# Patient Record
Sex: FEMALE | Race: WHITE | NOT HISPANIC OR LATINO | Employment: OTHER | URBAN - METROPOLITAN AREA
[De-identification: names, ages, dates, MRNs, and addresses within clinical notes are randomized per-mention and may not be internally consistent; named-entity substitution may affect disease eponyms.]

---

## 2018-10-23 ENCOUNTER — HOSPITAL ENCOUNTER (EMERGENCY)
Facility: HOSPITAL | Age: 33
Discharge: HOME | End: 2018-10-23
Attending: EMERGENCY MEDICINE
Payer: MEDICARE

## 2018-10-23 VITALS
RESPIRATION RATE: 20 BRPM | TEMPERATURE: 98 F | OXYGEN SATURATION: 95 % | HEART RATE: 88 BPM | DIASTOLIC BLOOD PRESSURE: 70 MMHG | SYSTOLIC BLOOD PRESSURE: 130 MMHG

## 2018-10-23 DIAGNOSIS — S33.5XXA LOW BACK SPRAIN, INITIAL ENCOUNTER: Primary | ICD-10-CM

## 2018-10-23 PROCEDURE — 63600175 PHARM REV CODE 636 W HCPCS: Performed by: PHYSICIAN ASSISTANT

## 2018-10-23 PROCEDURE — 99284 EMERGENCY DEPT VISIT MOD MDM: CPT

## 2018-10-23 PROCEDURE — 96372 THER/PROPH/DIAG INJ SC/IM: CPT | Mod: 59

## 2018-10-23 RX ORDER — ORPHENADRINE CITRATE 30 MG/ML
60 INJECTION INTRAMUSCULAR; INTRAVENOUS
Status: COMPLETED | OUTPATIENT
Start: 2018-10-23 | End: 2018-10-23

## 2018-10-23 RX ORDER — KETOROLAC TROMETHAMINE 30 MG/ML
60 INJECTION, SOLUTION INTRAMUSCULAR; INTRAVENOUS
Status: COMPLETED | OUTPATIENT
Start: 2018-10-23 | End: 2018-10-23

## 2018-10-23 RX ORDER — ORPHENADRINE CITRATE 100 MG/1
100 TABLET, EXTENDED RELEASE ORAL 2 TIMES DAILY
Qty: 20 TABLET | Refills: 0 | Status: SHIPPED | OUTPATIENT
Start: 2018-10-23 | End: 2018-11-02

## 2018-10-23 RX ORDER — HYDROCODONE BITARTRATE AND ACETAMINOPHEN 7.5; 325 MG/1; MG/1
1 TABLET ORAL EVERY 6 HOURS PRN
Qty: 12 TABLET | Refills: 0 | Status: SHIPPED | OUTPATIENT
Start: 2018-10-23 | End: 2018-11-02

## 2018-10-23 RX ORDER — DEXAMETHASONE SODIUM PHOSPHATE 4 MG/ML
8 INJECTION, SOLUTION INTRA-ARTICULAR; INTRALESIONAL; INTRAMUSCULAR; INTRAVENOUS; SOFT TISSUE
Status: COMPLETED | OUTPATIENT
Start: 2018-10-23 | End: 2018-10-23

## 2018-10-23 RX ADMIN — KETOROLAC TROMETHAMINE 60 MG: 30 INJECTION INTRAMUSCULAR; INTRAVENOUS at 07:10

## 2018-10-23 RX ADMIN — DEXAMETHASONE SODIUM PHOSPHATE 8 MG: 4 INJECTION, SOLUTION INTRAMUSCULAR; INTRAVENOUS at 07:10

## 2018-10-23 RX ADMIN — ORPHENADRINE CITRATE 60 MG: 30 INJECTION INTRAMUSCULAR; INTRAVENOUS at 07:10

## 2018-10-24 NOTE — ED PROVIDER NOTES
"SCRIBE #1 NOTE: I, Tiffanie Avery, am scribing for, and in the presence of, LUCA Wick Jr. I have scribed the entire note.      History      Chief Complaint   Patient presents with    Back Pain     pt reports hx of pinch nerve in lower back and states she thinks it is a "flare up"       Review of patient's allergies indicates:  No Known Allergies     HPI   HPI    10/23/2018, 7:01 PM   History obtained from the patient      History of Present Illness: Tianna Leon is a 33 y.o. female patient who presents to the Emergency Department for exacerbation of non-radiating chronic lower back pain which onset gradually several days ago. Symptoms are constant and mild in severity. She states that she was seeing pain management but quit pain management because she was receiving epidural injections which helped with the pain. She is currently taking Ibuprofen 600 mg for pain but the pain has not improved. Pain is worse with ambulation and movement. Patient denies direct back trauma/injury, fever, chills, saddle anesthesia, bowel/bladder incontinence, extremity weakness/numbness, gait problem, and all other sxs at this time. No further complaints or concerns at this time.       Arrival mode: Personal vehicle    PCP: Noris Palmer MD     Past Medical History:  Past medical history reviewed not relevant      Past Surgical History:  Past surgical history reviewed not relevant      Family History:  Family history reviewed not relevant      Social History:  Social History    Social History Main Topics    Social History Main Topics    Smoking status: Unknown if ever smoked    Smokeless tobacco: Unknown if ever used    Alcohol Use: Unknown drinking history    Drug Use: Unknown if ever used    Sexual Activity: Unknown     ROS   Review of Systems   Constitutional: Negative for chills and fever.   HENT: Negative for sore throat.    Respiratory: Negative for shortness of breath.    Cardiovascular: Negative for chest pain. "   Gastrointestinal: Negative for nausea.   Genitourinary: Negative for dysuria.   Musculoskeletal: Positive for back pain. Negative for arthralgias, joint swelling, neck pain and neck stiffness.        (-) direct back trauma/injury   Skin: Negative for rash.   Neurological: Negative for weakness and numbness.        (-) saddle anesthesia, (-) bladder/bowel incontinence, (-) paresthesias   Hematological: Does not bruise/bleed easily.   All other systems reviewed and are negative.      Physical Exam      Initial Vitals [10/23/18 1848]   BP Pulse Resp Temp SpO2   135/70 94 20 98 °F (36.7 °C) 95 %      MAP       --          Physical Exam  Nursing Notes and Vital Signs Reviewed.  Constitutional: Patient is in no acute distress. Awake and alert. Well-developed and well-nourished.  Head: Atraumatic. Normocephalic.  Eyes: PERRL. EOM intact. Conjunctivae are not pale. No scleral icterus.  ENT: Mucous membranes are moist. Oropharynx is clear and symmetric.    Neck: Supple. Full ROM.  Cardiovascular: Regular rate. Regular rhythm. No murmurs, rubs, or gallops. Distal pulses are 2+ and symmetric.  Pulmonary/Chest: No respiratory distress. Clear to auscultation bilaterally. No wheezing, rales, or rhonchi.  Abdominal: Non-distended.  Genitourinary: No CVA tenderness  Musculoskeletal: Moves all extremities. No obvious deformities. No edema. No calf tenderness.  Back: There is lumbar paraspinal muscle tenderness. No midline bony tenderness, deformities, or step-offs of the T-spine or L-spine. Skin appears normal without abrasions or bruising. No erythema, induration, or fluctuance.   Skin: Warm and dry.  Neurological: Awake, alert, and appropriate. GCS 15. Appropriate for age. DTRs 2+ and equal. Normal gait. No acute focal neurological deficits noted.  Psychiatric: Normal affect. Good eye contact. Appropriate in content.    ED Course    Procedures  ED Vital Signs:  Vitals:    10/23/18 1848   BP: 135/70   Pulse: 94   Resp: 20   Temp:  98 °F (36.7 °C)   TempSrc: Oral   SpO2: 95%     The Emergency Provider reviewed the vital signs and test results, which are outlined above.    ED Discussion     7:11 PM: Patient is requesting injections here for pain. Discussed pt dx and plan to tx with Norco and Norflex. Informed pt to follow up with PCP.  All questions and concerns were addressed at this time. Pt expresses understanding of information and instructions, and is comfortable with plan to discharge. Pt is stable for discharge.    I discussed with patient that evaluation in the ED does not suggest any emergent or life threatening medical conditions requiring immediate intervention beyond what was provided in the ED, and I believe patient is safe for discharge.  Regardless, an unremarkable evaluation in the ED does not preclude the development or presence of a serious of life threatening condition. As such, patient was instructed to return immediately for any worsening or change in current symptoms.      ED Medication(s):  Medications   ketorolac injection 60 mg (not administered)   orphenadrine injection 60 mg (not administered)   dexamethasone injection 8 mg (not administered)       Current Discharge Medication List      START taking these medications    Details   HYDROcodone-acetaminophen (NORCO) 7.5-325 mg per tablet Take 1 tablet by mouth every 6 (six) hours as needed for Pain.  Qty: 12 tablet, Refills: 0      orphenadrine (NORFLEX) 100 mg tablet Take 1 tablet (100 mg total) by mouth 2 (two) times daily. for 10 days  Qty: 20 tablet, Refills: 0             Follow-up Information     Noris Palmer MD In 1 week.    Specialty:  Family Medicine  Why:  If symptoms worsen  Contact information:  [address]  [facility] [address]  [phone]                    Medical Decision Making              Scribe Attestation:   Scribe #1: I performed the above scribed service and the documentation accurately describes the services I performed. I attest to  the accuracy of the note.  Attending:   Physician Attestation Statement for Scribe #1: I, LUCA Wick Jr., personally performed the services described in this documentation, as scribed by Tiffanie Avery, in my presence, and it is both accurate and complete.          Clinical Impression       ICD-10-CM ICD-9-CM   1. Low back sprain, initial encounter S33.5XXA 846.9       Disposition:   Disposition: Discharged  Condition: Stable           LUCA Morales  10/23/18 2138

## 2018-11-16 ENCOUNTER — TELEPHONE (OUTPATIENT)
Dept: PAIN MEDICINE | Facility: CLINIC | Age: 33
End: 2018-11-16

## 2018-11-16 NOTE — TELEPHONE ENCOUNTER
Contacted pt to offer earlier appt. Pt requesting to set up pain management appointment for pain medications. Informed pt that  and Dr. Kirkland is an INTERVENTIONAL pain specialist meaning they do procedures for pain management. Pt states she will contact  That referred her to verify as she was under the undestanding that she will be getting refills and medical pain management. All questions answered.//lp

## 2018-11-16 NOTE — TELEPHONE ENCOUNTER
----- Message from Antonette Moran sent at 11/16/2018 12:38 PM CST -----  Contact: self   Requesting a call back regarding if referral was received.please call back at [phone].      Thanks,  Antonette Moran

## 2018-11-28 ENCOUNTER — TELEPHONE (OUTPATIENT)
Dept: PAIN MEDICINE | Facility: CLINIC | Age: 33
End: 2018-11-28

## 2018-11-28 NOTE — TELEPHONE ENCOUNTER
Contacted pt to confirm that I spoke to pt 11/16/18. Contacted pt to offer earlier appt. Pt requesting to set up pain management appointment for pain medications. Informed pt that  and Dr. Kirkland is an INTERVENTIONAL pain specialist meaning they do procedures for pain management. Pt states she will contact  That referred her to verify as she was under the undestanding that she will be getting refills and medical pain management. All questions answered.//lp

## 2019-03-25 ENCOUNTER — OFFICE VISIT (OUTPATIENT)
Dept: OPHTHALMOLOGY | Facility: CLINIC | Age: 34
End: 2019-03-25
Payer: MEDICARE

## 2019-03-25 DIAGNOSIS — H52.203 MYOPIC ASTIGMATISM OF BOTH EYES: ICD-10-CM

## 2019-03-25 DIAGNOSIS — H52.13 MYOPIC ASTIGMATISM OF BOTH EYES: ICD-10-CM

## 2019-03-25 DIAGNOSIS — Z01.00 VISIT FOR EYE AND VISION EXAM: Primary | ICD-10-CM

## 2019-03-25 DIAGNOSIS — Z13.5 GLAUCOMA SCREENING: ICD-10-CM

## 2019-03-25 PROCEDURE — 92015 PR REFRACTION: ICD-10-PCS | Mod: S$GLB,,, | Performed by: OPTOMETRIST

## 2019-03-25 PROCEDURE — 99999 PR PBB SHADOW E&M-EST. PATIENT-LVL II: CPT | Mod: PBBFAC,,, | Performed by: OPTOMETRIST

## 2019-03-25 PROCEDURE — 92015 DETERMINE REFRACTIVE STATE: CPT | Mod: S$GLB,,, | Performed by: OPTOMETRIST

## 2019-03-25 PROCEDURE — 92004 COMPRE OPH EXAM NEW PT 1/>: CPT | Mod: S$GLB,,, | Performed by: OPTOMETRIST

## 2019-03-25 PROCEDURE — 99999 PR PBB SHADOW E&M-EST. PATIENT-LVL II: ICD-10-PCS | Mod: PBBFAC,,, | Performed by: OPTOMETRIST

## 2019-03-25 PROCEDURE — 92004 PR EYE EXAM, NEW PATIENT,COMPREHESV: ICD-10-PCS | Mod: S$GLB,,, | Performed by: OPTOMETRIST

## 2019-03-25 NOTE — PROGRESS NOTES
HPI     New Patient  Screening for glaucoma  RE  No visual complaints    Last edited by Leland Espitia MA on 3/25/2019  9:19 AM. (History)            Assessment /Plan     For exam results, see Encounter Report.    Visit for eye and vision exam    Glaucoma screening    Myopic astigmatism of both eyes      OH OK OU.  Spec Rx given.  RTC one year or prn.

## 2019-04-05 ENCOUNTER — TELEPHONE (OUTPATIENT)
Dept: OBSTETRICS AND GYNECOLOGY | Facility: CLINIC | Age: 34
End: 2019-04-05

## 2019-04-05 NOTE — TELEPHONE ENCOUNTER
Returned call to patient.  She requested an earlier appt than what she scheduled.  Says that it doesn't matter which provider and is okay with a NP, per patient.  Appointment scheduled 04/12/19 at 9:15 am with Ms. Stella Scott, NP, patient confirmed appointment, provider and location.

## 2019-04-05 NOTE — TELEPHONE ENCOUNTER
----- Message from Julisa Chaudhry sent at 4/5/2019  3:19 PM CDT -----  Contact: Patient  Patient called to reschedule her appointment. She can be contacted at [phone].    Thanks,  Julisa

## 2019-04-08 PROBLEM — J43.1 PANLOBULAR EMPHYSEMA: Status: ACTIVE | Noted: 2019-04-08

## 2019-05-10 ENCOUNTER — OFFICE VISIT (OUTPATIENT)
Dept: OBSTETRICS AND GYNECOLOGY | Facility: CLINIC | Age: 34
End: 2019-05-10
Payer: MEDICARE

## 2019-05-10 VITALS
HEIGHT: 64 IN | WEIGHT: 254 LBS | SYSTOLIC BLOOD PRESSURE: 118 MMHG | BODY MASS INDEX: 43.36 KG/M2 | DIASTOLIC BLOOD PRESSURE: 78 MMHG

## 2019-05-10 DIAGNOSIS — N94.6 DYSMENORRHEA: ICD-10-CM

## 2019-05-10 DIAGNOSIS — N81.11 CYSTOCELE, MIDLINE: ICD-10-CM

## 2019-05-10 DIAGNOSIS — Z01.419 WELL WOMAN EXAM WITH ROUTINE GYNECOLOGICAL EXAM: Primary | ICD-10-CM

## 2019-05-10 DIAGNOSIS — N39.3 FEMALE STRESS INCONTINENCE: ICD-10-CM

## 2019-05-10 PROCEDURE — 99999 PR PBB SHADOW E&M-EST. PATIENT-LVL II: CPT | Mod: PBBFAC,,, | Performed by: OBSTETRICS & GYNECOLOGY

## 2019-05-10 PROCEDURE — 99999 PR PBB SHADOW E&M-EST. PATIENT-LVL II: ICD-10-PCS | Mod: PBBFAC,,, | Performed by: OBSTETRICS & GYNECOLOGY

## 2019-05-10 PROCEDURE — G0101 CA SCREEN;PELVIC/BREAST EXAM: HCPCS | Mod: S$GLB,,, | Performed by: OBSTETRICS & GYNECOLOGY

## 2019-05-10 PROCEDURE — 87624 HPV HI-RISK TYP POOLED RSLT: CPT

## 2019-05-10 PROCEDURE — G0101 PR CA SCREEN;PELVIC/BREAST EXAM: ICD-10-PCS | Mod: S$GLB,,, | Performed by: OBSTETRICS & GYNECOLOGY

## 2019-05-10 PROCEDURE — 88175 CYTOPATH C/V AUTO FLUID REDO: CPT

## 2019-05-10 RX ORDER — OMEPRAZOLE 40 MG/1
CAPSULE, DELAYED RELEASE ORAL
Refills: 11 | COMMUNITY
Start: 2019-03-29 | End: 2019-06-07 | Stop reason: SDUPTHER

## 2019-05-10 RX ORDER — IBUPROFEN 800 MG/1
TABLET ORAL
Refills: 0 | Status: ON HOLD | COMMUNITY
Start: 2019-03-18 | End: 2019-07-23 | Stop reason: SDUPTHER

## 2019-05-10 RX ORDER — GABAPENTIN 300 MG/1
300 CAPSULE ORAL NIGHTLY
COMMUNITY
Start: 2019-03-06 | End: 2019-10-01 | Stop reason: SDUPTHER

## 2019-05-10 RX ORDER — MEDROXYPROGESTERONE ACETATE 10 MG/1
10 TABLET ORAL NIGHTLY
Status: ON HOLD | COMMUNITY
Start: 2019-05-05 | End: 2019-07-23

## 2019-05-10 RX ORDER — VARENICLINE TARTRATE 0.5 MG/1
0.5 TABLET, FILM COATED ORAL NIGHTLY
COMMUNITY
Start: 2019-05-06 | End: 2019-08-08

## 2019-05-13 ENCOUNTER — TELEPHONE (OUTPATIENT)
Dept: OBSTETRICS AND GYNECOLOGY | Facility: CLINIC | Age: 34
End: 2019-05-13

## 2019-05-13 NOTE — TELEPHONE ENCOUNTER
----- Message from Brenda Mcclain sent at 5/13/2019 12:14 PM CDT -----  Contact: pt  Type:  Patient Returning Call    Who Called: the pt   Who Left Message for Patient: unknown  Does the patient know what this is regarding?: Surgery appt  Would the patient rather a call back or a response via [facility]? Call back  Best Call Back Number: [phone]  Additional Information: n/a

## 2019-05-13 NOTE — TELEPHONE ENCOUNTER
Patient called stating that she had missed a call from the surgery scheduler.  Informed patient that I would send a message, and to be expecting another call from them.  Patient verbalized understanding.

## 2019-05-14 ENCOUNTER — TELEPHONE (OUTPATIENT)
Dept: OBSTETRICS AND GYNECOLOGY | Facility: CLINIC | Age: 34
End: 2019-05-14

## 2019-05-14 DIAGNOSIS — N39.3 FEMALE STRESS INCONTINENCE: ICD-10-CM

## 2019-05-14 DIAGNOSIS — N81.11 CYSTOCELE, MIDLINE: ICD-10-CM

## 2019-05-14 DIAGNOSIS — N94.6 DYSMENORRHEA: Primary | ICD-10-CM

## 2019-05-15 NOTE — PROGRESS NOTES
HPI:  34 y.o. female  presents today for well woman exam and complaint of severe pain with menses ever since her ectopic pregnancy surgery around 7 years ago.  She had right salpingectomy at that time.  Menses are regular with fairly heavy flow.  Pain is severe, and she has 2 days a month that she spends in bed due to severe pain, because she is unable to function.  She has previously tried hormonal contraception, which has not relieved the pain.  She reports she had a pelvic u/s with her previous ob/gyn that was negative.  Leep in past with normal paps since.  She also has bladder leakage with cough and sneeze and wears a pad daily.  She desires a hysterectomy with bladder lift for management of her symptoms.          REVIEW OF SYSTEMS:  GENERAL:  No fever, chills, fatigue, or weight loss  ABDOMEN:  Normal appetite, no weight loss or abdominal pain  URINARY:  No flank pain, dysuria, or hematuria  REPRODUCTIVE:  No abnormal vaginal bleeding  BREASTS:  No tenderness, masses, or nipple discharge noted of breasts    PHYSICAL EXAM:    APPEARANCE:  Well nourished, well developed, in no acute distress  ABDOMEN:  Soft, no tenderness or masses, no distension noted  PELVIC:  VULVA:  No lesions.  Normal female genitalia  URETHRAL MEATUS:  Normal size and location.  No lesions.  No prolapse  URETHRA:  No masses, tenderness, prolapse, or scarring, urethral hypermobility is present.  VAGINA:  No lesions or discharge.  Third degree cystocele, minimal rectocele  CERVIX:  No lesions.  Normal diameter, no cervical motion tenderness.  UTERUS:  4-6 week size, regular shape, mobile, non-tender, normal position, good support  ADNEXA:  No masses or tenderness  ANUS AND PERINEUM:  No lesions.  No external hemorrhoids    ASSESSMENT:  1. Well woman exam with routine gynecological exam  Liquid-based pap smear, screening    HPV High Risk Genotypes, PCR   2. Dysmenorrhea     3. Female stress incontinence     4. Cystocele, midline            PLAN:  Counseled patient on treatment options for her situation.  Due to severity of her symptoms, she desires to proceed with RALH, as well as TOT sling and anterior repair.  Will schedule surgery.  Will obtain prior records.    Discussed with the patient and all questioned fully answered. She will call me if any problems arise.    The patient indicates understanding of these issues and agrees with the plan.

## 2019-05-16 LAB
HPV HR 12 DNA CVX QL NAA+PROBE: NEGATIVE
HPV16 AG SPEC QL: NEGATIVE
HPV18 DNA SPEC QL NAA+PROBE: NEGATIVE

## 2019-05-18 ENCOUNTER — HOSPITAL ENCOUNTER (EMERGENCY)
Facility: HOSPITAL | Age: 34
Discharge: HOME | End: 2019-05-18
Attending: FAMILY MEDICINE
Payer: MEDICARE

## 2019-05-18 VITALS
HEIGHT: 65 IN | BODY MASS INDEX: 41.14 KG/M2 | TEMPERATURE: 98 F | DIASTOLIC BLOOD PRESSURE: 75 MMHG | RESPIRATION RATE: 16 BRPM | WEIGHT: 246.94 LBS | SYSTOLIC BLOOD PRESSURE: 158 MMHG | HEART RATE: 84 BPM | OXYGEN SATURATION: 97 %

## 2019-05-18 DIAGNOSIS — G89.29 CHRONIC BILATERAL LOW BACK PAIN WITH BILATERAL SCIATICA: ICD-10-CM

## 2019-05-18 DIAGNOSIS — H66.002 ACUTE SUPPURATIVE OTITIS MEDIA OF LEFT EAR WITHOUT SPONTANEOUS RUPTURE OF TYMPANIC MEMBRANE, RECURRENCE NOT SPECIFIED: Primary | ICD-10-CM

## 2019-05-18 DIAGNOSIS — M54.41 CHRONIC BILATERAL LOW BACK PAIN WITH BILATERAL SCIATICA: ICD-10-CM

## 2019-05-18 DIAGNOSIS — M54.42 CHRONIC BILATERAL LOW BACK PAIN WITH BILATERAL SCIATICA: ICD-10-CM

## 2019-05-18 PROCEDURE — 99284 EMERGENCY DEPT VISIT MOD MDM: CPT | Mod: 25

## 2019-05-18 PROCEDURE — 25000003 PHARM REV CODE 250: Performed by: NURSE PRACTITIONER

## 2019-05-18 PROCEDURE — 96372 THER/PROPH/DIAG INJ SC/IM: CPT

## 2019-05-18 PROCEDURE — 63600175 PHARM REV CODE 636 W HCPCS: Performed by: NURSE PRACTITIONER

## 2019-05-18 RX ORDER — HYDROCODONE BITARTRATE AND ACETAMINOPHEN 10; 325 MG/1; MG/1
1 TABLET ORAL
Status: COMPLETED | OUTPATIENT
Start: 2019-05-18 | End: 2019-05-18

## 2019-05-18 RX ORDER — TRAMADOL HYDROCHLORIDE 50 MG/1
50 TABLET ORAL EVERY 6 HOURS PRN
Qty: 15 TABLET | Refills: 0 | Status: SHIPPED | OUTPATIENT
Start: 2019-05-18 | End: 2019-05-29

## 2019-05-18 RX ORDER — LORAZEPAM 1 MG/1
1 TABLET ORAL
Status: COMPLETED | OUTPATIENT
Start: 2019-05-18 | End: 2019-05-18

## 2019-05-18 RX ORDER — AMOXICILLIN AND CLAVULANATE POTASSIUM 875; 125 MG/1; MG/1
1 TABLET, FILM COATED ORAL 2 TIMES DAILY
Qty: 14 TABLET | Refills: 0 | Status: SHIPPED | OUTPATIENT
Start: 2019-05-18 | End: 2019-05-28

## 2019-05-18 RX ORDER — CYCLOBENZAPRINE HCL 10 MG
10 TABLET ORAL 3 TIMES DAILY PRN
Qty: 28 TABLET | Refills: 0 | Status: SHIPPED | OUTPATIENT
Start: 2019-05-18 | End: 2019-05-23

## 2019-05-18 RX ORDER — ONDANSETRON 4 MG/1
4 TABLET, ORALLY DISINTEGRATING ORAL
Status: COMPLETED | OUTPATIENT
Start: 2019-05-18 | End: 2019-05-18

## 2019-05-18 RX ORDER — DEXAMETHASONE SODIUM PHOSPHATE 4 MG/ML
8 INJECTION, SOLUTION INTRA-ARTICULAR; INTRALESIONAL; INTRAMUSCULAR; INTRAVENOUS; SOFT TISSUE
Status: COMPLETED | OUTPATIENT
Start: 2019-05-18 | End: 2019-05-18

## 2019-05-18 RX ADMIN — ONDANSETRON 4 MG: 4 TABLET, ORALLY DISINTEGRATING ORAL at 07:05

## 2019-05-18 RX ADMIN — LORAZEPAM 1 MG: 1 TABLET ORAL at 07:05

## 2019-05-18 RX ADMIN — HYDROCODONE BITARTRATE AND ACETAMINOPHEN 1 TABLET: 10; 325 TABLET ORAL at 07:05

## 2019-05-18 RX ADMIN — DEXAMETHASONE SODIUM PHOSPHATE 8 MG: 4 INJECTION, SOLUTION INTRAMUSCULAR; INTRAVENOUS at 07:05

## 2019-05-19 NOTE — ED PROVIDER NOTES
SCRIBE #1 NOTE: I, Tiffanie Avery, am scribing for, and in the presence of, Louise Borges NP. I have scribed the entire note.       History     Chief Complaint   Patient presents with    Back Pain     chronic back pain, scheduled for injections in June, also c/o left ear pain.     Review of patient's allergies indicates:  No Known Allergies      History of Present Illness     HPI    5/18/2019, 7:05 PM  History obtained from the patient      History of Present Illness: Tianna Leon is a 34 y.o. female patient with a PMHx of chronic back pain who presents to the Emergency Department for evaluation of lower back pain which she has been having for 7 months. She denies any recent trauma or activity that could have exacerbating the pain. Patient notes that she just found a doctor that accepts her insurance and has an appt in June for injections. She has been taking Robaxin and takes Gabapentin daily w/o relief. The pain has been constant, moderate in severity, and radiates to her bilateral buttocks and legs. NIALL injections has helped in the past and PCP prescribed a few days course of Tramadol which helped. No exacerbating factors reported. Associated sx include nausea. She denies fever, chills, saddle anesthesia, bladder/bowel incontinence, extremity weakness/numbness, paresthesias. Patient also c/o L ear pain which onset 2 hours ago with associated ear drainage. Pt reports having ear infections 3 times in the past 3 months and is scheduled to see ENT in 2 weeks. She denies dental pain.    Arrival mode: Personal vehicle    PCP: Spenser Campa MD        Past Medical History:  Past Medical History:   Diagnosis Date    ADHD (attention deficit hyperactivity disorder)     Anxiety     Bipolar disorder     COPD (chronic obstructive pulmonary disease)     GERD (gastroesophageal reflux disease)     Hyperlipidemia     Intravenous drug abuse     MDD (major depressive disorder)     Mood disorder     PTSD  (post-traumatic stress disorder)     Suicidal ideation        Past Surgical History:  Past Surgical History:   Procedure Laterality Date     SECTION  2001    CHOLECYSTECTOMY      TONSILLECTOMY      TUBAL LIGATION           Family History:  No family history on file.    Social History:  Social History     Tobacco Use    Smoking status: Current Every Day Smoker     Packs/day: 1.50     Types: Cigarettes    Smokeless tobacco: Never Used   Substance and Sexual Activity    Alcohol use: Yes     Comment: Occassional    Drug use: Not Currently     Types: IV, Methamphetamines, Heroin    Sexual activity: Yes        Review of Systems     Review of Systems   Constitutional: Negative for chills and fever.   HENT: Positive for ear discharge and ear pain. Negative for dental problem and sore throat.    Respiratory: Negative for shortness of breath.    Cardiovascular: Negative for chest pain.   Gastrointestinal: Positive for nausea. Negative for abdominal pain and vomiting.   Genitourinary: Negative for dysuria.   Musculoskeletal: Positive for back pain. Negative for neck pain.        (-) acute trauma   Skin: Negative for rash.   Neurological: Negative for weakness and numbness.        (-) paresthesias, (-) saddle anesthesia, (-) bladder/bowel incontinence   Hematological: Does not bruise/bleed easily.   All other systems reviewed and are negative.     Physical Exam     Initial Vitals [19 1845]   BP Pulse Resp Temp SpO2   (!) 158/75 84 16 98.1 °F (36.7 °C) 97 %      MAP       --          Physical Exam  Nursing Notes and Vital Signs Reviewed.  Constitutional: Patient is in no acute distress. Well-developed and well-nourished.  Head: Atraumatic. Normocephalic.  Eyes: PERRL. EOM intact. Conjunctivae are not pale. No scleral icterus.  Ears: Right TM normal. Left TM with erythema, bulging, and effusion. No TM perforation noted bilaterally.   Nose: Patent nares. Turbinates are normal. No drainage.   Throat: Moist  "mucous membranes. Posterior oropharynx is symmetric without erythema. Tonsillar exudate is not present. No trismus. Normal handling of secretions. No stridor.  Neck: Supple. Full ROM. No lymphadenopathy.  Cardiovascular: Regular rate. Regular rhythm. No murmurs, rubs, or gallops. Distal pulses are 2+ and symmetric.  Pulmonary/Chest: No respiratory distress. Clear to auscultation bilaterally. No wheezing or rales.  Abdominal: Soft and non-distended.  There is no tenderness.  No rebound, guarding, or rigidity. Good bowel sounds.  Musculoskeletal: Moves all extremities. No obvious deformities. No edema. No calf tenderness.  Back: mild paraspinous tenderness to L spine. No midline bony tenderness, deformities, or step-offs of the T-spine or L-spine. Skin appears normal without abrasions or bruising. Full ROM to BLE, 5/5 strength, +2 bilateral pedal pulse.   Skin: Warm and dry.  Neurological:  Alert, awake, and appropriate.  Normal speech.  No acute focal neurological deficits are appreciated.  Psychiatric: Normal affect. Good eye contact. Appropriate in content.     ED Course   Procedures  ED Vital Signs:  Vitals:    05/18/19 1845   BP: (!) 158/75   Pulse: 84   Resp: 16   Temp: 98.1 °F (36.7 °C)   TempSrc: Oral   SpO2: 97%   Weight: 112 kg (246 lb 14.6 oz)   Height: 5' 5" (1.651 m)              The Emergency Provider reviewed the vital signs and test results, which are outlined above.     ED Discussion     7:46 PM: Discussed pt dx and plan to prescribe a few days worth of Tramadol for pain and Flexeril. Will prescribe Augmentin for ear infection. Keep scheduled appts. Gave pt all f/u and return to the ED instructions. All questions and concerns were addressed at this time. Pt expresses understanding of information and instructions, and is comfortable with plan to discharge. Pt is stable for discharge.    I discussed with patient and/or family/caretaker that evaluation in the ED does not suggest any emergent or life " threatening medical conditions requiring immediate intervention beyond what was provided in the ED, and I believe patient is safe for discharge.  Regardless, an unremarkable evaluation in the ED does not preclude the development or presence of a serious of life threatening condition. As such, patient was instructed to return immediately for any worsening or change in current symptoms.    ED Medication(s):  Medications   dexamethasone injection 8 mg (8 mg Intramuscular Given 5/18/19 1942)   HYDROcodone-acetaminophen  mg per tablet 1 tablet (1 tablet Oral Given 5/18/19 1941)   ondansetron disintegrating tablet 4 mg (4 mg Oral Given 5/18/19 1942)   LORazepam tablet 1 mg (1 mg Oral Given 5/18/19 1941)       Discharge Medication List as of 5/18/2019  7:46 PM      START taking these medications    Details   amoxicillin-clavulanate 875-125mg (AUGMENTIN) 875-125 mg per tablet Take 1 tablet by mouth 2 (two) times daily. for 10 days, Starting Sat 5/18/2019, Until Tue 5/28/2019, Print      cyclobenzaprine (FLEXERIL) 10 MG tablet Take 1 tablet (10 mg total) by mouth 3 (three) times daily as needed for Muscle spasms., Starting Sat 5/18/2019, Until Thu 5/23/2019, Print      traMADol (ULTRAM) 50 mg tablet Take 1 tablet (50 mg total) by mouth every 6 (six) hours as needed for Pain., Starting Sat 5/18/2019, Until Tue 5/28/2019, Print             Follow-up Information     Spenser Campa MD In 1 week.    Specialty:  Family Medicine  Why:  Follow up with your doctor for further evaluation, Return to ED for any concerns.  Contact information:  [address]  [address]  [facility] [address]  [phone]                                     Scribe Attestation:   Scribe #1: I performed the above scribed service and the documentation accurately describes the services I performed. I attest to the accuracy of the note.     Attending:   Physician Attestation Statement for Scribe #1: I, Louise Borges NP, personally performed the  services described in this documentation, as scribed by Tiffanie Avery, in my presence, and it is both accurate and complete.           Clinical Impression       ICD-10-CM ICD-9-CM   1. Acute suppurative otitis media of left ear without spontaneous rupture of tympanic membrane, recurrence not specified H66.002 382.00   2. Chronic bilateral low back pain with bilateral sciatica M54.42 724.2    M54.41 724.3    G89.29 338.29       Disposition:   Disposition: Discharged  Condition: Stable         Louise Borges NP  05/18/19 2035

## 2019-05-27 ENCOUNTER — CLINICAL SUPPORT (OUTPATIENT)
Dept: AUDIOLOGY | Facility: CLINIC | Age: 34
End: 2019-05-27
Payer: MEDICARE

## 2019-05-27 ENCOUNTER — OFFICE VISIT (OUTPATIENT)
Dept: OTOLARYNGOLOGY | Facility: CLINIC | Age: 34
End: 2019-05-27
Payer: MEDICARE

## 2019-05-27 VITALS
HEART RATE: 77 BPM | DIASTOLIC BLOOD PRESSURE: 92 MMHG | SYSTOLIC BLOOD PRESSURE: 134 MMHG | HEIGHT: 65 IN | BODY MASS INDEX: 42.24 KG/M2 | WEIGHT: 253.5 LBS | TEMPERATURE: 98 F

## 2019-05-27 DIAGNOSIS — H90.3 SENSORY HEARING LOSS, BILATERAL: Primary | ICD-10-CM

## 2019-05-27 DIAGNOSIS — H92.03 OTALGIA, BILATERAL: Primary | ICD-10-CM

## 2019-05-27 DIAGNOSIS — F17.200 SMOKER: ICD-10-CM

## 2019-05-27 PROCEDURE — 31575 DIAGNOSTIC LARYNGOSCOPY: CPT | Mod: S$GLB,,, | Performed by: ORTHOPAEDIC SURGERY

## 2019-05-27 PROCEDURE — 3008F BODY MASS INDEX DOCD: CPT | Mod: CPTII,S$GLB,, | Performed by: ORTHOPAEDIC SURGERY

## 2019-05-27 PROCEDURE — 92557 PR COMPREHENSIVE HEARING TEST: ICD-10-PCS | Mod: S$GLB,,, | Performed by: AUDIOLOGIST

## 2019-05-27 PROCEDURE — 92567 TYMPANOMETRY: CPT | Mod: S$GLB,,, | Performed by: AUDIOLOGIST

## 2019-05-27 PROCEDURE — 99204 PR OFFICE/OUTPT VISIT, NEW, LEVL IV, 45-59 MIN: ICD-10-PCS | Mod: 25,S$GLB,, | Performed by: ORTHOPAEDIC SURGERY

## 2019-05-27 PROCEDURE — 31575 PR LARYNGOSCOPY, FLEXIBLE; DIAGNOSTIC: ICD-10-PCS | Mod: S$GLB,,, | Performed by: ORTHOPAEDIC SURGERY

## 2019-05-27 PROCEDURE — 3008F PR BODY MASS INDEX (BMI) DOCUMENTED: ICD-10-PCS | Mod: CPTII,S$GLB,, | Performed by: ORTHOPAEDIC SURGERY

## 2019-05-27 PROCEDURE — 92557 COMPREHENSIVE HEARING TEST: CPT | Mod: S$GLB,,, | Performed by: AUDIOLOGIST

## 2019-05-27 PROCEDURE — 99204 OFFICE O/P NEW MOD 45 MIN: CPT | Mod: 25,S$GLB,, | Performed by: ORTHOPAEDIC SURGERY

## 2019-05-27 PROCEDURE — 99999 PR PBB SHADOW E&M-EST. PATIENT-LVL III: CPT | Mod: PBBFAC,,, | Performed by: ORTHOPAEDIC SURGERY

## 2019-05-27 PROCEDURE — 99999 PR PBB SHADOW E&M-EST. PATIENT-LVL III: ICD-10-PCS | Mod: PBBFAC,,, | Performed by: ORTHOPAEDIC SURGERY

## 2019-05-27 PROCEDURE — 92567 PR TYMPA2METRY: ICD-10-PCS | Mod: S$GLB,,, | Performed by: AUDIOLOGIST

## 2019-05-27 NOTE — PROGRESS NOTES
Tianna Leon was seen 05/27/2019 for an audiological evaluation.  Patient complains of chronic middle ear effusion for the past several months , especially in the left ear.  She reports that she is currently taking antibiotics as prescribed by her PCP.  She reports popping, crakcling,and a  muffled sensation in the left ear.     Results reveal a borderline normal-to-mild sensorineural hearing loss 250-8000 Hz for the right ear, and  mild-to-moderate sensorineural hearing loss 250-8000 Hz for the left ear.   Speech Reception Thresholds were  20 dBHL for the right ear and 25 dBHL for the left ear.   Word recognition scores were excellent for the right ear and excellent for the left ear.   Tympanograms were Type A for the right ear and Type A for the left ear.    Patient was counseled on the above findings.    REC:  ENT review of audiogram

## 2019-05-27 NOTE — PROGRESS NOTES
"Subjective:       Patient ID: Tianna Leon is a 34 y.o. female.    Chief Complaint: Otitis Media    Patient is a very pleasant 34 year old female here to see me today for evaluation of left otalgia.  She says that she has been diagnosed multiple times recently with left AOM.  She is currently on oral antibiotics.  She has a long history of "ear problems," and says that she required adenotonsillectomy as a teenager.  She says that she did well until February.  She has two episodes of PET as a child.  She has not had any issues with ear drainage, sometimes has slightly muffled hearing.  She does smoke, one pack daily for 15 years.  She had an audiogram today, her first in many years.  She has no known issues with TMJ, and last saw her dentist about a month ago, did not discuss at that time.  She is not having any dental pain at that time.    Review of Systems   Constitutional: Negative for chills, fatigue, fever and unexpected weight change.   HENT: Positive for ear pain and hearing loss (muffled at times, slight). Negative for congestion, dental problem, ear discharge, facial swelling, nosebleeds, postnasal drip, rhinorrhea, sinus pressure, sneezing, sore throat, tinnitus, trouble swallowing and voice change.    Eyes: Negative for redness, itching and visual disturbance.   Respiratory: Positive for cough. Negative for choking, shortness of breath and wheezing.    Cardiovascular: Negative for chest pain and palpitations.   Gastrointestinal: Negative for abdominal pain.        No reflux.   Musculoskeletal: Negative for gait problem.   Skin: Negative for rash.   Neurological: Negative for dizziness, light-headedness and headaches.       Objective:      Physical Exam   Constitutional: She is oriented to person, place, and time. She appears well-developed and well-nourished. No distress.   HENT:   Head: Normocephalic and atraumatic.   Right Ear: External ear and ear canal normal. Tympanic membrane is scarred. "   Left Ear: External ear and ear canal normal. Tympanic membrane is scarred.   Nose: Nose normal. No mucosal edema, rhinorrhea, nasal deformity or septal deviation. No epistaxis. Right sinus exhibits no maxillary sinus tenderness and no frontal sinus tenderness. Left sinus exhibits no maxillary sinus tenderness and no frontal sinus tenderness.   Mouth/Throat: Uvula is midline, oropharynx is clear and moist and mucous membranes are normal. Mucous membranes are not pale and not dry. No dental caries. No oropharyngeal exudate or posterior oropharyngeal erythema.   Eyes: Pupils are equal, round, and reactive to light. Conjunctivae, EOM and lids are normal. Right eye exhibits no chemosis. Left eye exhibits no chemosis. Right conjunctiva is not injected. Left conjunctiva is not injected. No scleral icterus. Right eye exhibits normal extraocular motion and no nystagmus. Left eye exhibits normal extraocular motion and no nystagmus.   Neck: Trachea normal and phonation normal. No tracheal tenderness present. No tracheal deviation present. No thyroid mass and no thyromegaly present.   Cardiovascular: Intact distal pulses.   Pulmonary/Chest: Effort normal. No stridor. No respiratory distress.   Abdominal: She exhibits no distension.   Lymphadenopathy:        Head (right side): No submental, no submandibular, no preauricular, no posterior auricular and no occipital adenopathy present.        Head (left side): No submental, no submandibular, no preauricular, no posterior auricular and no occipital adenopathy present.     She has no cervical adenopathy.   Neurological: She is alert and oriented to person, place, and time. No cranial nerve deficit.   Skin: Skin is warm and dry. No rash noted. No erythema.   Psychiatric: She has a normal mood and affect. Her behavior is normal.       AUDIOGRAM:  Results reveal a borderline normal-to-mild sensorineural hearing loss 250-8000 Hz for the right ear, and  mild-to-moderate sensorineural  hearing loss 250-8000 Hz for the left ear.   Speech Reception Thresholds were  20 dBHL for the right ear and 25 dBHL for the left ear.   Word recognition scores were excellent for the right ear and excellent for the left ear.   Tympanograms were Type A for the right ear and Type A for the left ear.      Due to indication in patient's history, presentation or risk factors,  a fiber optic exam was performed.    SEPARATE PROCEDURE NOTE:    ANESTHESIA:  Topical xylocaine with jonathan-synephrine  FINDINGS:  Normal exam      PROCEDURE:  After verbal consent was obtained, the flexible scope was passed through the patient's nasal cavity without difficulty.  The nasopharynx (adenoid pad) and eustachian tube orifices were first visualized and were found to be normal, without masses or irregularity.  The posterior pharyngeal wall and base of tongue were then examined and no mass or irregular tissue was seen.  The scope was then advanced to the larynx, and the epiglottis, valleculae, and piriform sinuses were normal, without masses or mucosal irregularity.  The false vocal folds and true vocal folds were then examined and were found to have normal mobility (full abduction and adduction) and no masses or mucosal irregularity was seen.  The arytenoids and the interarytenoid area were normal.      Assessment:       1. Otalgia, bilateral    2. Smoker        Plan:       1.  Bilateral otalgia:  Her ear exam today is completely normal, and she also has bilateral type a tympanograms on her audiogram also support neck there is no sign of any fluid or infection in her middle ear.  Twice in the last 4 weeks she has been diagnosed with acute otitis media, and she is on oral Augmentin.  However, if she truly did have an acute otitis media I would be surprised if her ear exam would be this improved at this point.  We did discuss other possible causes of ear pain other than ear infections including referred pain from the temporomandibular joint,  neck issues, as well as different neurological disorders that can be associated with ear pain including migraine.  She will please call the next time her ears are hurting, will have her come in to hopefully see someone in the ENT Department, or audiology if needed for tympanograms.  2.  Smoker:  Negative scope examination today for a mass or lesion in the base of tongue or hypopharynx as the cause for her otalgia.  Discussed importance of stopping smoking.

## 2019-05-28 ENCOUNTER — TELEPHONE (OUTPATIENT)
Dept: PAIN MEDICINE | Facility: CLINIC | Age: 34
End: 2019-05-28

## 2019-05-29 ENCOUNTER — HOSPITAL ENCOUNTER (OUTPATIENT)
Dept: RADIOLOGY | Facility: HOSPITAL | Age: 34
Discharge: HOME | End: 2019-05-29
Attending: PAIN MEDICINE
Payer: MEDICARE

## 2019-05-29 ENCOUNTER — OFFICE VISIT (OUTPATIENT)
Dept: PAIN MEDICINE | Facility: CLINIC | Age: 34
End: 2019-05-29
Payer: MEDICARE

## 2019-05-29 VITALS
DIASTOLIC BLOOD PRESSURE: 86 MMHG | RESPIRATION RATE: 17 BRPM | BODY MASS INDEX: 42.24 KG/M2 | WEIGHT: 253.5 LBS | HEART RATE: 87 BPM | HEIGHT: 65 IN | SYSTOLIC BLOOD PRESSURE: 123 MMHG

## 2019-05-29 DIAGNOSIS — M47.816 LUMBAR SPONDYLOSIS: ICD-10-CM

## 2019-05-29 DIAGNOSIS — M47.816 LUMBAR SPONDYLOSIS: Primary | ICD-10-CM

## 2019-05-29 PROCEDURE — 99999 PR PBB SHADOW E&M-EST. PATIENT-LVL IV: CPT | Mod: PBBFAC,,, | Performed by: PAIN MEDICINE

## 2019-05-29 PROCEDURE — 72100 X-RAY EXAM L-S SPINE 2/3 VWS: CPT | Mod: TC

## 2019-05-29 PROCEDURE — 72100 X-RAY EXAM L-S SPINE 2/3 VWS: CPT | Mod: 26,,, | Performed by: RADIOLOGY

## 2019-05-29 PROCEDURE — 99204 PR OFFICE/OUTPT VISIT, NEW, LEVL IV, 45-59 MIN: ICD-10-PCS | Mod: S$GLB,,, | Performed by: PAIN MEDICINE

## 2019-05-29 PROCEDURE — 99999 PR PBB SHADOW E&M-EST. PATIENT-LVL IV: ICD-10-PCS | Mod: PBBFAC,,, | Performed by: PAIN MEDICINE

## 2019-05-29 PROCEDURE — 99204 OFFICE O/P NEW MOD 45 MIN: CPT | Mod: S$GLB,,, | Performed by: PAIN MEDICINE

## 2019-05-29 PROCEDURE — 3008F PR BODY MASS INDEX (BMI) DOCUMENTED: ICD-10-PCS | Mod: CPTII,S$GLB,, | Performed by: PAIN MEDICINE

## 2019-05-29 PROCEDURE — 3008F BODY MASS INDEX DOCD: CPT | Mod: CPTII,S$GLB,, | Performed by: PAIN MEDICINE

## 2019-05-29 PROCEDURE — 72100 XR LUMBAR SPINE AP AND LATERAL: ICD-10-PCS | Mod: 26,,, | Performed by: RADIOLOGY

## 2019-05-29 RX ORDER — CYCLOBENZAPRINE HCL 10 MG
10 TABLET ORAL 3 TIMES DAILY PRN
COMMUNITY
End: 2019-08-08

## 2019-05-29 RX ORDER — GUAIFENESIN AND PHENYLEPHRINE HCL 400; 10 MG/1; MG/1
500 TABLET ORAL 2 TIMES DAILY
Qty: 60 CAPSULE | Refills: 3 | Status: SHIPPED | OUTPATIENT
Start: 2019-05-29 | End: 2019-10-01

## 2019-05-29 NOTE — PATIENT INSTRUCTIONS
-obtain lumbar x-ray  -schedule for bilateral lumbar medial branch blocks  -attempt to obtain records from previous pain clinic  -prescribed turmeric 500 mg twice daily  -follow up in clinic in 8 weeks

## 2019-05-29 NOTE — PROGRESS NOTES
Chief Pain Complaint:  Back Pain (consult back pain )    History of Present Illness:   This patient is a 34 y.o. female who presents today complaining of the above noted pain/s. The patient describes the pain as follows.  Ms. Leon is a new patient to clinic with complaints of low back pain which she currently rates as a 10/10.  She states she has been having pain off and on for the past 17 years and that standing and activity causes her symptoms to worsen.  She finds that if she coughs or sneezes this causes an exacerbation her symptoms.  She has been using pain medications undergone epidural steroid injections in the past and reports that she has also had lumbar medial branch blocks in preparation for radiofrequency ablation however she began feel better so she did not go through with the radiofrequency ablation.  She does report occasional radiating shin into the anterior thighs which is more burning and aching sensation.  She completed greater than 6 weeks of physical therapy in late 2018.  She has tried numerous types of therapies including aqua therapy, 10s unit, lumbar brace, ibuprofen, gabapentin, Robaxin, tramadol, Flexeril with minimal benefit.  She also describes having weakness in her bilateral lower extremities. She denies having bowel bladder difficulties.  She is due to go on vacation this coming Friday with her family to [place].    Previous Therapy:  Medications:  Ibuprofen, gabapentin, Robaxin, Toradol, Flexeril  Injections: Lumbar NIALL and Lumbar Medial Branch Blocks  Surgeries: None  Physical Therapy: Completed in the Past    Past Surgical History:   Procedure Laterality Date     SECTION      CHOLECYSTECTOMY      TONSILLECTOMY      TUBAL LIGATION       Imaging / Labs / Studies (reviewed on 2019):    Review of Systems:  Review of Systems   Constitutional: Negative for fever.   Eyes: Negative for blurred vision.   Respiratory: Negative for cough and wheezing.    Cardiovascular:  "Negative for chest pain and orthopnea.   Gastrointestinal: Negative for constipation, diarrhea, nausea and vomiting.   Genitourinary: Negative for dysuria.   Musculoskeletal: Positive for back pain.   Skin: Negative for itching and rash.   Neurological: Negative for weakness.   Endo/Heme/Allergies: Does not bruise/bleed easily.       Physical Exam:  /86 (BP Location: Right arm, Patient Position: Sitting)   Pulse 87   Resp 17   Ht 5' 5" (1.651 m)   Wt 115 kg (253 lb 8.5 oz)   BMI 42.19 kg/m²  (reviewed on 2019)\  General    Constitutional: She is oriented to person, place, and time. She appears well-developed and well-nourished.   HENT:   Head: Normocephalic and atraumatic.   Eyes: EOM are normal.   Neck: Neck supple.   Pulmonary/Chest: Effort normal.   Abdominal: She exhibits no distension.   Neurological: She is alert and oriented to person, place, and time. No cranial nerve deficit.   Psychiatric: She has a normal mood and affect.     General Musculoskeletal Exam   Gait: antalgic     Back (L-Spine & T-Spine) / Neck (C-Spine) Exam     Tenderness Right paramedian tenderness of the Lower L-Spine. Left paramedian tenderness of the Lower L-Spine.     Back (L-Spine & T-Spine) Range of Motion   Extension: normal   Flexion: normal   Lateral bend right: normal   Lateral bend left: normal   Rotation right: normal   Rotation left: normal     Spinal Sensation   Right Side Sensation  L-Spine Level: normal  Left Side Sensation  L-Spine Level: normal    Comments:  Negative straight leg raise bilaterally for radicular symptoms; increased pain with flexion and left and right lateral bending; tender palpation over lower lumbar facets      Muscle Strength   Right Lower Extremity   Hip Flexion: 5/5   Quadriceps:  5/5   Hamstrin/5   Anterior tibial:  5/5/5  Left Lower Extremity   Hip Flexion: 5/5   Quadriceps:  5/5   Hamstrin/5   Anterior tibial:  5/5/5     Reflexes     Left Side  Quadriceps:  2+  Achilles:  " 2+  Ankle Clonus:  absent    Right Side   Quadriceps:  2+  Achilles:  2+  Ankle Clonus:  absent      Assessment  Lumbar Spondylosis  Lumbar Radiculopathy    1. 34 y.o. year old patient with PMH of   Past Medical History:   Diagnosis Date    ADHD (attention deficit hyperactivity disorder)     Anxiety     Bipolar disorder     COPD (chronic obstructive pulmonary disease)     GERD (gastroesophageal reflux disease)     Hyperlipidemia     Intravenous drug abuse     MDD (major depressive disorder)     Mood disorder     PTSD (post-traumatic stress disorder)     Suicidal ideation       presenting with pain located lumbar spine  2. Pain Generators / Etiology: Lumbar Radiculopathy and Lumbar Spondylosis  3. Failed Meds (E- Effective, NE- Not Effective):  Ibuprofen-minimally effective, gabapentin-minimally effective, Robaxin-minimally effective, Toradol-minimally effective, Flexeril-minimally effective  4. Physical Therapy - Completed in the Past  5. Psychological comorbidities - None  6. Anticoagulants / Antiplatelets: None     PLAN:  1. Medications:  Continue all medications as prescribed; prescribed turmeric 500 mg twice daily    2. PT - continue physical therapy exercises at home  3. Psychological - none  4. Labs - obtain  none  5. Imaging - obtain lumbar x-ray  6. Interventions - schedule bilateral lumbar medial branch blocks  7. Referrals - none  8. Records - attempt to get records from previous pain clinic  9. Follow up visit - follow up in clinic in 8 weeks  10. Patient Questions - answered all of the patient's questions regarding diagnosis, therapy, and treatment  11.  This condition does not require this patient to take time off of work    ASHWINI Kirkland MD  Interventional Pain  [facility] [place]

## 2019-05-29 NOTE — LETTER
May 29, 2019      Spenser Campa MD  [address]  [address]  [place] [address]           [facility]  [address]  [place] [address]  Phone: [phone]  Fax: [phone]          Patient: Tianna Leon   MR Number: [id number]   YOB: 1985   Date of Visit: 5/29/2019       Dear Dr. Spenser Campa:    Thank you for referring Tianna Leon to me for evaluation. Attached you will find relevant portions of my assessment and plan of care.    If you have questions, please do not hesitate to call me. I look forward to following Tianna Leon along with you.    Sincerely,    Ned Kirkland MD    Enclosure  CC:  No Recipients    If you would like to receive this communication electronically, please contact [email or web address] or [phone] to request more information on [employer] access.    For providers and/or their staff who would like to refer a patient to [facility], please contact us through our one-stop-shop provider referral line, [facility], at [phone].    If you feel you have received this communication in error or would no longer like to receive these types of communications, please e-mail [email or web address]

## 2019-06-07 ENCOUNTER — OFFICE VISIT (OUTPATIENT)
Dept: OBSTETRICS AND GYNECOLOGY | Facility: CLINIC | Age: 34
End: 2019-06-07
Payer: MEDICARE

## 2019-06-07 ENCOUNTER — HOSPITAL ENCOUNTER (OUTPATIENT)
Dept: PREADMISSION TESTING | Facility: HOSPITAL | Age: 34
Discharge: HOME | End: 2019-06-07
Attending: OBSTETRICS & GYNECOLOGY
Payer: MEDICARE

## 2019-06-07 VITALS
SYSTOLIC BLOOD PRESSURE: 114 MMHG | WEIGHT: 253.31 LBS | DIASTOLIC BLOOD PRESSURE: 82 MMHG | BODY MASS INDEX: 42.15 KG/M2

## 2019-06-07 VITALS — WEIGHT: 250 LBS | BODY MASS INDEX: 41.65 KG/M2 | HEIGHT: 65 IN

## 2019-06-07 DIAGNOSIS — Z01.818 PREOP EXAMINATION: Primary | ICD-10-CM

## 2019-06-07 DIAGNOSIS — N94.89 OTHER SPECIFIED CONDITIONS ASSOCIATED WITH FEMALE GENITAL ORGANS AND MENSTRUAL CYCLE: ICD-10-CM

## 2019-06-07 PROCEDURE — 99999 PR PBB SHADOW E&M-EST. PATIENT-LVL III: CPT | Mod: PBBFAC,,, | Performed by: OBSTETRICS & GYNECOLOGY

## 2019-06-07 PROCEDURE — 99499 UNLISTED E&M SERVICE: CPT | Mod: S$GLB,,, | Performed by: OBSTETRICS & GYNECOLOGY

## 2019-06-07 PROCEDURE — 99999 PR PBB SHADOW E&M-EST. PATIENT-LVL III: ICD-10-PCS | Mod: PBBFAC,,, | Performed by: OBSTETRICS & GYNECOLOGY

## 2019-06-07 PROCEDURE — 99499 NO LOS: ICD-10-PCS | Mod: S$GLB,,, | Performed by: OBSTETRICS & GYNECOLOGY

## 2019-06-07 NOTE — DISCHARGE INSTRUCTIONS
To confirm, Your doctor has instructed you that surgery is scheduled for 06/11/19  at  1200noon.       Please report to [facility], [address], 1st floor, main lobby by 10:30am.  Pre admit office will call afternoon prior to surgery with final arrival time    INSTRUCTIONS IMPORTANT!!!   Do not eat, drink, or smoke after 12 midnight-including water. OK to brush teeth, no gum, candy or mints!    ¨ Take only these medicines with a small swallow of water-morning of surgery.  Use Albuterol , Fluticasone    Pre operative instructions:  Please review the Pre-Operative Instruction booklet that you were given.        Bathing Instructions--See page 6 in the Pre-operative booklet.      Prevention of surgical site infections:     -Keep incisions clean and dry.   -Do not soak/submerge incisions in water until completely healed.   -Do not apply lotions, powders, creams, or deodorants to site.   -Always make sure hands are cleaned with antibacterial soap/ alcohol-based                 prior to touching the surgical site.  (This includes doctors,                 nurses, staff, and yourself.)    Signs and symptoms:   -Redness and pain around the area where you had surgery   -Drainage of cloudy fluid from your surgical wound   -Fever over 100.4       I have read or had read and explained to me, and understand the above information.  Additional comments or instructions:  Received a copy of Pre-operative instructions booklet, FAQ surgical site infection sheet, and packets of hibiclens (if indicated).

## 2019-06-08 NOTE — PROGRESS NOTES
Patient presents today for preoperative visit.  She is scheduled for RALH with bilateral salpingectomy, TOT sling, and possible anterior repair on 6/11/19.  Procedure was explained to the patient and procedure specific consent form reviewed with the patient and signed by her.  All questions were answered to the patient's satisfaction.  Patient seems to understand the procedure, as well as risks and benefits associated with it.  Will plan to proceed with surgery as planned.  Patient desires to keep ovaries in place if they are normal.

## 2019-06-10 ENCOUNTER — TELEPHONE (OUTPATIENT)
Dept: OBSTETRICS AND GYNECOLOGY | Facility: CLINIC | Age: 34
End: 2019-06-10

## 2019-06-10 NOTE — TELEPHONE ENCOUNTER
----- Message from Cindy Florentino sent at 6/10/2019 11:12 AM CDT -----  Contact: pt  please call pt @ [phone] regarding procedure tomorrow,

## 2019-06-10 NOTE — TELEPHONE ENCOUNTER
Called patient and talked to her.  Patient stated that she talked to her insurance co. And they gave her a reference number [id number] and to call back this afternoon concerning prior authorization for surgery.      Patient made aware that Dr. Campa will have to clear her for surgery as well, per pre-op (anesthesia requesting).    Patient stated she will go to Dr. Campa's office today and I told her we will both try to get her surgery ok'd, but that it may have to be postponed.  Patient upset and stated all her arrangements have been made and wants surgery to proceed tomorrow.  Informed patient that if surgery is not authorized and if Dr. Campa does not clear her for surgery, it will have to be postponed.  Patient verbalized understanding.

## 2019-06-11 ENCOUNTER — CLINICAL SUPPORT (OUTPATIENT)
Dept: AUDIOLOGY | Facility: CLINIC | Age: 34
End: 2019-06-11
Payer: MEDICARE

## 2019-06-11 ENCOUNTER — OFFICE VISIT (OUTPATIENT)
Dept: OTOLARYNGOLOGY | Facility: CLINIC | Age: 34
End: 2019-06-11
Payer: MEDICARE

## 2019-06-11 VITALS
HEART RATE: 90 BPM | TEMPERATURE: 98 F | BODY MASS INDEX: 42.3 KG/M2 | WEIGHT: 254.19 LBS | DIASTOLIC BLOOD PRESSURE: 88 MMHG | SYSTOLIC BLOOD PRESSURE: 136 MMHG

## 2019-06-11 DIAGNOSIS — H92.03 OTALGIA, BILATERAL: Primary | ICD-10-CM

## 2019-06-11 DIAGNOSIS — H92.03 OTALGIA OF BOTH EARS: Primary | ICD-10-CM

## 2019-06-11 DIAGNOSIS — F17.200 SMOKER: ICD-10-CM

## 2019-06-11 DIAGNOSIS — M26.622 ARTHRALGIA OF LEFT TEMPOROMANDIBULAR JOINT: ICD-10-CM

## 2019-06-11 PROCEDURE — 99213 PR OFFICE/OUTPT VISIT, EST, LEVL III, 20-29 MIN: ICD-10-PCS | Mod: S$GLB,,, | Performed by: ORTHOPAEDIC SURGERY

## 2019-06-11 PROCEDURE — 3008F BODY MASS INDEX DOCD: CPT | Mod: CPTII,S$GLB,, | Performed by: ORTHOPAEDIC SURGERY

## 2019-06-11 PROCEDURE — 92567 PR TYMPA2METRY: ICD-10-PCS | Mod: S$GLB,,, | Performed by: AUDIOLOGIST-HEARING AID FITTER

## 2019-06-11 PROCEDURE — 92567 TYMPANOMETRY: CPT | Mod: S$GLB,,, | Performed by: AUDIOLOGIST-HEARING AID FITTER

## 2019-06-11 PROCEDURE — 3008F PR BODY MASS INDEX (BMI) DOCUMENTED: ICD-10-PCS | Mod: CPTII,S$GLB,, | Performed by: ORTHOPAEDIC SURGERY

## 2019-06-11 PROCEDURE — 99999 PR PBB SHADOW E&M-EST. PATIENT-LVL III: ICD-10-PCS | Mod: PBBFAC,,, | Performed by: ORTHOPAEDIC SURGERY

## 2019-06-11 PROCEDURE — 99999 PR PBB SHADOW E&M-EST. PATIENT-LVL III: CPT | Mod: PBBFAC,,, | Performed by: ORTHOPAEDIC SURGERY

## 2019-06-11 PROCEDURE — 99213 OFFICE O/P EST LOW 20 MIN: CPT | Mod: S$GLB,,, | Performed by: ORTHOPAEDIC SURGERY

## 2019-06-11 NOTE — PROGRESS NOTES
Tympanometry revealed Type A, normal in the right ear, and Type A, normal in the left ear.     Right Ear: 1.1ml@ -63 daPa, with ear canal volume of: 1.0ml    Left Ear:  0.4 ml@ -52 daPa, with ear canal volume of: 1.4ml    Patient was counseled with results.

## 2019-06-11 NOTE — TELEPHONE ENCOUNTER
Patient not cleared for surgery tomorrow due to elevated white blood cell count and ear infection.  She is on antibiotics and PCP requests that she reschedule surgery in 2-3 weeks.  Please reschedule her surgery.

## 2019-06-11 NOTE — PROGRESS NOTES
Subjective:       Patient ID: Tianna Leon is a 34 y.o. female.    Chief Complaint: Otitis Media (Left sided (frequently))    Patient is a very pleasant 34 year old female here to see me today for evaluation of her ear pain.  She was seen by PCP yesterday with left otalgia, and there was possible concern for a left AOM.  She says that she has not noted any change in her hearing.  She is clenching her teeth at night, she feels it is worse on the left side.  She also has noted clenching during the day.  She has a mouth piece from the dentist that she was given to wear for grinding, she is not wearing.  She is having increased nasal drainage.  She is still smoking, trying to stop.      Review of Systems   Constitutional: Negative for chills, fatigue, fever and unexpected weight change.   HENT: Positive for ear pain and hearing loss. Negative for congestion, dental problem, ear discharge, facial swelling, nosebleeds, postnasal drip, rhinorrhea, sinus pressure, sneezing, sore throat, tinnitus, trouble swallowing and voice change.    Eyes: Negative for redness, itching and visual disturbance.   Respiratory: Negative for cough, choking, shortness of breath and wheezing.    Cardiovascular: Negative for chest pain and palpitations.   Gastrointestinal: Negative for abdominal pain.        No reflux.   Musculoskeletal: Negative for gait problem.   Skin: Negative for rash.   Neurological: Negative for dizziness, light-headedness and headaches.       Objective:      Physical Exam   Constitutional: She is oriented to person, place, and time. She appears well-developed and well-nourished. No distress.   HENT:   Head: Normocephalic and atraumatic.   Right Ear: External ear and ear canal normal. Tympanic membrane is scarred. No middle ear effusion.   Left Ear: External ear and ear canal normal. Tympanic membrane is scarred.  No middle ear effusion.   Nose: Nose normal. No mucosal edema, rhinorrhea, nasal deformity or septal  deviation. No epistaxis. Right sinus exhibits no maxillary sinus tenderness and no frontal sinus tenderness. Left sinus exhibits no maxillary sinus tenderness and no frontal sinus tenderness.   Mouth/Throat: Uvula is midline, oropharynx is clear and moist and mucous membranes are normal. Mucous membranes are not pale and not dry. No dental caries. No oropharyngeal exudate or posterior oropharyngeal erythema.   Eyes: Pupils are equal, round, and reactive to light. Conjunctivae, EOM and lids are normal. Right eye exhibits no chemosis. Left eye exhibits no chemosis. Right conjunctiva is not injected. Left conjunctiva is not injected. No scleral icterus. Right eye exhibits normal extraocular motion and no nystagmus. Left eye exhibits normal extraocular motion and no nystagmus.   Neck: Trachea normal and phonation normal. No tracheal tenderness present. No tracheal deviation present. No thyroid mass and no thyromegaly present.   Cardiovascular: Intact distal pulses.   Pulmonary/Chest: Effort normal. No stridor. No respiratory distress.   Abdominal: She exhibits no distension.   Lymphadenopathy:        Head (right side): No submental, no submandibular, no preauricular, no posterior auricular and no occipital adenopathy present.        Head (left side): No submental, no submandibular, no preauricular, no posterior auricular and no occipital adenopathy present.     She has no cervical adenopathy.   Neurological: She is alert and oriented to person, place, and time. No cranial nerve deficit.   Skin: Skin is warm and dry. No rash noted. No erythema.   Psychiatric: She has a normal mood and affect. Her behavior is normal.       Tympanometry revealed Type A, normal in the right ear, and Type A, normal in the left ear.      Right Ear: 1.1ml@ -63 daPa, with ear canal volume of: 1.0ml     Left Ear:  0.4 ml@ -52 daPa, with ear canal volume of: 1.4ml      Assessment:       1. Otalgia, bilateral    2. Smoker    3. Arthralgia of left  temporomandibular joint        Plan:       1. Bilateral otalgia:  No fluid or infection on today's exam, and no fluid seen on audiology exam.  She has sclerosis of her TM, but no AOM.  Discussed with the patient that she has an elevated WBC, but the ears are not the source at this time.  No indication for PET placement at this point, continue to follow.  2.  TMJ:  We had a long discussion regarding the underlying pathology of temporomandibular joint dysfunction (TMD) as the cause of ear pain.  We further discussed conservative measures to treat TMD including avoiding gum and other foods that require lots of chewing, warm compresses, and scheduled antinflammatories.  If the pain persists, the patient will then schedule an appointment with a dentist for further evaluation.  3.  Smoker:  Trying to stop.

## 2019-06-12 ENCOUNTER — TELEPHONE (OUTPATIENT)
Dept: OBSTETRICS AND GYNECOLOGY | Facility: CLINIC | Age: 34
End: 2019-06-12

## 2019-06-13 ENCOUNTER — TELEPHONE (OUTPATIENT)
Dept: OBSTETRICS AND GYNECOLOGY | Facility: CLINIC | Age: 34
End: 2019-06-13

## 2019-06-13 NOTE — TELEPHONE ENCOUNTER
----- Message from Katerina Haynes sent at 6/13/2019 11:32 AM CDT -----  Contact: [phone]  Would  Like to consult with the nurse, patient  Has some question concerning some medication, that she was on and was told to stop taking  and would like to   speak with the nurse concerning this, please call back at [phone]

## 2019-06-13 NOTE — TELEPHONE ENCOUNTER
Spoke with pt, informed pt that we received her op clearance from her PCP. Pt asked if her insurance has approved her surgery. Pt advised that I do not know, but I would try and find out for her and let her know.

## 2019-06-18 ENCOUNTER — TELEPHONE (OUTPATIENT)
Dept: OBSTETRICS AND GYNECOLOGY | Facility: CLINIC | Age: 34
End: 2019-06-18

## 2019-06-18 NOTE — TELEPHONE ENCOUNTER
Christopher Colin Staff             Good afternoon Dr. Quintanilla and staff. I hav been contacted by a peer to peer specialist from [facility] regarding patient Tianna Leon mrn: [id number]. She is saying that you can set up a peer to peer because after being review by the medical director this case will be denied. Please have someone in your office contact to set up a p2p with Olery at [phone] and use ref# [id number] and the fax number is [phone]. This call need to take place before 12 noon on 06/19. Please let me know that you have received this message. Thank you     Christopher DOSHI   [phone]   Ext [phone]

## 2019-06-20 ENCOUNTER — TELEPHONE (OUTPATIENT)
Dept: OBSTETRICS AND GYNECOLOGY | Facility: CLINIC | Age: 34
End: 2019-06-20

## 2019-06-20 NOTE — TELEPHONE ENCOUNTER
----- Message from Adele Keane sent at 6/19/2019 12:27 PM CDT -----  Contact: Tessie Medina  Calling to give status of an authorization, Ph. [phone] ref# [id number]

## 2019-06-21 ENCOUNTER — TELEPHONE (OUTPATIENT)
Dept: PAIN MEDICINE | Facility: CLINIC | Age: 34
End: 2019-06-21

## 2019-06-21 NOTE — TELEPHONE ENCOUNTER
Contact patient. She stated she is having car trouble cannot get a  and will not make it for procedure. Will call at a later date to schedule procedure. No further concerns.

## 2019-06-21 NOTE — TELEPHONE ENCOUNTER
----- Message from Tisha Stone sent at 6/21/2019  9:40 AM CDT -----  Contact: PT   She's calling in regards to cancel appt,  6/21   pls call pt back at [phone] (home)

## 2019-07-02 ENCOUNTER — TELEPHONE (OUTPATIENT)
Dept: OBSTETRICS AND GYNECOLOGY | Facility: CLINIC | Age: 34
End: 2019-07-02

## 2019-07-02 NOTE — TELEPHONE ENCOUNTER
Patient unsure if she will be hospitalized over night with her surgery scheduled July 9.  Will send to Dr. Quintanilla for advice.

## 2019-07-02 NOTE — TELEPHONE ENCOUNTER
Patient advised that surgery should be same day discharge without complications and she verbalized understanding.

## 2019-07-02 NOTE — TELEPHONE ENCOUNTER
Spoke with Christopher DOSHI with precert regarding this patient's surgery.  She states the hysterectomy is approved, just not the bladder lift procedure.

## 2019-07-02 NOTE — TELEPHONE ENCOUNTER
----- Message from Yoana Cortes sent at 7/2/2019  3:32 PM CDT -----  Contact: self  Type:  Needs Medical Advice    Who Called: pt  Symptoms (please be specific):n/a   How long has patient had these symptoms:n/a  Pharmacy name and phone #: n/a  Would the patient rather a call back or a response via [facility]? Call back  Best Call Back Number: [phone]  Additional Information: requesting call back regarding questions about pt procedure on 07/09.    Thanks,  Yoana Cortes

## 2019-07-02 NOTE — TELEPHONE ENCOUNTER
Please advise patient that since we are just doing the hysterectomy without the bladder lift, she should be able to go home the same day.  However, we can always extend her stay overnight if needed.

## 2019-07-18 ENCOUNTER — TELEPHONE (OUTPATIENT)
Dept: OBSTETRICS AND GYNECOLOGY | Facility: CLINIC | Age: 34
End: 2019-07-18

## 2019-07-18 DIAGNOSIS — N94.6 DYSMENORRHEA: Primary | ICD-10-CM

## 2019-07-18 DIAGNOSIS — N39.3 FEMALE STRESS INCONTINENCE: ICD-10-CM

## 2019-07-18 DIAGNOSIS — N81.11 CYSTOCELE, MIDLINE: ICD-10-CM

## 2019-07-18 NOTE — TELEPHONE ENCOUNTER
----- Message from Дмитрий Ahuja sent at 7/18/2019  3:31 PM CDT -----  Contact: Pt  Please give the pt a call at ..[phone] (home) to have a surgery date rescheduled

## 2019-07-18 NOTE — TELEPHONE ENCOUNTER
Patient called to reschedule her ISAIAS surgery. Let her know that I will forward this message to Dr. Quintanilla. Patient verbalized understanding.

## 2019-07-22 ENCOUNTER — PATIENT MESSAGE (OUTPATIENT)
Dept: OBSTETRICS AND GYNECOLOGY | Facility: CLINIC | Age: 34
End: 2019-07-22

## 2019-07-22 DIAGNOSIS — Z01.818 PRE-OP TESTING: Primary | ICD-10-CM

## 2019-07-23 ENCOUNTER — TELEPHONE (OUTPATIENT)
Dept: OBSTETRICS AND GYNECOLOGY | Facility: HOSPITAL | Age: 34
End: 2019-07-23

## 2019-07-23 ENCOUNTER — ANESTHESIA (OUTPATIENT)
Dept: SURGERY | Facility: HOSPITAL | Age: 34
End: 2019-07-23
Payer: MEDICARE

## 2019-07-23 ENCOUNTER — ANESTHESIA EVENT (OUTPATIENT)
Dept: SURGERY | Facility: HOSPITAL | Age: 34
End: 2019-07-23
Payer: MEDICARE

## 2019-07-23 ENCOUNTER — CLINICAL SUPPORT (OUTPATIENT)
Dept: CARDIOLOGY | Facility: CLINIC | Age: 34
End: 2019-07-23
Payer: MEDICARE

## 2019-07-23 DIAGNOSIS — Z90.710 S/P LAPAROSCOPIC HYSTERECTOMY: Primary | ICD-10-CM

## 2019-07-23 DIAGNOSIS — Z01.818 PRE-OP TESTING: ICD-10-CM

## 2019-07-23 PROBLEM — R10.9 ABDOMINAL PAIN: Status: ACTIVE | Noted: 2019-07-23

## 2019-07-23 PROCEDURE — 25000003 PHARM REV CODE 250: Mod: HCWC | Performed by: NURSE ANESTHETIST, CERTIFIED REGISTERED

## 2019-07-23 PROCEDURE — 93010 EKG 12-LEAD: ICD-10-PCS | Mod: HCWC,S$GLB,, | Performed by: INTERNAL MEDICINE

## 2019-07-23 PROCEDURE — 93010 ELECTROCARDIOGRAM REPORT: CPT | Mod: HCWC,S$GLB,, | Performed by: INTERNAL MEDICINE

## 2019-07-23 PROCEDURE — 63600175 PHARM REV CODE 636 W HCPCS: Mod: HCWC | Performed by: NURSE ANESTHETIST, CERTIFIED REGISTERED

## 2019-07-23 RX ORDER — ROCURONIUM BROMIDE 10 MG/ML
INJECTION, SOLUTION INTRAVENOUS
Status: DISCONTINUED | OUTPATIENT
Start: 2019-07-23 | End: 2019-07-23

## 2019-07-23 RX ORDER — KETOROLAC TROMETHAMINE 30 MG/ML
INJECTION, SOLUTION INTRAMUSCULAR; INTRAVENOUS
Status: DISCONTINUED | OUTPATIENT
Start: 2019-07-23 | End: 2019-07-23

## 2019-07-23 RX ORDER — ONDANSETRON 8 MG/1
8 TABLET, ORALLY DISINTEGRATING ORAL EVERY 6 HOURS PRN
Qty: 20 TABLET | Refills: 0 | Status: SHIPPED | OUTPATIENT
Start: 2019-07-23 | End: 2019-08-08

## 2019-07-23 RX ORDER — FENTANYL CITRATE 50 UG/ML
INJECTION, SOLUTION INTRAMUSCULAR; INTRAVENOUS
Status: DISCONTINUED | OUTPATIENT
Start: 2019-07-23 | End: 2019-07-23

## 2019-07-23 RX ORDER — SODIUM CHLORIDE, SODIUM LACTATE, POTASSIUM CHLORIDE, CALCIUM CHLORIDE 600; 310; 30; 20 MG/100ML; MG/100ML; MG/100ML; MG/100ML
INJECTION, SOLUTION INTRAVENOUS CONTINUOUS PRN
Status: DISCONTINUED | OUTPATIENT
Start: 2019-07-23 | End: 2019-07-23

## 2019-07-23 RX ORDER — PROPOFOL 10 MG/ML
VIAL (ML) INTRAVENOUS
Status: DISCONTINUED | OUTPATIENT
Start: 2019-07-23 | End: 2019-07-23

## 2019-07-23 RX ORDER — ONDANSETRON 2 MG/ML
INJECTION INTRAMUSCULAR; INTRAVENOUS
Status: DISCONTINUED | OUTPATIENT
Start: 2019-07-23 | End: 2019-07-23

## 2019-07-23 RX ORDER — DEXAMETHASONE SODIUM PHOSPHATE 4 MG/ML
INJECTION, SOLUTION INTRA-ARTICULAR; INTRALESIONAL; INTRAMUSCULAR; INTRAVENOUS; SOFT TISSUE
Status: DISCONTINUED | OUTPATIENT
Start: 2019-07-23 | End: 2019-07-23

## 2019-07-23 RX ORDER — MIDAZOLAM HYDROCHLORIDE 1 MG/ML
INJECTION, SOLUTION INTRAMUSCULAR; INTRAVENOUS
Status: DISCONTINUED | OUTPATIENT
Start: 2019-07-23 | End: 2019-07-23

## 2019-07-23 RX ORDER — KETAMINE HYDROCHLORIDE 50 MG/ML
INJECTION, SOLUTION INTRAMUSCULAR; INTRAVENOUS
Status: DISCONTINUED | OUTPATIENT
Start: 2019-07-23 | End: 2019-07-23

## 2019-07-23 RX ORDER — CEFAZOLIN SODIUM 1 G/3ML
INJECTION, POWDER, FOR SOLUTION INTRAMUSCULAR; INTRAVENOUS
Status: DISCONTINUED | OUTPATIENT
Start: 2019-07-23 | End: 2019-07-23

## 2019-07-23 RX ORDER — LIDOCAINE HYDROCHLORIDE 10 MG/ML
INJECTION, SOLUTION EPIDURAL; INFILTRATION; INTRACAUDAL; PERINEURAL
Status: DISCONTINUED | OUTPATIENT
Start: 2019-07-23 | End: 2019-07-23

## 2019-07-23 RX ADMIN — PROPOFOL 100 MG: 10 INJECTION, EMULSION INTRAVENOUS at 01:07

## 2019-07-23 RX ADMIN — MIDAZOLAM 2 MG: 1 INJECTION INTRAMUSCULAR; INTRAVENOUS at 12:07

## 2019-07-23 RX ADMIN — KETAMINE HYDROCHLORIDE 50 MG: 50 INJECTION, SOLUTION, CONCENTRATE INTRAMUSCULAR; INTRAVENOUS at 12:07

## 2019-07-23 RX ADMIN — KETAMINE HYDROCHLORIDE 10 MG: 50 INJECTION, SOLUTION, CONCENTRATE INTRAMUSCULAR; INTRAVENOUS at 02:07

## 2019-07-23 RX ADMIN — KETOROLAC TROMETHAMINE 30 MG: 30 INJECTION, SOLUTION INTRAMUSCULAR; INTRAVENOUS at 02:07

## 2019-07-23 RX ADMIN — Medication 10 MG: at 02:07

## 2019-07-23 RX ADMIN — ONDANSETRON 4 MG: 2 INJECTION, SOLUTION INTRAMUSCULAR; INTRAVENOUS at 02:07

## 2019-07-23 RX ADMIN — PROPOFOL 100 MG: 10 INJECTION, EMULSION INTRAVENOUS at 12:07

## 2019-07-23 RX ADMIN — FENTANYL CITRATE 50 MCG: 50 INJECTION, SOLUTION INTRAMUSCULAR; INTRAVENOUS at 02:07

## 2019-07-23 RX ADMIN — LIDOCAINE HYDROCHLORIDE 50 MG: 10 INJECTION, SOLUTION EPIDURAL; INFILTRATION; INTRACAUDAL; PERINEURAL at 12:07

## 2019-07-23 RX ADMIN — FENTANYL CITRATE 100 MCG: 50 INJECTION, SOLUTION INTRAMUSCULAR; INTRAVENOUS at 02:07

## 2019-07-23 RX ADMIN — Medication 10 MG: at 01:07

## 2019-07-23 RX ADMIN — FENTANYL CITRATE 250 MCG: 50 INJECTION, SOLUTION INTRAMUSCULAR; INTRAVENOUS at 12:07

## 2019-07-23 RX ADMIN — ROCURONIUM BROMIDE 70 MG: 10 INJECTION, SOLUTION INTRAVENOUS at 12:07

## 2019-07-23 RX ADMIN — CEFAZOLIN 2 G: 1 INJECTION, POWDER, FOR SOLUTION INTRAMUSCULAR; INTRAVENOUS at 12:07

## 2019-07-23 RX ADMIN — ROCURONIUM BROMIDE 20 MG: 10 INJECTION, SOLUTION INTRAVENOUS at 01:07

## 2019-07-23 RX ADMIN — SUGAMMADEX 200 MG: 100 INJECTION, SOLUTION INTRAVENOUS at 02:07

## 2019-07-23 RX ADMIN — DEXAMETHASONE SODIUM PHOSPHATE 8 MG: 4 INJECTION, SOLUTION INTRA-ARTICULAR; INTRALESIONAL; INTRAMUSCULAR; INTRAVENOUS; SOFT TISSUE at 12:07

## 2019-07-23 RX ADMIN — SODIUM CHLORIDE, SODIUM LACTATE, POTASSIUM CHLORIDE, AND CALCIUM CHLORIDE: .6; .31; .03; .02 INJECTION, SOLUTION INTRAVENOUS at 12:07

## 2019-07-23 RX ADMIN — FENTANYL CITRATE 100 MCG: 50 INJECTION, SOLUTION INTRAMUSCULAR; INTRAVENOUS at 01:07

## 2019-07-23 RX ADMIN — KETAMINE HYDROCHLORIDE 10 MG: 50 INJECTION, SOLUTION, CONCENTRATE INTRAMUSCULAR; INTRAVENOUS at 01:07

## 2019-07-23 NOTE — TRANSFER OF CARE
"Anesthesia Transfer of Care Note    Patient: Tianna Leon    Procedure(s) Performed: Procedure(s) (LRB):  XI ROBOTIC HYSTERECTOMY (N/A)  XI ROBOTIC SALPINGECTOMY (Right)    Patient location: PACU    Anesthesia Type: general    Transport from OR: Transported from OR on room air with adequate spontaneous ventilation    Post pain: adequate analgesia    Post assessment: no apparent anesthetic complications and tolerated procedure well    Post vital signs: stable    Level of consciousness: responds to stimulation    Complications: none    Transfer of care protocol was followed      Last vitals:   Visit Vitals  /85 (BP Location: Right arm, Patient Position: Sitting)   Pulse 78   Temp 36.6 °C (97.9 °F) (Temporal)   Resp 18   Ht 5' 5" (1.651 m)   Wt 113.3 kg (249 lb 12.5 oz)   LMP 07/08/2019   SpO2 98%   Breastfeeding? No   BMI 41.57 kg/m²     "

## 2019-07-23 NOTE — ANESTHESIA PREPROCEDURE EVALUATION
07/23/2019  Tianna Leon is a 34 y.o., female.    Anesthesia Evaluation    I have reviewed the Patient Summary Reports.    I have reviewed the Nursing Notes.   I have reviewed the Medications.     Review of Systems  Anesthesia Hx:  No problems with previous Anesthesia Denies Hx of Anesthetic complications  Neg history of prior surgery. Denies Family Hx of Anesthesia complications.   Denies Personal Hx of Anesthesia complications.   Social:  Non-Smoker, No Alcohol Use    Hematology/Oncology:  Hematology Normal   Oncology Normal     EENT/Dental:EENT/Dental Normal   Cardiovascular:  Cardiovascular Normal Exercise tolerance: good  NYHA Classification I ECG has been reviewed.    Pulmonary:   COPD, mild Asthma    Renal/:  Renal/ Normal     Hepatic/GI:   GERD    Musculoskeletal:  Musculoskeletal Normal    Neurological:  Neurology Normal    Endocrine:  Endocrine Normal    Dermatological:  Skin Normal    Psych:   Psychiatric History          Physical Exam  General:  Well nourished, Obesity    Airway/Jaw/Neck:  Airway Findings: Mouth Opening: Normal Tongue: Normal  General Airway Assessment: Adult  Mallampati: I  TM Distance: Normal, at least 6 cm  Jaw/Neck Findings:  Neck ROM: Normal ROM     Eyes/Ears/Nose:  EYES/EARS/NOSE FINDINGS: Normal   Dental:  Dental Findings: In tact   Chest/Lungs:  Chest/Lungs Findings: Clear to auscultation     Heart/Vascular:  Heart Findings: Rate: Normal  Rhythm: Regular Rhythm  Sounds: Normal  Heart murmur: negative Vascular Findings: Normal    Abdomen:  Abdomen Findings: Normal    Musculoskeletal:  Musculoskeletal Findings: Normal   Skin:  Skin Findings: Normal    Mental Status:  Mental Status Findings: Normal        Anesthesia Plan  Type of Anesthesia, risks & benefits discussed:  Anesthesia Type:  general  Patient's Preference:   Intra-op Monitoring Plan: standard ASA  monitors  Intra-op Monitoring Plan Comments:   Post Op Pain Control Plan: per primary service following discharge from PACU  Post Op Pain Control Plan Comments:   Induction:   IV  Beta Blocker:  Patient is not currently on a Beta-Blocker (No further documentation required).       Informed Consent: Patient understands risks and agrees with Anesthesia plan.  Questions answered. Anesthesia consent signed with patient.  ASA Score: 3     Day of Surgery Review of History & Physical: I have interviewed and examined the patient. I have reviewed the patient's H&P dated:    H&P update referred to the surgeon.         Ready For Surgery From Anesthesia Perspective.

## 2019-07-24 ENCOUNTER — TELEPHONE (OUTPATIENT)
Dept: OBSTETRICS AND GYNECOLOGY | Facility: CLINIC | Age: 34
End: 2019-07-24

## 2019-07-24 RX ORDER — PROMETHAZINE HYDROCHLORIDE 12.5 MG/1
12.5 TABLET ORAL EVERY 6 HOURS PRN
Qty: 20 TABLET | Refills: 0 | Status: SHIPPED | OUTPATIENT
Start: 2019-07-24 | End: 2019-08-08

## 2019-07-24 NOTE — TELEPHONE ENCOUNTER
Please advise patient that it is fine for her to take 2 percocets every 6 hours, if that is what she needs.  Rx for promethazine sent to [facility] in [place].

## 2019-07-24 NOTE — ANESTHESIA POSTPROCEDURE EVALUATION
Anesthesia Post Evaluation    Patient: Tianna Leon    Procedure(s) Performed: Procedure(s) (LRB):  XI ROBOTIC HYSTERECTOMY (N/A)  XI ROBOTIC SALPINGECTOMY (Right)  XI ROBOTIC LYSIS, ADHESIONS (N/A)    Final Anesthesia Type: general  Patient location during evaluation: PACU  Patient participation: Yes- Able to Participate  Level of consciousness: awake and alert  Post-procedure vital signs: reviewed and stable  Pain management: adequate  Airway patency: patent  PONV status at discharge: No PONV  Anesthetic complications: no      Cardiovascular status: hemodynamically stable  Respiratory status: spontaneous ventilation  Hydration status: euvolemic  Follow-up not needed.          Vitals Value Taken Time   /70 7/23/2019  5:00 PM   Temp 37.1 °C (98.8 °F) 7/23/2019  2:41 PM   Pulse 77 7/23/2019  5:21 PM   Resp 20 7/23/2019  5:20 PM   SpO2 98 % 7/23/2019  5:21 PM   Vitals shown include unvalidated device data.      Event Time     Out of Recovery 17:24:51          Pain/Ginger Score: Pain Rating Prior to Med Admin: 10 (7/23/2019  6:24 PM)  Ginger Score: 10 (7/23/2019  5:00 PM)

## 2019-07-24 NOTE — TELEPHONE ENCOUNTER
Pt called to see if she could take two 5mg percocets because she still feels pain while even alternating with the Ibuprofen. She has been advised that she can but make sure to continue increasing water intake. She has taken promethazine for nausea that she is almost out of and would like a refill Message has been forwarded to provider and Pt has been made aware. DS

## 2019-07-24 NOTE — TELEPHONE ENCOUNTER
Patient advised ok to take 2 percocets if needed and the script for promethazine was sent to her pharmacy.  Patient verbalized understanding.

## 2019-07-27 ENCOUNTER — TELEPHONE (OUTPATIENT)
Dept: OBSTETRICS AND GYNECOLOGY | Facility: HOSPITAL | Age: 34
End: 2019-07-27

## 2019-07-27 DIAGNOSIS — Z90.710 STATUS POST LAPAROSCOPIC HYSTERECTOMY: Primary | ICD-10-CM

## 2019-07-27 RX ORDER — OXYCODONE AND ACETAMINOPHEN 5; 325 MG/1; MG/1
1 TABLET ORAL EVERY 4 HOURS PRN
Qty: 20 TABLET | Refills: 0 | Status: SHIPPED | OUTPATIENT
Start: 2019-07-27 | End: 2019-08-08

## 2019-07-27 NOTE — TELEPHONE ENCOUNTER
Pt called requesting refill of her Percocet.  States she does well with the medication but feels that pain is too much without it.  Is about to run out and would like a refill.  Pt was educated on goals of post-operative pain management and on warning signs.  Pt voiced understanding and still requests refill.  Percocet refill sent to pharmacy as requested and pt advised to follow up with Dr. Quintanilla as scheduled.

## 2019-07-30 ENCOUNTER — TELEPHONE (OUTPATIENT)
Dept: OBSTETRICS AND GYNECOLOGY | Facility: HOSPITAL | Age: 34
End: 2019-07-30

## 2019-07-31 NOTE — TELEPHONE ENCOUNTER
Can you schedule her for the PA clinic today or tomorrow.  She is 1 week post op.  I'm not in the office until Friday.

## 2019-07-31 NOTE — TELEPHONE ENCOUNTER
Called 7/30/2019--2000-    c/o increased anxiety, crying spells;   Inability to rest    Hx of mood disorder/depression--meds adjusted prior to surgery    Advised pt to call MD that treats her depression in am (wed)    Reassurance given to patient that she still has her ovaries and hormonally she should be as she was prior to surgery      May need earlier appt in PA clinic if unable to see dr hogan this week      Denies any post op physical problems

## 2019-08-01 ENCOUNTER — OFFICE VISIT (OUTPATIENT)
Dept: OBSTETRICS AND GYNECOLOGY | Facility: CLINIC | Age: 34
End: 2019-08-01
Payer: MEDICARE

## 2019-08-01 VITALS
DIASTOLIC BLOOD PRESSURE: 72 MMHG | SYSTOLIC BLOOD PRESSURE: 118 MMHG | WEIGHT: 255 LBS | BODY MASS INDEX: 42.49 KG/M2 | HEIGHT: 65 IN

## 2019-08-01 DIAGNOSIS — Z90.710 STATUS POST LAPAROSCOPIC HYSTERECTOMY: Primary | ICD-10-CM

## 2019-08-01 PROCEDURE — 99999 PR PBB SHADOW E&M-EST. PATIENT-LVL III: CPT | Mod: PBBFAC,HCWC,,

## 2019-08-01 PROCEDURE — 99024 POSTOP FOLLOW-UP VISIT: CPT | Mod: HCWC,S$GLB,, | Performed by: OBSTETRICS & GYNECOLOGY

## 2019-08-01 PROCEDURE — 99999 PR PBB SHADOW E&M-EST. PATIENT-LVL III: ICD-10-PCS | Mod: PBBFAC,HCWC,,

## 2019-08-01 PROCEDURE — 99024 PR POST-OP FOLLOW-UP VISIT: ICD-10-PCS | Mod: HCWC,S$GLB,, | Performed by: OBSTETRICS & GYNECOLOGY

## 2019-08-01 RX ORDER — OXYBUTYNIN CHLORIDE 5 MG/1
5 TABLET ORAL 2 TIMES DAILY
Qty: 60 TABLET | Refills: 0 | Status: SHIPPED | OUTPATIENT
Start: 2019-08-01 | End: 2019-09-05 | Stop reason: SDUPTHER

## 2019-08-01 RX ORDER — CLONAZEPAM 1 MG/1
1 TABLET ORAL 3 TIMES DAILY PRN
Qty: 90 TABLET | Refills: 0 | Status: SHIPPED | OUTPATIENT
Start: 2019-08-01 | End: 2019-10-01

## 2019-08-22 ENCOUNTER — OFFICE VISIT (OUTPATIENT)
Dept: OBSTETRICS AND GYNECOLOGY | Facility: CLINIC | Age: 34
End: 2019-08-22
Payer: MEDICARE

## 2019-08-22 VITALS
DIASTOLIC BLOOD PRESSURE: 92 MMHG | WEIGHT: 259.06 LBS | BODY MASS INDEX: 43.16 KG/M2 | HEIGHT: 65 IN | SYSTOLIC BLOOD PRESSURE: 120 MMHG

## 2019-08-22 DIAGNOSIS — Z98.890 POST-OPERATIVE STATE: Primary | ICD-10-CM

## 2019-08-22 DIAGNOSIS — N89.8 VAGINAL DISCHARGE: ICD-10-CM

## 2019-08-22 DIAGNOSIS — N76.0 VAGINAL CUFF CELLULITIS: ICD-10-CM

## 2019-08-22 PROCEDURE — 99999 PR PBB SHADOW E&M-EST. PATIENT-LVL III: ICD-10-PCS | Mod: PBBFAC,HCWC,, | Performed by: OBSTETRICS & GYNECOLOGY

## 2019-08-22 PROCEDURE — 99024 PR POST-OP FOLLOW-UP VISIT: ICD-10-PCS | Mod: HCWC,S$GLB,, | Performed by: OBSTETRICS & GYNECOLOGY

## 2019-08-22 PROCEDURE — 99999 PR PBB SHADOW E&M-EST. PATIENT-LVL III: CPT | Mod: PBBFAC,HCWC,, | Performed by: OBSTETRICS & GYNECOLOGY

## 2019-08-22 PROCEDURE — 87070 CULTURE OTHR SPECIMN AEROBIC: CPT | Mod: HCWC

## 2019-08-22 PROCEDURE — 99024 POSTOP FOLLOW-UP VISIT: CPT | Mod: HCWC,S$GLB,, | Performed by: OBSTETRICS & GYNECOLOGY

## 2019-08-22 RX ORDER — BUPROPION HYDROCHLORIDE 150 MG/1
150 TABLET, EXTENDED RELEASE ORAL
COMMUNITY
End: 2019-10-01

## 2019-08-22 RX ORDER — MIRTAZAPINE 15 MG/1
15 TABLET, FILM COATED ORAL
COMMUNITY
End: 2019-09-05

## 2019-08-22 RX ORDER — DIVALPROEX SODIUM 250 MG/1
250 TABLET, DELAYED RELEASE ORAL
COMMUNITY
End: 2019-10-01

## 2019-08-22 RX ORDER — AMOXICILLIN AND CLAVULANATE POTASSIUM 875; 125 MG/1; MG/1
1 TABLET, FILM COATED ORAL 2 TIMES DAILY
Qty: 20 TABLET | Refills: 0 | Status: SHIPPED | OUTPATIENT
Start: 2019-08-22 | End: 2019-09-01

## 2019-08-22 RX ORDER — METHOCARBAMOL 500 MG/1
TABLET, FILM COATED ORAL
COMMUNITY
Start: 2019-08-08 | End: 2019-09-05

## 2019-08-22 RX ORDER — DIVALPROEX SODIUM 500 MG/1
TABLET, DELAYED RELEASE ORAL
Refills: 0 | COMMUNITY
Start: 2019-08-17 | End: 2019-10-01

## 2019-08-22 NOTE — PROGRESS NOTES
Patient presents today for postoperative follow up.  Pt. underwent RALH/bilateral salpingectomy on 7/23/19.  Patient is recovering well overall, but reports lower pelvic pain for past week.  No fever or chills.  No vaginal bleeding.  C/o vaginal discharge also.  Still c/o urinary leakage with cough.    Physical Exam:  General - no acute distress  Abdomen - soft, nontender and nondistended.  No masses.  Incisions are well healed with no evidence of infection.  Pelvic - vaginal cuff intact.  Yellow/brown vaginal discharge noted.  Sutures still present.  No evidence of granulation tissue.  Bimanual exam shows no masses, but cuff tenderness is present, c/w cuff cellulitis.  Extremities - nontender and no edema noted.    Wet prep - positive for increased WBC's    Encounter Diagnoses   Name Primary?    Post-operative state Yes    Vaginal discharge     Vaginal cuff cellulitis        PLAN:    Augmentin Rx  Follow up 2 weeks to recheck vaginal cuff.  Continue pelvic rest  PFPT after fully healed from surgery.

## 2019-08-26 LAB — BACTERIA SPEC AEROBE CULT: NORMAL

## 2019-08-26 NOTE — PROGRESS NOTES
Subjective:       Patient ID: Tianna Leon is a 34 y.o. female.    Chief Complaint:  Post-op Evaluation (anxiety and pain)      History of Present Illness  HPI  Patient here today complaining of pain after her surgery.  She has high anxiety about her pain.      GYN & OB History  Patient's last menstrual period was 2019.   Date of Last Pap: 2019    OB History    Para Term  AB Living   2 1 1   1 1   SAB TAB Ectopic Multiple Live Births       1   1      # Outcome Date GA Lbr Pastor/2nd Weight Sex Delivery Anes PTL Lv   2 Ectopic               Birth Comments: right salpingectomy   1 Term      CS-Unspec   JADON       Review of Systems  Review of Systems        Objective:    Physical Exam       Assessment:        1. Status post laparoscopic hysterectomy                Plan:      Send anti-anxiolytic to pharmacy.

## 2019-09-05 ENCOUNTER — OFFICE VISIT (OUTPATIENT)
Dept: OBSTETRICS AND GYNECOLOGY | Facility: CLINIC | Age: 34
End: 2019-09-05
Payer: MEDICARE

## 2019-09-05 VITALS
BODY MASS INDEX: 45 KG/M2 | WEIGHT: 270.06 LBS | HEIGHT: 65 IN | DIASTOLIC BLOOD PRESSURE: 80 MMHG | SYSTOLIC BLOOD PRESSURE: 116 MMHG

## 2019-09-05 DIAGNOSIS — Z90.710 S/P LAPAROSCOPIC HYSTERECTOMY: Primary | ICD-10-CM

## 2019-09-05 DIAGNOSIS — N39.3 FEMALE STRESS INCONTINENCE: ICD-10-CM

## 2019-09-05 DIAGNOSIS — N32.81 OVERACTIVE BLADDER: ICD-10-CM

## 2019-09-05 PROBLEM — F60.3 BORDERLINE PERSONALITY DISORDER: Status: ACTIVE | Noted: 2019-01-21

## 2019-09-05 PROBLEM — E66.01 MORBID OBESITY WITH BMI OF 40.0-44.9, ADULT: Status: ACTIVE | Noted: 2017-02-10

## 2019-09-05 PROBLEM — J44.9 COPD (CHRONIC OBSTRUCTIVE PULMONARY DISEASE): Status: ACTIVE | Noted: 2017-12-29

## 2019-09-05 PROBLEM — F31.9 BIPOLAR 1 DISORDER: Status: ACTIVE | Noted: 2019-09-05

## 2019-09-05 PROCEDURE — 99999 PR PBB SHADOW E&M-EST. PATIENT-LVL III: ICD-10-PCS | Mod: PBBFAC,HCWC,, | Performed by: OBSTETRICS & GYNECOLOGY

## 2019-09-05 PROCEDURE — 99024 PR POST-OP FOLLOW-UP VISIT: ICD-10-PCS | Mod: HCWC,S$GLB,, | Performed by: OBSTETRICS & GYNECOLOGY

## 2019-09-05 PROCEDURE — 99999 PR PBB SHADOW E&M-EST. PATIENT-LVL III: CPT | Mod: PBBFAC,HCWC,, | Performed by: OBSTETRICS & GYNECOLOGY

## 2019-09-05 PROCEDURE — 99024 POSTOP FOLLOW-UP VISIT: CPT | Mod: HCWC,S$GLB,, | Performed by: OBSTETRICS & GYNECOLOGY

## 2019-09-05 RX ORDER — ZALEPLON 5 MG/1
5 CAPSULE ORAL
COMMUNITY
Start: 2019-08-30 | End: 2019-10-01

## 2019-09-05 RX ORDER — OXYBUTYNIN CHLORIDE 5 MG/1
5 TABLET ORAL 2 TIMES DAILY
Qty: 60 TABLET | Refills: 9 | Status: SHIPPED | OUTPATIENT
Start: 2019-09-05 | End: 2020-07-15

## 2019-09-05 RX ORDER — RISPERIDONE 0.5 MG/1
0.5 TABLET ORAL
COMMUNITY
Start: 2019-08-30 | End: 2019-10-01

## 2019-09-05 NOTE — PROGRESS NOTES
"Patient presents today for postoperative follow up.  Pt. underwent RALH with bilateral salpingectomy on 7/23/19.  Patient is recovering well and has no complaints today.  Abdominal pain has resolved.  No vaginal pain, bleeding, or discharge.  Urinary urgency symptoms have resolved with ditropan.  She continues to have stress incontinence with cough and sneeze.  Had another psych admission, due to "nervous breakdown".    Physical Exam:  General - no acute distress  Abdomen - soft, nontender and nondistended.  No masses.  Incisions are well healed with no evidence of infection.  Pelvic - deferred  Extremities - nontender and no edema noted.    Encounter Diagnoses   Name Primary?    S/P laparoscopic hysterectomy Yes    Female stress incontinence        PLAN:    Kegel exercise instructions given for stress incontinence.  Recommend pelvic floor physical therapy.    Continue ditropan.  Pelvic rest x 8 weeks.  Follow up as needed.      "

## 2019-10-01 PROBLEM — Z72.0 TOBACCO ABUSE: Status: ACTIVE | Noted: 2019-10-01

## 2019-11-01 ENCOUNTER — OFFICE VISIT (OUTPATIENT)
Dept: SURGERY | Facility: CLINIC | Age: 34
End: 2019-11-01
Payer: MEDICARE

## 2019-11-01 ENCOUNTER — TELEPHONE (OUTPATIENT)
Dept: SURGERY | Facility: CLINIC | Age: 34
End: 2019-11-01

## 2019-11-01 VITALS
TEMPERATURE: 99 F | DIASTOLIC BLOOD PRESSURE: 62 MMHG | BODY MASS INDEX: 47.31 KG/M2 | HEIGHT: 65 IN | HEART RATE: 76 BPM | WEIGHT: 283.94 LBS | SYSTOLIC BLOOD PRESSURE: 100 MMHG

## 2019-11-01 DIAGNOSIS — F17.200 SMOKING ADDICTION: Primary | ICD-10-CM

## 2019-11-01 PROCEDURE — 99999 PR PBB SHADOW E&M-EST. PATIENT-LVL III: CPT | Mod: PBBFAC,HCWC,, | Performed by: SURGERY

## 2019-11-01 PROCEDURE — 3008F PR BODY MASS INDEX (BMI) DOCUMENTED: ICD-10-PCS | Mod: HCWC,CPTII,S$GLB, | Performed by: SURGERY

## 2019-11-01 PROCEDURE — 3008F BODY MASS INDEX DOCD: CPT | Mod: HCWC,CPTII,S$GLB, | Performed by: SURGERY

## 2019-11-01 PROCEDURE — 99203 PR OFFICE/OUTPT VISIT, NEW, LEVL III, 30-44 MIN: ICD-10-PCS | Mod: HCWC,S$GLB,, | Performed by: SURGERY

## 2019-11-01 PROCEDURE — 99203 OFFICE O/P NEW LOW 30 MIN: CPT | Mod: HCWC,S$GLB,, | Performed by: SURGERY

## 2019-11-01 PROCEDURE — 99999 PR PBB SHADOW E&M-EST. PATIENT-LVL III: ICD-10-PCS | Mod: PBBFAC,HCWC,, | Performed by: SURGERY

## 2019-11-01 NOTE — TELEPHONE ENCOUNTER
----- Message from Kath Bob sent at 11/1/2019  4:04 PM CDT -----  Contact: Patient  Type: Needs Medical Advice    Who Called:  Patient  Symptoms (please be specific):  na  How long has patient had these symptoms:  tarsha  Pharmacy name and phone #:  tarsha  Best Call Back Number: [phone] (home)    Additional Information: Patient states that she does not qualify for the smoking cessation program and that she would like a call back to discuss what she can o next. Please call to advise, thank you!

## 2019-11-01 NOTE — PROGRESS NOTES
Tianna is 34-year-old white female patient who is a heavy smoker with a history of COPD and currently is short of breath being seen for the evaluation of weight loss surgery. According to the patient, she had done everything she can in order to lose excess weight without any success.  Her current BMI is 47.25 kilogram/meter sq and weighs 283 lb.  Patient had been a heavy smoker and wishes to gain health by losing excess weight.  The patient and I discussed the weight loss surgery algorithm.  We have discussed indication and contraindications to.  She has multiple comorbidities such as COPD, sleep apnea as well as psychiatric issues.  After about half an hour discussion, the patient has elected proceed with smoking cessation school program.  Once that she has completed, she will follow with me for a 2nd step of the program.  Total time approximately 20 min was spent with this patient, more than 50% of that was spent for treatment planning and counseling.    Hira Avendano

## 2019-11-04 ENCOUNTER — TELEPHONE (OUTPATIENT)
Dept: SURGERY | Facility: CLINIC | Age: 34
End: 2019-11-04

## 2019-11-04 NOTE — TELEPHONE ENCOUNTER
----- Message from Mary Goel sent at 11/4/2019 10:22 AM CST -----  Contact: patient  Patient states she is too young for the Smoking Cessation Program and would like to know what she should to in order to have surgery. Please call patient ASAP today @ [phone]. Thanks, cha

## 2019-11-07 ENCOUNTER — TELEPHONE (OUTPATIENT)
Dept: SURGERY | Facility: CLINIC | Age: 34
End: 2019-11-07

## 2019-11-07 NOTE — TELEPHONE ENCOUNTER
Returned call, informed pt our office contacted the smoking cessation dept and s/w Leonora/with [facility] smoking cessation program, states, she will contact the pt and answer any questions she may have. Pt voiced an understanding

## 2019-11-07 NOTE — TELEPHONE ENCOUNTER
----- Message from Yokasta Peng sent at 11/7/2019 12:57 PM CST -----  Contact: Pt  Pt is requesting call back in regards to questions about program other than Smoking Cessation due to her not being qualified because of age.          Pls call back at [phone]

## 2019-11-08 ENCOUNTER — TELEPHONE (OUTPATIENT)
Dept: SURGERY | Facility: CLINIC | Age: 34
End: 2019-11-08

## 2019-11-08 NOTE — TELEPHONE ENCOUNTER
----- Message from Angelita Krause MA sent at 11/8/2019  2:59 PM CST -----  Contact: pt      ----- Message -----  From: Tisha Stone  Sent: 11/8/2019   2:36 PM CST  To: Romana Iniguez Staff    She's calling in regards to speak with nurse; smoking program      Pt stated she found one       Pt request nurse give her a call [phone] (home)

## 2019-11-08 NOTE — TELEPHONE ENCOUNTER
Returned call, pt states, she enrolled in a smoking cessation program. Advised pt once she completes the program she will need a letter stating she completed the program. Pt voiced an understandking

## 2019-12-05 ENCOUNTER — HOSPITAL ENCOUNTER (EMERGENCY)
Facility: HOSPITAL | Age: 34
Discharge: HOME | End: 2019-12-05
Attending: EMERGENCY MEDICINE
Payer: MEDICARE

## 2019-12-05 VITALS
RESPIRATION RATE: 20 BRPM | DIASTOLIC BLOOD PRESSURE: 68 MMHG | TEMPERATURE: 98 F | OXYGEN SATURATION: 99 % | HEART RATE: 91 BPM | SYSTOLIC BLOOD PRESSURE: 126 MMHG

## 2019-12-05 DIAGNOSIS — S06.0X0A CONCUSSION WITHOUT LOSS OF CONSCIOUSNESS, INITIAL ENCOUNTER: ICD-10-CM

## 2019-12-05 DIAGNOSIS — M50.30 DDD (DEGENERATIVE DISC DISEASE), CERVICAL: ICD-10-CM
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
## 2019-12-05 DIAGNOSIS — S01.01XA LACERATION OF SCALP, INITIAL ENCOUNTER: Primary | ICD-10-CM

## 2019-12-05 DIAGNOSIS — M51.36 DDD (DEGENERATIVE DISC DISEASE), LUMBAR: ICD-10-CM

## 2019-12-05 PROCEDURE — 90715 TDAP VACCINE 7 YRS/> IM: CPT | Mod: HCWC | Performed by: EMERGENCY MEDICINE

## 2019-12-05 PROCEDURE — 90471 IMMUNIZATION ADMIN: CPT | Mod: HCWC | Performed by: EMERGENCY MEDICINE

## 2019-12-05 PROCEDURE — 25000003 PHARM REV CODE 250: Mod: HCWC | Performed by: EMERGENCY MEDICINE

## 2019-12-05 PROCEDURE — 12002 RPR S/N/AX/GEN/TRNK2.6-7.5CM: CPT | Mod: HCWC

## 2019-12-05 PROCEDURE — 63600175 PHARM REV CODE 636 W HCPCS: Mod: HCWC | Performed by: EMERGENCY MEDICINE

## 2019-12-05 PROCEDURE — 99285 EMERGENCY DEPT VISIT HI MDM: CPT | Mod: 25,HCWC

## 2019-12-05 RX ORDER — MORPHINE SULFATE 4 MG/ML
8 INJECTION, SOLUTION INTRAMUSCULAR; INTRAVENOUS
Status: COMPLETED | OUTPATIENT
Start: 2019-12-05 | End: 2019-12-05

## 2019-12-05 RX ADMIN — BACITRACIN ZINC NEOMYCIN SULFATE POLYMYXIN B SULFATE: 400; 3.5; 5 OINTMENT TOPICAL at 11:12

## 2019-12-05 RX ADMIN — MORPHINE SULFATE 8 MG: 4 INJECTION INTRAVENOUS at 11:12

## 2019-12-05 RX ADMIN — CLOSTRIDIUM TETANI TOXOID ANTIGEN (FORMALDEHYDE INACTIVATED), CORYNEBACTERIUM DIPHTHERIAE TOXOID ANTIGEN (FORMALDEHYDE INACTIVATED), BORDETELLA PERTUSSIS TOXOID ANTIGEN (GLUTARALDEHYDE INACTIVATED), BORDETELLA PERTUSSIS FILAMENTOUS HEMAGGLUTININ ANTIGEN (FORMALDEHYDE INACTIVATED), BORDETELLA PERTUSSIS PERTACTIN ANTIGEN, AND BORDETELLA PERTUSSIS FIMBRIAE 2/3 ANTIGEN 0.5 ML: 5; 2; 2.5; 5; 3; 5 INJECTION, SUSPENSION INTRAMUSCULAR at 01:12

## 2019-12-05 NOTE — ED NOTES
Assisted Dr. Burleson with placing staples to posterior head wound.  Area irrigated with sterile saline. Scrubbed with Hibiclens and saline. 5 staples placed. Edges well approximated and bleeding controlled.

## 2019-12-05 NOTE — ED NOTES
"Resting SF with c/o "my head is throbbing."   NAD noted.  Dried blood removed from left jaw and right forehead with NS and gauze.  "

## 2019-12-05 NOTE — ED PROVIDER NOTES
SCRIBE #1 NOTE: I, Ross Cleveland, am scribing for, and in the presence of, Tor Burleson MD. I have scribed the entire note.       History     Chief Complaint   Patient presents with    Fall     pt fell off ladder appr 5 feet, complaining of head and back pain.  unknown if LOC     Review of patient's allergies indicates:  No Known Allergies      History of Present Illness     HPI    12/5/2019, 10:20 AM  History obtained from the patient      History of Present Illness: Tianna Leon is a 34 y.o. female patient (current smoker, 1.5 ppd) with a PMHx of PTSD, major depressive disorder, IV drug abuse, HLD, GERD, COPD, cervical cancer, bipolar disorder, asthma, anxiety, and ADHD who presents to the Emergency Department for evaluation of back pain and HA following a fall which onset immediately PTA. Pt was on a 6ft ladder in her shed, and fell off of the second step onto concrete, hitting her head. Pt states she is unsure of whether she lost consciousness. Symptoms are constant and moderate in severity. No mitigating or exacerbating factors reported. Associated sxs include head trauma, blurred vision, back pain, and HA. Patient denies any neck pain, knee pain, hip pain, abdominal pain, CP, SOB, dizziness, and all other sxs at this time. No prior Tx reported. No further complaints or concerns at this time.        Arrival mode: EMS    PCP: Spenser Campa MD        Past Medical History:  Past Medical History:   Diagnosis Date    ADHD (attention deficit hyperactivity disorder)     Anxiety     Asthma     Bipolar disorder     Cancer     cervical    COPD (chronic obstructive pulmonary disease)     GERD (gastroesophageal reflux disease)     Hepatitis C antibody positive in blood     RNA UNDETECTED 5/18/2016    Hyperlipidemia     Intravenous drug abuse     MDD (major depressive disorder)     Mood disorder     PTSD (post-traumatic stress disorder)     Suicidal ideation        Past Surgical History:  Past  Surgical History:   Procedure Laterality Date     SECTION  2001    x2    CHOLECYSTECTOMY      LAPAROSCOPIC SALPINGECTOMY      ROBOT-ASSISTED LAPAROSCOPIC ABDOMINAL HYSTERECTOMY USING DA SEBASTIEN XI N/A 2019    Procedure: XI ROBOTIC HYSTERECTOMY;  Surgeon: Tabatha Quintanilla MD;  Location: [facility] OR;  Service: OB/GYN;  Laterality: N/A;    ROBOT-ASSISTED LAPAROSCOPIC LYSIS OF ADHESIONS USING DA SEBASTIEN XI N/A 2019    Procedure: XI ROBOTIC LYSIS, ADHESIONS;  Surgeon: Tabatha Quintanilla MD;  Location: [facility] OR;  Service: OB/GYN;  Laterality: N/A;    ROBOT-ASSISTED SURGICAL REMOVAL OF FALLOPIAN TUBE USING DA SEBASTIEN XI Right 2019    Procedure: XI ROBOTIC SALPINGECTOMY;  Surgeon: Tabatha Quintanilla MD;  Location: [facility] OR;  Service: OB/GYN;  Laterality: Right;    TONSILLECTOMY      TUBAL LIGATION           Family History:  Family History   Problem Relation Age of Onset    COPD Mother        Social History:  Social History     Tobacco Use    Smoking status: Current Every Day Smoker     Packs/day: 1.50     Types: Cigarettes    Smokeless tobacco: Never Used    Tobacco comment: no smoking after midnight prior to surgery   Substance and Sexual Activity    Alcohol use: Yes     Comment: Occassional     Drug use: Not Currently     Types: IV, Methamphetamines, Heroin     Comment: denies heroin use.  Last used meth 10/2018  No illegal drugs prior to sx    Sexual activity: Not Currently     Partners: Male     Birth control/protection: None        Review of Systems     Review of Systems   Constitutional: Negative for chills and fever.   HENT: Negative for sore throat.         (+) Head trauma   Eyes: Positive for visual disturbance (Blurred; since resolved).   Respiratory: Negative for cough and shortness of breath.    Cardiovascular: Negative for chest pain and leg swelling.   Gastrointestinal: Negative for abdominal pain, diarrhea, nausea and vomiting.   Genitourinary: Negative for dysuria.    Musculoskeletal: Positive for back pain. Negative for arthralgias (No knee or hip pain), neck pain and neck stiffness.   Skin: Negative for rash and wound.   Neurological: Positive for headaches. Negative for dizziness, weakness, light-headedness and numbness.   Hematological: Does not bruise/bleed easily.   All other systems reviewed and are negative.     Physical Exam     Initial Vitals [12/05/19 1017]   BP Pulse Resp Temp SpO2   (!) 145/75 100 18 98.3 °F (36.8 °C) 96 %      MAP       --          Physical Exam  Nursing Notes and Vital Signs Reviewed.  Constitutional: Patient is in no acute distress. Well-developed and well-nourished.  Head: Normocephalic. 5 cm v-shaped laceration to apex of scalp.  Eyes: PERRL. EOM intact. Conjunctivae are not pale. No scleral icterus.  ENT: Mucous membranes are moist. Oropharynx is clear and symmetric.    Neck: Supple. Full ROM. No lymphadenopathy. No cervical midline bony tenderness, deformities, or step-offs.   Back: No midline bony tenderness, deformities, or step-offs of the T-spine or L-spine. Skin appears normal without abrasions or bruising. No erythema, induration, or fluctuance.  Cardiovascular: Regular rate. Regular rhythm. No murmurs, rubs, or gallops. Distal pulses are 2+ and symmetric.  Pulmonary/Chest: No respiratory distress. Clear to auscultation bilaterally. No wheezing or rales.  Abdominal: Soft and non-distended.  There is no tenderness.  No rebound, guarding, or rigidity. Good bowel sounds.  Genitourinary: No CVA tenderness.  Musculoskeletal: Moves all extremities. No obvious deformities. No edema. No calf tenderness.  Skin: Warm and dry.  Neurological:  Alert, awake, and appropriate.  Normal speech.  No acute focal neurological deficits are appreciated. Normal gait. No pelvic instability. Neurologically in tact.  Psychiatric: Normal affect. Good eye contact. Appropriate in content.     ED Course   Lac Repair  Date/Time: 12/5/2019 11:20 AM  Performed by:  "Tor Burleson MD  Authorized by: Tor Burleson MD   Consent Done: Yes  Consent: Verbal consent obtained.  Risks and benefits: risks, benefits and alternatives were discussed  Consent given by: patient  Patient understanding: patient states understanding of the procedure being performed  Patient consent: the patient's understanding of the procedure matches consent given  Procedure consent: procedure consent matches procedure scheduled  Relevant documents: relevant documents present and verified  Test results: test results available and properly labeled  Site marked: the operative site was marked  Required items: required blood products, implants, devices, and special equipment available  Patient identity confirmed:  and name  Time out: Immediately prior to procedure a "time out" was called to verify the correct patient, procedure, equipment, support staff and site/side marked as required.  Body area: head/neck  Location details: scalp  Laceration length: 5 cm  Tendon involvement: none  Nerve involvement: none  Vascular damage: no    Anesthesia:  Local anesthesia used: no  Patient sedated: no  Preparation: Patient was prepped and draped in the usual sterile fashion.  Irrigation solution: saline  Irrigation method: jet lavage  Amount of cleaning: standard  Debridement: none  Degree of undermining: none  Skin closure: staples (5)  Technique: simple  Approximation: close  Approximation difficulty: simple  Dressing: antibiotic ointment  Patient tolerance: Patient tolerated the procedure well with no immediate complications        ED Vital Signs:  Vitals:    19 1017 19 1228 19 1329   BP: (!) 145/75 127/66 126/68   Pulse: 100 94 91   Resp: 18  20   Temp: 98.3 °F (36.8 °C)     TempSrc: Oral     SpO2: 96% 99% 99%       Abnormal Lab Results:  Labs Reviewed - No data to display     All Lab Results:  None    Imaging Results:  Imaging Results          CT Lumbar Spine Without Contrast (Final result)  Result " time 12/05/19 12:25:18    Final result by Lloyd Sandhu MD (12/05/19 12:25:18)                 Impression:      Circumferential disc bulges at the L3-4, L4-5 and L5-S1 levels.  Moderate central spinal stenosis at the L4-L5 level.  Narrowing of the left lateral recess greater than the right lateral recess.  Transitional L5 vertebra with bony neural foraminal narrowing on the left side and possible left-sided L5 nerve root impingement.  No acute injury/fracture identified.    All CT scans at this facility are performed  using dose modulation techniques as appropriate to performed exam including the following:  automated exposure control; adjustment of mA and/or kV according to the patients size (this includes techniques or standardized protocols for targeted exams where dose is matched to indication/reason for exam: i.e. extremities or head);  iterative reconstruction technique.      Electronically signed by: Lloyd Sandhu MD  Date:    12/05/2019  Time:    12:25             Narrative:    EXAMINATION:  CT LUMBAR SPINE WITHOUT CONTRAST    CLINICAL HISTORY:  T/L-spine trauma, significant injury suspected, +/- localizing signs;    FINDINGS:  The lumbar vertebral bodies demonstrate adequate alignment.The vertebral body heights are well-maintained.No fractures are identified.No secondary evidence of fracture (such as rahel-osseous hematoma) is identified.    Circumferential disc bulges at the L3-4 and L4-5 levels.  Mild degenerative change facets at the L4-5 and L5-S1 levels.  Bony neural foraminal narrowing seen most significantly at the L5-S1 level with possible left-sided L5 nerve root impingement.  L5 is a transitional vertebra.    Moderate central spinal stenosis at the L4-L5 level.                               CT Cervical Spine Without Contrast (Final result)  Result time 12/05/19 12:06:22    Final result by Lloyd Sandhu MD (12/05/19 12:06:22)                 Impression:      CT scan of the cervical spine is within  normal limits.  Minimal cervical scoliosis.    All CT scans at this facility are performed  using dose modulation techniques as appropriate to performed exam including the following:  automated exposure control; adjustment of mA and/or kV according to the patients size (this includes techniques or standardized protocols for targeted exams where dose is matched to indication/reason for exam: i.e. extremities or head);  iterative reconstruction technique.      Electronically signed by: Lloyd Sandhu MD  Date:    12/05/2019  Time:    12:06             Narrative:    EXAMINATION:  CT CERVICAL SPINE WITHOUT CONTRAST    CLINICAL HISTORY:  C-spine trauma, NEXUS/CCR positive, low risk;    TECHNIQUE:  Low dose axial images, sagittal and coronal reformations were performed though the cervical spine.  Contrast was not administered.    COMPARISON:  None    FINDINGS:  Minimal cervical scoliosis.    C2-C3: Normal.    C3-C4: Normal.    C4-C5: Normal.    C5-C6: Normal.    C6-C7: Normal.    C7-T1: Normal.                               CT Head Without Contrast (Final result)  Result time 12/05/19 12:04:16    Final result by Lloyd Sandhu MD (12/05/19 12:04:16)                 Impression:      Soft tissue injury to the scalp with gas in the subcutaneous fat adjacent to the convexity of the skull.  No skull fracture identified.  No acute intracranial abnormality..    All CT scans at this facility use dose modulation, iterative reconstruction and/or weight based dosing when appropriate to reduce radiation dose to as low as reasonably achievable.      Electronically signed by: Lloyd Sandhu MD  Date:    12/05/2019  Time:    12:04             Narrative:    EXAMINATION:  CT HEAD WITHOUT CONTRAST    CLINICAL HISTORY:  Head trauma, minor, GCS>=13, NOC/NEXUS/CCR positive, first study;    TECHNIQUE:  Axial CT images of the head were obtained without  contrast.    FINDINGS:  Ventricles, sulci, and cisterns are symmetric without evidence of mass  effect.  Brain volume and white matter are normal for age.  No intra or extraaxial masses, hemorrhages, abnormal fluid collections or abnormal calcifications. Gas identified within the subcutaneous fat of the high convexity scalp period.  Visualized sinuses and mastoid air cells are clear.                               X-Ray Pelvis Routine AP (Final result)  Result time 12/05/19 11:18:09    Final result by Lloyd Sandhu MD (12/05/19 11:18:09)                 Impression:      No acute abnormality.      Electronically signed by: Lloyd Sandhu MD  Date:    12/05/2019  Time:    11:18             Narrative:    EXAMINATION:  XR PELVIS ROUTINE AP    CLINICAL HISTORY:  Unspecified fall, initial encounter    TECHNIQUE:  AP view of the pelvis was performed.    COMPARISON:  None.    FINDINGS:  Study limited secondary to patient size.  No acute abnormality identified.                               X-Ray Chest AP Portable (Final result)  Result time 12/05/19 11:17:17    Final result by Lloyd Sandhu MD (12/05/19 11:17:17)                 Impression:      Normal single view of the chest.      Electronically signed by: Lloyd Sandhu MD  Date:    12/05/2019  Time:    11:17             Narrative:    EXAMINATION:  XR CHEST AP PORTABLE    CLINICAL HISTORY:  Fall;    TECHNIQUE:  Single frontal view of the chest was performed.    COMPARISON:  None    FINDINGS:  The lungs are clear, with normal appearance of pulmonary vasculature.  No pleural effusion or pneumothorax.    The cardiac silhouette is normal in size. The hilar and mediastinal contours are unremarkable.                                     The Emergency Provider reviewed the vital signs and test results, which are outlined above.     ED Discussion     12:51 PM: Reassessed pt at this time.  Pt states her condition has improved at this time. Discussed with pt all pertinent ED information and results. Discussed pt dx and plan of tx. Gave pt all f/u and return to the ED instructions.  All questions and concerns were addressed at this time. Pt expresses understanding of information and instructions, and is comfortable with plan to discharge. Pt is stable for discharge.    Closed Head Injury precautions were discussed with patient and/or family/caretaker; specifically that despite unrevealing CT scan or exam, a concussion can represent brain tissue injury.  Second impact syndrome was also discussed.    I discussed with patient and/or family/caretaker that evaluation in the ED does not suggest any emergent or life threatening medical conditions requiring immediate intervention beyond what was provided in the ED, and I believe patient is safe for discharge.  Regardless, an unremarkable evaluation in the ED does not preclude the development or presence of a serious of life threatening condition. As such, patient was instructed to return immediately for any worsening or change in current symptoms.       Medical Decision Making:   Clinical Tests:   Radiological Study: Ordered and Reviewed           ED Medication(s):  Medications   morphine injection 8 mg (8 mg Intramuscular Given 12/5/19 3493)   neomycin-bacitracin-polymyxin ointment ( Topical (Top) Given 12/5/19 1130)   DIPH,PERTUSS(ACEL),TET VAC(PF)(ADULT)(ADACEL)(TDaP) (0.5 mLs Intramuscular Given 12/5/19 1337)       Discharge Medication List as of 12/5/2019 12:52 PM          Follow-up Information     Spenser Campa MD In 1 week.    Specialty:  Family Medicine  Why:  For suture removal, For wound re-check  Contact information:  [address]  [address]  [place] [address] [phone]             [facility]. Go today.    Specialty:  Emergency Medicine  Why:  If symptoms worsen, For re-evaluation and further treatment, As needed  Contact information:  [address] [address] [facility] [address] [phone]                     Scribe Attestation:   Scribe #1: I performed the above scribed service and the  documentation accurately describes the services I performed. I attest to the accuracy of the note.     Attending:   Physician Attestation Statement for Scribe #1: I, Tor Burleson MD, personally performed the services described in this documentation, as scribed by Ross Cleveland, in my presence, and it is both accurate and complete.           Clinical Impression       ICD-10-CM ICD-9-CM   1. Laceration of scalp, initial encounter S01.01XA 873.0   2. Fall W19.XXXA E888.9   3. DDD (degenerative disc disease), cervical M50.30 722.4   4. DDD (degenerative disc disease), lumbar M51.36 722.52   5. Concussion without loss of consciousness, initial encounter S06.0X0A 850.0       Disposition:   Disposition: Discharged  Condition: Stable         Tor Burleson MD  12/05/19 3136

## 2019-12-11 ENCOUNTER — HOSPITAL ENCOUNTER (OUTPATIENT)
Dept: RADIOLOGY | Facility: HOSPITAL | Age: 34
Discharge: HOME | End: 2019-12-11
Attending: FAMILY MEDICINE
Payer: MEDICARE

## 2019-12-11 DIAGNOSIS — S06.0X0A CONCUSSION WITHOUT LOSS OF CONSCIOUSNESS, INITIAL ENCOUNTER: ICD-10-CM

## 2019-12-11 PROCEDURE — 70450 CT HEAD/BRAIN W/O DYE: CPT | Mod: TC,HCWC

## 2020-07-15 RX ORDER — OXYBUTYNIN CHLORIDE 5 MG/1
TABLET ORAL
Qty: 180 TABLET | Refills: 0 | Status: SHIPPED | OUTPATIENT
Start: 2020-07-15 | End: 2021-02-09

## 2021-10-22 ENCOUNTER — HOSPITAL ENCOUNTER (EMERGENCY)
Facility: HOSPITAL | Age: 36
Discharge: HOME | End: 2021-10-22
Attending: EMERGENCY MEDICINE
Payer: MEDICARE

## 2021-10-22 VITALS
DIASTOLIC BLOOD PRESSURE: 66 MMHG | TEMPERATURE: 98 F | OXYGEN SATURATION: 100 % | HEART RATE: 89 BPM | SYSTOLIC BLOOD PRESSURE: 103 MMHG | RESPIRATION RATE: 18 BRPM

## 2021-10-22 DIAGNOSIS — Z20.822 LAB TEST NEGATIVE FOR COVID-19 VIRUS: ICD-10-CM

## 2021-10-22 DIAGNOSIS — Z00.8 MEDICAL CLEARANCE FOR INCARCERATION: Primary | ICD-10-CM

## 2021-10-22 LAB
CTP QC/QA: YES
SARS-COV-2 RDRP RESP QL NAA+PROBE: NEGATIVE

## 2021-10-22 PROCEDURE — U0002 COVID-19 LAB TEST NON-CDC: HCPCS | Performed by: EMERGENCY MEDICINE

## 2021-10-22 PROCEDURE — 99282 EMERGENCY DEPT VISIT SF MDM: CPT

## 2022-02-24 PROBLEM — K21.9 GASTROESOPHAGEAL REFLUX DISEASE WITHOUT ESOPHAGITIS: Status: ACTIVE | Noted: 2022-02-24

## 2022-05-06 ENCOUNTER — HOSPITAL ENCOUNTER (INPATIENT)
Facility: HOSPITAL | Age: 37
LOS: 35 days | Discharge: NURSING, REHAB OR LONG-TERM CARE | DRG: 853 | End: 2022-06-10
Attending: EMERGENCY MEDICINE | Admitting: INTERNAL MEDICINE
Payer: MEDICARE

## 2022-05-06 DIAGNOSIS — I38 ENDOCARDITIS: ICD-10-CM

## 2022-05-06 DIAGNOSIS — U07.1 COVID-19: ICD-10-CM

## 2022-05-06 DIAGNOSIS — I33.0 VEGETATION OF HEART VALVE: ICD-10-CM

## 2022-05-06 DIAGNOSIS — I33.0 SUBACUTE BACTERIAL ENDOCARDITIS: ICD-10-CM

## 2022-05-06 DIAGNOSIS — J90 LOCULATED PLEURAL EFFUSION: ICD-10-CM

## 2022-05-06 DIAGNOSIS — R09.89 ENDOCARDITIS, SUSPECTED: ICD-10-CM

## 2022-05-06 DIAGNOSIS — R78.81 BACTEREMIA: ICD-10-CM

## 2022-05-06 DIAGNOSIS — D73.5 SPLENIC INFARCT: ICD-10-CM

## 2022-05-06 DIAGNOSIS — R06.02 SOB (SHORTNESS OF BREATH): ICD-10-CM

## 2022-05-06 DIAGNOSIS — J01.00 ACUTE NON-RECURRENT MAXILLARY SINUSITIS: ICD-10-CM

## 2022-05-06 DIAGNOSIS — M25.569 KNEE PAIN: ICD-10-CM

## 2022-05-06 DIAGNOSIS — R00.0 TACHYCARDIA: ICD-10-CM

## 2022-05-06 DIAGNOSIS — I33.0 ACUTE BACTERIAL ENDOCARDITIS: ICD-10-CM

## 2022-05-06 DIAGNOSIS — J85.1 ABSCESS OF LOWER LOBE OF RIGHT LUNG WITH PNEUMONIA: Primary | ICD-10-CM

## 2022-05-06 DIAGNOSIS — J86.9 EMPYEMA OF RIGHT PLEURAL SPACE: ICD-10-CM

## 2022-05-06 DIAGNOSIS — I33.0 INFECTIVE ENDOCARDITIS: ICD-10-CM

## 2022-05-06 PROBLEM — F19.90 IVDU (INTRAVENOUS DRUG USER): Status: ACTIVE | Noted: 2022-05-06

## 2022-05-06 PROBLEM — R65.20 SEVERE SEPSIS: Status: ACTIVE | Noted: 2022-05-06

## 2022-05-06 PROBLEM — J18.9 PNA (PNEUMONIA): Status: ACTIVE | Noted: 2022-05-06

## 2022-05-06 PROBLEM — A41.9 SEVERE SEPSIS: Status: ACTIVE | Noted: 2022-05-06

## 2022-05-06 LAB
ALBUMIN SERPL BCP-MCNC: 1.8 G/DL (ref 3.5–5.2)
ALP SERPL-CCNC: 139 U/L (ref 55–135)
ALT SERPL W/O P-5'-P-CCNC: 33 U/L (ref 10–44)
AMPHET+METHAMPHET UR QL: ABNORMAL
ANION GAP SERPL CALC-SCNC: 13 MMOL/L (ref 8–16)
APTT BLDCRRT: 29.6 SEC (ref 21–32)
AST SERPL-CCNC: 37 U/L (ref 10–40)
BACTERIA #/AREA URNS HPF: NORMAL /HPF
BARBITURATES UR QL SCN>200 NG/ML: NEGATIVE
BASOPHILS NFR BLD: 0 % (ref 0–1.9)
BENZODIAZ UR QL SCN>200 NG/ML: NEGATIVE
BILIRUB SERPL-MCNC: 1.4 MG/DL (ref 0.1–1)
BILIRUB UR QL STRIP: NEGATIVE
BNP SERPL-MCNC: 28 PG/ML (ref 0–99)
BUN SERPL-MCNC: 46 MG/DL (ref 6–20)
BZE UR QL SCN: ABNORMAL
CALCIUM SERPL-MCNC: 8.3 MG/DL (ref 8.7–10.5)
CANNABINOIDS UR QL SCN: NEGATIVE
CHLORIDE SERPL-SCNC: 89 MMOL/L (ref 95–110)
CLARITY UR: CLEAR
CO2 SERPL-SCNC: 28 MMOL/L (ref 23–29)
COLOR UR: YELLOW
CREAT SERPL-MCNC: 1.1 MG/DL (ref 0.5–1.4)
CREAT UR-MCNC: 83.8 MG/DL (ref 15–325)
CTP QC/QA: YES
DIFFERENTIAL METHOD: ABNORMAL
EOSINOPHIL NFR BLD: 0 % (ref 0–8)
ERYTHROCYTE [DISTWIDTH] IN BLOOD BY AUTOMATED COUNT: 14.2 % (ref 11.5–14.5)
EST. GFR  (AFRICAN AMERICAN): >60 ML/MIN/1.73 M^2
EST. GFR  (NON AFRICAN AMERICAN): >60 ML/MIN/1.73 M^2
ETHANOL SERPL-MCNC: <10 MG/DL
GLUCOSE SERPL-MCNC: 109 MG/DL (ref 70–110)
GLUCOSE UR QL STRIP: NEGATIVE
HCT VFR BLD AUTO: 27.8 % (ref 37–48.5)
HGB BLD-MCNC: 9.9 G/DL (ref 12–16)
HGB UR QL STRIP: ABNORMAL
HYALINE CASTS #/AREA URNS LPF: 1 /LPF
IMM GRANULOCYTES # BLD AUTO: ABNORMAL K/UL (ref 0–0.04)
IMM GRANULOCYTES NFR BLD AUTO: ABNORMAL % (ref 0–0.5)
INR PPP: 1.1 (ref 0.8–1.2)
KETONES UR QL STRIP: NEGATIVE
LACTATE SERPL-SCNC: 1.5 MMOL/L (ref 0.5–2.2)
LACTATE SERPL-SCNC: 1.5 MMOL/L (ref 0.5–2.2)
LEUKOCYTE ESTERASE UR QL STRIP: NEGATIVE
LYMPHOCYTES NFR BLD: 4 % (ref 18–48)
MCH RBC QN AUTO: 29.2 PG (ref 27–31)
MCHC RBC AUTO-ENTMCNC: 35.6 G/DL (ref 32–36)
MCV RBC AUTO: 82 FL (ref 82–98)
METAMYELOCYTES NFR BLD MANUAL: 3 %
METHADONE UR QL SCN>300 NG/ML: NEGATIVE
MICROSCOPIC COMMENT: NORMAL
MONOCYTES NFR BLD: 4 % (ref 4–15)
MYELOCYTES NFR BLD MANUAL: 2 %
NEUTROPHILS NFR BLD: 86 % (ref 38–73)
NEUTS BAND NFR BLD MANUAL: 1 %
NITRITE UR QL STRIP: NEGATIVE
NRBC BLD-RTO: 0 /100 WBC
OPIATES UR QL SCN: NEGATIVE
PCP UR QL SCN>25 NG/ML: NEGATIVE
PH UR STRIP: 6 [PH] (ref 5–8)
PLATELET # BLD AUTO: 171 K/UL (ref 150–450)
PMV BLD AUTO: 11.6 FL (ref 9.2–12.9)
POC MOLECULAR INFLUENZA A AGN: NEGATIVE
POC MOLECULAR INFLUENZA B AGN: NEGATIVE
POTASSIUM SERPL-SCNC: 3.4 MMOL/L (ref 3.5–5.1)
PROCALCITONIN SERPL IA-MCNC: 2.71 NG/ML
PROT SERPL-MCNC: 6 G/DL (ref 6–8.4)
PROT UR QL STRIP: ABNORMAL
PROTHROMBIN TIME: 11.7 SEC (ref 9–12.5)
RBC # BLD AUTO: 3.39 M/UL (ref 4–5.4)
RBC #/AREA URNS HPF: 2 /HPF (ref 0–4)
SARS-COV-2 RDRP RESP QL NAA+PROBE: NEGATIVE
SODIUM SERPL-SCNC: 130 MMOL/L (ref 136–145)
SP GR UR STRIP: 1.01 (ref 1–1.03)
TOXICOLOGY INFORMATION: ABNORMAL
TROPONIN I SERPL DL<=0.01 NG/ML-MCNC: 0.01 NG/ML (ref 0–0.03)
URN SPEC COLLECT METH UR: ABNORMAL
UROBILINOGEN UR STRIP-ACNC: 1 EU/DL
WBC # BLD AUTO: 29.13 K/UL (ref 3.9–12.7)
WBC #/AREA URNS HPF: 1 /HPF (ref 0–5)

## 2022-05-06 PROCEDURE — 80307 DRUG TEST PRSMV CHEM ANLYZR: CPT | Performed by: EMERGENCY MEDICINE

## 2022-05-06 PROCEDURE — 96365 THER/PROPH/DIAG IV INF INIT: CPT

## 2022-05-06 PROCEDURE — 83605 ASSAY OF LACTIC ACID: CPT | Mod: 91 | Performed by: EMERGENCY MEDICINE

## 2022-05-06 PROCEDURE — 84484 ASSAY OF TROPONIN QUANT: CPT | Performed by: EMERGENCY MEDICINE

## 2022-05-06 PROCEDURE — 25500020 PHARM REV CODE 255: Performed by: FAMILY MEDICINE

## 2022-05-06 PROCEDURE — 93010 ELECTROCARDIOGRAM REPORT: CPT | Mod: ,,, | Performed by: STUDENT IN AN ORGANIZED HEALTH CARE EDUCATION/TRAINING PROGRAM

## 2022-05-06 PROCEDURE — 63600175 PHARM REV CODE 636 W HCPCS: Performed by: FAMILY MEDICINE

## 2022-05-06 PROCEDURE — 63600175 PHARM REV CODE 636 W HCPCS: Performed by: EMERGENCY MEDICINE

## 2022-05-06 PROCEDURE — 85610 PROTHROMBIN TIME: CPT | Performed by: EMERGENCY MEDICINE

## 2022-05-06 PROCEDURE — 87040 BLOOD CULTURE FOR BACTERIA: CPT | Performed by: EMERGENCY MEDICINE

## 2022-05-06 PROCEDURE — 87502 INFLUENZA DNA AMP PROBE: CPT

## 2022-05-06 PROCEDURE — 25000003 PHARM REV CODE 250: Performed by: INTERNAL MEDICINE

## 2022-05-06 PROCEDURE — 87186 SC STD MICRODIL/AGAR DIL: CPT | Performed by: EMERGENCY MEDICINE

## 2022-05-06 PROCEDURE — 99291 CRITICAL CARE FIRST HOUR: CPT | Mod: 25

## 2022-05-06 PROCEDURE — 81000 URINALYSIS NONAUTO W/SCOPE: CPT | Mod: 59 | Performed by: EMERGENCY MEDICINE

## 2022-05-06 PROCEDURE — 93005 ELECTROCARDIOGRAM TRACING: CPT

## 2022-05-06 PROCEDURE — 93010 EKG 12-LEAD: ICD-10-PCS | Mod: ,,, | Performed by: STUDENT IN AN ORGANIZED HEALTH CARE EDUCATION/TRAINING PROGRAM

## 2022-05-06 PROCEDURE — 80053 COMPREHEN METABOLIC PANEL: CPT | Performed by: EMERGENCY MEDICINE

## 2022-05-06 PROCEDURE — 84145 PROCALCITONIN (PCT): CPT | Performed by: EMERGENCY MEDICINE

## 2022-05-06 PROCEDURE — 96361 HYDRATE IV INFUSION ADD-ON: CPT

## 2022-05-06 PROCEDURE — 11000001 HC ACUTE MED/SURG PRIVATE ROOM

## 2022-05-06 PROCEDURE — 85730 THROMBOPLASTIN TIME PARTIAL: CPT | Performed by: EMERGENCY MEDICINE

## 2022-05-06 PROCEDURE — 82077 ASSAY SPEC XCP UR&BREATH IA: CPT | Performed by: EMERGENCY MEDICINE

## 2022-05-06 PROCEDURE — 83880 ASSAY OF NATRIURETIC PEPTIDE: CPT | Performed by: EMERGENCY MEDICINE

## 2022-05-06 PROCEDURE — 85007 BL SMEAR W/DIFF WBC COUNT: CPT | Performed by: EMERGENCY MEDICINE

## 2022-05-06 PROCEDURE — 25000003 PHARM REV CODE 250: Performed by: FAMILY MEDICINE

## 2022-05-06 PROCEDURE — 87077 CULTURE AEROBIC IDENTIFY: CPT | Performed by: EMERGENCY MEDICINE

## 2022-05-06 PROCEDURE — U0002 COVID-19 LAB TEST NON-CDC: HCPCS | Performed by: EMERGENCY MEDICINE

## 2022-05-06 PROCEDURE — 85027 COMPLETE CBC AUTOMATED: CPT | Performed by: EMERGENCY MEDICINE

## 2022-05-06 RX ORDER — SODIUM CHLORIDE 0.9 % (FLUSH) 0.9 %
10 SYRINGE (ML) INJECTION
Status: DISCONTINUED | OUTPATIENT
Start: 2022-05-07 | End: 2022-06-10 | Stop reason: HOSPADM

## 2022-05-06 RX ORDER — ACETAMINOPHEN 500 MG
1000 TABLET ORAL
Status: COMPLETED | OUTPATIENT
Start: 2022-05-06 | End: 2022-05-06

## 2022-05-06 RX ORDER — SODIUM CHLORIDE 9 MG/ML
INJECTION, SOLUTION INTRAVENOUS CONTINUOUS
Status: DISCONTINUED | OUTPATIENT
Start: 2022-05-07 | End: 2022-05-14

## 2022-05-06 RX ORDER — ACETAMINOPHEN 325 MG/1
650 TABLET ORAL EVERY 6 HOURS PRN
Status: DISCONTINUED | OUTPATIENT
Start: 2022-05-07 | End: 2022-06-10 | Stop reason: HOSPADM

## 2022-05-06 RX ORDER — METRONIDAZOLE 500 MG/100ML
500 INJECTION, SOLUTION INTRAVENOUS
Status: DISCONTINUED | OUTPATIENT
Start: 2022-05-07 | End: 2022-05-09

## 2022-05-06 RX ORDER — ENOXAPARIN SODIUM 100 MG/ML
40 INJECTION SUBCUTANEOUS EVERY 24 HOURS
Status: DISCONTINUED | OUTPATIENT
Start: 2022-05-07 | End: 2022-05-31

## 2022-05-06 RX ORDER — CEFEPIME HYDROCHLORIDE 1 G/50ML
2 INJECTION, SOLUTION INTRAVENOUS
Status: DISCONTINUED | OUTPATIENT
Start: 2022-05-07 | End: 2022-05-09

## 2022-05-06 RX ORDER — ONDANSETRON 2 MG/ML
4 INJECTION INTRAMUSCULAR; INTRAVENOUS EVERY 6 HOURS PRN
Status: DISCONTINUED | OUTPATIENT
Start: 2022-05-07 | End: 2022-06-06

## 2022-05-06 RX ORDER — ACETAMINOPHEN 500 MG
500 TABLET ORAL EVERY 6 HOURS PRN
Status: ON HOLD | COMMUNITY
End: 2022-06-26

## 2022-05-06 RX ORDER — MORPHINE SULFATE 4 MG/ML
2 INJECTION, SOLUTION INTRAMUSCULAR; INTRAVENOUS EVERY 6 HOURS PRN
Status: DISCONTINUED | OUTPATIENT
Start: 2022-05-07 | End: 2022-05-08

## 2022-05-06 RX ORDER — IBUPROFEN 200 MG
200 TABLET ORAL EVERY 6 HOURS PRN
Status: ON HOLD | COMMUNITY
End: 2022-06-26

## 2022-05-06 RX ORDER — HYDROCODONE BITARTRATE AND ACETAMINOPHEN 5; 325 MG/1; MG/1
1 TABLET ORAL EVERY 6 HOURS PRN
Status: DISCONTINUED | OUTPATIENT
Start: 2022-05-07 | End: 2022-05-09

## 2022-05-06 RX ORDER — GUAIFENESIN AND DEXTROMETHORPHAN HYDROBROMIDE 1200; 60 MG/1; MG/1
1 TABLET, EXTENDED RELEASE ORAL
Status: ON HOLD | COMMUNITY
End: 2022-06-26

## 2022-05-06 RX ADMIN — VANCOMYCIN HYDROCHLORIDE 2250 MG: 10 INJECTION, POWDER, LYOPHILIZED, FOR SOLUTION INTRAVENOUS at 11:05

## 2022-05-06 RX ADMIN — IOHEXOL 100 ML: 350 INJECTION, SOLUTION INTRAVENOUS at 06:05

## 2022-05-06 RX ADMIN — SODIUM CHLORIDE 1000 ML: 0.9 INJECTION, SOLUTION INTRAVENOUS at 08:05

## 2022-05-06 RX ADMIN — PIPERACILLIN SODIUM AND TAZOBACTAM SODIUM 4.5 G: 4; .5 INJECTION, POWDER, FOR SOLUTION INTRAVENOUS at 05:05

## 2022-05-06 RX ADMIN — SODIUM CHLORIDE, SODIUM LACTATE, POTASSIUM CHLORIDE, AND CALCIUM CHLORIDE 2649 ML: .6; .31; .03; .02 INJECTION, SOLUTION INTRAVENOUS at 04:05

## 2022-05-06 RX ADMIN — ACETAMINOPHEN 1000 MG: 500 TABLET ORAL at 05:05

## 2022-05-06 NOTE — ED PROVIDER NOTES
SCRIBE #1 NOTE: ICherry, am scribing for, and in the presence of, Kyle Still MD. I have scribed the HPI, ROS, and PEx.     SCRIBE #2 NOTE: IAnuj, am scribing for, and in the presence of,  Maru Chan MD. I have scribed the remaining portions of the note not scribed by Scribe #1.     History      Chief Complaint   Patient presents with    Shortness of Breath     SOB and lower back pain x4 days.       Review of patient's allergies indicates:   Allergen Reactions    Sulfamethoxazole-trimethoprim         HPI   HPI    5/6/2022, 3:24 PM   History obtained from the patient      History of Present Illness: Tianna Leon is a 37 y.o. female patient with a PMHx of asthma, cancer, COPD, and HLD who presents to the Emergency Department for SOB which onset gradually 4 days PTA. Symptoms are constant and moderate in severity. No mitigating or exacerbating factors reported. Associated sxs include congestion, cough, lower back pain, fever (t now 101.6), R ankle swelling, R ring finger swelling, R knee swelling, and inability to walk due to R knee swelling. Patient denies any CP, diaphoresis, weakness, N/V/D, abdominal pain, dysuria, and all other sxs at this time. Prior Tx includes Mucinex. No further complaints or concerns at this time.         Arrival mode: EMS    PCP: Spenser Campa MD       Past Medical History:  Past Medical History:   Diagnosis Date    ADHD (attention deficit hyperactivity disorder)     Anxiety     Asthma     Bipolar disorder     Cancer     cervical    COPD (chronic obstructive pulmonary disease)     GERD (gastroesophageal reflux disease)     Hepatitis C antibody positive in blood     RNA UNDETECTED 5/18/2016    Hyperlipidemia     Intravenous drug abuse     MDD (major depressive disorder)     Mood disorder     PTSD (post-traumatic stress disorder)     Suicidal ideation        Past Surgical History:  Past Surgical History:   Procedure Laterality Date      SECTION  2001    x2    CHOLECYSTECTOMY      HYSTERECTOMY      LAPAROSCOPIC SALPINGECTOMY      ROBOT-ASSISTED LAPAROSCOPIC ABDOMINAL HYSTERECTOMY USING DA SEBASTIEN XI N/A 2019    Procedure: XI ROBOTIC HYSTERECTOMY;  Surgeon: Tabatha Quintanilla MD;  Location: [facility] OR;  Service: OB/GYN;  Laterality: N/A;    ROBOT-ASSISTED LAPAROSCOPIC LYSIS OF ADHESIONS USING DA SEBASTIEN XI N/A 2019    Procedure: XI ROBOTIC LYSIS, ADHESIONS;  Surgeon: Tabatha Quintanilla MD;  Location: [facility] OR;  Service: OB/GYN;  Laterality: N/A;    ROBOT-ASSISTED SURGICAL REMOVAL OF FALLOPIAN TUBE USING DA SEBASTIEN XI Right 2019    Procedure: XI ROBOTIC SALPINGECTOMY;  Surgeon: Tabatha Quintanilla MD;  Location: [facility] OR;  Service: OB/GYN;  Laterality: Right;    TONSILLECTOMY      TUBAL LIGATION           Family History:  Family History   Problem Relation Age of Onset    COPD Mother     Asthma Neg Hx     Cancer Neg Hx        Social History:  Social History     Tobacco Use    Smoking status: Current Every Day Smoker     Packs/day: 1.50     Types: Cigarettes    Smokeless tobacco: Never Used    Tobacco comment: no smoking after midnight prior to surgery   Substance and Sexual Activity    Alcohol use: Yes     Comment: Occassional     Drug use: Not Currently     Types: IV, Methamphetamines, Heroin     Comment: denies heroin use.  Last used meth 10/2018  No illegal drugs prior to sx    Sexual activity: Not Currently     Partners: Male     Birth control/protection: None       ROS   Review of Systems   Constitutional: Positive for fever (t now 101.6). Negative for diaphoresis.   HENT: Positive for congestion. Negative for sore throat.    Respiratory: Positive for cough and shortness of breath.    Cardiovascular: Negative for chest pain.   Gastrointestinal: Negative for abdominal pain, diarrhea, nausea and vomiting.   Genitourinary: Negative for dysuria.   Musculoskeletal: Positive for back pain (lower), gait  problem (inability to walk due to R knee swelling) and joint swelling (R ankle, R ring finger, and R knee swelling).   Skin: Negative for rash.   Neurological: Negative for weakness.   Hematological: Does not bruise/bleed easily.   All other systems reviewed and are negative.    Physical Exam      Initial Vitals [05/06/22 1521]   BP Pulse Resp Temp SpO2   (!) 124/58 110 (!) 30 (!) 101.6 °F (38.7 °C) 96 %      MAP       --          Physical Exam  Nursing Notes and Vital Signs Reviewed.  Constitutional: Patient is in no acute distress. Well-developed and well-nourished.  Head: Atraumatic. Normocephalic.  Eyes: PERRL. EOM intact. Conjunctivae are not pale. No scleral icterus.  ENT: Mucous membranes are moist. Oropharynx is clear and symmetric.    Neck: Supple. Full ROM. No lymphadenopathy.  Cardiovascular: Tachycardic rate. Regular rhythm. No murmurs, rubs, or gallops. Distal pulses are 2+ and symmetric.  Pulmonary/Chest: Tachypneic. No respiratory distress. Clear to auscultation bilaterally. No wheezing or rales.  Abdominal: Soft and non-distended.  There is no tenderness.  No rebound, guarding, or rigidity.   Musculoskeletal: Moves all extremities. No obvious deformities. There is swelling to the R ring finger.  Skin: Warm and dry.  Neurological:  Alert, awake, and appropriate.  Normal speech.  No acute focal neurological deficits are appreciated.  Psychiatric: Normal affect. Good eye contact. Appropriate in content.    ED Course    Critical Care    Date/Time: 5/6/2022 7:04 PM  Performed by: Maru Chan MD  Authorized by: Maru Chan MD   Direct patient critical care time: 8 minutes  Additional history critical care time: 8 minutes  Ordering / reviewing critical care time: 8 minutes  Documentation critical care time: 9 minutes  Consulting other physicians critical care time: 7 minutes  Total critical care time (exclusive of procedural time) : 40 minutes  Critical care time was exclusive of separately  "billable procedures and treating other patients and teaching time.  Critical care was necessary to treat or prevent imminent or life-threatening deterioration of the following conditions: respiratory failure.  Critical care was time spent personally by me on the following activities: blood draw for specimens, development of treatment plan with patient or surrogate, discussions with consultants, interpretation of cardiac output measurements, examination of patient, evaluation of patient's response to treatment, obtaining history from patient or surrogate, ordering and performing treatments and interventions, ordering and review of laboratory studies, ordering and review of radiographic studies, pulse oximetry, re-evaluation of patient's condition and review of old charts.        ED Vital Signs:  Vitals:    05/06/22 1827 05/06/22 1853 05/06/22 1910 05/06/22 1912   BP:    (!) 99/51   Pulse: 98 92  88   Resp: (!) 27 (!) 25  (!) 24   Temp:   99 °F (37.2 °C) 99.1 °F (37.3 °C)   TempSrc:   Oral Oral   SpO2: (!) 94% (!) 94%  (!) 93%   Weight:       Height:        05/06/22 2009 05/06/22 2013 05/06/22 2141 05/06/22 2359   BP:  (!) 98/55 (!) 103/58 (!) 115/56   Pulse:  88 89 94   Resp:  (!) 28 (!) 26 17   Temp:   98.7 °F (37.1 °C) 98.2 °F (36.8 °C)   TempSrc:   Oral Oral   SpO2:  96% 96% 95%   Weight:   82.4 kg (181 lb 10.5 oz) 83 kg (182 lb 15.7 oz)   Height: 5' 5" (1.651 m)       05/07/22 0205 05/07/22 0330 05/07/22 0453 05/07/22 0734   BP:   (!) 100/58 (!) 100/59   Pulse:   82 74   Resp:   18 20   Temp: (!) 101.2 °F (38.4 °C) 99 °F (37.2 °C) 98.6 °F (37 °C) 97.7 °F (36.5 °C)   TempSrc:   Oral Oral   SpO2:   95% 96%   Weight:       Height:        05/07/22 0922 05/07/22 1142 05/07/22 1457   BP:  121/70    Pulse:  82    Resp: 20 18 20   Temp:  98.1 °F (36.7 °C)    TempSrc:  Oral    SpO2:  100%    Weight:      Height:          Abnormal Lab Results:  Labs Reviewed   CBC W/ AUTO DIFFERENTIAL - Abnormal; Notable for the following " components:       Result Value    WBC 29.13 (*)     RBC 3.39 (*)     Hemoglobin 9.9 (*)     Hematocrit 27.8 (*)     Gran % 86.0 (*)     Lymph % 4.0 (*)     All other components within normal limits   COMPREHENSIVE METABOLIC PANEL - Abnormal; Notable for the following components:    Sodium 130 (*)     Potassium 3.4 (*)     Chloride 89 (*)     BUN 46 (*)     Calcium 8.3 (*)     Albumin 1.8 (*)     Total Bilirubin 1.4 (*)     Alkaline Phosphatase 139 (*)     All other components within normal limits   URINALYSIS, REFLEX TO URINE CULTURE - Abnormal; Notable for the following components:    Protein, UA 1+ (*)     Occult Blood UA 1+ (*)     All other components within normal limits    Narrative:     Specimen Source->Urine   PROCALCITONIN - Abnormal; Notable for the following components:    Procalcitonin 2.71 (*)     All other components within normal limits   DRUG SCREEN PANEL, URINE EMERGENCY - Abnormal; Notable for the following components:    Cocaine (Metab.) Presumptive Positive (*)     Amphetamine Screen, Ur Presumptive Positive (*)     All other components within normal limits    Narrative:     Specimen Source->Urine   SARS-COV-2 RNA AMPLIFICATION, QUAL   LACTIC ACID, PLASMA   B-TYPE NATRIURETIC PEPTIDE   TROPONIN I   PROTIME-INR   APTT   ALCOHOL,MEDICAL (ETHANOL)   LACTIC ACID, PLASMA   URINALYSIS MICROSCOPIC    Narrative:     Specimen Source->Urine   DRUG SCREEN PANEL, URINE EMERGENCY   POCT INFLUENZA A/B MOLECULAR        All Lab Results:  Results for orders placed or performed during the hospital encounter of 05/06/22   Blood culture x two cultures. Draw prior to antibiotics.    Specimen: Peripheral, Antecubital, Left; Blood   Result Value Ref Range    Blood Culture, Routine       Gram stain aer bottle: Gram positive cocci in clusters resembling Staph     Blood Culture, Routine       Gram stain missael bottle: Gram positive cocci in clusters resembling Staph     Blood Culture, Routine       Results called to and read  back by: Lila Hernandez RN 05/07/2022  10:57   Blood culture x two cultures. Draw prior to antibiotics.    Specimen: Peripheral, Antecubital, Right; Blood   Result Value Ref Range    Blood Culture, Routine       Gram stain aer bottle: Gram positive cocci in clusters resembling Staph     Blood Culture, Routine       Results called to and read back by: Lila Hernandez RN 05/07/2022  10:57    Blood Culture, Routine       Gram stain missael bottle: Gram positive cocci in clusters resembling Staph     Blood Culture, Routine 05/07/2022  13:40    COVID-19 Rapid Screening   Result Value Ref Range    SARS-CoV-2 RNA, Amplification, Qual Negative Negative   CBC auto differential   Result Value Ref Range    WBC 29.13 (H) 3.90 - 12.70 K/uL    RBC 3.39 (L) 4.00 - 5.40 M/uL    Hemoglobin 9.9 (L) 12.0 - 16.0 g/dL    Hematocrit 27.8 (L) 37.0 - 48.5 %    MCV 82 82 - 98 fL    MCH 29.2 27.0 - 31.0 pg    MCHC 35.6 32.0 - 36.0 g/dL    RDW 14.2 11.5 - 14.5 %    Platelets 171 150 - 450 K/uL    MPV 11.6 9.2 - 12.9 fL    Immature Granulocytes CANCELED 0.0 - 0.5 %    Immature Grans (Abs) CANCELED 0.00 - 0.04 K/uL    nRBC 0 0 /100 WBC    Gran % 86.0 (H) 38.0 - 73.0 %    Lymph % 4.0 (L) 18.0 - 48.0 %    Mono % 4.0 4.0 - 15.0 %    Eosinophil % 0.0 0.0 - 8.0 %    Basophil % 0.0 0.0 - 1.9 %    Bands 1.0 %    Metamyelocytes 3.0 %    Myelocytes 2.0 %    Differential Method Manual    Comprehensive metabolic panel   Result Value Ref Range    Sodium 130 (L) 136 - 145 mmol/L    Potassium 3.4 (L) 3.5 - 5.1 mmol/L    Chloride 89 (L) 95 - 110 mmol/L    CO2 28 23 - 29 mmol/L    Glucose 109 70 - 110 mg/dL    BUN 46 (H) 6 - 20 mg/dL    Creatinine 1.1 0.5 - 1.4 mg/dL    Calcium 8.3 (L) 8.7 - 10.5 mg/dL    Total Protein 6.0 6.0 - 8.4 g/dL    Albumin 1.8 (L) 3.5 - 5.2 g/dL    Total Bilirubin 1.4 (H) 0.1 - 1.0 mg/dL    Alkaline Phosphatase 139 (H) 55 - 135 U/L    AST 37 10 - 40 U/L    ALT 33 10 - 44 U/L    Anion Gap 13 8 - 16 mmol/L    eGFR if African American >60 >60  mL/min/1.73 m^2    eGFR if non African American >60 >60 mL/min/1.73 m^2   Lactic acid, plasma #1   Result Value Ref Range    Lactate (Lactic Acid) 1.5 0.5 - 2.2 mmol/L   Urinalysis, Reflex to Urine Culture Urine, Clean Catch    Specimen: Urine   Result Value Ref Range    Specimen UA Urine, Clean Catch     Color, UA Yellow Yellow, Straw, Mindy    Appearance, UA Clear Clear    pH, UA 6.0 5.0 - 8.0    Specific Gravity, UA 1.010 1.005 - 1.030    Protein, UA 1+ (A) Negative    Glucose, UA Negative Negative    Ketones, UA Negative Negative    Bilirubin (UA) Negative Negative    Occult Blood UA 1+ (A) Negative    Nitrite, UA Negative Negative    Urobilinogen, UA 1.0 <2.0 EU/dL    Leukocytes, UA Negative Negative   Brain natriuretic peptide   Result Value Ref Range    BNP 28 0 - 99 pg/mL   Troponin I   Result Value Ref Range    Troponin I 0.011 0.000 - 0.026 ng/mL   Procalcitonin   Result Value Ref Range    Procalcitonin 2.71 (H) <0.25 ng/mL   Protime-INR   Result Value Ref Range    Prothrombin Time 11.7 9.0 - 12.5 sec    INR 1.1 0.8 - 1.2   APTT   Result Value Ref Range    aPTT 29.6 21.0 - 32.0 sec   Ethanol   Result Value Ref Range    Alcohol, Serum <10 <10 mg/dL   Lactic acid, plasma #2   Result Value Ref Range    Lactate (Lactic Acid) 1.5 0.5 - 2.2 mmol/L   Urinalysis Microscopic   Result Value Ref Range    RBC, UA 2 0 - 4 /hpf    WBC, UA 1 0 - 5 /hpf    Bacteria Rare None-Occ /hpf    Hyaline Casts, UA 1 0-1/lpf /lpf    Microscopic Comment SEE COMMENT    Drug screen panel, in-house   Result Value Ref Range    Benzodiazepines Negative Negative    Methadone metabolites Negative Negative    Cocaine (Metab.) Presumptive Positive (A) Negative    Opiate Scrn, Ur Negative Negative    Barbiturate Screen, Ur Negative Negative    Amphetamine Screen, Ur Presumptive Positive (A) Negative    THC Negative Negative    Phencyclidine Negative Negative    Creatinine, Urine 83.8 15.0 - 325.0 mg/dL    Toxicology Information SEE COMMENT     Comprehensive metabolic panel   Result Value Ref Range    Sodium 134 (L) 136 - 145 mmol/L    Potassium 3.3 (L) 3.5 - 5.1 mmol/L    Chloride 95 95 - 110 mmol/L    CO2 31 (H) 23 - 29 mmol/L    Glucose 111 (H) 70 - 110 mg/dL    BUN 38 (H) 6 - 20 mg/dL    Creatinine 0.8 0.5 - 1.4 mg/dL    Calcium 7.6 (L) 8.7 - 10.5 mg/dL    Total Protein 4.8 (L) 6.0 - 8.4 g/dL    Albumin 1.5 (L) 3.5 - 5.2 g/dL    Total Bilirubin 1.7 (H) 0.1 - 1.0 mg/dL    Alkaline Phosphatase 122 55 - 135 U/L    AST 56 (H) 10 - 40 U/L    ALT 38 10 - 44 U/L    Anion Gap 8 8 - 16 mmol/L    eGFR if African American >60 >60 mL/min/1.73 m^2    eGFR if non African American >60 >60 mL/min/1.73 m^2   Magnesium   Result Value Ref Range    Magnesium 1.8 1.6 - 2.6 mg/dL   Phosphorus   Result Value Ref Range    Phosphorus 4.1 2.7 - 4.5 mg/dL   CBC auto differential   Result Value Ref Range    WBC 27.52 (H) 3.90 - 12.70 K/uL    RBC 3.05 (L) 4.00 - 5.40 M/uL    Hemoglobin 9.0 (L) 12.0 - 16.0 g/dL    Hematocrit 26.0 (L) 37.0 - 48.5 %    MCV 85 82 - 98 fL    MCH 29.5 27.0 - 31.0 pg    MCHC 34.6 32.0 - 36.0 g/dL    RDW 14.4 11.5 - 14.5 %    Platelets 115 (L) 150 - 450 K/uL    MPV 10.9 9.2 - 12.9 fL    Immature Granulocytes CANCELED 0.0 - 0.5 %    Immature Grans (Abs) CANCELED 0.00 - 0.04 K/uL    nRBC 0 0 /100 WBC    Gran % 84.0 (H) 38.0 - 73.0 %    Lymph % 5.0 (L) 18.0 - 48.0 %    Mono % 6.0 4.0 - 15.0 %    Eosinophil % 0.0 0.0 - 8.0 %    Basophil % 0.0 0.0 - 1.9 %    Bands 4.0 %    Metamyelocytes 1.0 %    Platelet Estimate Decreased (A)     Differential Method Manual    Lactate dehydrogenase   Result Value Ref Range     (H) 110 - 260 U/L   Protein, total   Result Value Ref Range    Total Protein 5.0 (L) 6.0 - 8.4 g/dL   Protime-INR   Result Value Ref Range    Prothrombin Time 11.9 9.0 - 12.5 sec    INR 1.1 0.8 - 1.2   APTT   Result Value Ref Range    aPTT 31.4 21.0 - 32.0 sec   Protein, Peritoneal, Pleural Fluid or ANTHONY Drainage, In-House Pleural Fluid, Right    Result Value Ref Range    Body Fluid Source, Total Protein Pleural Fluid, Right     Body Fluid, Protein 3.7 Not established g/dL   Albumin, Peritoneal, Pleural Fluid or ANTHONY Drainage, In-House Pleural Fluid, Right   Result Value Ref Range    Body Fluid Source, Albumin Pleural Fluid, Right     Body Fluid, Albumin 1.3 See text g/dL   LDH, Peritoneal, Pleural Fluid or ANTHONY Drainage, In-House Pleural Fluid, Right   Result Value Ref Range    Body Fluid Source, LDH Pleural Fluid, Right     LD, Fluid 1077 Not established U/L   Glucose, Peritoneal, Pleural Fluid or ANTHONY Drainage, In-House Pleural Fluid, Right   Result Value Ref Range    Body Fluid Source, Glucose Pleural Fluid, Right     Glucose, Fluid 29 Not established mg/dL   Amylase, Peritoneal, Pleural Fluid or ANTHONY Drainage, In-House Pleural Fluid, Right   Result Value Ref Range    Body Fluid Source Amylase Pleural Fluid, Right     Amylase, Fluid 34 Not established U/L   WBC & Diff,Body Fluid Pleural Fluid, Right   Result Value Ref Range    Body Fluid Type Pleural Fluid, Right     Fluid Appearance Hazy     Fluid Color Yellow     WBC, Body Fluid 61677 /cu mm    Segs, Fluid 93 %    Lymphs, Fluid 3 %    Monocytes/Macrophages, Fluid 4 %   Vancomycin, Random   Result Value Ref Range    Vancomycin, Random 15.7 Not established ug/mL   POCT Influenza A/B Molecular   Result Value Ref Range    POC Molecular Influenza A Ag Negative Negative, Not Reported    POC Molecular Influenza B Ag Negative Negative, Not Reported     Acceptable Yes    Echo   Result Value Ref Range    TDI SEPTAL 0.14 m/s    LV LATERAL E/E' RATIO 5.58 m/s    LV SEPTAL E/E' RATIO 7.57 m/s    IVC diameter 2.34 cm    Left Ventricular Outflow Tract Mean Velocity 0.36800226594 cm/s    Left Ventricular Outflow Tract Mean Gradient 4.20 mmHg    TDI LATERAL 0.19 m/s    LVIDd 5.11 3.5 - 6.0 cm    IVS 1.26 (A) 0.6 - 1.1 cm    Posterior Wall 1.01 0.6 - 1.1 cm    Ao root annulus 2.65 cm    LVIDs 3.25 2.1 - 4.0 cm     FS 36 28 - 44 %    Sinus 2.99 cm    STJ 2.78 cm    Ascending aorta 2.90 cm    LV mass 224.00 g    LA size 3.62 cm    Left Ventricle Relative Wall Thickness 0.40 cm    AV mean gradient 8 mmHg    AV valve area 1.87 cm2    AV Velocity Ratio 0.71     AV index (prosthetic) 0.63     PV peak gradient 4.55 mmHg    E/A ratio 1.20     Mean e' 0.17 m/s    E wave deceleration time 271.824984225134979 msec    IVRT 19.035010611439224 msec    LVOT diameter 1.94 cm    LVOT area 3.0 cm2    LVOT peak callum 1.34 m/s    LVOT peak VTI 25.70 cm    Ao peak callum 1.89 m/s    Ao VTI 40.5 cm    RVOT peak callum 1.07 m/s    RVOT peak VTI 21.7 cm    LVOT stroke volume 75.93 cm3    AV peak gradient 14 mmHg    PV mean gradient 2.38 mmHg    E/E' ratio 6.42 m/s    MV Peak E Callum 1.06 m/s    TR Max Callum 3.21 m/s    MV Peak A Callum 0.88 m/s    LV Systolic Volume 42.38 mL    LV Systolic Volume Index 22.3 mL/m2    LV Diastolic Volume 124.62 mL    LV Diastolic Volume Index 65.59 mL/m2    LV Mass Index 118 g/m2    Echo EF Estimated 66 %    RA Major Axis 4.42 cm    Left Atrium Minor Axis 4.80 cm    Left Atrium Major Axis 5.06 cm    Triscuspid Valve Regurgitation Peak Gradient 41 mmHg    RA Width 3.06 cm         Imaging Results:  Imaging Results          CTA Chest Non-Coronary (PE Study) (Final result)  Result time 05/06/22 18:26:20    Final result by Demario Morin MD (05/06/22 18:26:20)                 Impression:      No definite pulmonary thromboembolism.    Multiple nodular and cavitary opacities concerning for septic emboli with larger opacities possibly necrotizing pneumonia.    Moderate loculated right pleural fluid.  Empyema not excluded.    Small pericardial effusion of unclear etiology.    Splenic hypodensity possibly related to infarct or contrast timing.    Additional details as above.    All CT scans at this facility are performed  using dose modulation techniques as appropriate to performed exam including the following:  automated exposure  control; adjustment of mA and/or kV according to the patients size (this includes techniques or standardized protocols for targeted exams where dose is matched to indication/reason for exam: i.e. extremities or head);  iterative reconstruction technique.      Electronically signed by: Demario Morin  Date:    05/06/2022  Time:    18:26             Narrative:    EXAMINATION:  CTA CHEST NON CORONARY    CLINICAL HISTORY:  Pulmonary embolism (PE) suspected, neg D-dimer;    TECHNIQUE:  Low dose axial images, sagittal and coronal reformations were obtained from the thoracic inlet to the lung bases following the IV administration of 100 mL of Omnipaque 350.  Contrast timing was optimized to evaluate the pulmonary arteries.  MIP images were performed.    COMPARISON:  Multiple priors.    FINDINGS:  Base of Neck: No significant abnormality.    Thoracic soft tissues: Unremarkable.    Aorta: Left-sided aortic arch.  No aneurysm and no significant atherosclerosis    Heart: Normal size.  Small effusion.    Pulmonary vasculature: Pulmonary arteries are well opacified.  There is no pulmonary thromboembolism.    Esha/Mediastinum: No pathologic ariana enlargement.    Airways: Patent.    Lungs/Pleura: Multiple nodular and cavitary opacities concerning for septic emboli.  Additional larger opacities most notably in the right lung base with some internal clearing possibly related to necrotizing pneumonia.  Moderate loculated right pleural fluid and no definite left pleural fluid.  Empyema not excluded.    Esophagus: Unremarkable.    Upper Abdomen: Geographic wedge-shaped splenic hypodensity possibly related to contrast timing or infarct.    Bones: No acute fracture. No suspicious lytic or sclerotic lesions.                                CT Lumbar Spine Without Contrast (Final result)  Result time 05/06/22 18:29:13    Final result by Demario Morin MD (05/06/22 18:29:13)                 Impression:      No fracture or traumatic  malalignment of the lumbar spine.  No definite findings to suggest osteomyelitis.  Further evaluation as clinically warranted.    Possible progression of degenerative changes most notable at L4-5 with moderate to severe spinal canal stenosis at this level.  Additional details as above.    This report was flagged in Epic as abnormal.    All CT scans at this facility are performed  using dose modulation techniques as appropriate to performed exam including the following:  automated exposure control; adjustment of mA and/or kV according to the patients size (this includes techniques or standardized protocols for targeted exams where dose is matched to indication/reason for exam: i.e. extremities or head);  iterative reconstruction technique.      Electronically signed by: Demario Morin  Date:    05/06/2022  Time:    18:29             Narrative:    EXAMINATION:  CT LUMBAR SPINE WITHOUT CONTRAST    CLINICAL HISTORY:  Low back pain, infection suspected;    TECHNIQUE:  Low-dose CT images obtained throughout the region of the lumbar spine.  Axial, sagittal and coronal reformations were performed.  Contrast was not administered.    COMPARISON:  12/05/2019    FINDINGS:  The vertebral bodies are normal in height and morphology without evidence of fracture or osseous destructive process.    Normal sagittal alignment is preserved.  No spondylolisthesis.    Circumferential disc bulges L3 through S1 with mild spinal canal stenosis L3-4, moderate to severe spinal canal stenosis L4-5 and probable moderate spinal canal stenosis L5-S1.    Facet arthropathy with moderate left neural foraminal narrowing at L4-5 and severe left neural foraminal narrowing at L5-S1.  Other levels are better preserved.    Limited evaluation of the intraspinal contents demonstrates no hematoma or mass.    Paraspinal soft tissues exhibit no acute abnormalities.                               CT Thoracic Spine Without Contrast (Final result)  Result time  05/06/22 18:32:20    Final result by Demario Morin MD (05/06/22 18:32:20)                 Impression:      No definite acute abnormality.  No traumatic malalignment, fracture, or osseous destructive process.  Further evaluation as clinically warranted.    Pulmonary opacities as on the dedicated CT exam.    All CT scans at this facility are performed  using dose modulation techniques as appropriate to performed exam including the following:  automated exposure control; adjustment of mA and/or kV according to the patients size (this includes techniques or standardized protocols for targeted exams where dose is matched to indication/reason for exam: i.e. extremities or head);  iterative reconstruction technique.      Electronically signed by: Demario Morin  Date:    05/06/2022  Time:    18:32             Narrative:    EXAMINATION:  CT THORACIC SPINE WITHOUT CONTRAST    CLINICAL HISTORY:  Epidural abscess suspected;    TECHNIQUE:  Low-dose axial noncontrast CT images of the thoracic spine.  Multiplanar reformats generated    COMPARISON:  None    FINDINGS:  Thoracic spine alignment is well preserved.  No findings to suggest vertebral body fracture.  No osseous lytic or blastic process.    No significant degenerative changes.  No disc abnormalities or facet arthrosis.    Pulmonary opacities as on the dedicated CT exam.                               CT Cervical Spine Without Contrast (Final result)  Result time 05/06/22 18:34:24    Final result by Demario Morin MD (05/06/22 18:34:24)                 Impression:      No fracture or traumatic malalignment of the cervical spine.  No definite CT evidence of osteomyelitis.  Further evaluation as clinically warranted.    Additional degenerative findings as above.    All CT scans at this facility are performed  using dose modulation techniques as appropriate to performed exam including the following:  automated exposure control; adjustment of mA and/or kV according to the  patients size (this includes techniques or standardized protocols for targeted exams where dose is matched to indication/reason for exam: i.e. extremities or head);  iterative reconstruction technique.      Electronically signed by: Demario Morin  Date:    05/06/2022  Time:    18:34             Narrative:    EXAMINATION:  CT CERVICAL SPINE WITHOUT CONTRAST    CLINICAL HISTORY:  Neck pain, acute, infection suspected;    TECHNIQUE:  Low dose axial CT images through the cervical spine, with sagittal and coronal reformations.  Contrast was not administered.    COMPARISON:  12/05/2019    FINDINGS:  The vertebral bodies are normal in height and morphology without evidence of fracture or osseous destructive process.  Normal sagittal alignment is preserved.    Progression of degenerative change at C4-5 in comparison the prior exam with posterior disc osteophyte complex at this level producing mild spinal canal stenosis.  Smaller C5-6 posterior disc osteophyte complex without definite spinal canal stenosis.    No definite neural foraminal narrowing.    Limited evaluation of the intraspinal contents demonstrates no hematoma or mass.Paraspinal soft tissues exhibit no acute abnormalities.                               X-Ray Knee Complete 4 Or More Views Right (Final result)  Result time 05/06/22 16:01:49    Final result by Nate Brasher MD (05/06/22 16:01:49)                 Impression:      No evidence of fracture or dislocation. Moderate suprapatellar knee joint effusion. Mild soft tissue edema lateral to the knee.      Electronically signed by: Nate Brasher  Date:    05/06/2022  Time:    16:01             Narrative:    EXAMINATION:  XR KNEE COMP 4 OR MORE VIEWS RIGHT    CLINICAL HISTORY:  Pain in unspecified knee    TECHNIQUE:  AP, lateral, and Merchant views of the right knee were performed.    COMPARISON:  None    FINDINGS:  No evidence of fracture or dislocation.  Moderate suprapatellar knee joint effusion.  Mild soft  tissue edema lateral to the knee.  No foreign body.  Soft tissues otherwise unremarkable.                               X-Ray Finger 2 or More Views Right (Final result)  Result time 05/06/22 16:02:28    Final result by Nate Brasher MD (05/06/22 16:02:28)                 Impression:      Negative exam.      Electronically signed by: Nate Brasher  Date:    05/06/2022  Time:    16:02             Narrative:    EXAMINATION:  XR FINGER 2 OR MORE VIEWS RIGHT    CLINICAL HISTORY:  finger swelling;    TECHNIQUE:  Three views of the right 4th digit.    COMPARISON:  None    FINDINGS:  No evidence of fracture or dislocation.  Joint spaces appear well maintained.  Soft tissues unremarkable.  No evidence of foreign body.                                X-Ray Chest AP Portable (Final result)  Result time 05/06/22 16:04:37    Final result by Nate Brasher MD (05/06/22 16:04:37)                 Impression:      Patchy bilateral opacities, some of which appear nodular and possibly cavitary. Recommend further evaluation with chest CT with IV contrast.    This report was flagged in Epic as abnormal.      Electronically signed by: Nate Brasher  Date:    05/06/2022  Time:    16:04             Narrative:    EXAMINATION:  XR CHEST AP PORTABLE    CLINICAL HISTORY:  Sepsis;.    TECHNIQUE:  Single frontal portable view of the chest was performed.    COMPARISON:  12/05/2019    FINDINGS:  Support devices: None    Patchy bilateral opacities, some of which appear nodular and possibly cavitary.  Recommend further evaluation with chest CT with IV contrast.    Heart normal size.  No pneumothorax or pleural effusion    Bones are intact.                               The EKG was ordered, reviewed, and independently interpreted by the ED provider.  Interpretation time: 1702  Rate: 107 BPM  Rhythm: sinus tachycardia  Interpretation: Nonspecific T wave abnormality. Abnormal EKG. No STEMI.             The Emergency Provider reviewed the vital signs and  test results, which are outlined above.    ED Discussion     4:00 PM: Dr. Still transfers care of patient to Dr. Chan pending lab results.    8:06 PM: Discussed case with Alona Caban NP ([facility]). Dr. Arechiga agrees with current care and management of pt and accepts admission.   Admitting Service: Hospital Medicine  Admitting Physician: Dr. Arechiga  Admit to: Tele    8:06 PM: Re-evaluated pt. I have discussed test results, shared treatment plan, and the need for admission with patient and family at bedside. Pt and family express understanding at this time and agree with all information. All questions answered. Pt and family have no further questions or concerns at this time. Pt is ready for admit.           ED Medication(s):  Medications   vancomycin - pharmacy to dose (has no administration in time range)   cefepime in dextrose 5 % IVPB 2 g (2 g Intravenous New Bag 5/7/22 1638)   metronidazole IVPB 500 mg (0 mg Intravenous Stopped 5/7/22 1200)   0.9%  NaCl infusion ( Intravenous Verify Only 5/7/22 1500)   sodium chloride 0.9% flush 10 mL (has no administration in time range)   enoxaparin injection 40 mg (40 mg Subcutaneous Given 5/7/22 1701)   HYDROcodone-acetaminophen 5-325 mg per tablet 1 tablet (1 tablet Oral Given 5/7/22 1457)   morphine injection 2 mg (2 mg Intravenous Given 5/7/22 0922)   ondansetron injection 4 mg (has no administration in time range)   acetaminophen tablet 650 mg (650 mg Oral Given 5/7/22 0205)   albuterol-ipratropium 2.5 mg-0.5 mg/3 mL nebulizer solution 3 mL (has no administration in time range)   sodium chloride 0.9% flush 10 mL (has no administration in time range)   mupirocin 2 % ointment (has no administration in time range)   lactated ringers bolus 2,649 mL (0 mL/kg × 88.3 kg Intravenous Stopped 5/6/22 1815)   acetaminophen tablet 1,000 mg (1,000 mg Oral Given 5/6/22 1713)   piperacillin-tazobactam 4.5 g in dextrose 5 % 100 mL IVPB (ready to mix system) (0 g  Intravenous Stopped 5/6/22 1826)   iohexoL (OMNIPAQUE 350) injection 100 mL (100 mLs Intravenous Given 5/6/22 1811)   sodium chloride 0.9% bolus 1,000 mL (0 mLs Intravenous Stopped 5/6/22 2217)   vancomycin (VANCOCIN) 2,250 mg in dextrose 5 % 500 mL IVPB (0 mg Intravenous Stopped 5/7/22 0142)   vancomycin 1.25 g in dextrose 5% 250 mL IVPB (ready to mix) (0 mg Intravenous Stopped 5/7/22 1436)   lorazepam (ATIVAN) injection 0.5 mg (0.5 mg Intravenous Given 5/7/22 1700)           Current Discharge Medication List            Medical Decision Making    Medical Decision Making:   Clinical Tests:   Lab Tests: Ordered and Reviewed  Radiological Study: Ordered and Reviewed  Medical Tests: Ordered and Reviewed           Scribe Attestation:   Scribe #1: I performed the above scribed service and the documentation accurately describes the services I performed. I attest to the accuracy of the note.    Attending:   Physician Attestation Statement for Scribe #1: I, Kyle Still MD, personally performed the services described in this documentation, as scribed by Cherry Bowling, in my presence, and it is both accurate and complete.       Scribe Attestation:   Scribe #2: I performed the above scribed service and the documentation accurately describes the services I performed. I attest to the accuracy of the note.    Attending Attestation:           Physician Attestation for Scribe:    Physician Attestation Statement for Scribe #2: I, Maru Chan MD, reviewed documentation, as scribed by Anuj Carlos in my presence, and it is both accurate and complete. I also acknowledge and confirm the content of the note done by Scribe #1.          Clinical Impression       ICD-10-CM ICD-9-CM   1. Abscess of lower lobe of right lung with pneumonia  J85.1 513.0   2. SOB (shortness of breath)  R06.02 786.05   3. Tachycardia  R00.0 785.0   4. Knee pain  M25.569 719.46   5. Splenic infarct  D73.5 289.59   6. Infective endocarditis   I33.0 421.0   7. Loculated pleural effusion  J90 511.9       Disposition:   Disposition: Admitted  Condition: Fair         Maru Chan MD  05/07/22 1703       Maru Chan MD  05/07/22 1708

## 2022-05-06 NOTE — PHARMACY MED REC
"Admission Medication History     The home medication history was taken by Edgar Gutierrez.    You may go to "Admission" then "Reconcile Home Medications" tabs to review and/or act upon these items.      The home medication list has been updated by the Pharmacy department.    Please read ALL comments highlighted in yellow.    Please address this information as you see fit.     Feel free to contact us if you have any questions or require assistance.      The medications listed below were removed from the home medication list. Please reorder if appropriate:  Patient reports no longer taking the following medication(s):   ALBUTEROL 2.5 MG/3 mL (0.083 %) NEBULIZER SOLUTION   FLUTICASONE-UMECLIDIN-VILANTER (TRELEGY ELLIPTA) 100-62.5-25 MCG DsDv   GABAPENTIN 300 MG CAPSULE   IPRATROPIUM 21 MCG (0.03 %) NASAL SPRAY   METHYLPREDNISOLONE 4 MG TABLET DOSE PACK   OMEPRAZOLE 40 MG CAPSULE   OXYBUTYNIN 10 MG 24 HR TABLET   PRAZOSIN 5 MG CAPSULE   PROPRANOLOL 60 MG 24 HR CAPSULE   CARIPRAZINE (VRAYLAR) 3 MG CAPSULE    Medications listed below were obtained from: Patient/family, Analytic software- [employer] and Medical records  (Not in a hospital admission)      Potential issues to be addressed PRIOR TO DISCHARGE: NONE      Edgar Gutierrez [facility]  Spectr[employer]mi [phone]    Current Outpatient Medications on File Prior to Encounter   Medication Sig Dispense Refill Last Dose    acetaminophen (TYLENOL) 500 MG tablet Take 500 mg by mouth every 6 (six) hours as needed for Pain.   5/6/2022 at Unknown time    albuterol (PROVENTIL/VENTOLIN HFA) 90 mcg/actuation inhaler Inhale 2 puffs into the lungs every 6 (six) hours as needed for Wheezing. 18 g 1 5/6/2022 at Unknown time    azithromycin (ZITHROMAX Z-YUAN) 250 MG tablet Take 2 tablets (500 mg) on  Day 1,  followed by 1 tablet (250 mg) once daily on Days 2 through 5. 6 tablet 0 5/6/2022 at Unknown time    dextromethorphan-guaiFENesin (MUCINEX DM) 60-1,200 mg per 12 hr tablet " Take 1 tablet by mouth every 12 (twelve) hours.   5/6/2022 at Unknown time    ibuprofen (ADVIL,MOTRIN) 200 MG tablet Take 200 mg by mouth every 6 (six) hours as needed for Pain.   5/6/2022 at Unknown time    [DISCONTINUED] albuterol (PROVENTIL) 2.5 mg /3 mL (0.083 %) nebulizer solution Take 3 mLs (2.5 mg total) by nebulization every 6 (six) hours as needed for Wheezing. Rescue 100 each 1     [DISCONTINUED] fluticasone-umeclidin-vilanter (TRELEGY ELLIPTA) 100-62.5-25 mcg DsDv Inhale 1 puff into the lungs once daily. 1 each 11     [DISCONTINUED] gabapentin (NEURONTIN) 300 MG capsule Take 1 capsule (300 mg total) by mouth 3 (three) times daily. 90 capsule 5     [DISCONTINUED] ipratropium (ATROVENT) 21 mcg (0.03 %) nasal spray 2 sprays by Nasal route 3 (three) times daily. 30 mL 0     [DISCONTINUED] methylPREDNISolone (MEDROL DOSEPACK) 4 mg tablet Take as directed 21 tablet 0     [DISCONTINUED] omeprazole (PRILOSEC) 40 MG capsule Take 1 capsule (40 mg total) by mouth once daily. 30 capsule 11     [DISCONTINUED] oxybutynin (DITROPAN XL) 10 MG 24 hr tablet Take 1 tablet (10 mg total) by mouth once daily. 30 tablet 11     [DISCONTINUED] prazosin (MINIPRESS) 5 MG capsule Take 5 mg by mouth every evening.   0     [DISCONTINUED] propranolol (INDERAL LA) 60 MG 24 hr capsule Take 60 mg by mouth every morning.  0     [DISCONTINUED] VRAYLAR 3 mg Cap Take 1 capsule (3 mg total) by mouth every evening. 30 capsule 2                               .

## 2022-05-07 PROBLEM — J90 LOCULATED PLEURAL EFFUSION: Status: ACTIVE | Noted: 2022-05-07

## 2022-05-07 PROBLEM — R78.81 BACTEREMIA: Status: ACTIVE | Noted: 2022-05-07

## 2022-05-07 LAB
ALBUMIN FLD-MCNC: 1.3 G/DL
ALBUMIN SERPL BCP-MCNC: 1.5 G/DL (ref 3.5–5.2)
ALP SERPL-CCNC: 122 U/L (ref 55–135)
ALT SERPL W/O P-5'-P-CCNC: 38 U/L (ref 10–44)
AMYLASE, BODY FLUID: 34 U/L
ANION GAP SERPL CALC-SCNC: 8 MMOL/L (ref 8–16)
AORTIC ROOT ANNULUS: 2.65 CM
APPEARANCE FLD: NORMAL
APTT BLDCRRT: 31.4 SEC (ref 21–32)
ASCENDING AORTA: 2.9 CM
AST SERPL-CCNC: 56 U/L (ref 10–40)
AV INDEX (PROSTH): 0.63
AV MEAN GRADIENT: 8 MMHG
AV PEAK GRADIENT: 14 MMHG
AV VALVE AREA: 1.87 CM2
AV VELOCITY RATIO: 0.71
BASOPHILS NFR BLD: 0 % (ref 0–1.9)
BILIRUB SERPL-MCNC: 1.7 MG/DL (ref 0.1–1)
BODY FLD TYPE: NORMAL
BODY FLUID SOURCE AMYLASE: NORMAL
BODY FLUID SOURCE, LDH: NORMAL
BUN SERPL-MCNC: 38 MG/DL (ref 6–20)
CALCIUM SERPL-MCNC: 7.6 MG/DL (ref 8.7–10.5)
CHLORIDE SERPL-SCNC: 95 MMOL/L (ref 95–110)
CO2 SERPL-SCNC: 31 MMOL/L (ref 23–29)
COLOR FLD: YELLOW
CREAT SERPL-MCNC: 0.8 MG/DL (ref 0.5–1.4)
CV ECHO LV RWT: 0.4 CM
DIFFERENTIAL METHOD: ABNORMAL
DOP CALC AO PEAK VEL: 1.89 M/S
DOP CALC AO VTI: 40.5 CM
DOP CALC LVOT AREA: 3 CM2
DOP CALC LVOT DIAMETER: 1.94 CM
DOP CALC LVOT PEAK VEL: 1.34 M/S
DOP CALC LVOT STROKE VOLUME: 75.93 CM3
DOP CALC RVOT PEAK VEL: 1.07 M/S
DOP CALC RVOT VTI: 21.7 CM
DOP CALCLVOT PEAK VEL VTI: 25.7 CM
E WAVE DECELERATION TIME: 271.19 MSEC
E/A RATIO: 1.2
E/E' RATIO: 6.42 M/S
ECHO EF ESTIMATED: 66 %
ECHO LV POSTERIOR WALL: 1.01 CM (ref 0.6–1.1)
EJECTION FRACTION: 60 %
EOSINOPHIL NFR BLD: 0 % (ref 0–8)
ERYTHROCYTE [DISTWIDTH] IN BLOOD BY AUTOMATED COUNT: 14.4 % (ref 11.5–14.5)
EST. GFR  (AFRICAN AMERICAN): >60 ML/MIN/1.73 M^2
EST. GFR  (NON AFRICAN AMERICAN): >60 ML/MIN/1.73 M^2
FRACTIONAL SHORTENING: 36 % (ref 28–44)
GLUCOSE FLD-MCNC: 29 MG/DL
GLUCOSE SERPL-MCNC: 111 MG/DL (ref 70–110)
HCT VFR BLD AUTO: 26 % (ref 37–48.5)
HGB BLD-MCNC: 9 G/DL (ref 12–16)
IMM GRANULOCYTES # BLD AUTO: ABNORMAL K/UL (ref 0–0.04)
IMM GRANULOCYTES NFR BLD AUTO: ABNORMAL % (ref 0–0.5)
INR PPP: 1.1 (ref 0.8–1.2)
INTERVENTRICULAR SEPTUM: 1.26 CM (ref 0.6–1.1)
IVC DIAMETER: 2.34 CM
IVRT: 19.98 MSEC
LA MAJOR: 5.06 CM
LA MINOR: 4.8 CM
LDH FLD L TO P-CCNC: 1077 U/L
LDH SERPL L TO P-CCNC: 308 U/L (ref 110–260)
LEFT ATRIUM SIZE: 3.62 CM
LEFT INTERNAL DIMENSION IN SYSTOLE: 3.25 CM (ref 2.1–4)
LEFT VENTRICLE DIASTOLIC VOLUME INDEX: 65.59 ML/M2
LEFT VENTRICLE DIASTOLIC VOLUME: 124.62 ML
LEFT VENTRICLE MASS INDEX: 118 G/M2
LEFT VENTRICLE SYSTOLIC VOLUME INDEX: 22.3 ML/M2
LEFT VENTRICLE SYSTOLIC VOLUME: 42.38 ML
LEFT VENTRICULAR INTERNAL DIMENSION IN DIASTOLE: 5.11 CM (ref 3.5–6)
LEFT VENTRICULAR MASS: 224 G
LV LATERAL E/E' RATIO: 5.58 M/S
LV SEPTAL E/E' RATIO: 7.57 M/S
LVOT MG: 4.2 MMHG
LVOT MV: 0.97 CM/S
LYMPHOCYTES NFR BLD: 5 % (ref 18–48)
LYMPHOCYTES NFR FLD MANUAL: 3 %
MAGNESIUM SERPL-MCNC: 1.8 MG/DL (ref 1.6–2.6)
MCH RBC QN AUTO: 29.5 PG (ref 27–31)
MCHC RBC AUTO-ENTMCNC: 34.6 G/DL (ref 32–36)
MCV RBC AUTO: 85 FL (ref 82–98)
METAMYELOCYTES NFR BLD MANUAL: 1 %
MONOCYTES NFR BLD: 6 % (ref 4–15)
MONOS+MACROS NFR FLD MANUAL: 4 %
MV PEAK A VEL: 0.88 M/S
MV PEAK E VEL: 1.06 M/S
NEUTROPHILS NFR BLD: 84 % (ref 38–73)
NEUTROPHILS NFR FLD MANUAL: 93 %
NEUTS BAND NFR BLD MANUAL: 4 %
NRBC BLD-RTO: 0 /100 WBC
PHOSPHATE SERPL-MCNC: 4.1 MG/DL (ref 2.7–4.5)
PISA TR MAX VEL: 3.21 M/S
PLATELET # BLD AUTO: 115 K/UL (ref 150–450)
PLATELET BLD QL SMEAR: ABNORMAL
PMV BLD AUTO: 10.9 FL (ref 9.2–12.9)
POTASSIUM SERPL-SCNC: 3.3 MMOL/L (ref 3.5–5.1)
PROT FLD-MCNC: 3.7 G/DL
PROT SERPL-MCNC: 4.8 G/DL (ref 6–8.4)
PROT SERPL-MCNC: 5 G/DL (ref 6–8.4)
PROTHROMBIN TIME: 11.9 SEC (ref 9–12.5)
PV MEAN GRADIENT: 2.38 MMHG
RA MAJOR: 4.42 CM
RA PRESSURE: 15 MMHG
RA WIDTH: 3.06 CM
RBC # BLD AUTO: 3.05 M/UL (ref 4–5.4)
SINUS: 2.99 CM
SODIUM SERPL-SCNC: 134 MMOL/L (ref 136–145)
SPECIMEN SOURCE: NORMAL
STJ: 2.78 CM
TDI LATERAL: 0.19 M/S
TDI SEPTAL: 0.14 M/S
TDI: 0.17 M/S
TR MAX PG: 41 MMHG
TV REST PULMONARY ARTERY PRESSURE: 56 MMHG
VANCOMYCIN SERPL-MCNC: 15.7 UG/ML
WBC # BLD AUTO: 27.52 K/UL (ref 3.9–12.7)
WBC # FLD: NORMAL /CU MM

## 2022-05-07 PROCEDURE — 99223 PR INITIAL HOSPITAL CARE,LEVL III: ICD-10-PCS | Mod: 25,,, | Performed by: INTERNAL MEDICINE

## 2022-05-07 PROCEDURE — 88112 CYTOPATH CELL ENHANCE TECH: CPT | Mod: 26,,, | Performed by: PATHOLOGY

## 2022-05-07 PROCEDURE — 88305 TISSUE EXAM BY PATHOLOGIST: CPT | Mod: 26,,, | Performed by: PATHOLOGY

## 2022-05-07 PROCEDURE — 25000003 PHARM REV CODE 250: Performed by: INTERNAL MEDICINE

## 2022-05-07 PROCEDURE — 82150 ASSAY OF AMYLASE: CPT | Performed by: INTERNAL MEDICINE

## 2022-05-07 PROCEDURE — 85730 THROMBOPLASTIN TIME PARTIAL: CPT | Performed by: INTERNAL MEDICINE

## 2022-05-07 PROCEDURE — 88112 CYTOPATH CELL ENHANCE TECH: CPT | Performed by: PATHOLOGY

## 2022-05-07 PROCEDURE — 32555 ASPIRATE PLEURA W/ IMAGING: CPT | Mod: RT,,, | Performed by: INTERNAL MEDICINE

## 2022-05-07 PROCEDURE — 63600175 PHARM REV CODE 636 W HCPCS: Performed by: INTERNAL MEDICINE

## 2022-05-07 PROCEDURE — 82042 OTHER SOURCE ALBUMIN QUAN EA: CPT | Performed by: INTERNAL MEDICINE

## 2022-05-07 PROCEDURE — 84311 SPECTROPHOTOMETRY: CPT | Performed by: INTERNAL MEDICINE

## 2022-05-07 PROCEDURE — 21400001 HC TELEMETRY ROOM

## 2022-05-07 PROCEDURE — 83615 LACTATE (LD) (LDH) ENZYME: CPT | Mod: 91 | Performed by: INTERNAL MEDICINE

## 2022-05-07 PROCEDURE — 80202 ASSAY OF VANCOMYCIN: CPT | Performed by: INTERNAL MEDICINE

## 2022-05-07 PROCEDURE — 99223 1ST HOSP IP/OBS HIGH 75: CPT | Mod: 25,,, | Performed by: INTERNAL MEDICINE

## 2022-05-07 PROCEDURE — 83615 LACTATE (LD) (LDH) ENZYME: CPT | Performed by: INTERNAL MEDICINE

## 2022-05-07 PROCEDURE — 84155 ASSAY OF PROTEIN SERUM: CPT | Performed by: INTERNAL MEDICINE

## 2022-05-07 PROCEDURE — 32555 PR THORACEN W/IMAG GUIDANCE: ICD-10-PCS | Mod: RT,,, | Performed by: INTERNAL MEDICINE

## 2022-05-07 PROCEDURE — 63600175 PHARM REV CODE 636 W HCPCS: Performed by: FAMILY MEDICINE

## 2022-05-07 PROCEDURE — 94761 N-INVAS EAR/PLS OXIMETRY MLT: CPT

## 2022-05-07 PROCEDURE — 88305 TISSUE EXAM BY PATHOLOGIST: CPT | Performed by: PATHOLOGY

## 2022-05-07 PROCEDURE — 11000001 HC ACUTE MED/SURG PRIVATE ROOM

## 2022-05-07 PROCEDURE — 83735 ASSAY OF MAGNESIUM: CPT | Performed by: INTERNAL MEDICINE

## 2022-05-07 PROCEDURE — 80053 COMPREHEN METABOLIC PANEL: CPT | Performed by: INTERNAL MEDICINE

## 2022-05-07 PROCEDURE — 84100 ASSAY OF PHOSPHORUS: CPT | Performed by: INTERNAL MEDICINE

## 2022-05-07 PROCEDURE — 88305 TISSUE EXAM BY PATHOLOGIST: ICD-10-PCS | Mod: 26,,, | Performed by: PATHOLOGY

## 2022-05-07 PROCEDURE — 88112 PR  CYTOPATH, CELL ENHANCE TECH: ICD-10-PCS | Mod: 26,,, | Performed by: PATHOLOGY

## 2022-05-07 PROCEDURE — 25000003 PHARM REV CODE 250: Performed by: FAMILY MEDICINE

## 2022-05-07 PROCEDURE — 85027 COMPLETE CBC AUTOMATED: CPT | Performed by: INTERNAL MEDICINE

## 2022-05-07 PROCEDURE — 84157 ASSAY OF PROTEIN OTHER: CPT | Performed by: INTERNAL MEDICINE

## 2022-05-07 PROCEDURE — 85610 PROTHROMBIN TIME: CPT | Performed by: INTERNAL MEDICINE

## 2022-05-07 PROCEDURE — 85007 BL SMEAR W/DIFF WBC COUNT: CPT | Performed by: INTERNAL MEDICINE

## 2022-05-07 PROCEDURE — 82945 GLUCOSE OTHER FLUID: CPT | Performed by: INTERNAL MEDICINE

## 2022-05-07 PROCEDURE — 89051 BODY FLUID CELL COUNT: CPT | Performed by: INTERNAL MEDICINE

## 2022-05-07 PROCEDURE — S0030 INJECTION, METRONIDAZOLE: HCPCS | Performed by: INTERNAL MEDICINE

## 2022-05-07 PROCEDURE — 36415 COLL VENOUS BLD VENIPUNCTURE: CPT | Performed by: INTERNAL MEDICINE

## 2022-05-07 RX ORDER — IPRATROPIUM BROMIDE AND ALBUTEROL SULFATE 2.5; .5 MG/3ML; MG/3ML
3 SOLUTION RESPIRATORY (INHALATION) EVERY 6 HOURS PRN
Status: DISCONTINUED | OUTPATIENT
Start: 2022-05-07 | End: 2022-05-11

## 2022-05-07 RX ORDER — SODIUM CHLORIDE 0.9 % (FLUSH) 0.9 %
10 SYRINGE (ML) INJECTION
Status: DISCONTINUED | OUTPATIENT
Start: 2022-05-07 | End: 2022-06-10 | Stop reason: HOSPADM

## 2022-05-07 RX ORDER — MUPIROCIN 20 MG/G
OINTMENT TOPICAL 2 TIMES DAILY
Status: DISPENSED | OUTPATIENT
Start: 2022-05-07 | End: 2022-05-12

## 2022-05-07 RX ADMIN — HYDROCODONE BITARTRATE AND ACETAMINOPHEN 1 TABLET: 5; 325 TABLET ORAL at 02:05

## 2022-05-07 RX ADMIN — SODIUM CHLORIDE: 0.9 INJECTION, SOLUTION INTRAVENOUS at 06:05

## 2022-05-07 RX ADMIN — ENOXAPARIN SODIUM 40 MG: 40 INJECTION SUBCUTANEOUS at 05:05

## 2022-05-07 RX ADMIN — VANCOMYCIN HYDROCHLORIDE 1250 MG: 1.25 INJECTION, POWDER, LYOPHILIZED, FOR SOLUTION INTRAVENOUS at 01:05

## 2022-05-07 RX ADMIN — HYDROCODONE BITARTRATE AND ACETAMINOPHEN 1 TABLET: 5; 325 TABLET ORAL at 10:05

## 2022-05-07 RX ADMIN — SODIUM CHLORIDE: 0.9 INJECTION, SOLUTION INTRAVENOUS at 01:05

## 2022-05-07 RX ADMIN — CEFEPIME HYDROCHLORIDE 2 G: 2 INJECTION, SOLUTION INTRAVENOUS at 04:05

## 2022-05-07 RX ADMIN — METRONIDAZOLE 500 MG: 500 INJECTION, SOLUTION INTRAVENOUS at 11:05

## 2022-05-07 RX ADMIN — ACETAMINOPHEN 650 MG: 325 TABLET ORAL at 02:05

## 2022-05-07 RX ADMIN — MORPHINE SULFATE 2 MG: 4 INJECTION INTRAVENOUS at 06:05

## 2022-05-07 RX ADMIN — METRONIDAZOLE 500 MG: 500 INJECTION, SOLUTION INTRAVENOUS at 02:05

## 2022-05-07 RX ADMIN — MUPIROCIN: 20 OINTMENT TOPICAL at 08:05

## 2022-05-07 RX ADMIN — CEFEPIME HYDROCHLORIDE 2 G: 2 INJECTION, SOLUTION INTRAVENOUS at 01:05

## 2022-05-07 RX ADMIN — LORAZEPAM 0.5 MG: 2 INJECTION INTRAMUSCULAR; INTRAVENOUS at 05:05

## 2022-05-07 RX ADMIN — CEFEPIME HYDROCHLORIDE 2 G: 2 INJECTION, SOLUTION INTRAVENOUS at 09:05

## 2022-05-07 RX ADMIN — MORPHINE SULFATE 2 MG: 4 INJECTION INTRAVENOUS at 09:05

## 2022-05-07 RX ADMIN — METRONIDAZOLE 500 MG: 500 INJECTION, SOLUTION INTRAVENOUS at 06:05

## 2022-05-07 NOTE — ASSESSMENT & PLAN NOTE
"This patient does have evidence of infective focus  My overall impression is sepsis. Vital signs were reviewed and noted in progress note.  Antibiotics given-   Antibiotics (From admission, onward)            Start     Stop Route Frequency Ordered    05/07/22 0200  metronidazole IVPB 500 mg         -- IV Every 8 hours (non-standard times) 05/06/22 1954 05/07/22 0100  cefepime in dextrose 5 % IVPB 2 g         -- IV Every 8 hours (non-standard times) 05/06/22 1954 05/06/22 2115  vancomycin (VANCOCIN) 2,250 mg in dextrose 5 % 500 mL IVPB         -- IV Once 05/06/22 2014 05/06/22 2052  vancomycin - pharmacy to dose  (vancomycin IVPB)        "And" Linked Group Details    -- IV pharmacy to manage frequency 05/06/22 1954        Cultures were taken-   Microbiology Results (last 7 days)     Procedure Component Value Units Date/Time    Blood culture x two cultures. Draw prior to antibiotics. [[id number]] Collected: 05/06/22 1617    Order Status: Sent Specimen: Blood from Peripheral, Antecubital, Right Updated: 05/06/22 1618    Blood culture x two cultures. Draw prior to antibiotics. [[id number]] Collected: 05/06/22 1616    Order Status: Sent Specimen: Blood from Peripheral, Antecubital, Left Updated: 05/06/22 1617        Latest lactate reviewed, they are-  Recent Labs   Lab 05/06/22  1616 05/06/22  1731   LACTATE 1.5 1.5       Organ dysfunction indicated by Acute kidney injury  Source-  lung    Source control Achieved by-   Cont Broad spectrum IVAB     "

## 2022-05-07 NOTE — HOSPITAL COURSE
5/7 admitted for pneumonia, severe sepsis in setting of ivdu. Mother at bedside and provides collateral. Patient has struggled with substance abuse for approximately 20 years, worsening over last 3-5 years since life event. Onset of symptoms 1-2 weeks ago. Tolerated thoracentesis with no pneumothorax on chest x-ray. Mri lumbar and echo, pending. Bcx positive, growing gram positive cocci. On vanc, cefepime and flagyl. Infectious disease consulted for bacteremia. 5/8/22 The patient remained afebrile overnight. Currently on cefepime/vanc/flagyl. Blood cx 5/6/22 are growing MSSA. The patient refused the MRI lumbar spine overnight d/t being unable to lay flat from back pain. Will give pain meds and attempt to get the MRI today to r/o spinal abscess. The ECHO showed a 1.3 x 1.5 cm elongated, mobile echodensity seen on the tricuspid valve consistent with vegetation, CT surgery was consulted. Will need a SUSANNAH. Infectious disease is consulted. Will repeat blood cx today. 5/9/22 No acute events overnight. The patient reports lower back pain is not well controlled with current pain regimen, will adjust. Repeat blood cx are still positive. Sensitivities are back and Staph is sensitive to oxacillin, will D/C vanc/cefepime. Cardiology consulted and plans for a SUSANNAH today. CT surgery following and recommends continuing medical management with appropriate ABX, no surgical intervention at this time. MRI lumbar spine showed moderate to severe spinal canal stenosis with moderate left-sided neural foraminal stenosis, Neurosurgery consulted. 5/10/22 No acute events overnight. The patient reports some improvement in pain with adjustment in pain meds. Repeat blood cx are +. Continue IV oxacillin and flagyl. Infectious disease is consulted. IR consulted to evaluate possible paraspinous muscle myositis versus abscess. Recommend IR drainage. Continue current management. 5/11/22 No acute events overnight. The patient reports pain is still not  well controlled at times. The patient reports that she is very anxious about the upcoming SUSANNAH and IR drainage. SUSANNAH was pushed back to this afternoon because the patient ate candy this AM. Continue treatment with IV oxacillin and flagyl. Infectious disease is following. WBC trended down to 31K. Continue current management. 5/12/22 No acute events overnight. The patients WBC improved to 22K overnight. The patient remains on continuous oxacillin infusion. Infectious disease is following. MRI thoracic spine is ordered and pending. The patients SUSANNAH was postponed again today d/t low H/H. Hgb was noted to be 6.2 this morning, will transfuse 1 unit PRBC. The patient reports pain is still not well controlled. The case was discussed with Neurosurgery who recommended adding extended release pain meds. Approximately 1 ml of purulent drainage aspirated from back yesterday per IR, growing gram + cocci.     5/13: Patient was given 1U PRBC on 5/12, improved from 6.2 --> 7.0, will give an additional unit PRBC. Radiology attempted to bring patient on 5/12 at 1755 for Thoracic MRI, patient refused due to pain and anxiety. Repeat attempt this AM, patient again refused. Will order Ativan 1mg IV to be given with pain medication prior to scan to relieve anxiety and pain, nurse to notify NP when ready to go for scan. Discussed pain management with patient, discussed transition to PO pain medications to being preparing for d/c to LTAC, adjusted PO pain regimen will reevaluate to determine effectiveness. Repeat Blood cultures 5/11/22: NGTD.     5/14: Final anaerobic cultures pending, continue to adjust pain regimen, d/c IV Dilaudid today. Patient currently managed with PO Morphine extended release and PO oxycodone. D/C plan is for LTAC once final antibiotic regimen determined. Patient continues to refuse MRI of thoracic spine.     5/15: Called to room this AM, patient had coughed up blood and mucous. Approx. 1-2 teaspoons of blood in emesis  bag, ordered CXR and repeat CBC, CXR showed improvement from previous day, repeat CBC at time of incident, showed a slight decrease in Hgb: 9.7 --> 8.7, when repeated at 1500, Hgb had improved to 10. no further episodes of coughing up blood throughout day. Patient continues to c/o not having enough pain medication but continues to sleep most of the day and will fall asleep when speaking at times. Added IV Toradol to plan.     5/16: No further episodes of coughing blood overnight, patient participated with encouragement with PT/OT today, recommendations are HH vs. SNF pending progress. Awaiting final antibiotic regimen recommendations. Anaerobic culture results finalized: negative for growth. Afebrile, WBC trending downward.     5/17: Antibiotic regimen per ID is continuous oxacillin until 7/11/22, SW notified of plan, will seek acceptance at LTAC or SNF. Patient choice is [facility]. WBC in normal range. AMS this AM, CT of head: no acute findings. Given narcan and mental status improved. Decreased narcotic dose.     5/18: Per discussion with patient, she is willing to have the MRI of thoracic spine today, will order IV pain medication and anxiety medication prior to scan. Nursing and Radiology aware of needing to premedicate for scan. Patient is more cooperative and participating with care.     5/19: Patient given premedications for MRI, MRI completed, results pending. Patient more agreeable with treatment plan and cooperating. Will need psychiatry follow-up after discharge. Hgb: 6.7, will give 1U PRBC    5/20/22: +LEON and generalized pain. Thoracic MRI showed no evidence for disc osteomyelitis in the thoracic spine and no epidural abscess, Loculated right hydropneumothorax and extensive bilateral pulmonary infiltrates. Ct chest showed septic emboli and moderate right pleural effusion.+splenic infarct  Pulmonology felt likely empyema- consulted IR for chest tube.     5/21/22: s/p right pig tail placement to suction/  "drainage system per IR. 60ml pus removed initially. Fluid analysis consistent with empyema. Pleural fluid cultures pending. Now with scant serosanguinous output.+ right lateral chest pain at chest tube site. Denies any other complaints. Status update provided to pt's mother.     5/22/22: Repeatedly requesting IV Dilaudid. Toradol ordered. Afebrile, WBC normal, O2sats stable on 3lites. s/p right pig tail placement to suction/ drainage system per IR on 5/21/22- draining serous draiange with pus. Plan for LTAC placement     5/23/22: More cofortable today. Not getting OOB. Encouraged OOB and rationale provided. Pulmonology recommended Possible tPA and DNase in chest tube tomorrow    5/24/22: Examined OOB in chair. Encouraged pt to continue OOB. Plan for tPA and DNase in chest tube today per Pulmonology  Pt denied LTAC- will pursue SNF    5/25/22: Temp 101.7F last night. Will recheck blood cultures and UA. BP low - IVF bolus given and restarted IVF. ID re-consulted. 160ml of purulent CT drainage output. Dr. San plans to repeat tPA and DNase in chest tube again today.     5/26/22: temp 99.2. c/o of right "lung pain" when eating. Will consult speech to check swallow. +mild leukocytosis. Repeat BC 5/25/22 show NGTD. Repeat UA showed rare trichomas. Chest tube drainage remains purulent     5/27/22  Low grade temp. Downward trend of hemoglobin, related to chest tube drainage. Blood cultures remain negative. Urine culture growing E. Coli. Sensitivities are pending.     5/28/22  Urine culture returned pansenstiive. Will continue ciprofloxacin for total of 3 days then discontinue. Still awaiting SNF placement.    5/29/22 culture of pleural fluid from 5/28/22 negative. Continue CT for now.     5/30/22  CT removed yesterday by pulmonology. SNF declines. Will explore other options for IV abx with help from ID.     5/31/22  Patient has been accepted to [facility]. Will transfer today. Will follow up with pulmonology and ID at " discharge. She will continue oxacillin for management of bacteremia, septic embolic, and endocarditis.  Addendum: COVID negative transfer delayed.     6/1/22  COVID positive started on remdesivir. Tmax 101.3. repeat CT scan shows evolving septic embolic/necrotizing pneumonia. Pulmonlogy recommends reconsulting CVT. ID following. Awaiting insurance approval for [facility]. Decline in hemoglobin 7.4, hold on transfusion at this time.   6/2:  CVT consult on case.  Plan for VATS procedure on Monday given patient's COVID status.  Continue remdesivir.  Continue oxacillin.  6/3: VATS planned for Monday. Will transfuse 1 unit prbc. No further hemoptysis noted. Cont oxacillin.   6/4:  Vats procedure Monday .  H&H improved status post 1 unit packed RBC.  NPO midnight.  Continue oxacillin.  6/5:  VATS procedure tomorrow.  H&H stable.  NPO at midnight.  6/6: VATS procedure today.   6/7: s/p vats procedure. Patient self extubated approx 1 am. Currently doing well. Will step down from ICU. LTAC placement pending.   6/8: stepped down from icu. Cont chest tube, current being managed by CTS. Cont current abx therapy. Plans for LTAC once chest tubes are out.   6/9: h/h low today. Transfuse 2 units prbc per CTS. Cont chest tube.   6/10 Pt was seen and examined at bedside . She was determined to be suitable for d/c . She will be d/c  to [facility]  H/H stable . The Chest tube  DC by CTS . She will be d/c on oxacillin cont infusion  for  MSSA IE  EOC 7/11/22

## 2022-05-07 NOTE — PROGRESS NOTES
Pharmacokinetic Initial Assessment: IV Vancomycin    Assessment/Plan:    Initiate intravenous vancomycin with loading dose of 2250 mg once with subsequent doses when random concentrations are less than 20 mcg/mL  Desired empiric serum trough concentration is 15 to 20 mcg/mL  Draw vancomycin random level on 5/7 at 11:00.  Pharmacy will continue to follow and monitor vancomycin.      Please contact pharmacy at extension [phone] with any questions regarding this assessment.     Thank you for the consult,   Gurjit Dunn       Patient brief summary:  Tianna Leon is a 37 y.o. female initiated on antimicrobial therapy with IV Vancomycin for treatment of suspected  pneumonia    Drug Allergies:   Review of patient's allergies indicates:   Allergen Reactions    Sulfamethoxazole-trimethoprim        Actual Body Weight:   82.4kg    Renal Function:   Estimated Creatinine Clearance: 72.8 mL/min (based on SCr of 1.1 mg/dL).,     Dialysis Method (if applicable):  N/A    CBC (last 72 hours):  Recent Labs   Lab Result Units 05/06/22  1616   WBC K/uL 29.13*   Hemoglobin g/dL 9.9*   Hematocrit % 27.8*   Platelets K/uL 171   Gran % % 86.0*   Lymph % % 4.0*   Mono % % 4.0   Eosinophil % % 0.0   Basophil % % 0.0   Differential Method  Manual       Metabolic Panel (last 72 hours):  Recent Labs   Lab Result Units 05/06/22  1619 05/06/22  1720   Sodium mmol/L 130*  --    Potassium mmol/L 3.4*  --    Chloride mmol/L 89*  --    CO2 mmol/L 28  --    Glucose mg/dL 109  --    Glucose, UA   --  Negative   BUN mg/dL 46*  --    Creatinine mg/dL 1.1  --    Creatinine, Urine mg/dL  --  83.8   Albumin g/dL 1.8*  --    Total Bilirubin mg/dL 1.4*  --    Alkaline Phosphatase U/L 139*  --    AST U/L 37  --    ALT U/L 33  --        Drug levels (last 3 results):  No results for input(s): VANCOMYCINRA, VANCOMYCINPE, VANCOMYCINTR in the last 72 hours.    Microbiologic Results:  Microbiology Results (last 7 days)       Procedure Component Value Units  Date/Time    Blood culture x two cultures. Draw prior to antibiotics. [[id number]] Collected: 05/06/22 1617    Order Status: Sent Specimen: Blood from Peripheral, Antecubital, Right Updated: 05/06/22 1618    Blood culture x two cultures. Draw prior to antibiotics. [[id number]] Collected: 05/06/22 1616    Order Status: Sent Specimen: Blood from Peripheral, Antecubital, Left Updated: 05/06/22 1617

## 2022-05-07 NOTE — PLAN OF CARE
[facility] - Med Surg  Initial Discharge Assessment       Primary Care Provider: Spenser Campa MD    Admission Diagnosis: Knee pain [M25.569]  SOB (shortness of breath) [R06.02]  Tachycardia [R00.0]  Splenic infarct [D73.5]  Abscess of lower lobe of right lung with pneumonia [J85.1]    Admission Date: 5/6/2022  Expected Discharge Date:     Discharge Barriers Identified: None    Payor: HU[employer] MANAGED MEDICARE / Plan: HU[employer]NA SNP (SPECIAL NEEDS PLAN) / Product Type: Medicare Advantage /     Extended Emergency Contact Information  Primary Emergency Contact: Contact,No  Address: [address] [place], [place] [id number] [place] [address]  Home Phone: [phone]  Mobile Phone: [phone]  Relation: Mother    Discharge Plan A: Home         [id number] - [place], [place] - [address] [facility] [address]  [address] [address] [address]  Phone: [phone] Fax: [phone]    [facility] [place], [place] - [address]  [address]  [facility] [address]  Phone: [phone] Fax: [phone]    [id number] [address] [facility] AT [facility] [address]  [address] [facility] [address]  Phone: [phone] Fax: [phone]      Initial Assessment (most recent)     Adult Discharge Assessment - 05/07/22 1203        Discharge Assessment    Assessment Type Discharge Planning Assessment     Confirmed/corrected address, phone number and insurance Yes     Confirmed Demographics Correct on Facesheet     Source of Information patient     When was your last doctors appointment? 04/29/22     Does patient/caregiver understand observation status Yes     Communicated YESSICA with patient/caregiver Yes     Reason For Admission extreme pain     Lives With alone     Do you expect to return to your current living situation? Yes     Do you have help at home or someone to help you manage your care at home? No     Prior to hospitilization  cognitive status: Alert/Oriented     Current cognitive status: Alert/Oriented     Walking or Climbing Stairs Difficulty none     Dressing/Bathing Difficulty none     Equipment Currently Used at Home none     Readmission within 30 days? No     Patient currently being followed by outpatient case management? No     Do you currently have service(s) that help you manage your care at home? No     Do you take prescription medications? No     Do you have prescription coverage? Yes     Who is going to help you get home at discharge? Karyn Hammond, mother, [phone]     How do you get to doctors appointments? family or friend will provide     Are you on dialysis? No     Do you take coumadin? No     Discharge Plan A Home     DME Needed Upon Discharge  none     Discharge Plan discussed with: Parent(s);Patient     Name(s) and Number(s) Karyn Hammond, mother, [phone]     Discharge Barriers Identified None               SW met with pt and mother at bedside. SW explained role. Pt mother reports pt lives alone and was independent prior to being admitted. Pt discharge needs depends on her hospital progression. SW will continue to follow. SW provided a transitional care folder, information on advanced directives, information on pharmacy bedside delivery, and discharge planning begins on admission with contact information for any needs/questions.

## 2022-05-07 NOTE — NURSING
7 rings removed from patient placed in clear bag and gave to patient to put in her belongings.  Nurse Neil NOLAN was a witness

## 2022-05-07 NOTE — SUBJECTIVE & OBJECTIVE
Past Medical History:   Diagnosis Date    ADHD (attention deficit hyperactivity disorder)     Anxiety     Asthma     Bipolar disorder     Cancer     cervical    COPD (chronic obstructive pulmonary disease)     GERD (gastroesophageal reflux disease)     Hepatitis C antibody positive in blood     RNA UNDETECTED 2016    Hyperlipidemia     Intravenous drug abuse     MDD (major depressive disorder)     Mood disorder     PTSD (post-traumatic stress disorder)     Suicidal ideation        Past Surgical History:   Procedure Laterality Date     SECTION  2001    x2    CHOLECYSTECTOMY      HYSTERECTOMY      LAPAROSCOPIC SALPINGECTOMY      ROBOT-ASSISTED LAPAROSCOPIC ABDOMINAL HYSTERECTOMY USING DA SEBASTIEN XI N/A 2019    Procedure: XI ROBOTIC HYSTERECTOMY;  Surgeon: Tabatha Quintanilla MD;  Location: [facility] OR;  Service: OB/GYN;  Laterality: N/A;    ROBOT-ASSISTED LAPAROSCOPIC LYSIS OF ADHESIONS USING DA SEBASTIEN XI N/A 2019    Procedure: XI ROBOTIC LYSIS, ADHESIONS;  Surgeon: Tabatha Quintanilla MD;  Location: [facility] OR;  Service: OB/GYN;  Laterality: N/A;    ROBOT-ASSISTED SURGICAL REMOVAL OF FALLOPIAN TUBE USING DA SEBASTIEN XI Right 2019    Procedure: XI ROBOTIC SALPINGECTOMY;  Surgeon: Tabatha Quintanilla MD;  Location: [facility] OR;  Service: OB/GYN;  Laterality: Right;    TONSILLECTOMY      TUBAL LIGATION         Review of patient's allergies indicates:   Allergen Reactions    Sulfamethoxazole-trimethoprim        Family History       Problem Relation (Age of Onset)    COPD Mother          Tobacco Use    Smoking status: Current Every Day Smoker     Packs/day: 1.50     Types: Cigarettes    Smokeless tobacco: Never Used    Tobacco comment: no smoking after midnight prior to surgery   Substance and Sexual Activity    Alcohol use: Yes     Comment: Occassional     Drug use: Not Currently     Types: IV, Methamphetamines, Heroin     Comment: denies heroin use.  Last used meth 10/2018  No illegal drugs prior  to sx    Sexual activity: Not Currently     Partners: Male     Birth control/protection: None         Review of Systems   Constitutional:  Positive for fatigue and fever.   HENT: Negative.     Eyes: Negative.    Respiratory:  Positive for cough, chest tightness and shortness of breath.    Gastrointestinal: Negative.    Endocrine: Negative.    Genitourinary: Negative.    Musculoskeletal:  Positive for arthralgias and back pain.   Skin: Negative.    Allergic/Immunologic: Negative.    Neurological:  Positive for weakness.   Psychiatric/Behavioral:  Positive for confusion.    Objective:     Vital Signs (Most Recent):  Temp: 98.6 °F (37 °C) (05/07/22 0453)  Pulse: 82 (05/07/22 0453)  Resp: 18 (05/07/22 0453)  BP: (!) 100/58 (05/07/22 0453)  SpO2: 95 % (05/07/22 0453)   Vital Signs (24h Range):  Temp:  [98.2 °F (36.8 °C)-101.6 °F (38.7 °C)] 98.6 °F (37 °C)  Pulse:  [] 82  Resp:  [17-30] 18  SpO2:  [93 %-96 %] 95 %  BP: ()/(51-65) 100/58     Weight: 83 kg (182 lb 15.7 oz)  Body mass index is 30.45 kg/m².      Intake/Output Summary (Last 24 hours) at 5/7/2022 0705  Last data filed at 5/7/2022 0451  Gross per 24 hour   Intake 1670.01 ml   Output 400 ml   Net 1270.01 ml       Physical Exam  Vitals and nursing note reviewed.   Constitutional:       Appearance: She is ill-appearing.      Interventions: Nasal cannula in place.   HENT:      Head: Normocephalic.      Comments: Lip piercing     Nose: Nose normal.      Mouth/Throat:      Mouth: Mucous membranes are moist.   Eyes:      Pupils: Pupils are equal, round, and reactive to light.   Cardiovascular:      Rate and Rhythm: Normal rate.   Pulmonary:      Effort: Tachypnea present.      Breath sounds: Examination of the right-middle field reveals decreased breath sounds. Examination of the right-lower field reveals decreased breath sounds. Decreased breath sounds and rales present. No wheezing or rhonchi.   Abdominal:      General: Bowel sounds are normal.       Palpations: Abdomen is soft.   Musculoskeletal:         General: Normal range of motion.      Cervical back: Normal range of motion.   Skin:     General: Skin is warm.      Comments: Multiple tattoos   Neurological:      General: No focal deficit present.      Mental Status: She is alert and oriented to person, place, and time.   Psychiatric:         Behavior: Behavior is cooperative.       Vents:       Lines/Drains/Airways       Peripheral Intravenous Line  Duration                  Peripheral IV - Single Lumen 05/06/22 22 G Right Wrist 1 day         Peripheral IV - Single Lumen 05/06/22 1616 20 G Right Antecubital <1 day         Peripheral IV - Single Lumen 05/06/22 1658 20 G Left Antecubital <1 day                    Significant Labs:    CBC/Anemia Profile:  Recent Labs   Lab 05/06/22  1616   WBC 29.13*   HGB 9.9*   HCT 27.8*      MCV 82   RDW 14.2        Chemistries:  Recent Labs   Lab 05/06/22  1619   *   K 3.4*   CL 89*   CO2 28   BUN 46*   CREATININE 1.1   CALCIUM 8.3*   ALBUMIN 1.8*   PROT 6.0   BILITOT 1.4*   ALKPHOS 139*   ALT 33   AST 37       ABGs: No results for input(s): PH, PCO2, HCO3, POCSATURATED, BE in the last 48 hours.  Blood Culture: No results for input(s): LABBLOO in the last 48 hours.  Lactic Acid:   Recent Labs   Lab 05/06/22  1616 05/06/22  1731   LACTATE 1.5 1.5     POCT Glucose: No results for input(s): POCTGLUCOSE in the last 48 hours.  Respiratory Culture: No results for input(s): GSRESP, RESPIRATORYC in the last 48 hours.  Urine Culture: No results for input(s): LABURIN in the last 48 hours.  All pertinent labs within the past 24 hours have been reviewed.    Significant Imaging:   I have reviewed all pertinent imaging results/findings within the past 24 hours.    CT Cervical Spine Without Contrast  Narrative: EXAMINATION:  CT CERVICAL SPINE WITHOUT CONTRAST    CLINICAL HISTORY:  Neck pain, acute, infection suspected;    TECHNIQUE:  Low dose axial CT images through the  cervical spine, with sagittal and coronal reformations.  Contrast was not administered.    COMPARISON:  12/05/2019    FINDINGS:  The vertebral bodies are normal in height and morphology without evidence of fracture or osseous destructive process.  Normal sagittal alignment is preserved.    Progression of degenerative change at C4-5 in comparison the prior exam with posterior disc osteophyte complex at this level producing mild spinal canal stenosis.  Smaller C5-6 posterior disc osteophyte complex without definite spinal canal stenosis.    No definite neural foraminal narrowing.    Limited evaluation of the intraspinal contents demonstrates no hematoma or mass.Paraspinal soft tissues exhibit no acute abnormalities.  Impression: No fracture or traumatic malalignment of the cervical spine.  No definite CT evidence of osteomyelitis.  Further evaluation as clinically warranted.    Additional degenerative findings as above.    All CT scans at this facility are performed  using dose modulation techniques as appropriate to performed exam including the following:  automated exposure control; adjustment of mA and/or kV according to the patients size (this includes techniques or standardized protocols for targeted exams where dose is matched to indication/reason for exam: i.e. extremities or head);  iterative reconstruction technique.    Electronically signed by: Demario Morin  Date:    05/06/2022  Time:    18:34  CT Thoracic Spine Without Contrast  Narrative: EXAMINATION:  CT THORACIC SPINE WITHOUT CONTRAST    CLINICAL HISTORY:  Epidural abscess suspected;    TECHNIQUE:  Low-dose axial noncontrast CT images of the thoracic spine.  Multiplanar reformats generated    COMPARISON:  None    FINDINGS:  Thoracic spine alignment is well preserved.  No findings to suggest vertebral body fracture.  No osseous lytic or blastic process.    No significant degenerative changes.  No disc abnormalities or facet arthrosis.    Pulmonary  opacities as on the dedicated CT exam.  Impression: No definite acute abnormality.  No traumatic malalignment, fracture, or osseous destructive process.  Further evaluation as clinically warranted.    Pulmonary opacities as on the dedicated CT exam.    All CT scans at this facility are performed  using dose modulation techniques as appropriate to performed exam including the following:  automated exposure control; adjustment of mA and/or kV according to the patients size (this includes techniques or standardized protocols for targeted exams where dose is matched to indication/reason for exam: i.e. extremities or head);  iterative reconstruction technique.    Electronically signed by: Demario Morin  Date:    05/06/2022  Time:    18:32  CT Lumbar Spine Without Contrast  Narrative: EXAMINATION:  CT LUMBAR SPINE WITHOUT CONTRAST    CLINICAL HISTORY:  Low back pain, infection suspected;    TECHNIQUE:  Low-dose CT images obtained throughout the region of the lumbar spine.  Axial, sagittal and coronal reformations were performed.  Contrast was not administered.    COMPARISON:  12/05/2019    FINDINGS:  The vertebral bodies are normal in height and morphology without evidence of fracture or osseous destructive process.    Normal sagittal alignment is preserved.  No spondylolisthesis.    Circumferential disc bulges L3 through S1 with mild spinal canal stenosis L3-4, moderate to severe spinal canal stenosis L4-5 and probable moderate spinal canal stenosis L5-S1.    Facet arthropathy with moderate left neural foraminal narrowing at L4-5 and severe left neural foraminal narrowing at L5-S1.  Other levels are better preserved.    Limited evaluation of the intraspinal contents demonstrates no hematoma or mass.    Paraspinal soft tissues exhibit no acute abnormalities.  Impression: No fracture or traumatic malalignment of the lumbar spine.  No definite findings to suggest osteomyelitis.  Further evaluation as clinically  warranted.    Possible progression of degenerative changes most notable at L4-5 with moderate to severe spinal canal stenosis at this level.  Additional details as above.    This report was flagged in Epic as abnormal.    All CT scans at this facility are performed  using dose modulation techniques as appropriate to performed exam including the following:  automated exposure control; adjustment of mA and/or kV according to the patients size (this includes techniques or standardized protocols for targeted exams where dose is matched to indication/reason for exam: i.e. extremities or head);  iterative reconstruction technique.    Electronically signed by: Demario Morin  Date:    05/06/2022  Time:    18:29  CTA Chest Non-Coronary (PE Study)  Narrative: EXAMINATION:  CTA CHEST NON CORONARY    CLINICAL HISTORY:  Pulmonary embolism (PE) suspected, neg D-dimer;    TECHNIQUE:  Low dose axial images, sagittal and coronal reformations were obtained from the thoracic inlet to the lung bases following the IV administration of 100 mL of Omnipaque 350.  Contrast timing was optimized to evaluate the pulmonary arteries.  MIP images were performed.    COMPARISON:  Multiple priors.    FINDINGS:  Base of Neck: No significant abnormality.    Thoracic soft tissues: Unremarkable.    Aorta: Left-sided aortic arch.  No aneurysm and no significant atherosclerosis    Heart: Normal size.  Small effusion.    Pulmonary vasculature: Pulmonary arteries are well opacified.  There is no pulmonary thromboembolism.    Esha/Mediastinum: No pathologic ariana enlargement.    Airways: Patent.    Lungs/Pleura: Multiple nodular and cavitary opacities concerning for septic emboli.  Additional larger opacities most notably in the right lung base with some internal clearing possibly related to necrotizing pneumonia.  Moderate loculated right pleural fluid and no definite left pleural fluid.  Empyema not excluded.    Esophagus: Unremarkable.    Upper Abdomen:  Geographic wedge-shaped splenic hypodensity possibly related to contrast timing or infarct.    Bones: No acute fracture. No suspicious lytic or sclerotic lesions.  Impression: No definite pulmonary thromboembolism.    Multiple nodular and cavitary opacities concerning for septic emboli with larger opacities possibly necrotizing pneumonia.    Moderate loculated right pleural fluid.  Empyema not excluded.    Small pericardial effusion of unclear etiology.    Splenic hypodensity possibly related to infarct or contrast timing.    Additional details as above.    All CT scans at this facility are performed  using dose modulation techniques as appropriate to performed exam including the following:  automated exposure control; adjustment of mA and/or kV according to the patients size (this includes techniques or standardized protocols for targeted exams where dose is matched to indication/reason for exam: i.e. extremities or head);  iterative reconstruction technique.    Electronically signed by: Demario Morin  Date:    05/06/2022  Time:    18:26  X-Ray Chest AP Portable  Narrative: EXAMINATION:  XR CHEST AP PORTABLE    CLINICAL HISTORY:  Sepsis;.    TECHNIQUE:  Single frontal portable view of the chest was performed.    COMPARISON:  12/05/2019    FINDINGS:  Support devices: None    Patchy bilateral opacities, some of which appear nodular and possibly cavitary.  Recommend further evaluation with chest CT with IV contrast.    Heart normal size.  No pneumothorax or pleural effusion    Bones are intact.  Impression: Patchy bilateral opacities, some of which appear nodular and possibly cavitary. Recommend further evaluation with chest CT with IV contrast.    This report was flagged in Epic as abnormal.    Electronically signed by: Nate Brasher  Date:    05/06/2022  Time:    16:04  X-Ray Finger 2 or More Views Right  Narrative: EXAMINATION:  XR FINGER 2 OR MORE VIEWS RIGHT    CLINICAL HISTORY:  finger  swelling;    TECHNIQUE:  Three views of the right 4th digit.    COMPARISON:  None    FINDINGS:  No evidence of fracture or dislocation.  Joint spaces appear well maintained.  Soft tissues unremarkable.  No evidence of foreign body.  Impression: Negative exam.    Electronically signed by: Nate Brasher  Date:    05/06/2022  Time:    16:02  X-Ray Knee Complete 4 Or More Views Right  Narrative: EXAMINATION:  XR KNEE COMP 4 OR MORE VIEWS RIGHT    CLINICAL HISTORY:  Pain in unspecified knee    TECHNIQUE:  AP, lateral, and Merchant views of the right knee were performed.    COMPARISON:  None    FINDINGS:  No evidence of fracture or dislocation.  Moderate suprapatellar knee joint effusion.  Mild soft tissue edema lateral to the knee.  No foreign body.  Soft tissues otherwise unremarkable.  Impression: No evidence of fracture or dislocation. Moderate suprapatellar knee joint effusion. Mild soft tissue edema lateral to the knee.    Electronically signed by: Nate Brasher  Date:    05/06/2022  Time:    16:01

## 2022-05-07 NOTE — CONSULTS
[facility]  Pulmonology  Consult Note    Patient Name: Tianna Leon  MRN: [id number]  Admission Date: 2022  Hospital Length of Stay: 1 days  Code Status: Full Code  Attending Physician: Elizabeth Kim MD  Primary Care Provider: Spenser Campa MD   Principal Problem: <principal problem not specified>    [unfilled]  Subjective:     HPI:  Tianna Leon is 37 y.o.  Asked to see for abn chest CT  Presented yesterday, fever, cough, low oxygen Sat  She is active IVDU  ( heroine , cocaine  and amphetamine   Imaging show loculated right pleural effusion on chest CT  T max 101.6F, Wbcc 29 k, Procal 2.71  Drug screen +ve for cocaince and amphetamine    Past Medical History:   Diagnosis Date    ADHD (attention deficit hyperactivity disorder)     Anxiety     Asthma     Bipolar disorder     Cancer     cervical    COPD (chronic obstructive pulmonary disease)     GERD (gastroesophageal reflux disease)     Hepatitis C antibody positive in blood     RNA UNDETECTED 2016    Hyperlipidemia     Intravenous drug abuse     MDD (major depressive disorder)     Mood disorder     PTSD (post-traumatic stress disorder)     Suicidal ideation           Past Medical History:   Diagnosis Date    ADHD (attention deficit hyperactivity disorder)     Anxiety     Asthma     Bipolar disorder     Cancer     cervical    COPD (chronic obstructive pulmonary disease)     GERD (gastroesophageal reflux disease)     Hepatitis C antibody positive in blood     RNA UNDETECTED 2016    Hyperlipidemia     Intravenous drug abuse     MDD (major depressive disorder)     Mood disorder     PTSD (post-traumatic stress disorder)     Suicidal ideation        Past Surgical History:   Procedure Laterality Date     SECTION  2001    x2    CHOLECYSTECTOMY      HYSTERECTOMY      LAPAROSCOPIC SALPINGECTOMY      ROBOT-ASSISTED LAPAROSCOPIC ABDOMINAL HYSTERECTOMY USING DA SEBASTIEN XI N/A 2019    Procedure: XI  ROBOTIC HYSTERECTOMY;  Surgeon: Tabatha Quintanilla MD;  Location: [facility] OR;  Service: OB/GYN;  Laterality: N/A;    ROBOT-ASSISTED LAPAROSCOPIC LYSIS OF ADHESIONS USING DA SEBASTIEN XI N/A 7/23/2019    Procedure: XI ROBOTIC LYSIS, ADHESIONS;  Surgeon: Tabatha Quintanilla MD;  Location: [facility] OR;  Service: OB/GYN;  Laterality: N/A;    ROBOT-ASSISTED SURGICAL REMOVAL OF FALLOPIAN TUBE USING DA SEBASTIEN XI Right 7/23/2019    Procedure: XI ROBOTIC SALPINGECTOMY;  Surgeon: Tabatha Quintanilla MD;  Location: [facility] OR;  Service: OB/GYN;  Laterality: Right;    TONSILLECTOMY      TUBAL LIGATION         Review of patient's allergies indicates:   Allergen Reactions    Sulfamethoxazole-trimethoprim        Family History       Problem Relation (Age of Onset)    COPD Mother          Tobacco Use    Smoking status: Current Every Day Smoker     Packs/day: 1.50     Types: Cigarettes    Smokeless tobacco: Never Used    Tobacco comment: no smoking after midnight prior to surgery   Substance and Sexual Activity    Alcohol use: Yes     Comment: Occassional     Drug use: Not Currently     Types: IV, Methamphetamines, Heroin     Comment: denies heroin use.  Last used meth 10/2018  No illegal drugs prior to sx    Sexual activity: Not Currently     Partners: Male     Birth control/protection: None         Review of Systems   Constitutional:  Positive for fatigue and fever.   HENT: Negative.     Eyes: Negative.    Respiratory:  Positive for cough, chest tightness and shortness of breath.    Gastrointestinal: Negative.    Endocrine: Negative.    Genitourinary: Negative.    Musculoskeletal:  Positive for arthralgias and back pain.   Skin: Negative.    Allergic/Immunologic: Negative.    Neurological:  Positive for weakness.   Psychiatric/Behavioral:  Positive for confusion.    Objective:     Vital Signs (Most Recent):  Temp: 98.6 °F (37 °C) (05/07/22 0453)  Pulse: 82 (05/07/22 0453)  Resp: 18 (05/07/22 0453)  BP: (!) 100/58  (05/07/22 0453)  SpO2: 95 % (05/07/22 0453)   Vital Signs (24h Range):  Temp:  [98.2 °F (36.8 °C)-101.6 °F (38.7 °C)] 98.6 °F (37 °C)  Pulse:  [] 82  Resp:  [17-30] 18  SpO2:  [93 %-96 %] 95 %  BP: ()/(51-65) 100/58     Weight: 83 kg (182 lb 15.7 oz)  Body mass index is 30.45 kg/m².      Intake/Output Summary (Last 24 hours) at 5/7/2022 0705  Last data filed at 5/7/2022 0451  Gross per 24 hour   Intake 1670.01 ml   Output 400 ml   Net 1270.01 ml       Physical Exam  Vitals and nursing note reviewed.   Constitutional:       Appearance: She is ill-appearing.      Interventions: Nasal cannula in place.   HENT:      Head: Normocephalic.      Comments: Lip piercing     Nose: Nose normal.      Mouth/Throat:      Mouth: Mucous membranes are moist.   Eyes:      Pupils: Pupils are equal, round, and reactive to light.   Cardiovascular:      Rate and Rhythm: Normal rate.   Pulmonary:      Effort: Tachypnea present.      Breath sounds: Examination of the right-middle field reveals decreased breath sounds. Examination of the right-lower field reveals decreased breath sounds. Decreased breath sounds and rales present. No wheezing or rhonchi.   Abdominal:      General: Bowel sounds are normal.      Palpations: Abdomen is soft.   Musculoskeletal:         General: Normal range of motion.      Cervical back: Normal range of motion.   Skin:     General: Skin is warm.      Comments: Multiple tattoos   Neurological:      General: No focal deficit present.      Mental Status: She is alert and oriented to person, place, and time.   Psychiatric:         Behavior: Behavior is cooperative.       Vents:       Lines/Drains/Airways       Peripheral Intravenous Line  Duration                  Peripheral IV - Single Lumen 05/06/22 22 G Right Wrist 1 day         Peripheral IV - Single Lumen 05/06/22 1616 20 G Right Antecubital <1 day         Peripheral IV - Single Lumen 05/06/22 1658 20 G Left Antecubital <1 day                     Significant Labs:    CBC/Anemia Profile:  Recent Labs   Lab 05/06/22  1616   WBC 29.13*   HGB 9.9*   HCT 27.8*      MCV 82   RDW 14.2        Chemistries:  Recent Labs   Lab 05/06/22  1619   *   K 3.4*   CL 89*   CO2 28   BUN 46*   CREATININE 1.1   CALCIUM 8.3*   ALBUMIN 1.8*   PROT 6.0   BILITOT 1.4*   ALKPHOS 139*   ALT 33   AST 37       ABGs: No results for input(s): PH, PCO2, HCO3, POCSATURATED, BE in the last 48 hours.  Blood Culture: No results for input(s): LABBLOO in the last 48 hours.  Lactic Acid:   Recent Labs   Lab 05/06/22  1616 05/06/22  1731   LACTATE 1.5 1.5     POCT Glucose: No results for input(s): POCTGLUCOSE in the last 48 hours.  Respiratory Culture: No results for input(s): GSRESP, RESPIRATORYC in the last 48 hours.  Urine Culture: No results for input(s): LABURIN in the last 48 hours.  All pertinent labs within the past 24 hours have been reviewed.    Significant Imaging:   I have reviewed all pertinent imaging results/findings within the past 24 hours.    CT Cervical Spine Without Contrast  Narrative: EXAMINATION:  CT CERVICAL SPINE WITHOUT CONTRAST    CLINICAL HISTORY:  Neck pain, acute, infection suspected;    TECHNIQUE:  Low dose axial CT images through the cervical spine, with sagittal and coronal reformations.  Contrast was not administered.    COMPARISON:  12/05/2019    FINDINGS:  The vertebral bodies are normal in height and morphology without evidence of fracture or osseous destructive process.  Normal sagittal alignment is preserved.    Progression of degenerative change at C4-5 in comparison the prior exam with posterior disc osteophyte complex at this level producing mild spinal canal stenosis.  Smaller C5-6 posterior disc osteophyte complex without definite spinal canal stenosis.    No definite neural foraminal narrowing.    Limited evaluation of the intraspinal contents demonstrates no hematoma or mass.Paraspinal soft tissues exhibit no acute  abnormalities.  Impression: No fracture or traumatic malalignment of the cervical spine.  No definite CT evidence of osteomyelitis.  Further evaluation as clinically warranted.    Additional degenerative findings as above.    All CT scans at this facility are performed  using dose modulation techniques as appropriate to performed exam including the following:  automated exposure control; adjustment of mA and/or kV according to the patients size (this includes techniques or standardized protocols for targeted exams where dose is matched to indication/reason for exam: i.e. extremities or head);  iterative reconstruction technique.    Electronically signed by: Demario Morin  Date:    05/06/2022  Time:    18:34  CT Thoracic Spine Without Contrast  Narrative: EXAMINATION:  CT THORACIC SPINE WITHOUT CONTRAST    CLINICAL HISTORY:  Epidural abscess suspected;    TECHNIQUE:  Low-dose axial noncontrast CT images of the thoracic spine.  Multiplanar reformats generated    COMPARISON:  None    FINDINGS:  Thoracic spine alignment is well preserved.  No findings to suggest vertebral body fracture.  No osseous lytic or blastic process.    No significant degenerative changes.  No disc abnormalities or facet arthrosis.    Pulmonary opacities as on the dedicated CT exam.  Impression: No definite acute abnormality.  No traumatic malalignment, fracture, or osseous destructive process.  Further evaluation as clinically warranted.    Pulmonary opacities as on the dedicated CT exam.    All CT scans at this facility are performed  using dose modulation techniques as appropriate to performed exam including the following:  automated exposure control; adjustment of mA and/or kV according to the patients size (this includes techniques or standardized protocols for targeted exams where dose is matched to indication/reason for exam: i.e. extremities or head);  iterative reconstruction technique.    Electronically signed by: Demario  Mikel  Date:    05/06/2022  Time:    18:32  CT Lumbar Spine Without Contrast  Narrative: EXAMINATION:  CT LUMBAR SPINE WITHOUT CONTRAST    CLINICAL HISTORY:  Low back pain, infection suspected;    TECHNIQUE:  Low-dose CT images obtained throughout the region of the lumbar spine.  Axial, sagittal and coronal reformations were performed.  Contrast was not administered.    COMPARISON:  12/05/2019    FINDINGS:  The vertebral bodies are normal in height and morphology without evidence of fracture or osseous destructive process.    Normal sagittal alignment is preserved.  No spondylolisthesis.    Circumferential disc bulges L3 through S1 with mild spinal canal stenosis L3-4, moderate to severe spinal canal stenosis L4-5 and probable moderate spinal canal stenosis L5-S1.    Facet arthropathy with moderate left neural foraminal narrowing at L4-5 and severe left neural foraminal narrowing at L5-S1.  Other levels are better preserved.    Limited evaluation of the intraspinal contents demonstrates no hematoma or mass.    Paraspinal soft tissues exhibit no acute abnormalities.  Impression: No fracture or traumatic malalignment of the lumbar spine.  No definite findings to suggest osteomyelitis.  Further evaluation as clinically warranted.    Possible progression of degenerative changes most notable at L4-5 with moderate to severe spinal canal stenosis at this level.  Additional details as above.    This report was flagged in Epic as abnormal.    All CT scans at this facility are performed  using dose modulation techniques as appropriate to performed exam including the following:  automated exposure control; adjustment of mA and/or kV according to the patients size (this includes techniques or standardized protocols for targeted exams where dose is matched to indication/reason for exam: i.e. extremities or head);  iterative reconstruction technique.    Electronically signed by: Demario  Mikel  Date:    05/06/2022  Time:    18:29  CTA Chest Non-Coronary (PE Study)  Narrative: EXAMINATION:  CTA CHEST NON CORONARY    CLINICAL HISTORY:  Pulmonary embolism (PE) suspected, neg D-dimer;    TECHNIQUE:  Low dose axial images, sagittal and coronal reformations were obtained from the thoracic inlet to the lung bases following the IV administration of 100 mL of Omnipaque 350.  Contrast timing was optimized to evaluate the pulmonary arteries.  MIP images were performed.    COMPARISON:  Multiple priors.    FINDINGS:  Base of Neck: No significant abnormality.    Thoracic soft tissues: Unremarkable.    Aorta: Left-sided aortic arch.  No aneurysm and no significant atherosclerosis    Heart: Normal size.  Small effusion.    Pulmonary vasculature: Pulmonary arteries are well opacified.  There is no pulmonary thromboembolism.    Esha/Mediastinum: No pathologic ariana enlargement.    Airways: Patent.    Lungs/Pleura: Multiple nodular and cavitary opacities concerning for septic emboli.  Additional larger opacities most notably in the right lung base with some internal clearing possibly related to necrotizing pneumonia.  Moderate loculated right pleural fluid and no definite left pleural fluid.  Empyema not excluded.    Esophagus: Unremarkable.    Upper Abdomen: Geographic wedge-shaped splenic hypodensity possibly related to contrast timing or infarct.    Bones: No acute fracture. No suspicious lytic or sclerotic lesions.  Impression: No definite pulmonary thromboembolism.    Multiple nodular and cavitary opacities concerning for septic emboli with larger opacities possibly necrotizing pneumonia.    Moderate loculated right pleural fluid.  Empyema not excluded.    Small pericardial effusion of unclear etiology.    Splenic hypodensity possibly related to infarct or contrast timing.    Additional details as above.    All CT scans at this facility are performed  using dose modulation techniques as appropriate to performed  exam including the following:  automated exposure control; adjustment of mA and/or kV according to the patients size (this includes techniques or standardized protocols for targeted exams where dose is matched to indication/reason for exam: i.e. extremities or head);  iterative reconstruction technique.    Electronically signed by: Demario Morin  Date:    05/06/2022  Time:    18:26  X-Ray Chest AP Portable  Narrative: EXAMINATION:  XR CHEST AP PORTABLE    CLINICAL HISTORY:  Sepsis;.    TECHNIQUE:  Single frontal portable view of the chest was performed.    COMPARISON:  12/05/2019    FINDINGS:  Support devices: None    Patchy bilateral opacities, some of which appear nodular and possibly cavitary.  Recommend further evaluation with chest CT with IV contrast.    Heart normal size.  No pneumothorax or pleural effusion    Bones are intact.  Impression: Patchy bilateral opacities, some of which appear nodular and possibly cavitary. Recommend further evaluation with chest CT with IV contrast.    This report was flagged in Epic as abnormal.    Electronically signed by: Nate Brasher  Date:    05/06/2022  Time:    16:04  X-Ray Finger 2 or More Views Right  Narrative: EXAMINATION:  XR FINGER 2 OR MORE VIEWS RIGHT    CLINICAL HISTORY:  finger swelling;    TECHNIQUE:  Three views of the right 4th digit.    COMPARISON:  None    FINDINGS:  No evidence of fracture or dislocation.  Joint spaces appear well maintained.  Soft tissues unremarkable.  No evidence of foreign body.  Impression: Negative exam.    Electronically signed by: Nate Brasher  Date:    05/06/2022  Time:    16:02  X-Ray Knee Complete 4 Or More Views Right  Narrative: EXAMINATION:  XR KNEE COMP 4 OR MORE VIEWS RIGHT    CLINICAL HISTORY:  Pain in unspecified knee    TECHNIQUE:  AP, lateral, and Merchant views of the right knee were performed.    COMPARISON:  None    FINDINGS:  No evidence of fracture or dislocation.  Moderate suprapatellar knee joint effusion.  Mild  soft tissue edema lateral to the knee.  No foreign body.  Soft tissues otherwise unremarkable.  Impression: No evidence of fracture or dislocation. Moderate suprapatellar knee joint effusion. Mild soft tissue edema lateral to the knee.    Electronically signed by: Nate Brasher  Date:    05/06/2022  Time:    16:01       ABG  No results for input(s): PH, PO2, PCO2, HCO3, BE in the last 168 hours.  Assessment/Plan:     Loculated pleural effusion  Complicated by PNA, possible empyema  Plan for diagnostic thoracentesis    IVDU (intravenous drug user)  Cessation/rehab    PNA (pneumonia)  PNA in IVDU  Cultures; Blood and maloney=putum  AbxL VANCO+ CEFEPIME + FLAGYL    COPD (chronic obstructive pulmonary disease)  Oxygen  Keep SP2> 92%  Bronchodilators  Follow up in Pulmonary for PFT to clarify Dx    Lower back pain  Address pain  Morphine    Complications of the procedure discussed in detail with patient. Complications including but not limited to infection that may require hospital admission, bleeding that may require blood transfusion and or hospital admission, perforation of the lung which may require surgery. Patient expressed and verbalized understanding. Alternate treatments and material risks associated with such alternatives were discussed with pateint. These include radiologic surveillance with minimal risk and sugery with an indeterminate risk. The material risks of refusing the procedure was discussed in detail. This includes no diagnosis or confirmation of diagnisis and rendering of appropriate treatment the risk of which depends on the nature of the diagnosed illness. Patient expressed and verbalized understanding. Pleural fluid will be sent for chenistry, microbiology and cytology.       Thank you for your consult. I will follow-up with patient. Please contact us if you have any additional questions.     Alonzo Morrison MD  Pulmonology  [facility]

## 2022-05-07 NOTE — ASSESSMENT & PLAN NOTE
H/O IVDU   Cont broad spectrum IVAB   F/U blood and sputum cx   Will consult pulmonology for thoracentesis  To r/u empyema

## 2022-05-07 NOTE — SUBJECTIVE & OBJECTIVE
Past Medical History:   Diagnosis Date    ADHD (attention deficit hyperactivity disorder)     Anxiety     Asthma     Bipolar disorder     Cancer     cervical    COPD (chronic obstructive pulmonary disease)     GERD (gastroesophageal reflux disease)     Hepatitis C antibody positive in blood     RNA UNDETECTED 2016    Hyperlipidemia     Intravenous drug abuse     MDD (major depressive disorder)     Mood disorder     PTSD (post-traumatic stress disorder)     Suicidal ideation        Past Surgical History:   Procedure Laterality Date     SECTION  2001    x2    CHOLECYSTECTOMY      HYSTERECTOMY      LAPAROSCOPIC SALPINGECTOMY      ROBOT-ASSISTED LAPAROSCOPIC ABDOMINAL HYSTERECTOMY USING DA SEBASTIEN XI N/A 2019    Procedure: XI ROBOTIC HYSTERECTOMY;  Surgeon: Tabatha Quintanilla MD;  Location: [facility] ([place] OR;  Service: OB/GYN;  Laterality: N/A;    ROBOT-ASSISTED LAPAROSCOPIC LYSIS OF ADHESIONS USING DA SEBASTIEN XI N/A 2019    Procedure: XI ROBOTIC LYSIS, ADHESIONS;  Surgeon: Tabatha Quintanilla MD;  Location: [facility] OR;  Service: OB/GYN;  Laterality: N/A;    ROBOT-ASSISTED SURGICAL REMOVAL OF FALLOPIAN TUBE USING DA SEBASTIEN XI Right 2019    Procedure: XI ROBOTIC SALPINGECTOMY;  Surgeon: Tabatha Quintanilla MD;  Location: [facility] OR;  Service: OB/GYN;  Laterality: Right;    TONSILLECTOMY      TUBAL LIGATION         Review of patient's allergies indicates:   Allergen Reactions    Sulfamethoxazole-trimethoprim        No current facility-administered medications on file prior to encounter.     Current Outpatient Medications on File Prior to Encounter   Medication Sig    acetaminophen (TYLENOL) 500 MG tablet Take 500 mg by mouth every 6 (six) hours as needed for Pain.    albuterol (PROVENTIL/VENTOLIN HFA) 90 mcg/actuation inhaler Inhale 2 puffs into the lungs every 6 (six) hours as needed for Wheezing.    azithromycin (ZITHROMAX Z-YUAN) 250 MG tablet Take 2 tablets (500 mg) on  Day 1,  followed by 1 tablet  (250 mg) once daily on Days 2 through 5.    dextromethorphan-guaiFENesin (MUCINEX DM) 60-1,200 mg per 12 hr tablet Take 1 tablet by mouth every 12 (twelve) hours.    ibuprofen (ADVIL,MOTRIN) 200 MG tablet Take 200 mg by mouth every 6 (six) hours as needed for Pain.    [DISCONTINUED] albuterol (PROVENTIL) 2.5 mg /3 mL (0.083 %) nebulizer solution Take 3 mLs (2.5 mg total) by nebulization every 6 (six) hours as needed for Wheezing. Rescue    [DISCONTINUED] fluticasone-umeclidin-vilanter (TRELEGY ELLIPTA) 100-62.5-25 mcg DsDv Inhale 1 puff into the lungs once daily.    [DISCONTINUED] gabapentin (NEURONTIN) 300 MG capsule Take 1 capsule (300 mg total) by mouth 3 (three) times daily.    [DISCONTINUED] ipratropium (ATROVENT) 21 mcg (0.03 %) nasal spray 2 sprays by Nasal route 3 (three) times daily.    [DISCONTINUED] methylPREDNISolone (MEDROL DOSEPACK) 4 mg tablet Take as directed    [DISCONTINUED] omeprazole (PRILOSEC) 40 MG capsule Take 1 capsule (40 mg total) by mouth once daily.    [DISCONTINUED] oxybutynin (DITROPAN XL) 10 MG 24 hr tablet Take 1 tablet (10 mg total) by mouth once daily.    [DISCONTINUED] prazosin (MINIPRESS) 5 MG capsule Take 5 mg by mouth every evening.     [DISCONTINUED] propranolol (INDERAL LA) 60 MG 24 hr capsule Take 60 mg by mouth every morning.    [DISCONTINUED] VRAYLAR 3 mg Cap Take 1 capsule (3 mg total) by mouth every evening.     Family History       Problem Relation (Age of Onset)    COPD Mother          Tobacco Use    Smoking status: Current Every Day Smoker     Packs/day: 1.50     Types: Cigarettes    Smokeless tobacco: Never Used    Tobacco comment: no smoking after midnight prior to surgery   Substance and Sexual Activity    Alcohol use: Yes     Comment: Occassional     Drug use: Not Currently     Types: IV, Methamphetamines, Heroin     Comment: denies heroin use.  Last used meth 10/2018  No illegal drugs prior to sx    Sexual activity: Not Currently     Partners: Male     Birth  control/protection: None     Review of Systems   Constitutional:  Positive for activity change, chills, fatigue and fever.   HENT: Negative.     Respiratory:  Positive for cough, chest tightness and shortness of breath.    Gastrointestinal: Negative.    Endocrine: Negative.    Genitourinary: Negative.    Musculoskeletal:  Positive for back pain.   Skin:  Positive for rash and wound.   Allergic/Immunologic: Negative.    Neurological: Negative.    Hematological: Negative.    Psychiatric/Behavioral: Negative.     Objective:     Vital Signs (Most Recent):  Temp: 98.7 °F (37.1 °C) (05/06/22 2141)  Pulse: 89 (05/06/22 2141)  Resp: (!) 26 (05/06/22 2141)  BP: (!) 103/58 (05/06/22 2141)  SpO2: 96 % (05/06/22 2141)   Vital Signs (24h Range):  Temp:  [98.7 °F (37.1 °C)-101.6 °F (38.7 °C)] 98.7 °F (37.1 °C)  Pulse:  [] 89  Resp:  [24-30] 26  SpO2:  [93 %-96 %] 96 %  BP: ()/(51-65) 103/58     Weight: 82.4 kg (181 lb 10.5 oz)  Body mass index is 30.23 kg/m².    Physical Exam  Vitals reviewed.   Constitutional:       Appearance: She is ill-appearing and toxic-appearing.   HENT:      Head: Normocephalic and atraumatic.      Nose: Nose normal.   Eyes:      Extraocular Movements: Extraocular movements intact.      Conjunctiva/sclera: Conjunctivae normal.      Pupils: Pupils are equal, round, and reactive to light.   Cardiovascular:      Rate and Rhythm: Normal rate and regular rhythm.      Pulses: Normal pulses.      Heart sounds: Murmur heard.   Pulmonary:      Effort: Pulmonary effort is normal.      Breath sounds: Rhonchi and rales present.   Abdominal:      General: Abdomen is flat. Bowel sounds are normal. There is no distension.      Palpations: Abdomen is soft. There is no mass.      Tenderness: There is no abdominal tenderness. There is no guarding or rebound.      Hernia: No hernia is present.   Musculoskeletal:         General: Swelling present. No tenderness, deformity or signs of injury.      Cervical back:  Normal range of motion and neck supple.      Right lower leg: Edema present.      Left lower leg: Edema present.   Skin:     Capillary Refill: Capillary refill takes less than 2 seconds.      Findings: Lesion and rash present.   Neurological:      General: No focal deficit present.      Mental Status: She is alert and oriented to person, place, and time. Mental status is at baseline.      Cranial Nerves: No cranial nerve deficit.      Sensory: No sensory deficit.      Motor: No weakness.      Coordination: Coordination normal.   Psychiatric:         Mood and Affect: Mood normal.         CRANIAL NERVES     CN III, IV, VI   Pupils are equal, round, and reactive to light.     Significant Labs: All pertinent labs within the past 24 hours have been reviewed.      Significant Imaging: I have reviewed all pertinent imaging results/findings within the past 24 hours.

## 2022-05-07 NOTE — PLAN OF CARE
Received bedside shift report , pt in with pneumonia with sepsis. POC: O2 on at all times, monitor/manage acute changes respiratory status. ABT, IVF. Manage acute pain. Pt pain is managed as ordered., Pt continues ABT during my shift with IVF. Chart checked.

## 2022-05-07 NOTE — HOSPITAL COURSE
Per05/07: seen and examined  05/08: seen and examined: SP thora, CXR reveiwed, Echo Pul HTN, feels better, T max 98.9F, wbcc 10 k, LDH 1077, BC +ve G+ve Cocci  05/09: seen and examined: T max 99.9F, CTS note reveiwed, Wbcc 30 K, BC+ve Staph  05/10: seen and examined T ma x 99.6F, NS note reviewed, C/O back pain? Paraspinal process, Wbcc 35 K  on Abx, Oxacillin  05/11: seen and examined T ma x 99.2F, NS note reviewed, IR procedure planned, C/O back pain? Paraspinal process, Wbcc 31 K  on Abx, Oxacillin  5/20/2022 Asked to re-evaluate patient. Still with shortness of breath at rest, anxious. Thoracentesis performed on 5/7/2022 with exudative effusion with elevated WBC, gram stain - no results, aerobic cultures no results. Clinically an empyema. MRI performed - no spinal abscess.  5/21 Underwent placement of chest tube with drainage. C/o chest discomfort at site. Patient is on scheduled oral medications for pain control - will keep with present regimen  5/22 Resting quietly this am , no new c/o . Chest X Ray - stable  5/23.  Patient seen and examined.  O2 sat 92% on 2 liters/minute.  Body fluid studies reviewed.  Status post right-sided pigtail placement 05/21/2022.  Cultures reviewed.  5/24 seen and examined 98% on room air.  Chest tube output last 24 hours 50 mL.  Afebrile.  Complaining of generalized pain consistent coughing.  5/25 patient seen and examined.  Chest tube output last 24 hours 120 mL.  Afebrile.  Purulent discharge noted in pigtail.  Status post tPA and Dnase yesterday.  Spiked temp yesterday 101. 7  5/26  Chest tube output last 24 hours 200 mL. Afebrile last 24 hours.  Received tPA and DNase installation via chest tube for 2 consecutive days.  Complains of generalized pain decreased appetite and decreased activity fatigue.  Bleeding noted around chest tube resolved with compression and dressing change.  5/27 seen and examined.  Afebrile.  Chest tube in place.  225 mL drainage yesterday.  No further  bleeding around pigtail site.  Severe weakness fatigue and decreased activity and appetite.  Afebrile.  5/28 patient seen and examined.  T-max 100.2° last 24 hours.  Minimal amount sanguinous chest tube drainage on the right.  Left side ultrasound 330 mL.  Diagnostic thoracentesis performed 20 mL of cloudy pinkish fluid drained on the left.  No panchito pus noted.  Weak sleepy generalized myalgias.  Over  5/29 seen and examined.  O2 sat 95% on room air.  Afebrile.  25 cc right chest tube output.  Pleural fluid cell count from left thoracentesis yesterday not showing complicated effusion or empyema.  Weak , generalized pain and SOB  5/30 seen and examined.  Afebrile.  Right-sided pigtail removed yesterday.  Chest x-ray today reviewed.  Stable.  O2 sat 98% on 2 liter/minute.  Cultures from left-sided pleural fluid negative.  5/31 Afebrile, no new pulmonary complaints, O2 sat 97-98% on oxygen  6/1 seen and examined.  Afebrile T-max 101°.  O2 sat 93% on 2 liter/minute.  Weak SOB decreased activity.  CT chest reviewed.  6/2 seen and examined.  T-max 100°.  O2 sat 98% on 2 liter/minute.  Complains of SOB diffuse pain requesting pain medicine.  Decreased activity and appetite.  6/3 O2 sat 98% on 1 liter/minute.  T-max 99.3°.  CRP pending.  WBC 6 K. blood-tinged sputum.  On IV oxacillin.  Input from cardiothoracic surgery intervention Radiology and Infectious Disease noted.    6/4O2 sat 96% on 2 liter/minute.  Afebrile.  CRP 30. Significantly decreased.  Last blood cultures negative.  Awaiting VATS for complicated right-sided pleural effusion Monday.  Generalized pain weakness and decreased activity   6/5 T-max 100.4°.  Rule output 5 L. creatinine 1.9.  On IV heparin.  No major events overnight.      06/06: seen and examined: await decortication, T max: 98.4F, 2.0 LPM    06/07: seen and examined : POD #1: VATS decortication, Has a line, pain 5/10, T max: 99.6F, UO 3515 mls, chamber has 210 mls, no air leak    06/08: seen and  examined: POD #2: VATS decortication, OOB, still has pain, T Max: 99.6F, UO 1100mls, also got lasix , chamber 160 mls, no leak.  06/09: seen and examined: POD #3: VATS decortication, OOB, still has pain, T Max: 98.8 F, UO 3500mls, also got lasix , chamber 70 mls, no leak. H &H 6.1/19.6  06/10: seen and examined: POD #4:  VATS decortication, OOB, still has pain, T Max: 98.1 F, UO 2500mls,

## 2022-05-07 NOTE — PROCEDURES
"Tianna Leon is a 37 y.o. female patient.    Temp: 97.7 °F (36.5 °C) (22)  Pulse: 74 (22)  Resp: 20 (22)  BP: (!) 100/59 (22)  SpO2: 96 % (22)  Weight: 83 kg (182 lb 15.7 oz) (22)  Height: 5' 5" (165.1 cm) (22)       Thoracentesis    Date/Time: 2022 9:08 AM  Location procedure was performed: [facility] PULMONARY MEDICINE  Performed by: Alonzo Morrison MD  Authorized by: Alonzo Morrison MD   Pre-operative diagnosis: Right Loculated Effusion  Post-operative diagnosis: SAME  Consent Done: Yes  Consent: Written consent obtained.  Risks and benefits: risks, benefits and alternatives were discussed  Consent given by: patient  Patient understanding: patient states understanding of the procedure being performed  Patient consent: the patient's understanding of the procedure matches consent given  Procedure consent: procedure consent matches procedure scheduled  Relevant documents: relevant documents present and verified  Test results: test results available and properly labeled  Site marked: the operative site was marked  Imaging studies: imaging studies available  Required items: required blood products, implants, devices, and special equipment available  Patient identity confirmed: , MRN, name, provided demographic data and verbally with patient  Time out: Immediately prior to procedure a "time out" was called to verify the correct patient, procedure, equipment, support staff and site/side marked as required.  Procedure purpose: diagnostic  Indications: pleural effusion  Preparation: Patient was prepped and draped in the usual sterile fashion.  Local anesthesia used: yes  Anesthesia: local infiltration    Anesthesia:  Local anesthesia used: yes  Local Anesthetic: lidocaine 1% without epinephrine  Anesthetic total: 10 mL    Patient sedated: no  Preparation: skin prepped with ChloraPrep  Patient position: sitting  Ultrasound " guidance: yes  Location: right posterior  Intercostal space: 7th  Puncture method: over-the-needle catheter  Needle size: 18  Catheter size: 8 Bangl[place]  Number of attempts: 1  Drainage amount: 400 ml  Drainage characteristics: purulent  Patient tolerance: Patient tolerated the procedure well with no immediate complications  Chest x-ray performed: yes  Chest x-ray interpreted by me.  Chest x-ray findings: normal findings  Complications: No  Specimens: Yes  Implants: No  Comments: Post procedure CXR no pneumothorax  Drainage Tube: removed        5/7/2022

## 2022-05-07 NOTE — ASSESSMENT & PLAN NOTE
Will ordet TTE to r/o IE  Will order MRI lumbar wwo   Cont broad spectrum IVAB     5/7   Studies pending for additional sites of infection given h/o ivdu

## 2022-05-07 NOTE — ASSESSMENT & PLAN NOTE
Chronic in nature\  H/O IVDU  CT lumbar show spinal stenosis   No neurological deficit at this time   Will order a MRI lumbar wwo  To r/o spinal abscess     5/7  Mri pending

## 2022-05-07 NOTE — ASSESSMENT & PLAN NOTE
H/O IVDU   Cont broad spectrum IVAB   F/U blood and sputum cx   Will consult pulmonology for thoracentesis  To r/u empyema     5/7  Status post thoracentesis  Chest x-ray without signs of pneumothorax  Fluid studies pending

## 2022-05-07 NOTE — ASSESSMENT & PLAN NOTE
"This patient does have evidence of infective focus  My overall impression is sepsis. Vital signs were reviewed and noted in progress note.  Antibiotics given-   Antibiotics (From admission, onward)            Start     Stop Route Frequency Ordered    05/07/22 1300  vancomycin 1.25 g in dextrose 5% 250 mL IVPB (ready to mix)         -- IV Once 05/07/22 1213    05/07/22 0200  metronidazole IVPB 500 mg         -- IV Every 8 hours (non-standard times) 05/06/22 1954 05/07/22 0100  cefepime in dextrose 5 % IVPB 2 g         -- IV Every 8 hours (non-standard times) 05/06/22 1954 05/06/22 2052  vancomycin - pharmacy to dose  (vancomycin IVPB)        "And" Linked Group Details    -- IV pharmacy to manage frequency 05/06/22 1954        Cultures were taken-   Microbiology Results (last 7 days)     Procedure Component Value Units Date/Time    Blood culture x two cultures. Draw prior to antibiotics. [[id number]] Collected: 05/06/22 1617    Order Status: Completed Specimen: Blood from Peripheral, Antecubital, Right Updated: 05/07/22 1341     Blood Culture, Routine Gram stain aer bottle: Gram positive cocci in clusters resembling Staph       Results called to and read back by: Lila Hernandez RN 05/07/2022  10:57      Gram stain missael bottle: Gram positive cocci in clusters resembling Staph       05/07/2022  13:40    Narrative:      Aerobic and anaerobic    Blood culture x two cultures. Draw prior to antibiotics. [[id number]] Collected: 05/06/22 1616    Order Status: Completed Specimen: Blood from Peripheral, Antecubital, Left Updated: 05/07/22 1057     Blood Culture, Routine Gram stain aer bottle: Gram positive cocci in clusters resembling Staph       Gram stain missael bottle: Gram positive cocci in clusters resembling Staph       Results called to and read back by: Lila Hernandez RN 05/07/2022  10:57    Narrative:      Aerobic and anaerobic    Gram stain [[id number]] Collected: 05/07/22 0901    Order Status: Sent Specimen: Body " Fluid from Pleural Fluid Updated: 05/07/22 0905    Fungus culture [[id number]] Collected: 05/07/22 0901    Order Status: Sent Specimen: Body Fluid from Pleural Fluid Updated: 05/07/22 0905    AFB culture (includes stain) [[id number]] Collected: 05/07/22 0901    Order Status: Sent Specimen: Body Fluid from Pleural Fluid Updated: 05/07/22 0905        Latest lactate reviewed, they are-  Recent Labs   Lab 05/06/22  1616 05/06/22  1731   LACTATE 1.5 1.5       Organ dysfunction indicated by Acute kidney injury  Source-  lung    Source control Achieved by-   Cont Broad spectrum IVAB     Bacteremia  Infectious disease consulted

## 2022-05-07 NOTE — ASSESSMENT & PLAN NOTE
Gram positive   Infectious disease consulted  Continue broad spectrum intravenous antibiotic(s) given h/o ivdu  Remains febrile and leukocytosis persists

## 2022-05-07 NOTE — SUBJECTIVE & OBJECTIVE
Interval History: See hospital course for today      Review of Systems   Unable to perform ROS: Other (somnolence)   Objective:     Vital Signs (Most Recent):  Temp: 98.1 °F (36.7 °C) (05/07/22 1142)  Pulse: 82 (05/07/22 1142)  Resp: 18 (05/07/22 1142)  BP: 121/70 (05/07/22 1142)  SpO2: 100 % (05/07/22 1142) Vital Signs (24h Range):  Temp:  [97.7 °F (36.5 °C)-101.6 °F (38.7 °C)] 98.1 °F (36.7 °C)  Pulse:  [] 82  Resp:  [17-30] 18  SpO2:  [93 %-100 %] 100 %  BP: ()/(51-70) 121/70     Weight: 83 kg (182 lb 15.7 oz)  Body mass index is 30.45 kg/m².    Intake/Output Summary (Last 24 hours) at 5/7/2022 1255  Last data filed at 5/7/2022 0451  Gross per 24 hour   Intake 1670.01 ml   Output 400 ml   Net 1270.01 ml      Physical Exam  Vitals and nursing note reviewed. Exam conducted with a chaperone present (family).   Constitutional:       General: She is not in acute distress.     Appearance: She is ill-appearing. She is not toxic-appearing.   HENT:      Head: Normocephalic and atraumatic.   Cardiovascular:      Rate and Rhythm: Normal rate.   Pulmonary:      Effort: Pulmonary effort is normal. No respiratory distress.   Abdominal:      Palpations: Abdomen is soft.      Tenderness: There is no abdominal tenderness.   Musculoskeletal:         General: Swelling and tenderness present.      Right lower leg: No edema.      Left lower leg: No edema.   Skin:     General: Skin is warm.      Coloration: Skin is pale.      Findings: Erythema present.   Neurological:      Motor: Weakness present.       Significant Labs: All pertinent labs within the past 24 hours have been reviewed.  Blood Culture:   Recent Labs   Lab 05/06/22  1616 05/06/22  1617   LABBLOO Gram stain aer bottle: Gram positive cocci in clusters resembling Staph   Gram stain missael bottle: Gram positive cocci in clusters resembling Staph   Results called to and read back by: Lila Hernandez RN 05/07/2022  10:57 Gram stain aer bottle: Gram positive cocci in  clusters resembling Staph   Results called to and read back by: Lila Hernandez RN 05/07/2022  10:57     CBC:   Recent Labs   Lab 05/06/22  1616 05/07/22  0623   WBC 29.13* 27.52*   HGB 9.9* 9.0*   HCT 27.8* 26.0*    115*     CMP:   Recent Labs   Lab 05/06/22  1619 05/07/22  0623 05/07/22  0731   * 134*  --    K 3.4* 3.3*  --    CL 89* 95  --    CO2 28 31*  --     111*  --    BUN 46* 38*  --    CREATININE 1.1 0.8  --    CALCIUM 8.3* 7.6*  --    PROT 6.0 4.8* 5.0*   ALBUMIN 1.8* 1.5*  --    BILITOT 1.4* 1.7*  --    ALKPHOS 139* 122  --    AST 37 56*  --    ALT 33 38  --    ANIONGAP 13 8  --    EGFRNONAA >60 >60  --        Significant Imaging: I have reviewed all pertinent imaging results/findings within the past 24 hours.  CT: I have reviewed all pertinent results/findings within the past 24 hours and my personal findings are:  bulging discs  CXR: I have reviewed all pertinent results/findings within the past 24 hours and my personal findings are:  no pneumothorax

## 2022-05-07 NOTE — ASSESSMENT & PLAN NOTE
Chronic in nature\  H/O IVDU  CT lumbar show spinal stenosis   No neurological deficit at this time   Will order a MRI lumbar wwo  To r/o spinal abscess

## 2022-05-07 NOTE — CONSULTS
Pharmacokinetic Assessment Follow Up: IV Vancomycin    Vancomycin serum concentration assessment(s):    The random level was drawn correctly and can be used to guide therapy at this time. The measurement is within the desired definitive target range of 15 to 20 mcg/mL.    Vancomycin Regimen Plan:    A 1250 mg dose was administered following the random level of 15.7 mcg/mL. Re-dose when the random level is less than 20 mcg/mL, next level to be drawn at 0000 on 5/8.     Drug levels (last 3 results):  Recent Labs   Lab Result Units 05/07/22  1124   Vancomycin, Random ug/mL 15.7       Pharmacy will continue to follow and monitor vancomycin.    Please contact pharmacy at [phone] for questions regarding this assessment.    Thank you for the consult,   Art Garcia, PharmD 5/7/2022 1:29 PM       Patient brief summary:  Tianna Leon is a 37 y.o. female initiated on antimicrobial therapy with IV Vancomycin for treatment of GPC bacteremia, suspected empyema.     The patient is being pulse dosed based on random vancomycin levels.     Drug Allergies:   Review of patient's allergies indicates:   Allergen Reactions    Sulfamethoxazole-trimethoprim        Actual Body Weight:   83 kg    Renal Function:   Estimated Creatinine Clearance: 100.5 mL/min (based on SCr of 0.8 mg/dL).,     Dialysis Method (if applicable):  Not on RRT    CBC (last 72 hours):  Recent Labs   Lab Result Units 05/06/22  1616 05/07/22  0623   WBC K/uL 29.13* 27.52*   Hemoglobin g/dL 9.9* 9.0*   Hematocrit % 27.8* 26.0*   Platelets K/uL 171 115*   Gran % % 86.0* 84.0*   Lymph % % 4.0* 5.0*   Mono % % 4.0 6.0   Eosinophil % % 0.0 0.0   Basophil % % 0.0 0.0   Differential Method  Manual Manual       Metabolic Panel (last 72 hours):  Recent Labs   Lab Result Units 05/06/22  1619 05/06/22  1720 05/07/22  0623   Sodium mmol/L 130*  --  134*   Potassium mmol/L 3.4*  --  3.3*   Chloride mmol/L 89*  --  95   CO2 mmol/L 28  --  31*   Glucose mg/dL 109  --   111*   Glucose, UA   --  Negative  --    BUN mg/dL 46*  --  38*   Creatinine mg/dL 1.1  --  0.8   Creatinine, Urine mg/dL  --  83.8  --    Albumin g/dL 1.8*  --  1.5*   Total Bilirubin mg/dL 1.4*  --  1.7*   Alkaline Phosphatase U/L 139*  --  122   AST U/L 37  --  56*   ALT U/L 33  --  38   Magnesium mg/dL  --   --  1.8   Phosphorus mg/dL  --   --  4.1       Vancomycin Administrations:  vancomycin given in the last 96 hours                   vancomycin 1.25 g in dextrose 5% 250 mL IVPB (ready to mix) (mg) 1,250 mg New Bag 05/07/22 1306    vancomycin (VANCOCIN) 2,250 mg in dextrose 5 % 500 mL IVPB (mg) 2,250 mg New Bag 05/06/22 2312                Microbiologic Results:  Microbiology Results (last 7 days)     Procedure Component Value Units Date/Time    Blood culture x two cultures. Draw prior to antibiotics. [[id number]] Collected: 05/06/22 1617    Order Status: Completed Specimen: Blood from Peripheral, Antecubital, Right Updated: 05/07/22 1058     Blood Culture, Routine Gram stain aer bottle: Gram positive cocci in clusters resembling Staph       Results called to and read back by: Lila Hernandez RN 05/07/2022  10:57    Narrative:      Aerobic and anaerobic    Blood culture x two cultures. Draw prior to antibiotics. [[id number]] Collected: 05/06/22 1616    Order Status: Completed Specimen: Blood from Peripheral, Antecubital, Left Updated: 05/07/22 1057     Blood Culture, Routine Gram stain aer bottle: Gram positive cocci in clusters resembling Staph       Gram stain missael bottle: Gram positive cocci in clusters resembling Staph       Results called to and read back by: Lila Hernandez RN 05/07/2022  10:57    Narrative:      Aerobic and anaerobic    Gram stain [[id number]] Collected: 05/07/22 0901    Order Status: Sent Specimen: Body Fluid from Pleural Fluid Updated: 05/07/22 0905    Fungus culture [[id number]] Collected: 05/07/22 0901    Order Status: Sent Specimen: Body Fluid from Pleural Fluid Updated: 05/07/22 0905     AFB culture (includes stain) [[id number]] Collected: 05/07/22 0901    Order Status: Sent Specimen: Body Fluid from Pleural Fluid Updated: 05/07/22 0905

## 2022-05-07 NOTE — PLAN OF CARE
Problem: Adult Inpatient Plan of Care  Goal: Plan of Care Review  Outcome: Ongoing, Progressing  Goal: Patient-Specific Goal (Individualized)  Outcome: Ongoing, Progressing  Goal: Absence of Hospital-Acquired Illness or Injury  Outcome: Ongoing, Progressing  Goal: Optimal Comfort and Wellbeing  Outcome: Ongoing, Progressing  Goal: Readiness for Transition of Care  Outcome: Ongoing, Progressing     Problem: Skin Injury Risk Increased  Goal: Skin Health and Integrity  Outcome: Ongoing, Progressing     Problem: Adjustment to Illness (Sepsis/Septic Shock)  Goal: Optimal Coping  Outcome: Ongoing, Progressing     Problem: Bleeding (Sepsis/Septic Shock)  Goal: Absence of Bleeding  Outcome: Ongoing, Progressing     Problem: Glycemic Control Impaired (Sepsis/Septic Shock)  Goal: Blood Glucose Level Within Desired Range  Outcome: Ongoing, Progressing     Problem: Infection Progression (Sepsis/Septic Shock)  Goal: Absence of Infection Signs and Symptoms  Outcome: Ongoing, Progressing     Problem: Nutrition Impaired (Sepsis/Septic Shock)  Goal: Optimal Nutrition Intake  Outcome: Ongoing, Progressing     Problem: Fluid Imbalance (Pneumonia)  Goal: Fluid Balance  Outcome: Ongoing, Progressing     Problem: Infection (Pneumonia)  Goal: Resolution of Infection Signs and Symptoms  Outcome: Ongoing, Progressing     Problem: Respiratory Compromise (Pneumonia)  Goal: Effective Oxygenation and Ventilation  Outcome: Ongoing, Progressing

## 2022-05-08 PROBLEM — J91.8 PARAPNEUMONIC EFFUSION: Status: ACTIVE | Noted: 2022-05-07

## 2022-05-08 PROBLEM — J18.9 PARAPNEUMONIC EFFUSION: Status: ACTIVE | Noted: 2022-05-07

## 2022-05-08 PROBLEM — I27.9 CHRONIC PULMONARY HEART DISEASE: Status: ACTIVE | Noted: 2022-05-08

## 2022-05-08 PROBLEM — I33.0 SUBACUTE BACTERIAL ENDOCARDITIS: Status: ACTIVE | Noted: 2022-05-08

## 2022-05-08 PROBLEM — I33.0 VEGETATION OF HEART VALVE: Status: ACTIVE | Noted: 2022-05-08

## 2022-05-08 LAB
ALBUMIN SERPL BCP-MCNC: 1.4 G/DL (ref 3.5–5.2)
ALP SERPL-CCNC: 137 U/L (ref 55–135)
ALT SERPL W/O P-5'-P-CCNC: 25 U/L (ref 10–44)
ANION GAP SERPL CALC-SCNC: 13 MMOL/L (ref 8–16)
ANISOCYTOSIS BLD QL SMEAR: SLIGHT
AST SERPL-CCNC: 23 U/L (ref 10–40)
BASOPHILS NFR BLD: 0 % (ref 0–1.9)
BILIRUB SERPL-MCNC: 0.9 MG/DL (ref 0.1–1)
BUN SERPL-MCNC: 14 MG/DL (ref 6–20)
CALCIUM SERPL-MCNC: 7.5 MG/DL (ref 8.7–10.5)
CHLORIDE SERPL-SCNC: 95 MMOL/L (ref 95–110)
CO2 SERPL-SCNC: 23 MMOL/L (ref 23–29)
CREAT SERPL-MCNC: 0.6 MG/DL (ref 0.5–1.4)
DIFFERENTIAL METHOD: ABNORMAL
EOSINOPHIL NFR BLD: 0 % (ref 0–8)
ERYTHROCYTE [DISTWIDTH] IN BLOOD BY AUTOMATED COUNT: 14.5 % (ref 11.5–14.5)
EST. GFR  (AFRICAN AMERICAN): >60 ML/MIN/1.73 M^2
EST. GFR  (NON AFRICAN AMERICAN): >60 ML/MIN/1.73 M^2
GLUCOSE SERPL-MCNC: 125 MG/DL (ref 70–110)
HCT VFR BLD AUTO: 22.1 % (ref 37–48.5)
HGB BLD-MCNC: 7.8 G/DL (ref 12–16)
IMM GRANULOCYTES # BLD AUTO: ABNORMAL K/UL (ref 0–0.04)
IMM GRANULOCYTES NFR BLD AUTO: ABNORMAL % (ref 0–0.5)
LYMPHOCYTES NFR BLD: 6 % (ref 18–48)
MCH RBC QN AUTO: 29.2 PG (ref 27–31)
MCHC RBC AUTO-ENTMCNC: 35.3 G/DL (ref 32–36)
MCV RBC AUTO: 83 FL (ref 82–98)
MONOCYTES NFR BLD: 4 % (ref 4–15)
NEUTROPHILS NFR BLD: 89 % (ref 38–73)
NEUTS BAND NFR BLD MANUAL: 1 %
NRBC BLD-RTO: 0 /100 WBC
PLATELET # BLD AUTO: 165 K/UL (ref 150–450)
PLATELET BLD QL SMEAR: ABNORMAL
PMV BLD AUTO: 11.4 FL (ref 9.2–12.9)
POTASSIUM SERPL-SCNC: 3.6 MMOL/L (ref 3.5–5.1)
PROT SERPL-MCNC: 5.2 G/DL (ref 6–8.4)
RBC # BLD AUTO: 2.67 M/UL (ref 4–5.4)
SODIUM SERPL-SCNC: 131 MMOL/L (ref 136–145)
VANCOMYCIN SERPL-MCNC: 11.4 UG/ML
VANCOMYCIN SERPL-MCNC: 14.4 UG/ML
WBC # BLD AUTO: 30.9 K/UL (ref 3.9–12.7)

## 2022-05-08 PROCEDURE — 80202 ASSAY OF VANCOMYCIN: CPT | Performed by: FAMILY MEDICINE

## 2022-05-08 PROCEDURE — 94640 AIRWAY INHALATION TREATMENT: CPT

## 2022-05-08 PROCEDURE — 27000221 HC OXYGEN, UP TO 24 HOURS

## 2022-05-08 PROCEDURE — 25000003 PHARM REV CODE 250: Performed by: FAMILY MEDICINE

## 2022-05-08 PROCEDURE — 63600175 PHARM REV CODE 636 W HCPCS: Performed by: FAMILY MEDICINE

## 2022-05-08 PROCEDURE — 80053 COMPREHEN METABOLIC PANEL: CPT | Performed by: INTERNAL MEDICINE

## 2022-05-08 PROCEDURE — 63600175 PHARM REV CODE 636 W HCPCS: Performed by: NURSE PRACTITIONER

## 2022-05-08 PROCEDURE — 21400001 HC TELEMETRY ROOM

## 2022-05-08 PROCEDURE — 80202 ASSAY OF VANCOMYCIN: CPT | Mod: 91 | Performed by: FAMILY MEDICINE

## 2022-05-08 PROCEDURE — 99232 PR SUBSEQUENT HOSPITAL CARE,LEVL II: ICD-10-PCS | Mod: ,,, | Performed by: INTERNAL MEDICINE

## 2022-05-08 PROCEDURE — 99232 SBSQ HOSP IP/OBS MODERATE 35: CPT | Mod: ,,, | Performed by: INTERNAL MEDICINE

## 2022-05-08 PROCEDURE — 63600175 PHARM REV CODE 636 W HCPCS: Performed by: INTERNAL MEDICINE

## 2022-05-08 PROCEDURE — 11000001 HC ACUTE MED/SURG PRIVATE ROOM

## 2022-05-08 PROCEDURE — 25500020 PHARM REV CODE 255: Performed by: FAMILY MEDICINE

## 2022-05-08 PROCEDURE — S0030 INJECTION, METRONIDAZOLE: HCPCS | Performed by: INTERNAL MEDICINE

## 2022-05-08 PROCEDURE — 99900035 HC TECH TIME PER 15 MIN (STAT)

## 2022-05-08 PROCEDURE — 99223 PR INITIAL HOSPITAL CARE,LEVL III: ICD-10-PCS | Mod: NSCH,,, | Performed by: INTERNAL MEDICINE

## 2022-05-08 PROCEDURE — A9585 GADOBUTROL INJECTION: HCPCS | Performed by: FAMILY MEDICINE

## 2022-05-08 PROCEDURE — 85027 COMPLETE CBC AUTOMATED: CPT | Performed by: INTERNAL MEDICINE

## 2022-05-08 PROCEDURE — 87040 BLOOD CULTURE FOR BACTERIA: CPT | Mod: 59 | Performed by: NURSE PRACTITIONER

## 2022-05-08 PROCEDURE — 25000003 PHARM REV CODE 250: Performed by: INTERNAL MEDICINE

## 2022-05-08 PROCEDURE — 36415 COLL VENOUS BLD VENIPUNCTURE: CPT | Performed by: FAMILY MEDICINE

## 2022-05-08 PROCEDURE — 85007 BL SMEAR W/DIFF WBC COUNT: CPT | Performed by: INTERNAL MEDICINE

## 2022-05-08 PROCEDURE — 25000242 PHARM REV CODE 250 ALT 637 W/ HCPCS: Performed by: INTERNAL MEDICINE

## 2022-05-08 PROCEDURE — 94761 N-INVAS EAR/PLS OXIMETRY MLT: CPT

## 2022-05-08 PROCEDURE — 99223 1ST HOSP IP/OBS HIGH 75: CPT | Mod: NSCH,,, | Performed by: INTERNAL MEDICINE

## 2022-05-08 RX ORDER — MORPHINE SULFATE 4 MG/ML
4 INJECTION, SOLUTION INTRAMUSCULAR; INTRAVENOUS EVERY 4 HOURS PRN
Status: DISCONTINUED | OUTPATIENT
Start: 2022-05-08 | End: 2022-05-09

## 2022-05-08 RX ORDER — GADOBUTROL 604.72 MG/ML
10 INJECTION INTRAVENOUS
Status: COMPLETED | OUTPATIENT
Start: 2022-05-08 | End: 2022-05-08

## 2022-05-08 RX ORDER — HYDROMORPHONE HYDROCHLORIDE 2 MG/ML
1 INJECTION, SOLUTION INTRAMUSCULAR; INTRAVENOUS; SUBCUTANEOUS ONCE
Status: COMPLETED | OUTPATIENT
Start: 2022-05-08 | End: 2022-05-08

## 2022-05-08 RX ADMIN — HYDROCODONE BITARTRATE AND ACETAMINOPHEN 1 TABLET: 5; 325 TABLET ORAL at 01:05

## 2022-05-08 RX ADMIN — MORPHINE SULFATE 4 MG: 4 INJECTION INTRAVENOUS at 07:05

## 2022-05-08 RX ADMIN — GADOBUTROL 8 ML: 604.72 INJECTION INTRAVENOUS at 01:05

## 2022-05-08 RX ADMIN — IPRATROPIUM BROMIDE AND ALBUTEROL SULFATE 3 ML: 2.5; .5 SOLUTION RESPIRATORY (INHALATION) at 12:05

## 2022-05-08 RX ADMIN — METRONIDAZOLE 500 MG: 500 INJECTION, SOLUTION INTRAVENOUS at 05:05

## 2022-05-08 RX ADMIN — MORPHINE SULFATE 2 MG: 4 INJECTION INTRAVENOUS at 02:05

## 2022-05-08 RX ADMIN — CEFEPIME HYDROCHLORIDE 2 G: 2 INJECTION, SOLUTION INTRAVENOUS at 12:05

## 2022-05-08 RX ADMIN — METRONIDAZOLE 500 MG: 500 INJECTION, SOLUTION INTRAVENOUS at 01:05

## 2022-05-08 RX ADMIN — CEFEPIME HYDROCHLORIDE 2 G: 2 INJECTION, SOLUTION INTRAVENOUS at 04:05

## 2022-05-08 RX ADMIN — SODIUM CHLORIDE: 0.9 INJECTION, SOLUTION INTRAVENOUS at 06:05

## 2022-05-08 RX ADMIN — MORPHINE SULFATE 2 MG: 4 INJECTION INTRAVENOUS at 09:05

## 2022-05-08 RX ADMIN — VANCOMYCIN HYDROCHLORIDE 1500 MG: 1.5 INJECTION, POWDER, LYOPHILIZED, FOR SOLUTION INTRAVENOUS at 03:05

## 2022-05-08 RX ADMIN — CEFEPIME HYDROCHLORIDE 2 G: 2 INJECTION, SOLUTION INTRAVENOUS at 09:05

## 2022-05-08 RX ADMIN — VANCOMYCIN HYDROCHLORIDE 1500 MG: 1.5 INJECTION, POWDER, LYOPHILIZED, FOR SOLUTION INTRAVENOUS at 06:05

## 2022-05-08 RX ADMIN — ENOXAPARIN SODIUM 40 MG: 40 INJECTION SUBCUTANEOUS at 04:05

## 2022-05-08 RX ADMIN — METRONIDAZOLE 500 MG: 500 INJECTION, SOLUTION INTRAVENOUS at 11:05

## 2022-05-08 RX ADMIN — MORPHINE SULFATE 4 MG: 4 INJECTION INTRAVENOUS at 03:05

## 2022-05-08 RX ADMIN — HYDROMORPHONE HYDROCHLORIDE 1 MG: 2 INJECTION INTRAMUSCULAR; INTRAVENOUS; SUBCUTANEOUS at 12:05

## 2022-05-08 NOTE — ASSESSMENT & PLAN NOTE
"This patient does have evidence of infective focus  My overall impression is sepsis. Vital signs were reviewed and noted in progress note.  Antibiotics given-   Antibiotics (From admission, onward)            Start     Stop Route Frequency Ordered    05/07/22 2100  mupirocin 2 % ointment         05/12 2059 Nasl 2 times daily 05/07/22 1646    05/07/22 0200  metronidazole IVPB 500 mg         -- IV Every 8 hours (non-standard times) 05/06/22 1954 05/07/22 0100  cefepime in dextrose 5 % IVPB 2 g         -- IV Every 8 hours (non-standard times) 05/06/22 1954 05/06/22 2052  vancomycin - pharmacy to dose  (vancomycin IVPB)        "And" Linked Group Details    -- IV pharmacy to manage frequency 05/06/22 1954        Cultures were taken-   Microbiology Results (last 7 days)     Procedure Component Value Units Date/Time    Blood culture [[id number]]     Order Status: Sent Specimen: Blood     Blood culture [[id number]]     Order Status: Sent Specimen: Blood     Blood culture x two cultures. Draw prior to antibiotics. [[id number]]  (Abnormal) Collected: 05/06/22 1617    Order Status: Completed Specimen: Blood from Peripheral, Antecubital, Right Updated: 05/08/22 0843     Blood Culture, Routine Gram stain aer bottle: Gram positive cocci in clusters resembling Staph       Results called to and read back by: Lila Hernandez RN 05/07/2022  10:57      Gram stain missael bottle: Gram positive cocci in clusters resembling Staph       05/07/2022  13:40      STAPHYLOCOCCUS AUREUS  ID consult required at [facility] and [facility] locations.  For susceptibility see order #[id number]      Narrative:      Aerobic and anaerobic    Blood culture x two cultures. Draw prior to antibiotics. [[id number]]  (Abnormal) Collected: 05/06/22 1616    Order Status: Completed Specimen: Blood from Peripheral, Antecubital, Left Updated: 05/08/22 0837     Blood Culture, Routine Gram stain aer bottle: Gram positive cocci in clusters resembling Staph       " Gram stain missael bottle: Gram positive cocci in clusters resembling Staph       Results called to and read back by: Lila Hernandez RN 05/07/2022  10:57      STAPHYLOCOCCUS AUREUS  Susceptibility pending  ID consult required at [facility] and Reece nathan.      Narrative:      Aerobic and anaerobic    Gram stain [[id number]] Collected: 05/07/22 0901    Order Status: Sent Specimen: Body Fluid from Pleural Fluid Updated: 05/07/22 0905    Fungus culture [[id number]] Collected: 05/07/22 0901    Order Status: Sent Specimen: Body Fluid from Pleural Fluid Updated: 05/07/22 0905    AFB culture (includes stain) [[id number]] Collected: 05/07/22 0901    Order Status: Sent Specimen: Body Fluid from Pleural Fluid Updated: 05/07/22 0905        Latest lactate reviewed, they are-  Recent Labs   Lab 05/06/22  1616 05/06/22  1731   LACTATE 1.5 1.5       Organ dysfunction indicated by Acute kidney injury  Source-  lung    Source control Achieved by-   Cont Broad spectrum IVAB     Bacteremia  Infectious disease consulted

## 2022-05-08 NOTE — NURSING
"Patients one time dose for Ativan that was for her MRI was given on Day shift after 5pm. During report I was told to contact radiology to have them come and get the patient for the Test. Patient at the time was sleeping. Radiology called back and asked about the patient and I told himThat the patients Ativan had already been given on day shift. Radiology stated that they already called staff in to perform the test on the patient. I went and discussed the test with the patient and the patient stated " I am not having that test done, the only way I would have it is for them to completely put me to sleep. I will not take Ativan I must be put asleep" Radiology came up to the floor and was notified of patients choice.  "

## 2022-05-08 NOTE — ASSESSMENT & PLAN NOTE
Chronic in nature\  H/O IVDU  CT lumbar show spinal stenosis   No neurological deficit at this time   Will order a MRI lumbar wwo  To r/o spinal abscess     5/7  Mri pending  5/8/22 The patient refused the MRI lumbar spine overnight d/t being unable to lay flat from back pain. Will give pain meds and attempt to get the MRI today to r/o spinal abscess.

## 2022-05-08 NOTE — PROGRESS NOTES
Pharmacokinetic Assessment Follow Up: IV Vancomycin    Vancomycin serum concentration assessment(s):    The random level was drawn correctly and can be used to guide therapy at this time. The measurement is below the desired definitive target range of 15 to 20 mcg/mL.    Vancomycin Regimen Plan:  Administer 1500mg dose    Re-dose when the random level is less than 20 mcg/mL, next level to be drawn at 13:00 on 5/8    Drug levels (last 3 results):  Recent Labs   Lab Result Units 05/07/22  1124 05/08/22  0002   Vancomycin, Random ug/mL 15.7 14.4       Pharmacy will continue to follow and monitor vancomycin.    Please contact pharmacy at extension [phone] for questions regarding this assessment.    Thank you for the consult,   Gurjit Dunn       Patient brief summary:  Tianna Leon is a 37 y.o. female initiated on antimicrobial therapy with IV Vancomycin for treatment of bacteremia    The patient's current regimen is pulse dosing    Drug Allergies:   Review of patient's allergies indicates:   Allergen Reactions    Sulfamethoxazole-trimethoprim        Actual Body Weight:   83kg    Renal Function:   Estimated Creatinine Clearance: 100.5 mL/min (based on SCr of 0.8 mg/dL).,     Dialysis Method (if applicable):  N/A    CBC (last 72 hours):  Recent Labs   Lab Result Units 05/06/22  1616 05/07/22  0623   WBC K/uL 29.13* 27.52*   Hemoglobin g/dL 9.9* 9.0*   Hematocrit % 27.8* 26.0*   Platelets K/uL 171 115*   Gran % % 86.0* 84.0*   Lymph % % 4.0* 5.0*   Mono % % 4.0 6.0   Eosinophil % % 0.0 0.0   Basophil % % 0.0 0.0   Differential Method  Manual Manual       Metabolic Panel (last 72 hours):  Recent Labs   Lab Result Units 05/06/22  1619 05/06/22  1720 05/07/22  0623 05/07/22  0905   Sodium mmol/L 130*  --  134*  --    Potassium mmol/L 3.4*  --  3.3*  --    Chloride mmol/L 89*  --  95  --    CO2 mmol/L 28  --  31*  --    Glucose mg/dL 109  --  111*  --    Glucose, Fluid mg/dL  --   --   --  29   Glucose, UA   --   Negative  --   --    BUN mg/dL 46*  --  38*  --    Creatinine mg/dL 1.1  --  0.8  --    Creatinine, Urine mg/dL  --  83.8  --   --    Albumin g/dL 1.8*  --  1.5*  --    Total Bilirubin mg/dL 1.4*  --  1.7*  --    Alkaline Phosphatase U/L 139*  --  122  --    AST U/L 37  --  56*  --    ALT U/L 33  --  38  --    Magnesium mg/dL  --   --  1.8  --    Phosphorus mg/dL  --   --  4.1  --        Vancomycin Administrations:  vancomycin given in the last 96 hours                   vancomycin 1.25 g in dextrose 5% 250 mL IVPB (ready to mix) (mg) 1,250 mg New Bag 05/07/22 1306    vancomycin (VANCOCIN) 2,250 mg in dextrose 5 % 500 mL IVPB (mg) 2,250 mg New Bag 05/06/22 2312                Microbiologic Results:  Microbiology Results (last 7 days)     Procedure Component Value Units Date/Time    Blood culture x two cultures. Draw prior to antibiotics. [[id number]] Collected: 05/06/22 1617    Order Status: Completed Specimen: Blood from Peripheral, Antecubital, Right Updated: 05/07/22 1341     Blood Culture, Routine Gram stain aer bottle: Gram positive cocci in clusters resembling Staph       Results called to and read back by: Lila Hernandez RN 05/07/2022  10:57      Gram stain missael bottle: Gram positive cocci in clusters resembling Staph       05/07/2022  13:40    Narrative:      Aerobic and anaerobic    Blood culture x two cultures. Draw prior to antibiotics. [[id number]] Collected: 05/06/22 1616    Order Status: Completed Specimen: Blood from Peripheral, Antecubital, Left Updated: 05/07/22 1057     Blood Culture, Routine Gram stain aer bottle: Gram positive cocci in clusters resembling Staph       Gram stain missael bottle: Gram positive cocci in clusters resembling Staph       Results called to and read back by: Lila Hernandez RN 05/07/2022  10:57    Narrative:      Aerobic and anaerobic    Gram stain [[id number]] Collected: 05/07/22 0901    Order Status: Sent Specimen: Body Fluid from Pleural Fluid Updated: 05/07/22 0905    Fungus  culture [[id number]] Collected: 05/07/22 0901    Order Status: Sent Specimen: Body Fluid from Pleural Fluid Updated: 05/07/22 0905    AFB culture (includes stain) [[id number]] Collected: 05/07/22 0901    Order Status: Sent Specimen: Body Fluid from Pleural Fluid Updated: 05/07/22 0905

## 2022-05-08 NOTE — SUBJECTIVE & OBJECTIVE
Interval History:  The patient remained afebrile overnight. Currently on cefepime/vanc/flagyl. Blood cx are growing staph aureus. The patient refused the MRI lumbar spine overnight d/t being unable to lay flat from back pain. Will give pain meds and attempt to get the MRI today to r/o spinal abscess. The ECHO showed a 1.3 x 1.5 cm elongated, mobile echodensity seen on the tricuspid valve consistent with vegetation, CT surgery was consulted. Will need a SUSANNAH. Infectious disease is consulted. Will repeat blood cx today.     Review of Systems   Constitutional:  Positive for fatigue and fever. Negative for activity change, appetite change, chills, diaphoresis and unexpected weight change.   HENT: Negative.  Negative for congestion, drooling, facial swelling, rhinorrhea, sinus pressure, sneezing and sore throat.    Eyes: Negative.  Negative for discharge, redness, itching and visual disturbance.   Respiratory:  Positive for cough, chest tightness and shortness of breath. Negative for apnea, choking, wheezing and stridor.    Cardiovascular:  Negative for chest pain, palpitations and leg swelling.   Gastrointestinal: Negative.  Negative for abdominal distention, abdominal pain, anal bleeding, blood in stool, constipation, diarrhea, nausea and vomiting.   Endocrine: Negative.    Genitourinary: Negative.  Negative for decreased urine volume, difficulty urinating, dysuria, frequency, hematuria, pelvic pain, urgency, vaginal bleeding and vaginal discharge.   Musculoskeletal:  Positive for arthralgias and back pain. Negative for gait problem, joint swelling, myalgias, neck pain and neck stiffness.   Skin: Negative.  Negative for color change, pallor, rash and wound.   Allergic/Immunologic: Negative.    Neurological:  Positive for weakness. Negative for dizziness, seizures, facial asymmetry, speech difficulty, light-headedness, numbness and headaches.   Psychiatric/Behavioral:  Positive for confusion. Negative for agitation,  hallucinations and suicidal ideas.    All other systems reviewed and are negative.  Objective:     Vital Signs (Most Recent):  Temp: 97.9 °F (36.6 °C) (05/08/22 1131)  Pulse: 88 (05/08/22 1131)  Resp: 18 (05/08/22 1131)  BP: 111/69 (05/08/22 1131)  SpO2: 97 % (05/08/22 1131) Vital Signs (24h Range):  Temp:  [97.9 °F (36.6 °C)-98.9 °F (37.2 °C)] 97.9 °F (36.6 °C)  Pulse:  [87-95] 88  Resp:  [18-22] 18  SpO2:  [92 %-97 %] 97 %  BP: (106-117)/(54-69) 111/69     Weight: 83 kg (182 lb 15.7 oz)  Body mass index is 30.45 kg/m².    Intake/Output Summary (Last 24 hours) at 5/8/2022 1204  Last data filed at 5/8/2022 1000  Gross per 24 hour   Intake 2745.97 ml   Output 2200 ml   Net 545.97 ml      Physical Exam  Vitals and nursing note reviewed. Exam conducted with a chaperone present (family).   Constitutional:       General: She is not in acute distress.     Appearance: She is well-developed. She is ill-appearing. She is not toxic-appearing.   HENT:      Head: Normocephalic and atraumatic.   Eyes:      Conjunctiva/sclera: Conjunctivae normal.      Pupils: Pupils are equal, round, and reactive to light.   Cardiovascular:      Rate and Rhythm: Normal rate and regular rhythm.      Heart sounds: Normal heart sounds. No murmur heard.  Pulmonary:      Effort: Pulmonary effort is normal. No respiratory distress.      Breath sounds: Normal breath sounds.   Abdominal:      General: Bowel sounds are normal. There is no distension.      Palpations: Abdomen is soft.      Tenderness: There is no abdominal tenderness.   Musculoskeletal:         General: Swelling and tenderness present. No deformity. Normal range of motion.      Cervical back: Normal range of motion and neck supple.      Right lower leg: No edema.      Left lower leg: No edema.   Skin:     General: Skin is warm and dry.      Coloration: Skin is pale.      Findings: Erythema present.   Neurological:      Mental Status: She is alert and oriented to person, place, and time.       Motor: Weakness present.      Deep Tendon Reflexes: Reflexes are normal and symmetric.   Psychiatric:         Behavior: Behavior normal.       Significant Labs: All pertinent labs within the past 24 hours have been reviewed.    Significant Imaging:   Imaging Results              CTA Chest Non-Coronary (PE Study) (Final result)  Result time 05/06/22 18:26:20      Final result by Demario Morin MD (05/06/22 18:26:20)                   Impression:      No definite pulmonary thromboembolism.    Multiple nodular and cavitary opacities concerning for septic emboli with larger opacities possibly necrotizing pneumonia.    Moderate loculated right pleural fluid.  Empyema not excluded.    Small pericardial effusion of unclear etiology.    Splenic hypodensity possibly related to infarct or contrast timing.    Additional details as above.    All CT scans at this facility are performed  using dose modulation techniques as appropriate to performed exam including the following:  automated exposure control; adjustment of mA and/or kV according to the patients size (this includes techniques or standardized protocols for targeted exams where dose is matched to indication/reason for exam: i.e. extremities or head);  iterative reconstruction technique.      Electronically signed by: Demario Morin  Date:    05/06/2022  Time:    18:26               Narrative:    EXAMINATION:  CTA CHEST NON CORONARY    CLINICAL HISTORY:  Pulmonary embolism (PE) suspected, neg D-dimer;    TECHNIQUE:  Low dose axial images, sagittal and coronal reformations were obtained from the thoracic inlet to the lung bases following the IV administration of 100 mL of Omnipaque 350.  Contrast timing was optimized to evaluate the pulmonary arteries.  MIP images were performed.    COMPARISON:  Multiple priors.    FINDINGS:  Base of Neck: No significant abnormality.    Thoracic soft tissues: Unremarkable.    Aorta: Left-sided aortic arch.  No aneurysm and no  significant atherosclerosis    Heart: Normal size.  Small effusion.    Pulmonary vasculature: Pulmonary arteries are well opacified.  There is no pulmonary thromboembolism.    Esha/Mediastinum: No pathologic ariana enlargement.    Airways: Patent.    Lungs/Pleura: Multiple nodular and cavitary opacities concerning for septic emboli.  Additional larger opacities most notably in the right lung base with some internal clearing possibly related to necrotizing pneumonia.  Moderate loculated right pleural fluid and no definite left pleural fluid.  Empyema not excluded.    Esophagus: Unremarkable.    Upper Abdomen: Geographic wedge-shaped splenic hypodensity possibly related to contrast timing or infarct.    Bones: No acute fracture. No suspicious lytic or sclerotic lesions.                                        CT Lumbar Spine Without Contrast (Final result)  Result time 05/06/22 18:29:13      Final result by Demario Morin MD (05/06/22 18:29:13)                   Impression:      No fracture or traumatic malalignment of the lumbar spine.  No definite findings to suggest osteomyelitis.  Further evaluation as clinically warranted.    Possible progression of degenerative changes most notable at L4-5 with moderate to severe spinal canal stenosis at this level.  Additional details as above.    This report was flagged in Epic as abnormal.    All CT scans at this facility are performed  using dose modulation techniques as appropriate to performed exam including the following:  automated exposure control; adjustment of mA and/or kV according to the patients size (this includes techniques or standardized protocols for targeted exams where dose is matched to indication/reason for exam: i.e. extremities or head);  iterative reconstruction technique.      Electronically signed by: Demario Mroin  Date:    05/06/2022  Time:    18:29               Narrative:    EXAMINATION:  CT LUMBAR SPINE WITHOUT CONTRAST    CLINICAL HISTORY:  Low  back pain, infection suspected;    TECHNIQUE:  Low-dose CT images obtained throughout the region of the lumbar spine.  Axial, sagittal and coronal reformations were performed.  Contrast was not administered.    COMPARISON:  12/05/2019    FINDINGS:  The vertebral bodies are normal in height and morphology without evidence of fracture or osseous destructive process.    Normal sagittal alignment is preserved.  No spondylolisthesis.    Circumferential disc bulges L3 through S1 with mild spinal canal stenosis L3-4, moderate to severe spinal canal stenosis L4-5 and probable moderate spinal canal stenosis L5-S1.    Facet arthropathy with moderate left neural foraminal narrowing at L4-5 and severe left neural foraminal narrowing at L5-S1.  Other levels are better preserved.    Limited evaluation of the intraspinal contents demonstrates no hematoma or mass.    Paraspinal soft tissues exhibit no acute abnormalities.                                       CT Thoracic Spine Without Contrast (Final result)  Result time 05/06/22 18:32:20      Final result by Demario Morin MD (05/06/22 18:32:20)                   Impression:      No definite acute abnormality.  No traumatic malalignment, fracture, or osseous destructive process.  Further evaluation as clinically warranted.    Pulmonary opacities as on the dedicated CT exam.    All CT scans at this facility are performed  using dose modulation techniques as appropriate to performed exam including the following:  automated exposure control; adjustment of mA and/or kV according to the patients size (this includes techniques or standardized protocols for targeted exams where dose is matched to indication/reason for exam: i.e. extremities or head);  iterative reconstruction technique.      Electronically signed by: Demario Morin  Date:    05/06/2022  Time:    18:32               Narrative:    EXAMINATION:  CT THORACIC SPINE WITHOUT CONTRAST    CLINICAL HISTORY:  Epidural abscess  suspected;    TECHNIQUE:  Low-dose axial noncontrast CT images of the thoracic spine.  Multiplanar reformats generated    COMPARISON:  None    FINDINGS:  Thoracic spine alignment is well preserved.  No findings to suggest vertebral body fracture.  No osseous lytic or blastic process.    No significant degenerative changes.  No disc abnormalities or facet arthrosis.    Pulmonary opacities as on the dedicated CT exam.                                       CT Cervical Spine Without Contrast (Final result)  Result time 05/06/22 18:34:24      Final result by Demario Morin MD (05/06/22 18:34:24)                   Impression:      No fracture or traumatic malalignment of the cervical spine.  No definite CT evidence of osteomyelitis.  Further evaluation as clinically warranted.    Additional degenerative findings as above.    All CT scans at this facility are performed  using dose modulation techniques as appropriate to performed exam including the following:  automated exposure control; adjustment of mA and/or kV according to the patients size (this includes techniques or standardized protocols for targeted exams where dose is matched to indication/reason for exam: i.e. extremities or head);  iterative reconstruction technique.      Electronically signed by: Demario Morin  Date:    05/06/2022  Time:    18:34               Narrative:    EXAMINATION:  CT CERVICAL SPINE WITHOUT CONTRAST    CLINICAL HISTORY:  Neck pain, acute, infection suspected;    TECHNIQUE:  Low dose axial CT images through the cervical spine, with sagittal and coronal reformations.  Contrast was not administered.    COMPARISON:  12/05/2019    FINDINGS:  The vertebral bodies are normal in height and morphology without evidence of fracture or osseous destructive process.  Normal sagittal alignment is preserved.    Progression of degenerative change at C4-5 in comparison the prior exam with posterior disc osteophyte complex at this level producing mild  spinal canal stenosis.  Smaller C5-6 posterior disc osteophyte complex without definite spinal canal stenosis.    No definite neural foraminal narrowing.    Limited evaluation of the intraspinal contents demonstrates no hematoma or mass.Paraspinal soft tissues exhibit no acute abnormalities.                                       X-Ray Knee Complete 4 Or More Views Right (Final result)  Result time 05/06/22 16:01:49      Final result by Nate Brasher MD (05/06/22 16:01:49)                   Impression:      No evidence of fracture or dislocation. Moderate suprapatellar knee joint effusion. Mild soft tissue edema lateral to the knee.      Electronically signed by: Nate Brasher  Date:    05/06/2022  Time:    16:01               Narrative:    EXAMINATION:  XR KNEE COMP 4 OR MORE VIEWS RIGHT    CLINICAL HISTORY:  Pain in unspecified knee    TECHNIQUE:  AP, lateral, and Merchant views of the right knee were performed.    COMPARISON:  None    FINDINGS:  No evidence of fracture or dislocation.  Moderate suprapatellar knee joint effusion.  Mild soft tissue edema lateral to the knee.  No foreign body.  Soft tissues otherwise unremarkable.                                       X-Ray Finger 2 or More Views Right (Final result)  Result time 05/06/22 16:02:28      Final result by Nate Brasher MD (05/06/22 16:02:28)                   Impression:      Negative exam.      Electronically signed by: Nate Brasher  Date:    05/06/2022  Time:    16:02               Narrative:    EXAMINATION:  XR FINGER 2 OR MORE VIEWS RIGHT    CLINICAL HISTORY:  finger swelling;    TECHNIQUE:  Three views of the right 4th digit.    COMPARISON:  None    FINDINGS:  No evidence of fracture or dislocation.  Joint spaces appear well maintained.  Soft tissues unremarkable.  No evidence of foreign body.                                        X-Ray Chest AP Portable (Final result)  Result time 05/06/22 16:04:37      Final result by Nate Brasher MD  (05/06/22 16:04:37)                   Impression:      Patchy bilateral opacities, some of which appear nodular and possibly cavitary. Recommend further evaluation with chest CT with IV contrast.    This report was flagged in Epic as abnormal.      Electronically signed by: Nate Brasher  Date:    05/06/2022  Time:    16:04               Narrative:    EXAMINATION:  XR CHEST AP PORTABLE    CLINICAL HISTORY:  Sepsis;.    TECHNIQUE:  Single frontal portable view of the chest was performed.    COMPARISON:  12/05/2019    FINDINGS:  Support devices: None    Patchy bilateral opacities, some of which appear nodular and possibly cavitary.  Recommend further evaluation with chest CT with IV contrast.    Heart normal size.  No pneumothorax or pleural effusion    Bones are intact.

## 2022-05-08 NOTE — CONSULTS
[facility]  Cardiothoracic Surgery  Consult Note    Patient Name: Tianna Leon  MRN: [id number]  Admission Date: 2022  Attending Physician: Elizabeth Kim MD  Referring Provider: Self, Aaareferral    Patient information was obtained from patient, parent and ER records.     Consults  Subjective:     Principal Problem: Bacteremia    · History of Present Illness: The patient is a 37-year-old female with COPD, asthma bipolar disease, IV drug abuse, hyperlipidemia who was worked up for shortness of breath over the past week.  Patient is also complaining of fever chills and generalized weakness.  The patient is an active IV drug abuser and last injection was just prior to coming to the emergency room.  Workup included a CT scan that showed concern for possible septic emboli.  Blood cultures are positive for Gram-positive cocci.  Echocardiogram shows mild to moderate tricuspid regurgitation with a 1.3 x 1.5 elongated mobile vegetation on the tricuspid valve.        No current facility-administered medications on file prior to encounter.     Current Outpatient Medications on File Prior to Encounter   Medication Sig    acetaminophen (TYLENOL) 500 MG tablet Take 500 mg by mouth every 6 (six) hours as needed for Pain.    albuterol (PROVENTIL/VENTOLIN HFA) 90 mcg/actuation inhaler Inhale 2 puffs into the lungs every 6 (six) hours as needed for Wheezing.    [] azithromycin (ZITHROMAX Z-YUAN) 250 MG tablet Take 2 tablets (500 mg) on  Day 1,  followed by 1 tablet (250 mg) once daily on Days 2 through 5.    dextromethorphan-guaiFENesin (MUCINEX DM) 60-1,200 mg per 12 hr tablet Take 1 tablet by mouth every 12 (twelve) hours.    ibuprofen (ADVIL,MOTRIN) 200 MG tablet Take 200 mg by mouth every 6 (six) hours as needed for Pain.       Review of patient's allergies indicates:   Allergen Reactions    Sulfamethoxazole-trimethoprim        Past Medical History:   Diagnosis Date    ADHD (attention deficit  hyperactivity disorder)     Anxiety     Asthma     Bipolar disorder     Cancer     cervical    COPD (chronic obstructive pulmonary disease)     GERD (gastroesophageal reflux disease)     Hepatitis C antibody positive in blood     RNA UNDETECTED 2016    Hyperlipidemia     Intravenous drug abuse     MDD (major depressive disorder)     Mood disorder     PTSD (post-traumatic stress disorder)     Suicidal ideation      Past Surgical History:   Procedure Laterality Date     SECTION  2001    x2    CHOLECYSTECTOMY      HYSTERECTOMY      LAPAROSCOPIC SALPINGECTOMY      ROBOT-ASSISTED LAPAROSCOPIC ABDOMINAL HYSTERECTOMY USING DA SEBASTIEN XI N/A 2019    Procedure: XI ROBOTIC HYSTERECTOMY;  Surgeon: Tabatha Quintanilla MD;  Location: [facility];  Service: OB/GYN;  Laterality: N/A;    ROBOT-ASSISTED LAPAROSCOPIC LYSIS OF ADHESIONS USING DA SEBASTIEN XI N/A 2019    Procedure: XI ROBOTIC LYSIS, ADHESIONS;  Surgeon: Tabatha Quintanilla MD;  Location: [facility];  Service: OB/GYN;  Laterality: N/A;    ROBOT-ASSISTED SURGICAL REMOVAL OF FALLOPIAN TUBE USING DA SEBASTIEN XI Right 2019    Procedure: XI ROBOTIC SALPINGECTOMY;  Surgeon: Tabatha Quintanilla MD;  Location: [facility];  Service: OB/GYN;  Laterality: Right;    TONSILLECTOMY      TUBAL LIGATION       Family History       Problem Relation (Age of Onset)    COPD Mother          Tobacco Use    Smoking status: Current Every Day Smoker     Packs/day: 1.50     Types: Cigarettes    Smokeless tobacco: Never Used    Tobacco comment: no smoking after midnight prior to surgery   Substance and Sexual Activity    Alcohol use: Yes     Comment: Occassional     Drug use: Not Currently     Types: IV, Methamphetamines, Heroin     Comment: denies heroin use.  Last used meth 10/2018  No illegal drugs prior to sx    Sexual activity: Not Currently     Partners: Male     Birth control/protection: None     Review of Systems   Constitutional:  Positive  for chills and fever.   HENT:  Positive for congestion and sinus pain.    Eyes: Negative.    Respiratory:  Negative for chest tightness.    Cardiovascular:  Negative for chest pain and leg swelling.   Gastrointestinal: Negative.    Endocrine: Positive for polyuria.   Genitourinary:  Positive for enuresis.   Musculoskeletal:  Positive for arthralgias, back pain and gait problem.   Skin:  Positive for rash.   Neurological:  Negative for dizziness and light-headedness.   Hematological:  Does not bruise/bleed easily.   Psychiatric/Behavioral:  Negative for confusion. The patient is not nervous/anxious.    Objective:     Vital Signs (Most Recent):  Temp: 99.2 °F (37.3 °C) (05/08/22 1558)  Pulse: 87 (05/08/22 1558)  Resp: 18 (05/08/22 1558)  BP: (!) 113/59 (05/08/22 1558)  SpO2: 97 % (05/08/22 1558)   Vital Signs (24h Range):  Temp:  [97.9 °F (36.6 °C)-99.2 °F (37.3 °C)] 99.2 °F (37.3 °C)  Pulse:  [87-95] 87  Resp:  [18-22] 18  SpO2:  [92 %-98 %] 97 %  BP: (106-117)/(54-69) 113/59     Weight: 83 kg (182 lb 15.7 oz)  Body mass index is 30.45 kg/m².    SpO2: 97 %  O2 Device (Oxygen Therapy): nasal cannula     Intake/Output - Last 3 Shifts         05/06 0700 05/07 0659 05/07 0700 05/08 0659 05/08 0700  05/09 0659    P.O. 720 1318     I.V. (mL/kg) 207 (2.5) 1847.4 (22.3)     IV Piggyback 743 898.5     Total Intake(mL/kg) 1670 (20.1) 4064 (49)     Urine (mL/kg/hr) 400 1900 (1) 1200 (1.3)    Stool   0    Total Output 400 1900 1200    Net +1270 +2164 -1200           Stool Occurrence  1 x 1 x             Lines/Drains/Airways       Peripheral Intravenous Line  Duration                  Peripheral IV - Single Lumen 05/06/22 1616 20 G Right Antecubital 2 days                     STS Risk Score: n/a    Physical Exam  Constitutional:       Appearance: Normal appearance.   HENT:      Head: Normocephalic and atraumatic.      Nose: Nose normal.   Eyes:      Extraocular Movements: Extraocular movements intact.      Pupils: Pupils are  equal, round, and reactive to light.   Cardiovascular:      Rate and Rhythm: Regular rhythm.      Pulses: Normal pulses.      Heart sounds: Normal heart sounds.   Pulmonary:      Effort: Pulmonary effort is normal.      Breath sounds: Normal breath sounds.   Abdominal:      General: Abdomen is flat. Bowel sounds are normal.      Palpations: Abdomen is soft.   Musculoskeletal:      Right lower leg: No edema.      Left lower leg: No edema.   Skin:     General: Skin is warm and dry.   Neurological:      General: No focal deficit present.      Mental Status: She is alert and oriented to person, place, and time.   Psychiatric:         Mood and Affect: Mood normal.         Behavior: Behavior normal.       Significant Labs:  All pertinent labs from the last 24 hours have been reviewed.    Significant Diagnostics:  I have reviewed all pertinent imaging results/findings within the past 24 hours.      Assessment/Plan:     NYHA Score: NYHA III: marked limitation of physical activity, comfortable at rest    Subacute bacterial endocarditis  The patient is a 37-year-old active IV drug abuser who was admitted for workup of fever chills.  Workup shows echocardiogram with a vegetation on the tricuspid valve.  In addition, patient has blood cultures that are positive for g positive cocci.  Criteria for surgical intervention include decompensated heart failure, highly resistant organism, perivalvular extension, large vegetation i.e. greater than 2 cm, inability to clear bacteremia.  In that regard, will recommend continuing medical management appropriate antibiotic therapy.        Thank you for your consult. I will follow-up with patient. Please contact us if you have any additional questions.    Bobby Blandon MD  Cardiothoracic Surgery  [facility]

## 2022-05-08 NOTE — PROGRESS NOTES
[facility] Medicine  Progress Note    Patient Name: Tianna Leon  MRN: [id number]  Patient Class: IP- Inpatient   Admission Date: 5/6/2022  Length of Stay: 2 days  Attending Physician: Elizabeth Kim MD  Primary Care Provider: Spenser Campa MD        Subjective:     Principal Problem:Bacteremia        HPI:  38 y/o. female  with a PMHx of asthma, COPD,Bipolar  , IVDU and HLD presented to the ER with a c/o  sob for the last weak which has gotten worse . The SOB is associated with fever , chills , productive cough , back pain  and generalized weakness . She report cough some blood this am .  She is active IVDU  ( heroine , cocaine  and amphetamine ) . She denies any  sick contact , chest pain  , GI/ sx , She also complaning of LE sweeling . Knee pain and B/L LE rash .   ER COURSE: CTA chest negative for pe . Multiple nodular and cavitary opacities concerning for septic emboli with larger opacities possibly necrotizing pneumonia. CT cervical  and thoracic did not show any acute finding , CT lumbar Possible progression of degenerative changes most notable at L4-5 with moderate to severe spinal canal stenosis at this level.  WBC  29 k , na 130 , k 3.4 , cl 89 , procal 2.71   ER VS:  BP Pulse Resp Temp SpO2   (!) 124/58 110 (!) 30 (!) 101.6 °F (38.7 °C) 96 %           Pt will be admitted to inpt with a dx of PNA and severe sepsis       Overview/Hospital Course:  5/7 admitted for pneumonia, severe sepsis in setting of ivdu. Mother at bedside and provides collateral. Patient has struggled with substance abuse for approximately 20 years, worsening over last 3-5 years since life event. Onset of symptoms 1-2 weeks ago. Tolerated thoracentesis with no pneumothorax on chest x-ray. Mri lumbar and echo, pending. Bcx positive, growing gram positive cocci. On vanc, cefepime and flagyl. Infectious disease consulted for bacteremia. 5/8/22 The patient remained afebrile overnight. Currently on  cefepime/vanc/flagyl. Blood cx are growing staph aureus. The patient refused the MRI lumbar spine overnight d/t being unable to lay flat from back pain. Will give pain meds and attempt to get the MRI today to r/o spinal abscess. The ECHO showed a 1.3 x 1.5 cm elongated, mobile echodensity seen on the tricuspid valve consistent with vegetation, CT surgery was consulted. Will need a SUSANNAH. Infectious disease is consulted. Will repeat blood cx today.       Interval History:  The patient remained afebrile overnight. Currently on cefepime/vanc/flagyl. Blood cx are growing staph aureus. The patient refused the MRI lumbar spine overnight d/t being unable to lay flat from back pain. Will give pain meds and attempt to get the MRI today to r/o spinal abscess. The ECHO showed a 1.3 x 1.5 cm elongated, mobile echodensity seen on the tricuspid valve consistent with vegetation, CT surgery was consulted. Will need a SUSANNAH. Infectious disease is consulted. Will repeat blood cx today.     Review of Systems   Constitutional:  Positive for fatigue and fever. Negative for activity change, appetite change, chills, diaphoresis and unexpected weight change.   HENT: Negative.  Negative for congestion, drooling, facial swelling, rhinorrhea, sinus pressure, sneezing and sore throat.    Eyes: Negative.  Negative for discharge, redness, itching and visual disturbance.   Respiratory:  Positive for cough, chest tightness and shortness of breath. Negative for apnea, choking, wheezing and stridor.    Cardiovascular:  Negative for chest pain, palpitations and leg swelling.   Gastrointestinal: Negative.  Negative for abdominal distention, abdominal pain, anal bleeding, blood in stool, constipation, diarrhea, nausea and vomiting.   Endocrine: Negative.    Genitourinary: Negative.  Negative for decreased urine volume, difficulty urinating, dysuria, frequency, hematuria, pelvic pain, urgency, vaginal bleeding and vaginal discharge.   Musculoskeletal:   Positive for arthralgias and back pain. Negative for gait problem, joint swelling, myalgias, neck pain and neck stiffness.   Skin: Negative.  Negative for color change, pallor, rash and wound.   Allergic/Immunologic: Negative.    Neurological:  Positive for weakness. Negative for dizziness, seizures, facial asymmetry, speech difficulty, light-headedness, numbness and headaches.   Psychiatric/Behavioral:  Positive for confusion. Negative for agitation, hallucinations and suicidal ideas.    All other systems reviewed and are negative.  Objective:     Vital Signs (Most Recent):  Temp: 97.9 °F (36.6 °C) (05/08/22 1131)  Pulse: 88 (05/08/22 1131)  Resp: 18 (05/08/22 1131)  BP: 111/69 (05/08/22 1131)  SpO2: 97 % (05/08/22 1131) Vital Signs (24h Range):  Temp:  [97.9 °F (36.6 °C)-98.9 °F (37.2 °C)] 97.9 °F (36.6 °C)  Pulse:  [87-95] 88  Resp:  [18-22] 18  SpO2:  [92 %-97 %] 97 %  BP: (106-117)/(54-69) 111/69     Weight: 83 kg (182 lb 15.7 oz)  Body mass index is 30.45 kg/m².    Intake/Output Summary (Last 24 hours) at 5/8/2022 1204  Last data filed at 5/8/2022 1000  Gross per 24 hour   Intake 2745.97 ml   Output 2200 ml   Net 545.97 ml      Physical Exam  Vitals and nursing note reviewed. Exam conducted with a chaperone present (family).   Constitutional:       General: She is not in acute distress.     Appearance: She is well-developed. She is ill-appearing. She is not toxic-appearing.   HENT:      Head: Normocephalic and atraumatic.   Eyes:      Conjunctiva/sclera: Conjunctivae normal.      Pupils: Pupils are equal, round, and reactive to light.   Cardiovascular:      Rate and Rhythm: Normal rate and regular rhythm.      Heart sounds: Normal heart sounds. No murmur heard.  Pulmonary:      Effort: Pulmonary effort is normal. No respiratory distress.      Breath sounds: Normal breath sounds.   Abdominal:      General: Bowel sounds are normal. There is no distension.      Palpations: Abdomen is soft.      Tenderness: There  is no abdominal tenderness.   Musculoskeletal:         General: Swelling and tenderness present. No deformity. Normal range of motion.      Cervical back: Normal range of motion and neck supple.      Right lower leg: No edema.      Left lower leg: No edema.   Skin:     General: Skin is warm and dry.      Coloration: Skin is pale.      Findings: Erythema present.   Neurological:      Mental Status: She is alert and oriented to person, place, and time.      Motor: Weakness present.      Deep Tendon Reflexes: Reflexes are normal and symmetric.   Psychiatric:         Behavior: Behavior normal.       Significant Labs: All pertinent labs within the past 24 hours have been reviewed.    Significant Imaging:   Imaging Results              CTA Chest Non-Coronary (PE Study) (Final result)  Result time 05/06/22 18:26:20      Final result by Demario Morin MD (05/06/22 18:26:20)                   Impression:      No definite pulmonary thromboembolism.    Multiple nodular and cavitary opacities concerning for septic emboli with larger opacities possibly necrotizing pneumonia.    Moderate loculated right pleural fluid.  Empyema not excluded.    Small pericardial effusion of unclear etiology.    Splenic hypodensity possibly related to infarct or contrast timing.    Additional details as above.    All CT scans at this facility are performed  using dose modulation techniques as appropriate to performed exam including the following:  automated exposure control; adjustment of mA and/or kV according to the patients size (this includes techniques or standardized protocols for targeted exams where dose is matched to indication/reason for exam: i.e. extremities or head);  iterative reconstruction technique.      Electronically signed by: Demario Morin  Date:    05/06/2022  Time:    18:26               Narrative:    EXAMINATION:  CTA CHEST NON CORONARY    CLINICAL HISTORY:  Pulmonary embolism (PE) suspected, neg  D-dimer;    TECHNIQUE:  Low dose axial images, sagittal and coronal reformations were obtained from the thoracic inlet to the lung bases following the IV administration of 100 mL of Omnipaque 350.  Contrast timing was optimized to evaluate the pulmonary arteries.  MIP images were performed.    COMPARISON:  Multiple priors.    FINDINGS:  Base of Neck: No significant abnormality.    Thoracic soft tissues: Unremarkable.    Aorta: Left-sided aortic arch.  No aneurysm and no significant atherosclerosis    Heart: Normal size.  Small effusion.    Pulmonary vasculature: Pulmonary arteries are well opacified.  There is no pulmonary thromboembolism.    Esha/Mediastinum: No pathologic ariana enlargement.    Airways: Patent.    Lungs/Pleura: Multiple nodular and cavitary opacities concerning for septic emboli.  Additional larger opacities most notably in the right lung base with some internal clearing possibly related to necrotizing pneumonia.  Moderate loculated right pleural fluid and no definite left pleural fluid.  Empyema not excluded.    Esophagus: Unremarkable.    Upper Abdomen: Geographic wedge-shaped splenic hypodensity possibly related to contrast timing or infarct.    Bones: No acute fracture. No suspicious lytic or sclerotic lesions.                                        CT Lumbar Spine Without Contrast (Final result)  Result time 05/06/22 18:29:13      Final result by Demario Morin MD (05/06/22 18:29:13)                   Impression:      No fracture or traumatic malalignment of the lumbar spine.  No definite findings to suggest osteomyelitis.  Further evaluation as clinically warranted.    Possible progression of degenerative changes most notable at L4-5 with moderate to severe spinal canal stenosis at this level.  Additional details as above.    This report was flagged in Epic as abnormal.    All CT scans at this facility are performed  using dose modulation techniques as appropriate to performed exam  including the following:  automated exposure control; adjustment of mA and/or kV according to the patients size (this includes techniques or standardized protocols for targeted exams where dose is matched to indication/reason for exam: i.e. extremities or head);  iterative reconstruction technique.      Electronically signed by: Demario Morin  Date:    05/06/2022  Time:    18:29               Narrative:    EXAMINATION:  CT LUMBAR SPINE WITHOUT CONTRAST    CLINICAL HISTORY:  Low back pain, infection suspected;    TECHNIQUE:  Low-dose CT images obtained throughout the region of the lumbar spine.  Axial, sagittal and coronal reformations were performed.  Contrast was not administered.    COMPARISON:  12/05/2019    FINDINGS:  The vertebral bodies are normal in height and morphology without evidence of fracture or osseous destructive process.    Normal sagittal alignment is preserved.  No spondylolisthesis.    Circumferential disc bulges L3 through S1 with mild spinal canal stenosis L3-4, moderate to severe spinal canal stenosis L4-5 and probable moderate spinal canal stenosis L5-S1.    Facet arthropathy with moderate left neural foraminal narrowing at L4-5 and severe left neural foraminal narrowing at L5-S1.  Other levels are better preserved.    Limited evaluation of the intraspinal contents demonstrates no hematoma or mass.    Paraspinal soft tissues exhibit no acute abnormalities.                                       CT Thoracic Spine Without Contrast (Final result)  Result time 05/06/22 18:32:20      Final result by Demario Morin MD (05/06/22 18:32:20)                   Impression:      No definite acute abnormality.  No traumatic malalignment, fracture, or osseous destructive process.  Further evaluation as clinically warranted.    Pulmonary opacities as on the dedicated CT exam.    All CT scans at this facility are performed  using dose modulation techniques as appropriate to performed exam including the  following:  automated exposure control; adjustment of mA and/or kV according to the patients size (this includes techniques or standardized protocols for targeted exams where dose is matched to indication/reason for exam: i.e. extremities or head);  iterative reconstruction technique.      Electronically signed by: Demario Morin  Date:    05/06/2022  Time:    18:32               Narrative:    EXAMINATION:  CT THORACIC SPINE WITHOUT CONTRAST    CLINICAL HISTORY:  Epidural abscess suspected;    TECHNIQUE:  Low-dose axial noncontrast CT images of the thoracic spine.  Multiplanar reformats generated    COMPARISON:  None    FINDINGS:  Thoracic spine alignment is well preserved.  No findings to suggest vertebral body fracture.  No osseous lytic or blastic process.    No significant degenerative changes.  No disc abnormalities or facet arthrosis.    Pulmonary opacities as on the dedicated CT exam.                                       CT Cervical Spine Without Contrast (Final result)  Result time 05/06/22 18:34:24      Final result by Demario Morin MD (05/06/22 18:34:24)                   Impression:      No fracture or traumatic malalignment of the cervical spine.  No definite CT evidence of osteomyelitis.  Further evaluation as clinically warranted.    Additional degenerative findings as above.    All CT scans at this facility are performed  using dose modulation techniques as appropriate to performed exam including the following:  automated exposure control; adjustment of mA and/or kV according to the patients size (this includes techniques or standardized protocols for targeted exams where dose is matched to indication/reason for exam: i.e. extremities or head);  iterative reconstruction technique.      Electronically signed by: Demario Morin  Date:    05/06/2022  Time:    18:34               Narrative:    EXAMINATION:  CT CERVICAL SPINE WITHOUT CONTRAST    CLINICAL HISTORY:  Neck pain, acute, infection  suspected;    TECHNIQUE:  Low dose axial CT images through the cervical spine, with sagittal and coronal reformations.  Contrast was not administered.    COMPARISON:  12/05/2019    FINDINGS:  The vertebral bodies are normal in height and morphology without evidence of fracture or osseous destructive process.  Normal sagittal alignment is preserved.    Progression of degenerative change at C4-5 in comparison the prior exam with posterior disc osteophyte complex at this level producing mild spinal canal stenosis.  Smaller C5-6 posterior disc osteophyte complex without definite spinal canal stenosis.    No definite neural foraminal narrowing.    Limited evaluation of the intraspinal contents demonstrates no hematoma or mass.Paraspinal soft tissues exhibit no acute abnormalities.                                       X-Ray Knee Complete 4 Or More Views Right (Final result)  Result time 05/06/22 16:01:49      Final result by Nate Brasher MD (05/06/22 16:01:49)                   Impression:      No evidence of fracture or dislocation. Moderate suprapatellar knee joint effusion. Mild soft tissue edema lateral to the knee.      Electronically signed by: Nate Brasher  Date:    05/06/2022  Time:    16:01               Narrative:    EXAMINATION:  XR KNEE COMP 4 OR MORE VIEWS RIGHT    CLINICAL HISTORY:  Pain in unspecified knee    TECHNIQUE:  AP, lateral, and Merchant views of the right knee were performed.    COMPARISON:  None    FINDINGS:  No evidence of fracture or dislocation.  Moderate suprapatellar knee joint effusion.  Mild soft tissue edema lateral to the knee.  No foreign body.  Soft tissues otherwise unremarkable.                                       X-Ray Finger 2 or More Views Right (Final result)  Result time 05/06/22 16:02:28      Final result by Nate Brasher MD (05/06/22 16:02:28)                   Impression:      Negative exam.      Electronically signed by: Nate  Anshu  Date:    05/06/2022  Time:    16:02               Narrative:    EXAMINATION:  XR FINGER 2 OR MORE VIEWS RIGHT    CLINICAL HISTORY:  finger swelling;    TECHNIQUE:  Three views of the right 4th digit.    COMPARISON:  None    FINDINGS:  No evidence of fracture or dislocation.  Joint spaces appear well maintained.  Soft tissues unremarkable.  No evidence of foreign body.                                        X-Ray Chest AP Portable (Final result)  Result time 05/06/22 16:04:37      Final result by Nate Brasher MD (05/06/22 16:04:37)                   Impression:      Patchy bilateral opacities, some of which appear nodular and possibly cavitary. Recommend further evaluation with chest CT with IV contrast.    This report was flagged in Epic as abnormal.      Electronically signed by: Nate Brasher  Date:    05/06/2022  Time:    16:04               Narrative:    EXAMINATION:  XR CHEST AP PORTABLE    CLINICAL HISTORY:  Sepsis;.    TECHNIQUE:  Single frontal portable view of the chest was performed.    COMPARISON:  12/05/2019    FINDINGS:  Support devices: None    Patchy bilateral opacities, some of which appear nodular and possibly cavitary.  Recommend further evaluation with chest CT with IV contrast.    Heart normal size.  No pneumothorax or pleural effusion    Bones are intact.                                          Assessment/Plan:      * Bacteremia  Gram positive   Infectious disease consulted  Continue broad spectrum intravenous antibiotic(s) given h/o ivdu  Remains febrile and leukocytosis persists  5/8/22 The patient remained afebrile overnight. Currently on cefepime/vanc/flagyl. Blood cx are growing staph aureus. The patient refused the MRI lumbar spine overnight d/t being unable to lay flat from back pain. Will give pain meds and attempt to get the MRI today to r/o spinal abscess. The ECHO showed a 1.3 x 1.5 cm elongated, mobile echodensity seen on the tricuspid valve consistent with vegetation, CT  "surgery was consulted. Will need a SUSANNAH. Infectious disease is consulted. Will repeat blood cx today.         Vegetation of heart valve  5/8/22  The ECHO showed a 1.3 x 1.5 cm elongated, mobile echodensity seen on the tricuspid valve consistent with vegetation, CT surgery was consulted. Will need a SUSANNAH. Infectious disease is consulted. Will repeat blood cx today. Continue IV ABX.       Chronic pulmonary heart disease  - Pulmonology following       Parapneumonic effusion  Pulmonology consulted  Status post thoracentesis  Fluid studies pending      IVDU (intravenous drug user)   Will ordet TTE to r/o IE  Will order MRI lumbar wwo   Cont broad spectrum IVAB     5/7   Studies pending for additional sites of infection given h/o ivdu  5/8/22  on cessation     Severe sepsis  This patient does have evidence of infective focus  My overall impression is sepsis. Vital signs were reviewed and noted in progress note.  Antibiotics given-   Antibiotics (From admission, onward)            Start     Stop Route Frequency Ordered    05/07/22 2100  mupirocin 2 % ointment         05/12 2059 Nasl 2 times daily 05/07/22 1646 05/07/22 0200  metronidazole IVPB 500 mg         -- IV Every 8 hours (non-standard times) 05/06/22 1954 05/07/22 0100  cefepime in dextrose 5 % IVPB 2 g         -- IV Every 8 hours (non-standard times) 05/06/22 1954 05/06/22 2052  vancomycin - pharmacy to dose  (vancomycin IVPB)        "And" Linked Group Details    -- IV pharmacy to manage frequency 05/06/22 1954        Cultures were taken-   Microbiology Results (last 7 days)     Procedure Component Value Units Date/Time    Blood culture [[id number]]     Order Status: Sent Specimen: Blood     Blood culture [[id number]]     Order Status: Sent Specimen: Blood     Blood culture x two cultures. Draw prior to antibiotics. [[id number]]  (Abnormal) Collected: 05/06/22 1617    Order Status: Completed Specimen: Blood from Peripheral, Antecubital, Right Updated: " 05/08/22 0843     Blood Culture, Routine Gram stain aer bottle: Gram positive cocci in clusters resembling Staph       Results called to and read back by: Lila Hernandez RN 05/07/2022  10:57      Gram stain missael bottle: Gram positive cocci in clusters resembling Staph       05/07/2022  13:40      STAPHYLOCOCCUS AUREUS  ID consult required at [facility].  For susceptibility see order #[id number]      Narrative:      Aerobic and anaerobic    Blood culture x two cultures. Draw prior to antibiotics. [[id number]]  (Abnormal) Collected: 05/06/22 1616    Order Status: Completed Specimen: Blood from Peripheral, Antecubital, Left Updated: 05/08/22 0837     Blood Culture, Routine Gram stain aer bottle: Gram positive cocci in clusters resembling Staph       Gram stain missael bottle: Gram positive cocci in clusters resembling Staph       Results called to and read back by: Lila Hernandez RN 05/07/2022  10:57      STAPHYLOCOCCUS AUREUS  Susceptibility pending  ID consult required at [facility] and [address] locations.      Narrative:      Aerobic and anaerobic    Gram stain [[id number]] Collected: 05/07/22 0901    Order Status: Sent Specimen: Body Fluid from Pleural Fluid Updated: 05/07/22 0905    Fungus culture [[id number]] Collected: 05/07/22 0901    Order Status: Sent Specimen: Body Fluid from Pleural Fluid Updated: 05/07/22 0905    AFB culture (includes stain) [[id number]] Collected: 05/07/22 0901    Order Status: Sent Specimen: Body Fluid from Pleural Fluid Updated: 05/07/22 0905        Latest lactate reviewed, they are-  Recent Labs   Lab 05/06/22  1616 05/06/22  1731   LACTATE 1.5 1.5       Organ dysfunction indicated by Acute kidney injury  Source-  lung    Source control Achieved by-   Cont Broad spectrum IVAB     Bacteremia  Infectious disease consulted      PNA (pneumonia)  H/O IVDU   Cont broad spectrum IVAB   F/U blood and sputum cx   Will consult pulmonology for thoracentesis  To r/u  empyema     5/7  Status post thoracentesis  Chest x-ray without signs of pneumothorax  Fluid studies pending        Tobacco abuse  - Patient thoroughly counseled on smoking cessation, pt verbalized understanding. Time of counseling 10 minutes.        COPD (chronic obstructive pulmonary disease)  Cont breathing tx prn  Pulmonology following     Lower back pain  Chronic in nature\  H/O IVDU  CT lumbar show spinal stenosis   No neurological deficit at this time   Will order a MRI lumbar wwo  To r/o spinal abscess     5/7  Mri pending  5/8/22 The patient refused the MRI lumbar spine overnight d/t being unable to lay flat from back pain. Will give pain meds and attempt to get the MRI today to r/o spinal abscess.      VTE Risk Mitigation (From admission, onward)         Ordered     enoxaparin injection 40 mg  Daily         05/06/22 2336     IP VTE HIGH RISK PATIENT  Once         05/06/22 2336     Place sequential compression device  Until discontinued         05/06/22 2336                Discharge Planning   YESSICA:      Code Status: Full Code   Is the patient medically ready for discharge?:     Reason for patient still in hospital (select all that apply): Patient new problem, Patient trending condition, Laboratory test, Treatment, Imaging, Consult recommendations and Pending disposition  Discharge Plan A: Home                  Kike Buckley NP  Department of Hospital Medicine   [facility] - Med Surg

## 2022-05-08 NOTE — PROGRESS NOTES
[facility] Surg  Pulmonology  Progress Note    Patient Name: Tianna Leon  MRN: [id number]  Admission Date: 5/6/2022  Hospital Length of Stay: 2 days  Code Status: Full Code  Attending Provider: Elizabteh Kim MD  Primary Care Provider: Spenser Campa MD   Principal Problem: Severe sepsis    Subjective:     Interval History:   05/08: seen and examined: SP thora, CXR reveiwed, Echo Pul HTN, feels better, T max 98.9F, wbcc 10 k, LDH 1077, BC +ve G+ve Cocci    Respiratory ROS: positive for - pleuritic pain and shortness of breath  negative for - hemoptysis, orthopnea, sputum changes, tachypnea, or wheezing     Objective:     Vital Signs (Most Recent):  Temp: 98.5 °F (36.9 °C) (05/08/22 0024)  Pulse: 90 (05/08/22 0029)  Resp: 20 (05/08/22 0221)  BP: (!) 106/54 (05/08/22 0024)  SpO2: 95 % (05/08/22 0029)   Vital Signs (24h Range):  Temp:  [97.7 °F (36.5 °C)-98.9 °F (37.2 °C)] 98.5 °F (36.9 °C)  Pulse:  [74-95] 90  Resp:  [18-22] 20  SpO2:  [92 %-100 %] 95 %  BP: (100-121)/(54-70) 106/54     Weight: 83 kg (182 lb 15.7 oz)  Body mass index is 30.45 kg/m².      Intake/Output Summary (Last 24 hours) at 5/8/2022 0732  Last data filed at 5/8/2022 0515  Gross per 24 hour   Intake 4063.97 ml   Output 1900 ml   Net 2163.97 ml       Physical Exam  Vitals and nursing note reviewed.   Constitutional:       Appearance: She is ill-appearing.      Interventions: Nasal cannula in place.   HENT:      Head: Normocephalic.      Comments: Lip piercing     Nose: Nose normal.      Mouth/Throat:      Mouth: Mucous membranes are moist.   Eyes:      Pupils: Pupils are equal, round, and reactive to light.   Cardiovascular:      Rate and Rhythm: Normal rate.   Pulmonary:      Effort: Tachypnea present.      Breath sounds: Examination of the right-middle field reveals decreased breath sounds. Examination of the right-lower field reveals decreased breath sounds. Decreased breath sounds present. No wheezing, rhonchi or rales.   Abdominal:       General: Bowel sounds are normal.      Palpations: Abdomen is soft.   Musculoskeletal:         General: Normal range of motion.      Cervical back: Normal range of motion.   Skin:     General: Skin is warm.      Comments: Multiple tattoos   Neurological:      General: No focal deficit present.      Mental Status: She is alert and oriented to person, place, and time.   Psychiatric:         Behavior: Behavior is cooperative.       Vents:  Oxygen Concentration (%): 28 (05/08/22 0029)    Lines/Drains/Airways       Peripheral Intravenous Line  Duration                  Peripheral IV - Single Lumen 05/06/22 1616 20 G Right Antecubital 1 day                    Significant Labs:    CBC/Anemia Profile:  Recent Labs   Lab 05/06/22  1616 05/07/22  0623   WBC 29.13* 27.52*   HGB 9.9* 9.0*   HCT 27.8* 26.0*    115*   MCV 82 85   RDW 14.2 14.4        Chemistries:  Recent Labs   Lab 05/06/22  1619 05/07/22  0623 05/07/22  0731   * 134*  --    K 3.4* 3.3*  --    CL 89* 95  --    CO2 28 31*  --    BUN 46* 38*  --    CREATININE 1.1 0.8  --    CALCIUM 8.3* 7.6*  --    ALBUMIN 1.8* 1.5*  --    PROT 6.0 4.8* 5.0*   BILITOT 1.4* 1.7*  --    ALKPHOS 139* 122  --    ALT 33 38  --    AST 37 56*  --    MG  --  1.8  --    PHOS  --  4.1  --        Blood Culture:   Recent Labs   Lab 05/06/22  1616 05/06/22  1617   LABBLOO Gram stain aer bottle: Gram positive cocci in clusters resembling Staph   Gram stain missael bottle: Gram positive cocci in clusters resembling Staph   Results called to and read back by: Lila Hernandez RN 05/07/2022  10:57 Gram stain aer bottle: Gram positive cocci in clusters resembling Staph   Results called to and read back by: Lila Hernandez RN 05/07/2022  10:57  Gram stain missael bottle: Gram positive cocci in clusters resembling Staph   05/07/2022  13:40     POCT Glucose: No results for input(s): POCTGLUCOSE in the last 48 hours.  Respiratory Culture: No results for input(s): GSRESP, RESPIRATORYC in the last  48 hours.  Urine Studies:   Recent Labs   Lab 05/06/22  1720   COLORU Yellow   APPEARANCEUA Clear   PHUR 6.0   SPECGRAV 1.010   PROTEINUA 1+*   GLUCUA Negative   KETONESU Negative   BILIRUBINUA Negative   OCCULTUA 1+*   NITRITE Negative   UROBILINOGEN 1.0   LEUKOCYTESUR Negative   RBCUA 2   WBCUA 1   BACTERIA Rare   HYALINECASTS 1     Component      Latest Ref Rng & Units 5/7/2022   WBC      3.90 - 12.70 K/uL 27.52 (H)   RBC      4.00 - 5.40 M/uL 3.05 (L)   Hemoglobin      12.0 - 16.0 g/dL 9.0 (L)   Hematocrit      37.0 - 48.5 % 26.0 (L)   MCV      82 - 98 fL 85   MCH      27.0 - 31.0 pg 29.5   MCHC      32.0 - 36.0 g/dL 34.6   RDW      11.5 - 14.5 % 14.4   Platelets      150 - 450 K/uL 115 (L)   MPV      9.2 - 12.9 fL 10.9   Immature Granulocytes      0.0 - 0.5 % CANCELED   Immature Grans (Abs)      0.00 - 0.04 K/uL CANCELED   nRBC      0 /100 WBC 0   Gran %      38.0 - 73.0 % 84.0 (H)   Lymph %      18.0 - 48.0 % 5.0 (L)   Mono %      4.0 - 15.0 % 6.0   Eosinophil %      0.0 - 8.0 % 0.0   Basophil %      0.0 - 1.9 % 0.0   Bands      % 4.0   Metamyelocytes      % 1.0   Platelet Estimate       Decreased (A)   Differential Method       Manual   Body Fluid Type       Pleural Fluid, Right   Fluid Appearance       Hazy   Fluid Color       Yellow   WBC, Body Fluid      /cu mm 11804   Segs, Fluid      % 93   Lymphs, Fluid      % 3   Monocytes/Macrophages, Fluid      % 4   Body Fluid Source, Total Protein       Pleural Fluid, Right   Body Fluid, Protein      Not established g/dL 3.7   Body Fluid Source, Albumin       Pleural Fluid, Right   Body Fluid, Albumin      See text g/dL 1.3   Body Fluid Source, LDH       Pleural Fluid, Right   LD, Fluid      Not established U/L 1077   Body Fluid Source, Glucose       Pleural Fluid, Right   Glucose, Fluid      Not established mg/dL 29   Body Fluid Source Amylase       Pleural Fluid, Right   Amylase, Fluid      Not established U/L 34   LD      110 - 260 U/L 308 (H)     Significant  Imaging:  I have reviewed all pertinent imaging results/findings within the past 24 hours.    Echo  · The left ventricle is normal in size with eccentric hypertrophy and   normal systolic function.  · Normal left ventricular diastolic function.  · The estimated PA systolic pressure is 56 mmHg.  · Normal right ventricular size with normal right ventricular systolic   function.  · There is pulmonary hypertension.  · Elevated central venous pressure (15 mmHg).  · Mild pulmonic regurgitation.  · The estimated ejection fraction is 60%.  · Mild to moderate tricuspid regurgitation.     There is a 1.3 x 1.5 cm elongated, mobile echodensity seen on the   tricuspid valve consistent with vegetation. Recommend CT surgery consult.   US Chest Mediastinum  Narrative: EXAMINATION:  US CHEST MEDIASTINUM    CLINICAL HISTORY:  pleural effusion, preop thoracentesis;    TECHNIQUE:  Grayscale images of the right chest obtained.    COMPARISON:  Chest radiograph same date; CTA chest 05/06/2022    FINDINGS:  Single, grayscale image of the right chest demonstrates a right pleural effusion.  Subcutaneous spot marked preoperatively for thoracentesis.  Impression: As above.    Electronically signed by: Fish Moran  Date:    05/07/2022  Time:    09:27  X-Ray Chest 1 View  Narrative: EXAMINATION:  XR CHEST 1 VIEW    CLINICAL HISTORY:  post thora;    TECHNIQUE:  Single frontal view of the chest was performed.    COMPARISON:  Chest radiograph earlier same date; CTA chest 05/06/2022    FINDINGS:  Cardiac leads project over the chest.  Cardiomediastinal silhouette is unchanged in size.  Persistent irregular opacities identified throughout both lungs.  Persistent small loculated right pleural effusion.  No pneumothorax.  The visualized osseous structures appear intact.  Impression: No pneumothorax status post thoracentesis.  No detrimental interval change.    Electronically signed by: Fish Moran  Date:    05/07/2022  Time:    09:26  X-Ray  Chest 1 View  Narrative: EXAMINATION:  XR CHEST 1 VIEW    CLINICAL HISTORY:  <Diagnosis>, follow up;    COMPARISON:  Comparison 05/06/2022.    FINDINGS:  Stable heart size.  Patchy nodular infiltrates bilateral.  Slight worsening suspected on the right.  Impression: Slight worsening suspected.  Continued follow-up is recommended.    Electronically signed by: You Antonio MD  Date:    05/07/2022  Time:    09:00       ABG  No results for input(s): PH, PO2, PCO2, HCO3, BE in the last 168 hours.  Assessment/Plan:     Chronic pulmonary heart disease  Pulmonary hypertension  PA estimated 56  Multifactorial  : COPD, IVDU  Need outpatient w/u    Bacteremia  +ve BC  IVDU  Cont VANCO  Deescalate based on results final    Parapneumonic effusion  Complicated by PNA, possible empyema  SP Thora  Cont Abx    IVDU (intravenous drug user)  Cessation/rehab    PNA (pneumonia)  PNA in IVDU  Cultures; Blood and sputum  BC +ve G+ve cocci  AbxL VANCO+ CEFEPIME + FLAGYL    COPD (chronic obstructive pulmonary disease)  Oxygen  Keep SP2> 92%  Bronchodilators  Follow up in Pulmonary for PFT to clarify Dx    Lower back pain  Address pain  Morphine         Alonzo Morrison MD  Pulmonology  [facility]

## 2022-05-08 NOTE — ASSESSMENT & PLAN NOTE
The patient is a 37-year-old active IV drug abuser who was admitted for workup of fever chills.  Workup shows echocardiogram with a vegetation on the tricuspid valve.  In addition, patient has blood cultures that are positive for g positive cocci.  Criteria for surgical intervention include decompensated heart failure, highly resistant organism, perivalvular extension, large vegetation i.e. greater than 2 cm, inability to clear bacteremia.  In that regard, will recommend continuing medical management appropriate antibiotic therapy.

## 2022-05-08 NOTE — ASSESSMENT & PLAN NOTE
Gram positive   Infectious disease consulted  Continue broad spectrum intravenous antibiotic(s) given h/o ivdu  Remains febrile and leukocytosis persists  5/8/22 The patient remained afebrile overnight. Currently on cefepime/vanc/flagyl. Blood cx are growing staph aureus. The patient refused the MRI lumbar spine overnight d/t being unable to lay flat from back pain. Will give pain meds and attempt to get the MRI today to r/o spinal abscess. The ECHO showed a 1.3 x 1.5 cm elongated, mobile echodensity seen on the tricuspid valve consistent with vegetation, CT surgery was consulted. Will need a SUSANNAH. Infectious disease is consulted. Will repeat blood cx today.

## 2022-05-08 NOTE — CONSULTS
Pharmacokinetic Assessment Follow Up: IV Vancomycin    Vancomycin serum concentration assessment(s):    The random level was drawn correctly and can be used to guide therapy at this time. The measurement is below the desired definitive target range of 15 to 20 mcg/mL.    Vancomycin Regimen Plan:    Begin scheduled dosing of vancomycin with 1500 mg IV every 12 hours with next serum trough concentration measured at 1730 prior to the third scheduled dose on 5/9.    Drug levels (last 3 results):  Recent Labs   Lab Result Units 05/07/22  1124 05/08/22  0002 05/08/22  1514   Vancomycin, Random ug/mL 15.7 14.4 11.4       Pharmacy will continue to follow and monitor vancomycin.    Please contact pharmacy at [phone] for questions regarding this assessment.    Thank you for the consult,   Art Garcia, PharmD 5/8/2022 6:58 PM       Patient brief summary:  Tianna Leon is a 37 y.o. female initiated on antimicrobial therapy with IV Vancomycin for treatment of Staph aureus bacteremia, endocarditis.    The patient's new regimen is vancomycin 1500 mg IV q12 h.     Drug Allergies:   Review of patient's allergies indicates:   Allergen Reactions    Sulfamethoxazole-trimethoprim        Actual Body Weight:   83 kg    Renal Function:   Estimated Creatinine Clearance: 133.9 mL/min (based on SCr of 0.6 mg/dL).,     Dialysis Method (if applicable):  Not on RRT    CBC (last 72 hours):  Recent Labs   Lab Result Units 05/06/22  1616 05/07/22  0623 05/08/22  1514   WBC K/uL 29.13* 27.52* 30.90*   Hemoglobin g/dL 9.9* 9.0* 7.8*   Hematocrit % 27.8* 26.0* 22.1*   Platelets K/uL 171 115* 165   Gran % % 86.0* 84.0* 89.0*   Lymph % % 4.0* 5.0* 6.0*   Mono % % 4.0 6.0 4.0   Eosinophil % % 0.0 0.0 0.0   Basophil % % 0.0 0.0 0.0   Differential Method  Manual Manual Manual       Metabolic Panel (last 72 hours):  Recent Labs   Lab Result Units 05/06/22  1619 05/06/22  1720 05/07/22  0623 05/07/22  0905 05/08/22  1514   Sodium mmol/L 130*   --  134*  --  131*   Potassium mmol/L 3.4*  --  3.3*  --  3.6   Chloride mmol/L 89*  --  95  --  95   CO2 mmol/L 28  --  31*  --  23   Glucose mg/dL 109  --  111*  --  125*   Glucose, Fluid mg/dL  --   --   --  29  --    Glucose, UA   --  Negative  --   --   --    BUN mg/dL 46*  --  38*  --  14   Creatinine mg/dL 1.1  --  0.8  --  0.6   Creatinine, Urine mg/dL  --  83.8  --   --   --    Albumin g/dL 1.8*  --  1.5*  --  1.4*   Total Bilirubin mg/dL 1.4*  --  1.7*  --  0.9   Alkaline Phosphatase U/L 139*  --  122  --  137*   AST U/L 37  --  56*  --  23   ALT U/L 33  --  38  --  25   Magnesium mg/dL  --   --  1.8  --   --    Phosphorus mg/dL  --   --  4.1  --   --        Vancomycin Administrations:  vancomycin given in the last 96 hours                   vancomycin 1.5 g in dextrose 5 % 250 mL IVPB (ready to mix) (mg) 1,500 mg New Bag 05/08/22 1845    vancomycin 1.5 g in dextrose 5 % 250 mL IVPB (ready to mix) ()  Restarted 05/08/22 0338     1,500 mg New Bag  0332      Restarted  0330    vancomycin 1.25 g in dextrose 5% 250 mL IVPB (ready to mix) (mg) 1,250 mg New Bag 05/07/22 1306    vancomycin (VANCOCIN) 2,250 mg in dextrose 5 % 500 mL IVPB (mg) 2,250 mg New Bag 05/06/22 2312                Microbiologic Results:  Microbiology Results (last 7 days)     Procedure Component Value Units Date/Time    Blood culture [[id number]] Collected: 05/08/22 1515    Order Status: Sent Specimen: Blood from Peripheral, Left Arm Updated: 05/08/22 1516    Narrative:      Collection has been rescheduled by [email or web address] at 05/08/2022 13:22 Reason:   Pt in mri will be there after another hour [id number]  Collection has been rescheduled by [email or web address] at 05/08/2022 13:22 Reason:   Pt in mri will be there after another hour [id number]    Blood culture [[id number]] Collected: 05/08/22 1516    Order Status: Sent Specimen: Blood from Peripheral, Right Hand Updated: 05/08/22 1516    Narrative:      Collection has been rescheduled by [email or web address] at 05/08/2022 13:22 Reason:    Pt in mri will be there after another hour [id number]  Collection has been rescheduled by [email or web address] at 05/08/2022 13:22 Reason:   Pt in mri will be there after another hour [id number]    Blood culture x two cultures. Draw prior to antibiotics. [[id number]]  (Abnormal) Collected: 05/06/22 1617    Order Status: Completed Specimen: Blood from Peripheral, Antecubital, Right Updated: 05/08/22 0843     Blood Culture, Routine Gram stain aer bottle: Gram positive cocci in clusters resembling Staph       Results called to and read back by: Lila Hernandez RN 05/07/2022  10:57      Gram stain missael bottle: Gram positive cocci in clusters resembling Staph       05/07/2022  13:40      STAPHYLOCOCCUS AUREUS  ID consult required at [facility].  For susceptibility see order #[id number]      Narrative:      Aerobic and anaerobic    Blood culture x two cultures. Draw prior to antibiotics. [[id number]]  (Abnormal) Collected: 05/06/22 1616    Order Status: Completed Specimen: Blood from Peripheral, Antecubital, Left Updated: 05/08/22 0837     Blood Culture, Routine Gram stain aer bottle: Gram positive cocci in clusters resembling Staph       Gram stain missael bottle: Gram positive cocci in clusters resembling Staph       Results called to and read back by: Lila Hernandez RN 05/07/2022  10:57      STAPHYLOCOCCUS AUREUS  Susceptibility pending  ID consult required at [facility].      Narrative:      Aerobic and anaerobic    Gram stain [[id number]] Collected: 05/07/22 0901    Order Status: Sent Specimen: Body Fluid from Pleural Fluid Updated: 05/07/22 0905    Fungus culture [[id number]] Collected: 05/07/22 0901    Order Status: Sent Specimen: Body Fluid from Pleural Fluid Updated: 05/07/22 0905    AFB culture (includes stain) [[id number]] Collected: 05/07/22 0901    Order Status: Sent Specimen: Body Fluid from Pleural Fluid Updated: 05/07/22 0905

## 2022-05-08 NOTE — SUBJECTIVE & OBJECTIVE
No current facility-administered medications on file prior to encounter.     Current Outpatient Medications on File Prior to Encounter   Medication Sig    acetaminophen (TYLENOL) 500 MG tablet Take 500 mg by mouth every 6 (six) hours as needed for Pain.    albuterol (PROVENTIL/VENTOLIN HFA) 90 mcg/actuation inhaler Inhale 2 puffs into the lungs every 6 (six) hours as needed for Wheezing.    [] azithromycin (ZITHROMAX Z-YUAN) 250 MG tablet Take 2 tablets (500 mg) on  Day 1,  followed by 1 tablet (250 mg) once daily on Days 2 through 5.    dextromethorphan-guaiFENesin (MUCINEX DM) 60-1,200 mg per 12 hr tablet Take 1 tablet by mouth every 12 (twelve) hours.    ibuprofen (ADVIL,MOTRIN) 200 MG tablet Take 200 mg by mouth every 6 (six) hours as needed for Pain.       Review of patient's allergies indicates:   Allergen Reactions    Sulfamethoxazole-trimethoprim        Past Medical History:   Diagnosis Date    ADHD (attention deficit hyperactivity disorder)     Anxiety     Asthma     Bipolar disorder     Cancer     cervical    COPD (chronic obstructive pulmonary disease)     GERD (gastroesophageal reflux disease)     Hepatitis C antibody positive in blood     RNA UNDETECTED 2016    Hyperlipidemia     Intravenous drug abuse     MDD (major depressive disorder)     Mood disorder     PTSD (post-traumatic stress disorder)     Suicidal ideation      Past Surgical History:   Procedure Laterality Date     SECTION  2001    x2    CHOLECYSTECTOMY      HYSTERECTOMY      LAPAROSCOPIC SALPINGECTOMY      ROBOT-ASSISTED LAPAROSCOPIC ABDOMINAL HYSTERECTOMY USING DA SEBASTIEN XI N/A 2019    Procedure: XI ROBOTIC HYSTERECTOMY;  Surgeon: Tabatha Quintanilla MD;  Location: [facility] OR;  Service: OB/GYN;  Laterality: N/A;    ROBOT-ASSISTED LAPAROSCOPIC LYSIS OF ADHESIONS USING DA SEBASTIEN XI N/A 2019    Procedure: XI ROBOTIC LYSIS, ADHESIONS;  Surgeon: Tabatha Quintanilla MD;  Location: [facility] OR;  Service: OB/GYN;   Laterality: N/A;    ROBOT-ASSISTED SURGICAL REMOVAL OF FALLOPIAN TUBE USING DA SEBASTIEN XI Right 7/23/2019    Procedure: XI ROBOTIC SALPINGECTOMY;  Surgeon: Tabatha Quintanilla MD;  [facility];  Service: OB/GYN;  Laterality: Right;    TONSILLECTOMY      TUBAL LIGATION       Family History       Problem Relation (Age of Onset)    COPD Mother          Tobacco Use    Smoking status: Current Every Day Smoker     Packs/day: 1.50     Types: Cigarettes    Smokeless tobacco: Never Used    Tobacco comment: no smoking after midnight prior to surgery   Substance and Sexual Activity    Alcohol use: Yes     Comment: Occassional     Drug use: Not Currently     Types: IV, Methamphetamines, Heroin     Comment: denies heroin use.  Last used meth 10/2018  No illegal drugs prior to sx    Sexual activity: Not Currently     Partners: Male     Birth control/protection: None     Review of Systems   Constitutional:  Positive for chills and fever.   HENT:  Positive for congestion and sinus pain.    Eyes: Negative.    Respiratory:  Negative for chest tightness.    Cardiovascular:  Negative for chest pain and leg swelling.   Gastrointestinal: Negative.    Endocrine: Positive for polyuria.   Genitourinary:  Positive for enuresis.   Musculoskeletal:  Positive for arthralgias, back pain and gait problem.   Skin:  Positive for rash.   Neurological:  Negative for dizziness and light-headedness.   Hematological:  Does not bruise/bleed easily.   Psychiatric/Behavioral:  Negative for confusion. The patient is not nervous/anxious.    Objective:     Vital Signs (Most Recent):  Temp: 99.2 °F (37.3 °C) (05/08/22 1558)  Pulse: 87 (05/08/22 1558)  Resp: 18 (05/08/22 1558)  BP: (!) 113/59 (05/08/22 1558)  SpO2: 97 % (05/08/22 1558)   Vital Signs (24h Range):  Temp:  [97.9 °F (36.6 °C)-99.2 °F (37.3 °C)] 99.2 °F (37.3 °C)  Pulse:  [87-95] 87  Resp:  [18-22] 18  SpO2:  [92 %-98 %] 97 %  BP: (106-117)/(54-69) 113/59     Weight: 83 kg (182 lb 15.7  oz)  Body mass index is 30.45 kg/m².    SpO2: 97 %  O2 Device (Oxygen Therapy): nasal cannula     Intake/Output - Last 3 Shifts         05/06 0700  05/07 0659 05/07 0700  05/08 0659 05/08 0700  05/09 0659    P.O. 720 1318     I.V. (mL/kg) 207 (2.5) 1847.4 (22.3)     IV Piggyback 743 898.5     Total Intake(mL/kg) 1670 (20.1) 4064 (49)     Urine (mL/kg/hr) 400 1900 (1) 1200 (1.3)    Stool   0    Total Output 400 1900 1200    Net +1270 +2164 -1200           Stool Occurrence  1 x 1 x             Lines/Drains/Airways       Peripheral Intravenous Line  Duration                  Peripheral IV - Single Lumen 05/06/22 1616 20 G Right Antecubital 2 days                     STS Risk Score: n/a    Physical Exam  Constitutional:       Appearance: Normal appearance.   HENT:      Head: Normocephalic and atraumatic.      Nose: Nose normal.   Eyes:      Extraocular Movements: Extraocular movements intact.      Pupils: Pupils are equal, round, and reactive to light.   Cardiovascular:      Rate and Rhythm: Regular rhythm.      Pulses: Normal pulses.      Heart sounds: Normal heart sounds.   Pulmonary:      Effort: Pulmonary effort is normal.      Breath sounds: Normal breath sounds.   Abdominal:      General: Abdomen is flat. Bowel sounds are normal.      Palpations: Abdomen is soft.   Musculoskeletal:      Right lower leg: No edema.      Left lower leg: No edema.   Skin:     General: Skin is warm and dry.   Neurological:      General: No focal deficit present.      Mental Status: She is alert and oriented to person, place, and time.   Psychiatric:         Mood and Affect: Mood normal.         Behavior: Behavior normal.       Significant Labs:  All pertinent labs from the last 24 hours have been reviewed.    Significant Diagnostics:  I have reviewed all pertinent imaging results/findings within the past 24 hours.

## 2022-05-08 NOTE — SUBJECTIVE & OBJECTIVE
Interval History:   05/08: seen and examined: SP rolando, CXR reveiwed, Echo Pul HTN, feels better, T max 98.9F, wbcc 10 k, LDH 1077, BC +ve G+ve Cocci    Respiratory ROS: positive for - pleuritic pain and shortness of breath  negative for - hemoptysis, orthopnea, sputum changes, tachypnea, or wheezing     Objective:     Vital Signs (Most Recent):  Temp: 98.5 °F (36.9 °C) (05/08/22 0024)  Pulse: 90 (05/08/22 0029)  Resp: 20 (05/08/22 0221)  BP: (!) 106/54 (05/08/22 0024)  SpO2: 95 % (05/08/22 0029)   Vital Signs (24h Range):  Temp:  [97.7 °F (36.5 °C)-98.9 °F (37.2 °C)] 98.5 °F (36.9 °C)  Pulse:  [74-95] 90  Resp:  [18-22] 20  SpO2:  [92 %-100 %] 95 %  BP: (100-121)/(54-70) 106/54     Weight: 83 kg (182 lb 15.7 oz)  Body mass index is 30.45 kg/m².      Intake/Output Summary (Last 24 hours) at 5/8/2022 0732  Last data filed at 5/8/2022 0515  Gross per 24 hour   Intake 4063.97 ml   Output 1900 ml   Net 2163.97 ml       Physical Exam  Vitals and nursing note reviewed.   Constitutional:       Appearance: She is ill-appearing.      Interventions: Nasal cannula in place.   HENT:      Head: Normocephalic.      Comments: Lip piercing     Nose: Nose normal.      Mouth/Throat:      Mouth: Mucous membranes are moist.   Eyes:      Pupils: Pupils are equal, round, and reactive to light.   Cardiovascular:      Rate and Rhythm: Normal rate.   Pulmonary:      Effort: Tachypnea present.      Breath sounds: Examination of the right-middle field reveals decreased breath sounds. Examination of the right-lower field reveals decreased breath sounds. Decreased breath sounds present. No wheezing, rhonchi or rales.   Abdominal:      General: Bowel sounds are normal.      Palpations: Abdomen is soft.   Musculoskeletal:         General: Normal range of motion.      Cervical back: Normal range of motion.   Skin:     General: Skin is warm.      Comments: Multiple tattoos   Neurological:      General: No focal deficit present.      Mental Status:  She is alert and oriented to person, place, and time.   Psychiatric:         Behavior: Behavior is cooperative.       Vents:  Oxygen Concentration (%): 28 (05/08/22 0029)    Lines/Drains/Airways       Peripheral Intravenous Line  Duration                  Peripheral IV - Single Lumen 05/06/22 1616 20 G Right Antecubital 1 day                    Significant Labs:    CBC/Anemia Profile:  Recent Labs   Lab 05/06/22  1616 05/07/22  0623   WBC 29.13* 27.52*   HGB 9.9* 9.0*   HCT 27.8* 26.0*    115*   MCV 82 85   RDW 14.2 14.4        Chemistries:  Recent Labs   Lab 05/06/22  1619 05/07/22  0623 05/07/22  0731   * 134*  --    K 3.4* 3.3*  --    CL 89* 95  --    CO2 28 31*  --    BUN 46* 38*  --    CREATININE 1.1 0.8  --    CALCIUM 8.3* 7.6*  --    ALBUMIN 1.8* 1.5*  --    PROT 6.0 4.8* 5.0*   BILITOT 1.4* 1.7*  --    ALKPHOS 139* 122  --    ALT 33 38  --    AST 37 56*  --    MG  --  1.8  --    PHOS  --  4.1  --        Blood Culture:   Recent Labs   Lab 05/06/22  1616 05/06/22  1617   LABBLOO Gram stain aer bottle: Gram positive cocci in clusters resembling Staph   Gram stain missael bottle: Gram positive cocci in clusters resembling Staph   Results called to and read back by: Lila Hernandez RN 05/07/2022  10:57 Gram stain aer bottle: Gram positive cocci in clusters resembling Staph   Results called to and read back by: Lila Hernandez RN 05/07/2022  10:57  Gram stain missael bottle: Gram positive cocci in clusters resembling Staph   05/07/2022  13:40     POCT Glucose: No results for input(s): POCTGLUCOSE in the last 48 hours.  Respiratory Culture: No results for input(s): GSRESP, RESPIRATORYC in the last 48 hours.  Urine Studies:   Recent Labs   Lab 05/06/22  1720   COLORU Yellow   APPEARANCEUA Clear   PHUR 6.0   SPECGRAV 1.010   PROTEINUA 1+*   GLUCUA Negative   KETONESU Negative   BILIRUBINUA Negative   OCCULTUA 1+*   NITRITE Negative   UROBILINOGEN 1.0   LEUKOCYTESUR Negative   RBCUA 2   WBCUA 1   BACTERIA Rare    HYALINECASTS 1     Component      Latest Ref Rng & Units 5/7/2022   WBC      3.90 - 12.70 K/uL 27.52 (H)   RBC      4.00 - 5.40 M/uL 3.05 (L)   Hemoglobin      12.0 - 16.0 g/dL 9.0 (L)   Hematocrit      37.0 - 48.5 % 26.0 (L)   MCV      82 - 98 fL 85   MCH      27.0 - 31.0 pg 29.5   MCHC      32.0 - 36.0 g/dL 34.6   RDW      11.5 - 14.5 % 14.4   Platelets      150 - 450 K/uL 115 (L)   MPV      9.2 - 12.9 fL 10.9   Immature Granulocytes      0.0 - 0.5 % CANCELED   Immature Grans (Abs)      0.00 - 0.04 K/uL CANCELED   nRBC      0 /100 WBC 0   Gran %      38.0 - 73.0 % 84.0 (H)   Lymph %      18.0 - 48.0 % 5.0 (L)   Mono %      4.0 - 15.0 % 6.0   Eosinophil %      0.0 - 8.0 % 0.0   Basophil %      0.0 - 1.9 % 0.0   Bands      % 4.0   Metamyelocytes      % 1.0   Platelet Estimate       Decreased (A)   Differential Method       Manual   Body Fluid Type       Pleural Fluid, Right   Fluid Appearance       Hazy   Fluid Color       Yellow   WBC, Body Fluid      /cu mm 85178   Segs, Fluid      % 93   Lymphs, Fluid      % 3   Monocytes/Macrophages, Fluid      % 4   Body Fluid Source, Total Protein       Pleural Fluid, Right   Body Fluid, Protein      Not established g/dL 3.7   Body Fluid Source, Albumin       Pleural Fluid, Right   Body Fluid, Albumin      See text g/dL 1.3   Body Fluid Source, LDH       Pleural Fluid, Right   LD, Fluid      Not established U/L 1077   Body Fluid Source, Glucose       Pleural Fluid, Right   Glucose, Fluid      Not established mg/dL 29   Body Fluid Source Amylase       Pleural Fluid, Right   Amylase, Fluid      Not established U/L 34   LD      110 - 260 U/L 308 (H)     Significant Imaging:  I have reviewed all pertinent imaging results/findings within the past 24 hours.    Echo  · The left ventricle is normal in size with eccentric hypertrophy and   normal systolic function.  · Normal left ventricular diastolic function.  · The estimated PA systolic pressure is 56 mmHg.  · Normal right  ventricular size with normal right ventricular systolic   function.  · There is pulmonary hypertension.  · Elevated central venous pressure (15 mmHg).  · Mild pulmonic regurgitation.  · The estimated ejection fraction is 60%.  · Mild to moderate tricuspid regurgitation.     There is a 1.3 x 1.5 cm elongated, mobile echodensity seen on the   tricuspid valve consistent with vegetation. Recommend CT surgery consult.   US Chest Mediastinum  Narrative: EXAMINATION:  US CHEST MEDIASTINUM    CLINICAL HISTORY:  pleural effusion, preop thoracentesis;    TECHNIQUE:  Grayscale images of the right chest obtained.    COMPARISON:  Chest radiograph same date; CTA chest 05/06/2022    FINDINGS:  Single, grayscale image of the right chest demonstrates a right pleural effusion.  Subcutaneous spot marked preoperatively for thoracentesis.  Impression: As above.    Electronically signed by: Fish Moran  Date:    05/07/2022  Time:    09:27  X-Ray Chest 1 View  Narrative: EXAMINATION:  XR CHEST 1 VIEW    CLINICAL HISTORY:  post thora;    TECHNIQUE:  Single frontal view of the chest was performed.    COMPARISON:  Chest radiograph earlier same date; CTA chest 05/06/2022    FINDINGS:  Cardiac leads project over the chest.  Cardiomediastinal silhouette is unchanged in size.  Persistent irregular opacities identified throughout both lungs.  Persistent small loculated right pleural effusion.  No pneumothorax.  The visualized osseous structures appear intact.  Impression: No pneumothorax status post thoracentesis.  No detrimental interval change.    Electronically signed by: Fish Moran  Date:    05/07/2022  Time:    09:26  X-Ray Chest 1 View  Narrative: EXAMINATION:  XR CHEST 1 VIEW    CLINICAL HISTORY:  <Diagnosis>, follow up;    COMPARISON:  Comparison 05/06/2022.    FINDINGS:  Stable heart size.  Patchy nodular infiltrates bilateral.  Slight worsening suspected on the right.  Impression: Slight worsening suspected.  Continued  follow-up is recommended.    Electronically signed by: You Antonio MD  Date:    05/07/2022  Time:    09:00

## 2022-05-08 NOTE — ASSESSMENT & PLAN NOTE
- Patient thoroughly counseled on smoking cessation, pt verbalized understanding. Time of counseling 10 minutes.

## 2022-05-08 NOTE — ASSESSMENT & PLAN NOTE
Will ordet TTE to r/o IE  Will order MRI lumbar wwo   Cont broad spectrum IVAB     5/7   Studies pending for additional sites of infection given h/o ivdu  5/8/22  on cessation

## 2022-05-08 NOTE — ASSESSMENT & PLAN NOTE
5/8/22  The ECHO showed a 1.3 x 1.5 cm elongated, mobile echodensity seen on the tricuspid valve consistent with vegetation, CT surgery was consulted. Will need a SUSANNAH. Infectious disease is consulted. Will repeat blood cx today. Continue IV ABX.

## 2022-05-08 NOTE — PLAN OF CARE
Problem: Adult Inpatient Plan of Care  Goal: Plan of Care Review  Outcome: Ongoing, Progressing     Problem: Impaired Wound Healing  Goal: Optimal Wound Healing  Outcome: Ongoing, Progressing     Problem: Skin Injury Risk Increased  Goal: Skin Health and Integrity  Outcome: Ongoing, Progressing

## 2022-05-09 PROBLEM — M48.061 LUMBAR STENOSIS WITHOUT NEUROGENIC CLAUDICATION: Status: ACTIVE | Noted: 2022-05-09

## 2022-05-09 PROBLEM — G06.1: Status: ACTIVE | Noted: 2022-05-09

## 2022-05-09 LAB
ALBUMIN SERPL BCP-MCNC: 1.3 G/DL (ref 3.5–5.2)
ALBUMIN SERPL BCP-MCNC: 1.3 G/DL (ref 3.5–5.2)
ALP SERPL-CCNC: 94 U/L (ref 55–135)
ALP SERPL-CCNC: 94 U/L (ref 55–135)
ALT SERPL W/O P-5'-P-CCNC: 23 U/L (ref 10–44)
ALT SERPL W/O P-5'-P-CCNC: 23 U/L (ref 10–44)
ANION GAP SERPL CALC-SCNC: 7 MMOL/L (ref 8–16)
ANION GAP SERPL CALC-SCNC: 7 MMOL/L (ref 8–16)
AST SERPL-CCNC: 19 U/L (ref 10–40)
AST SERPL-CCNC: 19 U/L (ref 10–40)
BACTERIA BLD CULT: ABNORMAL
BASOPHILS NFR BLD: 1 % (ref 0–1.9)
BASOPHILS NFR BLD: 1 % (ref 0–1.9)
BILIRUB SERPL-MCNC: 1.1 MG/DL (ref 0.1–1)
BILIRUB SERPL-MCNC: 1.1 MG/DL (ref 0.1–1)
BUN SERPL-MCNC: 10 MG/DL (ref 6–20)
BUN SERPL-MCNC: 10 MG/DL (ref 6–20)
CALCIUM SERPL-MCNC: 7.1 MG/DL (ref 8.7–10.5)
CALCIUM SERPL-MCNC: 7.1 MG/DL (ref 8.7–10.5)
CHLORIDE SERPL-SCNC: 95 MMOL/L (ref 95–110)
CHLORIDE SERPL-SCNC: 95 MMOL/L (ref 95–110)
CO2 SERPL-SCNC: 28 MMOL/L (ref 23–29)
CO2 SERPL-SCNC: 28 MMOL/L (ref 23–29)
CREAT SERPL-MCNC: 0.5 MG/DL (ref 0.5–1.4)
CREAT SERPL-MCNC: 0.5 MG/DL (ref 0.5–1.4)
DIFFERENTIAL METHOD: ABNORMAL
DIFFERENTIAL METHOD: ABNORMAL
EOSINOPHIL NFR BLD: 0 % (ref 0–8)
EOSINOPHIL NFR BLD: 0 % (ref 0–8)
ERYTHROCYTE [DISTWIDTH] IN BLOOD BY AUTOMATED COUNT: 14.2 % (ref 11.5–14.5)
ERYTHROCYTE [DISTWIDTH] IN BLOOD BY AUTOMATED COUNT: 14.2 % (ref 11.5–14.5)
EST. GFR  (AFRICAN AMERICAN): >60 ML/MIN/1.73 M^2
EST. GFR  (AFRICAN AMERICAN): >60 ML/MIN/1.73 M^2
EST. GFR  (NON AFRICAN AMERICAN): >60 ML/MIN/1.73 M^2
EST. GFR  (NON AFRICAN AMERICAN): >60 ML/MIN/1.73 M^2
GLUCOSE SERPL-MCNC: 113 MG/DL (ref 70–110)
GLUCOSE SERPL-MCNC: 113 MG/DL (ref 70–110)
HCT VFR BLD AUTO: 21.8 % (ref 37–48.5)
HCT VFR BLD AUTO: 21.8 % (ref 37–48.5)
HGB BLD-MCNC: 7.4 G/DL (ref 12–16)
HGB BLD-MCNC: 7.4 G/DL (ref 12–16)
IMM GRANULOCYTES # BLD AUTO: ABNORMAL K/UL (ref 0–0.04)
IMM GRANULOCYTES # BLD AUTO: ABNORMAL K/UL (ref 0–0.04)
IMM GRANULOCYTES NFR BLD AUTO: ABNORMAL % (ref 0–0.5)
IMM GRANULOCYTES NFR BLD AUTO: ABNORMAL % (ref 0–0.5)
LYMPHOCYTES NFR BLD: 2 % (ref 18–48)
LYMPHOCYTES NFR BLD: 2 % (ref 18–48)
MCH RBC QN AUTO: 28.9 PG (ref 27–31)
MCH RBC QN AUTO: 28.9 PG (ref 27–31)
MCHC RBC AUTO-ENTMCNC: 33.9 G/DL (ref 32–36)
MCHC RBC AUTO-ENTMCNC: 33.9 G/DL (ref 32–36)
MCV RBC AUTO: 85 FL (ref 82–98)
MCV RBC AUTO: 85 FL (ref 82–98)
MONOCYTES NFR BLD: 6 % (ref 4–15)
MONOCYTES NFR BLD: 6 % (ref 4–15)
MYELOCYTES NFR BLD MANUAL: 1 %
MYELOCYTES NFR BLD MANUAL: 1 %
NEUTROPHILS NFR BLD: 90 % (ref 38–73)
NEUTROPHILS NFR BLD: 90 % (ref 38–73)
NRBC BLD-RTO: 0 /100 WBC
NRBC BLD-RTO: 0 /100 WBC
PATH INTERP FLD-IMP: NORMAL
PLATELET # BLD AUTO: 176 K/UL (ref 150–450)
PLATELET # BLD AUTO: 176 K/UL (ref 150–450)
PLATELET BLD QL SMEAR: ABNORMAL
PLATELET BLD QL SMEAR: ABNORMAL
PMV BLD AUTO: 12 FL (ref 9.2–12.9)
PMV BLD AUTO: 12 FL (ref 9.2–12.9)
POTASSIUM SERPL-SCNC: 3.9 MMOL/L (ref 3.5–5.1)
POTASSIUM SERPL-SCNC: 3.9 MMOL/L (ref 3.5–5.1)
PROT SERPL-MCNC: 4.9 G/DL (ref 6–8.4)
PROT SERPL-MCNC: 4.9 G/DL (ref 6–8.4)
RBC # BLD AUTO: 2.56 M/UL (ref 4–5.4)
RBC # BLD AUTO: 2.56 M/UL (ref 4–5.4)
SODIUM SERPL-SCNC: 130 MMOL/L (ref 136–145)
SODIUM SERPL-SCNC: 130 MMOL/L (ref 136–145)
WBC # BLD AUTO: 33.79 K/UL (ref 3.9–12.7)
WBC # BLD AUTO: 33.79 K/UL (ref 3.9–12.7)

## 2022-05-09 PROCEDURE — 99232 PR SUBSEQUENT HOSPITAL CARE,LEVL II: ICD-10-PCS | Mod: ,,, | Performed by: INTERNAL MEDICINE

## 2022-05-09 PROCEDURE — 85007 BL SMEAR W/DIFF WBC COUNT: CPT | Performed by: INTERNAL MEDICINE

## 2022-05-09 PROCEDURE — 25000003 PHARM REV CODE 250: Performed by: NURSE PRACTITIONER

## 2022-05-09 PROCEDURE — 63600175 PHARM REV CODE 636 W HCPCS: Performed by: INTERNAL MEDICINE

## 2022-05-09 PROCEDURE — S0030 INJECTION, METRONIDAZOLE: HCPCS | Performed by: INTERNAL MEDICINE

## 2022-05-09 PROCEDURE — 21400001 HC TELEMETRY ROOM

## 2022-05-09 PROCEDURE — 85027 COMPLETE CBC AUTOMATED: CPT | Performed by: INTERNAL MEDICINE

## 2022-05-09 PROCEDURE — 80053 COMPREHEN METABOLIC PANEL: CPT | Performed by: INTERNAL MEDICINE

## 2022-05-09 PROCEDURE — 99222 1ST HOSP IP/OBS MODERATE 55: CPT | Mod: ,,, | Performed by: INTERNAL MEDICINE

## 2022-05-09 PROCEDURE — 63600175 PHARM REV CODE 636 W HCPCS: Performed by: FAMILY MEDICINE

## 2022-05-09 PROCEDURE — 36415 COLL VENOUS BLD VENIPUNCTURE: CPT | Performed by: INTERNAL MEDICINE

## 2022-05-09 PROCEDURE — 63600175 PHARM REV CODE 636 W HCPCS: Performed by: NURSE PRACTITIONER

## 2022-05-09 PROCEDURE — 99222 PR INITIAL HOSPITAL CARE,LEVL II: ICD-10-PCS | Mod: ,,, | Performed by: INTERNAL MEDICINE

## 2022-05-09 PROCEDURE — 25000003 PHARM REV CODE 250: Performed by: INTERNAL MEDICINE

## 2022-05-09 PROCEDURE — 94640 AIRWAY INHALATION TREATMENT: CPT

## 2022-05-09 PROCEDURE — 99223 1ST HOSP IP/OBS HIGH 75: CPT | Mod: ,,, | Performed by: NEUROLOGICAL SURGERY

## 2022-05-09 PROCEDURE — 94761 N-INVAS EAR/PLS OXIMETRY MLT: CPT

## 2022-05-09 PROCEDURE — 27000221 HC OXYGEN, UP TO 24 HOURS

## 2022-05-09 PROCEDURE — 99900035 HC TECH TIME PER 15 MIN (STAT)

## 2022-05-09 PROCEDURE — 99232 SBSQ HOSP IP/OBS MODERATE 35: CPT | Mod: ,,, | Performed by: INTERNAL MEDICINE

## 2022-05-09 PROCEDURE — 25000003 PHARM REV CODE 250: Performed by: FAMILY MEDICINE

## 2022-05-09 PROCEDURE — 25000242 PHARM REV CODE 250 ALT 637 W/ HCPCS: Performed by: INTERNAL MEDICINE

## 2022-05-09 PROCEDURE — 99223 PR INITIAL HOSPITAL CARE,LEVL III: ICD-10-PCS | Mod: ,,, | Performed by: NEUROLOGICAL SURGERY

## 2022-05-09 RX ORDER — METRONIDAZOLE 500 MG/1
500 TABLET ORAL EVERY 8 HOURS
Status: DISCONTINUED | OUTPATIENT
Start: 2022-05-09 | End: 2022-05-13

## 2022-05-09 RX ORDER — OXYCODONE AND ACETAMINOPHEN 10; 325 MG/1; MG/1
1 TABLET ORAL EVERY 4 HOURS PRN
Status: DISCONTINUED | OUTPATIENT
Start: 2022-05-09 | End: 2022-05-13

## 2022-05-09 RX ORDER — HYDROMORPHONE HYDROCHLORIDE 2 MG/ML
1 INJECTION, SOLUTION INTRAMUSCULAR; INTRAVENOUS; SUBCUTANEOUS
Status: DISCONTINUED | OUTPATIENT
Start: 2022-05-09 | End: 2022-05-14

## 2022-05-09 RX ORDER — TALC
6 POWDER (GRAM) TOPICAL NIGHTLY PRN
Status: DISCONTINUED | OUTPATIENT
Start: 2022-05-09 | End: 2022-05-10

## 2022-05-09 RX ADMIN — MORPHINE SULFATE 4 MG: 4 INJECTION INTRAVENOUS at 01:05

## 2022-05-09 RX ADMIN — SODIUM CHLORIDE: 0.9 INJECTION, SOLUTION INTRAVENOUS at 07:05

## 2022-05-09 RX ADMIN — ENOXAPARIN SODIUM 40 MG: 40 INJECTION SUBCUTANEOUS at 04:05

## 2022-05-09 RX ADMIN — VANCOMYCIN HYDROCHLORIDE 1500 MG: 1.5 INJECTION, POWDER, LYOPHILIZED, FOR SOLUTION INTRAVENOUS at 06:05

## 2022-05-09 RX ADMIN — MORPHINE SULFATE 4 MG: 4 INJECTION INTRAVENOUS at 06:05

## 2022-05-09 RX ADMIN — MUPIROCIN: 20 OINTMENT TOPICAL at 12:05

## 2022-05-09 RX ADMIN — HYDROMORPHONE HYDROCHLORIDE 1 MG: 2 INJECTION INTRAMUSCULAR; INTRAVENOUS; SUBCUTANEOUS at 04:05

## 2022-05-09 RX ADMIN — CEFEPIME HYDROCHLORIDE 2 G: 2 INJECTION, SOLUTION INTRAVENOUS at 12:05

## 2022-05-09 RX ADMIN — HYDROMORPHONE HYDROCHLORIDE 1 MG: 2 INJECTION INTRAMUSCULAR; INTRAVENOUS; SUBCUTANEOUS at 10:05

## 2022-05-09 RX ADMIN — CEFEPIME HYDROCHLORIDE 2 G: 2 INJECTION, SOLUTION INTRAVENOUS at 08:05

## 2022-05-09 RX ADMIN — IPRATROPIUM BROMIDE AND ALBUTEROL SULFATE 3 ML: 2.5; .5 SOLUTION RESPIRATORY (INHALATION) at 01:05

## 2022-05-09 RX ADMIN — Medication 6 MG: at 09:05

## 2022-05-09 RX ADMIN — METRONIDAZOLE 500 MG: 500 TABLET ORAL at 04:05

## 2022-05-09 RX ADMIN — OXACILLIN SODIUM 12 G: 10 INJECTION, POWDER, FOR SOLUTION INTRAVENOUS at 12:05

## 2022-05-09 RX ADMIN — MORPHINE SULFATE 4 MG: 4 INJECTION INTRAVENOUS at 10:05

## 2022-05-09 RX ADMIN — HYDROCODONE BITARTRATE AND ACETAMINOPHEN 1 TABLET: 5; 325 TABLET ORAL at 08:05

## 2022-05-09 RX ADMIN — METRONIDAZOLE 500 MG: 500 INJECTION, SOLUTION INTRAVENOUS at 01:05

## 2022-05-09 RX ADMIN — HYDROMORPHONE HYDROCHLORIDE 1 MG: 2 INJECTION INTRAMUSCULAR; INTRAVENOUS; SUBCUTANEOUS at 01:05

## 2022-05-09 RX ADMIN — OXYCODONE AND ACETAMINOPHEN 1 TABLET: 10; 325 TABLET ORAL at 09:05

## 2022-05-09 RX ADMIN — SODIUM CHLORIDE: 0.9 INJECTION, SOLUTION INTRAVENOUS at 08:05

## 2022-05-09 RX ADMIN — METRONIDAZOLE 500 MG: 500 INJECTION, SOLUTION INTRAVENOUS at 10:05

## 2022-05-09 NOTE — PROGRESS NOTES
[facility]  Pulmonology  Progress Note    Patient Name: Tianna Leon  MRN: [id number]  Admission Date: 5/6/2022  Hospital Length of Stay: 3 days  Code Status: Full Code  Attending Provider: Elizabeth Kim MD  Primary Care Provider: Spenser Campa MD   Principal Problem: Bacteremia    Subjective:     Interval History:  05/09: seen and examined: T max 99.9F, CTS note reveiwed, Wbcc 30 K, BC+ve Staph    Respiratory ROS: no cough, shortness of breath, or wheezing  positive for - pleuritic pain     Objective:     Vital Signs (Most Recent):  Temp: 99.9 °F (37.7 °C) (05/09/22 0452)  Pulse: (!) 132 (05/09/22 0452)  Resp: 17 (05/09/22 0618)  BP: 121/67 (05/09/22 0452)  SpO2: 96 % (05/09/22 0452)   Vital Signs (24h Range):  Temp:  [97.9 °F (36.6 °C)-99.9 °F (37.7 °C)] 99.9 °F (37.7 °C)  Pulse:  [] 132  Resp:  [16-22] 17  SpO2:  [94 %-98 %] 96 %  BP: (111-121)/(58-69) 121/67     Weight: 83 kg (182 lb 15.7 oz)  Body mass index is 30.45 kg/m².      Intake/Output Summary (Last 24 hours) at 5/9/2022 0648  Last data filed at 5/9/2022 0500  Gross per 24 hour   Intake 1340.28 ml   Output 3200 ml   Net -1859.72 ml       Physical Exam  Vitals and nursing note reviewed.   Constitutional:       Appearance: She is ill-appearing.      Interventions: Nasal cannula in place.   HENT:      Head: Normocephalic.      Comments: Lip piercing     Nose: Nose normal.      Mouth/Throat:      Mouth: Mucous membranes are moist.   Eyes:      Pupils: Pupils are equal, round, and reactive to light.   Cardiovascular:      Rate and Rhythm: Normal rate.   Pulmonary:      Effort: Tachypnea present.      Breath sounds: Examination of the right-middle field reveals decreased breath sounds. Examination of the right-lower field reveals decreased breath sounds. Decreased breath sounds present. No wheezing, rhonchi or rales.   Abdominal:      General: Bowel sounds are normal.      Palpations: Abdomen is soft.   Musculoskeletal:         General:  Normal range of motion.      Cervical back: Normal range of motion.   Skin:     General: Skin is warm.      Comments: Multiple tattoos   Neurological:      General: No focal deficit present.      Mental Status: She is alert and oriented to person, place, and time.   Psychiatric:         Behavior: Behavior is cooperative.       Vents:  Oxygen Concentration (%): 28 (05/09/22 0156)    Lines/Drains/Airways       Peripheral Intravenous Line  Duration                  Peripheral IV - Single Lumen 05/06/22 1616 20 G Right Antecubital 2 days                    Significant Labs:    CBC/Anemia Profile:  Recent Labs   Lab 05/08/22  1514   WBC 30.90*   HGB 7.8*   HCT 22.1*      MCV 83   RDW 14.5        Chemistries:  Recent Labs   Lab 05/07/22  0731 05/08/22  1514   NA  --  131*   K  --  3.6   CL  --  95   CO2  --  23   BUN  --  14   CREATININE  --  0.6   CALCIUM  --  7.5*   ALBUMIN  --  1.4*   PROT 5.0* 5.2*   BILITOT  --  0.9   ALKPHOS  --  137*   ALT  --  25   AST  --  23       Blood Culture:   Recent Labs   Lab 05/08/22  1515 05/08/22  1516   LABBLOO No Growth to date No Growth to date     All pertinent labs within the past 24 hours have been reviewed.    Significant Imaging:  I have reviewed all pertinent imaging results/findings within the past 24 hours.      ABG  No results for input(s): PH, PO2, PCO2, HCO3, BE in the last 168 hours.  Assessment/Plan:     * Bacteremia  +ve BC  IVDU  Cont VANCO  Deescalate based on results final    Subacute bacterial endocarditis  Abx:Vancomycin    Vegetation of heart valve  Medical Mx  CTS note noted    Chronic pulmonary heart disease  Pulmonary hypertension  PA estimated 56  Multifactorial  : COPD, IVDU  Need outpatient w/u    Parapneumonic effusion  Complicated by PNA, possible empyema  SP Thora  Cont Abx    IVDU (intravenous drug user)  Cessation/rehab    PNA (pneumonia)  PNA in IVDU  Cultures; Blood and sputum  BC +ve G+ve cocci  AbxL VANCO+ CEFEPIME + FLAGYL    Deescalate  FLAGYL    Tobacco abuse  Smoking cessation    COPD (chronic obstructive pulmonary disease)  Oxygen  Keep SP2> 92%  Bronchodilators  Follow up in Pulmonary for PFT to clarify Dx    Lower back pain  Address pain  Morphine         Alonzo Morrison MD  Pulmonology  [facility]

## 2022-05-09 NOTE — ASSESSMENT & PLAN NOTE
Chronic in nature\  H/O IVDU  CT lumbar show spinal stenosis   No neurological deficit at this time   Will order a MRI lumbar wwo  To r/o spinal abscess     5/7  Mri pending  5/8/22 The patient refused the MRI lumbar spine overnight d/t being unable to lay flat from back pain. Will give pain meds and attempt to get the MRI today to r/o spinal abscess.  5/9/22 The patient reports lower back pain is not well controlled with current pain regimen, will adjust. MRI lumbar spine showed moderate to severe spinal canal stenosis with moderate left-sided neural foraminal stenosis, Neurosurgery consulted.

## 2022-05-09 NOTE — ASSESSMENT & PLAN NOTE
Gram positive   Infectious disease consulted  Continue broad spectrum intravenous antibiotic(s) given h/o ivdu  Remains febrile and leukocytosis persists  5/8/22 The patient remained afebrile overnight. Currently on cefepime/vanc/flagyl. Blood cx are growing staph aureus. The patient refused the MRI lumbar spine overnight d/t being unable to lay flat from back pain. Will give pain meds and attempt to get the MRI today to r/o spinal abscess. The ECHO showed a 1.3 x 1.5 cm elongated, mobile echodensity seen on the tricuspid valve consistent with vegetation, CT surgery was consulted. Will need a SUSANNAH. Infectious disease is consulted. Will repeat blood cx today.   5/9/22 Repeat blood cx are still positive. Sensitivities are back and Staph is sensitive to oxacillin, will D/C vanc/cefepime. Infectious disease is following

## 2022-05-09 NOTE — ASSESSMENT & PLAN NOTE
PNA in IVDU  Cultures; Blood and sputum  BC +ve G+ve cocci  AbxL VANCO+ CEFEPIME + FLAGYL    Deescalate FLAGYL

## 2022-05-09 NOTE — SUBJECTIVE & OBJECTIVE
Past Medical History:   Diagnosis Date    ADHD (attention deficit hyperactivity disorder)     Anxiety     Asthma     Bipolar disorder     Cancer     cervical    COPD (chronic obstructive pulmonary disease)     GERD (gastroesophageal reflux disease)     Hepatitis C antibody positive in blood     RNA UNDETECTED 2016    Hyperlipidemia     Intravenous drug abuse     MDD (major depressive disorder)     Mood disorder     PTSD (post-traumatic stress disorder)     Suicidal ideation        Past Surgical History:   Procedure Laterality Date     SECTION  2001    x2    CHOLECYSTECTOMY      HYSTERECTOMY      LAPAROSCOPIC SALPINGECTOMY      ROBOT-ASSISTED LAPAROSCOPIC ABDOMINAL HYSTERECTOMY USING DA SEBASTIEN XI N/A 2019    Procedure: XI ROBOTIC HYSTERECTOMY;  Surgeon: Tabatha Quintanilla MD;  Location: [facility] OR;  Service: OB/GYN;  Laterality: N/A;    ROBOT-ASSISTED LAPAROSCOPIC LYSIS OF ADHESIONS USING DA SEBASTIEN XI N/A 2019    Procedure: XI ROBOTIC LYSIS, ADHESIONS;  Surgeon: Tabatha Quintanilla MD;  Location: [facility] OR;  Service: OB/GYN;  Laterality: N/A;    ROBOT-ASSISTED SURGICAL REMOVAL OF FALLOPIAN TUBE USING DA SEBASTIEN XI Right 2019    Procedure: XI ROBOTIC SALPINGECTOMY;  Surgeon: Tabatha Quintanilla MD;  Location: [facility] OR;  Service: OB/GYN;  Laterality: Right;    TONSILLECTOMY      TUBAL LIGATION         Review of patient's allergies indicates:   Allergen Reactions    Sulfamethoxazole-trimethoprim        Medications:  Medications Prior to Admission   Medication Sig    acetaminophen (TYLENOL) 500 MG tablet Take 500 mg by mouth every 6 (six) hours as needed for Pain.    albuterol (PROVENTIL/VENTOLIN HFA) 90 mcg/actuation inhaler Inhale 2 puffs into the lungs every 6 (six) hours as needed for Wheezing.    [] azithromycin (ZITHROMAX Z-YUAN) 250 MG tablet Take 2 tablets (500 mg) on  Day 1,  followed by 1 tablet (250 mg) once daily on Days 2 through 5.    dextromethorphan-guaiFENesin  (MUCINEX DM) 60-1,200 mg per 12 hr tablet Take 1 tablet by mouth every 12 (twelve) hours.    ibuprofen (ADVIL,MOTRIN) 200 MG tablet Take 200 mg by mouth every 6 (six) hours as needed for Pain.     Antibiotics (From admission, onward)                Start     Stop Route Frequency Ordered    05/09/22 1100  oxacillin 12 g in  mL CONTINUOUS INFUSION         -- IV Every 24 hours (non-standard times) 05/09/22 0932    05/07/22 2100  mupirocin 2 % ointment         05/12 2059 Nasl 2 times daily 05/07/22 1646    05/07/22 0200  metronidazole IVPB 500 mg         -- IV Every 8 hours (non-standard times) 05/06/22 1954          Antifungals (From admission, onward)                None          Antivirals (From admission, onward)      None             Immunization History   Administered Date(s) Administered    COVID-19 Vaccine 08/20/2021    Hepatitis A, Adult 07/10/2019    Influenza - Quadrivalent 10/15/2015, 12/29/2017, 11/13/2018    Influenza - Quadrivalent - PF *Preferred* (6 months and older) 10/01/2019    Pneumococcal Polysaccharide - 23 Valent 12/29/2017    Td (ADULT) 09/18/2012    Tdap 12/05/2019       Family History       Problem Relation (Age of Onset)    COPD Mother          Social History     Socioeconomic History    Marital status: Legally    Tobacco Use    Smoking status: Current Every Day Smoker     Packs/day: 1.50     Types: Cigarettes    Smokeless tobacco: Never Used    Tobacco comment: no smoking after midnight prior to surgery   Substance and Sexual Activity    Alcohol use: Yes     Comment: Occassional     Drug use: Not Currently     Types: IV, Methamphetamines, Heroin     Comment: denies heroin use.  Last used meth 10/2018  No illegal drugs prior to sx    Sexual activity: Not Currently     Partners: Male     Birth control/protection: None     Review of Systems   Constitutional:  Positive for activity change. Negative for appetite change, chills, diaphoresis, fatigue and fever.   Respiratory:   Positive for shortness of breath. Negative for apnea and chest tightness.    Neurological:  Negative for dizziness and facial asymmetry.   Objective:     Vital Signs (Most Recent):  Temp: 98.7 °F (37.1 °C) (05/09/22 0733)  Pulse: 85 (05/09/22 0733)  Resp: 18 (05/09/22 1018)  BP: (!) 112/59 (05/09/22 0733)  SpO2: 97 % (05/09/22 0733)   Vital Signs (24h Range):  Temp:  [97.9 °F (36.6 °C)-99.9 °F (37.7 °C)] 98.7 °F (37.1 °C)  Pulse:  [] 85  Resp:  [16-22] 18  SpO2:  [94 %-98 %] 97 %  BP: (111-121)/(59-69) 112/59     Weight: 83 kg (182 lb 15.7 oz)  Body mass index is 30.45 kg/m².    Estimated Creatinine Clearance: 160.7 mL/min (based on SCr of 0.5 mg/dL).    Physical Exam  Vitals and nursing note reviewed. Exam conducted with a chaperone present (family).   Constitutional:       General: She is not in acute distress.     Appearance: She is well-developed. She is ill-appearing. She is not toxic-appearing.   HENT:      Head: Normocephalic and atraumatic.   Eyes:      Conjunctiva/sclera: Conjunctivae normal.      Pupils: Pupils are equal, round, and reactive to light.   Cardiovascular:      Rate and Rhythm: Normal rate and regular rhythm.      Heart sounds: Normal heart sounds. No murmur heard.  Pulmonary:      Effort: Pulmonary effort is normal. No respiratory distress.      Breath sounds: Normal breath sounds.   Abdominal:      General: Bowel sounds are normal. There is no distension.      Palpations: Abdomen is soft.      Tenderness: There is no abdominal tenderness.   Musculoskeletal:         General: Swelling and tenderness present. No deformity. Normal range of motion.      Cervical back: Normal range of motion and neck supple.      Right lower leg: No edema.      Left lower leg: No edema.   Skin:     General: Skin is warm and dry.      Coloration: Skin is pale.      Findings: Erythema present.   Neurological:      Mental Status: She is alert and oriented to person, place, and time.      Motor: Weakness  present.   Psychiatric:         Behavior: Behavior normal.       Significant Labs: Blood Culture:   Recent Labs   Lab 05/06/22  1616 05/06/22  1617 05/08/22  1515 05/08/22  1516   LABBLOO Gram stain aer bottle: Gram positive cocci in clusters resembling Staph   Gram stain missael bottle: Gram positive cocci in clusters resembling Staph   Results called to and read back by: Lila Hernandez RN 05/07/2022  10:57  STAPHYLOCOCCUS AUREUS  ID consult required at [facility].  * Gram stain aer bottle: Gram positive cocci in clusters resembling Staph   Results called to and read back by: Lila Hernandez RN 05/07/2022  10:57  Gram stain missael bottle: Gram positive cocci in clusters resembling Staph   05/07/2022  13:40  STAPHYLOCOCCUS AUREUS  ID consult required at [facility].  For susceptibility see order #[id number]  * No Growth to date No Growth to date       CBC:   Recent Labs   Lab 05/08/22  1514 05/09/22  0730   WBC 30.90* 33.79*  33.79*   HGB 7.8* 7.4*  7.4*   HCT 22.1* 21.8*  21.8*    176  176       CMP:   Recent Labs   Lab 05/08/22  1514 05/09/22  0730   * 130*  130*   K 3.6 3.9  3.9   CL 95 95  95   CO2 23 28  28   * 113*  113*   BUN 14 10  10   CREATININE 0.6 0.5  0.5   CALCIUM 7.5* 7.1*  7.1*   PROT 5.2* 4.9*  4.9*   ALBUMIN 1.4* 1.3*  1.3*   BILITOT 0.9 1.1*  1.1*   ALKPHOS 137* 94  94   AST 23 19  19   ALT 25 23  23   ANIONGAP 13 7*  7*   EGFRNONAA >60 >60  >60

## 2022-05-09 NOTE — ASSESSMENT & PLAN NOTE
This patient does have evidence of infective focus  My overall impression is sepsis. Vital signs were reviewed and noted in progress note.  Antibiotics given-   Antibiotics (From admission, onward)            Start     Stop Route Frequency Ordered    05/09/22 1100  oxacillin 12 g in  mL CONTINUOUS INFUSION         -- IV Every 24 hours (non-standard times) 05/09/22 0932    05/07/22 2100  mupirocin 2 % ointment         05/12 2059 Nasl 2 times daily 05/07/22 1646    05/07/22 0200  metronidazole IVPB 500 mg         -- IV Every 8 hours (non-standard times) 05/06/22 1954        Cultures were taken-   Microbiology Results (last 7 days)     Procedure Component Value Units Date/Time    Blood culture [[id number]] Collected: 05/08/22 1516    Order Status: Completed Specimen: Blood from Peripheral, Right Hand Updated: 05/09/22 1132     Blood Culture, Routine Gram stain aer bottle: Gram positive cocci in clusters resembling Staph       Results called to and read back by: Chasity Raymundo RN  05/09/2022  11:31    Narrative:      Collection has been rescheduled by [email or web address] at 05/08/2022 13:22 Reason:   Pt in mri will be there after another hour [id number]  Collection has been rescheduled by [email or web address] at 05/08/2022 13:22 Reason:   Pt in mri will be there after another hour [id number]    Blood culture x two cultures. Draw prior to antibiotics. [[id number]]  (Abnormal) Collected: 05/06/22 1617    Order Status: Completed Specimen: Blood from Peripheral, Antecubital, Right Updated: 05/09/22 0824     Blood Culture, Routine Gram stain aer bottle: Gram positive cocci in clusters resembling Staph       Results called to and read back by: Lila Hernandez RN 05/07/2022  10:57      Gram stain missael bottle: Gram positive cocci in clusters resembling Staph       05/07/2022  13:40      STAPHYLOCOCCUS AUREUS  ID consult required at [facility] and [facility].  For susceptibility see order #[id number]      Narrative:      Aerobic and anaerobic     Blood culture x two cultures. Draw prior to antibiotics. [[id number]]  (Abnormal)  (Susceptibility) Collected: 05/06/22 1616    Order Status: Completed Specimen: Blood from Peripheral, Antecubital, Left Updated: 05/09/22 0823     Blood Culture, Routine Gram stain aer bottle: Gram positive cocci in clusters resembling Staph       Gram stain missael bottle: Gram positive cocci in clusters resembling Staph       Results called to and read back by: Lila Hernandez RN 05/07/2022  10:57      STAPHYLOCOCCUS AUREUS  ID consult required at [facility],[place] and [facility] locations.      Narrative:      Aerobic and anaerobic    Blood culture [[id number]] Collected: 05/08/22 1515    Order Status: Completed Specimen: Blood from Peripheral, Left Arm Updated: 05/09/22 0315     Blood Culture, Routine No Growth to date    Narrative:      Collection has been rescheduled by [email or web address] at 05/08/2022 13:22 Reason:   Pt in mri will be there after another hour [id number]  Collection has been rescheduled by [email or web address] at 05/08/2022 13:22 Reason:   Pt in mri will be there after another hour [id number]    Gram stain [[id number]] Collected: 05/07/22 0901    Order Status: Sent Specimen: Body Fluid from Pleural Fluid Updated: 05/07/22 0905    Fungus culture [[id number]] Collected: 05/07/22 0901    Order Status: Sent Specimen: Body Fluid from Pleural Fluid Updated: 05/07/22 0905    AFB culture (includes stain) [[id number]] Collected: 05/07/22 0901    Order Status: Sent Specimen: Body Fluid from Pleural Fluid Updated: 05/07/22 0905        Latest lactate reviewed, they are-  Recent Labs   Lab 05/06/22  1616 05/06/22  1731   LACTATE 1.5 1.5       Organ dysfunction indicated by Acute kidney injury  Source-  lung    Source control Achieved by-   Cont Broad spectrum IVAB     Bacteremia  Infectious disease consulted

## 2022-05-09 NOTE — SUBJECTIVE & OBJECTIVE
Interval History:  05/09: seen and examined: T max 99.9F, CTS note reveiwed, Wbcc 30 K, BC+ve Staph    Respiratory ROS: no cough, shortness of breath, or wheezing  positive for - pleuritic pain     Objective:     Vital Signs (Most Recent):  Temp: 99.9 °F (37.7 °C) (05/09/22 0452)  Pulse: (!) 132 (05/09/22 0452)  Resp: 17 (05/09/22 0618)  BP: 121/67 (05/09/22 0452)  SpO2: 96 % (05/09/22 0452)   Vital Signs (24h Range):  Temp:  [97.9 °F (36.6 °C)-99.9 °F (37.7 °C)] 99.9 °F (37.7 °C)  Pulse:  [] 132  Resp:  [16-22] 17  SpO2:  [94 %-98 %] 96 %  BP: (111-121)/(58-69) 121/67     Weight: 83 kg (182 lb 15.7 oz)  Body mass index is 30.45 kg/m².      Intake/Output Summary (Last 24 hours) at 5/9/2022 0648  Last data filed at 5/9/2022 0500  Gross per 24 hour   Intake 1340.28 ml   Output 3200 ml   Net -1859.72 ml       Physical Exam  Vitals and nursing note reviewed.   Constitutional:       Appearance: She is ill-appearing.      Interventions: Nasal cannula in place.   HENT:      Head: Normocephalic.      Comments: Lip piercing     Nose: Nose normal.      Mouth/Throat:      Mouth: Mucous membranes are moist.   Eyes:      Pupils: Pupils are equal, round, and reactive to light.   Cardiovascular:      Rate and Rhythm: Normal rate.   Pulmonary:      Effort: Tachypnea present.      Breath sounds: Examination of the right-middle field reveals decreased breath sounds. Examination of the right-lower field reveals decreased breath sounds. Decreased breath sounds present. No wheezing, rhonchi or rales.   Abdominal:      General: Bowel sounds are normal.      Palpations: Abdomen is soft.   Musculoskeletal:         General: Normal range of motion.      Cervical back: Normal range of motion.   Skin:     General: Skin is warm.      Comments: Multiple tattoos   Neurological:      General: No focal deficit present.      Mental Status: She is alert and oriented to person, place, and time.   Psychiatric:         Behavior: Behavior is  cooperative.       Vents:  Oxygen Concentration (%): 28 (05/09/22 0156)    Lines/Drains/Airways       Peripheral Intravenous Line  Duration                  Peripheral IV - Single Lumen 05/06/22 1616 20 G Right Antecubital 2 days                    Significant Labs:    CBC/Anemia Profile:  Recent Labs   Lab 05/08/22  1514   WBC 30.90*   HGB 7.8*   HCT 22.1*      MCV 83   RDW 14.5        Chemistries:  Recent Labs   Lab 05/07/22  0731 05/08/22  1514   NA  --  131*   K  --  3.6   CL  --  95   CO2  --  23   BUN  --  14   CREATININE  --  0.6   CALCIUM  --  7.5*   ALBUMIN  --  1.4*   PROT 5.0* 5.2*   BILITOT  --  0.9   ALKPHOS  --  137*   ALT  --  25   AST  --  23       Blood Culture:   Recent Labs   Lab 05/08/22  1515 05/08/22  1516   LABBLOO No Growth to date No Growth to date     All pertinent labs within the past 24 hours have been reviewed.    Significant Imaging:  I have reviewed all pertinent imaging results/findings within the past 24 hours.

## 2022-05-09 NOTE — CONSULTS
[facility] - Med Surg  Cardiology  Consult Note    Patient Name: Tianna Leon  MRN: [id number]  Admission Date: 5/6/2022  Hospital Length of Stay: 3 days  Code Status: Full Code   Attending Provider: Elizabeth Kim MD   Consulting Provider: Conchita San MD  Primary Care Physician: Spenser Campa MD  Principal Problem:Bacteremia    Patient information was obtained from patient, past medical records and ER records.     Inpatient consult to Cardiology  Consult performed by: Conchita San MD  Consult ordered by: Elizabeth Kim MD  Reason for consult: tricuspid vegetation, request for a SUSANNAH         Subjective:     Chief Complaint:  TRICUSPID VEGETATION      HPI:   38 y/o. female  with a PMHx of asthma, COPD,Bipolar  , IVDU and HLD presented to the ER with a c/o  sob for the last weak which has gotten worse . The SOB is associated with fever , chills , productive cough , back pain  and generalized weakness . She report cough some blood this am .  She is active IVDU  ( heroine , cocaine  and amphetamine ) . She denies any  sick contact , chest pain  , GI/ sx , She also complaning of LE sweeling . Knee pain and B/L LE rash .   ER COURSE: CTA chest negative for pe . Multiple nodular and cavitary opacities concerning for septic emboli with larger opacities possibly necrotizing pneumonia. CT cervical  and thoracic did not show any acute finding , CT lumbar Possible progression of degenerative changes most notable at L4-5 with moderate to severe spinal canal stenosis at this level.  WBC  29 k , na 130 , k 3.4 , cl 89 , procal 2.71   ER VS:  BP Pulse Resp Temp SpO2   (!) 124/58 110 (!) 30 (!) 101.6 °F (38.7 °        WE ARE BEING CONSULTED FOR A SUSANNAH, TTE SHOWED VEGEATION OF THE TRICUSPID VALVE       Past Medical History:   Diagnosis Date    ADHD (attention deficit hyperactivity disorder)     Anxiety     Asthma     Bipolar disorder     Cancer     cervical    COPD (chronic obstructive pulmonary disease)     GERD  (gastroesophageal reflux disease)     Hepatitis C antibody positive in blood     RNA UNDETECTED 2016    Hyperlipidemia     Intravenous drug abuse     MDD (major depressive disorder)     Mood disorder     PTSD (post-traumatic stress disorder)     Suicidal ideation        Past Surgical History:   Procedure Laterality Date     SECTION  2001    x2    CHOLECYSTECTOMY      HYSTERECTOMY      LAPAROSCOPIC SALPINGECTOMY      ROBOT-ASSISTED LAPAROSCOPIC ABDOMINAL HYSTERECTOMY USING DA SEBASTIEN XI N/A 2019    Procedure: XI ROBOTIC HYSTERECTOMY;  Surgeon: Tabatha Quintanilla MD;  Location: [facility] OR;  Service: OB/GYN;  Laterality: N/A;    ROBOT-ASSISTED LAPAROSCOPIC LYSIS OF ADHESIONS USING DA SEBASTIEN XI N/A 2019    Procedure: XI ROBOTIC LYSIS, ADHESIONS;  Surgeon: Tabatha Quintanilla MD;  Location: [facility] OR;  Service: OB/GYN;  Laterality: N/A;    ROBOT-ASSISTED SURGICAL REMOVAL OF FALLOPIAN TUBE USING DA SEBASTIEN XI Right 2019    Procedure: XI ROBOTIC SALPINGECTOMY;  Surgeon: Tabatha Quintanilla MD;  Location: [facility] OR;  Service: OB/GYN;  Laterality: Right;    TONSILLECTOMY      TUBAL LIGATION         Review of patient's allergies indicates:   Allergen Reactions    Sulfamethoxazole-trimethoprim        Medications:  Medications Prior to Admission   Medication Sig    acetaminophen (TYLENOL) 500 MG tablet Take 500 mg by mouth every 6 (six) hours as needed for Pain.    albuterol (PROVENTIL/VENTOLIN HFA) 90 mcg/actuation inhaler Inhale 2 puffs into the lungs every 6 (six) hours as needed for Wheezing.    [] azithromycin (ZITHROMAX Z-YUAN) 250 MG tablet Take 2 tablets (500 mg) on  Day 1,  followed by 1 tablet (250 mg) once daily on Days 2 through 5.    dextromethorphan-guaiFENesin (MUCINEX DM) 60-1,200 mg per 12 hr tablet Take 1 tablet by mouth every 12 (twelve) hours.    ibuprofen (ADVIL,MOTRIN) 200 MG tablet Take 200 mg by mouth every 6 (six) hours as needed for Pain.      Antibiotics (From admission, onward)                Start     Stop Route Frequency Ordered    05/09/22 1100  oxacillin 12 g in  mL CONTINUOUS INFUSION         -- IV Every 24 hours (non-standard times) 05/09/22 0932    05/07/22 2100  mupirocin 2 % ointment         05/12 2059 Nasl 2 times daily 05/07/22 1646    05/07/22 0200  metronidazole IVPB 500 mg         -- IV Every 8 hours (non-standard times) 05/06/22 1954          Antifungals (From admission, onward)                None          Antivirals (From admission, onward)      None             Immunization History   Administered Date(s) Administered    COVID-19 Vaccine 08/20/2021    Hepatitis A, Adult 07/10/2019    Influenza - Quadrivalent 10/15/2015, 12/29/2017, 11/13/2018    Influenza - Quadrivalent - PF *Preferred* (6 months and older) 10/01/2019    Pneumococcal Polysaccharide - 23 Valent 12/29/2017    Td (ADULT) 09/18/2012    Tdap 12/05/2019       Family History       Problem Relation (Age of Onset)    COPD Mother          Social History     Socioeconomic History    Marital status: Legally    Tobacco Use    Smoking status: Current Every Day Smoker     Packs/day: 1.50     Types: Cigarettes    Smokeless tobacco: Never Used    Tobacco comment: no smoking after midnight prior to surgery   Substance and Sexual Activity    Alcohol use: Yes     Comment: Occassional     Drug use: Not Currently     Types: IV, Methamphetamines, Heroin     Comment: denies heroin use.  Last used meth 10/2018  No illegal drugs prior to sx    Sexual activity: Not Currently     Partners: Male     Birth control/protection: None     Review of Systems   Constitutional:  Positive for activity change. Negative for appetite change, chills, diaphoresis, fatigue and fever.   Respiratory:  Positive for shortness of breath. Negative for apnea and chest tightness.    Neurological:  Negative for dizziness and facial asymmetry.   Objective:     Vital Signs (Most  Recent):  Temp: 98.7 °F (37.1 °C) (05/09/22 0733)  Pulse: 85 (05/09/22 0733)  Resp: 18 (05/09/22 1018)  BP: (!) 112/59 (05/09/22 0733)  SpO2: 97 % (05/09/22 0733)   Vital Signs (24h Range):  Temp:  [97.9 °F (36.6 °C)-99.9 °F (37.7 °C)] 98.7 °F (37.1 °C)  Pulse:  [] 85  Resp:  [16-22] 18  SpO2:  [94 %-98 %] 97 %  BP: (111-121)/(59-69) 112/59     Weight: 83 kg (182 lb 15.7 oz)  Body mass index is 30.45 kg/m².    Estimated Creatinine Clearance: 160.7 mL/min (based on SCr of 0.5 mg/dL).    Physical Exam  Vitals and nursing note reviewed. Exam conducted with a chaperone present (family).   Constitutional:       General: She is not in acute distress.     Appearance: She is well-developed. She is ill-appearing. She is not toxic-appearing.   HENT:      Head: Normocephalic and atraumatic.   Eyes:      Conjunctiva/sclera: Conjunctivae normal.      Pupils: Pupils are equal, round, and reactive to light.   Cardiovascular:      Rate and Rhythm: Normal rate and regular rhythm.      Heart sounds: Normal heart sounds. No murmur heard.  Pulmonary:      Effort: Pulmonary effort is normal. No respiratory distress.      Breath sounds: Normal breath sounds.   Abdominal:      General: Bowel sounds are normal. There is no distension.      Palpations: Abdomen is soft.      Tenderness: There is no abdominal tenderness.   Musculoskeletal:         General: Swelling and tenderness present. No deformity. Normal range of motion.      Cervical back: Normal range of motion and neck supple.      Right lower leg: No edema.      Left lower leg: No edema.   Skin:     General: Skin is warm and dry.      Coloration: Skin is pale.      Findings: Erythema present.   Neurological:      Mental Status: She is alert and oriented to person, place, and time.      Motor: Weakness present.   Psychiatric:         Behavior: Behavior normal.       Significant Labs: Blood Culture:   Recent Labs   Lab 05/06/22  1616 05/06/22  1617 05/08/22  1515 05/08/22  1516    LABBLOO Gram stain aer bottle: Gram positive cocci in clusters resembling Staph   Gram stain missael bottle: Gram positive cocci in clusters resembling Staph   Results called to and read back by: Lila Hernandez RN 05/07/2022  10:57  STAPHYLOCOCCUS AUREUS  ID consult required at [facility] locations.  * Gram stain aer bottle: Gram positive cocci in clusters resembling Staph   Results called to and read back by: Lila Hernandez RN 05/07/2022  10:57  Gram stain missael bottle: Gram positive cocci in clusters resembling Staph   05/07/2022  13:40  STAPHYLOCOCCUS AUREUS  ID consult required at [facility].  For susceptibility see order #[id number]  * No Growth to date No Growth to date       CBC:   Recent Labs   Lab 05/08/22  1514 05/09/22  0730   WBC 30.90* 33.79*  33.79*   HGB 7.8* 7.4*  7.4*   HCT 22.1* 21.8*  21.8*    176  176       CMP:   Recent Labs   Lab 05/08/22  1514 05/09/22  0730   * 130*  130*   K 3.6 3.9  3.9   CL 95 95  95   CO2 23 28  28   * 113*  113*   BUN 14 10  10   CREATININE 0.6 0.5  0.5   CALCIUM 7.5* 7.1*  7.1*   PROT 5.2* 4.9*  4.9*   ALBUMIN 1.4* 1.3*  1.3*   BILITOT 0.9 1.1*  1.1*   ALKPHOS 137* 94  94   AST 23 19  19   ALT 25 23  23   ANIONGAP 13 7*  7*   EGFRNONAA >60 >60  >60                Assessment and Plan:     Vegetation of heart valve  CONSENTED AND AGREEABLE WITH A SUSANNAH   ALL QUESTIONS ANSWERED   PROCEDURE AND RISKS EXPLAINED IN DETAILS         VTE Risk Mitigation (From admission, onward)         Ordered     enoxaparin injection 40 mg  Daily         05/06/22 2336     IP VTE HIGH RISK PATIENT  Once         05/06/22 2336     Place sequential compression device  Until discontinued         05/06/22 2336                Thank you for your consult. I will follow-up with patient. Please contact us if you have any additional questions.    Conchita San MD  Cardiology   [facility] - Med Surg

## 2022-05-09 NOTE — ASSESSMENT & PLAN NOTE
5/8/22  The ECHO showed a 1.3 x 1.5 cm elongated, mobile echodensity seen on the tricuspid valve consistent with vegetation, CT surgery was consulted. Will need a SUSANNAH. Infectious disease is consulted. Will repeat blood cx today. Continue IV ABX.   5/9/22 Sensitivities are back and Staph is sensitive to oxacillin, will D/C vanc/cefepime. Cardiology consulted and plans for a SUSANNAH today. CT surgery following and recommends continuing medical management with appropriate ABX, no surgical intervention at this time.

## 2022-05-09 NOTE — SUBJECTIVE & OBJECTIVE
Past Medical History:   Diagnosis Date    ADHD (attention deficit hyperactivity disorder)     Anxiety     Asthma     Bipolar disorder     Cancer     cervical    COPD (chronic obstructive pulmonary disease)     GERD (gastroesophageal reflux disease)     Hepatitis C antibody positive in blood     RNA UNDETECTED 2016    Hyperlipidemia     Intravenous drug abuse     MDD (major depressive disorder)     Mood disorder     PTSD (post-traumatic stress disorder)     Suicidal ideation        Past Surgical History:   Procedure Laterality Date     SECTION  2001    x2    CHOLECYSTECTOMY      HYSTERECTOMY      LAPAROSCOPIC SALPINGECTOMY      ROBOT-ASSISTED LAPAROSCOPIC ABDOMINAL HYSTERECTOMY USING DA SEBASTIEN XI N/A 2019    Procedure: XI ROBOTIC HYSTERECTOMY;  Surgeon: Tabatha Quintanilla MD;  Location: [facility] OR;  Service: OB/GYN;  Laterality: N/A;    ROBOT-ASSISTED LAPAROSCOPIC LYSIS OF ADHESIONS USING DA SEBASTIEN XI N/A 2019    Procedure: XI ROBOTIC LYSIS, ADHESIONS;  Surgeon: Tabatha Quintanilla MD;  Location: [facility] OR;  Service: OB/GYN;  Laterality: N/A;    ROBOT-ASSISTED SURGICAL REMOVAL OF FALLOPIAN TUBE USING DA SEBASTIEN XI Right 2019    Procedure: XI ROBOTIC SALPINGECTOMY;  Surgeon: Tabatha Quintanilla MD;  Location: [facility] OR;  Service: OB/GYN;  Laterality: Right;    TONSILLECTOMY      TUBAL LIGATION         Review of patient's allergies indicates:   Allergen Reactions    Sulfamethoxazole-trimethoprim        Medications:  Medications Prior to Admission   Medication Sig    acetaminophen (TYLENOL) 500 MG tablet Take 500 mg by mouth every 6 (six) hours as needed for Pain.    albuterol (PROVENTIL/VENTOLIN HFA) 90 mcg/actuation inhaler Inhale 2 puffs into the lungs every 6 (six) hours as needed for Wheezing.    [] azithromycin (ZITHROMAX Z-YUAN) 250 MG tablet Take 2 tablets (500 mg) on  Day 1,  followed by 1 tablet (250 mg) once daily on Days 2 through 5.    dextromethorphan-guaiFENesin  "(MUCINEX DM) 60-1,200 mg per 12 hr tablet Take 1 tablet by mouth every 12 (twelve) hours.    ibuprofen (ADVIL,MOTRIN) 200 MG tablet Take 200 mg by mouth every 6 (six) hours as needed for Pain.     Antibiotics (From admission, onward)                Start     Stop Route Frequency Ordered    05/08/22 1730  vancomycin 1.5 g in dextrose 5 % 250 mL IVPB (ready to mix)         -- IV Every 12 hours (non-standard times) 05/08/22 1636    05/07/22 2100  mupirocin 2 % ointment         05/12 2059 Nasl 2 times daily 05/07/22 1646 05/07/22 0200  metronidazole IVPB 500 mg         -- IV Every 8 hours (non-standard times) 05/06/22 1954 05/07/22 0100  cefepime in dextrose 5 % IVPB 2 g         -- IV Every 8 hours (non-standard times) 05/06/22 1954 05/06/22 2052  vancomycin - pharmacy to dose  (vancomycin IVPB)        "And" Linked Group Details    -- IV pharmacy to manage frequency 05/06/22 1954          Antifungals (From admission, onward)                None          Antivirals (From admission, onward)      None             Immunization History   Administered Date(s) Administered    COVID-19 Vaccine 08/20/2021    Hepatitis A, Adult 07/10/2019    Influenza - Quadrivalent 10/15/2015, 12/29/2017, 11/13/2018    Influenza - Quadrivalent - PF *Preferred* (6 months and older) 10/01/2019    Pneumococcal Polysaccharide - 23 Valent 12/29/2017    Td (ADULT) 09/18/2012    Tdap 12/05/2019       Family History       Problem Relation (Age of Onset)    COPD Mother          Social History     Socioeconomic History    Marital status: Legally    Tobacco Use    Smoking status: Current Every Day Smoker     Packs/day: 1.50     Types: Cigarettes    Smokeless tobacco: Never Used    Tobacco comment: no smoking after midnight prior to surgery   Substance and Sexual Activity    Alcohol use: Yes     Comment: Occassional     Drug use: Not Currently     Types: IV, Methamphetamines, Heroin     Comment: denies heroin use.  Last used meth 10/2018  " No illegal drugs prior to sx    Sexual activity: Not Currently     Partners: Male     Birth control/protection: None     Review of Systems   Constitutional:  Positive for activity change. Negative for appetite change, chills, diaphoresis, fatigue and fever.   Respiratory:  Positive for shortness of breath. Negative for apnea and chest tightness.    Neurological:  Negative for dizziness and facial asymmetry.   Objective:     Vital Signs (Most Recent):  Temp: 99.9 °F (37.7 °C) (05/09/22 0452)  Pulse: (!) 132 (05/09/22 0452)  Resp: 17 (05/09/22 0618)  BP: 121/67 (05/09/22 0452)  SpO2: 96 % (05/09/22 0452)   Vital Signs (24h Range):  Temp:  [97.9 °F (36.6 °C)-99.9 °F (37.7 °C)] 99.9 °F (37.7 °C)  Pulse:  [] 132  Resp:  [16-22] 17  SpO2:  [94 %-98 %] 96 %  BP: (111-121)/(58-69) 121/67     Weight: 83 kg (182 lb 15.7 oz)  Body mass index is 30.45 kg/m².    Estimated Creatinine Clearance: 133.9 mL/min (based on SCr of 0.6 mg/dL).    Physical Exam  Vitals and nursing note reviewed. Exam conducted with a chaperone present (family).   Constitutional:       General: She is not in acute distress.     Appearance: She is well-developed. She is ill-appearing. She is not toxic-appearing.   HENT:      Head: Normocephalic and atraumatic.   Eyes:      Conjunctiva/sclera: Conjunctivae normal.      Pupils: Pupils are equal, round, and reactive to light.   Cardiovascular:      Rate and Rhythm: Normal rate and regular rhythm.      Heart sounds: Normal heart sounds. No murmur heard.  Pulmonary:      Effort: Pulmonary effort is normal. No respiratory distress.      Breath sounds: Normal breath sounds.   Abdominal:      General: Bowel sounds are normal. There is no distension.      Palpations: Abdomen is soft.      Tenderness: There is no abdominal tenderness.   Musculoskeletal:         General: Swelling and tenderness present. No deformity. Normal range of motion.      Cervical back: Normal range of motion and neck supple.      Right  lower leg: No edema.      Left lower leg: No edema.   Skin:     General: Skin is warm and dry.      Coloration: Skin is pale.      Findings: Erythema present.   Neurological:      Mental Status: She is alert and oriented to person, place, and time.      Motor: Weakness present.   Psychiatric:         Behavior: Behavior normal.       Significant Labs: Blood Culture:   Recent Labs   Lab 05/06/22  1616 05/06/22  1617 05/08/22  1515 05/08/22  1516   LABBLOO Gram stain aer bottle: Gram positive cocci in clusters resembling Staph   Gram stain missael bottle: Gram positive cocci in clusters resembling Staph   Results called to and read back by: Lila Hernandez RN 05/07/2022  10:57  STAPHYLOCOCCUS AUREUS  Susceptibility pending  ID consult required at [facility].  * Gram stain aer bottle: Gram positive cocci in clusters resembling Staph   Results called to and read back by: Lila Hernandez RN 05/07/2022  10:57  Gram stain missael bottle: Gram positive cocci in clusters resembling Staph   05/07/2022  13:40  STAPHYLOCOCCUS AUREUS  ID consult required at [facility].  For susceptibility see order #[id number]  * No Growth to date No Growth to date     CBC:   Recent Labs   Lab 05/08/22  1514   WBC 30.90*   HGB 7.8*   HCT 22.1*        CMP:   Recent Labs   Lab 05/07/22  0731 05/08/22  1514   NA  --  131*   K  --  3.6   CL  --  95   CO2  --  23   GLU  --  125*   BUN  --  14   CREATININE  --  0.6   CALCIUM  --  7.5*   PROT 5.0* 5.2*   ALBUMIN  --  1.4*   BILITOT  --  0.9   ALKPHOS  --  137*   AST  --  23   ALT  --  25   ANIONGAP  --  13   EGFRNONAA  --  >60       Significant Imaging:

## 2022-05-09 NOTE — PLAN OF CARE
Problem: Adult Inpatient Plan of Care  Goal: Plan of Care Review  Outcome: Ongoing, Progressing  Goal: Optimal Comfort and Wellbeing  Outcome: Ongoing, Progressing     Problem: Infection Progression (Sepsis/Septic Shock)  Goal: Absence of Infection Signs and Symptoms  Outcome: Ongoing, Progressing

## 2022-05-09 NOTE — ASSESSMENT & PLAN NOTE
CONSENTED AND AGREEABLE WITH A SUSANNAH   ALL QUESTIONS ANSWERED   PROCEDURE AND RISKS EXPLAINED IN DETAILS

## 2022-05-09 NOTE — CONSULTS
[facility] Surg  Neurosurgery  Consult Note    Consults  Subjective:     Chief Complaint/Reason for Admission:  Lower back and flank pain    History of Present Illness: 38 y/o. female   chronic history of lower back pain which has been intermittent presented to the ER with a PMHx of asthma, COPD,Bipolar  , IVDU and HLD presented to the ER with a c/o  sob for the last weak which has gotten worse . The SOB is associated with fever , chills , productive cough , back pain  and generalized weakness . She report cough some blood this am .  She is active IVDU  ( heroine , cocaine  and amphetamine ) . She denies any  sick contact , chest pain  , GI/ sx , She also complaning of LE sweeling . Knee pain and B/L LE rash .  We see the diagnosed with Gram-positive cocci in the blood  ER COURSE: CTA chest negative for pe . Multiple nodular and cavitary opacities concerning for septic emboli with larger opacities possibly necrotizing pneumonia. CT cervical  and thoracic did not show any acute finding , CT lumbar Possible progression of degenerative changes most notable at L4-5 with moderate to severe spinal canal stenosis at this level.  She currently complains of pain throughout the lumbar region extending into the flank area with some tenderness difficulties laying on her back and pain with twisting and bending she has some pain referred into the buttocks but denies any pain radiating into the legs feet or toes she denies any numbness or tingling in the feet or toes and no bowel or bladder incontinence at this time    PTA Medications   Medication Sig    acetaminophen (TYLENOL) 500 MG tablet Take 500 mg by mouth every 6 (six) hours as needed for Pain.    albuterol (PROVENTIL/VENTOLIN HFA) 90 mcg/actuation inhaler Inhale 2 puffs into the lungs every 6 (six) hours as needed for Wheezing.    [] azithromycin (ZITHROMAX Z-YUAN) 250 MG tablet Take 2 tablets (500 mg) on  Day 1,  followed by 1 tablet (250 mg) once daily on  Days 2 through 5.    dextromethorphan-guaiFENesin (MUCINEX DM) 60-1,200 mg per 12 hr tablet Take 1 tablet by mouth every 12 (twelve) hours.    ibuprofen (ADVIL,MOTRIN) 200 MG tablet Take 200 mg by mouth every 6 (six) hours as needed for Pain.       Review of patient's allergies indicates:   Allergen Reactions    Sulfamethoxazole-trimethoprim        Past Medical History:   Diagnosis Date    ADHD (attention deficit hyperactivity disorder)     Anxiety     Asthma     Bipolar disorder     Cancer     cervical    COPD (chronic obstructive pulmonary disease)     GERD (gastroesophageal reflux disease)     Hepatitis C antibody positive in blood     RNA UNDETECTED 2016    Hyperlipidemia     Intravenous drug abuse     MDD (major depressive disorder)     Mood disorder     PTSD (post-traumatic stress disorder)     Suicidal ideation      Past Surgical History:   Procedure Laterality Date     SECTION  2001    x2    CHOLECYSTECTOMY      HYSTERECTOMY      LAPAROSCOPIC SALPINGECTOMY      ROBOT-ASSISTED LAPAROSCOPIC ABDOMINAL HYSTERECTOMY USING DA SEBASTIEN XI N/A 2019    Procedure: XI ROBOTIC HYSTERECTOMY;  Surgeon: Tabatha Quintanilla MD;  Location: [facility] OR;  Service: OB/GYN;  Laterality: N/A;    ROBOT-ASSISTED LAPAROSCOPIC LYSIS OF ADHESIONS USING DA SEBASTIEN XI N/A 2019    Procedure: XI ROBOTIC LYSIS, ADHESIONS;  Surgeon: Tabatha Quintanilla MD;  Location: [facility] OR;  Service: OB/GYN;  Laterality: N/A;    ROBOT-ASSISTED SURGICAL REMOVAL OF FALLOPIAN TUBE USING DA SEBASTIEN XI Right 2019    Procedure: XI ROBOTIC SALPINGECTOMY;  Surgeon: Tabatha Quintanilla MD;  Location: [facility] OR;  Service: OB/GYN;  Laterality: Right;    TONSILLECTOMY      TUBAL LIGATION       Family History     Problem Relation (Age of Onset)    COPD Mother        Tobacco Use    Smoking status: Current Every Day Smoker     Packs/day: 1.50     Types: Cigarettes    Smokeless tobacco: Never Used    Tobacco  comment: no smoking after midnight prior to surgery   Substance and Sexual Activity    Alcohol use: Yes     Comment: Occassional     Drug use: Not Currently     Types: IV, Methamphetamines, Heroin     Comment: denies heroin use.  Last used meth 10/2018  No illegal drugs prior to sx    Sexual activity: Not Currently     Partners: Male     Birth control/protection: None     Review of Systems  Objective:     Weight: 83 kg (182 lb 15.7 oz)  Body mass index is 30.45 kg/m².  Vital Signs (Most Recent):  Temp: 98.4 °F (36.9 °C) (05/09/22 1145)  Pulse: 88 (05/09/22 1145)  Resp: 18 (05/09/22 1145)  BP: 114/65 (05/09/22 1145)  SpO2: 100 % (05/09/22 1145) Vital Signs (24h Range):  Temp:  [98.1 °F (36.7 °C)-99.9 °F (37.7 °C)] 98.4 °F (36.9 °C)  Pulse:  [] 88  Resp:  [16-22] 18  SpO2:  [94 %-100 %] 100 %  BP: (112-121)/(59-67) 114/65                Oxygen Concentration (%):  [28] 28         Physical Exam:  Nursing note and vitals reviewed.    Eyes: Pupils are equal, round, and reactive to light. Conjunctivae and EOM are normal.     Cardiovascular: Normal rate and regular rhythm.     Abdominal: Soft. Bowel sounds are normal.     Musculoskeletal: Gait is abnormal.        Neck: Muscle strength is 5/5.        Back: Muscle strength is 5/5.        Right Upper Extremities: Range of motion is full. Muscle strength is 5/5.        Left Upper Extremities: Range of motion is full. Muscle strength is 5/5.       Right Lower Extremities: Range of motion is limited. Muscle strength is 5/5.        Left Lower Extremities: Range of motion is limited. Muscle strength is 5/5.     Neurological:        Coordination: She has normal finger to nose coordination and normal heel to shin coordination.        DTRs: DTRs are DTRS NORMAL AND SYMMETRICnormal and symmetric. She displays no Babinski's sign on the right side. She displays no Babinski's sign on the left side.        Cranial nerves: Cranial nerve(s) II, III, IV, V, VI, VII, VIII, IX, X, XI  and XII are intact.   Patient is awake and alert speech is clear and fluent cranial nerves are intact cervical exam revealed normal range of motion.  Upper extremity strength showed 5/5 strength lower extremity exam showed slight break away weakness with hip flexion and quadriceps.  Patient had 4+ out of 5 extensor hallucis longus strength otherwise remaining motor exam showed 5-out of 5 strength in the lower extremities sensation was grossly intact to light touch temperature pinprick..  Patient decreased range of motion of the lumbar spine with diffuse tenderness over the paraspinous muscles.  Deep tendon reflexes were decreased throughout cerebellar exam showed no obvious dysmetria patient found it difficult to ambulate secondary to pain in the back.       Significant Labs:  Recent Labs   Lab 05/08/22  1514 05/09/22  0730   * 113*  113*   * 130*  130*   K 3.6 3.9  3.9   CL 95 95  95   CO2 23 28  28   BUN 14 10  10   CREATININE 0.6 0.5  0.5   CALCIUM 7.5* 7.1*  7.1*     Recent Labs   Lab 05/08/22  1514 05/09/22  0730   WBC 30.90* 33.79*  33.79*   HGB 7.8* 7.4*  7.4*   HCT 22.1* 21.8*  21.8*    176  176     No results for input(s): LABPT, INR, APTT in the last 48 hours.  Microbiology Results (last 7 days)     Procedure Component Value Units Date/Time    Blood culture [[id number]] Collected: 05/08/22 1516    Order Status: Completed Specimen: Blood from Peripheral, Right Hand Updated: 05/09/22 1132     Blood Culture, Routine Gram stain aer bottle: Gram positive cocci in clusters resembling Staph       Results called to and read back by: Chasity Raymundo RN  05/09/2022  11:31    Narrative:      Collection has been rescheduled by [email or web address] at 05/08/2022 13:22 Reason:   Pt in mri will be there after another hour [id number]  Collection has been rescheduled by [email or web address] at 05/08/2022 13:22 Reason:   Pt in mri will be there after another hour [id number]    Blood culture x two cultures. Draw prior to  antibiotics. [[id number]]  (Abnormal) Collected: 05/06/22 1617    Order Status: Completed Specimen: Blood from Peripheral, Antecubital, Right Updated: 05/09/22 0824     Blood Culture, Routine Gram stain aer bottle: Gram positive cocci in clusters resembling Staph       Results called to and read back by: Lila Hernandez RN 05/07/2022  10:57      Gram stain missael bottle: Gram positive cocci in clusters resembling Staph       05/07/2022  13:40      STAPHYLOCOCCUS AUREUS  ID consult required at [facility].  For susceptibility see order #[id number]      Narrative:      Aerobic and anaerobic    Blood culture x two cultures. Draw prior to antibiotics. [[id number]]  (Abnormal)  (Susceptibility) Collected: 05/06/22 1616    Order Status: Completed Specimen: Blood from Peripheral, Antecubital, Left Updated: 05/09/22 0823     Blood Culture, Routine Gram stain aer bottle: Gram positive cocci in clusters resembling Staph       Gram stain missael bottle: Gram positive cocci in clusters resembling Staph       Results called to and read back by: Lila Hernandez RN 05/07/2022  10:57      STAPHYLOCOCCUS AUREUS  ID consult required at [facility].      Narrative:      Aerobic and anaerobic    Blood culture [[id number]] Collected: 05/08/22 1515    Order Status: Completed Specimen: Blood from Peripheral, Left Arm Updated: 05/09/22 0315     Blood Culture, Routine No Growth to date    Narrative:      Collection has been rescheduled by [email or web address] at 05/08/2022 13:22 Reason:   Pt in mri will be there after another hour [id number]  Collection has been rescheduled by [email or web address] at 05/08/2022 13:22 Reason:   Pt in mri will be there after another hour [id number]    Gram stain [[id number]] Collected: 05/07/22 0901    Order Status: Sent Specimen: Body Fluid from Pleural Fluid Updated: 05/07/22 0905    Fungus culture [[id number]] Collected: 05/07/22 0901    Order Status: Sent Specimen: Body Fluid from Pleural Fluid  Updated: 05/07/22 0905    AFB culture (includes stain) [[id number]] Collected: 05/07/22 0901    Order Status: Sent Specimen: Body Fluid from Pleural Fluid Updated: 05/07/22 0905        Recent Lab Results       05/09/22  0730   05/08/22  1516   05/08/22  1515   05/08/22  1514        Albumin 1.3       1.4        1.3             Alkaline Phosphatase 94       137        94             ALT 23       25        23             Anion Gap 7       13        7             Aniso       Slight       AST 19       23        19             Bands       1.0       Basophil %       0.0       BILIRUBIN TOTAL 1.1  Comment: For infants and newborns, interpretation of results should be based  on gestational age, weight and in agreement with clinical  observations.    Premature Infant recommended reference ranges:  Up to 24 hours.............<8.0 mg/dL  Up to 48 hours............<12.0 mg/dL  3-5 days..................<15.0 mg/dL  6-29 days.................<15.0 mg/dL         0.9  Comment: For infants and newborns, interpretation of results should be based  on gestational age, weight and in agreement with clinical  observations.    Premature Infant recommended reference ranges:  Up to 24 hours.............<8.0 mg/dL  Up to 48 hours............<12.0 mg/dL  3-5 days..................<15.0 mg/dL  6-29 days.................<15.0 mg/dL          1.1  Comment: For infants and newborns, interpretation of results should be based  on gestational age, weight and in agreement with clinical  observations.    Premature Infant recommended reference ranges:  Up to 24 hours.............<8.0 mg/dL  Up to 48 hours............<12.0 mg/dL  3-5 days..................<15.0 mg/dL  6-29 days.................<15.0 mg/dL               Blood Culture, Routine   Gram stain aer bottle: Gram positive cocci in clusters resembling Staph   [P]   No Growth to date  [P]            Results called to and read back by: Chasity Raymundo RN  05/09/2022  11:31  [P]           BUN 10        14        10             Calcium 7.1       7.5        7.1             Chloride 95       95        95             CO2 28       23        28             Creatinine 0.5       0.6        0.5             Differential Method       Manual       eGFR if  >60       >60        >60             eGFR if non  >60  Comment: Calculation used to obtain the estimated glomerular filtration  rate (eGFR) is the CKD-EPI equation.          >60  Comment: Calculation used to obtain the estimated glomerular filtration  rate (eGFR) is the CKD-EPI equation.           >60  Comment: Calculation used to obtain the estimated glomerular filtration  rate (eGFR) is the CKD-EPI equation.                Eosinophil %       0.0       Glucose 113       125        113             Gran %       89.0       Hematocrit 21.8       22.1        21.8             Hemoglobin 7.4       7.8        7.4             Immature Grans (Abs)       CANCELED  Comment: Mild elevation in immature granulocytes is non specific and   can be seen in a variety of conditions including stress response,   acute inflammation, trauma and pregnancy. Correlation with other   laboratory and clinical findings is essential.    Result canceled by the ancillary.         Immature Granulocytes       CANCELED  Comment: Result canceled by the ancillary.       Lymph %       6.0       MCH 28.9       29.2        28.9             MCHC 33.9       35.3        33.9             MCV 85       83        85             Mono %       4.0       MPV 12.0       11.4        12.0             nRBC       0       Platelet Estimate       Appears normal       Platelets 176       165        176             Potassium 3.9       3.6        3.9             PROTEIN TOTAL 4.9       5.2        4.9             RBC 2.56       2.67        2.56             RDW 14.2       14.5        14.2             Sodium 130       131        130             Vancomycin, Random       11.4       WBC 33.79       30.90         33.79                    [P] - Preliminary Result           All pertinent labs from the last 24 hours have been reviewed.  Significant Diagnostics:  MRI: No results found in the last 24 hours.  I have reviewed all pertinent imaging results/findings within the past 24 hours.  Degenerative findings:     T12-L1: No significant disc bulge, spinal canal or neural foraminal stenosis.     L1-L2: No significant disc bulge, spinal canal or neural foraminal stenosis.     L2-L3: No significant disc bulge.  Small amount of fluid within the right facet joint with adjacent fluid collection.  No significant spinal canal or neural foraminal stenosis.     L3-L4: Disc desiccation.  Mild broad-based disc bulge.  No significant spinal canal or neural foraminal stenosis.     L4-L5: Disc height loss and desiccation.  Broad-based disc bulge with superimposed central disc protrusion and mild bilateral facet arthropathy and ligamentum flavum hypertrophy.  Moderate to severe spinal canal stenosis with moderate left-sided neural foraminal stenosis.  Fluid within the left facet joint.     L5-S1: Asymmetric left disc bulge involving the left neural foramen.  Narrowing of the left lateral recess and moderate to severe left-sided neural foraminal stenosis.  No significant spinal canal stenosis.     Paraspinal muscles & soft tissues: Rim enhancing fluid collection identified emanating from the right L2-L3 facet joint measures roughly 1.4 x 1.7 x 2.5 cm.  Additional ring-enhancing fluid collection is identified emanating from the left L4-L5 facet joint measuring roughly 1.9 x 1.9 x 2.4 cm.  Paraspinal soft tissues otherwise are grossly within normal limits.     Impression:     No MR evidence of epidural abscess.     Large, rim enhancing facet synovial cysts arising posteriorly from the right L2-L3 and left L4-L5 facet joints may be degenerative or sequelae of facet septic arthritis.     Minor enhancement and endplate irregularity at L4-L5 may be  degenerative versus less likely the sequelae of spondylodiscitis.  No significant associated endplate destruction or vertebral body height loss.     Multilevel degenerative changes of the lumbar spine are most pronounced at L4-L5 with broad-based disc bulge and prominent central disc protrusion contributing to moderate to severe spinal canal stenosis with moderate left-sided neural foraminal stenosis.     Asymmetric left L5-S1 disc bulge contributes to moderate to severe left-sided neural foraminal stenosis and left lateral recess stenosis.     Assessment/Plan:   1. Chronic lower back pain with L4-5 central disc protrusion causing mild-to-moderate lateral recess stenosis.  Patient with minor radicular leg symptoms would recommend continue conservative management given patient's current infectious status would hold off on any surgery patient may follow up in neurosurgery clinic for further treatment of the back and possible surgery once her infection is cleared  2. Possible paraspinous muscle and joint infection at L2-3 and L4-5 recommend Interventional Radiology to perform CT-guided aspiration for diagnostic treatment if indicated if unable to find other source of infection.  Patient will require 6-8 weeks of IV antibiotics for treatment.  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] 05/09/2022 Unknown    Chronic pulmonary heart disease [I27.9] 05/08/2022 Yes    Vegetation of heart valve [I33.0] 05/08/2022 Yes    Subacute bacterial endocarditis [I33.0] 05/08/2022 Yes    Parapneumonic effusion [J18.9, J91.8] 05/07/2022 Yes    PNA (pneumonia) [J18.9] 05/06/2022 Yes    Severe sepsis [A41.9, R65.20] 05/06/2022 Yes    IVDU (intravenous drug user) [F19.90]  05/06/2022 Yes    Tobacco abuse [Z72.0] 10/01/2019 Yes    COPD (chronic obstructive pulmonary disease) [J44.9] 12/29/2017 Yes    Lower back pain [M54.50] 10/24/2013 Yes      Problems Resolved During this Admission:   More than 45 minutes of the time spent in counseling and coordination of care including discussions etiology of diagnosis, pathogenesis of diagnosis, prognosis of diagnosis,, diagnostic results, impression and recommendations, diagnostic studies, management, risks and benefits of treatment, instructions of disease self-management, treatment instructions, follow up requirements, patient and family counseling/involvement in care compliance with treatment regimen. All of the patient's and/or family members questions were answered during this discussion.      Thank you for your consult. I will follow-up with patient. Please contact us if you have any additional questions.    Didier Hess MD  Neurosurgery  [facility] - Med Surg

## 2022-05-09 NOTE — CONSULTS
[facility] - Med Surg  Infectious Disease  Consult Note    Patient Name: Tianna Leon  MRN: [id number]  Admission Date: 5/6/2022  Hospital Length of Stay: 3 days  Attending Physician: Elizabeth Kim MD  Primary Care Provider: Spenser Campa MD     Isolation Status: No active isolations    Patient information was obtained from patient, past medical records and ER records.      Consults  Assessment/Plan:     * Bacteremia  Isolate is staph Aureus - will continue Vanco/cefepime.flagyl    Subacute bacterial endocarditis  Follow drug susceptibility of staph Aureus - continue Vancomycin, Flagyl.cefepime for now      IVDU (intravenous drug user)  This is the crux of the problem - needs drug cessation     Severe sepsis  Will continue Cefepime/vancomycin,flagyl  Will follow drug susceptibility of staph aureus   CVT follow up        Thank you for your consult. I will follow-up with patient. Please contact us if you have any additional questions.    Sunday Hassan MD  Infectious Disease  [facility]    Subjective:     Principal Problem: Bacteremia    HPI:   38 y/o. female  with a PMHx of asthma, COPD,Bipolar  , IVDU and HLD ,active IVDU  ( heroine , cocaine  and amphetamine )   : CTA chest negative for pe . Multiple nodular and cavitary opacities concerning for septic emboli with larger opacities possibly necrotizing pneumonia. CT cervical  and thoracic did not show any acute finding , CT lumbar Possible progression of degenerative changes most notable at L4-5 with moderate to severe spinal canal stenosis at this level.  Echo-  · to moderate tricuspid regurgitation.     There is a 1.3 x 1.5 cm elongated, mobile echodensity seen on the tricuspid valve consistent with vegetation. Recommend CT surgery consult.   Blood culture- staph auerus  Component      Latest Ref Rng & Units 5/8/2022 5/7/2022 5/6/2022   WBC      3.90 - 12.70 K/uL 30.90 (H) 27.52 (H) 29.13 (H)       Past Medical History:   Diagnosis Date    ADHD (attention  deficit hyperactivity disorder)     Anxiety     Asthma     Bipolar disorder     Cancer     cervical    COPD (chronic obstructive pulmonary disease)     GERD (gastroesophageal reflux disease)     Hepatitis C antibody positive in blood     RNA UNDETECTED 2016    Hyperlipidemia     Intravenous drug abuse     MDD (major depressive disorder)     Mood disorder     PTSD (post-traumatic stress disorder)     Suicidal ideation        Past Surgical History:   Procedure Laterality Date     SECTION  2001    x2    CHOLECYSTECTOMY      HYSTERECTOMY      LAPAROSCOPIC SALPINGECTOMY      ROBOT-ASSISTED LAPAROSCOPIC ABDOMINAL HYSTERECTOMY USING DA SEBASTIEN XI N/A 2019    Procedure: XI ROBOTIC HYSTERECTOMY;  Surgeon: Tabatha Quintanilla MD;  Location: [facility] OR;  Service: OB/GYN;  Laterality: N/A;    ROBOT-ASSISTED LAPAROSCOPIC LYSIS OF ADHESIONS USING DA SEBASTIEN XI N/A 2019    Procedure: XI ROBOTIC LYSIS, ADHESIONS;  Surgeon: Tabatha Quintanilla MD;  Location: [facility] OR;  Service: OB/GYN;  Laterality: N/A;    ROBOT-ASSISTED SURGICAL REMOVAL OF FALLOPIAN TUBE USING DA SEBASTIEN XI Right 2019    Procedure: XI ROBOTIC SALPINGECTOMY;  Surgeon: Tabatha Quintanilla MD;  Location: [facility] OR;  Service: OB/GYN;  Laterality: Right;    TONSILLECTOMY      TUBAL LIGATION         Review of patient's allergies indicates:   Allergen Reactions    Sulfamethoxazole-trimethoprim        Medications:  Medications Prior to Admission   Medication Sig    acetaminophen (TYLENOL) 500 MG tablet Take 500 mg by mouth every 6 (six) hours as needed for Pain.    albuterol (PROVENTIL/VENTOLIN HFA) 90 mcg/actuation inhaler Inhale 2 puffs into the lungs every 6 (six) hours as needed for Wheezing.    [] azithromycin (ZITHROMAX Z-YUAN) 250 MG tablet Take 2 tablets (500 mg) on  Day 1,  followed by 1 tablet (250 mg) once daily on Days 2 through 5.    dextromethorphan-guaiFENesin (MUCINEX DM) 60-1,200 mg per 12 hr  "tablet Take 1 tablet by mouth every 12 (twelve) hours.    ibuprofen (ADVIL,MOTRIN) 200 MG tablet Take 200 mg by mouth every 6 (six) hours as needed for Pain.     Antibiotics (From admission, onward)                Start     Stop Route Frequency Ordered    05/08/22 1730  vancomycin 1.5 g in dextrose 5 % 250 mL IVPB (ready to mix)         -- IV Every 12 hours (non-standard times) 05/08/22 1636    05/07/22 2100  mupirocin 2 % ointment         05/12 2059 Nasl 2 times daily 05/07/22 1646 05/07/22 0200  metronidazole IVPB 500 mg         -- IV Every 8 hours (non-standard times) 05/06/22 1954 05/07/22 0100  cefepime in dextrose 5 % IVPB 2 g         -- IV Every 8 hours (non-standard times) 05/06/22 1954 05/06/22 2052  vancomycin - pharmacy to dose  (vancomycin IVPB)        "And" Linked Group Details    -- IV pharmacy to manage frequency 05/06/22 1954          Antifungals (From admission, onward)                None          Antivirals (From admission, onward)      None             Immunization History   Administered Date(s) Administered    COVID-19 Vaccine 08/20/2021    Hepatitis A, Adult 07/10/2019    Influenza - Quadrivalent 10/15/2015, 12/29/2017, 11/13/2018    Influenza - Quadrivalent - PF *Preferred* (6 months and older) 10/01/2019    Pneumococcal Polysaccharide - 23 Valent 12/29/2017    Td (ADULT) 09/18/2012    Tdap 12/05/2019       Family History       Problem Relation (Age of Onset)    COPD Mother          Social History     Socioeconomic History    Marital status: Legally    Tobacco Use    Smoking status: Current Every Day Smoker     Packs/day: 1.50     Types: Cigarettes    Smokeless tobacco: Never Used    Tobacco comment: no smoking after midnight prior to surgery   Substance and Sexual Activity    Alcohol use: Yes     Comment: Occassional     Drug use: Not Currently     Types: IV, Methamphetamines, Heroin     Comment: denies heroin use.  Last used meth 10/2018  No illegal drugs " prior to sx    Sexual activity: Not Currently     Partners: Male     Birth control/protection: None     Review of Systems   Constitutional:  Positive for activity change. Negative for appetite change, chills, diaphoresis, fatigue and fever.   Respiratory:  Positive for shortness of breath. Negative for apnea and chest tightness.    Neurological:  Negative for dizziness and facial asymmetry.   Objective:     Vital Signs (Most Recent):  Temp: 99.9 °F (37.7 °C) (05/09/22 0452)  Pulse: (!) 132 (05/09/22 0452)  Resp: 17 (05/09/22 0618)  BP: 121/67 (05/09/22 0452)  SpO2: 96 % (05/09/22 0452)   Vital Signs (24h Range):  Temp:  [97.9 °F (36.6 °C)-99.9 °F (37.7 °C)] 99.9 °F (37.7 °C)  Pulse:  [] 132  Resp:  [16-22] 17  SpO2:  [94 %-98 %] 96 %  BP: (111-121)/(58-69) 121/67     Weight: 83 kg (182 lb 15.7 oz)  Body mass index is 30.45 kg/m².    Estimated Creatinine Clearance: 133.9 mL/min (based on SCr of 0.6 mg/dL).    Physical Exam  Vitals and nursing note reviewed. Exam conducted with a chaperone present (family).   Constitutional:       General: She is not in acute distress.     Appearance: She is well-developed. She is ill-appearing. She is not toxic-appearing.   HENT:      Head: Normocephalic and atraumatic.   Eyes:      Conjunctiva/sclera: Conjunctivae normal.      Pupils: Pupils are equal, round, and reactive to light.   Cardiovascular:      Rate and Rhythm: Normal rate and regular rhythm.      Heart sounds: Normal heart sounds. No murmur heard.  Pulmonary:      Effort: Pulmonary effort is normal. No respiratory distress.      Breath sounds: Normal breath sounds.   Abdominal:      General: Bowel sounds are normal. There is no distension.      Palpations: Abdomen is soft.      Tenderness: There is no abdominal tenderness.   Musculoskeletal:         General: Swelling and tenderness present. No deformity. Normal range of motion.      Cervical back: Normal range of motion and neck supple.      Right lower leg: No  edema.      Left lower leg: No edema.   Skin:     General: Skin is warm and dry.      Coloration: Skin is pale.      Findings: Erythema present.   Neurological:      Mental Status: She is alert and oriented to person, place, and time.      Motor: Weakness present.   Psychiatric:         Behavior: Behavior normal.       Significant Labs: Blood Culture:   Recent Labs   Lab 05/06/22  1616 05/06/22  1617 05/08/22  1515 05/08/22  1516   LABBLOO Gram stain aer bottle: Gram positive cocci in clusters resembling Staph   Gram stain missael bottle: Gram positive cocci in clusters resembling Staph   Results called to and read back by: Lila Hernandez RN 05/07/2022  10:57  STAPHYLOCOCCUS AUREUS  Susceptibility pending  ID consult required at [facility].  * Gram stain aer bottle: Gram positive cocci in clusters resembling Staph   Results called to and read back by: Lila Hernandez RN 05/07/2022  10:57  Gram stain missael bottle: Gram positive cocci in clusters resembling Staph   05/07/2022  13:40  STAPHYLOCOCCUS AUREUS  ID consult required at [facility].  For susceptibility see order #[id number]  * No Growth to date No Growth to date     CBC:   Recent Labs   Lab 05/08/22  1514   WBC 30.90*   HGB 7.8*   HCT 22.1*        CMP:   Recent Labs   Lab 05/07/22  0731 05/08/22  1514   NA  --  131*   K  --  3.6   CL  --  95   CO2  --  23   GLU  --  125*   BUN  --  14   CREATININE  --  0.6   CALCIUM  --  7.5*   PROT 5.0* 5.2*   ALBUMIN  --  1.4*   BILITOT  --  0.9   ALKPHOS  --  137*   AST  --  23   ALT  --  25   ANIONGAP  --  13   EGFRNONAA  --  >60       Significant Imaging:

## 2022-05-09 NOTE — NURSING
Notified Kike Buckley NP by way of secure chat of a positive blood culture collected on 5/8 at 15:16. Tatum Carlisle at the main campus called with the results of -aerobic bottle, gram + cocci in clusters-.

## 2022-05-09 NOTE — H&P (VIEW-ONLY)
[facility] S[facility]  Neurosurgery  Consult Note    Consults  Subjective:     Chief Complaint/Reason for Admission:  Lower back and flank pain    History of Present Illness: 38 y/o. female   chronic history of lower back pain which has been intermittent presented to the ER with a PMHx of asthma, COPD,Bipolar  , IVDU and HLD presented to the ER with a c/o  sob for the last weak which has gotten worse . The SOB is associated with fever , chills , productive cough , back pain  and generalized weakness . She report cough some blood this am .  She is active IVDU  ( heroine , cocaine  and amphetamine ) . She denies any  sick contact , chest pain  , GI/ sx , She also complaning of LE sweeling . Knee pain and B/L LE rash .  We see the diagnosed with Gram-positive cocci in the blood  ER COURSE: CTA chest negative for pe . Multiple nodular and cavitary opacities concerning for septic emboli with larger opacities possibly necrotizing pneumonia. CT cervical  and thoracic did not show any acute finding , CT lumbar Possible progression of degenerative changes most notable at L4-5 with moderate to severe spinal canal stenosis at this level.  She currently complains of pain throughout the lumbar region extending into the flank area with some tenderness difficulties laying on her back and pain with twisting and bending she has some pain referred into the buttocks but denies any pain radiating into the legs feet or toes she denies any numbness or tingling in the feet or toes and no bowel or bladder incontinence at this time    PTA Medications   Medication Sig    acetaminophen (TYLENOL) 500 MG tablet Take 500 mg by mouth every 6 (six) hours as needed for Pain.    albuterol (PROVENTIL/VENTOLIN HFA) 90 mcg/actuation inhaler Inhale 2 puffs into the lungs every 6 (six) hours as needed for Wheezing.    [] azithromycin (ZITHROMAX Z-YUAN) 250 MG tablet Take 2 tablets (500 mg) on  Day 1,  followed by 1 tablet (250 mg) once daily on  Days 2 through 5.    dextromethorphan-guaiFENesin (MUCINEX DM) 60-1,200 mg per 12 hr tablet Take 1 tablet by mouth every 12 (twelve) hours.    ibuprofen (ADVIL,MOTRIN) 200 MG tablet Take 200 mg by mouth every 6 (six) hours as needed for Pain.       Review of patient's allergies indicates:   Allergen Reactions    Sulfamethoxazole-trimethoprim        Past Medical History:   Diagnosis Date    ADHD (attention deficit hyperactivity disorder)     Anxiety     Asthma     Bipolar disorder     Cancer     cervical    COPD (chronic obstructive pulmonary disease)     GERD (gastroesophageal reflux disease)     Hepatitis C antibody positive in blood     RNA UNDETECTED 2016    Hyperlipidemia     Intravenous drug abuse     MDD (major depressive disorder)     Mood disorder     PTSD (post-traumatic stress disorder)     Suicidal ideation      Past Surgical History:   Procedure Laterality Date     SECTION  2001    x2    CHOLECYSTECTOMY      HYSTERECTOMY      LAPAROSCOPIC SALPINGECTOMY      ROBOT-ASSISTED LAPAROSCOPIC ABDOMINAL HYSTERECTOMY USING DA SEBASTIEN XI N/A 2019    Procedure: XI ROBOTIC HYSTERECTOMY;  Surgeon: Tabatha Quintanilla MD;  Location: [facility], [place] OR;  Service: OB/GYN;  Laterality: N/A;    ROBOT-ASSISTED LAPAROSCOPIC LYSIS OF ADHESIONS USING DA SEBASTIEN XI N/A 2019    Procedure: XI ROBOTIC LYSIS, ADHESIONS;  Surgeon: Tabatha Quintanilla MD;  Location: [facility], [place] OR;  Service: OB/GYN;  Laterality: N/A;    ROBOT-ASSISTED SURGICAL REMOVAL OF FALLOPIAN TUBE USING DA SEBASTIEN XI Right 2019    Procedure: XI ROBOTIC SALPINGECTOMY;  Surgeon: Tabatha Quintanilla MD;  Location: [facility], [place] OR;  Service: OB/GYN;  Laterality: Right;    TONSILLECTOMY      TUBAL LIGATION       Family History     Problem Relation (Age of Onset)    COPD Mother        Tobacco Use    Smoking status: Current Every Day Smoker     Packs/day: 1.50     Types: Cigarettes    Smokeless tobacco: Never Used    Tobacco  comment: no smoking after midnight prior to surgery   Substance and Sexual Activity    Alcohol use: Yes     Comment: Occassional     Drug use: Not Currently     Types: IV, Methamphetamines, Heroin     Comment: denies heroin use.  Last used meth 10/2018  No illegal drugs prior to sx    Sexual activity: Not Currently     Partners: Male     Birth control/protection: None     Review of Systems  Objective:     Weight: 83 kg (182 lb 15.7 oz)  Body mass index is 30.45 kg/m².  Vital Signs (Most Recent):  Temp: 98.4 °F (36.9 °C) (05/09/22 1145)  Pulse: 88 (05/09/22 1145)  Resp: 18 (05/09/22 1145)  BP: 114/65 (05/09/22 1145)  SpO2: 100 % (05/09/22 1145) Vital Signs (24h Range):  Temp:  [98.1 °F (36.7 °C)-99.9 °F (37.7 °C)] 98.4 °F (36.9 °C)  Pulse:  [] 88  Resp:  [16-22] 18  SpO2:  [94 %-100 %] 100 %  BP: (112-121)/(59-67) 114/65                Oxygen Concentration (%):  [28] 28         Physical Exam:  Nursing note and vitals reviewed.    Eyes: Pupils are equal, round, and reactive to light. Conjunctivae and EOM are normal.     Cardiovascular: Normal rate and regular rhythm.     Abdominal: Soft. Bowel sounds are normal.     Musculoskeletal: Gait is abnormal.        Neck: Muscle strength is 5/5.        Back: Muscle strength is 5/5.        Right Upper Extremities: Range of motion is full. Muscle strength is 5/5.        Left Upper Extremities: Range of motion is full. Muscle strength is 5/5.       Right Lower Extremities: Range of motion is limited. Muscle strength is 5/5.        Left Lower Extremities: Range of motion is limited. Muscle strength is 5/5.     Neurological:        Coordination: She has normal finger to nose coordination and normal heel to shin coordination.        DTRs: DTRs are DTRS NORMAL AND SYMMETRICnormal and symmetric. She displays no Babinski's sign on the right side. She displays no Babinski's sign on the left side.        Cranial nerves: Cranial nerve(s) II, III, IV, V, VI, VII, VIII, IX, X, XI  and XII are intact.   Patient is awake and alert speech is clear and fluent cranial nerves are intact cervical exam revealed normal range of motion.  Upper extremity strength showed 5/5 strength lower extremity exam showed slight break away weakness with hip flexion and quadriceps.  Patient had 4+ out of 5 extensor hallucis longus strength otherwise remaining motor exam showed 5-out of 5 strength in the lower extremities sensation was grossly intact to light touch temperature pinprick..  Patient decreased range of motion of the lumbar spine with diffuse tenderness over the paraspinous muscles.  Deep tendon reflexes were decreased throughout cerebellar exam showed no obvious dysmetria patient found it difficult to ambulate secondary to pain in the back.       Significant Labs:  Recent Labs   Lab 05/08/22  1514 05/09/22  0730   * 113*  113*   * 130*  130*   K 3.6 3.9  3.9   CL 95 95  95   CO2 23 28  28   BUN 14 10  10   CREATININE 0.6 0.5  0.5   CALCIUM 7.5* 7.1*  7.1*     Recent Labs   Lab 05/08/22  1514 05/09/22  0730   WBC 30.90* 33.79*  33.79*   HGB 7.8* 7.4*  7.4*   HCT 22.1* 21.8*  21.8*    176  176     No results for input(s): LABPT, INR, APTT in the last 48 hours.  Microbiology Results (last 7 days)     Procedure Component Value Units Date/Time    Blood culture [[id number]] Collected: 05/08/22 1516    Order Status: Completed Specimen: Blood from Peripheral, Right Hand Updated: 05/09/22 1132     Blood Culture, Routine Gram stain aer bottle: Gram positive cocci in clusters resembling Staph       Results called to and read back by: Chasity Raymundo RN  05/09/2022  11:31    Narrative:      Collection has been rescheduled by [email or web address] at 05/08/2022 13:22 Reason:   Pt in mri will be there after another hour [id number]  Collection has been rescheduled by [email or web address] at 05/08/2022 13:22 Reason:   Pt in mri will be there after another hour [id number]    Blood culture x two cultures. Draw prior to  antibiotics. [[id number]]  (Abnormal) Collected: 05/06/22 1617    Order Status: Completed Specimen: Blood from Peripheral, Antecubital, Right Updated: 05/09/22 0824     Blood Culture, Routine Gram stain aer bottle: Gram positive cocci in clusters resembling Staph       Results called to and read back by: Lila Hernandez RN 05/07/2022  10:57      Gram stain missael bottle: Gram positive cocci in clusters resembling Staph       05/07/2022  13:40      STAPHYLOCOCCUS AUREUS  ID consult required at [facility].  For susceptibility see order #[id number]      Narrative:      Aerobic and anaerobic    Blood culture x two cultures. Draw prior to antibiotics. [[id number]]  (Abnormal)  (Susceptibility) Collected: 05/06/22 1616    Order Status: Completed Specimen: Blood from Peripheral, Antecubital, Left Updated: 05/09/22 0823     Blood Culture, Routine Gram stain aer bottle: Gram positive cocci in clusters resembling Staph       Gram stain missael bottle: Gram positive cocci in clusters resembling Staph       Results called to and read back by: Lila Hernandez RN 05/07/2022  10:57      STAPHYLOCOCCUS AUREUS  ID consult required at [facility].      Narrative:      Aerobic and anaerobic    Blood culture [[id number]] Collected: 05/08/22 1515    Order Status: Completed Specimen: Blood from Peripheral, Left Arm Updated: 05/09/22 0315     Blood Culture, Routine No Growth to date    Narrative:      Collection has been rescheduled by [email or web address] at 05/08/2022 13:22 Reason:   Pt in mri will be there after another hour [id number]  Collection has been rescheduled by [email or web address] at 05/08/2022 13:22 Reason:   Pt in mri will be there after another hour [id number]    Gram stain [[id number]] Collected: 05/07/22 0901    Order Status: Sent Specimen: Body Fluid from Pleural Fluid Updated: 05/07/22 0905    Fungus culture [[id number]] Collected: 05/07/22 0901    Order Status: Sent Specimen: Body Fluid from Pleural Fluid  Updated: 05/07/22 0905    AFB culture (includes stain) [[id number]] Collected: 05/07/22 0901    Order Status: Sent Specimen: Body Fluid from Pleural Fluid Updated: 05/07/22 0905        Recent Lab Results       05/09/22  0730   05/08/22  1516   05/08/22  1515   05/08/22  1514        Albumin 1.3       1.4        1.3             Alkaline Phosphatase 94       137        94             ALT 23       25        23             Anion Gap 7       13        7             Aniso       Slight       AST 19       23        19             Bands       1.0       Basophil %       0.0       BILIRUBIN TOTAL 1.1  Comment: For infants and newborns, interpretation of results should be based  on gestational age, weight and in agreement with clinical  observations.    Premature Infant recommended reference ranges:  Up to 24 hours.............<8.0 mg/dL  Up to 48 hours............<12.0 mg/dL  3-5 days..................<15.0 mg/dL  6-29 days.................<15.0 mg/dL         0.9  Comment: For infants and newborns, interpretation of results should be based  on gestational age, weight and in agreement with clinical  observations.    Premature Infant recommended reference ranges:  Up to 24 hours.............<8.0 mg/dL  Up to 48 hours............<12.0 mg/dL  3-5 days..................<15.0 mg/dL  6-29 days.................<15.0 mg/dL          1.1  Comment: For infants and newborns, interpretation of results should be based  on gestational age, weight and in agreement with clinical  observations.    Premature Infant recommended reference ranges:  Up to 24 hours.............<8.0 mg/dL  Up to 48 hours............<12.0 mg/dL  3-5 days..................<15.0 mg/dL  6-29 days.................<15.0 mg/dL               Blood Culture, Routine   Gram stain aer bottle: Gram positive cocci in clusters resembling Staph   [P]   No Growth to date  [P]            Results called to and read back by: Chasity Raymundo RN  05/09/2022  11:31  [P]           BUN 10        14        10             Calcium 7.1       7.5        7.1             Chloride 95       95        95             CO2 28       23        28             Creatinine 0.5       0.6        0.5             Differential Method       Manual       eGFR if  >60       >60        >60             eGFR if non  >60  Comment: Calculation used to obtain the estimated glomerular filtration  rate (eGFR) is the CKD-EPI equation.          >60  Comment: Calculation used to obtain the estimated glomerular filtration  rate (eGFR) is the CKD-EPI equation.           >60  Comment: Calculation used to obtain the estimated glomerular filtration  rate (eGFR) is the CKD-EPI equation.                Eosinophil %       0.0       Glucose 113       125        113             Gran %       89.0       Hematocrit 21.8       22.1        21.8             Hemoglobin 7.4       7.8        7.4             Immature Grans (Abs)       CANCELED  Comment: Mild elevation in immature granulocytes is non specific and   can be seen in a variety of conditions including stress response,   acute inflammation, trauma and pregnancy. Correlation with other   laboratory and clinical findings is essential.    Result canceled by the ancillary.         Immature Granulocytes       CANCELED  Comment: Result canceled by the ancillary.       Lymph %       6.0       MCH 28.9       29.2        28.9             MCHC 33.9       35.3        33.9             MCV 85       83        85             Mono %       4.0       MPV 12.0       11.4        12.0             nRBC       0       Platelet Estimate       Appears normal       Platelets 176       165        176             Potassium 3.9       3.6        3.9             PROTEIN TOTAL 4.9       5.2        4.9             RBC 2.56       2.67        2.56             RDW 14.2       14.5        14.2             Sodium 130       131        130             Vancomycin, Random       11.4       WBC 33.79       30.90         33.79                    [P] - Preliminary Result           All pertinent labs from the last 24 hours have been reviewed.  Significant Diagnostics:  MRI: No results found in the last 24 hours.  I have reviewed all pertinent imaging results/findings within the past 24 hours.  Degenerative findings:     T12-L1: No significant disc bulge, spinal canal or neural foraminal stenosis.     L1-L2: No significant disc bulge, spinal canal or neural foraminal stenosis.     L2-L3: No significant disc bulge.  Small amount of fluid within the right facet joint with adjacent fluid collection.  No significant spinal canal or neural foraminal stenosis.     L3-L4: Disc desiccation.  Mild broad-based disc bulge.  No significant spinal canal or neural foraminal stenosis.     L4-L5: Disc height loss and desiccation.  Broad-based disc bulge with superimposed central disc protrusion and mild bilateral facet arthropathy and ligamentum flavum hypertrophy.  Moderate to severe spinal canal stenosis with moderate left-sided neural foraminal stenosis.  Fluid within the left facet joint.     L5-S1: Asymmetric left disc bulge involving the left neural foramen.  Narrowing of the left lateral recess and moderate to severe left-sided neural foraminal stenosis.  No significant spinal canal stenosis.     Paraspinal muscles & soft tissues: Rim enhancing fluid collection identified emanating from the right L2-L3 facet joint measures roughly 1.4 x 1.7 x 2.5 cm.  Additional ring-enhancing fluid collection is identified emanating from the left L4-L5 facet joint measuring roughly 1.9 x 1.9 x 2.4 cm.  Paraspinal soft tissues otherwise are grossly within normal limits.     Impression:     No MR evidence of epidural abscess.     Large, rim enhancing facet synovial cysts arising posteriorly from the right L2-L3 and left L4-L5 facet joints may be degenerative or sequelae of facet septic arthritis.     Minor enhancement and endplate irregularity at L4-L5 may be  degenerative versus less likely the sequelae of spondylodiscitis.  No significant associated endplate destruction or vertebral body height loss.     Multilevel degenerative changes of the lumbar spine are most pronounced at L4-L5 with broad-based disc bulge and prominent central disc protrusion contributing to moderate to severe spinal canal stenosis with moderate left-sided neural foraminal stenosis.     Asymmetric left L5-S1 disc bulge contributes to moderate to severe left-sided neural foraminal stenosis and left lateral recess stenosis.     Assessment/Plan:   1. Chronic lower back pain with L4-5 central disc protrusion causing mild-to-moderate lateral recess stenosis.  Patient with minor radicular leg symptoms would recommend continue conservative management given patient's current infectious status would hold off on any surgery patient may follow up in neurosurgery clinic for further treatment of the back and possible surgery once her infection is cleared  2. Possible paraspinous muscle and joint infection at L2-3 and L4-5 recommend Interventional Radiology to perform CT-guided aspiration for diagnostic treatment if indicated if unable to find other source of infection.  Patient will require 6-8 weeks of IV antibiotics for treatment.  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] 05/09/2022 Unknown    Chronic pulmonary heart disease [I27.9] 05/08/2022 Yes    Vegetation of heart valve [I33.0] 05/08/2022 Yes    Subacute bacterial endocarditis [I33.0] 05/08/2022 Yes    Parapneumonic effusion [J18.9, J91.8] 05/07/2022 Yes    PNA (pneumonia) [J18.9] 05/06/2022 Yes    Severe sepsis [A41.9, R65.20] 05/06/2022 Yes    IVDU (intravenous drug user) [F19.90]  05/06/2022 Yes    Tobacco abuse [Z72.0] 10/01/2019 Yes    COPD (chronic obstructive pulmonary disease) [J44.9] 12/29/2017 Yes    Lower back pain [M54.50] 10/24/2013 Yes      Problems Resolved During this Admission:   More than 45 minutes of the time spent in counseling and coordination of care including discussions etiology of diagnosis, pathogenesis of diagnosis, prognosis of diagnosis,, diagnostic results, impression and recommendations, diagnostic studies, management, risks and benefits of treatment, instructions of disease self-management, treatment instructions, follow up requirements, patient and family counseling/involvement in care compliance with treatment regimen. All of the patient's and/or family members questions were answered during this discussion.      Thank you for your consult. I will follow-up with patient. Please contact us if you have any additional questions.    Didier Hess MD  Neurosurgery  [facility]

## 2022-05-09 NOTE — ASSESSMENT & PLAN NOTE
Will continue Cefepime/vancomycin,flagyl  Will follow drug susceptibility of staph aureus   CVT follow up

## 2022-05-09 NOTE — PROGRESS NOTES
Therapy with Vancomycin complete and/or consult discontinued by provider.  Pharmacy will sign off, please re-consult as needed.     Angeli Martinez [facility] 5/9/2022 9:30 AM

## 2022-05-09 NOTE — SUBJECTIVE & OBJECTIVE
Interval History:No acute events overnight. The patient reports lower back pain is not well controlled with current pain regimen, will adjust. Repeat blood cx are still positive. Sensitivities are back and Staph is sensitive to oxacillin, will D/C vanc/cefepime. Cardiology consulted and plans for a SUSANNAH today. CT surgery following and recommends continuing medical management with appropriate ABX, no surgical intervention at this time. MRI lumbar spine showed moderate to severe spinal canal stenosis with moderate left-sided neural foraminal stenosis, Neurosurgery consulted.      Review of Systems   Constitutional:  Positive for fatigue and fever. Negative for activity change, appetite change, chills, diaphoresis and unexpected weight change.   HENT: Negative.  Negative for congestion, drooling, facial swelling, rhinorrhea, sinus pressure, sneezing and sore throat.    Eyes: Negative.  Negative for discharge, redness, itching and visual disturbance.   Respiratory:  Positive for cough, chest tightness and shortness of breath. Negative for apnea, choking, wheezing and stridor.    Cardiovascular:  Negative for chest pain, palpitations and leg swelling.   Gastrointestinal: Negative.  Negative for abdominal distention, abdominal pain, anal bleeding, blood in stool, constipation, diarrhea, nausea and vomiting.   Endocrine: Negative.    Genitourinary: Negative.  Negative for decreased urine volume, difficulty urinating, dysuria, frequency, hematuria, pelvic pain, urgency, vaginal bleeding and vaginal discharge.   Musculoskeletal:  Positive for arthralgias and back pain. Negative for gait problem, joint swelling, myalgias, neck pain and neck stiffness.   Skin: Negative.  Negative for color change, pallor, rash and wound.   Allergic/Immunologic: Negative.    Neurological:  Positive for weakness. Negative for dizziness, seizures, facial asymmetry, speech difficulty, light-headedness, numbness and headaches.    Psychiatric/Behavioral:  Positive for confusion. Negative for agitation, hallucinations and suicidal ideas.    All other systems reviewed and are negative.  Objective:     Vital Signs (Most Recent):  Temp: 98.7 °F (37.1 °C) (05/09/22 0733)  Pulse: 85 (05/09/22 0733)  Resp: 18 (05/09/22 1018)  BP: (!) 112/59 (05/09/22 0733)  SpO2: 97 % (05/09/22 0733)   Vital Signs (24h Range):  Temp:  [98.1 °F (36.7 °C)-99.9 °F (37.7 °C)] 98.7 °F (37.1 °C)  Pulse:  [] 85  Resp:  [16-22] 18  SpO2:  [94 %-98 %] 97 %  BP: (112-121)/(59-67) 112/59     Weight: 83 kg (182 lb 15.7 oz)  Body mass index is 30.45 kg/m².    Intake/Output Summary (Last 24 hours) at 5/9/2022 1143  Last data filed at 5/9/2022 0500  Gross per 24 hour   Intake 1340.28 ml   Output 2000 ml   Net -659.72 ml      Physical Exam  Vitals and nursing note reviewed. Exam conducted with a chaperone present (family).   Constitutional:       General: She is not in acute distress.     Appearance: She is well-developed. She is ill-appearing. She is not toxic-appearing.   HENT:      Head: Normocephalic and atraumatic.   Eyes:      Conjunctiva/sclera: Conjunctivae normal.      Pupils: Pupils are equal, round, and reactive to light.   Cardiovascular:      Rate and Rhythm: Normal rate and regular rhythm.      Heart sounds: Normal heart sounds. No murmur heard.  Pulmonary:      Effort: Pulmonary effort is normal. No respiratory distress.      Breath sounds: Normal breath sounds.   Abdominal:      General: Bowel sounds are normal. There is no distension.      Palpations: Abdomen is soft.      Tenderness: There is no abdominal tenderness.   Musculoskeletal:         General: Swelling and tenderness present. No deformity. Normal range of motion.      Cervical back: Normal range of motion and neck supple.      Right lower leg: No edema.      Left lower leg: No edema.   Skin:     General: Skin is warm and dry.      Coloration: Skin is pale.      Findings: Erythema present.    Neurological:      Mental Status: She is alert and oriented to person, place, and time.      Motor: Weakness present.      Deep Tendon Reflexes: Reflexes are normal and symmetric.   Psychiatric:         Behavior: Behavior normal.       Significant Labs: All pertinent labs within the past 24 hours have been reviewed.    Significant Imaging:   Imaging Results              CTA Chest Non-Coronary (PE Study) (Final result)  Result time 05/06/22 18:26:20      Final result by Demario Morin MD (05/06/22 18:26:20)                   Impression:      No definite pulmonary thromboembolism.    Multiple nodular and cavitary opacities concerning for septic emboli with larger opacities possibly necrotizing pneumonia.    Moderate loculated right pleural fluid.  Empyema not excluded.    Small pericardial effusion of unclear etiology.    Splenic hypodensity possibly related to infarct or contrast timing.    Additional details as above.    All CT scans at this facility are performed  using dose modulation techniques as appropriate to performed exam including the following:  automated exposure control; adjustment of mA and/or kV according to the patients size (this includes techniques or standardized protocols for targeted exams where dose is matched to indication/reason for exam: i.e. extremities or head);  iterative reconstruction technique.      Electronically signed by: Demario Morin  Date:    05/06/2022  Time:    18:26               Narrative:    EXAMINATION:  CTA CHEST NON CORONARY    CLINICAL HISTORY:  Pulmonary embolism (PE) suspected, neg D-dimer;    TECHNIQUE:  Low dose axial images, sagittal and coronal reformations were obtained from the thoracic inlet to the lung bases following the IV administration of 100 mL of Omnipaque 350.  Contrast timing was optimized to evaluate the pulmonary arteries.  MIP images were performed.    COMPARISON:  Multiple priors.    FINDINGS:  Base of Neck: No significant  abnormality.    Thoracic soft tissues: Unremarkable.    Aorta: Left-sided aortic arch.  No aneurysm and no significant atherosclerosis    Heart: Normal size.  Small effusion.    Pulmonary vasculature: Pulmonary arteries are well opacified.  There is no pulmonary thromboembolism.    Esha/Mediastinum: No pathologic ariana enlargement.    Airways: Patent.    Lungs/Pleura: Multiple nodular and cavitary opacities concerning for septic emboli.  Additional larger opacities most notably in the right lung base with some internal clearing possibly related to necrotizing pneumonia.  Moderate loculated right pleural fluid and no definite left pleural fluid.  Empyema not excluded.    Esophagus: Unremarkable.    Upper Abdomen: Geographic wedge-shaped splenic hypodensity possibly related to contrast timing or infarct.    Bones: No acute fracture. No suspicious lytic or sclerotic lesions.                                        CT Lumbar Spine Without Contrast (Final result)  Result time 05/06/22 18:29:13      Final result by Demario Morin MD (05/06/22 18:29:13)                   Impression:      No fracture or traumatic malalignment of the lumbar spine.  No definite findings to suggest osteomyelitis.  Further evaluation as clinically warranted.    Possible progression of degenerative changes most notable at L4-5 with moderate to severe spinal canal stenosis at this level.  Additional details as above.    This report was flagged in Epic as abnormal.    All CT scans at this facility are performed  using dose modulation techniques as appropriate to performed exam including the following:  automated exposure control; adjustment of mA and/or kV according to the patients size (this includes techniques or standardized protocols for targeted exams where dose is matched to indication/reason for exam: i.e. extremities or head);  iterative reconstruction technique.      Electronically signed by: Demario  Mikel  Date:    05/06/2022  Time:    18:29               Narrative:    EXAMINATION:  CT LUMBAR SPINE WITHOUT CONTRAST    CLINICAL HISTORY:  Low back pain, infection suspected;    TECHNIQUE:  Low-dose CT images obtained throughout the region of the lumbar spine.  Axial, sagittal and coronal reformations were performed.  Contrast was not administered.    COMPARISON:  12/05/2019    FINDINGS:  The vertebral bodies are normal in height and morphology without evidence of fracture or osseous destructive process.    Normal sagittal alignment is preserved.  No spondylolisthesis.    Circumferential disc bulges L3 through S1 with mild spinal canal stenosis L3-4, moderate to severe spinal canal stenosis L4-5 and probable moderate spinal canal stenosis L5-S1.    Facet arthropathy with moderate left neural foraminal narrowing at L4-5 and severe left neural foraminal narrowing at L5-S1.  Other levels are better preserved.    Limited evaluation of the intraspinal contents demonstrates no hematoma or mass.    Paraspinal soft tissues exhibit no acute abnormalities.                                       CT Thoracic Spine Without Contrast (Final result)  Result time 05/06/22 18:32:20      Final result by Demario Morin MD (05/06/22 18:32:20)                   Impression:      No definite acute abnormality.  No traumatic malalignment, fracture, or osseous destructive process.  Further evaluation as clinically warranted.    Pulmonary opacities as on the dedicated CT exam.    All CT scans at this facility are performed  using dose modulation techniques as appropriate to performed exam including the following:  automated exposure control; adjustment of mA and/or kV according to the patients size (this includes techniques or standardized protocols for targeted exams where dose is matched to indication/reason for exam: i.e. extremities or head);  iterative reconstruction technique.      Electronically signed by: Demario  Mikel  Date:    05/06/2022  Time:    18:32               Narrative:    EXAMINATION:  CT THORACIC SPINE WITHOUT CONTRAST    CLINICAL HISTORY:  Epidural abscess suspected;    TECHNIQUE:  Low-dose axial noncontrast CT images of the thoracic spine.  Multiplanar reformats generated    COMPARISON:  None    FINDINGS:  Thoracic spine alignment is well preserved.  No findings to suggest vertebral body fracture.  No osseous lytic or blastic process.    No significant degenerative changes.  No disc abnormalities or facet arthrosis.    Pulmonary opacities as on the dedicated CT exam.                                       CT Cervical Spine Without Contrast (Final result)  Result time 05/06/22 18:34:24      Final result by Demario Morin MD (05/06/22 18:34:24)                   Impression:      No fracture or traumatic malalignment of the cervical spine.  No definite CT evidence of osteomyelitis.  Further evaluation as clinically warranted.    Additional degenerative findings as above.    All CT scans at this facility are performed  using dose modulation techniques as appropriate to performed exam including the following:  automated exposure control; adjustment of mA and/or kV according to the patients size (this includes techniques or standardized protocols for targeted exams where dose is matched to indication/reason for exam: i.e. extremities or head);  iterative reconstruction technique.      Electronically signed by: Demario Morin  Date:    05/06/2022  Time:    18:34               Narrative:    EXAMINATION:  CT CERVICAL SPINE WITHOUT CONTRAST    CLINICAL HISTORY:  Neck pain, acute, infection suspected;    TECHNIQUE:  Low dose axial CT images through the cervical spine, with sagittal and coronal reformations.  Contrast was not administered.    COMPARISON:  12/05/2019    FINDINGS:  The vertebral bodies are normal in height and morphology without evidence of fracture or osseous destructive process.  Normal sagittal alignment  is preserved.    Progression of degenerative change at C4-5 in comparison the prior exam with posterior disc osteophyte complex at this level producing mild spinal canal stenosis.  Smaller C5-6 posterior disc osteophyte complex without definite spinal canal stenosis.    No definite neural foraminal narrowing.    Limited evaluation of the intraspinal contents demonstrates no hematoma or mass.Paraspinal soft tissues exhibit no acute abnormalities.                                       X-Ray Knee Complete 4 Or More Views Right (Final result)  Result time 05/06/22 16:01:49      Final result by Nate Brasher MD (05/06/22 16:01:49)                   Impression:      No evidence of fracture or dislocation. Moderate suprapatellar knee joint effusion. Mild soft tissue edema lateral to the knee.      Electronically signed by: Nate Brasher  Date:    05/06/2022  Time:    16:01               Narrative:    EXAMINATION:  XR KNEE COMP 4 OR MORE VIEWS RIGHT    CLINICAL HISTORY:  Pain in unspecified knee    TECHNIQUE:  AP, lateral, and Merchant views of the right knee were performed.    COMPARISON:  None    FINDINGS:  No evidence of fracture or dislocation.  Moderate suprapatellar knee joint effusion.  Mild soft tissue edema lateral to the knee.  No foreign body.  Soft tissues otherwise unremarkable.                                       X-Ray Finger 2 or More Views Right (Final result)  Result time 05/06/22 16:02:28      Final result by Nate Brasher MD (05/06/22 16:02:28)                   Impression:      Negative exam.      Electronically signed by: Nate Brasher  Date:    05/06/2022  Time:    16:02               Narrative:    EXAMINATION:  XR FINGER 2 OR MORE VIEWS RIGHT    CLINICAL HISTORY:  finger swelling;    TECHNIQUE:  Three views of the right 4th digit.    COMPARISON:  None    FINDINGS:  No evidence of fracture or dislocation.  Joint spaces appear well maintained.  Soft tissues unremarkable.  No evidence of foreign  body.                                        X-Ray Chest AP Portable (Final result)  Result time 05/06/22 16:04:37      Final result by Nate Brasher MD (05/06/22 16:04:37)                   Impression:      Patchy bilateral opacities, some of which appear nodular and possibly cavitary. Recommend further evaluation with chest CT with IV contrast.    This report was flagged in Epic as abnormal.      Electronically signed by: Nate Brasher  Date:    05/06/2022  Time:    16:04               Narrative:    EXAMINATION:  XR CHEST AP PORTABLE    CLINICAL HISTORY:  Sepsis;.    TECHNIQUE:  Single frontal portable view of the chest was performed.    COMPARISON:  12/05/2019    FINDINGS:  Support devices: None    Patchy bilateral opacities, some of which appear nodular and possibly cavitary.  Recommend further evaluation with chest CT with IV contrast.    Heart normal size.  No pneumothorax or pleural effusion    Bones are intact.

## 2022-05-09 NOTE — PROGRESS NOTES
[facility] Medicine  Progress Note    Patient Name: Tianna Leon  MRN: [id number]  Patient Class: IP- Inpatient   Admission Date: 5/6/2022  Length of Stay: 3 days  Attending Physician: Elizabeth Kim MD  Primary Care Provider: Spenser Campa MD        Subjective:     Principal Problem:Bacteremia        HPI:  36 y/o. female  with a PMHx of asthma, COPD,Bipolar  , IVDU and HLD presented to the ER with a c/o  sob for the last weak which has gotten worse . The SOB is associated with fever , chills , productive cough , back pain  and generalized weakness . She report cough some blood this am .  She is active IVDU  ( heroine , cocaine  and amphetamine ) . She denies any  sick contact , chest pain  , GI/ sx , She also complaning of LE sweeling . Knee pain and B/L LE rash .   ER COURSE: CTA chest negative for pe . Multiple nodular and cavitary opacities concerning for septic emboli with larger opacities possibly necrotizing pneumonia. CT cervical  and thoracic did not show any acute finding , CT lumbar Possible progression of degenerative changes most notable at L4-5 with moderate to severe spinal canal stenosis at this level.  WBC  29 k , na 130 , k 3.4 , cl 89 , procal 2.71   ER VS:  BP Pulse Resp Temp SpO2   (!) 124/58 110 (!) 30 (!) 101.6 °F (38.7 °C) 96 %           Pt will be admitted to inpt with a dx of PNA and severe sepsis       Overview/Hospital Course:  5/7 admitted for pneumonia, severe sepsis in setting of ivdu. Mother at bedside and provides collateral. Patient has struggled with substance abuse for approximately 20 years, worsening over last 3-5 years since life event. Onset of symptoms 1-2 weeks ago. Tolerated thoracentesis with no pneumothorax on chest x-ray. Mri lumbar and echo, pending. Bcx positive, growing gram positive cocci. On vanc, cefepime and flagyl. Infectious disease consulted for bacteremia. 5/8/22 The patient remained afebrile overnight. Currently on  cefepime/vanc/flagyl. Blood cx are growing staph aureus. The patient refused the MRI lumbar spine overnight d/t being unable to lay flat from back pain. Will give pain meds and attempt to get the MRI today to r/o spinal abscess. The ECHO showed a 1.3 x 1.5 cm elongated, mobile echodensity seen on the tricuspid valve consistent with vegetation, CT surgery was consulted. Will need a SUSANNAH. Infectious disease is consulted. Will repeat blood cx today. 5/9/22 No acute events overnight. The patient reports lower back pain is not well controlled with current pain regimen, will adjust. Repeat blood cx are still positive. Sensitivities are back and Staph is sensitive to oxacillin, will D/C vanc/cefepime. Cardiology consulted and plans for a SUSANNAH today. CT surgery following and recommends continuing medical management with appropriate ABX, no surgical intervention at this time. MRI lumbar spine showed moderate to severe spinal canal stenosis with moderate left-sided neural foraminal stenosis, Neurosurgery consulted.       Interval History:No acute events overnight. The patient reports lower back pain is not well controlled with current pain regimen, will adjust. Repeat blood cx are still positive. Sensitivities are back and Staph is sensitive to oxacillin, will D/C vanc/cefepime. Cardiology consulted and plans for a SUSANNAH today. CT surgery following and recommends continuing medical management with appropriate ABX, no surgical intervention at this time. MRI lumbar spine showed moderate to severe spinal canal stenosis with moderate left-sided neural foraminal stenosis, Neurosurgery consulted.      Review of Systems   Constitutional:  Positive for fatigue and fever. Negative for activity change, appetite change, chills, diaphoresis and unexpected weight change.   HENT: Negative.  Negative for congestion, drooling, facial swelling, rhinorrhea, sinus pressure, sneezing and sore throat.    Eyes: Negative.  Negative for discharge,  redness, itching and visual disturbance.   Respiratory:  Positive for cough, chest tightness and shortness of breath. Negative for apnea, choking, wheezing and stridor.    Cardiovascular:  Negative for chest pain, palpitations and leg swelling.   Gastrointestinal: Negative.  Negative for abdominal distention, abdominal pain, anal bleeding, blood in stool, constipation, diarrhea, nausea and vomiting.   Endocrine: Negative.    Genitourinary: Negative.  Negative for decreased urine volume, difficulty urinating, dysuria, frequency, hematuria, pelvic pain, urgency, vaginal bleeding and vaginal discharge.   Musculoskeletal:  Positive for arthralgias and back pain. Negative for gait problem, joint swelling, myalgias, neck pain and neck stiffness.   Skin: Negative.  Negative for color change, pallor, rash and wound.   Allergic/Immunologic: Negative.    Neurological:  Positive for weakness. Negative for dizziness, seizures, facial asymmetry, speech difficulty, light-headedness, numbness and headaches.   Psychiatric/Behavioral:  Positive for confusion. Negative for agitation, hallucinations and suicidal ideas.    All other systems reviewed and are negative.  Objective:     Vital Signs (Most Recent):  Temp: 98.7 °F (37.1 °C) (05/09/22 0733)  Pulse: 85 (05/09/22 0733)  Resp: 18 (05/09/22 1018)  BP: (!) 112/59 (05/09/22 0733)  SpO2: 97 % (05/09/22 0733)   Vital Signs (24h Range):  Temp:  [98.1 °F (36.7 °C)-99.9 °F (37.7 °C)] 98.7 °F (37.1 °C)  Pulse:  [] 85  Resp:  [16-22] 18  SpO2:  [94 %-98 %] 97 %  BP: (112-121)/(59-67) 112/59     Weight: 83 kg (182 lb 15.7 oz)  Body mass index is 30.45 kg/m².    Intake/Output Summary (Last 24 hours) at 5/9/2022 1143  Last data filed at 5/9/2022 0500  Gross per 24 hour   Intake 1340.28 ml   Output 2000 ml   Net -659.72 ml      Physical Exam  Vitals and nursing note reviewed. Exam conducted with a chaperone present (family).   Constitutional:       General: She is not in acute  distress.     Appearance: She is well-developed. She is ill-appearing. She is not toxic-appearing.   HENT:      Head: Normocephalic and atraumatic.   Eyes:      Conjunctiva/sclera: Conjunctivae normal.      Pupils: Pupils are equal, round, and reactive to light.   Cardiovascular:      Rate and Rhythm: Normal rate and regular rhythm.      Heart sounds: Normal heart sounds. No murmur heard.  Pulmonary:      Effort: Pulmonary effort is normal. No respiratory distress.      Breath sounds: Normal breath sounds.   Abdominal:      General: Bowel sounds are normal. There is no distension.      Palpations: Abdomen is soft.      Tenderness: There is no abdominal tenderness.   Musculoskeletal:         General: Swelling and tenderness present. No deformity. Normal range of motion.      Cervical back: Normal range of motion and neck supple.      Right lower leg: No edema.      Left lower leg: No edema.   Skin:     General: Skin is warm and dry.      Coloration: Skin is pale.      Findings: Erythema present.   Neurological:      Mental Status: She is alert and oriented to person, place, and time.      Motor: Weakness present.      Deep Tendon Reflexes: Reflexes are normal and symmetric.   Psychiatric:         Behavior: Behavior normal.       Significant Labs: All pertinent labs within the past 24 hours have been reviewed.    Significant Imaging:   Imaging Results              CTA Chest Non-Coronary (PE Study) (Final result)  Result time 05/06/22 18:26:20      Final result by Demario Morin MD (05/06/22 18:26:20)                   Impression:      No definite pulmonary thromboembolism.    Multiple nodular and cavitary opacities concerning for septic emboli with larger opacities possibly necrotizing pneumonia.    Moderate loculated right pleural fluid.  Empyema not excluded.    Small pericardial effusion of unclear etiology.    Splenic hypodensity possibly related to infarct or contrast timing.    Additional details as  above.    All CT scans at this facility are performed  using dose modulation techniques as appropriate to performed exam including the following:  automated exposure control; adjustment of mA and/or kV according to the patients size (this includes techniques or standardized protocols for targeted exams where dose is matched to indication/reason for exam: i.e. extremities or head);  iterative reconstruction technique.      Electronically signed by: Demario Morin  Date:    05/06/2022  Time:    18:26               Narrative:    EXAMINATION:  CTA CHEST NON CORONARY    CLINICAL HISTORY:  Pulmonary embolism (PE) suspected, neg D-dimer;    TECHNIQUE:  Low dose axial images, sagittal and coronal reformations were obtained from the thoracic inlet to the lung bases following the IV administration of 100 mL of Omnipaque 350.  Contrast timing was optimized to evaluate the pulmonary arteries.  MIP images were performed.    COMPARISON:  Multiple priors.    FINDINGS:  Base of Neck: No significant abnormality.    Thoracic soft tissues: Unremarkable.    Aorta: Left-sided aortic arch.  No aneurysm and no significant atherosclerosis    Heart: Normal size.  Small effusion.    Pulmonary vasculature: Pulmonary arteries are well opacified.  There is no pulmonary thromboembolism.    Esha/Mediastinum: No pathologic ariana enlargement.    Airways: Patent.    Lungs/Pleura: Multiple nodular and cavitary opacities concerning for septic emboli.  Additional larger opacities most notably in the right lung base with some internal clearing possibly related to necrotizing pneumonia.  Moderate loculated right pleural fluid and no definite left pleural fluid.  Empyema not excluded.    Esophagus: Unremarkable.    Upper Abdomen: Geographic wedge-shaped splenic hypodensity possibly related to contrast timing or infarct.    Bones: No acute fracture. No suspicious lytic or sclerotic lesions.                                        CT Lumbar Spine Without  Contrast (Final result)  Result time 05/06/22 18:29:13      Final result by Demario Morin MD (05/06/22 18:29:13)                   Impression:      No fracture or traumatic malalignment of the lumbar spine.  No definite findings to suggest osteomyelitis.  Further evaluation as clinically warranted.    Possible progression of degenerative changes most notable at L4-5 with moderate to severe spinal canal stenosis at this level.  Additional details as above.    This report was flagged in Epic as abnormal.    All CT scans at this facility are performed  using dose modulation techniques as appropriate to performed exam including the following:  automated exposure control; adjustment of mA and/or kV according to the patients size (this includes techniques or standardized protocols for targeted exams where dose is matched to indication/reason for exam: i.e. extremities or head);  iterative reconstruction technique.      Electronically signed by: Demario Morin  Date:    05/06/2022  Time:    18:29               Narrative:    EXAMINATION:  CT LUMBAR SPINE WITHOUT CONTRAST    CLINICAL HISTORY:  Low back pain, infection suspected;    TECHNIQUE:  Low-dose CT images obtained throughout the region of the lumbar spine.  Axial, sagittal and coronal reformations were performed.  Contrast was not administered.    COMPARISON:  12/05/2019    FINDINGS:  The vertebral bodies are normal in height and morphology without evidence of fracture or osseous destructive process.    Normal sagittal alignment is preserved.  No spondylolisthesis.    Circumferential disc bulges L3 through S1 with mild spinal canal stenosis L3-4, moderate to severe spinal canal stenosis L4-5 and probable moderate spinal canal stenosis L5-S1.    Facet arthropathy with moderate left neural foraminal narrowing at L4-5 and severe left neural foraminal narrowing at L5-S1.  Other levels are better preserved.    Limited evaluation of the intraspinal contents demonstrates  no hematoma or mass.    Paraspinal soft tissues exhibit no acute abnormalities.                                       CT Thoracic Spine Without Contrast (Final result)  Result time 05/06/22 18:32:20      Final result by Demario Morin MD (05/06/22 18:32:20)                   Impression:      No definite acute abnormality.  No traumatic malalignment, fracture, or osseous destructive process.  Further evaluation as clinically warranted.    Pulmonary opacities as on the dedicated CT exam.    All CT scans at this facility are performed  using dose modulation techniques as appropriate to performed exam including the following:  automated exposure control; adjustment of mA and/or kV according to the patients size (this includes techniques or standardized protocols for targeted exams where dose is matched to indication/reason for exam: i.e. extremities or head);  iterative reconstruction technique.      Electronically signed by: Demario Morin  Date:    05/06/2022  Time:    18:32               Narrative:    EXAMINATION:  CT THORACIC SPINE WITHOUT CONTRAST    CLINICAL HISTORY:  Epidural abscess suspected;    TECHNIQUE:  Low-dose axial noncontrast CT images of the thoracic spine.  Multiplanar reformats generated    COMPARISON:  None    FINDINGS:  Thoracic spine alignment is well preserved.  No findings to suggest vertebral body fracture.  No osseous lytic or blastic process.    No significant degenerative changes.  No disc abnormalities or facet arthrosis.    Pulmonary opacities as on the dedicated CT exam.                                       CT Cervical Spine Without Contrast (Final result)  Result time 05/06/22 18:34:24      Final result by Demario Morin MD (05/06/22 18:34:24)                   Impression:      No fracture or traumatic malalignment of the cervical spine.  No definite CT evidence of osteomyelitis.  Further evaluation as clinically warranted.    Additional degenerative findings as above.    All CT  scans at this facility are performed  using dose modulation techniques as appropriate to performed exam including the following:  automated exposure control; adjustment of mA and/or kV according to the patients size (this includes techniques or standardized protocols for targeted exams where dose is matched to indication/reason for exam: i.e. extremities or head);  iterative reconstruction technique.      Electronically signed by: Demario Morin  Date:    05/06/2022  Time:    18:34               Narrative:    EXAMINATION:  CT CERVICAL SPINE WITHOUT CONTRAST    CLINICAL HISTORY:  Neck pain, acute, infection suspected;    TECHNIQUE:  Low dose axial CT images through the cervical spine, with sagittal and coronal reformations.  Contrast was not administered.    COMPARISON:  12/05/2019    FINDINGS:  The vertebral bodies are normal in height and morphology without evidence of fracture or osseous destructive process.  Normal sagittal alignment is preserved.    Progression of degenerative change at C4-5 in comparison the prior exam with posterior disc osteophyte complex at this level producing mild spinal canal stenosis.  Smaller C5-6 posterior disc osteophyte complex without definite spinal canal stenosis.    No definite neural foraminal narrowing.    Limited evaluation of the intraspinal contents demonstrates no hematoma or mass.Paraspinal soft tissues exhibit no acute abnormalities.                                       X-Ray Knee Complete 4 Or More Views Right (Final result)  Result time 05/06/22 16:01:49      Final result by Nate Brasher MD (05/06/22 16:01:49)                   Impression:      No evidence of fracture or dislocation. Moderate suprapatellar knee joint effusion. Mild soft tissue edema lateral to the knee.      Electronically signed by: Nate Brasher  Date:    05/06/2022  Time:    16:01               Narrative:    EXAMINATION:  XR KNEE COMP 4 OR MORE VIEWS RIGHT    CLINICAL HISTORY:  Pain in unspecified  knee    TECHNIQUE:  AP, lateral, and Merchant views of the right knee were performed.    COMPARISON:  None    FINDINGS:  No evidence of fracture or dislocation.  Moderate suprapatellar knee joint effusion.  Mild soft tissue edema lateral to the knee.  No foreign body.  Soft tissues otherwise unremarkable.                                       X-Ray Finger 2 or More Views Right (Final result)  Result time 05/06/22 16:02:28      Final result by Nate Brasher MD (05/06/22 16:02:28)                   Impression:      Negative exam.      Electronically signed by: Nate Brasher  Date:    05/06/2022  Time:    16:02               Narrative:    EXAMINATION:  XR FINGER 2 OR MORE VIEWS RIGHT    CLINICAL HISTORY:  finger swelling;    TECHNIQUE:  Three views of the right 4th digit.    COMPARISON:  None    FINDINGS:  No evidence of fracture or dislocation.  Joint spaces appear well maintained.  Soft tissues unremarkable.  No evidence of foreign body.                                        X-Ray Chest AP Portable (Final result)  Result time 05/06/22 16:04:37      Final result by Nate Brasher MD (05/06/22 16:04:37)                   Impression:      Patchy bilateral opacities, some of which appear nodular and possibly cavitary. Recommend further evaluation with chest CT with IV contrast.    This report was flagged in Epic as abnormal.      Electronically signed by: Nate Brasher  Date:    05/06/2022  Time:    16:04               Narrative:    EXAMINATION:  XR CHEST AP PORTABLE    CLINICAL HISTORY:  Sepsis;.    TECHNIQUE:  Single frontal portable view of the chest was performed.    COMPARISON:  12/05/2019    FINDINGS:  Support devices: None    Patchy bilateral opacities, some of which appear nodular and possibly cavitary.  Recommend further evaluation with chest CT with IV contrast.    Heart normal size.  No pneumothorax or pleural effusion    Bones are intact.                                          Assessment/Plan:      *  Bacteremia  Gram positive   Infectious disease consulted  Continue broad spectrum intravenous antibiotic(s) given h/o ivdu  Remains febrile and leukocytosis persists  5/8/22 The patient remained afebrile overnight. Currently on cefepime/vanc/flagyl. Blood cx are growing staph aureus. The patient refused the MRI lumbar spine overnight d/t being unable to lay flat from back pain. Will give pain meds and attempt to get the MRI today to r/o spinal abscess. The ECHO showed a 1.3 x 1.5 cm elongated, mobile echodensity seen on the tricuspid valve consistent with vegetation, CT surgery was consulted. Will need a SUSANNAH. Infectious disease is consulted. Will repeat blood cx today.   5/9/22 Repeat blood cx are still positive. Sensitivities are back and Staph is sensitive to oxacillin, will D/C vanc/cefepime. Infectious disease is following     Subacute bacterial endocarditis        Vegetation of heart valve  5/8/22  The ECHO showed a 1.3 x 1.5 cm elongated, mobile echodensity seen on the tricuspid valve consistent with vegetation, CT surgery was consulted. Will need a SUSANNAH. Infectious disease is consulted. Will repeat blood cx today. Continue IV ABX.   5/9/22 Sensitivities are back and Staph is sensitive to oxacillin, will D/C vanc/cefepime. Cardiology consulted and plans for a SUSANNAH today. CT surgery following and recommends continuing medical management with appropriate ABX, no surgical intervention at this time.    Chronic pulmonary heart disease  - Pulmonology following       Parapneumonic effusion  Pulmonology consulted  Status post thoracentesis  Fluid studies pending      IVDU (intravenous drug user)   Will ordet TTE to r/o IE  Will order MRI lumbar wwo   Cont broad spectrum IVAB     5/7   Studies pending for additional sites of infection given h/o ivdu  5/8/22  on cessation     Severe sepsis  This patient does have evidence of infective focus  My overall impression is sepsis. Vital signs were reviewed and noted in  progress note.  Antibiotics given-   Antibiotics (From admission, onward)            Start     Stop Route Frequency Ordered    05/09/22 1100  oxacillin 12 g in  mL CONTINUOUS INFUSION         -- IV Every 24 hours (non-standard times) 05/09/22 0932    05/07/22 2100  mupirocin 2 % ointment         05/12 2059 Nasl 2 times daily 05/07/22 1646    05/07/22 0200  metronidazole IVPB 500 mg         -- IV Every 8 hours (non-standard times) 05/06/22 1954        Cultures were taken-   Microbiology Results (last 7 days)     Procedure Component Value Units Date/Time    Blood culture [[id number]] Collected: 05/08/22 1516    Order Status: Completed Specimen: Blood from Peripheral, Right Hand Updated: 05/09/22 1132     Blood Culture, Routine Gram stain aer bottle: Gram positive cocci in clusters resembling Staph       Results called to and read back by: Chasity Raymundo RN  05/09/2022  11:31    Narrative:      Collection has been rescheduled by [email or web address] at 05/08/2022 13:22 Reason:   Pt in mri will be there after another hour [id number]  Collection has been rescheduled by [email or web address] at 05/08/2022 13:22 Reason:   Pt in mri will be there after another hour [id number]    Blood culture x two cultures. Draw prior to antibiotics. [[id number]]  (Abnormal) Collected: 05/06/22 1617    Order Status: Completed Specimen: Blood from Peripheral, Antecubital, Right Updated: 05/09/22 0824     Blood Culture, Routine Gram stain aer bottle: Gram positive cocci in clusters resembling Staph       Results called to and read back by: Lila Hernandez RN 05/07/2022  10:57      Gram stain missael bottle: Gram positive cocci in clusters resembling Staph       05/07/2022  13:40      STAPHYLOCOCCUS AUREUS  ID consult required at [facility],[place] and [facility].  For susceptibility see order #[id number]      Narrative:      Aerobic and anaerobic    Blood culture x two cultures. Draw prior to antibiotics. [[id number]]  (Abnormal)  (Susceptibility) Collected: 05/06/22 1616     Order Status: Completed Specimen: Blood from Peripheral, Antecubital, Left Updated: 05/09/22 0823     Blood Culture, Routine Gram stain aer bottle: Gram positive cocci in clusters resembling Staph       Gram stain missael bottle: Gram positive cocci in clusters resembling Staph       Results called to and read back by: Lila Hernandez RN 05/07/2022  10:57      STAPHYLOCOCCUS AUREUS  ID consult required at [facility],[place] and [facility].      Narrative:      Aerobic and anaerobic    Blood culture [[id number]] Collected: 05/08/22 1515    Order Status: Completed Specimen: Blood from Peripheral, Left Arm Updated: 05/09/22 0315     Blood Culture, Routine No Growth to date    Narrative:      Collection has been rescheduled by [email or web address] at 05/08/2022 13:22 Reason:   Pt in mri will be there after another hour [id number]  Collection has been rescheduled by [email or web address] at 05/08/2022 13:22 Reason:   Pt in mri will be there after another hour [id number]    Gram stain [[id number]] Collected: 05/07/22 0901    Order Status: Sent Specimen: Body Fluid from Pleural Fluid Updated: 05/07/22 0905    Fungus culture [[id number]] Collected: 05/07/22 0901    Order Status: Sent Specimen: Body Fluid from Pleural Fluid Updated: 05/07/22 0905    AFB culture (includes stain) [[id number]] Collected: 05/07/22 0901    Order Status: Sent Specimen: Body Fluid from Pleural Fluid Updated: 05/07/22 0905        Latest lactate reviewed, they are-  Recent Labs   Lab 05/06/22  1616 05/06/22  1731   LACTATE 1.5 1.5       Organ dysfunction indicated by Acute kidney injury  Source-  lung    Source control Achieved by-   Cont Broad spectrum IVAB     Bacteremia  Infectious disease consulted      PNA (pneumonia)  H/O IVDU   Cont broad spectrum IVAB   F/U blood and sputum cx   Will consult pulmonology for thoracentesis  To r/u empyema     5/7  Status post thoracentesis  Chest x-ray without signs of pneumothorax  Fluid studies pending        Tobacco abuse  - Patient  thoroughly counseled on smoking cessation, pt verbalized understanding. Time of counseling 10 minutes.        COPD (chronic obstructive pulmonary disease)  3Cont breathing tx prn  Pulmonology following     Lower back pain  Chronic in nature\  H/O IVDU  CT lumbar show spinal stenosis   No neurological deficit at this time   Will order a MRI lumbar wwo  To r/o spinal abscess     5/7  Mri pending  5/8/22 The patient refused the MRI lumbar spine overnight d/t being unable to lay flat from back pain. Will give pain meds and attempt to get the MRI today to r/o spinal abscess.  5/9/22 The patient reports lower back pain is not well controlled with current pain regimen, will adjust. MRI lumbar spine showed moderate to severe spinal canal stenosis with moderate left-sided neural foraminal stenosis, Neurosurgery consulted.       VTE Risk Mitigation (From admission, onward)         Ordered     enoxaparin injection 40 mg  Daily         05/06/22 2336     IP VTE HIGH RISK PATIENT  Once         05/06/22 2336     Place sequential compression device  Until discontinued         05/06/22 2336                Discharge Planning   YESSICA:      Code Status: Full Code   Is the patient medically ready for discharge?:     Reason for patient still in hospital (select all that apply): Patient trending condition, Laboratory test, Treatment, Consult recommendations and Pending disposition  Discharge Plan A: Home                  Kike Buckley NP  Department of Hospital Medicine   [facility] - Med Surg

## 2022-05-10 LAB
ALBUMIN SERPL BCP-MCNC: 1.4 G/DL (ref 3.5–5.2)
ALP SERPL-CCNC: 85 U/L (ref 55–135)
ALT SERPL W/O P-5'-P-CCNC: 16 U/L (ref 10–44)
ANION GAP SERPL CALC-SCNC: 8 MMOL/L (ref 8–16)
AST SERPL-CCNC: 18 U/L (ref 10–40)
BASOPHILS # BLD AUTO: 0.1 K/UL (ref 0–0.2)
BASOPHILS NFR BLD: 0.3 % (ref 0–1.9)
BILIRUB SERPL-MCNC: 0.8 MG/DL (ref 0.1–1)
BUN SERPL-MCNC: 11 MG/DL (ref 6–20)
CALCIUM SERPL-MCNC: 7.3 MG/DL (ref 8.7–10.5)
CHLORIDE SERPL-SCNC: 98 MMOL/L (ref 95–110)
CO2 SERPL-SCNC: 29 MMOL/L (ref 23–29)
CREAT SERPL-MCNC: 0.5 MG/DL (ref 0.5–1.4)
DIFFERENTIAL METHOD: ABNORMAL
EOSINOPHIL # BLD AUTO: 0.1 K/UL (ref 0–0.5)
EOSINOPHIL NFR BLD: 0.2 % (ref 0–8)
ERYTHROCYTE [DISTWIDTH] IN BLOOD BY AUTOMATED COUNT: 14.8 % (ref 11.5–14.5)
EST. GFR  (AFRICAN AMERICAN): >60 ML/MIN/1.73 M^2
EST. GFR  (NON AFRICAN AMERICAN): >60 ML/MIN/1.73 M^2
GLUCOSE SERPL-MCNC: 86 MG/DL (ref 70–110)
HCT VFR BLD AUTO: 23.2 % (ref 37–48.5)
HGB BLD-MCNC: 7.9 G/DL (ref 12–16)
IMM GRANULOCYTES # BLD AUTO: 1.47 K/UL (ref 0–0.04)
IMM GRANULOCYTES NFR BLD AUTO: 4.1 % (ref 0–0.5)
LYMPHOCYTES # BLD AUTO: 2.8 K/UL (ref 1–4.8)
LYMPHOCYTES NFR BLD: 7.9 % (ref 18–48)
MCH RBC QN AUTO: 29.2 PG (ref 27–31)
MCHC RBC AUTO-ENTMCNC: 34.1 G/DL (ref 32–36)
MCV RBC AUTO: 86 FL (ref 82–98)
MONOCYTES # BLD AUTO: 1.5 K/UL (ref 0.3–1)
MONOCYTES NFR BLD: 4.1 % (ref 4–15)
NEUTROPHILS # BLD AUTO: 29.8 K/UL (ref 1.8–7.7)
NEUTROPHILS NFR BLD: 83.4 % (ref 38–73)
NRBC BLD-RTO: 0 /100 WBC
PLATELET # BLD AUTO: 249 K/UL (ref 150–450)
PLATELET BLD QL SMEAR: ABNORMAL
PMV BLD AUTO: 10.5 FL (ref 9.2–12.9)
POTASSIUM SERPL-SCNC: 4.3 MMOL/L (ref 3.5–5.1)
PROT SERPL-MCNC: 5.1 G/DL (ref 6–8.4)
RBC # BLD AUTO: 2.71 M/UL (ref 4–5.4)
SODIUM SERPL-SCNC: 135 MMOL/L (ref 136–145)
WBC # BLD AUTO: 35.76 K/UL (ref 3.9–12.7)

## 2022-05-10 PROCEDURE — 99232 SBSQ HOSP IP/OBS MODERATE 35: CPT | Mod: ,,, | Performed by: INTERNAL MEDICINE

## 2022-05-10 PROCEDURE — 94761 N-INVAS EAR/PLS OXIMETRY MLT: CPT

## 2022-05-10 PROCEDURE — 63600175 PHARM REV CODE 636 W HCPCS: Performed by: INTERNAL MEDICINE

## 2022-05-10 PROCEDURE — 94640 AIRWAY INHALATION TREATMENT: CPT

## 2022-05-10 PROCEDURE — 99232 PR SUBSEQUENT HOSPITAL CARE,LEVL II: ICD-10-PCS | Mod: ,,, | Performed by: NEUROLOGICAL SURGERY

## 2022-05-10 PROCEDURE — 63600175 PHARM REV CODE 636 W HCPCS: Performed by: NURSE PRACTITIONER

## 2022-05-10 PROCEDURE — 80053 COMPREHEN METABOLIC PANEL: CPT | Performed by: INTERNAL MEDICINE

## 2022-05-10 PROCEDURE — 25000003 PHARM REV CODE 250: Performed by: INTERNAL MEDICINE

## 2022-05-10 PROCEDURE — 27000221 HC OXYGEN, UP TO 24 HOURS

## 2022-05-10 PROCEDURE — 99232 SBSQ HOSP IP/OBS MODERATE 35: CPT | Mod: ,,, | Performed by: NEUROLOGICAL SURGERY

## 2022-05-10 PROCEDURE — 21400001 HC TELEMETRY ROOM

## 2022-05-10 PROCEDURE — 99232 PR SUBSEQUENT HOSPITAL CARE,LEVL II: ICD-10-PCS | Mod: ,,, | Performed by: INTERNAL MEDICINE

## 2022-05-10 PROCEDURE — 99900035 HC TECH TIME PER 15 MIN (STAT)

## 2022-05-10 PROCEDURE — 25000003 PHARM REV CODE 250: Performed by: NURSE PRACTITIONER

## 2022-05-10 PROCEDURE — 85025 COMPLETE CBC W/AUTO DIFF WBC: CPT | Performed by: INTERNAL MEDICINE

## 2022-05-10 PROCEDURE — 36415 COLL VENOUS BLD VENIPUNCTURE: CPT | Performed by: INTERNAL MEDICINE

## 2022-05-10 PROCEDURE — 25000242 PHARM REV CODE 250 ALT 637 W/ HCPCS: Performed by: INTERNAL MEDICINE

## 2022-05-10 RX ORDER — TRAZODONE HYDROCHLORIDE 100 MG/1
100 TABLET ORAL NIGHTLY
Status: DISCONTINUED | OUTPATIENT
Start: 2022-05-10 | End: 2022-06-10 | Stop reason: HOSPADM

## 2022-05-10 RX ORDER — SODIUM CHLORIDE 0.9 % (FLUSH) 0.9 %
10 SYRINGE (ML) INJECTION
Status: DISCONTINUED | OUTPATIENT
Start: 2022-05-10 | End: 2022-06-10 | Stop reason: HOSPADM

## 2022-05-10 RX ADMIN — MUPIROCIN: 20 OINTMENT TOPICAL at 08:05

## 2022-05-10 RX ADMIN — HYDROMORPHONE HYDROCHLORIDE 1 MG: 2 INJECTION INTRAMUSCULAR; INTRAVENOUS; SUBCUTANEOUS at 03:05

## 2022-05-10 RX ADMIN — OXYCODONE AND ACETAMINOPHEN 1 TABLET: 10; 325 TABLET ORAL at 01:05

## 2022-05-10 RX ADMIN — TRAZODONE HYDROCHLORIDE 100 MG: 100 TABLET ORAL at 09:05

## 2022-05-10 RX ADMIN — OXYCODONE AND ACETAMINOPHEN 1 TABLET: 10; 325 TABLET ORAL at 04:05

## 2022-05-10 RX ADMIN — HYDROMORPHONE HYDROCHLORIDE 1 MG: 2 INJECTION INTRAMUSCULAR; INTRAVENOUS; SUBCUTANEOUS at 11:05

## 2022-05-10 RX ADMIN — OXACILLIN SODIUM 12 G: 10 INJECTION, POWDER, FOR SOLUTION INTRAVENOUS at 12:05

## 2022-05-10 RX ADMIN — METRONIDAZOLE 500 MG: 500 TABLET ORAL at 02:05

## 2022-05-10 RX ADMIN — SODIUM CHLORIDE: 0.9 INJECTION, SOLUTION INTRAVENOUS at 11:05

## 2022-05-10 RX ADMIN — METRONIDAZOLE 500 MG: 500 TABLET ORAL at 05:05

## 2022-05-10 RX ADMIN — HYDROMORPHONE HYDROCHLORIDE 1 MG: 2 INJECTION INTRAMUSCULAR; INTRAVENOUS; SUBCUTANEOUS at 08:05

## 2022-05-10 RX ADMIN — OXYCODONE AND ACETAMINOPHEN 1 TABLET: 10; 325 TABLET ORAL at 11:05

## 2022-05-10 RX ADMIN — MUPIROCIN: 20 OINTMENT TOPICAL at 09:05

## 2022-05-10 RX ADMIN — OXYCODONE AND ACETAMINOPHEN 1 TABLET: 10; 325 TABLET ORAL at 09:05

## 2022-05-10 RX ADMIN — HYDROMORPHONE HYDROCHLORIDE 1 MG: 2 INJECTION INTRAMUSCULAR; INTRAVENOUS; SUBCUTANEOUS at 04:05

## 2022-05-10 RX ADMIN — HYDROMORPHONE HYDROCHLORIDE 1 MG: 2 INJECTION INTRAMUSCULAR; INTRAVENOUS; SUBCUTANEOUS at 06:05

## 2022-05-10 RX ADMIN — HYDROMORPHONE HYDROCHLORIDE 1 MG: 2 INJECTION INTRAMUSCULAR; INTRAVENOUS; SUBCUTANEOUS at 12:05

## 2022-05-10 RX ADMIN — ENOXAPARIN SODIUM 40 MG: 40 INJECTION SUBCUTANEOUS at 04:05

## 2022-05-10 RX ADMIN — METRONIDAZOLE 500 MG: 500 TABLET ORAL at 09:05

## 2022-05-10 RX ADMIN — IPRATROPIUM BROMIDE AND ALBUTEROL SULFATE 3 ML: 2.5; .5 SOLUTION RESPIRATORY (INHALATION) at 02:05

## 2022-05-10 RX ADMIN — SODIUM CHLORIDE: 0.9 INJECTION, SOLUTION INTRAVENOUS at 07:05

## 2022-05-10 NOTE — ASSESSMENT & PLAN NOTE
This patient does have evidence of infective focus  My overall impression is sepsis. Vital signs were reviewed and noted in progress note.  Antibiotics given-   Antibiotics (From admission, onward)            Start     Stop Route Frequency Ordered    05/09/22 1800  metroNIDAZOLE tablet 500 mg         -- Oral Every 8 hours 05/09/22 1201    05/09/22 1100  oxacillin 12 g in  mL CONTINUOUS INFUSION         -- IV Every 24 hours (non-standard times) 05/09/22 0932    05/07/22 2100  mupirocin 2 % ointment         05/12 2059 Nasl 2 times daily 05/07/22 1646        Cultures were taken-   Microbiology Results (last 7 days)     Procedure Component Value Units Date/Time    Blood culture [[id number]]  (Abnormal) Collected: 05/08/22 1516    Order Status: Completed Specimen: Blood from Peripheral, Right Hand Updated: 05/10/22 0649     Blood Culture, Routine Gram stain aer bottle: Gram positive cocci in clusters resembling Staph       Results called to and read back by: Chasity Raymundo RN  05/09/2022  11:31      STAPHYLOCOCCUS AUREUS  ID consult required at [facility].Rosario shukla [facility].  For susceptibility see order #[id number]      Narrative:      Collection has been rescheduled by [email or web address] at 05/08/2022 13:22 Reason:   Pt in mri will be there after another hour [id number]  Collection has been rescheduled by [email or web address] at 05/08/2022 13:22 Reason:   Pt in mri will be there after another hour [id number]    Blood culture [[id number]]  (Abnormal) Collected: 05/08/22 1515    Order Status: Completed Specimen: Blood from Peripheral, Left Arm Updated: 05/10/22 0648     Blood Culture, Routine Gram stain aer bottle: Gram positive cocci in clusters resembling Staph      Results called to and read back by: Chasity Raymundo RN  05/09/2022  15:40      STAPHYLOCOCCUS AUREUS  ID consult required at [facility].  For susceptibility see order #[id number]      Narrative:      Collection has been rescheduled by [email or web address] at  05/08/2022 13:22 Reason:   Pt in mri will be there after another hour [id number]  Collection has been rescheduled by [email or web address] at 05/08/2022 13:22 Reason:   Pt in mri will be there after another hour [id number]    Blood culture x two cultures. Draw prior to antibiotics. [[id number]]  (Abnormal) Collected: 05/06/22 1617    Order Status: Completed Specimen: Blood from Peripheral, Antecubital, Right Updated: 05/09/22 0824     Blood Culture, Routine Gram stain aer bottle: Gram positive cocci in clusters resembling Staph       Results called to and read back by: Lila Hernandez RN 05/07/2022  10:57      Gram stain missael bottle: Gram positive cocci in clusters resembling Staph       05/07/2022  13:40      STAPHYLOCOCCUS AUREUS  ID consult required at [facility].  For susceptibility see order #[id number]      Narrative:      Aerobic and anaerobic    Blood culture x two cultures. Draw prior to antibiotics. [[id number]]  (Abnormal)  (Susceptibility) Collected: 05/06/22 1616    Order Status: Completed Specimen: Blood from Peripheral, Antecubital, Left Updated: 05/09/22 0823     Blood Culture, Routine Gram stain aer bottle: Gram positive cocci in clusters resembling Staph       Gram stain missael bottle: Gram positive cocci in clusters resembling Staph       Results called to and read back by: Lila Hernandez RN 05/07/2022  10:57      STAPHYLOCOCCUS AUREUS  ID consult required at [facility].      Narrative:      Aerobic and anaerobic    Gram stain [[id number]] Collected: 05/07/22 0901    Order Status: Sent Specimen: Body Fluid from Pleural Fluid Updated: 05/07/22 0905    Fungus culture [[id number]] Collected: 05/07/22 0901    Order Status: Sent Specimen: Body Fluid from Pleural Fluid Updated: 05/07/22 0905    AFB culture (includes stain) [[id number]] Collected: 05/07/22 0901    Order Status: Sent Specimen: Body Fluid from Pleural Fluid Updated: 05/07/22 0905        Latest lactate  reviewed, they are-  No results for input(s): LACTATE in the last 72 hours.    Organ dysfunction indicated by Acute kidney injury  Source-  lung    Source control Achieved by-   Cont Broad spectrum IVAB     Bacteremia  Infectious disease consulted

## 2022-05-10 NOTE — PROGRESS NOTES
[facility] Medicine  Progress Note    Patient Name: Tianna Leon  MRN: [id number]  Patient Class: IP- Inpatient   Admission Date: 5/6/2022  Length of Stay: 4 days  Attending Physician: Elizabeth Kim MD  Primary Care Provider: Spenser Campa MD        Subjective:     Principal Problem:Bacteremia        HPI:  36 y/o. female  with a PMHx of asthma, COPD,Bipolar  , IVDU and HLD presented to the ER with a c/o  sob for the last weak which has gotten worse . The SOB is associated with fever , chills , productive cough , back pain  and generalized weakness . She report cough some blood this am .  She is active IVDU  ( heroine , cocaine  and amphetamine ) . She denies any  sick contact , chest pain  , GI/ sx , She also complaning of LE sweeling . Knee pain and B/L LE rash .   ER COURSE: CTA chest negative for pe . Multiple nodular and cavitary opacities concerning for septic emboli with larger opacities possibly necrotizing pneumonia. CT cervical  and thoracic did not show any acute finding , CT lumbar Possible progression of degenerative changes most notable at L4-5 with moderate to severe spinal canal stenosis at this level.  WBC  29 k , na 130 , k 3.4 , cl 89 , procal 2.71   ER VS:  BP Pulse Resp Temp SpO2   (!) 124/58 110 (!) 30 (!) 101.6 °F (38.7 °C) 96 %           Pt will be admitted to inpt with a dx of PNA and severe sepsis       Overview/Hospital Course:  5/7 admitted for pneumonia, severe sepsis in setting of ivdu. Mother at bedside and provides collateral. Patient has struggled with substance abuse for approximately 20 years, worsening over last 3-5 years since life event. Onset of symptoms 1-2 weeks ago. Tolerated thoracentesis with no pneumothorax on chest x-ray. Mri lumbar and echo, pending. Bcx positive, growing gram positive cocci. On vanc, cefepime and flagyl. Infectious disease consulted for bacteremia. 5/8/22 The patient remained afebrile overnight. Currently on  cefepime/vanc/flagyl. Blood cx are growing staph aureus. The patient refused the MRI lumbar spine overnight d/t being unable to lay flat from back pain. Will give pain meds and attempt to get the MRI today to r/o spinal abscess. The ECHO showed a 1.3 x 1.5 cm elongated, mobile echodensity seen on the tricuspid valve consistent with vegetation, CT surgery was consulted. Will need a SUSANNAH. Infectious disease is consulted. Will repeat blood cx today. 5/9/22 No acute events overnight. The patient reports lower back pain is not well controlled with current pain regimen, will adjust. Repeat blood cx are still positive. Sensitivities are back and Staph is sensitive to oxacillin, will D/C vanc/cefepime. Cardiology consulted and plans for a SUSANNAH today. CT surgery following and recommends continuing medical management with appropriate ABX, no surgical intervention at this time. MRI lumbar spine showed moderate to severe spinal canal stenosis with moderate left-sided neural foraminal stenosis, Neurosurgery consulted. 5/10/22 No acute events overnight. The patient reports some improvement in pain with adjustment in pain meds. Repeat blood cx are +. Continue IV oxacillin and flagyl. Infectious disease is consulted. IR consulted to evaluate possible paraspinous muscle myositis versus abscess. Recommend IR drainage. Continue current management.        Interval History: No acute events overnight. The patient reports some improvement in pain with adjustment in pain meds. Repeat blood cx are +. Continue IV oxacillin and flagyl. Infectious disease is consulted. IR consulted to evaluate possible paraspinous muscle myositis versus abscess. Recommend IR drainage. Continue current management.      Review of Systems   Constitutional:  Positive for fatigue and fever. Negative for activity change, appetite change, chills, diaphoresis and unexpected weight change.   HENT: Negative.  Negative for congestion, drooling, facial swelling, rhinorrhea,  sinus pressure, sneezing and sore throat.    Eyes: Negative.  Negative for discharge, redness, itching and visual disturbance.   Respiratory:  Positive for cough, chest tightness and shortness of breath. Negative for apnea, choking, wheezing and stridor.    Cardiovascular:  Negative for chest pain, palpitations and leg swelling.   Gastrointestinal: Negative.  Negative for abdominal distention, abdominal pain, anal bleeding, blood in stool, constipation, diarrhea, nausea and vomiting.   Endocrine: Negative.    Genitourinary: Negative.  Negative for decreased urine volume, difficulty urinating, dysuria, frequency, hematuria, pelvic pain, urgency, vaginal bleeding and vaginal discharge.   Musculoskeletal:  Positive for arthralgias and back pain. Negative for gait problem, joint swelling, myalgias, neck pain and neck stiffness.   Skin: Negative.  Negative for color change, pallor, rash and wound.   Allergic/Immunologic: Negative.    Neurological:  Positive for weakness. Negative for dizziness, seizures, facial asymmetry, speech difficulty, light-headedness, numbness and headaches.   Psychiatric/Behavioral:  Positive for confusion. Negative for agitation, hallucinations and suicidal ideas.    All other systems reviewed and are negative.  Objective:     Vital Signs (Most Recent):  Temp: 98.4 °F (36.9 °C) (05/10/22 1620)  Pulse: 87 (05/10/22 1620)  Resp: 17 (05/10/22 1620)  BP: (!) 97/56 (05/10/22 1620)  SpO2: 98 % (05/10/22 1620)   Vital Signs (24h Range):  Temp:  [97.9 °F (36.6 °C)-98.9 °F (37.2 °C)] 98.4 °F (36.9 °C)  Pulse:  [] 87  Resp:  [15-20] 17  SpO2:  [94 %-100 %] 98 %  BP: ()/(52-65) 97/56     Weight: 92 kg (202 lb 13.2 oz)  Body mass index is 33.75 kg/m².    Intake/Output Summary (Last 24 hours) at 5/10/2022 1645  Last data filed at 5/10/2022 1300  Gross per 24 hour   Intake 420 ml   Output 2150 ml   Net -1730 ml      Physical Exam  Vitals and nursing note reviewed. Exam conducted with a chaperone  present (family).   Constitutional:       General: She is not in acute distress.     Appearance: She is well-developed. She is ill-appearing. She is not toxic-appearing.   HENT:      Head: Normocephalic and atraumatic.   Eyes:      Conjunctiva/sclera: Conjunctivae normal.      Pupils: Pupils are equal, round, and reactive to light.   Cardiovascular:      Rate and Rhythm: Normal rate and regular rhythm.      Heart sounds: Normal heart sounds. No murmur heard.  Pulmonary:      Effort: Pulmonary effort is normal. No respiratory distress.      Breath sounds: Normal breath sounds.   Abdominal:      General: Bowel sounds are normal. There is no distension.      Palpations: Abdomen is soft.      Tenderness: There is no abdominal tenderness.   Musculoskeletal:         General: Swelling and tenderness present. No deformity. Normal range of motion.      Cervical back: Normal range of motion and neck supple.      Right lower leg: No edema.      Left lower leg: No edema.   Skin:     General: Skin is warm and dry.      Coloration: Skin is pale.      Findings: Erythema present.   Neurological:      Mental Status: She is alert and oriented to person, place, and time.      Motor: Weakness present.      Deep Tendon Reflexes: Reflexes are normal and symmetric.   Psychiatric:         Behavior: Behavior normal.       Significant Labs: All pertinent labs within the past 24 hours have been reviewed.    Significant Imaging:   Imaging Results              CTA Chest Non-Coronary (PE Study) (Final result)  Result time 05/06/22 18:26:20      Final result by Demario Morin MD (05/06/22 18:26:20)                   Impression:      No definite pulmonary thromboembolism.    Multiple nodular and cavitary opacities concerning for septic emboli with larger opacities possibly necrotizing pneumonia.    Moderate loculated right pleural fluid.  Empyema not excluded.    Small pericardial effusion of unclear etiology.    Splenic hypodensity possibly  related to infarct or contrast timing.    Additional details as above.    All CT scans at this facility are performed  using dose modulation techniques as appropriate to performed exam including the following:  automated exposure control; adjustment of mA and/or kV according to the patients size (this includes techniques or standardized protocols for targeted exams where dose is matched to indication/reason for exam: i.e. extremities or head);  iterative reconstruction technique.      Electronically signed by: Demario Morin  Date:    05/06/2022  Time:    18:26               Narrative:    EXAMINATION:  CTA CHEST NON CORONARY    CLINICAL HISTORY:  Pulmonary embolism (PE) suspected, neg D-dimer;    TECHNIQUE:  Low dose axial images, sagittal and coronal reformations were obtained from the thoracic inlet to the lung bases following the IV administration of 100 mL of Omnipaque 350.  Contrast timing was optimized to evaluate the pulmonary arteries.  MIP images were performed.    COMPARISON:  Multiple priors.    FINDINGS:  Base of Neck: No significant abnormality.    Thoracic soft tissues: Unremarkable.    Aorta: Left-sided aortic arch.  No aneurysm and no significant atherosclerosis    Heart: Normal size.  Small effusion.    Pulmonary vasculature: Pulmonary arteries are well opacified.  There is no pulmonary thromboembolism.    Esha/Mediastinum: No pathologic ariana enlargement.    Airways: Patent.    Lungs/Pleura: Multiple nodular and cavitary opacities concerning for septic emboli.  Additional larger opacities most notably in the right lung base with some internal clearing possibly related to necrotizing pneumonia.  Moderate loculated right pleural fluid and no definite left pleural fluid.  Empyema not excluded.    Esophagus: Unremarkable.    Upper Abdomen: Geographic wedge-shaped splenic hypodensity possibly related to contrast timing or infarct.    Bones: No acute fracture. No suspicious lytic or sclerotic lesions.                                         CT Lumbar Spine Without Contrast (Final result)  Result time 05/06/22 18:29:13      Final result by Demario Morin MD (05/06/22 18:29:13)                   Impression:      No fracture or traumatic malalignment of the lumbar spine.  No definite findings to suggest osteomyelitis.  Further evaluation as clinically warranted.    Possible progression of degenerative changes most notable at L4-5 with moderate to severe spinal canal stenosis at this level.  Additional details as above.    This report was flagged in Epic as abnormal.    All CT scans at this facility are performed  using dose modulation techniques as appropriate to performed exam including the following:  automated exposure control; adjustment of mA and/or kV according to the patients size (this includes techniques or standardized protocols for targeted exams where dose is matched to indication/reason for exam: i.e. extremities or head);  iterative reconstruction technique.      Electronically signed by: Demario Morin  Date:    05/06/2022  Time:    18:29               Narrative:    EXAMINATION:  CT LUMBAR SPINE WITHOUT CONTRAST    CLINICAL HISTORY:  Low back pain, infection suspected;    TECHNIQUE:  Low-dose CT images obtained throughout the region of the lumbar spine.  Axial, sagittal and coronal reformations were performed.  Contrast was not administered.    COMPARISON:  12/05/2019    FINDINGS:  The vertebral bodies are normal in height and morphology without evidence of fracture or osseous destructive process.    Normal sagittal alignment is preserved.  No spondylolisthesis.    Circumferential disc bulges L3 through S1 with mild spinal canal stenosis L3-4, moderate to severe spinal canal stenosis L4-5 and probable moderate spinal canal stenosis L5-S1.    Facet arthropathy with moderate left neural foraminal narrowing at L4-5 and severe left neural foraminal narrowing at L5-S1.  Other levels are better  preserved.    Limited evaluation of the intraspinal contents demonstrates no hematoma or mass.    Paraspinal soft tissues exhibit no acute abnormalities.                                       CT Thoracic Spine Without Contrast (Final result)  Result time 05/06/22 18:32:20      Final result by Demario Morin MD (05/06/22 18:32:20)                   Impression:      No definite acute abnormality.  No traumatic malalignment, fracture, or osseous destructive process.  Further evaluation as clinically warranted.    Pulmonary opacities as on the dedicated CT exam.    All CT scans at this facility are performed  using dose modulation techniques as appropriate to performed exam including the following:  automated exposure control; adjustment of mA and/or kV according to the patients size (this includes techniques or standardized protocols for targeted exams where dose is matched to indication/reason for exam: i.e. extremities or head);  iterative reconstruction technique.      Electronically signed by: Demario Morin  Date:    05/06/2022  Time:    18:32               Narrative:    EXAMINATION:  CT THORACIC SPINE WITHOUT CONTRAST    CLINICAL HISTORY:  Epidural abscess suspected;    TECHNIQUE:  Low-dose axial noncontrast CT images of the thoracic spine.  Multiplanar reformats generated    COMPARISON:  None    FINDINGS:  Thoracic spine alignment is well preserved.  No findings to suggest vertebral body fracture.  No osseous lytic or blastic process.    No significant degenerative changes.  No disc abnormalities or facet arthrosis.    Pulmonary opacities as on the dedicated CT exam.                                       CT Cervical Spine Without Contrast (Final result)  Result time 05/06/22 18:34:24      Final result by Demario Morin MD (05/06/22 18:34:24)                   Impression:      No fracture or traumatic malalignment of the cervical spine.  No definite CT evidence of osteomyelitis.  Further evaluation as  clinically warranted.    Additional degenerative findings as above.    All CT scans at this facility are performed  using dose modulation techniques as appropriate to performed exam including the following:  automated exposure control; adjustment of mA and/or kV according to the patients size (this includes techniques or standardized protocols for targeted exams where dose is matched to indication/reason for exam: i.e. extremities or head);  iterative reconstruction technique.      Electronically signed by: Demario Morin  Date:    05/06/2022  Time:    18:34               Narrative:    EXAMINATION:  CT CERVICAL SPINE WITHOUT CONTRAST    CLINICAL HISTORY:  Neck pain, acute, infection suspected;    TECHNIQUE:  Low dose axial CT images through the cervical spine, with sagittal and coronal reformations.  Contrast was not administered.    COMPARISON:  12/05/2019    FINDINGS:  The vertebral bodies are normal in height and morphology without evidence of fracture or osseous destructive process.  Normal sagittal alignment is preserved.    Progression of degenerative change at C4-5 in comparison the prior exam with posterior disc osteophyte complex at this level producing mild spinal canal stenosis.  Smaller C5-6 posterior disc osteophyte complex without definite spinal canal stenosis.    No definite neural foraminal narrowing.    Limited evaluation of the intraspinal contents demonstrates no hematoma or mass.Paraspinal soft tissues exhibit no acute abnormalities.                                       X-Ray Knee Complete 4 Or More Views Right (Final result)  Result time 05/06/22 16:01:49      Final result by Nate Brasher MD (05/06/22 16:01:49)                   Impression:      No evidence of fracture or dislocation. Moderate suprapatellar knee joint effusion. Mild soft tissue edema lateral to the knee.      Electronically signed by: Nate Brasher  Date:    05/06/2022  Time:    16:01               Narrative:     EXAMINATION:  XR KNEE COMP 4 OR MORE VIEWS RIGHT    CLINICAL HISTORY:  Pain in unspecified knee    TECHNIQUE:  AP, lateral, and Merchant views of the right knee were performed.    COMPARISON:  None    FINDINGS:  No evidence of fracture or dislocation.  Moderate suprapatellar knee joint effusion.  Mild soft tissue edema lateral to the knee.  No foreign body.  Soft tissues otherwise unremarkable.                                       X-Ray Finger 2 or More Views Right (Final result)  Result time 05/06/22 16:02:28      Final result by Nate Brasher MD (05/06/22 16:02:28)                   Impression:      Negative exam.      Electronically signed by: Nate Brasher  Date:    05/06/2022  Time:    16:02               Narrative:    EXAMINATION:  XR FINGER 2 OR MORE VIEWS RIGHT    CLINICAL HISTORY:  finger swelling;    TECHNIQUE:  Three views of the right 4th digit.    COMPARISON:  None    FINDINGS:  No evidence of fracture or dislocation.  Joint spaces appear well maintained.  Soft tissues unremarkable.  No evidence of foreign body.                                        X-Ray Chest AP Portable (Final result)  Result time 05/06/22 16:04:37      Final result by Nate Brasher MD (05/06/22 16:04:37)                   Impression:      Patchy bilateral opacities, some of which appear nodular and possibly cavitary. Recommend further evaluation with chest CT with IV contrast.    This report was flagged in Epic as abnormal.      Electronically signed by: Nate Brasher  Date:    05/06/2022  Time:    16:04               Narrative:    EXAMINATION:  XR CHEST AP PORTABLE    CLINICAL HISTORY:  Sepsis;.    TECHNIQUE:  Single frontal portable view of the chest was performed.    COMPARISON:  12/05/2019    FINDINGS:  Support devices: None    Patchy bilateral opacities, some of which appear nodular and possibly cavitary.  Recommend further evaluation with chest CT with IV contrast.    Heart normal size.  No pneumothorax or pleural  effusion    Bones are intact.                                          Assessment/Plan:      * Bacteremia  Gram positive   Infectious disease consulted  Continue broad spectrum intravenous antibiotic(s) given h/o ivdu  Remains febrile and leukocytosis persists  5/8/22 The patient remained afebrile overnight. Currently on cefepime/vanc/flagyl. Blood cx are growing staph aureus. The patient refused the MRI lumbar spine overnight d/t being unable to lay flat from back pain. Will give pain meds and attempt to get the MRI today to r/o spinal abscess. The ECHO showed a 1.3 x 1.5 cm elongated, mobile echodensity seen on the tricuspid valve consistent with vegetation, CT surgery was consulted. Will need a SUSANNAH. Infectious disease is consulted. Will repeat blood cx today.   5/9/22 Repeat blood cx are still positive. Sensitivities are back and Staph is sensitive to oxacillin, will D/C vanc/cefepime. Infectious disease is following   5/10/22 Repeat blood cx are +. Continue IV oxacillin and flagyl. Infectious disease is consulted. IR consulted to evaluate possible paraspinous muscle myositis versus abscess. Recommend IR drainage. Continue current management.      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 IR consulted to evaluate possible paraspinous muscle myositis versus abscess. Recommend IR drainage. Continue current management with ABX.        Lumbar stenosis without neurogenic claudication        Subacute bacterial endocarditis        Vegetation of heart valve  5/8/22  The ECHO showed a 1.3 x 1.5 cm elongated, mobile echodensity seen on the tricuspid valve consistent with vegetation, CT surgery was consulted. Will need a SUSANNAH. Infectious disease is consulted. Will repeat blood cx today. Continue IV ABX.   5/9/22 Sensitivities are back and Staph is sensitive to oxacillin, will D/C vanc/cefepime. Cardiology consulted and plans for a SUSANNAH today. CT surgery following and recommends  continuing medical management with appropriate ABX, no surgical intervention at this time.  5/10/22 Continue IV oxacillin and flagyl. Infectious disease is consulted.    Chronic pulmonary heart disease  - Pulmonology following       Parapneumonic effusion  Pulmonology consulted  Status post thoracentesis  Fluid studies pending      IVDU (intravenous drug user)   Will ordet TTE to r/o IE  Will order MRI lumbar wwo   Cont broad spectrum IVAB     5/7   Studies pending for additional sites of infection given h/o ivdu  5/8/22  on cessation     Severe sepsis  This patient does have evidence of infective focus  My overall impression is sepsis. Vital signs were reviewed and noted in progress note.  Antibiotics given-   Antibiotics (From admission, onward)            Start     Stop Route Frequency Ordered    05/09/22 1800  metroNIDAZOLE tablet 500 mg         -- Oral Every 8 hours 05/09/22 1201    05/09/22 1100  oxacillin 12 g in  mL CONTINUOUS INFUSION         -- IV Every 24 hours (non-standard times) 05/09/22 0932    05/07/22 2100  mupirocin 2 % ointment         05/12 2059 Nasl 2 times daily 05/07/22 1646        Cultures were taken-   Microbiology Results (last 7 days)     Procedure Component Value Units Date/Time    Blood culture [[id number]]  (Abnormal) Collected: 05/08/22 1516    Order Status: Completed Specimen: Blood from Peripheral, Right Hand Updated: 05/10/22 0649     Blood Culture, Routine Gram stain aer bottle: Gram positive cocci in clusters resembling Staph       Results called to and read back by: Chasity Raymundo RN  05/09/2022  11:31      STAPHYLOCOCCUS AUREUS  ID consult required at [facility].[place] and [place] locations.  For susceptibility see order #[id number]      Narrative:      Collection has been rescheduled by [email or web address] at 05/08/2022 13:22 Reason:   Pt in mri will be there after another hour [id number]  Collection has been rescheduled by [email or web address] at 05/08/2022 13:22 Reason:   Pt in mri will be there  after another hour [id number]    Blood culture [[id number]]  (Abnormal) Collected: 05/08/22 1515    Order Status: Completed Specimen: Blood from Peripheral, Left Arm Updated: 05/10/22 0648     Blood Culture, Routine Gram stain aer bottle: Gram positive cocci in clusters resembling Staph      Results called to and read back by: Chasity Raymundo RN  05/09/2022  15:40      STAPHYLOCOCCUS AUREUS  ID consult required at [facility].  For susceptibility see order #[id number]      Narrative:      Collection has been rescheduled by [email or web address] at 05/08/2022 13:22 Reason:   Pt in mri will be there after another hour [id number]  Collection has been rescheduled by [email or web address] at 05/08/2022 13:22 Reason:   Pt in mri will be there after another hour [id number]    Blood culture x two cultures. Draw prior to antibiotics. [[id number]]  (Abnormal) Collected: 05/06/22 1617    Order Status: Completed Specimen: Blood from Peripheral, Antecubital, Right Updated: 05/09/22 0824     Blood Culture, Routine Gram stain aer bottle: Gram positive cocci in clusters resembling Staph       Results called to and read back by: Lila Hernandez RN 05/07/2022  10:57      Gram stain missael bottle: Gram positive cocci in clusters resembling Staph       05/07/2022  13:40      STAPHYLOCOCCUS AUREUS  ID consult required at [facility].  For susceptibility see order #[id number]      Narrative:      Aerobic and anaerobic    Blood culture x two cultures. Draw prior to antibiotics. [[id number]]  (Abnormal)  (Susceptibility) Collected: 05/06/22 1616    Order Status: Completed Specimen: Blood from Peripheral, Antecubital, Left Updated: 05/09/22 0823     Blood Culture, Routine Gram stain aer bottle: Gram positive cocci in clusters resembling Staph       Gram stain missael bottle: Gram positive cocci in clusters resembling Staph       Results called to and read back by: Lila Hernandez RN 05/07/2022  10:57      STAPHYLOCOCCUS AUREUS  ID  consult required at [facility] and [place] locations.      Narrative:      Aerobic and anaerobic    Gram stain [[id number]] Collected: 05/07/22 0901    Order Status: Sent Specimen: Body Fluid from Pleural Fluid Updated: 05/07/22 0905    Fungus culture [[id number]] Collected: 05/07/22 0901    Order Status: Sent Specimen: Body Fluid from Pleural Fluid Updated: 05/07/22 0905    AFB culture (includes stain) [[id number]] Collected: 05/07/22 0901    Order Status: Sent Specimen: Body Fluid from Pleural Fluid Updated: 05/07/22 0905        Latest lactate reviewed, they are-  No results for input(s): LACTATE in the last 72 hours.    Organ dysfunction indicated by Acute kidney injury  Source-  lung    Source control Achieved by-   Cont Broad spectrum IVAB     Bacteremia  Infectious disease consulted      PNA (pneumonia)  H/O IVDU   Cont broad spectrum IVAB   F/U blood and sputum cx   Will consult pulmonology for thoracentesis  To r/u empyema     5/7  Status post thoracentesis  Chest x-ray without signs of pneumothorax  Fluid studies pending        Tobacco abuse  - Patient thoroughly counseled on smoking cessation, pt verbalized understanding. Time of counseling 10 minutes.        COPD (chronic obstructive pulmonary disease)  3Cont breathing tx prn  Pulmonology following     Lower back pain  Chronic in nature\  H/O IVDU  CT lumbar show spinal stenosis   No neurological deficit at this time   Will order a MRI lumbar wwo  To r/o spinal abscess     5/7  Mri pending  5/8/22 The patient refused the MRI lumbar spine overnight d/t being unable to lay flat from back pain. Will give pain meds and attempt to get the MRI today to r/o spinal abscess.  5/9/22 The patient reports lower back pain is not well controlled with current pain regimen, will adjust. MRI lumbar spine showed moderate to severe spinal canal stenosis with moderate left-sided neural foraminal stenosis, Neurosurgery consulted.   5/10/22 No acute events overnight. The  patient reports some improvement in pain with adjustment in pain meds. Continue current management.        VTE Risk Mitigation (From admission, onward)         Ordered     enoxaparin injection 40 mg  Daily         05/06/22 2336     IP VTE HIGH RISK PATIENT  Once         05/06/22 2336     Place sequential compression device  Until discontinued         05/06/22 2336                Discharge Planning   YESSICA:      Code Status: Full Code   Is the patient medically ready for discharge?:     Reason for patient still in hospital (select all that apply): Patient trending condition, Laboratory test, Treatment, Imaging, Consult recommendations and Pending disposition  Discharge Plan A: Home                  Kike Buckley NP  Department of Hospital Medicine   [facility]

## 2022-05-10 NOTE — ASSESSMENT & PLAN NOTE
Chronic in nature\  H/O IVDU  CT lumbar show spinal stenosis   No neurological deficit at this time   Will order a MRI lumbar wwo  To r/o spinal abscess     5/7  Mri pending  5/8/22 The patient refused the MRI lumbar spine overnight d/t being unable to lay flat from back pain. Will give pain meds and attempt to get the MRI today to r/o spinal abscess.  5/9/22 The patient reports lower back pain is not well controlled with current pain regimen, will adjust. MRI lumbar spine showed moderate to severe spinal canal stenosis with moderate left-sided neural foraminal stenosis, Neurosurgery consulted.   5/10/22 No acute events overnight. The patient reports some improvement in pain with adjustment in pain meds. Continue current management.

## 2022-05-10 NOTE — ASSESSMENT & PLAN NOTE
5/8/22  The ECHO showed a 1.3 x 1.5 cm elongated, mobile echodensity seen on the tricuspid valve consistent with vegetation, CT surgery was consulted. Will need a SUSANNAH. Infectious disease is consulted. Will repeat blood cx today. Continue IV ABX.   5/9/22 Sensitivities are back and Staph is sensitive to oxacillin, will D/C vanc/cefepime. Cardiology consulted and plans for a SUSANNAH today. CT surgery following and recommends continuing medical management with appropriate ABX, no surgical intervention at this time.  5/10/22 Continue IV oxacillin and flagyl. Infectious disease is consulted.

## 2022-05-10 NOTE — PROGRESS NOTES
[facility]  Neurosurgery  Progress Note    Subjective:     Interval History:  Patient underwent pulmonary procedure currently resting in bed she reports pain in the back worse with activities requesting pain medicine    History of Present Illness:     Post-Op Info:  Procedure(s) (LRB):  ECHOCARDIOGRAM,TRANSESOPHAGEAL (N/A)   1 Day Post-Op      Medications:  Continuous Infusions:   sodium chloride 0.9% 125 mL/hr at 05/09/22 1933     Scheduled Meds:   enoxaparin  40 mg Subcutaneous Daily    metroNIDAZOLE  500 mg Oral Q8H    mupirocin   Nasal BID    oxacillin 12 g in  mL CONTINUOUS INFUSION  12 g Intravenous Q24H     PRN Meds:acetaminophen, albuterol-ipratropium, HYDROmorphone, lorazepam, melatonin, ondansetron, oxyCODONE-acetaminophen, sodium chloride 0.9%, sodium chloride 0.9%     Review of Systems  Objective:     Weight: 92 kg (202 lb 13.2 oz)  Body mass index is 33.75 kg/m².  Vital Signs (Most Recent):  Temp: 98.9 °F (37.2 °C) (05/10/22 0740)  Pulse: 86 (05/10/22 0740)  Resp: 15 (05/10/22 0847)  BP: 109/61 (05/10/22 0740)  SpO2: 95 % (05/10/22 0740) Vital Signs (24h Range):  Temp:  [98.3 °F (36.8 °C)-99.6 °F (37.6 °C)] 98.9 °F (37.2 °C)  Pulse:  [] 86  Resp:  [15-18] 15  SpO2:  [94 %-100 %] 95 %  BP: ()/(52-65) 109/61                          Physical Exam:  Nursing note and vitals reviewed.    Musculoskeletal: Gait is abnormal.        Back: Range of motion is limited. There is tenderness (Lower lumbar region).        Right Upper Extremities: Range of motion is full.        Left Upper Extremities: Range of motion is full.       Right Lower Extremities: Range of motion is full.        Left Lower Extremities: Range of motion is full.      Comments: Patient has decreased range of motion flexion extension the spine generalized tenderness over the lumbar region.  Motor exam essentially unchanged with there is slight dorsiflexion plantar flexion weakness secondary to break away pain      Neurological:        Sensory: There is no sensory deficit in the trunk. There is no sensory deficit in the extremities.        DTRs: DTRs are DTRS NORMAL AND SYMMETRICnormal and symmetric.        Cranial nerves: Cranial nerve(s) II, III, IV, V, VI, VII, VIII, IX, X, XI and XII are intact.   Awake alert oriented to person place no nuchal rigidity speech clear cranial nerves are intact       Significant Labs:  Recent Labs   Lab 05/08/22  1514 05/09/22  0730 05/10/22  0716   * 113*  113* 86   * 130*  130* 135*   K 3.6 3.9  3.9 4.3   CL 95 95  95 98   CO2 23 28  28 29   BUN 14 10  10 11   CREATININE 0.6 0.5  0.5 0.5   CALCIUM 7.5* 7.1*  7.1* 7.3*     Recent Labs   Lab 05/08/22  1514 05/09/22  0730 05/10/22  0716   WBC 30.90* 33.79*  33.79* 35.76*   HGB 7.8* 7.4*  7.4* 7.9*   HCT 22.1* 21.8*  21.8* 23.2*    176  176 249     No results for input(s): LABPT, INR, APTT in the last 48 hours.  Microbiology Results (last 7 days)     Procedure Component Value Units Date/Time    Blood culture [[id number]]  (Abnormal) Collected: 05/08/22 1516    Order Status: Completed Specimen: Blood from Peripheral, Right Hand Updated: 05/10/22 0649     Blood Culture, Routine Gram stain aer bottle: Gram positive cocci in clusters resembling Staph       Results called to and read back by: Chasity Raymundo RN  05/09/2022  11:31      STAPHYLOCOCCUS AUREUS  ID consult required at [facility].Critical access hospital,[place] [facility].  For susceptibility see order #[id number]      Narrative:      Collection has been rescheduled by [email or web address] at 05/08/2022 13:22 Reason:   Pt in mri will be there after another hour [id number]  Collection has been rescheduled by [email or web address] at 05/08/2022 13:22 Reason:   Pt in mri will be there after another hour [id number]    Blood culture [[id number]]  (Abnormal) Collected: 05/08/22 1515    Order Status: Completed Specimen: Blood from Peripheral, Left Arm Updated: 05/10/22 0648     Blood Culture, Routine Gram stain  aer bottle: Gram positive cocci in clusters resembling Staph      Results called to and read back by: Chasity Raymundo RN  05/09/2022  15:40      STAPHYLOCOCCUS AUREUS  ID consult required at [facility].  For susceptibility see order #[id number]      Narrative:      Collection has been rescheduled by [email or web address] at 05/08/2022 13:22 Reason:   Pt in mri will be there after another hour [id number]  Collection has been rescheduled by [email or web address] at 05/08/2022 13:22 Reason:   Pt in mri will be there after another hour [id number]    Blood culture x two cultures. Draw prior to antibiotics. [[id number]]  (Abnormal) Collected: 05/06/22 1617    Order Status: Completed Specimen: Blood from Peripheral, Antecubital, Right Updated: 05/09/22 0824     Blood Culture, Routine Gram stain aer bottle: Gram positive cocci in clusters resembling Staph       Results called to and read back by: Lila Hernandez RN 05/07/2022  10:57      Gram stain missael bottle: Gram positive cocci in clusters resembling Staph       05/07/2022  13:40      STAPHYLOCOCCUS AUREUS  ID consult required at [facility].  For susceptibility see order #[id number]      Narrative:      Aerobic and anaerobic    Blood culture x two cultures. Draw prior to antibiotics. [[id number]]  (Abnormal)  (Susceptibility) Collected: 05/06/22 1616    Order Status: Completed Specimen: Blood from Peripheral, Antecubital, Left Updated: 05/09/22 0823     Blood Culture, Routine Gram stain aer bottle: Gram positive cocci in clusters resembling Staph       Gram stain missael bottle: Gram positive cocci in clusters resembling Staph       Results called to and read back by: Lila Hernandez RN 05/07/2022  10:57      STAPHYLOCOCCUS AUREUS  ID consult required at [facility].      Narrative:      Aerobic and anaerobic    Gram stain [[id number]] Collected: 05/07/22 0901    Order Status: Sent Specimen: Body Fluid from Pleural Fluid Updated:  05/07/22 0905    Fungus culture [[id number]] Collected: 05/07/22 0901    Order Status: Sent Specimen: Body Fluid from Pleural Fluid Updated: 05/07/22 0905    AFB culture (includes stain) [[id number]] Collected: 05/07/22 0901    Order Status: Sent Specimen: Body Fluid from Pleural Fluid Updated: 05/07/22 0905        Recent Lab Results       05/10/22  0716        Albumin 1.4       Alkaline Phosphatase 85       ALT 16       Anion Gap 8       AST 18       Baso # 0.10       Basophil % 0.3       BILIRUBIN TOTAL 0.8  Comment: For infants and newborns, interpretation of results should be based  on gestational age, weight and in agreement with clinical  observations.    Premature Infant recommended reference ranges:  Up to 24 hours.............<8.0 mg/dL  Up to 48 hours............<12.0 mg/dL  3-5 days..................<15.0 mg/dL  6-29 days.................<15.0 mg/dL         BUN 11       Calcium 7.3       Chloride 98       CO2 29       Creatinine 0.5       Differential Method Automated       eGFR if  >60       eGFR if non  >60  Comment: Calculation used to obtain the estimated glomerular filtration  rate (eGFR) is the CKD-EPI equation.          Eos # 0.1       Eosinophil % 0.2       Glucose 86       Gran # (ANC) 29.8       Gran % 83.4       Hematocrit 23.2       Hemoglobin 7.9       Immature Grans (Abs) 1.47  Comment: Mild elevation in immature granulocytes is non specific and   can be seen in a variety of conditions including stress response,   acute inflammation, trauma and pregnancy. Correlation with other   laboratory and clinical findings is essential.         Immature Granulocytes 4.1       Lymph # 2.8       Lymph % 7.9       MCH 29.2       MCHC 34.1       MCV 86       Mono # 1.5       Mono % 4.1       MPV 10.5       nRBC 0       Platelet Estimate Appears normal       Platelets 249  Comment: Results confirmed, test repeated       Potassium 4.3       PROTEIN TOTAL 5.1       RBC 2.71        RDW 14.8       Sodium 135       WBC 35.76           All pertinent labs from the last 24 hours have been reviewed.  Significant Diagnostics:  I have reviewed all pertinent imaging results/findings within the past 24 hours.    Assessment/Plan:   1. L4-5 broad-based disc protrusion causing some mild lateral recess stenosis radicular leg pain this time no neurosurgical intervention indicated in the face of bacteremia and infection.  Patient may follow up in neurosurgery clinic once medically stable in her infection has resolved in 2-3 months.  2. 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] 05/09/2022 Yes    Chronic pulmonary heart disease [I27.9] 05/08/2022 Yes    Vegetation of heart valve [I33.0] 05/08/2022 Yes    Subacute bacterial endocarditis [I33.0] 05/08/2022 Yes    Parapneumonic effusion [J18.9, J91.8] 05/07/2022 Yes    PNA (pneumonia) [J18.9] 05/06/2022 Yes    Severe sepsis [A41.9, R65.20] 05/06/2022 Yes    IVDU (intravenous drug user) [F19.90] 05/06/2022 Yes    Tobacco abuse [Z72.0] 10/01/2019 Yes    COPD (chronic obstructive pulmonary disease) [J44.9] 12/29/2017 Yes    Lower back pain [M54.50] 10/24/2013 Yes      Problems Resolved During this Admission:       Didier Hess MD  Neurosurgery  [facility]

## 2022-05-10 NOTE — ASSESSMENT & PLAN NOTE
Infectious disease is consulted. IR consulted to evaluate possible paraspinous muscle myositis versus abscess. Recommend IR drainage. Continue current management with ABX.

## 2022-05-10 NOTE — PLAN OF CARE
Problem: Adult Inpatient Plan of Care  Goal: Plan of Care Review  Outcome: Ongoing, Progressing  Goal: Patient-Specific Goal (Individualized)  Outcome: Ongoing, Progressing  Goal: Absence of Hospital-Acquired Illness or Injury  Outcome: Ongoing, Progressing  Goal: Optimal Comfort and Wellbeing  Outcome: Ongoing, Progressing  Goal: Readiness for Transition of Care  Outcome: Ongoing, Progressing     Problem: Impaired Wound Healing  Goal: Optimal Wound Healing  Outcome: Ongoing, Progressing     Problem: Skin Injury Risk Increased  Goal: Skin Health and Integrity  Outcome: Ongoing, Progressing     Problem: Adjustment to Illness (Sepsis/Septic Shock)  Goal: Optimal Coping  Outcome: Ongoing, Progressing     Problem: Infection (Pneumonia)  Goal: Resolution of Infection Signs and Symptoms  Outcome: Ongoing, Progressing

## 2022-05-10 NOTE — SUBJECTIVE & OBJECTIVE
Interval History:  05/10: seen and examined T ma x 99.6F, NS note reviewed, C/O back pain? Paraspinal process, Wbcc 35 K  on Abx, Oxacillin     sodium chloride 0.9% 125 mL/hr at 05/09/22 1933     Review of Systems   Constitutional: Positive for malaise/fatigue.   HENT: Negative.     Eyes: Negative.    Cardiovascular: Negative.    Respiratory: Negative.     Endocrine: Negative.    Hematologic/Lymphatic: Negative.    Skin: Negative.    Musculoskeletal:  Positive for back pain.   Gastrointestinal: Negative.    Genitourinary: Negative.    Neurological: Negative.    Psychiatric/Behavioral: Negative.     Allergic/Immunologic: Negative.       Objective:     Vital Signs (Most Recent):  Temp: 98.9 °F (37.2 °C) (05/10/22 0740)  Pulse: 86 (05/10/22 0740)  Resp: 15 (05/10/22 0847)  BP: 109/61 (05/10/22 0740)  SpO2: 95 % (05/10/22 0740) Vital Signs (24h Range):  Temp:  [98.3 °F (36.8 °C)-99.6 °F (37.6 °C)] 98.9 °F (37.2 °C)  Pulse:  [] 86  Resp:  [15-18] 15  SpO2:  [94 %-100 %] 95 %  BP: ()/(52-65) 109/61     Weight: 92 kg (202 lb 13.2 oz)  Body mass index is 33.75 kg/m².      Intake/Output Summary (Last 24 hours) at 5/10/2022 1015  Last data filed at 5/10/2022 0600  Gross per 24 hour   Intake --   Output 1650 ml   Net -1650 ml       Physical Exam  Vitals and nursing note reviewed.   Constitutional:       Appearance: She is ill-appearing.      Interventions: Nasal cannula in place.   HENT:      Head: Normocephalic.      Comments: Lip piercing     Nose: Nose normal.      Mouth/Throat:      Mouth: Mucous membranes are moist.   Eyes:      Pupils: Pupils are equal, round, and reactive to light.   Cardiovascular:      Rate and Rhythm: Normal rate.   Pulmonary:      Effort: No tachypnea.      Breath sounds: Examination of the right-middle field reveals decreased breath sounds. Examination of the right-lower field reveals decreased breath sounds. Decreased breath sounds present. No wheezing, rhonchi or rales.   Abdominal:       General: Bowel sounds are normal.      Palpations: Abdomen is soft.   Musculoskeletal:         General: Normal range of motion.      Cervical back: Normal range of motion.   Skin:     General: Skin is warm.      Comments: Multiple tattoos   Neurological:      General: No focal deficit present.      Mental Status: She is alert and oriented to person, place, and time.   Psychiatric:         Behavior: Behavior is cooperative.       Vents:  Oxygen Concentration (%): 28 (05/09/22 0156)    Lines/Drains/Airways       Peripheral Intravenous Line  Duration                  Peripheral IV - Single Lumen 05/06/22 1616 20 G Right Antecubital 3 days                    Significant Labs:    CBC/Anemia Profile:  Recent Labs   Lab 05/08/22  1514 05/09/22  0730 05/10/22  0716   WBC 30.90* 33.79*  33.79* 35.76*   HGB 7.8* 7.4*  7.4* 7.9*   HCT 22.1* 21.8*  21.8* 23.2*    176  176 249   MCV 83 85  85 86   RDW 14.5 14.2  14.2 14.8*        Chemistries:  Recent Labs   Lab 05/08/22  1514 05/09/22  0730 05/10/22  0716   * 130*  130* 135*   K 3.6 3.9  3.9 4.3   CL 95 95  95 98   CO2 23 28  28 29   BUN 14 10  10 11   CREATININE 0.6 0.5  0.5 0.5   CALCIUM 7.5* 7.1*  7.1* 7.3*   ALBUMIN 1.4* 1.3*  1.3* 1.4*   PROT 5.2* 4.9*  4.9* 5.1*   BILITOT 0.9 1.1*  1.1* 0.8   ALKPHOS 137* 94  94 85   ALT 25 23  23 16   AST 23 19  19 18       Blood Culture:   Recent Labs   Lab 05/08/22  1515 05/08/22  1516   LABBLOO Gram stain aer bottle: Gram positive cocci in clusters resembling Staph  Results called to and read back by: Chasity Raymundo RN  05/09/2022  15:40  STAPHYLOCOCCUS AUREUS  ID consult required at [facility],[place] and [facility] locations.  For susceptibility see order #[id number]  * Gram stain aer bottle: Gram positive cocci in clusters resembling Staph   Results called to and read back by: Chasity Raymundo RN  05/09/2022  11:31  STAPHYLOCOCCUS AUREUS  ID consult required at [facility] [address] [address] and [place]  locations.  For susceptibility see order #[id number]  *     POCT Glucose: No results for input(s): POCTGLUCOSE in the last 48 hours.  All pertinent labs within the past 24 hours have been reviewed.    Significant Imaging:  I have reviewed all pertinent imaging results/findings within the past 24 hours.      MRI Lumbar Spine W WO Cont  Narrative: EXAMINATION:  MRI LUMBAR SPINE W WO CONTRAST    CLINICAL HISTORY:  spina; abscess . H/O IVDU;    TECHNIQUE:  Multiplanar, multisequence MR images were acquired from the thoracolumbar junction to the sacrum without and with 8 mL Gadavist intravenous contrast.    COMPARISON:  CT lumbar spine 05/06/2022    FINDINGS:  Alignment: Straightening of the normal lumbar lordosis.    Vertebrae: Lumbar vertebral body heights appear maintained.  L4-L5 endplate edema with minor endplate irregularity is most likely degenerative.  Edema within the right L2-L3 and left L4-L5 facet joints.  No evidence of acute fracture.  Marrow signal is otherwise within normal limits.    Discs: L3-L4 and L4-L5 disc desiccation with L4-L5 disc height loss.    Cord: Normal.  Conus terminates at L1.    Enhancement: Minor enhancement noted at the L4-L5 endplate.  No abnormal intrathecal enhancement.    Degenerative findings:    T12-L1: No significant disc bulge, spinal canal or neural foraminal stenosis.    L1-L2: No significant disc bulge, spinal canal or neural foraminal stenosis.    L2-L3: No significant disc bulge.  Small amount of fluid within the right facet joint with adjacent fluid collection.  No significant spinal canal or neural foraminal stenosis.    L3-L4: Disc desiccation.  Mild broad-based disc bulge.  No significant spinal canal or neural foraminal stenosis.    L4-L5: Disc height loss and desiccation.  Broad-based disc bulge with superimposed central disc protrusion and mild bilateral facet arthropathy and ligamentum flavum hypertrophy.  Moderate to severe spinal canal stenosis with moderate  left-sided neural foraminal stenosis.  Fluid within the left facet joint.    L5-S1: Asymmetric left disc bulge involving the left neural foramen.  Narrowing of the left lateral recess and moderate to severe left-sided neural foraminal stenosis.  No significant spinal canal stenosis.    Paraspinal muscles & soft tissues: Rim enhancing fluid collection identified emanating from the right L2-L3 facet joint measures roughly 1.4 x 1.7 x 2.5 cm.  Additional ring-enhancing fluid collection is identified emanating from the left L4-L5 facet joint measuring roughly 1.9 x 1.9 x 2.4 cm.  Paraspinal soft tissues otherwise are grossly within normal limits.  Impression: No MR evidence of epidural abscess.    Large, rim enhancing facet synovial cysts arising posteriorly from the right L2-L3 and left L4-L5 facet joints may be degenerative or sequelae of facet septic arthritis.    Minor enhancement and endplate irregularity at L4-L5 may be degenerative versus less likely the sequelae of spondylodiscitis.  No significant associated endplate destruction or vertebral body height loss.    Multilevel degenerative changes of the lumbar spine are most pronounced at L4-L5 with broad-based disc bulge and prominent central disc protrusion contributing to moderate to severe spinal canal stenosis with moderate left-sided neural foraminal stenosis.    Asymmetric left L5-S1 disc bulge contributes to moderate to severe left-sided neural foraminal stenosis and left lateral recess stenosis.    Electronically signed by: Fish Moran  Date:    05/08/2022  Time:    14:15  X-Ray Chest 1 View  Narrative: EXAMINATION:  XR CHEST 1 VIEW    CLINICAL HISTORY:  follow up;    TECHNIQUE:  Single frontal view of the chest was performed.    COMPARISON:  Chest radiograph 05/07/2022 with priors    FINDINGS:  Cardiac leads project over the chest.  Cardiomediastinal silhouette is unchanged in size.  Similar appearance patchy ground-glass opacities throughout both  lungs.  Persistent small right pleural effusion.  No pneumothorax.  The visualized osseous structures appear intact.  Impression: No detrimental interval change as above.    Electronically signed by: Fish Moran  Date:    05/08/2022  Time:    08:31

## 2022-05-10 NOTE — PLAN OF CARE
Patient remains free from falls and injuries this shift. Safety precautions maintained. Pain managed with dilaudid IV push q3 and Percocets POq4. No signs of acute distress noted. Will continue to monitor. Chart check completed.

## 2022-05-10 NOTE — PROGRESS NOTES
[facility] Surg  Pulmonology  Progress Note    Patient Name: Tianna Leon  MRN: [id number]  Admission Date: 5/6/2022  Hospital Length of Stay: 4 days  Code Status: Full Code  Attending Provider: Elizabeth Kim MD  Primary Care Provider: Spenser Campa MD   Principal Problem: Bacteremia    Subjective:     Interval History:  05/10: seen and examined T ma x 99.6F, NS note reviewed, C/O back pain? Paraspinal process, Wbcc 35 K  on Abx, Oxacillin     sodium chloride 0.9% 125 mL/hr at 05/09/22 1933     Review of Systems   Constitutional: Positive for malaise/fatigue.   HENT: Negative.     Eyes: Negative.    Cardiovascular: Negative.    Respiratory: Negative.     Endocrine: Negative.    Hematologic/Lymphatic: Negative.    Skin: Negative.    Musculoskeletal:  Positive for back pain.   Gastrointestinal: Negative.    Genitourinary: Negative.    Neurological: Negative.    Psychiatric/Behavioral: Negative.     Allergic/Immunologic: Negative.       Objective:     Vital Signs (Most Recent):  Temp: 98.9 °F (37.2 °C) (05/10/22 0740)  Pulse: 86 (05/10/22 0740)  Resp: 15 (05/10/22 0847)  BP: 109/61 (05/10/22 0740)  SpO2: 95 % (05/10/22 0740) Vital Signs (24h Range):  Temp:  [98.3 °F (36.8 °C)-99.6 °F (37.6 °C)] 98.9 °F (37.2 °C)  Pulse:  [] 86  Resp:  [15-18] 15  SpO2:  [94 %-100 %] 95 %  BP: ()/(52-65) 109/61     Weight: 92 kg (202 lb 13.2 oz)  Body mass index is 33.75 kg/m².      Intake/Output Summary (Last 24 hours) at 5/10/2022 1015  Last data filed at 5/10/2022 0600  Gross per 24 hour   Intake --   Output 1650 ml   Net -1650 ml       Physical Exam  Vitals and nursing note reviewed.   Constitutional:       Appearance: She is ill-appearing.      Interventions: Nasal cannula in place.   HENT:      Head: Normocephalic.      Comments: Lip piercing     Nose: Nose normal.      Mouth/Throat:      Mouth: Mucous membranes are moist.   Eyes:      Pupils: Pupils are equal, round, and reactive to light.   Cardiovascular:       Rate and Rhythm: Normal rate.   Pulmonary:      Effort: No tachypnea.      Breath sounds: Examination of the right-middle field reveals decreased breath sounds. Examination of the right-lower field reveals decreased breath sounds. Decreased breath sounds present. No wheezing, rhonchi or rales.   Abdominal:      General: Bowel sounds are normal.      Palpations: Abdomen is soft.   Musculoskeletal:         General: Normal range of motion.      Cervical back: Normal range of motion.   Skin:     General: Skin is warm.      Comments: Multiple tattoos   Neurological:      General: No focal deficit present.      Mental Status: She is alert and oriented to person, place, and time.   Psychiatric:         Behavior: Behavior is cooperative.       Vents:  Oxygen Concentration (%): 28 (05/09/22 0156)    Lines/Drains/Airways       Peripheral Intravenous Line  Duration                  Peripheral IV - Single Lumen 05/06/22 1616 20 G Right Antecubital 3 days                    Significant Labs:    CBC/Anemia Profile:  Recent Labs   Lab 05/08/22  1514 05/09/22  0730 05/10/22  0716   WBC 30.90* 33.79*  33.79* 35.76*   HGB 7.8* 7.4*  7.4* 7.9*   HCT 22.1* 21.8*  21.8* 23.2*    176  176 249   MCV 83 85  85 86   RDW 14.5 14.2  14.2 14.8*        Chemistries:  Recent Labs   Lab 05/08/22  1514 05/09/22  0730 05/10/22  0716   * 130*  130* 135*   K 3.6 3.9  3.9 4.3   CL 95 95  95 98   CO2 23 28  28 29   BUN 14 10  10 11   CREATININE 0.6 0.5  0.5 0.5   CALCIUM 7.5* 7.1*  7.1* 7.3*   ALBUMIN 1.4* 1.3*  1.3* 1.4*   PROT 5.2* 4.9*  4.9* 5.1*   BILITOT 0.9 1.1*  1.1* 0.8   ALKPHOS 137* 94  94 85   ALT 25 23  23 16   AST 23 19  19 18       Blood Culture:   Recent Labs   Lab 05/08/22  1515 05/08/22  1516   LABBLOO Gram stain aer bottle: Gram positive cocci in clusters resembling Staph  Results called to and read back by: Chasity Raymundo RN  05/09/2022  15:40  STAPHYLOCOCCUS AUREUS  [place] [facility] [place]  [facility] and [facility].  For susceptibility see order #[id number]  * Gram stain aer bottle: Gram positive cocci in clusters resembling Staph   Results called to and read back by: Chasity Raymundo RN  05/09/2022  11:31  STAPHYLOCOCCUS AUREUS  ID consult required at [facility] and [facility].  For susceptibility see order #[id number]  *     POCT Glucose: No results for input(s): POCTGLUCOSE in the last 48 hours.  All pertinent labs within the past 24 hours have been reviewed.    Significant Imaging:  I have reviewed all pertinent imaging results/findings within the past 24 hours.      MRI Lumbar Spine W WO Cont  Narrative: EXAMINATION:  MRI LUMBAR SPINE W WO CONTRAST    CLINICAL HISTORY:  spina; abscess . H/O IVDU;    TECHNIQUE:  Multiplanar, multisequence MR images were acquired from the thoracolumbar junction to the sacrum without and with 8 mL Gadavist intravenous contrast.    COMPARISON:  CT lumbar spine 05/06/2022    FINDINGS:  Alignment: Straightening of the normal lumbar lordosis.    Vertebrae: Lumbar vertebral body heights appear maintained.  L4-L5 endplate edema with minor endplate irregularity is most likely degenerative.  Edema within the right L2-L3 and left L4-L5 facet joints.  No evidence of acute fracture.  Marrow signal is otherwise within normal limits.    Discs: L3-L4 and L4-L5 disc desiccation with L4-L5 disc height loss.    Cord: Normal.  Conus terminates at L1.    Enhancement: Minor enhancement noted at the L4-L5 endplate.  No abnormal intrathecal enhancement.    Degenerative findings:    T12-L1: No significant disc bulge, spinal canal or neural foraminal stenosis.    L1-L2: No significant disc bulge, spinal canal or neural foraminal stenosis.    L2-L3: No significant disc bulge.  Small amount of fluid within the right facet joint with adjacent fluid collection.  No significant spinal canal or neural foraminal stenosis.    L3-L4: Disc desiccation.  Mild broad-based  disc bulge.  No significant spinal canal or neural foraminal stenosis.    L4-L5: Disc height loss and desiccation.  Broad-based disc bulge with superimposed central disc protrusion and mild bilateral facet arthropathy and ligamentum flavum hypertrophy.  Moderate to severe spinal canal stenosis with moderate left-sided neural foraminal stenosis.  Fluid within the left facet joint.    L5-S1: Asymmetric left disc bulge involving the left neural foramen.  Narrowing of the left lateral recess and moderate to severe left-sided neural foraminal stenosis.  No significant spinal canal stenosis.    Paraspinal muscles & soft tissues: Rim enhancing fluid collection identified emanating from the right L2-L3 facet joint measures roughly 1.4 x 1.7 x 2.5 cm.  Additional ring-enhancing fluid collection is identified emanating from the left L4-L5 facet joint measuring roughly 1.9 x 1.9 x 2.4 cm.  Paraspinal soft tissues otherwise are grossly within normal limits.  Impression: No MR evidence of epidural abscess.    Large, rim enhancing facet synovial cysts arising posteriorly from the right L2-L3 and left L4-L5 facet joints may be degenerative or sequelae of facet septic arthritis.    Minor enhancement and endplate irregularity at L4-L5 may be degenerative versus less likely the sequelae of spondylodiscitis.  No significant associated endplate destruction or vertebral body height loss.    Multilevel degenerative changes of the lumbar spine are most pronounced at L4-L5 with broad-based disc bulge and prominent central disc protrusion contributing to moderate to severe spinal canal stenosis with moderate left-sided neural foraminal stenosis.    Asymmetric left L5-S1 disc bulge contributes to moderate to severe left-sided neural foraminal stenosis and left lateral recess stenosis.    Electronically signed by: Fish Moran  Date:    05/08/2022  Time:    14:15  X-Ray Chest 1 View  Narrative: EXAMINATION:  XR CHEST 1 VIEW    CLINICAL  HISTORY:  follow up;    TECHNIQUE:  Single frontal view of the chest was performed.    COMPARISON:  Chest radiograph 05/07/2022 with priors    FINDINGS:  Cardiac leads project over the chest.  Cardiomediastinal silhouette is unchanged in size.  Similar appearance patchy ground-glass opacities throughout both lungs.  Persistent small right pleural effusion.  No pneumothorax.  The visualized osseous structures appear intact.  Impression: No detrimental interval change as above.    Electronically signed by: Fish Moran  Date:    05/08/2022  Time:    08:31       ABG  No results for input(s): PH, PO2, PCO2, HCO3, BE in the last 168 hours.  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  Neurosurge note  Await IR drainage    Subacute bacterial endocarditis  Abx OXACILLIN + FLAGYL    Vegetation of heart valve  Medical Mx  CTS note noted    Chronic pulmonary heart disease  Pulmonary hypertension  PA estimated 56  Multifactorial  : COPD, IVDU  Need outpatient w/u    Parapneumonic effusion  Complicated by PNA, possible empyema  SP Thora  Cont Abx    IVDU (intravenous drug user)  Cessation/rehab    PNA (pneumonia)  PNA in IVDU  Cultures; Blood and sputum  BC +ve G+ve cocci  AbxL OXACILLIN + FLAGYL         Tobacco abuse  Smoking cessation    COPD (chronic obstructive pulmonary disease)  Oxygen  Keep SP2> 92%  Bronchodilators  Follow up in Pulmonary for PFT to clarify Dx         Alonzo Morrison MD  Pulmonology  [facility] - Med Surg

## 2022-05-10 NOTE — ASSESSMENT & PLAN NOTE
Gram positive   Infectious disease consulted  Continue broad spectrum intravenous antibiotic(s) given h/o ivdu  Remains febrile and leukocytosis persists  5/8/22 The patient remained afebrile overnight. Currently on cefepime/vanc/flagyl. Blood cx are growing staph aureus. The patient refused the MRI lumbar spine overnight d/t being unable to lay flat from back pain. Will give pain meds and attempt to get the MRI today to r/o spinal abscess. The ECHO showed a 1.3 x 1.5 cm elongated, mobile echodensity seen on the tricuspid valve consistent with vegetation, CT surgery was consulted. Will need a SUSANNAH. Infectious disease is consulted. Will repeat blood cx today.   5/9/22 Repeat blood cx are still positive. Sensitivities are back and Staph is sensitive to oxacillin, will D/C vanc/cefepime. Infectious disease is following   5/10/22 Repeat blood cx are +. Continue IV oxacillin and flagyl. Infectious disease is consulted. IR consulted to evaluate possible paraspinous muscle myositis versus abscess. Recommend IR drainage. Continue current management.

## 2022-05-10 NOTE — SUBJECTIVE & OBJECTIVE
Interval History: No acute events overnight. The patient reports some improvement in pain with adjustment in pain meds. Repeat blood cx are +. Continue IV oxacillin and flagyl. Infectious disease is consulted. IR consulted to evaluate possible paraspinous muscle myositis versus abscess. Recommend IR drainage. Continue current management.      Review of Systems   Constitutional:  Positive for fatigue and fever. Negative for activity change, appetite change, chills, diaphoresis and unexpected weight change.   HENT: Negative.  Negative for congestion, drooling, facial swelling, rhinorrhea, sinus pressure, sneezing and sore throat.    Eyes: Negative.  Negative for discharge, redness, itching and visual disturbance.   Respiratory:  Positive for cough, chest tightness and shortness of breath. Negative for apnea, choking, wheezing and stridor.    Cardiovascular:  Negative for chest pain, palpitations and leg swelling.   Gastrointestinal: Negative.  Negative for abdominal distention, abdominal pain, anal bleeding, blood in stool, constipation, diarrhea, nausea and vomiting.   Endocrine: Negative.    Genitourinary: Negative.  Negative for decreased urine volume, difficulty urinating, dysuria, frequency, hematuria, pelvic pain, urgency, vaginal bleeding and vaginal discharge.   Musculoskeletal:  Positive for arthralgias and back pain. Negative for gait problem, joint swelling, myalgias, neck pain and neck stiffness.   Skin: Negative.  Negative for color change, pallor, rash and wound.   Allergic/Immunologic: Negative.    Neurological:  Positive for weakness. Negative for dizziness, seizures, facial asymmetry, speech difficulty, light-headedness, numbness and headaches.   Psychiatric/Behavioral:  Positive for confusion. Negative for agitation, hallucinations and suicidal ideas.    All other systems reviewed and are negative.  Objective:     Vital Signs (Most Recent):  Temp: 98.4 °F (36.9 °C) (05/10/22 1620)  Pulse: 87  (05/10/22 1620)  Resp: 17 (05/10/22 1620)  BP: (!) 97/56 (05/10/22 1620)  SpO2: 98 % (05/10/22 1620)   Vital Signs (24h Range):  Temp:  [97.9 °F (36.6 °C)-98.9 °F (37.2 °C)] 98.4 °F (36.9 °C)  Pulse:  [] 87  Resp:  [15-20] 17  SpO2:  [94 %-100 %] 98 %  BP: ()/(52-65) 97/56     Weight: 92 kg (202 lb 13.2 oz)  Body mass index is 33.75 kg/m².    Intake/Output Summary (Last 24 hours) at 5/10/2022 1645  Last data filed at 5/10/2022 1300  Gross per 24 hour   Intake 420 ml   Output 2150 ml   Net -1730 ml      Physical Exam  Vitals and nursing note reviewed. Exam conducted with a chaperone present (family).   Constitutional:       General: She is not in acute distress.     Appearance: She is well-developed. She is ill-appearing. She is not toxic-appearing.   HENT:      Head: Normocephalic and atraumatic.   Eyes:      Conjunctiva/sclera: Conjunctivae normal.      Pupils: Pupils are equal, round, and reactive to light.   Cardiovascular:      Rate and Rhythm: Normal rate and regular rhythm.      Heart sounds: Normal heart sounds. No murmur heard.  Pulmonary:      Effort: Pulmonary effort is normal. No respiratory distress.      Breath sounds: Normal breath sounds.   Abdominal:      General: Bowel sounds are normal. There is no distension.      Palpations: Abdomen is soft.      Tenderness: There is no abdominal tenderness.   Musculoskeletal:         General: Swelling and tenderness present. No deformity. Normal range of motion.      Cervical back: Normal range of motion and neck supple.      Right lower leg: No edema.      Left lower leg: No edema.   Skin:     General: Skin is warm and dry.      Coloration: Skin is pale.      Findings: Erythema present.   Neurological:      Mental Status: She is alert and oriented to person, place, and time.      Motor: Weakness present.      Deep Tendon Reflexes: Reflexes are normal and symmetric.   Psychiatric:         Behavior: Behavior normal.       Significant Labs: All  pertinent labs within the past 24 hours have been reviewed.    Significant Imaging:   Imaging Results              CTA Chest Non-Coronary (PE Study) (Final result)  Result time 05/06/22 18:26:20      Final result by Demario Morin MD (05/06/22 18:26:20)                   Impression:      No definite pulmonary thromboembolism.    Multiple nodular and cavitary opacities concerning for septic emboli with larger opacities possibly necrotizing pneumonia.    Moderate loculated right pleural fluid.  Empyema not excluded.    Small pericardial effusion of unclear etiology.    Splenic hypodensity possibly related to infarct or contrast timing.    Additional details as above.    All CT scans at this facility are performed  using dose modulation techniques as appropriate to performed exam including the following:  automated exposure control; adjustment of mA and/or kV according to the patients size (this includes techniques or standardized protocols for targeted exams where dose is matched to indication/reason for exam: i.e. extremities or head);  iterative reconstruction technique.      Electronically signed by: Demario Morin  Date:    05/06/2022  Time:    18:26               Narrative:    EXAMINATION:  CTA CHEST NON CORONARY    CLINICAL HISTORY:  Pulmonary embolism (PE) suspected, neg D-dimer;    TECHNIQUE:  Low dose axial images, sagittal and coronal reformations were obtained from the thoracic inlet to the lung bases following the IV administration of 100 mL of Omnipaque 350.  Contrast timing was optimized to evaluate the pulmonary arteries.  MIP images were performed.    COMPARISON:  Multiple priors.    FINDINGS:  Base of Neck: No significant abnormality.    Thoracic soft tissues: Unremarkable.    Aorta: Left-sided aortic arch.  No aneurysm and no significant atherosclerosis    Heart: Normal size.  Small effusion.    Pulmonary vasculature: Pulmonary arteries are well opacified.  There is no pulmonary  thromboembolism.    Esha/Mediastinum: No pathologic ariana enlargement.    Airways: Patent.    Lungs/Pleura: Multiple nodular and cavitary opacities concerning for septic emboli.  Additional larger opacities most notably in the right lung base with some internal clearing possibly related to necrotizing pneumonia.  Moderate loculated right pleural fluid and no definite left pleural fluid.  Empyema not excluded.    Esophagus: Unremarkable.    Upper Abdomen: Geographic wedge-shaped splenic hypodensity possibly related to contrast timing or infarct.    Bones: No acute fracture. No suspicious lytic or sclerotic lesions.                                        CT Lumbar Spine Without Contrast (Final result)  Result time 05/06/22 18:29:13      Final result by Demario Morin MD (05/06/22 18:29:13)                   Impression:      No fracture or traumatic malalignment of the lumbar spine.  No definite findings to suggest osteomyelitis.  Further evaluation as clinically warranted.    Possible progression of degenerative changes most notable at L4-5 with moderate to severe spinal canal stenosis at this level.  Additional details as above.    This report was flagged in Epic as abnormal.    All CT scans at this facility are performed  using dose modulation techniques as appropriate to performed exam including the following:  automated exposure control; adjustment of mA and/or kV according to the patients size (this includes techniques or standardized protocols for targeted exams where dose is matched to indication/reason for exam: i.e. extremities or head);  iterative reconstruction technique.      Electronically signed by: Demario Morin  Date:    05/06/2022  Time:    18:29               Narrative:    EXAMINATION:  CT LUMBAR SPINE WITHOUT CONTRAST    CLINICAL HISTORY:  Low back pain, infection suspected;    TECHNIQUE:  Low-dose CT images obtained throughout the region of the lumbar spine.  Axial, sagittal and coronal  reformations were performed.  Contrast was not administered.    COMPARISON:  12/05/2019    FINDINGS:  The vertebral bodies are normal in height and morphology without evidence of fracture or osseous destructive process.    Normal sagittal alignment is preserved.  No spondylolisthesis.    Circumferential disc bulges L3 through S1 with mild spinal canal stenosis L3-4, moderate to severe spinal canal stenosis L4-5 and probable moderate spinal canal stenosis L5-S1.    Facet arthropathy with moderate left neural foraminal narrowing at L4-5 and severe left neural foraminal narrowing at L5-S1.  Other levels are better preserved.    Limited evaluation of the intraspinal contents demonstrates no hematoma or mass.    Paraspinal soft tissues exhibit no acute abnormalities.                                       CT Thoracic Spine Without Contrast (Final result)  Result time 05/06/22 18:32:20      Final result by Demario Morin MD (05/06/22 18:32:20)                   Impression:      No definite acute abnormality.  No traumatic malalignment, fracture, or osseous destructive process.  Further evaluation as clinically warranted.    Pulmonary opacities as on the dedicated CT exam.    All CT scans at this facility are performed  using dose modulation techniques as appropriate to performed exam including the following:  automated exposure control; adjustment of mA and/or kV according to the patients size (this includes techniques or standardized protocols for targeted exams where dose is matched to indication/reason for exam: i.e. extremities or head);  iterative reconstruction technique.      Electronically signed by: Demario Morin  Date:    05/06/2022  Time:    18:32               Narrative:    EXAMINATION:  CT THORACIC SPINE WITHOUT CONTRAST    CLINICAL HISTORY:  Epidural abscess suspected;    TECHNIQUE:  Low-dose axial noncontrast CT images of the thoracic spine.  Multiplanar reformats  generated    COMPARISON:  None    FINDINGS:  Thoracic spine alignment is well preserved.  No findings to suggest vertebral body fracture.  No osseous lytic or blastic process.    No significant degenerative changes.  No disc abnormalities or facet arthrosis.    Pulmonary opacities as on the dedicated CT exam.                                       CT Cervical Spine Without Contrast (Final result)  Result time 05/06/22 18:34:24      Final result by Demario Morin MD (05/06/22 18:34:24)                   Impression:      No fracture or traumatic malalignment of the cervical spine.  No definite CT evidence of osteomyelitis.  Further evaluation as clinically warranted.    Additional degenerative findings as above.    All CT scans at this facility are performed  using dose modulation techniques as appropriate to performed exam including the following:  automated exposure control; adjustment of mA and/or kV according to the patients size (this includes techniques or standardized protocols for targeted exams where dose is matched to indication/reason for exam: i.e. extremities or head);  iterative reconstruction technique.      Electronically signed by: Demario Morin  Date:    05/06/2022  Time:    18:34               Narrative:    EXAMINATION:  CT CERVICAL SPINE WITHOUT CONTRAST    CLINICAL HISTORY:  Neck pain, acute, infection suspected;    TECHNIQUE:  Low dose axial CT images through the cervical spine, with sagittal and coronal reformations.  Contrast was not administered.    COMPARISON:  12/05/2019    FINDINGS:  The vertebral bodies are normal in height and morphology without evidence of fracture or osseous destructive process.  Normal sagittal alignment is preserved.    Progression of degenerative change at C4-5 in comparison the prior exam with posterior disc osteophyte complex at this level producing mild spinal canal stenosis.  Smaller C5-6 posterior disc osteophyte complex without definite spinal canal  stenosis.    No definite neural foraminal narrowing.    Limited evaluation of the intraspinal contents demonstrates no hematoma or mass.Paraspinal soft tissues exhibit no acute abnormalities.                                       X-Ray Knee Complete 4 Or More Views Right (Final result)  Result time 05/06/22 16:01:49      Final result by Nate Brasher MD (05/06/22 16:01:49)                   Impression:      No evidence of fracture or dislocation. Moderate suprapatellar knee joint effusion. Mild soft tissue edema lateral to the knee.      Electronically signed by: Nate Brasher  Date:    05/06/2022  Time:    16:01               Narrative:    EXAMINATION:  XR KNEE COMP 4 OR MORE VIEWS RIGHT    CLINICAL HISTORY:  Pain in unspecified knee    TECHNIQUE:  AP, lateral, and Merchant views of the right knee were performed.    COMPARISON:  None    FINDINGS:  No evidence of fracture or dislocation.  Moderate suprapatellar knee joint effusion.  Mild soft tissue edema lateral to the knee.  No foreign body.  Soft tissues otherwise unremarkable.                                       X-Ray Finger 2 or More Views Right (Final result)  Result time 05/06/22 16:02:28      Final result by Nate Brasher MD (05/06/22 16:02:28)                   Impression:      Negative exam.      Electronically signed by: Nate Brasher  Date:    05/06/2022  Time:    16:02               Narrative:    EXAMINATION:  XR FINGER 2 OR MORE VIEWS RIGHT    CLINICAL HISTORY:  finger swelling;    TECHNIQUE:  Three views of the right 4th digit.    COMPARISON:  None    FINDINGS:  No evidence of fracture or dislocation.  Joint spaces appear well maintained.  Soft tissues unremarkable.  No evidence of foreign body.                                        X-Ray Chest AP Portable (Final result)  Result time 05/06/22 16:04:37      Final result by Nate Brasher MD (05/06/22 16:04:37)                   Impression:      Patchy bilateral opacities, some of which appear  nodular and possibly cavitary. Recommend further evaluation with chest CT with IV contrast.    This report was flagged in Epic as abnormal.      Electronically signed by: Nate Brasher  Date:    05/06/2022  Time:    16:04               Narrative:    EXAMINATION:  XR CHEST AP PORTABLE    CLINICAL HISTORY:  Sepsis;.    TECHNIQUE:  Single frontal portable view of the chest was performed.    COMPARISON:  12/05/2019    FINDINGS:  Support devices: None    Patchy bilateral opacities, some of which appear nodular and possibly cavitary.  Recommend further evaluation with chest CT with IV contrast.    Heart normal size.  No pneumothorax or pleural effusion    Bones are intact.

## 2022-05-11 ENCOUNTER — ANESTHESIA (OUTPATIENT)
Dept: ANESTHESIOLOGY | Facility: HOSPITAL | Age: 37
DRG: 853 | End: 2022-05-11
Payer: MEDICARE

## 2022-05-11 ENCOUNTER — ANESTHESIA EVENT (OUTPATIENT)
Dept: ANESTHESIOLOGY | Facility: HOSPITAL | Age: 37
DRG: 853 | End: 2022-05-11
Payer: MEDICARE

## 2022-05-11 PROBLEM — I26.90: Status: ACTIVE | Noted: 2022-05-11

## 2022-05-11 LAB
ALBUMIN SERPL BCP-MCNC: 1.2 G/DL (ref 3.5–5.2)
ALP SERPL-CCNC: 77 U/L (ref 55–135)
ALT SERPL W/O P-5'-P-CCNC: 13 U/L (ref 10–44)
ANION GAP SERPL CALC-SCNC: 9 MMOL/L (ref 8–16)
AST SERPL-CCNC: 21 U/L (ref 10–40)
BACTERIA BLD CULT: ABNORMAL
BASOPHILS # BLD AUTO: 0.1 K/UL (ref 0–0.2)
BASOPHILS NFR BLD: 0.3 % (ref 0–1.9)
BILIRUB SERPL-MCNC: 0.7 MG/DL (ref 0.1–1)
BUN SERPL-MCNC: 12 MG/DL (ref 6–20)
CALCIUM SERPL-MCNC: 7 MG/DL (ref 8.7–10.5)
CHLORIDE SERPL-SCNC: 102 MMOL/L (ref 95–110)
CHOLEST FLD-MCNC: 79 MG/DL
CO2 SERPL-SCNC: 23 MMOL/L (ref 23–29)
CREAT SERPL-MCNC: 0.5 MG/DL (ref 0.5–1.4)
DIFFERENTIAL METHOD: ABNORMAL
EOSINOPHIL # BLD AUTO: 0.1 K/UL (ref 0–0.5)
EOSINOPHIL NFR BLD: 0.2 % (ref 0–8)
ERYTHROCYTE [DISTWIDTH] IN BLOOD BY AUTOMATED COUNT: 15.2 % (ref 11.5–14.5)
EST. GFR  (AFRICAN AMERICAN): >60 ML/MIN/1.73 M^2
EST. GFR  (NON AFRICAN AMERICAN): >60 ML/MIN/1.73 M^2
GLUCOSE SERPL-MCNC: 98 MG/DL (ref 70–110)
HCT VFR BLD AUTO: 21.6 % (ref 37–48.5)
HGB BLD-MCNC: 7.1 G/DL (ref 12–16)
IMM GRANULOCYTES # BLD AUTO: 1.41 K/UL (ref 0–0.04)
IMM GRANULOCYTES NFR BLD AUTO: 4.5 % (ref 0–0.5)
LYMPHOCYTES # BLD AUTO: 2.6 K/UL (ref 1–4.8)
LYMPHOCYTES NFR BLD: 8.3 % (ref 18–48)
MCH RBC QN AUTO: 29.5 PG (ref 27–31)
MCHC RBC AUTO-ENTMCNC: 32.9 G/DL (ref 32–36)
MCV RBC AUTO: 90 FL (ref 82–98)
MONOCYTES # BLD AUTO: 1.5 K/UL (ref 0.3–1)
MONOCYTES NFR BLD: 4.7 % (ref 4–15)
NEUTROPHILS # BLD AUTO: 25.5 K/UL (ref 1.8–7.7)
NEUTROPHILS NFR BLD: 82 % (ref 38–73)
NRBC BLD-RTO: 0 /100 WBC
PLATELET # BLD AUTO: 247 K/UL (ref 150–450)
PLATELET BLD QL SMEAR: ABNORMAL
PMV BLD AUTO: 11.2 FL (ref 9.2–12.9)
POTASSIUM SERPL-SCNC: 4.7 MMOL/L (ref 3.5–5.1)
PROT SERPL-MCNC: 4.7 G/DL (ref 6–8.4)
RBC # BLD AUTO: 2.41 M/UL (ref 4–5.4)
SODIUM SERPL-SCNC: 134 MMOL/L (ref 136–145)
WBC # BLD AUTO: 31.07 K/UL (ref 3.9–12.7)

## 2022-05-11 PROCEDURE — 63600175 PHARM REV CODE 636 W HCPCS: Performed by: NURSE PRACTITIONER

## 2022-05-11 PROCEDURE — 99233 SBSQ HOSP IP/OBS HIGH 50: CPT | Mod: NSCH,,, | Performed by: INTERNAL MEDICINE

## 2022-05-11 PROCEDURE — 94761 N-INVAS EAR/PLS OXIMETRY MLT: CPT

## 2022-05-11 PROCEDURE — 87075 CULTR BACTERIA EXCEPT BLOOD: CPT | Performed by: RADIOLOGY

## 2022-05-11 PROCEDURE — 87077 CULTURE AEROBIC IDENTIFY: CPT | Performed by: RADIOLOGY

## 2022-05-11 PROCEDURE — 63600175 PHARM REV CODE 636 W HCPCS: Performed by: NURSE ANESTHETIST, CERTIFIED REGISTERED

## 2022-05-11 PROCEDURE — 80053 COMPREHEN METABOLIC PANEL: CPT | Performed by: INTERNAL MEDICINE

## 2022-05-11 PROCEDURE — 27000221 HC OXYGEN, UP TO 24 HOURS

## 2022-05-11 PROCEDURE — 87205 SMEAR GRAM STAIN: CPT | Performed by: RADIOLOGY

## 2022-05-11 PROCEDURE — 87040 BLOOD CULTURE FOR BACTERIA: CPT | Performed by: INTERNAL MEDICINE

## 2022-05-11 PROCEDURE — 85025 COMPLETE CBC W/AUTO DIFF WBC: CPT | Performed by: INTERNAL MEDICINE

## 2022-05-11 PROCEDURE — 36415 COLL VENOUS BLD VENIPUNCTURE: CPT | Performed by: INTERNAL MEDICINE

## 2022-05-11 PROCEDURE — 71000033 HC RECOVERY, INTIAL HOUR: Performed by: RADIOLOGY

## 2022-05-11 PROCEDURE — 99232 PR SUBSEQUENT HOSPITAL CARE,LEVL II: ICD-10-PCS | Mod: ,,, | Performed by: INTERNAL MEDICINE

## 2022-05-11 PROCEDURE — 25000003 PHARM REV CODE 250: Performed by: INTERNAL MEDICINE

## 2022-05-11 PROCEDURE — 87070 CULTURE OTHR SPECIMN AEROBIC: CPT | Performed by: RADIOLOGY

## 2022-05-11 PROCEDURE — 99499 NO LOS: ICD-10-PCS | Mod: ,,, | Performed by: INTERNAL MEDICINE

## 2022-05-11 PROCEDURE — 99233 PR SUBSEQUENT HOSPITAL CARE,LEVL III: ICD-10-PCS | Mod: NSCH,,, | Performed by: INTERNAL MEDICINE

## 2022-05-11 PROCEDURE — 99232 SBSQ HOSP IP/OBS MODERATE 35: CPT | Mod: ,,, | Performed by: INTERNAL MEDICINE

## 2022-05-11 PROCEDURE — 99232 SBSQ HOSP IP/OBS MODERATE 35: CPT | Mod: ,,, | Performed by: NEUROLOGICAL SURGERY

## 2022-05-11 PROCEDURE — 37000009 HC ANESTHESIA EA ADD 15 MINS: Performed by: RADIOLOGY

## 2022-05-11 PROCEDURE — 99232 PR SUBSEQUENT HOSPITAL CARE,LEVL II: ICD-10-PCS | Mod: ,,, | Performed by: NEUROLOGICAL SURGERY

## 2022-05-11 PROCEDURE — 63600175 PHARM REV CODE 636 W HCPCS: Performed by: INTERNAL MEDICINE

## 2022-05-11 PROCEDURE — 21400001 HC TELEMETRY ROOM

## 2022-05-11 PROCEDURE — 87186 SC STD MICRODIL/AGAR DIL: CPT | Performed by: RADIOLOGY

## 2022-05-11 PROCEDURE — 99499 UNLISTED E&M SERVICE: CPT | Mod: ,,, | Performed by: INTERNAL MEDICINE

## 2022-05-11 PROCEDURE — 37000008 HC ANESTHESIA 1ST 15 MINUTES: Performed by: RADIOLOGY

## 2022-05-11 PROCEDURE — 25000003 PHARM REV CODE 250: Performed by: NURSE PRACTITIONER

## 2022-05-11 RX ORDER — PROPOFOL 10 MG/ML
VIAL (ML) INTRAVENOUS
Status: DISCONTINUED | OUTPATIENT
Start: 2022-05-11 | End: 2022-05-11

## 2022-05-11 RX ORDER — ALPRAZOLAM 0.5 MG/1
0.5 TABLET ORAL 3 TIMES DAILY PRN
Status: DISCONTINUED | OUTPATIENT
Start: 2022-05-11 | End: 2022-05-13

## 2022-05-11 RX ORDER — FENTANYL CITRATE 50 UG/ML
INJECTION, SOLUTION INTRAMUSCULAR; INTRAVENOUS
Status: DISCONTINUED | OUTPATIENT
Start: 2022-05-11 | End: 2022-05-11

## 2022-05-11 RX ORDER — IPRATROPIUM BROMIDE AND ALBUTEROL SULFATE 2.5; .5 MG/3ML; MG/3ML
3 SOLUTION RESPIRATORY (INHALATION) EVERY 4 HOURS PRN
Status: DISCONTINUED | OUTPATIENT
Start: 2022-05-11 | End: 2022-05-20

## 2022-05-11 RX ORDER — MIDAZOLAM HYDROCHLORIDE 1 MG/ML
INJECTION, SOLUTION INTRAMUSCULAR; INTRAVENOUS
Status: DISCONTINUED | OUTPATIENT
Start: 2022-05-11 | End: 2022-05-11

## 2022-05-11 RX ADMIN — METRONIDAZOLE 500 MG: 500 TABLET ORAL at 01:05

## 2022-05-11 RX ADMIN — PROPOFOL 20 MG: 10 INJECTION, EMULSION INTRAVENOUS at 02:05

## 2022-05-11 RX ADMIN — ENOXAPARIN SODIUM 40 MG: 40 INJECTION SUBCUTANEOUS at 04:05

## 2022-05-11 RX ADMIN — HYDROMORPHONE HYDROCHLORIDE 1 MG: 2 INJECTION INTRAMUSCULAR; INTRAVENOUS; SUBCUTANEOUS at 06:05

## 2022-05-11 RX ADMIN — FENTANYL CITRATE 100 MCG: 50 INJECTION, SOLUTION INTRAMUSCULAR; INTRAVENOUS at 02:05

## 2022-05-11 RX ADMIN — OXACILLIN SODIUM 12 G: 10 INJECTION, POWDER, FOR SOLUTION INTRAVENOUS at 12:05

## 2022-05-11 RX ADMIN — ACETAMINOPHEN 650 MG: 325 TABLET ORAL at 07:05

## 2022-05-11 RX ADMIN — OXYCODONE AND ACETAMINOPHEN 1 TABLET: 10; 325 TABLET ORAL at 11:05

## 2022-05-11 RX ADMIN — MUPIROCIN: 20 OINTMENT TOPICAL at 08:05

## 2022-05-11 RX ADMIN — OXYCODONE AND ACETAMINOPHEN 1 TABLET: 10; 325 TABLET ORAL at 07:05

## 2022-05-11 RX ADMIN — OXYCODONE AND ACETAMINOPHEN 1 TABLET: 10; 325 TABLET ORAL at 01:05

## 2022-05-11 RX ADMIN — TRAZODONE HYDROCHLORIDE 100 MG: 100 TABLET ORAL at 08:05

## 2022-05-11 RX ADMIN — METRONIDAZOLE 500 MG: 500 TABLET ORAL at 09:05

## 2022-05-11 RX ADMIN — METRONIDAZOLE 500 MG: 500 TABLET ORAL at 05:05

## 2022-05-11 RX ADMIN — MUPIROCIN: 20 OINTMENT TOPICAL at 09:05

## 2022-05-11 RX ADMIN — HYDROMORPHONE HYDROCHLORIDE 1 MG: 2 INJECTION INTRAMUSCULAR; INTRAVENOUS; SUBCUTANEOUS at 08:05

## 2022-05-11 RX ADMIN — SODIUM CHLORIDE: 0.9 INJECTION, SOLUTION INTRAVENOUS at 12:05

## 2022-05-11 RX ADMIN — HYDROMORPHONE HYDROCHLORIDE 1 MG: 2 INJECTION INTRAMUSCULAR; INTRAVENOUS; SUBCUTANEOUS at 01:05

## 2022-05-11 RX ADMIN — LORAZEPAM 2 MG: 2 INJECTION INTRAMUSCULAR; INTRAVENOUS at 12:05

## 2022-05-11 RX ADMIN — MIDAZOLAM 2 MG: 1 INJECTION INTRAMUSCULAR; INTRAVENOUS at 02:05

## 2022-05-11 RX ADMIN — HYDROMORPHONE HYDROCHLORIDE 1 MG: 2 INJECTION INTRAMUSCULAR; INTRAVENOUS; SUBCUTANEOUS at 10:05

## 2022-05-11 RX ADMIN — HYDROMORPHONE HYDROCHLORIDE 1 MG: 2 INJECTION INTRAMUSCULAR; INTRAVENOUS; SUBCUTANEOUS at 02:05

## 2022-05-11 RX ADMIN — HYDROMORPHONE HYDROCHLORIDE 1 MG: 2 INJECTION INTRAMUSCULAR; INTRAVENOUS; SUBCUTANEOUS at 04:05

## 2022-05-11 RX ADMIN — OXYCODONE AND ACETAMINOPHEN 1 TABLET: 10; 325 TABLET ORAL at 05:05

## 2022-05-11 NOTE — SEDATION DOCUMENTATION
Pt transported via bed from pt room to CT room. Pt transferred to CT table and placed in prone position. Pt reporting discomfort in this position but not allowing staff to assist in repositioning with pillows. VSS and in no acute distress.

## 2022-05-11 NOTE — NURSING
"Walked in at 0620 and pt was eating candy despite reminding her several times throughout the shift of NPO status. Pt stated she didn't believe she would be having any procedures today anyway because the doctors have been having a "tug of war" about her plan of care. Reiterated NPO status and purpose. Pt verbalized understanding.  "

## 2022-05-11 NOTE — ASSESSMENT & PLAN NOTE
5/8/22  The ECHO showed a 1.3 x 1.5 cm elongated, mobile echodensity seen on the tricuspid valve consistent with vegetation, CT surgery was consulted. Will need a SUSANNAH. Infectious disease is consulted. Will repeat blood cx today. Continue IV ABX.   5/9/22 Sensitivities are back and Staph is sensitive to oxacillin, will D/C vanc/cefepime. Cardiology consulted and plans for a SUSANNAH today. CT surgery following and recommends continuing medical management with appropriate ABX, no surgical intervention at this time.  5/10/22 Continue IV oxacillin and flagyl. Infectious disease is consulted.  5/11/22 The patient reports that she is very anxious about the upcoming SUSANNAH and IR drainage. SUSANNAH was pushed back to this afternoon because the patient ate candy this AM. Continue treatment with IV oxacillin and flagyl. Infectious disease is following. WBC trended down to 31K. Continue current management.

## 2022-05-11 NOTE — ASSESSMENT & PLAN NOTE
This patient does have evidence of infective focus  My overall impression is sepsis. Vital signs were reviewed and noted in progress note.  Antibiotics given-   Antibiotics (From admission, onward)            Start     Stop Route Frequency Ordered    05/09/22 1800  metroNIDAZOLE tablet 500 mg         -- Oral Every 8 hours 05/09/22 1201    05/09/22 1100  oxacillin 12 g in  mL CONTINUOUS INFUSION         -- IV Every 24 hours (non-standard times) 05/09/22 0932    05/07/22 2100  mupirocin 2 % ointment         05/12 2059 Nasl 2 times daily 05/07/22 1646        Cultures were taken-   Microbiology Results (last 7 days)     Procedure Component Value Units Date/Time    Blood culture [[id number]]     Order Status: Sent Specimen: Blood     Blood culture [[id number]]     Order Status: Sent Specimen: Blood     Blood culture [[id number]]  (Abnormal) Collected: 05/08/22 1516    Order Status: Completed Specimen: Blood from Peripheral, Right Hand Updated: 05/11/22 1009     Blood Culture, Routine Gram stain aer bottle: Gram positive cocci in clusters resembling Staph       Results called to and read back by: Chasity Raymundo RN  05/09/2022  11:31      STAPHYLOCOCCUS AUREUS  ID consult required at [facility],Rosario and Reece nathan.  For susceptibility see order #[id number]      Narrative:      Collection has been rescheduled by [email or web address] at 05/08/2022 13:22 Reason:   Pt in mri will be there after another hour [id number]  Collection has been rescheduled by [email or web address] at 05/08/2022 13:22 Reason:   Pt in mri will be there after another hour [id number]    Blood culture [[id number]]  (Abnormal) Collected: 05/08/22 1515    Order Status: Completed Specimen: Blood from Peripheral, Left Arm Updated: 05/11/22 1009     Blood Culture, Routine Gram stain aer bottle: Gram positive cocci in clusters resembling Staph      Results called to and read back by: Chasity Raymundo RN  05/09/2022  15:40      STAPHYLOCOCCUS AUREUS  ID consult required at [facility]  [facility].  For susceptibility see order #[id number]      Narrative:      Collection has been rescheduled by [email or web address] at 05/08/2022 13:22 Reason:   Pt in mri will be there after another hour [id number]  Collection has been rescheduled by [email or web address] at 05/08/2022 13:22 Reason:   Pt in mri will be there after another hour [id number]    Blood culture x two cultures. Draw prior to antibiotics. [[id number]]  (Abnormal) Collected: 05/06/22 1617    Order Status: Completed Specimen: Blood from Peripheral, Antecubital, Right Updated: 05/09/22 0824     Blood Culture, Routine Gram stain aer bottle: Gram positive cocci in clusters resembling Staph       Results called to and read back by: Lila Hernandez RN 05/07/2022  10:57      Gram stain missael bottle: Gram positive cocci in clusters resembling Staph       05/07/2022  13:40      STAPHYLOCOCCUS AUREUS  ID consult required at [facility].  For susceptibility see order #[id number]      Narrative:      Aerobic and anaerobic    Blood culture x two cultures. Draw prior to antibiotics. [[id number]]  (Abnormal)  (Susceptibility) Collected: 05/06/22 1616    Order Status: Completed Specimen: Blood from Peripheral, Antecubital, Left Updated: 05/09/22 0823     Blood Culture, Routine Gram stain aer bottle: Gram positive cocci in clusters resembling Staph       Gram stain missael bottle: Gram positive cocci in clusters resembling Staph       Results called to and read back by: Lila Hernandez RN 05/07/2022  10:57      STAPHYLOCOCCUS AUREUS  ID consult required at [facility].      Narrative:      Aerobic and anaerobic    Gram stain [[id number]] Collected: 05/07/22 0901    Order Status: Sent Specimen: Body Fluid from Pleural Fluid Updated: 05/07/22 0905    Fungus culture [[id number]] Collected: 05/07/22 0901    Order Status: Sent Specimen: Body Fluid from Pleural Fluid Updated: 05/07/22 0905    AFB culture (includes stain)  [[id number]] Collected: 05/07/22 0901    Order Status: Sent Specimen: Body Fluid from Pleural Fluid Updated: 05/07/22 0905        Latest lactate reviewed, they are-  No results for input(s): LACTATE in the last 72 hours.    Organ dysfunction indicated by Acute kidney injury  Source-  lung    Source control Achieved by-   Cont Broad spectrum IVAB     Bacteremia  Infectious disease consulted

## 2022-05-11 NOTE — CONSULTS
Chart reviewed by Dr. Bass.       ASSESSMENT/PLAN:    Myositis, Facet joint fluid collection    The order for a CT aspiration has been placed and the procedure will be performed asap.          Thank you for the consult.

## 2022-05-11 NOTE — SEDATION DOCUMENTATION
Procedure completed at this time. VS monitored and meds given by anesthesia during procedure.  Bandaid to right mid back puncture site CDI. Will continue to monitor.

## 2022-05-11 NOTE — PROGRESS NOTES
[facility]  Neurosurgery  Progress Note    Subjective:     History of Present Illness: History of Present Illness: 36 y/o. female   chronic history of lower back pain which has been intermittent presented to the ER with a PMHx of asthma, COPD,Bipolar  , IVDU and HLD presented to the ER with a c/o  sob for the last weak which has gotten worse . The SOB is associated with fever , chills , productive cough , back pain  and generalized weakness . She report cough some blood this am .  She is active IVDU  ( heroine , cocaine  and amphetamine ) . She denies any  sick contact , chest pain  , GI/ sx , She also complaning of LE sweeling . Knee pain and B/L LE rash .  We see the diagnosed with Gram-positive cocci in the blood  ER COURSE: CTA chest negative for pe . Multiple nodular and cavitary opacities concerning for septic emboli with larger opacities possibly necrotizing pneumonia. CT cervical  and thoracic did not show any acute finding , CT lumbar Possible progression of degenerative changes most notable at L4-5 with moderate to severe spinal canal stenosis at this level.  She currently complains of pain throughout the lumbar region extending into the flank area with some tenderness difficulties laying on her back and pain with twisting and bending she has some pain referred into the buttocks but denies any pain radiating into the legs feet or toes she denies any numbness or tingling in the feet or toes and no bowel or bladder incontinence at this time      Post-Op Info:  Procedure(s) (LRB):  CT (COMPUTED TOMOGRAPHY)/facet joint aspiration (N/A)   Day of Surgery     Interval History:  Patient undergoing bacteremia workup underwent transesophageal echocardiogram.  Continues to receive IV antibiotics.  Patient with history of drug abuse show requesting narcotics for pain control.  Still having low-grade fevers    Medications:  Continuous Infusions:   sodium chloride 0.9% 125 mL/hr at 05/11/22 1208     Scheduled  Meds:   enoxaparin  40 mg Subcutaneous Daily    metroNIDAZOLE  500 mg Oral Q8H    mupirocin   Nasal BID    oxacillin 12 g in  mL CONTINUOUS INFUSION  12 g Intravenous Q24H    traZODone  100 mg Oral QHS     PRN Meds:acetaminophen, albuterol-ipratropium, ALPRAZolam, HYDROmorphone, ondansetron, oxyCODONE-acetaminophen, sodium chloride 0.9%, sodium chloride 0.9%, sodium chloride 0.9%     Review of Systems  Objective:     Weight: 98.3 kg (216 lb 11.4 oz)  Body mass index is 36.06 kg/m².  Vital Signs (Most Recent):  Temp: 100.1 °F (37.8 °C) (05/11/22 1159)  Pulse: 105 (05/11/22 1159)  Resp: 18 (05/11/22 1315)  BP: (!) 116/59 (05/11/22 1159)  SpO2: 95 % (05/11/22 1159)   Vital Signs (24h Range):  Temp:  [97.9 °F (36.6 °C)-100.1 °F (37.8 °C)] 100.1 °F (37.8 °C)  Pulse:  [] 105  Resp:  [16-22] 18  SpO2:  [95 %-100 %] 95 %  BP: ()/(53-59) 116/59     Date 05/11/22 0700 - 05/12/22 0659   Shift 8535-4549 1448-6408 8133-6345 24 Hour Total   INTAKE   P.O. 0   0   Shift Total(mL/kg) 0(0)   0(0)   OUTPUT   Shift Total(mL/kg)       Weight (kg) 98.3 98.3 98.3 98.3              Oxygen Concentration (%):  [32] 32         Physical Exam  Vitals and nursing note reviewed. Exam conducted with a chaperone present.   HENT:      Head: Normocephalic and atraumatic.      Nose: Nose normal.      Mouth/Throat:      Mouth: Mucous membranes are dry.   Eyes:      Extraocular Movements: Extraocular movements intact.      Conjunctiva/sclera: Conjunctivae normal.      Pupils: Pupils are equal, round, and reactive to light.   Cardiovascular:      Rate and Rhythm: Normal rate and regular rhythm.   Pulmonary:      Effort: Pulmonary effort is normal.      Breath sounds: Normal breath sounds.   Abdominal:      General: Abdomen is flat.      Palpations: Abdomen is soft.   Musculoskeletal:         General: Tenderness present.      Cervical back: Normal range of motion and neck supple.      Lumbar back: Tenderness present. Decreased range  of motion. Negative right straight leg raise test and negative left straight leg raise test.        Back:       Comments: Generalized paraspinous muscle tenderness with decreased range of motion with for flexion extension.  Patient is awake alert orient x3 speech clear and fluent cranial nerves are intact upper extremity strength is 5/5 lower extremity strength shows 5-out of 5 with some break away weakness secondary to pain with very slight dorsiflexion plantar extensor weakness   Skin:     General: Skin is warm and dry.   Neurological:      General: No focal deficit present.      Mental Status: She is alert and oriented to person, place, and time. Mental status is at baseline.      Sensory: Sensory deficit present.      Motor: Weakness present.   Psychiatric:         Mood and Affect: Mood normal.         Behavior: Behavior normal.       Physical Exam:  Nursing note and vitals reviewed.    Eyes: Pupils are equal, round, and reactive to light. Conjunctivae are normal.     Cardiovascular: Normal rate and regular rhythm.     Abdominal: Soft.     Psych/Behavior: She is alert. She is oriented to person, place, and time.     Musculoskeletal: Gait is abnormal.        Neck: Range of motion is full. Tone is normal.        Back: Range of motion is full. Tone is normal.        Right Upper Extremities: Tone is normal.        Left Upper Extremities: Tone is normal.       Right Lower Extremities: Range of motion is full.        Left Lower Extremities: Range of motion is full.      Comments: Generalized paraspinous muscle tenderness with decreased range of motion with for flexion extension.  Patient is awake alert orient x3 speech clear and fluent cranial nerves are intact upper extremity strength is 5/5 lower extremity strength shows 5-out of 5 with some break away weakness secondary to pain with very slight dorsiflexion plantar extensor weakness     Neurological:        Cranial nerves: Cranial nerve(s) II, III, IV, V, VI, VII,  VIII, IX, X, XI and XII are intact.     Significant Labs:  Recent Labs   Lab 05/10/22  0716 05/11/22  0650   GLU 86 98   * 134*   K 4.3 4.7   CL 98 102   CO2 29 23   BUN 11 12   CREATININE 0.5 0.5   CALCIUM 7.3* 7.0*     Recent Labs   Lab 05/10/22  0716 05/11/22  0650   WBC 35.76* 31.07*   HGB 7.9* 7.1*   HCT 23.2* 21.6*    247     No results for input(s): LABPT, INR, APTT in the last 48 hours.  Microbiology Results (last 7 days)       Procedure Component Value Units Date/Time    Blood culture [[id number]] Collected: 05/11/22 1213    Order Status: Sent Specimen: Blood Updated: 05/11/22 1225    Blood culture [[id number]] Collected: 05/11/22 1213    Order Status: Sent Specimen: Blood Updated: 05/11/22 1225    Blood culture [[id number]]  (Abnormal) Collected: 05/08/22 1516    Order Status: Completed Specimen: Blood from Peripheral, Right Hand Updated: 05/11/22 1009     Blood Culture, Routine Gram stain aer bottle: Gram positive cocci in clusters resembling Staph       Results called to and read back by: Chasity Raymundo RN  05/09/2022  11:31      STAPHYLOCOCCUS AUREUS  [facility].  For susceptibility see order #[id number]      Narrative:      Collection has been rescheduled by [email or web address] at 05/08/2022 13:22 Reason:   Pt in mri will be there after another hour [id number]  Collection has been rescheduled by [email or web address] at 05/08/2022 13:22 Reason:   Pt in mri will be there after another hour [id number]    Blood culture [[id number]]  (Abnormal) Collected: 05/08/22 1515    Order Status: Completed Specimen: Blood from Peripheral, Left Arm Updated: 05/11/22 1009     Blood Culture, Routine Gram stain aer bottle: Gram positive cocci in clusters resembling Staph      Results called to and read back by: Chasity Raymundo RN  05/09/2022  15:40      STAPHYLOCOCCUS AUREUS  ID consult required at [facility].  For susceptibility see order #[id number]      Narrative:       Collection has been rescheduled by [email or web address] at 05/08/2022 13:22 Reason:   Pt in mri will be there after another hour [id number]  Collection has been rescheduled by [email or web address] at 05/08/2022 13:22 Reason:   Pt in mri will be there after another hour [id number]    Blood culture x two cultures. Draw prior to antibiotics. [[id number]]  (Abnormal) Collected: 05/06/22 1617    Order Status: Completed Specimen: Blood from Peripheral, Antecubital, Right Updated: 05/09/22 0824     Blood Culture, Routine Gram stain aer bottle: Gram positive cocci in clusters resembling Staph       Results called to and read back by: Lila Hernandez RN 05/07/2022  10:57      Gram stain missael bottle: Gram positive cocci in clusters resembling Staph       05/07/2022  13:40      STAPHYLOCOCCUS AUREUS  ID consult required at [facility].  For susceptibility see order #[id number]      Narrative:      Aerobic and anaerobic    Blood culture x two cultures. Draw prior to antibiotics. [[id number]]  (Abnormal)  (Susceptibility) Collected: 05/06/22 1616    Order Status: Completed Specimen: Blood from Peripheral, Antecubital, Left Updated: 05/09/22 0823     Blood Culture, Routine Gram stain aer bottle: Gram positive cocci in clusters resembling Staph       Gram stain missael bottle: Gram positive cocci in clusters resembling Staph       Results called to and read back by: Lila Hernandez RN 05/07/2022  10:57      STAPHYLOCOCCUS AUREUS  ID consult required at [facility].      Narrative:      Aerobic and anaerobic    Gram stain [[id number]] Collected: 05/07/22 0901    Order Status: Sent Specimen: Body Fluid from Pleural Fluid Updated: 05/07/22 0905    Fungus culture [[id number]] Collected: 05/07/22 0901    Order Status: Sent Specimen: Body Fluid from Pleural Fluid Updated: 05/07/22 0905    AFB culture (includes stain) [[id number]] Collected: 05/07/22 0901    Order Status: Sent Specimen: Body Fluid from Pleural Fluid  Updated: 05/07/22 0905          Recent Lab Results         05/11/22  0650        Albumin 1.2       Alkaline Phosphatase 77       ALT 13       Anion Gap 9       AST 21       Baso # 0.10       Basophil % 0.3       BILIRUBIN TOTAL 0.7  Comment: For infants and newborns, interpretation of results should be based  on gestational age, weight and in agreement with clinical  observations.    Premature Infant recommended reference ranges:  Up to 24 hours.............<8.0 mg/dL  Up to 48 hours............<12.0 mg/dL  3-5 days..................<15.0 mg/dL  6-29 days.................<15.0 mg/dL         BUN 12       Calcium 7.0       Chloride 102       CO2 23       Creatinine 0.5       Differential Method Automated       eGFR if  >60       eGFR if non  >60  Comment: Calculation used to obtain the estimated glomerular filtration  rate (eGFR) is the CKD-EPI equation.          Eos # 0.1       Eosinophil % 0.2       Glucose 98       Gran # (ANC) 25.5       Gran % 82.0       Hematocrit 21.6       Hemoglobin 7.1       Immature Grans (Abs) 1.41  Comment: Mild elevation in immature granulocytes is non specific and   can be seen in a variety of conditions including stress response,   acute inflammation, trauma and pregnancy. Correlation with other   laboratory and clinical findings is essential.         Immature Granulocytes 4.5       Lymph # 2.6       Lymph % 8.3       MCH 29.5       MCHC 32.9       MCV 90       Mono # 1.5       Mono % 4.7       MPV 11.2       nRBC 0       Platelet Estimate Appears normal       Platelets 247       Potassium 4.7       PROTEIN TOTAL 4.7       RBC 2.41       RDW 15.2       Sodium 134       WBC 31.07             All pertinent labs from the last 24 hours have been reviewed.    Significant Diagnostics:  I have reviewed all pertinent imaging results/findings within the past 24 hours.    Assessment/Plan:     Lumbar stenosis without neurogenic claudication  Patient with L4-5  broad-based disc protrusion contributing to lumbar stenosis some mechanical lower back pain and mild early neurogenic claudication.  Patient will most likely require surgical intervention in the future.  Patient will need to be cleared of her infection for minimum of 6-8 weeks for she be re-evaluated to discuss surgical options.  Patient with paraspinous muscle abscesses and bacteremia will require 6-8 weeks of IV antibiotics.  Patient may follow up in neurosurgery clinic once she is cleared medically.  No neurosurgical intervention indicated at this time    More than 30minutes of the time spent in counseling and coordination of care including discussions etiology of diagnosis, pathogenesis of diagnosis, prognosis of diagnosis,, diagnostic results, impression and recommendations, diagnostic studies, management, risks and benefits of treatment, instructions of disease self-management, treatment instructions, follow up requirements, patient and family counseling/involvement in care compliance with treatment regimen. All of the patient's and/or family members questions were answered during this discussion.          Didier Hess MD  Neurosurgery  [facility]

## 2022-05-11 NOTE — ASSESSMENT & PLAN NOTE
Pulmonology consulted  Status post thoracentesis  Fluid studies pending  5/11/22 IR to collect sample and send for culture today

## 2022-05-11 NOTE — ASSESSMENT & PLAN NOTE
Follow drug susceptibility of staph Aureus - continue Vancomycin, Flagyl.cefepime for now      05/11- isolate is MSSA - will continue Oxacillin, follow SUSANNAH, repeat blood cultures

## 2022-05-11 NOTE — ASSESSMENT & PLAN NOTE
Chronic in nature\  H/O IVDU  CT lumbar show spinal stenosis   No neurological deficit at this time   Will order a MRI lumbar wwo  To r/o spinal abscess     5/7  Mri pending  5/8/22 The patient refused the MRI lumbar spine overnight d/t being unable to lay flat from back pain. Will give pain meds and attempt to get the MRI today to r/o spinal abscess.  5/9/22 The patient reports lower back pain is not well controlled with current pain regimen, will adjust. MRI lumbar spine showed moderate to severe spinal canal stenosis with moderate left-sided neural foraminal stenosis, Neurosurgery consulted.   5/10/22 No acute events overnight. The patient reports some improvement in pain with adjustment in pain meds. Continue current management.    5/11/22 The patient reports pain is still not well controlled at times. Continue management with PRN analgesia

## 2022-05-11 NOTE — OP NOTE
Pre Op Diagnosis: LBP     Post Op Diagnosis: same     Procedure:  aspiration     Procedure performed by: Daniela GEORGE, Shorty BURNS     Written Informed Consent Obtained: Yes     Specimen Removed:  purulent fluid (type of tissue to be determined by lab analysis)     Estimated Blood Loss:  minimal     Findings: Local anesthesia and moderate sedation were used.     The patient tolerated the procedure well and there were no complications.      Sterile technique was performed in the lower back, lidocaine was used as a local anesthetic.  Approx 1 cc of purulent fluid removed and sent to lab.  Pt tolerated the procedure well without immediate complications.  Please see radiologist report for details. F/u with PCP and/or ordering physician.

## 2022-05-11 NOTE — ANESTHESIA PREPROCEDURE EVALUATION
05/11/2022  Tianna Leon is a 37 y.o., female.      Pre-op Assessment    I have reviewed the Patient Summary Reports.     I have reviewed the Nursing Notes. I have reviewed the NPO Status.   I have reviewed the Medications.     Review of Systems  Anesthesia Hx:  No problems with previous Anesthesia  Denies Family Hx of Anesthesia complications.   Denies Personal Hx of Anesthesia complications.   Social:  Smoker Drug abuse IV   Cardiovascular:   vegetation on valve, normall ef   Pulmonary:   Pneumonia COPD, moderate Asthma Shortness of breath    Hepatic/GI:   GERD    Musculoskeletal:   Arthritis     Neurological:   Neuromuscular Disease,    Psych:   Psychiatric History          Physical Exam    Airway:  Mallampati: II   Mouth Opening: Normal  TM Distance: Normal  Tongue: Normal    Chest/Lungs:  Rhonchi  Decreased Breath Sounds: left and right        Anesthesia Plan  Type of Anesthesia, risks & benefits discussed:    Anesthesia Type: MAC  Intra-op Monitoring Plan: Standard ASA Monitors  Post Op Pain Control Plan: multimodal analgesia  Induction:  IV  Informed Consent: Informed consent signed with the Patient and all parties understand the risks and agree with anesthesia plan.  All questions answered.   ASA Score: 4  Day of Surgery Review of History & Physical: H&P Update referred to the surgeon/provider.I have interviewed and examined the patient. I have reviewed the patient's H&P dated: There are no significant changes. H&P completed by Anesthesiologist.    Ready For Surgery From Anesthesia Perspective.     .

## 2022-05-11 NOTE — INTERVAL H&P NOTE
The patient has been examined and the H&P has been reviewed:    I concur with the findings and no changes have occurred since H&P was written.    Anesthesia risks, benefits and alternative options discussed and understood by patient/family.          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

## 2022-05-11 NOTE — ANESTHESIA POSTPROCEDURE EVALUATION
Anesthesia Post Evaluation    Patient: Tianna Leon    Procedure(s) Performed: Procedure(s) (LRB):  CT (COMPUTED TOMOGRAPHY)/facet joint aspiration (N/A)    Final Anesthesia Type: MAC      Patient location during evaluation: PACU  Patient participation: Yes- Able to Participate  Level of consciousness: awake and alert  Post-procedure vital signs: reviewed and stable  Pain management: pain needs to be addressed (pt chronic pain, ivdu)  Airway patency: patent  CARMEN mitigation strategies: Multimodal analgesia  PONV status at discharge: No PONV  Anesthetic complications: no      Cardiovascular status: blood pressure returned to baseline  Respiratory status: unassisted and spontaneous ventilation  Hydration status: euvolemic  Follow-up not needed.          Vitals Value Taken Time   /59 05/11/22 1159   Temp 37.8 °C (100.1 °F) 05/11/22 1159   Pulse 105 05/11/22 1159   Resp 18 05/11/22 1315   SpO2 95 % 05/11/22 1159         No case tracking events are documented in the log.      Pain/Ginger Score: Pain Rating Prior to Med Admin: 10 (5/11/2022  1:15 PM)  Pain Rating Post Med Admin: 4 (5/11/2022 12:15 PM)

## 2022-05-11 NOTE — SUBJECTIVE & OBJECTIVE
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  Blood cultures- MSSA.  She remains bacteremic.  No MR evidence of epidural abscess.     Large, rim enhancing facet synovial cysts arising posteriorly from the right L2-L3 and left L4-L5 facet joints may be degenerative or sequelae of facet septic arthritis.     Minor enhancement and endplate irregularity at L4-L5 may be degenerative versus less likely the sequelae of spondylodiscitis.  No significant associated endplate destruction or vertebral body height loss.     Multilevel degenerative changes of the lumbar spine are most pronounced at L4-L5 with broad-based disc bulge and prominent central disc protrusion contributing to moderate to severe spinal canal stenosis with moderate left-sided neural foraminal stenosis.     Asymmetric left L5-S1 disc bulge contributes to moderate to severe left-sided neural foraminal stenosis and left lateral recess stenosis.   CT chest -Lungs/Pleura: Multiple nodular and cavitary opacities concerning for septic emboli.  Additional larger opacities most notably in the right lung base with some internal clearing possibly related to necrotizing pneumonia.  Moderate loculated right pleural fluid and no definite left pleural fluid.  Empyema not excluded.     Review of Systems   Constitutional:  Positive for fatigue and fever. Negative for activity change, appetite change, chills, diaphoresis and unexpected weight change.   HENT: Negative.  Negative for congestion, drooling, facial swelling, rhinorrhea, sinus pressure, sneezing and sore throat.    Eyes: Negative.  Negative for discharge, redness, itching and visual disturbance.   Respiratory:  Positive for cough, chest tightness and shortness of breath. Negative for apnea, choking, wheezing and stridor.    Cardiovascular:  Negative for chest pain, palpitations and leg swelling.   Gastrointestinal: Negative.  Negative for abdominal distention, abdominal pain, anal bleeding,  blood in stool, constipation, diarrhea, nausea and vomiting.   Endocrine: Negative.    Genitourinary: Negative.  Negative for decreased urine volume, difficulty urinating, dysuria, frequency, hematuria, pelvic pain, urgency, vaginal bleeding and vaginal discharge.   Musculoskeletal:  Positive for arthralgias and back pain. Negative for gait problem, joint swelling, myalgias, neck pain and neck stiffness.   Skin: Negative.  Negative for color change, pallor, rash and wound.   Allergic/Immunologic: Negative.    Neurological:  Positive for weakness. Negative for dizziness, seizures, facial asymmetry, speech difficulty, light-headedness, numbness and headaches.   Psychiatric/Behavioral:  Positive for confusion. Negative for agitation, hallucinations and suicidal ideas.    All other systems reviewed and are negative.  Objective:     Vital Signs (Most Recent):  Temp: 98.4 °F (36.9 °C) (05/11/22 0738)  Pulse: 86 (05/11/22 0738)  Resp: 18 (05/11/22 1008)  BP: (!) 98/55 (05/11/22 0738)  SpO2: 96 % (05/11/22 0752)   Vital Signs (24h Range):  Temp:  [97.9 °F (36.6 °C)-99.2 °F (37.3 °C)] 98.4 °F (36.9 °C)  Pulse:  [] 86  Resp:  [16-20] 18  SpO2:  [95 %-100 %] 96 %  BP: ()/(53-57) 98/55     Weight: 98.3 kg (216 lb 11.4 oz)  Body mass index is 36.06 kg/m².    Estimated Creatinine Clearance: 175.3 mL/min (based on SCr of 0.5 mg/dL).    Physical Exam  Vitals and nursing note reviewed. Exam conducted with a chaperone present (family).   Constitutional:       General: She is not in acute distress.     Appearance: She is well-developed. She is ill-appearing. She is not toxic-appearing.   HENT:      Head: Normocephalic and atraumatic.   Eyes:      Conjunctiva/sclera: Conjunctivae normal.      Pupils: Pupils are equal, round, and reactive to light.   Cardiovascular:      Rate and Rhythm: Normal rate and regular rhythm.      Heart sounds: Normal heart sounds. No murmur heard.  Pulmonary:      Effort: Pulmonary effort is  normal. No respiratory distress.      Breath sounds: Normal breath sounds.   Abdominal:      General: Bowel sounds are normal. There is no distension.      Palpations: Abdomen is soft.      Tenderness: There is no abdominal tenderness.   Musculoskeletal:         General: Swelling and tenderness present. No deformity. Normal range of motion.      Cervical back: Normal range of motion and neck supple.      Right lower leg: No edema.      Left lower leg: No edema.   Skin:     General: Skin is warm and dry.      Coloration: Skin is pale.      Findings: Erythema present.   Neurological:      Mental Status: She is alert and oriented to person, place, and time.      Motor: Weakness present.      Deep Tendon Reflexes: Reflexes are normal and symmetric.   Psychiatric:         Behavior: Behavior normal.       Significant Labs: Blood Culture:   Recent Labs   Lab 05/06/22  1616 05/06/22  1617 05/08/22  1515 05/08/22  1516   LABBLOO Gram stain aer bottle: Gram positive cocci in clusters resembling Staph   Gram stain missael bottle: Gram positive cocci in clusters resembling Staph   Results called to and read back by: Lila Hernandez RN 05/07/2022  10:57  STAPHYLOCOCCUS AUREUS  ID consult required at [facility].  * Gram stain aer bottle: Gram positive cocci in clusters resembling Staph   Results called to and read back by: Lila Hernandez RN 05/07/2022  10:57  Gram stain missael bottle: Gram positive cocci in clusters resembling Staph   05/07/2022  13:40  STAPHYLOCOCCUS AUREUS  ID consult required at [facility].  For susceptibility see order #[id number]  * Gram stain aer bottle: Gram positive cocci in clusters resembling Staph  Results called to and read back by: Chasity Raymundo RN  05/09/2022  15:40  STAPHYLOCOCCUS AUREUS  ID consult required at [facility].  For susceptibility see order #[id number]  * Gram stain aer bottle: Gram positive  cocci in clusters resembling Staph   Results called to and read back by: Chasity Raymundo RN  05/09/2022  11:31  STAPHYLOCOCCUS AUREUS  ID consult required at [facility] Bhaskar.Moustapha,Rosario and Reece nathan.  For susceptibility see order #[id number]  *     BMP:   Recent Labs   Lab 05/11/22  0650   GLU 98   *   K 4.7      CO2 23   BUN 12   CREATININE 0.5   CALCIUM 7.0*     CMP:   Recent Labs   Lab 05/10/22  0716 05/11/22  0650   * 134*   K 4.3 4.7   CL 98 102   CO2 29 23   GLU 86 98   BUN 11 12   CREATININE 0.5 0.5   CALCIUM 7.3* 7.0*   PROT 5.1* 4.7*   ALBUMIN 1.4* 1.2*   BILITOT 0.8 0.7   ALKPHOS 85 77   AST 18 21   ALT 16 13   ANIONGAP 8 9   EGFRNONAA >60 >60       Significant Imaging: CT: I have reviewed all pertinent results/findings within the past 24 hours:

## 2022-05-11 NOTE — SUBJECTIVE & OBJECTIVE
Interval History: No acute events overnight. The patient reports pain is still not well controlled at times. The patient reports that she is very anxious about the upcoming SUSANNAH and IR drainage. SUSANNAH was pushed back to this afternoon because the patient ate candy this AM. Continue treatment with IV oxacillin and flagyl. Infectious disease is following. WBC trended down to 31K. Continue current management.     Review of Systems   Constitutional:  Positive for fatigue and fever. Negative for activity change, appetite change, chills, diaphoresis and unexpected weight change.   HENT: Negative.  Negative for congestion, drooling, facial swelling, rhinorrhea, sinus pressure, sneezing and sore throat.    Eyes: Negative.  Negative for discharge, redness, itching and visual disturbance.   Respiratory:  Positive for cough, chest tightness and shortness of breath. Negative for apnea, choking, wheezing and stridor.    Cardiovascular:  Negative for chest pain, palpitations and leg swelling.   Gastrointestinal: Negative.  Negative for abdominal distention, abdominal pain, anal bleeding, blood in stool, constipation, diarrhea, nausea and vomiting.   Endocrine: Negative.    Genitourinary: Negative.  Negative for decreased urine volume, difficulty urinating, dysuria, frequency, hematuria, pelvic pain, urgency, vaginal bleeding and vaginal discharge.   Musculoskeletal:  Positive for arthralgias and back pain. Negative for gait problem, joint swelling, myalgias, neck pain and neck stiffness.   Skin: Negative.  Negative for color change, pallor, rash and wound.   Allergic/Immunologic: Negative.    Neurological:  Positive for weakness. Negative for dizziness, seizures, facial asymmetry, speech difficulty, light-headedness, numbness and headaches.   Psychiatric/Behavioral:  Positive for confusion. Negative for agitation, hallucinations and suicidal ideas.    All other systems reviewed and are negative.  Objective:     Vital Signs (Most  Recent):  Temp: 98.4 °F (36.9 °C) (05/11/22 0738)  Pulse: 86 (05/11/22 0738)  Resp: 18 (05/11/22 1129)  BP: (!) 98/55 (05/11/22 0738)  SpO2: 96 % (05/11/22 0752)   Vital Signs (24h Range):  Temp:  [97.9 °F (36.6 °C)-99.2 °F (37.3 °C)] 98.4 °F (36.9 °C)  Pulse:  [] 86  Resp:  [16-20] 18  SpO2:  [95 %-100 %] 96 %  BP: ()/(53-57) 98/55     Weight: 98.3 kg (216 lb 11.4 oz)  Body mass index is 36.06 kg/m².    Intake/Output Summary (Last 24 hours) at 5/11/2022 1138  Last data filed at 5/11/2022 0600  Gross per 24 hour   Intake 240 ml   Output 2350 ml   Net -2110 ml      Physical Exam  Vitals and nursing note reviewed. Exam conducted with a chaperone present (family).   Constitutional:       General: She is not in acute distress.     Appearance: She is well-developed. She is ill-appearing. She is not toxic-appearing.   HENT:      Head: Normocephalic and atraumatic.   Eyes:      Conjunctiva/sclera: Conjunctivae normal.      Pupils: Pupils are equal, round, and reactive to light.   Cardiovascular:      Rate and Rhythm: Normal rate and regular rhythm.      Heart sounds: Normal heart sounds. No murmur heard.  Pulmonary:      Effort: Pulmonary effort is normal. No respiratory distress.      Breath sounds: Normal breath sounds.   Abdominal:      General: Bowel sounds are normal. There is no distension.      Palpations: Abdomen is soft.      Tenderness: There is no abdominal tenderness.   Musculoskeletal:         General: Swelling and tenderness present. No deformity. Normal range of motion.      Cervical back: Normal range of motion and neck supple.      Right lower leg: No edema.      Left lower leg: No edema.   Skin:     General: Skin is warm and dry.      Coloration: Skin is pale.      Findings: Erythema present.   Neurological:      Mental Status: She is alert and oriented to person, place, and time.      Motor: Weakness present.      Deep Tendon Reflexes: Reflexes are normal and symmetric.   Psychiatric:          Behavior: Behavior normal.       Significant Labs: All pertinent labs within the past 24 hours have been reviewed.    Significant Imaging:   Imaging Results              CTA Chest Non-Coronary (PE Study) (Final result)  Result time 05/06/22 18:26:20      Final result by Demario Morin MD (05/06/22 18:26:20)                   Impression:      No definite pulmonary thromboembolism.    Multiple nodular and cavitary opacities concerning for septic emboli with larger opacities possibly necrotizing pneumonia.    Moderate loculated right pleural fluid.  Empyema not excluded.    Small pericardial effusion of unclear etiology.    Splenic hypodensity possibly related to infarct or contrast timing.    Additional details as above.    All CT scans at this facility are performed  using dose modulation techniques as appropriate to performed exam including the following:  automated exposure control; adjustment of mA and/or kV according to the patients size (this includes techniques or standardized protocols for targeted exams where dose is matched to indication/reason for exam: i.e. extremities or head);  iterative reconstruction technique.      Electronically signed by: Demario Morin  Date:    05/06/2022  Time:    18:26               Narrative:    EXAMINATION:  CTA CHEST NON CORONARY    CLINICAL HISTORY:  Pulmonary embolism (PE) suspected, neg D-dimer;    TECHNIQUE:  Low dose axial images, sagittal and coronal reformations were obtained from the thoracic inlet to the lung bases following the IV administration of 100 mL of Omnipaque 350.  Contrast timing was optimized to evaluate the pulmonary arteries.  MIP images were performed.    COMPARISON:  Multiple priors.    FINDINGS:  Base of Neck: No significant abnormality.    Thoracic soft tissues: Unremarkable.    Aorta: Left-sided aortic arch.  No aneurysm and no significant atherosclerosis    Heart: Normal size.  Small effusion.    Pulmonary vasculature: Pulmonary arteries are  well opacified.  There is no pulmonary thromboembolism.    Esha/Mediastinum: No pathologic ariana enlargement.    Airways: Patent.    Lungs/Pleura: Multiple nodular and cavitary opacities concerning for septic emboli.  Additional larger opacities most notably in the right lung base with some internal clearing possibly related to necrotizing pneumonia.  Moderate loculated right pleural fluid and no definite left pleural fluid.  Empyema not excluded.    Esophagus: Unremarkable.    Upper Abdomen: Geographic wedge-shaped splenic hypodensity possibly related to contrast timing or infarct.    Bones: No acute fracture. No suspicious lytic or sclerotic lesions.                                        CT Lumbar Spine Without Contrast (Final result)  Result time 05/06/22 18:29:13      Final result by Demario Morin MD (05/06/22 18:29:13)                   Impression:      No fracture or traumatic malalignment of the lumbar spine.  No definite findings to suggest osteomyelitis.  Further evaluation as clinically warranted.    Possible progression of degenerative changes most notable at L4-5 with moderate to severe spinal canal stenosis at this level.  Additional details as above.    This report was flagged in Epic as abnormal.    All CT scans at this facility are performed  using dose modulation techniques as appropriate to performed exam including the following:  automated exposure control; adjustment of mA and/or kV according to the patients size (this includes techniques or standardized protocols for targeted exams where dose is matched to indication/reason for exam: i.e. extremities or head);  iterative reconstruction technique.      Electronically signed by: Demario Morin  Date:    05/06/2022  Time:    18:29               Narrative:    EXAMINATION:  CT LUMBAR SPINE WITHOUT CONTRAST    CLINICAL HISTORY:  Low back pain, infection suspected;    TECHNIQUE:  Low-dose CT images obtained throughout the region of the lumbar  spine.  Axial, sagittal and coronal reformations were performed.  Contrast was not administered.    COMPARISON:  12/05/2019    FINDINGS:  The vertebral bodies are normal in height and morphology without evidence of fracture or osseous destructive process.    Normal sagittal alignment is preserved.  No spondylolisthesis.    Circumferential disc bulges L3 through S1 with mild spinal canal stenosis L3-4, moderate to severe spinal canal stenosis L4-5 and probable moderate spinal canal stenosis L5-S1.    Facet arthropathy with moderate left neural foraminal narrowing at L4-5 and severe left neural foraminal narrowing at L5-S1.  Other levels are better preserved.    Limited evaluation of the intraspinal contents demonstrates no hematoma or mass.    Paraspinal soft tissues exhibit no acute abnormalities.                                       CT Thoracic Spine Without Contrast (Final result)  Result time 05/06/22 18:32:20      Final result by Demario Morin MD (05/06/22 18:32:20)                   Impression:      No definite acute abnormality.  No traumatic malalignment, fracture, or osseous destructive process.  Further evaluation as clinically warranted.    Pulmonary opacities as on the dedicated CT exam.    All CT scans at this facility are performed  using dose modulation techniques as appropriate to performed exam including the following:  automated exposure control; adjustment of mA and/or kV according to the patients size (this includes techniques or standardized protocols for targeted exams where dose is matched to indication/reason for exam: i.e. extremities or head);  iterative reconstruction technique.      Electronically signed by: Demario Morin  Date:    05/06/2022  Time:    18:32               Narrative:    EXAMINATION:  CT THORACIC SPINE WITHOUT CONTRAST    CLINICAL HISTORY:  Epidural abscess suspected;    TECHNIQUE:  Low-dose axial noncontrast CT images of the thoracic spine.  Multiplanar reformats  generated    COMPARISON:  None    FINDINGS:  Thoracic spine alignment is well preserved.  No findings to suggest vertebral body fracture.  No osseous lytic or blastic process.    No significant degenerative changes.  No disc abnormalities or facet arthrosis.    Pulmonary opacities as on the dedicated CT exam.                                       CT Cervical Spine Without Contrast (Final result)  Result time 05/06/22 18:34:24      Final result by Demario Morin MD (05/06/22 18:34:24)                   Impression:      No fracture or traumatic malalignment of the cervical spine.  No definite CT evidence of osteomyelitis.  Further evaluation as clinically warranted.    Additional degenerative findings as above.    All CT scans at this facility are performed  using dose modulation techniques as appropriate to performed exam including the following:  automated exposure control; adjustment of mA and/or kV according to the patients size (this includes techniques or standardized protocols for targeted exams where dose is matched to indication/reason for exam: i.e. extremities or head);  iterative reconstruction technique.      Electronically signed by: Demario Morin  Date:    05/06/2022  Time:    18:34               Narrative:    EXAMINATION:  CT CERVICAL SPINE WITHOUT CONTRAST    CLINICAL HISTORY:  Neck pain, acute, infection suspected;    TECHNIQUE:  Low dose axial CT images through the cervical spine, with sagittal and coronal reformations.  Contrast was not administered.    COMPARISON:  12/05/2019    FINDINGS:  The vertebral bodies are normal in height and morphology without evidence of fracture or osseous destructive process.  Normal sagittal alignment is preserved.    Progression of degenerative change at C4-5 in comparison the prior exam with posterior disc osteophyte complex at this level producing mild spinal canal stenosis.  Smaller C5-6 posterior disc osteophyte complex without definite spinal canal  stenosis.    No definite neural foraminal narrowing.    Limited evaluation of the intraspinal contents demonstrates no hematoma or mass.Paraspinal soft tissues exhibit no acute abnormalities.                                       X-Ray Knee Complete 4 Or More Views Right (Final result)  Result time 05/06/22 16:01:49      Final result by Nate Brasher MD (05/06/22 16:01:49)                   Impression:      No evidence of fracture or dislocation. Moderate suprapatellar knee joint effusion. Mild soft tissue edema lateral to the knee.      Electronically signed by: Nate Brasher  Date:    05/06/2022  Time:    16:01               Narrative:    EXAMINATION:  XR KNEE COMP 4 OR MORE VIEWS RIGHT    CLINICAL HISTORY:  Pain in unspecified knee    TECHNIQUE:  AP, lateral, and Merchant views of the right knee were performed.    COMPARISON:  None    FINDINGS:  No evidence of fracture or dislocation.  Moderate suprapatellar knee joint effusion.  Mild soft tissue edema lateral to the knee.  No foreign body.  Soft tissues otherwise unremarkable.                                       X-Ray Finger 2 or More Views Right (Final result)  Result time 05/06/22 16:02:28      Final result by Nate Brasher MD (05/06/22 16:02:28)                   Impression:      Negative exam.      Electronically signed by: Nate Brasher  Date:    05/06/2022  Time:    16:02               Narrative:    EXAMINATION:  XR FINGER 2 OR MORE VIEWS RIGHT    CLINICAL HISTORY:  finger swelling;    TECHNIQUE:  Three views of the right 4th digit.    COMPARISON:  None    FINDINGS:  No evidence of fracture or dislocation.  Joint spaces appear well maintained.  Soft tissues unremarkable.  No evidence of foreign body.                                        X-Ray Chest AP Portable (Final result)  Result time 05/06/22 16:04:37      Final result by Nate Brasher MD (05/06/22 16:04:37)                   Impression:      Patchy bilateral opacities, some of which appear  nodular and possibly cavitary. Recommend further evaluation with chest CT with IV contrast.    This report was flagged in Epic as abnormal.      Electronically signed by: Nate Brasher  Date:    05/06/2022  Time:    16:04               Narrative:    EXAMINATION:  XR CHEST AP PORTABLE    CLINICAL HISTORY:  Sepsis;.    TECHNIQUE:  Single frontal portable view of the chest was performed.    COMPARISON:  12/05/2019    FINDINGS:  Support devices: None    Patchy bilateral opacities, some of which appear nodular and possibly cavitary.  Recommend further evaluation with chest CT with IV contrast.    Heart normal size.  No pneumothorax or pleural effusion    Bones are intact.

## 2022-05-11 NOTE — ASSESSMENT & PLAN NOTE
Gram positive   Infectious disease consulted  Continue broad spectrum intravenous antibiotic(s) given h/o ivdu  Remains febrile and leukocytosis persists  5/8/22 The patient remained afebrile overnight. Currently on cefepime/vanc/flagyl. Blood cx are growing staph aureus. The patient refused the MRI lumbar spine overnight d/t being unable to lay flat from back pain. Will give pain meds and attempt to get the MRI today to r/o spinal abscess. The ECHO showed a 1.3 x 1.5 cm elongated, mobile echodensity seen on the tricuspid valve consistent with vegetation, CT surgery was consulted. Will need a SUSANNAH. Infectious disease is consulted. Will repeat blood cx today.   5/9/22 Repeat blood cx are still positive. Sensitivities are back and Staph is sensitive to oxacillin, will D/C vanc/cefepime. Infectious disease is following   5/10/22 Repeat blood cx are +. Continue IV oxacillin and flagyl. Infectious disease is consulted. IR consulted to evaluate possible paraspinous muscle myositis versus abscess. Recommend IR drainage. Continue current management.    5/11/22 The patient reports pain is still not well controlled at times. The patient reports that she is very anxious about the upcoming SUSANNAH and IR drainage. SUSANNAH was pushed back to this afternoon because the patient ate candy this AM. Continue treatment with IV oxacillin and flagyl. Infectious disease is following. WBC trended down to 31K. Continue current management.

## 2022-05-11 NOTE — SEDATION DOCUMENTATION
Pt transported back to room 541. Bedside report given. Pt stable at time of transfer. bandaid to site remains CDI.

## 2022-05-11 NOTE — HOSPITAL COURSE
Patient is still reporting pain in the back requesting narcotics reports slightly better than during her admission denies any new focal neurologic deficits no new numbness tingling or weakness.  Patient currently receiving IV antibiotics for her bacteremia and paraspinous muscle infections.  Medical workup for etiology of infection still in progress.  Patient is scheduled for transesophageal echo today no new focal neurologic complaints    Patient is still reporting diffuse pain in back and throughout her body and chest.  Also complaining about nursing staff at getting up and letting her lay in bed in her own urine.  I spoke extensively to the patient with her mother present about her needing to be more proactive getting up and going to the bathroom on her own.  Despite her pain she needs to remain mobile getting up to Chairs ambulating to the bathroom and using her spirometry to help with her pulmonary status.  We also had a long talk regarding pain control and pain management explained to her that she will never be free of pain in that she needs to push through some of her activities despite the pain otherwise she will continue to lose muscle mass strength and become debilitated

## 2022-05-11 NOTE — PLAN OF CARE
Problem: Adult Inpatient Plan of Care  Goal: Plan of Care Review  Outcome: Ongoing, Progressing  Goal: Patient-Specific Goal (Individualized)  Outcome: Ongoing, Progressing  Goal: Absence of Hospital-Acquired Illness or Injury  Outcome: Ongoing, Progressing  Goal: Optimal Comfort and Wellbeing  Outcome: Ongoing, Progressing  Goal: Readiness for Transition of Care  Outcome: Ongoing, Progressing     Problem: Impaired Wound Healing  Goal: Optimal Wound Healing  Outcome: Ongoing, Progressing     Problem: Skin Injury Risk Increased  Goal: Skin Health and Integrity  Outcome: Ongoing, Progressing     Problem: Adjustment to Illness (Sepsis/Septic Shock)  Goal: Optimal Coping  Outcome: Ongoing, Progressing     Problem: Bleeding (Sepsis/Septic Shock)  Goal: Absence of Bleeding  Outcome: Ongoing, Progressing     Problem: Glycemic Control Impaired (Sepsis/Septic Shock)  Goal: Blood Glucose Level Within Desired Range  Outcome: Ongoing, Progressing     Problem: Infection Progression (Sepsis/Septic Shock)  Goal: Absence of Infection Signs and Symptoms  Outcome: Ongoing, Progressing     Problem: Nutrition Impaired (Sepsis/Septic Shock)  Goal: Optimal Nutrition Intake  Outcome: Ongoing, Progressing     Problem: Fluid Imbalance (Pneumonia)  Goal: Fluid Balance  Outcome: Ongoing, Progressing     Problem: Infection (Pneumonia)  Goal: Resolution of Infection Signs and Symptoms  Outcome: Ongoing, Progressing     Problem: Respiratory Compromise (Pneumonia)  Goal: Effective Oxygenation and Ventilation  Outcome: Ongoing, Progressing

## 2022-05-11 NOTE — SUBJECTIVE & OBJECTIVE
Interval History:  Patient undergoing bacteremia workup underwent transesophageal echocardiogram.  Continues to receive IV antibiotics.  Patient with history of drug abuse show requesting narcotics for pain control.  Still having low-grade fevers    Medications:  Continuous Infusions:   sodium chloride 0.9% 125 mL/hr at 05/11/22 1208     Scheduled Meds:   enoxaparin  40 mg Subcutaneous Daily    metroNIDAZOLE  500 mg Oral Q8H    mupirocin   Nasal BID    oxacillin 12 g in  mL CONTINUOUS INFUSION  12 g Intravenous Q24H    traZODone  100 mg Oral QHS     PRN Meds:acetaminophen, albuterol-ipratropium, ALPRAZolam, HYDROmorphone, ondansetron, oxyCODONE-acetaminophen, sodium chloride 0.9%, sodium chloride 0.9%, sodium chloride 0.9%     Review of Systems  Objective:     Weight: 98.3 kg (216 lb 11.4 oz)  Body mass index is 36.06 kg/m².  Vital Signs (Most Recent):  Temp: 100.1 °F (37.8 °C) (05/11/22 1159)  Pulse: 105 (05/11/22 1159)  Resp: 18 (05/11/22 1315)  BP: (!) 116/59 (05/11/22 1159)  SpO2: 95 % (05/11/22 1159)   Vital Signs (24h Range):  Temp:  [97.9 °F (36.6 °C)-100.1 °F (37.8 °C)] 100.1 °F (37.8 °C)  Pulse:  [] 105  Resp:  [16-22] 18  SpO2:  [95 %-100 %] 95 %  BP: ()/(53-59) 116/59     Date 05/11/22 0700 - 05/12/22 0659   Shift 7704-5281 0278-2585 4344-2969 24 Hour Total   INTAKE   P.O. 0   0   Shift Total(mL/kg) 0(0)   0(0)   OUTPUT   Shift Total(mL/kg)       Weight (kg) 98.3 98.3 98.3 98.3              Oxygen Concentration (%):  [32] 32         Physical Exam  Vitals and nursing note reviewed. Exam conducted with a chaperone present.   HENT:      Head: Normocephalic and atraumatic.      Nose: Nose normal.      Mouth/Throat:      Mouth: Mucous membranes are dry.   Eyes:      Extraocular Movements: Extraocular movements intact.      Conjunctiva/sclera: Conjunctivae normal.      Pupils: Pupils are equal, round, and reactive to light.   Cardiovascular:      Rate and Rhythm: Normal rate and regular  rhythm.   Pulmonary:      Effort: Pulmonary effort is normal.      Breath sounds: Normal breath sounds.   Abdominal:      General: Abdomen is flat.      Palpations: Abdomen is soft.   Musculoskeletal:         General: Tenderness present.      Cervical back: Normal range of motion and neck supple.      Lumbar back: Tenderness present. Decreased range of motion. Negative right straight leg raise test and negative left straight leg raise test.        Back:       Comments: Generalized paraspinous muscle tenderness with decreased range of motion with for flexion extension.  Patient is awake alert orient x3 speech clear and fluent cranial nerves are intact upper extremity strength is 5/5 lower extremity strength shows 5-out of 5 with some break away weakness secondary to pain with very slight dorsiflexion plantar extensor weakness   Skin:     General: Skin is warm and dry.   Neurological:      General: No focal deficit present.      Mental Status: She is alert and oriented to person, place, and time. Mental status is at baseline.      Sensory: Sensory deficit present.      Motor: Weakness present.   Psychiatric:         Mood and Affect: Mood normal.         Behavior: Behavior normal.       Physical Exam:  Nursing note and vitals reviewed.    Eyes: Pupils are equal, round, and reactive to light. Conjunctivae are normal.     Cardiovascular: Normal rate and regular rhythm.     Abdominal: Soft.     Psych/Behavior: She is alert. She is oriented to person, place, and time.     Musculoskeletal: Gait is abnormal.        Neck: Range of motion is full. Tone is normal.        Back: Range of motion is full. Tone is normal.        Right Upper Extremities: Tone is normal.        Left Upper Extremities: Tone is normal.       Right Lower Extremities: Range of motion is full.        Left Lower Extremities: Range of motion is full.      Comments: Generalized paraspinous muscle tenderness with decreased range of motion with for flexion  extension.  Patient is awake alert orient x3 speech clear and fluent cranial nerves are intact upper extremity strength is 5/5 lower extremity strength shows 5-out of 5 with some break away weakness secondary to pain with very slight dorsiflexion plantar extensor weakness     Neurological:        Cranial nerves: Cranial nerve(s) II, III, IV, V, VI, VII, VIII, IX, X, XI and XII are intact.     Significant Labs:  Recent Labs   Lab 05/10/22  0716 05/11/22  0650   GLU 86 98   * 134*   K 4.3 4.7   CL 98 102   CO2 29 23   BUN 11 12   CREATININE 0.5 0.5   CALCIUM 7.3* 7.0*     Recent Labs   Lab 05/10/22  0716 05/11/22  0650   WBC 35.76* 31.07*   HGB 7.9* 7.1*   HCT 23.2* 21.6*    247     No results for input(s): LABPT, INR, APTT in the last 48 hours.  Microbiology Results (last 7 days)       Procedure Component Value Units Date/Time    Blood culture [[id number]] Collected: 05/11/22 1213    Order Status: Sent Specimen: Blood Updated: 05/11/22 1225    Blood culture [[id number]] Collected: 05/11/22 1213    Order Status: Sent Specimen: Blood Updated: 05/11/22 1225    Blood culture [[id number]]  (Abnormal) Collected: 05/08/22 1516    Order Status: Completed Specimen: Blood from Peripheral, Right Hand Updated: 05/11/22 1009     Blood Culture, Routine Gram stain aer bottle: Gram positive cocci in clusters resembling Staph       Results called to and read back by: Chasity Raymundo RN  05/09/2022  11:31      STAPHYLOCOCCUS AUREUS  ID consult required at [facility].  For susceptibility see order #[id number]      Narrative:      Collection has been rescheduled by [email or web address] at 05/08/2022 13:22 Reason:   Pt in mri will be there after another hour [id number]  Collection has been rescheduled by [email or web address] at 05/08/2022 13:22 Reason:   Pt in mri will be there after another hour [id number]    Blood culture [[id number]]  (Abnormal) Collected: 05/08/22 1515    Order Status: Completed Specimen: Blood from Peripheral, Left  Arm Updated: 05/11/22 1009     Blood Culture, Routine Gram stain aer bottle: Gram positive cocci in clusters resembling Staph      Results called to and read back by: Chasity Raymundo RN  05/09/2022  15:40      STAPHYLOCOCCUS AUREUS  ID consult required at [facility].  For susceptibility see order #[id number]      Narrative:      Collection has been rescheduled by [email or web address] at 05/08/2022 13:22 Reason:   Pt in mri will be there after another hour [id number]  Collection has been rescheduled by [email or web address] at 05/08/2022 13:22 Reason:   Pt in mri will be there after another hour [id number]    Blood culture x two cultures. Draw prior to antibiotics. [[id number]]  (Abnormal) Collected: 05/06/22 1617    Order Status: Completed Specimen: Blood from Peripheral, Antecubital, Right Updated: 05/09/22 0824     Blood Culture, Routine Gram stain aer bottle: Gram positive cocci in clusters resembling Staph       Results called to and read back by: Lila Hernandez RN 05/07/2022  10:57      Gram stain missael bottle: Gram positive cocci in clusters resembling Staph       05/07/2022  13:40      STAPHYLOCOCCUS AUREUS  ID consult required at [facility].  For susceptibility see order #[id number]      Narrative:      Aerobic and anaerobic    Blood culture x two cultures. Draw prior to antibiotics. [[id number]]  (Abnormal)  (Susceptibility) Collected: 05/06/22 1616    Order Status: Completed Specimen: Blood from Peripheral, Antecubital, Left Updated: 05/09/22 0823     Blood Culture, Routine Gram stain aer bottle: Gram positive cocci in clusters resembling Staph       Gram stain missael bottle: Gram positive cocci in clusters resembling Staph       Results called to and read back by: Lila Hernandez RN 05/07/2022  10:57      STAPHYLOCOCCUS AUREUS  ID consult required at [facility].      Narrative:      Aerobic and anaerobic    Gram stain [[id number]] Collected: 05/07/22 0901     Order Status: Sent Specimen: Body Fluid from Pleural Fluid Updated: 05/07/22 0905    Fungus culture [[id number]] Collected: 05/07/22 0901    Order Status: Sent Specimen: Body Fluid from Pleural Fluid Updated: 05/07/22 0905    AFB culture (includes stain) [[id number]] Collected: 05/07/22 0901    Order Status: Sent Specimen: Body Fluid from Pleural Fluid Updated: 05/07/22 0905          Recent Lab Results         05/11/22  0650        Albumin 1.2       Alkaline Phosphatase 77       ALT 13       Anion Gap 9       AST 21       Baso # 0.10       Basophil % 0.3       BILIRUBIN TOTAL 0.7  Comment: For infants and newborns, interpretation of results should be based  on gestational age, weight and in agreement with clinical  observations.    Premature Infant recommended reference ranges:  Up to 24 hours.............<8.0 mg/dL  Up to 48 hours............<12.0 mg/dL  3-5 days..................<15.0 mg/dL  6-29 days.................<15.0 mg/dL         BUN 12       Calcium 7.0       Chloride 102       CO2 23       Creatinine 0.5       Differential Method Automated       eGFR if  >60       eGFR if non  >60  Comment: Calculation used to obtain the estimated glomerular filtration  rate (eGFR) is the CKD-EPI equation.          Eos # 0.1       Eosinophil % 0.2       Glucose 98       Gran # (ANC) 25.5       Gran % 82.0       Hematocrit 21.6       Hemoglobin 7.1       Immature Grans (Abs) 1.41  Comment: Mild elevation in immature granulocytes is non specific and   can be seen in a variety of conditions including stress response,   acute inflammation, trauma and pregnancy. Correlation with other   laboratory and clinical findings is essential.         Immature Granulocytes 4.5       Lymph # 2.6       Lymph % 8.3       MCH 29.5       MCHC 32.9       MCV 90       Mono # 1.5       Mono % 4.7       MPV 11.2       nRBC 0       Platelet Estimate Appears normal       Platelets 247       Potassium 4.7       PROTEIN  TOTAL 4.7       RBC 2.41       RDW 15.2       Sodium 134       WBC 31.07             All pertinent labs from the last 24 hours have been reviewed.    Significant Diagnostics:  I have reviewed all pertinent imaging results/findings within the past 24 hours.

## 2022-05-11 NOTE — SUBJECTIVE & OBJECTIVE
Interval History:   05/11: seen and examined T ma x 99.2F, NS note reviewed, IR procedure planned, C/O back pain? Paraspinal process, Wbcc 31 K  on Abx, Oxacillin    Respiratory ROS: no cough, shortness of breath, or wheezing     Objective:     Vital Signs (Most Recent):  Temp: 98.4 °F (36.9 °C) (05/11/22 0738)  Pulse: 86 (05/11/22 0738)  Resp: 18 (05/11/22 1008)  BP: (!) 98/55 (05/11/22 0738)  SpO2: 96 % (05/11/22 0752)   Vital Signs (24h Range):  Temp:  [97.9 °F (36.6 °C)-99.2 °F (37.3 °C)] 98.4 °F (36.9 °C)  Pulse:  [] 86  Resp:  [16-20] 18  SpO2:  [95 %-100 %] 96 %  BP: ()/(53-57) 98/55     Weight: 98.3 kg (216 lb 11.4 oz)  Body mass index is 36.06 kg/m².      Intake/Output Summary (Last 24 hours) at 5/11/2022 1021  Last data filed at 5/11/2022 0600  Gross per 24 hour   Intake 240 ml   Output 2350 ml   Net -2110 ml       Physical Exam  Vitals and nursing note reviewed.   Constitutional:       Appearance: She is ill-appearing.      Interventions: Nasal cannula in place.   HENT:      Head: Normocephalic.      Comments: Lip piercing     Nose: Nose normal.      Mouth/Throat:      Mouth: Mucous membranes are moist.   Eyes:      Pupils: Pupils are equal, round, and reactive to light.   Cardiovascular:      Rate and Rhythm: Normal rate.      Pulses: Normal pulses.      Heart sounds: Normal heart sounds.   Pulmonary:      Effort: No tachypnea.      Breath sounds: Examination of the right-middle field reveals decreased breath sounds. Examination of the right-lower field reveals decreased breath sounds. Decreased breath sounds present. No wheezing, rhonchi or rales.   Abdominal:      General: Bowel sounds are normal.      Palpations: Abdomen is soft.   Musculoskeletal:         General: Normal range of motion.      Cervical back: Normal range of motion.   Skin:     General: Skin is warm.      Findings: Bruising and erythema present.      Comments: Multiple tattoos   Neurological:      General: No focal deficit  present.      Mental Status: She is alert and oriented to person, place, and time.   Psychiatric:         Behavior: Behavior is cooperative.       Vents:  Oxygen Concentration (%): 32 (05/11/22 0738)    Lines/Drains/Airways       Peripheral Intravenous Line  Duration                  Peripheral IV - Single Lumen 05/11/22 0845 22 G Anterior;Right Wrist <1 day                    Significant Labs:    CBC/Anemia Profile:  Recent Labs   Lab 05/10/22  0716 05/11/22  0650   WBC 35.76* 31.07*   HGB 7.9* 7.1*   HCT 23.2* 21.6*    247   MCV 86 90   RDW 14.8* 15.2*        Chemistries:  Recent Labs   Lab 05/10/22  0716 05/11/22  0650   * 134*   K 4.3 4.7   CL 98 102   CO2 29 23   BUN 11 12   CREATININE 0.5 0.5   CALCIUM 7.3* 7.0*   ALBUMIN 1.4* 1.2*   PROT 5.1* 4.7*   BILITOT 0.8 0.7   ALKPHOS 85 77   ALT 16 13   AST 18 21       Blood Culture: No results for input(s): LABBLOO in the last 48 hours.  Respiratory Culture: No results for input(s): GSRESP, RESPIRATORYC in the last 48 hours.  All pertinent labs within the past 24 hours have been reviewed.    Significant Imaging:  I have reviewed all pertinent imaging results/findings within the past 24 hours.

## 2022-05-11 NOTE — ASSESSMENT & PLAN NOTE
Patient with L4-5 broad-based disc protrusion contributing to lumbar stenosis some mechanical lower back pain and mild early neurogenic claudication.  Patient will most likely require surgical intervention in the future.  Patient will need to be cleared of her infection for minimum of 6-8 weeks for she be re-evaluated to discuss surgical options.  Patient with paraspinous muscle abscesses and bacteremia will require 6-8 weeks of IV antibiotics.  Patient may follow up in neurosurgery clinic once she is cleared medically.  No neurosurgical intervention indicated at this time    More than 30minutes of the time spent in counseling and coordination of care including discussions etiology of diagnosis, pathogenesis of diagnosis, prognosis of diagnosis,, diagnostic results, impression and recommendations, diagnostic studies, management, risks and benefits of treatment, instructions of disease self-management, treatment instructions, follow up requirements, patient and family counseling/involvement in care compliance with treatment regimen. All of the patient's and/or family members questions were answered during this discussion.

## 2022-05-11 NOTE — PROGRESS NOTES
[facility]  Pulmonology  Progress Note    Patient Name: Tianna Leon  MRN: [id number]  Admission Date: 5/6/2022  Hospital Length of Stay: 5 days  Code Status: Full Code  Attending Provider: Elizabeth Kim MD  Primary Care Provider: Spenser Campa MD   Principal Problem: Bacteremia    Subjective:     Interval History:   05/11: seen and examined T ma x 99.2F, NS note reviewed, IR procedure planned, C/O back pain? Paraspinal process, Wbcc 31 K  on Abx, Oxacillin    Respiratory ROS: no cough, shortness of breath, or wheezing     Objective:     Vital Signs (Most Recent):  Temp: 98.4 °F (36.9 °C) (05/11/22 0738)  Pulse: 86 (05/11/22 0738)  Resp: 18 (05/11/22 1008)  BP: (!) 98/55 (05/11/22 0738)  SpO2: 96 % (05/11/22 0752)   Vital Signs (24h Range):  Temp:  [97.9 °F (36.6 °C)-99.2 °F (37.3 °C)] 98.4 °F (36.9 °C)  Pulse:  [] 86  Resp:  [16-20] 18  SpO2:  [95 %-100 %] 96 %  BP: ()/(53-57) 98/55     Weight: 98.3 kg (216 lb 11.4 oz)  Body mass index is 36.06 kg/m².      Intake/Output Summary (Last 24 hours) at 5/11/2022 1021  Last data filed at 5/11/2022 0600  Gross per 24 hour   Intake 240 ml   Output 2350 ml   Net -2110 ml       Physical Exam  Vitals and nursing note reviewed.   Constitutional:       Appearance: She is ill-appearing.      Interventions: Nasal cannula in place.   HENT:      Head: Normocephalic.      Comments: Lip piercing     Nose: Nose normal.      Mouth/Throat:      Mouth: Mucous membranes are moist.   Eyes:      Pupils: Pupils are equal, round, and reactive to light.   Cardiovascular:      Rate and Rhythm: Normal rate.      Pulses: Normal pulses.      Heart sounds: Normal heart sounds.   Pulmonary:      Effort: No tachypnea.      Breath sounds: Examination of the right-middle field reveals decreased breath sounds. Examination of the right-lower field reveals decreased breath sounds. Decreased breath sounds present. No wheezing, rhonchi or rales.   Abdominal:      General: Bowel  sounds are normal.      Palpations: Abdomen is soft.   Musculoskeletal:         General: Normal range of motion.      Cervical back: Normal range of motion.   Skin:     General: Skin is warm.      Findings: Bruising and erythema present.      Comments: Multiple tattoos   Neurological:      General: No focal deficit present.      Mental Status: She is alert and oriented to person, place, and time.   Psychiatric:         Behavior: Behavior is cooperative.       Vents:  Oxygen Concentration (%): 32 (05/11/22 0738)    Lines/Drains/Airways       Peripheral Intravenous Line  Duration                  Peripheral IV - Single Lumen 05/11/22 0845 22 G Anterior;Right Wrist <1 day                    Significant Labs:    CBC/Anemia Profile:  Recent Labs   Lab 05/10/22  0716 05/11/22  0650   WBC 35.76* 31.07*   HGB 7.9* 7.1*   HCT 23.2* 21.6*    247   MCV 86 90   RDW 14.8* 15.2*        Chemistries:  Recent Labs   Lab 05/10/22  0716 05/11/22  0650   * 134*   K 4.3 4.7   CL 98 102   CO2 29 23   BUN 11 12   CREATININE 0.5 0.5   CALCIUM 7.3* 7.0*   ALBUMIN 1.4* 1.2*   PROT 5.1* 4.7*   BILITOT 0.8 0.7   ALKPHOS 85 77   ALT 16 13   AST 18 21       Blood Culture: No results for input(s): LABBLOO in the last 48 hours.  Respiratory Culture: No results for input(s): GSRESP, RESPIRATORYC in the last 48 hours.  All pertinent labs within the past 24 hours have been reviewed.    Significant Imaging:  I have reviewed all pertinent imaging results/findings within the past 24 hours.      ABG  No results for input(s): PH, PO2, PCO2, HCO3, BE in the last 168 hours.  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  Neurosurge note  Await IR drainage    Lumbar stenosis without neurogenic claudication  Neurosurgery recs noted    Vegetation of heart valve  Medical Mx  CTS note noted    Chronic pulmonary heart disease  Pulmonary hypertension  PA estimated  56  Multifactorial  : COPD, IVDU  Need outpatient w/u    Parapneumonic effusion  Complicated by PNA, possible empyema  SP Thora  Cont Abx    IVDU (intravenous drug user)  Cessation/rehab    PNA (pneumonia)  PNA in IVDU  Cultures; Blood and sputum  BC +ve G+ve cocci  AbxL OXACILLIN + FLAGYL         Tobacco abuse  Smoking cessation    COPD (chronic obstructive pulmonary disease)  Oxygen  Keep SP2> 92%  Bronchodilators  Follow up in Pulmonary for PFT to clarify Dx    Lower back pain  Address pain  Morphine      Main issues remain ID  Will need interval imaging 6-8 weeks  O2 requirements minimal  Will sign off  Call for questions     Alonzo Morrison MD  Pulmonology  [facility]

## 2022-05-11 NOTE — PLAN OF CARE
Patient remains free from falls and injuries this shift. Safety precautions maintained. Pain managed with Iv dilaudid. No s/s of acute distress noted. Will continue to monitor. Chart check completed.

## 2022-05-11 NOTE — TRANSFER OF CARE
"Anesthesia Transfer of Care Note    Patient: Tianna Leon    Procedure(s) Performed: Procedure(s) (LRB):  CT (COMPUTED TOMOGRAPHY)/facet joint aspiration (N/A)    Patient location: Other: IR    Anesthesia Type: MAC    Post pain: pain needs to be addressed    Post assessment: no apparent anesthetic complications and tolerated procedure well    Post vital signs: stable    Level of consciousness: awake    Nausea/Vomiting: no nausea/vomiting    Complications: none    Transfer of care protocol was followed      Last vitals:   Visit Vitals  BP (!) 116/59 (BP Location: Left arm, Patient Position: Lying)   Pulse 105   Temp 37.8 °C (100.1 °F) (Oral)   Resp 18   Ht 5' 5" (1.651 m)   Wt 98.3 kg (216 lb 11.4 oz)   LMP 07/08/2019   SpO2 95%   Breastfeeding No   BMI 36.06 kg/m²     "

## 2022-05-11 NOTE — ASSESSMENT & PLAN NOTE
MRI of the lumbar spine-  Large, rim enhancing facet synovial cysts arising posteriorly from the right L2-L3 and left L4-L5 facet joints may be degenerative or sequelae of facet septic arthritis.     Minor enhancement and endplate irregularity at L4-L5 may be degenerative versus less likely the sequelae of spondylodiscitis.  No significant associated endplate destruction or vertebral body height loss.     Will follow IR guided aspiration, will do MRI of the thoracic regime

## 2022-05-11 NOTE — PROGRESS NOTES
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     Minor enhancement and endplate irregularity at L4-L5 may be degenerative versus less likely the sequelae of spondylodiscitis.  No significant associated endplate destruction or vertebral body height loss.     Will follow IR guided aspiration, will do MRI of the thoracic regime    Subacute bacterial endocarditis  Follow drug susceptibility of staph Aureus - continue Vancomycin, Flagyl.cefepime for now      05/11- isolate is MSSA - will continue Oxacillin, follow SUSANNAH, repeat blood cultures     IVDU (intravenous drug user)  This is the crux of the problem - needs drug cessation     Lower back pain  With bacteremia - will do MRI of the thoracic regime too  Continue oxacillin         Anticipated Disposition:     Thank you for your consult. I will follow-up with patient. Please contact us if you have any additional questions.    Sunday Hassan MD  Infectious Disease  [facility]    Subjective:     Principal Problem:Bacteremia    HPI:   36 y/o. female  with a PMHx of asthma, COPD,Bipolar  , IVDU and HLD ,active IVDU  ( heroine , cocaine  and amphetamine )   : CTA chest negative for pe . Multiple nodular and cavitary opacities concerning for septic emboli with larger opacities possibly necrotizing pneumonia. CT cervical  and thoracic did not show any acute finding , CT lumbar Possible  progression of degenerative changes most notable at L4-5 with moderate to severe spinal canal stenosis at this level.  Echo-  · to moderate tricuspid regurgitation.     There is a 1.3 x 1.5 cm elongated, mobile echodensity seen on the tricuspid valve consistent with vegetation. Recommend CT surgery consult.   Blood culture- staph auerus  Component      Latest Ref Rng & Units 5/8/2022 5/7/2022 5/6/2022   WBC      3.90 - 12.70 K/uL 30.90 (H) 27.52 (H) 29.13 (H)     Interval History:  37 year old woman with tricuspid endocarditis .  Blood cultures- MSSA.  She remains bacteremic.  No MR evidence of epidural abscess.     Large, rim enhancing facet synovial cysts arising posteriorly from the right L2-L3 and left L4-L5 facet joints may be degenerative or sequelae of facet septic arthritis.     Minor enhancement and endplate irregularity at L4-L5 may be degenerative versus less likely the sequelae of spondylodiscitis.  No significant associated endplate destruction or vertebral body height loss.     Multilevel degenerative changes of the lumbar spine are most pronounced at L4-L5 with broad-based disc bulge and prominent central disc protrusion contributing to moderate to severe spinal canal stenosis with moderate left-sided neural foraminal stenosis.     Asymmetric left L5-S1 disc bulge contributes to moderate to severe left-sided neural foraminal stenosis and left lateral recess stenosis.   CT chest -Lungs/Pleura: Multiple nodular and cavitary opacities concerning for septic emboli.  Additional larger opacities most notably in the right lung base with some internal clearing possibly related to necrotizing pneumonia.  Moderate loculated right pleural fluid and no definite left pleural fluid.  Empyema not excluded.     Review of Systems   Constitutional:  Positive for fatigue and fever. Negative for activity change, appetite change, chills, diaphoresis and unexpected weight change.   HENT: Negative.  Negative for congestion,  drooling, facial swelling, rhinorrhea, sinus pressure, sneezing and sore throat.    Eyes: Negative.  Negative for discharge, redness, itching and visual disturbance.   Respiratory:  Positive for cough, chest tightness and shortness of breath. Negative for apnea, choking, wheezing and stridor.    Cardiovascular:  Negative for chest pain, palpitations and leg swelling.   Gastrointestinal: Negative.  Negative for abdominal distention, abdominal pain, anal bleeding, blood in stool, constipation, diarrhea, nausea and vomiting.   Endocrine: Negative.    Genitourinary: Negative.  Negative for decreased urine volume, difficulty urinating, dysuria, frequency, hematuria, pelvic pain, urgency, vaginal bleeding and vaginal discharge.   Musculoskeletal:  Positive for arthralgias and back pain. Negative for gait problem, joint swelling, myalgias, neck pain and neck stiffness.   Skin: Negative.  Negative for color change, pallor, rash and wound.   Allergic/Immunologic: Negative.    Neurological:  Positive for weakness. Negative for dizziness, seizures, facial asymmetry, speech difficulty, light-headedness, numbness and headaches.   Psychiatric/Behavioral:  Positive for confusion. Negative for agitation, hallucinations and suicidal ideas.    All other systems reviewed and are negative.  Objective:     Vital Signs (Most Recent):  Temp: 98.4 °F (36.9 °C) (05/11/22 0738)  Pulse: 86 (05/11/22 0738)  Resp: 18 (05/11/22 1008)  BP: (!) 98/55 (05/11/22 0738)  SpO2: 96 % (05/11/22 0752)   Vital Signs (24h Range):  Temp:  [97.9 °F (36.6 °C)-99.2 °F (37.3 °C)] 98.4 °F (36.9 °C)  Pulse:  [] 86  Resp:  [16-20] 18  SpO2:  [95 %-100 %] 96 %  BP: ()/(53-57) 98/55     Weight: 98.3 kg (216 lb 11.4 oz)  Body mass index is 36.06 kg/m².    Estimated Creatinine Clearance: 175.3 mL/min (based on SCr of 0.5 mg/dL).    Physical Exam  Vitals and nursing note reviewed. Exam conducted with a chaperone present (family).   Constitutional:        General: She is not in acute distress.     Appearance: She is well-developed. She is ill-appearing. She is not toxic-appearing.   HENT:      Head: Normocephalic and atraumatic.   Eyes:      Conjunctiva/sclera: Conjunctivae normal.      Pupils: Pupils are equal, round, and reactive to light.   Cardiovascular:      Rate and Rhythm: Normal rate and regular rhythm.      Heart sounds: Normal heart sounds. No murmur heard.  Pulmonary:      Effort: Pulmonary effort is normal. No respiratory distress.      Breath sounds: Normal breath sounds.   Abdominal:      General: Bowel sounds are normal. There is no distension.      Palpations: Abdomen is soft.      Tenderness: There is no abdominal tenderness.   Musculoskeletal:         General: Swelling and tenderness present. No deformity. Normal range of motion.      Cervical back: Normal range of motion and neck supple.      Right lower leg: No edema.      Left lower leg: No edema.   Skin:     General: Skin is warm and dry.      Coloration: Skin is pale.      Findings: Erythema present.   Neurological:      Mental Status: She is alert and oriented to person, place, and time.      Motor: Weakness present.      Deep Tendon Reflexes: Reflexes are normal and symmetric.   Psychiatric:         Behavior: Behavior normal.       Significant Labs: Blood Culture:   Recent Labs   Lab 05/06/22  1616 05/06/22  1617 05/08/22  1515 05/08/22  1516   LABBLOO Gram stain aer bottle: Gram positive cocci in clusters resembling Staph   Gram stain imssael bottle: Gram positive cocci in clusters resembling Staph   Results called to and read back by: Lila Hernandez RN 05/07/2022  10:57  STAPHYLOCOCCUS AUREUS  ID consult required at [facility],[place] and [facility].  * Gram stain aer bottle: Gram positive cocci in clusters resembling Staph   Results called to and read back by: Lila Hernandez RN 05/07/2022  10:57  Gram stain missael bottle: Gram positive cocci in clusters resembling Staph    05/07/2022  13:40  STAPHYLOCOCCUS AUREUS  ID consult required at [facility].  For susceptibility see order #[id number]  * Gram stain aer bottle: Gram positive cocci in clusters resembling Staph  Results called to and read back by: Chasity Raymundo RN  05/09/2022  15:40  STAPHYLOCOCCUS AUREUS  ID consult required at [facility].  For susceptibility see order #[id number]  * Gram stain aer bottle: Gram positive cocci in clusters resembling Staph   Results called to and read back by: Chasity Raymundo RN  05/09/2022  11:31  STAPHYLOCOCCUS AUREUS  ID consult required at [facility].  For susceptibility see order #[id number]  *     BMP:   Recent Labs   Lab 05/11/22  0650   GLU 98   *   K 4.7      CO2 23   BUN 12   CREATININE 0.5   CALCIUM 7.0*     CMP:   Recent Labs   Lab 05/10/22  0716 05/11/22  0650   * 134*   K 4.3 4.7   CL 98 102   CO2 29 23   GLU 86 98   BUN 11 12   CREATININE 0.5 0.5   CALCIUM 7.3* 7.0*   PROT 5.1* 4.7*   ALBUMIN 1.4* 1.2*   BILITOT 0.8 0.7   ALKPHOS 85 77   AST 18 21   ALT 16 13   ANIONGAP 8 9   EGFRNONAA >60 >60       Significant Imaging: CT: I have reviewed all pertinent results/findings within the past 24 hours:

## 2022-05-12 PROBLEM — D64.9 NORMOCYTIC ANEMIA: Status: ACTIVE | Noted: 2022-05-12

## 2022-05-12 LAB
ABO + RH BLD: NORMAL
ALBUMIN SERPL BCP-MCNC: 1.2 G/DL (ref 3.5–5.2)
ALP SERPL-CCNC: 99 U/L (ref 55–135)
ALT SERPL W/O P-5'-P-CCNC: 12 U/L (ref 10–44)
ANION GAP SERPL CALC-SCNC: 7 MMOL/L (ref 8–16)
AST SERPL-CCNC: 15 U/L (ref 10–40)
BASOPHILS # BLD AUTO: 0.05 K/UL (ref 0–0.2)
BASOPHILS NFR BLD: 0.2 % (ref 0–1.9)
BILIRUB SERPL-MCNC: 0.7 MG/DL (ref 0.1–1)
BLD GP AB SCN CELLS X3 SERPL QL: NORMAL
BLD PROD TYP BPU: NORMAL
BLOOD UNIT EXPIRATION DATE: NORMAL
BLOOD UNIT TYPE CODE: 5100
BLOOD UNIT TYPE: NORMAL
BUN SERPL-MCNC: 10 MG/DL (ref 6–20)
CALCIUM SERPL-MCNC: 7.3 MG/DL (ref 8.7–10.5)
CHLORIDE SERPL-SCNC: 101 MMOL/L (ref 95–110)
CO2 SERPL-SCNC: 26 MMOL/L (ref 23–29)
CODING SYSTEM: NORMAL
CREAT SERPL-MCNC: 0.5 MG/DL (ref 0.5–1.4)
DIFFERENTIAL METHOD: ABNORMAL
DISPENSE STATUS: NORMAL
EOSINOPHIL # BLD AUTO: 0.1 K/UL (ref 0–0.5)
EOSINOPHIL NFR BLD: 0.3 % (ref 0–8)
ERYTHROCYTE [DISTWIDTH] IN BLOOD BY AUTOMATED COUNT: 15.3 % (ref 11.5–14.5)
EST. GFR  (AFRICAN AMERICAN): >60 ML/MIN/1.73 M^2
EST. GFR  (NON AFRICAN AMERICAN): >60 ML/MIN/1.73 M^2
FINAL PATHOLOGIC DIAGNOSIS: NORMAL
GLUCOSE SERPL-MCNC: 93 MG/DL (ref 70–110)
GRAM STN SPEC: NORMAL
GRAM STN SPEC: NORMAL
HCT VFR BLD AUTO: 18.8 % (ref 37–48.5)
HGB BLD-MCNC: 6.2 G/DL (ref 12–16)
IMM GRANULOCYTES # BLD AUTO: 0.93 K/UL (ref 0–0.04)
IMM GRANULOCYTES NFR BLD AUTO: 4.2 % (ref 0–0.5)
LYMPHOCYTES # BLD AUTO: 1.9 K/UL (ref 1–4.8)
LYMPHOCYTES NFR BLD: 8.7 % (ref 18–48)
Lab: NORMAL
MCH RBC QN AUTO: 29 PG (ref 27–31)
MCHC RBC AUTO-ENTMCNC: 33 G/DL (ref 32–36)
MCV RBC AUTO: 88 FL (ref 82–98)
MONOCYTES # BLD AUTO: 1 K/UL (ref 0.3–1)
MONOCYTES NFR BLD: 4.7 % (ref 4–15)
NEUTROPHILS # BLD AUTO: 18.3 K/UL (ref 1.8–7.7)
NEUTROPHILS NFR BLD: 81.9 % (ref 38–73)
NRBC BLD-RTO: 0 /100 WBC
NUM UNITS TRANS PACKED RBC: NORMAL
PLATELET # BLD AUTO: 395 K/UL (ref 150–450)
PLATELET BLD QL SMEAR: ABNORMAL
PMV BLD AUTO: 10 FL (ref 9.2–12.9)
POTASSIUM SERPL-SCNC: 4.7 MMOL/L (ref 3.5–5.1)
PROT SERPL-MCNC: 4.8 G/DL (ref 6–8.4)
RBC # BLD AUTO: 2.14 M/UL (ref 4–5.4)
SODIUM SERPL-SCNC: 134 MMOL/L (ref 136–145)
WBC # BLD AUTO: 22.31 K/UL (ref 3.9–12.7)

## 2022-05-12 PROCEDURE — 86920 COMPATIBILITY TEST SPIN: CPT | Performed by: NURSE PRACTITIONER

## 2022-05-12 PROCEDURE — 86850 RBC ANTIBODY SCREEN: CPT | Performed by: NURSE PRACTITIONER

## 2022-05-12 PROCEDURE — 99233 PR SUBSEQUENT HOSPITAL CARE,LEVL III: ICD-10-PCS | Mod: NSCH,,, | Performed by: INTERNAL MEDICINE

## 2022-05-12 PROCEDURE — 27000221 HC OXYGEN, UP TO 24 HOURS

## 2022-05-12 PROCEDURE — 99232 PR SUBSEQUENT HOSPITAL CARE,LEVL II: ICD-10-PCS | Mod: ,,, | Performed by: NEUROLOGICAL SURGERY

## 2022-05-12 PROCEDURE — 99900035 HC TECH TIME PER 15 MIN (STAT)

## 2022-05-12 PROCEDURE — 99233 SBSQ HOSP IP/OBS HIGH 50: CPT | Mod: NSCH,,, | Performed by: INTERNAL MEDICINE

## 2022-05-12 PROCEDURE — 63600175 PHARM REV CODE 636 W HCPCS: Performed by: NURSE PRACTITIONER

## 2022-05-12 PROCEDURE — 36430 TRANSFUSION BLD/BLD COMPNT: CPT

## 2022-05-12 PROCEDURE — 25000003 PHARM REV CODE 250: Performed by: INTERNAL MEDICINE

## 2022-05-12 PROCEDURE — 99499 UNLISTED E&M SERVICE: CPT | Mod: ,,, | Performed by: INTERNAL MEDICINE

## 2022-05-12 PROCEDURE — 63600175 PHARM REV CODE 636 W HCPCS: Performed by: INTERNAL MEDICINE

## 2022-05-12 PROCEDURE — 25000003 PHARM REV CODE 250: Performed by: NURSE PRACTITIONER

## 2022-05-12 PROCEDURE — 99232 SBSQ HOSP IP/OBS MODERATE 35: CPT | Mod: ,,, | Performed by: NEUROLOGICAL SURGERY

## 2022-05-12 PROCEDURE — 36415 COLL VENOUS BLD VENIPUNCTURE: CPT | Performed by: NURSE PRACTITIONER

## 2022-05-12 PROCEDURE — 21400001 HC TELEMETRY ROOM

## 2022-05-12 PROCEDURE — 85025 COMPLETE CBC W/AUTO DIFF WBC: CPT | Performed by: INTERNAL MEDICINE

## 2022-05-12 PROCEDURE — 99499 NO LOS: ICD-10-PCS | Mod: ,,, | Performed by: INTERNAL MEDICINE

## 2022-05-12 PROCEDURE — P9016 RBC LEUKOCYTES REDUCED: HCPCS | Performed by: NURSE PRACTITIONER

## 2022-05-12 PROCEDURE — 80053 COMPREHEN METABOLIC PANEL: CPT | Performed by: INTERNAL MEDICINE

## 2022-05-12 PROCEDURE — 94761 N-INVAS EAR/PLS OXIMETRY MLT: CPT

## 2022-05-12 RX ORDER — HYDROCODONE BITARTRATE AND ACETAMINOPHEN 500; 5 MG/1; MG/1
TABLET ORAL
Status: DISCONTINUED | OUTPATIENT
Start: 2022-05-12 | End: 2022-06-09

## 2022-05-12 RX ORDER — MORPHINE SULFATE 15 MG/1
15 TABLET, FILM COATED, EXTENDED RELEASE ORAL EVERY 12 HOURS
Status: DISCONTINUED | OUTPATIENT
Start: 2022-05-12 | End: 2022-06-02

## 2022-05-12 RX ADMIN — SODIUM CHLORIDE: 0.9 INJECTION, SOLUTION INTRAVENOUS at 01:05

## 2022-05-12 RX ADMIN — HYDROMORPHONE HYDROCHLORIDE 1 MG: 2 INJECTION INTRAMUSCULAR; INTRAVENOUS; SUBCUTANEOUS at 03:05

## 2022-05-12 RX ADMIN — METRONIDAZOLE 500 MG: 500 TABLET ORAL at 05:05

## 2022-05-12 RX ADMIN — OXYCODONE AND ACETAMINOPHEN 1 TABLET: 10; 325 TABLET ORAL at 05:05

## 2022-05-12 RX ADMIN — ENOXAPARIN SODIUM 40 MG: 40 INJECTION SUBCUTANEOUS at 05:05

## 2022-05-12 RX ADMIN — SODIUM CHLORIDE: 0.9 INJECTION, SOLUTION INTRAVENOUS at 03:05

## 2022-05-12 RX ADMIN — TRAZODONE HYDROCHLORIDE 100 MG: 100 TABLET ORAL at 09:05

## 2022-05-12 RX ADMIN — MORPHINE SULFATE 15 MG: 15 TABLET, EXTENDED RELEASE ORAL at 11:05

## 2022-05-12 RX ADMIN — OXYCODONE AND ACETAMINOPHEN 1 TABLET: 10; 325 TABLET ORAL at 01:05

## 2022-05-12 RX ADMIN — HYDROMORPHONE HYDROCHLORIDE 1 MG: 2 INJECTION INTRAMUSCULAR; INTRAVENOUS; SUBCUTANEOUS at 12:05

## 2022-05-12 RX ADMIN — HYDROMORPHONE HYDROCHLORIDE 1 MG: 2 INJECTION INTRAMUSCULAR; INTRAVENOUS; SUBCUTANEOUS at 07:05

## 2022-05-12 RX ADMIN — METRONIDAZOLE 500 MG: 500 TABLET ORAL at 09:05

## 2022-05-12 RX ADMIN — OXYCODONE AND ACETAMINOPHEN 1 TABLET: 10; 325 TABLET ORAL at 10:05

## 2022-05-12 RX ADMIN — SODIUM CHLORIDE: 0.9 INJECTION, SOLUTION INTRAVENOUS at 09:05

## 2022-05-12 RX ADMIN — MORPHINE SULFATE 15 MG: 15 TABLET, EXTENDED RELEASE ORAL at 09:05

## 2022-05-12 RX ADMIN — HYDROMORPHONE HYDROCHLORIDE 1 MG: 2 INJECTION INTRAMUSCULAR; INTRAVENOUS; SUBCUTANEOUS at 06:05

## 2022-05-12 RX ADMIN — OXACILLIN SODIUM 12 G: 10 INJECTION, POWDER, FOR SOLUTION INTRAVENOUS at 12:05

## 2022-05-12 NOTE — PROGRESS NOTES
[facility] S[facility]  Neurosurgery  Progress Note    Subjective:     History of Present Illness: History of Present Illness: 38 y/o. female   chronic history of lower back pain which has been intermittent presented to the ER with a PMHx of asthma, COPD,Bipolar  , IVDU and HLD presented to the ER with a c/o  sob for the last weak which has gotten worse . The SOB is associated with fever , chills , productive cough , back pain  and generalized weakness . She report cough some blood this am .  She is active IVDU  ( heroine , cocaine  and amphetamine ) . She denies any  sick contact , chest pain  , GI/ sx , She also complaning of LE sweeling . Knee pain and B/L LE rash .  We see the diagnosed with Gram-positive cocci in the blood  ER COURSE: CTA chest negative for pe . Multiple nodular and cavitary opacities concerning for septic emboli with larger opacities possibly necrotizing pneumonia. CT cervical  and thoracic did not show any acute finding , CT lumbar Possible progression of degenerative changes most notable at L4-5 with moderate to severe spinal canal stenosis at this level.  She currently complains of pain throughout the lumbar region extending into the flank area with some tenderness difficulties laying on her back and pain with twisting and bending she has some pain referred into the buttocks but denies any pain radiating into the legs feet or toes she denies any numbness or tingling in the feet or toes and no bowel or bladder incontinence at this time      Post-Op Info:  Procedure(s) (LRB):  ECHOCARDIOGRAM,TRANSESOPHAGEAL (N/A)   Day of Surgery     Interval History:  Patient undergoing bacteremia workup underwent transesophageal echocardiogram.  Continues to receive IV antibiotics.  Patient with history of drug abuse show requesting narcotics for pain control.  Still having low-grade fevers.  Patient and going transesophageal echo today    Medications:  Continuous Infusions:   sodium chloride 0.9% 125 mL/hr  at 05/12/22 1323     Scheduled Meds:   enoxaparin  40 mg Subcutaneous Daily    metroNIDAZOLE  500 mg Oral Q8H    morphine  15 mg Oral Q12H    mupirocin   Nasal BID    oxacillin 12 g in  mL CONTINUOUS INFUSION  12 g Intravenous Q24H    traZODone  100 mg Oral QHS     PRN Meds:sodium chloride, acetaminophen, albuterol-ipratropium, ALPRAZolam, HYDROmorphone, ondansetron, oxyCODONE-acetaminophen, sodium chloride 0.9%, sodium chloride 0.9%, sodium chloride 0.9%     Review of Systems  Objective:     Weight: 95.1 kg (209 lb 10.5 oz)  Body mass index is 34.89 kg/m².  Vital Signs (Most Recent):  Temp: 98.3 °F (36.8 °C) (05/12/22 1247)  Pulse: 91 (05/12/22 1247)  Resp: 20 (05/12/22 1247)  BP: (!) 105/52 (05/12/22 1247)  SpO2: (!) 94 % (05/12/22 1247)   Vital Signs (24h Range):  Temp:  [98.3 °F (36.8 °C)-100.8 °F (38.2 °C)] 98.3 °F (36.8 °C)  Pulse:  [] 91  Resp:  [14-20] 20  SpO2:  [93 %-98 %] 94 %  BP: ()/(52-61) 105/52                  Oxygen Concentration (%):  [28] 28         Physical Exam  Vitals and nursing note reviewed. Exam conducted with a chaperone present.   HENT:      Head: Normocephalic and atraumatic.      Nose: Nose normal.      Mouth/Throat:      Mouth: Mucous membranes are dry.   Eyes:      Extraocular Movements: Extraocular movements intact.      Conjunctiva/sclera: Conjunctivae normal.      Pupils: Pupils are equal, round, and reactive to light.   Cardiovascular:      Rate and Rhythm: Normal rate and regular rhythm.   Pulmonary:      Effort: Pulmonary effort is normal.      Breath sounds: Normal breath sounds.   Abdominal:      General: Abdomen is flat.      Palpations: Abdomen is soft.   Musculoskeletal:         General: Tenderness present.      Cervical back: Normal range of motion and neck supple.      Lumbar back: Tenderness present. Decreased range of motion. Negative right straight leg raise test and negative left straight leg raise test.        Back:       Comments:  Generalized paraspinous muscle tenderness with decreased range of motion with for flexion extension.  Patient is awake alert orient x3 speech clear and fluent cranial nerves are intact upper extremity strength is 5/5 lower extremity strength shows 5-out of 5 with some break away weakness secondary to pain with very slight dorsiflexion plantar extensor weakness   Skin:     General: Skin is warm and dry.   Neurological:      General: No focal deficit present.      Mental Status: She is alert and oriented to person, place, and time. Mental status is at baseline.      Sensory: Sensory deficit present.      Motor: Weakness present.   Psychiatric:         Mood and Affect: Mood normal.         Behavior: Behavior normal.       Physical Exam:  Nursing note and vitals reviewed.    Eyes: Pupils are equal, round, and reactive to light. Conjunctivae are normal.     Cardiovascular: Normal rate and regular rhythm.     Abdominal: Soft.     Psych/Behavior: She is alert. She is oriented to person, place, and time.     Musculoskeletal: Gait is abnormal.        Neck: Range of motion is full. Tone is normal.        Back: Range of motion is full. Tone is normal.        Right Upper Extremities: Tone is normal.        Left Upper Extremities: Tone is normal.       Right Lower Extremities: Range of motion is full.        Left Lower Extremities: Range of motion is full.      Comments: Generalized paraspinous muscle tenderness with decreased range of motion with for flexion extension.  Patient is awake alert orient x3 speech clear and fluent cranial nerves are intact upper extremity strength is 5/5 lower extremity strength shows 5-out of 5 with some break away weakness secondary to pain with very slight dorsiflexion plantar extensor weakness     Neurological:        Cranial nerves: Cranial nerve(s) II, III, IV, V, VI, VII, VIII, IX, X, XI and XII are intact.     Significant Labs:  Recent Labs   Lab 05/11/22  0650 05/12/22  0730   GLU 98 93    * 134*   K 4.7 4.7    101   CO2 23 26   BUN 12 10   CREATININE 0.5 0.5   CALCIUM 7.0* 7.3*       Recent Labs   Lab 05/11/22  0650 05/12/22  0730   WBC 31.07* 22.31*   HGB 7.1* 6.2*   HCT 21.6* 18.8*    395       No results for input(s): LABPT, INR, APTT in the last 48 hours.  Microbiology Results (last 7 days)       Procedure Component Value Units Date/Time    Blood culture [[id number]] Collected: 05/11/22 1213    Order Status: Completed Specimen: Blood Updated: 05/12/22 0915     Blood Culture, Routine No Growth to date    Blood culture [[id number]] Collected: 05/11/22 1213    Order Status: Completed Specimen: Blood Updated: 05/12/22 0915     Blood Culture, Routine No Growth to date    Gram stain [[id number]] Collected: 05/11/22 1440    Order Status: Completed Specimen: Abscess from Back Updated: 05/12/22 0306     Gram Stain Result Few WBC's      Few Gram positive cocci    Aerobic culture [[id number]] Collected: 05/11/22 1440    Order Status: Sent Specimen: Abscess from Back Updated: 05/12/22 0215    Culture, Anaerobe [[id number]] Collected: 05/11/22 1440    Order Status: Sent Specimen: Abscess from Back Updated: 05/12/22 0215    Gram stain [[id number]]     Order Status: Canceled Specimen: Joint Fluid from Back     Blood culture [[id number]]  (Abnormal) Collected: 05/08/22 1516    Order Status: Completed Specimen: Blood from Peripheral, Right Hand Updated: 05/11/22 1009     Blood Culture, Routine Gram stain aer bottle: Gram positive cocci in clusters resembling Staph       Results called to and read back by: Chasity Raymundo RN  05/09/2022  11:31      STAPHYLOCOCCUS AUREUS  ID consult required at [facility].[place] and [place].  For susceptibility see order #[id number]      Narrative:      Collection has been rescheduled by [email or web address] at 05/08/2022 13:22 Reason:   Pt in mri will be there after another hour [id number]  Collection has been rescheduled by [email or web address] at 05/08/2022 13:22 Reason:   Pt in mri will  be there after another hour [id number]    Blood culture [[id number]]  (Abnormal) Collected: 05/08/22 1515    Order Status: Completed Specimen: Blood from Peripheral, Left Arm Updated: 05/11/22 1009     Blood Culture, Routine Gram stain aer bottle: Gram positive cocci in clusters resembling Staph      Results called to and read back by: Chasity Raymundo RN  05/09/2022  15:40      STAPHYLOCOCCUS AUREUS  ID consult required at [facility].  For susceptibility see order #[id number]      Narrative:      Collection has been rescheduled by [email or web address] at 05/08/2022 13:22 Reason:   Pt in mri will be there after another hour [id number]  Collection has been rescheduled by [email or web address] at 05/08/2022 13:22 Reason:   Pt in mri will be there after another hour [id number]    Blood culture x two cultures. Draw prior to antibiotics. [[id number]]  (Abnormal) Collected: 05/06/22 1617    Order Status: Completed Specimen: Blood from Peripheral, Antecubital, Right Updated: 05/09/22 0824     Blood Culture, Routine Gram stain aer bottle: Gram positive cocci in clusters resembling Staph       Results called to and read back by: Lila Hernandez RN 05/07/2022  10:57      Gram stain missael bottle: Gram positive cocci in clusters resembling Staph       05/07/2022  13:40      STAPHYLOCOCCUS AUREUS  ID consult required at [facility].  For susceptibility see order #[id number]      Narrative:      Aerobic and anaerobic    Blood culture x two cultures. Draw prior to antibiotics. [[id number]]  (Abnormal)  (Susceptibility) Collected: 05/06/22 1616    Order Status: Completed Specimen: Blood from Peripheral, Antecubital, Left Updated: 05/09/22 0823     Blood Culture, Routine Gram stain aer bottle: Gram positive cocci in clusters resembling Staph       Gram stain missael bottle: Gram positive cocci in clusters resembling Staph       Results called to and read back by: Lila Hernandez RN 05/07/2022  10:57      STAPHYLOCOCCUS  AUREUS  ID consult required at [facility].[place] and [place] locations.      Narrative:      Aerobic and anaerobic    Gram stain [[id number]] Collected: 05/07/22 0901    Order Status: Sent Specimen: Body Fluid from Pleural Fluid Updated: 05/07/22 0905    Fungus culture [[id number]] Collected: 05/07/22 0901    Order Status: Sent Specimen: Body Fluid from Pleural Fluid Updated: 05/07/22 0905    AFB culture (includes stain) [[id number]] Collected: 05/07/22 0901    Order Status: Sent Specimen: Body Fluid from Pleural Fluid Updated: 05/07/22 0905          Recent Lab Results         05/12/22 0935 05/12/22 0730 05/11/22  1440        Unit Blood Type Code 5100  [P]           Unit Expiration [id number]  [P]           Unit Blood Type O POS  [P]           Albumin   1.2         Alkaline Phosphatase   99         ALT   12         Anion Gap   7         AST   15         Baso #   0.05         Basophil %   0.2         BILIRUBIN TOTAL   0.7  Comment: For infants and newborns, interpretation of results should be based  on gestational age, weight and in agreement with clinical  observations.    Premature Infant recommended reference ranges:  Up to 24 hours.............<8.0 mg/dL  Up to 48 hours............<12.0 mg/dL  3-5 days..................<15.0 mg/dL  6-29 days.................<15.0 mg/dL           BUN   10         Calcium   7.3         Chloride   101         CO2   26         CODING SYSTEM VNLS374  [P]           Creatinine   0.5         Differential Method   Automated         DISPENSE STATUS ISSUED  [P]           eGFR if    >60         eGFR if non    >60  Comment: Calculation used to obtain the estimated glomerular filtration  rate (eGFR) is the CKD-EPI equation.            Eos #   0.1         Eosinophil %   0.3         Glucose   93         Gram Stain Result     Few WBC's            Few Gram positive cocci       Gran # (ANC)   18.3         Gran %   81.9         Group & Rh O POS            Hematocrit   18.8  Comment: Results confirmed, test repeated  HCT  critical result(s) called and verbal readback obtained from   HENRY LARRY RN by CN1 05/12/2022 08:32           Hemoglobin   6.2         Immature Grans (Abs)   0.93  Comment: Mild elevation in immature granulocytes is non specific and   can be seen in a variety of conditions including stress response,   acute inflammation, trauma and pregnancy. Correlation with other   laboratory and clinical findings is essential.           Immature Granulocytes   4.2         INDIRECT SALLY NEG           Lymph #   1.9         Lymph %   8.7         MCH   29.0         MCHC   33.0         MCV   88         Mono #   1.0         Mono %   4.7         MPV   10.0         nRBC   0         Platelet Estimate   Appears normal         Platelets   395  Comment: Results confirmed, test repeated         Potassium   4.7         Product Code [id number]  [P]           PROTEIN TOTAL   4.8         RBC   2.14         RDW   15.3         Sodium   134         UNIT NUMBER [id number]  [P]           WBC   22.31                  [P] - Preliminary Result             All pertinent labs from the last 24 hours have been reviewed.    Significant Diagnostics:  I have reviewed all pertinent imaging results/findings within the past 24 hours.    Assessment/Plan:     Lumbar stenosis without neurogenic claudication  Patient remains stable with no new focal neurologic deficits still reporting significant pain.  Recommended getting patient switched over to longer-acting pain medications instead of the short-acting medications.  Patient with L4-5 broad-based disc protrusion contributing to lumbar stenosis some mechanical lower back pain and mild early neurogenic claudication.  Patient will most likely require surgical intervention in the future.  Patient will need to be cleared of her infection for minimum of 6-8 weeks for she be re-evaluated to discuss surgical options.  Patient with paraspinous muscle  abscesses and bacteremia will require 6-8 weeks of IV antibiotics.  Patient may follow up in neurosurgery clinic once she is cleared medically.  No neurosurgical intervention indicated at this time    More than 30minutes of the time spent in counseling and coordination of care including discussions etiology of diagnosis, pathogenesis of diagnosis, prognosis of diagnosis,, diagnostic results, impression and recommendations, diagnostic studies, management, risks and benefits of treatment, instructions of disease self-management, treatment instructions, follow up requirements, patient and family counseling/involvement in care compliance with treatment regimen. All of the patient's and/or family members questions were answered during this discussion.          Didier Hess MD  Neurosurgery  [facility]

## 2022-05-12 NOTE — ASSESSMENT & PLAN NOTE
Patient remains stable with no new focal neurologic deficits still reporting significant pain.  Recommended getting patient switched over to longer-acting pain medications instead of the short-acting medications.  Patient with L4-5 broad-based disc protrusion contributing to lumbar stenosis some mechanical lower back pain and mild early neurogenic claudication.  Patient will most likely require surgical intervention in the future.  Patient will need to be cleared of her infection for minimum of 6-8 weeks for she be re-evaluated to discuss surgical options.  Patient with paraspinous muscle abscesses and bacteremia will require 6-8 weeks of IV antibiotics.  Patient may follow up in neurosurgery clinic once she is cleared medically.  No neurosurgical intervention indicated at this time    More than 30minutes of the time spent in counseling and coordination of care including discussions etiology of diagnosis, pathogenesis of diagnosis, prognosis of diagnosis,, diagnostic results, impression and recommendations, diagnostic studies, management, risks and benefits of treatment, instructions of disease self-management, treatment instructions, follow up requirements, patient and family counseling/involvement in care compliance with treatment regimen. All of the patient's and/or family members questions were answered during this discussion.

## 2022-05-12 NOTE — PROGRESS NOTES
[facility] Medicine  Progress Note    Patient Name: Tianna Leon  MRN: [id number]  Patient Class: IP- Inpatient   Admission Date: 5/6/2022  Length of Stay: 6 days  Attending Physician: Elizabeth Kim MD  Primary Care Provider: Spenser Campa MD        Subjective:     Principal Problem:Bacteremia        HPI:  38 y/o. female  with a PMHx of asthma, COPD,Bipolar  , IVDU and HLD presented to the ER with a c/o  sob for the last weak which has gotten worse . The SOB is associated with fever , chills , productive cough , back pain  and generalized weakness . She report cough some blood this am .  She is active IVDU  ( heroine , cocaine  and amphetamine ) . She denies any  sick contact , chest pain  , GI/ sx , She also complaning of LE sweeling . Knee pain and B/L LE rash .   ER COURSE: CTA chest negative for pe . Multiple nodular and cavitary opacities concerning for septic emboli with larger opacities possibly necrotizing pneumonia. CT cervical  and thoracic did not show any acute finding , CT lumbar Possible progression of degenerative changes most notable at L4-5 with moderate to severe spinal canal stenosis at this level.  WBC  29 k , na 130 , k 3.4 , cl 89 , procal 2.71   ER VS:  BP Pulse Resp Temp SpO2   (!) 124/58 110 (!) 30 (!) 101.6 °F (38.7 °C) 96 %           Pt will be admitted to inpt with a dx of PNA and severe sepsis       Overview/Hospital Course:  5/7 admitted for pneumonia, severe sepsis in setting of ivdu. Mother at bedside and provides collateral. Patient has struggled with substance abuse for approximately 20 years, worsening over last 3-5 years since life event. Onset of symptoms 1-2 weeks ago. Tolerated thoracentesis with no pneumothorax on chest x-ray. Mri lumbar and echo, pending. Bcx positive, growing gram positive cocci. On vanc, cefepime and flagyl. Infectious disease consulted for bacteremia. 5/8/22 The patient remained afebrile overnight. Currently on  cefepime/vanc/flagyl. Blood cx are growing staph aureus. The patient refused the MRI lumbar spine overnight d/t being unable to lay flat from back pain. Will give pain meds and attempt to get the MRI today to r/o spinal abscess. The ECHO showed a 1.3 x 1.5 cm elongated, mobile echodensity seen on the tricuspid valve consistent with vegetation, CT surgery was consulted. Will need a SUSANNAH. Infectious disease is consulted. Will repeat blood cx today. 5/9/22 No acute events overnight. The patient reports lower back pain is not well controlled with current pain regimen, will adjust. Repeat blood cx are still positive. Sensitivities are back and Staph is sensitive to oxacillin, will D/C vanc/cefepime. Cardiology consulted and plans for a SUSANNAH today. CT surgery following and recommends continuing medical management with appropriate ABX, no surgical intervention at this time. MRI lumbar spine showed moderate to severe spinal canal stenosis with moderate left-sided neural foraminal stenosis, Neurosurgery consulted. 5/10/22 No acute events overnight. The patient reports some improvement in pain with adjustment in pain meds. Repeat blood cx are +. Continue IV oxacillin and flagyl. Infectious disease is consulted. IR consulted to evaluate possible paraspinous muscle myositis versus abscess. Recommend IR drainage. Continue current management. 5/11/22 No acute events overnight. The patient reports pain is still not well controlled at times. The patient reports that she is very anxious about the upcoming SUSANNAH and IR drainage. SUSANNAH was pushed back to this afternoon because the patient ate candy this AM. Continue treatment with IV oxacillin and flagyl. Infectious disease is following. WBC trended down to 31K. Continue current management. 5/12/22 No acute events overnight. The patients WBC improved to 22K overnight. The patient remains on continuous oxacillin infusion. Infectious disease is following. MRI thoracic spine is ordered and  pending. The patients SUSANNAH was postponed again today d/t low H/H. Hgb was noted to be 6.2 this morning, will transfuse 1 unit PRBC. The patient reports pain is still not well controlled. The case was discussed with Neurosurgery who recommended adding extended release pain meds. Approximately 1 ml of purulent drainage aspirated from back yesterday per IR, growing gram + cocci.       Interval History: No acute events overnight. The patients WBC improved to 22K overnight. The patient remains on continuous oxacillin infusion. Infectious disease is following. MRI thoracic spine is ordered and pending. The patients SUSANNAH was postponed again today d/t low H/H. Hgb was noted to be 6.2 this morning, will transfuse 1 unit PRBC. The patient reports pain is still not well controlled. The case was discussed with Neurosurgery who recommended adding extended release pain meds. Approximately 1 ml of purulent drainage aspirated from back yesterday per IR, growing gram + cocci.     Review of Systems   Constitutional:  Positive for fatigue and fever. Negative for activity change, appetite change, chills, diaphoresis and unexpected weight change.   HENT: Negative.  Negative for congestion, drooling, facial swelling, rhinorrhea, sinus pressure, sneezing and sore throat.    Eyes: Negative.  Negative for discharge, redness, itching and visual disturbance.   Respiratory:  Positive for cough, chest tightness and shortness of breath. Negative for apnea, choking, wheezing and stridor.    Cardiovascular:  Negative for chest pain, palpitations and leg swelling.   Gastrointestinal: Negative.  Negative for abdominal distention, abdominal pain, anal bleeding, blood in stool, constipation, diarrhea, nausea and vomiting.   Endocrine: Negative.    Genitourinary: Negative.  Negative for decreased urine volume, difficulty urinating, dysuria, frequency, hematuria, pelvic pain, urgency, vaginal bleeding and vaginal discharge.   Musculoskeletal:  Positive  for arthralgias and back pain. Negative for gait problem, joint swelling, myalgias, neck pain and neck stiffness.   Skin: Negative.  Negative for color change, pallor, rash and wound.   Allergic/Immunologic: Negative.    Neurological:  Positive for weakness. Negative for dizziness, seizures, facial asymmetry, speech difficulty, light-headedness, numbness and headaches.   Psychiatric/Behavioral:  Positive for confusion. Negative for agitation, hallucinations and suicidal ideas.    All other systems reviewed and are negative.  Objective:     Vital Signs (Most Recent):  Temp: 98.7 °F (37.1 °C) (05/12/22 1207)  Pulse: 98 (05/12/22 1207)  Resp: 14 (05/12/22 1210)  BP: (!) 106/54 (05/12/22 1207)  SpO2: 96 % (05/12/22 1207)   Vital Signs (24h Range):  Temp:  [98.7 °F (37.1 °C)-100.8 °F (38.2 °C)] 98.7 °F (37.1 °C)  Pulse:  [] 98  Resp:  [14-20] 14  SpO2:  [94 %-98 %] 96 %  BP: ()/(52-61) 106/54     Weight: 95.1 kg (209 lb 10.5 oz)  Body mass index is 34.89 kg/m².    Intake/Output Summary (Last 24 hours) at 5/12/2022 1218  Last data filed at 5/12/2022 0500  Gross per 24 hour   Intake --   Output 2000 ml   Net -2000 ml      Physical Exam  Vitals and nursing note reviewed. Exam conducted with a chaperone present (family).   Constitutional:       General: She is not in acute distress.     Appearance: She is well-developed. She is ill-appearing. She is not toxic-appearing.   HENT:      Head: Normocephalic and atraumatic.   Eyes:      Conjunctiva/sclera: Conjunctivae normal.      Pupils: Pupils are equal, round, and reactive to light.   Cardiovascular:      Rate and Rhythm: Normal rate and regular rhythm.      Heart sounds: Normal heart sounds. No murmur heard.  Pulmonary:      Effort: Pulmonary effort is normal. No respiratory distress.      Breath sounds: Normal breath sounds.   Abdominal:      General: Bowel sounds are normal. There is no distension.      Palpations: Abdomen is soft.      Tenderness: There is no  abdominal tenderness.   Musculoskeletal:         General: Swelling and tenderness present. No deformity. Normal range of motion.      Cervical back: Normal range of motion and neck supple.      Right lower leg: No edema.      Left lower leg: No edema.   Skin:     General: Skin is warm and dry.      Coloration: Skin is pale.      Findings: Erythema present.   Neurological:      Mental Status: She is alert and oriented to person, place, and time.      Motor: Weakness present.      Deep Tendon Reflexes: Reflexes are normal and symmetric.   Psychiatric:         Behavior: Behavior normal.       Significant Labs: All pertinent labs within the past 24 hours have been reviewed.    Significant Imaging:   Imaging Results              CTA Chest Non-Coronary (PE Study) (Final result)  Result time 05/06/22 18:26:20      Final result by Demario Morin MD (05/06/22 18:26:20)                   Impression:      No definite pulmonary thromboembolism.    Multiple nodular and cavitary opacities concerning for septic emboli with larger opacities possibly necrotizing pneumonia.    Moderate loculated right pleural fluid.  Empyema not excluded.    Small pericardial effusion of unclear etiology.    Splenic hypodensity possibly related to infarct or contrast timing.    Additional details as above.    All CT scans at this facility are performed  using dose modulation techniques as appropriate to performed exam including the following:  automated exposure control; adjustment of mA and/or kV according to the patients size (this includes techniques or standardized protocols for targeted exams where dose is matched to indication/reason for exam: i.e. extremities or head);  iterative reconstruction technique.      Electronically signed by: Demario Morin  Date:    05/06/2022  Time:    18:26               Narrative:    EXAMINATION:  CTA CHEST NON CORONARY    CLINICAL HISTORY:  Pulmonary embolism (PE) suspected, neg D-dimer;    TECHNIQUE:  Low  dose axial images, sagittal and coronal reformations were obtained from the thoracic inlet to the lung bases following the IV administration of 100 mL of Omnipaque 350.  Contrast timing was optimized to evaluate the pulmonary arteries.  MIP images were performed.    COMPARISON:  Multiple priors.    FINDINGS:  Base of Neck: No significant abnormality.    Thoracic soft tissues: Unremarkable.    Aorta: Left-sided aortic arch.  No aneurysm and no significant atherosclerosis    Heart: Normal size.  Small effusion.    Pulmonary vasculature: Pulmonary arteries are well opacified.  There is no pulmonary thromboembolism.    Esha/Mediastinum: No pathologic ariana enlargement.    Airways: Patent.    Lungs/Pleura: Multiple nodular and cavitary opacities concerning for septic emboli.  Additional larger opacities most notably in the right lung base with some internal clearing possibly related to necrotizing pneumonia.  Moderate loculated right pleural fluid and no definite left pleural fluid.  Empyema not excluded.    Esophagus: Unremarkable.    Upper Abdomen: Geographic wedge-shaped splenic hypodensity possibly related to contrast timing or infarct.    Bones: No acute fracture. No suspicious lytic or sclerotic lesions.                                        CT Lumbar Spine Without Contrast (Final result)  Result time 05/06/22 18:29:13      Final result by Demario Morin MD (05/06/22 18:29:13)                   Impression:      No fracture or traumatic malalignment of the lumbar spine.  No definite findings to suggest osteomyelitis.  Further evaluation as clinically warranted.    Possible progression of degenerative changes most notable at L4-5 with moderate to severe spinal canal stenosis at this level.  Additional details as above.    This report was flagged in Epic as abnormal.    All CT scans at this facility are performed  using dose modulation techniques as appropriate to performed exam including the following:  automated  exposure control; adjustment of mA and/or kV according to the patients size (this includes techniques or standardized protocols for targeted exams where dose is matched to indication/reason for exam: i.e. extremities or head);  iterative reconstruction technique.      Electronically signed by: Demario Morin  Date:    05/06/2022  Time:    18:29               Narrative:    EXAMINATION:  CT LUMBAR SPINE WITHOUT CONTRAST    CLINICAL HISTORY:  Low back pain, infection suspected;    TECHNIQUE:  Low-dose CT images obtained throughout the region of the lumbar spine.  Axial, sagittal and coronal reformations were performed.  Contrast was not administered.    COMPARISON:  12/05/2019    FINDINGS:  The vertebral bodies are normal in height and morphology without evidence of fracture or osseous destructive process.    Normal sagittal alignment is preserved.  No spondylolisthesis.    Circumferential disc bulges L3 through S1 with mild spinal canal stenosis L3-4, moderate to severe spinal canal stenosis L4-5 and probable moderate spinal canal stenosis L5-S1.    Facet arthropathy with moderate left neural foraminal narrowing at L4-5 and severe left neural foraminal narrowing at L5-S1.  Other levels are better preserved.    Limited evaluation of the intraspinal contents demonstrates no hematoma or mass.    Paraspinal soft tissues exhibit no acute abnormalities.                                       CT Thoracic Spine Without Contrast (Final result)  Result time 05/06/22 18:32:20      Final result by Demario Morin MD (05/06/22 18:32:20)                   Impression:      No definite acute abnormality.  No traumatic malalignment, fracture, or osseous destructive process.  Further evaluation as clinically warranted.    Pulmonary opacities as on the dedicated CT exam.    All CT scans at this facility are performed  using dose modulation techniques as appropriate to performed exam including the following:  automated exposure control;  adjustment of mA and/or kV according to the patients size (this includes techniques or standardized protocols for targeted exams where dose is matched to indication/reason for exam: i.e. extremities or head);  iterative reconstruction technique.      Electronically signed by: Demario Morin  Date:    05/06/2022  Time:    18:32               Narrative:    EXAMINATION:  CT THORACIC SPINE WITHOUT CONTRAST    CLINICAL HISTORY:  Epidural abscess suspected;    TECHNIQUE:  Low-dose axial noncontrast CT images of the thoracic spine.  Multiplanar reformats generated    COMPARISON:  None    FINDINGS:  Thoracic spine alignment is well preserved.  No findings to suggest vertebral body fracture.  No osseous lytic or blastic process.    No significant degenerative changes.  No disc abnormalities or facet arthrosis.    Pulmonary opacities as on the dedicated CT exam.                                       CT Cervical Spine Without Contrast (Final result)  Result time 05/06/22 18:34:24      Final result by Demario Morin MD (05/06/22 18:34:24)                   Impression:      No fracture or traumatic malalignment of the cervical spine.  No definite CT evidence of osteomyelitis.  Further evaluation as clinically warranted.    Additional degenerative findings as above.    All CT scans at this facility are performed  using dose modulation techniques as appropriate to performed exam including the following:  automated exposure control; adjustment of mA and/or kV according to the patients size (this includes techniques or standardized protocols for targeted exams where dose is matched to indication/reason for exam: i.e. extremities or head);  iterative reconstruction technique.      Electronically signed by: Demario Morin  Date:    05/06/2022  Time:    18:34               Narrative:    EXAMINATION:  CT CERVICAL SPINE WITHOUT CONTRAST    CLINICAL HISTORY:  Neck pain, acute, infection suspected;    TECHNIQUE:  Low dose axial CT images  through the cervical spine, with sagittal and coronal reformations.  Contrast was not administered.    COMPARISON:  12/05/2019    FINDINGS:  The vertebral bodies are normal in height and morphology without evidence of fracture or osseous destructive process.  Normal sagittal alignment is preserved.    Progression of degenerative change at C4-5 in comparison the prior exam with posterior disc osteophyte complex at this level producing mild spinal canal stenosis.  Smaller C5-6 posterior disc osteophyte complex without definite spinal canal stenosis.    No definite neural foraminal narrowing.    Limited evaluation of the intraspinal contents demonstrates no hematoma or mass.Paraspinal soft tissues exhibit no acute abnormalities.                                       X-Ray Knee Complete 4 Or More Views Right (Final result)  Result time 05/06/22 16:01:49      Final result by Nate Brasher MD (05/06/22 16:01:49)                   Impression:      No evidence of fracture or dislocation. Moderate suprapatellar knee joint effusion. Mild soft tissue edema lateral to the knee.      Electronically signed by: Nate Brasher  Date:    05/06/2022  Time:    16:01               Narrative:    EXAMINATION:  XR KNEE COMP 4 OR MORE VIEWS RIGHT    CLINICAL HISTORY:  Pain in unspecified knee    TECHNIQUE:  AP, lateral, and Merchant views of the right knee were performed.    COMPARISON:  None    FINDINGS:  No evidence of fracture or dislocation.  Moderate suprapatellar knee joint effusion.  Mild soft tissue edema lateral to the knee.  No foreign body.  Soft tissues otherwise unremarkable.                                       X-Ray Finger 2 or More Views Right (Final result)  Result time 05/06/22 16:02:28      Final result by Nate Brasher MD (05/06/22 16:02:28)                   Impression:      Negative exam.      Electronically signed by: Nate Brasher  Date:    05/06/2022  Time:    16:02               Narrative:    EXAMINATION:  XR  FINGER 2 OR MORE VIEWS RIGHT    CLINICAL HISTORY:  finger swelling;    TECHNIQUE:  Three views of the right 4th digit.    COMPARISON:  None    FINDINGS:  No evidence of fracture or dislocation.  Joint spaces appear well maintained.  Soft tissues unremarkable.  No evidence of foreign body.                                        X-Ray Chest AP Portable (Final result)  Result time 05/06/22 16:04:37      Final result by Nate Brasher MD (05/06/22 16:04:37)                   Impression:      Patchy bilateral opacities, some of which appear nodular and possibly cavitary. Recommend further evaluation with chest CT with IV contrast.    This report was flagged in Epic as abnormal.      Electronically signed by: Nate Brasher  Date:    05/06/2022  Time:    16:04               Narrative:    EXAMINATION:  XR CHEST AP PORTABLE    CLINICAL HISTORY:  Sepsis;.    TECHNIQUE:  Single frontal portable view of the chest was performed.    COMPARISON:  12/05/2019    FINDINGS:  Support devices: None    Patchy bilateral opacities, some of which appear nodular and possibly cavitary.  Recommend further evaluation with chest CT with IV contrast.    Heart normal size.  No pneumothorax or pleural effusion    Bones are intact.                                          Assessment/Plan:      * Bacteremia  Gram positive   Infectious disease consulted  Continue broad spectrum intravenous antibiotic(s) given h/o ivdu  Remains febrile and leukocytosis persists  5/8/22 The patient remained afebrile overnight. Currently on cefepime/vanc/flagyl. Blood cx are growing staph aureus. The patient refused the MRI lumbar spine overnight d/t being unable to lay flat from back pain. Will give pain meds and attempt to get the MRI today to r/o spinal abscess. The ECHO showed a 1.3 x 1.5 cm elongated, mobile echodensity seen on the tricuspid valve consistent with vegetation, CT surgery was consulted. Will need a SUSANNAH. Infectious disease is consulted. Will repeat blood  cx today.   5/9/22 Repeat blood cx are still positive. Sensitivities are back and Staph is sensitive to oxacillin, will D/C vanc/cefepime. Infectious disease is following   5/10/22 Repeat blood cx are +. Continue IV oxacillin and flagyl. Infectious disease is consulted. IR consulted to evaluate possible paraspinous muscle myositis versus abscess. Recommend IR drainage. Continue current management.    5/11/22 The patient reports pain is still not well controlled at times. The patient reports that she is very anxious about the upcoming SUSANNAH and IR drainage. SUSANNAH was pushed back to this afternoon because the patient ate candy this AM. Continue treatment with IV oxacillin and flagyl. Infectious disease is following. WBC trended down to 31K. Continue current management.  5/12/22 The patients WBC improved to 22K overnight. The patient remains on continuous oxacillin infusion. Infectious disease is following.     Normocytic anemia  5/12/22 Hgb was noted to be 6.2 this morning, will transfuse 1 unit PRBC. 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 IR consulted to evaluate possible paraspinous muscle myositis versus abscess. Recommend IR drainage. Continue current management with ABX.   5/12/22  Approximately 1 ml of purulent drainage aspirated from back yesterday per IR, growing gram + cocci.        Lumbar stenosis without neurogenic claudication        Subacute bacterial endocarditis        Vegetation of heart valve  5/8/22  The ECHO showed a 1.3 x 1.5 cm elongated, mobile echodensity seen on the tricuspid valve consistent with vegetation, CT surgery was consulted. Will need a SUSANNAH. Infectious disease is consulted. Will repeat blood cx today. Continue IV ABX.   5/9/22 Sensitivities are back and Staph is sensitive to oxacillin, will D/C vanc/cefepime. Cardiology consulted and plans for a SUSANNAH today. CT surgery following and recommends continuing medical management with  appropriate ABX, no surgical intervention at this time.  5/10/22 Continue IV oxacillin and flagyl. Infectious disease is consulted.  5/11/22 The patient reports that she is very anxious about the upcoming SUSANNAH and IR drainage. SUSANNAH was pushed back to this afternoon because the patient ate candy this AM. Continue treatment with IV oxacillin and flagyl. Infectious disease is following. WBC trended down to 31K. Continue current management.   5/12/22 The patients WBC improved to 22K overnight. The patient remains on continuous oxacillin infusion. Infectious disease is following. MRI thoracic spine is ordered and pending. The patients SUSANNAH was postponed again today d/t low H/H.     Chronic pulmonary heart disease  - Pulmonology following       Parapneumonic effusion  Pulmonology consulted  Status post thoracentesis  Fluid studies pending  5/11/22 IR to collect sample and send for culture today    IVDU (intravenous drug user)   Will ordet TTE to r/o IE  Will order MRI lumbar wwo   Cont broad spectrum IVAB     5/7   Studies pending for additional sites of infection given h/o ivdu  5/8/22  on cessation     Severe sepsis  This patient does have evidence of infective focus  My overall impression is sepsis. Vital signs were reviewed and noted in progress note.  Antibiotics given-   Antibiotics (From admission, onward)            Start     Stop Route Frequency Ordered    05/09/22 1800  metroNIDAZOLE tablet 500 mg         -- Oral Every 8 hours 05/09/22 1201    05/09/22 1100  oxacillin 12 g in  mL CONTINUOUS INFUSION         -- IV Every 24 hours (non-standard times) 05/09/22 0932    05/07/22 2100  mupirocin 2 % ointment         05/12 2059 Nasl 2 times daily 05/07/22 1646        Cultures were taken-   Microbiology Results (last 7 days)     Procedure Component Value Units Date/Time    Blood culture [[id number]]     Order Status: Sent Specimen: Blood     Blood culture [[id number]]     Order Status: Sent Specimen: Blood      Blood culture [[id number]]  (Abnormal) Collected: 05/08/22 1516    Order Status: Completed Specimen: Blood from Peripheral, Right Hand Updated: 05/11/22 1009     Blood Culture, Routine Gram stain aer bottle: Gram positive cocci in clusters resembling Staph       Results called to and read back by: Chasity Raymundo RN  05/09/2022  11:31      STAPHYLOCOCCUS AUREUS  ID consult required at [facility].  For susceptibility see order #[id number]      Narrative:      Collection has been rescheduled by [email or web address] at 05/08/2022 13:22 Reason:   Pt in mri will be there after another hour [id number]  Collection has been rescheduled by [email or web address] at 05/08/2022 13:22 Reason:   Pt in mri will be there after another hour [id number]    Blood culture [[id number]]  (Abnormal) Collected: 05/08/22 1515    Order Status: Completed Specimen: Blood from Peripheral, Left Arm Updated: 05/11/22 1009     Blood Culture, Routine Gram stain aer bottle: Gram positive cocci in clusters resembling Staph      Results called to and read back by: Chasity Raymundo RN  05/09/2022  15:40      STAPHYLOCOCCUS AUREUS  ID consult required at [facility].  For susceptibility see order #[id number]      Narrative:      Collection has been rescheduled by [email or web address] at 05/08/2022 13:22 Reason:   Pt in mri will be there after another hour [id number]  Collection has been rescheduled by [email or web address] at 05/08/2022 13:22 Reason:   Pt in mri will be there after another hour [id number]    Blood culture x two cultures. Draw prior to antibiotics. [[id number]]  (Abnormal) Collected: 05/06/22 1617    Order Status: Completed Specimen: Blood from Peripheral, Antecubital, Right Updated: 05/09/22 0824     Blood Culture, Routine Gram stain aer bottle: Gram positive cocci in clusters resembling Staph       Results called to and read back by: Lila Hernandez RN 05/07/2022  10:57      Gram stain missael bottle: Gram positive cocci in clusters resembling Staph        05/07/2022  13:40      STAPHYLOCOCCUS AUREUS  ID consult required at [facility].  For susceptibility see order #[id number]      Narrative:      Aerobic and anaerobic    Blood culture x two cultures. Draw prior to antibiotics. [[id number]]  (Abnormal)  (Susceptibility) Collected: 05/06/22 1616    Order Status: Completed Specimen: Blood from Peripheral, Antecubital, Left Updated: 05/09/22 0823     Blood Culture, Routine Gram stain aer bottle: Gram positive cocci in clusters resembling Staph       Gram stain missael bottle: Gram positive cocci in clusters resembling Staph       Results called to and read back by: Lila Hernandez RN 05/07/2022  10:57      STAPHYLOCOCCUS AUREUS  ID consult required at [facility].      Narrative:      Aerobic and anaerobic    Gram stain [[id number]] Collected: 05/07/22 0901    Order Status: Sent Specimen: Body Fluid from Pleural Fluid Updated: 05/07/22 0905    Fungus culture [[id number]] Collected: 05/07/22 0901    Order Status: Sent Specimen: Body Fluid from Pleural Fluid Updated: 05/07/22 0905    AFB culture (includes stain) [[id number]] Collected: 05/07/22 0901    Order Status: Sent Specimen: Body Fluid from Pleural Fluid Updated: 05/07/22 0905        Latest lactate reviewed, they are-  No results for input(s): LACTATE in the last 72 hours.    Organ dysfunction indicated by Acute kidney injury  Source-  lung    Source control Achieved by-   Cont Broad spectrum IVAB     Bacteremia  Infectious disease consulted      PNA (pneumonia)  H/O IVDU   Cont broad spectrum IVAB   F/U blood and sputum cx   Will consult pulmonology for thoracentesis  To r/u empyema     5/7  Status post thoracentesis  Chest x-ray without signs of pneumothorax  Fluid studies pending        Tobacco abuse  - Patient thoroughly counseled on smoking cessation, pt verbalized understanding. Time of counseling 10 minutes.        COPD (chronic obstructive pulmonary  disease)  3Cont breathing tx prn  Pulmonology following     Lower back pain  Chronic in nature\  H/O IVDU  CT lumbar show spinal stenosis   No neurological deficit at this time   Will order a MRI lumbar wwo  To r/o spinal abscess     5/7  Mri pending  5/8/22 The patient refused the MRI lumbar spine overnight d/t being unable to lay flat from back pain. Will give pain meds and attempt to get the MRI today to r/o spinal abscess.  5/9/22 The patient reports lower back pain is not well controlled with current pain regimen, will adjust. MRI lumbar spine showed moderate to severe spinal canal stenosis with moderate left-sided neural foraminal stenosis, Neurosurgery consulted.   5/10/22 No acute events overnight. The patient reports some improvement in pain with adjustment in pain meds. Continue current management.    5/11/22 The patient reports pain is still not well controlled at times. Continue management with PRN analgesia  5/12/22 MRI thoracic spine is ordered and pending. The patient reports pain is still not well controlled. The case was discussed with Neurosurgery who recommended adding extended release pain meds.       VTE Risk Mitigation (From admission, onward)         Ordered     enoxaparin injection 40 mg  Daily         05/06/22 2336     IP VTE HIGH RISK PATIENT  Once         05/06/22 2336     Place sequential compression device  Until discontinued         05/06/22 2336                Discharge Planning   YESSICA:      Code Status: Full Code   Is the patient medically ready for discharge?:     Reason for patient still in hospital (select all that apply): Patient trending condition, Laboratory test, Treatment, Imaging, Consult recommendations and Pending disposition  Discharge Plan A: Home                  Kike Buckley NP  Department of Hospital Medicine   [facility] - Med Surg

## 2022-05-12 NOTE — PLAN OF CARE
Problem: Adult Inpatient Plan of Care  Goal: Plan of Care Review  Outcome: Ongoing, Progressing  Goal: Patient-Specific Goal (Individualized)  Outcome: Ongoing, Progressing  Goal: Absence of Hospital-Acquired Illness or Injury  Outcome: Ongoing, Progressing  Goal: Optimal Comfort and Wellbeing  Outcome: Ongoing, Progressing  Goal: Readiness for Transition of Care  Outcome: Ongoing, Progressing     Problem: Impaired Wound Healing  Goal: Optimal Wound Healing  Outcome: Ongoing, Progressing     Problem: Skin Injury Risk Increased  Goal: Skin Health and Integrity  Outcome: Ongoing, Progressing     Problem: Adjustment to Illness (Sepsis/Septic Shock)  Goal: Optimal Coping  Outcome: Ongoing, Progressing     Problem: Bleeding (Sepsis/Septic Shock)  Goal: Absence of Bleeding  Outcome: Ongoing, Progressing     Problem: Glycemic Control Impaired (Sepsis/Septic Shock)  Goal: Blood Glucose Level Within Desired Range  Outcome: Ongoing, Progressing     Problem: Infection Progression (Sepsis/Septic Shock)  Goal: Absence of Infection Signs and Symptoms  Outcome: Ongoing, Progressing     Problem: Nutrition Impaired (Sepsis/Septic Shock)  Goal: Optimal Nutrition Intake  Outcome: Ongoing, Progressing     Problem: Fluid Imbalance (Pneumonia)  Goal: Fluid Balance  Outcome: Ongoing, Progressing     Problem: Infection (Pneumonia)  Goal: Resolution of Infection Signs and Symptoms  Outcome: Ongoing, Progressing     Problem: Respiratory Compromise (Pneumonia)  Goal: Effective Oxygenation and Ventilation  Outcome: Ongoing, Progressing     Problem: Fall Injury Risk  Goal: Absence of Fall and Fall-Related Injury  Outcome: Ongoing, Progressing

## 2022-05-12 NOTE — ASSESSMENT & PLAN NOTE
5/8/22  The ECHO showed a 1.3 x 1.5 cm elongated, mobile echodensity seen on the tricuspid valve consistent with vegetation, CT surgery was consulted. Will need a SUSANNAH. Infectious disease is consulted. Will repeat blood cx today. Continue IV ABX.   5/9/22 Sensitivities are back and Staph is sensitive to oxacillin, will D/C vanc/cefepime. Cardiology consulted and plans for a SUSANNAH today. CT surgery following and recommends continuing medical management with appropriate ABX, no surgical intervention at this time.  5/10/22 Continue IV oxacillin and flagyl. Infectious disease is consulted.  5/11/22 The patient reports that she is very anxious about the upcoming SUSANNAH and IR drainage. SUSANNAH was pushed back to this afternoon because the patient ate candy this AM. Continue treatment with IV oxacillin and flagyl. Infectious disease is following. WBC trended down to 31K. Continue current management.   5/12/22 The patients WBC improved to 22K overnight. The patient remains on continuous oxacillin infusion. Infectious disease is following. MRI thoracic spine is ordered and pending. The patients SUSANNAH was postponed again today d/t low H/H.

## 2022-05-12 NOTE — PLAN OF CARE
[facility] - Med Surg  Discharge Reassessment    Primary Care Provider: Spenser Campa MD    Expected Discharge Date:     Reassessment (most recent)     Discharge Reassessment - 05/12/22 1546        Discharge Reassessment    Assessment Type Discharge Planning Reassessment     Did the patient's condition or plan change since previous assessment? No     Communicated YESSICA with patient/caregiver Date not available/Unable to determine     Discharge Plan A Home     DME Needed Upon Discharge  --   tbd    Discharge Barriers Identified None     Why the patient remains in the hospital Requires continued medical care               CM will continue to follow for discharge planning needs.

## 2022-05-12 NOTE — SUBJECTIVE & OBJECTIVE
Interval History:  Patient undergoing bacteremia workup underwent transesophageal echocardiogram.  Continues to receive IV antibiotics.  Patient with history of drug abuse show requesting narcotics for pain control.  Still having low-grade fevers.  Patient and going transesophageal echo today    Medications:  Continuous Infusions:   sodium chloride 0.9% 125 mL/hr at 05/12/22 1323     Scheduled Meds:   enoxaparin  40 mg Subcutaneous Daily    metroNIDAZOLE  500 mg Oral Q8H    morphine  15 mg Oral Q12H    mupirocin   Nasal BID    oxacillin 12 g in  mL CONTINUOUS INFUSION  12 g Intravenous Q24H    traZODone  100 mg Oral QHS     PRN Meds:sodium chloride, acetaminophen, albuterol-ipratropium, ALPRAZolam, HYDROmorphone, ondansetron, oxyCODONE-acetaminophen, sodium chloride 0.9%, sodium chloride 0.9%, sodium chloride 0.9%     Review of Systems  Objective:     Weight: 95.1 kg (209 lb 10.5 oz)  Body mass index is 34.89 kg/m².  Vital Signs (Most Recent):  Temp: 98.3 °F (36.8 °C) (05/12/22 1247)  Pulse: 91 (05/12/22 1247)  Resp: 20 (05/12/22 1247)  BP: (!) 105/52 (05/12/22 1247)  SpO2: (!) 94 % (05/12/22 1247)   Vital Signs (24h Range):  Temp:  [98.3 °F (36.8 °C)-100.8 °F (38.2 °C)] 98.3 °F (36.8 °C)  Pulse:  [] 91  Resp:  [14-20] 20  SpO2:  [93 %-98 %] 94 %  BP: ()/(52-61) 105/52                  Oxygen Concentration (%):  [28] 28         Physical Exam  Vitals and nursing note reviewed. Exam conducted with a chaperone present.   HENT:      Head: Normocephalic and atraumatic.      Nose: Nose normal.      Mouth/Throat:      Mouth: Mucous membranes are dry.   Eyes:      Extraocular Movements: Extraocular movements intact.      Conjunctiva/sclera: Conjunctivae normal.      Pupils: Pupils are equal, round, and reactive to light.   Cardiovascular:      Rate and Rhythm: Normal rate and regular rhythm.   Pulmonary:      Effort: Pulmonary effort is normal.      Breath sounds: Normal breath sounds.   Abdominal:       General: Abdomen is flat.      Palpations: Abdomen is soft.   Musculoskeletal:         General: Tenderness present.      Cervical back: Normal range of motion and neck supple.      Lumbar back: Tenderness present. Decreased range of motion. Negative right straight leg raise test and negative left straight leg raise test.        Back:       Comments: Generalized paraspinous muscle tenderness with decreased range of motion with for flexion extension.  Patient is awake alert orient x3 speech clear and fluent cranial nerves are intact upper extremity strength is 5/5 lower extremity strength shows 5-out of 5 with some break away weakness secondary to pain with very slight dorsiflexion plantar extensor weakness   Skin:     General: Skin is warm and dry.   Neurological:      General: No focal deficit present.      Mental Status: She is alert and oriented to person, place, and time. Mental status is at baseline.      Sensory: Sensory deficit present.      Motor: Weakness present.   Psychiatric:         Mood and Affect: Mood normal.         Behavior: Behavior normal.       Physical Exam:  Nursing note and vitals reviewed.    Eyes: Pupils are equal, round, and reactive to light. Conjunctivae are normal.     Cardiovascular: Normal rate and regular rhythm.     Abdominal: Soft.     Psych/Behavior: She is alert. She is oriented to person, place, and time.     Musculoskeletal: Gait is abnormal.        Neck: Range of motion is full. Tone is normal.        Back: Range of motion is full. Tone is normal.        Right Upper Extremities: Tone is normal.        Left Upper Extremities: Tone is normal.       Right Lower Extremities: Range of motion is full.        Left Lower Extremities: Range of motion is full.      Comments: Generalized paraspinous muscle tenderness with decreased range of motion with for flexion extension.  Patient is awake alert orient x3 speech clear and fluent cranial nerves are intact upper extremity strength is 5/5  lower extremity strength shows 5-out of 5 with some break away weakness secondary to pain with very slight dorsiflexion plantar extensor weakness     Neurological:        Cranial nerves: Cranial nerve(s) II, III, IV, V, VI, VII, VIII, IX, X, XI and XII are intact.     Significant Labs:  Recent Labs   Lab 05/11/22  0650 05/12/22  0730   GLU 98 93   * 134*   K 4.7 4.7    101   CO2 23 26   BUN 12 10   CREATININE 0.5 0.5   CALCIUM 7.0* 7.3*       Recent Labs   Lab 05/11/22  0650 05/12/22  0730   WBC 31.07* 22.31*   HGB 7.1* 6.2*   HCT 21.6* 18.8*    395       No results for input(s): LABPT, INR, APTT in the last 48 hours.  Microbiology Results (last 7 days)       Procedure Component Value Units Date/Time    Blood culture [[id number]] Collected: 05/11/22 1213    Order Status: Completed Specimen: Blood Updated: 05/12/22 0915     Blood Culture, Routine No Growth to date    Blood culture [[id number]] Collected: 05/11/22 1213    Order Status: Completed Specimen: Blood Updated: 05/12/22 0915     Blood Culture, Routine No Growth to date    Gram stain [[id number]] Collected: 05/11/22 1440    Order Status: Completed Specimen: Abscess from Back Updated: 05/12/22 0306     Gram Stain Result Few WBC's      Few Gram positive cocci    Aerobic culture [[id number]] Collected: 05/11/22 1440    Order Status: Sent Specimen: Abscess from Back Updated: 05/12/22 0215    Culture, Anaerobe [[id number]] Collected: 05/11/22 1440    Order Status: Sent Specimen: Abscess from Back Updated: 05/12/22 0215    Gram stain [[id number]]     Order Status: Canceled Specimen: Joint Fluid from Back     Blood culture [[id number]]  (Abnormal) Collected: 05/08/22 1516    Order Status: Completed Specimen: Blood from Peripheral, Right Hand Updated: 05/11/22 1009     Blood Culture, Routine Gram stain aer bottle: Gram positive cocci in clusters resembling Staph       Results called to and read back by: Chasity Raymundo RN  05/09/2022  11:31       STAPHYLOCOCCUS AUREUS  ID consult required at [facility].  For susceptibility see order #[id number]      Narrative:      Collection has been rescheduled by [email or web address] at 05/08/2022 13:22 Reason:   Pt in mri will be there after another hour [id number]  Collection has been rescheduled by [email or web address] at 05/08/2022 13:22 Reason:   Pt in mri will be there after another hour [id number]    Blood culture [[id number]]  (Abnormal) Collected: 05/08/22 1515    Order Status: Completed Specimen: Blood from Peripheral, Left Arm Updated: 05/11/22 1009     Blood Culture, Routine Gram stain aer bottle: Gram positive cocci in clusters resembling Staph      Results called to and read back by: Chasity Raymundo RN  05/09/2022  15:40      STAPHYLOCOCCUS AUREUS  ID consult required at [facility].  For susceptibility see order #[id number]      Narrative:      Collection has been rescheduled by [email or web address] at 05/08/2022 13:22 Reason:   Pt in mri will be there after another hour [id number]  Collection has been rescheduled by [email or web address] at 05/08/2022 13:22 Reason:   Pt in mri will be there after another hour [id number]    Blood culture x two cultures. Draw prior to antibiotics. [[id number]]  (Abnormal) Collected: 05/06/22 1617    Order Status: Completed Specimen: Blood from Peripheral, Antecubital, Right Updated: 05/09/22 0824     Blood Culture, Routine Gram stain aer bottle: Gram positive cocci in clusters resembling Staph       Results called to and read back by: Lila Hernandez RN 05/07/2022  10:57      Gram stain missael bottle: Gram positive cocci in clusters resembling Staph       05/07/2022  13:40      STAPHYLOCOCCUS AUREUS  ID consult required at [facility].  For susceptibility see order #[id number]      Narrative:      Aerobic and anaerobic    Blood culture x two cultures. Draw prior to antibiotics. [[id number]]  (Abnormal)  (Susceptibility) Collected: 05/06/22 1616    Order Status:  Completed Specimen: Blood from Peripheral, Antecubital, Left Updated: 05/09/22 0823     Blood Culture, Routine Gram stain aer bottle: Gram positive cocci in clusters resembling Staph       Gram stain missael bottle: Gram positive cocci in clusters resembling Staph       Results called to and read back by: Lila Hernandez RN 05/07/2022  10:57      STAPHYLOCOCCUS AUREUS  ID consult required at [facility],[place] and [facility] locations.      Narrative:      Aerobic and anaerobic    Gram stain [[id number]] Collected: 05/07/22 0901    Order Status: Sent Specimen: Body Fluid from Pleural Fluid Updated: 05/07/22 0905    Fungus culture [[id number]] Collected: 05/07/22 0901    Order Status: Sent Specimen: Body Fluid from Pleural Fluid Updated: 05/07/22 0905    AFB culture (includes stain) [[id number]] Collected: 05/07/22 0901    Order Status: Sent Specimen: Body Fluid from Pleural Fluid Updated: 05/07/22 0905          Recent Lab Results         05/12/22 0935 05/12/22 0730 05/11/22  1440        Unit Blood Type Code 5100  [P]           Unit Expiration [id number]  [P]           Unit Blood Type O POS  [P]           Albumin   1.2         Alkaline Phosphatase   99         ALT   12         Anion Gap   7         AST   15         Baso #   0.05         Basophil %   0.2         BILIRUBIN TOTAL   0.7  Comment: For infants and newborns, interpretation of results should be based  on gestational age, weight and in agreement with clinical  observations.    Premature Infant recommended reference ranges:  Up to 24 hours.............<8.0 mg/dL  Up to 48 hours............<12.0 mg/dL  3-5 days..................<15.0 mg/dL  6-29 days.................<15.0 mg/dL           BUN   10         Calcium   7.3         Chloride   101         CO2   26         CODING SYSTEM OSJN124  [P]           Creatinine   0.5         Differential Method   Automated         DISPENSE STATUS ISSUED  [P]           eGFR if    >60         eGFR if non   American   >60  Comment: Calculation used to obtain the estimated glomerular filtration  rate (eGFR) is the CKD-EPI equation.            Eos #   0.1         Eosinophil %   0.3         Glucose   93         Gram Stain Result     Few WBC's            Few Gram positive cocci       Gran # (ANC)   18.3         Gran %   81.9         Group & Rh O POS           Hematocrit   18.8  Comment: Results confirmed, test repeated  HCT  critical result(s) called and verbal readback obtained from   HENRY LARRY RN by CN1 05/12/2022 08:32           Hemoglobin   6.2         Immature Grans (Abs)   0.93  Comment: Mild elevation in immature granulocytes is non specific and   can be seen in a variety of conditions including stress response,   acute inflammation, trauma and pregnancy. Correlation with other   laboratory and clinical findings is essential.           Immature Granulocytes   4.2         INDIRECT SALLY NEG           Lymph #   1.9         Lymph %   8.7         MCH   29.0         MCHC   33.0         MCV   88         Mono #   1.0         Mono %   4.7         MPV   10.0         nRBC   0         Platelet Estimate   Appears normal         Platelets   395  Comment: Results confirmed, test repeated         Potassium   4.7         Product Code [id number]  [P]           PROTEIN TOTAL   4.8         RBC   2.14         RDW   15.3         Sodium   134         UNIT NUMBER [id number]  [P]           WBC   22.31                  [P] - Preliminary Result             All pertinent labs from the last 24 hours have been reviewed.    Significant Diagnostics:  I have reviewed all pertinent imaging results/findings within the past 24 hours.

## 2022-05-12 NOTE — ASSESSMENT & PLAN NOTE
Chronic in nature\  H/O IVDU  CT lumbar show spinal stenosis   No neurological deficit at this time   Will order a MRI lumbar wwo  To r/o spinal abscess     5/7  Mri pending  5/8/22 The patient refused the MRI lumbar spine overnight d/t being unable to lay flat from back pain. Will give pain meds and attempt to get the MRI today to r/o spinal abscess.  5/9/22 The patient reports lower back pain is not well controlled with current pain regimen, will adjust. MRI lumbar spine showed moderate to severe spinal canal stenosis with moderate left-sided neural foraminal stenosis, Neurosurgery consulted.   5/10/22 No acute events overnight. The patient reports some improvement in pain with adjustment in pain meds. Continue current management.    5/11/22 The patient reports pain is still not well controlled at times. Continue management with PRN analgesia  5/12/22 MRI thoracic spine is ordered and pending. The patient reports pain is still not well controlled. The case was discussed with Neurosurgery who recommended adding extended release pain meds.

## 2022-05-12 NOTE — ASSESSMENT & PLAN NOTE
Infectious disease is consulted. IR consulted to evaluate possible paraspinous muscle myositis versus abscess. Recommend IR drainage. Continue current management with ABX.   5/12/22  Approximately 1 ml of purulent drainage aspirated from back yesterday per IR, growing gram + cocci.

## 2022-05-12 NOTE — SUBJECTIVE & OBJECTIVE
Interval History: No acute events overnight. The patients WBC improved to 22K overnight. The patient remains on continuous oxacillin infusion. Infectious disease is following. MRI thoracic spine is ordered and pending. The patients SUSANNAH was postponed again today d/t low H/H. Hgb was noted to be 6.2 this morning, will transfuse 1 unit PRBC. The patient reports pain is still not well controlled. The case was discussed with Neurosurgery who recommended adding extended release pain meds. Approximately 1 ml of purulent drainage aspirated from back yesterday per IR, growing gram + cocci.     Review of Systems   Constitutional:  Positive for fatigue and fever. Negative for activity change, appetite change, chills, diaphoresis and unexpected weight change.   HENT: Negative.  Negative for congestion, drooling, facial swelling, rhinorrhea, sinus pressure, sneezing and sore throat.    Eyes: Negative.  Negative for discharge, redness, itching and visual disturbance.   Respiratory:  Positive for cough, chest tightness and shortness of breath. Negative for apnea, choking, wheezing and stridor.    Cardiovascular:  Negative for chest pain, palpitations and leg swelling.   Gastrointestinal: Negative.  Negative for abdominal distention, abdominal pain, anal bleeding, blood in stool, constipation, diarrhea, nausea and vomiting.   Endocrine: Negative.    Genitourinary: Negative.  Negative for decreased urine volume, difficulty urinating, dysuria, frequency, hematuria, pelvic pain, urgency, vaginal bleeding and vaginal discharge.   Musculoskeletal:  Positive for arthralgias and back pain. Negative for gait problem, joint swelling, myalgias, neck pain and neck stiffness.   Skin: Negative.  Negative for color change, pallor, rash and wound.   Allergic/Immunologic: Negative.    Neurological:  Positive for weakness. Negative for dizziness, seizures, facial asymmetry, speech difficulty, light-headedness, numbness and headaches.    Psychiatric/Behavioral:  Positive for confusion. Negative for agitation, hallucinations and suicidal ideas.    All other systems reviewed and are negative.  Objective:     Vital Signs (Most Recent):  Temp: 98.7 °F (37.1 °C) (05/12/22 1207)  Pulse: 98 (05/12/22 1207)  Resp: 14 (05/12/22 1210)  BP: (!) 106/54 (05/12/22 1207)  SpO2: 96 % (05/12/22 1207)   Vital Signs (24h Range):  Temp:  [98.7 °F (37.1 °C)-100.8 °F (38.2 °C)] 98.7 °F (37.1 °C)  Pulse:  [] 98  Resp:  [14-20] 14  SpO2:  [94 %-98 %] 96 %  BP: ()/(52-61) 106/54     Weight: 95.1 kg (209 lb 10.5 oz)  Body mass index is 34.89 kg/m².    Intake/Output Summary (Last 24 hours) at 5/12/2022 1218  Last data filed at 5/12/2022 0500  Gross per 24 hour   Intake --   Output 2000 ml   Net -2000 ml      Physical Exam  Vitals and nursing note reviewed. Exam conducted with a chaperone present (family).   Constitutional:       General: She is not in acute distress.     Appearance: She is well-developed. She is ill-appearing. She is not toxic-appearing.   HENT:      Head: Normocephalic and atraumatic.   Eyes:      Conjunctiva/sclera: Conjunctivae normal.      Pupils: Pupils are equal, round, and reactive to light.   Cardiovascular:      Rate and Rhythm: Normal rate and regular rhythm.      Heart sounds: Normal heart sounds. No murmur heard.  Pulmonary:      Effort: Pulmonary effort is normal. No respiratory distress.      Breath sounds: Normal breath sounds.   Abdominal:      General: Bowel sounds are normal. There is no distension.      Palpations: Abdomen is soft.      Tenderness: There is no abdominal tenderness.   Musculoskeletal:         General: Swelling and tenderness present. No deformity. Normal range of motion.      Cervical back: Normal range of motion and neck supple.      Right lower leg: No edema.      Left lower leg: No edema.   Skin:     General: Skin is warm and dry.      Coloration: Skin is pale.      Findings: Erythema present.    Neurological:      Mental Status: She is alert and oriented to person, place, and time.      Motor: Weakness present.      Deep Tendon Reflexes: Reflexes are normal and symmetric.   Psychiatric:         Behavior: Behavior normal.       Significant Labs: All pertinent labs within the past 24 hours have been reviewed.    Significant Imaging:   Imaging Results              CTA Chest Non-Coronary (PE Study) (Final result)  Result time 05/06/22 18:26:20      Final result by Demario Morin MD (05/06/22 18:26:20)                   Impression:      No definite pulmonary thromboembolism.    Multiple nodular and cavitary opacities concerning for septic emboli with larger opacities possibly necrotizing pneumonia.    Moderate loculated right pleural fluid.  Empyema not excluded.    Small pericardial effusion of unclear etiology.    Splenic hypodensity possibly related to infarct or contrast timing.    Additional details as above.    All CT scans at this facility are performed  using dose modulation techniques as appropriate to performed exam including the following:  automated exposure control; adjustment of mA and/or kV according to the patients size (this includes techniques or standardized protocols for targeted exams where dose is matched to indication/reason for exam: i.e. extremities or head);  iterative reconstruction technique.      Electronically signed by: Demario Morin  Date:    05/06/2022  Time:    18:26               Narrative:    EXAMINATION:  CTA CHEST NON CORONARY    CLINICAL HISTORY:  Pulmonary embolism (PE) suspected, neg D-dimer;    TECHNIQUE:  Low dose axial images, sagittal and coronal reformations were obtained from the thoracic inlet to the lung bases following the IV administration of 100 mL of Omnipaque 350.  Contrast timing was optimized to evaluate the pulmonary arteries.  MIP images were performed.    COMPARISON:  Multiple priors.    FINDINGS:  Base of Neck: No significant  abnormality.    Thoracic soft tissues: Unremarkable.    Aorta: Left-sided aortic arch.  No aneurysm and no significant atherosclerosis    Heart: Normal size.  Small effusion.    Pulmonary vasculature: Pulmonary arteries are well opacified.  There is no pulmonary thromboembolism.    Esha/Mediastinum: No pathologic ariana enlargement.    Airways: Patent.    Lungs/Pleura: Multiple nodular and cavitary opacities concerning for septic emboli.  Additional larger opacities most notably in the right lung base with some internal clearing possibly related to necrotizing pneumonia.  Moderate loculated right pleural fluid and no definite left pleural fluid.  Empyema not excluded.    Esophagus: Unremarkable.    Upper Abdomen: Geographic wedge-shaped splenic hypodensity possibly related to contrast timing or infarct.    Bones: No acute fracture. No suspicious lytic or sclerotic lesions.                                        CT Lumbar Spine Without Contrast (Final result)  Result time 05/06/22 18:29:13      Final result by Demario Morin MD (05/06/22 18:29:13)                   Impression:      No fracture or traumatic malalignment of the lumbar spine.  No definite findings to suggest osteomyelitis.  Further evaluation as clinically warranted.    Possible progression of degenerative changes most notable at L4-5 with moderate to severe spinal canal stenosis at this level.  Additional details as above.    This report was flagged in Epic as abnormal.    All CT scans at this facility are performed  using dose modulation techniques as appropriate to performed exam including the following:  automated exposure control; adjustment of mA and/or kV according to the patients size (this includes techniques or standardized protocols for targeted exams where dose is matched to indication/reason for exam: i.e. extremities or head);  iterative reconstruction technique.      Electronically signed by: Demario  Mikel  Date:    05/06/2022  Time:    18:29               Narrative:    EXAMINATION:  CT LUMBAR SPINE WITHOUT CONTRAST    CLINICAL HISTORY:  Low back pain, infection suspected;    TECHNIQUE:  Low-dose CT images obtained throughout the region of the lumbar spine.  Axial, sagittal and coronal reformations were performed.  Contrast was not administered.    COMPARISON:  12/05/2019    FINDINGS:  The vertebral bodies are normal in height and morphology without evidence of fracture or osseous destructive process.    Normal sagittal alignment is preserved.  No spondylolisthesis.    Circumferential disc bulges L3 through S1 with mild spinal canal stenosis L3-4, moderate to severe spinal canal stenosis L4-5 and probable moderate spinal canal stenosis L5-S1.    Facet arthropathy with moderate left neural foraminal narrowing at L4-5 and severe left neural foraminal narrowing at L5-S1.  Other levels are better preserved.    Limited evaluation of the intraspinal contents demonstrates no hematoma or mass.    Paraspinal soft tissues exhibit no acute abnormalities.                                       CT Thoracic Spine Without Contrast (Final result)  Result time 05/06/22 18:32:20      Final result by Demario Morin MD (05/06/22 18:32:20)                   Impression:      No definite acute abnormality.  No traumatic malalignment, fracture, or osseous destructive process.  Further evaluation as clinically warranted.    Pulmonary opacities as on the dedicated CT exam.    All CT scans at this facility are performed  using dose modulation techniques as appropriate to performed exam including the following:  automated exposure control; adjustment of mA and/or kV according to the patients size (this includes techniques or standardized protocols for targeted exams where dose is matched to indication/reason for exam: i.e. extremities or head);  iterative reconstruction technique.      Electronically signed by: Demario  Mikel  Date:    05/06/2022  Time:    18:32               Narrative:    EXAMINATION:  CT THORACIC SPINE WITHOUT CONTRAST    CLINICAL HISTORY:  Epidural abscess suspected;    TECHNIQUE:  Low-dose axial noncontrast CT images of the thoracic spine.  Multiplanar reformats generated    COMPARISON:  None    FINDINGS:  Thoracic spine alignment is well preserved.  No findings to suggest vertebral body fracture.  No osseous lytic or blastic process.    No significant degenerative changes.  No disc abnormalities or facet arthrosis.    Pulmonary opacities as on the dedicated CT exam.                                       CT Cervical Spine Without Contrast (Final result)  Result time 05/06/22 18:34:24      Final result by Demario Morin MD (05/06/22 18:34:24)                   Impression:      No fracture or traumatic malalignment of the cervical spine.  No definite CT evidence of osteomyelitis.  Further evaluation as clinically warranted.    Additional degenerative findings as above.    All CT scans at this facility are performed  using dose modulation techniques as appropriate to performed exam including the following:  automated exposure control; adjustment of mA and/or kV according to the patients size (this includes techniques or standardized protocols for targeted exams where dose is matched to indication/reason for exam: i.e. extremities or head);  iterative reconstruction technique.      Electronically signed by: Demario Morin  Date:    05/06/2022  Time:    18:34               Narrative:    EXAMINATION:  CT CERVICAL SPINE WITHOUT CONTRAST    CLINICAL HISTORY:  Neck pain, acute, infection suspected;    TECHNIQUE:  Low dose axial CT images through the cervical spine, with sagittal and coronal reformations.  Contrast was not administered.    COMPARISON:  12/05/2019    FINDINGS:  The vertebral bodies are normal in height and morphology without evidence of fracture or osseous destructive process.  Normal sagittal alignment  is preserved.    Progression of degenerative change at C4-5 in comparison the prior exam with posterior disc osteophyte complex at this level producing mild spinal canal stenosis.  Smaller C5-6 posterior disc osteophyte complex without definite spinal canal stenosis.    No definite neural foraminal narrowing.    Limited evaluation of the intraspinal contents demonstrates no hematoma or mass.Paraspinal soft tissues exhibit no acute abnormalities.                                       X-Ray Knee Complete 4 Or More Views Right (Final result)  Result time 05/06/22 16:01:49      Final result by Nate Brasher MD (05/06/22 16:01:49)                   Impression:      No evidence of fracture or dislocation. Moderate suprapatellar knee joint effusion. Mild soft tissue edema lateral to the knee.      Electronically signed by: Nate Brasher  Date:    05/06/2022  Time:    16:01               Narrative:    EXAMINATION:  XR KNEE COMP 4 OR MORE VIEWS RIGHT    CLINICAL HISTORY:  Pain in unspecified knee    TECHNIQUE:  AP, lateral, and Merchant views of the right knee were performed.    COMPARISON:  None    FINDINGS:  No evidence of fracture or dislocation.  Moderate suprapatellar knee joint effusion.  Mild soft tissue edema lateral to the knee.  No foreign body.  Soft tissues otherwise unremarkable.                                       X-Ray Finger 2 or More Views Right (Final result)  Result time 05/06/22 16:02:28      Final result by Nate Brasher MD (05/06/22 16:02:28)                   Impression:      Negative exam.      Electronically signed by: Nate Brasher  Date:    05/06/2022  Time:    16:02               Narrative:    EXAMINATION:  XR FINGER 2 OR MORE VIEWS RIGHT    CLINICAL HISTORY:  finger swelling;    TECHNIQUE:  Three views of the right 4th digit.    COMPARISON:  None    FINDINGS:  No evidence of fracture or dislocation.  Joint spaces appear well maintained.  Soft tissues unremarkable.  No evidence of foreign  body.                                        X-Ray Chest AP Portable (Final result)  Result time 05/06/22 16:04:37      Final result by Nate Brasher MD (05/06/22 16:04:37)                   Impression:      Patchy bilateral opacities, some of which appear nodular and possibly cavitary. Recommend further evaluation with chest CT with IV contrast.    This report was flagged in Epic as abnormal.      Electronically signed by: Nate Brasher  Date:    05/06/2022  Time:    16:04               Narrative:    EXAMINATION:  XR CHEST AP PORTABLE    CLINICAL HISTORY:  Sepsis;.    TECHNIQUE:  Single frontal portable view of the chest was performed.    COMPARISON:  12/05/2019    FINDINGS:  Support devices: None    Patchy bilateral opacities, some of which appear nodular and possibly cavitary.  Recommend further evaluation with chest CT with IV contrast.    Heart normal size.  No pneumothorax or pleural effusion    Bones are intact.

## 2022-05-12 NOTE — PLAN OF CARE
Pt remains free from injury/falls this shift. Safety precautions maintained. Pain managed with prn and scheduled meds. Diet tolerated well. VSS. No signs and symptoms of acute distress noted at this time. Chart reviewed, will continue to monitor.

## 2022-05-12 NOTE — ASSESSMENT & PLAN NOTE
Gram positive   Infectious disease consulted  Continue broad spectrum intravenous antibiotic(s) given h/o ivdu  Remains febrile and leukocytosis persists  5/8/22 The patient remained afebrile overnight. Currently on cefepime/vanc/flagyl. Blood cx are growing staph aureus. The patient refused the MRI lumbar spine overnight d/t being unable to lay flat from back pain. Will give pain meds and attempt to get the MRI today to r/o spinal abscess. The ECHO showed a 1.3 x 1.5 cm elongated, mobile echodensity seen on the tricuspid valve consistent with vegetation, CT surgery was consulted. Will need a SUSANNAH. Infectious disease is consulted. Will repeat blood cx today.   5/9/22 Repeat blood cx are still positive. Sensitivities are back and Staph is sensitive to oxacillin, will D/C vanc/cefepime. Infectious disease is following   5/10/22 Repeat blood cx are +. Continue IV oxacillin and flagyl. Infectious disease is consulted. IR consulted to evaluate possible paraspinous muscle myositis versus abscess. Recommend IR drainage. Continue current management.    5/11/22 The patient reports pain is still not well controlled at times. The patient reports that she is very anxious about the upcoming SUSANNAH and IR drainage. SUSANNAH was pushed back to this afternoon because the patient ate candy this AM. Continue treatment with IV oxacillin and flagyl. Infectious disease is following. WBC trended down to 31K. Continue current management.  5/12/22 The patients WBC improved to 22K overnight. The patient remains on continuous oxacillin infusion. Infectious disease is following.

## 2022-05-13 LAB
BASOPHILS # BLD AUTO: 0.06 K/UL (ref 0–0.2)
BASOPHILS NFR BLD: 0.3 % (ref 0–1.9)
BLD PROD TYP BPU: NORMAL
BLOOD UNIT EXPIRATION DATE: NORMAL
BLOOD UNIT TYPE CODE: 5100
BLOOD UNIT TYPE: NORMAL
CODING SYSTEM: NORMAL
DIFFERENTIAL METHOD: ABNORMAL
DISPENSE STATUS: NORMAL
EOSINOPHIL # BLD AUTO: 0 K/UL (ref 0–0.5)
EOSINOPHIL NFR BLD: 0.1 % (ref 0–8)
ERYTHROCYTE [DISTWIDTH] IN BLOOD BY AUTOMATED COUNT: 18.7 % (ref 11.5–14.5)
HCT VFR BLD AUTO: 21.5 % (ref 37–48.5)
HGB BLD-MCNC: 7 G/DL (ref 12–16)
IMM GRANULOCYTES # BLD AUTO: 0.63 K/UL (ref 0–0.04)
IMM GRANULOCYTES NFR BLD AUTO: 3.3 % (ref 0–0.5)
LYMPHOCYTES # BLD AUTO: 2 K/UL (ref 1–4.8)
LYMPHOCYTES NFR BLD: 10.5 % (ref 18–48)
MCH RBC QN AUTO: 27.7 PG (ref 27–31)
MCHC RBC AUTO-ENTMCNC: 32.6 G/DL (ref 32–36)
MCV RBC AUTO: 85 FL (ref 82–98)
MONOCYTES # BLD AUTO: 1.1 K/UL (ref 0.3–1)
MONOCYTES NFR BLD: 5.5 % (ref 4–15)
NEUTROPHILS # BLD AUTO: 15.2 K/UL (ref 1.8–7.7)
NEUTROPHILS NFR BLD: 80.3 % (ref 38–73)
NRBC BLD-RTO: 0 /100 WBC
NUM UNITS TRANS PACKED RBC: NORMAL
PLATELET # BLD AUTO: 453 K/UL (ref 150–450)
PMV BLD AUTO: 9.2 FL (ref 9.2–12.9)
RBC # BLD AUTO: 2.53 M/UL (ref 4–5.4)
WBC # BLD AUTO: 18.96 K/UL (ref 3.9–12.7)

## 2022-05-13 PROCEDURE — 63600175 PHARM REV CODE 636 W HCPCS: Performed by: NURSE PRACTITIONER

## 2022-05-13 PROCEDURE — 99232 SBSQ HOSP IP/OBS MODERATE 35: CPT | Mod: ,,, | Performed by: NEUROLOGICAL SURGERY

## 2022-05-13 PROCEDURE — 21400001 HC TELEMETRY ROOM

## 2022-05-13 PROCEDURE — 36415 COLL VENOUS BLD VENIPUNCTURE: CPT | Performed by: INTERNAL MEDICINE

## 2022-05-13 PROCEDURE — 94799 UNLISTED PULMONARY SVC/PX: CPT

## 2022-05-13 PROCEDURE — 25000003 PHARM REV CODE 250: Performed by: NURSE PRACTITIONER

## 2022-05-13 PROCEDURE — 94761 N-INVAS EAR/PLS OXIMETRY MLT: CPT

## 2022-05-13 PROCEDURE — 99900035 HC TECH TIME PER 15 MIN (STAT)

## 2022-05-13 PROCEDURE — P9016 RBC LEUKOCYTES REDUCED: HCPCS | Performed by: NURSE PRACTITIONER

## 2022-05-13 PROCEDURE — 63600175 PHARM REV CODE 636 W HCPCS: Performed by: INTERNAL MEDICINE

## 2022-05-13 PROCEDURE — 25000242 PHARM REV CODE 250 ALT 637 W/ HCPCS: Performed by: NURSE PRACTITIONER

## 2022-05-13 PROCEDURE — 99232 PR SUBSEQUENT HOSPITAL CARE,LEVL II: ICD-10-PCS | Mod: ,,, | Performed by: NEUROLOGICAL SURGERY

## 2022-05-13 PROCEDURE — 85025 COMPLETE CBC W/AUTO DIFF WBC: CPT | Performed by: INTERNAL MEDICINE

## 2022-05-13 PROCEDURE — 27000221 HC OXYGEN, UP TO 24 HOURS

## 2022-05-13 PROCEDURE — 36430 TRANSFUSION BLD/BLD COMPNT: CPT

## 2022-05-13 PROCEDURE — 94640 AIRWAY INHALATION TREATMENT: CPT

## 2022-05-13 RX ORDER — ACETAMINOPHEN 325 MG/1
650 TABLET ORAL ONCE
Status: COMPLETED | OUTPATIENT
Start: 2022-05-13 | End: 2022-05-13

## 2022-05-13 RX ORDER — DIPHENHYDRAMINE HCL 25 MG
25 CAPSULE ORAL ONCE
Status: COMPLETED | OUTPATIENT
Start: 2022-05-13 | End: 2022-05-13

## 2022-05-13 RX ORDER — HYDROCODONE BITARTRATE AND ACETAMINOPHEN 500; 5 MG/1; MG/1
TABLET ORAL
Status: DISCONTINUED | OUTPATIENT
Start: 2022-05-13 | End: 2022-06-09

## 2022-05-13 RX ORDER — ALPRAZOLAM 1 MG/1
1 TABLET ORAL 3 TIMES DAILY PRN
Status: DISCONTINUED | OUTPATIENT
Start: 2022-05-13 | End: 2022-05-26

## 2022-05-13 RX ORDER — OXYCODONE HYDROCHLORIDE 5 MG/1
10 TABLET ORAL
Status: DISCONTINUED | OUTPATIENT
Start: 2022-05-13 | End: 2022-05-15

## 2022-05-13 RX ADMIN — HYDROMORPHONE HYDROCHLORIDE 1 MG: 2 INJECTION INTRAMUSCULAR; INTRAVENOUS; SUBCUTANEOUS at 04:05

## 2022-05-13 RX ADMIN — OXYCODONE AND ACETAMINOPHEN 1 TABLET: 10; 325 TABLET ORAL at 03:05

## 2022-05-13 RX ADMIN — IPRATROPIUM BROMIDE AND ALBUTEROL SULFATE 3 ML: 2.5; .5 SOLUTION RESPIRATORY (INHALATION) at 10:05

## 2022-05-13 RX ADMIN — OXACILLIN SODIUM 12 G: 10 INJECTION, POWDER, FOR SOLUTION INTRAVENOUS at 12:05

## 2022-05-13 RX ADMIN — HYDROMORPHONE HYDROCHLORIDE 1 MG: 2 INJECTION INTRAMUSCULAR; INTRAVENOUS; SUBCUTANEOUS at 05:05

## 2022-05-13 RX ADMIN — TRAZODONE HYDROCHLORIDE 100 MG: 100 TABLET ORAL at 09:05

## 2022-05-13 RX ADMIN — ALPRAZOLAM 0.5 MG: 0.5 TABLET ORAL at 09:05

## 2022-05-13 RX ADMIN — HYDROMORPHONE HYDROCHLORIDE 1 MG: 2 INJECTION INTRAMUSCULAR; INTRAVENOUS; SUBCUTANEOUS at 09:05

## 2022-05-13 RX ADMIN — OXYCODONE HYDROCHLORIDE 10 MG: 5 TABLET ORAL at 01:05

## 2022-05-13 RX ADMIN — MORPHINE SULFATE 15 MG: 15 TABLET, EXTENDED RELEASE ORAL at 08:05

## 2022-05-13 RX ADMIN — ALPRAZOLAM 1 MG: 1 TABLET ORAL at 05:05

## 2022-05-13 RX ADMIN — MORPHINE SULFATE 15 MG: 15 TABLET, EXTENDED RELEASE ORAL at 09:05

## 2022-05-13 RX ADMIN — HYDROMORPHONE HYDROCHLORIDE 1 MG: 2 INJECTION INTRAMUSCULAR; INTRAVENOUS; SUBCUTANEOUS at 08:05

## 2022-05-13 RX ADMIN — DIPHENHYDRAMINE HYDROCHLORIDE 25 MG: 25 CAPSULE ORAL at 12:05

## 2022-05-13 RX ADMIN — ACETAMINOPHEN 650 MG: 325 TABLET ORAL at 12:05

## 2022-05-13 RX ADMIN — OXYCODONE HYDROCHLORIDE 10 MG: 5 TABLET ORAL at 05:05

## 2022-05-13 RX ADMIN — HYDROMORPHONE HYDROCHLORIDE 1 MG: 2 INJECTION INTRAMUSCULAR; INTRAVENOUS; SUBCUTANEOUS at 12:05

## 2022-05-13 RX ADMIN — ENOXAPARIN SODIUM 40 MG: 40 INJECTION SUBCUTANEOUS at 05:05

## 2022-05-13 RX ADMIN — METRONIDAZOLE 500 MG: 500 TABLET ORAL at 05:05

## 2022-05-13 NOTE — ASSESSMENT & PLAN NOTE
Chronic in nature, H/O IVDU  --CT lumbar show spinal stenosis   --No neurological deficit at this time   --MRI lumbar wwo  To r/o spinal abscess -showed moderate to severe spinal canal stenosis with moderate left-sided neural foraminal stenosis, Neurosurgery consulted.     --MRI thoracic spine ordered: Patient refused 5/12 at 1755 citing anxiety and pain, again refused on 5/13 AM for same reasons, advised nurse to alert provider if able to reapprach, will order 1 time dose of IV Ativan.   --The patient reports pain is still not well controlled. The case was discussed with Neurosurgery who recommended adding extended release pain meds, will initiate PO Oxycodone and titrate for pain relief, when acceptable dose determined will initiate extended release medications.

## 2022-05-13 NOTE — ASSESSMENT & PLAN NOTE
ECHO showed a 1.3 x 1.5 cm elongated, mobile echodensity seen on the tricuspid valve consistent with vegetation, CT surgery was consulted.     --Will need a SUSANNAH.  -- Infectious disease is consulted.  -- repeat blood cx NGTD.  -- Continue IV ABX.   --Sensitivities are back and Staph is sensitive to oxacillin, D/C vanc/cefepime.   --Cardiology consulted and plans for a SUSANNAH.   --CT surgery following and recommends continuing medical management with appropriate ABX, no surgical intervention at this time.

## 2022-05-13 NOTE — ASSESSMENT & PLAN NOTE
Secondary to acute illness    --Daily CBC  --Transfuse with 1 unit PRBC for Hgb <7.0 or <8.0 if symptomatic   --Hgb: 7.0, give 1U PRBC

## 2022-05-13 NOTE — PROGRESS NOTES
[facility]  Progress Note    Patient Name: Tianna Leon  MRN: [id number]  Patient Class: IP- Inpatient   Admission Date: 5/6/2022  Length of Stay: 7 days  Attending Physician: Elizabeth Kim MD  Primary Care Provider: Spenser Campa MD        Subjective:     Principal Problem:Bacteremia        HPI:  36 y/o. female  with a PMHx of asthma, COPD,Bipolar  , IVDU and HLD presented to the ER with a c/o  sob for the last weak which has gotten worse . The SOB is associated with fever , chills , productive cough , back pain  and generalized weakness . She report cough some blood this am .  She is active IVDU  ( heroine , cocaine  and amphetamine ) . She denies any  sick contact , chest pain  , GI/ sx , She also complaning of LE sweeling . Knee pain and B/L LE rash .   ER COURSE: CTA chest negative for pe . Multiple nodular and cavitary opacities concerning for septic emboli with larger opacities possibly necrotizing pneumonia. CT cervical  and thoracic did not show any acute finding , CT lumbar Possible progression of degenerative changes most notable at L4-5 with moderate to severe spinal canal stenosis at this level.  WBC  29 k , na 130 , k 3.4 , cl 89 , procal 2.71   ER VS:  BP Pulse Resp Temp SpO2   (!) 124/58 110 (!) 30 (!) 101.6 °F (38.7 °C) 96 %           Pt will be admitted to inpt with a dx of PNA and severe sepsis       Overview/Hospital Course:  5/7 admitted for pneumonia, severe sepsis in setting of ivdu. Mother at bedside and provides collateral. Patient has struggled with substance abuse for approximately 20 years, worsening over last 3-5 years since life event. Onset of symptoms 1-2 weeks ago. Tolerated thoracentesis with no pneumothorax on chest x-ray. Mri lumbar and echo, pending. Bcx positive, growing gram positive cocci. On vanc, cefepime and flagyl. Infectious disease consulted for bacteremia. 5/8/22 The patient remained afebrile overnight. Currently on  cefepime/vanc/flagyl. Blood cx are growing staph aureus. The patient refused the MRI lumbar spine overnight d/t being unable to lay flat from back pain. Will give pain meds and attempt to get the MRI today to r/o spinal abscess. The ECHO showed a 1.3 x 1.5 cm elongated, mobile echodensity seen on the tricuspid valve consistent with vegetation, CT surgery was consulted. Will need a SUSANNAH. Infectious disease is consulted. Will repeat blood cx today. 5/9/22 No acute events overnight. The patient reports lower back pain is not well controlled with current pain regimen, will adjust. Repeat blood cx are still positive. Sensitivities are back and Staph is sensitive to oxacillin, will D/C vanc/cefepime. Cardiology consulted and plans for a SUSANNAH today. CT surgery following and recommends continuing medical management with appropriate ABX, no surgical intervention at this time. MRI lumbar spine showed moderate to severe spinal canal stenosis with moderate left-sided neural foraminal stenosis, Neurosurgery consulted. 5/10/22 No acute events overnight. The patient reports some improvement in pain with adjustment in pain meds. Repeat blood cx are +. Continue IV oxacillin and flagyl. Infectious disease is consulted. IR consulted to evaluate possible paraspinous muscle myositis versus abscess. Recommend IR drainage. Continue current management. 5/11/22 No acute events overnight. The patient reports pain is still not well controlled at times. The patient reports that she is very anxious about the upcoming SUSANNAH and IR drainage. SUSANNAH was pushed back to this afternoon because the patient ate candy this AM. Continue treatment with IV oxacillin and flagyl. Infectious disease is following. WBC trended down to 31K. Continue current management. 5/12/22 No acute events overnight. The patients WBC improved to 22K overnight. The patient remains on continuous oxacillin infusion. Infectious disease is following. MRI thoracic spine is ordered and  pending. The patients SUSANNAH was postponed again today d/t low H/H. Hgb was noted to be 6.2 this morning, will transfuse 1 unit PRBC. The patient reports pain is still not well controlled. The case was discussed with Neurosurgery who recommended adding extended release pain meds. Approximately 1 ml of purulent drainage aspirated from back yesterday per IR, growing gram + cocci.     5/13: Patient was given 1U PRBC on 5/12, improved from 6.2 --> 7.0, will give an additional unit PRBC. Radiology attempted to bring patient on 5/12 at 1755 for Thoracic MRI, patient refused due to pain and anxiety. Repeat attempt this AM, patient again refused. Will order Ativan 1mg IV to be given with pain medication prior to scan to relieve anxiety and pain, nurse to notify NP when ready to go for scan. Discussed pain management with patient, discussed transition to PO pain medications to being preparing for d/c to LTAC, adjusted PO pain regimen will reevaluate to determine effectiveness. Repeat Blood cultures: NGTD, sensitivities pending.       Interval History: continued c/o SOB, weakness, anxiety and pain. Regimen adjusted.     Review of Systems   Constitutional:  Positive for activity change, appetite change and fatigue. Negative for chills, diaphoresis, fever and unexpected weight change.   HENT:  Negative for congestion, hearing loss, mouth sores, postnasal drip, rhinorrhea, sore throat and trouble swallowing.    Eyes:  Negative for discharge and visual disturbance.   Respiratory:  Positive for shortness of breath. Negative for cough and chest tightness.    Cardiovascular:  Negative for chest pain, palpitations and leg swelling.   Gastrointestinal:  Positive for abdominal distention and abdominal pain. Negative for blood in stool, constipation, diarrhea, nausea and vomiting.   Endocrine: Negative for cold intolerance and heat intolerance.   Genitourinary:  Negative for difficulty urinating, dyspareunia, flank pain and hematuria.    Musculoskeletal:  Positive for back pain and myalgias. Negative for arthralgias.   Skin: Negative.    Neurological:  Positive for weakness and headaches. Negative for dizziness and light-headedness.   Hematological:  Negative for adenopathy. Does not bruise/bleed easily.   Psychiatric/Behavioral:  Positive for agitation and sleep disturbance. Negative for behavioral problems and confusion. The patient is nervous/anxious.    Objective:     Vital Signs (Most Recent):  Temp: 98.5 °F (36.9 °C) (05/13/22 1349)  Pulse: 90 (05/13/22 1349)  Resp: 16 (05/13/22 1349)  BP: 115/67 (05/13/22 1349)  SpO2: 98 % (05/13/22 1349) Vital Signs (24h Range):  Temp:  [98.3 °F (36.8 °C)-99.8 °F (37.7 °C)] 98.5 °F (36.9 °C)  Pulse:  [] 90  Resp:  [14-20] 16  SpO2:  [91 %-98 %] 98 %  BP: ()/(53-68) 115/67     Weight: 95.1 kg (209 lb 10.5 oz)  Body mass index is 34.89 kg/m².    Intake/Output Summary (Last 24 hours) at 5/13/2022 1437  Last data filed at 5/13/2022 1330  Gross per 24 hour   Intake 8127.74 ml   Output --   Net 8127.74 ml      Physical Exam  Vitals and nursing note reviewed.   Constitutional:       General: She is not in acute distress.     Appearance: She is well-developed. She is ill-appearing.   HENT:      Head: Normocephalic and atraumatic.      Right Ear: Hearing and external ear normal.      Left Ear: Hearing and external ear normal.      Nose: No rhinorrhea.      Right Sinus: No maxillary sinus tenderness or frontal sinus tenderness.      Left Sinus: No maxillary sinus tenderness or frontal sinus tenderness.      Mouth/Throat:      Mouth: No oral lesions.      Pharynx: Uvula midline.   Eyes:      General:         Right eye: No discharge.         Left eye: No discharge.      Conjunctiva/sclera: Conjunctivae normal.      Pupils: Pupils are equal, round, and reactive to light.   Neck:      Thyroid: No thyromegaly.      Vascular: No carotid bruit.      Trachea: No tracheal deviation.   Cardiovascular:      Rate  and Rhythm: Regular rhythm. Tachycardia present.      Pulses:           Dorsalis pedis pulses are 2+ on the right side and 2+ on the left side.      Heart sounds: Normal heart sounds, S1 normal and S2 normal. No murmur heard.  Pulmonary:      Effort: Pulmonary effort is normal. No respiratory distress.      Breath sounds: Examination of the right-lower field reveals decreased breath sounds. Examination of the left-lower field reveals decreased breath sounds. Decreased breath sounds present.   Chest:   Breasts:      Right: No supraclavicular adenopathy.      Left: No supraclavicular adenopathy.     Abdominal:      General: Bowel sounds are decreased. There is no distension.      Palpations: Abdomen is soft. There is no mass.      Tenderness: There is no abdominal tenderness.   Musculoskeletal:      Cervical back: Normal range of motion.      Thoracic back: Tenderness present. Decreased range of motion.      Lumbar back: Tenderness present. Decreased range of motion.   Lymphadenopathy:      Cervical: No cervical adenopathy.      Upper Body:      Right upper body: No supraclavicular adenopathy.      Left upper body: No supraclavicular adenopathy.   Skin:     General: Skin is warm and dry.      Capillary Refill: Capillary refill takes less than 2 seconds.      Findings: No rash.   Neurological:      Mental Status: She is alert and oriented to person, place, and time.      Sensory: No sensory deficit.      Coordination: Coordination normal.      Gait: Gait normal.   Psychiatric:         Mood and Affect: Mood is not anxious or depressed.         Speech: Speech normal.         Behavior: Behavior normal.         Thought Content: Thought content normal.         Judgment: Judgment normal.       Significant Labs: All pertinent labs within the past 24 hours have been reviewed.  CBC:   Recent Labs   Lab 05/12/22  0730 05/13/22  0559   WBC 22.31* 18.96*   HGB 6.2* 7.0*   HCT 18.8* 21.5*    453*     CMP:   Recent Labs   Lab  05/12/22  0730   *   K 4.7      CO2 26   GLU 93   BUN 10   CREATININE 0.5   CALCIUM 7.3*   PROT 4.8*   ALBUMIN 1.2*   BILITOT 0.7   ALKPHOS 99   AST 15   ALT 12   ANIONGAP 7*   EGFRNONAA >60       Significant Imaging: I have reviewed all pertinent imaging results/findings within the past 24 hours.      Assessment/Plan:      * Bacteremia  Gram positive   Infectious disease consulted  Continue broad spectrum intravenous antibiotic(s) given h/o ivdu  Remains febrile and leukocytosis persists  5/8/22 The patient remained afebrile overnight. Currently on cefepime/vanc/flagyl. Blood cx are growing staph aureus. The patient refused the MRI lumbar spine overnight d/t being unable to lay flat from back pain. Will give pain meds and attempt to get the MRI today to r/o spinal abscess. The ECHO showed a 1.3 x 1.5 cm elongated, mobile echodensity seen on the tricuspid valve consistent with vegetation, CT surgery was consulted. Will need a SUSANNAH. Infectious disease is consulted. Will repeat blood cx today.   5/9/22 Repeat blood cx are still positive. Sensitivities are back and Staph is sensitive to oxacillin, will D/C vanc/cefepime. Infectious disease is following   5/10/22 Repeat blood cx are +. Continue IV oxacillin and flagyl. Infectious disease is consulted. IR consulted to evaluate possible paraspinous muscle myositis versus abscess. Recommend IR drainage. Continue current management.    5/11/22 The patient reports pain is still not well controlled at times. The patient reports that she is very anxious about the upcoming SUSANNAH and IR drainage. SUSANNAH was pushed back to this afternoon because the patient ate candy this AM. Continue treatment with IV oxacillin and flagyl. Infectious disease is following. WBC trended down to 31K. Continue current management.  5/12/22 The patients WBC improved to 22K overnight. The patient remains on continuous oxacillin infusion. Infectious disease is following.     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 IR consulted to evaluate possible paraspinous muscle myositis versus abscess.    --Approximately 1 ml of purulent drainage aspirated from back 5/11 per IR, growing gram + cocci.    --Continue current regimen      Lumbar stenosis without neurogenic claudication        Subacute bacterial endocarditis        Vegetation of heart valve  ECHO showed a 1.3 x 1.5 cm elongated, mobile echodensity seen on the tricuspid valve consistent with vegetation, CT surgery was consulted.     --Will need a SUSANNAH.  -- Infectious disease is consulted.  -- repeat blood cx NGTD.  -- Continue IV ABX.   --Sensitivities are back and Staph is sensitive to oxacillin, D/C vanc/cefepime.   --Cardiology consulted and plans for a SUSANNAH.   --CT surgery following and recommends continuing medical management with appropriate ABX, no surgical intervention at this time.      Chronic pulmonary heart disease  - Pulmonology following       Parapneumonic effusion  Pulmonology consulted  Status post thoracentesis  Fluid studies pending  5/11/22 IR to collect sample and send for culture today    IVDU (intravenous drug user)   Will ordet TTE to r/o IE  Will order MRI lumbar wwo   Cont broad spectrum IVAB     5/7   Studies pending for additional sites of infection given h/o ivdu  5/8/22  on cessation     Severe sepsis  This patient does have evidence of infective focus  My overall impression is sepsis. Vital signs were reviewed and noted in progress note.  Antibiotics given-   Antibiotics (From admission, onward)            Start     Stop Route Frequency Ordered    05/09/22 1100  oxacillin 12 g in  mL CONTINUOUS INFUSION         -- IV Every 24 hours (non-standard times) 05/09/22 0932    05/07/22 2100  mupirocin 2 % ointment          05/12 2059 Nasl 2 times daily 05/07/22 1646        Cultures were taken-   Microbiology Results (last 7 days)     Procedure Component Value Units Date/Time    Aerobic culture [[id number]]  (Abnormal) Collected: 05/11/22 1440    Order Status: Completed Specimen: Abscess from Back Updated: 05/13/22 0811     Aerobic Bacterial Culture STAPHYLOCOCCUS AUREUS  Many  Susceptibility pending      Culture, Anaerobe [[id number]] Collected: 05/11/22 1440    Order Status: Completed Specimen: Abscess from Back Updated: 05/13/22 0719     Anaerobic Culture Culture in progress    Blood culture [[id number]] Collected: 05/11/22 1213    Order Status: Completed Specimen: Blood Updated: 05/13/22 0612     Blood Culture, Routine No Growth to date      No Growth to date    Blood culture [[id number]] Collected: 05/11/22 1213    Order Status: Completed Specimen: Blood Updated: 05/13/22 0612     Blood Culture, Routine No Growth to date      No Growth to date    Gram stain [[id number]] Collected: 05/11/22 1440    Order Status: Completed Specimen: Abscess from Back Updated: 05/12/22 0306     Gram Stain Result Few WBC's      Few Gram positive cocci    Gram stain [[id number]]     Order Status: Canceled Specimen: Joint Fluid from Back     Blood culture [[id number]]  (Abnormal) Collected: 05/08/22 1516    Order Status: Completed Specimen: Blood from Peripheral, Right Hand Updated: 05/11/22 1009     Blood Culture, Routine Gram stain aer bottle: Gram positive cocci in clusters resembling Staph       Results called to and read back by: Chasity Raymundo RN  05/09/2022  11:31      STAPHYLOCOCCUS AUREUS  ID consult required at [facility].[place] and [place] locations.  For susceptibility see order #[id number]      Narrative:      Collection has been rescheduled by [email or web address] at 05/08/2022 13:22 Reason:   Pt in mri will be there after another hour [id number]  Collection has been rescheduled by [email or web address] at 05/08/2022 13:22 Reason:   Pt in mri will be there after another  hour [id number]    Blood culture [[id number]]  (Abnormal) Collected: 05/08/22 1515    Order Status: Completed Specimen: Blood from Peripheral, Left Arm Updated: 05/11/22 1009     Blood Culture, Routine Gram stain aer bottle: Gram positive cocci in clusters resembling Staph      Results called to and read back by: Chasity Raymundo RN  05/09/2022  15:40      STAPHYLOCOCCUS AUREUS  ID consult required at [facility].  For susceptibility see order #[id number]      Narrative:      Collection has been rescheduled by [email or web address] at 05/08/2022 13:22 Reason:   Pt in mri will be there after another hour [id number]  Collection has been rescheduled by [email or web address] at 05/08/2022 13:22 Reason:   Pt in mri will be there after another hour [id number]    Blood culture x two cultures. Draw prior to antibiotics. [[id number]]  (Abnormal) Collected: 05/06/22 1617    Order Status: Completed Specimen: Blood from Peripheral, Antecubital, Right Updated: 05/09/22 0824     Blood Culture, Routine Gram stain aer bottle: Gram positive cocci in clusters resembling Staph       Results called to and read back by: Lila Hernandez RN 05/07/2022  10:57      Gram stain missael bottle: Gram positive cocci in clusters resembling Staph       05/07/2022  13:40      STAPHYLOCOCCUS AUREUS  ID consult required at [facility].  For susceptibility see order #[id number]      Narrative:      Aerobic and anaerobic    Blood culture x two cultures. Draw prior to antibiotics. [[id number]]  (Abnormal)  (Susceptibility) Collected: 05/06/22 1616    Order Status: Completed Specimen: Blood from Peripheral, Antecubital, Left Updated: 05/09/22 0823     Blood Culture, Routine Gram stain aer bottle: Gram positive cocci in clusters resembling Staph       Gram stain missael bottle: Gram positive cocci in clusters resembling Staph       Results called to and read back by: Lila Hernandez RN 05/07/2022  10:57      STAPHYLOCOCCUS AUREUS  ID consult required  at [facility] [facility]      Narrative:      Aerobic and anaerobic    Gram stain [[id number]] Collected: 05/07/22 0901    Order Status: Sent Specimen: Body Fluid from Pleural Fluid Updated: 05/07/22 0905    Fungus culture [[id number]] Collected: 05/07/22 0901    Order Status: Sent Specimen: Body Fluid from Pleural Fluid Updated: 05/07/22 0905    AFB culture (includes stain) [[id number]] Collected: 05/07/22 0901    Order Status: Sent Specimen: Body Fluid from Pleural Fluid Updated: 05/07/22 0905        Latest lactate reviewed, they are-  No results for input(s): LACTATE in the last 72 hours.    Organ dysfunction indicated by Acute kidney injury  Source-  lung    Source control Achieved by-   Cont Broad spectrum IVAB     Bacteremia  Infectious disease consulted      PNA (pneumonia)  H/O IVDU   Cont broad spectrum IVAB   F/U blood and sputum cx   Will consult pulmonology for thoracentesis  To r/u empyema     5/7  Status post thoracentesis  Chest x-ray without signs of pneumothorax  Fluid studies pending        Tobacco abuse  - Patient thoroughly counseled on smoking cessation, pt verbalized understanding. Time of counseling 10 minutes.        COPD (chronic obstructive pulmonary disease)  Cont breathing tx prn  Pulmonology following     Lower back pain  Chronic in nature, H/O IVDU  --CT lumbar show spinal stenosis   --No neurological deficit at this time   --MRI lumbar wwo  To r/o spinal abscess -showed moderate to severe spinal canal stenosis with moderate left-sided neural foraminal stenosis, Neurosurgery consulted.     --MRI thoracic spine ordered: Patient refused 5/12 at 1755 citing anxiety and pain, again refused on 5/13 AM for same reasons, advised nurse to alert provider if able to reapprach, will order 1 time dose of IV Ativan.   --The patient reports pain is still not well controlled. The case was discussed with Neurosurgery who recommended adding extended release pain meds, will initiate PO  Oxycodone and titrate for pain relief, when acceptable dose determined will initiate extended release medications.       VTE Risk Mitigation (From admission, onward)         Ordered     enoxaparin injection 40 mg  Daily         05/06/22 2336     IP VTE HIGH RISK PATIENT  Once         05/06/22 2336     Place sequential compression device  Until discontinued         05/06/22 2336                Discharge Planning   YESSICA:      Code Status: Full Code   Is the patient medically ready for discharge?:     Reason for patient still in hospital (select all that apply): Patient trending condition  Discharge Plan A: Home                  Marjan Calderón NP  Department of Hospital Medicine   [facility] - Med Surg

## 2022-05-13 NOTE — PROGRESS NOTES
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     Minor enhancement and endplate irregularity at L4-L5 may be degenerative versus less likely the sequelae of spondylodiscitis.  No significant associated endplate destruction or vertebral body height loss.     Will follow IR guided aspiration, will do MRI of the thoracic regime    05/12- will need spinal thoracic MRI - follow cultures from CT guided aspirate     Subacute bacterial endocarditis  Follow drug susceptibility of staph Aureus - continue Vancomycin, Flagyl.cefepime for now      05/11- isolate is MSSA - will continue Oxacillin, follow SUSANNAH, repeat blood cultures         Anticipated Disposition:     Thank you for your consult. I will follow-up with patient. Please contact us if you have any additional questions.    Sunday Hassan MD  Infectious Disease  [facility]    Subjective:     Principal Problem:Bacteremia    HPI:   38 y/o. female  with a PMHx of asthma, COPD,Bipolar  , IVDU and HLD ,active IVDU  ( heroine , cocaine  and amphetamine )   : CTA chest negative for pe . Multiple nodular and cavitary opacities concerning for septic emboli with larger opacities possibly necrotizing pneumonia. CT cervical  and thoracic did not show any acute finding , CT lumbar Possible progression of degenerative changes most notable at L4-5 with moderate to severe spinal canal stenosis at  this level.  Echo-  · to moderate tricuspid regurgitation.     There is a 1.3 x 1.5 cm elongated, mobile echodensity seen on the tricuspid valve consistent with vegetation. Recommend CT surgery consult.   Blood culture- staph auerus  Component      Latest Ref Rng & Units 5/8/2022 5/7/2022 5/6/2022   WBC      3.90 - 12.70 K/uL 30.90 (H) 27.52 (H) 29.13 (H)     Interval History:  37 year old woman with tricuspid endocarditis .  Blood cultures- MSSA.  She remains bacteremic.  No MR evidence of epidural abscess.     Large, rim enhancing facet synovial cysts arising posteriorly from the right L2-L3 and left L4-L5 facet joints may be degenerative or sequelae of facet septic arthritis.     Minor enhancement and endplate irregularity at L4-L5 may be degenerative versus less likely the sequelae of spondylodiscitis.  No significant associated endplate destruction or vertebral body height loss.     Multilevel degenerative changes of the lumbar spine are most pronounced at L4-L5 with broad-based disc bulge and prominent central disc protrusion contributing to moderate to severe spinal canal stenosis with moderate left-sided neural foraminal stenosis.     Asymmetric left L5-S1 disc bulge contributes to moderate to severe left-sided neural foraminal stenosis and left lateral recess stenosis.   CT chest -Lungs/Pleura: Multiple nodular and cavitary opacities concerning for septic emboli.  Additional larger opacities most notably in the right lung base with some internal clearing possibly related to necrotizing pneumonia.  Moderate loculated right pleural fluid and no definite left pleural fluid.  Empyema not excluded.   05/12- she declined thoracic MRI    She had CT guided aspirtaion -pus aspirated  Review of Systems   Constitutional:  Positive for fatigue and fever. Negative for activity change, appetite change, chills, diaphoresis and unexpected weight change.   HENT: Negative.  Negative for congestion, drooling, facial swelling,  rhinorrhea, sinus pressure, sneezing and sore throat.    Eyes: Negative.  Negative for discharge, redness, itching and visual disturbance.   Respiratory:  Positive for cough, chest tightness and shortness of breath. Negative for apnea, choking, wheezing and stridor.    Cardiovascular:  Negative for chest pain, palpitations and leg swelling.   Gastrointestinal: Negative.  Negative for abdominal distention, abdominal pain, anal bleeding, blood in stool, constipation, diarrhea, nausea and vomiting.   Endocrine: Negative.    Genitourinary: Negative.  Negative for decreased urine volume, difficulty urinating, dysuria, frequency, hematuria, pelvic pain, urgency, vaginal bleeding and vaginal discharge.   Musculoskeletal:  Positive for arthralgias and back pain. Negative for gait problem, joint swelling, myalgias, neck pain and neck stiffness.   Skin: Negative.  Negative for color change, pallor, rash and wound.   Allergic/Immunologic: Negative.    Neurological:  Positive for weakness. Negative for dizziness, seizures, facial asymmetry, speech difficulty, light-headedness, numbness and headaches.   Psychiatric/Behavioral:  Positive for confusion. Negative for agitation, hallucinations and suicidal ideas.    All other systems reviewed and are negative.  Objective:     Vital Signs (Most Recent):  Temp: 98.6 °F (37 °C) (05/13/22 0440)  Pulse: 95 (05/13/22 0506)  Resp: 18 (05/13/22 0506)  BP: (!) 116/58 (05/13/22 0440)  SpO2: (!) 92 % (05/13/22 0440)   Vital Signs (24h Range):  Temp:  [98.3 °F (36.8 °C)-99.8 °F (37.7 °C)] 98.6 °F (37 °C)  Pulse:  [] 95  Resp:  [14-20] 18  SpO2:  [91 %-97 %] 92 %  BP: ()/(52-59) 116/58     Weight: 95.1 kg (209 lb 10.5 oz)  Body mass index is 34.89 kg/m².    Estimated Creatinine Clearance: 172.2 mL/min (based on SCr of 0.5 mg/dL).    Physical Exam  Vitals and nursing note reviewed. Chaperone present: -.   Constitutional:       General: She is not in acute distress.     Appearance: She  is well-developed. She is ill-appearing. She is not toxic-appearing.   HENT:      Head: Normocephalic and atraumatic.   Eyes:      Conjunctiva/sclera: Conjunctivae normal.      Pupils: Pupils are equal, round, and reactive to light.   Cardiovascular:      Rate and Rhythm: Normal rate and regular rhythm.      Heart sounds: Normal heart sounds. No murmur heard.  Pulmonary:      Effort: Pulmonary effort is normal. No respiratory distress.      Breath sounds: Normal breath sounds.   Abdominal:      General: Bowel sounds are normal. There is no distension.      Palpations: Abdomen is soft.      Tenderness: There is no abdominal tenderness.   Musculoskeletal:         General: Swelling and tenderness present. No deformity. Normal range of motion.      Cervical back: Normal range of motion and neck supple.      Right lower leg: No edema.      Left lower leg: No edema.   Skin:     General: Skin is warm and dry.      Coloration: Skin is pale.      Findings: Erythema present.   Neurological:      Mental Status: She is alert and oriented to person, place, and time.      Motor: Weakness present.      Deep Tendon Reflexes: Reflexes are normal and symmetric.   Psychiatric:         Behavior: Behavior normal.       Significant Labs: Blood Culture:   Recent Labs   Lab 05/06/22  1616 05/06/22  1617 05/08/22  1515 05/08/22  1516 05/11/22  1213   LABBLOO Gram stain aer bottle: Gram positive cocci in clusters resembling Staph   Gram stain missael bottle: Gram positive cocci in clusters resembling Staph   Results called to and read back by: Lila Hernandez RN 05/07/2022  10:57  STAPHYLOCOCCUS AUREUS  ID consult required at [facility] [place] and [facility].  * Gram stain aer bottle: Gram positive cocci in clusters resembling Staph   Results called to and read back by: Lila Hernandez RN 05/07/2022  10:57  Gram stain missael bottle: Gram positive cocci in clusters resembling Staph   05/07/2022  13:40  STAPHYLOCOCCUS AUREUS  ID consult  required at [facility].  For susceptibility see order #[id number]  * Gram stain aer bottle: Gram positive cocci in clusters resembling Staph  Results called to and read back by: Chasity Raymundo RN  05/09/2022  15:40  STAPHYLOCOCCUS AUREUS  ID consult required at [facility].  For susceptibility see order #[id number]  * Gram stain aer bottle: Gram positive cocci in clusters resembling Staph   Results called to and read back by: Chasity Raymundo RN  05/09/2022  11:31  STAPHYLOCOCCUS AUREUS  ID consult required at [facility].  For susceptibility see order #[id number]  * No Growth to date  No Growth to date       BMP:   Recent Labs   Lab 05/12/22  0730   GLU 93   *   K 4.7      CO2 26   BUN 10   CREATININE 0.5   CALCIUM 7.3*       CMP:   Recent Labs   Lab 05/11/22  0650 05/12/22  0730   * 134*   K 4.7 4.7    101   CO2 23 26   GLU 98 93   BUN 12 10   CREATININE 0.5 0.5   CALCIUM 7.0* 7.3*   PROT 4.7* 4.8*   ALBUMIN 1.2* 1.2*   BILITOT 0.7 0.7   ALKPHOS 77 99   AST 21 15   ALT 13 12   ANIONGAP 9 7*   EGFRNONAA >60 >60         Significant Imaging: CT: I have reviewed all pertinent results/findings within the past 24 hours:

## 2022-05-13 NOTE — SUBJECTIVE & OBJECTIVE
Interval History: continued c/o SOB, weakness, anxiety and pain. Regimen adjusted.     Review of Systems   Constitutional:  Positive for activity change, appetite change and fatigue. Negative for chills, diaphoresis, fever and unexpected weight change.   HENT:  Negative for congestion, hearing loss, mouth sores, postnasal drip, rhinorrhea, sore throat and trouble swallowing.    Eyes:  Negative for discharge and visual disturbance.   Respiratory:  Positive for shortness of breath. Negative for cough and chest tightness.    Cardiovascular:  Negative for chest pain, palpitations and leg swelling.   Gastrointestinal:  Positive for abdominal distention and abdominal pain. Negative for blood in stool, constipation, diarrhea, nausea and vomiting.   Endocrine: Negative for cold intolerance and heat intolerance.   Genitourinary:  Negative for difficulty urinating, dyspareunia, flank pain and hematuria.   Musculoskeletal:  Positive for back pain and myalgias. Negative for arthralgias.   Skin: Negative.    Neurological:  Positive for weakness and headaches. Negative for dizziness and light-headedness.   Hematological:  Negative for adenopathy. Does not bruise/bleed easily.   Psychiatric/Behavioral:  Positive for agitation and sleep disturbance. Negative for behavioral problems and confusion. The patient is nervous/anxious.    Objective:     Vital Signs (Most Recent):  Temp: 98.5 °F (36.9 °C) (05/13/22 1349)  Pulse: 90 (05/13/22 1349)  Resp: 16 (05/13/22 1349)  BP: 115/67 (05/13/22 1349)  SpO2: 98 % (05/13/22 1349) Vital Signs (24h Range):  Temp:  [98.3 °F (36.8 °C)-99.8 °F (37.7 °C)] 98.5 °F (36.9 °C)  Pulse:  [] 90  Resp:  [14-20] 16  SpO2:  [91 %-98 %] 98 %  BP: ()/(53-68) 115/67     Weight: 95.1 kg (209 lb 10.5 oz)  Body mass index is 34.89 kg/m².    Intake/Output Summary (Last 24 hours) at 5/13/2022 1437  Last data filed at 5/13/2022 1330  Gross per 24 hour   Intake 8127.74 ml   Output --   Net 8127.74 ml       Physical Exam  Vitals and nursing note reviewed.   Constitutional:       General: She is not in acute distress.     Appearance: She is well-developed. She is ill-appearing.   HENT:      Head: Normocephalic and atraumatic.      Right Ear: Hearing and external ear normal.      Left Ear: Hearing and external ear normal.      Nose: No rhinorrhea.      Right Sinus: No maxillary sinus tenderness or frontal sinus tenderness.      Left Sinus: No maxillary sinus tenderness or frontal sinus tenderness.      Mouth/Throat:      Mouth: No oral lesions.      Pharynx: Uvula midline.   Eyes:      General:         Right eye: No discharge.         Left eye: No discharge.      Conjunctiva/sclera: Conjunctivae normal.      Pupils: Pupils are equal, round, and reactive to light.   Neck:      Thyroid: No thyromegaly.      Vascular: No carotid bruit.      Trachea: No tracheal deviation.   Cardiovascular:      Rate and Rhythm: Regular rhythm. Tachycardia present.      Pulses:           Dorsalis pedis pulses are 2+ on the right side and 2+ on the left side.      Heart sounds: Normal heart sounds, S1 normal and S2 normal. No murmur heard.  Pulmonary:      Effort: Pulmonary effort is normal. No respiratory distress.      Breath sounds: Examination of the right-lower field reveals decreased breath sounds. Examination of the left-lower field reveals decreased breath sounds. Decreased breath sounds present.   Chest:   Breasts:      Right: No supraclavicular adenopathy.      Left: No supraclavicular adenopathy.     Abdominal:      General: Bowel sounds are decreased. There is no distension.      Palpations: Abdomen is soft. There is no mass.      Tenderness: There is no abdominal tenderness.   Musculoskeletal:      Cervical back: Normal range of motion.      Thoracic back: Tenderness present. Decreased range of motion.      Lumbar back: Tenderness present. Decreased range of motion.   Lymphadenopathy:      Cervical: No cervical adenopathy.       Upper Body:      Right upper body: No supraclavicular adenopathy.      Left upper body: No supraclavicular adenopathy.   Skin:     General: Skin is warm and dry.      Capillary Refill: Capillary refill takes less than 2 seconds.      Findings: No rash.   Neurological:      Mental Status: She is alert and oriented to person, place, and time.      Sensory: No sensory deficit.      Coordination: Coordination normal.      Gait: Gait normal.   Psychiatric:         Mood and Affect: Mood is not anxious or depressed.         Speech: Speech normal.         Behavior: Behavior normal.         Thought Content: Thought content normal.         Judgment: Judgment normal.       Significant Labs: All pertinent labs within the past 24 hours have been reviewed.  CBC:   Recent Labs   Lab 05/12/22  0730 05/13/22  0559   WBC 22.31* 18.96*   HGB 6.2* 7.0*   HCT 18.8* 21.5*    453*     CMP:   Recent Labs   Lab 05/12/22  0730   *   K 4.7      CO2 26   GLU 93   BUN 10   CREATININE 0.5   CALCIUM 7.3*   PROT 4.8*   ALBUMIN 1.2*   BILITOT 0.7   ALKPHOS 99   AST 15   ALT 12   ANIONGAP 7*   EGFRNONAA >60       Significant Imaging: I have reviewed all pertinent imaging results/findings within the past 24 hours.

## 2022-05-13 NOTE — SUBJECTIVE & OBJECTIVE
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  Blood cultures- MSSA.  She remains bacteremic.  No MR evidence of epidural abscess.     Large, rim enhancing facet synovial cysts arising posteriorly from the right L2-L3 and left L4-L5 facet joints may be degenerative or sequelae of facet septic arthritis.     Minor enhancement and endplate irregularity at L4-L5 may be degenerative versus less likely the sequelae of spondylodiscitis.  No significant associated endplate destruction or vertebral body height loss.     Multilevel degenerative changes of the lumbar spine are most pronounced at L4-L5 with broad-based disc bulge and prominent central disc protrusion contributing to moderate to severe spinal canal stenosis with moderate left-sided neural foraminal stenosis.     Asymmetric left L5-S1 disc bulge contributes to moderate to severe left-sided neural foraminal stenosis and left lateral recess stenosis.   CT chest -Lungs/Pleura: Multiple nodular and cavitary opacities concerning for septic emboli.  Additional larger opacities most notably in the right lung base with some internal clearing possibly related to necrotizing pneumonia.  Moderate loculated right pleural fluid and no definite left pleural fluid.  Empyema not excluded.   05/12- she declined thoracic MRI    She had CT guided aspirtaion -pus aspirated  Review of Systems   Constitutional:  Positive for fatigue and fever. Negative for activity change, appetite change, chills, diaphoresis and unexpected weight change.   HENT: Negative.  Negative for congestion, drooling, facial swelling, rhinorrhea, sinus pressure, sneezing and sore throat.    Eyes: Negative.  Negative for discharge, redness, itching and visual disturbance.   Respiratory:  Positive for cough, chest tightness and shortness of breath. Negative for apnea, choking, wheezing and stridor.    Cardiovascular:  Negative for chest pain, palpitations and leg swelling.    Gastrointestinal: Negative.  Negative for abdominal distention, abdominal pain, anal bleeding, blood in stool, constipation, diarrhea, nausea and vomiting.   Endocrine: Negative.    Genitourinary: Negative.  Negative for decreased urine volume, difficulty urinating, dysuria, frequency, hematuria, pelvic pain, urgency, vaginal bleeding and vaginal discharge.   Musculoskeletal:  Positive for arthralgias and back pain. Negative for gait problem, joint swelling, myalgias, neck pain and neck stiffness.   Skin: Negative.  Negative for color change, pallor, rash and wound.   Allergic/Immunologic: Negative.    Neurological:  Positive for weakness. Negative for dizziness, seizures, facial asymmetry, speech difficulty, light-headedness, numbness and headaches.   Psychiatric/Behavioral:  Positive for confusion. Negative for agitation, hallucinations and suicidal ideas.    All other systems reviewed and are negative.  Objective:     Vital Signs (Most Recent):  Temp: 98.6 °F (37 °C) (05/13/22 0440)  Pulse: 95 (05/13/22 0506)  Resp: 18 (05/13/22 0506)  BP: (!) 116/58 (05/13/22 0440)  SpO2: (!) 92 % (05/13/22 0440)   Vital Signs (24h Range):  Temp:  [98.3 °F (36.8 °C)-99.8 °F (37.7 °C)] 98.6 °F (37 °C)  Pulse:  [] 95  Resp:  [14-20] 18  SpO2:  [91 %-97 %] 92 %  BP: ()/(52-59) 116/58     Weight: 95.1 kg (209 lb 10.5 oz)  Body mass index is 34.89 kg/m².    Estimated Creatinine Clearance: 172.2 mL/min (based on SCr of 0.5 mg/dL).    Physical Exam  Vitals and nursing note reviewed. Chaperone present: -.   Constitutional:       General: She is not in acute distress.     Appearance: She is well-developed. She is ill-appearing. She is not toxic-appearing.   HENT:      Head: Normocephalic and atraumatic.   Eyes:      Conjunctiva/sclera: Conjunctivae normal.      Pupils: Pupils are equal, round, and reactive to light.   Cardiovascular:      Rate and Rhythm: Normal rate and regular rhythm.      Heart sounds: Normal heart  sounds. No murmur heard.  Pulmonary:      Effort: Pulmonary effort is normal. No respiratory distress.      Breath sounds: Normal breath sounds.   Abdominal:      General: Bowel sounds are normal. There is no distension.      Palpations: Abdomen is soft.      Tenderness: There is no abdominal tenderness.   Musculoskeletal:         General: Swelling and tenderness present. No deformity. Normal range of motion.      Cervical back: Normal range of motion and neck supple.      Right lower leg: No edema.      Left lower leg: No edema.   Skin:     General: Skin is warm and dry.      Coloration: Skin is pale.      Findings: Erythema present.   Neurological:      Mental Status: She is alert and oriented to person, place, and time.      Motor: Weakness present.      Deep Tendon Reflexes: Reflexes are normal and symmetric.   Psychiatric:         Behavior: Behavior normal.       Significant Labs: Blood Culture:   Recent Labs   Lab 05/06/22  1616 05/06/22  1617 05/08/22  1515 05/08/22  1516 05/11/22  1213   LABBLOO Gram stain aer bottle: Gram positive cocci in clusters resembling Staph   Gram stain missael bottle: Gram positive cocci in clusters resembling Staph   Results called to and read back by: Lila Hernandez RN 05/07/2022  10:57  STAPHYLOCOCCUS AUREUS  ID consult required at [facility].  * Gram stain aer bottle: Gram positive cocci in clusters resembling Staph   Results called to and read back by: Lila Hernandez RN 05/07/2022  10:57  Gram stain missael bottle: Gram positive cocci in clusters resembling Staph   05/07/2022  13:40  STAPHYLOCOCCUS AUREUS  ID consult required at [facility].  For susceptibility see order #[id number]  * Gram stain aer bottle: Gram positive cocci in clusters resembling Staph  Results called to and read back by: Chasity Raymundo RN  05/09/2022  15:40  STAPHYLOCOCCUS AUREUS  ID consult required at [facility]  locations.  For susceptibility see order #[id number]  * Gram stain aer bottle: Gram positive cocci in clusters resembling Staph   Results called to and read back by: Chasity Raymundo RN  05/09/2022  11:31  STAPHYLOCOCCUS AUREUS  ID consult required at [facility] and [place] locations.  For susceptibility see order #[id number]  * No Growth to date  No Growth to date       BMP:   Recent Labs   Lab 05/12/22  0730   GLU 93   *   K 4.7      CO2 26   BUN 10   CREATININE 0.5   CALCIUM 7.3*       CMP:   Recent Labs   Lab 05/11/22  0650 05/12/22  0730   * 134*   K 4.7 4.7    101   CO2 23 26   GLU 98 93   BUN 12 10   CREATININE 0.5 0.5   CALCIUM 7.0* 7.3*   PROT 4.7* 4.8*   ALBUMIN 1.2* 1.2*   BILITOT 0.7 0.7   ALKPHOS 77 99   AST 21 15   ALT 13 12   ANIONGAP 9 7*   EGFRNONAA >60 >60         Significant Imaging: CT: I have reviewed all pertinent results/findings within the past 24 hours:

## 2022-05-13 NOTE — ASSESSMENT & PLAN NOTE
MRI of the lumbar spine-  Large, rim enhancing facet synovial cysts arising posteriorly from the right L2-L3 and left L4-L5 facet joints may be degenerative or sequelae of facet septic arthritis.     Minor enhancement and endplate irregularity at L4-L5 may be degenerative versus less likely the sequelae of spondylodiscitis.  No significant associated endplate destruction or vertebral body height loss.     Will follow IR guided aspiration, will do MRI of the thoracic regime    05/12- will need spinal thoracic MRI - follow cultures from CT guided aspirate

## 2022-05-13 NOTE — ASSESSMENT & PLAN NOTE
Infectious disease is consulted. IR consulted to evaluate possible paraspinous muscle myositis versus abscess.    --Approximately 1 ml of purulent drainage aspirated from back 5/11 per IR, growing gram + cocci.    --Continue current regimen

## 2022-05-13 NOTE — PROGRESS NOTES
[facility]  Neurosurgery  Progress Note    Subjective:     History of Present Illness: History of Present Illness: 38 y/o. female   chronic history of lower back pain which has been intermittent presented to the ER with a PMHx of asthma, COPD,Bipolar  , IVDU and HLD presented to the ER with a c/o  sob for the last weak which has gotten worse . The SOB is associated with fever , chills , productive cough , back pain  and generalized weakness . She report cough some blood this am .  She is active IVDU  ( heroine , cocaine  and amphetamine ) . She denies any  sick contact , chest pain  , GI/ sx , She also complaning of LE sweeling . Knee pain and B/L LE rash .  We see the diagnosed with Gram-positive cocci in the blood  ER COURSE: CTA chest negative for pe . Multiple nodular and cavitary opacities concerning for septic emboli with larger opacities possibly necrotizing pneumonia. CT cervical  and thoracic did not show any acute finding , CT lumbar Possible progression of degenerative changes most notable at L4-5 with moderate to severe spinal canal stenosis at this level.  She currently complains of pain throughout the lumbar region extending into the flank area with some tenderness difficulties laying on her back and pain with twisting and bending she has some pain referred into the buttocks but denies any pain radiating into the legs feet or toes she denies any numbness or tingling in the feet or toes and no bowel or bladder incontinence at this time      Post-Op Info:  Procedure(s) (LRB):  ECHOCARDIOGRAM,TRANSESOPHAGEAL (N/A)   1 Day Post-Op     Interval History:  Patient undergoing bacteremia workup underwent transesophageal echocardiogram.  Continues to receive IV antibiotics.  Patient with history of drug abuse show requesting narcotics for pain control.  Still having low-grade fevers.  Patient and going transesophageal echo today    Patient encouraged to get up and be more active in proactive in her care  to ambulate to the bathroom and to set up chair more throughout the day and eat her meals despite the pain     Medications:  Continuous Infusions:   sodium chloride 0.9% 125 mL/hr at 05/12/22 2117     Scheduled Meds:   acetaminophen  650 mg Oral Once    diphenhydrAMINE  25 mg Oral Once    enoxaparin  40 mg Subcutaneous Daily    morphine  15 mg Oral Q12H    oxacillin 12 g in  mL CONTINUOUS INFUSION  12 g Intravenous Q24H    traZODone  100 mg Oral QHS     PRN Meds:sodium chloride, sodium chloride, acetaminophen, albuterol-ipratropium, ALPRAZolam, HYDROmorphone, ondansetron, oxyCODONE-acetaminophen, sodium chloride 0.9%, sodium chloride 0.9%, sodium chloride 0.9%     Review of Systems  Objective:     Weight: 95.1 kg (209 lb 10.5 oz)  Body mass index is 34.89 kg/m².  Vital Signs (Most Recent):  Temp: 98.4 °F (36.9 °C) (05/13/22 0816)  Pulse: 95 (05/13/22 0819)  Resp: 16 (05/13/22 0842)  BP: 109/60 (05/13/22 0819)  SpO2: 95 % (05/13/22 0816)   Vital Signs (24h Range):  Temp:  [98.3 °F (36.8 °C)-99.8 °F (37.7 °C)] 98.4 °F (36.9 °C)  Pulse:  [] 95  Resp:  [14-20] 16  SpO2:  [91 %-97 %] 95 %  BP: ()/(52-60) 109/60                  Oxygen Concentration (%):  [28] 28         Physical Exam  Vitals and nursing note reviewed. Exam conducted with a chaperone present.   HENT:      Head: Normocephalic and atraumatic.      Nose: Nose normal.      Mouth/Throat:      Mouth: Mucous membranes are dry.   Eyes:      Extraocular Movements: Extraocular movements intact.      Conjunctiva/sclera: Conjunctivae normal.      Pupils: Pupils are equal, round, and reactive to light.   Cardiovascular:      Rate and Rhythm: Normal rate and regular rhythm.   Pulmonary:      Effort: Pulmonary effort is normal.      Breath sounds: Normal breath sounds.   Abdominal:      General: Abdomen is flat.      Palpations: Abdomen is soft.   Musculoskeletal:         General: Tenderness present.      Cervical back: Normal range of motion  and neck supple.      Lumbar back: Tenderness present. Decreased range of motion. Negative right straight leg raise test and negative left straight leg raise test.        Back:       Comments: Generalized paraspinous muscle tenderness with decreased range of motion with for flexion extension.  Patient is awake alert orient x3 speech clear and fluent cranial nerves are intact upper extremity strength is 5/5 lower extremity strength shows 5-out of 5 with some break away weakness secondary to pain with very slight dorsiflexion plantar extensor weakness   Skin:     General: Skin is warm and dry.   Neurological:      General: No focal deficit present.      Mental Status: She is alert and oriented to person, place, and time. Mental status is at baseline.      Sensory: Sensory deficit present.      Motor: Weakness present.   Psychiatric:         Mood and Affect: Mood normal.         Behavior: Behavior normal.       Physical Exam:  Nursing note and vitals reviewed.    Eyes: Pupils are equal, round, and reactive to light. Conjunctivae are normal.     Cardiovascular: Normal rate and regular rhythm.     Abdominal: Soft.     Psych/Behavior: She is alert. She is oriented to person, place, and time.     Musculoskeletal: Gait is abnormal.        Neck: Range of motion is full. Tone is normal.        Back: Range of motion is full. Tone is normal.        Right Upper Extremities: Tone is normal.        Left Upper Extremities: Tone is normal.       Right Lower Extremities: Range of motion is full.        Left Lower Extremities: Range of motion is full.      Comments: Generalized paraspinous muscle tenderness with decreased range of motion with for flexion extension.  Patient is awake alert orient x3 speech clear and fluent cranial nerves are intact upper extremity strength is 5/5 lower extremity strength shows 5-out of 5 with some break away weakness secondary to pain with very slight dorsiflexion plantar extensor weakness      Neurological:        Cranial nerves: Cranial nerve(s) II, III, IV, V, VI, VII, VIII, IX, X, XI and XII are intact.     Significant Labs:  Recent Labs   Lab 05/12/22  0730   GLU 93   *   K 4.7      CO2 26   BUN 10   CREATININE 0.5   CALCIUM 7.3*       Recent Labs   Lab 05/12/22  0730 05/13/22  0559   WBC 22.31* 18.96*   HGB 6.2* 7.0*   HCT 18.8* 21.5*    453*       No results for input(s): LABPT, INR, APTT in the last 48 hours.  Microbiology Results (last 7 days)       Procedure Component Value Units Date/Time    Aerobic culture [[id number]]  (Abnormal) Collected: 05/11/22 1440    Order Status: Completed Specimen: Abscess from Back Updated: 05/13/22 0811     Aerobic Bacterial Culture STAPHYLOCOCCUS AUREUS  Many  Susceptibility pending      Culture, Anaerobe [[id number]] Collected: 05/11/22 1440    Order Status: Completed Specimen: Abscess from Back Updated: 05/13/22 0719     Anaerobic Culture Culture in progress    Blood culture [[id number]] Collected: 05/11/22 1213    Order Status: Completed Specimen: Blood Updated: 05/13/22 0612     Blood Culture, Routine No Growth to date      No Growth to date    Blood culture [[id number]] Collected: 05/11/22 1213    Order Status: Completed Specimen: Blood Updated: 05/13/22 0612     Blood Culture, Routine No Growth to date      No Growth to date    Gram stain [[id number]] Collected: 05/11/22 1440    Order Status: Completed Specimen: Abscess from Back Updated: 05/12/22 0306     Gram Stain Result Few WBC's      Few Gram positive cocci    Gram stain [[id number]]     Order Status: Canceled Specimen: Joint Fluid from Back     Blood culture [[id number]]  (Abnormal) Collected: 05/08/22 1516    Order Status: Completed Specimen: Blood from Peripheral, Right Hand Updated: 05/11/22 1009     Blood Culture, Routine Gram stain aer bottle: Gram positive cocci in clusters resembling Staph       Results called to and read back by: Chasity Raymundo RN  05/09/2022  11:31       STAPHYLOCOCCUS AUREUS  ID consult required at [facility].  For susceptibility see order #[id number]      Narrative:      Collection has been rescheduled by [email or web address] at 05/08/2022 13:22 Reason:   Pt in mri will be there after another hour [id number]  Collection has been rescheduled by [email or web address] at 05/08/2022 13:22 Reason:   Pt in mri will be there after another hour [id number]    Blood culture [[id number]]  (Abnormal) Collected: 05/08/22 1515    Order Status: Completed Specimen: Blood from Peripheral, Left Arm Updated: 05/11/22 1009     Blood Culture, Routine Gram stain aer bottle: Gram positive cocci in clusters resembling Staph      Results called to and read back by: Chasity Raymundo RN  05/09/2022  15:40      STAPHYLOCOCCUS AUREUS  ID consult required at [facility].  For susceptibility see order #[id number]      Narrative:      Collection has been rescheduled by [email or web address] at 05/08/2022 13:22 Reason:   Pt in mri will be there after another hour [id number]  Collection has been rescheduled by [email or web address] at 05/08/2022 13:22 Reason:   Pt in mri will be there after another hour [id number]    Blood culture x two cultures. Draw prior to antibiotics. [[id number]]  (Abnormal) Collected: 05/06/22 1617    Order Status: Completed Specimen: Blood from Peripheral, Antecubital, Right Updated: 05/09/22 0824     Blood Culture, Routine Gram stain aer bottle: Gram positive cocci in clusters resembling Staph       Results called to and read back by: Lila Hernandez RN 05/07/2022  10:57      Gram stain missael bottle: Gram positive cocci in clusters resembling Staph       05/07/2022  13:40      STAPHYLOCOCCUS AUREUS  ID consult required at [facility].  For susceptibility see order #[id number]      Narrative:      Aerobic and anaerobic    Blood culture x two cultures. Draw prior to antibiotics. [[id number]]  (Abnormal)  (Susceptibility) Collected: 05/06/22 1616    Order Status:  Completed Specimen: Blood from Peripheral, Antecubital, Left Updated: 05/09/22 0823     Blood Culture, Routine Gram stain aer bottle: Gram positive cocci in clusters resembling Staph       Gram stain missael bottle: Gram positive cocci in clusters resembling Staph       Results called to and read back by: Lila Hernandez RN 05/07/2022  10:57      STAPHYLOCOCCUS AUREUS  ID consult required at [facility],[place] and [facility] locations.      Narrative:      Aerobic and anaerobic    Gram stain [[id number]] Collected: 05/07/22 0901    Order Status: Sent Specimen: Body Fluid from Pleural Fluid Updated: 05/07/22 0905    Fungus culture [[id number]] Collected: 05/07/22 0901    Order Status: Sent Specimen: Body Fluid from Pleural Fluid Updated: 05/07/22 0905    AFB culture (includes stain) [[id number]] Collected: 05/07/22 0901    Order Status: Sent Specimen: Body Fluid from Pleural Fluid Updated: 05/07/22 0905          Recent Lab Results         05/13/22 0559 05/12/22 0935        Unit Blood Type Code   5100       Unit Expiration   [id number]       Unit Blood Type   O POS       Baso # 0.06         Basophil % 0.3         CODING SYSTEM   PQIN349       Differential Method Automated         DISPENSE STATUS   TRANSFUSED       Eos # 0.0         Eosinophil % 0.1         Gran # (ANC) 15.2         Gran % 80.3         Group & Rh   O POS       Hematocrit 21.5         Hemoglobin 7.0         Immature Grans (Abs) 0.63  Comment: Mild elevation in immature granulocytes is non specific and   can be seen in a variety of conditions including stress response,   acute inflammation, trauma and pregnancy. Correlation with other   laboratory and clinical findings is essential.           Immature Granulocytes 3.3         INDIRECT SALLY   NEG       Lymph # 2.0         Lymph % 10.5         MCH 27.7         MCHC 32.6         MCV 85         Mono # 1.1         Mono % 5.5         MPV 9.2         nRBC 0         Platelets 453         Product Code   [id number]        RBC 2.53         RDW 18.7         UNIT NUMBER   [id number]       WBC 18.96               All pertinent labs from the last 24 hours have been reviewed.    Significant Diagnostics:  I have reviewed all pertinent imaging results/findings within the past 24 hours.    Assessment/Plan:     Lumbar stenosis without neurogenic claudication  Patient remains stable with no new focal neurologic deficits still reporting significant pain.  Recommended getting patient switched over to longer-acting pain medications instead of the short-acting medications.  Patient with L4-5 broad-based disc protrusion contributing to lumbar stenosis some mechanical lower back pain and mild early neurogenic claudication.  Patient will most likely require surgical intervention in the future.  Patient will need to be cleared of her infection for minimum of 6-8 weeks for she be re-evaluated to discuss surgical options.  Patient with paraspinous muscle abscesses and bacteremia will require 6-8 weeks of IV antibiotics.  Patient may follow up in neurosurgery clinic once she is cleared medically.  No neurosurgical intervention indicated at this time    Patient with L4-5 disc protrusion causing some mechanical back pain minimal leg weakness which has been complicated by her bacteremia and infection in the paraspinous muscles.  Fortunately there is nothing we can do for this patient at this time till she gets cleared of her infection.  At some point she may require lumbar decompression.  Would recommend inpatient rehab or skilled nursing facility help with her pain control patient may follow-up with neurosurgery in 6-8 weeks once her infection is cleared to discuss her treatment options for her lumbar spine.  Patient remains stable neurologically no neurosurgical intervention indicated at this time will sign off. May re-consulted if her condition changes    More than 30minutes of the time spent in counseling and coordination of care including  discussions etiology of diagnosis, pathogenesis of diagnosis, prognosis of diagnosis,, diagnostic results, impression and recommendations, diagnostic studies, management, risks and benefits of treatment, instructions of disease self-management, treatment instructions, follow up requirements, patient and family counseling/involvement in care compliance with treatment regimen. All of the patient's and/or family members questions were answered during this discussion.          Didier Hess MD  Neurosurgery  [facility]

## 2022-05-13 NOTE — SUBJECTIVE & OBJECTIVE
Interval History:  Patient undergoing bacteremia workup underwent transesophageal echocardiogram.  Continues to receive IV antibiotics.  Patient with history of drug abuse show requesting narcotics for pain control.  Still having low-grade fevers.  Patient and going transesophageal echo today    Patient encouraged to get up and be more active in proactive in her care to ambulate to the bathroom and to set up chair more throughout the day and eat her meals despite the pain     Medications:  Continuous Infusions:   sodium chloride 0.9% 125 mL/hr at 05/12/22 2117     Scheduled Meds:   acetaminophen  650 mg Oral Once    diphenhydrAMINE  25 mg Oral Once    enoxaparin  40 mg Subcutaneous Daily    morphine  15 mg Oral Q12H    oxacillin 12 g in  mL CONTINUOUS INFUSION  12 g Intravenous Q24H    traZODone  100 mg Oral QHS     PRN Meds:sodium chloride, sodium chloride, acetaminophen, albuterol-ipratropium, ALPRAZolam, HYDROmorphone, ondansetron, oxyCODONE-acetaminophen, sodium chloride 0.9%, sodium chloride 0.9%, sodium chloride 0.9%     Review of Systems  Objective:     Weight: 95.1 kg (209 lb 10.5 oz)  Body mass index is 34.89 kg/m².  Vital Signs (Most Recent):  Temp: 98.4 °F (36.9 °C) (05/13/22 0816)  Pulse: 95 (05/13/22 0819)  Resp: 16 (05/13/22 0842)  BP: 109/60 (05/13/22 0819)  SpO2: 95 % (05/13/22 0816)   Vital Signs (24h Range):  Temp:  [98.3 °F (36.8 °C)-99.8 °F (37.7 °C)] 98.4 °F (36.9 °C)  Pulse:  [] 95  Resp:  [14-20] 16  SpO2:  [91 %-97 %] 95 %  BP: ()/(52-60) 109/60                  Oxygen Concentration (%):  [28] 28         Physical Exam  Vitals and nursing note reviewed. Exam conducted with a chaperone present.   HENT:      Head: Normocephalic and atraumatic.      Nose: Nose normal.      Mouth/Throat:      Mouth: Mucous membranes are dry.   Eyes:      Extraocular Movements: Extraocular movements intact.      Conjunctiva/sclera: Conjunctivae normal.      Pupils: Pupils are equal, round, and  reactive to light.   Cardiovascular:      Rate and Rhythm: Normal rate and regular rhythm.   Pulmonary:      Effort: Pulmonary effort is normal.      Breath sounds: Normal breath sounds.   Abdominal:      General: Abdomen is flat.      Palpations: Abdomen is soft.   Musculoskeletal:         General: Tenderness present.      Cervical back: Normal range of motion and neck supple.      Lumbar back: Tenderness present. Decreased range of motion. Negative right straight leg raise test and negative left straight leg raise test.        Back:       Comments: Generalized paraspinous muscle tenderness with decreased range of motion with for flexion extension.  Patient is awake alert orient x3 speech clear and fluent cranial nerves are intact upper extremity strength is 5/5 lower extremity strength shows 5-out of 5 with some break away weakness secondary to pain with very slight dorsiflexion plantar extensor weakness   Skin:     General: Skin is warm and dry.   Neurological:      General: No focal deficit present.      Mental Status: She is alert and oriented to person, place, and time. Mental status is at baseline.      Sensory: Sensory deficit present.      Motor: Weakness present.   Psychiatric:         Mood and Affect: Mood normal.         Behavior: Behavior normal.       Physical Exam:  Nursing note and vitals reviewed.    Eyes: Pupils are equal, round, and reactive to light. Conjunctivae are normal.     Cardiovascular: Normal rate and regular rhythm.     Abdominal: Soft.     Psych/Behavior: She is alert. She is oriented to person, place, and time.     Musculoskeletal: Gait is abnormal.        Neck: Range of motion is full. Tone is normal.        Back: Range of motion is full. Tone is normal.        Right Upper Extremities: Tone is normal.        Left Upper Extremities: Tone is normal.       Right Lower Extremities: Range of motion is full.        Left Lower Extremities: Range of motion is full.      Comments: Generalized  paraspinous muscle tenderness with decreased range of motion with for flexion extension.  Patient is awake alert orient x3 speech clear and fluent cranial nerves are intact upper extremity strength is 5/5 lower extremity strength shows 5-out of 5 with some break away weakness secondary to pain with very slight dorsiflexion plantar extensor weakness     Neurological:        Cranial nerves: Cranial nerve(s) II, III, IV, V, VI, VII, VIII, IX, X, XI and XII are intact.     Significant Labs:  Recent Labs   Lab 05/12/22  0730   GLU 93   *   K 4.7      CO2 26   BUN 10   CREATININE 0.5   CALCIUM 7.3*       Recent Labs   Lab 05/12/22  0730 05/13/22  0559   WBC 22.31* 18.96*   HGB 6.2* 7.0*   HCT 18.8* 21.5*    453*       No results for input(s): LABPT, INR, APTT in the last 48 hours.  Microbiology Results (last 7 days)       Procedure Component Value Units Date/Time    Aerobic culture [[id number]]  (Abnormal) Collected: 05/11/22 1440    Order Status: Completed Specimen: Abscess from Back Updated: 05/13/22 0811     Aerobic Bacterial Culture STAPHYLOCOCCUS AUREUS  Many  Susceptibility pending      Culture, Anaerobe [[id number]] Collected: 05/11/22 1440    Order Status: Completed Specimen: Abscess from Back Updated: 05/13/22 0719     Anaerobic Culture Culture in progress    Blood culture [[id number]] Collected: 05/11/22 1213    Order Status: Completed Specimen: Blood Updated: 05/13/22 0612     Blood Culture, Routine No Growth to date      No Growth to date    Blood culture [[id number]] Collected: 05/11/22 1213    Order Status: Completed Specimen: Blood Updated: 05/13/22 0612     Blood Culture, Routine No Growth to date      No Growth to date    Gram stain [[id number]] Collected: 05/11/22 1440    Order Status: Completed Specimen: Abscess from Back Updated: 05/12/22 0306     Gram Stain Result Few WBC's      Few Gram positive cocci    Gram stain [[id number]]     Order Status: Canceled Specimen: Joint Fluid from Back      Blood culture [[id number]]  (Abnormal) Collected: 05/08/22 1516    Order Status: Completed Specimen: Blood from Peripheral, Right Hand Updated: 05/11/22 1009     Blood Culture, Routine Gram stain aer bottle: Gram positive cocci in clusters resembling Staph       Results called to and read back by: Chasity Raymundo RN  05/09/2022  11:31      STAPHYLOCOCCUS AUREUS  ID consult required at [facility].  For susceptibility see order #[id number]      Narrative:      Collection has been rescheduled by [email or web address] at 05/08/2022 13:22 Reason:   Pt in mri will be there after another hour [id number]  Collection has been rescheduled by [email or web address] at 05/08/2022 13:22 Reason:   Pt in mri will be there after another hour [id number]    Blood culture [[id number]]  (Abnormal) Collected: 05/08/22 1515    Order Status: Completed Specimen: Blood from Peripheral, Left Arm Updated: 05/11/22 1009     Blood Culture, Routine Gram stain aer bottle: Gram positive cocci in clusters resembling Staph      Results called to and read back by: Chasity Raymundo RN  05/09/2022  15:40      STAPHYLOCOCCUS AUREUS  ID consult required at [facility].  For susceptibility see order #[id number]      Narrative:      Collection has been rescheduled by [email or web address] at 05/08/2022 13:22 Reason:   Pt in mri will be there after another hour [id number]  Collection has been rescheduled by [email or web address] at 05/08/2022 13:22 Reason:   Pt in mri will be there after another hour [id number]    Blood culture x two cultures. Draw prior to antibiotics. [[id number]]  (Abnormal) Collected: 05/06/22 1617    Order Status: Completed Specimen: Blood from Peripheral, Antecubital, Right Updated: 05/09/22 0824     Blood Culture, Routine Gram stain aer bottle: Gram positive cocci in clusters resembling Staph       Results called to and read back by: Lila Hernandez RN 05/07/2022  10:57      Gram stain missael bottle: Gram positive cocci in clusters resembling Staph        05/07/2022  13:40      STAPHYLOCOCCUS AUREUS  ID consult required at [facility].  For susceptibility see order #[id number]      Narrative:      Aerobic and anaerobic    Blood culture x two cultures. Draw prior to antibiotics. [[id number]]  (Abnormal)  (Susceptibility) Collected: 05/06/22 1616    Order Status: Completed Specimen: Blood from Peripheral, Antecubital, Left Updated: 05/09/22 0823     Blood Culture, Routine Gram stain aer bottle: Gram positive cocci in clusters resembling Staph       Gram stain missael bottle: Gram positive cocci in clusters resembling Staph       Results called to and read back by: Lila Hernandez RN 05/07/2022  10:57      STAPHYLOCOCCUS AUREUS  ID consult required at [facility].      Narrative:      Aerobic and anaerobic    Gram stain [[id number]] Collected: 05/07/22 0901    Order Status: Sent Specimen: Body Fluid from Pleural Fluid Updated: 05/07/22 0905    Fungus culture [[id number]] Collected: 05/07/22 0901    Order Status: Sent Specimen: Body Fluid from Pleural Fluid Updated: 05/07/22 0905    AFB culture (includes stain) [[id number]] Collected: 05/07/22 0901    Order Status: Sent Specimen: Body Fluid from Pleural Fluid Updated: 05/07/22 0905          Recent Lab Results         05/13/22 0559   05/12/22 0935        Unit Blood Type Code   5100       Unit Expiration   [id number]       Unit Blood Type   O POS       Baso # 0.06         Basophil % 0.3         CODING SYSTEM   HAOL772       Differential Method Automated         DISPENSE STATUS   TRANSFUSED       Eos # 0.0         Eosinophil % 0.1         Gran # (ANC) 15.2         Gran % 80.3         Group & Rh   O POS       Hematocrit 21.5         Hemoglobin 7.0         Immature Grans (Abs) 0.63  Comment: Mild elevation in immature granulocytes is non specific and   can be seen in a variety of conditions including stress response,   acute inflammation, trauma and pregnancy. Correlation  with other   laboratory and clinical findings is essential.           Immature Granulocytes 3.3         INDIRECT SALLY   NEG       Lymph # 2.0         Lymph % 10.5         MCH 27.7         MCHC 32.6         MCV 85         Mono # 1.1         Mono % 5.5         MPV 9.2         nRBC 0         Platelets 453         Product Code   [id number]       RBC 2.53         RDW 18.7         UNIT NUMBER   [id number]       WBC 18.96               All pertinent labs from the last 24 hours have been reviewed.    Significant Diagnostics:  I have reviewed all pertinent imaging results/findings within the past 24 hours.

## 2022-05-13 NOTE — ASSESSMENT & PLAN NOTE
Patient remains stable with no new focal neurologic deficits still reporting significant pain.  Recommended getting patient switched over to longer-acting pain medications instead of the short-acting medications.  Patient with L4-5 broad-based disc protrusion contributing to lumbar stenosis some mechanical lower back pain and mild early neurogenic claudication.  Patient will most likely require surgical intervention in the future.  Patient will need to be cleared of her infection for minimum of 6-8 weeks for she be re-evaluated to discuss surgical options.  Patient with paraspinous muscle abscesses and bacteremia will require 6-8 weeks of IV antibiotics.  Patient may follow up in neurosurgery clinic once she is cleared medically.  No neurosurgical intervention indicated at this time    Patient with L4-5 disc protrusion causing some mechanical back pain minimal leg weakness which has been complicated by her bacteremia and infection in the paraspinous muscles.  Fortunately there is nothing we can do for this patient at this time till she gets cleared of her infection.  At some point she may require lumbar decompression.  Would recommend inpatient rehab or skilled nursing facility help with her pain control patient may follow-up with neurosurgery in 6-8 weeks once her infection is cleared to discuss her treatment options for her lumbar spine.  Patient remains stable neurologically no neurosurgical intervention indicated at this time will sign off. May re-consulted if her condition changes    More than 30minutes of the time spent in counseling and coordination of care including discussions etiology of diagnosis, pathogenesis of diagnosis, prognosis of diagnosis,, diagnostic results, impression and recommendations, diagnostic studies, management, risks and benefits of treatment, instructions of disease self-management, treatment instructions, follow up requirements, patient and family counseling/involvement in care  compliance with treatment regimen. All of the patient's and/or family members questions were answered during this discussion.

## 2022-05-13 NOTE — ASSESSMENT & PLAN NOTE
This patient does have evidence of infective focus  My overall impression is sepsis. Vital signs were reviewed and noted in progress note.  Antibiotics given-   Antibiotics (From admission, onward)            Start     Stop Route Frequency Ordered    05/09/22 1100  oxacillin 12 g in  mL CONTINUOUS INFUSION         -- IV Every 24 hours (non-standard times) 05/09/22 0932    05/07/22 2100  mupirocin 2 % ointment         05/12 2059 Nasl 2 times daily 05/07/22 1646        Cultures were taken-   Microbiology Results (last 7 days)     Procedure Component Value Units Date/Time    Aerobic culture [[id number]]  (Abnormal) Collected: 05/11/22 1440    Order Status: Completed Specimen: Abscess from Back Updated: 05/13/22 0811     Aerobic Bacterial Culture STAPHYLOCOCCUS AUREUS  Many  Susceptibility pending      Culture, Anaerobe [[id number]] Collected: 05/11/22 1440    Order Status: Completed Specimen: Abscess from Back Updated: 05/13/22 0719     Anaerobic Culture Culture in progress    Blood culture [[id number]] Collected: 05/11/22 1213    Order Status: Completed Specimen: Blood Updated: 05/13/22 0612     Blood Culture, Routine No Growth to date      No Growth to date    Blood culture [[id number]] Collected: 05/11/22 1213    Order Status: Completed Specimen: Blood Updated: 05/13/22 0612     Blood Culture, Routine No Growth to date      No Growth to date    Gram stain [[id number]] Collected: 05/11/22 1440    Order Status: Completed Specimen: Abscess from Back Updated: 05/12/22 0306     Gram Stain Result Few WBC's      Few Gram positive cocci    Gram stain [[id number]]     Order Status: Canceled Specimen: Joint Fluid from Back     Blood culture [[id number]]  (Abnormal) Collected: 05/08/22 1516    Order Status: Completed Specimen: Blood from Peripheral, Right Hand Updated: 05/11/22 1009     Blood Culture, Routine Gram stain aer bottle: Gram positive cocci in clusters resembling Staph       Results called to and read back by: June  Breanne JACKSON  05/09/2022  11:31      STAPHYLOCOCCUS AUREUS  ID consult required at [facility].  For susceptibility see order #[id number]      Narrative:      Collection has been rescheduled by [email or web address] at 05/08/2022 13:22 Reason:   Pt in mri will be there after another hour [id number]  Collection has been rescheduled by [email or web address] at 05/08/2022 13:22 Reason:   Pt in mri will be there after another hour [id number]    Blood culture [[id number]]  (Abnormal) Collected: 05/08/22 1515    Order Status: Completed Specimen: Blood from Peripheral, Left Arm Updated: 05/11/22 1009     Blood Culture, Routine Gram stain aer bottle: Gram positive cocci in clusters resembling Staph      Results called to and read back by: Chasity Raymundo RN  05/09/2022  15:40      STAPHYLOCOCCUS AUREUS  ID consult required at [facility].  For susceptibility see order #[id number]      Narrative:      Collection has been rescheduled by [email or web address] at 05/08/2022 13:22 Reason:   Pt in mri will be there after another hour [id number]  Collection has been rescheduled by [email or web address] at 05/08/2022 13:22 Reason:   Pt in mri will be there after another hour [id number]    Blood culture x two cultures. Draw prior to antibiotics. [[id number]]  (Abnormal) Collected: 05/06/22 1617    Order Status: Completed Specimen: Blood from Peripheral, Antecubital, Right Updated: 05/09/22 0824     Blood Culture, Routine Gram stain aer bottle: Gram positive cocci in clusters resembling Staph       Results called to and read back by: Lila Hernandez RN 05/07/2022  10:57      Gram stain missael bottle: Gram positive cocci in clusters resembling Staph       05/07/2022  13:40      STAPHYLOCOCCUS AUREUS  ID consult required at [facility].  For susceptibility see order #[id number]      Narrative:      Aerobic and anaerobic    Blood culture x two cultures. Draw prior to antibiotics. [[id number]]  (Abnormal)  (Susceptibility) Collected:  05/06/22 1616    Order Status: Completed Specimen: Blood from Peripheral, Antecubital, Left Updated: 05/09/22 0823     Blood Culture, Routine Gram stain aer bottle: Gram positive cocci in clusters resembling Staph       Gram stain missael bottle: Gram positive cocci in clusters resembling Staph       Results called to and read back by: Lila Hernandez RN 05/07/2022  10:57      STAPHYLOCOCCUS AUREUS  ID consult required at [facility],[place] and [facility] locations.      Narrative:      Aerobic and anaerobic    Gram stain [[id number]] Collected: 05/07/22 0901    Order Status: Sent Specimen: Body Fluid from Pleural Fluid Updated: 05/07/22 0905    Fungus culture [[id number]] Collected: 05/07/22 0901    Order Status: Sent Specimen: Body Fluid from Pleural Fluid Updated: 05/07/22 0905    AFB culture (includes stain) [[id number]] Collected: 05/07/22 0901    Order Status: Sent Specimen: Body Fluid from Pleural Fluid Updated: 05/07/22 0905        Latest lactate reviewed, they are-  No results for input(s): LACTATE in the last 72 hours.    Organ dysfunction indicated by Acute kidney injury  Source-  lung    Source control Achieved by-   Cont Broad spectrum IVAB     Bacteremia  Infectious disease consulted

## 2022-05-13 NOTE — PLAN OF CARE
1 unit of blood transfusing @ this time. No adverse reactions noted, remains afebrile. IV ABT therapy remains in progress. NS@ 125mL/hr. 2L/NC. C/o's of pain on deep breathing. Heart monitor 8681. PRN pain meds adm as needed. Requires assist x1 with ADL's. Remains free from incident/injury. Call light in reach.

## 2022-05-14 LAB
ALBUMIN SERPL BCP-MCNC: 1.2 G/DL (ref 3.5–5.2)
ALP SERPL-CCNC: 110 U/L (ref 55–135)
ALT SERPL W/O P-5'-P-CCNC: 9 U/L (ref 10–44)
ANION GAP SERPL CALC-SCNC: 8 MMOL/L (ref 8–16)
AST SERPL-CCNC: 13 U/L (ref 10–40)
BACTERIA SPEC AEROBE CULT: ABNORMAL
BASOPHILS # BLD AUTO: 0.05 K/UL (ref 0–0.2)
BASOPHILS NFR BLD: 0.3 % (ref 0–1.9)
BILIRUB SERPL-MCNC: 0.5 MG/DL (ref 0.1–1)
BUN SERPL-MCNC: 6 MG/DL (ref 6–20)
CALCIUM SERPL-MCNC: 7.3 MG/DL (ref 8.7–10.5)
CHLORIDE SERPL-SCNC: 102 MMOL/L (ref 95–110)
CO2 SERPL-SCNC: 24 MMOL/L (ref 23–29)
CREAT SERPL-MCNC: 0.5 MG/DL (ref 0.5–1.4)
DIFFERENTIAL METHOD: ABNORMAL
EOSINOPHIL # BLD AUTO: 0 K/UL (ref 0–0.5)
EOSINOPHIL NFR BLD: 0.1 % (ref 0–8)
ERYTHROCYTE [DISTWIDTH] IN BLOOD BY AUTOMATED COUNT: 17.9 % (ref 11.5–14.5)
EST. GFR  (AFRICAN AMERICAN): >60 ML/MIN/1.73 M^2
EST. GFR  (NON AFRICAN AMERICAN): >60 ML/MIN/1.73 M^2
GLUCOSE SERPL-MCNC: 107 MG/DL (ref 70–110)
HCT VFR BLD AUTO: 25 % (ref 37–48.5)
HGB BLD-MCNC: 8 G/DL (ref 12–16)
IMM GRANULOCYTES # BLD AUTO: 0.43 K/UL (ref 0–0.04)
IMM GRANULOCYTES NFR BLD AUTO: 2.4 % (ref 0–0.5)
LYMPHOCYTES # BLD AUTO: 2.4 K/UL (ref 1–4.8)
LYMPHOCYTES NFR BLD: 13 % (ref 18–48)
MCH RBC QN AUTO: 28.2 PG (ref 27–31)
MCHC RBC AUTO-ENTMCNC: 32 G/DL (ref 32–36)
MCV RBC AUTO: 88 FL (ref 82–98)
MONOCYTES # BLD AUTO: 1 K/UL (ref 0.3–1)
MONOCYTES NFR BLD: 5.2 % (ref 4–15)
NEUTROPHILS # BLD AUTO: 14.3 K/UL (ref 1.8–7.7)
NEUTROPHILS NFR BLD: 79 % (ref 38–73)
NRBC BLD-RTO: 0 /100 WBC
PLATELET # BLD AUTO: 512 K/UL (ref 150–450)
PMV BLD AUTO: 9.4 FL (ref 9.2–12.9)
POTASSIUM SERPL-SCNC: 4.4 MMOL/L (ref 3.5–5.1)
PROT SERPL-MCNC: 4.9 G/DL (ref 6–8.4)
RBC # BLD AUTO: 2.84 M/UL (ref 4–5.4)
SODIUM SERPL-SCNC: 134 MMOL/L (ref 136–145)
WBC # BLD AUTO: 18.1 K/UL (ref 3.9–12.7)

## 2022-05-14 PROCEDURE — 80053 COMPREHEN METABOLIC PANEL: CPT | Performed by: NURSE PRACTITIONER

## 2022-05-14 PROCEDURE — 63600175 PHARM REV CODE 636 W HCPCS: Performed by: NURSE PRACTITIONER

## 2022-05-14 PROCEDURE — 85025 COMPLETE CBC W/AUTO DIFF WBC: CPT | Performed by: INTERNAL MEDICINE

## 2022-05-14 PROCEDURE — 94761 N-INVAS EAR/PLS OXIMETRY MLT: CPT

## 2022-05-14 PROCEDURE — 36415 COLL VENOUS BLD VENIPUNCTURE: CPT | Performed by: NURSE PRACTITIONER

## 2022-05-14 PROCEDURE — 99900035 HC TECH TIME PER 15 MIN (STAT)

## 2022-05-14 PROCEDURE — 21400001 HC TELEMETRY ROOM

## 2022-05-14 PROCEDURE — 94640 AIRWAY INHALATION TREATMENT: CPT

## 2022-05-14 PROCEDURE — 25000242 PHARM REV CODE 250 ALT 637 W/ HCPCS: Performed by: NURSE PRACTITIONER

## 2022-05-14 PROCEDURE — 63600175 PHARM REV CODE 636 W HCPCS: Performed by: INTERNAL MEDICINE

## 2022-05-14 PROCEDURE — 27000221 HC OXYGEN, UP TO 24 HOURS

## 2022-05-14 PROCEDURE — 96372 THER/PROPH/DIAG INJ SC/IM: CPT

## 2022-05-14 PROCEDURE — 25000003 PHARM REV CODE 250: Performed by: NURSE PRACTITIONER

## 2022-05-14 RX ORDER — FUROSEMIDE 10 MG/ML
40 INJECTION INTRAMUSCULAR; INTRAVENOUS ONCE
Status: COMPLETED | OUTPATIENT
Start: 2022-05-14 | End: 2022-05-14

## 2022-05-14 RX ADMIN — OXACILLIN SODIUM 12 G: 10 INJECTION, POWDER, FOR SOLUTION INTRAVENOUS at 10:05

## 2022-05-14 RX ADMIN — OXYCODONE HYDROCHLORIDE 10 MG: 5 TABLET ORAL at 11:05

## 2022-05-14 RX ADMIN — MORPHINE SULFATE 15 MG: 15 TABLET, EXTENDED RELEASE ORAL at 08:05

## 2022-05-14 RX ADMIN — ALPRAZOLAM 1 MG: 1 TABLET ORAL at 11:05

## 2022-05-14 RX ADMIN — IPRATROPIUM BROMIDE AND ALBUTEROL SULFATE 3 ML: 2.5; .5 SOLUTION RESPIRATORY (INHALATION) at 08:05

## 2022-05-14 RX ADMIN — OXYCODONE HYDROCHLORIDE 10 MG: 5 TABLET ORAL at 03:05

## 2022-05-14 RX ADMIN — OXYCODONE HYDROCHLORIDE 10 MG: 5 TABLET ORAL at 06:05

## 2022-05-14 RX ADMIN — ALPRAZOLAM 1 MG: 1 TABLET ORAL at 08:05

## 2022-05-14 RX ADMIN — ENOXAPARIN SODIUM 40 MG: 40 INJECTION SUBCUTANEOUS at 05:05

## 2022-05-14 RX ADMIN — OXYCODONE HYDROCHLORIDE 10 MG: 5 TABLET ORAL at 08:05

## 2022-05-14 RX ADMIN — ALPRAZOLAM 1 MG: 1 TABLET ORAL at 03:05

## 2022-05-14 RX ADMIN — FUROSEMIDE 40 MG: 10 INJECTION, SOLUTION INTRAMUSCULAR; INTRAVENOUS at 05:05

## 2022-05-14 RX ADMIN — HYDROMORPHONE HYDROCHLORIDE 1 MG: 2 INJECTION INTRAMUSCULAR; INTRAVENOUS; SUBCUTANEOUS at 05:05

## 2022-05-14 RX ADMIN — HYDROMORPHONE HYDROCHLORIDE 1 MG: 2 INJECTION INTRAMUSCULAR; INTRAVENOUS; SUBCUTANEOUS at 12:05

## 2022-05-14 RX ADMIN — TRAZODONE HYDROCHLORIDE 100 MG: 100 TABLET ORAL at 08:05

## 2022-05-14 RX ADMIN — FUROSEMIDE 40 MG: 10 INJECTION, SOLUTION INTRAMUSCULAR; INTRAVENOUS at 01:05

## 2022-05-14 NOTE — NURSING
Patient complaining of pain all night, given IV and Oral pain medication per orders, Pt stated no relief. She also c/o SOB and proceeded to request IV Dilaudid and Ativan. Prn Xanax was given as well. Heart monitor on. VS stable. Pt in no acute distress. Side rails up x2, call light in reach. Chart reviewed. Will continue to monitor.

## 2022-05-14 NOTE — NURSING
"Pt educated that she has to take Oxycontin IR for pain then she can have dilaudid IV for breakthrough pain not relieved by oral medication. Pt became agitated and threw fork at breakfast tray and stated that she can no longer eat. She had eaten 50% of breakfast. Pt states "I want instant relief! I want my diluadid!"   "

## 2022-05-14 NOTE — ASSESSMENT & PLAN NOTE
H/O IVDU   --Cont broad spectrum IVAB   --F/U blood and sputum cx   --Status post thoracentesis - 5/7  --Chest x-ray without signs of pneumothorax  --Fluid studies pending  --C/O worsened cough, repeat CXR shows interval worsening, likely secondary to IVF  --Stopped IVF, given Lasix 40mg IV x1 dose  --Nebs PRN  --Supportive O2

## 2022-05-14 NOTE — ASSESSMENT & PLAN NOTE
Pulmonology consulted  Status post thoracentesis  Fluid studies pending  5/11/22 IR to collect sample and send for culture today- done, pending

## 2022-05-14 NOTE — ASSESSMENT & PLAN NOTE
Will ordet TTE to r/o IE  Will order MRI lumbar wwo- completed   Cont broad spectrum IVAB   --MRI thoracic spine ordered per ID, patient refused on multiple attempts    5/7   Studies pending for additional sites of infection given h/o ivdu  5/8/22  on cessation

## 2022-05-14 NOTE — PROGRESS NOTES
[facility] Medicine  Progress Note    Patient Name: Tianna Leon  MRN: [id number]  Patient Class: IP- Inpatient   Admission Date: 5/6/2022  Length of Stay: 8 days  Attending Physician: Elizabeth Kim MD  Primary Care Provider: Spenser Campa MD        Subjective:     Principal Problem:Bacteremia        HPI:  38 y/o. female  with a PMHx of asthma, COPD,Bipolar  , IVDU and HLD presented to the ER with a c/o  sob for the last weak which has gotten worse . The SOB is associated with fever , chills , productive cough , back pain  and generalized weakness . She report cough some blood this am .  She is active IVDU  ( heroine , cocaine  and amphetamine ) . She denies any  sick contact , chest pain  , GI/ sx , She also complaning of LE sweeling . Knee pain and B/L LE rash .   ER COURSE: CTA chest negative for pe . Multiple nodular and cavitary opacities concerning for septic emboli with larger opacities possibly necrotizing pneumonia. CT cervical  and thoracic did not show any acute finding , CT lumbar Possible progression of degenerative changes most notable at L4-5 with moderate to severe spinal canal stenosis at this level.  WBC  29 k , na 130 , k 3.4 , cl 89 , procal 2.71   ER VS:  BP Pulse Resp Temp SpO2   (!) 124/58 110 (!) 30 (!) 101.6 °F (38.7 °C) 96 %           Pt will be admitted to inpt with a dx of PNA and severe sepsis       Overview/Hospital Course:  5/7 admitted for pneumonia, severe sepsis in setting of ivdu. Mother at bedside and provides collateral. Patient has struggled with substance abuse for approximately 20 years, worsening over last 3-5 years since life event. Onset of symptoms 1-2 weeks ago. Tolerated thoracentesis with no pneumothorax on chest x-ray. Mri lumbar and echo, pending. Bcx positive, growing gram positive cocci. On vanc, cefepime and flagyl. Infectious disease consulted for bacteremia. 5/8/22 The patient remained afebrile overnight. Currently on  cefepime/vanc/flagyl. Blood cx are growing staph aureus. The patient refused the MRI lumbar spine overnight d/t being unable to lay flat from back pain. Will give pain meds and attempt to get the MRI today to r/o spinal abscess. The ECHO showed a 1.3 x 1.5 cm elongated, mobile echodensity seen on the tricuspid valve consistent with vegetation, CT surgery was consulted. Will need a SUSANNAH. Infectious disease is consulted. Will repeat blood cx today. 5/9/22 No acute events overnight. The patient reports lower back pain is not well controlled with current pain regimen, will adjust. Repeat blood cx are still positive. Sensitivities are back and Staph is sensitive to oxacillin, will D/C vanc/cefepime. Cardiology consulted and plans for a SUSANNAH today. CT surgery following and recommends continuing medical management with appropriate ABX, no surgical intervention at this time. MRI lumbar spine showed moderate to severe spinal canal stenosis with moderate left-sided neural foraminal stenosis, Neurosurgery consulted. 5/10/22 No acute events overnight. The patient reports some improvement in pain with adjustment in pain meds. Repeat blood cx are +. Continue IV oxacillin and flagyl. Infectious disease is consulted. IR consulted to evaluate possible paraspinous muscle myositis versus abscess. Recommend IR drainage. Continue current management. 5/11/22 No acute events overnight. The patient reports pain is still not well controlled at times. The patient reports that she is very anxious about the upcoming SUSANNAH and IR drainage. SUSANNAH was pushed back to this afternoon because the patient ate candy this AM. Continue treatment with IV oxacillin and flagyl. Infectious disease is following. WBC trended down to 31K. Continue current management. 5/12/22 No acute events overnight. The patients WBC improved to 22K overnight. The patient remains on continuous oxacillin infusion. Infectious disease is following. MRI thoracic spine is ordered and  pending. The patients SUSANNAH was postponed again today d/t low H/H. Hgb was noted to be 6.2 this morning, will transfuse 1 unit PRBC. The patient reports pain is still not well controlled. The case was discussed with Neurosurgery who recommended adding extended release pain meds. Approximately 1 ml of purulent drainage aspirated from back yesterday per IR, growing gram + cocci.     5/13: Patient was given 1U PRBC on 5/12, improved from 6.2 --> 7.0, will give an additional unit PRBC. Radiology attempted to bring patient on 5/12 at 1755 for Thoracic MRI, patient refused due to pain and anxiety. Repeat attempt this AM, patient again refused. Will order Ativan 1mg IV to be given with pain medication prior to scan to relieve anxiety and pain, nurse to notify NP when ready to go for scan. Discussed pain management with patient, discussed transition to PO pain medications to being preparing for d/c to LTAC, adjusted PO pain regimen will reevaluate to determine effectiveness. Repeat Blood cultures: NGTD, sensitivities pending.     5/14: Final anaerobic cultures pending, continue to adjust pain regimen, d/c IV Dilaudid today. Patient currently managed with PO Morphine extended release and PO oxycodone. D/C plan is for LTAC once final antibiotic regimen determined. Patient continues to refuse MRI of thoracic spine.       Interval History: IV Dilaudid d/c. Continues on PO pain regimen.     Review of Systems   Constitutional:  Positive for activity change, appetite change and fatigue. Negative for chills, diaphoresis, fever and unexpected weight change.   HENT:  Negative for congestion, hearing loss, mouth sores, postnasal drip, rhinorrhea, sore throat and trouble swallowing.    Eyes:  Negative for discharge and visual disturbance.   Respiratory:  Positive for shortness of breath. Negative for cough and chest tightness.    Cardiovascular:  Negative for chest pain, palpitations and leg swelling.   Gastrointestinal:  Positive for  abdominal distention and abdominal pain. Negative for blood in stool, constipation, diarrhea, nausea and vomiting.   Endocrine: Negative for cold intolerance and heat intolerance.   Genitourinary:  Negative for difficulty urinating, dyspareunia, flank pain and hematuria.   Musculoskeletal:  Positive for back pain and myalgias. Negative for arthralgias.   Skin: Negative.    Neurological:  Positive for weakness and headaches. Negative for dizziness and light-headedness.   Hematological:  Negative for adenopathy. Does not bruise/bleed easily.   Psychiatric/Behavioral:  Positive for agitation and sleep disturbance. Negative for behavioral problems and confusion. The patient is nervous/anxious.    Objective:     Vital Signs (Most Recent):  Temp: 99.7 °F (37.6 °C) (05/14/22 1225)  Pulse: 91 (05/14/22 1225)  Resp: 20 (05/14/22 1502)  BP: 119/68 (05/14/22 1225)  SpO2: 99 % (05/14/22 1225) Vital Signs (24h Range):  Temp:  [97.8 °F (36.6 °C)-99.7 °F (37.6 °C)] 99.7 °F (37.6 °C)  Pulse:  [] 91  Resp:  [12-22] 20  SpO2:  [96 %-100 %] 99 %  BP: (104-133)/(57-77) 119/68     Weight: 95.1 kg (209 lb 10.5 oz)  Body mass index is 34.89 kg/m².    Intake/Output Summary (Last 24 hours) at 5/14/2022 1510  Last data filed at 5/14/2022 1400  Gross per 24 hour   Intake 6152.4 ml   Output 3200 ml   Net 2952.4 ml      Physical Exam  Vitals and nursing note reviewed.   Constitutional:       General: She is not in acute distress.     Appearance: She is well-developed. She is ill-appearing.   HENT:      Head: Normocephalic and atraumatic.      Right Ear: Hearing and external ear normal.      Left Ear: Hearing and external ear normal.      Nose: No rhinorrhea.      Right Sinus: No maxillary sinus tenderness or frontal sinus tenderness.      Left Sinus: No maxillary sinus tenderness or frontal sinus tenderness.      Mouth/Throat:      Mouth: No oral lesions.      Pharynx: Uvula midline.   Eyes:      General:         Right eye: No discharge.          Left eye: No discharge.      Conjunctiva/sclera: Conjunctivae normal.      Pupils: Pupils are equal, round, and reactive to light.   Neck:      Thyroid: No thyromegaly.      Vascular: No carotid bruit.      Trachea: No tracheal deviation.   Cardiovascular:      Rate and Rhythm: Regular rhythm. Tachycardia present.      Pulses:           Dorsalis pedis pulses are 2+ on the right side and 2+ on the left side.      Heart sounds: Normal heart sounds, S1 normal and S2 normal. No murmur heard.  Pulmonary:      Effort: Pulmonary effort is normal. No respiratory distress.      Breath sounds: Examination of the right-lower field reveals decreased breath sounds. Examination of the left-lower field reveals decreased breath sounds. Decreased breath sounds present.   Chest:   Breasts:      Right: No supraclavicular adenopathy.      Left: No supraclavicular adenopathy.     Abdominal:      General: Bowel sounds are decreased. There is no distension.      Palpations: Abdomen is soft. There is no mass.      Tenderness: There is no abdominal tenderness.   Musculoskeletal:      Cervical back: Normal range of motion.      Thoracic back: Tenderness present. Decreased range of motion.      Lumbar back: Tenderness present. Decreased range of motion.   Lymphadenopathy:      Cervical: No cervical adenopathy.      Upper Body:      Right upper body: No supraclavicular adenopathy.      Left upper body: No supraclavicular adenopathy.   Skin:     General: Skin is warm and dry.      Capillary Refill: Capillary refill takes less than 2 seconds.      Findings: No rash.   Neurological:      Mental Status: She is alert and oriented to person, place, and time.      Sensory: No sensory deficit.      Coordination: Coordination normal.      Gait: Gait normal.   Psychiatric:         Mood and Affect: Mood is not anxious or depressed.         Speech: Speech normal.         Behavior: Behavior normal.         Thought Content: Thought content normal.          Judgment: Judgment normal.       Significant Labs: All pertinent labs within the past 24 hours have been reviewed.  CBC:   Recent Labs   Lab 05/13/22  0559 05/14/22  0650   WBC 18.96* 18.10*   HGB 7.0* 8.0*   HCT 21.5* 25.0*   * 512*     CMP:   Recent Labs   Lab 05/14/22  0650   *   K 4.4      CO2 24      BUN 6   CREATININE 0.5   CALCIUM 7.3*   PROT 4.9*   ALBUMIN 1.2*   BILITOT 0.5   ALKPHOS 110   AST 13   ALT 9*   ANIONGAP 8   EGFRNONAA >60       Significant Imaging: I have reviewed all pertinent imaging results/findings within the past 24 hours.      Assessment/Plan:      * Bacteremia  Gram positive   Infectious disease consulted  Continue broad spectrum intravenous antibiotic(s) given h/o ivdu  Remains febrile and leukocytosis persists  5/8/22 The patient remained afebrile overnight. Currently on cefepime/vanc/flagyl. Blood cx are growing staph aureus. The patient refused the MRI lumbar spine overnight d/t being unable to lay flat from back pain. Will give pain meds and attempt to get the MRI today to r/o spinal abscess. The ECHO showed a 1.3 x 1.5 cm elongated, mobile echodensity seen on the tricuspid valve consistent with vegetation, CT surgery was consulted. Will need a SUSANNAH. Infectious disease is consulted. Will repeat blood cx today.   5/9/22 Repeat blood cx are still positive. Sensitivities are back and Staph is sensitive to oxacillin, will D/C vanc/cefepime. Infectious disease is following   5/10/22 Repeat blood cx are +. Continue IV oxacillin and flagyl. Infectious disease is consulted. IR consulted to evaluate possible paraspinous muscle myositis versus abscess. Recommend IR drainage. Continue current management.    5/11/22 The patient reports pain is still not well controlled at times. The patient reports that she is very anxious about the upcoming SUSANNAH and IR drainage. SUSANNAH was pushed back to this afternoon because the patient ate candy this AM. Continue treatment with IV  oxacillin and flagyl. Infectious disease is following. WBC trended down to 31K. Continue current management.  5/12/22 The patients WBC improved to 22K overnight. The patient remains on continuous oxacillin infusion. Infectious disease is following.  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 IR consulted to evaluate possible paraspinous muscle myositis versus abscess.    --Approximately 1 ml of purulent drainage aspirated from back 5/11 per IR, growing gram + cocci.    --Continue current regimen      Lumbar stenosis without neurogenic claudication        Subacute bacterial endocarditis        Vegetation of heart valve  ECHO showed a 1.3 x 1.5 cm elongated, mobile echodensity seen on the tricuspid valve consistent with vegetation, CT surgery was consulted.     --Will need a SUSANNAH.  -- Infectious disease is consulted.  -- repeat blood cx NGTD.  -- Continue IV ABX.   --Sensitivities are back and Staph is sensitive to oxacillin, D/C vanc/cefepime.   --Cardiology consulted and plans for a SUSANNAH.   --CT surgery following and recommends continuing medical management with appropriate ABX, no surgical intervention at this time.      Chronic pulmonary heart disease  - Pulmonology following       Parapneumonic effusion  Pulmonology consulted  Status post thoracentesis  Fluid studies pending  5/11/22 IR to collect sample and send for culture today- done, pending    IVDU (intravenous drug user)   Will ordet TTE to r/o IE  Will order MRI lumbar wwo- completed   Cont broad spectrum IVAB   --MRI thoracic spine ordered per ID, patient refused on multiple attempts    5/7   Studies pending for additional sites of infection given h/o ivdu  5/8/22  on cessation     Severe sepsis  This  patient does have evidence of infective focus  My overall impression is sepsis. Vital signs were reviewed and noted in progress note.  Antibiotics given-   Antibiotics (From admission, onward)            Start     Stop Route Frequency Ordered    05/09/22 1100  oxacillin 12 g in  mL CONTINUOUS INFUSION         -- IV Every 24 hours (non-standard times) 05/09/22 0932    05/07/22 2100  mupirocin 2 % ointment         05/12 2059 Nasl 2 times daily 05/07/22 1646        Cultures were taken-   Microbiology Results (last 7 days)     Procedure Component Value Units Date/Time    Aerobic culture [[id number]]  (Abnormal)  (Susceptibility) Collected: 05/11/22 1440    Order Status: Completed Specimen: Abscess from Back Updated: 05/14/22 1057     Aerobic Bacterial Culture STAPHYLOCOCCUS AUREUS  Many      Blood culture [[id number]] Collected: 05/11/22 1213    Order Status: Completed Specimen: Blood Updated: 05/14/22 0612     Blood Culture, Routine No Growth to date      No Growth to date      No Growth to date    Blood culture [[id number]] Collected: 05/11/22 1213    Order Status: Completed Specimen: Blood Updated: 05/14/22 0612     Blood Culture, Routine No Growth to date      No Growth to date      No Growth to date    Culture, Anaerobe [[id number]] Collected: 05/11/22 1440    Order Status: Completed Specimen: Abscess from Back Updated: 05/13/22 0719     Anaerobic Culture Culture in progress    Gram stain [[id number]] Collected: 05/11/22 1440    Order Status: Completed Specimen: Abscess from Back Updated: 05/12/22 0306     Gram Stain Result Few WBC's      Few Gram positive cocci    Gram stain [[id number]]     Order Status: Canceled Specimen: Joint Fluid from Back     Blood culture [[id number]]  (Abnormal) Collected: 05/08/22 1516    Order Status: Completed Specimen: Blood from Peripheral, Right Hand Updated: 05/11/22 1009     Blood Culture, Routine Gram stain aer bottle: Gram positive cocci in clusters resembling Staph       Results  called to and read back by: Chasity Raymundo RN  05/09/2022  11:31      STAPHYLOCOCCUS AUREUS  ID consult required at [facility].  For susceptibility see order #[id number]      Narrative:      Collection has been rescheduled by [email or web address] at 05/08/2022 13:22 Reason:   Pt in mri will be there after another hour [id number]  Collection has been rescheduled by [email or web address] at 05/08/2022 13:22 Reason:   Pt in mri will be there after another hour [id number]    Blood culture [[id number]]  (Abnormal) Collected: 05/08/22 1515    Order Status: Completed Specimen: Blood from Peripheral, Left Arm Updated: 05/11/22 1009     Blood Culture, Routine Gram stain aer bottle: Gram positive cocci in clusters resembling Staph      Results called to and read back by: Chasity Raymundo RN  05/09/2022  15:40      STAPHYLOCOCCUS AUREUS  ID consult required at [facility].  For susceptibility see order #[id number]      Narrative:      Collection has been rescheduled by [email or web address] at 05/08/2022 13:22 Reason:   Pt in mri will be there after another hour [id number]  Collection has been rescheduled by [email or web address] at 05/08/2022 13:22 Reason:   Pt in mri will be there after another hour [id number]    Blood culture x two cultures. Draw prior to antibiotics. [[id number]]  (Abnormal) Collected: 05/06/22 1617    Order Status: Completed Specimen: Blood from Peripheral, Antecubital, Right Updated: 05/09/22 0824     Blood Culture, Routine Gram stain aer bottle: Gram positive cocci in clusters resembling Staph       Results called to and read back by: Lila Hernandez RN 05/07/2022  10:57      Gram stain missael bottle: Gram positive cocci in clusters resembling Staph       05/07/2022  13:40      STAPHYLOCOCCUS AUREUS  ID consult required at [facility].  For susceptibility see order #[id number]      Narrative:      Aerobic and anaerobic    Blood culture x two cultures. Draw prior to antibiotics. [[id number]]   (Abnormal)  (Susceptibility) Collected: 05/06/22 1616    Order Status: Completed Specimen: Blood from Peripheral, Antecubital, Left Updated: 05/09/22 0823     Blood Culture, Routine Gram stain aer bottle: Gram positive cocci in clusters resembling Staph       Gram stain missael bottle: Gram positive cocci in clusters resembling Staph       Results called to and read back by: Lila Hernandez RN 05/07/2022  10:57      STAPHYLOCOCCUS AUREUS  ID consult required at [facility] and [facility] locations.      Narrative:      Aerobic and anaerobic        Latest lactate reviewed, they are-  No results for input(s): LACTATE in the last 72 hours.    Organ dysfunction indicated by Acute kidney injury  Source-  lung    Source control Achieved by-   Cont Broad spectrum IVAB     Bacteremia  Infectious disease consulted      PNA (pneumonia)  H/O IVDU   --Cont broad spectrum IVAB   --F/U blood and sputum cx   --Status post thoracentesis - 5/7  --Chest x-ray without signs of pneumothorax  --Fluid studies pending  --C/O worsened cough, repeat CXR shows interval worsening, likely secondary to IVF  --Stopped IVF, given Lasix 40mg IV x1 dose  --Nebs PRN  --Supportive O2        Tobacco abuse  - Patient thoroughly counseled on smoking cessation, pt verbalized understanding. Time of counseling 10 minutes.        COPD (chronic obstructive pulmonary disease)  Cont breathing tx prn  Pulmonology following     Lower back pain  Chronic in nature, H/O IVDU  --CT lumbar show spinal stenosis   --No neurological deficit at this time   --MRI lumbar wwo  To r/o spinal abscess -showed moderate to severe spinal canal stenosis with moderate left-sided neural foraminal stenosis, Neurosurgery consulted.     --MRI thoracic spine ordered: Patient refused 5/12 at 1755 citing anxiety and pain, again refused on 5/13 AM for same reasons, advised nurse to alert provider if able to reapprach, will order 1 time dose of IV Ativan.   --The patient reports pain is  still not well controlled. The case was discussed with Neurosurgery who recommended adding extended release pain meds, will initiate PO Oxycodone and titrate for pain relief for breakthrough pain, when acceptable dose determined will adjust long acting medication      VTE Risk Mitigation (From admission, onward)         Ordered     enoxaparin injection 40 mg  Daily         05/06/22 2336     IP VTE HIGH RISK PATIENT  Once         05/06/22 2336     Place sequential compression device  Until discontinued         05/06/22 2336                Discharge Planning   YESSICA:      Code Status: Full Code   Is the patient medically ready for discharge?:     Reason for patient still in hospital (select all that apply): Patient trending condition  Discharge Plan A: Home                  Marjan Calderón NP  Department of Hospital Medicine   [facility] - Med Surg

## 2022-05-14 NOTE — ASSESSMENT & PLAN NOTE
Chronic in nature, H/O IVDU  --CT lumbar show spinal stenosis   --No neurological deficit at this time   --MRI lumbar wwo  To r/o spinal abscess -showed moderate to severe spinal canal stenosis with moderate left-sided neural foraminal stenosis, Neurosurgery consulted.     --MRI thoracic spine ordered: Patient refused 5/12 at 1755 citing anxiety and pain, again refused on 5/13 AM for same reasons, advised nurse to alert provider if able to reapprach, will order 1 time dose of IV Ativan.   --The patient reports pain is still not well controlled. The case was discussed with Neurosurgery who recommended adding extended release pain meds, will initiate PO Oxycodone and titrate for pain relief for breakthrough pain, when acceptable dose determined will adjust long acting medication

## 2022-05-14 NOTE — PLAN OF CARE
Remains injury free. Pain managed with oral medication. Repositioned with assistance. Barrier cream applied to protect skin. No s/s acute distress.

## 2022-05-14 NOTE — ASSESSMENT & PLAN NOTE
This patient does have evidence of infective focus  My overall impression is sepsis. Vital signs were reviewed and noted in progress note.  Antibiotics given-   Antibiotics (From admission, onward)            Start     Stop Route Frequency Ordered    05/09/22 1100  oxacillin 12 g in  mL CONTINUOUS INFUSION         -- IV Every 24 hours (non-standard times) 05/09/22 0932    05/07/22 2100  mupirocin 2 % ointment         05/12 2059 Nasl 2 times daily 05/07/22 1646        Cultures were taken-   Microbiology Results (last 7 days)     Procedure Component Value Units Date/Time    Aerobic culture [[id number]]  (Abnormal)  (Susceptibility) Collected: 05/11/22 1440    Order Status: Completed Specimen: Abscess from Back Updated: 05/14/22 1057     Aerobic Bacterial Culture STAPHYLOCOCCUS AUREUS  Many      Blood culture [[id number]] Collected: 05/11/22 1213    Order Status: Completed Specimen: Blood Updated: 05/14/22 0612     Blood Culture, Routine No Growth to date      No Growth to date      No Growth to date    Blood culture [[id number]] Collected: 05/11/22 1213    Order Status: Completed Specimen: Blood Updated: 05/14/22 0612     Blood Culture, Routine No Growth to date      No Growth to date      No Growth to date    Culture, Anaerobe [[id number]] Collected: 05/11/22 1440    Order Status: Completed Specimen: Abscess from Back Updated: 05/13/22 0719     Anaerobic Culture Culture in progress    Gram stain [[id number]] Collected: 05/11/22 1440    Order Status: Completed Specimen: Abscess from Back Updated: 05/12/22 0306     Gram Stain Result Few WBC's      Few Gram positive cocci    Gram stain [[id number]]     Order Status: Canceled Specimen: Joint Fluid from Back     Blood culture [[id number]]  (Abnormal) Collected: 05/08/22 1516    Order Status: Completed Specimen: Blood from Peripheral, Right Hand Updated: 05/11/22 1009     Blood Culture, Routine Gram stain aer bottle: Gram positive cocci in clusters resembling Staph        Results called to and read back by: Chasity Raymundo RN  05/09/2022  11:31      STAPHYLOCOCCUS AUREUS  ID consult required at [facility].  For susceptibility see order #[id number]      Narrative:      Collection has been rescheduled by [email or web address] at 05/08/2022 13:22 Reason:   Pt in mri will be there after another hour [id number]  Collection has been rescheduled by [email or web address] at 05/08/2022 13:22 Reason:   Pt in mri will be there after another hour [id number]    Blood culture [[id number]]  (Abnormal) Collected: 05/08/22 1515    Order Status: Completed Specimen: Blood from Peripheral, Left Arm Updated: 05/11/22 1009     Blood Culture, Routine Gram stain aer bottle: Gram positive cocci in clusters resembling Staph      Results called to and read back by: Chasity Raymundo RN  05/09/2022  15:40      STAPHYLOCOCCUS AUREUS  ID consult required at [facility].  For susceptibility see order #[id number]      Narrative:      Collection has been rescheduled by [email or web address] at 05/08/2022 13:22 Reason:   Pt in mri will be there after another hour [id number]  Collection has been rescheduled by [email or web address] at 05/08/2022 13:22 Reason:   Pt in mri will be there after another hour [id number]    Blood culture x two cultures. Draw prior to antibiotics. [[id number]]  (Abnormal) Collected: 05/06/22 1617    Order Status: Completed Specimen: Blood from Peripheral, Antecubital, Right Updated: 05/09/22 0824     Blood Culture, Routine Gram stain aer bottle: Gram positive cocci in clusters resembling Staph       Results called to and read back by: Lila Hernandez RN 05/07/2022  10:57      Gram stain missael bottle: Gram positive cocci in clusters resembling Staph       05/07/2022  13:40      STAPHYLOCOCCUS AUREUS  ID consult required at [facility].  For susceptibility see order #[id number]      Narrative:      Aerobic and anaerobic    Blood culture x two cultures. Draw prior to antibiotics. [[id number]]   (Abnormal)  (Susceptibility) Collected: 05/06/22 1616    Order Status: Completed Specimen: Blood from Peripheral, Antecubital, Left Updated: 05/09/22 0823     Blood Culture, Routine Gram stain aer bottle: Gram positive cocci in clusters resembling Staph       Gram stain missael bottle: Gram positive cocci in clusters resembling Staph       Results called to and read back by: Lila Hernandez RN 05/07/2022  10:57      STAPHYLOCOCCUS AUREUS  ID consult required at [facility] [place] locations.      Narrative:      Aerobic and anaerobic        Latest lactate reviewed, they are-  No results for input(s): LACTATE in the last 72 hours.    Organ dysfunction indicated by Acute kidney injury  Source-  lung    Source control Achieved by-   Cont Broad spectrum IVAB     Bacteremia  Infectious disease consulted

## 2022-05-14 NOTE — SUBJECTIVE & OBJECTIVE
Interval History: IV Dilaudid d/c. Continues on PO pain regimen.     Review of Systems   Constitutional:  Positive for activity change, appetite change and fatigue. Negative for chills, diaphoresis, fever and unexpected weight change.   HENT:  Negative for congestion, hearing loss, mouth sores, postnasal drip, rhinorrhea, sore throat and trouble swallowing.    Eyes:  Negative for discharge and visual disturbance.   Respiratory:  Positive for shortness of breath. Negative for cough and chest tightness.    Cardiovascular:  Negative for chest pain, palpitations and leg swelling.   Gastrointestinal:  Positive for abdominal distention and abdominal pain. Negative for blood in stool, constipation, diarrhea, nausea and vomiting.   Endocrine: Negative for cold intolerance and heat intolerance.   Genitourinary:  Negative for difficulty urinating, dyspareunia, flank pain and hematuria.   Musculoskeletal:  Positive for back pain and myalgias. Negative for arthralgias.   Skin: Negative.    Neurological:  Positive for weakness and headaches. Negative for dizziness and light-headedness.   Hematological:  Negative for adenopathy. Does not bruise/bleed easily.   Psychiatric/Behavioral:  Positive for agitation and sleep disturbance. Negative for behavioral problems and confusion. The patient is nervous/anxious.    Objective:     Vital Signs (Most Recent):  Temp: 99.7 °F (37.6 °C) (05/14/22 1225)  Pulse: 91 (05/14/22 1225)  Resp: 20 (05/14/22 1502)  BP: 119/68 (05/14/22 1225)  SpO2: 99 % (05/14/22 1225) Vital Signs (24h Range):  Temp:  [97.8 °F (36.6 °C)-99.7 °F (37.6 °C)] 99.7 °F (37.6 °C)  Pulse:  [] 91  Resp:  [12-22] 20  SpO2:  [96 %-100 %] 99 %  BP: (104-133)/(57-77) 119/68     Weight: 95.1 kg (209 lb 10.5 oz)  Body mass index is 34.89 kg/m².    Intake/Output Summary (Last 24 hours) at 5/14/2022 1510  Last data filed at 5/14/2022 1400  Gross per 24 hour   Intake 6152.4 ml   Output 3200 ml   Net 2952.4 ml      Physical  Exam  Vitals and nursing note reviewed.   Constitutional:       General: She is not in acute distress.     Appearance: She is well-developed. She is ill-appearing.   HENT:      Head: Normocephalic and atraumatic.      Right Ear: Hearing and external ear normal.      Left Ear: Hearing and external ear normal.      Nose: No rhinorrhea.      Right Sinus: No maxillary sinus tenderness or frontal sinus tenderness.      Left Sinus: No maxillary sinus tenderness or frontal sinus tenderness.      Mouth/Throat:      Mouth: No oral lesions.      Pharynx: Uvula midline.   Eyes:      General:         Right eye: No discharge.         Left eye: No discharge.      Conjunctiva/sclera: Conjunctivae normal.      Pupils: Pupils are equal, round, and reactive to light.   Neck:      Thyroid: No thyromegaly.      Vascular: No carotid bruit.      Trachea: No tracheal deviation.   Cardiovascular:      Rate and Rhythm: Regular rhythm. Tachycardia present.      Pulses:           Dorsalis pedis pulses are 2+ on the right side and 2+ on the left side.      Heart sounds: Normal heart sounds, S1 normal and S2 normal. No murmur heard.  Pulmonary:      Effort: Pulmonary effort is normal. No respiratory distress.      Breath sounds: Examination of the right-lower field reveals decreased breath sounds. Examination of the left-lower field reveals decreased breath sounds. Decreased breath sounds present.   Chest:   Breasts:      Right: No supraclavicular adenopathy.      Left: No supraclavicular adenopathy.     Abdominal:      General: Bowel sounds are decreased. There is no distension.      Palpations: Abdomen is soft. There is no mass.      Tenderness: There is no abdominal tenderness.   Musculoskeletal:      Cervical back: Normal range of motion.      Thoracic back: Tenderness present. Decreased range of motion.      Lumbar back: Tenderness present. Decreased range of motion.   Lymphadenopathy:      Cervical: No cervical adenopathy.      Upper  Body:      Right upper body: No supraclavicular adenopathy.      Left upper body: No supraclavicular adenopathy.   Skin:     General: Skin is warm and dry.      Capillary Refill: Capillary refill takes less than 2 seconds.      Findings: No rash.   Neurological:      Mental Status: She is alert and oriented to person, place, and time.      Sensory: No sensory deficit.      Coordination: Coordination normal.      Gait: Gait normal.   Psychiatric:         Mood and Affect: Mood is not anxious or depressed.         Speech: Speech normal.         Behavior: Behavior normal.         Thought Content: Thought content normal.         Judgment: Judgment normal.       Significant Labs: All pertinent labs within the past 24 hours have been reviewed.  CBC:   Recent Labs   Lab 05/13/22  0559 05/14/22  0650   WBC 18.96* 18.10*   HGB 7.0* 8.0*   HCT 21.5* 25.0*   * 512*     CMP:   Recent Labs   Lab 05/14/22  0650   *   K 4.4      CO2 24      BUN 6   CREATININE 0.5   CALCIUM 7.3*   PROT 4.9*   ALBUMIN 1.2*   BILITOT 0.5   ALKPHOS 110   AST 13   ALT 9*   ANIONGAP 8   EGFRNONAA >60       Significant Imaging: I have reviewed all pertinent imaging results/findings within the past 24 hours.

## 2022-05-14 NOTE — NURSING
Pt refused MRI today. When asked if she was going to go down for MRI today she yelled at staff and mother and said that she was not going to have this done.

## 2022-05-14 NOTE — CARE UPDATE
Reassessed patient following IV Lasix administration, rales in bilateral lungs significantly improved from this AM, will order an additional 1x dose of IV Lasix, reassess in AM. Encouraged patient to continue use of IS and to sit on side of bed intermittently.

## 2022-05-14 NOTE — ASSESSMENT & PLAN NOTE
Gram positive   Infectious disease consulted  Continue broad spectrum intravenous antibiotic(s) given h/o ivdu  Remains febrile and leukocytosis persists  5/8/22 The patient remained afebrile overnight. Currently on cefepime/vanc/flagyl. Blood cx are growing staph aureus. The patient refused the MRI lumbar spine overnight d/t being unable to lay flat from back pain. Will give pain meds and attempt to get the MRI today to r/o spinal abscess. The ECHO showed a 1.3 x 1.5 cm elongated, mobile echodensity seen on the tricuspid valve consistent with vegetation, CT surgery was consulted. Will need a SUSANNAH. Infectious disease is consulted. Will repeat blood cx today.   5/9/22 Repeat blood cx are still positive. Sensitivities are back and Staph is sensitive to oxacillin, will D/C vanc/cefepime. Infectious disease is following   5/10/22 Repeat blood cx are +. Continue IV oxacillin and flagyl. Infectious disease is consulted. IR consulted to evaluate possible paraspinous muscle myositis versus abscess. Recommend IR drainage. Continue current management.    5/11/22 The patient reports pain is still not well controlled at times. The patient reports that she is very anxious about the upcoming SUSANNAH and IR drainage. SUSANNAH was pushed back to this afternoon because the patient ate candy this AM. Continue treatment with IV oxacillin and flagyl. Infectious disease is following. WBC trended down to 31K. Continue current management.  5/12/22 The patients WBC improved to 22K overnight. The patient remains on continuous oxacillin infusion. Infectious disease is following.  5/14: WBC continues to improve, 18.10 today

## 2022-05-14 NOTE — ASSESSMENT & PLAN NOTE
Secondary to acute illness    --Daily CBC  --Transfuse with 1 unit PRBC for Hgb <7.0 or <8.0 if symptomatic   --Hgb: 8.0, improved

## 2022-05-15 LAB
ALBUMIN SERPL BCP-MCNC: 1.3 G/DL (ref 3.5–5.2)
ALP SERPL-CCNC: 115 U/L (ref 55–135)
ALT SERPL W/O P-5'-P-CCNC: 9 U/L (ref 10–44)
ANION GAP SERPL CALC-SCNC: 9 MMOL/L (ref 8–16)
AST SERPL-CCNC: 14 U/L (ref 10–40)
BASOPHILS # BLD AUTO: 0.04 K/UL (ref 0–0.2)
BASOPHILS # BLD AUTO: 0.05 K/UL (ref 0–0.2)
BASOPHILS # BLD AUTO: 0.06 K/UL (ref 0–0.2)
BASOPHILS NFR BLD: 0.2 % (ref 0–1.9)
BASOPHILS NFR BLD: 0.3 % (ref 0–1.9)
BASOPHILS NFR BLD: 0.3 % (ref 0–1.9)
BILIRUB SERPL-MCNC: 0.5 MG/DL (ref 0.1–1)
BUN SERPL-MCNC: 9 MG/DL (ref 6–20)
CALCIUM SERPL-MCNC: 7.6 MG/DL (ref 8.7–10.5)
CHLORIDE SERPL-SCNC: 97 MMOL/L (ref 95–110)
CO2 SERPL-SCNC: 28 MMOL/L (ref 23–29)
CREAT SERPL-MCNC: 0.5 MG/DL (ref 0.5–1.4)
DIFFERENTIAL METHOD: ABNORMAL
EOSINOPHIL # BLD AUTO: 0 K/UL (ref 0–0.5)
EOSINOPHIL NFR BLD: 0 % (ref 0–8)
EOSINOPHIL NFR BLD: 0.1 % (ref 0–8)
EOSINOPHIL NFR BLD: 0.1 % (ref 0–8)
ERYTHROCYTE [DISTWIDTH] IN BLOOD BY AUTOMATED COUNT: 17.2 % (ref 11.5–14.5)
ERYTHROCYTE [DISTWIDTH] IN BLOOD BY AUTOMATED COUNT: 17.2 % (ref 11.5–14.5)
ERYTHROCYTE [DISTWIDTH] IN BLOOD BY AUTOMATED COUNT: 17.5 % (ref 11.5–14.5)
EST. GFR  (AFRICAN AMERICAN): >60 ML/MIN/1.73 M^2
EST. GFR  (NON AFRICAN AMERICAN): >60 ML/MIN/1.73 M^2
GLUCOSE SERPL-MCNC: 114 MG/DL (ref 70–110)
HCT VFR BLD AUTO: 26.7 % (ref 37–48.5)
HCT VFR BLD AUTO: 30.7 % (ref 37–48.5)
HCT VFR BLD AUTO: 32.2 % (ref 37–48.5)
HGB BLD-MCNC: 10 G/DL (ref 12–16)
HGB BLD-MCNC: 8.7 G/DL (ref 12–16)
HGB BLD-MCNC: 9.6 G/DL (ref 12–16)
IMM GRANULOCYTES # BLD AUTO: 0.23 K/UL (ref 0–0.04)
IMM GRANULOCYTES # BLD AUTO: 0.26 K/UL (ref 0–0.04)
IMM GRANULOCYTES # BLD AUTO: 0.27 K/UL (ref 0–0.04)
IMM GRANULOCYTES NFR BLD AUTO: 1.3 % (ref 0–0.5)
IMM GRANULOCYTES NFR BLD AUTO: 1.4 % (ref 0–0.5)
IMM GRANULOCYTES NFR BLD AUTO: 1.5 % (ref 0–0.5)
LYMPHOCYTES # BLD AUTO: 1.8 K/UL (ref 1–4.8)
LYMPHOCYTES # BLD AUTO: 1.9 K/UL (ref 1–4.8)
LYMPHOCYTES # BLD AUTO: 2.3 K/UL (ref 1–4.8)
LYMPHOCYTES NFR BLD: 10.2 % (ref 18–48)
LYMPHOCYTES NFR BLD: 13.6 % (ref 18–48)
LYMPHOCYTES NFR BLD: 9.8 % (ref 18–48)
MCH RBC QN AUTO: 27.7 PG (ref 27–31)
MCH RBC QN AUTO: 27.7 PG (ref 27–31)
MCH RBC QN AUTO: 27.8 PG (ref 27–31)
MCHC RBC AUTO-ENTMCNC: 31.1 G/DL (ref 32–36)
MCHC RBC AUTO-ENTMCNC: 31.3 G/DL (ref 32–36)
MCHC RBC AUTO-ENTMCNC: 32.6 G/DL (ref 32–36)
MCV RBC AUTO: 85 FL (ref 82–98)
MCV RBC AUTO: 89 FL (ref 82–98)
MCV RBC AUTO: 89 FL (ref 82–98)
MONOCYTES # BLD AUTO: 0.9 K/UL (ref 0.3–1)
MONOCYTES NFR BLD: 5 % (ref 4–15)
MONOCYTES NFR BLD: 5 % (ref 4–15)
MONOCYTES NFR BLD: 5.1 % (ref 4–15)
NEUTROPHILS # BLD AUTO: 13.6 K/UL (ref 1.8–7.7)
NEUTROPHILS # BLD AUTO: 15 K/UL (ref 1.8–7.7)
NEUTROPHILS # BLD AUTO: 15.3 K/UL (ref 1.8–7.7)
NEUTROPHILS NFR BLD: 79.6 % (ref 38–73)
NEUTROPHILS NFR BLD: 83.1 % (ref 38–73)
NEUTROPHILS NFR BLD: 83.4 % (ref 38–73)
NRBC BLD-RTO: 0 /100 WBC
PLATELET # BLD AUTO: 539 K/UL (ref 150–450)
PLATELET # BLD AUTO: 555 K/UL (ref 150–450)
PLATELET # BLD AUTO: 558 K/UL (ref 150–450)
PMV BLD AUTO: 9.1 FL (ref 9.2–12.9)
PMV BLD AUTO: 9.1 FL (ref 9.2–12.9)
PMV BLD AUTO: 9.2 FL (ref 9.2–12.9)
POTASSIUM SERPL-SCNC: 4.6 MMOL/L (ref 3.5–5.1)
PROT SERPL-MCNC: 5.4 G/DL (ref 6–8.4)
RBC # BLD AUTO: 3.14 M/UL (ref 4–5.4)
RBC # BLD AUTO: 3.47 M/UL (ref 4–5.4)
RBC # BLD AUTO: 3.6 M/UL (ref 4–5.4)
SODIUM SERPL-SCNC: 134 MMOL/L (ref 136–145)
WBC # BLD AUTO: 17.1 K/UL (ref 3.9–12.7)
WBC # BLD AUTO: 18.07 K/UL (ref 3.9–12.7)
WBC # BLD AUTO: 18.35 K/UL (ref 3.9–12.7)

## 2022-05-15 PROCEDURE — 27000221 HC OXYGEN, UP TO 24 HOURS

## 2022-05-15 PROCEDURE — 63600175 PHARM REV CODE 636 W HCPCS: Performed by: NURSE PRACTITIONER

## 2022-05-15 PROCEDURE — 94640 AIRWAY INHALATION TREATMENT: CPT

## 2022-05-15 PROCEDURE — 85025 COMPLETE CBC W/AUTO DIFF WBC: CPT | Mod: 91 | Performed by: INTERNAL MEDICINE

## 2022-05-15 PROCEDURE — 36415 COLL VENOUS BLD VENIPUNCTURE: CPT | Performed by: NURSE PRACTITIONER

## 2022-05-15 PROCEDURE — 85025 COMPLETE CBC W/AUTO DIFF WBC: CPT | Performed by: NURSE PRACTITIONER

## 2022-05-15 PROCEDURE — 25000003 PHARM REV CODE 250: Performed by: NURSE PRACTITIONER

## 2022-05-15 PROCEDURE — 96372 THER/PROPH/DIAG INJ SC/IM: CPT

## 2022-05-15 PROCEDURE — 27000646 HC AEROBIKA DEVICE

## 2022-05-15 PROCEDURE — 25000242 PHARM REV CODE 250 ALT 637 W/ HCPCS: Performed by: NURSE PRACTITIONER

## 2022-05-15 PROCEDURE — 94664 DEMO&/EVAL PT USE INHALER: CPT

## 2022-05-15 PROCEDURE — 63600175 PHARM REV CODE 636 W HCPCS: Performed by: INTERNAL MEDICINE

## 2022-05-15 PROCEDURE — 80053 COMPREHEN METABOLIC PANEL: CPT | Performed by: NURSE PRACTITIONER

## 2022-05-15 PROCEDURE — 99900035 HC TECH TIME PER 15 MIN (STAT)

## 2022-05-15 PROCEDURE — 21400001 HC TELEMETRY ROOM

## 2022-05-15 PROCEDURE — 94799 UNLISTED PULMONARY SVC/PX: CPT

## 2022-05-15 PROCEDURE — 94761 N-INVAS EAR/PLS OXIMETRY MLT: CPT

## 2022-05-15 RX ORDER — OXYCODONE HYDROCHLORIDE 5 MG/1
10 TABLET ORAL EVERY 4 HOURS PRN
Status: DISCONTINUED | OUTPATIENT
Start: 2022-05-15 | End: 2022-05-17

## 2022-05-15 RX ORDER — KETOROLAC TROMETHAMINE 30 MG/ML
15 INJECTION, SOLUTION INTRAMUSCULAR; INTRAVENOUS EVERY 6 HOURS
Status: COMPLETED | OUTPATIENT
Start: 2022-05-15 | End: 2022-05-18

## 2022-05-15 RX ORDER — KETOROLAC TROMETHAMINE 30 MG/ML
15 INJECTION, SOLUTION INTRAMUSCULAR; INTRAVENOUS EVERY 6 HOURS
Status: DISCONTINUED | OUTPATIENT
Start: 2022-05-15 | End: 2022-05-15

## 2022-05-15 RX ADMIN — KETOROLAC TROMETHAMINE 15 MG: 30 INJECTION, SOLUTION INTRAMUSCULAR at 09:05

## 2022-05-15 RX ADMIN — OXACILLIN SODIUM 12 G: 10 INJECTION, POWDER, FOR SOLUTION INTRAVENOUS at 11:05

## 2022-05-15 RX ADMIN — MORPHINE SULFATE 15 MG: 15 TABLET, EXTENDED RELEASE ORAL at 08:05

## 2022-05-15 RX ADMIN — IPRATROPIUM BROMIDE AND ALBUTEROL SULFATE 3 ML: 2.5; .5 SOLUTION RESPIRATORY (INHALATION) at 12:05

## 2022-05-15 RX ADMIN — ALPRAZOLAM 1 MG: 1 TABLET ORAL at 05:05

## 2022-05-15 RX ADMIN — OXYCODONE HYDROCHLORIDE 10 MG: 5 TABLET ORAL at 02:05

## 2022-05-15 RX ADMIN — TRAZODONE HYDROCHLORIDE 100 MG: 100 TABLET ORAL at 08:05

## 2022-05-15 RX ADMIN — KETOROLAC TROMETHAMINE 15 MG: 30 INJECTION, SOLUTION INTRAMUSCULAR at 05:05

## 2022-05-15 RX ADMIN — OXYCODONE HYDROCHLORIDE 10 MG: 5 TABLET ORAL at 03:05

## 2022-05-15 RX ADMIN — ENOXAPARIN SODIUM 40 MG: 40 INJECTION SUBCUTANEOUS at 05:05

## 2022-05-15 RX ADMIN — MORPHINE SULFATE 15 MG: 15 TABLET, EXTENDED RELEASE ORAL at 09:05

## 2022-05-15 RX ADMIN — KETOROLAC TROMETHAMINE 15 MG: 30 INJECTION, SOLUTION INTRAMUSCULAR at 11:05

## 2022-05-15 RX ADMIN — OXYCODONE HYDROCHLORIDE 10 MG: 5 TABLET ORAL at 05:05

## 2022-05-15 NOTE — RESPIRATORY THERAPY
Unable to obtain sputum sample, specimen cup left at bedside, Pt instructed on use and to notify RN when obtained

## 2022-05-15 NOTE — NURSING
"Patient has called the  12 times since the shift started requesting IV Dilaudid and IV Ativan. I have been giving the patient her pain meds as prescribed and she is still complaining of pain stating "I want IV Dilaudid I pay my hospital bills and I want it" I explained to the patient several times that the IV medications were stopped and all medications will be given orally. Patient falls asleep mid sentence when I am speaking to her then wakes back up again stating "I want something for pain" Charge nurse Hazel and House Sup Yvonne have both spoken to the patient. Patient has been educated several times about her medication. Patient laying in bed with O2 on NC at 2L, siderails up X2, call light within reach, bed alarm set, heart monitor in place, patient in no acute distress will continue to monitor.   "

## 2022-05-15 NOTE — ASSESSMENT & PLAN NOTE
Gram positive   Infectious disease consulted  Continue broad spectrum intravenous antibiotic(s) given h/o ivdu  Remains febrile and leukocytosis persists  5/8/22 The patient remained afebrile overnight. Currently on cefepime/vanc/flagyl. Blood cx are growing staph aureus. The patient refused the MRI lumbar spine overnight d/t being unable to lay flat from back pain. Will give pain meds and attempt to get the MRI today to r/o spinal abscess. The ECHO showed a 1.3 x 1.5 cm elongated, mobile echodensity seen on the tricuspid valve consistent with vegetation, CT surgery was consulted. Will need a SUSANNAH. Infectious disease is consulted. Will repeat blood cx today.   5/9/22 Repeat blood cx are still positive. Sensitivities are back and Staph is sensitive to oxacillin, will D/C vanc/cefepime. Infectious disease is following   5/10/22 Repeat blood cx are +. Continue IV oxacillin and flagyl. Infectious disease is consulted. IR consulted to evaluate possible paraspinous muscle myositis versus abscess. Recommend IR drainage. Continue current management.    5/11/22 The patient reports pain is still not well controlled at times. The patient reports that she is very anxious about the upcoming SUSANNAH and IR drainage. SUSANNAH was pushed back to this afternoon because the patient ate candy this AM. Continue treatment with IV oxacillin and flagyl. Infectious disease is following. WBC trended down to 31K. Continue current management.  5/12/22 The patients WBC improved to 22K overnight. The patient remains on continuous oxacillin infusion. Infectious disease is following.  5/14: WBC continues to improve, 18.10 today     5/15: Blood cultures: NGTD

## 2022-05-15 NOTE — ASSESSMENT & PLAN NOTE
Secondary to acute illness    --Daily CBC  --Transfuse with 1 unit PRBC for Hgb <7.0 or <8.0 if symptomatic   --Hgb: 10.0, improved

## 2022-05-15 NOTE — NURSING
"Patient screaming from room and when I went in the patient stated "I am in pain I want IV pain meds and IV Ativan" I explained to the patient that the IV medications are no longer available. Patient stated "I want to be transferred to [facility]" I told patient I will pass along to day shift and the patient stated "I will act like an ass in this room I need my IV medication"  "

## 2022-05-15 NOTE — SUBJECTIVE & OBJECTIVE
Interval History: Productive cough this AM, ordered repeat CXR, no concerning findings.     Review of Systems   Constitutional:  Positive for activity change, appetite change and fatigue. Negative for chills, diaphoresis, fever and unexpected weight change.   HENT:  Negative for congestion, hearing loss, mouth sores, postnasal drip, rhinorrhea, sore throat and trouble swallowing.    Eyes:  Negative for discharge and visual disturbance.   Respiratory:  Positive for cough and shortness of breath. Negative for chest tightness.    Cardiovascular:  Negative for chest pain, palpitations and leg swelling.   Gastrointestinal:  Positive for abdominal distention and abdominal pain. Negative for blood in stool, constipation, diarrhea, nausea and vomiting.   Endocrine: Negative for cold intolerance and heat intolerance.   Genitourinary:  Negative for difficulty urinating, dyspareunia, flank pain and hematuria.   Musculoskeletal:  Positive for back pain and myalgias. Negative for arthralgias.   Skin: Negative.    Neurological:  Positive for weakness and headaches. Negative for dizziness and light-headedness.   Hematological:  Negative for adenopathy. Does not bruise/bleed easily.   Psychiatric/Behavioral:  Positive for agitation and sleep disturbance. Negative for behavioral problems and confusion. The patient is nervous/anxious.    Objective:     Vital Signs (Most Recent):  Temp: 98.1 °F (36.7 °C) (05/15/22 1642)  Pulse: 80 (05/15/22 1642)  Resp: 18 (05/15/22 1642)  BP: 121/71 (05/15/22 1642)  SpO2: 100 % (05/15/22 1642) Vital Signs (24h Range):  Temp:  [97.7 °F (36.5 °C)-100 °F (37.8 °C)] 98.1 °F (36.7 °C)  Pulse:  [] 80  Resp:  [16-20] 18  SpO2:  [93 %-100 %] 100 %  BP: (121-169)/(71-93) 121/71     Weight: 95.1 kg (209 lb 10.5 oz)  Body mass index is 34.89 kg/m².    Intake/Output Summary (Last 24 hours) at 5/15/2022 1651  Last data filed at 5/15/2022 0401  Gross per 24 hour   Intake 667.2 ml   Output 3100 ml   Net  -2432.8 ml      Physical Exam  Vitals and nursing note reviewed.   Constitutional:       General: She is not in acute distress.     Appearance: She is well-developed. She is ill-appearing.   HENT:      Head: Normocephalic and atraumatic.      Right Ear: Hearing and external ear normal.      Left Ear: Hearing and external ear normal.      Nose: No rhinorrhea.      Right Sinus: No maxillary sinus tenderness or frontal sinus tenderness.      Left Sinus: No maxillary sinus tenderness or frontal sinus tenderness.      Mouth/Throat:      Mouth: No oral lesions.      Pharynx: Uvula midline.   Eyes:      General:         Right eye: No discharge.         Left eye: No discharge.      Conjunctiva/sclera: Conjunctivae normal.      Pupils: Pupils are equal, round, and reactive to light.   Neck:      Thyroid: No thyromegaly.      Vascular: No carotid bruit.      Trachea: No tracheal deviation.   Cardiovascular:      Rate and Rhythm: Regular rhythm. Tachycardia present.      Pulses:           Dorsalis pedis pulses are 2+ on the right side and 2+ on the left side.      Heart sounds: Normal heart sounds, S1 normal and S2 normal. No murmur heard.  Pulmonary:      Effort: Pulmonary effort is normal. No respiratory distress.      Breath sounds: Examination of the right-lower field reveals decreased breath sounds. Examination of the left-lower field reveals decreased breath sounds. Decreased breath sounds present.   Chest:   Breasts:      Right: No supraclavicular adenopathy.      Left: No supraclavicular adenopathy.     Abdominal:      General: Bowel sounds are decreased. There is no distension.      Palpations: Abdomen is soft. There is no mass.      Tenderness: There is no abdominal tenderness.   Musculoskeletal:      Cervical back: Normal range of motion.      Thoracic back: Tenderness present. Decreased range of motion.      Lumbar back: Tenderness present. Decreased range of motion.   Lymphadenopathy:      Cervical: No cervical  adenopathy.      Upper Body:      Right upper body: No supraclavicular adenopathy.      Left upper body: No supraclavicular adenopathy.   Skin:     General: Skin is warm and dry.      Capillary Refill: Capillary refill takes less than 2 seconds.      Findings: No rash.   Neurological:      Mental Status: She is alert and oriented to person, place, and time.      Sensory: No sensory deficit.      Coordination: Coordination normal.      Gait: Gait normal.   Psychiatric:         Mood and Affect: Mood is not anxious or depressed.         Speech: Speech normal.         Behavior: Behavior normal.         Thought Content: Thought content normal.         Judgment: Judgment normal.       Significant Labs: All pertinent labs within the past 24 hours have been reviewed.  CBC:   Recent Labs   Lab 05/15/22  0727 05/15/22  1046 05/15/22  1454   WBC 18.35* 18.07* 17.10*   HGB 9.6* 8.7* 10.0*   HCT 30.7* 26.7* 32.2*   * 558* 555*     CMP:   Recent Labs   Lab 05/14/22  0650 05/15/22  0727   * 134*   K 4.4 4.6    97   CO2 24 28    114*   BUN 6 9   CREATININE 0.5 0.5   CALCIUM 7.3* 7.6*   PROT 4.9* 5.4*   ALBUMIN 1.2* 1.3*   BILITOT 0.5 0.5   ALKPHOS 110 115   AST 13 14   ALT 9* 9*   ANIONGAP 8 9   EGFRNONAA >60 >60       Significant Imaging: I have reviewed all pertinent imaging results/findings within the past 24 hours.

## 2022-05-15 NOTE — PT/OT/SLP PROGRESS
"Physical Therapy      Patient Name:  Tianna Leon   MRN:  [id number]    PT consult received and chart review completed. Patient not seen today secondary to patient refusing therapy eval, stating "you just don't understand, I haven't had any sleep."  Will follow-up on next therapy session.    Viridiana Rhodes, PT  05/15/2022  11:45 AM        "

## 2022-05-15 NOTE — ASSESSMENT & PLAN NOTE
H/O IVDU   --Cont broad spectrum IVAB   --F/U blood and sputum cx   --Status post thoracentesis - 5/7  --Chest x-ray without signs of pneumothorax  --Fluid studies pending  --C/O worsened cough, repeat CXR shows interval worsening, likely secondary to IVF  --Stopped IVF, given Lasix 40mg IV x2 dose- improved  --Nebs PRN  --Supportive O2

## 2022-05-15 NOTE — PLAN OF CARE
Patient has had better pain control today with Toradol IVP that is now scheduled. Pt has been encouraged to use her incentive spirometer and uses it at times. Patient instructed that she needs to participate with PT as she did refuse today.

## 2022-05-15 NOTE — ASSESSMENT & PLAN NOTE
Chronic in nature, H/O IVDU  --CT lumbar show spinal stenosis   --No neurological deficit at this time   --MRI lumbar wwo  To r/o spinal abscess -showed moderate to severe spinal canal stenosis with moderate left-sided neural foraminal stenosis, Neurosurgery consulted.     --MRI thoracic spine ordered: Patient refused 5/12 at 1755 citing anxiety and pain, again refused on 5/13 AM for same reasons, advised nurse to alert provider if able to reapprach, will order 1 time dose of IV Ativan.   --The patient reports pain is still not well controlled. The case was discussed with Neurosurgery who recommended adding extended release pain meds, will initiate PO Oxycodone and titrate for pain relief for breakthrough pain, when acceptable dose determined will adjust long acting medication  --Added Toradol

## 2022-05-15 NOTE — PLAN OF CARE
Problem: Adult Inpatient Plan of Care  Goal: Plan of Care Review  Outcome: Ongoing, Progressing  Goal: Patient-Specific Goal (Individualized)  Outcome: Ongoing, Progressing  Goal: Absence of Hospital-Acquired Illness or Injury  Outcome: Ongoing, Progressing  Goal: Optimal Comfort and Wellbeing  Outcome: Ongoing, Progressing  Goal: Readiness for Transition of Care  Outcome: Ongoing, Progressing     Problem: Skin Injury Risk Increased  Goal: Skin Health and Integrity  Outcome: Ongoing, Progressing     Problem: Bleeding (Sepsis/Septic Shock)  Goal: Absence of Bleeding  Outcome: Ongoing, Progressing

## 2022-05-15 NOTE — PROGRESS NOTES
[facility] Medicine  Progress Note    Patient Name: Tianna Leon  MRN: [id number]  Patient Class: IP- Inpatient   Admission Date: 5/6/2022  Length of Stay: 9 days  Attending Physician: Elizabeth Kim MD  Primary Care Provider: Spenser Campa MD        Subjective:     Principal Problem:Bacteremia        HPI:  36 y/o. female  with a PMHx of asthma, COPD,Bipolar  , IVDU and HLD presented to the ER with a c/o  sob for the last weak which has gotten worse . The SOB is associated with fever , chills , productive cough , back pain  and generalized weakness . She report cough some blood this am .  She is active IVDU  ( heroine , cocaine  and amphetamine ) . She denies any  sick contact , chest pain  , GI/ sx , She also complaning of LE sweeling . Knee pain and B/L LE rash .   ER COURSE: CTA chest negative for pe . Multiple nodular and cavitary opacities concerning for septic emboli with larger opacities possibly necrotizing pneumonia. CT cervical  and thoracic did not show any acute finding , CT lumbar Possible progression of degenerative changes most notable at L4-5 with moderate to severe spinal canal stenosis at this level.  WBC  29 k , na 130 , k 3.4 , cl 89 , procal 2.71   ER VS:  BP Pulse Resp Temp SpO2   (!) 124/58 110 (!) 30 (!) 101.6 °F (38.7 °C) 96 %           Pt will be admitted to inpt with a dx of PNA and severe sepsis       Overview/Hospital Course:  5/7 admitted for pneumonia, severe sepsis in setting of ivdu. Mother at bedside and provides collateral. Patient has struggled with substance abuse for approximately 20 years, worsening over last 3-5 years since life event. Onset of symptoms 1-2 weeks ago. Tolerated thoracentesis with no pneumothorax on chest x-ray. Mri lumbar and echo, pending. Bcx positive, growing gram positive cocci. On vanc, cefepime and flagyl. Infectious disease consulted for bacteremia. 5/8/22 The patient remained afebrile overnight. Currently on  cefepime/vanc/flagyl. Blood cx are growing staph aureus. The patient refused the MRI lumbar spine overnight d/t being unable to lay flat from back pain. Will give pain meds and attempt to get the MRI today to r/o spinal abscess. The ECHO showed a 1.3 x 1.5 cm elongated, mobile echodensity seen on the tricuspid valve consistent with vegetation, CT surgery was consulted. Will need a SUSANNAH. Infectious disease is consulted. Will repeat blood cx today. 5/9/22 No acute events overnight. The patient reports lower back pain is not well controlled with current pain regimen, will adjust. Repeat blood cx are still positive. Sensitivities are back and Staph is sensitive to oxacillin, will D/C vanc/cefepime. Cardiology consulted and plans for a SUSANNAH today. CT surgery following and recommends continuing medical management with appropriate ABX, no surgical intervention at this time. MRI lumbar spine showed moderate to severe spinal canal stenosis with moderate left-sided neural foraminal stenosis, Neurosurgery consulted. 5/10/22 No acute events overnight. The patient reports some improvement in pain with adjustment in pain meds. Repeat blood cx are +. Continue IV oxacillin and flagyl. Infectious disease is consulted. IR consulted to evaluate possible paraspinous muscle myositis versus abscess. Recommend IR drainage. Continue current management. 5/11/22 No acute events overnight. The patient reports pain is still not well controlled at times. The patient reports that she is very anxious about the upcoming SUSANNAH and IR drainage. SUSANNAH was pushed back to this afternoon because the patient ate candy this AM. Continue treatment with IV oxacillin and flagyl. Infectious disease is following. WBC trended down to 31K. Continue current management. 5/12/22 No acute events overnight. The patients WBC improved to 22K overnight. The patient remains on continuous oxacillin infusion. Infectious disease is following. MRI thoracic spine is ordered and  pending. The patients SUSANNAH was postponed again today d/t low H/H. Hgb was noted to be 6.2 this morning, will transfuse 1 unit PRBC. The patient reports pain is still not well controlled. The case was discussed with Neurosurgery who recommended adding extended release pain meds. Approximately 1 ml of purulent drainage aspirated from back yesterday per IR, growing gram + cocci.     5/13: Patient was given 1U PRBC on 5/12, improved from 6.2 --> 7.0, will give an additional unit PRBC. Radiology attempted to bring patient on 5/12 at 1755 for Thoracic MRI, patient refused due to pain and anxiety. Repeat attempt this AM, patient again refused. Will order Ativan 1mg IV to be given with pain medication prior to scan to relieve anxiety and pain, nurse to notify NP when ready to go for scan. Discussed pain management with patient, discussed transition to PO pain medications to being preparing for d/c to LTAC, adjusted PO pain regimen will reevaluate to determine effectiveness. Repeat Blood cultures: NGTD, sensitivities pending.     5/14: Final anaerobic cultures pending, continue to adjust pain regimen, d/c IV Dilaudid today. Patient currently managed with PO Morphine extended release and PO oxycodone. D/C plan is for LTAC once final antibiotic regimen determined. Patient continues to refuse MRI of thoracic spine.     5/15: Called to room this AM, patient had coughed up blood and mucous. Approx. 1-2 teaspoons of blood in emesis bag, ordered CXR and repeat CBC, CXR showed improvement from previous day, repeat CBC at time of incident, showed a slight decrease in Hgb: 9.7 --> 8.7, when repeated at 1500, Hgb had improved to 10. no further episodes of coughing up blood throughout day. Patient continues to c/o not having enough pain medication but continues to sleep most of the day and will fall asleep when speaking at times. Added IV Toradol to plan.       Interval History: Productive cough this AM, ordered repeat CXR, no concerning  findings.     Review of Systems   Constitutional:  Positive for activity change, appetite change and fatigue. Negative for chills, diaphoresis, fever and unexpected weight change.   HENT:  Negative for congestion, hearing loss, mouth sores, postnasal drip, rhinorrhea, sore throat and trouble swallowing.    Eyes:  Negative for discharge and visual disturbance.   Respiratory:  Positive for cough and shortness of breath. Negative for chest tightness.    Cardiovascular:  Negative for chest pain, palpitations and leg swelling.   Gastrointestinal:  Positive for abdominal distention and abdominal pain. Negative for blood in stool, constipation, diarrhea, nausea and vomiting.   Endocrine: Negative for cold intolerance and heat intolerance.   Genitourinary:  Negative for difficulty urinating, dyspareunia, flank pain and hematuria.   Musculoskeletal:  Positive for back pain and myalgias. Negative for arthralgias.   Skin: Negative.    Neurological:  Positive for weakness and headaches. Negative for dizziness and light-headedness.   Hematological:  Negative for adenopathy. Does not bruise/bleed easily.   Psychiatric/Behavioral:  Positive for agitation and sleep disturbance. Negative for behavioral problems and confusion. The patient is nervous/anxious.    Objective:     Vital Signs (Most Recent):  Temp: 98.1 °F (36.7 °C) (05/15/22 1642)  Pulse: 80 (05/15/22 1642)  Resp: 18 (05/15/22 1642)  BP: 121/71 (05/15/22 1642)  SpO2: 100 % (05/15/22 1642) Vital Signs (24h Range):  Temp:  [97.7 °F (36.5 °C)-100 °F (37.8 °C)] 98.1 °F (36.7 °C)  Pulse:  [] 80  Resp:  [16-20] 18  SpO2:  [93 %-100 %] 100 %  BP: (121-169)/(71-93) 121/71     Weight: 95.1 kg (209 lb 10.5 oz)  Body mass index is 34.89 kg/m².    Intake/Output Summary (Last 24 hours) at 5/15/2022 1651  Last data filed at 5/15/2022 0401  Gross per 24 hour   Intake 667.2 ml   Output 3100 ml   Net -2432.8 ml      Physical Exam  Vitals and nursing note reviewed.   Constitutional:        General: She is not in acute distress.     Appearance: She is well-developed. She is ill-appearing.   HENT:      Head: Normocephalic and atraumatic.      Right Ear: Hearing and external ear normal.      Left Ear: Hearing and external ear normal.      Nose: No rhinorrhea.      Right Sinus: No maxillary sinus tenderness or frontal sinus tenderness.      Left Sinus: No maxillary sinus tenderness or frontal sinus tenderness.      Mouth/Throat:      Mouth: No oral lesions.      Pharynx: Uvula midline.   Eyes:      General:         Right eye: No discharge.         Left eye: No discharge.      Conjunctiva/sclera: Conjunctivae normal.      Pupils: Pupils are equal, round, and reactive to light.   Neck:      Thyroid: No thyromegaly.      Vascular: No carotid bruit.      Trachea: No tracheal deviation.   Cardiovascular:      Rate and Rhythm: Regular rhythm. Tachycardia present.      Pulses:           Dorsalis pedis pulses are 2+ on the right side and 2+ on the left side.      Heart sounds: Normal heart sounds, S1 normal and S2 normal. No murmur heard.  Pulmonary:      Effort: Pulmonary effort is normal. No respiratory distress.      Breath sounds: Examination of the right-lower field reveals decreased breath sounds. Examination of the left-lower field reveals decreased breath sounds. Decreased breath sounds present.   Chest:   Breasts:      Right: No supraclavicular adenopathy.      Left: No supraclavicular adenopathy.     Abdominal:      General: Bowel sounds are decreased. There is no distension.      Palpations: Abdomen is soft. There is no mass.      Tenderness: There is no abdominal tenderness.   Musculoskeletal:      Cervical back: Normal range of motion.      Thoracic back: Tenderness present. Decreased range of motion.      Lumbar back: Tenderness present. Decreased range of motion.   Lymphadenopathy:      Cervical: No cervical adenopathy.      Upper Body:      Right upper body: No supraclavicular adenopathy.       Left upper body: No supraclavicular adenopathy.   Skin:     General: Skin is warm and dry.      Capillary Refill: Capillary refill takes less than 2 seconds.      Findings: No rash.   Neurological:      Mental Status: She is alert and oriented to person, place, and time.      Sensory: No sensory deficit.      Coordination: Coordination normal.      Gait: Gait normal.   Psychiatric:         Mood and Affect: Mood is not anxious or depressed.         Speech: Speech normal.         Behavior: Behavior normal.         Thought Content: Thought content normal.         Judgment: Judgment normal.       Significant Labs: All pertinent labs within the past 24 hours have been reviewed.  CBC:   Recent Labs   Lab 05/15/22  0727 05/15/22  1046 05/15/22  1454   WBC 18.35* 18.07* 17.10*   HGB 9.6* 8.7* 10.0*   HCT 30.7* 26.7* 32.2*   * 558* 555*     CMP:   Recent Labs   Lab 05/14/22  0650 05/15/22  0727   * 134*   K 4.4 4.6    97   CO2 24 28    114*   BUN 6 9   CREATININE 0.5 0.5   CALCIUM 7.3* 7.6*   PROT 4.9* 5.4*   ALBUMIN 1.2* 1.3*   BILITOT 0.5 0.5   ALKPHOS 110 115   AST 13 14   ALT 9* 9*   ANIONGAP 8 9   EGFRNONAA >60 >60       Significant Imaging: I have reviewed all pertinent imaging results/findings within the past 24 hours.      Assessment/Plan:      * Bacteremia  Gram positive   Infectious disease consulted  Continue broad spectrum intravenous antibiotic(s) given h/o ivdu  Remains febrile and leukocytosis persists  5/8/22 The patient remained afebrile overnight. Currently on cefepime/vanc/flagyl. Blood cx are growing staph aureus. The patient refused the MRI lumbar spine overnight d/t being unable to lay flat from back pain. Will give pain meds and attempt to get the MRI today to r/o spinal abscess. The ECHO showed a 1.3 x 1.5 cm elongated, mobile echodensity seen on the tricuspid valve consistent with vegetation, CT surgery was consulted. Will need a SUSANNAH. Infectious disease is consulted. Will  repeat blood cx today.   5/9/22 Repeat blood cx are still positive. Sensitivities are back and Staph is sensitive to oxacillin, will D/C vanc/cefepime. Infectious disease is following   5/10/22 Repeat blood cx are +. Continue IV oxacillin and flagyl. Infectious disease is consulted. IR consulted to evaluate possible paraspinous muscle myositis versus abscess. Recommend IR drainage. Continue current management.    5/11/22 The patient reports pain is still not well controlled at times. The patient reports that she is very anxious about the upcoming SUSANNAH and IR drainage. SUSANNAH was pushed back to this afternoon because the patient ate candy this AM. Continue treatment with IV oxacillin and flagyl. Infectious disease is following. WBC trended down to 31K. Continue current management.  5/12/22 The patients WBC improved to 22K overnight. The patient remains on continuous oxacillin infusion. Infectious disease is following.  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 IR consulted to evaluate possible paraspinous muscle myositis versus abscess.    --Approximately 1 ml of purulent drainage aspirated from back 5/11 per IR, growing gram + cocci.    --Continue current regimen      Lumbar stenosis without neurogenic claudication        Subacute bacterial endocarditis        Vegetation of heart valve  ECHO showed a 1.3 x 1.5 cm elongated, mobile echodensity seen on the tricuspid valve consistent with vegetation, CT surgery was consulted.     --Will need a SUSANNAH.  -- Infectious disease is consulted.  -- repeat blood cx NGTD.  -- Continue IV ABX.   --Sensitivities are back and Staph is sensitive to oxacillin, D/C vanc/cefepime.    --Cardiology consulted and plans for a SUSANNAH.   --CT surgery following and recommends continuing medical management with appropriate ABX, no surgical intervention at this time.      Chronic pulmonary heart disease  - Pulmonology following       Parapneumonic effusion  Pulmonology consulted  Status post thoracentesis  Fluid studies pending  5/11/22 IR to collect sample and send for culture today- done, pending    IVDU (intravenous drug user)   Will ordet TTE to r/o IE  Will order MRI lumbar wwo- completed   Cont broad spectrum IVAB   --MRI thoracic spine ordered per ID, patient refused on multiple attempts    5/7   Studies pending for additional sites of infection given h/o ivdu  5/8/22  on cessation     Severe sepsis  This patient does have evidence of infective focus  My overall impression is sepsis. Vital signs were reviewed and noted in progress note.  Antibiotics given-   Antibiotics (From admission, onward)            Start     Stop Route Frequency Ordered    05/09/22 1100  oxacillin 12 g in  mL CONTINUOUS INFUSION         -- IV Every 24 hours (non-standard times) 05/09/22 0932    05/07/22 2100  mupirocin 2 % ointment         05/12 2059 Nasl 2 times daily 05/07/22 1646        Cultures were taken-   Microbiology Results (last 7 days)     Procedure Component Value Units Date/Time    Culture, Respiratory with Gram Stain [[id number]]     Order Status: No result Specimen: Respiratory     Blood culture [[id number]] Collected: 05/11/22 1213    Order Status: Completed Specimen: Blood Updated: 05/15/22 0612     Blood Culture, Routine No Growth to date      No Growth to date      No Growth to date      No Growth to date    Blood culture [[id number]] Collected: 05/11/22 1213    Order Status: Completed Specimen: Blood Updated: 05/15/22 0612     Blood Culture, Routine No Growth to date      No Growth to date      No Growth to date      No Growth to date    Aerobic culture [[id number]]  (Abnormal)  (Susceptibility)  Collected: 05/11/22 1440    Order Status: Completed Specimen: Abscess from Back Updated: 05/14/22 1057     Aerobic Bacterial Culture STAPHYLOCOCCUS AUREUS  Many      Culture, Anaerobe [[id number]] Collected: 05/11/22 1440    Order Status: Completed Specimen: Abscess from Back Updated: 05/13/22 0719     Anaerobic Culture Culture in progress    Gram stain [[id number]] Collected: 05/11/22 1440    Order Status: Completed Specimen: Abscess from Back Updated: 05/12/22 0306     Gram Stain Result Few WBC's      Few Gram positive cocci    Gram stain [[id number]]     Order Status: Canceled Specimen: Joint Fluid from Back     Blood culture [[id number]]  (Abnormal) Collected: 05/08/22 1516    Order Status: Completed Specimen: Blood from Peripheral, Right Hand Updated: 05/11/22 1009     Blood Culture, Routine Gram stain aer bottle: Gram positive cocci in clusters resembling Staph       Results called to and read back by: Chasity Raymundo RN  05/09/2022  11:31      STAPHYLOCOCCUS AUREUS  [facility]  For susceptibility see order #[id number]      Narrative:      Collection has been rescheduled by [email or web address] at 05/08/2022 13:22 Reason:   Pt in mri will be there after another hour [id number]  Collection has been rescheduled by [email or web address] at 05/08/2022 13:22 Reason:   Pt in mri will be there after another hour [id number]    Blood culture [[id number]]  (Abnormal) Collected: 05/08/22 1515    Order Status: Completed Specimen: Blood from Peripheral, Left Arm Updated: 05/11/22 1009     Blood Culture, Routine Gram stain aer bottle: Gram positive cocci in clusters resembling Staph      Results called to and read back by: Chasity Raymundo RN  05/09/2022  15:40      STAPHYLOCOCCUS AUREUS  [facility]  For susceptibility see order #[id number]      Narrative:      Collection has been rescheduled by [email or web address] at 05/08/2022 13:22 Reason:   Pt in mri will be there after  another hour [id number]  Collection has been rescheduled by [email or web address] at 05/08/2022 13:22 Reason:   Pt in mri will be there after another hour [id number]    Blood culture x two cultures. Draw prior to antibiotics. [[id number]]  (Abnormal) Collected: 05/06/22 1617    Order Status: Completed Specimen: Blood from Peripheral, Antecubital, Right Updated: 05/09/22 0824     Blood Culture, Routine Gram stain aer bottle: Gram positive cocci in clusters resembling Staph       Results called to and read back by: Lila Hernandez RN 05/07/2022  10:57      Gram stain missael bottle: Gram positive cocci in clusters resembling Staph       05/07/2022  13:40      STAPHYLOCOCCUS AUREUS  ID consult required at [facility].  For susceptibility see order #[id number]      Narrative:      Aerobic and anaerobic    Blood culture x two cultures. Draw prior to antibiotics. [[id number]]  (Abnormal)  (Susceptibility) Collected: 05/06/22 1616    Order Status: Completed Specimen: Blood from Peripheral, Antecubital, Left Updated: 05/09/22 0823     Blood Culture, Routine Gram stain aer bottle: Gram positive cocci in clusters resembling Staph       Gram stain missael bottle: Gram positive cocci in clusters resembling Staph       Results called to and read back by: Lila Hernandez RN 05/07/2022  10:57      STAPHYLOCOCCUS AUREUS  ID consult required at [facility].      Narrative:      Aerobic and anaerobic        Latest lactate reviewed, they are-  No results for input(s): LACTATE in the last 72 hours.    Organ dysfunction indicated by Acute kidney injury  Source-  lung    Source control Achieved by-   Cont Broad spectrum IVAB     Bacteremia  Infectious disease consulted      PNA (pneumonia)  H/O IVDU   --Cont broad spectrum IVAB   --F/U blood and sputum cx   --Status post thoracentesis - 5/7  --Chest x-ray without signs of pneumothorax  --Fluid studies pending  --C/O worsened cough, repeat CXR shows interval  worsening, likely secondary to IVF  --Stopped IVF, given Lasix 40mg IV x2 dose- improved  --Nebs PRN  --Supportive O2        Tobacco abuse  - Patient thoroughly counseled on smoking cessation, pt verbalized understanding. Time of counseling 10 minutes.        COPD (chronic obstructive pulmonary disease)  Cont breathing tx prn  Pulmonology following     Lower back pain  Chronic in nature, H/O IVDU  --CT lumbar show spinal stenosis   --No neurological deficit at this time   --MRI lumbar wwo  To r/o spinal abscess -showed moderate to severe spinal canal stenosis with moderate left-sided neural foraminal stenosis, Neurosurgery consulted.     --MRI thoracic spine ordered: Patient refused 5/12 at 1755 citing anxiety and pain, again refused on 5/13 AM for same reasons, advised nurse to alert provider if able to reapprach, will order 1 time dose of IV Ativan.   --The patient reports pain is still not well controlled. The case was discussed with Neurosurgery who recommended adding extended release pain meds, will initiate PO Oxycodone and titrate for pain relief for breakthrough pain, when acceptable dose determined will adjust long acting medication  --Added Toradol      VTE Risk Mitigation (From admission, onward)         Ordered     enoxaparin injection 40 mg  Daily         05/06/22 2336     IP VTE HIGH RISK PATIENT  Once         05/06/22 2336     Place sequential compression device  Until discontinued         05/06/22 2336                Discharge Planning   YESSICA:      Code Status: Full Code   Is the patient medically ready for discharge?:     Reason for patient still in hospital (select all that apply): Patient trending condition  Discharge Plan A: Home                  Marjan Calderón NP  Department of Hospital Medicine   [facility] - Med Surg

## 2022-05-15 NOTE — ASSESSMENT & PLAN NOTE
This patient does have evidence of infective focus  My overall impression is sepsis. Vital signs were reviewed and noted in progress note.  Antibiotics given-   Antibiotics (From admission, onward)            Start     Stop Route Frequency Ordered    05/09/22 1100  oxacillin 12 g in  mL CONTINUOUS INFUSION         -- IV Every 24 hours (non-standard times) 05/09/22 0932    05/07/22 2100  mupirocin 2 % ointment         05/12 2059 Nasl 2 times daily 05/07/22 1646        Cultures were taken-   Microbiology Results (last 7 days)     Procedure Component Value Units Date/Time    Culture, Respiratory with Gram Stain [[id number]]     Order Status: No result Specimen: Respiratory     Blood culture [[id number]] Collected: 05/11/22 1213    Order Status: Completed Specimen: Blood Updated: 05/15/22 0612     Blood Culture, Routine No Growth to date      No Growth to date      No Growth to date      No Growth to date    Blood culture [[id number]] Collected: 05/11/22 1213    Order Status: Completed Specimen: Blood Updated: 05/15/22 0612     Blood Culture, Routine No Growth to date      No Growth to date      No Growth to date      No Growth to date    Aerobic culture [[id number]]  (Abnormal)  (Susceptibility) Collected: 05/11/22 1440    Order Status: Completed Specimen: Abscess from Back Updated: 05/14/22 1057     Aerobic Bacterial Culture STAPHYLOCOCCUS AUREUS  Many      Culture, Anaerobe [[id number]] Collected: 05/11/22 1440    Order Status: Completed Specimen: Abscess from Back Updated: 05/13/22 0719     Anaerobic Culture Culture in progress    Gram stain [[id number]] Collected: 05/11/22 1440    Order Status: Completed Specimen: Abscess from Back Updated: 05/12/22 0306     Gram Stain Result Few WBC's      Few Gram positive cocci    Gram stain [[id number]]     Order Status: Canceled Specimen: Joint Fluid from Back     Blood culture [[id number]]  (Abnormal) Collected: 05/08/22 1516    Order Status: Completed Specimen: Blood from  Peripheral, Right Hand Updated: 05/11/22 1009     Blood Culture, Routine Gram stain aer bottle: Gram positive cocci in clusters resembling Staph       Results called to and read back by: Chasity Raymundo RN  05/09/2022  11:31      STAPHYLOCOCCUS AUREUS  ID consult required at [facility].  For susceptibility see order #[id number]      Narrative:      Collection has been rescheduled by [email or web address] at 05/08/2022 13:22 Reason:   Pt in mri will be there after another hour [id number]  Collection has been rescheduled by [email or web address] at 05/08/2022 13:22 Reason:   Pt in mri will be there after another hour [id number]    Blood culture [[id number]]  (Abnormal) Collected: 05/08/22 1515    Order Status: Completed Specimen: Blood from Peripheral, Left Arm Updated: 05/11/22 1009     Blood Culture, Routine Gram stain aer bottle: Gram positive cocci in clusters resembling Staph      Results called to and read back by: Chasity Raymundo RN  05/09/2022  15:40      STAPHYLOCOCCUS AUREUS  ID consult required at [facility] and [facility].  For susceptibility see order #[id number]      Narrative:      Collection has been rescheduled by [email or web address] at 05/08/2022 13:22 Reason:   Pt in mri will be there after another hour [id number]  Collection has been rescheduled by [email or web address] at 05/08/2022 13:22 Reason:   Pt in mri will be there after another hour [id number]    Blood culture x two cultures. Draw prior to antibiotics. [[id number]]  (Abnormal) Collected: 05/06/22 1617    Order Status: Completed Specimen: Blood from Peripheral, Antecubital, Right Updated: 05/09/22 0824     Blood Culture, Routine Gram stain aer bottle: Gram positive cocci in clusters resembling Staph       Results called to and read back by: Lila Hernandez RN 05/07/2022  10:57      Gram stain missael bottle: Gram positive cocci in clusters resembling Staph       05/07/2022  13:40      STAPHYLOCOCCUS AUREUS  ID consult required at [facility] and [facility].  For  susceptibility see order #[id number]      Narrative:      Aerobic and anaerobic    Blood culture x two cultures. Draw prior to antibiotics. [[id number]]  (Abnormal)  (Susceptibility) Collected: 05/06/22 1616    Order Status: Completed Specimen: Blood from Peripheral, Antecubital, Left Updated: 05/09/22 0823     Blood Culture, Routine Gram stain aer bottle: Gram positive cocci in clusters resembling Staph       Gram stain missael bottle: Gram positive cocci in clusters resembling Staph       Results called to and read back by: Lila Hernandez RN 05/07/2022  10:57      STAPHYLOCOCCUS AUREUS  ID consult required at [facility].[place] and [place] locations.      Narrative:      Aerobic and anaerobic        Latest lactate reviewed, they are-  No results for input(s): LACTATE in the last 72 hours.    Organ dysfunction indicated by Acute kidney injury  Source-  lung    Source control Achieved by-   Cont Broad spectrum IVAB     Bacteremia  Infectious disease consulted

## 2022-05-16 LAB
BACTERIA SPEC ANAEROBE CULT: NORMAL
BASOPHILS # BLD AUTO: 0.06 K/UL (ref 0–0.2)
BASOPHILS NFR BLD: 0.4 % (ref 0–1.9)
DIFFERENTIAL METHOD: ABNORMAL
EOSINOPHIL # BLD AUTO: 0.1 K/UL (ref 0–0.5)
EOSINOPHIL NFR BLD: 0.6 % (ref 0–8)
ERYTHROCYTE [DISTWIDTH] IN BLOOD BY AUTOMATED COUNT: 17.5 % (ref 11.5–14.5)
HCT VFR BLD AUTO: 26.7 % (ref 37–48.5)
HGB BLD-MCNC: 8.4 G/DL (ref 12–16)
IMM GRANULOCYTES # BLD AUTO: 0.19 K/UL (ref 0–0.04)
IMM GRANULOCYTES NFR BLD AUTO: 1.4 % (ref 0–0.5)
LYMPHOCYTES # BLD AUTO: 2.4 K/UL (ref 1–4.8)
LYMPHOCYTES NFR BLD: 17.8 % (ref 18–48)
MCH RBC QN AUTO: 27.9 PG (ref 27–31)
MCHC RBC AUTO-ENTMCNC: 31.5 G/DL (ref 32–36)
MCV RBC AUTO: 89 FL (ref 82–98)
MONOCYTES # BLD AUTO: 1 K/UL (ref 0.3–1)
MONOCYTES NFR BLD: 7.4 % (ref 4–15)
NEUTROPHILS # BLD AUTO: 9.7 K/UL (ref 1.8–7.7)
NEUTROPHILS NFR BLD: 72.4 % (ref 38–73)
NRBC BLD-RTO: 0 /100 WBC
PLATELET # BLD AUTO: 586 K/UL (ref 150–450)
PMV BLD AUTO: 9.2 FL (ref 9.2–12.9)
RBC # BLD AUTO: 3.01 M/UL (ref 4–5.4)
WBC # BLD AUTO: 13.46 K/UL (ref 3.9–12.7)

## 2022-05-16 PROCEDURE — 87205 SMEAR GRAM STAIN: CPT | Performed by: NURSE PRACTITIONER

## 2022-05-16 PROCEDURE — 97530 THERAPEUTIC ACTIVITIES: CPT

## 2022-05-16 PROCEDURE — 25000003 PHARM REV CODE 250: Performed by: NURSE PRACTITIONER

## 2022-05-16 PROCEDURE — 27000221 HC OXYGEN, UP TO 24 HOURS

## 2022-05-16 PROCEDURE — 36415 COLL VENOUS BLD VENIPUNCTURE: CPT | Performed by: INTERNAL MEDICINE

## 2022-05-16 PROCEDURE — 27000646 HC AEROBIKA DEVICE

## 2022-05-16 PROCEDURE — 99233 PR SUBSEQUENT HOSPITAL CARE,LEVL III: ICD-10-PCS | Mod: NSCH,,, | Performed by: INTERNAL MEDICINE

## 2022-05-16 PROCEDURE — 63600175 PHARM REV CODE 636 W HCPCS: Performed by: INTERNAL MEDICINE

## 2022-05-16 PROCEDURE — 94761 N-INVAS EAR/PLS OXIMETRY MLT: CPT

## 2022-05-16 PROCEDURE — 85025 COMPLETE CBC W/AUTO DIFF WBC: CPT | Performed by: INTERNAL MEDICINE

## 2022-05-16 PROCEDURE — 21400001 HC TELEMETRY ROOM

## 2022-05-16 PROCEDURE — 99900035 HC TECH TIME PER 15 MIN (STAT)

## 2022-05-16 PROCEDURE — 63600175 PHARM REV CODE 636 W HCPCS: Performed by: NURSE PRACTITIONER

## 2022-05-16 PROCEDURE — 97166 OT EVAL MOD COMPLEX 45 MIN: CPT

## 2022-05-16 PROCEDURE — 97162 PT EVAL MOD COMPLEX 30 MIN: CPT

## 2022-05-16 PROCEDURE — 94664 DEMO&/EVAL PT USE INHALER: CPT

## 2022-05-16 PROCEDURE — 99233 SBSQ HOSP IP/OBS HIGH 50: CPT | Mod: NSCH,,, | Performed by: INTERNAL MEDICINE

## 2022-05-16 PROCEDURE — 97535 SELF CARE MNGMENT TRAINING: CPT

## 2022-05-16 PROCEDURE — 87070 CULTURE OTHR SPECIMN AEROBIC: CPT | Performed by: NURSE PRACTITIONER

## 2022-05-16 RX ADMIN — MORPHINE SULFATE 15 MG: 15 TABLET, EXTENDED RELEASE ORAL at 08:05

## 2022-05-16 RX ADMIN — KETOROLAC TROMETHAMINE 15 MG: 30 INJECTION, SOLUTION INTRAMUSCULAR at 06:05

## 2022-05-16 RX ADMIN — KETOROLAC TROMETHAMINE 15 MG: 30 INJECTION, SOLUTION INTRAMUSCULAR at 11:05

## 2022-05-16 RX ADMIN — OXACILLIN SODIUM 12 G: 10 INJECTION, POWDER, FOR SOLUTION INTRAVENOUS at 12:05

## 2022-05-16 RX ADMIN — KETOROLAC TROMETHAMINE 15 MG: 30 INJECTION, SOLUTION INTRAMUSCULAR at 12:05

## 2022-05-16 RX ADMIN — ENOXAPARIN SODIUM 40 MG: 40 INJECTION SUBCUTANEOUS at 06:05

## 2022-05-16 RX ADMIN — OXYCODONE HYDROCHLORIDE 10 MG: 5 TABLET ORAL at 10:05

## 2022-05-16 RX ADMIN — TRAZODONE HYDROCHLORIDE 100 MG: 100 TABLET ORAL at 08:05

## 2022-05-16 RX ADMIN — KETOROLAC TROMETHAMINE 15 MG: 30 INJECTION, SOLUTION INTRAMUSCULAR at 05:05

## 2022-05-16 NOTE — PLAN OF CARE
P.T. EVAL COMPLETE, PT CURRENTLY REQUIRES SBA FOR BED MOBILITY, SHAYY FOR TF'S AND SHORT DISTANCE GAIT, QUICK TO FATIGUE WITH INCREASED SOB WITH MINIMAL EXERTION, P.T. RECOMMENDS SNF VS HHPT DEPENDING ON PROGRESS WITH POC

## 2022-05-16 NOTE — PLAN OF CARE
[facility] - Med Surg  Discharge Reassessment    Primary Care Provider: Spenser Campa MD    Expected Discharge Date:     Reassessment (most recent)     Discharge Reassessment - 05/16/22 1107        Discharge Reassessment    Assessment Type Discharge Planning Reassessment     Did the patient's condition or plan change since previous assessment? No     Discharge Plan discussed with: Patient     Communicated YESSICA with patient/caregiver Date not available/Unable to determine     Discharge Plan A Home     Discharge Plan B Home     DME Needed Upon Discharge  none     Discharge Barriers Identified None        Post-Acute Status    Discharge Delays None known at this time               Cm will continue to follow patient for DC needs. Patient to dc home once medically stable.

## 2022-05-16 NOTE — PLAN OF CARE
SEE EVAL FOR DETAILS. PT DISPLAYED DEFICITS WITH FUNCTIONAL MOBILITY/ TRANSFERS, DECREASE ADL'S SKILLS, DECREASE B UE STRENGTH/ENDURANCE. RECOMMEND: HOME HEALTH O.T. VS. SNF

## 2022-05-16 NOTE — PT/OT/SLP EVAL
Physical Therapy Evaluation    Patient Name:  Tianna Leon   MRN:  [id number]    Recommendations:     Discharge Recommendations:  SNF VS HHPT DEPENDING ON PROGRESS WITH POC   Discharge Equipment Recommendations: walker, rolling   Barriers to discharge: None    Assessment:     Tianna Leon is a 37 y.o. female admitted with a medical diagnosis of Bacteremia.  She presents with the following impairments/functional limitations:  weakness, impaired endurance, impaired functional mobilty, gait instability, decreased safety awareness, decreased lower extremity function, decreased upper extremity function, edema, decreased coordination.    Rehab Prognosis: Good; patient would benefit from acute skilled PT services to address these deficits and reach maximum level of function.    Recent Surgery: Procedure(s) (LRB):  ECHOCARDIOGRAM,TRANSESOPHAGEAL (N/A) 4 Days Post-Op    Plan:     During this hospitalization, patient to be seen 3 x/week to address the identified rehab impairments via gait training, therapeutic activities, therapeutic exercises and progress toward the following goals:    · Plan of Care Expires:  05/30/22    Subjective     Chief Complaint:   Patient/Family Comments/goals:   Pain/Comfort:  · Pain Rating 1: 0/10    Patients cultural, spiritual, Restorationist conflicts given the current situation:      Living Environment:  PT LIVES ALONE IN  WITH 2 STEPS TO ENTER WITH RAIL, AMB INDEP COMMUNITY DISTANCES, DRIVES, DOES NOT WORK, INDEP WITH ADL'S  Prior to admission, patients level of function was INDEP.  Equipment used at home: none.  DME owned (not currently used): none.  Upon discharge, patient will have assistance from ?.    Objective:     Communicated with NURSE OSORIO prior to session.  Patient found supine with telemetry, oxygen, peripheral IV, PureWick  upon PT entry to room.    General Precautions: Standard, fall, respiratory   Orthopedic Precautions:N/A   Braces: N/A  Respiratory Status: Nasal  cannula, flow 2 L/min    Exams:  · Cognitive Exam:  Patient is oriented to Person, Place, Time, Situation and INCREASED ANXIETY WITH OOB ACTIVITY  · Postural Exam:  Patient presented with the following abnormalities:    · -       Rounded shoulders  · -       Forward head  · Sensation:    · -       Intact  BLE  · Skin Integrity/Edema:      · -       Edema: Moderate ALL OVER BODY  · RLE ROM: WFL  · RLE Strength: GROSSLY 3+/5  · LLE ROM: WFL  · LLE Strength: GROSSLY 3+/5    Functional Mobility:  · Bed Mobility:     · Rolling Left:  stand by assistance  · Scooting: stand by assistance  · Supine to Sit: stand by assistance  · Transfers:     · Sit to Stand:  minimum assistance with rolling walker  · Bed to Chair: minimum assistance with  rolling walker  using  Step Transfer  · Toilet Transfer: minimum assistance with  rolling walker  using  Step Transfer  · Gait: PT AMB 10' X 2 TRIALS WITH RW AND MIN/CGA, SLOW PACE, CUES FOR UPRIGHT POSTURE AND DEEP BREATHING  · Balance: POOR+    Therapeutic Activities and Exercises:   PT EDUCATED IN ROLE OF P.T. AND POC, PT EDUCATED IN RW USE AND SAFETY DURING TF'S AND GAIT, PT SLOW MOVING WITH ALL MOBILITY, CUES REQUIRED TO STAY ON TASK, INCREASED ANXIETY AND TEARFUL AT TIMES, MAX CUES THROUGHOUT EVAL AND TREAT TO HOLD HEAD UPRIGHT INSTEAD OF FLEXED FWD AT HEAD AND TRUNK, DEEP BREATHING ENCOURAGED, REST GIVEN AS NEEDED, SHAYY FOR TOILET TF, MODA FOR CLEANING BOTTOM, PT ENCOURAGED TO INCREASE TIME OOB IN CHAIR, EDUCATED IN BLE THEREX TO PERFORM WHILE SEATED IN CHAIR THROUGHOUT THE DAY: HIP FLEX/EXT, LAQ, AP'S    AM-PAC 6 CLICK MOBILITY  Total Score:18     Patient left up in chair with all lines intact, call button in reach, chair alarm on, NURSE notified and MOTHER present.    GOALS:   Multidisciplinary Problems     Physical Therapy Goals        Problem: Physical Therapy    Goal Priority Disciplines Outcome Goal Variances Interventions   Physical Therapy Goal     PT, PT/OT       Description: LTG'S TO BE MET IN 14 DAYS (22)  1. PT WILL BE KRISAT WITH BED MOBILITY  2. PT WILL BE KRISTA WITH TF'S  3. PT WILL ' WITH RW AND SPV                   History:     Past Medical History:   Diagnosis Date    ADHD (attention deficit hyperactivity disorder)     Anxiety     Asthma     Bipolar disorder     Cancer     cervical    COPD (chronic obstructive pulmonary disease)     GERD (gastroesophageal reflux disease)     Hepatitis C antibody positive in blood     RNA UNDETECTED 2016    Hyperlipidemia     Intravenous drug abuse     MDD (major depressive disorder)     Mood disorder     PTSD (post-traumatic stress disorder)     Suicidal ideation        Past Surgical History:   Procedure Laterality Date     SECTION  2001    x2    CHOLECYSTECTOMY      HYSTERECTOMY      LAPAROSCOPIC SALPINGECTOMY      ROBOT-ASSISTED LAPAROSCOPIC ABDOMINAL HYSTERECTOMY USING DA SEBASTIEN XI N/A 2019    Procedure: XI ROBOTIC HYSTERECTOMY;  Surgeon: Tabatha Quintanilla MD;  Location: [facility] OR;  Service: OB/GYN;  Laterality: N/A;    ROBOT-ASSISTED LAPAROSCOPIC LYSIS OF ADHESIONS USING DA SEBASTIEN XI N/A 2019    Procedure: XI ROBOTIC LYSIS, ADHESIONS;  Surgeon: Tabatha Quintanilla MD;  Location: [facility] OR;  Service: OB/GYN;  Laterality: N/A;    ROBOT-ASSISTED SURGICAL REMOVAL OF FALLOPIAN TUBE USING DA SEBASTIEN XI Right 2019    Procedure: XI ROBOTIC SALPINGECTOMY;  Surgeon: Tabatha Quintanilla MD;  Location: [facility] OR;  Service: OB/GYN;  Laterality: Right;    TONSILLECTOMY      TUBAL LIGATION         Time Tracking:     PT Received On: 22  PT Start Time: 1015     PT Stop Time: 1040  PT Total Time (min): 25 min     Billable Minutes: Evaluation 15 and Therapeutic Activity 10    2022

## 2022-05-16 NOTE — PROGRESS NOTES
[facility] Medicine  Progress Note    Patient Name: Tianna Leon  MRN: [id number]  Patient Class: IP- Inpatient   Admission Date: 5/6/2022  Length of Stay: 10 days  Attending Physician: Elizabeth Kim MD  Primary Care Provider: Spenser Campa MD        Subjective:     Principal Problem:Bacteremia        HPI:  38 y/o. female  with a PMHx of asthma, COPD,Bipolar  , IVDU and HLD presented to the ER with a c/o  sob for the last weak which has gotten worse . The SOB is associated with fever , chills , productive cough , back pain  and generalized weakness . She report cough some blood this am .  She is active IVDU  ( heroine , cocaine  and amphetamine ) . She denies any  sick contact , chest pain  , GI/ sx , She also complaning of LE sweeling . Knee pain and B/L LE rash .   ER COURSE: CTA chest negative for pe . Multiple nodular and cavitary opacities concerning for septic emboli with larger opacities possibly necrotizing pneumonia. CT cervical  and thoracic did not show any acute finding , CT lumbar Possible progression of degenerative changes most notable at L4-5 with moderate to severe spinal canal stenosis at this level.  WBC  29 k , na 130 , k 3.4 , cl 89 , procal 2.71   ER VS:  BP Pulse Resp Temp SpO2   (!) 124/58 110 (!) 30 (!) 101.6 °F (38.7 °C) 96 %           Pt will be admitted to inpt with a dx of PNA and severe sepsis       Overview/Hospital Course:  5/7 admitted for pneumonia, severe sepsis in setting of ivdu. Mother at bedside and provides collateral. Patient has struggled with substance abuse for approximately 20 years, worsening over last 3-5 years since life event. Onset of symptoms 1-2 weeks ago. Tolerated thoracentesis with no pneumothorax on chest x-ray. Mri lumbar and echo, pending. Bcx positive, growing gram positive cocci. On vanc, cefepime and flagyl. Infectious disease consulted for bacteremia. 5/8/22 The patient remained afebrile overnight. Currently on  cefepime/vanc/flagyl. Blood cx are growing staph aureus. The patient refused the MRI lumbar spine overnight d/t being unable to lay flat from back pain. Will give pain meds and attempt to get the MRI today to r/o spinal abscess. The ECHO showed a 1.3 x 1.5 cm elongated, mobile echodensity seen on the tricuspid valve consistent with vegetation, CT surgery was consulted. Will need a SUSANNAH. Infectious disease is consulted. Will repeat blood cx today. 5/9/22 No acute events overnight. The patient reports lower back pain is not well controlled with current pain regimen, will adjust. Repeat blood cx are still positive. Sensitivities are back and Staph is sensitive to oxacillin, will D/C vanc/cefepime. Cardiology consulted and plans for a SUSANNAH today. CT surgery following and recommends continuing medical management with appropriate ABX, no surgical intervention at this time. MRI lumbar spine showed moderate to severe spinal canal stenosis with moderate left-sided neural foraminal stenosis, Neurosurgery consulted. 5/10/22 No acute events overnight. The patient reports some improvement in pain with adjustment in pain meds. Repeat blood cx are +. Continue IV oxacillin and flagyl. Infectious disease is consulted. IR consulted to evaluate possible paraspinous muscle myositis versus abscess. Recommend IR drainage. Continue current management. 5/11/22 No acute events overnight. The patient reports pain is still not well controlled at times. The patient reports that she is very anxious about the upcoming SUSANNAH and IR drainage. SUSANNAH was pushed back to this afternoon because the patient ate candy this AM. Continue treatment with IV oxacillin and flagyl. Infectious disease is following. WBC trended down to 31K. Continue current management. 5/12/22 No acute events overnight. The patients WBC improved to 22K overnight. The patient remains on continuous oxacillin infusion. Infectious disease is following. MRI thoracic spine is ordered and  pending. The patients SUSANNAH was postponed again today d/t low H/H. Hgb was noted to be 6.2 this morning, will transfuse 1 unit PRBC. The patient reports pain is still not well controlled. The case was discussed with Neurosurgery who recommended adding extended release pain meds. Approximately 1 ml of purulent drainage aspirated from back yesterday per IR, growing gram + cocci.     5/13: Patient was given 1U PRBC on 5/12, improved from 6.2 --> 7.0, will give an additional unit PRBC. Radiology attempted to bring patient on 5/12 at 1755 for Thoracic MRI, patient refused due to pain and anxiety. Repeat attempt this AM, patient again refused. Will order Ativan 1mg IV to be given with pain medication prior to scan to relieve anxiety and pain, nurse to notify NP when ready to go for scan. Discussed pain management with patient, discussed transition to PO pain medications to being preparing for d/c to LTAC, adjusted PO pain regimen will reevaluate to determine effectiveness. Repeat Blood cultures: NGTD, sensitivities pending.     5/14: Final anaerobic cultures pending, continue to adjust pain regimen, d/c IV Dilaudid today. Patient currently managed with PO Morphine extended release and PO oxycodone. D/C plan is for LTAC once final antibiotic regimen determined. Patient continues to refuse MRI of thoracic spine.     5/15: Called to room this AM, patient had coughed up blood and mucous. Approx. 1-2 teaspoons of blood in emesis bag, ordered CXR and repeat CBC, CXR showed improvement from previous day, repeat CBC at time of incident, showed a slight decrease in Hgb: 9.7 --> 8.7, when repeated at 1500, Hgb had improved to 10. no further episodes of coughing up blood throughout day. Patient continues to c/o not having enough pain medication but continues to sleep most of the day and will fall asleep when speaking at times. Added IV Toradol to plan.     5/16: No further episodes of coughing blood overnight, patient participated  with encouragement with PT/OT today, recommendations are HH vs. SNF pending progress. Awaiting final antibiotic regimen recommendations. Anaerobic culture results finalized: negative for growth. Afebrile, WBC trending downward.       Interval History: WBC continues downward trend    Review of Systems   Constitutional:  Positive for activity change, appetite change and fatigue. Negative for chills, diaphoresis, fever and unexpected weight change.   HENT:  Negative for congestion, hearing loss, mouth sores, postnasal drip, rhinorrhea, sore throat and trouble swallowing.    Eyes:  Negative for discharge and visual disturbance.   Respiratory:  Positive for cough and shortness of breath. Negative for chest tightness.    Cardiovascular:  Negative for chest pain, palpitations and leg swelling.   Gastrointestinal:  Positive for abdominal distention and abdominal pain. Negative for blood in stool, constipation, diarrhea, nausea and vomiting.   Endocrine: Negative for cold intolerance and heat intolerance.   Genitourinary:  Negative for difficulty urinating, dyspareunia, flank pain and hematuria.   Musculoskeletal:  Positive for back pain and myalgias. Negative for arthralgias.   Skin: Negative.    Neurological:  Positive for weakness and headaches. Negative for dizziness and light-headedness.   Hematological:  Negative for adenopathy. Does not bruise/bleed easily.   Psychiatric/Behavioral:  Positive for agitation and sleep disturbance. Negative for behavioral problems and confusion. The patient is nervous/anxious.    Objective:     Vital Signs (Most Recent):  Temp: 98.2 °F (36.8 °C) (05/16/22 1230)  Pulse: 99 (05/16/22 1230)  Resp: 16 (05/16/22 1230)  BP: 124/76 (05/16/22 1230)  SpO2: 95 % (05/16/22 1230) Vital Signs (24h Range):  Temp:  [98.1 °F (36.7 °C)-98.9 °F (37.2 °C)] 98.2 °F (36.8 °C)  Pulse:  [] 99  Resp:  [16-20] 16  SpO2:  [95 %-100 %] 95 %  BP: (103-124)/(51-76) 124/76     Weight: 95.1 kg (209 lb 10.5  oz)  Body mass index is 34.89 kg/m².    Intake/Output Summary (Last 24 hours) at 5/16/2022 1618  Last data filed at 5/16/2022 1059  Gross per 24 hour   Intake 740.22 ml   Output 1600 ml   Net -859.78 ml      Physical Exam  Vitals and nursing note reviewed.   Constitutional:       General: She is not in acute distress.     Appearance: She is well-developed. She is ill-appearing.   HENT:      Head: Normocephalic and atraumatic.      Right Ear: Hearing and external ear normal.      Left Ear: Hearing and external ear normal.      Nose: No rhinorrhea.      Right Sinus: No maxillary sinus tenderness or frontal sinus tenderness.      Left Sinus: No maxillary sinus tenderness or frontal sinus tenderness.      Mouth/Throat:      Mouth: No oral lesions.      Pharynx: Uvula midline.   Eyes:      General:         Right eye: No discharge.         Left eye: No discharge.      Conjunctiva/sclera: Conjunctivae normal.      Pupils: Pupils are equal, round, and reactive to light.   Neck:      Thyroid: No thyromegaly.      Vascular: No carotid bruit.      Trachea: No tracheal deviation.   Cardiovascular:      Rate and Rhythm: Regular rhythm. Tachycardia present.      Pulses:           Dorsalis pedis pulses are 2+ on the right side and 2+ on the left side.      Heart sounds: Normal heart sounds, S1 normal and S2 normal. No murmur heard.  Pulmonary:      Effort: Pulmonary effort is normal. No respiratory distress.      Breath sounds: Examination of the right-lower field reveals decreased breath sounds. Examination of the left-lower field reveals decreased breath sounds. Decreased breath sounds present.   Chest:   Breasts:      Right: No supraclavicular adenopathy.      Left: No supraclavicular adenopathy.     Abdominal:      General: Bowel sounds are decreased. There is no distension.      Palpations: Abdomen is soft. There is no mass.      Tenderness: There is no abdominal tenderness.   Musculoskeletal:      Cervical back: Normal range  of motion.      Thoracic back: Tenderness present. Decreased range of motion.      Lumbar back: Tenderness present. Decreased range of motion.   Lymphadenopathy:      Cervical: No cervical adenopathy.      Upper Body:      Right upper body: No supraclavicular adenopathy.      Left upper body: No supraclavicular adenopathy.   Skin:     General: Skin is warm and dry.      Capillary Refill: Capillary refill takes less than 2 seconds.      Findings: No rash.   Neurological:      Mental Status: She is alert and oriented to person, place, and time.      Sensory: No sensory deficit.      Coordination: Coordination normal.      Gait: Gait normal.   Psychiatric:         Mood and Affect: Mood is not anxious or depressed.         Speech: Speech normal.         Behavior: Behavior normal.         Thought Content: Thought content normal.         Judgment: Judgment normal.       Significant Labs: All pertinent labs within the past 24 hours have been reviewed.    Significant Imaging: I have reviewed all pertinent imaging results/findings within the past 24 hours.      Assessment/Plan:      * Bacteremia  Gram positive   Infectious disease consulted  Continue broad spectrum intravenous antibiotic(s) given h/o ivdu  Remains febrile and leukocytosis persists  5/8/22 The patient remained afebrile overnight. Currently on cefepime/vanc/flagyl. Blood cx are growing staph aureus. The patient refused the MRI lumbar spine overnight d/t being unable to lay flat from back pain. Will give pain meds and attempt to get the MRI today to r/o spinal abscess. The ECHO showed a 1.3 x 1.5 cm elongated, mobile echodensity seen on the tricuspid valve consistent with vegetation, CT surgery was consulted. Will need a SUSANNAH. Infectious disease is consulted. Will repeat blood cx today.   5/9/22 Repeat blood cx are still positive. Sensitivities are back and Staph is sensitive to oxacillin, will D/C vanc/cefepime. Infectious disease is following   5/10/22 Repeat  blood cx are +. Continue IV oxacillin and flagyl. Infectious disease is consulted. IR consulted to evaluate possible paraspinous muscle myositis versus abscess. Recommend IR drainage. Continue current management.    5/11/22 The patient reports pain is still not well controlled at times. The patient reports that she is very anxious about the upcoming SUSANNAH and IR drainage. SUSANNAH was pushed back to this afternoon because the patient ate candy this AM. Continue treatment with IV oxacillin and flagyl. Infectious disease is following. WBC trended down to 31K. Continue current management.  5/12/22 The patients WBC improved to 22K overnight. The patient remains on continuous oxacillin infusion. Infectious disease is following.  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 IR consulted to evaluate possible paraspinous muscle myositis versus abscess.    --Approximately 1 ml of purulent drainage aspirated from back 5/11 per IR, growing gram + cocci.    --Continue current regimen      Lumbar stenosis without neurogenic claudication        Subacute bacterial endocarditis        Vegetation of heart valve  ECHO showed a 1.3 x 1.5 cm elongated, mobile echodensity seen on the tricuspid valve consistent with vegetation, CT surgery was consulted.     --Will need a SUSANNAH.  -- Infectious disease is consulted.  -- repeat blood cx NGTD.  -- Continue IV ABX.   --Sensitivities are back and Staph is sensitive to oxacillin, D/C vanc/cefepime.   --Cardiology consulted and plans for a SUSANNAH.   --CT surgery following and recommends continuing medical management with appropriate ABX, no surgical intervention at this time.      Chronic pulmonary  heart disease  - Pulmonology following       Parapneumonic effusion  Pulmonology consulted  Status post thoracentesis  Fluid studies pending  5/11/22 IR to collect sample and send for culture today- done, pending    IVDU (intravenous drug user)   Will ordet TTE to r/o IE  Will order MRI lumbar wwo- completed   Cont broad spectrum IVAB   --MRI thoracic spine ordered per ID, patient refused on multiple attempts    5/7   Studies pending for additional sites of infection given h/o ivdu  5/8/22  on cessation     Severe sepsis  This patient does have evidence of infective focus  My overall impression is sepsis. Vital signs were reviewed and noted in progress note.  Antibiotics given-   Antibiotics (From admission, onward)            Start     Stop Route Frequency Ordered    05/09/22 1100  oxacillin 12 g in  mL CONTINUOUS INFUSION         -- IV Every 24 hours (non-standard times) 05/09/22 0932    05/07/22 2100  mupirocin 2 % ointment         05/12 2059 Nasl 2 times daily 05/07/22 1646        Cultures were taken-   Microbiology Results (last 7 days)     Procedure Component Value Units Date/Time    Culture, Respiratory with Gram Stain [[id number]] Collected: 05/16/22 0758    Order Status: Sent Specimen: Respiratory from Sputum Updated: 05/16/22 0759    Culture, Anaerobe [[id number]] Collected: 05/11/22 1440    Order Status: Completed Specimen: Abscess from Back Updated: 05/16/22 0733     Anaerobic Culture No anaerobes isolated    Blood culture [[id number]] Collected: 05/11/22 1213    Order Status: Completed Specimen: Blood Updated: 05/16/22 0612     Blood Culture, Routine No Growth to date      No Growth to date      No Growth to date      No Growth to date      No Growth to date    Blood culture [[id number]] Collected: 05/11/22 1213    Order Status: Completed Specimen: Blood Updated: 05/16/22 0612     Blood Culture, Routine No Growth to date      No Growth to date      No Growth to date      No Growth to date       No Growth to date    Aerobic culture [[id number]]  (Abnormal)  (Susceptibility) Collected: 05/11/22 1440    Order Status: Completed Specimen: Abscess from Back Updated: 05/14/22 1057     Aerobic Bacterial Culture STAPHYLOCOCCUS AUREUS  Many      Gram stain [[id number]] Collected: 05/11/22 1440    Order Status: Completed Specimen: Abscess from Back Updated: 05/12/22 0306     Gram Stain Result Few WBC's      Few Gram positive cocci    Gram stain [[id number]]     Order Status: Canceled Specimen: Joint Fluid from Back     Blood culture [[id number]]  (Abnormal) Collected: 05/08/22 1516    Order Status: Completed Specimen: Blood from Peripheral, Right Hand Updated: 05/11/22 1009     Blood Culture, Routine Gram stain aer bottle: Gram positive cocci in clusters resembling Staph       Results called to and read back by: Chasity Raymundo RN  05/09/2022  11:31      STAPHYLOCOCCUS AUREUS  ID consult required at [facility].  For susceptibility see order #[id number]      Narrative:      Collection has been rescheduled by [email or web address] at 05/08/2022 13:22 Reason:   Pt in mri will be there after another hour [id number]  Collection has been rescheduled by [email or web address] at 05/08/2022 13:22 Reason:   Pt in mri will be there after another hour [id number]    Blood culture [[id number]]  (Abnormal) Collected: 05/08/22 1515    Order Status: Completed Specimen: Blood from Peripheral, Left Arm Updated: 05/11/22 1009     Blood Culture, Routine Gram stain aer bottle: Gram positive cocci in clusters resembling Staph      Results called to and read back by: Chasity Raymundo RN  05/09/2022  15:40      STAPHYLOCOCCUS AUREUS  ID consult required at [facility].  For susceptibility see order #[id number]      Narrative:      Collection has been rescheduled by [email or web address] at 05/08/2022 13:22 Reason:   Pt in mri will be there after another hour [id number]  Collection has been rescheduled by [email or web address] at 05/08/2022 13:22 Reason:   Pt in mri  will be there after another hour [id number]        Latest lactate reviewed, they are-  No results for input(s): LACTATE in the last 72 hours.    Organ dysfunction indicated by Acute kidney injury  Source-  lung    Source control Achieved by-   Cont Broad spectrum IVAB     Bacteremia  Infectious disease consulted      PNA (pneumonia)  H/O IVDU   --Cont broad spectrum IVAB   --F/U blood and sputum cx   --Status post thoracentesis - 5/7  --Chest x-ray without signs of pneumothorax  --Fluid studies pending  --C/O worsened cough, repeat CXR shows interval worsening, likely secondary to IVF  --Stopped IVF, given Lasix 40mg IV x2 dose- improved  --Nebs PRN  --Supportive O2        Tobacco abuse  - Patient thoroughly counseled on smoking cessation, pt verbalized understanding. Time of counseling 10 minutes.        COPD (chronic obstructive pulmonary disease)  Cont breathing tx prn  Pulmonology following     Lower back pain  Chronic in nature, H/O IVDU  --CT lumbar show spinal stenosis   --No neurological deficit at this time   --MRI lumbar wwo  To r/o spinal abscess -showed moderate to severe spinal canal stenosis with moderate left-sided neural foraminal stenosis, Neurosurgery consulted.     --MRI thoracic spine ordered: Patient refused 5/12 at 1755 citing anxiety and pain, again refused on 5/13 AM for same reasons, advised nurse to alert provider if able to reapprach, will order 1 time dose of IV Ativan.   --The patient reports pain is still not well controlled. The case was discussed with Neurosurgery who recommended adding extended release pain meds, will initiate PO Oxycodone and titrate for pain relief for breakthrough pain, when acceptable dose determined will adjust long acting medication  --Continue Toradol      VTE Risk Mitigation (From admission, onward)         Ordered     enoxaparin injection 40 mg  Daily         05/06/22 2336     IP VTE HIGH RISK PATIENT  Once         05/06/22 2336     Place sequential  compression device  Until discontinued         05/06/22 4765                Discharge Planning   YESSICA:      Code Status: Full Code   Is the patient medically ready for discharge?:     Reason for patient still in hospital (select all that apply): Patient trending condition and Pending disposition  Discharge Plan A: Home   Discharge Delays: None known at this time              Marjan Calderón NP  Department of Hospital Medicine   [facility]

## 2022-05-16 NOTE — PLAN OF CARE
Recommendations  1) Continue cardiac diet - encourage intake   2) If PO < 50% meals, add Boost Glucose Control TID    Goals: Pt to meet greater than or equal to 75% EEN by RD follow up  Nutrition Goal Status: new  Communication of RD Recs: other (comment) (POC, sticky note)    Assessment and Plan  Nutrition Problem  Inadequate oral intake    Related to (etiology):   Decreased appetite    Signs and Symptoms (as evidenced by):   PO intake 0-75% meals per chart    Interventions(treatment strategy):  Collaboration with other providers  Fat and Mineral modified diet (cardiac)    Nutrition Diagnosis Status:   New

## 2022-05-16 NOTE — SUBJECTIVE & OBJECTIVE
Interval History: WBC continues downward trend    Review of Systems   Constitutional:  Positive for activity change, appetite change and fatigue. Negative for chills, diaphoresis, fever and unexpected weight change.   HENT:  Negative for congestion, hearing loss, mouth sores, postnasal drip, rhinorrhea, sore throat and trouble swallowing.    Eyes:  Negative for discharge and visual disturbance.   Respiratory:  Positive for cough and shortness of breath. Negative for chest tightness.    Cardiovascular:  Negative for chest pain, palpitations and leg swelling.   Gastrointestinal:  Positive for abdominal distention and abdominal pain. Negative for blood in stool, constipation, diarrhea, nausea and vomiting.   Endocrine: Negative for cold intolerance and heat intolerance.   Genitourinary:  Negative for difficulty urinating, dyspareunia, flank pain and hematuria.   Musculoskeletal:  Positive for back pain and myalgias. Negative for arthralgias.   Skin: Negative.    Neurological:  Positive for weakness and headaches. Negative for dizziness and light-headedness.   Hematological:  Negative for adenopathy. Does not bruise/bleed easily.   Psychiatric/Behavioral:  Positive for agitation and sleep disturbance. Negative for behavioral problems and confusion. The patient is nervous/anxious.    Objective:     Vital Signs (Most Recent):  Temp: 98.2 °F (36.8 °C) (05/16/22 1230)  Pulse: 99 (05/16/22 1230)  Resp: 16 (05/16/22 1230)  BP: 124/76 (05/16/22 1230)  SpO2: 95 % (05/16/22 1230) Vital Signs (24h Range):  Temp:  [98.1 °F (36.7 °C)-98.9 °F (37.2 °C)] 98.2 °F (36.8 °C)  Pulse:  [] 99  Resp:  [16-20] 16  SpO2:  [95 %-100 %] 95 %  BP: (103-124)/(51-76) 124/76     Weight: 95.1 kg (209 lb 10.5 oz)  Body mass index is 34.89 kg/m².    Intake/Output Summary (Last 24 hours) at 5/16/2022 1618  Last data filed at 5/16/2022 1059  Gross per 24 hour   Intake 740.22 ml   Output 1600 ml   Net -859.78 ml      Physical Exam  Vitals and  nursing note reviewed.   Constitutional:       General: She is not in acute distress.     Appearance: She is well-developed. She is ill-appearing.   HENT:      Head: Normocephalic and atraumatic.      Right Ear: Hearing and external ear normal.      Left Ear: Hearing and external ear normal.      Nose: No rhinorrhea.      Right Sinus: No maxillary sinus tenderness or frontal sinus tenderness.      Left Sinus: No maxillary sinus tenderness or frontal sinus tenderness.      Mouth/Throat:      Mouth: No oral lesions.      Pharynx: Uvula midline.   Eyes:      General:         Right eye: No discharge.         Left eye: No discharge.      Conjunctiva/sclera: Conjunctivae normal.      Pupils: Pupils are equal, round, and reactive to light.   Neck:      Thyroid: No thyromegaly.      Vascular: No carotid bruit.      Trachea: No tracheal deviation.   Cardiovascular:      Rate and Rhythm: Regular rhythm. Tachycardia present.      Pulses:           Dorsalis pedis pulses are 2+ on the right side and 2+ on the left side.      Heart sounds: Normal heart sounds, S1 normal and S2 normal. No murmur heard.  Pulmonary:      Effort: Pulmonary effort is normal. No respiratory distress.      Breath sounds: Examination of the right-lower field reveals decreased breath sounds. Examination of the left-lower field reveals decreased breath sounds. Decreased breath sounds present.   Chest:   Breasts:      Right: No supraclavicular adenopathy.      Left: No supraclavicular adenopathy.     Abdominal:      General: Bowel sounds are decreased. There is no distension.      Palpations: Abdomen is soft. There is no mass.      Tenderness: There is no abdominal tenderness.   Musculoskeletal:      Cervical back: Normal range of motion.      Thoracic back: Tenderness present. Decreased range of motion.      Lumbar back: Tenderness present. Decreased range of motion.   Lymphadenopathy:      Cervical: No cervical adenopathy.      Upper Body:      Right  upper body: No supraclavicular adenopathy.      Left upper body: No supraclavicular adenopathy.   Skin:     General: Skin is warm and dry.      Capillary Refill: Capillary refill takes less than 2 seconds.      Findings: No rash.   Neurological:      Mental Status: She is alert and oriented to person, place, and time.      Sensory: No sensory deficit.      Coordination: Coordination normal.      Gait: Gait normal.   Psychiatric:         Mood and Affect: Mood is not anxious or depressed.         Speech: Speech normal.         Behavior: Behavior normal.         Thought Content: Thought content normal.         Judgment: Judgment normal.       Significant Labs: All pertinent labs within the past 24 hours have been reviewed.    Significant Imaging: I have reviewed all pertinent imaging results/findings within the past 24 hours.

## 2022-05-16 NOTE — PROGRESS NOTES
"[facility] - Med Surg  Adult Nutrition  Progress Note    SUMMARY     Recommendations  1) Continue cardiac diet - encourage intake   2) If PO < 50% meals, add Boost Glucose Control TID    Goals: Pt to meet greater than or equal to 75% EEN by RD follow up  Nutrition Goal Status: new  Communication of RD Recs: other (comment) (POC, sticky note)    Assessment and Plan  Nutrition Problem  Inadequate oral intake    Related to (etiology):   Decreased appetite    Signs and Symptoms (as evidenced by):   PO intake 0-75% meals per chart    Interventions(treatment strategy):  Collaboration with other providers  Fat and Mineral modified diet (cardiac)    Nutrition Diagnosis Status:   New    Malnutrition Assessment    JHON NFPE due to remote assessment  Wt history showing wt fluctuations and gradual wt loss over the last 3 years. Unable to assess wt pattern in the last year (none on file)    Reason for Assessment  Reason For Assessment: length of stay  Diagnosis: other (see comments), infection/sepsis (bacteremia)  Relevant Medical History: asthma, COPD,Bipolar, IVDU, anxiety, HLD  Interdisciplinary Rounds: did not attend  General Information Comments: Remote assessment for coverage. Unable to reach pt on room phone - line busy. Reached out to RN via secure chat - reports she is doing okay with PO intake. Per chart, eating 0-50% meals, with 15 lbs wt gain since admit (+ 4.3 L in I/Os), possibly fluid related. Per MD note pt +appetite change and + abdominal pain and distension. LBM 5/15. JHON NFPE due to remote assesment. RD to monitor and follow up.  Nutrition Discharge Planning: Discharge on cardiac diet    Nutrition Risk Screen  Nutrition Risk Screen: no indicators present    Nutrition/Diet History  Factors Affecting Nutritional Intake: decreased appetite, abdominal pain, abdominal distension    Anthropometrics  Temp: 98.2 °F (36.8 °C)  Height Method: Stated  Height: 5' 5" (165.1 cm)  Height (inches): 65 in  Weight Method: Bed " Scale  Weight: 95.1 kg (209 lb 10.5 oz)  Weight (lb): 209.66 lb  Ideal Body Weight (IBW), Female: 125 lb  BMI (Calculated): 34.9     Lab/Procedures/Meds  Pertinent Labs Reviewed: reviewed  Pertinent Labs Comments: Na 134, Glu 114, Ca 7.6, Alb 1.3, ALT 9  Pertinent Medications Reviewed: reviewed  Pertinent Medications Comments: morphine    Estimated/Assessed Needs  Weight Used For Calorie Calculations: 94.8 kg (209 lb)  Energy Calorie Requirements (kcal): 2370 kcal/day based on 25 kcal/kg for COPD  Energy Need Method: Kcal/kg  Protein Requirements:  kcal/day based on 1-1.2 g/kg  Weight Used For Protein Calculations: 94.8 kg (209 lb)  Fluid Requirements (mL): 1 mL/kcal or per MD  Estimated Fluid Requirement Method: RDA Method  RDA Method (mL): 2370     Nutrition Prescription Ordered  Current Diet Order: Cardiac - 1500 kcal    Evaluation of Received Nutrient/Fluid Intake  Energy Calories Required: not meeting needs  Fluid Required: not meeting needs  Comments: LBM 5/15  Tolerance: tolerating  % Intake of Estimated Energy Needs: 25 - 50 %  % Meal Intake: 25 - 50 %    Nutrition Risk  Level of Risk/Frequency of Follow-up:  (1-2x weekly)     Monitor and Evaluation  Food and Nutrient Intake: energy intake, food and beverage intake  Food and Nutrient Adminstration: diet order  Knowledge/Beliefs/Attitudes: food and nutrition knowledge/skill  Physical Activity and Function: nutrition-related ADLs and IADLs  Anthropometric Measurements: weight change  Biochemical Data, Medical Tests and Procedures: electrolyte and renal panel, lipid profile, gastrointestinal profile, glucose/endocrine profile, inflammatory profile  Nutrition-Focused Physical Findings: overall appearance, extremities, muscles and bones, skin     Nutrition Follow-Up  RD Follow-up?: Yes

## 2022-05-16 NOTE — ASSESSMENT & PLAN NOTE
Chronic in nature, H/O IVDU  --CT lumbar show spinal stenosis   --No neurological deficit at this time   --MRI lumbar wwo  To r/o spinal abscess -showed moderate to severe spinal canal stenosis with moderate left-sided neural foraminal stenosis, Neurosurgery consulted.     --MRI thoracic spine ordered: Patient refused 5/12 at 1755 citing anxiety and pain, again refused on 5/13 AM for same reasons, advised nurse to alert provider if able to reapprach, will order 1 time dose of IV Ativan.   --The patient reports pain is still not well controlled. The case was discussed with Neurosurgery who recommended adding extended release pain meds, will initiate PO Oxycodone and titrate for pain relief for breakthrough pain, when acceptable dose determined will adjust long acting medication  --Continue Toradol

## 2022-05-16 NOTE — PT/OT/SLP EVAL
Occupational Therapy   Evaluation    Name: Tianna Leon  MRN: [id number]  Admitting Diagnosis:  Bacteremia  Recent Surgery: Procedure(s) (LRB):  ECHOCARDIOGRAM,TRANSESOPHAGEAL (N/A) 4 Days Post-Op    Recommendations:     Discharge Recommendations: home health OT, nursing facility, skilled  Discharge Equipment Recommendations:  walker, rolling  Barriers to discharge:       Assessment:     Tianna Leon is a 37 y.o. female with a medical diagnosis of Bacteremia.  She presents with DEBILITY AND GENERALIZED WEAKNESS. Performance deficits affecting function: weakness, impaired balance, decreased safety awareness, impaired endurance, impaired self care skills, decreased coordination, decreased ROM, impaired functional mobilty, decreased upper extremity function, gait instability, decreased lower extremity function, edema.      Rehab Prognosis: Fair; patient would benefit from acute skilled OT services to address these deficits and reach maximum level of function.       Plan:     Patient to be seen 2 x/week to address the above listed problems via self-care/home management, therapeutic activities, therapeutic exercises  · Plan of Care Expires: 05/30/22  · Plan of Care Reviewed with: patient, mother    Subjective     Chief Complaint: DEBILITY  AND GENERALIZED WEAKNESS  Patient/Family Comments/goals:   Occupational Profile:  Living Environment: LIVES ALONE IN [facility] WITH 2 STEPS TO ENTER AND HAND RAILS BY DOOR.  Previous level of function: (I)  WITH ADL'S AND FUNCTIONAL MOBILITY  Roles and Routines: OCCUPATIONAL THERAPY  Equipment Used at Home:  none  Assistance upon Discharge:     Pain/Comfort:  Pain Rating 1: 0/10    Patients cultural, spiritual, Orthodoxy conflicts given the current situation:      Objective:     Communicated with: NURSE AND Epic CHART REVIEW prior to session.  Patient found with bed in chair position with telemetry, oxygen, peripheral IV, PureWick upon OT entry to room.    General  "Precautions: Standard, fall   Orthopedic Precautions:N/A   Braces: N/A  Respiratory Status: Room air    Occupational Performance:    Bed Mobility:    · Patient completed Rolling/Turning to Left with  moderate assistance  · Patient completed Scooting/Bridging with moderate assistance  · Patient completed Supine to Sit with moderate assistance    Functional Mobility/Transfers:  · Patient completed Sit <> Stand Transfer with minimum assistance and of 2 persons  with  rolling walker   · Patient completed Bed <> Chair Transfer using Step Transfer technique with minimum assistance and of 2 persons with rolling walker  · Patient completed Toilet Transfer Step Transfer technique with moderate assistance with  rolling walker, grab bars and VC'S FOR SAFETY, HAND PLACEMENT, AND POSTURE  · Functional Mobility: PT AMBULATED 10 FEET X 2 WITH MIN A  AND ROLLING WALKER    Activities of Daily Living:  · Lower Body Dressing: maximal assistance .    Cognitive/Visual Perceptual:  Cognitive/Psychosocial Skills:     -       Oriented to: Person, Place, Time and Situation   -       Follows Commands/attention:Follows multistep  commands  -       Communication: clear/fluent  -       Memory: No Deficits noted  -       Safety awareness/insight to disability: impaired     Physical Exam:  Upper Extremity Range of Motion:     -       Right Upper Extremity: WFL  -       Left Upper Extremity: WFL  Upper Extremity Strength:    -       Right Upper Extremity: MMT: 3/ 5 GROSSLY  -       Left Upper Extremity: MMT: 3/5 GROSSLY   Strength:    -       Right Upper Extremity: MMT: 3/5 GROSSLY  -       Left Upper Extremity: MMT: 3/5 GROSSLY    AMPAC 6 Click ADL:  AMPAC Total Score: 14    Treatment & Education:  O.T. EVAL COM[PLETED. PT EDUCATED ON O.T. POC. PT DISPLAYED DEFICITS WITH ADL'S AND FUNCTIONAL MOBILITY/ TRANSFERS. PT ALSO DISPLAYED DEFICITS WITH B UE STRENGTH/ENDURANCE. PT STATES SEVERAL TIMES THROUGH OUT EVAL " I CAN'T." PT DISPLAYS POOR " MOTIVATION AND DECREASE PARTICIPATION   Tx: PT REQ MAX A WITH TOILETING.Education:    PT WILL CONTINUE BENEFIT FROM SKILLED O.T.SEE EVAL FOR DETAILS.  Patient left up in chair with all lines intact, call button in reach, NURSE notified and SPOUSE present    GOALS:   Multidisciplinary Problems     Occupational Therapy Goals        Problem: Occupational Therapy    Goal Priority Disciplines Outcome Interventions   Occupational Therapy Goal     OT, PT/OT     Description: O.T. GOALS MET BY -  SBA WITH UE DRESSING  MIN A WITH LE DRESSING  PT WILL TOLERATE 1 SET X 15 REPS B UE ROM EXERCISE  MIN A WITH TOILETING                   History:     Past Medical History:   Diagnosis Date    ADHD (attention deficit hyperactivity disorder)     Anxiety     Asthma     Bipolar disorder     Cancer     cervical    COPD (chronic obstructive pulmonary disease)     GERD (gastroesophageal reflux disease)     Hepatitis C antibody positive in blood     RNA UNDETECTED 2016    Hyperlipidemia     Intravenous drug abuse     MDD (major depressive disorder)     Mood disorder     PTSD (post-traumatic stress disorder)     Suicidal ideation          Past Surgical History:   Procedure Laterality Date     SECTION  2001    x2    CHOLECYSTECTOMY      HYSTERECTOMY      LAPAROSCOPIC SALPINGECTOMY      ROBOT-ASSISTED LAPAROSCOPIC ABDOMINAL HYSTERECTOMY USING DA SEBASTIEN XI N/A 2019    Procedure: XI ROBOTIC HYSTERECTOMY;  Surgeon: Tabatha Quintanilla MD;  Location: [facility] OR;  Service: OB/GYN;  Laterality: N/A;    ROBOT-ASSISTED LAPAROSCOPIC LYSIS OF ADHESIONS USING DA SEBASTIEN XI N/A 2019    Procedure: XI ROBOTIC LYSIS, ADHESIONS;  Surgeon: Tabatha Quintanilla MD;  Location: [facility] OR;  Service: OB/GYN;  Laterality: N/A;    ROBOT-ASSISTED SURGICAL REMOVAL OF FALLOPIAN TUBE USING DA SEBASTIEN XI Right 2019    Procedure: XI ROBOTIC SALPINGECTOMY;  Surgeon: Tabatha Quintanilla MD;  Location: [facility] OR;  Service:  OB/GYN;  Laterality: Right;    TONSILLECTOMY      TUBAL LIGATION         Time Tracking:     OT Date of Treatment: 05/16/22  OT Start Time: 1010  OT Stop Time: 1035  OT Total Time (min): 25 min    Billable Minutes:Evaluation 15 MINUTES  Self Care/Home Management  10 MINUTES    5/16/2022

## 2022-05-17 LAB
BACTERIA BLD CULT: NORMAL
BACTERIA BLD CULT: NORMAL
BASOPHILS # BLD AUTO: 0.07 K/UL (ref 0–0.2)
BASOPHILS NFR BLD: 0.7 % (ref 0–1.9)
DIFFERENTIAL METHOD: ABNORMAL
EOSINOPHIL # BLD AUTO: 0.1 K/UL (ref 0–0.5)
EOSINOPHIL NFR BLD: 0.5 % (ref 0–8)
ERYTHROCYTE [DISTWIDTH] IN BLOOD BY AUTOMATED COUNT: 17.6 % (ref 11.5–14.5)
HCT VFR BLD AUTO: 26.7 % (ref 37–48.5)
HGB BLD-MCNC: 8.2 G/DL (ref 12–16)
IMM GRANULOCYTES # BLD AUTO: 0.12 K/UL (ref 0–0.04)
IMM GRANULOCYTES NFR BLD AUTO: 1.3 % (ref 0–0.5)
LYMPHOCYTES # BLD AUTO: 1.9 K/UL (ref 1–4.8)
LYMPHOCYTES NFR BLD: 20.1 % (ref 18–48)
MCH RBC QN AUTO: 27.4 PG (ref 27–31)
MCHC RBC AUTO-ENTMCNC: 30.7 G/DL (ref 32–36)
MCV RBC AUTO: 89 FL (ref 82–98)
MONOCYTES # BLD AUTO: 0.7 K/UL (ref 0.3–1)
MONOCYTES NFR BLD: 7.2 % (ref 4–15)
NEUTROPHILS # BLD AUTO: 6.7 K/UL (ref 1.8–7.7)
NEUTROPHILS NFR BLD: 70.2 % (ref 38–73)
NRBC BLD-RTO: 0 /100 WBC
PLATELET # BLD AUTO: 510 K/UL (ref 150–450)
PMV BLD AUTO: 9 FL (ref 9.2–12.9)
RBC # BLD AUTO: 2.99 M/UL (ref 4–5.4)
WBC # BLD AUTO: 9.5 K/UL (ref 3.9–12.7)

## 2022-05-17 PROCEDURE — 99900035 HC TECH TIME PER 15 MIN (STAT)

## 2022-05-17 PROCEDURE — 21400001 HC TELEMETRY ROOM

## 2022-05-17 PROCEDURE — 25000003 PHARM REV CODE 250: Performed by: NURSE PRACTITIONER

## 2022-05-17 PROCEDURE — 36415 COLL VENOUS BLD VENIPUNCTURE: CPT | Performed by: INTERNAL MEDICINE

## 2022-05-17 PROCEDURE — 63600175 PHARM REV CODE 636 W HCPCS: Performed by: NURSE PRACTITIONER

## 2022-05-17 PROCEDURE — 85025 COMPLETE CBC W/AUTO DIFF WBC: CPT | Performed by: INTERNAL MEDICINE

## 2022-05-17 PROCEDURE — 27000221 HC OXYGEN, UP TO 24 HOURS

## 2022-05-17 PROCEDURE — 63600175 PHARM REV CODE 636 W HCPCS: Performed by: INTERNAL MEDICINE

## 2022-05-17 PROCEDURE — 36569 INSJ PICC 5 YR+ W/O IMAGING: CPT

## 2022-05-17 PROCEDURE — C1751 CATH, INF, PER/CENT/MIDLINE: HCPCS

## 2022-05-17 PROCEDURE — 94664 DEMO&/EVAL PT USE INHALER: CPT

## 2022-05-17 PROCEDURE — 94761 N-INVAS EAR/PLS OXIMETRY MLT: CPT

## 2022-05-17 PROCEDURE — 27000646 HC AEROBIKA DEVICE

## 2022-05-17 PROCEDURE — 63600175 PHARM REV CODE 636 W HCPCS: Performed by: STUDENT IN AN ORGANIZED HEALTH CARE EDUCATION/TRAINING PROGRAM

## 2022-05-17 RX ORDER — OXYCODONE HYDROCHLORIDE 5 MG/1
5 TABLET ORAL EVERY 6 HOURS PRN
Status: DISCONTINUED | OUTPATIENT
Start: 2022-05-17 | End: 2022-06-06

## 2022-05-17 RX ORDER — OXYCODONE HYDROCHLORIDE 5 MG/1
5 TABLET ORAL EVERY 4 HOURS PRN
Status: DISCONTINUED | OUTPATIENT
Start: 2022-05-17 | End: 2022-05-17

## 2022-05-17 RX ORDER — NALOXONE HCL 0.4 MG/ML
0.4 VIAL (ML) INJECTION
Status: DISCONTINUED | OUTPATIENT
Start: 2022-05-17 | End: 2022-06-10 | Stop reason: HOSPADM

## 2022-05-17 RX ORDER — NALOXONE HYDROCHLORIDE 1 MG/ML
0.4 INJECTION INTRAMUSCULAR; INTRAVENOUS; SUBCUTANEOUS ONCE
Status: COMPLETED | OUTPATIENT
Start: 2022-05-17 | End: 2022-05-17

## 2022-05-17 RX ADMIN — SODIUM CHLORIDE 1000 ML: 0.9 INJECTION, SOLUTION INTRAVENOUS at 01:05

## 2022-05-17 RX ADMIN — KETOROLAC TROMETHAMINE 15 MG: 30 INJECTION, SOLUTION INTRAMUSCULAR at 05:05

## 2022-05-17 RX ADMIN — KETOROLAC TROMETHAMINE 15 MG: 30 INJECTION, SOLUTION INTRAMUSCULAR at 11:05

## 2022-05-17 RX ADMIN — NALOXONE HYDROCHLORIDE 0.4 MG: 1 INJECTION PARENTERAL at 10:05

## 2022-05-17 RX ADMIN — ENOXAPARIN SODIUM 40 MG: 40 INJECTION SUBCUTANEOUS at 05:05

## 2022-05-17 RX ADMIN — OXACILLIN SODIUM 12 G: 10 INJECTION, POWDER, FOR SOLUTION INTRAVENOUS at 11:05

## 2022-05-17 RX ADMIN — TRAZODONE HYDROCHLORIDE 100 MG: 100 TABLET ORAL at 09:05

## 2022-05-17 NOTE — ASSESSMENT & PLAN NOTE
MRI of the lumbar spine-  Large, rim enhancing facet synovial cysts arising posteriorly from the right L2-L3 and left L4-L5 facet joints may be degenerative or sequelae of facet septic arthritis.     Minor enhancement and endplate irregularity at L4-L5 may be degenerative versus less likely the sequelae of spondylodiscitis.  No significant associated endplate destruction or vertebral body height loss.     Will follow IR guided aspiration, will do MRI of the thoracic regime    05/12- will need spinal thoracic MRI - follow cultures from CT guided aspirate     05/16- will need mRI of the thoracic region -she wants it to be done with pain medication-will discuss with the team

## 2022-05-17 NOTE — NURSING
This morning at 0830 monitor tech called pts heart rate 24, went in pt arousable, but falls asleep quickly, notified Marjan Calderón NP. April came to the bedside to assess pt and ordered CT of head. Orders obtained to hold morning dose of morphine from CURT Calderón NP today. Pt returned from Ct at 0915 and she did remain sleepy. At 1026 narcan 0.4mg given IVP and pt is awake, alert and oriented x4 now. CURT Calderón NP notified that narcan did work and pt is fully awake.

## 2022-05-17 NOTE — PLAN OF CARE
Pt remains free from falls/injuries this shift. Safety precautions maintained. Pain managed w pain medication. No s/s of acute distress noted. Will continue to monitor. Chart check complete.

## 2022-05-17 NOTE — ASSESSMENT & PLAN NOTE
Isolate is staph Aureus - will continue [email or web address]    05/16- will continue Oxacillin   Will plan to treat for 8 weeks =EOC 07/11/22

## 2022-05-17 NOTE — PROGRESS NOTES
[facility] Medicine  Progress Note    Patient Name: Tianna Leon  MRN: [id number]  Patient Class: IP- Inpatient   Admission Date: 5/6/2022  Length of Stay: 11 days  Attending Physician: Columba Helms MD  Primary Care Provider: Spenser Campa MD        Subjective:     Principal Problem:Bacteremia        HPI:  38 y/o. female  with a PMHx of asthma, COPD,Bipolar  , IVDU and HLD presented to the ER with a c/o  sob for the last weak which has gotten worse . The SOB is associated with fever , chills , productive cough , back pain  and generalized weakness . She report cough some blood this am .  She is active IVDU  ( heroine , cocaine  and amphetamine ) . She denies any  sick contact , chest pain  , GI/ sx , She also complaning of LE sweeling . Knee pain and B/L LE rash .   ER COURSE: CTA chest negative for pe . Multiple nodular and cavitary opacities concerning for septic emboli with larger opacities possibly necrotizing pneumonia. CT cervical  and thoracic did not show any acute finding , CT lumbar Possible progression of degenerative changes most notable at L4-5 with moderate to severe spinal canal stenosis at this level.  WBC  29 k , na 130 , k 3.4 , cl 89 , procal 2.71   ER VS:  BP Pulse Resp Temp SpO2   (!) 124/58 110 (!) 30 (!) 101.6 °F (38.7 °C) 96 %           Pt will be admitted to inpt with a dx of PNA and severe sepsis       Overview/Hospital Course:  5/7 admitted for pneumonia, severe sepsis in setting of ivdu. Mother at bedside and provides collateral. Patient has struggled with substance abuse for approximately 20 years, worsening over last 3-5 years since life event. Onset of symptoms 1-2 weeks ago. Tolerated thoracentesis with no pneumothorax on chest x-ray. Mri lumbar and echo, pending. Bcx positive, growing gram positive cocci. On vanc, cefepime and flagyl. Infectious disease consulted for bacteremia. 5/8/22 The patient remained afebrile overnight. Currently on  cefepime/vanc/flagyl. Blood cx are growing staph aureus. The patient refused the MRI lumbar spine overnight d/t being unable to lay flat from back pain. Will give pain meds and attempt to get the MRI today to r/o spinal abscess. The ECHO showed a 1.3 x 1.5 cm elongated, mobile echodensity seen on the tricuspid valve consistent with vegetation, CT surgery was consulted. Will need a SUSANNAH. Infectious disease is consulted. Will repeat blood cx today. 5/9/22 No acute events overnight. The patient reports lower back pain is not well controlled with current pain regimen, will adjust. Repeat blood cx are still positive. Sensitivities are back and Staph is sensitive to oxacillin, will D/C vanc/cefepime. Cardiology consulted and plans for a SUSANNAH today. CT surgery following and recommends continuing medical management with appropriate ABX, no surgical intervention at this time. MRI lumbar spine showed moderate to severe spinal canal stenosis with moderate left-sided neural foraminal stenosis, Neurosurgery consulted. 5/10/22 No acute events overnight. The patient reports some improvement in pain with adjustment in pain meds. Repeat blood cx are +. Continue IV oxacillin and flagyl. Infectious disease is consulted. IR consulted to evaluate possible paraspinous muscle myositis versus abscess. Recommend IR drainage. Continue current management. 5/11/22 No acute events overnight. The patient reports pain is still not well controlled at times. The patient reports that she is very anxious about the upcoming SUSANNAH and IR drainage. SUSANNAH was pushed back to this afternoon because the patient ate candy this AM. Continue treatment with IV oxacillin and flagyl. Infectious disease is following. WBC trended down to 31K. Continue current management. 5/12/22 No acute events overnight. The patients WBC improved to 22K overnight. The patient remains on continuous oxacillin infusion. Infectious disease is following. MRI thoracic spine is ordered and  pending. The patients SUSANNAH was postponed again today d/t low H/H. Hgb was noted to be 6.2 this morning, will transfuse 1 unit PRBC. The patient reports pain is still not well controlled. The case was discussed with Neurosurgery who recommended adding extended release pain meds. Approximately 1 ml of purulent drainage aspirated from back yesterday per IR, growing gram + cocci.     5/13: Patient was given 1U PRBC on 5/12, improved from 6.2 --> 7.0, will give an additional unit PRBC. Radiology attempted to bring patient on 5/12 at 1755 for Thoracic MRI, patient refused due to pain and anxiety. Repeat attempt this AM, patient again refused. Will order Ativan 1mg IV to be given with pain medication prior to scan to relieve anxiety and pain, nurse to notify NP when ready to go for scan. Discussed pain management with patient, discussed transition to PO pain medications to being preparing for d/c to LTAC, adjusted PO pain regimen will reevaluate to determine effectiveness. Repeat Blood cultures: NGTD, sensitivities pending.     5/14: Final anaerobic cultures pending, continue to adjust pain regimen, d/c IV Dilaudid today. Patient currently managed with PO Morphine extended release and PO oxycodone. D/C plan is for LTAC once final antibiotic regimen determined. Patient continues to refuse MRI of thoracic spine.     5/15: Called to room this AM, patient had coughed up blood and mucous. Approx. 1-2 teaspoons of blood in emesis bag, ordered CXR and repeat CBC, CXR showed improvement from previous day, repeat CBC at time of incident, showed a slight decrease in Hgb: 9.7 --> 8.7, when repeated at 1500, Hgb had improved to 10. no further episodes of coughing up blood throughout day. Patient continues to c/o not having enough pain medication but continues to sleep most of the day and will fall asleep when speaking at times. Added IV Toradol to plan.     5/16: No further episodes of coughing blood overnight, patient participated  with encouragement with PT/OT today, recommendations are HH vs. SNF pending progress. Awaiting final antibiotic regimen recommendations. Anaerobic culture results finalized: negative for growth. Afebrile, WBC trending downward.     5/17: Antibiotic regimen per ID is continuous oxacillin until 7/11/22, SW notified of plan, will seek acceptance at LTAC or SNF. Patient choice is [facility]. WBC in normal range. AMS this AM, CT of head: no acute findings. Given narcan and mental status improved. Decreased narcotic dose.       Interval History: WBC in normal range. Afebrile, narcotic dose decreased.     Review of Systems   Constitutional:  Positive for activity change, appetite change and fatigue. Negative for chills, diaphoresis, fever and unexpected weight change.   HENT:  Negative for congestion, hearing loss, mouth sores, postnasal drip, rhinorrhea, sore throat and trouble swallowing.    Eyes:  Negative for discharge and visual disturbance.   Respiratory:  Negative for cough, chest tightness and shortness of breath.    Cardiovascular:  Negative for chest pain, palpitations and leg swelling.   Gastrointestinal:  Positive for abdominal distention and abdominal pain. Negative for blood in stool, constipation, diarrhea, nausea and vomiting.   Endocrine: Negative for cold intolerance and heat intolerance.   Genitourinary:  Negative for difficulty urinating, dyspareunia, flank pain and hematuria.   Musculoskeletal:  Positive for back pain and myalgias. Negative for arthralgias.   Skin: Negative.    Neurological:  Positive for weakness and headaches. Negative for dizziness and light-headedness.   Hematological:  Negative for adenopathy. Does not bruise/bleed easily.   Psychiatric/Behavioral:  Negative for agitation, behavioral problems, confusion and sleep disturbance. The patient is nervous/anxious.    Objective:     Vital Signs (Most Recent):  Temp: 98.7 °F (37.1 °C) (05/17/22 1219)  Pulse: 78 (05/17/22 1325)  Resp: 18  (05/17/22 1219)  BP: (!) 83/51 (05/17/22 1219)  SpO2: 97 % (05/17/22 1219)   Vital Signs (24h Range):  Temp:  [96.5 °F (35.8 °C)-98.7 °F (37.1 °C)] 98.7 °F (37.1 °C)  Pulse:  [65-92] 78  Resp:  [17-18] 18  SpO2:  [93 %-99 %] 97 %  BP: ()/(51-67) 83/51     Weight: 95.1 kg (209 lb 10.5 oz)  Body mass index is 34.89 kg/m².    Intake/Output Summary (Last 24 hours) at 5/17/2022 1507  Last data filed at 5/17/2022 1245  Gross per 24 hour   Intake 1025.59 ml   Output 1000 ml   Net 25.59 ml      Physical Exam  Vitals and nursing note reviewed.   Constitutional:       General: She is not in acute distress.     Appearance: She is well-developed. She is ill-appearing.   HENT:      Head: Normocephalic and atraumatic.      Right Ear: Hearing and external ear normal.      Left Ear: Hearing and external ear normal.      Nose: No rhinorrhea.      Right Sinus: No maxillary sinus tenderness or frontal sinus tenderness.      Left Sinus: No maxillary sinus tenderness or frontal sinus tenderness.      Mouth/Throat:      Mouth: No oral lesions.      Pharynx: Uvula midline.   Eyes:      General:         Right eye: No discharge.         Left eye: No discharge.      Conjunctiva/sclera: Conjunctivae normal.      Pupils: Pupils are equal, round, and reactive to light.   Neck:      Thyroid: No thyromegaly.      Vascular: No carotid bruit.      Trachea: No tracheal deviation.   Cardiovascular:      Rate and Rhythm: Regular rhythm. Tachycardia present.      Pulses:           Dorsalis pedis pulses are 2+ on the right side and 2+ on the left side.      Heart sounds: Normal heart sounds, S1 normal and S2 normal. No murmur heard.  Pulmonary:      Effort: Pulmonary effort is normal. No respiratory distress.      Breath sounds: Examination of the right-lower field reveals decreased breath sounds. Examination of the left-lower field reveals decreased breath sounds. Decreased breath sounds present.   Chest:   Breasts:      Right: No supraclavicular  adenopathy.      Left: No supraclavicular adenopathy.     Abdominal:      General: Bowel sounds are decreased. There is no distension.      Palpations: Abdomen is soft. There is no mass.      Tenderness: There is no abdominal tenderness.   Musculoskeletal:      Cervical back: Normal range of motion.      Thoracic back: Tenderness present. Decreased range of motion.      Lumbar back: Tenderness present. Decreased range of motion.   Lymphadenopathy:      Cervical: No cervical adenopathy.      Upper Body:      Right upper body: No supraclavicular adenopathy.      Left upper body: No supraclavicular adenopathy.   Skin:     General: Skin is warm and dry.      Capillary Refill: Capillary refill takes less than 2 seconds.      Findings: No rash.   Neurological:      Mental Status: She is alert and oriented to person, place, and time.      Sensory: No sensory deficit.      Coordination: Coordination normal.      Gait: Gait normal.   Psychiatric:         Mood and Affect: Mood is not anxious or depressed.         Speech: Speech normal.         Behavior: Behavior normal.         Thought Content: Thought content normal.         Judgment: Judgment normal.       Significant Labs: All pertinent labs within the past 24 hours have been reviewed.  CBC:   Recent Labs   Lab 05/16/22  0431 05/17/22  0533   WBC 13.46* 9.50   HGB 8.4* 8.2*   HCT 26.7* 26.7*   * 510*     CMP: No results for input(s): NA, K, CL, CO2, GLU, BUN, CREATININE, CALCIUM, PROT, ALBUMIN, BILITOT, ALKPHOS, AST, ALT, ANIONGAP, EGFRNONAA in the last 48 hours.    Invalid input(s): ESTGFAFRICA    Significant Imaging: I have reviewed all pertinent imaging results/findings within the past 24 hours.      Assessment/Plan:      * Bacteremia  Gram positive   Infectious disease consulted  Continue broad spectrum intravenous antibiotic(s) given h/o ivdu  Remains febrile and leukocytosis persists  5/8/22 The patient remained afebrile overnight. Currently on  cefepime/vanc/flagyl. Blood cx are growing staph aureus. The patient refused the MRI lumbar spine overnight d/t being unable to lay flat from back pain. Will give pain meds and attempt to get the MRI today to r/o spinal abscess. The ECHO showed a 1.3 x 1.5 cm elongated, mobile echodensity seen on the tricuspid valve consistent with vegetation, CT surgery was consulted. Will need a SUSANNAH. Infectious disease is consulted. Will repeat blood cx today.   5/9/22 Repeat blood cx are still positive. Sensitivities are back and Staph is sensitive to oxacillin, will D/C vanc/cefepime. Infectious disease is following   5/10/22 Repeat blood cx are +. Continue IV oxacillin and flagyl. Infectious disease is consulted. IR consulted to evaluate possible paraspinous muscle myositis versus abscess. Recommend IR drainage. Continue current management.    5/11/22 The patient reports pain is still not well controlled at times. The patient reports that she is very anxious about the upcoming SUSANNAH and IR drainage. SUSANNAH was pushed back to this afternoon because the patient ate candy this AM. Continue treatment with IV oxacillin and flagyl. Infectious disease is following. WBC trended down to 31K. Continue current management.  5/12/22 The patients WBC improved to 22K overnight. The patient remains on continuous oxacillin infusion. Infectious disease is following.  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 IR consulted to evaluate possible paraspinous muscle myositis versus abscess.    --Approximately 1 ml of purulent drainage aspirated from back 5/11 per IR, growing gram + cocci.    --Continue current regimen      Lumbar stenosis without  neurogenic claudication        Subacute bacterial endocarditis        Vegetation of heart valve  ECHO showed a 1.3 x 1.5 cm elongated, mobile echodensity seen on the tricuspid valve consistent with vegetation, CT surgery was consulted.     --Will need a SUSANNAH.  -- Infectious disease is consulted.  -- repeat blood cx NGTD.  -- Continue IV ABX.   --Sensitivities are back and Staph is sensitive to oxacillin, D/C vanc/cefepime.   --Cardiology consulted and plans for a SUSANNAH.   --CT surgery following and recommends continuing medical management with appropriate ABX, no surgical intervention at this time.      Chronic pulmonary heart disease  - Pulmonology following       Parapneumonic effusion  Pulmonology consulted  Status post thoracentesis  Fluid studies pending  5/11/22 IR to collect sample and send for culture today- reviewed    IVDU (intravenous drug user)   Will ordet TTE to r/o IE  Will order MRI lumbar wwo- completed   Cont broad spectrum IVAB   --MRI thoracic spine ordered per ID, patient refused on multiple attempts    5/7   Studies pending for additional sites of infection given h/o ivdu  5/8/22  on cessation     Severe sepsis  This patient does have evidence of infective focus  My overall impression is sepsis. Vital signs were reviewed and noted in progress note.  Antibiotics given-   Antibiotics (From admission, onward)            Start     Stop Route Frequency Ordered    05/09/22 1100  oxacillin 12 g in  mL CONTINUOUS INFUSION         -- IV Every 24 hours (non-standard times) 05/09/22 0932    05/07/22 2100  mupirocin 2 % ointment         05/12 2059 Nasl 2 times daily 05/07/22 1646        Cultures were taken-   Microbiology Results (last 7 days)     Procedure Component Value Units Date/Time    Culture, Respiratory with Gram Stain [[id number]] Collected: 05/16/22 0758    Order Status: Sent Specimen: Respiratory from Sputum Updated: 05/16/22 0759    Culture, Anaerobe [[id number]] Collected: 05/11/22  1440    Order Status: Completed Specimen: Abscess from Back Updated: 05/16/22 0733     Anaerobic Culture No anaerobes isolated    Blood culture [[id number]] Collected: 05/11/22 1213    Order Status: Completed Specimen: Blood Updated: 05/16/22 0612     Blood Culture, Routine No Growth to date      No Growth to date      No Growth to date      No Growth to date      No Growth to date    Blood culture [[id number]] Collected: 05/11/22 1213    Order Status: Completed Specimen: Blood Updated: 05/16/22 0612     Blood Culture, Routine No Growth to date      No Growth to date      No Growth to date      No Growth to date      No Growth to date    Aerobic culture [[id number]]  (Abnormal)  (Susceptibility) Collected: 05/11/22 1440    Order Status: Completed Specimen: Abscess from Back Updated: 05/14/22 1057     Aerobic Bacterial Culture STAPHYLOCOCCUS AUREUS  Many      Gram stain [[id number]] Collected: 05/11/22 1440    Order Status: Completed Specimen: Abscess from Back Updated: 05/12/22 0306     Gram Stain Result Few WBC's      Few Gram positive cocci    Gram stain [[id number]]     Order Status: Canceled Specimen: Joint Fluid from Back     Blood culture [[id number]]  (Abnormal) Collected: 05/08/22 1516    Order Status: Completed Specimen: Blood from Peripheral, Right Hand Updated: 05/11/22 1009     Blood Culture, Routine Gram stain aer bottle: Gram positive cocci in clusters resembling Staph       Results called to and read back by: Chasity Raymundo RN  05/09/2022  11:31      STAPHYLOCOCCUS AUREUS  ID consult required at [facility],[place] and [facility].  For susceptibility see order #[id number]      Narrative:      Collection has been rescheduled by [email or web address] at 05/08/2022 13:22 Reason:   Pt in mri will be there after another hour [id number]  Collection has been rescheduled by [email or web address] at 05/08/2022 13:22 Reason:   Pt in mri will be there after another hour [id number]    Blood culture [[id number]]  (Abnormal) Collected: 05/08/22 1515     Order Status: Completed Specimen: Blood from Peripheral, Left Arm Updated: 05/11/22 1009     Blood Culture, Routine Gram stain aer bottle: Gram positive cocci in clusters resembling Staph      Results called to and read back by: Chasity Raymundo RN  05/09/2022  15:40      STAPHYLOCOCCUS AUREUS  ID consult required at [facility] and [place] locations.  For susceptibility see order #[id number]      Narrative:      Collection has been rescheduled by [email or web address] at 05/08/2022 13:22 Reason:   Pt in mri will be there after another hour [id number]  Collection has been rescheduled by [email or web address] at 05/08/2022 13:22 Reason:   Pt in mri will be there after another hour [id number]        Latest lactate reviewed, they are-  No results for input(s): LACTATE in the last 72 hours.    Organ dysfunction indicated by Acute kidney injury  Source-  lung    Source control Achieved by-   Cont Broad spectrum IVAB     Bacteremia  Infectious disease consulted      PNA (pneumonia)  H/O IVDU   --Cont broad spectrum IVAB   --F/U blood and sputum cx   --Status post thoracentesis - 5/7  --Chest x-ray without signs of pneumothorax  --Fluid studies pending  --C/O worsened cough, repeat CXR shows interval worsening, likely secondary to IVF  --Stopped IVF, given Lasix 40mg IV x2 dose- improved  --Nebs PRN  --Supportive O2        Tobacco abuse  - Patient thoroughly counseled on smoking cessation, pt verbalized understanding. Time of counseling 10 minutes.        COPD (chronic obstructive pulmonary disease)  Cont breathing tx prn  Pulmonology following     Lower back pain  Chronic in nature, H/O IVDU  --CT lumbar show spinal stenosis   --No neurological deficit at this time   --MRI lumbar wwo  To r/o spinal abscess -showed moderate to severe spinal canal stenosis with moderate left-sided neural foraminal stenosis, Neurosurgery consulted.     --MRI thoracic spine ordered: Patient refused 5/12 at 1755 citing anxiety and pain, again refused on 5/13 AM for same  reasons, advised nurse to alert provider if able to reapprach, will order 1 time dose of IV Ativan- patient continued to refuse  --Decreased dose of pain medication due to patient drowsy and speech slurred. Decreased Oxycodone to 5mg Q6h, Morphine 12hr tab 15mg Q12  --Continue Toradol      VTE Risk Mitigation (From admission, onward)         Ordered     enoxaparin injection 40 mg  Daily         05/06/22 2336     IP VTE HIGH RISK PATIENT  Once         05/06/22 2336     Place sequential compression device  Until discontinued         05/06/22 2336                Discharge Planning   YESSICA:      Code Status: Full Code   Is the patient medically ready for discharge?:     Reason for patient still in hospital (select all that apply): Patient trending condition  Discharge Plan A: Home   Discharge Delays: None known at this time              Marjan Calderón NP  Department of Hospital Medicine   [facility] - Med Surg

## 2022-05-17 NOTE — ASSESSMENT & PLAN NOTE
Follow drug susceptibility of staph Aureus - continue Vancomycin, Flagyl.cefepime for now      05/11- isolate is MSSA - will continue Oxacillin, follow SUSANNAH, repeat blood cultures     05/16- will treat for 6 weeks with oxacillin   EOC 07/11/22

## 2022-05-17 NOTE — SUBJECTIVE & OBJECTIVE
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  Blood cultures- MSSA.  She remains bacteremic.  No MR evidence of epidural abscess.     Large, rim enhancing facet synovial cysts arising posteriorly from the right L2-L3 and left L4-L5 facet joints may be degenerative or sequelae of facet septic arthritis.     Minor enhancement and endplate irregularity at L4-L5 may be degenerative versus less likely the sequelae of spondylodiscitis.  No significant associated endplate destruction or vertebral body height loss.     Multilevel degenerative changes of the lumbar spine are most pronounced at L4-L5 with broad-based disc bulge and prominent central disc protrusion contributing to moderate to severe spinal canal stenosis with moderate left-sided neural foraminal stenosis.     Asymmetric left L5-S1 disc bulge contributes to moderate to severe left-sided neural foraminal stenosis and left lateral recess stenosis.   CT chest -Lungs/Pleura: Multiple nodular and cavitary opacities concerning for septic emboli.  Additional larger opacities most notably in the right lung base with some internal clearing possibly related to necrotizing pneumonia.  Moderate loculated right pleural fluid and no definite left pleural fluid.  Empyema not excluded.   05/12- she declined thoracic MRI    She had CT guided aspirtaion -pus aspirated  05/16 - she is drowsy but refusing more imaging .   Review of Systems   Constitutional:  Positive for fatigue and fever. Negative for activity change, appetite change, chills, diaphoresis and unexpected weight change.   HENT: Negative.  Negative for congestion, drooling, facial swelling, rhinorrhea, sinus pressure, sneezing and sore throat.    Eyes: Negative.  Negative for discharge, redness, itching and visual disturbance.   Respiratory:  Positive for cough, chest tightness and shortness of breath. Negative for apnea, choking, wheezing and stridor.    Cardiovascular:  Negative for chest  pain, palpitations and leg swelling.   Gastrointestinal: Negative.  Negative for abdominal distention, abdominal pain, anal bleeding, blood in stool, constipation, diarrhea, nausea and vomiting.   Endocrine: Negative.    Genitourinary: Negative.  Negative for decreased urine volume, difficulty urinating, dysuria, frequency, hematuria, pelvic pain, urgency, vaginal bleeding and vaginal discharge.   Musculoskeletal:  Positive for arthralgias and back pain. Negative for gait problem, joint swelling, myalgias, neck pain and neck stiffness.   Skin: Negative.  Negative for color change, pallor, rash and wound.   Allergic/Immunologic: Negative.    Neurological:  Positive for weakness. Negative for dizziness, seizures, facial asymmetry, speech difficulty, light-headedness, numbness and headaches.   Psychiatric/Behavioral:  Positive for confusion. Negative for agitation, hallucinations and suicidal ideas.    All other systems reviewed and are negative.  Objective:     Vital Signs (Most Recent):  Temp: 96.5 °F (35.8 °C) (05/16/22 1930)  Pulse: 92 (05/16/22 1930)  Resp: 18 (05/16/22 1930)  BP: (!) 105/56 (05/16/22 1930)  SpO2: (!) 93 % (05/16/22 1930)   Vital Signs (24h Range):  Temp:  [96.5 °F (35.8 °C)-98.9 °F (37.2 °C)] 96.5 °F (35.8 °C)  Pulse:  [] 92  Resp:  [16-20] 18  SpO2:  [93 %-100 %] 93 %  BP: (103-125)/(51-76) 105/56     Weight: 95.1 kg (209 lb 10.5 oz)  Body mass index is 34.89 kg/m².    Estimated Creatinine Clearance: 172.2 mL/min (based on SCr of 0.5 mg/dL).    Physical Exam  Vitals and nursing note reviewed. Chaperone present: -.   Constitutional:       General: She is not in acute distress.     Appearance: She is well-developed. She is ill-appearing. She is not toxic-appearing.   HENT:      Head: Normocephalic and atraumatic.   Eyes:      Conjunctiva/sclera: Conjunctivae normal.      Pupils: Pupils are equal, round, and reactive to light.   Cardiovascular:      Rate and Rhythm: Normal rate and regular  rhythm.      Heart sounds: Normal heart sounds. No murmur heard.  Pulmonary:      Effort: Pulmonary effort is normal. No respiratory distress.      Breath sounds: Normal breath sounds.   Abdominal:      General: Bowel sounds are normal. There is no distension.      Palpations: Abdomen is soft.      Tenderness: There is no abdominal tenderness.   Musculoskeletal:         General: Swelling and tenderness present. No deformity. Normal range of motion.      Cervical back: Normal range of motion and neck supple.      Right lower leg: No edema.      Left lower leg: No edema.   Skin:     General: Skin is warm and dry.      Coloration: Skin is pale.      Findings: Erythema present.   Neurological:      Mental Status: She is alert and oriented to person, place, and time.      Motor: Weakness present.      Deep Tendon Reflexes: Reflexes are normal and symmetric.   Psychiatric:         Behavior: Behavior normal.       Significant Labs: Blood Culture:   Recent Labs   Lab 05/06/22  1616 05/06/22  1617 05/08/22  1515 05/08/22  1516 05/11/22  1213   LABBLOO Gram stain aer bottle: Gram positive cocci in clusters resembling Staph   Gram stain missael bottle: Gram positive cocci in clusters resembling Staph   Results called to and read back by: Lila Hernandez RN 05/07/2022  10:57  STAPHYLOCOCCUS AUREUS  ID consult required at [facility].  * Gram stain aer bottle: Gram positive cocci in clusters resembling Staph   Results called to and read back by: Lila Hernandez RN 05/07/2022  10:57  Gram stain missael bottle: Gram positive cocci in clusters resembling Staph   05/07/2022  13:40  STAPHYLOCOCCUS AUREUS  ID consult required at [facility].  For susceptibility see order #[id number]  * Gram stain aer bottle: Gram positive cocci in clusters resembling Staph  Results called to and read back by: Chasity Raymundo RN  05/09/2022  15:40  STAPHYLOCOCCUS AUREUS  ID consult required at [facility].  For susceptibility see order #[id number]  * Gram stain aer bottle: Gram positive cocci in clusters resembling Staph   Results called to and read back by: Chasity Raymundo RN  05/09/2022  11:31  STAPHYLOCOCCUS AUREUS  ID consult required at [facility] and [facility].  For susceptibility see order #[id number]  * No Growth to date  No Growth to date  No Growth to date  No Growth to date  No Growth to date  No Growth to date  No Growth to date  No Growth to date  No Growth to date  No Growth to date       BMP:   Recent Labs   Lab 05/15/22  0727   *   *   K 4.6   CL 97   CO2 28   BUN 9   CREATININE 0.5   CALCIUM 7.6*       CMP:   Recent Labs   Lab 05/15/22  0727   *   K 4.6   CL 97   CO2 28   *   BUN 9   CREATININE 0.5   CALCIUM 7.6*   PROT 5.4*   ALBUMIN 1.3*   BILITOT 0.5   ALKPHOS 115   AST 14   ALT 9*   ANIONGAP 9   EGFRNONAA >60         Significant Imaging: CT: I have reviewed all pertinent results/findings within the past 24 hours:

## 2022-05-17 NOTE — PROGRESS NOTES
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     Minor enhancement and endplate irregularity at L4-L5 may be degenerative versus less likely the sequelae of spondylodiscitis.  No significant associated endplate destruction or vertebral body height loss.     Will follow IR guided aspiration, will do MRI of the thoracic regime    05/12- will need spinal thoracic MRI - follow cultures from CT guided aspirate     05/16- will need mRI of the thoracic region -she wants it to be done with pain medication-will discuss with the team      Subacute bacterial endocarditis  Follow drug susceptibility of staph Aureus - continue Vancomycin, [email or web address] for now      05/11- isolate is MSSA - will continue Oxacillin, follow SUSANNAH, repeat blood cultures     05/16- will treat for 6 weeks with oxacillin   EOC 07/11/22      IVDU (intravenous drug user)  This is the crux of the problem - needs drug cessation         Anticipated Disposition:     Thank you for your consult. I will follow-up with patient. Please contact us if you have any additional questions.    Sunday Hassan MD  Infectious Disease  [facility] - Med S[facility]    Subjective:     Principal Problem:Bacteremia    HPI:   38 y/o. female  with a PMHx of asthma, COPD,Bipolar  , IVDU and HLD ,active IVDU  ( heroine , cocaine  and  amphetamine )   : CTA chest negative for pe . Multiple nodular and cavitary opacities concerning for septic emboli with larger opacities possibly necrotizing pneumonia. CT cervical  and thoracic did not show any acute finding , CT lumbar Possible progression of degenerative changes most notable at L4-5 with moderate to severe spinal canal stenosis at this level.  Echo-  · to moderate tricuspid regurgitation.     There is a 1.3 x 1.5 cm elongated, mobile echodensity seen on the tricuspid valve consistent with vegetation. Recommend CT surgery consult.   Blood culture- staph auerus  Component      Latest Ref Rng & Units 5/8/2022 5/7/2022 5/6/2022   WBC      3.90 - 12.70 K/uL 30.90 (H) 27.52 (H) 29.13 (H)     Interval History:  37 year old woman with tricuspid endocarditis .  Blood cultures- MSSA.  She remains bacteremic.  No MR evidence of epidural abscess.     Large, rim enhancing facet synovial cysts arising posteriorly from the right L2-L3 and left L4-L5 facet joints may be degenerative or sequelae of facet septic arthritis.     Minor enhancement and endplate irregularity at L4-L5 may be degenerative versus less likely the sequelae of spondylodiscitis.  No significant associated endplate destruction or vertebral body height loss.     Multilevel degenerative changes of the lumbar spine are most pronounced at L4-L5 with broad-based disc bulge and prominent central disc protrusion contributing to moderate to severe spinal canal stenosis with moderate left-sided neural foraminal stenosis.     Asymmetric left L5-S1 disc bulge contributes to moderate to severe left-sided neural foraminal stenosis and left lateral recess stenosis.   CT chest -Lungs/Pleura: Multiple nodular and cavitary opacities concerning for septic emboli.  Additional larger opacities most notably in the right lung base with some internal clearing possibly related to necrotizing pneumonia.  Moderate loculated right pleural fluid and no definite left  pleural fluid.  Empyema not excluded.   05/12- she declined thoracic MRI    She had CT guided aspirtaion -pus aspirated  05/16 - she is drowsy but refusing more imaging .   Review of Systems   Constitutional:  Positive for fatigue and fever. Negative for activity change, appetite change, chills, diaphoresis and unexpected weight change.   HENT: Negative.  Negative for congestion, drooling, facial swelling, rhinorrhea, sinus pressure, sneezing and sore throat.    Eyes: Negative.  Negative for discharge, redness, itching and visual disturbance.   Respiratory:  Positive for cough, chest tightness and shortness of breath. Negative for apnea, choking, wheezing and stridor.    Cardiovascular:  Negative for chest pain, palpitations and leg swelling.   Gastrointestinal: Negative.  Negative for abdominal distention, abdominal pain, anal bleeding, blood in stool, constipation, diarrhea, nausea and vomiting.   Endocrine: Negative.    Genitourinary: Negative.  Negative for decreased urine volume, difficulty urinating, dysuria, frequency, hematuria, pelvic pain, urgency, vaginal bleeding and vaginal discharge.   Musculoskeletal:  Positive for arthralgias and back pain. Negative for gait problem, joint swelling, myalgias, neck pain and neck stiffness.   Skin: Negative.  Negative for color change, pallor, rash and wound.   Allergic/Immunologic: Negative.    Neurological:  Positive for weakness. Negative for dizziness, seizures, facial asymmetry, speech difficulty, light-headedness, numbness and headaches.   Psychiatric/Behavioral:  Positive for confusion. Negative for agitation, hallucinations and suicidal ideas.    All other systems reviewed and are negative.  Objective:     Vital Signs (Most Recent):  Temp: 96.5 °F (35.8 °C) (05/16/22 1930)  Pulse: 92 (05/16/22 1930)  Resp: 18 (05/16/22 1930)  BP: (!) 105/56 (05/16/22 1930)  SpO2: (!) 93 % (05/16/22 1930)   Vital Signs (24h Range):  Temp:  [96.5 °F (35.8 °C)-98.9 °F (37.2 °C)]  96.5 °F (35.8 °C)  Pulse:  [] 92  Resp:  [16-20] 18  SpO2:  [93 %-100 %] 93 %  BP: (103-125)/(51-76) 105/56     Weight: 95.1 kg (209 lb 10.5 oz)  Body mass index is 34.89 kg/m².    Estimated Creatinine Clearance: 172.2 mL/min (based on SCr of 0.5 mg/dL).    Physical Exam  Vitals and nursing note reviewed. Chaperone present: -.   Constitutional:       General: She is not in acute distress.     Appearance: She is well-developed. She is ill-appearing. She is not toxic-appearing.   HENT:      Head: Normocephalic and atraumatic.   Eyes:      Conjunctiva/sclera: Conjunctivae normal.      Pupils: Pupils are equal, round, and reactive to light.   Cardiovascular:      Rate and Rhythm: Normal rate and regular rhythm.      Heart sounds: Normal heart sounds. No murmur heard.  Pulmonary:      Effort: Pulmonary effort is normal. No respiratory distress.      Breath sounds: Normal breath sounds.   Abdominal:      General: Bowel sounds are normal. There is no distension.      Palpations: Abdomen is soft.      Tenderness: There is no abdominal tenderness.   Musculoskeletal:         General: Swelling and tenderness present. No deformity. Normal range of motion.      Cervical back: Normal range of motion and neck supple.      Right lower leg: No edema.      Left lower leg: No edema.   Skin:     General: Skin is warm and dry.      Coloration: Skin is pale.      Findings: Erythema present.   Neurological:      Mental Status: She is alert and oriented to person, place, and time.      Motor: Weakness present.      Deep Tendon Reflexes: Reflexes are normal and symmetric.   Psychiatric:         Behavior: Behavior normal.       Significant Labs: Blood Culture:   Recent Labs   Lab 05/06/22  1616 05/06/22  1617 05/08/22  1515 05/08/22  1516 05/11/22  1213   LABBLOO Gram stain aer bottle: Gram positive cocci in clusters resembling Staph   Gram stain missael bottle: Gram positive cocci in clusters resembling Staph   Results called to and  read back by: Lila Hernandez RN 05/07/2022  10:57  STAPHYLOCOCCUS AUREUS  ID consult required at [facility].  * Gram stain aer bottle: Gram positive cocci in clusters resembling Staph   Results called to and read back by: Lila Hernandez RN 05/07/2022  10:57  Gram stain missael bottle: Gram positive cocci in clusters resembling Staph   05/07/2022  13:40  STAPHYLOCOCCUS AUREUS  ID consult required at [facility].  For susceptibility see order #[id number]  * Gram stain aer bottle: Gram positive cocci in clusters resembling Staph  Results called to and read back by: Chasity Raymundo RN  05/09/2022  15:40  STAPHYLOCOCCUS AUREUS  ID consult required at [facility].  For susceptibility see order #[id number]  * Gram stain aer bottle: Gram positive cocci in clusters resembling Staph   Results called to and read back by: Chasity Raymundo RN  05/09/2022  11:31  STAPHYLOCOCCUS AUREUS  ID consult required at [facility].  For susceptibility see order #[id number]  * No Growth to date  No Growth to date  No Growth to date  No Growth to date  No Growth to date  No Growth to date  No Growth to date  No Growth to date  No Growth to date  No Growth to date       BMP:   Recent Labs   Lab 05/15/22  0727   *   *   K 4.6   CL 97   CO2 28   BUN 9   CREATININE 0.5   CALCIUM 7.6*       CMP:   Recent Labs   Lab 05/15/22  0727   *   K 4.6   CL 97   CO2 28   *   BUN 9   CREATININE 0.5   CALCIUM 7.6*   PROT 5.4*   ALBUMIN 1.3*   BILITOT 0.5   ALKPHOS 115   AST 14   ALT 9*   ANIONGAP 9   EGFRNONAA >60         Significant Imaging: CT: I have reviewed all pertinent results/findings within the past 24 hours:

## 2022-05-17 NOTE — ASSESSMENT & PLAN NOTE
Pulmonology consulted  Status post thoracentesis  Fluid studies pending  5/11/22 IR to collect sample and send for culture today- reviewed

## 2022-05-17 NOTE — PT/OT/SLP PROGRESS
Physical Therapy      Patient Name:  Tianna Leon   MRN:  [id number]    0750P.T. COMPLETED CHART REVIEW. PT MET IN RM FOR TX. PT VERY SLEEPY AND DIFFICULT TO AROUSE. PT ENCOURAGED TO PARTICIPATE IN THERAPY MULT TIMES HOWEVER PT WOULD NOT INITIATE MOVEMENT AND WOULD RETURN SUP WHEN P.T. ASSIST LE OR ASSIST TO ROLL IN BED. P.T. SPOKE WITH NURSE PER PT NOT RESPONDING TO PARTICIPATE. P.T. RETURNED AT LATER TIME AND PT COLD AND SHAKING IN BED. PT REFUSED P.T. 2ND TIME. P.T. TO CHECK ON PT NEXT VISIT. THANK YOU , Yani Trujillo, PT

## 2022-05-17 NOTE — ASSESSMENT & PLAN NOTE
Secondary to acute illness    --Daily CBC  --Transfuse with 1 unit PRBC for Hgb <7.0 or <8.0 if symptomatic   --Hgb: 8.2, improved

## 2022-05-17 NOTE — PROCEDURES
"Tianna Leon is a 37 y.o. female patient.    Temp: 98.7 °F (37.1 °C) (05/17/22 1219)  Pulse: 78 (05/17/22 1325)  Resp: 18 (05/17/22 1219)  BP: (!) 83/51 (05/17/22 1219)  SpO2: 97 % (05/17/22 1219)  Weight: 95.1 kg (209 lb 10.5 oz) (05/12/22 0108)  Height: 5' 5" (165.1 cm) (05/06/22 2009)    PICC  Performed by: James Haynes RN  Supervising provider: James Haynes RN  Consent Done: Yes  Time out: Immediately prior to procedure a time out was called to verify the correct patient, procedure, equipment, support staff and site/side marked as required  Indications: med administration and vascular access  Anesthesia: local infiltration  Local anesthetic: lidocaine 1% without epinephrine  Anesthetic Total (mL): 3  Preparation: skin prepped with ChloraPrep  Skin prep agent dried: skin prep agent completely dried prior to procedure  Sterile barriers: all five maximum sterile barriers used - cap, mask, sterile gown, sterile gloves, and large sterile sheet  Hand hygiene: hand hygiene performed prior to central venous catheter insertion  Location details: right basilic  Catheter type: double lumen  Catheter size: 4 Fr  Catheter Length: 34cm    Ultrasound guidance: yes  Vessel Caliber: medium and patent, compressibility normal  Vascular Doppler: not done  Needle advanced into vessel with real time Ultrasound guidance.  Guidewire confirmed in vessel.  Sterile sheath used.  no esophageal manometryNumber of attempts: 1  Post-procedure: blood return through all ports, chlorhexidine patch and sterile dressing applied  Estimated blood loss (mL): 0  Specimens: No  Implants: No          Name RENETTA Ramirez  5/17/2022  "

## 2022-05-17 NOTE — ASSESSMENT & PLAN NOTE
Chronic in nature, H/O IVDU  --CT lumbar show spinal stenosis   --No neurological deficit at this time   --MRI lumbar wwo  To r/o spinal abscess -showed moderate to severe spinal canal stenosis with moderate left-sided neural foraminal stenosis, Neurosurgery consulted.     --MRI thoracic spine ordered: Patient refused 5/12 at 1755 citing anxiety and pain, again refused on 5/13 AM for same reasons, advised nurse to alert provider if able to reapprach, will order 1 time dose of IV Ativan- patient continued to refuse  --Decreased dose of pain medication due to patient drowsy and speech slurred. Decreased Oxycodone to 5mg Q6h, Morphine 12hr tab 15mg Q12  --Continue Toradol

## 2022-05-17 NOTE — PLAN OF CARE
Cm met with patient at the bedside to discuss LTAC placement. Patient gave preference for [facility]. Referral sent via careport, pending acceptance.     Update: Cm spoke with aileen at [facility], facility is willing to try to submit to insurance, pending updated therapy notes. Cm informed that I would send new therapy notes as they become available.   05/17/2022   4:06PM   Abdomen soft, non-tender, no guarding.

## 2022-05-17 NOTE — PT/OT/SLP PROGRESS
Occupational Therapy      Patient Name:  Tianna Leon   MRN:  [id number]   O.T.  COMPLETED CHART REVIEW. PT MET IN RM FOR TX,  HOWEVER,   PT COLD AND SHAKING IN BED. PT ADAMANTLY REFUSED O.T.  TX SESSION. O.T. SPOKE WITH NURSE PER  PT NOT PARTICIPATING WITH TX SESSION. WILL FOLLOW UP NEXT VISIT.  5/17/2022   Yolande Quinones, OT  [id number]

## 2022-05-18 LAB
BACTERIA SPEC AEROBE CULT: NORMAL
BACTERIA SPEC AEROBE CULT: NORMAL
GRAM STN SPEC: NORMAL

## 2022-05-18 PROCEDURE — 80053 COMPREHEN METABOLIC PANEL: CPT | Performed by: NURSE PRACTITIONER

## 2022-05-18 PROCEDURE — 85025 COMPLETE CBC W/AUTO DIFF WBC: CPT | Performed by: INTERNAL MEDICINE

## 2022-05-18 PROCEDURE — 97530 THERAPEUTIC ACTIVITIES: CPT | Mod: CQ

## 2022-05-18 PROCEDURE — 94664 DEMO&/EVAL PT USE INHALER: CPT

## 2022-05-18 PROCEDURE — 63600175 PHARM REV CODE 636 W HCPCS: Performed by: INTERNAL MEDICINE

## 2022-05-18 PROCEDURE — 27000646 HC AEROBIKA DEVICE

## 2022-05-18 PROCEDURE — 97116 GAIT TRAINING THERAPY: CPT | Mod: CQ

## 2022-05-18 PROCEDURE — 99900035 HC TECH TIME PER 15 MIN (STAT)

## 2022-05-18 PROCEDURE — 94761 N-INVAS EAR/PLS OXIMETRY MLT: CPT

## 2022-05-18 PROCEDURE — 25000003 PHARM REV CODE 250: Performed by: NURSE PRACTITIONER

## 2022-05-18 PROCEDURE — 63600175 PHARM REV CODE 636 W HCPCS: Performed by: NURSE PRACTITIONER

## 2022-05-18 PROCEDURE — 27000221 HC OXYGEN, UP TO 24 HOURS

## 2022-05-18 PROCEDURE — 97530 THERAPEUTIC ACTIVITIES: CPT

## 2022-05-18 PROCEDURE — 21400001 HC TELEMETRY ROOM

## 2022-05-18 RX ORDER — HYDROMORPHONE HYDROCHLORIDE 2 MG/ML
1 INJECTION, SOLUTION INTRAMUSCULAR; INTRAVENOUS; SUBCUTANEOUS
Status: DISCONTINUED | OUTPATIENT
Start: 2022-05-18 | End: 2022-05-20

## 2022-05-18 RX ORDER — KETOROLAC TROMETHAMINE 10 MG/1
10 TABLET, FILM COATED ORAL EVERY 6 HOURS
Status: DISPENSED | OUTPATIENT
Start: 2022-05-18 | End: 2022-05-21

## 2022-05-18 RX ORDER — KETOROLAC TROMETHAMINE 10 MG/1
10 TABLET, FILM COATED ORAL EVERY 6 HOURS
Status: DISCONTINUED | OUTPATIENT
Start: 2022-05-19 | End: 2022-05-18

## 2022-05-18 RX ORDER — HYDROMORPHONE HYDROCHLORIDE 2 MG/ML
1 INJECTION, SOLUTION INTRAMUSCULAR; INTRAVENOUS; SUBCUTANEOUS ONCE
Status: DISCONTINUED | OUTPATIENT
Start: 2022-05-18 | End: 2022-05-18

## 2022-05-18 RX ORDER — KETOROLAC TROMETHAMINE 30 MG/ML
15 INJECTION, SOLUTION INTRAMUSCULAR; INTRAVENOUS ONCE
Status: COMPLETED | OUTPATIENT
Start: 2022-05-18 | End: 2022-05-18

## 2022-05-18 RX ADMIN — MORPHINE SULFATE 15 MG: 15 TABLET, EXTENDED RELEASE ORAL at 08:05

## 2022-05-18 RX ADMIN — KETOROLAC TROMETHAMINE 10 MG: 10 TABLET, FILM COATED ORAL at 07:05

## 2022-05-18 RX ADMIN — KETOROLAC TROMETHAMINE 15 MG: 30 INJECTION, SOLUTION INTRAMUSCULAR at 12:05

## 2022-05-18 RX ADMIN — TRAZODONE HYDROCHLORIDE 100 MG: 100 TABLET ORAL at 08:05

## 2022-05-18 RX ADMIN — KETOROLAC TROMETHAMINE 15 MG: 30 INJECTION, SOLUTION INTRAMUSCULAR at 01:05

## 2022-05-18 RX ADMIN — OXACILLIN SODIUM 12 G: 10 INJECTION, POWDER, FOR SOLUTION INTRAVENOUS at 11:05

## 2022-05-18 RX ADMIN — ENOXAPARIN SODIUM 40 MG: 40 INJECTION SUBCUTANEOUS at 05:05

## 2022-05-18 RX ADMIN — MORPHINE SULFATE 15 MG: 15 TABLET, EXTENDED RELEASE ORAL at 10:05

## 2022-05-18 RX ADMIN — KETOROLAC TROMETHAMINE 15 MG: 30 INJECTION, SOLUTION INTRAMUSCULAR at 05:05

## 2022-05-18 NOTE — ASSESSMENT & PLAN NOTE
Chronic in nature, H/O IVDU  --CT lumbar show spinal stenosis   --No neurological deficit at this time   --MRI lumbar wwo  To r/o spinal abscess -showed moderate to severe spinal canal stenosis with moderate left-sided neural foraminal stenosis, Neurosurgery consulted.     --MRI thoracic spine ordered: Patient refused 5/12 at 1755 citing anxiety and pain, again refused on 5/13 AM for same reasons, advised nurse to alert provider if able to reapprach, will order 1 time dose of IV Ativan- patient continued to refuse  --Decreased dose of pain medication due to patient drowsy and speech slurred. Decreased Oxycodone to 5mg Q6h, Morphine 12hr tab 15mg Q12  --Continue Toradol- will change to PO

## 2022-05-18 NOTE — PT/OT/SLP PROGRESS
"Occupational Therapy  Treatment    Tianna Leon   MRN: [id number]   Admitting Diagnosis: Bacteremia    OT Date of Treatment: 05/18/22   OT Start Time: 1210  OT Stop Time: 1235  OT Total Time (min): 25 min    Billable Minutes:  Therapeutic Activity 25 MINUTES    OT/CHRISTI: OT          General Precautions: Standard, fall  Orthopedic Precautions: N/A  Braces: N/A  Respiratory Status: Nasal cannula, flow 3 L/min         Subjective:  Communicated with NURSE AND Epic CHART REVIEW prior to session.    Pain/Comfort  Pain Rating 1: 0/10    Objective:  Patient found with: telemetry, oxygen, peripheral IV, PureWick     Functional Mobility:  Bed Mobility:       Transfers:        Functional Ambulation: PT AMBULATING 30 FEET X 2 WITH MIN A WITH ROLLING WALKER AND VC'S FOR SAFETY AND POSTURE    Activities of Daily Living:  G/H TASK MIN A WITH HAIR GROOMING SEATED IN BEDSIDE CHAIR  Balance:   Static Sit: FAIR+: Able to take MINIMAL challenges from all directions  Dynamic Sit: FAIR+: Maintains balance through MINIMAL excursions of active trunk motion  Static Stand: POOR+: Needs MINIMAL assist to maintain  Dynamic stand: POOR+: Needs MIN (minimal ) assist during gait  AM-PAC 6 CLICK ADL   How much help from another person does this patient currently need?   1 = Unable, Total/Dependent Assistance  2 = A lot, Maximum/Moderate Assistance  3 = A little, Minimum/Contact Guard/Supervision  4 = None, Modified [place]/Independent    Putting on and taking off regular lower body clothing? : 2  Bathing (including washing, rinsing, drying)?: 2  Toileting, which includes using toilet, bedpan, or urinal? : 2  Putting on and taking off regular upper body clothing?: 3  Taking care of personal grooming such as brushing teeth?: 4  Eating meals?: 4  Daily Activity Total Score: 17     AM-PAC Raw Score CMS "G-Code Modifier Level of Impairment Assistance   6 % Total / Unable   7 - 8 CM 80 - 100% Maximal Assist   9-13 CL 60 - 80% Moderate " Assist   14 - 19 CK 40 - 60% Moderate Assist   20 - 22 CJ 20 - 40% Minimal Assist   23 CI 1-20% SBA / CGA   24 CH 0% Independent/ Mod I       Patient left up in chair with all lines intact, call button in reach and NURSE notified    ASSESSMENT:  Tianna Leon is a 37 y.o. female with a medical diagnosis of Bacteremia and presents with DEBILITY AND GENERALIZED WEAKNESS      Rehab identified problem list/impairments: Rehab identified problem list/impairments: weakness, impaired balance, decreased safety awareness, impaired endurance, impaired self care skills, decreased ROM, impaired functional mobilty, gait instability, decreased lower extremity function, decreased upper extremity function    Rehab potential is good.    Activity tolerance: Fair    Discharge recommendations: Discharge Facility/Level of Care Needs: nursing facility, skilled, home health OT     Barriers to discharge:      Equipment recommendations: walker, rolling     GOALS:   Multidisciplinary Problems     Occupational Therapy Goals        Problem: Occupational Therapy    Goal Priority Disciplines Outcome Interventions   Occupational Therapy Goal     OT, PT/OT Ongoing, Progressing    Description: O.T. GOALS MET BY 5-30-22  SBA WITH UE DRESSING  MIN A WITH LE DRESSING  PT WILL TOLERATE 1 SET X 15 REPS B UE ROM EXERCISE  MIN A WITH TOILETING                   Plan:  Patient to be seen 2 x/week to address the above listed problems via self-care/home management, therapeutic activities, therapeutic exercises  Plan of Care expires: 05/30/22  Plan of Care reviewed with: patient         05/18/2022

## 2022-05-18 NOTE — NURSING
Patient refused MRI;states she needs a strong dose of pain meds to lay on table and to calm her down. Narcotics held due to narcan event earlier during the day.

## 2022-05-18 NOTE — PROGRESS NOTES
[facility]  Progress Note    Patient Name: Tianna Leon  MRN: [id number]  Patient Class: IP- Inpatient   Admission Date: 5/6/2022  Length of Stay: 12 days  Attending Physician: Columba Helms MD  Primary Care Provider: Spenser Campa MD        Subjective:     Principal Problem:Bacteremia        HPI:  38 y/o. female  with a PMHx of asthma, COPD,Bipolar  , IVDU and HLD presented to the ER with a c/o  sob for the last weak which has gotten worse . The SOB is associated with fever , chills , productive cough , back pain  and generalized weakness . She report cough some blood this am .  She is active IVDU  ( heroine , cocaine  and amphetamine ) . She denies any  sick contact , chest pain  , GI/ sx , She also complaning of LE sweeling . Knee pain and B/L LE rash .   ER COURSE: CTA chest negative for pe . Multiple nodular and cavitary opacities concerning for septic emboli with larger opacities possibly necrotizing pneumonia. CT cervical  and thoracic did not show any acute finding , CT lumbar Possible progression of degenerative changes most notable at L4-5 with moderate to severe spinal canal stenosis at this level.  WBC  29 k , na 130 , k 3.4 , cl 89 , procal 2.71   ER VS:  BP Pulse Resp Temp SpO2   (!) 124/58 110 (!) 30 (!) 101.6 °F (38.7 °C) 96 %           Pt will be admitted to inpt with a dx of PNA and severe sepsis       Overview/Hospital Course:  5/7 admitted for pneumonia, severe sepsis in setting of ivdu. Mother at bedside and provides collateral. Patient has struggled with substance abuse for approximately 20 years, worsening over last 3-5 years since life event. Onset of symptoms 1-2 weeks ago. Tolerated thoracentesis with no pneumothorax on chest x-ray. Mri lumbar and echo, pending. Bcx positive, growing gram positive cocci. On vanc, cefepime and flagyl. Infectious disease consulted for bacteremia. 5/8/22 The patient remained afebrile overnight. Currently on  cefepime/vanc/flagyl. Blood cx are growing staph aureus. The patient refused the MRI lumbar spine overnight d/t being unable to lay flat from back pain. Will give pain meds and attempt to get the MRI today to r/o spinal abscess. The ECHO showed a 1.3 x 1.5 cm elongated, mobile echodensity seen on the tricuspid valve consistent with vegetation, CT surgery was consulted. Will need a SUSANNAH. Infectious disease is consulted. Will repeat blood cx today. 5/9/22 No acute events overnight. The patient reports lower back pain is not well controlled with current pain regimen, will adjust. Repeat blood cx are still positive. Sensitivities are back and Staph is sensitive to oxacillin, will D/C vanc/cefepime. Cardiology consulted and plans for a SUSANNAH today. CT surgery following and recommends continuing medical management with appropriate ABX, no surgical intervention at this time. MRI lumbar spine showed moderate to severe spinal canal stenosis with moderate left-sided neural foraminal stenosis, Neurosurgery consulted. 5/10/22 No acute events overnight. The patient reports some improvement in pain with adjustment in pain meds. Repeat blood cx are +. Continue IV oxacillin and flagyl. Infectious disease is consulted. IR consulted to evaluate possible paraspinous muscle myositis versus abscess. Recommend IR drainage. Continue current management. 5/11/22 No acute events overnight. The patient reports pain is still not well controlled at times. The patient reports that she is very anxious about the upcoming SUSANNAH and IR drainage. SUSANNAH was pushed back to this afternoon because the patient ate candy this AM. Continue treatment with IV oxacillin and flagyl. Infectious disease is following. WBC trended down to 31K. Continue current management. 5/12/22 No acute events overnight. The patients WBC improved to 22K overnight. The patient remains on continuous oxacillin infusion. Infectious disease is following. MRI thoracic spine is ordered and  pending. The patients SUSANNAH was postponed again today d/t low H/H. Hgb was noted to be 6.2 this morning, will transfuse 1 unit PRBC. The patient reports pain is still not well controlled. The case was discussed with Neurosurgery who recommended adding extended release pain meds. Approximately 1 ml of purulent drainage aspirated from back yesterday per IR, growing gram + cocci.     5/13: Patient was given 1U PRBC on 5/12, improved from 6.2 --> 7.0, will give an additional unit PRBC. Radiology attempted to bring patient on 5/12 at 1755 for Thoracic MRI, patient refused due to pain and anxiety. Repeat attempt this AM, patient again refused. Will order Ativan 1mg IV to be given with pain medication prior to scan to relieve anxiety and pain, nurse to notify NP when ready to go for scan. Discussed pain management with patient, discussed transition to PO pain medications to being preparing for d/c to LTAC, adjusted PO pain regimen will reevaluate to determine effectiveness. Repeat Blood cultures: NGTD, sensitivities pending.     5/14: Final anaerobic cultures pending, continue to adjust pain regimen, d/c IV Dilaudid today. Patient currently managed with PO Morphine extended release and PO oxycodone. D/C plan is for LTAC once final antibiotic regimen determined. Patient continues to refuse MRI of thoracic spine.     5/15: Called to room this AM, patient had coughed up blood and mucous. Approx. 1-2 teaspoons of blood in emesis bag, ordered CXR and repeat CBC, CXR showed improvement from previous day, repeat CBC at time of incident, showed a slight decrease in Hgb: 9.7 --> 8.7, when repeated at 1500, Hgb had improved to 10. no further episodes of coughing up blood throughout day. Patient continues to c/o not having enough pain medication but continues to sleep most of the day and will fall asleep when speaking at times. Added IV Toradol to plan.     5/16: No further episodes of coughing blood overnight, patient participated  with encouragement with PT/OT today, recommendations are  vs. SNF pending progress. Awaiting final antibiotic regimen recommendations. Anaerobic culture results finalized: negative for growth. Afebrile, WBC trending downward.     5/17: Antibiotic regimen per ID is continuous oxacillin until 7/11/22, SW notified of plan, will seek acceptance at LTAC or SNF. Patient choice is [facility]. WBC in normal range. AMS this AM, CT of head: no acute findings. Given narcan and mental status improved. Decreased narcotic dose.     5/18: Per discussion with patient, she is willing to have the MRI of thoracic spine today, will order IV pain medication and anxiety medication prior to scan. Nursing and Radiology aware of needing to premedicate for scan. Patient is more cooperative and participating with care.       Interval History: Planned for MRI of Thoracic spine, will give premedication prior to scan.     Review of Systems   Constitutional:  Positive for activity change, appetite change and fatigue. Negative for chills, diaphoresis, fever and unexpected weight change.   HENT:  Negative for congestion, hearing loss, mouth sores, postnasal drip, rhinorrhea, sore throat and trouble swallowing.    Eyes:  Negative for discharge and visual disturbance.   Respiratory:  Negative for cough, chest tightness and shortness of breath.    Cardiovascular:  Negative for chest pain, palpitations and leg swelling.   Gastrointestinal:  Positive for abdominal distention and abdominal pain. Negative for blood in stool, constipation, diarrhea, nausea and vomiting.   Endocrine: Negative for cold intolerance and heat intolerance.   Genitourinary:  Negative for difficulty urinating, dyspareunia, flank pain and hematuria.   Musculoskeletal:  Positive for back pain and myalgias. Negative for arthralgias.   Skin: Negative.    Neurological:  Positive for weakness and headaches. Negative for dizziness and light-headedness.   Hematological:  Negative for  adenopathy. Does not bruise/bleed easily.   Psychiatric/Behavioral:  Negative for agitation, behavioral problems, confusion and sleep disturbance. The patient is nervous/anxious.    Objective:     Vital Signs (Most Recent):  Temp: 98.9 °F (37.2 °C) (05/18/22 1149)  Pulse: 85 (05/18/22 1500)  Resp: 16 (05/18/22 1149)  BP: 137/68 (05/18/22 1149)  SpO2: 95 % (05/18/22 1149) Vital Signs (24h Range):  Temp:  [98.1 °F (36.7 °C)-99.8 °F (37.7 °C)] 98.9 °F (37.2 °C)  Pulse:  [69-94] 85  Resp:  [16-18] 16  SpO2:  [95 %-100 %] 95 %  BP: ()/(51-69) 137/68     Weight: 95.1 kg (209 lb 10.5 oz)  Body mass index is 34.89 kg/m².    Intake/Output Summary (Last 24 hours) at 5/18/2022 1606  Last data filed at 5/18/2022 0454  Gross per 24 hour   Intake 1972.83 ml   Output 900 ml   Net 1072.83 ml      Physical Exam  Vitals and nursing note reviewed.   Constitutional:       General: She is not in acute distress.     Appearance: She is well-developed. She is ill-appearing.   HENT:      Head: Normocephalic and atraumatic.      Right Ear: Hearing and external ear normal.      Left Ear: Hearing and external ear normal.      Nose: No rhinorrhea.      Right Sinus: No maxillary sinus tenderness or frontal sinus tenderness.      Left Sinus: No maxillary sinus tenderness or frontal sinus tenderness.      Mouth/Throat:      Mouth: No oral lesions.      Pharynx: Uvula midline.   Eyes:      General:         Right eye: No discharge.         Left eye: No discharge.      Conjunctiva/sclera: Conjunctivae normal.      Pupils: Pupils are equal, round, and reactive to light.   Neck:      Thyroid: No thyromegaly.      Vascular: No carotid bruit.      Trachea: No tracheal deviation.   Cardiovascular:      Rate and Rhythm: Regular rhythm. Tachycardia present.      Pulses:           Dorsalis pedis pulses are 2+ on the right side and 2+ on the left side.      Heart sounds: Normal heart sounds, S1 normal and S2 normal. No murmur heard.  Pulmonary:       Effort: Pulmonary effort is normal. No respiratory distress.      Breath sounds: Examination of the right-lower field reveals decreased breath sounds. Examination of the left-lower field reveals decreased breath sounds. Decreased breath sounds present.   Chest:   Breasts:      Right: No supraclavicular adenopathy.      Left: No supraclavicular adenopathy.     Abdominal:      General: Bowel sounds are decreased. There is no distension.      Palpations: Abdomen is soft. There is no mass.      Tenderness: There is no abdominal tenderness.   Musculoskeletal:      Cervical back: Normal range of motion.      Thoracic back: Tenderness present. Decreased range of motion.      Lumbar back: Tenderness present. Decreased range of motion.   Lymphadenopathy:      Cervical: No cervical adenopathy.      Upper Body:      Right upper body: No supraclavicular adenopathy.      Left upper body: No supraclavicular adenopathy.   Skin:     General: Skin is warm and dry.      Capillary Refill: Capillary refill takes less than 2 seconds.      Findings: No rash.   Neurological:      Mental Status: She is alert and oriented to person, place, and time.      Sensory: No sensory deficit.      Coordination: Coordination normal.      Gait: Gait normal.   Psychiatric:         Mood and Affect: Mood is not anxious or depressed.         Speech: Speech normal.         Behavior: Behavior normal.         Thought Content: Thought content normal.         Judgment: Judgment normal.       Significant Labs: All pertinent labs within the past 24 hours have been reviewed.  CBC:   Recent Labs   Lab 05/17/22  0533   WBC 9.50   HGB 8.2*   HCT 26.7*   *     CMP:   Significant Imaging: I have reviewed all pertinent imaging results/findings within the past 24 hours.      Assessment/Plan:      * Bacteremia  Gram positive   Infectious disease consulted  Continue broad spectrum intravenous antibiotic(s) given h/o ivdu  Remains febrile and leukocytosis  persists  5/8/22 The patient remained afebrile overnight. Currently on cefepime/vanc/flagyl. Blood cx are growing staph aureus. The patient refused the MRI lumbar spine overnight d/t being unable to lay flat from back pain. Will give pain meds and attempt to get the MRI today to r/o spinal abscess. The ECHO showed a 1.3 x 1.5 cm elongated, mobile echodensity seen on the tricuspid valve consistent with vegetation, CT surgery was consulted. Will need a SUSANNAH. Infectious disease is consulted. Will repeat blood cx today.   5/9/22 Repeat blood cx are still positive. Sensitivities are back and Staph is sensitive to oxacillin, will D/C vanc/cefepime. Infectious disease is following   5/10/22 Repeat blood cx are +. Continue IV oxacillin and flagyl. Infectious disease is consulted. IR consulted to evaluate possible paraspinous muscle myositis versus abscess. Recommend IR drainage. Continue current management.    5/11/22 The patient reports pain is still not well controlled at times. The patient reports that she is very anxious about the upcoming SUSANNAH and IR drainage. SUSANNAH was pushed back to this afternoon because the patient ate candy this AM. Continue treatment with IV oxacillin and flagyl. Infectious disease is following. WBC trended down to 31K. Continue current management.  5/12/22 The patients WBC improved to 22K overnight. The patient remains on continuous oxacillin infusion. Infectious disease is following.  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 IR consulted to evaluate possible paraspinous muscle myositis versus abscess.    --Approximately 1 ml of purulent drainage aspirated from back 5/11 per IR, growing  gram + cocci.    --Continue current regimen      Lumbar stenosis without neurogenic claudication        Subacute bacterial endocarditis        Vegetation of heart valve  ECHO showed a 1.3 x 1.5 cm elongated, mobile echodensity seen on the tricuspid valve consistent with vegetation, CT surgery was consulted.     --Will need a SUSANNAH.  -- Infectious disease is consulted.  -- repeat blood cx NGTD.  -- Continue IV ABX.   --Sensitivities are back and Staph is sensitive to oxacillin, D/C vanc/cefepime.   --Cardiology consulted and plans for a SUSANNAH.   --CT surgery following and recommends continuing medical management with appropriate ABX, no surgical intervention at this time.      Chronic pulmonary heart disease  - Pulmonology following       Parapneumonic effusion  Pulmonology consulted  Status post thoracentesis  Fluid studies pending  5/11/22 IR to collect sample and send for culture today- reviewed    IVDU (intravenous drug user)   Will ordet TTE to r/o IE  Will order MRI lumbar wwo- completed   Cont broad spectrum IVAB   --MRI thoracic spine ordered per ID, patient refused on multiple attempts    5/7   Studies pending for additional sites of infection given h/o ivdu  5/8/22  on cessation     Severe sepsis  This patient does have evidence of infective focus  My overall impression is sepsis. Vital signs were reviewed and noted in progress note.  Antibiotics given-   Antibiotics (From admission, onward)            Start     Stop Route Frequency Ordered    05/09/22 1100  oxacillin 12 g in  mL CONTINUOUS INFUSION         -- IV Every 24 hours (non-standard times) 05/09/22 0932    05/07/22 2100  mupirocin 2 % ointment         05/12 2059 Nasl 2 times daily 05/07/22 1646        Cultures were taken-   Microbiology Results (last 7 days)     Procedure Component Value Units Date/Time    Culture, Respiratory with Gram Stain [[id number]] Collected: 05/16/22 0752    Order Status: Sent Specimen: Respiratory from Sputum  Updated: 05/16/22 0759    Culture, Anaerobe [[id number]] Collected: 05/11/22 1440    Order Status: Completed Specimen: Abscess from Back Updated: 05/16/22 0733     Anaerobic Culture No anaerobes isolated    Blood culture [[id number]] Collected: 05/11/22 1213    Order Status: Completed Specimen: Blood Updated: 05/16/22 0612     Blood Culture, Routine No Growth to date      No Growth to date      No Growth to date      No Growth to date      No Growth to date    Blood culture [[id number]] Collected: 05/11/22 1213    Order Status: Completed Specimen: Blood Updated: 05/16/22 0612     Blood Culture, Routine No Growth to date      No Growth to date      No Growth to date      No Growth to date      No Growth to date    Aerobic culture [[id number]]  (Abnormal)  (Susceptibility) Collected: 05/11/22 1440    Order Status: Completed Specimen: Abscess from Back Updated: 05/14/22 1057     Aerobic Bacterial Culture STAPHYLOCOCCUS AUREUS  Many      Gram stain [[id number]] Collected: 05/11/22 1440    Order Status: Completed Specimen: Abscess from Back Updated: 05/12/22 0306     Gram Stain Result Few WBC's      Few Gram positive cocci    Gram stain [[id number]]     Order Status: Canceled Specimen: Joint Fluid from Back     Blood culture [[id number]]  (Abnormal) Collected: 05/08/22 1516    Order Status: Completed Specimen: Blood from Peripheral, Right Hand Updated: 05/11/22 1009     Blood Culture, Routine Gram stain aer bottle: Gram positive cocci in clusters resembling Staph       Results called to and read back by: Chasity Raymundo RN  05/09/2022  11:31      STAPHYLOCOCCUS AUREUS  ID consult required at [facility].  For susceptibility see order #[id number]      Narrative:      Collection has been rescheduled by [email or web address] at 05/08/2022 13:22 Reason:   Pt in mri will be there after another hour [id number]  Collection has been rescheduled by [email or web address] at 05/08/2022 13:22 Reason:   Pt in mri will be there after another hour  [id number]    Blood culture [[id number]]  (Abnormal) Collected: 05/08/22 1515    Order Status: Completed Specimen: Blood from Peripheral, Left Arm Updated: 05/11/22 1009     Blood Culture, Routine Gram stain aer bottle: Gram positive cocci in clusters resembling Staph      Results called to and read back by: Chasity Raymundo RN  05/09/2022  15:40      STAPHYLOCOCCUS AUREUS  ID consult required at [facility].  For susceptibility see order #[id number]      Narrative:      Collection has been rescheduled by [email or web address] at 05/08/2022 13:22 Reason:   Pt in mri will be there after another hour [id number]  Collection has been rescheduled by [email or web address] at 05/08/2022 13:22 Reason:   Pt in mri will be there after another hour [id number]        Latest lactate reviewed, they are-  No results for input(s): LACTATE in the last 72 hours.    Organ dysfunction indicated by Acute kidney injury  Source-  lung    Source control Achieved by-   Cont Broad spectrum IVAB     Bacteremia  Infectious disease consulted      PNA (pneumonia)  H/O IVDU   --Cont broad spectrum IVAB   --F/U blood and sputum cx   --Status post thoracentesis - 5/7  --Chest x-ray without signs of pneumothorax  --Fluid studies pending  --C/O worsened cough, repeat CXR shows interval worsening, likely secondary to IVF  --Stopped IVF, given Lasix 40mg IV x2 dose- improved  --Nebs PRN  --Supportive O2        Tobacco abuse  - Patient thoroughly counseled on smoking cessation, pt verbalized understanding. Time of counseling 10 minutes.        COPD (chronic obstructive pulmonary disease)  Cont breathing tx prn  Pulmonology following     Lower back pain  Chronic in nature, H/O IVDU  --CT lumbar show spinal stenosis   --No neurological deficit at this time   --MRI lumbar wwo  To r/o spinal abscess -showed moderate to severe spinal canal stenosis with moderate left-sided neural foraminal stenosis, Neurosurgery consulted.     --MRI thoracic spine ordered: Patient refused  5/12 at 1755 citing anxiety and pain, again refused on 5/13 AM for same reasons, advised nurse to alert provider if able to reapprach, will order 1 time dose of IV Ativan- patient continued to refuse  --Decreased dose of pain medication due to patient drowsy and speech slurred. Decreased Oxycodone to 5mg Q6h, Morphine 12hr tab 15mg Q12  --Continue Toradol- will change to PO      VTE Risk Mitigation (From admission, onward)         Ordered     enoxaparin injection 40 mg  Daily         05/06/22 2336     IP VTE HIGH RISK PATIENT  Once         05/06/22 2336     Place sequential compression device  Until discontinued         05/06/22 2336                Discharge Planning   YESSICA:      Code Status: Full Code   Is the patient medically ready for discharge?:     Reason for patient still in hospital (select all that apply): Patient trending condition  Discharge Plan A: Home   Discharge Delays: None known at this time              Marjan Calderón NP  Department of Hospital Medicine   [facility] - Med Surg

## 2022-05-18 NOTE — PT/OT/SLP PROGRESS
"Physical Therapy  Treatment    Tianna Leon   MRN: [id number]   Admitting Diagnosis: Bacteremia    PT Received On: 05/18/22  PT Start Time: 1205     PT Stop Time: 1235    PT Total Time (min): 30 min       Billable Minutes:  Gait Training 10 and Therapeutic Activity 20    Treatment Type: Treatment  PT/PTA: PTA     PTA Visit Number: 1       General Precautions: Standard, fall  Orthopedic Precautions: N/A   Braces: N/A  Respiratory Status: Nasal cannula, flow 3 L/min         Subjective:  Communicated with patient's nurse, Deejay, and completed Epic chart review prior to session.  Patient agreed to PT session with consistent encouragement. Expressed anxiety with exertion and frustration with any task given.   "I'm so cold I can't stop shaking!" "I can't breathe" "Go get my nurse!"    Pain/Comfort  Pain Rating 1: 0/10  Pain Rating Post-Intervention 1: 0/10    Objective:   Patient found with: telemetry, oxygen, peripheral IV, PICC line, SCD, PureWick    Functional Mobility:  Patient found off of supplemental O2. Assessed SpO2: 88%. Replaced O2 at current setting of 3 LPM. SpO2 increased quickly to 95%.    Supine > Sit EOB: Min A     Self supported sitting EOB x10 min total focusing on increased tolerance to upright position, core stability, trunk control and CV endurance.  Maintained sitting balance independently.  Educated patient on pursed lip breathing technique as she became anxious after exerting herself during transition from supine to sit EOB.  Poor carryover and compliance with technique.  SpO2 96%    STS from EOB > RW: Min A (VC for hand placement)    30ft x2 trials w/ RW Min A (VC for upright posture and safety w/ RW mgmt; c/o SOB throughout)    Stand pivot T/F to chair w/ RW: Min A (VC for sequencing of task and safety w/ RW mgmt)    Patient, again, became highly anxious which lead to increased SOB. SpO2 assessed and was found to be 100% on 3L O2. Encouraged patient to slow down breathing pattern and " utilize pursed lip breathing technique in attempt to address. Patient remained in this state for 1-2 minutes before episode subsided on it's own. Patient began demanding for her nurse. Nurse was notified of patient request.     Educated patient on performing LE TE throughout the day outside of PT sessions to maintain current level of ROM and prevent further decline. Patient expressed verbal understanding not to attempt to get up without staff assistance and to use call light to meet needs in room. Encouraged patient to increase time OOB and to remain OOB for a minimum of 2 hours/day and slowly increase as tolerated. Patient verbalized understanding of all education given.    AM-PAC 6 CLICK MOBILITY  How much help from another person does this patient currently need?   1 = Unable, Total/Dependent Assistance  2 = A lot, Maximum/Moderate Assistance  3 = A little, Minimum/Contact Guard/Supervision  4 = None, Modified [place]/Independent    Turning over in bed (including adjusting bedclothes, sheets and blankets)?: 4  Sitting down on and standing up from a chair with arms (e.g., wheelchair, bedside commode, etc.): 3  Moving from lying on back to sitting on the side of the bed?: 3  Moving to and from a bed to a chair (including a wheelchair)?: 3  Need to walk in hospital room?: 3  Climbing 3-5 steps with a railing?: 1  Basic Mobility Total Score: 17    AM-PAC Raw Score CMS G-Code Modifier Level of Impairment Assistance   6 % Total / Unable   7 - 9 CM 80 - 100% Maximal Assist   10 - 14 CL 60 - 80% Moderate Assist   15 - 19 CK 40 - 60% Moderate Assist   20 - 22 CJ 20 - 40% Minimal Assist   23 CI 1-20% SBA / CGA   24 CH 0% Independent/ Mod I     Patient left up in chair with all lines intact, call button in reach, chair alarm on and nurse notified.    Assessment:  Tianna Leon is a 37 y.o. female with a medical diagnosis of Bacteremia and presents with overall decline in functional mobility. Patient would  continue to benefit from skilled PT to address functional limitations listed below in order to return to PLOF/decrease caregiver burden. Patient demonstrates some self limiting behaviors which are exacerbated by anxiety. Patient requires consistent redirection towards task and encouragement to participate.    Rehab identified problem list/impairments: Rehab identified problem list/impairments: weakness, impaired endurance, impaired self care skills, impaired functional mobilty, gait instability, impaired balance, impaired cognition, decreased coordination, decreased upper extremity function, decreased lower extremity function, decreased safety awareness, decreased ROM, impaired coordination, impaired cardiopulmonary response to activity    Rehab potential is fair.    Activity tolerance: Fair    Discharge recommendations: Discharge Facility/Level of Care Needs: home health PT, nursing facility, skilled (PENDING PROGRESS)     Barriers to discharge:      Equipment recommendations: Equipment Needed After Discharge: walker, rolling     GOALS:   Multidisciplinary Problems     Physical Therapy Goals        Problem: Physical Therapy    Goal Priority Disciplines Outcome Goal Variances Interventions   Physical Therapy Goal     PT, PT/OT      Description: LTG'S TO BE MET IN 14 DAYS (5-30-22)  1. PT WILL BE KRISTA WITH BED MOBILITY  2. PT WILL BE KRISTA WITH TF'S  3. PT WILL ' WITH RW AND SPV                   PLAN:    Patient to be seen 3 x/week  to address the above listed problems via gait training, therapeutic activities, therapeutic exercises  Plan of Care expires: 05/30/22  Plan of Care reviewed with: patient         05/18/2022

## 2022-05-18 NOTE — PLAN OF CARE
Problem: Adult Inpatient Plan of Care  Goal: Plan of Care Review  Outcome: Ongoing, Progressing  Goal: Patient-Specific Goal (Individualized)  Outcome: Ongoing, Progressing  Goal: Absence of Hospital-Acquired Illness or Injury  Outcome: Ongoing, Progressing  Goal: Optimal Comfort and Wellbeing  Outcome: Ongoing, Progressing  Goal: Readiness for Transition of Care  Outcome: Ongoing, Progressing     Problem: Impaired Wound Healing  Goal: Optimal Wound Healing  Outcome: Ongoing, Progressing     Problem: Skin Injury Risk Increased  Goal: Skin Health and Integrity  Outcome: Ongoing, Progressing     Problem: Adjustment to Illness (Sepsis/Septic Shock)  Goal: Optimal Coping  Outcome: Ongoing, Progressing     Problem: Bleeding (Sepsis/Septic Shock)  Goal: Absence of Bleeding  Outcome: Ongoing, Progressing     Problem: Glycemic Control Impaired (Sepsis/Septic Shock)  Goal: Blood Glucose Level Within Desired Range  Outcome: Ongoing, Progressing     Problem: Infection Progression (Sepsis/Septic Shock)  Goal: Absence of Infection Signs and Symptoms  Outcome: Ongoing, Progressing     Problem: Nutrition Impaired (Sepsis/Septic Shock)  Goal: Optimal Nutrition Intake  Outcome: Ongoing, Progressing     Problem: Fluid Imbalance (Pneumonia)  Goal: Fluid Balance  Outcome: Ongoing, Progressing     Problem: Infection (Pneumonia)  Goal: Resolution of Infection Signs and Symptoms  Outcome: Ongoing, Progressing     Problem: Respiratory Compromise (Pneumonia)  Goal: Effective Oxygenation and Ventilation  Outcome: Ongoing, Progressing     Problem: Fall Injury Risk  Goal: Absence of Fall and Fall-Related Injury  Outcome: Ongoing, Progressing     Problem: Infection  Goal: Absence of Infection Signs and Symptoms  Outcome: Ongoing, Progressing

## 2022-05-18 NOTE — SUBJECTIVE & OBJECTIVE
Interval History: Planned for MRI of Thoracic spine, will give premedication prior to scan.     Review of Systems   Constitutional:  Positive for activity change, appetite change and fatigue. Negative for chills, diaphoresis, fever and unexpected weight change.   HENT:  Negative for congestion, hearing loss, mouth sores, postnasal drip, rhinorrhea, sore throat and trouble swallowing.    Eyes:  Negative for discharge and visual disturbance.   Respiratory:  Negative for cough, chest tightness and shortness of breath.    Cardiovascular:  Negative for chest pain, palpitations and leg swelling.   Gastrointestinal:  Positive for abdominal distention and abdominal pain. Negative for blood in stool, constipation, diarrhea, nausea and vomiting.   Endocrine: Negative for cold intolerance and heat intolerance.   Genitourinary:  Negative for difficulty urinating, dyspareunia, flank pain and hematuria.   Musculoskeletal:  Positive for back pain and myalgias. Negative for arthralgias.   Skin: Negative.    Neurological:  Positive for weakness and headaches. Negative for dizziness and light-headedness.   Hematological:  Negative for adenopathy. Does not bruise/bleed easily.   Psychiatric/Behavioral:  Negative for agitation, behavioral problems, confusion and sleep disturbance. The patient is nervous/anxious.    Objective:     Vital Signs (Most Recent):  Temp: 98.9 °F (37.2 °C) (05/18/22 1149)  Pulse: 85 (05/18/22 1500)  Resp: 16 (05/18/22 1149)  BP: 137/68 (05/18/22 1149)  SpO2: 95 % (05/18/22 1149) Vital Signs (24h Range):  Temp:  [98.1 °F (36.7 °C)-99.8 °F (37.7 °C)] 98.9 °F (37.2 °C)  Pulse:  [69-94] 85  Resp:  [16-18] 16  SpO2:  [95 %-100 %] 95 %  BP: ()/(51-69) 137/68     Weight: 95.1 kg (209 lb 10.5 oz)  Body mass index is 34.89 kg/m².    Intake/Output Summary (Last 24 hours) at 5/18/2022 1606  Last data filed at 5/18/2022 0454  Gross per 24 hour   Intake 1972.83 ml   Output 900 ml   Net 1072.83 ml      Physical  Exam  Vitals and nursing note reviewed.   Constitutional:       General: She is not in acute distress.     Appearance: She is well-developed. She is ill-appearing.   HENT:      Head: Normocephalic and atraumatic.      Right Ear: Hearing and external ear normal.      Left Ear: Hearing and external ear normal.      Nose: No rhinorrhea.      Right Sinus: No maxillary sinus tenderness or frontal sinus tenderness.      Left Sinus: No maxillary sinus tenderness or frontal sinus tenderness.      Mouth/Throat:      Mouth: No oral lesions.      Pharynx: Uvula midline.   Eyes:      General:         Right eye: No discharge.         Left eye: No discharge.      Conjunctiva/sclera: Conjunctivae normal.      Pupils: Pupils are equal, round, and reactive to light.   Neck:      Thyroid: No thyromegaly.      Vascular: No carotid bruit.      Trachea: No tracheal deviation.   Cardiovascular:      Rate and Rhythm: Regular rhythm. Tachycardia present.      Pulses:           Dorsalis pedis pulses are 2+ on the right side and 2+ on the left side.      Heart sounds: Normal heart sounds, S1 normal and S2 normal. No murmur heard.  Pulmonary:      Effort: Pulmonary effort is normal. No respiratory distress.      Breath sounds: Examination of the right-lower field reveals decreased breath sounds. Examination of the left-lower field reveals decreased breath sounds. Decreased breath sounds present.   Chest:   Breasts:      Right: No supraclavicular adenopathy.      Left: No supraclavicular adenopathy.     Abdominal:      General: Bowel sounds are decreased. There is no distension.      Palpations: Abdomen is soft. There is no mass.      Tenderness: There is no abdominal tenderness.   Musculoskeletal:      Cervical back: Normal range of motion.      Thoracic back: Tenderness present. Decreased range of motion.      Lumbar back: Tenderness present. Decreased range of motion.   Lymphadenopathy:      Cervical: No cervical adenopathy.      Upper  Body:      Right upper body: No supraclavicular adenopathy.      Left upper body: No supraclavicular adenopathy.   Skin:     General: Skin is warm and dry.      Capillary Refill: Capillary refill takes less than 2 seconds.      Findings: No rash.   Neurological:      Mental Status: She is alert and oriented to person, place, and time.      Sensory: No sensory deficit.      Coordination: Coordination normal.      Gait: Gait normal.   Psychiatric:         Mood and Affect: Mood is not anxious or depressed.         Speech: Speech normal.         Behavior: Behavior normal.         Thought Content: Thought content normal.         Judgment: Judgment normal.       Significant Labs: All pertinent labs within the past 24 hours have been reviewed.  CBC:   Recent Labs   Lab 05/17/22  0533   WBC 9.50   HGB 8.2*   HCT 26.7*   *     CMP:   Significant Imaging: I have reviewed all pertinent imaging results/findings within the past 24 hours.

## 2022-05-19 LAB
ABO + RH BLD: NORMAL
ALBUMIN SERPL BCP-MCNC: 1.3 G/DL (ref 3.5–5.2)
ALP SERPL-CCNC: 184 U/L (ref 55–135)
ALT SERPL W/O P-5'-P-CCNC: 23 U/L (ref 10–44)
ANION GAP SERPL CALC-SCNC: 13 MMOL/L (ref 8–16)
AST SERPL-CCNC: 24 U/L (ref 10–40)
BASOPHILS # BLD AUTO: 0.07 K/UL (ref 0–0.2)
BASOPHILS # BLD AUTO: 0.07 K/UL (ref 0–0.2)
BASOPHILS NFR BLD: 0.7 % (ref 0–1.9)
BASOPHILS NFR BLD: 0.7 % (ref 0–1.9)
BILIRUB SERPL-MCNC: 0.2 MG/DL (ref 0.1–1)
BLD GP AB SCN CELLS X3 SERPL QL: NORMAL
BLD PROD TYP BPU: NORMAL
BLOOD UNIT EXPIRATION DATE: NORMAL
BLOOD UNIT TYPE CODE: 5100
BLOOD UNIT TYPE: NORMAL
BUN SERPL-MCNC: 15 MG/DL (ref 6–20)
CALCIUM SERPL-MCNC: 7.6 MG/DL (ref 8.7–10.5)
CHLORIDE SERPL-SCNC: 97 MMOL/L (ref 95–110)
CO2 SERPL-SCNC: 27 MMOL/L (ref 23–29)
CODING SYSTEM: NORMAL
CREAT SERPL-MCNC: 0.6 MG/DL (ref 0.5–1.4)
DIFFERENTIAL METHOD: ABNORMAL
DIFFERENTIAL METHOD: ABNORMAL
DISPENSE STATUS: NORMAL
EOSINOPHIL # BLD AUTO: 0 K/UL (ref 0–0.5)
EOSINOPHIL # BLD AUTO: 0.1 K/UL (ref 0–0.5)
EOSINOPHIL NFR BLD: 0.3 % (ref 0–8)
EOSINOPHIL NFR BLD: 1.4 % (ref 0–8)
ERYTHROCYTE [DISTWIDTH] IN BLOOD BY AUTOMATED COUNT: 17.1 % (ref 11.5–14.5)
ERYTHROCYTE [DISTWIDTH] IN BLOOD BY AUTOMATED COUNT: 17.5 % (ref 11.5–14.5)
EST. GFR  (AFRICAN AMERICAN): >60 ML/MIN/1.73 M^2
EST. GFR  (NON AFRICAN AMERICAN): >60 ML/MIN/1.73 M^2
GLUCOSE SERPL-MCNC: 47 MG/DL (ref 70–110)
HCT VFR BLD AUTO: 21.3 % (ref 37–48.5)
HCT VFR BLD AUTO: 24.6 % (ref 37–48.5)
HGB BLD-MCNC: 6.7 G/DL (ref 12–16)
HGB BLD-MCNC: 7 G/DL (ref 12–16)
IMM GRANULOCYTES # BLD AUTO: 0.1 K/UL (ref 0–0.04)
IMM GRANULOCYTES # BLD AUTO: 0.13 K/UL (ref 0–0.04)
IMM GRANULOCYTES NFR BLD AUTO: 1 % (ref 0–0.5)
IMM GRANULOCYTES NFR BLD AUTO: 1.3 % (ref 0–0.5)
LYMPHOCYTES # BLD AUTO: 2.4 K/UL (ref 1–4.8)
LYMPHOCYTES # BLD AUTO: 2.5 K/UL (ref 1–4.8)
LYMPHOCYTES NFR BLD: 24.6 % (ref 18–48)
LYMPHOCYTES NFR BLD: 25.4 % (ref 18–48)
MCH RBC QN AUTO: 27.7 PG (ref 27–31)
MCH RBC QN AUTO: 27.8 PG (ref 27–31)
MCHC RBC AUTO-ENTMCNC: 28.5 G/DL (ref 32–36)
MCHC RBC AUTO-ENTMCNC: 31.5 G/DL (ref 32–36)
MCV RBC AUTO: 88 FL (ref 82–98)
MCV RBC AUTO: 98 FL (ref 82–98)
MONOCYTES # BLD AUTO: 0.8 K/UL (ref 0.3–1)
MONOCYTES # BLD AUTO: 1.1 K/UL (ref 0.3–1)
MONOCYTES NFR BLD: 10.6 % (ref 4–15)
MONOCYTES NFR BLD: 8.5 % (ref 4–15)
NEUTROPHILS # BLD AUTO: 6 K/UL (ref 1.8–7.7)
NEUTROPHILS # BLD AUTO: 6.4 K/UL (ref 1.8–7.7)
NEUTROPHILS NFR BLD: 60.6 % (ref 38–73)
NEUTROPHILS NFR BLD: 64.9 % (ref 38–73)
NRBC BLD-RTO: 0 /100 WBC
NRBC BLD-RTO: 0 /100 WBC
NUM UNITS TRANS PACKED RBC: NORMAL
PLATELET # BLD AUTO: 489 K/UL (ref 150–450)
PLATELET # BLD AUTO: 495 K/UL (ref 150–450)
PMV BLD AUTO: 9 FL (ref 9.2–12.9)
PMV BLD AUTO: 9.6 FL (ref 9.2–12.9)
POCT GLUCOSE: 109 MG/DL (ref 70–110)
POTASSIUM SERPL-SCNC: 5 MMOL/L (ref 3.5–5.1)
PROT SERPL-MCNC: 5.2 G/DL (ref 6–8.4)
RBC # BLD AUTO: 2.42 M/UL (ref 4–5.4)
RBC # BLD AUTO: 2.52 M/UL (ref 4–5.4)
SODIUM SERPL-SCNC: 137 MMOL/L (ref 136–145)
WBC # BLD AUTO: 9.91 K/UL (ref 3.9–12.7)
WBC # BLD AUTO: 9.92 K/UL (ref 3.9–12.7)

## 2022-05-19 PROCEDURE — 97535 SELF CARE MNGMENT TRAINING: CPT

## 2022-05-19 PROCEDURE — 27000646 HC AEROBIKA DEVICE

## 2022-05-19 PROCEDURE — 97530 THERAPEUTIC ACTIVITIES: CPT

## 2022-05-19 PROCEDURE — 25000003 PHARM REV CODE 250: Performed by: NURSE PRACTITIONER

## 2022-05-19 PROCEDURE — 63600175 PHARM REV CODE 636 W HCPCS: Performed by: STUDENT IN AN ORGANIZED HEALTH CARE EDUCATION/TRAINING PROGRAM

## 2022-05-19 PROCEDURE — 36430 TRANSFUSION BLD/BLD COMPNT: CPT

## 2022-05-19 PROCEDURE — 25000242 PHARM REV CODE 250 ALT 637 W/ HCPCS: Performed by: NURSE PRACTITIONER

## 2022-05-19 PROCEDURE — 25000003 PHARM REV CODE 250: Performed by: INTERNAL MEDICINE

## 2022-05-19 PROCEDURE — A9585 GADOBUTROL INJECTION: HCPCS | Performed by: STUDENT IN AN ORGANIZED HEALTH CARE EDUCATION/TRAINING PROGRAM

## 2022-05-19 PROCEDURE — 94761 N-INVAS EAR/PLS OXIMETRY MLT: CPT

## 2022-05-19 PROCEDURE — P9016 RBC LEUKOCYTES REDUCED: HCPCS | Performed by: NURSE PRACTITIONER

## 2022-05-19 PROCEDURE — 99900035 HC TECH TIME PER 15 MIN (STAT)

## 2022-05-19 PROCEDURE — 63600175 PHARM REV CODE 636 W HCPCS: Performed by: NURSE PRACTITIONER

## 2022-05-19 PROCEDURE — 85025 COMPLETE CBC W/AUTO DIFF WBC: CPT | Performed by: INTERNAL MEDICINE

## 2022-05-19 PROCEDURE — 99233 PR SUBSEQUENT HOSPITAL CARE,LEVL III: ICD-10-PCS | Mod: NSCH,,, | Performed by: INTERNAL MEDICINE

## 2022-05-19 PROCEDURE — 86920 COMPATIBILITY TEST SPIN: CPT | Performed by: NURSE PRACTITIONER

## 2022-05-19 PROCEDURE — 94799 UNLISTED PULMONARY SVC/PX: CPT

## 2022-05-19 PROCEDURE — 94640 AIRWAY INHALATION TREATMENT: CPT

## 2022-05-19 PROCEDURE — 86901 BLOOD TYPING SEROLOGIC RH(D): CPT | Performed by: NURSE PRACTITIONER

## 2022-05-19 PROCEDURE — 25500020 PHARM REV CODE 255: Performed by: STUDENT IN AN ORGANIZED HEALTH CARE EDUCATION/TRAINING PROGRAM

## 2022-05-19 PROCEDURE — 97116 GAIT TRAINING THERAPY: CPT

## 2022-05-19 PROCEDURE — 99233 SBSQ HOSP IP/OBS HIGH 50: CPT | Mod: NSCH,,, | Performed by: INTERNAL MEDICINE

## 2022-05-19 PROCEDURE — 63600175 PHARM REV CODE 636 W HCPCS: Performed by: INTERNAL MEDICINE

## 2022-05-19 PROCEDURE — 21400001 HC TELEMETRY ROOM

## 2022-05-19 PROCEDURE — 27000221 HC OXYGEN, UP TO 24 HOURS

## 2022-05-19 PROCEDURE — 94664 DEMO&/EVAL PT USE INHALER: CPT

## 2022-05-19 RX ORDER — GADOBUTROL 604.72 MG/ML
10 INJECTION INTRAVENOUS
Status: COMPLETED | OUTPATIENT
Start: 2022-05-19 | End: 2022-05-19

## 2022-05-19 RX ORDER — HYDROCODONE BITARTRATE AND ACETAMINOPHEN 500; 5 MG/1; MG/1
TABLET ORAL
Status: DISCONTINUED | OUTPATIENT
Start: 2022-05-19 | End: 2022-06-09

## 2022-05-19 RX ADMIN — KETOROLAC TROMETHAMINE 10 MG: 10 TABLET, FILM COATED ORAL at 01:05

## 2022-05-19 RX ADMIN — TRAZODONE HYDROCHLORIDE 100 MG: 100 TABLET ORAL at 10:05

## 2022-05-19 RX ADMIN — GADOBUTROL 9 ML: 604.72 INJECTION INTRAVENOUS at 11:05

## 2022-05-19 RX ADMIN — MORPHINE SULFATE 15 MG: 15 TABLET, EXTENDED RELEASE ORAL at 10:05

## 2022-05-19 RX ADMIN — ENOXAPARIN SODIUM 40 MG: 40 INJECTION SUBCUTANEOUS at 06:05

## 2022-05-19 RX ADMIN — LORAZEPAM 2 MG: 2 INJECTION INTRAMUSCULAR; INTRAVENOUS at 10:05

## 2022-05-19 RX ADMIN — ACETAMINOPHEN 650 MG: 325 TABLET ORAL at 08:05

## 2022-05-19 RX ADMIN — KETOROLAC TROMETHAMINE 10 MG: 10 TABLET, FILM COATED ORAL at 05:05

## 2022-05-19 RX ADMIN — IPRATROPIUM BROMIDE AND ALBUTEROL SULFATE 3 ML: 2.5; .5 SOLUTION RESPIRATORY (INHALATION) at 09:05

## 2022-05-19 RX ADMIN — HYDROMORPHONE HYDROCHLORIDE 1 MG: 2 INJECTION INTRAMUSCULAR; INTRAVENOUS; SUBCUTANEOUS at 10:05

## 2022-05-19 RX ADMIN — OXYCODONE HYDROCHLORIDE 5 MG: 5 TABLET ORAL at 08:05

## 2022-05-19 RX ADMIN — KETOROLAC TROMETHAMINE 10 MG: 10 TABLET, FILM COATED ORAL at 03:05

## 2022-05-19 RX ADMIN — OXACILLIN SODIUM 12 G: 10 INJECTION, POWDER, FOR SOLUTION INTRAVENOUS at 03:05

## 2022-05-19 RX ADMIN — IPRATROPIUM BROMIDE AND ALBUTEROL SULFATE 3 ML: 2.5; .5 SOLUTION RESPIRATORY (INHALATION) at 03:05

## 2022-05-19 NOTE — ASSESSMENT & PLAN NOTE
Secondary to acute illness    --Daily CBC  --Transfuse with 1 unit PRBC for Hgb <7.0 or <8.0 if symptomatic   --Hgb: 6.7, give 1U PRBC

## 2022-05-19 NOTE — SUBJECTIVE & OBJECTIVE
Interval History: MRI completed, results pending. Awaiting acceptance for [facility].     Review of Systems   Constitutional:  Positive for activity change, appetite change and fatigue. Negative for chills, diaphoresis, fever and unexpected weight change.   HENT:  Negative for congestion, hearing loss, mouth sores, postnasal drip, rhinorrhea, sore throat and trouble swallowing.    Eyes:  Negative for discharge and visual disturbance.   Respiratory:  Negative for cough, chest tightness and shortness of breath.    Cardiovascular:  Negative for chest pain, palpitations and leg swelling.   Gastrointestinal:  Positive for abdominal distention and abdominal pain. Negative for blood in stool, constipation, diarrhea, nausea and vomiting.   Endocrine: Negative for cold intolerance and heat intolerance.   Genitourinary:  Negative for difficulty urinating, dyspareunia, flank pain and hematuria.   Musculoskeletal:  Positive for back pain and myalgias. Negative for arthralgias.   Skin: Negative.    Neurological:  Positive for weakness and headaches. Negative for dizziness and light-headedness.   Hematological:  Negative for adenopathy. Does not bruise/bleed easily.   Psychiatric/Behavioral:  Negative for agitation, behavioral problems, confusion and sleep disturbance. The patient is nervous/anxious.    Objective:     Vital Signs (Most Recent):  Temp: 99.9 °F (37.7 °C) (05/19/22 1217)  Pulse: 94 (05/19/22 1217)  Resp: 18 (05/19/22 1217)  BP: (!) 143/69 (05/19/22 1217)  SpO2: 95 % (05/19/22 1217)   Vital Signs (24h Range):  Temp:  [97.7 °F (36.5 °C)-99.9 °F (37.7 °C)] 99.9 °F (37.7 °C)  Pulse:  [77-99] 94  Resp:  [18-19] 18  SpO2:  [95 %-100 %] 95 %  BP: (101-143)/(55-69) 143/69     Weight: 95.1 kg (209 lb 10.5 oz)  Body mass index is 34.89 kg/m².    Intake/Output Summary (Last 24 hours) at 5/19/2022 1235  Last data filed at 5/19/2022 0731  Gross per 24 hour   Intake 535.12 ml   Output 1200 ml   Net -664.88 ml      Physical Exam  Vitals  and nursing note reviewed.   Constitutional:       General: She is not in acute distress.     Appearance: She is well-developed. She is ill-appearing.   HENT:      Head: Normocephalic and atraumatic.      Right Ear: Hearing and external ear normal.      Left Ear: Hearing and external ear normal.      Nose: No rhinorrhea.      Right Sinus: No maxillary sinus tenderness or frontal sinus tenderness.      Left Sinus: No maxillary sinus tenderness or frontal sinus tenderness.      Mouth/Throat:      Mouth: No oral lesions.      Pharynx: Uvula midline.   Eyes:      General:         Right eye: No discharge.         Left eye: No discharge.      Conjunctiva/sclera: Conjunctivae normal.      Pupils: Pupils are equal, round, and reactive to light.   Neck:      Thyroid: No thyromegaly.      Vascular: No carotid bruit.      Trachea: No tracheal deviation.   Cardiovascular:      Rate and Rhythm: Regular rhythm. Tachycardia present.      Pulses:           Dorsalis pedis pulses are 2+ on the right side and 2+ on the left side.      Heart sounds: Normal heart sounds, S1 normal and S2 normal. No murmur heard.  Pulmonary:      Effort: Pulmonary effort is normal. No respiratory distress.      Breath sounds: Examination of the right-lower field reveals decreased breath sounds. Examination of the left-lower field reveals decreased breath sounds. Decreased breath sounds present.   Chest:   Breasts:      Right: No supraclavicular adenopathy.      Left: No supraclavicular adenopathy.     Abdominal:      General: Bowel sounds are decreased. There is no distension.      Palpations: Abdomen is soft. There is no mass.      Tenderness: There is no abdominal tenderness.   Musculoskeletal:      Cervical back: Normal range of motion.      Thoracic back: Tenderness present. Decreased range of motion.      Lumbar back: Tenderness present. Decreased range of motion.   Lymphadenopathy:      Cervical: No cervical adenopathy.      Upper Body:      Right  upper body: No supraclavicular adenopathy.      Left upper body: No supraclavicular adenopathy.   Skin:     General: Skin is warm and dry.      Capillary Refill: Capillary refill takes less than 2 seconds.      Findings: No rash.   Neurological:      Mental Status: She is alert and oriented to person, place, and time.      Sensory: No sensory deficit.      Coordination: Coordination normal.      Gait: Gait normal.   Psychiatric:         Mood and Affect: Mood is not anxious or depressed.         Speech: Speech normal.         Behavior: Behavior normal.         Thought Content: Thought content normal.         Judgment: Judgment normal.       Significant Labs: All pertinent labs within the past 24 hours have been reviewed.  CBC:   Recent Labs   Lab 05/18/22 0522 05/19/22  0527   WBC 9.91 9.92   HGB 7.0* 6.7*   HCT 24.6* 21.3*   * 489*     CMP:   Recent Labs   Lab 05/18/22  0522      K 5.0   CL 97   CO2 27   GLU 47*   BUN 15   CREATININE 0.6   CALCIUM 7.6*   PROT 5.2*   ALBUMIN 1.3*   BILITOT 0.2   ALKPHOS 184*   AST 24   ALT 23   ANIONGAP 13   EGFRNONAA >60       Significant Imaging: I have reviewed all pertinent imaging results/findings within the past 24 hours.

## 2022-05-19 NOTE — ASSESSMENT & PLAN NOTE
H/O IVDU   --Cont oxacillin  --F/U blood and sputum cx   --Status post thoracentesis - 5/7  --Chest x-ray without signs of pneumothorax  --Fluid studies pending  --C/O worsened cough, repeat CXR shows interval worsening, likely secondary to IVF  --Stopped IVF, given Lasix 40mg IV x2 dose- improved  --Nebs PRN  --Supportive O2

## 2022-05-19 NOTE — PT/OT/SLP PROGRESS
Occupational Therapy  Treatment    Tianna Leon   MRN: [id number]   Admitting Diagnosis: Bacteremia    OT Date of Treatment: 05/19/22   OT Start Time: 0910  OT Stop Time: 0935  OT Total Time (min): 25 min    Billable Minutes:  Self Care/Home Management 15 minutes and Therapeutic Activity 10 minutes    OT/CHRISTI: OT          General Precautions: Standard, fall  Orthopedic Precautions: N/A  Braces: N/A  Respiratory Status: Nasal cannula, flow 3 L/min         Subjective:  Communicated with nurse and epic chart review prior to session.    Pain/Comfort  Pain Rating 1: 0/10    Objective:  Patient found with: telemetry, PureWick, peripheral IV     Functional Mobility:    Transfers:   min a with sit<>stand transfers as well as toilet transfers. Pt also req vc for hand placement and safety    Functional Ambulation:   Pt ambulated 8 feet x 2 with min a and rolling walker    Activities of Daily Living:  Mod a wiith toileting   Balance:   Static Sit: FAIR+: Able to take MINIMAL challenges from all directions  Dynamic Sit: FAIR+: Maintains balance through MINIMAL excursions of active trunk motion  Static Stand: POOR+: Needs MINIMAL assist to maintain  Dynamic stand: POOR+: Needs MIN (minimal ) assist during gait    Therapeutic Activities and Exercises:  Pt educated on hep. Pt verbalized understanding and returned demonstration  AM-PAC 6 CLICK ADL   How much help from another person does this patient currently need?   1 = Unable, Total/Dependent Assistance  2 = A lot, Maximum/Moderate Assistance  3 = A little, Minimum/Contact Guard/Supervision  4 = None, Modified [place]/Independent    Putting on and taking off regular lower body clothing? : 3  Bathing (including washing, rinsing, drying)?: 3  Toileting, which includes using toilet, bedpan, or urinal? : 2  Putting on and taking off regular upper body clothing?: 3  Taking care of personal grooming such as brushing teeth?: 4  Eating meals?: 4  Daily Activity Total Score: 19  "    AM-PAC Raw Score CMS "G-Code Modifier Level of Impairment Assistance   6 % Total / Unable   7 - 8 CM 80 - 100% Maximal Assist   9-13 CL 60 - 80% Moderate Assist   14 - 19 CK 40 - 60% Moderate Assist   20 - 22 CJ 20 - 40% Minimal Assist   23 CI 1-20% SBA / CGA   24 CH 0% Independent/ Mod I       Patient left up in chair with all lines intact, call button in reach, nurse notified and mother present    ASSESSMENT:  Tianna Leon is a 37 y.o. female with a medical diagnosis of Bacteremia and presents with debility and generalized weakness and debility    Rehab identified problem list/impairments: Rehab identified problem list/impairments: weakness, impaired balance, decreased safety awareness, impaired endurance, impaired self care skills, decreased coordination, impaired functional mobilty, decreased upper extremity function, gait instability, decreased lower extremity function    Rehab potential is good.    Activity tolerance: Good    Discharge recommendations: Discharge Facility/Level of Care Needs: home health OT, nursing facility, skilled     Barriers to discharge:      Equipment recommendations: walker, rolling     GOALS:   Multidisciplinary Problems     Occupational Therapy Goals        Problem: Occupational Therapy    Goal Priority Disciplines Outcome Interventions   Occupational Therapy Goal     OT, PT/OT Ongoing, Progressing    Description: O.T. GOALS MET BY 5-30-22  SBA WITH UE DRESSING  MIN A WITH LE DRESSING  PT WILL TOLERATE 1 SET X 15 REPS B UE ROM EXERCISE  MIN A WITH TOILETING                   Plan:  Patient to be seen 2 x/week to address the above listed problems via self-care/home management, therapeutic activities, therapeutic exercises  Plan of Care expires: 05/30/22  Plan of Care reviewed with: patient, mother         05/19/2022  "

## 2022-05-19 NOTE — PLAN OF CARE
Patient remains free from falls and injuries this shift. Safety precautions maintained. Pain managed with Morphine.  No signs or symptoms of acute distress noted. Will continue to monitor. Chart check completed.

## 2022-05-19 NOTE — ASSESSMENT & PLAN NOTE
Chronic in nature, H/O IVDU  --CT lumbar show spinal stenosis   --No neurological deficit at this time   --MRI lumbar wwo  To r/o spinal abscess -showed moderate to severe spinal canal stenosis with moderate left-sided neural foraminal stenosis, Neurosurgery consulted.     --MRI thoracic spine ordered: completed 5/19/22, results pending  --Decreased dose of pain medication due to patient drowsy and speech slurred. Decreased Oxycodone to 5mg Q6h, Morphine 12hr tab 15mg Q12  --Continue Toradol- will change to PO

## 2022-05-19 NOTE — PT/OT/SLP PROGRESS
"Physical Therapy  Treatment    Tianna Leon   MRN: [id number]   Admitting Diagnosis: Bacteremia    PT Received On: 05/19/22  PT Start Time: 0920     PT Stop Time: 0945    PT Total Time (min): 25 min       Billable Minutes:  Gait Training 15 and Therapeutic Activity 10    Treatment Type: Treatment  PT/PTA: PT     PTA Visit Number: 0       General Precautions: Standard, fall  Orthopedic Precautions: N/A   Braces: N/A  Respiratory Status: Nasal cannula, flow 3 L/min         Subjective:  Communicated with NURSE GONZALEZ AND Epic CHART REVIEW  prior to session.   PT AGREED TO TX HOWEVER CONT TO REPORT, " I CANT DO THAT ON MY OWN WHEN ASKED TO COMPLETE TASKS"    Pain/Comfort  Pain Rating 1: 0/10  Pain Rating Post-Intervention 1: 0/10    Objective:   Patient found with: peripheral IV, telemetry, PureWick    Functional Mobility:  PT LOG ROLLED TO LEFT AND SEATED EOB WITH SBA. PT SCOOTED TO EOB WITH SBA AND PT SOILED WITH URINE. P.T. EDUCATED PT TO CALL FOR ASSIST OOB AS PT WAS LAYING IN WETNESS AND EDUCATED ON RISK OF SKIN BREAKDOWN. PT ALSO EDUCATED THAT THE MORE SHE MOVES THE STRONGER SHE WILL GET. PT STOOD WITH RW AND CGA>SBA FOR GT 2X8' TO BATHROOM FOR TOILETING. PT RETURNED TO ROOM AND SEATED IN CHAIR FOR REST AFTER TOILETING. PT EDUCATED ON B LE TE X AND PT DEMONSTRATED AP, TKE AND MIP. PT LEFT SEATED IN CHAIR WITH CHAIR ALARM ON     AM-PAC 6 CLICK MOBILITY  How much help from another person does this patient currently need?   1 = Unable, Total/Dependent Assistance  2 = A lot, Maximum/Moderate Assistance  3 = A little, Minimum/Contact Guard/Supervision  4 = None, Modified Show Low/Independent    Turning over in bed (including adjusting bedclothes, sheets and blankets)?: 4  Sitting down on and standing up from a chair with arms (e.g., wheelchair, bedside commode, etc.): 4  Moving from lying on back to sitting on the side of the bed?: 4  Moving to and from a bed to a chair (including a wheelchair)?: 4  Need to " walk in hospital room?: 3  Climbing 3-5 steps with a railing?: 1  Basic Mobility Total Score: 20    AM-PAC Raw Score CMS G-Code Modifier Level of Impairment Assistance   6 % Total / Unable   7 - 9 CM 80 - 100% Maximal Assist   10 - 14 CL 60 - 80% Moderate Assist   15 - 19 CK 40 - 60% Moderate Assist   20 - 22 CJ 20 - 40% Minimal Assist   23 CI 1-20% SBA / CGA   24 CH 0% Independent/ Mod I     Patient left up in chair with call button in reach and chair alarm on.    Assessment:  PT MANJIT TX WITH INC ENCOURAGEMENT. PT LACKS MOTIVATION WITH SKILLED THERAPY TX.     Rehab identified problem list/impairments: Rehab identified problem list/impairments: weakness, decreased ROM, impaired endurance, edema, decreased safety awareness, impaired balance, gait instability, impaired functional mobilty, decreased lower extremity function, impaired self care skills, decreased coordination, impaired cognition    Rehab potential is good.    Activity tolerance: Fair    Discharge recommendations: Discharge Facility/Level of Care Needs: nursing facility, skilled, home health PT     Barriers to discharge:      Equipment recommendations: Equipment Needed After Discharge: walker, rolling     GOALS:   Multidisciplinary Problems     Physical Therapy Goals        Problem: Physical Therapy    Goal Priority Disciplines Outcome Goal Variances Interventions   Physical Therapy Goal     PT, PT/OT Ongoing, Progressing     Description: LTG'S TO BE MET IN 14 DAYS (5-30-22)  1. PT WILL BE KRISTA WITH BED MOBILITY  2. PT WILL BE KRISTA WITH TF'S  3. PT WILL ' WITH RW AND SPV                   PLAN:    Patient to be seen 3 x/week  to address the above listed problems via gait training, therapeutic activities, therapeutic exercises  Plan of Care expires: 05/30/22  Plan of Care reviewed with: patient, mother         05/19/2022

## 2022-05-19 NOTE — PLAN OF CARE
Tip spoke with Radha at [facility]. Tip informed that updated clinicals were sent via [facility] to review clinicals and submit to insurance.

## 2022-05-19 NOTE — ASSESSMENT & PLAN NOTE
Gram positive   Infectious disease consulted  Continue broad spectrum intravenous antibiotic(s) given h/o ivdu  Remains febrile and leukocytosis persists  5/8/22 The patient remained afebrile overnight. Currently on cefepime/vanc/flagyl. Blood cx are growing staph aureus. The patient refused the MRI lumbar spine overnight d/t being unable to lay flat from back pain. Will give pain meds and attempt to get the MRI today to r/o spinal abscess. The ECHO showed a 1.3 x 1.5 cm elongated, mobile echodensity seen on the tricuspid valve consistent with vegetation, CT surgery was consulted. Will need a SUSANNAH. Infectious disease is consulted. Will repeat blood cx today.   5/9/22 Repeat blood cx are still positive. Sensitivities are back and Staph is sensitive to oxacillin, will D/C vanc/cefepime. Infectious disease is following   5/10/22 Repeat blood cx are +. Continue IV oxacillin and flagyl. Infectious disease is consulted. IR consulted to evaluate possible paraspinous muscle myositis versus abscess. Recommend IR drainage. Continue current management.    5/11/22 The patient reports pain is still not well controlled at times. The patient reports that she is very anxious about the upcoming SUSANNAH and IR drainage. SUSANNAH was pushed back to this afternoon because the patient ate candy this AM. Continue treatment with IV oxacillin and flagyl. Infectious disease is following. WBC trended down to 31K. Continue current management.  5/12/22 The patients WBC improved to 22K overnight. The patient remains on continuous oxacillin infusion. Infectious disease is following.  5/14: WBC continues to improve, 18.10 today     5/15: Blood cultures: NGTD    5/19: Continuous Oxacillin, EOC: 7/11/22, pending [place] at [facility] [place], PICC line placed

## 2022-05-19 NOTE — PLAN OF CARE
Problem: Adult Inpatient Plan of Care  Goal: Plan of Care Review  Outcome: Ongoing, Progressing  Goal: Patient-Specific Goal (Individualized)  Outcome: Ongoing, Progressing  Goal: Absence of Hospital-Acquired Illness or Injury  Outcome: Ongoing, Progressing  Goal: Optimal Comfort and Wellbeing  Outcome: Ongoing, Progressing  Goal: Readiness for Transition of Care  Outcome: Ongoing, Progressing     Problem: Impaired Wound Healing  Goal: Optimal Wound Healing  Outcome: Ongoing, Progressing     Problem: Skin Injury Risk Increased  Goal: Skin Health and Integrity  Outcome: Ongoing, Progressing     Problem: Nutrition Impaired (Sepsis/Septic Shock)  Goal: Optimal Nutrition Intake  Outcome: Ongoing, Progressing     Problem: Infection Progression (Sepsis/Septic Shock)  Goal: Absence of Infection Signs and Symptoms  Outcome: Ongoing, Progressing

## 2022-05-20 ENCOUNTER — ANESTHESIA EVENT (OUTPATIENT)
Dept: ANESTHESIOLOGY | Facility: HOSPITAL | Age: 37
DRG: 853 | End: 2022-05-20
Payer: MEDICARE

## 2022-05-20 PROBLEM — B95.61 MSSA BACTEREMIA: Status: ACTIVE | Noted: 2022-05-07

## 2022-05-20 PROBLEM — J94.8 HYDROPNEUMOTHORAX: Status: ACTIVE | Noted: 2022-05-20

## 2022-05-20 LAB
ALBUMIN SERPL BCP-MCNC: 1.4 G/DL (ref 3.5–5.2)
ALP SERPL-CCNC: 130 U/L (ref 55–135)
ALT SERPL W/O P-5'-P-CCNC: 16 U/L (ref 10–44)
ANION GAP SERPL CALC-SCNC: 8 MMOL/L (ref 8–16)
AST SERPL-CCNC: 11 U/L (ref 10–40)
BASOPHILS # BLD AUTO: 0.07 K/UL (ref 0–0.2)
BASOPHILS NFR BLD: 0.8 % (ref 0–1.9)
BILIRUB SERPL-MCNC: 0.3 MG/DL (ref 0.1–1)
BUN SERPL-MCNC: 10 MG/DL (ref 6–20)
CALCIUM SERPL-MCNC: 7.7 MG/DL (ref 8.7–10.5)
CHLORIDE SERPL-SCNC: 98 MMOL/L (ref 95–110)
CO2 SERPL-SCNC: 30 MMOL/L (ref 23–29)
CREAT SERPL-MCNC: 0.5 MG/DL (ref 0.5–1.4)
DIFFERENTIAL METHOD: ABNORMAL
EOSINOPHIL # BLD AUTO: 0.2 K/UL (ref 0–0.5)
EOSINOPHIL NFR BLD: 1.8 % (ref 0–8)
ERYTHROCYTE [DISTWIDTH] IN BLOOD BY AUTOMATED COUNT: 16.2 % (ref 11.5–14.5)
EST. GFR  (AFRICAN AMERICAN): >60 ML/MIN/1.73 M^2
EST. GFR  (NON AFRICAN AMERICAN): >60 ML/MIN/1.73 M^2
GLUCOSE SERPL-MCNC: 87 MG/DL (ref 70–110)
HCT VFR BLD AUTO: 23.2 % (ref 37–48.5)
HGB BLD-MCNC: 7.3 G/DL (ref 12–16)
IMM GRANULOCYTES # BLD AUTO: 0.12 K/UL (ref 0–0.04)
IMM GRANULOCYTES NFR BLD AUTO: 1.3 % (ref 0–0.5)
LYMPHOCYTES # BLD AUTO: 1.9 K/UL (ref 1–4.8)
LYMPHOCYTES NFR BLD: 21.6 % (ref 18–48)
MCH RBC QN AUTO: 28 PG (ref 27–31)
MCHC RBC AUTO-ENTMCNC: 31.5 G/DL (ref 32–36)
MCV RBC AUTO: 89 FL (ref 82–98)
MONOCYTES # BLD AUTO: 1 K/UL (ref 0.3–1)
MONOCYTES NFR BLD: 11.1 % (ref 4–15)
NEUTROPHILS # BLD AUTO: 5.7 K/UL (ref 1.8–7.7)
NEUTROPHILS NFR BLD: 63.4 % (ref 38–73)
NRBC BLD-RTO: 0 /100 WBC
PLATELET # BLD AUTO: 484 K/UL (ref 150–450)
PMV BLD AUTO: 9.1 FL (ref 9.2–12.9)
POTASSIUM SERPL-SCNC: 5 MMOL/L (ref 3.5–5.1)
PROT SERPL-MCNC: 5.5 G/DL (ref 6–8.4)
RBC # BLD AUTO: 2.61 M/UL (ref 4–5.4)
SODIUM SERPL-SCNC: 136 MMOL/L (ref 136–145)
WBC # BLD AUTO: 8.98 K/UL (ref 3.9–12.7)

## 2022-05-20 PROCEDURE — 21400001 HC TELEMETRY ROOM

## 2022-05-20 PROCEDURE — 99900035 HC TECH TIME PER 15 MIN (STAT)

## 2022-05-20 PROCEDURE — 99233 SBSQ HOSP IP/OBS HIGH 50: CPT | Mod: ,,, | Performed by: INTERNAL MEDICINE

## 2022-05-20 PROCEDURE — 80053 COMPREHEN METABOLIC PANEL: CPT | Performed by: NURSE PRACTITIONER

## 2022-05-20 PROCEDURE — 94664 DEMO&/EVAL PT USE INHALER: CPT

## 2022-05-20 PROCEDURE — 27000221 HC OXYGEN, UP TO 24 HOURS

## 2022-05-20 PROCEDURE — 94640 AIRWAY INHALATION TREATMENT: CPT

## 2022-05-20 PROCEDURE — 25000242 PHARM REV CODE 250 ALT 637 W/ HCPCS: Performed by: INTERNAL MEDICINE

## 2022-05-20 PROCEDURE — 63600175 PHARM REV CODE 636 W HCPCS: Performed by: INTERNAL MEDICINE

## 2022-05-20 PROCEDURE — 25000003 PHARM REV CODE 250: Performed by: NURSE PRACTITIONER

## 2022-05-20 PROCEDURE — 99233 PR SUBSEQUENT HOSPITAL CARE,LEVL III: ICD-10-PCS | Mod: ,,, | Performed by: INTERNAL MEDICINE

## 2022-05-20 PROCEDURE — 94761 N-INVAS EAR/PLS OXIMETRY MLT: CPT

## 2022-05-20 PROCEDURE — 85025 COMPLETE CBC W/AUTO DIFF WBC: CPT | Performed by: INTERNAL MEDICINE

## 2022-05-20 PROCEDURE — 63600175 PHARM REV CODE 636 W HCPCS: Performed by: NURSE PRACTITIONER

## 2022-05-20 RX ORDER — IPRATROPIUM BROMIDE AND ALBUTEROL SULFATE 2.5; .5 MG/3ML; MG/3ML
3 SOLUTION RESPIRATORY (INHALATION)
Status: DISCONTINUED | OUTPATIENT
Start: 2022-05-20 | End: 2022-05-29

## 2022-05-20 RX ORDER — HYDROMORPHONE HYDROCHLORIDE 2 MG/ML
1 INJECTION, SOLUTION INTRAMUSCULAR; INTRAVENOUS; SUBCUTANEOUS
Status: DISCONTINUED | OUTPATIENT
Start: 2022-05-20 | End: 2022-05-23

## 2022-05-20 RX ORDER — HYDROMORPHONE HYDROCHLORIDE 2 MG/ML
1 INJECTION, SOLUTION INTRAMUSCULAR; INTRAVENOUS; SUBCUTANEOUS
Status: DISCONTINUED | OUTPATIENT
Start: 2022-05-20 | End: 2022-05-20

## 2022-05-20 RX ADMIN — OXYCODONE HYDROCHLORIDE 5 MG: 5 TABLET ORAL at 03:05

## 2022-05-20 RX ADMIN — LORAZEPAM 2 MG: 2 INJECTION INTRAMUSCULAR; INTRAVENOUS at 10:05

## 2022-05-20 RX ADMIN — OXACILLIN SODIUM 12 G: 10 INJECTION, POWDER, FOR SOLUTION INTRAVENOUS at 04:05

## 2022-05-20 RX ADMIN — IPRATROPIUM BROMIDE AND ALBUTEROL SULFATE 3 ML: 2.5; .5 SOLUTION RESPIRATORY (INHALATION) at 01:05

## 2022-05-20 RX ADMIN — KETOROLAC TROMETHAMINE 10 MG: 10 TABLET, FILM COATED ORAL at 06:05

## 2022-05-20 RX ADMIN — ALPRAZOLAM 1 MG: 1 TABLET ORAL at 07:05

## 2022-05-20 RX ADMIN — MORPHINE SULFATE 15 MG: 15 TABLET, EXTENDED RELEASE ORAL at 09:05

## 2022-05-20 RX ADMIN — KETOROLAC TROMETHAMINE 10 MG: 10 TABLET, FILM COATED ORAL at 11:05

## 2022-05-20 RX ADMIN — OXYCODONE HYDROCHLORIDE 5 MG: 5 TABLET ORAL at 07:05

## 2022-05-20 RX ADMIN — KETOROLAC TROMETHAMINE 10 MG: 10 TABLET, FILM COATED ORAL at 12:05

## 2022-05-20 RX ADMIN — KETOROLAC TROMETHAMINE 10 MG: 10 TABLET, FILM COATED ORAL at 05:05

## 2022-05-20 RX ADMIN — MORPHINE SULFATE 15 MG: 15 TABLET, EXTENDED RELEASE ORAL at 08:05

## 2022-05-20 RX ADMIN — TRAZODONE HYDROCHLORIDE 100 MG: 100 TABLET ORAL at 09:05

## 2022-05-20 NOTE — PT/OT/SLP PROGRESS
Occupational Therapy      Patient Name:  Tianna Leon   MRN:  [id number]    Patient unavailable and out of room for a CT scan. Will follow up during next scheduled OT session.       Yolande Quinones, OT  1050  5/20/2022

## 2022-05-20 NOTE — ASSESSMENT & PLAN NOTE
Complicated by PNA, possible empyema  SP Thora  Cont Abx  5/20/2022 Probable empyema,needs chest tube drainage

## 2022-05-20 NOTE — SUBJECTIVE & OBJECTIVE
Interval History: see hospital course    Review of Systems   Constitutional:  Positive for activity change, appetite change and fatigue. Negative for chills, diaphoresis, fever and unexpected weight change.   HENT:  Negative for congestion, hearing loss, mouth sores, postnasal drip, rhinorrhea, sore throat and trouble swallowing.    Eyes:  Negative for discharge and visual disturbance.   Respiratory:  Positive for shortness of breath. Negative for cough and chest tightness.    Cardiovascular:  Negative for chest pain, palpitations and leg swelling.   Gastrointestinal:  Positive for abdominal distention and abdominal pain. Negative for blood in stool, constipation, diarrhea, nausea and vomiting.   Endocrine: Negative for cold intolerance and heat intolerance.   Genitourinary:  Negative for difficulty urinating, dyspareunia, flank pain and hematuria.   Musculoskeletal:  Positive for back pain and myalgias. Negative for arthralgias.   Skin: Negative.    Neurological:  Positive for weakness. Negative for dizziness and light-headedness.   Hematological:  Negative for adenopathy. Does not bruise/bleed easily.   Psychiatric/Behavioral:  Negative for agitation, behavioral problems, confusion and sleep disturbance. The patient is nervous/anxious.    Objective:     Vital Signs (Most Recent):  Temp: 98.6 °F (37 °C) (05/20/22 1516)  Pulse: 91 (05/20/22 1516)  Resp: 14 (05/20/22 1516)  BP: 133/72 (05/20/22 1516)  SpO2: 99 % (05/20/22 1516)   Vital Signs (24h Range):  Temp:  [97.1 °F (36.2 °C)-100.4 °F (38 °C)] 98.6 °F (37 °C)  Pulse:  [] 91  Resp:  [14-18] 14  SpO2:  [94 %-100 %] 99 %  BP: (108-141)/(55-72) 133/72     Weight: 95.1 kg (209 lb 10.5 oz)  Body mass index is 34.89 kg/m².    Intake/Output Summary (Last 24 hours) at 5/20/2022 1612  Last data filed at 5/20/2022 0400  Gross per 24 hour   Intake 268.33 ml   Output 3100 ml   Net -2831.67 ml        Physical Exam  Vitals and nursing note reviewed.   Constitutional:        General: She is not in acute distress.     Appearance: She is well-developed. She is ill-appearing.   HENT:      Head: Normocephalic and atraumatic.      Right Ear: Hearing and external ear normal.      Left Ear: Hearing and external ear normal.      Nose: No rhinorrhea.      Right Sinus: No maxillary sinus tenderness or frontal sinus tenderness.      Left Sinus: No maxillary sinus tenderness or frontal sinus tenderness.      Mouth/Throat:      Mouth: No oral lesions.      Pharynx: Uvula midline.   Eyes:      General:         Right eye: No discharge.         Left eye: No discharge.      Conjunctiva/sclera: Conjunctivae normal.      Pupils: Pupils are equal, round, and reactive to light.   Neck:      Thyroid: No thyromegaly.      Vascular: No carotid bruit.      Trachea: No tracheal deviation.   Cardiovascular:      Rate and Rhythm: Regular rhythm. Tachycardia present.      Pulses:           Dorsalis pedis pulses are 2+ on the right side and 2+ on the left side.      Heart sounds: Normal heart sounds, S1 normal and S2 normal. No murmur heard.  Pulmonary:      Effort: Pulmonary effort is normal. No respiratory distress.      Breath sounds: Examination of the right-lower field reveals decreased breath sounds. Examination of the left-lower field reveals decreased breath sounds. Decreased breath sounds present.   Chest:   Breasts:      Right: No supraclavicular adenopathy.      Left: No supraclavicular adenopathy.     Abdominal:      General: Bowel sounds are decreased. There is no distension.      Palpations: Abdomen is soft. There is no mass.      Tenderness: There is no abdominal tenderness.   Musculoskeletal:      Cervical back: Normal range of motion.      Thoracic back: Tenderness present. Decreased range of motion.      Lumbar back: Tenderness present. Decreased range of motion.   Lymphadenopathy:      Cervical: No cervical adenopathy.      Upper Body:      Right upper body: No supraclavicular adenopathy.       Left upper body: No supraclavicular adenopathy.   Skin:     General: Skin is warm and dry.      Capillary Refill: Capillary refill takes less than 2 seconds.      Findings: No rash.   Neurological:      Mental Status: She is alert and oriented to person, place, and time.      Sensory: No sensory deficit.      Coordination: Coordination normal.      Gait: Gait normal.   Psychiatric:         Mood and Affect: Mood is not anxious or depressed.         Speech: Speech normal.         Behavior: Behavior normal.         Thought Content: Thought content normal.         Judgment: Judgment normal.       Significant Labs: All pertinent labs within the past 24 hours have been reviewed.  CBC:   Recent Labs   Lab 05/19/22  0527 05/20/22  0503   WBC 9.92 8.98   HGB 6.7* 7.3*   HCT 21.3* 23.2*   * 484*       CMP:   Recent Labs   Lab 05/20/22  1334      K 5.0   CL 98   CO2 30*   GLU 87   BUN 10   CREATININE 0.5   CALCIUM 7.7*   PROT 5.5*   ALBUMIN 1.4*   BILITOT 0.3   ALKPHOS 130   AST 11   ALT 16   ANIONGAP 8   EGFRNONAA >60         Significant Imaging: I have reviewed all pertinent imaging results/findings within the past 24 hours.

## 2022-05-20 NOTE — PROGRESS NOTES
[facility] Surg  Pulmonology  Progress Note    Patient Name: Tianna Leno  MRN: [id number]  Admission Date: 2022  Hospital Length of Stay: 14 days  Code Status: Full Code  Attending Provider: Elizabeth Kim MD  Primary Care Provider: Spenser Campa MD   Principal Problem: Bacteremia    Subjective: Still with shortness of breath at rest     Past Medical History:   Diagnosis Date    ADHD (attention deficit hyperactivity disorder)     Anxiety     Asthma     Bipolar disorder     Cancer     cervical    COPD (chronic obstructive pulmonary disease)     GERD (gastroesophageal reflux disease)     Hepatitis C antibody positive in blood     RNA UNDETECTED 2016    Hyperlipidemia     Intravenous drug abuse     MDD (major depressive disorder)     Mood disorder     PTSD (post-traumatic stress disorder)     Suicidal ideation        Past Surgical History:   Procedure Laterality Date     SECTION  2001    x2    CHOLECYSTECTOMY      COMPUTED TOMOGRAPHY N/A 2022    Procedure: CT (COMPUTED TOMOGRAPHY)/facet joint aspiration;  Surgeon: Lloyd Suarez MD;  Location: [facility];  Service: General;  Laterality: N/A;    HYSTERECTOMY      LAPAROSCOPIC SALPINGECTOMY      ROBOT-ASSISTED LAPAROSCOPIC ABDOMINAL HYSTERECTOMY USING DA SEBASTIEN XI N/A 2019    Procedure: XI ROBOTIC HYSTERECTOMY;  Surgeon: Tabatha Quintanilla MD;  Location: [facility];  Service: OB/GYN;  Laterality: N/A;    ROBOT-ASSISTED LAPAROSCOPIC LYSIS OF ADHESIONS USING DA SEBASTIEN XI N/A 2019    Procedure: XI ROBOTIC LYSIS, ADHESIONS;  Surgeon: Tabatha Quintanilla MD;  Location: [facility];  Service: OB/GYN;  Laterality: N/A;    ROBOT-ASSISTED SURGICAL REMOVAL OF FALLOPIAN TUBE USING DA SEBASTIEN XI Right 2019    Procedure: XI ROBOTIC SALPINGECTOMY;  Surgeon: Tabatha Quintanilla MD;  Location: [facility];  Service: OB/GYN;  Laterality: Right;    TONSILLECTOMY      TUBAL LIGATION         Review of patient's allergies  indicates:   Allergen Reactions    Sulfamethoxazole-trimethoprim        Family History       Problem Relation (Age of Onset)    COPD Mother          Tobacco Use    Smoking status: Current Every Day Smoker     Packs/day: 1.50     Types: Cigarettes    Smokeless tobacco: Never Used    Tobacco comment: no smoking after midnight prior to surgery   Substance and Sexual Activity    Alcohol use: Yes     Comment: Occassional     Drug use: Not Currently     Types: IV, Methamphetamines, Heroin     Comment: denies heroin use.  Last used meth 10/2018  No illegal drugs prior to sx    Sexual activity: Not Currently     Partners: Male     Birth control/protection: None         Review of Systems   Constitutional:  Positive for diaphoresis, fatigue and fever.   HENT:  Positive for dental problem and sinus pressure.    Respiratory:  Positive for cough, chest tightness and shortness of breath.    Cardiovascular: Negative.    Gastrointestinal: Negative.    Genitourinary: Negative.    Musculoskeletal:  Positive for arthralgias.   Neurological:  Positive for weakness.   Psychiatric/Behavioral:  The patient is nervous/anxious.    Objective:     Vital Signs (Most Recent):  Temp: 97.1 °F (36.2 °C) (05/20/22 0722)  Pulse: 78 (05/20/22 0722)  Resp: 14 (05/20/22 0832)  BP: 118/61 (05/20/22 0722)  SpO2: 99 % (05/20/22 0722) Vital Signs (24h Range):  Temp:  [97.1 °F (36.2 °C)-100.5 °F (38.1 °C)] 97.1 °F (36.2 °C)  Pulse:  [] 78  Resp:  [14-20] 14  SpO2:  [94 %-99 %] 99 %  BP: (108-143)/(55-76) 118/61     Weight: 95.1 kg (209 lb 10.5 oz)  Body mass index is 34.89 kg/m².      Intake/Output Summary (Last 24 hours) at 5/20/2022 0943  Last data filed at 5/20/2022 0400  Gross per 24 hour   Intake 628.33 ml   Output 3100 ml   Net -2471.67 ml       Physical Exam  Vitals and nursing note reviewed.   Constitutional:       Appearance: She is well-developed. She is obese. She is ill-appearing.   HENT:      Head: Normocephalic and atraumatic.       Nose: Nose normal.   Eyes:      Conjunctiva/sclera: Conjunctivae normal.      Pupils: Pupils are equal, round, and reactive to light.   Neck:      Thyroid: No thyromegaly.      Vascular: No JVD.      Trachea: No tracheal deviation.   Cardiovascular:      Rate and Rhythm: Regular rhythm. Tachycardia present.      Heart sounds: Murmur heard.   Pulmonary:      Effort: Pulmonary effort is normal. No respiratory distress.      Breath sounds: Stridor present. Examination of the right-lower field reveals wheezing. Examination of the left-lower field reveals wheezing. Decreased breath sounds, wheezing and rales present. No rhonchi.   Chest:      Chest wall: No tenderness.   Abdominal:      General: Bowel sounds are normal.      Palpations: Abdomen is soft.   Musculoskeletal:         General: Normal range of motion.      Cervical back: Neck supple.      Right lower leg: Edema present.      Left lower leg: Edema present.   Lymphadenopathy:      Cervical: No cervical adenopathy.   Skin:     General: Skin is warm and dry.   Neurological:      Mental Status: She is alert and oriented to person, place, and time.       Vents:  Oxygen Concentration (%): 32 (05/19/22 0923)    Lines/Drains/Airways       Peripherally Inserted Central Catheter Line  Duration             PICC Double Lumen 05/17/22 1612 right basilic 2 days                    Significant Labs:    CBC/Anemia Profile:  Recent Labs   Lab 05/19/22  0527 05/20/22  0503   WBC 9.92 8.98   HGB 6.7* 7.3*   HCT 21.3* 23.2*   * 484*   MCV 88 89   RDW 17.1* 16.2*        Chemistries:  No results for input(s): NA, K, CL, CO2, BUN, CREATININE, CALCIUM, ALBUMIN, PROT, BILITOT, ALKPHOS, ALT, AST, GLUCOSE, MG, PHOS in the last 48 hours.    ABGs: No results for input(s): PH, PCO2, HCO3, POCSATURATED, BE in the last 48 hours.  BMP: No results for input(s): GLU, NA, K, CL, CO2, BUN, CREATININE, CALCIUM, MG in the last 48 hours.  CMP: No results for input(s): NA, K, CL, CO2, GLU, BUN,  CREATININE, CALCIUM, PROT, ALBUMIN, BILITOT, ALKPHOS, AST, ALT, ANIONGAP, EGFRNONAA in the last 48 hours.    Invalid input(s): EST[place]    Significant Imaging:   I have reviewed all pertinent imaging results/findings within the past 24 hours.    MRI Thoracic Spine W WO Cont  Narrative: EXAMINATION:  MRI THORACIC SPINE W WO CONTRAST    CLINICAL HISTORY:  IV drug abuser.  Evaluate for epidural abscess.    TECHNIQUE:  Multiplanar multisequence imaging of the thoracic spine is performed with and without the intravenous administration of 9 cc of Gadavist.    COMPARISON:  Thoracic spine CT, 05/06/2022    FINDINGS:  The exam is limited due to patient motion artifact.  The STIR sequence is nondiagnostic    Loculated right hydropneumothorax.  Extensive pulmonary infiltrates bilaterally.    No fracture of the thoracic spine.  No paraspinal mass or fluid collection.  No abnormal epidural enhancement.  No endplate destruction or evidence for discitis.  No central canal stenosis or neural foraminal stenosis at any level.  Impression: 1. No evidence for disc osteomyelitis in the thoracic spine.  No evidence for epidural abscess.  2. Loculated right hydropneumothorax and extensive bilateral pulmonary infiltrates.    Electronically signed by: Dionisio Lawson MD  Date:    05/19/2022  Time:    15:36        ABG  No results for input(s): PH, PO2, PCO2, HCO3, BE in the last 168 hours.  Assessment/Plan:     Hydropneumothorax  5/20 Suspect empyema, needs chest tube placement    Parapneumonic effusion  Complicated by PNA, possible empyema  SP Thora  Cont Abx  5/20/2022 Probable empyema,needs chest tube drainage      Will discuss with interventional radiology , NPO for now     Samuel Saleh MD  Pulmonology  [facility]

## 2022-05-20 NOTE — PT/OT/SLP PROGRESS
Physical Therapy      Patient Name:  Tianna Leon   MRN:  [id number]    10:35 a.m.  Patient unavailable and out of room for a CT scan.  Will follow up during next scheduled PT session.

## 2022-05-20 NOTE — PLAN OF CARE
Problem: Adult Inpatient Plan of Care  Goal: Plan of Care Review  Outcome: Ongoing, Progressing  Goal: Patient-Specific Goal (Individualized)  Outcome: Ongoing, Progressing  Goal: Absence of Hospital-Acquired Illness or Injury  Outcome: Ongoing, Progressing  Goal: Optimal Comfort and Wellbeing  Outcome: Ongoing, Progressing  Goal: Readiness for Transition of Care  Outcome: Ongoing, Progressing     Problem: Impaired Wound Healing  Goal: Optimal Wound Healing  Outcome: Ongoing, Progressing     Problem: Skin Injury Risk Increased  Goal: Skin Health and Integrity  Outcome: Ongoing, Progressing     Problem: Adjustment to Illness (Sepsis/Septic Shock)  Goal: Optimal Coping  Outcome: Ongoing, Progressing     Problem: Glycemic Control Impaired (Sepsis/Septic Shock)  Goal: Blood Glucose Level Within Desired Range  Outcome: Ongoing, Progressing     Problem: Infection Progression (Sepsis/Septic Shock)  Goal: Absence of Infection Signs and Symptoms  Outcome: Ongoing, Progressing     Problem: Nutrition Impaired (Sepsis/Septic Shock)  Goal: Optimal Nutrition Intake  Outcome: Ongoing, Progressing     Problem: Fluid Imbalance (Pneumonia)  Goal: Fluid Balance  Outcome: Ongoing, Progressing     Problem: Infection (Pneumonia)  Goal: Resolution of Infection Signs and Symptoms  Outcome: Ongoing, Progressing     Problem: Respiratory Compromise (Pneumonia)  Goal: Effective Oxygenation and Ventilation  Outcome: Ongoing, Progressing     Problem: Fall Injury Risk  Goal: Absence of Fall and Fall-Related Injury  Outcome: Ongoing, Progressing

## 2022-05-20 NOTE — ASSESSMENT & PLAN NOTE
Infectious disease is consulted. IR consulted to evaluate possible paraspinous muscle myositis versus abscess.    --Approximately 1 ml of purulent drainage aspirated 5/11 per IR, growing MSSA  --Continue current regimen

## 2022-05-20 NOTE — PROGRESS NOTES
[facility] Medicine  Progress Note    Patient Name: Tianna Leon  MRN: [id number]  Patient Class: IP- Inpatient   Admission Date: 5/6/2022  Length of Stay: 14 days  Attending Physician: Elizabeth Kim MD  Primary Care Provider: Spenser Campa MD        Subjective:     Principal Problem:Acute bacterial endocarditis        HPI:  36 y/o. female  with a PMHx of asthma, COPD,Bipolar  , IVDU and HLD presented to the ER with a c/o  sob for the last weak which has gotten worse . The SOB is associated with fever , chills , productive cough , back pain  and generalized weakness . She report cough some blood this am .  She is active IVDU  ( heroine , cocaine  and amphetamine ) . She denies any  sick contact , chest pain  , GI/ sx , She also complaning of LE sweeling . Knee pain and B/L LE rash .   ER COURSE: CTA chest negative for pe . Multiple nodular and cavitary opacities concerning for septic emboli with larger opacities possibly necrotizing pneumonia. CT cervical  and thoracic did not show any acute finding , CT lumbar Possible progression of degenerative changes most notable at L4-5 with moderate to severe spinal canal stenosis at this level.  WBC  29 k , na 130 , k 3.4 , cl 89 , procal 2.71   ER VS:  BP Pulse Resp Temp SpO2   (!) 124/58 110 (!) 30 (!) 101.6 °F (38.7 °C) 96 %           Pt will be admitted to inpt with a dx of PNA and severe sepsis       Overview/Hospital Course:  5/7 admitted for pneumonia, severe sepsis in setting of ivdu. Mother at bedside and provides collateral. Patient has struggled with substance abuse for approximately 20 years, worsening over last 3-5 years since life event. Onset of symptoms 1-2 weeks ago. Tolerated thoracentesis with no pneumothorax on chest x-ray. Mri lumbar and echo, pending. Bcx positive, growing gram positive cocci. On vanc, cefepime and flagyl. Infectious disease consulted for bacteremia. 5/8/22 The patient remained afebrile overnight. Currently on  cefepime/vanc/flagyl. Blood cx 5/6/22 are growing MSSA. The patient refused the MRI lumbar spine overnight d/t being unable to lay flat from back pain. Will give pain meds and attempt to get the MRI today to r/o spinal abscess. The ECHO showed a 1.3 x 1.5 cm elongated, mobile echodensity seen on the tricuspid valve consistent with vegetation, CT surgery was consulted. Will need a SUSANNAH. Infectious disease is consulted. Will repeat blood cx today. 5/9/22 No acute events overnight. The patient reports lower back pain is not well controlled with current pain regimen, will adjust. Repeat blood cx are still positive. Sensitivities are back and Staph is sensitive to oxacillin, will D/C vanc/cefepime. Cardiology consulted and plans for a SUSANNAH today. CT surgery following and recommends continuing medical management with appropriate ABX, no surgical intervention at this time. MRI lumbar spine showed moderate to severe spinal canal stenosis with moderate left-sided neural foraminal stenosis, Neurosurgery consulted. 5/10/22 No acute events overnight. The patient reports some improvement in pain with adjustment in pain meds. Repeat blood cx are +. Continue IV oxacillin and flagyl. Infectious disease is consulted. IR consulted to evaluate possible paraspinous muscle myositis versus abscess. Recommend IR drainage. Continue current management. 5/11/22 No acute events overnight. The patient reports pain is still not well controlled at times. The patient reports that she is very anxious about the upcoming SUSANNAH and IR drainage. SUSANNAH was pushed back to this afternoon because the patient ate candy this AM. Continue treatment with IV oxacillin and flagyl. Infectious disease is following. WBC trended down to 31K. Continue current management. 5/12/22 No acute events overnight. The patients WBC improved to 22K overnight. The patient remains on continuous oxacillin infusion. Infectious disease is following. MRI thoracic spine is ordered and  pending. The patients SUSANNAH was postponed again today d/t low H/H. Hgb was noted to be 6.2 this morning, will transfuse 1 unit PRBC. The patient reports pain is still not well controlled. The case was discussed with Neurosurgery who recommended adding extended release pain meds. Approximately 1 ml of purulent drainage aspirated from back yesterday per IR, growing gram + cocci.     5/13: Patient was given 1U PRBC on 5/12, improved from 6.2 --> 7.0, will give an additional unit PRBC. Radiology attempted to bring patient on 5/12 at 1755 for Thoracic MRI, patient refused due to pain and anxiety. Repeat attempt this AM, patient again refused. Will order Ativan 1mg IV to be given with pain medication prior to scan to relieve anxiety and pain, nurse to notify NP when ready to go for scan. Discussed pain management with patient, discussed transition to PO pain medications to being preparing for d/c to LTAC, adjusted PO pain regimen will reevaluate to determine effectiveness. Repeat Blood cultures 5/11/22: NGTD.     5/14: Final anaerobic cultures pending, continue to adjust pain regimen, d/c IV Dilaudid today. Patient currently managed with PO Morphine extended release and PO oxycodone. D/C plan is for LTAC once final antibiotic regimen determined. Patient continues to refuse MRI of thoracic spine.     5/15: Called to room this AM, patient had coughed up blood and mucous. Approx. 1-2 teaspoons of blood in emesis bag, ordered CXR and repeat CBC, CXR showed improvement from previous day, repeat CBC at time of incident, showed a slight decrease in Hgb: 9.7 --> 8.7, when repeated at 1500, Hgb had improved to 10. no further episodes of coughing up blood throughout day. Patient continues to c/o not having enough pain medication but continues to sleep most of the day and will fall asleep when speaking at times. Added IV Toradol to plan.     5/16: No further episodes of coughing blood overnight, patient participated with encouragement  with PT/OT today, recommendations are HH vs. SNF pending progress. Awaiting final antibiotic regimen recommendations. Anaerobic culture results finalized: negative for growth. Afebrile, WBC trending downward.     5/17: Antibiotic regimen per ID is continuous oxacillin until 7/11/22, SW notified of plan, will seek acceptance at LTAC or SNF. Patient choice is [facility]. WBC in normal range. AMS this AM, CT of head: no acute findings. Given narcan and mental status improved. Decreased narcotic dose.     5/18: Per discussion with patient, she is willing to have the MRI of thoracic spine today, will order IV pain medication and anxiety medication prior to scan. Nursing and Radiology aware of needing to premedicate for scan. Patient is more cooperative and participating with care.     5/19: Patient given premedications for MRI, MRI completed, results pending. Patient more agreeable with treatment plan and cooperating. Will need psychiatry follow-up after discharge. Hgb: 6.7, will give 1U PRBC    5/20/22: +LEON and generalized pain. Thoracic MRI showed no evidence for disc osteomyelitis or epidural abscess, +Loculated right hydropneumothorax,and extensive bilateral pulmonary infiltrates. Ct chest showed septic emboli and moderate right pleural effusion.+splenic infarct  Pulmonology felt likely empyema- consulted IR for chest tube.       Interval History: see hospital course    Review of Systems   Constitutional:  Positive for activity change, appetite change and fatigue. Negative for chills, diaphoresis, fever and unexpected weight change.   HENT:  Negative for congestion, hearing loss, mouth sores, postnasal drip, rhinorrhea, sore throat and trouble swallowing.    Eyes:  Negative for discharge and visual disturbance.   Respiratory:  Positive for shortness of breath. Negative for cough and chest tightness.    Cardiovascular:  Negative for chest pain, palpitations and leg swelling.   Gastrointestinal:  Positive for abdominal  distention and abdominal pain. Negative for blood in stool, constipation, diarrhea, nausea and vomiting.   Endocrine: Negative for cold intolerance and heat intolerance.   Genitourinary:  Negative for difficulty urinating, dyspareunia, flank pain and hematuria.   Musculoskeletal:  Positive for back pain and myalgias. Negative for arthralgias.   Skin: Negative.    Neurological:  Positive for weakness. Negative for dizziness and light-headedness.   Hematological:  Negative for adenopathy. Does not bruise/bleed easily.   Psychiatric/Behavioral:  Negative for agitation, behavioral problems, confusion and sleep disturbance. The patient is nervous/anxious.    Objective:     Vital Signs (Most Recent):  Temp: 98.6 °F (37 °C) (05/20/22 1516)  Pulse: 91 (05/20/22 1516)  Resp: 14 (05/20/22 1516)  BP: 133/72 (05/20/22 1516)  SpO2: 99 % (05/20/22 1516)   Vital Signs (24h Range):  Temp:  [97.1 °F (36.2 °C)-100.4 °F (38 °C)] 98.6 °F (37 °C)  Pulse:  [] 91  Resp:  [14-18] 14  SpO2:  [94 %-100 %] 99 %  BP: (108-141)/(55-72) 133/72     Weight: 95.1 kg (209 lb 10.5 oz)  Body mass index is 34.89 kg/m².    Intake/Output Summary (Last 24 hours) at 5/20/2022 1612  Last data filed at 5/20/2022 0400  Gross per 24 hour   Intake 268.33 ml   Output 3100 ml   Net -2831.67 ml        Physical Exam  Vitals and nursing note reviewed.   Constitutional:       General: She is not in acute distress.     Appearance: She is well-developed. She is ill-appearing.   HENT:      Head: Normocephalic and atraumatic.      Right Ear: Hearing and external ear normal.      Left Ear: Hearing and external ear normal.      Nose: No rhinorrhea.      Right Sinus: No maxillary sinus tenderness or frontal sinus tenderness.      Left Sinus: No maxillary sinus tenderness or frontal sinus tenderness.      Mouth/Throat:      Mouth: No oral lesions.      Pharynx: Uvula midline.   Eyes:      General:         Right eye: No discharge.         Left eye: No discharge.       Conjunctiva/sclera: Conjunctivae normal.      Pupils: Pupils are equal, round, and reactive to light.   Neck:      Thyroid: No thyromegaly.      Vascular: No carotid bruit.      Trachea: No tracheal deviation.   Cardiovascular:      Rate and Rhythm: Regular rhythm. Tachycardia present.      Pulses:           Dorsalis pedis pulses are 2+ on the right side and 2+ on the left side.      Heart sounds: Normal heart sounds, S1 normal and S2 normal. No murmur heard.  Pulmonary:      Effort: Pulmonary effort is normal. No respiratory distress.      Breath sounds: Examination of the right-lower field reveals decreased breath sounds. Examination of the left-lower field reveals decreased breath sounds. Decreased breath sounds present.   Chest:   Breasts:      Right: No supraclavicular adenopathy.      Left: No supraclavicular adenopathy.     Abdominal:      General: Bowel sounds are decreased. There is no distension.      Palpations: Abdomen is soft. There is no mass.      Tenderness: There is no abdominal tenderness.   Musculoskeletal:      Cervical back: Normal range of motion.      Thoracic back: Tenderness present. Decreased range of motion.      Lumbar back: Tenderness present. Decreased range of motion.   Lymphadenopathy:      Cervical: No cervical adenopathy.      Upper Body:      Right upper body: No supraclavicular adenopathy.      Left upper body: No supraclavicular adenopathy.   Skin:     General: Skin is warm and dry.      Capillary Refill: Capillary refill takes less than 2 seconds.      Findings: No rash.   Neurological:      Mental Status: She is alert and oriented to person, place, and time.      Sensory: No sensory deficit.      Coordination: Coordination normal.      Gait: Gait normal.   Psychiatric:         Mood and Affect: Mood is not anxious or depressed.         Speech: Speech normal.         Behavior: Behavior normal.         Thought Content: Thought content normal.         Judgment: Judgment normal.        Significant Labs: All pertinent labs within the past 24 hours have been reviewed.  CBC:   Recent Labs   Lab 05/19/22  0527 05/20/22  0503   WBC 9.92 8.98   HGB 6.7* 7.3*   HCT 21.3* 23.2*   * 484*       CMP:   Recent Labs   Lab 05/20/22  1334      K 5.0   CL 98   CO2 30*   GLU 87   BUN 10   CREATININE 0.5   CALCIUM 7.7*   PROT 5.5*   ALBUMIN 1.4*   BILITOT 0.3   ALKPHOS 130   AST 11   ALT 16   ANIONGAP 8   EGFRNONAA >60         Significant Imaging: I have reviewed all pertinent imaging results/findings within the past 24 hours.      Assessment/Plan:      * Acute bacterial endocarditis  ECHO showed a 1.3 x 1.5 cm elongated, mobile echodensity seen on the tricuspid valve consistent with vegetation, CT surgery was consulted.   Due to MSSA    -- Infectious disease is consulted.  -- repeat blood cx NGTD.  -- Continue IV ABX.   --Sensitivities are back and Staph is sensitive to oxacillin, D/C vanc/cefepime.   --Cardiology consulted and plans for a SUSANNAH.   --CT surgery following and recommends continuing medical management with appropriate ABX, no surgical intervention at this time.    5/20/22: cont IV oxacillin.      MSSA bacteremia  Gram positive   Infectious disease consulted  Continue broad spectrum intravenous antibiotic(s) given h/o ivdu  Remains febrile and leukocytosis persists  5/8/22 The patient remained afebrile overnight. Currently on cefepime/vanc/flagyl. Blood cx are growing staph aureus. The patient refused the MRI lumbar spine overnight d/t being unable to lay flat from back pain. Will give pain meds and attempt to get the MRI today to r/o spinal abscess. The ECHO showed a 1.3 x 1.5 cm elongated, mobile echodensity seen on the tricuspid valve consistent with vegetation, CT surgery was consulted. Will need a SUSANNAH. Infectious disease is consulted. Will repeat blood cx today.   5/9/22 Repeat blood cx are still positive. Sensitivities are back and Staph is sensitive to oxacillin, will D/C  vanc/cefepime. Infectious disease is following   5/10/22 Repeat blood cx are +. Continue IV oxacillin and flagyl. Infectious disease is consulted. IR consulted to evaluate possible paraspinous muscle myositis versus abscess. Recommend IR drainage. Continue current management.    5/11/22 The patient reports pain is still not well controlled at times. The patient reports that she is very anxious about the upcoming SUSANNAH and IR drainage. SUSANNAH was pushed back to this afternoon because the patient ate candy this AM. Continue treatment with IV oxacillin and flagyl. Infectious disease is following. WBC trended down to 31K. Continue current management.  5/12/22 The patients WBC improved to 22K overnight. The patient remains on continuous oxacillin infusion. Infectious disease is following.  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 IR consulted to evaluate possible paraspinous muscle myositis versus abscess.    --Approximately 1 ml of purulent drainage aspirated 5/11 per IR, growing MSSA  --Continue current regimen      Normocytic anemia  Secondary to acute illness    --Daily CBC  --Transfuse with 1 unit PRBC for Hgb <7.0 or <8.0 if symptomatic   --Hgb: 6.7, give 1U PRBC    Lumbar stenosis without neurogenic claudication        Chronic pulmonary heart disease  - Pulmonology following       IVDU (intravenous drug user)   Will ordet TTE to r/o IE  Will order MRI lumbar wwo- completed    Cont broad spectrum IVAB   --MRI thoracic spine ordered per ID, patient refused on multiple attempts    5/7   Studies pending for additional sites of infection given h/o ivdu  5/8/22  on cessation     Severe sepsis  This patient does have evidence of infective focus  My overall impression is sepsis. Vital signs were reviewed and noted in progress note.  Antibiotics given-   Antibiotics (From admission, onward)            Start     Stop Route Frequency Ordered    05/09/22 1100  oxacillin 12 g in  mL CONTINUOUS INFUSION         -- IV Every 24 hours (non-standard times) 05/09/22 0932    05/07/22 2100  mupirocin 2 % ointment         05/12 2059 Nasl 2 times daily 05/07/22 1646        Cultures were taken-   Microbiology Results (last 7 days)     Procedure Component Value Units Date/Time    Culture, Respiratory with Gram Stain [[id number]] Collected: 05/16/22 0758    Order Status: Sent Specimen: Respiratory from Sputum Updated: 05/16/22 0759    Culture, Anaerobe [[id number]] Collected: 05/11/22 1440    Order Status: Completed Specimen: Abscess from Back Updated: 05/16/22 0733     Anaerobic Culture No anaerobes isolated    Blood culture [[id number]] Collected: 05/11/22 1213    Order Status: Completed Specimen: Blood Updated: 05/16/22 0612     Blood Culture, Routine No Growth to date      No Growth to date      No Growth to date      No Growth to date      No Growth to date    Blood culture [[id number]] Collected: 05/11/22 1213    Order Status: Completed Specimen: Blood Updated: 05/16/22 0612     Blood Culture, Routine No Growth to date      No Growth to date      No Growth to date      No Growth to date      No Growth to date    Aerobic culture [[id number]]  (Abnormal)  (Susceptibility) Collected: 05/11/22 1440    Order Status: Completed Specimen: Abscess from Back Updated: 05/14/22 1057     Aerobic Bacterial Culture STAPHYLOCOCCUS AUREUS  Many      Gram stain [[id number]] Collected: 05/11/22 1440    Order Status:  Completed Specimen: Abscess from Back Updated: 05/12/22 0306     Gram Stain Result Few WBC's      Few Gram positive cocci    Gram stain [[id number]]     Order Status: Canceled Specimen: Joint Fluid from Back     Blood culture [[id number]]  (Abnormal) Collected: 05/08/22 1516    Order Status: Completed Specimen: Blood from Peripheral, Right Hand Updated: 05/11/22 1009     Blood Culture, Routine Gram stain aer bottle: Gram positive cocci in clusters resembling Staph       Results called to and read back by: Chasity Raymundo RN  05/09/2022  11:31      STAPHYLOCOCCUS AUREUS  ID consult required at [facility].  For susceptibility see order #[id number]      Narrative:      Collection has been rescheduled by [email or web address] at 05/08/2022 13:22 Reason:   Pt in mri will be there after another hour [id number]  Collection has been rescheduled by [email or web address] at 05/08/2022 13:22 Reason:   Pt in mri will be there after another hour [id number]    Blood culture [[id number]]  (Abnormal) Collected: 05/08/22 1515    Order Status: Completed Specimen: Blood from Peripheral, Left Arm Updated: 05/11/22 1009     Blood Culture, Routine Gram stain aer bottle: Gram positive cocci in clusters resembling Staph      Results called to and read back by: Chasity Raymundo RN  05/09/2022  15:40      STAPHYLOCOCCUS AUREUS  ID consult required at [facility].  For susceptibility see order #[id number]      Narrative:      Collection has been rescheduled by [email or web address] at 05/08/2022 13:22 Reason:   Pt in mri will be there after another hour [id number]  Collection has been rescheduled by [email or web address] at 05/08/2022 13:22 Reason:   Pt in mri will be there after another hour [id number]        Latest lactate reviewed, they are-  No results for input(s): LACTATE in the last 72 hours.    Organ dysfunction indicated by Acute kidney injury  Source-  lung    Source control Achieved by-   Cont Broad spectrum IVAB     Bacteremia  Infectious disease  consulted      PNA (pneumonia)  --Cont oxacillin        Tobacco abuse  - Patient thoroughly counseled on smoking cessation, pt verbalized understanding. Time of counseling 10 minutes.        COPD (chronic obstructive pulmonary disease)  Cont breathing tx prn  Pulmonology following       VTE Risk Mitigation (From admission, onward)         Ordered     enoxaparin injection 40 mg  Daily         05/06/22 2336     IP VTE HIGH RISK PATIENT  Once         05/06/22 2336     Place sequential compression device  Until discontinued         05/06/22 2336                Discharge Planning   YESSICA:      Code Status: Full Code   Is the patient medically ready for discharge?:     Reason for patient still in hospital (select all that apply): Patient trending condition  Discharge Plan A: Home   Discharge Delays: None known at this time              Adriana Baltazar NP  Department of Hospital Medicine   [facility] - Med Surg

## 2022-05-20 NOTE — ASSESSMENT & PLAN NOTE
MRI of the thoracic region 0  1. No evidence for disc osteomyelitis in the thoracic spine.  No evidence for epidural abscess.  2. Loculated right hydropneumothorax and extensive bilateral pulmonary infiltrates.     Case discussed with critical care team- will consult pulmonology /CVT  On oxacillin

## 2022-05-20 NOTE — ASSESSMENT & PLAN NOTE
Gram positive   Infectious disease consulted  Continue broad spectrum intravenous antibiotic(s) given h/o ivdu  Remains febrile and leukocytosis persists  5/8/22 The patient remained afebrile overnight. Currently on cefepime/vanc/flagyl. Blood cx are growing staph aureus. The patient refused the MRI lumbar spine overnight d/t being unable to lay flat from back pain. Will give pain meds and attempt to get the MRI today to r/o spinal abscess. The ECHO showed a 1.3 x 1.5 cm elongated, mobile echodensity seen on the tricuspid valve consistent with vegetation, CT surgery was consulted. Will need a SUSANNAH. Infectious disease is consulted. Will repeat blood cx today.   5/9/22 Repeat blood cx are still positive. Sensitivities are back and Staph is sensitive to oxacillin, will D/C vanc/cefepime. Infectious disease is following   5/10/22 Repeat blood cx are +. Continue IV oxacillin and flagyl. Infectious disease is consulted. IR consulted to evaluate possible paraspinous muscle myositis versus abscess. Recommend IR drainage. Continue current management.    5/11/22 The patient reports pain is still not well controlled at times. The patient reports that she is very anxious about the upcoming SUSANNAH and IR drainage. SUSANNAH was pushed back to this afternoon because the patient ate candy this AM. Continue treatment with IV oxacillin and flagyl. Infectious disease is following. WBC trended down to 31K. Continue current management.  5/12/22 The patients WBC improved to 22K overnight. The patient remains on continuous oxacillin infusion. Infectious disease is following.  5/14: WBC continues to improve, 18.10 today     5/15: Blood cultures 5/11/22: NGTD     Continuous Oxacillin, EOC: 7/11/22, [facility], PICC line placed

## 2022-05-20 NOTE — SUBJECTIVE & OBJECTIVE
Past Medical History:   Diagnosis Date    ADHD (attention deficit hyperactivity disorder)     Anxiety     Asthma     Bipolar disorder     Cancer     cervical    COPD (chronic obstructive pulmonary disease)     GERD (gastroesophageal reflux disease)     Hepatitis C antibody positive in blood     RNA UNDETECTED 2016    Hyperlipidemia     Intravenous drug abuse     MDD (major depressive disorder)     Mood disorder     PTSD (post-traumatic stress disorder)     Suicidal ideation        Past Surgical History:   Procedure Laterality Date     SECTION  2001    x2    CHOLECYSTECTOMY      COMPUTED TOMOGRAPHY N/A 2022    Procedure: CT (COMPUTED TOMOGRAPHY)/facet joint aspiration;  Surgeon: Lloyd Suarez MD;  Location: [facility];  Service: General;  Laterality: N/A;    HYSTERECTOMY      LAPAROSCOPIC SALPINGECTOMY      ROBOT-ASSISTED LAPAROSCOPIC ABDOMINAL HYSTERECTOMY USING DA SEBASTIEN XI N/A 2019    Procedure: XI ROBOTIC HYSTERECTOMY;  Surgeon: Tabatha Quintanilla MD;  Location: [facility];  Service: OB/GYN;  Laterality: N/A;    ROBOT-ASSISTED LAPAROSCOPIC LYSIS OF ADHESIONS USING DA SEBASTIEN XI N/A 2019    Procedure: XI ROBOTIC LYSIS, ADHESIONS;  Surgeon: Tabatha Quintanilla MD;  Location: [facility];  Service: OB/GYN;  Laterality: N/A;    ROBOT-ASSISTED SURGICAL REMOVAL OF FALLOPIAN TUBE USING DA SEBASTIEN XI Right 2019    Procedure: XI ROBOTIC SALPINGECTOMY;  Surgeon: Tabatha Quintanilla MD;  Location: [facility];  Service: OB/GYN;  Laterality: Right;    TONSILLECTOMY      TUBAL LIGATION         Review of patient's allergies indicates:   Allergen Reactions    Sulfamethoxazole-trimethoprim        Family History       Problem Relation (Age of Onset)    COPD Mother          Tobacco Use    Smoking status: Current Every Day Smoker     Packs/day: 1.50     Types: Cigarettes    Smokeless tobacco: Never Used    Tobacco comment: no smoking after midnight prior to surgery   Substance and Sexual Activity     Alcohol use: Yes     Comment: Occassional     Drug use: Not Currently     Types: IV, Methamphetamines, Heroin     Comment: denies heroin use.  Last used meth 10/2018  No illegal drugs prior to sx    Sexual activity: Not Currently     Partners: Male     Birth control/protection: None         Review of Systems   Constitutional:  Positive for diaphoresis, fatigue and fever.   HENT:  Positive for dental problem and sinus pressure.    Respiratory:  Positive for cough, chest tightness and shortness of breath.    Cardiovascular: Negative.    Gastrointestinal: Negative.    Genitourinary: Negative.    Musculoskeletal:  Positive for arthralgias.   Neurological:  Positive for weakness.   Psychiatric/Behavioral:  The patient is nervous/anxious.    Objective:     Vital Signs (Most Recent):  Temp: 97.1 °F (36.2 °C) (05/20/22 0722)  Pulse: 78 (05/20/22 0722)  Resp: 14 (05/20/22 0832)  BP: 118/61 (05/20/22 0722)  SpO2: 99 % (05/20/22 0722) Vital Signs (24h Range):  Temp:  [97.1 °F (36.2 °C)-100.5 °F (38.1 °C)] 97.1 °F (36.2 °C)  Pulse:  [] 78  Resp:  [14-20] 14  SpO2:  [94 %-99 %] 99 %  BP: (108-143)/(55-76) 118/61     Weight: 95.1 kg (209 lb 10.5 oz)  Body mass index is 34.89 kg/m².      Intake/Output Summary (Last 24 hours) at 5/20/2022 0943  Last data filed at 5/20/2022 0400  Gross per 24 hour   Intake 628.33 ml   Output 3100 ml   Net -2471.67 ml       Physical Exam  Vitals and nursing note reviewed.   Constitutional:       Appearance: She is well-developed. She is obese. She is ill-appearing.   HENT:      Head: Normocephalic and atraumatic.      Nose: Nose normal.   Eyes:      Conjunctiva/sclera: Conjunctivae normal.      Pupils: Pupils are equal, round, and reactive to light.   Neck:      Thyroid: No thyromegaly.      Vascular: No JVD.      Trachea: No tracheal deviation.   Cardiovascular:      Rate and Rhythm: Regular rhythm. Tachycardia present.      Heart sounds: Murmur heard.   Pulmonary:      Effort: Pulmonary  effort is normal. No respiratory distress.      Breath sounds: Stridor present. Examination of the right-lower field reveals wheezing. Examination of the left-lower field reveals wheezing. Decreased breath sounds, wheezing and rales present. No rhonchi.   Chest:      Chest wall: No tenderness.   Abdominal:      General: Bowel sounds are normal.      Palpations: Abdomen is soft.   Musculoskeletal:         General: Normal range of motion.      Cervical back: Neck supple.      Right lower leg: Edema present.      Left lower leg: Edema present.   Lymphadenopathy:      Cervical: No cervical adenopathy.   Skin:     General: Skin is warm and dry.   Neurological:      Mental Status: She is alert and oriented to person, place, and time.       Vents:  Oxygen Concentration (%): 32 (05/19/22 0923)    Lines/Drains/Airways       Peripherally Inserted Central Catheter Line  Duration             PICC Double Lumen 05/17/22 1612 right basilic 2 days                    Significant Labs:    CBC/Anemia Profile:  Recent Labs   Lab 05/19/22  0527 05/20/22  0503   WBC 9.92 8.98   HGB 6.7* 7.3*   HCT 21.3* 23.2*   * 484*   MCV 88 89   RDW 17.1* 16.2*        Chemistries:  No results for input(s): NA, K, CL, CO2, BUN, CREATININE, CALCIUM, ALBUMIN, PROT, BILITOT, ALKPHOS, ALT, AST, GLUCOSE, MG, PHOS in the last 48 hours.    ABGs: No results for input(s): PH, PCO2, HCO3, POCSATURATED, BE in the last 48 hours.  BMP: No results for input(s): GLU, NA, K, CL, CO2, BUN, CREATININE, CALCIUM, MG in the last 48 hours.  CMP: No results for input(s): NA, K, CL, CO2, GLU, BUN, CREATININE, CALCIUM, PROT, ALBUMIN, BILITOT, ALKPHOS, AST, ALT, ANIONGAP, EGFRNONAA in the last 48 hours.    Invalid input(s): ESTGFAFRICA    Significant Imaging:   I have reviewed all pertinent imaging results/findings within the past 24 hours.    MRI Thoracic Spine W WO Cont  Narrative: EXAMINATION:  MRI THORACIC SPINE W WO CONTRAST    CLINICAL HISTORY:  IV drug abuser.   Evaluate for epidural abscess.    TECHNIQUE:  Multiplanar multisequence imaging of the thoracic spine is performed with and without the intravenous administration of 9 cc of Gadavist.    COMPARISON:  Thoracic spine CT, 05/06/2022    FINDINGS:  The exam is limited due to patient motion artifact.  The STIR sequence is nondiagnostic    Loculated right hydropneumothorax.  Extensive pulmonary infiltrates bilaterally.    No fracture of the thoracic spine.  No paraspinal mass or fluid collection.  No abnormal epidural enhancement.  No endplate destruction or evidence for discitis.  No central canal stenosis or neural foraminal stenosis at any level.  Impression: 1. No evidence for disc osteomyelitis in the thoracic spine.  No evidence for epidural abscess.  2. Loculated right hydropneumothorax and extensive bilateral pulmonary infiltrates.    Electronically signed by: Dionisio Lawson MD  Date:    05/19/2022  Time:    15:36

## 2022-05-20 NOTE — PROGRESS NOTES
[facility] - Med Surg  Infectious Disease  Progress Note    Patient Name: Tianna Leon  MRN: [id number]  Admission Date: 5/6/2022  Length of Stay: 14 days  Attending Physician: Elizabeth Kim MD  Primary Care Provider: Spenser Campa MD    Isolation Status: No active isolations  Assessment/Plan:      Hydropneumothorax  MRI of the thoracic region 0  1. No evidence for disc osteomyelitis in the thoracic spine.  No evidence for epidural abscess.  2. Loculated right hydropneumothorax and extensive bilateral pulmonary infiltrates.     Case discussed with critical care team- will consult pulmonology /CVT  On oxacillin    Pulmonary embolism, septic  Related to tricuspid endocarditis - continue Oxacillin     Subacute bacterial endocarditis  Follow drug susceptibility of staph Aureus - continue Vancomycin, Flagyl.cefepime for now      05/11- isolate is MSSA - will continue Oxacillin, follow SUSANNAH, repeat blood cultures     05/16- will treat for 6 weeks with oxacillin   EOC 07/11/22      IVDU (intravenous drug user)  This is the crux of the problem - needs drug cessation         Anticipated Disposition:     Thank you for your consult. I will follow-up with patient. Please contact us if you have any additional questions.    Sunday Hassan MD  Infectious Disease  [facility] - Med Surg    Subjective:     Principal Problem:Bacteremia    HPI:   38 y/o. female  with a PMHx of asthma, COPD,Bipolar  , IVDU and HLD ,active IVDU  ( heroine , cocaine  and amphetamine )   : CTA chest negative for pe . Multiple nodular and cavitary opacities concerning for septic emboli with larger opacities possibly necrotizing pneumonia. CT cervical  and thoracic did not show any acute finding , CT lumbar Possible progression of degenerative changes most notable at L4-5 with moderate to severe spinal canal stenosis at this level.  Echo-  · to moderate tricuspid regurgitation.     There is a 1.3 x 1.5 cm elongated, mobile echodensity seen on the tricuspid valve  consistent with vegetation. Recommend CT surgery consult.   Blood culture- staph auerus  Component      Latest Ref Rng & Units 5/8/2022 5/7/2022 5/6/2022   WBC      3.90 - 12.70 K/uL 30.90 (H) 27.52 (H) 29.13 (H)     Interval History:  37 year old woman with tricuspid endocarditis .  Blood cultures- MSSA.  She remains bacteremic.  No MR evidence of epidural abscess.     Large, rim enhancing facet synovial cysts arising posteriorly from the right L2-L3 and left L4-L5 facet joints may be degenerative or sequelae of facet septic arthritis.     Minor enhancement and endplate irregularity at L4-L5 may be degenerative versus less likely the sequelae of spondylodiscitis.  No significant associated endplate destruction or vertebral body height loss.     Multilevel degenerative changes of the lumbar spine are most pronounced at L4-L5 with broad-based disc bulge and prominent central disc protrusion contributing to moderate to severe spinal canal stenosis with moderate left-sided neural foraminal stenosis.     Asymmetric left L5-S1 disc bulge contributes to moderate to severe left-sided neural foraminal stenosis and left lateral recess stenosis.   CT chest -Lungs/Pleura: Multiple nodular and cavitary opacities concerning for septic emboli.  Additional larger opacities most notably in the right lung base with some internal clearing possibly related to necrotizing pneumonia.  Moderate loculated right pleural fluid and no definite left pleural fluid.  Empyema not excluded.   05/12- she declined thoracic MRI    She had CT guided aspirtaion -pus aspirated  05/16 - she is drowsy but refusing more imaging .   05/19-  MRI of the thoracic region    1. No evidence for disc osteomyelitis in the thoracic spine.  No evidence for epidural abscess.  2. Loculated right hydropneumothorax and extensive bilateral pulmonary infiltrates.   T max 100.5  Review of Systems   Constitutional:  Positive for fatigue and fever. Negative for activity  change, appetite change, chills, diaphoresis and unexpected weight change.   HENT: Negative.  Negative for congestion, drooling, facial swelling, rhinorrhea, sinus pressure, sneezing and sore throat.    Eyes: Negative.  Negative for discharge, redness, itching and visual disturbance.   Respiratory:  Positive for cough, chest tightness and shortness of breath. Negative for apnea, choking, wheezing and stridor.    Cardiovascular:  Negative for chest pain, palpitations and leg swelling.   Gastrointestinal: Negative.  Negative for abdominal distention, abdominal pain, anal bleeding, blood in stool, constipation, diarrhea, nausea and vomiting.   Endocrine: Negative.    Genitourinary: Negative.  Negative for decreased urine volume, difficulty urinating, dysuria, frequency, hematuria, pelvic pain, urgency, vaginal bleeding and vaginal discharge.   Musculoskeletal:  Positive for arthralgias and back pain. Negative for gait problem, joint swelling, myalgias, neck pain and neck stiffness.   Skin: Negative.  Negative for color change, pallor, rash and wound.   Allergic/Immunologic: Negative.    Neurological:  Positive for weakness. Negative for dizziness, seizures, facial asymmetry, speech difficulty, light-headedness, numbness and headaches.   Psychiatric/Behavioral:  Positive for confusion. Negative for agitation, hallucinations and suicidal ideas.    All other systems reviewed and are negative.  Objective:     Vital Signs (Most Recent):  Temp: 97.8 °F (36.6 °C) (05/20/22 0408)  Pulse: 79 (05/20/22 0408)  Resp: 18 (05/20/22 0408)  BP: 115/60 (05/20/22 0408)  SpO2: 96 % (05/20/22 0408)   Vital Signs (24h Range):  Temp:  [97.7 °F (36.5 °C)-100.5 °F (38.1 °C)] 97.8 °F (36.6 °C)  Pulse:  [] 79  Resp:  [14-20] 18  SpO2:  [94 %-100 %] 96 %  BP: (101-143)/(55-76) 115/60     Weight: 95.1 kg (209 lb 10.5 oz)  Body mass index is 34.89 kg/m².    Estimated Creatinine Clearance: 143.5 mL/min (based on SCr of 0.6 mg/dL).    Physical  Exam  Vitals and nursing note reviewed. Chaperone present: -.   Constitutional:       General: She is not in acute distress.     Appearance: She is well-developed. She is ill-appearing. She is not toxic-appearing.   HENT:      Head: Normocephalic and atraumatic.   Eyes:      Conjunctiva/sclera: Conjunctivae normal.      Pupils: Pupils are equal, round, and reactive to light.   Cardiovascular:      Rate and Rhythm: Normal rate and regular rhythm.      Heart sounds: Normal heart sounds. No murmur heard.  Pulmonary:      Effort: Pulmonary effort is normal. No respiratory distress.      Breath sounds: Normal breath sounds.   Abdominal:      General: Bowel sounds are normal. There is no distension.      Palpations: Abdomen is soft.      Tenderness: There is no abdominal tenderness.   Musculoskeletal:         General: Swelling and tenderness present. No deformity. Normal range of motion.      Cervical back: Normal range of motion and neck supple.      Right lower leg: No edema.      Left lower leg: No edema.   Skin:     General: Skin is warm and dry.      Coloration: Skin is pale.      Findings: Erythema present.   Neurological:      Mental Status: She is alert and oriented to person, place, and time.      Motor: Weakness present.      Deep Tendon Reflexes: Reflexes are normal and symmetric.   Psychiatric:         Behavior: Behavior normal.       Significant Labs: Blood Culture:   Recent Labs   Lab 05/06/22  1616 05/06/22  1617 05/08/22  1515 05/08/22  1516 05/11/22  1213   LABBLOO Gram stain aer bottle: Gram positive cocci in clusters resembling Staph   Gram stain missael bottle: Gram positive cocci in clusters resembling Staph   Results called to and read back by: Lila Hernandez RN 05/07/2022  10:57  STAPHYLOCOCCUS AUREUS  ID consult required at [facility],[place] and [facility] locations.  * Gram stain aer bottle: Gram positive cocci in clusters resembling Staph   Results called to and read back by: Lila Hernandez RN  05/07/2022  10:57  Gram stain missael bottle: Gram positive cocci in clusters resembling Staph   05/07/2022  13:40  STAPHYLOCOCCUS AUREUS  ID consult required at [facility].  For susceptibility see order #[id number]  * Gram stain aer bottle: Gram positive cocci in clusters resembling Staph  Results called to and read back by: Chasity Raymundo RN  05/09/2022  15:40  STAPHYLOCOCCUS AUREUS  ID consult required at [facility].  For susceptibility see order #[id number]  * Gram stain aer bottle: Gram positive cocci in clusters resembling Staph   Results called to and read back by: Chasity Raymundo RN  05/09/2022  11:31  STAPHYLOCOCCUS AUREUS  ID consult required at [facility].  For susceptibility see order #[id number]  * No growth after 5 days.  No growth after 5 days.       BMP:   No results for input(s): GLU, NA, K, CL, CO2, BUN, CREATININE, CALCIUM, MG in the last 48 hours.    CMP:   No results for input(s): NA, K, CL, CO2, GLU, BUN, CREATININE, CALCIUM, PROT, ALBUMIN, BILITOT, ALKPHOS, AST, ALT, ANIONGAP, EGFRNONAA in the last 48 hours.    Invalid input(s): ESTGFAFRICA      Significant Imaging: CT: I have reviewed all pertinent results/findings within the past 24 hours:

## 2022-05-20 NOTE — SUBJECTIVE & OBJECTIVE
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  Blood cultures- MSSA.  She remains bacteremic.  No MR evidence of epidural abscess.     Large, rim enhancing facet synovial cysts arising posteriorly from the right L2-L3 and left L4-L5 facet joints may be degenerative or sequelae of facet septic arthritis.     Minor enhancement and endplate irregularity at L4-L5 may be degenerative versus less likely the sequelae of spondylodiscitis.  No significant associated endplate destruction or vertebral body height loss.     Multilevel degenerative changes of the lumbar spine are most pronounced at L4-L5 with broad-based disc bulge and prominent central disc protrusion contributing to moderate to severe spinal canal stenosis with moderate left-sided neural foraminal stenosis.     Asymmetric left L5-S1 disc bulge contributes to moderate to severe left-sided neural foraminal stenosis and left lateral recess stenosis.   CT chest -Lungs/Pleura: Multiple nodular and cavitary opacities concerning for septic emboli.  Additional larger opacities most notably in the right lung base with some internal clearing possibly related to necrotizing pneumonia.  Moderate loculated right pleural fluid and no definite left pleural fluid.  Empyema not excluded.   05/12- she declined thoracic MRI    She had CT guided aspirtaion -pus aspirated  05/16 - she is drowsy but refusing more imaging .   05/19-  MRI of the thoracic region    1. No evidence for disc osteomyelitis in the thoracic spine.  No evidence for epidural abscess.  2. Loculated right hydropneumothorax and extensive bilateral pulmonary infiltrates.   T max 100.5  Review of Systems   Constitutional:  Positive for fatigue and fever. Negative for activity change, appetite change, chills, diaphoresis and unexpected weight change.   HENT: Negative.  Negative for congestion, drooling, facial swelling, rhinorrhea, sinus pressure, sneezing and sore throat.    Eyes: Negative.   Negative for discharge, redness, itching and visual disturbance.   Respiratory:  Positive for cough, chest tightness and shortness of breath. Negative for apnea, choking, wheezing and stridor.    Cardiovascular:  Negative for chest pain, palpitations and leg swelling.   Gastrointestinal: Negative.  Negative for abdominal distention, abdominal pain, anal bleeding, blood in stool, constipation, diarrhea, nausea and vomiting.   Endocrine: Negative.    Genitourinary: Negative.  Negative for decreased urine volume, difficulty urinating, dysuria, frequency, hematuria, pelvic pain, urgency, vaginal bleeding and vaginal discharge.   Musculoskeletal:  Positive for arthralgias and back pain. Negative for gait problem, joint swelling, myalgias, neck pain and neck stiffness.   Skin: Negative.  Negative for color change, pallor, rash and wound.   Allergic/Immunologic: Negative.    Neurological:  Positive for weakness. Negative for dizziness, seizures, facial asymmetry, speech difficulty, light-headedness, numbness and headaches.   Psychiatric/Behavioral:  Positive for confusion. Negative for agitation, hallucinations and suicidal ideas.    All other systems reviewed and are negative.  Objective:     Vital Signs (Most Recent):  Temp: 97.8 °F (36.6 °C) (05/20/22 0408)  Pulse: 79 (05/20/22 0408)  Resp: 18 (05/20/22 0408)  BP: 115/60 (05/20/22 0408)  SpO2: 96 % (05/20/22 0408)   Vital Signs (24h Range):  Temp:  [97.7 °F (36.5 °C)-100.5 °F (38.1 °C)] 97.8 °F (36.6 °C)  Pulse:  [] 79  Resp:  [14-20] 18  SpO2:  [94 %-100 %] 96 %  BP: (101-143)/(55-76) 115/60     Weight: 95.1 kg (209 lb 10.5 oz)  Body mass index is 34.89 kg/m².    Estimated Creatinine Clearance: 143.5 mL/min (based on SCr of 0.6 mg/dL).    Physical Exam  Vitals and nursing note reviewed. Chaperone present: -.   Constitutional:       General: She is not in acute distress.     Appearance: She is well-developed. She is ill-appearing. She is not toxic-appearing.    HENT:      Head: Normocephalic and atraumatic.   Eyes:      Conjunctiva/sclera: Conjunctivae normal.      Pupils: Pupils are equal, round, and reactive to light.   Cardiovascular:      Rate and Rhythm: Normal rate and regular rhythm.      Heart sounds: Normal heart sounds. No murmur heard.  Pulmonary:      Effort: Pulmonary effort is normal. No respiratory distress.      Breath sounds: Normal breath sounds.   Abdominal:      General: Bowel sounds are normal. There is no distension.      Palpations: Abdomen is soft.      Tenderness: There is no abdominal tenderness.   Musculoskeletal:         General: Swelling and tenderness present. No deformity. Normal range of motion.      Cervical back: Normal range of motion and neck supple.      Right lower leg: No edema.      Left lower leg: No edema.   Skin:     General: Skin is warm and dry.      Coloration: Skin is pale.      Findings: Erythema present.   Neurological:      Mental Status: She is alert and oriented to person, place, and time.      Motor: Weakness present.      Deep Tendon Reflexes: Reflexes are normal and symmetric.   Psychiatric:         Behavior: Behavior normal.       Significant Labs: Blood Culture:   Recent Labs   Lab 05/06/22  1616 05/06/22  1617 05/08/22  1515 05/08/22  1516 05/11/22  1213   LABBLOO Gram stain aer bottle: Gram positive cocci in clusters resembling Staph   Gram stain missael bottle: Gram positive cocci in clusters resembling Staph   Results called to and read back by: Lila Hernandez RN 05/07/2022  10:57  STAPHYLOCOCCUS AUREUS  ID consult required at [facility] and [facility].  * Gram stain aer bottle: Gram positive cocci in clusters resembling Staph   Results called to and read back by: Lila Hernandez RN 05/07/2022  10:57  Gram stain missael bottle: Gram positive cocci in clusters resembling Staph   05/07/2022  13:40  STAPHYLOCOCCUS AUREUS  ID consult required at [facility] and [facility].  For  susceptibility see order #[id number]  * Gram stain aer bottle: Gram positive cocci in clusters resembling Staph  Results called to and read back by: Chasity Raymundo RN  05/09/2022  15:40  STAPHYLOCOCCUS AUREUS  ID consult required at [facility].  For susceptibility see order #[id number]  * Gram stain aer bottle: Gram positive cocci in clusters resembling Staph   Results called to and read back by: Chasity Raymundo RN  05/09/2022  11:31  STAPHYLOCOCCUS AUREUS  ID consult required at [facility].  For susceptibility see order #[id number]  * No growth after 5 days.  No growth after 5 days.       BMP:   No results for input(s): GLU, NA, K, CL, CO2, BUN, CREATININE, CALCIUM, MG in the last 48 hours.    CMP:   No results for input(s): NA, K, CL, CO2, GLU, BUN, CREATININE, CALCIUM, PROT, ALBUMIN, BILITOT, ALKPHOS, AST, ALT, ANIONGAP, EGFRNONAA in the last 48 hours.    Invalid input(s): ESTGFAFRICA      Significant Imaging: CT: I have reviewed all pertinent results/findings within the past 24 hours:

## 2022-05-20 NOTE — ASSESSMENT & PLAN NOTE
ECHO showed a 1.3 x 1.5 cm elongated, mobile echodensity seen on the tricuspid valve consistent with vegetation, CT surgery was consulted.   Due to MSSA    -- Infectious disease is consulted.  -- repeat blood cx NGTD.  -- Continue IV ABX.   --Sensitivities are back and Staph is sensitive to oxacillin, D/C vanc/cefepime.   --Cardiology consulted and plans for a SUSANNAH.   --CT surgery following and recommends continuing medical management with appropriate ABX, no surgical intervention at this time.    5/20/22: cont IV oxacillin.

## 2022-05-20 NOTE — PLAN OF CARE
Patient remains free from falls and injuries this shift. Safety precautions maintained. Pain managed with scheduled Morphine. No signs or symptoms of acute distress noted. Will continue to monitor patient Chart check completed.

## 2022-05-20 NOTE — CONSULTS
Chart reviewed by Dr. Bass.       ASSESSMENT/PLAN:    Empyema    The order for a CT chest tube has been placed and the procedure will be performed asap.          Thank you for the consult.

## 2022-05-21 ENCOUNTER — ANESTHESIA (OUTPATIENT)
Dept: ANESTHESIOLOGY | Facility: HOSPITAL | Age: 37
DRG: 853 | End: 2022-05-21
Payer: MEDICARE

## 2022-05-21 LAB
ALBUMIN SERPL BCP-MCNC: 1.3 G/DL (ref 3.5–5.2)
ALP SERPL-CCNC: 111 U/L (ref 55–135)
ALT SERPL W/O P-5'-P-CCNC: 15 U/L (ref 10–44)
ANION GAP SERPL CALC-SCNC: 9 MMOL/L (ref 8–16)
APPEARANCE FLD: NORMAL
AST SERPL-CCNC: 11 U/L (ref 10–40)
BASOPHILS # BLD AUTO: 0.06 K/UL (ref 0–0.2)
BASOPHILS NFR BLD: 0.6 % (ref 0–1.9)
BILIRUB SERPL-MCNC: 0.3 MG/DL (ref 0.1–1)
BODY FLD TYPE: NORMAL
BUN SERPL-MCNC: 9 MG/DL (ref 6–20)
CALCIUM SERPL-MCNC: 7.7 MG/DL (ref 8.7–10.5)
CHLORIDE SERPL-SCNC: 99 MMOL/L (ref 95–110)
CO2 SERPL-SCNC: 30 MMOL/L (ref 23–29)
COLOR FLD: NORMAL
CREAT SERPL-MCNC: 0.6 MG/DL (ref 0.5–1.4)
DIFFERENTIAL METHOD: ABNORMAL
EOSINOPHIL # BLD AUTO: 0.3 K/UL (ref 0–0.5)
EOSINOPHIL NFR BLD: 3.3 % (ref 0–8)
ERYTHROCYTE [DISTWIDTH] IN BLOOD BY AUTOMATED COUNT: 16 % (ref 11.5–14.5)
EST. GFR  (AFRICAN AMERICAN): >60 ML/MIN/1.73 M^2
EST. GFR  (NON AFRICAN AMERICAN): >60 ML/MIN/1.73 M^2
GLUCOSE SERPL-MCNC: 86 MG/DL (ref 70–110)
GRAM STN SPEC: NORMAL
GRAM STN SPEC: NORMAL
HCT VFR BLD AUTO: 23 % (ref 37–48.5)
HGB BLD-MCNC: 7.1 G/DL (ref 12–16)
IMM GRANULOCYTES # BLD AUTO: 0.09 K/UL (ref 0–0.04)
IMM GRANULOCYTES NFR BLD AUTO: 0.9 % (ref 0–0.5)
LYMPHOCYTES # BLD AUTO: 2.4 K/UL (ref 1–4.8)
LYMPHOCYTES NFR BLD: 24.9 % (ref 18–48)
LYMPHOCYTES NFR FLD MANUAL: 7 %
MAGNESIUM SERPL-MCNC: 1.7 MG/DL (ref 1.6–2.6)
MCH RBC QN AUTO: 27.6 PG (ref 27–31)
MCHC RBC AUTO-ENTMCNC: 30.9 G/DL (ref 32–36)
MCV RBC AUTO: 90 FL (ref 82–98)
MONOCYTES # BLD AUTO: 1.1 K/UL (ref 0.3–1)
MONOCYTES NFR BLD: 10.9 % (ref 4–15)
MONOS+MACROS NFR FLD MANUAL: 5 %
NEUTROPHILS # BLD AUTO: 5.8 K/UL (ref 1.8–7.7)
NEUTROPHILS NFR BLD: 59.4 % (ref 38–73)
NEUTROPHILS NFR FLD MANUAL: 88 %
NRBC BLD-RTO: 0 /100 WBC
PHOSPHATE SERPL-MCNC: 5.4 MG/DL (ref 2.7–4.5)
PLATELET # BLD AUTO: 504 K/UL (ref 150–450)
PMV BLD AUTO: 9 FL (ref 9.2–12.9)
POTASSIUM SERPL-SCNC: 4.6 MMOL/L (ref 3.5–5.1)
PROT SERPL-MCNC: 5.3 G/DL (ref 6–8.4)
RBC # BLD AUTO: 2.57 M/UL (ref 4–5.4)
SODIUM SERPL-SCNC: 138 MMOL/L (ref 136–145)
WBC # BLD AUTO: 9.7 K/UL (ref 3.9–12.7)
WBC # FLD: NORMAL /CU MM

## 2022-05-21 PROCEDURE — 84100 ASSAY OF PHOSPHORUS: CPT | Performed by: NURSE PRACTITIONER

## 2022-05-21 PROCEDURE — 25000003 PHARM REV CODE 250: Performed by: NURSE PRACTITIONER

## 2022-05-21 PROCEDURE — 63600175 PHARM REV CODE 636 W HCPCS

## 2022-05-21 PROCEDURE — 85025 COMPLETE CBC W/AUTO DIFF WBC: CPT | Performed by: INTERNAL MEDICINE

## 2022-05-21 PROCEDURE — 63600175 PHARM REV CODE 636 W HCPCS: Performed by: NURSE PRACTITIONER

## 2022-05-21 PROCEDURE — 94664 DEMO&/EVAL PT USE INHALER: CPT

## 2022-05-21 PROCEDURE — 83735 ASSAY OF MAGNESIUM: CPT | Performed by: NURSE PRACTITIONER

## 2022-05-21 PROCEDURE — 99232 SBSQ HOSP IP/OBS MODERATE 35: CPT | Mod: ,,, | Performed by: INTERNAL MEDICINE

## 2022-05-21 PROCEDURE — 94799 UNLISTED PULMONARY SVC/PX: CPT

## 2022-05-21 PROCEDURE — 94640 AIRWAY INHALATION TREATMENT: CPT

## 2022-05-21 PROCEDURE — 80053 COMPREHEN METABOLIC PANEL: CPT | Performed by: NURSE PRACTITIONER

## 2022-05-21 PROCEDURE — 87075 CULTR BACTERIA EXCEPT BLOOD: CPT | Performed by: FAMILY MEDICINE

## 2022-05-21 PROCEDURE — 25000003 PHARM REV CODE 250: Performed by: NURSE ANESTHETIST, CERTIFIED REGISTERED

## 2022-05-21 PROCEDURE — 94761 N-INVAS EAR/PLS OXIMETRY MLT: CPT

## 2022-05-21 PROCEDURE — 37000008 HC ANESTHESIA 1ST 15 MINUTES: Performed by: RADIOLOGY

## 2022-05-21 PROCEDURE — 63600175 PHARM REV CODE 636 W HCPCS: Performed by: INTERNAL MEDICINE

## 2022-05-21 PROCEDURE — 25000242 PHARM REV CODE 250 ALT 637 W/ HCPCS: Performed by: INTERNAL MEDICINE

## 2022-05-21 PROCEDURE — 87070 CULTURE OTHR SPECIMN AEROBIC: CPT | Performed by: FAMILY MEDICINE

## 2022-05-21 PROCEDURE — 89051 BODY FLUID CELL COUNT: CPT | Performed by: FAMILY MEDICINE

## 2022-05-21 PROCEDURE — 63600175 PHARM REV CODE 636 W HCPCS: Performed by: NURSE ANESTHETIST, CERTIFIED REGISTERED

## 2022-05-21 PROCEDURE — 99232 PR SUBSEQUENT HOSPITAL CARE,LEVL II: ICD-10-PCS | Mod: ,,, | Performed by: INTERNAL MEDICINE

## 2022-05-21 PROCEDURE — 27000221 HC OXYGEN, UP TO 24 HOURS

## 2022-05-21 PROCEDURE — 37000009 HC ANESTHESIA EA ADD 15 MINS: Performed by: RADIOLOGY

## 2022-05-21 PROCEDURE — 21400001 HC TELEMETRY ROOM

## 2022-05-21 PROCEDURE — 87205 SMEAR GRAM STAIN: CPT | Performed by: FAMILY MEDICINE

## 2022-05-21 PROCEDURE — 99900035 HC TECH TIME PER 15 MIN (STAT)

## 2022-05-21 RX ORDER — LIDOCAINE HYDROCHLORIDE 10 MG/ML
INJECTION, SOLUTION EPIDURAL; INFILTRATION; INTRACAUDAL; PERINEURAL
Status: DISCONTINUED | OUTPATIENT
Start: 2022-05-21 | End: 2022-05-21

## 2022-05-21 RX ORDER — SODIUM CHLORIDE, SODIUM LACTATE, POTASSIUM CHLORIDE, CALCIUM CHLORIDE 600; 310; 30; 20 MG/100ML; MG/100ML; MG/100ML; MG/100ML
INJECTION, SOLUTION INTRAVENOUS CONTINUOUS PRN
Status: DISCONTINUED | OUTPATIENT
Start: 2022-05-21 | End: 2022-05-21

## 2022-05-21 RX ORDER — FAMOTIDINE 20 MG/1
20 TABLET, FILM COATED ORAL 2 TIMES DAILY
Status: DISCONTINUED | OUTPATIENT
Start: 2022-05-21 | End: 2022-06-01

## 2022-05-21 RX ORDER — PROPOFOL 10 MG/ML
VIAL (ML) INTRAVENOUS
Status: DISCONTINUED | OUTPATIENT
Start: 2022-05-21 | End: 2022-05-21

## 2022-05-21 RX ADMIN — KETOROLAC TROMETHAMINE 10 MG: 10 TABLET, FILM COATED ORAL at 12:05

## 2022-05-21 RX ADMIN — PROPOFOL 40 MG: 10 INJECTION, EMULSION INTRAVENOUS at 09:05

## 2022-05-21 RX ADMIN — OXYCODONE HYDROCHLORIDE 5 MG: 5 TABLET ORAL at 04:05

## 2022-05-21 RX ADMIN — KETOROLAC TROMETHAMINE 10 MG: 10 TABLET, FILM COATED ORAL at 06:05

## 2022-05-21 RX ADMIN — PROPOFOL 50 MG: 10 INJECTION, EMULSION INTRAVENOUS at 09:05

## 2022-05-21 RX ADMIN — KETOROLAC TROMETHAMINE 10 MG: 10 TABLET, FILM COATED ORAL at 11:05

## 2022-05-21 RX ADMIN — ENOXAPARIN SODIUM 40 MG: 40 INJECTION SUBCUTANEOUS at 04:05

## 2022-05-21 RX ADMIN — PROPOFOL 30 MG: 10 INJECTION, EMULSION INTRAVENOUS at 09:05

## 2022-05-21 RX ADMIN — TRAZODONE HYDROCHLORIDE 100 MG: 100 TABLET ORAL at 09:05

## 2022-05-21 RX ADMIN — MORPHINE SULFATE 15 MG: 15 TABLET, EXTENDED RELEASE ORAL at 08:05

## 2022-05-21 RX ADMIN — FAMOTIDINE 20 MG: 20 TABLET ORAL at 09:05

## 2022-05-21 RX ADMIN — OXACILLIN SODIUM 12 G: 10 INJECTION, POWDER, FOR SOLUTION INTRAVENOUS at 04:05

## 2022-05-21 RX ADMIN — LIDOCAINE HYDROCHLORIDE 50 MG: 10 INJECTION, SOLUTION EPIDURAL; INFILTRATION; INTRACAUDAL; PERINEURAL at 09:05

## 2022-05-21 RX ADMIN — FAMOTIDINE 20 MG: 20 TABLET ORAL at 11:05

## 2022-05-21 RX ADMIN — IPRATROPIUM BROMIDE AND ALBUTEROL SULFATE 3 ML: 2.5; .5 SOLUTION RESPIRATORY (INHALATION) at 08:05

## 2022-05-21 RX ADMIN — IPRATROPIUM BROMIDE AND ALBUTEROL SULFATE 3 ML: 2.5; .5 SOLUTION RESPIRATORY (INHALATION) at 02:05

## 2022-05-21 RX ADMIN — SODIUM CHLORIDE, SODIUM LACTATE, POTASSIUM CHLORIDE, CALCIUM CHLORIDE: 600; 310; 30; 20 INJECTION, SOLUTION INTRAVENOUS at 09:05

## 2022-05-21 RX ADMIN — MORPHINE SULFATE 15 MG: 15 TABLET, EXTENDED RELEASE ORAL at 09:05

## 2022-05-21 RX ADMIN — ALPRAZOLAM 1 MG: 1 TABLET ORAL at 09:05

## 2022-05-21 RX ADMIN — OXYCODONE HYDROCHLORIDE 5 MG: 5 TABLET ORAL at 11:05

## 2022-05-21 RX ADMIN — IPRATROPIUM BROMIDE AND ALBUTEROL SULFATE 3 ML: 2.5; .5 SOLUTION RESPIRATORY (INHALATION) at 07:05

## 2022-05-21 RX ADMIN — PROPOFOL 70 MG: 10 INJECTION, EMULSION INTRAVENOUS at 09:05

## 2022-05-21 NOTE — SEDATION DOCUMENTATION
Pt in CT on table lying on left side with R side up.  CRNADionisio, at bedside for pain management and CM.  Pt verbalized undertanding of procedure.

## 2022-05-21 NOTE — ASSESSMENT & PLAN NOTE
Gram positive   Infectious disease consulted  Continue broad spectrum intravenous antibiotic(s) given h/o ivdu  Remains febrile and leukocytosis persists  5/8/22 The patient remained afebrile overnight. Currently on cefepime/vanc/flagyl. Blood cx are growing staph aureus. The patient refused the MRI lumbar spine overnight d/t being unable to lay flat from back pain. Will give pain meds and attempt to get the MRI today to r/o spinal abscess. The ECHO showed a 1.3 x 1.5 cm elongated, mobile echodensity seen on the tricuspid valve consistent with vegetation, CT surgery was consulted. Will need a SUSANNAH. Infectious disease is consulted. Will repeat blood cx today.   5/9/22 Repeat blood cx are still positive. Sensitivities are back and Staph is sensitive to oxacillin, will D/C vanc/cefepime. Infectious disease is following   5/10/22 Repeat blood cx are +. Continue IV oxacillin and flagyl. Infectious disease is consulted. IR consulted to evaluate possible paraspinous muscle myositis versus abscess. Recommend IR drainage. Continue current management.    5/11/22 The patient reports pain is still not well controlled at times. The patient reports that she is very anxious about the upcoming SUSANNAH and IR drainage. SUSANNAH was pushed back to this afternoon because the patient ate candy this AM. Continue treatment with IV oxacillin and flagyl. Infectious disease is following. WBC trended down to 31K. Continue current management.  5/12/22 The patients WBC improved to 22K overnight. The patient remains on continuous oxacillin infusion. Infectious disease is following.  5/14: WBC continues to improve, 18.10 today     5/15: Blood cultures 5/11/22: NGTD     Continuous Oxacillin, EOC: 7/11/22, pending acceptance at [facility] [place], PICC line placed

## 2022-05-21 NOTE — TRANSFER OF CARE
"Anesthesia Transfer of Care Note    Patient: Tianna Leon    Procedure(s) Performed: Procedure(s) (LRB):  CT (COMPUTED TOMOGRAPHY)/Chest tube insertion (N/A)    Patient location: Other: IR    Anesthesia Type: MAC    Transport from OR: Transported from OR on room air with adequate spontaneous ventilation    Post pain: adequate analgesia    Post assessment: no apparent anesthetic complications and tolerated procedure well    Post vital signs: stable    Level of consciousness: awake, alert and oriented    Nausea/Vomiting: no nausea/vomiting    Complications: none    Transfer of care protocol was followed      Last vitals:   Visit Vitals  /64 (BP Location: Left arm, Patient Position: Lying)   Pulse 85   Temp 36.9 °C (98.4 °F) (Oral)   Resp 18   Ht 5' 5" (1.651 m)   Wt 95.1 kg (209 lb 10.5 oz)   LMP 07/08/2019   SpO2 96%   Breastfeeding No   BMI 34.89 kg/m²     "

## 2022-05-21 NOTE — PROGRESS NOTES
[facility] Medicine  Progress Note    Patient Name: Tianna Leon  MRN: [id number]  Patient Class: IP- Inpatient   Admission Date: 5/6/2022  Length of Stay: 15 days  Attending Physician: Elizabeth Kim MD  Primary Care Provider: Spenser Campa MD        Subjective:     Principal Problem:Acute bacterial endocarditis        HPI:  36 y/o. female  with a PMHx of asthma, COPD,Bipolar  , IVDU and HLD presented to the ER with a c/o  sob for the last weak which has gotten worse . The SOB is associated with fever , chills , productive cough , back pain  and generalized weakness . She report cough some blood this am .  She is active IVDU  ( heroine , cocaine  and amphetamine ) . She denies any  sick contact , chest pain  , GI/ sx , She also complaning of LE sweeling . Knee pain and B/L LE rash .   ER COURSE: CTA chest negative for pe . Multiple nodular and cavitary opacities concerning for septic emboli with larger opacities possibly necrotizing pneumonia. CT cervical  and thoracic did not show any acute finding , CT lumbar Possible progression of degenerative changes most notable at L4-5 with moderate to severe spinal canal stenosis at this level.  WBC  29 k , na 130 , k 3.4 , cl 89 , procal 2.71   ER VS:  BP Pulse Resp Temp SpO2   (!) 124/58 110 (!) 30 (!) 101.6 °F (38.7 °C) 96 %           Pt will be admitted to inpt with a dx of PNA and severe sepsis       Overview/Hospital Course:  5/7 admitted for pneumonia, severe sepsis in setting of ivdu. Mother at bedside and provides collateral. Patient has struggled with substance abuse for approximately 20 years, worsening over last 3-5 years since life event. Onset of symptoms 1-2 weeks ago. Tolerated thoracentesis with no pneumothorax on chest x-ray. Mri lumbar and echo, pending. Bcx positive, growing gram positive cocci. On vanc, cefepime and flagyl. Infectious disease consulted for bacteremia. 5/8/22 The patient remained afebrile overnight. Currently on  cefepime/vanc/flagyl. Blood cx 5/6/22 are growing MSSA. The patient refused the MRI lumbar spine overnight d/t being unable to lay flat from back pain. Will give pain meds and attempt to get the MRI today to r/o spinal abscess. The ECHO showed a 1.3 x 1.5 cm elongated, mobile echodensity seen on the tricuspid valve consistent with vegetation, CT surgery was consulted. Will need a SUSANNAH. Infectious disease is consulted. Will repeat blood cx today. 5/9/22 No acute events overnight. The patient reports lower back pain is not well controlled with current pain regimen, will adjust. Repeat blood cx are still positive. Sensitivities are back and Staph is sensitive to oxacillin, will D/C vanc/cefepime. Cardiology consulted and plans for a SUSANNAH today. CT surgery following and recommends continuing medical management with appropriate ABX, no surgical intervention at this time. MRI lumbar spine showed moderate to severe spinal canal stenosis with moderate left-sided neural foraminal stenosis, Neurosurgery consulted. 5/10/22 No acute events overnight. The patient reports some improvement in pain with adjustment in pain meds. Repeat blood cx are +. Continue IV oxacillin and flagyl. Infectious disease is consulted. IR consulted to evaluate possible paraspinous muscle myositis versus abscess. Recommend IR drainage. Continue current management. 5/11/22 No acute events overnight. The patient reports pain is still not well controlled at times. The patient reports that she is very anxious about the upcoming SUSANNAH and IR drainage. SUSANNAH was pushed back to this afternoon because the patient ate candy this AM. Continue treatment with IV oxacillin and flagyl. Infectious disease is following. WBC trended down to 31K. Continue current management. 5/12/22 No acute events overnight. The patients WBC improved to 22K overnight. The patient remains on continuous oxacillin infusion. Infectious disease is following. MRI thoracic spine is ordered and  pending. The patients SUSANNAH was postponed again today d/t low H/H. Hgb was noted to be 6.2 this morning, will transfuse 1 unit PRBC. The patient reports pain is still not well controlled. The case was discussed with Neurosurgery who recommended adding extended release pain meds. Approximately 1 ml of purulent drainage aspirated from back yesterday per IR, growing gram + cocci.     5/13: Patient was given 1U PRBC on 5/12, improved from 6.2 --> 7.0, will give an additional unit PRBC. Radiology attempted to bring patient on 5/12 at 1755 for Thoracic MRI, patient refused due to pain and anxiety. Repeat attempt this AM, patient again refused. Will order Ativan 1mg IV to be given with pain medication prior to scan to relieve anxiety and pain, nurse to notify NP when ready to go for scan. Discussed pain management with patient, discussed transition to PO pain medications to being preparing for d/c to LTAC, adjusted PO pain regimen will reevaluate to determine effectiveness. Repeat Blood cultures 5/11/22: NGTD.     5/14: Final anaerobic cultures pending, continue to adjust pain regimen, d/c IV Dilaudid today. Patient currently managed with PO Morphine extended release and PO oxycodone. D/C plan is for LTAC once final antibiotic regimen determined. Patient continues to refuse MRI of thoracic spine.     5/15: Called to room this AM, patient had coughed up blood and mucous. Approx. 1-2 teaspoons of blood in emesis bag, ordered CXR and repeat CBC, CXR showed improvement from previous day, repeat CBC at time of incident, showed a slight decrease in Hgb: 9.7 --> 8.7, when repeated at 1500, Hgb had improved to 10. no further episodes of coughing up blood throughout day. Patient continues to c/o not having enough pain medication but continues to sleep most of the day and will fall asleep when speaking at times. Added IV Toradol to plan.     5/16: No further episodes of coughing blood overnight, patient participated with encouragement  with PT/OT today, recommendations are HH vs. SNF pending progress. Awaiting final antibiotic regimen recommendations. Anaerobic culture results finalized: negative for growth. Afebrile, WBC trending downward.     5/17: Antibiotic regimen per ID is continuous oxacillin until 7/11/22, SW notified of plan, will seek acceptance at LTAC or SNF. Patient choice is [facility]. WBC in normal range. AMS this AM, CT of head: no acute findings. Given narcan and mental status improved. Decreased narcotic dose.     5/18: Per discussion with patient, she is willing to have the MRI of thoracic spine today, will order IV pain medication and anxiety medication prior to scan. Nursing and Radiology aware of needing to premedicate for scan. Patient is more cooperative and participating with care.     5/19: Patient given premedications for MRI, MRI completed, results pending. Patient more agreeable with treatment plan and cooperating. Will need psychiatry follow-up after discharge. Hgb: 6.7, will give 1U PRBC    5/20/22: +LEON and generalized pain. Thoracic MRI showed no evidence for disc osteomyelitis in the thoracic spine and no epidural abscess, Loculated right hydropneumothorax and extensive bilateral pulmonary infiltrates. Ct chest showed septic emboli and moderate right pleural effusion.+splenic infarct  Pulmonology felt likely empyema- consulted IR for chest tube.     5/21/22: s/p right chest tube placement to suction/ drainage system per IR. 60ml pus removed initially. Fluid analysis consistent with empyema. Pleural fluid cultures pending. Now with scant serosanguinous output.+ right lateral chest pain at chest tube site. Denies any other complaints. Status update provided to pt's mother.       Interval History: see hospital course    Review of Systems   Constitutional:  Positive for activity change, appetite change and fatigue. Negative for chills, diaphoresis, fever and unexpected weight change.   HENT:  Negative for congestion,  hearing loss, mouth sores, postnasal drip, rhinorrhea, sore throat and trouble swallowing.    Eyes:  Negative for discharge and visual disturbance.   Respiratory:  Positive for shortness of breath. Negative for cough and chest tightness.    Cardiovascular:  Negative for chest pain, palpitations and leg swelling.   Gastrointestinal:  Positive for abdominal distention and abdominal pain. Negative for blood in stool, constipation, diarrhea, nausea and vomiting.   Endocrine: Negative for cold intolerance and heat intolerance.   Genitourinary:  Negative for difficulty urinating, dyspareunia, flank pain and hematuria.   Musculoskeletal:  Positive for back pain and myalgias. Negative for arthralgias.   Skin: Negative.    Neurological:  Positive for weakness. Negative for dizziness and light-headedness.   Hematological:  Negative for adenopathy. Does not bruise/bleed easily.   Psychiatric/Behavioral:  Negative for agitation, behavioral problems, confusion and sleep disturbance. The patient is nervous/anxious.    Objective:     Vital Signs (Most Recent):  Temp: 98.5 °F (36.9 °C) (05/21/22 1606)  Pulse: 85 (05/21/22 1606)  Resp: 19 (05/21/22 1622)  BP: 123/67 (05/21/22 1606)  SpO2: 100 % (05/21/22 1606)   Vital Signs (24h Range):  Temp:  [98.1 °F (36.7 °C)-99.6 °F (37.6 °C)] 98.5 °F (36.9 °C)  Pulse:  [78-90] 85  Resp:  [14-20] 19  SpO2:  [95 %-100 %] 100 %  BP: (115-139)/(63-82) 123/67     Weight: 95.1 kg (209 lb 10.5 oz)  Body mass index is 34.89 kg/m².    Intake/Output Summary (Last 24 hours) at 5/21/2022 1709  Last data filed at 5/21/2022 1617  Gross per 24 hour   Intake 1744.03 ml   Output 4050 ml   Net -2305.97 ml        Physical Exam  Vitals and nursing note reviewed.   Constitutional:       General: She is not in acute distress.     Appearance: She is well-developed. She is ill-appearing.   HENT:      Head: Normocephalic and atraumatic.      Right Ear: Hearing and external ear normal.      Left Ear: Hearing and  external ear normal.      Nose: No rhinorrhea.      Right Sinus: No maxillary sinus tenderness or frontal sinus tenderness.      Left Sinus: No maxillary sinus tenderness or frontal sinus tenderness.      Mouth/Throat:      Mouth: No oral lesions.      Pharynx: Uvula midline.   Eyes:      General:         Right eye: No discharge.         Left eye: No discharge.      Conjunctiva/sclera: Conjunctivae normal.      Pupils: Pupils are equal, round, and reactive to light.   Neck:      Thyroid: No thyromegaly.      Vascular: No carotid bruit.      Trachea: No tracheal deviation.   Cardiovascular:      Rate and Rhythm: Regular rhythm. Tachycardia present.      Pulses:           Dorsalis pedis pulses are 2+ on the right side and 2+ on the left side.      Heart sounds: Normal heart sounds, S1 normal and S2 normal. No murmur heard.  Pulmonary:      Effort: Pulmonary effort is normal. No respiratory distress.      Breath sounds: Examination of the right-lower field reveals decreased breath sounds. Examination of the left-lower field reveals decreased breath sounds. Decreased breath sounds present.      Comments: Right chest tube in place with scant serosanguinous drainage to to drainage system   Chest tube site C/D/I  Chest:   Breasts:      Right: No supraclavicular adenopathy.      Left: No supraclavicular adenopathy.     Abdominal:      General: Bowel sounds are decreased. There is no distension.      Palpations: Abdomen is soft. There is no mass.      Tenderness: There is no abdominal tenderness.   Musculoskeletal:      Cervical back: Normal range of motion.      Thoracic back: Tenderness present. Decreased range of motion.      Lumbar back: Tenderness present. Decreased range of motion.   Lymphadenopathy:      Cervical: No cervical adenopathy.      Upper Body:      Right upper body: No supraclavicular adenopathy.      Left upper body: No supraclavicular adenopathy.   Skin:     General: Skin is warm and dry.      Capillary  Refill: Capillary refill takes less than 2 seconds.      Findings: No rash.   Neurological:      Mental Status: She is alert and oriented to person, place, and time.      Sensory: No sensory deficit.      Coordination: Coordination normal.      Gait: Gait normal.   Psychiatric:         Mood and Affect: Mood is not anxious or depressed.         Speech: Speech normal.         Behavior: Behavior normal.         Thought Content: Thought content normal.         Judgment: Judgment normal.       Significant Labs: All pertinent labs within the past 24 hours have been reviewed.  CBC:   Recent Labs   Lab 05/20/22  0503 05/21/22  0500   WBC 8.98 9.70   HGB 7.3* 7.1*   HCT 23.2* 23.0*   * 504*       CMP:   Recent Labs   Lab 05/20/22  1334 05/21/22  0500    138   K 5.0 4.6   CL 98 99   CO2 30* 30*   GLU 87 86   BUN 10 9   CREATININE 0.5 0.6   CALCIUM 7.7* 7.7*   PROT 5.5* 5.3*   ALBUMIN 1.4* 1.3*   BILITOT 0.3 0.3   ALKPHOS 130 111   AST 11 11   ALT 16 15   ANIONGAP 8 9   EGFRNONAA >60 >60         Significant Imaging: I have reviewed all pertinent imaging results/findings within the past 24 hours.      Assessment/Plan:      * Acute bacterial endocarditis  ECHO showed a 1.3 x 1.5 cm elongated, mobile echodensity seen on the tricuspid valve consistent with vegetation, CT surgery was consulted.   Due to MSSA    -- Infectious disease is consulted.  -- repeat blood cx NGTD.  -- Continue IV ABX.   --Sensitivities are back and Staph is sensitive to oxacillin, D/C vanc/cefepime.   --Cardiology consulted and plans for a SUSANNAH.   --CT surgery following and recommends continuing medical management with appropriate ABX, no surgical intervention at this time.    cont IV oxacillin.      MSSA bacteremia  Gram positive   Infectious disease consulted  Continue broad spectrum intravenous antibiotic(s) given h/o ivdu  Remains febrile and leukocytosis persists  5/8/22 The patient remained afebrile overnight. Currently on  cefepime/vanc/flagyl. Blood cx are growing staph aureus. The patient refused the MRI lumbar spine overnight d/t being unable to lay flat from back pain. Will give pain meds and attempt to get the MRI today to r/o spinal abscess. The ECHO showed a 1.3 x 1.5 cm elongated, mobile echodensity seen on the tricuspid valve consistent with vegetation, CT surgery was consulted. Will need a SUSANNAH. Infectious disease is consulted. Will repeat blood cx today.   5/9/22 Repeat blood cx are still positive. Sensitivities are back and Staph is sensitive to oxacillin, will D/C vanc/cefepime. Infectious disease is following   5/10/22 Repeat blood cx are +. Continue IV oxacillin and flagyl. Infectious disease is consulted. IR consulted to evaluate possible paraspinous muscle myositis versus abscess. Recommend IR drainage. Continue current management.    5/11/22 The patient reports pain is still not well controlled at times. The patient reports that she is very anxious about the upcoming SUSANNAH and IR drainage. SUSANNAH was pushed back to this afternoon because the patient ate candy this AM. Continue treatment with IV oxacillin and flagyl. Infectious disease is following. WBC trended down to 31K. Continue current management.  5/12/22 The patients WBC improved to 22K overnight. The patient remains on continuous oxacillin infusion. Infectious disease is following.  5/14: WBC continues to improve, 18.10 today     5/15: Blood cultures 5/11/22: NGTD     Continuous Oxacillin, EOC: 7/11/22, pending acceptance at [facility] [place], PICC line placed    Hydropneumothorax  Pulmonology consulted and felt likely empyema   IR consulted for chest tube     5/21/22: s/p right chest tube placement to suction/drainage system per IR. 60ml pus removed. Fluid analysis consistent with empyema. Pleural fluid cultures pending. Now with scant serosanguinous output to drainage system.+ right lateral chest pain at chest tube site.      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 IR consulted to evaluate possible paraspinous muscle myositis versus abscess.    --Approximately 1 ml of purulent drainage aspirated 5/11 per IR, growing MSSA  --Continue current regimen      Normocytic anemia  Secondary to acute illness    --Daily CBC  --Transfuse with 1 unit PRBC for Hgb <7.0 or <8.0 if symptomatic   --Hgb: 6.7, give 1U PRBC    Lumbar stenosis without neurogenic claudication        Chronic pulmonary heart disease  - Pulmonology following       IVDU (intravenous drug user)   Will ordet TTE to r/o IE  Will order MRI lumbar wwo- completed   Cont broad spectrum IVAB   --MRI thoracic spine ordered per ID, patient refused on multiple attempts    5/7   Studies pending for additional sites of infection given h/o ivdu  5/8/22  on cessation     Severe sepsis  This patient does have evidence of infective focus  My overall impression is sepsis. Vital signs were reviewed and noted in progress note.  Antibiotics given-   Antibiotics (From admission, onward)            Start     Stop Route Frequency Ordered    05/09/22 1100  oxacillin 12 g in  mL CONTINUOUS INFUSION         -- IV Every 24 hours (non-standard times) 05/09/22 0932    05/07/22 2100  mupirocin 2 % ointment         05/12 2059 Nasl 2 times daily 05/07/22 1646        Cultures were taken-   Microbiology Results (last 7 days)     Procedure Component Value Units Date/Time    Culture, Respiratory with Gram Stain [[id number]] Collected: 05/16/22 0758    Order Status: Sent Specimen: Respiratory from Sputum Updated: 05/16/22 0759    Culture, Anaerobe [[id number]] Collected: 05/11/22 1440    Order Status: Completed Specimen: Abscess from Back Updated: 05/16/22 0733     Anaerobic Culture No anaerobes isolated     Blood culture [[id number]] Collected: 05/11/22 1213    Order Status: Completed Specimen: Blood Updated: 05/16/22 0612     Blood Culture, Routine No Growth to date      No Growth to date      No Growth to date      No Growth to date      No Growth to date    Blood culture [[id number]] Collected: 05/11/22 1213    Order Status: Completed Specimen: Blood Updated: 05/16/22 0612     Blood Culture, Routine No Growth to date      No Growth to date      No Growth to date      No Growth to date      No Growth to date    Aerobic culture [[id number]]  (Abnormal)  (Susceptibility) Collected: 05/11/22 1440    Order Status: Completed Specimen: Abscess from Back Updated: 05/14/22 1057     Aerobic Bacterial Culture STAPHYLOCOCCUS AUREUS  Many      Gram stain [[id number]] Collected: 05/11/22 1440    Order Status: Completed Specimen: Abscess from Back Updated: 05/12/22 0306     Gram Stain Result Few WBC's      Few Gram positive cocci    Gram stain [[id number]]     Order Status: Canceled Specimen: Joint Fluid from Back     Blood culture [[id number]]  (Abnormal) Collected: 05/08/22 1516    Order Status: Completed Specimen: Blood from Peripheral, Right Hand Updated: 05/11/22 1009     Blood Culture, Routine Gram stain aer bottle: Gram positive cocci in clusters resembling Staph       Results called to and read back by: Chasity Raymundo RN  05/09/2022  11:31      STAPHYLOCOCCUS AUREUS  ID consult required at [facility].Critical access hospital,[place] and [facility] locations.  For susceptibility see order #[id number]      Narrative:      Collection has been rescheduled by [email or web address] at 05/08/2022 13:22 Reason:   Pt in mri will be there after another hour [id number]  Collection has been rescheduled by [email or web address] at 05/08/2022 13:22 Reason:   Pt in mri will be there after another hour [id number]    Blood culture [[id number]]  (Abnormal) Collected: 05/08/22 1515    Order Status: Completed Specimen: Blood from Peripheral, Left Arm Updated: 05/11/22 1009     Blood Culture, Routine Gram stain  aer bottle: Gram positive cocci in clusters resembling Staph      Results called to and read back by: Chasity Raymundo RN  05/09/2022  15:40      STAPHYLOCOCCUS AUREUS  ID consult required [place] [facility] and Reece nathan.  For susceptibility see order #[id number]      Narrative:      Collection has been rescheduled by [email or web address] at 05/08/2022 13:22 Reason:   Pt in mri will be there after another hour [id number]  Collection has been rescheduled by [email or web address] at 05/08/2022 13:22 Reason:   Pt in mri will be there after another hour [id number]        Latest lactate reviewed, they are-  No results for input(s): LACTATE in the last 72 hours.    Organ dysfunction indicated by Acute kidney injury  Source-  lung    Source control Achieved by-   Cont Broad spectrum IVAB     Bacteremia  Infectious disease consulted      PNA (pneumonia)  --Cont oxacillin        Tobacco abuse  - Patient thoroughly counseled on smoking cessation, pt verbalized understanding. Time of counseling 10 minutes.        COPD (chronic obstructive pulmonary disease)  Cont breathing tx prn  Pulmonology following       VTE Risk Mitigation (From admission, onward)         Ordered     enoxaparin injection 40 mg  Daily         05/06/22 2336     IP VTE HIGH RISK PATIENT  Once         05/06/22 2336     Place sequential compression device  Until discontinued         05/06/22 2336                Discharge Planning   YESSICA:      Code Status: Full Code   Is the patient medically ready for discharge?:     Reason for patient still in hospital (select all that apply): Patient trending condition  Discharge Plan A: Home   Discharge Delays: None known at this time              Adriana Baltazar NP  Department of Hospital Medicine   [facility] - Med Surg

## 2022-05-21 NOTE — ANESTHESIA PREPROCEDURE EVALUATION
05/21/2022  Tianna Leon is a 37 y.o., female.      Pre-op Assessment    I have reviewed the Patient Summary Reports.     I have reviewed the Nursing Notes. I have reviewed the NPO Status.   I have reviewed the Medications.     Review of Systems  Anesthesia Hx:  No problems with previous Anesthesia    Social:  Smoker IV drug abuse   Hematology/Oncology:  Hematology Normal   Oncology Normal     EENT/Dental:EENT/Dental Normal   Cardiovascular:   ECG has been reviewed. Vent. Rate : 107 BPM     Atrial Rate : 107 BPM      P-R Int : 128 ms          QRS Dur : 088 ms       QT Int : 300 ms       P-R-T Axes : 060 048 -14 degrees      QTc Int : 400 ms     Sinus tachycardia   Nonspecific T wave abnormality   Abnormal ECG   When compared with ECG of 23-JUL-2019 09:17,   Nonspecific T wave abnormality now evident in Lateral leads   Confirmed by Riley GEORGE, Lucita' VIKTOR ([id number]) on 5/9/2022 8:12:33 AM      ECHO 05/2022    · The left ventricle is normal in size with eccentric hypertrophy and normal systolic function.  · Normal left ventricular diastolic function.  · The estimated PA systolic pressure is 56 mmHg.  · Normal right ventricular size with normal right ventricular systolic function.  · There is pulmonary hypertension.  · Elevated central venous pressure (15 mmHg).  · Mild pulmonic regurgitation.  · The estimated ejection fraction is 60%.  · Mild to moderate tricuspid regurgitation.     There is a 1.3 x 1.5 cm elongated, mobile echodensity seen on the tricuspid valve consistent with vegetation. Recommend CT surgery consult.   Pulmonary:   Pneumonia COPD Asthma    Renal/:  Renal/ Normal     Hepatic/GI:   GERD    Musculoskeletal:   Arthritis   Spine Disorders: lumbar Chronic Pain    Neurological:   Neuromuscular Disease,    Endocrine:  Endocrine Normal    Dermatological:  Skin Normal    Psych:   Psychiatric  History depression PTSD  Bipolar disorder  Mood disorder  ADHD  Personality disorder         Physical Exam  General: Well nourished, Cooperative, Alert and Oriented    Airway:  Mallampati: II   Mouth Opening: Normal  TM Distance: Normal  Tongue: Normal  Neck ROM: Normal ROM    Dental:  Intact        Anesthesia Plan  Type of Anesthesia, risks & benefits discussed:    Anesthesia Type: MAC, Gen Supraglottic Airway  Intra-op Monitoring Plan: Standard ASA Monitors  Post Op Pain Control Plan: multimodal analgesia  Induction:  IV  Airway Plan: Direct, Post-Induction  Informed Consent: Informed consent signed with the Patient and all parties understand the risks and agree with anesthesia plan.  All questions answered.   ASA Score: 3  Day of Surgery Review of History & Physical: H&P Update referred to the surgeon/provider.    Ready For Surgery From Anesthesia Perspective.     .

## 2022-05-21 NOTE — ASSESSMENT & PLAN NOTE
5/20 Suspect empyema, needs chest tube placement  5/21 s/p chest tube placement, adequate expansion - will check Chest X Ray in aa

## 2022-05-21 NOTE — H&P
Inpatient Radiology Pre-procedure Note    History of Present Illness:  Tianna Leon is a 37 y.o. female who presents for right chest tube placement for right hydropneumothorax with concern for empyema.    Admission H&P reviewed.  Past Medical History:   Diagnosis Date    ADHD (attention deficit hyperactivity disorder)     Anxiety     Asthma     Bipolar disorder     Cancer     cervical    COPD (chronic obstructive pulmonary disease)     GERD (gastroesophageal reflux disease)     Hepatitis C antibody positive in blood     RNA UNDETECTED 2016    Hyperlipidemia     Intravenous drug abuse     MDD (major depressive disorder)     Mood disorder     PTSD (post-traumatic stress disorder)     Suicidal ideation      Past Surgical History:   Procedure Laterality Date     SECTION  2001    x2    CHOLECYSTECTOMY      COMPUTED TOMOGRAPHY N/A 2022    Procedure: CT (COMPUTED TOMOGRAPHY)/facet joint aspiration;  Surgeon: Lloyd Suarez MD;  Location: [facility];  Service: General;  Laterality: N/A;    HYSTERECTOMY      LAPAROSCOPIC SALPINGECTOMY      ROBOT-ASSISTED LAPAROSCOPIC ABDOMINAL HYSTERECTOMY USING DA SEBASTIEN XI N/A 2019    Procedure: XI ROBOTIC HYSTERECTOMY;  Surgeon: Tabatha Quintanilla MD;  Location: [facility];  Service: OB/GYN;  Laterality: N/A;    ROBOT-ASSISTED LAPAROSCOPIC LYSIS OF ADHESIONS USING DA SEBASTIEN XI N/A 2019    Procedure: XI ROBOTIC LYSIS, ADHESIONS;  Surgeon: Tabatha Quintanilla MD;  Location: [facility];  Service: OB/GYN;  Laterality: N/A;    ROBOT-ASSISTED SURGICAL REMOVAL OF FALLOPIAN TUBE USING DA SEBASTIEN XI Right 2019    Procedure: XI ROBOTIC SALPINGECTOMY;  Surgeon: Tabatha Quintanilla MD;  Location: [facility];  Service: OB/GYN;  Laterality: Right;    TONSILLECTOMY      TUBAL LIGATION         Review of Systems:   As documented in primary team H&P    Home Meds:   Prior to Admission medications    Medication Sig Start Date End Date Taking?  Authorizing Provider   acetaminophen (TYLENOL) 500 MG tablet Take 500 mg by mouth every 6 (six) hours as needed for Pain.   Yes Historical Provider   albuterol (PROVENTIL/VENTOLIN HFA) 90 mcg/actuation inhaler Inhale 2 puffs into the lungs every 6 (six) hours as needed for Wheezing. 4/25/22 4/25/23 Yes Magda Borrero NP   dextromethorphan-guaiFENesin (MUCINEX DM) 60-1,200 mg per 12 hr tablet Take 1 tablet by mouth every 12 (twelve) hours.   Yes Historical Provider   ibuprofen (ADVIL,MOTRIN) 200 MG tablet Take 200 mg by mouth every 6 (six) hours as needed for Pain.   Yes Historical Provider     Scheduled Meds:    albuterol-ipratropium  3 mL Nebulization Q6H WAKE    enoxaparin  40 mg Subcutaneous Daily    ketorolac  10 mg Oral Q6H    morphine  15 mg Oral Q12H    oxacillin 12 g in  mL CONTINUOUS INFUSION  12 g Intravenous Q24H    traZODone  100 mg Oral QHS     Continuous Infusions:   PRN Meds:sodium chloride, sodium chloride, sodium chloride, acetaminophen, ALPRAZolam, HYDROmorphone, naloxone, ondansetron, oxyCODONE, sodium chloride 0.9%, sodium chloride 0.9%, sodium chloride 0.9%  Anticoagulants/Antiplatelets: no anticoagulation    Allergies:   Review of patient's allergies indicates:   Allergen Reactions    Sulfamethoxazole-trimethoprim      Sedation Hx: have not been any systemic reactions    Labs:  No results for input(s): INR in the last 168 hours.    Invalid input(s):  PT,  PTT    Recent Labs   Lab 05/21/22  0500   WBC 9.70   HGB 7.1*   HCT 23.0*   MCV 90   *      Recent Labs   Lab 05/21/22  0500   GLU 86      K 4.6   CL 99   CO2 30*   BUN 9   CREATININE 0.6   CALCIUM 7.7*   MG 1.7   ALT 15   AST 11   ALBUMIN 1.3*   BILITOT 0.3         Vitals:  Temp: 98.4 °F (36.9 °C) (05/21/22 0715)  Pulse: 85 (05/21/22 0716)  Resp: 18 (05/21/22 0842)  BP: 115/64 (05/21/22 0715)  SpO2: 96 % (05/21/22 0716)     Physical Exam:  General: no acute distress  Mental Status: alert and oriented to person,  place and time  HEENT: normocephalic, atraumatic  Chest: unlabored breathing  Heart: regular heart rate  Abdomen: nondistended  Extremity: moves all extremities    Plan: Right chest tube placement  Sedation Plan: per anesthesia    Lucy Bass MD  Interventional Radiology

## 2022-05-21 NOTE — PT/OT/SLP PROGRESS
Occupational Therapy      Patient Name:  Tianna Leon   MRN:  [id number]    Patient not seen today secondary to PT IN PROCEDURE (RECEIVING CHEST TUBE). Will follow-up ON LATER DATE.     5/21/2022   Yolande Quinones, OT   [id number]

## 2022-05-21 NOTE — ASSESSMENT & PLAN NOTE
ECHO showed a 1.3 x 1.5 cm elongated, mobile echodensity seen on the tricuspid valve consistent with vegetation, CT surgery was consulted.   Due to MSSA    -- Infectious disease is consulted.  -- repeat blood cx NGTD.  -- Continue IV ABX.   --Sensitivities are back and Staph is sensitive to oxacillin, D/C vanc/cefepime.   --Cardiology consulted and plans for a SUSANNAH.   --CT surgery following and recommends continuing medical management with appropriate ABX, no surgical intervention at this time.    cont IV oxacillin.

## 2022-05-21 NOTE — ASSESSMENT & PLAN NOTE
Pulmonology consulted and felt likely empyema   IR consulted for chest tube     5/21/22: s/p right chest tube placement to suction/drainage system per IR. 60ml pus removed. Fluid analysis consistent with empyema. Pleural fluid cultures pending. Now with scant serosanguinous output to drainage system.+ right lateral chest pain at chest tube site.

## 2022-05-21 NOTE — PT/OT/SLP PROGRESS
Physical Therapy      Patient Name:  Tianna Leon   MRN:  [id number]    09:00 a.m.  Patient out of room for procedure at this time.  Will follow up during next scheduled PT session.

## 2022-05-21 NOTE — PROGRESS NOTES
[facility]  Pulmonology  Progress Note    Patient Name: Tianna Leon  MRN: [id number]  Admission Date: 2022  Hospital Length of Stay: 15 days  Code Status: Full Code  Attending Provider: Elizabeth Kim MD  Primary Care Provider: Spenser Campa MD   Principal Problem: Acute bacterial endocarditis    Subjective: Chest tube placed in radiology today     Past Medical History:   Diagnosis Date    ADHD (attention deficit hyperactivity disorder)     Anxiety     Asthma     Bipolar disorder     Cancer     cervical    COPD (chronic obstructive pulmonary disease)     GERD (gastroesophageal reflux disease)     Hepatitis C antibody positive in blood     RNA UNDETECTED 2016    Hyperlipidemia     Intravenous drug abuse     MDD (major depressive disorder)     Mood disorder     PTSD (post-traumatic stress disorder)     Suicidal ideation        Past Surgical History:   Procedure Laterality Date     SECTION  2001    x2    CHOLECYSTECTOMY      COMPUTED TOMOGRAPHY N/A 2022    Procedure: CT (COMPUTED TOMOGRAPHY)/facet joint aspiration;  Surgeon: Lloyd Suarez MD;  Location: [facility];  Service: General;  Laterality: N/A;    HYSTERECTOMY      LAPAROSCOPIC SALPINGECTOMY      ROBOT-ASSISTED LAPAROSCOPIC ABDOMINAL HYSTERECTOMY USING DA SEBASTIEN XI N/A 2019    Procedure: XI ROBOTIC HYSTERECTOMY;  Surgeon: Tabatha Quintanilla MD;  Location: [facility];  Service: OB/GYN;  Laterality: N/A;    ROBOT-ASSISTED LAPAROSCOPIC LYSIS OF ADHESIONS USING DA SEBASTIEN XI N/A 2019    Procedure: XI ROBOTIC LYSIS, ADHESIONS;  Surgeon: Tabatha Quintanilla MD;  Location: [facility];  Service: OB/GYN;  Laterality: N/A;    ROBOT-ASSISTED SURGICAL REMOVAL OF FALLOPIAN TUBE USING DA SEBASTIEN XI Right 2019    Procedure: XI ROBOTIC SALPINGECTOMY;  Surgeon: Tabatha Quintanilla MD;  Location: [facility];  Service: OB/GYN;  Laterality: Right;    TONSILLECTOMY      TUBAL LIGATION         Review of  patient's allergies indicates:   Allergen Reactions    Sulfamethoxazole-trimethoprim        Family History       Problem Relation (Age of Onset)    COPD Mother          Tobacco Use    Smoking status: Current Every Day Smoker     Packs/day: 1.50     Types: Cigarettes    Smokeless tobacco: Never Used    Tobacco comment: no smoking after midnight prior to surgery   Substance and Sexual Activity    Alcohol use: Yes     Comment: Occassional     Drug use: Not Currently     Types: IV, Methamphetamines, Heroin     Comment: denies heroin use.  Last used meth 10/2018  No illegal drugs prior to sx    Sexual activity: Not Currently     Partners: Male     Birth control/protection: None         Review of Systems   Constitutional:  Positive for diaphoresis, fatigue and fever.   HENT:  Positive for dental problem and sinus pressure.    Respiratory:  Positive for cough, chest tightness and shortness of breath.    Cardiovascular: Negative.    Gastrointestinal: Negative.    Genitourinary: Negative.    Musculoskeletal:  Positive for arthralgias.   Neurological:  Positive for weakness.   Psychiatric/Behavioral:  The patient is nervous/anxious.    Objective:     Vital Signs (Most Recent):  Temp: 98.5 °F (36.9 °C) (05/21/22 1606)  Pulse: 84 (05/21/22 1700)  Resp: 19 (05/21/22 1622)  BP: 123/67 (05/21/22 1606)  SpO2: 100 % (05/21/22 1606) Vital Signs (24h Range):  Temp:  [98.1 °F (36.7 °C)-99.6 °F (37.6 °C)] 98.5 °F (36.9 °C)  Pulse:  [78-90] 84  Resp:  [14-20] 19  SpO2:  [95 %-100 %] 100 %  BP: (115-139)/(63-82) 123/67     Weight: 95.1 kg (209 lb 10.5 oz)  Body mass index is 34.89 kg/m².      Intake/Output Summary (Last 24 hours) at 5/21/2022 1817  Last data filed at 5/21/2022 1617  Gross per 24 hour   Intake 1244.56 ml   Output 4050 ml   Net -2805.44 ml         Physical Exam  Vitals and nursing note reviewed.   Constitutional:       Appearance: She is well-developed. She is obese. She is ill-appearing.   HENT:      Head:  Normocephalic and atraumatic.      Nose: Nose normal.   Eyes:      Conjunctiva/sclera: Conjunctivae normal.      Pupils: Pupils are equal, round, and reactive to light.   Neck:      Thyroid: No thyromegaly.      Vascular: No JVD.      Trachea: No tracheal deviation.   Cardiovascular:      Rate and Rhythm: Regular rhythm. Tachycardia present.      Heart sounds: Murmur heard.   Pulmonary:      Effort: Pulmonary effort is normal. No respiratory distress.      Breath sounds: Stridor present. Examination of the right-lower field reveals wheezing. Examination of the left-lower field reveals wheezing. Decreased breath sounds, wheezing and rales present. No rhonchi.   Chest:      Chest wall: No tenderness.   Abdominal:      General: Bowel sounds are normal.      Palpations: Abdomen is soft.   Musculoskeletal:         General: Normal range of motion.      Cervical back: Neck supple.      Right lower leg: Edema present.      Left lower leg: Edema present.   Lymphadenopathy:      Cervical: No cervical adenopathy.   Skin:     General: Skin is warm and dry.   Neurological:      Mental Status: She is alert and oriented to person, place, and time.       Vents:  Oxygen Concentration (%): 32 (05/21/22 1432)    Lines/Drains/Airways       Peripherally Inserted Central Catheter Line  Duration             PICC Double Lumen 05/17/22 1612 right basilic 4 days              Drain  Duration                  Chest Tube 05/21/22 0957 1 Right Pleural 8.5 Fr. <1 day    Female External Urinary Catheter 05/21/22 0700 <1 day                    Significant Labs:    CBC/Anemia Profile:  Recent Labs   Lab 05/20/22  0503 05/21/22  0500   WBC 8.98 9.70   HGB 7.3* 7.1*   HCT 23.2* 23.0*   * 504*   MCV 89 90   RDW 16.2* 16.0*          Chemistries:  Recent Labs   Lab 05/20/22  1334 05/21/22  0500    138   K 5.0 4.6   CL 98 99   CO2 30* 30*   BUN 10 9   CREATININE 0.5 0.6   CALCIUM 7.7* 7.7*   ALBUMIN 1.4* 1.3*   PROT 5.5* 5.3*   BILITOT 0.3  0.3   ALKPHOS 130 111   ALT 16 15   AST 11 11   MG  --  1.7   PHOS  --  5.4*       ABGs: No results for input(s): PH, PCO2, HCO3, POCSATURATED, BE in the last 48 hours.  BMP:   Recent Labs   Lab 05/21/22  0500   GLU 86      K 4.6   CL 99   CO2 30*   BUN 9   CREATININE 0.6   CALCIUM 7.7*   MG 1.7     CMP:   Recent Labs   Lab 05/20/22  1334 05/21/22  0500    138   K 5.0 4.6   CL 98 99   CO2 30* 30*   GLU 87 86   BUN 10 9   CREATININE 0.5 0.6   CALCIUM 7.7* 7.7*   PROT 5.5* 5.3*   ALBUMIN 1.4* 1.3*   BILITOT 0.3 0.3   ALKPHOS 130 111   AST 11 11   ALT 16 15   ANIONGAP 8 9   EGFRNONAA >60 >60       Significant Imaging:   I have reviewed all pertinent imaging results/findings within the past 24 hours.    CT Guided Needle Placement  Narrative: EXAMINATION:  Pleural drainage catheter placement    Procedural Personnel    Attending physician(s): Lucy Bass    Fellow physician(s): None    Resident physician(s): None    Advanced practice provider(s): None    Pre-procedure diagnosis: Right hydropneumothorax, empyema    Post-procedure diagnosis: Same    Indication: Leukocytosis with fluid collection    Additional clinical history: IVDU, septic pulmonary emboli with likely necrotizing pneumonia    Complications: No immediate complications.    PROCEDURAL SUMMARY:  -Right pleural drainage catheter placement under CT guidance    PROCEDURE:  Pre-procedure:    Consent: Informed consent for the procedure was obtained and time-out was performed prior to the procedure.    Preparation: The site was prepared and draped using maximal sterile barrier technique including cutaneous antisepsis.    Antibiotic administered: Scheduled dose more than 1 hour from procedure start time  or more than 2 hours for vancomycin or fluoroquinolones    Anesthesia/sedation:    Level of anesthesia/sedation: Monitored anesthesia care    Anesthesia/sedation administered by: Anesthesiology    Total intra-service sedation time (minutes): See  anesthesia record    Drainage catheter placement:    The patient was positioned left lateral decubitus. Initial imaging was performed. Local anesthesia was administered. The fluid collection was accessed using an access needle followed by wire insertion and serial dilation and a drainage catheter was placed. Position of the drainage catheter within the fluid collection was confirmed.    - Initial imaging findings: Large loculated right hydropneumothorax with redemonstration of multiple cavitary and consolidative densities throughout the lungs.    - Drainage catheter placed: Multipurpose drainage catheter    - External catheter securement: Adhesive anchoring device    - Post-drainage imaging findings: Partial drainage of the fluid collection    Contrast:    Contrast agent: None    Contrast volume (mL): 0    Radiation Dose:    CT dose length product ( mGy-cm): 1079.78    Additional Details:    Additional description of procedure: None    Equipment details: None    Specimens removed: Aspirated fluid was sent for analysis.    Estimated blood loss (mL): Less than 10    Standardized report: [email or web address]  Impression: Percutaneous placement of a 8.5 Nepali drainage catheter into right pleural space, yielding 60 mL of purulent fluid.    Plan:    Follow-up laboratory analysis.    Attestation:    Signer name: Lucy Bass    I attest that I was present for the entire procedure. I reviewed the stored images and agree with the report as written.    Electronically signed by: Lucy Bass  Date:    05/21/2022  Time:    13:55        ABG  No results for input(s): PH, PO2, PCO2, HCO3, BE in the last 168 hours.  Assessment/Plan:     Hydropneumothorax  5/20 Suspect empyema, needs chest tube placement  5/21 s/p chest tube placement, adequate expansion - will check Chest X Ray in aa           Samuel Saleh MD  Pulmonology  [facility]

## 2022-05-21 NOTE — PLAN OF CARE
Problem: Adult Inpatient Plan of Care  Goal: Plan of Care Review  Outcome: Ongoing, Progressing  Goal: Patient-Specific Goal (Individualized)  Outcome: Ongoing, Progressing  Goal: Absence of Hospital-Acquired Illness or Injury  Outcome: Ongoing, Progressing  Goal: Optimal Comfort and Wellbeing  Outcome: Ongoing, Progressing  Goal: Readiness for Transition of Care  Outcome: Ongoing, Progressing       Problem: Impaired Wound Healing  Goal: Optimal Wound Healing  Outcome: Ongoing, Progressing     Problem: Skin Injury Risk Increased  Goal: Skin Health and Integrity  Outcome: Ongoing, Progressing     Problem: Adjustment to Illness (Sepsis/Septic Shock)  Goal: Optimal Coping  Outcome: Ongoing, Progressing     Problem: Adjustment to Illness (Sepsis/Septic Shock)  Goal: Optimal Coping  Outcome: Ongoing, Progressing     Problem: Adjustment to Illness (Sepsis/Septic Shock)  Goal: Optimal Coping  Outcome: Ongoing, Progressing     Problem: Adjustment to Illness (Sepsis/Septic Shock)  Goal: Optimal Coping  Outcome: Ongoing, Progressing     Problem: Infection Progression (Sepsis/Septic Shock)  Goal: Absence of Infection Signs and Symptoms  Outcome: Ongoing, Progressing     Problem: Fluid Imbalance (Pneumonia)  Goal: Fluid Balance  Outcome: Ongoing, Progressing     Problem: Infection (Pneumonia)  Goal: Resolution of Infection Signs and Symptoms  Outcome: Ongoing, Progressing     Problem: Fall Injury Risk  Goal: Absence of Fall and Fall-Related Injury  Outcome: Ongoing, Progressing     Problem: Infection  Goal: Absence of Infection Signs and Symptoms  Outcome: Ongoing, Progressing

## 2022-05-21 NOTE — SEDATION DOCUMENTATION
Pt transferred to pt bed and transported to pt room.  Mother at bedside, update given.  Charge nurse notified of pt arrival to room.  Pt AAOx 3.

## 2022-05-21 NOTE — SUBJECTIVE & OBJECTIVE
Interval History: see hospital course    Review of Systems   Constitutional:  Positive for activity change, appetite change and fatigue. Negative for chills, diaphoresis, fever and unexpected weight change.   HENT:  Negative for congestion, hearing loss, mouth sores, postnasal drip, rhinorrhea, sore throat and trouble swallowing.    Eyes:  Negative for discharge and visual disturbance.   Respiratory:  Positive for shortness of breath. Negative for cough and chest tightness.    Cardiovascular:  Negative for chest pain, palpitations and leg swelling.   Gastrointestinal:  Positive for abdominal distention and abdominal pain. Negative for blood in stool, constipation, diarrhea, nausea and vomiting.   Endocrine: Negative for cold intolerance and heat intolerance.   Genitourinary:  Negative for difficulty urinating, dyspareunia, flank pain and hematuria.   Musculoskeletal:  Positive for back pain and myalgias. Negative for arthralgias.   Skin: Negative.    Neurological:  Positive for weakness. Negative for dizziness and light-headedness.   Hematological:  Negative for adenopathy. Does not bruise/bleed easily.   Psychiatric/Behavioral:  Negative for agitation, behavioral problems, confusion and sleep disturbance. The patient is nervous/anxious.    Objective:     Vital Signs (Most Recent):  Temp: 98.5 °F (36.9 °C) (05/21/22 1606)  Pulse: 85 (05/21/22 1606)  Resp: 19 (05/21/22 1622)  BP: 123/67 (05/21/22 1606)  SpO2: 100 % (05/21/22 1606)   Vital Signs (24h Range):  Temp:  [98.1 °F (36.7 °C)-99.6 °F (37.6 °C)] 98.5 °F (36.9 °C)  Pulse:  [78-90] 85  Resp:  [14-20] 19  SpO2:  [95 %-100 %] 100 %  BP: (115-139)/(63-82) 123/67     Weight: 95.1 kg (209 lb 10.5 oz)  Body mass index is 34.89 kg/m².    Intake/Output Summary (Last 24 hours) at 5/21/2022 1709  Last data filed at 5/21/2022 1617  Gross per 24 hour   Intake 1744.03 ml   Output 4050 ml   Net -2305.97 ml        Physical Exam  Vitals and nursing note reviewed.    Constitutional:       General: She is not in acute distress.     Appearance: She is well-developed. She is ill-appearing.   HENT:      Head: Normocephalic and atraumatic.      Right Ear: Hearing and external ear normal.      Left Ear: Hearing and external ear normal.      Nose: No rhinorrhea.      Right Sinus: No maxillary sinus tenderness or frontal sinus tenderness.      Left Sinus: No maxillary sinus tenderness or frontal sinus tenderness.      Mouth/Throat:      Mouth: No oral lesions.      Pharynx: Uvula midline.   Eyes:      General:         Right eye: No discharge.         Left eye: No discharge.      Conjunctiva/sclera: Conjunctivae normal.      Pupils: Pupils are equal, round, and reactive to light.   Neck:      Thyroid: No thyromegaly.      Vascular: No carotid bruit.      Trachea: No tracheal deviation.   Cardiovascular:      Rate and Rhythm: Regular rhythm. Tachycardia present.      Pulses:           Dorsalis pedis pulses are 2+ on the right side and 2+ on the left side.      Heart sounds: Normal heart sounds, S1 normal and S2 normal. No murmur heard.  Pulmonary:      Effort: Pulmonary effort is normal. No respiratory distress.      Breath sounds: Examination of the right-lower field reveals decreased breath sounds. Examination of the left-lower field reveals decreased breath sounds. Decreased breath sounds present.      Comments: Right chest tube in place with scant serosanguinous drainage to to drainage system   Chest tube site C/D/I  Chest:   Breasts:      Right: No supraclavicular adenopathy.      Left: No supraclavicular adenopathy.     Abdominal:      General: Bowel sounds are decreased. There is no distension.      Palpations: Abdomen is soft. There is no mass.      Tenderness: There is no abdominal tenderness.   Musculoskeletal:      Cervical back: Normal range of motion.      Thoracic back: Tenderness present. Decreased range of motion.      Lumbar back: Tenderness present. Decreased range of  motion.   Lymphadenopathy:      Cervical: No cervical adenopathy.      Upper Body:      Right upper body: No supraclavicular adenopathy.      Left upper body: No supraclavicular adenopathy.   Skin:     General: Skin is warm and dry.      Capillary Refill: Capillary refill takes less than 2 seconds.      Findings: No rash.   Neurological:      Mental Status: She is alert and oriented to person, place, and time.      Sensory: No sensory deficit.      Coordination: Coordination normal.      Gait: Gait normal.   Psychiatric:         Mood and Affect: Mood is not anxious or depressed.         Speech: Speech normal.         Behavior: Behavior normal.         Thought Content: Thought content normal.         Judgment: Judgment normal.       Significant Labs: All pertinent labs within the past 24 hours have been reviewed.  CBC:   Recent Labs   Lab 05/20/22  0503 05/21/22  0500   WBC 8.98 9.70   HGB 7.3* 7.1*   HCT 23.2* 23.0*   * 504*       CMP:   Recent Labs   Lab 05/20/22  1334 05/21/22  0500    138   K 5.0 4.6   CL 98 99   CO2 30* 30*   GLU 87 86   BUN 10 9   CREATININE 0.5 0.6   CALCIUM 7.7* 7.7*   PROT 5.5* 5.3*   ALBUMIN 1.4* 1.3*   BILITOT 0.3 0.3   ALKPHOS 130 111   AST 11 11   ALT 16 15   ANIONGAP 8 9   EGFRNONAA >60 >60         Significant Imaging: I have reviewed all pertinent imaging results/findings within the past 24 hours.

## 2022-05-21 NOTE — ANESTHESIA POSTPROCEDURE EVALUATION
Anesthesia Post Evaluation    Patient: Tianna Leon    Procedure(s) Performed: Procedure(s) (LRB):  CT (COMPUTED TOMOGRAPHY)/Chest tube insertion (N/A)    Final Anesthesia Type: MAC      Patient location: IR.  Patient participation: Yes- Able to Participate  Level of consciousness: awake and alert and oriented  Post-procedure vital signs: reviewed and stable  Pain management: adequate  Airway patency: patent  CARMEN mitigation strategies: Multimodal analgesia  PONV status at discharge: No PONV  Anesthetic complications: no      Cardiovascular status: hemodynamically stable  Respiratory status: unassisted, spontaneous ventilation and room air  Hydration status: euvolemic  Follow-up not needed.          Vitals Value Taken Time   /64 05/21/22 0715   Temp 36.9 °C (98.4 °F) 05/21/22 0715   Pulse 85 05/21/22 0716   Resp 18 05/21/22 0842   SpO2 96 % 05/21/22 0716         No case tracking events are documented in the log.      Pain/Ginger Score: Pain Rating Prior to Med Admin: 9 (5/21/2022  8:42 AM)  Pain Rating Post Med Admin: 2 (5/21/2022  1:57 AM)

## 2022-05-21 NOTE — ANESTHESIA RELEASE NOTE
"Anesthesia Release from PACU Note    Patient: Tianna Leon    Procedure(s) Performed: Procedure(s) (LRB):  CT (COMPUTED TOMOGRAPHY)/Chest tube insertion (N/A)    Anesthesia type: MAC    Post pain: Adequate analgesia    Post assessment: no apparent anesthetic complications and tolerated procedure well    Last Vitals:   Visit Vitals  /64 (BP Location: Left arm, Patient Position: Lying)   Pulse 85   Temp 36.9 °C (98.4 °F) (Oral)   Resp 18   Ht 5' 5" (1.651 m)   Wt 95.1 kg (209 lb 10.5 oz)   LMP 07/08/2019   SpO2 96%   Breastfeeding No   BMI 34.89 kg/m²       Post vital signs: stable    Level of consciousness: awake, alert  and oriented    Nausea/Vomiting: no nausea/no vomiting    Complications: none    Airway Patency: patent    Respiratory: unassisted, spontaneous ventilation, room air    Cardiovascular: stable and blood pressure at baseline    Hydration: euvolemic  "

## 2022-05-21 NOTE — PROCEDURES
Radiology Post-Procedure Note    Pre Op Diagnosis: Hydropneumothorax/empyema    Post Op Diagnosis: Same    Procedure: Right chest tube placement with anesthesia assistance    Procedure performed by: Lucy Bass MD    Written Informed Consent Obtained: Yes    Specimen Removed: YES     Estimated Blood Loss: Minimal    Findings: Appreciate anesthesia assistance with this procedure.  Local anesthesia additionally used.    A right posterior approach was used to insert a 8.5-Maltese  all-purpose drainage catheter into the pleural space using CT guidance.  60 mL purulent fluid was removed and sent to the lab for further analysis.  Small volume air was additional removed.  The tube was secured using pigtail formation of the distal end as well as adhesive anchoring device. Postprocedural imaging demonstrates decrease in size of the collection and no increase in pneumothorax.    The patient tolerated the procedure well and there were no complications.  Please see Imaging report for further details.    Lucy Bass MD  Interventional Radiology

## 2022-05-21 NOTE — SEDATION DOCUMENTATION
Procedure complete.  8.5 Mongolian chest tube with water seal to r mid back.  Secured with sutures, stat lock, and tegaderm. Pt tolerated well.  See anesthesia report for details.

## 2022-05-21 NOTE — SUBJECTIVE & OBJECTIVE
Past Medical History:   Diagnosis Date    ADHD (attention deficit hyperactivity disorder)     Anxiety     Asthma     Bipolar disorder     Cancer     cervical    COPD (chronic obstructive pulmonary disease)     GERD (gastroesophageal reflux disease)     Hepatitis C antibody positive in blood     RNA UNDETECTED 2016    Hyperlipidemia     Intravenous drug abuse     MDD (major depressive disorder)     Mood disorder     PTSD (post-traumatic stress disorder)     Suicidal ideation        Past Surgical History:   Procedure Laterality Date     SECTION  2001    x2    CHOLECYSTECTOMY      COMPUTED TOMOGRAPHY N/A 2022    Procedure: CT (COMPUTED TOMOGRAPHY)/facet joint aspiration;  Surgeon: Lloyd Suarez MD;  Location: [facility];  Service: General;  Laterality: N/A;    HYSTERECTOMY      LAPAROSCOPIC SALPINGECTOMY      ROBOT-ASSISTED LAPAROSCOPIC ABDOMINAL HYSTERECTOMY USING DA SEBASTIEN XI N/A 2019    Procedure: XI ROBOTIC HYSTERECTOMY;  Surgeon: Tabatha Quintanilla MD;  Location: [facility];  Service: OB/GYN;  Laterality: N/A;    ROBOT-ASSISTED LAPAROSCOPIC LYSIS OF ADHESIONS USING DA SEBASTIEN XI N/A 2019    Procedure: XI ROBOTIC LYSIS, ADHESIONS;  Surgeon: Tabatha Quintanilla MD;  Location: [facility];  Service: OB/GYN;  Laterality: N/A;    ROBOT-ASSISTED SURGICAL REMOVAL OF FALLOPIAN TUBE USING DA SEBASTIEN XI Right 2019    Procedure: XI ROBOTIC SALPINGECTOMY;  Surgeon: Tabatha Quintanilla MD;  Location: [facility];  Service: OB/GYN;  Laterality: Right;    TONSILLECTOMY      TUBAL LIGATION         Review of patient's allergies indicates:   Allergen Reactions    Sulfamethoxazole-trimethoprim        Family History       Problem Relation (Age of Onset)    COPD Mother          Tobacco Use    Smoking status: Current Every Day Smoker     Packs/day: 1.50     Types: Cigarettes    Smokeless tobacco: Never Used    Tobacco comment: no smoking after midnight prior to surgery    Substance and Sexual Activity    Alcohol use: Yes     Comment: Occassional     Drug use: Not Currently     Types: IV, Methamphetamines, Heroin     Comment: denies heroin use.  Last used meth 10/2018  No illegal drugs prior to sx    Sexual activity: Not Currently     Partners: Male     Birth control/protection: None         Review of Systems   Constitutional:  Positive for diaphoresis, fatigue and fever.   HENT:  Positive for dental problem and sinus pressure.    Respiratory:  Positive for cough, chest tightness and shortness of breath.    Cardiovascular: Negative.    Gastrointestinal: Negative.    Genitourinary: Negative.    Musculoskeletal:  Positive for arthralgias.   Neurological:  Positive for weakness.   Psychiatric/Behavioral:  The patient is nervous/anxious.    Objective:     Vital Signs (Most Recent):  Temp: 98.5 °F (36.9 °C) (05/21/22 1606)  Pulse: 84 (05/21/22 1700)  Resp: 19 (05/21/22 1622)  BP: 123/67 (05/21/22 1606)  SpO2: 100 % (05/21/22 1606) Vital Signs (24h Range):  Temp:  [98.1 °F (36.7 °C)-99.6 °F (37.6 °C)] 98.5 °F (36.9 °C)  Pulse:  [78-90] 84  Resp:  [14-20] 19  SpO2:  [95 %-100 %] 100 %  BP: (115-139)/(63-82) 123/67     Weight: 95.1 kg (209 lb 10.5 oz)  Body mass index is 34.89 kg/m².      Intake/Output Summary (Last 24 hours) at 5/21/2022 1817  Last data filed at 5/21/2022 1617  Gross per 24 hour   Intake 1244.56 ml   Output 4050 ml   Net -2805.44 ml         Physical Exam  Vitals and nursing note reviewed.   Constitutional:       Appearance: She is well-developed. She is obese. She is ill-appearing.   HENT:      Head: Normocephalic and atraumatic.      Nose: Nose normal.   Eyes:      Conjunctiva/sclera: Conjunctivae normal.      Pupils: Pupils are equal, round, and reactive to light.   Neck:      Thyroid: No thyromegaly.      Vascular: No JVD.      Trachea: No tracheal deviation.   Cardiovascular:      Rate and Rhythm: Regular rhythm. Tachycardia present.      Heart sounds: Murmur heard.    Pulmonary:      Effort: Pulmonary effort is normal. No respiratory distress.      Breath sounds: Stridor present. Examination of the right-lower field reveals wheezing. Examination of the left-lower field reveals wheezing. Decreased breath sounds, wheezing and rales present. No rhonchi.   Chest:      Chest wall: No tenderness.   Abdominal:      General: Bowel sounds are normal.      Palpations: Abdomen is soft.   Musculoskeletal:         General: Normal range of motion.      Cervical back: Neck supple.      Right lower leg: Edema present.      Left lower leg: Edema present.   Lymphadenopathy:      Cervical: No cervical adenopathy.   Skin:     General: Skin is warm and dry.   Neurological:      Mental Status: She is alert and oriented to person, place, and time.       Vents:  Oxygen Concentration (%): 32 (05/21/22 1432)    Lines/Drains/Airways       Peripherally Inserted Central Catheter Line  Duration             PICC Double Lumen 05/17/22 1612 right basilic 4 days              Drain  Duration                  Chest Tube 05/21/22 0957 1 Right Pleural 8.5 Fr. <1 day    Female External Urinary Catheter 05/21/22 0700 <1 day                    Significant Labs:    CBC/Anemia Profile:  Recent Labs   Lab 05/20/22  0503 05/21/22  0500   WBC 8.98 9.70   HGB 7.3* 7.1*   HCT 23.2* 23.0*   * 504*   MCV 89 90   RDW 16.2* 16.0*          Chemistries:  Recent Labs   Lab 05/20/22  1334 05/21/22  0500    138   K 5.0 4.6   CL 98 99   CO2 30* 30*   BUN 10 9   CREATININE 0.5 0.6   CALCIUM 7.7* 7.7*   ALBUMIN 1.4* 1.3*   PROT 5.5* 5.3*   BILITOT 0.3 0.3   ALKPHOS 130 111   ALT 16 15   AST 11 11   MG  --  1.7   PHOS  --  5.4*       ABGs: No results for input(s): PH, PCO2, HCO3, POCSATURATED, BE in the last 48 hours.  BMP:   Recent Labs   Lab 05/21/22  0500   GLU 86      K 4.6   CL 99   CO2 30*   BUN 9   CREATININE 0.6   CALCIUM 7.7*   MG 1.7     CMP:   Recent Labs   Lab 05/20/22  1334 05/21/22  0500    138   K  5.0 4.6   CL 98 99   CO2 30* 30*   GLU 87 86   BUN 10 9   CREATININE 0.5 0.6   CALCIUM 7.7* 7.7*   PROT 5.5* 5.3*   ALBUMIN 1.4* 1.3*   BILITOT 0.3 0.3   ALKPHOS 130 111   AST 11 11   ALT 16 15   ANIONGAP 8 9   EGFRNONAA >60 >60       Significant Imaging:   I have reviewed all pertinent imaging results/findings within the past 24 hours.    CT Guided Needle Placement  Narrative: EXAMINATION:  Pleural drainage catheter placement    Procedural Personnel    Attending physician(s): Lucy Bass    Fellow physician(s): None    Resident physician(s): None    Advanced practice provider(s): None    Pre-procedure diagnosis: Right hydropneumothorax, empyema    Post-procedure diagnosis: Same    Indication: Leukocytosis with fluid collection    Additional clinical history: IVDU, septic pulmonary emboli with likely necrotizing pneumonia    Complications: No immediate complications.    PROCEDURAL SUMMARY:  -Right pleural drainage catheter placement under CT guidance    PROCEDURE:  Pre-procedure:    Consent: Informed consent for the procedure was obtained and time-out was performed prior to the procedure.    Preparation: The site was prepared and draped using maximal sterile barrier technique including cutaneous antisepsis.    Antibiotic administered: Scheduled dose more than 1 hour from procedure start time  or more than 2 hours for vancomycin or fluoroquinolones    Anesthesia/sedation:    Level of anesthesia/sedation: Monitored anesthesia care    Anesthesia/sedation administered by: Anesthesiology    Total intra-service sedation time (minutes): See anesthesia record    Drainage catheter placement:    The patient was positioned left lateral decubitus. Initial imaging was performed. Local anesthesia was administered. The fluid collection was accessed using an access needle followed by wire insertion and serial dilation and a drainage catheter was placed. Position of the drainage catheter within the fluid collection was  confirmed.    - Initial imaging findings: Large loculated right hydropneumothorax with redemonstration of multiple cavitary and consolidative densities throughout the lungs.    - Drainage catheter placed: Multipurpose drainage catheter    - External catheter securement: Adhesive anchoring device    - Post-drainage imaging findings: Partial drainage of the fluid collection    Contrast:    Contrast agent: None    Contrast volume (mL): 0    Radiation Dose:    CT dose length product ( mGy-cm): 1079.78    Additional Details:    Additional description of procedure: None    Equipment details: None    Specimens removed: Aspirated fluid was sent for analysis.    Estimated blood loss (mL): Less than 10    Standardized report: [email or web address]  Impression: Percutaneous placement of a 8.5 Syriac drainage catheter into right pleural space, yielding 60 mL of purulent fluid.    Plan:    Follow-up laboratory analysis.    Attestation:    Signer name: Lucy Bass    I attest that I was present for the entire procedure. I reviewed the stored images and agree with the report as written.    Electronically signed by: Lucy Bass  Date:    05/21/2022  Time:    13:55

## 2022-05-21 NOTE — PLAN OF CARE
Pt remains free from falls/injuries this shift. Safety precautions maintained. Pain managed with pain medication. No s/s of acute distress noted. VSS. Tele monitoring per orders. Continuous antibiotics infusing per orders. Chest tube to R back in place, CDI. 3L NC. PICC dressing CDI. Will continue to monitor. Chart check completed.

## 2022-05-22 LAB
ALBUMIN SERPL BCP-MCNC: 1.4 G/DL (ref 3.5–5.2)
ALP SERPL-CCNC: 119 U/L (ref 55–135)
ALT SERPL W/O P-5'-P-CCNC: 16 U/L (ref 10–44)
ANION GAP SERPL CALC-SCNC: 9 MMOL/L (ref 8–16)
AST SERPL-CCNC: 12 U/L (ref 10–40)
BASOPHILS # BLD AUTO: 0.06 K/UL (ref 0–0.2)
BASOPHILS NFR BLD: 0.6 % (ref 0–1.9)
BILIRUB SERPL-MCNC: 0.3 MG/DL (ref 0.1–1)
BUN SERPL-MCNC: 10 MG/DL (ref 6–20)
CALCIUM SERPL-MCNC: 8.3 MG/DL (ref 8.7–10.5)
CHLORIDE SERPL-SCNC: 100 MMOL/L (ref 95–110)
CO2 SERPL-SCNC: 31 MMOL/L (ref 23–29)
CREAT SERPL-MCNC: 0.6 MG/DL (ref 0.5–1.4)
DIFFERENTIAL METHOD: ABNORMAL
EOSINOPHIL # BLD AUTO: 0.2 K/UL (ref 0–0.5)
EOSINOPHIL NFR BLD: 1.8 % (ref 0–8)
ERYTHROCYTE [DISTWIDTH] IN BLOOD BY AUTOMATED COUNT: 15.6 % (ref 11.5–14.5)
EST. GFR  (AFRICAN AMERICAN): >60 ML/MIN/1.73 M^2
EST. GFR  (NON AFRICAN AMERICAN): >60 ML/MIN/1.73 M^2
GLUCOSE SERPL-MCNC: 95 MG/DL (ref 70–110)
HCT VFR BLD AUTO: 23.9 % (ref 37–48.5)
HGB BLD-MCNC: 7.7 G/DL (ref 12–16)
IMM GRANULOCYTES # BLD AUTO: 0.1 K/UL (ref 0–0.04)
IMM GRANULOCYTES NFR BLD AUTO: 1 % (ref 0–0.5)
LYMPHOCYTES # BLD AUTO: 2.5 K/UL (ref 1–4.8)
LYMPHOCYTES NFR BLD: 26.3 % (ref 18–48)
MCH RBC QN AUTO: 27.7 PG (ref 27–31)
MCHC RBC AUTO-ENTMCNC: 32.2 G/DL (ref 32–36)
MCV RBC AUTO: 86 FL (ref 82–98)
MONOCYTES # BLD AUTO: 1 K/UL (ref 0.3–1)
MONOCYTES NFR BLD: 10.4 % (ref 4–15)
NEUTROPHILS # BLD AUTO: 5.7 K/UL (ref 1.8–7.7)
NEUTROPHILS NFR BLD: 59.9 % (ref 38–73)
NRBC BLD-RTO: 0 /100 WBC
PLATELET # BLD AUTO: 481 K/UL (ref 150–450)
PMV BLD AUTO: 8.5 FL (ref 9.2–12.9)
POCT GLUCOSE: 123 MG/DL (ref 70–110)
POTASSIUM SERPL-SCNC: 4.6 MMOL/L (ref 3.5–5.1)
PROT SERPL-MCNC: 5.7 G/DL (ref 6–8.4)
RBC # BLD AUTO: 2.78 M/UL (ref 4–5.4)
SODIUM SERPL-SCNC: 140 MMOL/L (ref 136–145)
WBC # BLD AUTO: 9.55 K/UL (ref 3.9–12.7)

## 2022-05-22 PROCEDURE — 25000003 PHARM REV CODE 250: Performed by: NURSE PRACTITIONER

## 2022-05-22 PROCEDURE — 99233 SBSQ HOSP IP/OBS HIGH 50: CPT | Mod: ,,, | Performed by: INTERNAL MEDICINE

## 2022-05-22 PROCEDURE — 21400001 HC TELEMETRY ROOM

## 2022-05-22 PROCEDURE — 99233 PR SUBSEQUENT HOSPITAL CARE,LEVL III: ICD-10-PCS | Mod: ,,, | Performed by: INTERNAL MEDICINE

## 2022-05-22 PROCEDURE — 25000242 PHARM REV CODE 250 ALT 637 W/ HCPCS: Performed by: INTERNAL MEDICINE

## 2022-05-22 PROCEDURE — 85025 COMPLETE CBC W/AUTO DIFF WBC: CPT | Performed by: INTERNAL MEDICINE

## 2022-05-22 PROCEDURE — 27000221 HC OXYGEN, UP TO 24 HOURS

## 2022-05-22 PROCEDURE — 80053 COMPREHEN METABOLIC PANEL: CPT | Performed by: NURSE PRACTITIONER

## 2022-05-22 PROCEDURE — 94664 DEMO&/EVAL PT USE INHALER: CPT

## 2022-05-22 PROCEDURE — 63600175 PHARM REV CODE 636 W HCPCS: Performed by: NURSE PRACTITIONER

## 2022-05-22 PROCEDURE — 94761 N-INVAS EAR/PLS OXIMETRY MLT: CPT

## 2022-05-22 PROCEDURE — 63600175 PHARM REV CODE 636 W HCPCS: Performed by: INTERNAL MEDICINE

## 2022-05-22 PROCEDURE — 94640 AIRWAY INHALATION TREATMENT: CPT

## 2022-05-22 PROCEDURE — 99900035 HC TECH TIME PER 15 MIN (STAT)

## 2022-05-22 PROCEDURE — 94799 UNLISTED PULMONARY SVC/PX: CPT

## 2022-05-22 RX ORDER — KETOROLAC TROMETHAMINE 30 MG/ML
15 INJECTION, SOLUTION INTRAMUSCULAR; INTRAVENOUS ONCE
Status: COMPLETED | OUTPATIENT
Start: 2022-05-22 | End: 2022-05-22

## 2022-05-22 RX ADMIN — IPRATROPIUM BROMIDE AND ALBUTEROL SULFATE 3 ML: 2.5; .5 SOLUTION RESPIRATORY (INHALATION) at 07:05

## 2022-05-22 RX ADMIN — TRAZODONE HYDROCHLORIDE 100 MG: 100 TABLET ORAL at 09:05

## 2022-05-22 RX ADMIN — FAMOTIDINE 20 MG: 20 TABLET ORAL at 09:05

## 2022-05-22 RX ADMIN — ALPRAZOLAM 1 MG: 1 TABLET ORAL at 05:05

## 2022-05-22 RX ADMIN — OXYCODONE HYDROCHLORIDE 5 MG: 5 TABLET ORAL at 06:05

## 2022-05-22 RX ADMIN — OXACILLIN SODIUM 12 G: 10 INJECTION, POWDER, FOR SOLUTION INTRAVENOUS at 04:05

## 2022-05-22 RX ADMIN — ALPRAZOLAM 1 MG: 1 TABLET ORAL at 06:05

## 2022-05-22 RX ADMIN — KETOROLAC TROMETHAMINE 15 MG: 30 INJECTION, SOLUTION INTRAMUSCULAR at 09:05

## 2022-05-22 RX ADMIN — MORPHINE SULFATE 15 MG: 15 TABLET, EXTENDED RELEASE ORAL at 09:05

## 2022-05-22 RX ADMIN — KETOROLAC TROMETHAMINE 15 MG: 30 INJECTION, SOLUTION INTRAMUSCULAR at 05:05

## 2022-05-22 RX ADMIN — ENOXAPARIN SODIUM 40 MG: 40 INJECTION SUBCUTANEOUS at 04:05

## 2022-05-22 RX ADMIN — IPRATROPIUM BROMIDE AND ALBUTEROL SULFATE 3 ML: 2.5; .5 SOLUTION RESPIRATORY (INHALATION) at 01:05

## 2022-05-22 NOTE — ASSESSMENT & PLAN NOTE
Pulmonology consulted and felt likely empyema   IR consulted for chest tube      s/p right pigtail small bore chest tube placement to suction/drainage system per IR on 5/21/22. 60ml pus removed. Now with Serous drainage with pus. Fluid analysis consistent with empyema. Pleural fluid cultures pending.

## 2022-05-22 NOTE — PROGRESS NOTES
[facility]  [facility] Note    Patient Name: Tianna Leon  MRN: [id number]  Patient Class: IP- Inpatient   Admission Date: 5/6/2022  Length of Stay: 16 days  Attending Physician: Elizabeth Kim MD  Primary Care Provider: Spenser Campa MD        Subjective:     Principal Problem:Acute bacterial endocarditis        HPI:  36 y/o. female  with a PMHx of asthma, COPD,Bipolar  , IVDU and HLD presented to the ER with a c/o  sob for the last weak which has gotten worse . The SOB is associated with fever , chills , productive cough , back pain  and generalized weakness . She report cough some blood this am .  She is active IVDU  ( heroine , cocaine  and amphetamine ) . She denies any  sick contact , chest pain  , GI/ sx , She also complaning of LE sweeling . Knee pain and B/L LE rash .   ER COURSE: CTA chest negative for pe . Multiple nodular and cavitary opacities concerning for septic emboli with larger opacities possibly necrotizing pneumonia. CT cervical  and thoracic did not show any acute finding , CT lumbar Possible progression of degenerative changes most notable at L4-5 with moderate to severe spinal canal stenosis at this level.  WBC  29 k , na 130 , k 3.4 , cl 89 , procal 2.71   ER VS:  BP Pulse Resp Temp SpO2   (!) 124/58 110 (!) 30 (!) 101.6 °F (38.7 °C) 96 %           Pt will be admitted to inpt with a dx of PNA and severe sepsis       Overview/Hospital Course:  5/7 admitted for pneumonia, severe sepsis in setting of ivdu. Mother at bedside and provides collateral. Patient has struggled with substance abuse for approximately 20 years, worsening over last 3-5 years since life event. Onset of symptoms 1-2 weeks ago. Tolerated thoracentesis with no pneumothorax on chest x-ray. Mri lumbar and echo, pending. Bcx positive, growing gram positive cocci. On vanc, cefepime and flagyl. Infectious disease consulted for bacteremia. 5/8/22 The patient remained afebrile overnight. Currently on  cefepime/vanc/flagyl. Blood cx 5/6/22 are growing MSSA. The patient refused the MRI lumbar spine overnight d/t being unable to lay flat from back pain. Will give pain meds and attempt to get the MRI today to r/o spinal abscess. The ECHO showed a 1.3 x 1.5 cm elongated, mobile echodensity seen on the tricuspid valve consistent with vegetation, CT surgery was consulted. Will need a SUSANNAH. Infectious disease is consulted. Will repeat blood cx today. 5/9/22 No acute events overnight. The patient reports lower back pain is not well controlled with current pain regimen, will adjust. Repeat blood cx are still positive. Sensitivities are back and Staph is sensitive to oxacillin, will D/C vanc/cefepime. Cardiology consulted and plans for a SUSANNAH today. CT surgery following and recommends continuing medical management with appropriate ABX, no surgical intervention at this time. MRI lumbar spine showed moderate to severe spinal canal stenosis with moderate left-sided neural foraminal stenosis, Neurosurgery consulted. 5/10/22 No acute events overnight. The patient reports some improvement in pain with adjustment in pain meds. Repeat blood cx are +. Continue IV oxacillin and flagyl. Infectious disease is consulted. IR consulted to evaluate possible paraspinous muscle myositis versus abscess. Recommend IR drainage. Continue current management. 5/11/22 No acute events overnight. The patient reports pain is still not well controlled at times. The patient reports that she is very anxious about the upcoming SUSANNAH and IR drainage. SUSANNAH was pushed back to this afternoon because the patient ate candy this AM. Continue treatment with IV oxacillin and flagyl. Infectious disease is following. WBC trended down to 31K. Continue current management. 5/12/22 No acute events overnight. The patients WBC improved to 22K overnight. The patient remains on continuous oxacillin infusion. Infectious disease is following. MRI thoracic spine is ordered and  pending. The patients SUSANNAH was postponed again today d/t low H/H. Hgb was noted to be 6.2 this morning, will transfuse 1 unit PRBC. The patient reports pain is still not well controlled. The case was discussed with Neurosurgery who recommended adding extended release pain meds. Approximately 1 ml of purulent drainage aspirated from back yesterday per IR, growing gram + cocci.     5/13: Patient was given 1U PRBC on 5/12, improved from 6.2 --> 7.0, will give an additional unit PRBC. Radiology attempted to bring patient on 5/12 at 1755 for Thoracic MRI, patient refused due to pain and anxiety. Repeat attempt this AM, patient again refused. Will order Ativan 1mg IV to be given with pain medication prior to scan to relieve anxiety and pain, nurse to notify NP when ready to go for scan. Discussed pain management with patient, discussed transition to PO pain medications to being preparing for d/c to LTAC, adjusted PO pain regimen will reevaluate to determine effectiveness. Repeat Blood cultures 5/11/22: NGTD.     5/14: Final anaerobic cultures pending, continue to adjust pain regimen, d/c IV Dilaudid today. Patient currently managed with PO Morphine extended release and PO oxycodone. D/C plan is for LTAC once final antibiotic regimen determined. Patient continues to refuse MRI of thoracic spine.     5/15: Called to room this AM, patient had coughed up blood and mucous. Approx. 1-2 teaspoons of blood in emesis bag, ordered CXR and repeat CBC, CXR showed improvement from previous day, repeat CBC at time of incident, showed a slight decrease in Hgb: 9.7 --> 8.7, when repeated at 1500, Hgb had improved to 10. no further episodes of coughing up blood throughout day. Patient continues to c/o not having enough pain medication but continues to sleep most of the day and will fall asleep when speaking at times. Added IV Toradol to plan.     5/16: No further episodes of coughing blood overnight, patient participated with encouragement  with PT/OT today, recommendations are HH vs. SNF pending progress. Awaiting final antibiotic regimen recommendations. Anaerobic culture results finalized: negative for growth. Afebrile, WBC trending downward.     5/17: Antibiotic regimen per ID is continuous oxacillin until 7/11/22, SW notified of plan, will seek acceptance at LTAC or SNF. Patient choice is [facility]. WBC in normal range. AMS this AM, CT of head: no acute findings. Given narcan and mental status improved. Decreased narcotic dose.     5/18: Per discussion with patient, she is willing to have the MRI of thoracic spine today, will order IV pain medication and anxiety medication prior to scan. Nursing and Radiology aware of needing to premedicate for scan. Patient is more cooperative and participating with care.     5/19: Patient given premedications for MRI, MRI completed, results pending. Patient more agreeable with treatment plan and cooperating. Will need psychiatry follow-up after discharge. Hgb: 6.7, will give 1U PRBC    5/20/22: +LEON and generalized pain. Thoracic MRI showed no evidence for disc osteomyelitis in the thoracic spine and no epidural abscess, Loculated right hydropneumothorax and extensive bilateral pulmonary infiltrates. Ct chest showed septic emboli and moderate right pleural effusion.+splenic infarct  Pulmonology felt likely empyema- consulted IR for chest tube.     5/21/22: s/p right pig tail placement to suction/ drainage system per IR. 60ml pus removed initially. Fluid analysis consistent with empyema. Pleural fluid cultures pending. Now with scant serosanguinous output.+ right lateral chest pain at chest tube site. Denies any other complaints. Status update provided to pt's mother.     5/22/22: Repeatedly requesting IV Dilaudid. Toradol ordered. Afebrile, WBC normal, O2sats stable on 3lites. s/p right pig tail placement to suction/ drainage system per IR on 5/21/22- draining serous draiange with pus. Plan for LTAC placement        Interval History: see hospital course    Review of Systems   Constitutional:  Positive for activity change, appetite change and fatigue. Negative for chills, diaphoresis, fever and unexpected weight change.   HENT:  Negative for congestion, hearing loss, mouth sores, postnasal drip, rhinorrhea, sore throat and trouble swallowing.    Eyes:  Negative for discharge and visual disturbance.   Respiratory:  Negative for cough and chest tightness.    Cardiovascular:  Negative for chest pain, palpitations and leg swelling.   Gastrointestinal:  Negative for blood in stool, constipation, diarrhea, nausea and vomiting.   Endocrine: Negative for cold intolerance and heat intolerance.   Genitourinary:  Negative for difficulty urinating, dyspareunia, flank pain and hematuria.   Musculoskeletal:  Positive for back pain and myalgias. Negative for arthralgias.   Skin: Negative.    Neurological:  Positive for weakness. Negative for dizziness and light-headedness.   Hematological:  Negative for adenopathy. Does not bruise/bleed easily.   Psychiatric/Behavioral:  Negative for agitation, behavioral problems, confusion and sleep disturbance. The patient is nervous/anxious.    Objective:     Vital Signs (Most Recent):  Temp: 98 °F (36.7 °C) (05/22/22 1249)  Pulse: 80 (05/22/22 1327)  Resp: 18 (05/22/22 1327)  BP: (!) 145/79 (05/22/22 1249)  SpO2: 96 % (05/22/22 1327)   Vital Signs (24h Range):  Temp:  [98 °F (36.7 °C)-99.3 °F (37.4 °C)] 98 °F (36.7 °C)  Pulse:  [] 80  Resp:  [16-20] 18  SpO2:  [93 %-97 %] 96 %  BP: (118-155)/(68-85) 145/79     Weight: 97.1 kg (214 lb 1.1 oz)  Body mass index is 35.62 kg/m².    Intake/Output Summary (Last 24 hours) at 5/22/2022 1624  Last data filed at 5/22/2022 1400  Gross per 24 hour   Intake 603.51 ml   Output 7760 ml   Net -7156.49 ml        Physical Exam  Vitals and nursing note reviewed.   Constitutional:       General: She is not in acute distress.     Appearance: She is well-developed.    HENT:      Head: Normocephalic and atraumatic.      Right Ear: Hearing and external ear normal.      Left Ear: Hearing and external ear normal.      Nose: No rhinorrhea.      Right Sinus: No maxillary sinus tenderness or frontal sinus tenderness.      Left Sinus: No maxillary sinus tenderness or frontal sinus tenderness.      Mouth/Throat:      Mouth: No oral lesions.      Pharynx: Uvula midline.   Eyes:      General:         Right eye: No discharge.         Left eye: No discharge.      Conjunctiva/sclera: Conjunctivae normal.      Pupils: Pupils are equal, round, and reactive to light.   Neck:      Thyroid: No thyromegaly.      Vascular: No carotid bruit.      Trachea: No tracheal deviation.   Cardiovascular:      Rate and Rhythm: Normal rate and regular rhythm.      Pulses:           Dorsalis pedis pulses are 2+ on the right side and 2+ on the left side.      Heart sounds: Normal heart sounds, S1 normal and S2 normal. No murmur heard.  Pulmonary:      Effort: Pulmonary effort is normal. No respiratory distress.      Breath sounds: Examination of the right-lower field reveals decreased breath sounds. Examination of the left-lower field reveals decreased breath sounds. Decreased breath sounds present.      Comments: Right pigtail small bore chest tube in place with moderate serous drainage with pus to to drainage system   Chest tube site C/D/I  Chest:   Breasts:      Right: No supraclavicular adenopathy.      Left: No supraclavicular adenopathy.     Abdominal:      General: Bowel sounds are decreased. There is no distension.      Palpations: Abdomen is soft. There is no mass.      Tenderness: There is no abdominal tenderness.   Musculoskeletal:      Cervical back: Normal range of motion.      Thoracic back: Tenderness present. Decreased range of motion.      Lumbar back: Tenderness present. Decreased range of motion.   Lymphadenopathy:      Cervical: No cervical adenopathy.      Upper Body:      Right upper body:  No supraclavicular adenopathy.      Left upper body: No supraclavicular adenopathy.   Skin:     General: Skin is warm and dry.      Capillary Refill: Capillary refill takes less than 2 seconds.      Findings: No rash.   Neurological:      Mental Status: She is alert and oriented to person, place, and time.      Sensory: No sensory deficit.      Coordination: Coordination normal.      Gait: Gait normal.   Psychiatric:         Attention and Perception: She is inattentive.         Mood and Affect: Mood is anxious. Mood is not depressed.         Speech: Speech normal.         Behavior: Behavior normal.         Thought Content: Thought content normal.         Judgment: Judgment normal.       Significant Labs: All pertinent labs within the past 24 hours have been reviewed.  CBC:   Recent Labs   Lab 05/21/22  0500 05/22/22  0604   WBC 9.70 9.55   HGB 7.1* 7.7*   HCT 23.0* 23.9*   * 481*       CMP:   Recent Labs   Lab 05/21/22  0500 05/22/22  0604    140   K 4.6 4.6   CL 99 100   CO2 30* 31*   GLU 86 95   BUN 9 10   CREATININE 0.6 0.6   CALCIUM 7.7* 8.3*   PROT 5.3* 5.7*   ALBUMIN 1.3* 1.4*   BILITOT 0.3 0.3   ALKPHOS 111 119   AST 11 12   ALT 15 16   ANIONGAP 9 9   EGFRNONAA >60 >60         Significant Imaging: I have reviewed all pertinent imaging results/findings within the past 24 hours.      Assessment/Plan:      * Acute bacterial endocarditis  ECHO showed a 1.3 x 1.5 cm elongated, mobile echodensity seen on the tricuspid valve consistent with vegetation, CT surgery was consulted.   Due to MSSA    -- Infectious disease is consulted.  -- repeat blood cx NGTD.  -- Continue IV ABX.   --Sensitivities are back and Staph is sensitive to oxacillin, D/C vanc/cefepime.   --Cardiology consulted and plans for a SUSANNAH.   --CT surgery following and recommends continuing medical management with appropriate ABX, no surgical intervention at this time.    cont IV oxacillin.      MSSA bacteremia  Gram positive   Infectious  disease consulted  Continue broad spectrum intravenous antibiotic(s) given h/o ivdu  Remains febrile and leukocytosis persists  5/8/22 The patient remained afebrile overnight. Currently on cefepime/vanc/flagyl. Blood cx are growing staph aureus. The patient refused the MRI lumbar spine overnight d/t being unable to lay flat from back pain. Will give pain meds and attempt to get the MRI today to r/o spinal abscess. The ECHO showed a 1.3 x 1.5 cm elongated, mobile echodensity seen on the tricuspid valve consistent with vegetation, CT surgery was consulted. Will need a SUSANNAH. Infectious disease is consulted. Will repeat blood cx today.   5/9/22 Repeat blood cx are still positive. Sensitivities are back and Staph is sensitive to oxacillin, will D/C vanc/cefepime. Infectious disease is following   5/10/22 Repeat blood cx are +. Continue IV oxacillin and flagyl. Infectious disease is consulted. IR consulted to evaluate possible paraspinous muscle myositis versus abscess. Recommend IR drainage. Continue current management.    5/11/22 The patient reports pain is still not well controlled at times. The patient reports that she is very anxious about the upcoming SUSANNAH and IR drainage. SUSANNAH was pushed back to this afternoon because the patient ate candy this AM. Continue treatment with IV oxacillin and flagyl. Infectious disease is following. WBC trended down to 31K. Continue current management.  5/12/22 The patients WBC improved to 22K overnight. The patient remains on continuous oxacillin infusion. Infectious disease is following.  5/14: WBC continues to improve, 18.10 today     5/15: Blood cultures 5/11/22: NGTD     Continuous Oxacillin, EOC: 7/11/22, pending acceptance at [facility], PICC line placed    Hydropneumothorax  Pulmonology consulted and felt likely empyema   IR consulted for chest tube      s/p right pigtail small bore chest tube placement to suction/drainage system per IR on 5/21/22. 60ml pus removed. Now with Serous  drainage with pus. Fluid analysis consistent with empyema. Pleural fluid cultures pending.    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 IR consulted to evaluate possible paraspinous muscle myositis versus abscess.    --Approximately 1 ml of purulent drainage aspirated 5/11 per IR, growing MSSA  --Continue current regimen      Normocytic anemia  Secondary to acute illness    --Daily CBC  --Transfuse with 1 unit PRBC for Hgb <7.0 or <8.0 if symptomatic   --Hgb: 6.7, give 1U PRBC    Lumbar stenosis without neurogenic claudication        Chronic pulmonary heart disease  - Pulmonology following       IVDU (intravenous drug user)   Will ordet TTE to r/o IE  Will order MRI lumbar wwo- completed   Cont broad spectrum IVAB   --MRI thoracic spine ordered per ID, patient refused on multiple attempts    5/7   Studies pending for additional sites of infection given h/o ivdu  5/8/22  on cessation     Severe sepsis  This patient does have evidence of infective focus  My overall impression is sepsis. Vital signs were reviewed and noted in progress note.  Antibiotics given-   Antibiotics (From admission, onward)            Start     Stop Route Frequency Ordered    05/09/22 1100  oxacillin 12 g in  mL CONTINUOUS INFUSION         -- IV Every 24 hours (non-standard times) 05/09/22 0932    05/07/22 2100  mupirocin 2 % ointment         05/12 2059 Nasl 2 times daily 05/07/22 1646        Cultures were taken-   Microbiology Results (last 7 days)     Procedure Component Value Units Date/Time    Culture, Respiratory with Gram Stain [[id number]] Collected: 05/16/22 0758    Order Status: Sent Specimen: Respiratory from Sputum Updated: 05/16/22 0759    Culture, Anaerobe [[id number]] Collected:  05/11/22 1440    Order Status: Completed Specimen: Abscess from Back Updated: 05/16/22 0733     Anaerobic Culture No anaerobes isolated    Blood culture [[id number]] Collected: 05/11/22 1213    Order Status: Completed Specimen: Blood Updated: 05/16/22 0612     Blood Culture, Routine No Growth to date      No Growth to date      No Growth to date      No Growth to date      No Growth to date    Blood culture [[id number]] Collected: 05/11/22 1213    Order Status: Completed Specimen: Blood Updated: 05/16/22 0612     Blood Culture, Routine No Growth to date      No Growth to date      No Growth to date      No Growth to date      No Growth to date    Aerobic culture [[id number]]  (Abnormal)  (Susceptibility) Collected: 05/11/22 1440    Order Status: Completed Specimen: Abscess from Back Updated: 05/14/22 1057     Aerobic Bacterial Culture STAPHYLOCOCCUS AUREUS  Many      Gram stain [[id number]] Collected: 05/11/22 1440    Order Status: Completed Specimen: Abscess from Back Updated: 05/12/22 0306     Gram Stain Result Few WBC's      Few Gram positive cocci    Gram stain [[id number]]     Order Status: Canceled Specimen: Joint Fluid from Back     Blood culture [[id number]]  (Abnormal) Collected: 05/08/22 1516    Order Status: Completed Specimen: Blood from Peripheral, Right Hand Updated: 05/11/22 1009     Blood Culture, Routine Gram stain aer bottle: Gram positive cocci in clusters resembling Staph       Results called to and read back by: Chasity Raymundo RN  05/09/2022  11:31      STAPHYLOCOCCUS AUREUS  ID consult required at [facility] and [facility].  For susceptibility see order #[id number]      Narrative:      Collection has been rescheduled by [email or web address] at 05/08/2022 13:22 Reason:   Pt in mri will be there after another hour [id number]  Collection has been rescheduled by [email or web address] at 05/08/2022 13:22 Reason:   Pt in mri will be there after another hour [id number]    Blood culture [[id number]]  (Abnormal) Collected: 05/08/22  1515    Order Status: Completed Specimen: Blood from Peripheral, Left Arm Updated: 05/11/22 1009     Blood Culture, Routine Gram stain aer bottle: Gram positive cocci in clusters resembling Staph      Results called to and read back by: Chasity Raymundo RN  05/09/2022  15:40      STAPHYLOCOCCUS AUREUS  ID consult required at [facility] and [facility] locations.  For susceptibility see order #[id number]      Narrative:      Collection has been rescheduled by [email or web address] at 05/08/2022 13:22 Reason:   Pt in mri will be there after another hour [id number]  Collection has been rescheduled by [email or web address] at 05/08/2022 13:22 Reason:   Pt in mri will be there after another hour [id number]        Latest lactate reviewed, they are-  No results for input(s): LACTATE in the last 72 hours.    Organ dysfunction indicated by Acute kidney injury  Source-  lung    Source control Achieved by-   Cont Broad spectrum IVAB     Bacteremia  Infectious disease consulted      PNA (pneumonia)  --Cont oxacillin        Tobacco abuse  - Patient thoroughly counseled on smoking cessation, pt verbalized understanding. Time of counseling 10 minutes.        COPD (chronic obstructive pulmonary disease)  Cont breathing tx prn  Pulmonology following       VTE Risk Mitigation (From admission, onward)         Ordered     enoxaparin injection 40 mg  Daily         05/06/22 2336     IP VTE HIGH RISK PATIENT  Once         05/06/22 2336     Place sequential compression device  Until discontinued         05/06/22 2336                Discharge Planning   YESSICA:      Code Status: Full Code   Is the patient medically ready for discharge?:     Reason for patient still in hospital (select all that apply): Patient trending condition  Discharge Plan A: Home   Discharge Delays: None known at this time              Adriana Baltazar NP  Department of Hospital Medicine   [facility] - Med Surg

## 2022-05-22 NOTE — SUBJECTIVE & OBJECTIVE
Past Medical History:   Diagnosis Date    ADHD (attention deficit hyperactivity disorder)     Anxiety     Asthma     Bipolar disorder     Cancer     cervical    COPD (chronic obstructive pulmonary disease)     GERD (gastroesophageal reflux disease)     Hepatitis C antibody positive in blood     RNA UNDETECTED 2016    Hyperlipidemia     Intravenous drug abuse     MDD (major depressive disorder)     Mood disorder     PTSD (post-traumatic stress disorder)     Suicidal ideation        Past Surgical History:   Procedure Laterality Date     SECTION  2001    x2    CHOLECYSTECTOMY      COMPUTED TOMOGRAPHY N/A 2022    Procedure: CT (COMPUTED TOMOGRAPHY)/facet joint aspiration;  Surgeon: Lloyd Suarez MD;  Location: [facility];  Service: General;  Laterality: N/A;    HYSTERECTOMY      LAPAROSCOPIC SALPINGECTOMY      ROBOT-ASSISTED LAPAROSCOPIC ABDOMINAL HYSTERECTOMY USING DA SEBASTIEN XI N/A 2019    Procedure: XI ROBOTIC HYSTERECTOMY;  Surgeon: Tabatha Quintanilla MD;  Location: [facility];  Service: OB/GYN;  Laterality: N/A;    ROBOT-ASSISTED LAPAROSCOPIC LYSIS OF ADHESIONS USING DA SEBASTIEN XI N/A 2019    Procedure: XI ROBOTIC LYSIS, ADHESIONS;  Surgeon: Tabatha Quintanilla MD;  Location: [facility];  Service: OB/GYN;  Laterality: N/A;    ROBOT-ASSISTED SURGICAL REMOVAL OF FALLOPIAN TUBE USING DA SEBASTIEN XI Right 2019    Procedure: XI ROBOTIC SALPINGECTOMY;  Surgeon: Tabatha Quintanilla MD;  Location: [facility];  Service: OB/GYN;  Laterality: Right;    TONSILLECTOMY      TUBAL LIGATION         Review of patient's allergies indicates:   Allergen Reactions    Sulfamethoxazole-trimethoprim        Family History       Problem Relation (Age of Onset)    COPD Mother          Tobacco Use    Smoking status: Current Every Day Smoker     Packs/day: 1.50     Types: Cigarettes    Smokeless tobacco: Never Used    Tobacco comment: no smoking after midnight prior to surgery   Substance and Sexual Activity     Alcohol use: Yes     Comment: Occassional     Drug use: Not Currently     Types: IV, Methamphetamines, Heroin     Comment: denies heroin use.  Last used meth 10/2018  No illegal drugs prior to sx    Sexual activity: Not Currently     Partners: Male     Birth control/protection: None         Review of Systems   Constitutional:  Positive for diaphoresis, fatigue and fever.   HENT:  Positive for dental problem and sinus pressure.    Respiratory:  Positive for cough, chest tightness and shortness of breath.         Chest tube right chest   Cardiovascular: Negative.    Gastrointestinal: Negative.    Genitourinary: Negative.    Musculoskeletal:  Positive for arthralgias.   Neurological:  Positive for weakness.   Psychiatric/Behavioral:  The patient is nervous/anxious.    Objective:     Vital Signs (Most Recent):  Temp: 98.8 °F (37.1 °C) (05/22/22 0820)  Pulse: 87 (05/22/22 0820)  Resp: 16 (05/22/22 0903)  BP: 134/70 (05/22/22 0820)  SpO2: 95 % (05/22/22 0820) Vital Signs (24h Range):  Temp:  [98.1 °F (36.7 °C)-99.3 °F (37.4 °C)] 98.8 °F (37.1 °C)  Pulse:  [] 87  Resp:  [16-20] 16  SpO2:  [93 %-100 %] 95 %  BP: (118-155)/(63-85) 134/70     Weight: 97.1 kg (214 lb 1.1 oz)  Body mass index is 35.62 kg/m².      Intake/Output Summary (Last 24 hours) at 5/22/2022 1019  Last data filed at 5/22/2022 0830  Gross per 24 hour   Intake 998.57 ml   Output 7610 ml   Net -6611.43 ml         Physical Exam  Vitals and nursing note reviewed.   Constitutional:       Appearance: She is well-developed. She is obese. She is ill-appearing.   HENT:      Head: Normocephalic and atraumatic.      Nose: Nose normal.   Eyes:      Conjunctiva/sclera: Conjunctivae normal.      Pupils: Pupils are equal, round, and reactive to light.   Neck:      Thyroid: No thyromegaly.      Vascular: No JVD.      Trachea: No tracheal deviation.   Cardiovascular:      Rate and Rhythm: Regular rhythm. Tachycardia present.      Heart sounds: Murmur heard.    Pulmonary:      Effort: Pulmonary effort is normal. No respiratory distress.      Breath sounds: Stridor present. Examination of the right-lower field reveals wheezing. Examination of the left-lower field reveals wheezing. Decreased breath sounds, wheezing and rales present. No rhonchi.   Chest:      Chest wall: No tenderness.   Abdominal:      General: Bowel sounds are normal.      Palpations: Abdomen is soft.   Musculoskeletal:         General: Normal range of motion.      Cervical back: Neck supple.      Right lower leg: Edema present.      Left lower leg: Edema present.   Lymphadenopathy:      Cervical: No cervical adenopathy.   Skin:     General: Skin is warm and dry.   Neurological:      Mental Status: She is alert and oriented to person, place, and time.       Vents:  Oxygen Concentration (%): 32 (05/22/22 0731)    Lines/Drains/Airways       Peripherally Inserted Central Catheter Line  Duration             PICC Double Lumen 05/17/22 1612 right basilic 4 days              Drain  Duration                  Chest Tube 05/21/22 0957 1 Right Pleural 8.5 Fr. 1 day    Female External Urinary Catheter 05/21/22 0700 1 day                    Significant Labs:    CBC/Anemia Profile:  Recent Labs   Lab 05/21/22  0500 05/22/22  0604   WBC 9.70 9.55   HGB 7.1* 7.7*   HCT 23.0* 23.9*   * 481*   MCV 90 86   RDW 16.0* 15.6*          Chemistries:  Recent Labs   Lab 05/20/22  1334 05/21/22  0500 05/22/22  0604    138 140   K 5.0 4.6 4.6   CL 98 99 100   CO2 30* 30* 31*   BUN 10 9 10   CREATININE 0.5 0.6 0.6   CALCIUM 7.7* 7.7* 8.3*   ALBUMIN 1.4* 1.3* 1.4*   PROT 5.5* 5.3* 5.7*   BILITOT 0.3 0.3 0.3   ALKPHOS 130 111 119   ALT 16 15 16   AST 11 11 12   MG  --  1.7  --    PHOS  --  5.4*  --          ABGs: No results for input(s): PH, PCO2, HCO3, POCSATURATED, BE in the last 48 hours.  BMP:   Recent Labs   Lab 05/21/22  0500 05/22/22  0604   GLU 86 95    140   K 4.6 4.6   CL 99 100   CO2 30* 31*   BUN 9 10    CREATININE 0.6 0.6   CALCIUM 7.7* 8.3*   MG 1.7  --        CMP:   Recent Labs   Lab 05/20/22  1334 05/21/22  0500 05/22/22  0604    138 140   K 5.0 4.6 4.6   CL 98 99 100   CO2 30* 30* 31*   GLU 87 86 95   BUN 10 9 10   CREATININE 0.5 0.6 0.6   CALCIUM 7.7* 7.7* 8.3*   PROT 5.5* 5.3* 5.7*   ALBUMIN 1.4* 1.3* 1.4*   BILITOT 0.3 0.3 0.3   ALKPHOS 130 111 119   AST 11 11 12   ALT 16 15 16   ANIONGAP 8 9 9   EGFRNONAA >60 >60 >60         Significant Imaging:   I have reviewed all pertinent imaging results/findings within the past 24 hours.    X-Ray Chest 1 View  Narrative: EXAMINATION:  XR CHEST AP PORTABLE    CLINICAL HISTORY:  SOB;    COMPARISON:  Radiographs dating back to 05/17/2022 were reviewed.    FINDINGS:  Right PICC terminates within the mid right atrium.  There is a pigtail drainage catheter within the medial right pleural space.  No visible pneumothorax.  There is patchy consolidative density within the periphery of the right lower lobe, within the central right upper lobe, central left upper lobe, and behind the heart which is similar to the prior study.  Small loculated pleural fluid bilaterally.  Heart size within normal limits.  No significant bony findings.  Impression: Right PICC terminates within the mid right atrium.  Placement of pigtail thoracostomy tube on the right with no sign of pneumothorax.  Unchanged abnormal pulmonary opacities.    Electronically signed by: Paul Radford Jr., MD  Date:    05/22/2022  Time:    07:41

## 2022-05-22 NOTE — ASSESSMENT & PLAN NOTE
Complicated by PNA, possible empyema  SP Thora  Cont Abx  5/20/2022 Probable empyema,needs chest tube drainage  5/22 chest tube draining

## 2022-05-22 NOTE — NURSING
"Patient stated "I am fixing to start cutting up I want IV morphine and IV Ativan" I explained to the patient that she cannot have IV pain medications and she stated "I am not doing this all night I will cut up" Patient falling asleep mid conversation then wakes back up stating "I will cut up" I messaged the NP Leonarda Buuck and no new orders were placed. Call light within reach, side rails up X2, heart monitor in place. Will continue to monitor.  "

## 2022-05-22 NOTE — PROGRESS NOTES
[facility]  Pulmonology  Progress Note    Patient Name: Tianna Leon  MRN: [id number]  Admission Date: 2022  Hospital Length of Stay: 16 days  Code Status: Full Code  Attending Provider: Elizabeth Kim MD  Primary Care Provider: Spenser Campa MD   Principal Problem: Acute bacterial endocarditis    Subjective: Breathing easier     Past Medical History:   Diagnosis Date    ADHD (attention deficit hyperactivity disorder)     Anxiety     Asthma     Bipolar disorder     Cancer     cervical    COPD (chronic obstructive pulmonary disease)     GERD (gastroesophageal reflux disease)     Hepatitis C antibody positive in blood     RNA UNDETECTED 2016    Hyperlipidemia     Intravenous drug abuse     MDD (major depressive disorder)     Mood disorder     PTSD (post-traumatic stress disorder)     Suicidal ideation        Past Surgical History:   Procedure Laterality Date     SECTION  2001    x2    CHOLECYSTECTOMY      COMPUTED TOMOGRAPHY N/A 2022    Procedure: CT (COMPUTED TOMOGRAPHY)/facet joint aspiration;  Surgeon: Lloyd Suarez MD;  Location: [facility];  Service: General;  Laterality: N/A;    HYSTERECTOMY      LAPAROSCOPIC SALPINGECTOMY      ROBOT-ASSISTED LAPAROSCOPIC ABDOMINAL HYSTERECTOMY USING DA SEBASTIEN XI N/A 2019    Procedure: XI ROBOTIC HYSTERECTOMY;  Surgeon: Tabatha Quintanilla MD;  Location: [facility];  Service: OB/GYN;  Laterality: N/A;    ROBOT-ASSISTED LAPAROSCOPIC LYSIS OF ADHESIONS USING DA SEBASTIEN XI N/A 2019    Procedure: XI ROBOTIC LYSIS, ADHESIONS;  Surgeon: Tabatha Quintanilla MD;  Location: [facility];  Service: OB/GYN;  Laterality: N/A;    ROBOT-ASSISTED SURGICAL REMOVAL OF FALLOPIAN TUBE USING DA SEBASTIEN XI Right 2019    Procedure: XI ROBOTIC SALPINGECTOMY;  Surgeon: Taabtha Quintanilla MD;  Location: [facility];  Service: OB/GYN;  Laterality: Right;    TONSILLECTOMY      TUBAL LIGATION         Review of patient's allergies  indicates:   Allergen Reactions    Sulfamethoxazole-trimethoprim        Family History       Problem Relation (Age of Onset)    COPD Mother          Tobacco Use    Smoking status: Current Every Day Smoker     Packs/day: 1.50     Types: Cigarettes    Smokeless tobacco: Never Used    Tobacco comment: no smoking after midnight prior to surgery   Substance and Sexual Activity    Alcohol use: Yes     Comment: Occassional     Drug use: Not Currently     Types: IV, Methamphetamines, Heroin     Comment: denies heroin use.  Last used meth 10/2018  No illegal drugs prior to sx    Sexual activity: Not Currently     Partners: Male     Birth control/protection: None         Review of Systems   Constitutional:  Positive for diaphoresis, fatigue and fever.   HENT:  Positive for dental problem and sinus pressure.    Respiratory:  Positive for cough, chest tightness and shortness of breath.         Chest tube right chest   Cardiovascular: Negative.    Gastrointestinal: Negative.    Genitourinary: Negative.    Musculoskeletal:  Positive for arthralgias.   Neurological:  Positive for weakness.   Psychiatric/Behavioral:  The patient is nervous/anxious.    Objective:     Vital Signs (Most Recent):  Temp: 98.8 °F (37.1 °C) (05/22/22 0820)  Pulse: 87 (05/22/22 0820)  Resp: 16 (05/22/22 0903)  BP: 134/70 (05/22/22 0820)  SpO2: 95 % (05/22/22 0820) Vital Signs (24h Range):  Temp:  [98.1 °F (36.7 °C)-99.3 °F (37.4 °C)] 98.8 °F (37.1 °C)  Pulse:  [] 87  Resp:  [16-20] 16  SpO2:  [93 %-100 %] 95 %  BP: (118-155)/(63-85) 134/70     Weight: 97.1 kg (214 lb 1.1 oz)  Body mass index is 35.62 kg/m².      Intake/Output Summary (Last 24 hours) at 5/22/2022 1019  Last data filed at 5/22/2022 0830  Gross per 24 hour   Intake 998.57 ml   Output 7610 ml   Net -6611.43 ml         Physical Exam  Vitals and nursing note reviewed.   Constitutional:       Appearance: She is well-developed. She is obese. She is ill-appearing.   HENT:      Head:  Normocephalic and atraumatic.      Nose: Nose normal.   Eyes:      Conjunctiva/sclera: Conjunctivae normal.      Pupils: Pupils are equal, round, and reactive to light.   Neck:      Thyroid: No thyromegaly.      Vascular: No JVD.      Trachea: No tracheal deviation.   Cardiovascular:      Rate and Rhythm: Regular rhythm. Tachycardia present.      Heart sounds: Murmur heard.   Pulmonary:      Effort: Pulmonary effort is normal. No respiratory distress.      Breath sounds: Stridor present. Examination of the right-lower field reveals wheezing. Examination of the left-lower field reveals wheezing. Decreased breath sounds, wheezing and rales present. No rhonchi.   Chest:      Chest wall: No tenderness.   Abdominal:      General: Bowel sounds are normal.      Palpations: Abdomen is soft.   Musculoskeletal:         General: Normal range of motion.      Cervical back: Neck supple.      Right lower leg: Edema present.      Left lower leg: Edema present.   Lymphadenopathy:      Cervical: No cervical adenopathy.   Skin:     General: Skin is warm and dry.   Neurological:      Mental Status: She is alert and oriented to person, place, and time.       Vents:  Oxygen Concentration (%): 32 (05/22/22 0731)    Lines/Drains/Airways       Peripherally Inserted Central Catheter Line  Duration             PICC Double Lumen 05/17/22 1612 right basilic 4 days              Drain  Duration                  Chest Tube 05/21/22 0957 1 Right Pleural 8.5 Fr. 1 day    Female External Urinary Catheter 05/21/22 0700 1 day                    Significant Labs:    CBC/Anemia Profile:  Recent Labs   Lab 05/21/22  0500 05/22/22  0604   WBC 9.70 9.55   HGB 7.1* 7.7*   HCT 23.0* 23.9*   * 481*   MCV 90 86   RDW 16.0* 15.6*          Chemistries:  Recent Labs   Lab 05/20/22  1334 05/21/22  0500 05/22/22  0604    138 140   K 5.0 4.6 4.6   CL 98 99 100   CO2 30* 30* 31*   BUN 10 9 10   CREATININE 0.5 0.6 0.6   CALCIUM 7.7* 7.7* 8.3*   ALBUMIN  1.4* 1.3* 1.4*   PROT 5.5* 5.3* 5.7*   BILITOT 0.3 0.3 0.3   ALKPHOS 130 111 119   ALT 16 15 16   AST 11 11 12   MG  --  1.7  --    PHOS  --  5.4*  --          ABGs: No results for input(s): PH, PCO2, HCO3, POCSATURATED, BE in the last 48 hours.  BMP:   Recent Labs   Lab 05/21/22  0500 05/22/22  0604   GLU 86 95    140   K 4.6 4.6   CL 99 100   CO2 30* 31*   BUN 9 10   CREATININE 0.6 0.6   CALCIUM 7.7* 8.3*   MG 1.7  --        CMP:   Recent Labs   Lab 05/20/22  1334 05/21/22  0500 05/22/22  0604    138 140   K 5.0 4.6 4.6   CL 98 99 100   CO2 30* 30* 31*   GLU 87 86 95   BUN 10 9 10   CREATININE 0.5 0.6 0.6   CALCIUM 7.7* 7.7* 8.3*   PROT 5.5* 5.3* 5.7*   ALBUMIN 1.4* 1.3* 1.4*   BILITOT 0.3 0.3 0.3   ALKPHOS 130 111 119   AST 11 11 12   ALT 16 15 16   ANIONGAP 8 9 9   EGFRNONAA >60 >60 >60         Significant Imaging:   I have reviewed all pertinent imaging results/findings within the past 24 hours.    X-Ray Chest 1 View  Narrative: EXAMINATION:  XR CHEST AP PORTABLE    CLINICAL HISTORY:  SOB;    COMPARISON:  Radiographs dating back to 05/17/2022 were reviewed.    FINDINGS:  Right PICC terminates within the mid right atrium.  There is a pigtail drainage catheter within the medial right pleural space.  No visible pneumothorax.  There is patchy consolidative density within the periphery of the right lower lobe, within the central right upper lobe, central left upper lobe, and behind the heart which is similar to the prior study.  Small loculated pleural fluid bilaterally.  Heart size within normal limits.  No significant bony findings.  Impression: Right PICC terminates within the mid right atrium.  Placement of pigtail thoracostomy tube on the right with no sign of pneumothorax.  Unchanged abnormal pulmonary opacities.    Electronically signed by: Paul Radford Jr., MD  Date:    05/22/2022  Time:    07:41        ABG  No results for input(s): PH, PO2, PCO2, HCO3, BE in the last 168 hours.  Assessment/Plan:      Parapneumonic effusion  Complicated by PNA, possible empyema  SP Thora  Cont Abx  5/20/2022 Probable empyema,needs chest tube drainage  5/22 chest tube draining    PNA (pneumonia)  PNA in IVDU  Cultures; Blood and sputum  BC +ve G+ve cocci  AbxL OXACILLIN + FLAGYL  5/22 stable, improved aeration            Continue above regimen     Samuel Saleh MD  Pulmonology  [facility]

## 2022-05-22 NOTE — PLAN OF CARE
Discussed poc with pt, pt verbalized understanding    Purposeful rounding every 2hours    VS wnl  Cardiac monitoring in use, pt is NSR, tele monitor # [id number]  Fall precautions in place, remains injury free  Pain managed with PRN meds    Abx given as prescribed  Bed locked at lowest position  Call light within reach    Chart check complete  Will cont with POC

## 2022-05-22 NOTE — NURSING
"Attempted to get pt up in chair several times today, pt refused due to wanting IV pain and anxiety medications, offered oral meds and pt refused saying "i want something that works instant". pt was educated on the importance of getting out of the bed and turning to prevent skin break down as well as blood clots. Attending provider notified.  "

## 2022-05-22 NOTE — ASSESSMENT & PLAN NOTE
PNA in IVDU  Cultures; Blood and sputum  BC +ve G+ve cocci  AbxL OXACILLIN + FLAGYL  5/22 stable, improved aeration

## 2022-05-23 PROBLEM — J86.9 EMPYEMA OF RIGHT PLEURAL SPACE: Status: ACTIVE | Noted: 2022-05-20

## 2022-05-23 LAB
ALBUMIN SERPL BCP-MCNC: 1.4 G/DL (ref 3.5–5.2)
ALP SERPL-CCNC: 106 U/L (ref 55–135)
ALT SERPL W/O P-5'-P-CCNC: 17 U/L (ref 10–44)
ANION GAP SERPL CALC-SCNC: 7 MMOL/L (ref 8–16)
AST SERPL-CCNC: 13 U/L (ref 10–40)
BASOPHILS # BLD AUTO: 0.07 K/UL (ref 0–0.2)
BASOPHILS # BLD AUTO: 0.07 K/UL (ref 0–0.2)
BASOPHILS NFR BLD: 0.7 % (ref 0–1.9)
BASOPHILS NFR BLD: 0.7 % (ref 0–1.9)
BILIRUB SERPL-MCNC: 0.2 MG/DL (ref 0.1–1)
BUN SERPL-MCNC: 12 MG/DL (ref 6–20)
CALCIUM SERPL-MCNC: 8 MG/DL (ref 8.7–10.5)
CHLORIDE SERPL-SCNC: 99 MMOL/L (ref 95–110)
CO2 SERPL-SCNC: 31 MMOL/L (ref 23–29)
CREAT SERPL-MCNC: 0.7 MG/DL (ref 0.5–1.4)
DIFFERENTIAL METHOD: ABNORMAL
DIFFERENTIAL METHOD: ABNORMAL
EOSINOPHIL # BLD AUTO: 0.3 K/UL (ref 0–0.5)
EOSINOPHIL # BLD AUTO: 0.3 K/UL (ref 0–0.5)
EOSINOPHIL NFR BLD: 3.2 % (ref 0–8)
EOSINOPHIL NFR BLD: 3.2 % (ref 0–8)
ERYTHROCYTE [DISTWIDTH] IN BLOOD BY AUTOMATED COUNT: 15.8 % (ref 11.5–14.5)
ERYTHROCYTE [DISTWIDTH] IN BLOOD BY AUTOMATED COUNT: 15.8 % (ref 11.5–14.5)
EST. GFR  (AFRICAN AMERICAN): >60 ML/MIN/1.73 M^2
EST. GFR  (NON AFRICAN AMERICAN): >60 ML/MIN/1.73 M^2
GLUCOSE SERPL-MCNC: 115 MG/DL (ref 70–110)
HCT VFR BLD AUTO: 25.2 % (ref 37–48.5)
HCT VFR BLD AUTO: 25.2 % (ref 37–48.5)
HGB BLD-MCNC: 7.8 G/DL (ref 12–16)
HGB BLD-MCNC: 7.8 G/DL (ref 12–16)
IMM GRANULOCYTES # BLD AUTO: 0.08 K/UL (ref 0–0.04)
IMM GRANULOCYTES # BLD AUTO: 0.08 K/UL (ref 0–0.04)
IMM GRANULOCYTES NFR BLD AUTO: 0.8 % (ref 0–0.5)
IMM GRANULOCYTES NFR BLD AUTO: 0.8 % (ref 0–0.5)
LYMPHOCYTES # BLD AUTO: 2.1 K/UL (ref 1–4.8)
LYMPHOCYTES # BLD AUTO: 2.1 K/UL (ref 1–4.8)
LYMPHOCYTES NFR BLD: 21.4 % (ref 18–48)
LYMPHOCYTES NFR BLD: 21.4 % (ref 18–48)
MCH RBC QN AUTO: 27.5 PG (ref 27–31)
MCH RBC QN AUTO: 27.5 PG (ref 27–31)
MCHC RBC AUTO-ENTMCNC: 31 G/DL (ref 32–36)
MCHC RBC AUTO-ENTMCNC: 31 G/DL (ref 32–36)
MCV RBC AUTO: 89 FL (ref 82–98)
MCV RBC AUTO: 89 FL (ref 82–98)
MONOCYTES # BLD AUTO: 0.9 K/UL (ref 0.3–1)
MONOCYTES # BLD AUTO: 0.9 K/UL (ref 0.3–1)
MONOCYTES NFR BLD: 9.2 % (ref 4–15)
MONOCYTES NFR BLD: 9.2 % (ref 4–15)
NEUTROPHILS # BLD AUTO: 6.5 K/UL (ref 1.8–7.7)
NEUTROPHILS # BLD AUTO: 6.5 K/UL (ref 1.8–7.7)
NEUTROPHILS NFR BLD: 64.7 % (ref 38–73)
NEUTROPHILS NFR BLD: 64.7 % (ref 38–73)
NRBC BLD-RTO: 0 /100 WBC
NRBC BLD-RTO: 0 /100 WBC
PATH INTERP FLD-IMP: NORMAL
PLATELET # BLD AUTO: 490 K/UL (ref 150–450)
PLATELET # BLD AUTO: 490 K/UL (ref 150–450)
PMV BLD AUTO: 8.7 FL (ref 9.2–12.9)
PMV BLD AUTO: 8.7 FL (ref 9.2–12.9)
POTASSIUM SERPL-SCNC: 4.7 MMOL/L (ref 3.5–5.1)
PROT SERPL-MCNC: 5.7 G/DL (ref 6–8.4)
RBC # BLD AUTO: 2.84 M/UL (ref 4–5.4)
RBC # BLD AUTO: 2.84 M/UL (ref 4–5.4)
SODIUM SERPL-SCNC: 137 MMOL/L (ref 136–145)
WBC # BLD AUTO: 9.97 K/UL (ref 3.9–12.7)
WBC # BLD AUTO: 9.97 K/UL (ref 3.9–12.7)

## 2022-05-23 PROCEDURE — 63600175 PHARM REV CODE 636 W HCPCS: Performed by: INTERNAL MEDICINE

## 2022-05-23 PROCEDURE — 99233 SBSQ HOSP IP/OBS HIGH 50: CPT | Mod: NSCH,,, | Performed by: INTERNAL MEDICINE

## 2022-05-23 PROCEDURE — 99233 SBSQ HOSP IP/OBS HIGH 50: CPT | Mod: ,,, | Performed by: INTERNAL MEDICINE

## 2022-05-23 PROCEDURE — 99900035 HC TECH TIME PER 15 MIN (STAT)

## 2022-05-23 PROCEDURE — 25000003 PHARM REV CODE 250: Performed by: NURSE PRACTITIONER

## 2022-05-23 PROCEDURE — 63600175 PHARM REV CODE 636 W HCPCS: Performed by: NURSE PRACTITIONER

## 2022-05-23 PROCEDURE — 85025 COMPLETE CBC W/AUTO DIFF WBC: CPT | Performed by: INTERNAL MEDICINE

## 2022-05-23 PROCEDURE — 94761 N-INVAS EAR/PLS OXIMETRY MLT: CPT

## 2022-05-23 PROCEDURE — 99233 PR SUBSEQUENT HOSPITAL CARE,LEVL III: ICD-10-PCS | Mod: NSCH,,, | Performed by: INTERNAL MEDICINE

## 2022-05-23 PROCEDURE — 80053 COMPREHEN METABOLIC PANEL: CPT | Performed by: NURSE PRACTITIONER

## 2022-05-23 PROCEDURE — 27000221 HC OXYGEN, UP TO 24 HOURS

## 2022-05-23 PROCEDURE — 25000242 PHARM REV CODE 250 ALT 637 W/ HCPCS: Performed by: INTERNAL MEDICINE

## 2022-05-23 PROCEDURE — 94640 AIRWAY INHALATION TREATMENT: CPT

## 2022-05-23 PROCEDURE — 21400001 HC TELEMETRY ROOM

## 2022-05-23 PROCEDURE — 94664 DEMO&/EVAL PT USE INHALER: CPT

## 2022-05-23 PROCEDURE — 99233 PR SUBSEQUENT HOSPITAL CARE,LEVL III: ICD-10-PCS | Mod: ,,, | Performed by: INTERNAL MEDICINE

## 2022-05-23 RX ORDER — KETOROLAC TROMETHAMINE 30 MG/ML
15 INJECTION, SOLUTION INTRAMUSCULAR; INTRAVENOUS ONCE
Status: COMPLETED | OUTPATIENT
Start: 2022-05-23 | End: 2022-05-23

## 2022-05-23 RX ADMIN — MORPHINE SULFATE 15 MG: 15 TABLET, EXTENDED RELEASE ORAL at 08:05

## 2022-05-23 RX ADMIN — MORPHINE SULFATE 15 MG: 15 TABLET, EXTENDED RELEASE ORAL at 09:05

## 2022-05-23 RX ADMIN — TRAZODONE HYDROCHLORIDE 100 MG: 100 TABLET ORAL at 08:05

## 2022-05-23 RX ADMIN — IPRATROPIUM BROMIDE AND ALBUTEROL SULFATE 3 ML: 2.5; .5 SOLUTION RESPIRATORY (INHALATION) at 07:05

## 2022-05-23 RX ADMIN — OXYCODONE HYDROCHLORIDE 5 MG: 5 TABLET ORAL at 02:05

## 2022-05-23 RX ADMIN — OXYCODONE HYDROCHLORIDE 5 MG: 5 TABLET ORAL at 04:05

## 2022-05-23 RX ADMIN — ALPRAZOLAM 1 MG: 1 TABLET ORAL at 09:05

## 2022-05-23 RX ADMIN — OXACILLIN SODIUM 12 G: 10 INJECTION, POWDER, FOR SOLUTION INTRAVENOUS at 05:05

## 2022-05-23 RX ADMIN — ALPRAZOLAM 1 MG: 1 TABLET ORAL at 08:05

## 2022-05-23 RX ADMIN — KETOROLAC TROMETHAMINE 15 MG: 30 INJECTION, SOLUTION INTRAMUSCULAR at 10:05

## 2022-05-23 RX ADMIN — FAMOTIDINE 20 MG: 20 TABLET ORAL at 09:05

## 2022-05-23 RX ADMIN — ENOXAPARIN SODIUM 40 MG: 40 INJECTION SUBCUTANEOUS at 05:05

## 2022-05-23 RX ADMIN — FAMOTIDINE 20 MG: 20 TABLET ORAL at 08:05

## 2022-05-23 NOTE — PROGRESS NOTES
[facility]  Progress Note    Patient Name: Tianna Leon  MRN: [id number]  Patient Class: IP- Inpatient   Admission Date: 5/6/2022  Length of Stay: 17 days  Attending Physician: Elizabeth Kim MD  Primary Care Provider: Spenser Campa MD        Subjective:     Principal Problem:Acute bacterial endocarditis        HPI:  36 y/o. female  with a PMHx of asthma, COPD,Bipolar  , IVDU and HLD presented to the ER with a c/o  sob for the last weak which has gotten worse . The SOB is associated with fever , chills , productive cough , back pain  and generalized weakness . She report cough some blood this am .  She is active IVDU  ( heroine , cocaine  and amphetamine ) . She denies any  sick contact , chest pain  , GI/ sx , She also complaning of LE sweeling . Knee pain and B/L LE rash .   ER COURSE: CTA chest negative for pe . Multiple nodular and cavitary opacities concerning for septic emboli with larger opacities possibly necrotizing pneumonia. CT cervical  and thoracic did not show any acute finding , CT lumbar Possible progression of degenerative changes most notable at L4-5 with moderate to severe spinal canal stenosis at this level.  WBC  29 k , na 130 , k 3.4 , cl 89 , procal 2.71   ER VS:  BP Pulse Resp Temp SpO2   (!) 124/58 110 (!) 30 (!) 101.6 °F (38.7 °C) 96 %           Pt will be admitted to inpt with a dx of PNA and severe sepsis       Overview/Hospital Course:  5/7 admitted for pneumonia, severe sepsis in setting of ivdu. Mother at bedside and provides collateral. Patient has struggled with substance abuse for approximately 20 years, worsening over last 3-5 years since life event. Onset of symptoms 1-2 weeks ago. Tolerated thoracentesis with no pneumothorax on chest x-ray. Mri lumbar and echo, pending. Bcx positive, growing gram positive cocci. On vanc, cefepime and flagyl. Infectious disease consulted for bacteremia. 5/8/22 The patient remained afebrile overnight. Currently on  cefepime/vanc/flagyl. Blood cx 5/6/22 are growing MSSA. The patient refused the MRI lumbar spine overnight d/t being unable to lay flat from back pain. Will give pain meds and attempt to get the MRI today to r/o spinal abscess. The ECHO showed a 1.3 x 1.5 cm elongated, mobile echodensity seen on the tricuspid valve consistent with vegetation, CT surgery was consulted. Will need a SUSANNAH. Infectious disease is consulted. Will repeat blood cx today. 5/9/22 No acute events overnight. The patient reports lower back pain is not well controlled with current pain regimen, will adjust. Repeat blood cx are still positive. Sensitivities are back and Staph is sensitive to oxacillin, will D/C vanc/cefepime. Cardiology consulted and plans for a SUSANNAH today. CT surgery following and recommends continuing medical management with appropriate ABX, no surgical intervention at this time. MRI lumbar spine showed moderate to severe spinal canal stenosis with moderate left-sided neural foraminal stenosis, Neurosurgery consulted. 5/10/22 No acute events overnight. The patient reports some improvement in pain with adjustment in pain meds. Repeat blood cx are +. Continue IV oxacillin and flagyl. Infectious disease is consulted. IR consulted to evaluate possible paraspinous muscle myositis versus abscess. Recommend IR drainage. Continue current management. 5/11/22 No acute events overnight. The patient reports pain is still not well controlled at times. The patient reports that she is very anxious about the upcoming SUSANNAH and IR drainage. SUSANNAH was pushed back to this afternoon because the patient ate candy this AM. Continue treatment with IV oxacillin and flagyl. Infectious disease is following. WBC trended down to 31K. Continue current management. 5/12/22 No acute events overnight. The patients WBC improved to 22K overnight. The patient remains on continuous oxacillin infusion. Infectious disease is following. MRI thoracic spine is ordered and  pending. The patients SUSANNAH was postponed again today d/t low H/H. Hgb was noted to be 6.2 this morning, will transfuse 1 unit PRBC. The patient reports pain is still not well controlled. The case was discussed with Neurosurgery who recommended adding extended release pain meds. Approximately 1 ml of purulent drainage aspirated from back yesterday per IR, growing gram + cocci.     5/13: Patient was given 1U PRBC on 5/12, improved from 6.2 --> 7.0, will give an additional unit PRBC. Radiology attempted to bring patient on 5/12 at 1755 for Thoracic MRI, patient refused due to pain and anxiety. Repeat attempt this AM, patient again refused. Will order Ativan 1mg IV to be given with pain medication prior to scan to relieve anxiety and pain, nurse to notify NP when ready to go for scan. Discussed pain management with patient, discussed transition to PO pain medications to being preparing for d/c to LTAC, adjusted PO pain regimen will reevaluate to determine effectiveness. Repeat Blood cultures 5/11/22: NGTD.     5/14: Final anaerobic cultures pending, continue to adjust pain regimen, d/c IV Dilaudid today. Patient currently managed with PO Morphine extended release and PO oxycodone. D/C plan is for LTAC once final antibiotic regimen determined. Patient continues to refuse MRI of thoracic spine.     5/15: Called to room this AM, patient had coughed up blood and mucous. Approx. 1-2 teaspoons of blood in emesis bag, ordered CXR and repeat CBC, CXR showed improvement from previous day, repeat CBC at time of incident, showed a slight decrease in Hgb: 9.7 --> 8.7, when repeated at 1500, Hgb had improved to 10. no further episodes of coughing up blood throughout day. Patient continues to c/o not having enough pain medication but continues to sleep most of the day and will fall asleep when speaking at times. Added IV Toradol to plan.     5/16: No further episodes of coughing blood overnight, patient participated with encouragement  with PT/OT today, recommendations are HH vs. SNF pending progress. Awaiting final antibiotic regimen recommendations. Anaerobic culture results finalized: negative for growth. Afebrile, WBC trending downward.     5/17: Antibiotic regimen per ID is continuous oxacillin until 7/11/22, SW notified of plan, will seek acceptance at LTAC or SNF. Patient choice is [facility]. WBC in normal range. AMS this AM, CT of head: no acute findings. Given narcan and mental status improved. Decreased narcotic dose.     5/18: Per discussion with patient, she is willing to have the MRI of thoracic spine today, will order IV pain medication and anxiety medication prior to scan. Nursing and Radiology aware of needing to premedicate for scan. Patient is more cooperative and participating with care.     5/19: Patient given premedications for MRI, MRI completed, results pending. Patient more agreeable with treatment plan and cooperating. Will need psychiatry follow-up after discharge. Hgb: 6.7, will give 1U PRBC    5/20/22: +LEON and generalized pain. Thoracic MRI showed no evidence for disc osteomyelitis in the thoracic spine and no epidural abscess, Loculated right hydropneumothorax and extensive bilateral pulmonary infiltrates. Ct chest showed septic emboli and moderate right pleural effusion.+splenic infarct  Pulmonology felt likely empyema- consulted IR for chest tube.     5/21/22: s/p right pig tail placement to suction/ drainage system per IR. 60ml pus removed initially. Fluid analysis consistent with empyema. Pleural fluid cultures pending. Now with scant serosanguinous output.+ right lateral chest pain at chest tube site. Denies any other complaints. Status update provided to pt's mother.     5/22/22: Repeatedly requesting IV Dilaudid. Toradol ordered. Afebrile, WBC normal, O2sats stable on 3lites. s/p right pig tail placement to suction/ drainage system per IR on 5/21/22- draining serous draiange with pus. Plan for LTAC placement      5/23/22: More cofortable today. Not getting OOB. Encouraged OOB and rationale provided. Pulmonology recommended Possible tPA and DNase in chest tube tomorrow      Interval History: see hospital course    Review of Systems   Constitutional:  Positive for activity change, appetite change and fatigue. Negative for chills, diaphoresis, fever and unexpected weight change.   HENT:  Negative for congestion, hearing loss, mouth sores, postnasal drip, rhinorrhea, sore throat and trouble swallowing.    Eyes:  Negative for discharge and visual disturbance.   Respiratory:  Negative for cough and chest tightness.    Cardiovascular:  Negative for chest pain, palpitations and leg swelling.   Gastrointestinal:  Negative for blood in stool, constipation, diarrhea, nausea and vomiting.   Endocrine: Negative for cold intolerance and heat intolerance.   Genitourinary:  Negative for difficulty urinating, dyspareunia, flank pain and hematuria.   Musculoskeletal:  Positive for back pain and myalgias. Negative for arthralgias.   Skin: Negative.    Neurological:  Positive for weakness. Negative for dizziness and light-headedness.   Hematological:  Negative for adenopathy. Does not bruise/bleed easily.   Psychiatric/Behavioral:  Negative for agitation, behavioral problems, confusion and sleep disturbance. The patient is nervous/anxious.    Objective:     Vital Signs (Most Recent):  Temp: 99.3 °F (37.4 °C) (05/23/22 1703)  Pulse: 92 (05/23/22 1703)  Resp: 16 (05/23/22 1703)  BP: 134/69 (05/23/22 1703)  SpO2: 95 % (05/23/22 1703)   Vital Signs (24h Range):  Temp:  [97.7 °F (36.5 °C)-99.4 °F (37.4 °C)] 99.3 °F (37.4 °C)  Pulse:  [80-97] 92  Resp:  [16-20] 16  SpO2:  [92 %-99 %] 95 %  BP: (128-143)/(68-81) 134/69     Weight: 97.1 kg (214 lb 1.1 oz)  Body mass index is 35.62 kg/m².    Intake/Output Summary (Last 24 hours) at 5/23/2022 1730  Last data filed at 5/23/2022 1700  Gross per 24 hour   Intake 274.54 ml   Output 7160 ml   Net -6885.46  ml        Physical Exam  Vitals and nursing note reviewed.   Constitutional:       General: She is not in acute distress.     Appearance: She is well-developed.   HENT:      Head: Normocephalic and atraumatic.      Right Ear: Hearing and external ear normal.      Left Ear: Hearing and external ear normal.      Nose: No rhinorrhea.      Right Sinus: No maxillary sinus tenderness or frontal sinus tenderness.      Left Sinus: No maxillary sinus tenderness or frontal sinus tenderness.      Mouth/Throat:      Mouth: No oral lesions.      Pharynx: Uvula midline.   Eyes:      General:         Right eye: No discharge.         Left eye: No discharge.      Conjunctiva/sclera: Conjunctivae normal.      Pupils: Pupils are equal, round, and reactive to light.   Neck:      Thyroid: No thyromegaly.      Vascular: No carotid bruit.      Trachea: No tracheal deviation.   Cardiovascular:      Rate and Rhythm: Normal rate and regular rhythm.      Pulses:           Dorsalis pedis pulses are 2+ on the right side and 2+ on the left side.      Heart sounds: Normal heart sounds, S1 normal and S2 normal. No murmur heard.  Pulmonary:      Effort: Pulmonary effort is normal. No respiratory distress.      Breath sounds: Examination of the right-lower field reveals decreased breath sounds. Examination of the left-lower field reveals decreased breath sounds. Decreased breath sounds present.      Comments: Right pigtail small bore chest tube in place with moderate serous drainage with pus to to drainage system   Chest tube site C/D/I  Chest:   Breasts:      Right: No supraclavicular adenopathy.      Left: No supraclavicular adenopathy.     Abdominal:      General: Bowel sounds are decreased. There is no distension.      Palpations: Abdomen is soft. There is no mass.      Tenderness: There is no abdominal tenderness.   Musculoskeletal:      Cervical back: Normal range of motion.      Thoracic back: Tenderness present. Decreased range of motion.       Lumbar back: Tenderness present. Decreased range of motion.      Right lower leg: Edema present.      Left lower leg: Edema present.   Lymphadenopathy:      Cervical: No cervical adenopathy.      Upper Body:      Right upper body: No supraclavicular adenopathy.      Left upper body: No supraclavicular adenopathy.   Skin:     General: Skin is warm and dry.      Capillary Refill: Capillary refill takes less than 2 seconds.      Findings: No rash.   Neurological:      Mental Status: She is alert and oriented to person, place, and time.      Sensory: No sensory deficit.      Coordination: Coordination normal.      Gait: Gait normal.   Psychiatric:         Attention and Perception: She is inattentive.         Mood and Affect: Mood is anxious. Mood is not depressed.         Speech: Speech normal.         Behavior: Behavior normal.         Thought Content: Thought content normal.         Judgment: Judgment normal.       Significant Labs: All pertinent labs within the past 24 hours have been reviewed.  CBC:   Recent Labs   Lab 05/22/22  0604 05/23/22  0448   WBC 9.55 9.97  9.97   HGB 7.7* 7.8*  7.8*   HCT 23.9* 25.2*  25.2*   * 490*  490*       CMP:   Recent Labs   Lab 05/22/22  0604 05/23/22  0448    137   K 4.6 4.7    99   CO2 31* 31*   GLU 95 115*   BUN 10 12   CREATININE 0.6 0.7   CALCIUM 8.3* 8.0*   PROT 5.7* 5.7*   ALBUMIN 1.4* 1.4*   BILITOT 0.3 0.2   ALKPHOS 119 106   AST 12 13   ALT 16 17   ANIONGAP 9 7*   EGFRNONAA >60 >60         Significant Imaging: I have reviewed all pertinent imaging results/findings within the past 24 hours.      Assessment/Plan:      * Acute bacterial endocarditis  ECHO showed a 1.3 x 1.5 cm elongated, mobile echodensity seen on the tricuspid valve consistent with vegetation, CT surgery was consulted.   Due to MSSA    -- Infectious disease is consulted.  -- repeat blood cx NGTD.  -- Continue IV ABX.   --Sensitivities are back and Staph is sensitive to oxacillin, D/C  vanc/cefepime.   --Cardiology consulted and plans for a SUSANNAH.   --CT surgery following and recommends continuing medical management with appropriate ABX, no surgical intervention at this time.    cont IV oxacillin.      MSSA bacteremia  Gram positive   Infectious disease consulted  Continue broad spectrum intravenous antibiotic(s) given h/o ivdu  Remains febrile and leukocytosis persists  5/8/22 The patient remained afebrile overnight. Currently on cefepime/vanc/flagyl. Blood cx are growing staph aureus. The patient refused the MRI lumbar spine overnight d/t being unable to lay flat from back pain. Will give pain meds and attempt to get the MRI today to r/o spinal abscess. The ECHO showed a 1.3 x 1.5 cm elongated, mobile echodensity seen on the tricuspid valve consistent with vegetation, CT surgery was consulted. Will need a SUSANNAH. Infectious disease is consulted. Will repeat blood cx today.   5/9/22 Repeat blood cx are still positive. Sensitivities are back and Staph is sensitive to oxacillin, will D/C vanc/cefepime. Infectious disease is following   5/10/22 Repeat blood cx are +. Continue IV oxacillin and flagyl. Infectious disease is consulted. IR consulted to evaluate possible paraspinous muscle myositis versus abscess. Recommend IR drainage. Continue current management.    5/11/22 The patient reports pain is still not well controlled at times. The patient reports that she is very anxious about the upcoming SUSANNAH and IR drainage. SUSANNAH was pushed back to this afternoon because the patient ate candy this AM. Continue treatment with IV oxacillin and flagyl. Infectious disease is following. WBC trended down to 31K. Continue current management.  5/12/22 The patients WBC improved to 22K overnight. The patient remains on continuous oxacillin infusion. Infectious disease is following.  5/14: WBC continues to improve, 18.10 today     5/15: Blood cultures 5/11/22: NGTD     Continuous Oxacillin, EOC: 7/11/22, pending acceptance  at [facility], PICC line placed    Empyema of right pleural space  Pulmonology consulted and felt likely empyema   IR consulted for chest tube      s/p right pigtail small bore chest tube placement to suction/drainage system per IR on 5/21/22. 60ml pus removed. Now with Serous drainage with pus. Fluid analysis consistent with empyema. 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 IR consulted to evaluate possible paraspinous muscle myositis versus abscess.    --Approximately 1 ml of purulent drainage aspirated 5/11 per IR, growing MSSA  --Continue current regimen      Normocytic anemia  Secondary to acute illness    --Daily CBC  --Transfuse with 1 unit PRBC for Hgb <7.0 or <8.0 if symptomatic   --Hgb: 6.7, give 1U PRBC    Lumbar stenosis without neurogenic claudication        Chronic pulmonary heart disease  - Pulmonology following       IVDU (intravenous drug user)   Will ordet TTE to r/o IE  Will order MRI lumbar wwo- completed   Cont broad spectrum IVAB   --MRI thoracic spine ordered per ID, patient refused on multiple attempts    5/7   Studies pending for additional sites of infection given h/o ivdu  5/8/22  on cessation     Severe sepsis  This patient does have evidence of infective focus  My overall impression is sepsis. Vital signs were reviewed and noted in progress note.  Antibiotics given-   Antibiotics (From admission, onward)            Start     Stop Route Frequency Ordered    05/09/22 1100  oxacillin 12 g in  mL CONTINUOUS INFUSION         -- IV Every 24 hours (non-standard times) 05/09/22 0932    05/07/22 2100  mupirocin 2 % ointment         05/12 2059 Nasl 2 times daily 05/07/22 1646         Cultures were taken-   Microbiology Results (last 7 days)     Procedure Component Value Units Date/Time    Culture, Respiratory with Gram Stain [[id number]] Collected: 05/16/22 0758    Order Status: Sent Specimen: Respiratory from Sputum Updated: 05/16/22 0759    Culture, Anaerobe [[id number]] Collected: 05/11/22 1440    Order Status: Completed Specimen: Abscess from Back Updated: 05/16/22 0733     Anaerobic Culture No anaerobes isolated    Blood culture [[id number]] Collected: 05/11/22 1213    Order Status: Completed Specimen: Blood Updated: 05/16/22 0612     Blood Culture, Routine No Growth to date      No Growth to date      No Growth to date      No Growth to date      No Growth to date    Blood culture [[id number]] Collected: 05/11/22 1213    Order Status: Completed Specimen: Blood Updated: 05/16/22 0612     Blood Culture, Routine No Growth to date      No Growth to date      No Growth to date      No Growth to date      No Growth to date    Aerobic culture [[id number]]  (Abnormal)  (Susceptibility) Collected: 05/11/22 1440    Order Status: Completed Specimen: Abscess from Back Updated: 05/14/22 1057     Aerobic Bacterial Culture STAPHYLOCOCCUS AUREUS  Many      Gram stain [[id number]] Collected: 05/11/22 1440    Order Status: Completed Specimen: Abscess from Back Updated: 05/12/22 0306     Gram Stain Result Few WBC's      Few Gram positive cocci    Gram stain [[id number]]     Order Status: Canceled Specimen: Joint Fluid from Back     Blood culture [[id number]]  (Abnormal) Collected: 05/08/22 1516    Order Status: Completed Specimen: Blood from Peripheral, Right Hand Updated: 05/11/22 1009     Blood Culture, Routine Gram stain aer bottle: Gram positive cocci in clusters resembling Staph       Results called to and read back by: Chasity Raymundo RN  05/09/2022  11:31      STAPHYLOCOCCUS AUREUS  ID consult required at [facility].Moustapha,Rosario and Reece nathan.  For susceptibility see order #[id number]      Narrative:       Collection has been rescheduled by [email or web address] at 05/08/2022 13:22 Reason:   Pt in mri will be there after another hour [id number]  Collection has been rescheduled by [email or web address] at 05/08/2022 13:22 Reason:   Pt in mri will be there after another hour [id number]    Blood culture [[id number]]  (Abnormal) Collected: 05/08/22 1515    Order Status: Completed Specimen: Blood from Peripheral, Left Arm Updated: 05/11/22 1009     Blood Culture, Routine Gram stain aer bottle: Gram positive cocci in clusters resembling Staph      Results called to and read back by: Chasity Raymundo RN  05/09/2022  15:40      STAPHYLOCOCCUS AUREUS  ID consult required at [facility].vazquez,Rosario and Reece [facility].  For susceptibility see order #[id number]      Narrative:      Collection has been rescheduled by [email or web address] at 05/08/2022 13:22 Reason:   Pt in mri will be there after another hour [id number]  Collection has been rescheduled by [email or web address] at 05/08/2022 13:22 Reason:   Pt in mri will be there after another hour [id number]        Latest lactate reviewed, they are-  No results for input(s): LACTATE in the last 72 hours.    Organ dysfunction indicated by Acute kidney injury  Source-  lung    Source control Achieved by-   Cont Broad spectrum IVAB     Bacteremia  Infectious disease consulted      PNA (pneumonia)  --Cont oxacillin        Tobacco abuse  - Patient thoroughly counseled on smoking cessation, pt verbalized understanding. Time of counseling 10 minutes.        COPD (chronic obstructive pulmonary disease)  Cont breathing tx prn  Pulmonology following       VTE Risk Mitigation (From admission, onward)         Ordered     enoxaparin injection 40 mg  Daily         05/06/22 2336     IP VTE HIGH RISK PATIENT  Once         05/06/22 2336     Place sequential compression device  Until discontinued         05/06/22 2336                Discharge Planning   YESSICA:      Code Status: Full Code   Is the patient medically ready for discharge?:     Reason for patient still in hospital  (select all that apply): Patient trending condition  Discharge Plan A: Long-term acute care facility (LTAC)   Discharge Delays: None known at this time              Adriana Baltazar NP  Department of Hospital Medicine   [facility] Surg

## 2022-05-23 NOTE — PT/OT/SLP PROGRESS
Physical Therapy      Patient Name:  Tianna Leon   MRN:  [id number]    02:35 p.m.  Patient refused PT session at this time. Patient was very lethargic and required increased time to respond but did c/o 8/10 back and chest pain.  Nurse notified via secure chat.  Will follow up during next scheduled PT session.

## 2022-05-23 NOTE — PLAN OF CARE
IV ABT therapy remains in progress. No adverse reactions noted. PICC to RUE. Chest tube to right chest, dressing is C/D/I. No signs of air leak. Requires max encouragement to get OOB. Heart monitor 8681. Neuro checks Q4hrs. PRN pain meds adm as needed. Call light in reach.

## 2022-05-23 NOTE — ASSESSMENT & PLAN NOTE
Pulmonology consulted and felt likely empyema   IR consulted for chest tube      s/p right pigtail small bore chest tube placement to suction/drainage system per IR on 5/21/22. 60ml pus removed. Now with Serous drainage with pus. Fluid analysis consistent with empyema. Pleural fluid aerobic culture shows NGTD, Anaerobic culture pending

## 2022-05-23 NOTE — SUBJECTIVE & OBJECTIVE
Interval History: see hospital course    Review of Systems   Constitutional:  Positive for activity change, appetite change and fatigue. Negative for chills, diaphoresis, fever and unexpected weight change.   HENT:  Negative for congestion, hearing loss, mouth sores, postnasal drip, rhinorrhea, sore throat and trouble swallowing.    Eyes:  Negative for discharge and visual disturbance.   Respiratory:  Negative for cough and chest tightness.    Cardiovascular:  Negative for chest pain, palpitations and leg swelling.   Gastrointestinal:  Negative for blood in stool, constipation, diarrhea, nausea and vomiting.   Endocrine: Negative for cold intolerance and heat intolerance.   Genitourinary:  Negative for difficulty urinating, dyspareunia, flank pain and hematuria.   Musculoskeletal:  Positive for back pain and myalgias. Negative for arthralgias.   Skin: Negative.    Neurological:  Positive for weakness. Negative for dizziness and light-headedness.   Hematological:  Negative for adenopathy. Does not bruise/bleed easily.   Psychiatric/Behavioral:  Negative for agitation, behavioral problems, confusion and sleep disturbance. The patient is nervous/anxious.    Objective:     Vital Signs (Most Recent):  Temp: 99.3 °F (37.4 °C) (05/23/22 1703)  Pulse: 92 (05/23/22 1703)  Resp: 16 (05/23/22 1703)  BP: 134/69 (05/23/22 1703)  SpO2: 95 % (05/23/22 1703)   Vital Signs (24h Range):  Temp:  [97.7 °F (36.5 °C)-99.4 °F (37.4 °C)] 99.3 °F (37.4 °C)  Pulse:  [80-97] 92  Resp:  [16-20] 16  SpO2:  [92 %-99 %] 95 %  BP: (128-143)/(68-81) 134/69     Weight: 97.1 kg (214 lb 1.1 oz)  Body mass index is 35.62 kg/m².    Intake/Output Summary (Last 24 hours) at 5/23/2022 1730  Last data filed at 5/23/2022 1700  Gross per 24 hour   Intake 274.54 ml   Output 7160 ml   Net -6885.46 ml        Physical Exam  Vitals and nursing note reviewed.   Constitutional:       General: She is not in acute distress.     Appearance: She is well-developed.    HENT:      Head: Normocephalic and atraumatic.      Right Ear: Hearing and external ear normal.      Left Ear: Hearing and external ear normal.      Nose: No rhinorrhea.      Right Sinus: No maxillary sinus tenderness or frontal sinus tenderness.      Left Sinus: No maxillary sinus tenderness or frontal sinus tenderness.      Mouth/Throat:      Mouth: No oral lesions.      Pharynx: Uvula midline.   Eyes:      General:         Right eye: No discharge.         Left eye: No discharge.      Conjunctiva/sclera: Conjunctivae normal.      Pupils: Pupils are equal, round, and reactive to light.   Neck:      Thyroid: No thyromegaly.      Vascular: No carotid bruit.      Trachea: No tracheal deviation.   Cardiovascular:      Rate and Rhythm: Normal rate and regular rhythm.      Pulses:           Dorsalis pedis pulses are 2+ on the right side and 2+ on the left side.      Heart sounds: Normal heart sounds, S1 normal and S2 normal. No murmur heard.  Pulmonary:      Effort: Pulmonary effort is normal. No respiratory distress.      Breath sounds: Examination of the right-lower field reveals decreased breath sounds. Examination of the left-lower field reveals decreased breath sounds. Decreased breath sounds present.      Comments: Right pigtail small bore chest tube in place with moderate serous drainage with pus to to drainage system   Chest tube site C/D/I  Chest:   Breasts:      Right: No supraclavicular adenopathy.      Left: No supraclavicular adenopathy.     Abdominal:      General: Bowel sounds are decreased. There is no distension.      Palpations: Abdomen is soft. There is no mass.      Tenderness: There is no abdominal tenderness.   Musculoskeletal:      Cervical back: Normal range of motion.      Thoracic back: Tenderness present. Decreased range of motion.      Lumbar back: Tenderness present. Decreased range of motion.      Right lower leg: Edema present.      Left lower leg: Edema present.   Lymphadenopathy:       Cervical: No cervical adenopathy.      Upper Body:      Right upper body: No supraclavicular adenopathy.      Left upper body: No supraclavicular adenopathy.   Skin:     General: Skin is warm and dry.      Capillary Refill: Capillary refill takes less than 2 seconds.      Findings: No rash.   Neurological:      Mental Status: She is alert and oriented to person, place, and time.      Sensory: No sensory deficit.      Coordination: Coordination normal.      Gait: Gait normal.   Psychiatric:         Attention and Perception: She is inattentive.         Mood and Affect: Mood is anxious. Mood is not depressed.         Speech: Speech normal.         Behavior: Behavior normal.         Thought Content: Thought content normal.         Judgment: Judgment normal.       Significant Labs: All pertinent labs within the past 24 hours have been reviewed.  CBC:   Recent Labs   Lab 05/22/22  0604 05/23/22  0448   WBC 9.55 9.97  9.97   HGB 7.7* 7.8*  7.8*   HCT 23.9* 25.2*  25.2*   * 490*  490*       CMP:   Recent Labs   Lab 05/22/22  0604 05/23/22  0448    137   K 4.6 4.7    99   CO2 31* 31*   GLU 95 115*   BUN 10 12   CREATININE 0.6 0.7   CALCIUM 8.3* 8.0*   PROT 5.7* 5.7*   ALBUMIN 1.4* 1.4*   BILITOT 0.3 0.2   ALKPHOS 119 106   AST 12 13   ALT 16 17   ANIONGAP 9 7*   EGFRNONAA >60 >60         Significant Imaging: I have reviewed all pertinent imaging results/findings within the past 24 hours.

## 2022-05-23 NOTE — PROGRESS NOTES
[facility] - Med Surg  Adult Nutrition  Progress Note    SUMMARY     Recommendations  1) Continue diet as prescribed: regular  2) RD to follow up to monitor intake, tolerance, and labs.      Goals: Pt to meet greater than or equal to 75% EEN by RD follow up  Nutrition Goal Status: goal met, continues  Communication of RD Recs: POC, sticky note    Assessment and Plan  Nutrition Problem  Inadequate oral intake  Related to (etiology):   Decreased appetite  Signs and Symptoms (as evidenced by):   PO intake 0-75% meals per chart  Interventions(treatment strategy):  Collaboration with other providers  Fat, Na, DM modified diet (cardiac)  Nutrition Diagnosis Status:   New    Physical Findings/Malnutrition Assessment  Patient does not meet at least 2 ASPEN criteria for malnutrition at this time.   Will continue to monitor.    N/V:   not indicated   Wounds: not indicated   Edema: 1+ dependent, hands, legs, knees, ankles, feet  Last Bowel Movement: 05/21/22 Stool Consistency: watery, loose    Mouth/Teeth WDL: WDL except, teeth Teeth Symptoms: tooth/teeth missing  O2 Device (Oxygen Therapy): nasal cannula   Hand , Left: moderate  Jose Score: 16  NFPE not performed, pt well nourished/obese    Reason for Assessment  Reason For Assessment: length of stay  Diagnosis: infection/sepsis (bacteremia) --> Acute bacterial endocarditis  Relevant Medical History: asthma, COPD,Bipolar, IVDU, anxiety, HLD    General Information Comments:     5/16: Remote assessment for coverage. Unable to reach pt on room phone - line busy. Reached out to RN via secure chat - reports she is doing okay with PO intake. Per chart, eating 0-50% meals, with 15 lbs wt gain since admit (+ 4.3 L in I/Os), possibly fluid related. Per MD note pt +appetite change and + abdominal pain and distension. LBM 5/15. JHON NFPE due to remote assesment. RD to monitor and follow up.    5/23: Pt with mostly 100% PO intake since last assessment, will dc ONS and Will continue to  "monitor.      Nutrition Discharge Planning: regular diet    Nutrition Risk Screen  Nutrition Risk Screen: no indicators present    Nutrition/Diet History  Spiritual, Cultural Beliefs, Muslim Practices, Values that Affect Care: no  Food Allergies: NKFA  Factors Affecting Nutritional Intake: decreased appetite, abdominal pain, abdominal distension    Anthropometrics  Temp: 99.4 °F (37.4 °C)  Height Method: Stated  Height: 5' 5" (165.1 cm)  Height (inches): 65 in  Weight Method: Bed Scale  Weight: 97.1 kg (214 lb 1.1 oz)  Weight (lb): 214.07 lb  Ideal Body Weight (IBW), Female: 125 lb  BMI (Calculated): 35.6  BMI Grade: 35 - 39.9 - obesity - grade II     Labs  Pertinent Labs: reviewed  Lab Results   Component Value Date    ALBUMIN 1.4 (L) 05/23/2022    HGB 7.8 (L) 05/23/2022    HGB 7.8 (L) 05/23/2022    HCT 25.2 (L) 05/23/2022    HCT 25.2 (L) 05/23/2022    WBC 9.97 05/23/2022    WBC 9.97 05/23/2022    CALCIUM 8.0 (L) 05/23/2022     Lab Results   Component Value Date     05/23/2022    K 4.7 05/23/2022    PHOS 5.4 (H) 05/21/2022    BUN 12 05/23/2022    CREATININE 0.7 05/23/2022    ESTGFRAFRICA >60 05/23/2022    EGFRNONAA >60 05/23/2022     Meds  Pertinent Medications: reviewed    albuterol-ipratropium  3 mL Nebulization Q6H WAKE    enoxaparin  40 mg Subcutaneous Daily    famotidine  20 mg Oral BID    morphine  15 mg Oral Q12H    oxacillin 12 g in  mL CONTINUOUS INFUSION  12 g Intravenous Q24H    traZODone  100 mg Oral QHS     Continuous Infusions:  PRN Meds:sodium chloride, sodium chloride, sodium chloride, acetaminophen, ALPRAZolam, HYDROmorphone, naloxone, ondansetron, oxyCODONE, sodium chloride 0.9%, sodium chloride 0.9%, sodium chloride 0.9%      Estimated/Assessed Needs  Weight Used For Calorie Calculations: 97.1 kg (214 lb 1.1 oz)  Energy Calorie Requirements (kcal): 1646 (no af, obese)  Energy Need Method: Elaine Temple  Protein Requirements: 56-68 (1-1.2g/kg)  Weight Used For Protein " Calculations: 56.7 kg (125 lb) (IBW)  Fluid Requirements (mL): 1 mL/kcal or per MD  Estimated Fluid Requirement Method: RDA Method  RDA Method (mL): 1646  CHO Requirement: 205    Nutrition Prescription Ordered  Current Diet Order: cardiac, DM 2000 diet  Oral Nutrition Supplement: Boost glucose control TID    Evaluation of Received Nutrient/Fluid Intake  Energy Calories Required: not meeting needs  Fluid Required: not meeting needs  Tolerance: tolerating  % Intake of Estimated Energy Needs: 75 - 100 %  % Meal Intake: 50 - 75 %    Monitor and Evaluation  Food and Nutrient Intake: energy intake, food and beverage intake  Food and Nutrient Adminstration: diet order  Knowledge/Beliefs/Attitudes: food and nutrition knowledge/skill  Physical Activity and Function: nutrition-related ADLs and IADLs  Anthropometric Measurements: weight change  Biochemical Data, Medical Tests and Procedures: electrolyte and renal panel, lipid profile, gastrointestinal profile, glucose/endocrine profile, inflammatory profile  Nutrition-Focused Physical Findings: overall appearance, extremities, muscles and bones, skin     Nutrition Follow-Up  Level of nutrition risk: moderate-high  Frequency of follow-up: Once weekly   Miriam Tamez, MS, RD, LDN

## 2022-05-23 NOTE — PROGRESS NOTES
[facility]  Pulmonology  Progress Note    Patient Name: Tianna Leon  MRN: [id number]  Admission Date: 5/6/2022  Hospital Length of Stay: 17 days  Code Status: Full Code  Attending Provider: Elizabeth Kim MD  Primary Care Provider: Spenser Campa MD   Principal Problem: Acute bacterial endocarditis    Subjective:     Interval History: 5/23.  Patient seen and examined.  O2 sat 92% on 2 liters/minute.  Body fluid studies reviewed.  Status post right-sided pigtail placement 05/21/2022.  Cultures reviewed.    Review of Systems   Constitutional:  Positive for malaise/fatigue and weight loss. Negative for chills and fever.   HENT:  Negative for hearing loss and nosebleeds.    Eyes:  Negative for discharge.   Respiratory:  Positive for cough, sputum production and shortness of breath.    Cardiovascular:  Positive for leg swelling. Negative for chest pain.   Gastrointestinal:  Negative for abdominal pain.   Genitourinary:  Negative for hematuria.   Musculoskeletal:  Negative for falls.   Skin:  Negative for itching.   Neurological:  Positive for weakness. Negative for speech change, seizures and loss of consciousness.   Endo/Heme/Allergies:  Negative for polydipsia. Does not bruise/bleed easily.   Psychiatric/Behavioral:  Negative for hallucinations.        Objective:     Vital Signs (Most Recent):  Temp: 99.3 °F (37.4 °C) (05/23/22 1205)  Pulse: 95 (05/23/22 1205)  Resp: 17 (05/23/22 1446)  BP: 134/75 (05/23/22 1205)  SpO2: (!) 92 % (05/23/22 1205)   Vital Signs (24h Range):  Temp:  [97.7 °F (36.5 °C)-99.4 °F (37.4 °C)] 99.3 °F (37.4 °C)  Pulse:  [80-97] 95  Resp:  [16-20] 17  SpO2:  [92 %-99 %] 92 %  BP: (128-143)/(68-81) 134/75     Weight: 97.1 kg (214 lb 1.1 oz)  Body mass index is 35.62 kg/m².      Intake/Output Summary (Last 24 hours) at 5/23/2022 1628  Last data filed at 5/23/2022 1200  Gross per 24 hour   Intake 514.54 ml   Output 6560 ml   Net -6045.46 ml       Physical Exam  Vitals and nursing note  reviewed.   Constitutional:       General: She is not in acute distress.     Appearance: She is well-developed. She is ill-appearing.   HENT:      Head: Normocephalic and atraumatic.      Nose: Nose normal.   Eyes:      Extraocular Movements: Extraocular movements intact.   Cardiovascular:      Rate and Rhythm: Normal rate and regular rhythm.   Pulmonary:      Effort: Pulmonary effort is normal.      Breath sounds: No stridor. Rhonchi present.      Comments: Right-side pigtail in place  Abdominal:      General: There is no distension.   Musculoskeletal:         General: No signs of injury.      Cervical back: Normal range of motion and neck supple.   Skin:     General: Skin is dry.   Neurological:      General: No focal deficit present.      Mental Status: She is alert and oriented to person, place, and time. Mental status is at baseline.   Psychiatric:         Behavior: Behavior normal.       Vents:  Oxygen Concentration (%): 28 (05/23/22 0739)    Lines/Drains/Airways       Peripherally Inserted Central Catheter Line  Duration             PICC Double Lumen 05/17/22 1612 right basilic 6 days              Drain  Duration                  Chest Tube 05/21/22 0957 1 Right Pleural 8.5 Fr. 2 days    Female External Urinary Catheter 05/21/22 0700 2 days                    Significant Labs:    CBC/Anemia Profile:  Recent Labs   Lab 05/22/22  0604 05/23/22  0448   WBC 9.55 9.97  9.97   HGB 7.7* 7.8*  7.8*   HCT 23.9* 25.2*  25.2*   * 490*  490*   MCV 86 89  89   RDW 15.6* 15.8*  15.8*        Chemistries:  Recent Labs   Lab 05/22/22  0604 05/23/22  0448    137   K 4.6 4.7    99   CO2 31* 31*   BUN 10 12   CREATININE 0.6 0.7   CALCIUM 8.3* 8.0*   ALBUMIN 1.4* 1.4*   PROT 5.7* 5.7*   BILITOT 0.3 0.2   ALKPHOS 119 106   ALT 16 17   AST 12 13      Latest Reference Range & Units 05/07/22 09:05 05/21/22 10:22   Fluid Appearance  Hazy Cloudy   WBC, Body Fluid /cu mm 09054 [1] 949501 (C) [2]   Body Fluid  Type  Pleural Fluid, Right Pleural Fluid, Right   Segs, Fluid % 93 88   Lymphs, Fluid % 3 7   Monocytes/Macrophages, Fluid % 4 5   Amylase, Fluid Not established U/L 34 [3]    Glucose, Fluid Not established mg/dL 29 [4]    LD, Fluid Not established U/L 1077 [5]    Body Fluid, Albumin See text g/dL 1.3 [6]    Body Fluid Source Amylase  Pleural Fluid, Right    Body Fluid Source, Albumin  Pleural Fluid, Right    Body Fluid Source, Glucose  Pleural Fluid, Right    Body Fluid Source, LDH  Pleural Fluid, Right    Body Fluid Source, Total Protein  Pleural Fluid, Right    Body Fluid, Protein Not established g/dL 3.7 [7]    Cholesterol, Body Fluid See Comment mg/dL 79 [8]      [    Significant Imaging:      CT chest without contrast 05/20/2022    Narrative & Impression  EXAMINATION:  CT CHEST WITHOUT CONTRAST     CLINICAL HISTORY:  Empyema;     TECHNIQUE:  Low dose axial images, sagittal and coronal reformations were obtained from the thoracic inlet to the lung bases. Contrast was not administered.  All CT scans at this facility are performed using dose optimization techniques including the following: automated exposure control; adjustment of the mA and/or kV; use of iterative reconstruction technique.     COMPARISON:  CTA chest non coronary 05/06/2022, chest radiograph 05/20/2022, MRI thoracic spine 05/19/2022     FINDINGS:  Base of Neck: There is diffuse body wall edema.  Unremarkable, noting the presence of AA PICC line which terminates at the superior cavoatrial junction.     Thoracic soft tissues: There is diffuse body wall edema.     Aorta: Left-sided aortic arch.  No aneurysm and no significant atherosclerosis     Heart: Normal size. No effusion.  No significant coronary atherosclerosis.     Pulmonary vasculature: Pulmonary arteries distribute normally.     Esha/Mediastinum: There are few prominent mediastinal lymph nodes with a prevascular node measuring 0.8 cm in short axis.     Airways: Patent.     Lungs/Pleura:  There are multiple bilateral consolidative densities scattered throughout the lungs, many of which demonstrate cavitation, again suggesting septic emboli.  There is small left pleural fluid, which appears simple and layers dependently.  There is a loculated moderate right pleural effusion, which appears similar in size to the comparison exam however now containing air, which may be due to empyema.  There is adjacent atelectatic change within the bilateral lower lobes.  Overall number and size of consolidative densities has increased since the comparison exam.     Esophagus: Normal.     Upper Abdomen: There is persistent wedge-shaped hypodensity within the spleen, suggesting infarct.     Bones: No acute fracture. No suspicious lytic or sclerotic lesions.     Impression:     Interval progression with increased number and size of consolidative cavitary densities throughout the lungs, most suggestive of septic emboli with larger opacities concerning for necrotizing pneumonia.     Stable size of loculated right moderate pleural effusion, now containing air compatible with hydropneumothorax as reported on prior MRI.  Interval development of air is concerning for empyema noting limited evaluation without intravenous contrast.  New small simple appearing left pleural effusion.     Wedge-shaped splenic hypodensity, suggesting infarction.     Anasarca.           ABG  No results for input(s): PH, PO2, PCO2, HCO3, BE in the last 168 hours.  Assessment/Plan:     * Acute bacterial endocarditis  Medical Mx  CTS note noted  5/23 IV oxacillin.    Hydropneumothorax  5/20 Suspect empyema, needs chest tube placement  5/21 s/p chest tube placement, adequate expansion - will check Chest X Ray in aam  5/23 tPA DNase.    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    Parapneumonic  effusion  Complicated by PNA, possible empyema  SP Thora  Cont Abx  5/20/2022 Probable empyema,needs chest tube drainage  5/22 chest tube draining  5/23 60 cc drained yesterday.  Possible tPA and DNase tomorrow.  Continue IV oxacillin.    COPD (chronic obstructive pulmonary disease)  Oxygen  Keep SP2> 92%  Bronchodilators  Follow up in Pulmonary for PFT to clarify Dx  5/23 O2 target sat 92-94%.  Nebs p.r.n.      Discussed with hospital medicine attending     Piyush San MD  Pulmonology  [facility]

## 2022-05-23 NOTE — ASSESSMENT & PLAN NOTE
Complicated by PNA, possible empyema  SP Thora  Cont Abx  5/20/2022 Probable empyema,needs chest tube drainage  5/22 chest tube draining  5/23 60 cc drained yesterday.  Possible tPA and DNase tomorrow.  Continue IV oxacillin.   Dr. Yanez

## 2022-05-23 NOTE — PLAN OF CARE
[facility] - Med [facility]  Discharge Reassessment    Primary Care Provider: Spenser Campa MD    Expected Discharge Date:     Reassessment (most recent)     Discharge Reassessment - 05/23/22 1239        Discharge Reassessment    Assessment Type Discharge Planning Reassessment     Did the patient's condition or plan change since previous assessment? No     Discharge Plan discussed with: Patient     Communicated YESSICA with patient/caregiver Date not available/Unable to determine     Discharge Plan A Long-term acute care facility (LTAC)     Discharge Plan B Long-term acute care facility (LTAC)     DME Needed Upon Discharge  none     Discharge Barriers Identified None        Post-Acute Status    Discharge Delays None known at this time               CM to continue following patient for dc needs. Patients DC disposition is LTAC. Cm sent updated clinicals to [facility]. Cm contacted liaison was unable to reach her, left  to return call.    Tip spoke with Radha at [facility], updated clinicals were received, auth was submitted, pending approval.   05/23/2022   2:57PM

## 2022-05-23 NOTE — PLAN OF CARE
Problem: Adult Inpatient Plan of Care  Goal: Plan of Care Review  Outcome: Ongoing, Progressing  Goal: Patient-Specific Goal (Individualized)  Outcome: Ongoing, Progressing  Goal: Absence of Hospital-Acquired Illness or Injury  Outcome: Ongoing, Progressing  Goal: Optimal Comfort and Wellbeing  Outcome: Ongoing, Progressing  Goal: Readiness for Transition of Care  Outcome: Ongoing, Progressing     Problem: Skin Injury Risk Increased  Goal: Skin Health and Integrity  Outcome: Ongoing, Progressing     Problem: Impaired Wound Healing  Goal: Optimal Wound Healing  Outcome: Ongoing, Progressing     Problem: Respiratory Compromise (Pneumonia)  Goal: Effective Oxygenation and Ventilation  Outcome: Ongoing, Progressing     Patient is on tele monitor 8681, NSR. Pt. Has a RUE PICC in place. Pt. Chest tube in place, CDI. Pt. Is on 2 liters of oxygen, NC. Pt. Slept majority of the night. Pt. Was free from fall or injury. Pain managed appropriately. Pt. shows no signs of distress. Will continue to monitor. 24 hour chart check complete.

## 2022-05-23 NOTE — SUBJECTIVE & OBJECTIVE
Interval History: 5/23.  Patient seen and examined.  O2 sat 92% on 2 liters/minute.  Body fluid studies reviewed.  Status post right-sided pigtail placement 05/21/2022.  Cultures reviewed.    Review of Systems   Constitutional:  Positive for malaise/fatigue and weight loss. Negative for chills and fever.   HENT:  Negative for hearing loss and nosebleeds.    Eyes:  Negative for discharge.   Respiratory:  Positive for cough, sputum production and shortness of breath.    Cardiovascular:  Positive for leg swelling. Negative for chest pain.   Gastrointestinal:  Negative for abdominal pain.   Genitourinary:  Negative for hematuria.   Musculoskeletal:  Negative for falls.   Skin:  Negative for itching.   Neurological:  Positive for weakness. Negative for speech change, seizures and loss of consciousness.   Endo/Heme/Allergies:  Negative for polydipsia. Does not bruise/bleed easily.   Psychiatric/Behavioral:  Negative for hallucinations.        Objective:     Vital Signs (Most Recent):  Temp: 99.3 °F (37.4 °C) (05/23/22 1205)  Pulse: 95 (05/23/22 1205)  Resp: 17 (05/23/22 1446)  BP: 134/75 (05/23/22 1205)  SpO2: (!) 92 % (05/23/22 1205)   Vital Signs (24h Range):  Temp:  [97.7 °F (36.5 °C)-99.4 °F (37.4 °C)] 99.3 °F (37.4 °C)  Pulse:  [80-97] 95  Resp:  [16-20] 17  SpO2:  [92 %-99 %] 92 %  BP: (128-143)/(68-81) 134/75     Weight: 97.1 kg (214 lb 1.1 oz)  Body mass index is 35.62 kg/m².      Intake/Output Summary (Last 24 hours) at 5/23/2022 1628  Last data filed at 5/23/2022 1200  Gross per 24 hour   Intake 514.54 ml   Output 6560 ml   Net -6045.46 ml       Physical Exam  Vitals and nursing note reviewed.   Constitutional:       General: She is not in acute distress.     Appearance: She is well-developed. She is ill-appearing.   HENT:      Head: Normocephalic and atraumatic.      Nose: Nose normal.   Eyes:      Extraocular Movements: Extraocular movements intact.   Cardiovascular:      Rate and Rhythm: Normal rate and  regular rhythm.   Pulmonary:      Effort: Pulmonary effort is normal.      Breath sounds: No stridor. Rhonchi present.      Comments: Right-side pigtail in place  Abdominal:      General: There is no distension.   Musculoskeletal:         General: No signs of injury.      Cervical back: Normal range of motion and neck supple.   Skin:     General: Skin is dry.   Neurological:      General: No focal deficit present.      Mental Status: She is alert and oriented to person, place, and time. Mental status is at baseline.   Psychiatric:         Behavior: Behavior normal.       Vents:  Oxygen Concentration (%): 28 (05/23/22 0739)    Lines/Drains/Airways       Peripherally Inserted Central Catheter Line  Duration             PICC Double Lumen 05/17/22 1612 right basilic 6 days              Drain  Duration                  Chest Tube 05/21/22 0957 1 Right Pleural 8.5 Fr. 2 days    Female External Urinary Catheter 05/21/22 0700 2 days                    Significant Labs:    CBC/Anemia Profile:  Recent Labs   Lab 05/22/22  0604 05/23/22  0448   WBC 9.55 9.97  9.97   HGB 7.7* 7.8*  7.8*   HCT 23.9* 25.2*  25.2*   * 490*  490*   MCV 86 89  89   RDW 15.6* 15.8*  15.8*        Chemistries:  Recent Labs   Lab 05/22/22  0604 05/23/22  0448    137   K 4.6 4.7    99   CO2 31* 31*   BUN 10 12   CREATININE 0.6 0.7   CALCIUM 8.3* 8.0*   ALBUMIN 1.4* 1.4*   PROT 5.7* 5.7*   BILITOT 0.3 0.2   ALKPHOS 119 106   ALT 16 17   AST 12 13      Latest Reference Range & Units 05/07/22 09:05 05/21/22 10:22   Fluid Appearance  Hazy Cloudy   WBC, Body Fluid /cu mm 39822 [1] 793246 (C) [2]   Body Fluid Type  Pleural Fluid, Right Pleural Fluid, Right   Segs, Fluid % 93 88   Lymphs, Fluid % 3 7   Monocytes/Macrophages, Fluid % 4 5   Amylase, Fluid Not established U/L 34 [3]    Glucose, Fluid Not established mg/dL 29 [4]    LD, Fluid Not established U/L 1077 [5]    Body Fluid, Albumin See text g/dL 1.3 [6]    Body Fluid Source  Amylase  Pleural Fluid, Right    Body Fluid Source, Albumin  Pleural Fluid, Right    Body Fluid Source, Glucose  Pleural Fluid, Right    Body Fluid Source, LDH  Pleural Fluid, Right    Body Fluid Source, Total Protein  Pleural Fluid, Right    Body Fluid, Protein Not established g/dL 3.7 [7]    Cholesterol, Body Fluid See Comment mg/dL 79 [8]      [    Significant Imaging:      CT chest without contrast 05/20/2022    Narrative & Impression  EXAMINATION:  CT CHEST WITHOUT CONTRAST     CLINICAL HISTORY:  Empyema;     TECHNIQUE:  Low dose axial images, sagittal and coronal reformations were obtained from the thoracic inlet to the lung bases. Contrast was not administered.  All CT scans at this facility are performed using dose optimization techniques including the following: automated exposure control; adjustment of the mA and/or kV; use of iterative reconstruction technique.     COMPARISON:  CTA chest non coronary 05/06/2022, chest radiograph 05/20/2022, MRI thoracic spine 05/19/2022     FINDINGS:  Base of Neck: There is diffuse body wall edema.  Unremarkable, noting the presence of AA PICC line which terminates at the superior cavoatrial junction.     Thoracic soft tissues: There is diffuse body wall edema.     Aorta: Left-sided aortic arch.  No aneurysm and no significant atherosclerosis     Heart: Normal size. No effusion.  No significant coronary atherosclerosis.     Pulmonary vasculature: Pulmonary arteries distribute normally.     Esha/Mediastinum: There are few prominent mediastinal lymph nodes with a prevascular node measuring 0.8 cm in short axis.     Airways: Patent.     Lungs/Pleura: There are multiple bilateral consolidative densities scattered throughout the lungs, many of which demonstrate cavitation, again suggesting septic emboli.  There is small left pleural fluid, which appears simple and layers dependently.  There is a loculated moderate right pleural effusion, which appears similar in size to the  comparison exam however now containing air, which may be due to empyema.  There is adjacent atelectatic change within the bilateral lower lobes.  Overall number and size of consolidative densities has increased since the comparison exam.     Esophagus: Normal.     Upper Abdomen: There is persistent wedge-shaped hypodensity within the spleen, suggesting infarct.     Bones: No acute fracture. No suspicious lytic or sclerotic lesions.     Impression:     Interval progression with increased number and size of consolidative cavitary densities throughout the lungs, most suggestive of septic emboli with larger opacities concerning for necrotizing pneumonia.     Stable size of loculated right moderate pleural effusion, now containing air compatible with hydropneumothorax as reported on prior MRI.  Interval development of air is concerning for empyema noting limited evaluation without intravenous contrast.  New small simple appearing left pleural effusion.     Wedge-shaped splenic hypodensity, suggesting infarction.     Anasarca.

## 2022-05-23 NOTE — ASSESSMENT & PLAN NOTE
Oxygen  Keep SP2> 92%  Bronchodilators  Follow up in Pulmonary for PFT to clarify Dx  5/23 O2 target sat 92-94%.  Nebs p.r.n.

## 2022-05-23 NOTE — PT/OT/SLP PROGRESS
Occupational Therapy      Patient Name:  Tianna Leon   MRN:  [id number]    Patient refused OT session at this time. Patient was very lethargic and required increased time to respond but did c/o 8/10 back and chest pain.  Nurse notified via secure chat.  Will follow up during next scheduled OT session.   1435  5/23/2022   Yolande Quinones, OT

## 2022-05-23 NOTE — ASSESSMENT & PLAN NOTE
5/20 Suspect empyema, needs chest tube placement  5/21 s/p chest tube placement, adequate expansion - will check Chest X Ray in [facility]  5/23 tPA DNase.

## 2022-05-23 NOTE — PLAN OF CARE
RD Recommendations  1) Continue diet as prescribed: regular  2) RD to follow up to monitor intake, tolerance, and labs.      Goals: Pt to meet greater than or equal to 75% EEN by RD follow up      Miriam Tamez MS, RD, LDN

## 2022-05-24 LAB
ALBUMIN SERPL BCP-MCNC: 1.5 G/DL (ref 3.5–5.2)
ALP SERPL-CCNC: 94 U/L (ref 55–135)
ALT SERPL W/O P-5'-P-CCNC: 12 U/L (ref 10–44)
ANION GAP SERPL CALC-SCNC: 8 MMOL/L (ref 8–16)
AST SERPL-CCNC: 12 U/L (ref 10–40)
BACTERIA SPEC AEROBE CULT: NO GROWTH
BASOPHILS # BLD AUTO: 0.07 K/UL (ref 0–0.2)
BASOPHILS NFR BLD: 0.6 % (ref 0–1.9)
BILIRUB SERPL-MCNC: 0.2 MG/DL (ref 0.1–1)
BUN SERPL-MCNC: 12 MG/DL (ref 6–20)
CALCIUM SERPL-MCNC: 8.1 MG/DL (ref 8.7–10.5)
CHLORIDE SERPL-SCNC: 99 MMOL/L (ref 95–110)
CO2 SERPL-SCNC: 31 MMOL/L (ref 23–29)
CREAT SERPL-MCNC: 0.7 MG/DL (ref 0.5–1.4)
DIFFERENTIAL METHOD: ABNORMAL
EOSINOPHIL # BLD AUTO: 0.4 K/UL (ref 0–0.5)
EOSINOPHIL NFR BLD: 3.3 % (ref 0–8)
ERYTHROCYTE [DISTWIDTH] IN BLOOD BY AUTOMATED COUNT: 15.6 % (ref 11.5–14.5)
EST. GFR  (AFRICAN AMERICAN): >60 ML/MIN/1.73 M^2
EST. GFR  (NON AFRICAN AMERICAN): >60 ML/MIN/1.73 M^2
GLUCOSE SERPL-MCNC: 103 MG/DL (ref 70–110)
HCT VFR BLD AUTO: 25 % (ref 37–48.5)
HGB BLD-MCNC: 7.9 G/DL (ref 12–16)
IMM GRANULOCYTES # BLD AUTO: 0.07 K/UL (ref 0–0.04)
IMM GRANULOCYTES NFR BLD AUTO: 0.6 % (ref 0–0.5)
LYMPHOCYTES # BLD AUTO: 2.6 K/UL (ref 1–4.8)
LYMPHOCYTES NFR BLD: 23.1 % (ref 18–48)
MCH RBC QN AUTO: 27.6 PG (ref 27–31)
MCHC RBC AUTO-ENTMCNC: 31.6 G/DL (ref 32–36)
MCV RBC AUTO: 87 FL (ref 82–98)
MONOCYTES # BLD AUTO: 1.1 K/UL (ref 0.3–1)
MONOCYTES NFR BLD: 10.3 % (ref 4–15)
NEUTROPHILS # BLD AUTO: 6.9 K/UL (ref 1.8–7.7)
NEUTROPHILS NFR BLD: 62.1 % (ref 38–73)
NRBC BLD-RTO: 0 /100 WBC
PLATELET # BLD AUTO: 453 K/UL (ref 150–450)
PMV BLD AUTO: 8.4 FL (ref 9.2–12.9)
POTASSIUM SERPL-SCNC: 4.4 MMOL/L (ref 3.5–5.1)
PROT SERPL-MCNC: 5.8 G/DL (ref 6–8.4)
RBC # BLD AUTO: 2.86 M/UL (ref 4–5.4)
SODIUM SERPL-SCNC: 138 MMOL/L (ref 136–145)
WBC # BLD AUTO: 11.1 K/UL (ref 3.9–12.7)

## 2022-05-24 PROCEDURE — 63600175 PHARM REV CODE 636 W HCPCS: Performed by: INTERNAL MEDICINE

## 2022-05-24 PROCEDURE — 99233 PR SUBSEQUENT HOSPITAL CARE,LEVL III: ICD-10-PCS | Mod: 25,,, | Performed by: INTERNAL MEDICINE

## 2022-05-24 PROCEDURE — 25000003 PHARM REV CODE 250: Performed by: NURSE PRACTITIONER

## 2022-05-24 PROCEDURE — 94664 DEMO&/EVAL PT USE INHALER: CPT

## 2022-05-24 PROCEDURE — 99900035 HC TECH TIME PER 15 MIN (STAT)

## 2022-05-24 PROCEDURE — 21400001 HC TELEMETRY ROOM

## 2022-05-24 PROCEDURE — 80053 COMPREHEN METABOLIC PANEL: CPT | Performed by: NURSE PRACTITIONER

## 2022-05-24 PROCEDURE — 32561 LYSE CHEST FIBRIN INIT DAY: CPT | Mod: ,,, | Performed by: INTERNAL MEDICINE

## 2022-05-24 PROCEDURE — A4217 STERILE WATER/SALINE, 500 ML: HCPCS | Performed by: INTERNAL MEDICINE

## 2022-05-24 PROCEDURE — 97530 THERAPEUTIC ACTIVITIES: CPT

## 2022-05-24 PROCEDURE — 25000003 PHARM REV CODE 250: Performed by: INTERNAL MEDICINE

## 2022-05-24 PROCEDURE — 63600175 PHARM REV CODE 636 W HCPCS: Performed by: NURSE PRACTITIONER

## 2022-05-24 PROCEDURE — 85025 COMPLETE CBC W/AUTO DIFF WBC: CPT | Performed by: INTERNAL MEDICINE

## 2022-05-24 PROCEDURE — 99233 SBSQ HOSP IP/OBS HIGH 50: CPT | Mod: 25,,, | Performed by: INTERNAL MEDICINE

## 2022-05-24 PROCEDURE — 27000646 HC AEROBIKA DEVICE

## 2022-05-24 PROCEDURE — 27000221 HC OXYGEN, UP TO 24 HOURS

## 2022-05-24 PROCEDURE — 94761 N-INVAS EAR/PLS OXIMETRY MLT: CPT

## 2022-05-24 PROCEDURE — 25000242 PHARM REV CODE 250 ALT 637 W/ HCPCS: Performed by: INTERNAL MEDICINE

## 2022-05-24 PROCEDURE — 32561 PR INSTILL VIA CHEST TUBE AGENT FOR FIBRINOLYSIS, 1ST DAY: ICD-10-PCS | Mod: ,,, | Performed by: INTERNAL MEDICINE

## 2022-05-24 PROCEDURE — 94640 AIRWAY INHALATION TREATMENT: CPT

## 2022-05-24 PROCEDURE — 63600175 PHARM REV CODE 636 W HCPCS: Mod: JG | Performed by: INTERNAL MEDICINE

## 2022-05-24 RX ORDER — IBUPROFEN 400 MG/1
400 TABLET ORAL ONCE
Status: COMPLETED | OUTPATIENT
Start: 2022-05-24 | End: 2022-05-24

## 2022-05-24 RX ADMIN — ACETAMINOPHEN 650 MG: 325 TABLET ORAL at 12:05

## 2022-05-24 RX ADMIN — DORNASE ALFA 5 MG: 1 SOLUTION RESPIRATORY (INHALATION) at 01:05

## 2022-05-24 RX ADMIN — MORPHINE SULFATE 15 MG: 15 TABLET, EXTENDED RELEASE ORAL at 09:05

## 2022-05-24 RX ADMIN — ACETAMINOPHEN 650 MG: 325 TABLET ORAL at 10:05

## 2022-05-24 RX ADMIN — FAMOTIDINE 20 MG: 20 TABLET ORAL at 10:05

## 2022-05-24 RX ADMIN — ALTEPLASE 10 MG: 2.2 INJECTION, POWDER, LYOPHILIZED, FOR SOLUTION INTRAVENOUS at 01:05

## 2022-05-24 RX ADMIN — TRAZODONE HYDROCHLORIDE 100 MG: 100 TABLET ORAL at 10:05

## 2022-05-24 RX ADMIN — OXYCODONE HYDROCHLORIDE 5 MG: 5 TABLET ORAL at 11:05

## 2022-05-24 RX ADMIN — ENOXAPARIN SODIUM 40 MG: 40 INJECTION SUBCUTANEOUS at 05:05

## 2022-05-24 RX ADMIN — FAMOTIDINE 20 MG: 20 TABLET ORAL at 09:05

## 2022-05-24 RX ADMIN — IBUPROFEN 400 MG: 400 TABLET, FILM COATED ORAL at 05:05

## 2022-05-24 RX ADMIN — OXACILLIN SODIUM 12 G: 10 INJECTION, POWDER, FOR SOLUTION INTRAVENOUS at 03:05

## 2022-05-24 RX ADMIN — ALPRAZOLAM 1 MG: 1 TABLET ORAL at 01:05

## 2022-05-24 RX ADMIN — IPRATROPIUM BROMIDE AND ALBUTEROL SULFATE 3 ML: 2.5; .5 SOLUTION RESPIRATORY (INHALATION) at 07:05

## 2022-05-24 NOTE — PLAN OF CARE
Cm received a call from Radha at [facility], insurance denied, requested info for peer to peer. Provider notified of peer to peer request, pending response.

## 2022-05-24 NOTE — ASSESSMENT & PLAN NOTE
Complicated by PNA, possible empyema  SP Thora  Cont Abx  5/20/2022 Probable empyema,needs chest tube drainage  5/22 chest tube draining  5/23 60 cc drained yesterday.  Possible tPA and DNase tomorrow.  Continue IV oxacillin.  5/24 monitor chest tube output post tPA and DNase instillation today PA chest tube

## 2022-05-24 NOTE — ASSESSMENT & PLAN NOTE
Gram positive   Infectious disease consulted  Continue broad spectrum intravenous antibiotic(s) given h/o ivdu  Remains febrile and leukocytosis persists  5/8/22 The patient remained afebrile overnight. Currently on cefepime/vanc/flagyl. Blood cx are growing staph aureus. The patient refused the MRI lumbar spine overnight d/t being unable to lay flat from back pain. Will give pain meds and attempt to get the MRI today to r/o spinal abscess. The ECHO showed a 1.3 x 1.5 cm elongated, mobile echodensity seen on the tricuspid valve consistent with vegetation, CT surgery was consulted. Will need a SUSANNAH. Infectious disease is consulted. Will repeat blood cx today.   5/9/22 Repeat blood cx are still positive. Sensitivities are back and Staph is sensitive to oxacillin, will D/C vanc/cefepime. Infectious disease is following   5/10/22 Repeat blood cx are +. Continue IV oxacillin and flagyl. Infectious disease is consulted. IR consulted to evaluate possible paraspinous muscle myositis versus abscess. Recommend IR drainage. Continue current management.    5/11/22 The patient reports pain is still not well controlled at times. The patient reports that she is very anxious about the upcoming SUSANNAH and IR drainage. SUSANNAH was pushed back to this afternoon because the patient ate candy this AM. Continue treatment with IV oxacillin and flagyl. Infectious disease is following. WBC trended down to 31K. Continue current management.  5/12/22 The patients WBC improved to 22K overnight. The patient remains on continuous oxacillin infusion. Infectious disease is following.  5/14: WBC continues to improve, 18.10 today     5/15: Blood cultures 5/11/22: NGTD     Continuous Oxacillin, EOC: 7/11/22, pending acceptance at [facility], PICC line placed

## 2022-05-24 NOTE — PLAN OF CARE
Problem: Adult Inpatient Plan of Care  Goal: Plan of Care Review  Outcome: Ongoing, Progressing  Goal: Patient-Specific Goal (Individualized)  Outcome: Ongoing, Progressing  Goal: Absence of Hospital-Acquired Illness or Injury  Outcome: Ongoing, Progressing  Goal: Optimal Comfort and Wellbeing  Outcome: Ongoing, Progressing  Goal: Readiness for Transition of Care  Outcome: Ongoing, Progressing     Problem: Skin Injury Risk Increased  Goal: Skin Health and Integrity  Outcome: Ongoing, Progressing     Patient Is on tele monitor 8681, NSR. Pt. Has a chest tube in place. RUE PICC. Pt. Was free from fall or injury. Pain managed appropriately. Pt. shows no signs of distress. Will continue to monitor. 24 hour chart check complete.

## 2022-05-24 NOTE — SUBJECTIVE & OBJECTIVE
Interval History: see hospital course    Review of Systems   Constitutional:  Positive for activity change, appetite change and fatigue. Negative for chills, diaphoresis, fever and unexpected weight change.   HENT:  Negative for congestion, hearing loss, mouth sores, postnasal drip, rhinorrhea, sore throat and trouble swallowing.    Eyes:  Negative for discharge and visual disturbance.   Respiratory:  Negative for cough and chest tightness.    Cardiovascular:  Negative for chest pain, palpitations and leg swelling.   Gastrointestinal:  Negative for blood in stool, constipation, diarrhea, nausea and vomiting.   Endocrine: Negative for cold intolerance and heat intolerance.   Genitourinary:  Negative for difficulty urinating, dyspareunia, flank pain and hematuria.   Musculoskeletal:  Positive for back pain and myalgias. Negative for arthralgias.   Skin: Negative.    Neurological:  Positive for weakness. Negative for dizziness and light-headedness.   Hematological:  Negative for adenopathy. Does not bruise/bleed easily.   Psychiatric/Behavioral:  Negative for agitation, behavioral problems, confusion and sleep disturbance. The patient is nervous/anxious.    Objective:     Vital Signs (Most Recent):  Temp: (!) 101.7 °F (38.7 °C) (05/24/22 1628)  Pulse: 82 (05/24/22 1628)  Resp: 19 (05/24/22 1628)  BP: (!) 100/55 (05/24/22 1628)  SpO2: 99 % (05/24/22 1628)   Vital Signs (24h Range):  Temp:  [97.9 °F (36.6 °C)-101.7 °F (38.7 °C)] 101.7 °F (38.7 °C)  Pulse:  [74-99] 82  Resp:  [16-19] 19  SpO2:  [90 %-99 %] 99 %  BP: (100-149)/(53-77) 100/55     Weight: 97.1 kg (214 lb 1.1 oz)  Body mass index is 35.62 kg/m².    Intake/Output Summary (Last 24 hours) at 5/24/2022 1646  Last data filed at 5/24/2022 0740  Gross per 24 hour   Intake 713.18 ml   Output 5750 ml   Net -5036.82 ml        Physical Exam  Vitals and nursing note reviewed.   Constitutional:       General: She is not in acute distress.     Appearance: She is  well-developed.   HENT:      Head: Normocephalic and atraumatic.      Right Ear: Hearing and external ear normal.      Left Ear: Hearing and external ear normal.      Nose: No rhinorrhea.      Right Sinus: No maxillary sinus tenderness or frontal sinus tenderness.      Left Sinus: No maxillary sinus tenderness or frontal sinus tenderness.      Mouth/Throat:      Mouth: No oral lesions.      Pharynx: Uvula midline.   Eyes:      General:         Right eye: No discharge.         Left eye: No discharge.      Conjunctiva/sclera: Conjunctivae normal.      Pupils: Pupils are equal, round, and reactive to light.   Neck:      Thyroid: No thyromegaly.      Vascular: No carotid bruit.      Trachea: No tracheal deviation.   Cardiovascular:      Rate and Rhythm: Normal rate and regular rhythm.      Pulses:           Dorsalis pedis pulses are 2+ on the right side and 2+ on the left side.      Heart sounds: Normal heart sounds, S1 normal and S2 normal. No murmur heard.  Pulmonary:      Effort: Pulmonary effort is normal. No respiratory distress.      Breath sounds: Examination of the right-lower field reveals decreased breath sounds. Examination of the left-lower field reveals decreased breath sounds. Decreased breath sounds present.      Comments: Right pigtail small bore chest tube in place with moderate serous drainage with pus to to drainage system   Chest tube site C/D/I  Chest:   Breasts:      Right: No supraclavicular adenopathy.      Left: No supraclavicular adenopathy.     Abdominal:      General: Bowel sounds are decreased. There is no distension.      Palpations: Abdomen is soft. There is no mass.      Tenderness: There is no abdominal tenderness.   Musculoskeletal:      Cervical back: Normal range of motion.      Thoracic back: Tenderness present. Decreased range of motion.      Lumbar back: Tenderness present. Decreased range of motion.      Right lower leg: Edema present.      Left lower leg: Edema present.    Lymphadenopathy:      Cervical: No cervical adenopathy.      Upper Body:      Right upper body: No supraclavicular adenopathy.      Left upper body: No supraclavicular adenopathy.   Skin:     General: Skin is warm and dry.      Capillary Refill: Capillary refill takes less than 2 seconds.      Findings: No rash.   Neurological:      Mental Status: She is alert and oriented to person, place, and time.      Sensory: No sensory deficit.      Coordination: Coordination normal.      Gait: Gait normal.   Psychiatric:         Attention and Perception: She is inattentive.         Mood and Affect: Mood is anxious. Mood is not depressed.         Speech: Speech normal.         Behavior: Behavior normal.         Thought Content: Thought content normal.         Judgment: Judgment normal.       Significant Labs: All pertinent labs within the past 24 hours have been reviewed.  CBC:   Recent Labs   Lab 05/23/22  0448 05/24/22  0601   WBC 9.97  9.97 11.10   HGB 7.8*  7.8* 7.9*   HCT 25.2*  25.2* 25.0*   *  490* 453*       CMP:   Recent Labs   Lab 05/23/22  0448 05/24/22  0601    138   K 4.7 4.4   CL 99 99   CO2 31* 31*   * 103   BUN 12 12   CREATININE 0.7 0.7   CALCIUM 8.0* 8.1*   PROT 5.7* 5.8*   ALBUMIN 1.4* 1.5*   BILITOT 0.2 0.2   ALKPHOS 106 94   AST 13 12   ALT 17 12   ANIONGAP 7* 8   EGFRNONAA >60 >60         Significant Imaging: I have reviewed all pertinent imaging results/findings within the past 24 hours.

## 2022-05-24 NOTE — PLAN OF CARE
Pt remains free of falls/injury this shift. Safety precautions maintained. Pain managed with pain medication. No sxs of acute distress noted. Will continue to monitor.

## 2022-05-24 NOTE — PROGRESS NOTES
[facility] Medicine  Progress Note    Patient Name: Tianna Leon  MRN: [id number]  Patient Class: IP- Inpatient   Admission Date: 5/6/2022  Length of Stay: 18 days  Attending Physician: Elizabeth Kim MD  Primary Care Provider: Spenser Campa MD        Subjective:     Principal Problem:Acute bacterial endocarditis        HPI:  38 y/o. female  with a PMHx of asthma, COPD,Bipolar  , IVDU and HLD presented to the ER with a c/o  sob for the last weak which has gotten worse . The SOB is associated with fever , chills , productive cough , back pain  and generalized weakness . She report cough some blood this am .  She is active IVDU  ( heroine , cocaine  and amphetamine ) . She denies any  sick contact , chest pain  , GI/ sx , She also complaning of LE sweeling . Knee pain and B/L LE rash .   ER COURSE: CTA chest negative for pe . Multiple nodular and cavitary opacities concerning for septic emboli with larger opacities possibly necrotizing pneumonia. CT cervical  and thoracic did not show any acute finding , CT lumbar Possible progression of degenerative changes most notable at L4-5 with moderate to severe spinal canal stenosis at this level.  WBC  29 k , na 130 , k 3.4 , cl 89 , procal 2.71   ER VS:  BP Pulse Resp Temp SpO2   (!) 124/58 110 (!) 30 (!) 101.6 °F (38.7 °C) 96 %           Pt will be admitted to inpt with a dx of PNA and severe sepsis       Overview/Hospital Course:  5/7 admitted for pneumonia, severe sepsis in setting of ivdu. Mother at bedside and provides collateral. Patient has struggled with substance abuse for approximately 20 years, worsening over last 3-5 years since life event. Onset of symptoms 1-2 weeks ago. Tolerated thoracentesis with no pneumothorax on chest x-ray. Mri lumbar and echo, pending. Bcx positive, growing gram positive cocci. On vanc, cefepime and flagyl. Infectious disease consulted for bacteremia. 5/8/22 The patient remained afebrile overnight. Currently on  cefepime/vanc/flagyl. Blood cx 5/6/22 are growing MSSA. The patient refused the MRI lumbar spine overnight d/t being unable to lay flat from back pain. Will give pain meds and attempt to get the MRI today to r/o spinal abscess. The ECHO showed a 1.3 x 1.5 cm elongated, mobile echodensity seen on the tricuspid valve consistent with vegetation, CT surgery was consulted. Will need a SUSANNAH. Infectious disease is consulted. Will repeat blood cx today. 5/9/22 No acute events overnight. The patient reports lower back pain is not well controlled with current pain regimen, will adjust. Repeat blood cx are still positive. Sensitivities are back and Staph is sensitive to oxacillin, will D/C vanc/cefepime. Cardiology consulted and plans for a SUSANNAH today. CT surgery following and recommends continuing medical management with appropriate ABX, no surgical intervention at this time. MRI lumbar spine showed moderate to severe spinal canal stenosis with moderate left-sided neural foraminal stenosis, Neurosurgery consulted. 5/10/22 No acute events overnight. The patient reports some improvement in pain with adjustment in pain meds. Repeat blood cx are +. Continue IV oxacillin and flagyl. Infectious disease is consulted. IR consulted to evaluate possible paraspinous muscle myositis versus abscess. Recommend IR drainage. Continue current management. 5/11/22 No acute events overnight. The patient reports pain is still not well controlled at times. The patient reports that she is very anxious about the upcoming SUSANNAH and IR drainage. SUSANNAH was pushed back to this afternoon because the patient ate candy this AM. Continue treatment with IV oxacillin and flagyl. Infectious disease is following. WBC trended down to 31K. Continue current management. 5/12/22 No acute events overnight. The patients WBC improved to 22K overnight. The patient remains on continuous oxacillin infusion. Infectious disease is following. MRI thoracic spine is ordered and  pending. The patients SUSANNAH was postponed again today d/t low H/H. Hgb was noted to be 6.2 this morning, will transfuse 1 unit PRBC. The patient reports pain is still not well controlled. The case was discussed with Neurosurgery who recommended adding extended release pain meds. Approximately 1 ml of purulent drainage aspirated from back yesterday per IR, growing gram + cocci.     5/13: Patient was given 1U PRBC on 5/12, improved from 6.2 --> 7.0, will give an additional unit PRBC. Radiology attempted to bring patient on 5/12 at 1755 for Thoracic MRI, patient refused due to pain and anxiety. Repeat attempt this AM, patient again refused. Will order Ativan 1mg IV to be given with pain medication prior to scan to relieve anxiety and pain, nurse to notify NP when ready to go for scan. Discussed pain management with patient, discussed transition to PO pain medications to being preparing for d/c to LTAC, adjusted PO pain regimen will reevaluate to determine effectiveness. Repeat Blood cultures 5/11/22: NGTD.     5/14: Final anaerobic cultures pending, continue to adjust pain regimen, d/c IV Dilaudid today. Patient currently managed with PO Morphine extended release and PO oxycodone. D/C plan is for LTAC once final antibiotic regimen determined. Patient continues to refuse MRI of thoracic spine.     5/15: Called to room this AM, patient had coughed up blood and mucous. Approx. 1-2 teaspoons of blood in emesis bag, ordered CXR and repeat CBC, CXR showed improvement from previous day, repeat CBC at time of incident, showed a slight decrease in Hgb: 9.7 --> 8.7, when repeated at 1500, Hgb had improved to 10. no further episodes of coughing up blood throughout day. Patient continues to c/o not having enough pain medication but continues to sleep most of the day and will fall asleep when speaking at times. Added IV Toradol to plan.     5/16: No further episodes of coughing blood overnight, patient participated with encouragement  with PT/OT today, recommendations are HH vs. SNF pending progress. Awaiting final antibiotic regimen recommendations. Anaerobic culture results finalized: negative for growth. Afebrile, WBC trending downward.     5/17: Antibiotic regimen per ID is continuous oxacillin until 7/11/22, SW notified of plan, will seek acceptance at LTAC or SNF. Patient choice is [facility]. WBC in normal range. AMS this AM, CT of head: no acute findings. Given narcan and mental status improved. Decreased narcotic dose.     5/18: Per discussion with patient, she is willing to have the MRI of thoracic spine today, will order IV pain medication and anxiety medication prior to scan. Nursing and Radiology aware of needing to premedicate for scan. Patient is more cooperative and participating with care.     5/19: Patient given premedications for MRI, MRI completed, results pending. Patient more agreeable with treatment plan and cooperating. Will need psychiatry follow-up after discharge. Hgb: 6.7, will give 1U PRBC    5/20/22: +LEON and generalized pain. Thoracic MRI showed no evidence for disc osteomyelitis in the thoracic spine and no epidural abscess, Loculated right hydropneumothorax and extensive bilateral pulmonary infiltrates. Ct chest showed septic emboli and moderate right pleural effusion.+splenic infarct  Pulmonology felt likely empyema- consulted IR for chest tube.     5/21/22: s/p right pig tail placement to suction/ drainage system per IR. 60ml pus removed initially. Fluid analysis consistent with empyema. Pleural fluid cultures pending. Now with scant serosanguinous output.+ right lateral chest pain at chest tube site. Denies any other complaints. Status update provided to pt's mother.     5/22/22: Repeatedly requesting IV Dilaudid. Toradol ordered. Afebrile, WBC normal, O2sats stable on 3lites. s/p right pig tail placement to suction/ drainage system per IR on 5/21/22- draining serous draiange with pus. Plan for LTAC placement      5/23/22: More cofortable today. Not getting OOB. Encouraged OOB and rationale provided. Pulmonology recommended Possible tPA and DNase in chest tube tomorrow    5/24/22: Examined OOB in chair. Encouraged pt to continue OOB. Plan for tPA and DNase in chest tube today per Pulmonology  Pt denied LTAC- will pursue SNF      Interval History: see hospital course    Review of Systems   Constitutional:  Positive for activity change, appetite change and fatigue. Negative for chills, diaphoresis, fever and unexpected weight change.   HENT:  Negative for congestion, hearing loss, mouth sores, postnasal drip, rhinorrhea, sore throat and trouble swallowing.    Eyes:  Negative for discharge and visual disturbance.   Respiratory:  Negative for cough and chest tightness.    Cardiovascular:  Negative for chest pain, palpitations and leg swelling.   Gastrointestinal:  Negative for blood in stool, constipation, diarrhea, nausea and vomiting.   Endocrine: Negative for cold intolerance and heat intolerance.   Genitourinary:  Negative for difficulty urinating, dyspareunia, flank pain and hematuria.   Musculoskeletal:  Positive for back pain and myalgias. Negative for arthralgias.   Skin: Negative.    Neurological:  Positive for weakness. Negative for dizziness and light-headedness.   Hematological:  Negative for adenopathy. Does not bruise/bleed easily.   Psychiatric/Behavioral:  Negative for agitation, behavioral problems, confusion and sleep disturbance. The patient is nervous/anxious.    Objective:     Vital Signs (Most Recent):  Temp: (!) 101.7 °F (38.7 °C) (05/24/22 1628)  Pulse: 82 (05/24/22 1628)  Resp: 19 (05/24/22 1628)  BP: (!) 100/55 (05/24/22 1628)  SpO2: 99 % (05/24/22 1628)   Vital Signs (24h Range):  Temp:  [97.9 °F (36.6 °C)-101.7 °F (38.7 °C)] 101.7 °F (38.7 °C)  Pulse:  [74-99] 82  Resp:  [16-19] 19  SpO2:  [90 %-99 %] 99 %  BP: (100-149)/(53-77) 100/55     Weight: 97.1 kg (214 lb 1.1 oz)  Body mass index is 35.62  kg/m².    Intake/Output Summary (Last 24 hours) at 5/24/2022 1646  Last data filed at 5/24/2022 0740  Gross per 24 hour   Intake 713.18 ml   Output 5750 ml   Net -5036.82 ml        Physical Exam  Vitals and nursing note reviewed.   Constitutional:       General: She is not in acute distress.     Appearance: She is well-developed.   HENT:      Head: Normocephalic and atraumatic.      Right Ear: Hearing and external ear normal.      Left Ear: Hearing and external ear normal.      Nose: No rhinorrhea.      Right Sinus: No maxillary sinus tenderness or frontal sinus tenderness.      Left Sinus: No maxillary sinus tenderness or frontal sinus tenderness.      Mouth/Throat:      Mouth: No oral lesions.      Pharynx: Uvula midline.   Eyes:      General:         Right eye: No discharge.         Left eye: No discharge.      Conjunctiva/sclera: Conjunctivae normal.      Pupils: Pupils are equal, round, and reactive to light.   Neck:      Thyroid: No thyromegaly.      Vascular: No carotid bruit.      Trachea: No tracheal deviation.   Cardiovascular:      Rate and Rhythm: Normal rate and regular rhythm.      Pulses:           Dorsalis pedis pulses are 2+ on the right side and 2+ on the left side.      Heart sounds: Normal heart sounds, S1 normal and S2 normal. No murmur heard.  Pulmonary:      Effort: Pulmonary effort is normal. No respiratory distress.      Breath sounds: Examination of the right-lower field reveals decreased breath sounds. Examination of the left-lower field reveals decreased breath sounds. Decreased breath sounds present.      Comments: Right pigtail small bore chest tube in place with moderate serous drainage with pus to to drainage system   Chest tube site C/D/I  Chest:   Breasts:      Right: No supraclavicular adenopathy.      Left: No supraclavicular adenopathy.     Abdominal:      General: Bowel sounds are decreased. There is no distension.      Palpations: Abdomen is soft. There is no mass.       Tenderness: There is no abdominal tenderness.   Musculoskeletal:      Cervical back: Normal range of motion.      Thoracic back: Tenderness present. Decreased range of motion.      Lumbar back: Tenderness present. Decreased range of motion.      Right lower leg: Edema present.      Left lower leg: Edema present.   Lymphadenopathy:      Cervical: No cervical adenopathy.      Upper Body:      Right upper body: No supraclavicular adenopathy.      Left upper body: No supraclavicular adenopathy.   Skin:     General: Skin is warm and dry.      Capillary Refill: Capillary refill takes less than 2 seconds.      Findings: No rash.   Neurological:      Mental Status: She is alert and oriented to person, place, and time.      Sensory: No sensory deficit.      Coordination: Coordination normal.      Gait: Gait normal.   Psychiatric:         Attention and Perception: She is inattentive.         Mood and Affect: Mood is anxious. Mood is not depressed.         Speech: Speech normal.         Behavior: Behavior normal.         Thought Content: Thought content normal.         Judgment: Judgment normal.       Significant Labs: All pertinent labs within the past 24 hours have been reviewed.  CBC:   Recent Labs   Lab 05/23/22  0448 05/24/22  0601   WBC 9.97  9.97 11.10   HGB 7.8*  7.8* 7.9*   HCT 25.2*  25.2* 25.0*   *  490* 453*       CMP:   Recent Labs   Lab 05/23/22  0448 05/24/22  0601    138   K 4.7 4.4   CL 99 99   CO2 31* 31*   * 103   BUN 12 12   CREATININE 0.7 0.7   CALCIUM 8.0* 8.1*   PROT 5.7* 5.8*   ALBUMIN 1.4* 1.5*   BILITOT 0.2 0.2   ALKPHOS 106 94   AST 13 12   ALT 17 12   ANIONGAP 7* 8   EGFRNONAA >60 >60         Significant Imaging: I have reviewed all pertinent imaging results/findings within the past 24 hours.      Assessment/Plan:      * Acute bacterial endocarditis  ECHO showed a 1.3 x 1.5 cm elongated, mobile echodensity seen on the tricuspid valve consistent with vegetation, CT surgery was  consulted.   Due to MSSA    -- Infectious disease is consulted.  -- repeat blood cx NGTD.  -- Continue IV ABX.   --Sensitivities are back and Staph is sensitive to oxacillin, D/C vanc/cefepime.   --Cardiology consulted and plans for a SUSANNAH.   --CT surgery following and recommends continuing medical management with appropriate ABX, no surgical intervention at this time.    cont IV oxacillin.      MSSA bacteremia  Gram positive   Infectious disease consulted  Continue broad spectrum intravenous antibiotic(s) given h/o ivdu  Remains febrile and leukocytosis persists  5/8/22 The patient remained afebrile overnight. Currently on cefepime/vanc/flagyl. Blood cx are growing staph aureus. The patient refused the MRI lumbar spine overnight d/t being unable to lay flat from back pain. Will give pain meds and attempt to get the MRI today to r/o spinal abscess. The ECHO showed a 1.3 x 1.5 cm elongated, mobile echodensity seen on the tricuspid valve consistent with vegetation, CT surgery was consulted. Will need a SUSANNAH. Infectious disease is consulted. Will repeat blood cx today.   5/9/22 Repeat blood cx are still positive. Sensitivities are back and Staph is sensitive to oxacillin, will D/C vanc/cefepime. Infectious disease is following   5/10/22 Repeat blood cx are +. Continue IV oxacillin and flagyl. Infectious disease is consulted. IR consulted to evaluate possible paraspinous muscle myositis versus abscess. Recommend IR drainage. Continue current management.    5/11/22 The patient reports pain is still not well controlled at times. The patient reports that she is very anxious about the upcoming SUSANNAH and IR drainage. SUSANNAH was pushed back to this afternoon because the patient ate candy this AM. Continue treatment with IV oxacillin and flagyl. Infectious disease is following. WBC trended down to 31K. Continue current management.  5/12/22 The patients WBC improved to 22K overnight. The patient remains on continuous oxacillin  infusion. Infectious disease is following.  5/14: WBC continues to improve, 18.10 today     5/15: Blood cultures 5/11/22: NGTD     Continuous Oxacillin, EOC: 7/11/22, pending acceptance at [facility], PICC line placed    Empyema of right pleural space  Pulmonology consulted and felt likely empyema   IR consulted for chest tube      s/p right pigtail small bore chest tube placement to suction/drainage system per IR on 5/21/22. 60ml pus removed. Now with Serous drainage with pus. Fluid analysis consistent with empyema. 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 IR consulted to evaluate possible paraspinous muscle myositis versus abscess.    --Approximately 1 ml of purulent drainage aspirated 5/11 per IR, growing MSSA  --Continue current regimen      Normocytic anemia  Secondary to acute illness    --Daily CBC  --Transfuse with 1 unit PRBC for Hgb <7.0 or <8.0 if symptomatic   --Hgb: 6.7, give 1U PRBC    Lumbar stenosis without neurogenic claudication        Chronic pulmonary heart disease  - Pulmonology following       IVDU (intravenous drug user)   Will ordet TTE to r/o IE  Will order MRI lumbar wwo- completed   Cont broad spectrum IVAB   --MRI thoracic spine ordered per ID, patient refused on multiple attempts    5/7   Studies pending for additional sites of infection given h/o ivdu  5/8/22  on cessation     Severe sepsis  This patient does have evidence of infective focus  My overall impression is sepsis. Vital signs were reviewed and noted in progress note.  Antibiotics given-   Antibiotics (From admission, onward)            Start     Stop Route Frequency Ordered    05/09/22 1100  oxacillin 12 g in  mL  CONTINUOUS INFUSION         -- IV Every 24 hours (non-standard times) 05/09/22 0932    05/07/22 2100  mupirocin 2 % ointment         05/12 2059 Nasl 2 times daily 05/07/22 1646        Cultures were taken-   Microbiology Results (last 7 days)     Procedure Component Value Units Date/Time    Culture, Respiratory with Gram Stain [[id number]] Collected: 05/16/22 0758    Order Status: Sent Specimen: Respiratory from Sputum Updated: 05/16/22 0759    Culture, Anaerobe [[id number]] Collected: 05/11/22 1440    Order Status: Completed Specimen: Abscess from Back Updated: 05/16/22 0733     Anaerobic Culture No anaerobes isolated    Blood culture [[id number]] Collected: 05/11/22 1213    Order Status: Completed Specimen: Blood Updated: 05/16/22 0612     Blood Culture, Routine No Growth to date      No Growth to date      No Growth to date      No Growth to date      No Growth to date    Blood culture [[id number]] Collected: 05/11/22 1213    Order Status: Completed Specimen: Blood Updated: 05/16/22 0612     Blood Culture, Routine No Growth to date      No Growth to date      No Growth to date      No Growth to date      No Growth to date    Aerobic culture [[id number]]  (Abnormal)  (Susceptibility) Collected: 05/11/22 1440    Order Status: Completed Specimen: Abscess from Back Updated: 05/14/22 1057     Aerobic Bacterial Culture STAPHYLOCOCCUS AUREUS  Many      Gram stain [[id number]] Collected: 05/11/22 1440    Order Status: Completed Specimen: Abscess from Back Updated: 05/12/22 0306     Gram Stain Result Few WBC's      Few Gram positive cocci    Gram stain [[id number]]     Order Status: Canceled Specimen: Joint Fluid from Back     Blood culture [[id number]]  (Abnormal) Collected: 05/08/22 1516    Order Status: Completed Specimen: Blood from Peripheral, Right Hand Updated: 05/11/22 1009     Blood Culture, Routine Gram stain aer bottle: Gram positive cocci in clusters resembling Staph       Results called to and read back by: June  Breanne JACKSON  05/09/2022  11:31      STAPHYLOCOCCUS AUREUS  ID consult required at [facility].  For susceptibility see order #[id number]      Narrative:      Collection has been rescheduled by [email or web address] at 05/08/2022 13:22 Reason:   Pt in mri will be there after another hour [id number]  Collection has been rescheduled by [email or web address] at 05/08/2022 13:22 Reason:   Pt in mri will be there after another hour [id number]    Blood culture [[id number]]  (Abnormal) Collected: 05/08/22 1515    Order Status: Completed Specimen: Blood from Peripheral, Left Arm Updated: 05/11/22 1009     Blood Culture, Routine Gram stain aer bottle: Gram positive cocci in clusters resembling Staph      Results called to and read back by: Chasity Raymundo RN  05/09/2022  15:40      STAPHYLOCOCCUS AUREUS  ID consult required at [facility].  For susceptibility see order #[id number]      Narrative:      Collection has been rescheduled by [email or web address] at 05/08/2022 13:22 Reason:   Pt in mri will be there after another hour [id number]  Collection has been rescheduled by [email or web address] at 05/08/2022 13:22 Reason:   Pt in mri will be there after another hour [id number]        Latest lactate reviewed, they are-  No results for input(s): LACTATE in the last 72 hours.    Organ dysfunction indicated by Acute kidney injury  Source-  lung    Source control Achieved by-   Cont Broad spectrum IVAB     Bacteremia  Infectious disease consulted      PNA (pneumonia)  --Cont oxacillin        Tobacco abuse  - Patient thoroughly counseled on smoking cessation, pt verbalized understanding. Time of counseling 10 minutes.        COPD (chronic obstructive pulmonary disease)  Cont breathing tx prn  Pulmonology following       VTE Risk Mitigation (From admission, onward)         Ordered     enoxaparin injection 40 mg  Daily         05/06/22 2336     IP VTE HIGH RISK PATIENT  Once         05/06/22 2336     Place sequential compression device  Until  discontinued         05/06/22 2336                Discharge Planning   YESSICA:      Code Status: Full Code   Is the patient medically ready for discharge?:     Reason for patient still in hospital (select all that apply): Patient trending condition  Discharge Plan A: Long-term acute care facility (LTAC)   Discharge Delays: None known at this time              Adriana Baltazar NP  Department of Hospital Medicine   [facility] Med [facility]urg

## 2022-05-24 NOTE — PROGRESS NOTES
[facility] Surg  Pulmonology  Progress Note    Patient Name: Tianna Leon  MRN: [id number]  Admission Date: 5/6/2022  Hospital Length of Stay: 18 days  Code Status: Full Code  Attending Provider: Elizabeth Kim MD  Primary Care Provider: Spenser Campa MD   Principal Problem: Acute bacterial endocarditis    Subjective:     Interval History:   5/24 seen and examined 98% on room air.  Chest tube output last 24 hours 50 mL.  Afebrile.  Complaining of generalized pain consistent coughing.    Review of Systems   Constitutional:  Positive for malaise/fatigue and weight loss. Negative for chills and fever.   HENT:  Negative for hearing loss and nosebleeds.    Eyes:  Negative for discharge.   Respiratory:  Positive for cough, sputum production and shortness of breath.    Cardiovascular:  Positive for leg swelling. Negative for chest pain.   Gastrointestinal:  Negative for abdominal pain.   Genitourinary:  Negative for hematuria.   Musculoskeletal:  Negative for falls.   Skin:  Negative for itching.   Neurological:  Positive for weakness. Negative for speech change, seizures and loss of consciousness.   Endo/Heme/Allergies:  Negative for polydipsia. Does not bruise/bleed easily.   Psychiatric/Behavioral:  Negative for hallucinations.        Objective:     Vital Signs (Most Recent):  Temp: 99.2 °F (37.3 °C) (05/24/22 1158)  Pulse: 74 (05/24/22 1158)  Resp: 19 (05/24/22 1158)  BP: (!) 102/53 (05/24/22 1158)  SpO2: 98 % (05/24/22 1158)   Vital Signs (24h Range):  Temp:  [97.9 °F (36.6 °C)-99.9 °F (37.7 °C)] 99.2 °F (37.3 °C)  Pulse:  [74-99] 74  Resp:  [16-19] 19  SpO2:  [90 %-99 %] 98 %  BP: (102-149)/(53-77) 102/53     Weight: 97.1 kg (214 lb 1.1 oz)  Body mass index is 35.62 kg/m².      Intake/Output Summary (Last 24 hours) at 5/24/2022 1451  Last data filed at 5/24/2022 0740  Gross per 24 hour   Intake 713.18 ml   Output 5750 ml   Net -5036.82 ml         Physical Exam  Vitals and nursing note reviewed.    Constitutional:       General: She is not in acute distress.     Appearance: She is well-developed. She is ill-appearing.   HENT:      Head: Normocephalic and atraumatic.      Nose: Nose normal.   Eyes:      Extraocular Movements: Extraocular movements intact.   Cardiovascular:      Rate and Rhythm: Normal rate and regular rhythm.   Pulmonary:      Effort: Pulmonary effort is normal.      Breath sounds: No stridor. Rhonchi present.      Comments: Right-side pigtail in place  Abdominal:      General: There is no distension.   Musculoskeletal:         General: No signs of injury.      Cervical back: Normal range of motion and neck supple.   Skin:     General: Skin is dry.   Neurological:      General: No focal deficit present.      Mental Status: She is alert and oriented to person, place, and time. Mental status is at baseline.   Psychiatric:         Behavior: Behavior normal.       Vents:  Oxygen Concentration (%): 28 (05/23/22 1912)    Lines/Drains/Airways       Peripherally Inserted Central Catheter Line  Duration             PICC Double Lumen 05/17/22 1612 right basilic 6 days              Drain  Duration                  Chest Tube 05/21/22 0957 1 Right Pleural 8.5 Fr. 3 days    Female External Urinary Catheter 05/21/22 0700 3 days                    Significant Labs:    CBC/Anemia Profile:  Recent Labs   Lab 05/23/22  0448 05/24/22  0601   WBC 9.97  9.97 11.10   HGB 7.8*  7.8* 7.9*   HCT 25.2*  25.2* 25.0*   *  490* 453*   MCV 89  89 87   RDW 15.8*  15.8* 15.6*          Chemistries:  Recent Labs   Lab 05/23/22  0448 05/24/22  0601    138   K 4.7 4.4   CL 99 99   CO2 31* 31*   BUN 12 12   CREATININE 0.7 0.7   CALCIUM 8.0* 8.1*   ALBUMIN 1.4* 1.5*   PROT 5.7* 5.8*   BILITOT 0.2 0.2   ALKPHOS 106 94   ALT 17 12   AST 13 12        Latest Reference Range & Units 05/07/22 09:05 05/21/22 10:22   Fluid Appearance  Hazy Cloudy   WBC, Body Fluid /cu mm 81901 [1] 297077 (C) [2]   Body Fluid Type   Pleural Fluid, Right Pleural Fluid, Right   Segs, Fluid % 93 88   Lymphs, Fluid % 3 7   Monocytes/Macrophages, Fluid % 4 5   Amylase, Fluid Not established U/L 34 [3]    Glucose, Fluid Not established mg/dL 29 [4]    LD, Fluid Not established U/L 1077 [5]    Body Fluid, Albumin See text g/dL 1.3 [6]    Body Fluid Source Amylase  Pleural Fluid, Right    Body Fluid Source, Albumin  Pleural Fluid, Right    Body Fluid Source, Glucose  Pleural Fluid, Right    Body Fluid Source, LDH  Pleural Fluid, Right    Body Fluid Source, Total Protein  Pleural Fluid, Right    Body Fluid, Protein Not established g/dL 3.7 [7]    Cholesterol, Body Fluid See Comment mg/dL 79 [8]      [    Significant Imaging:      CT chest without contrast 05/20/2022    Narrative & Impression  EXAMINATION:  CT CHEST WITHOUT CONTRAST     CLINICAL HISTORY:  Empyema;     TECHNIQUE:  Low dose axial images, sagittal and coronal reformations were obtained from the thoracic inlet to the lung bases. Contrast was not administered.  All CT scans at this facility are performed using dose optimization techniques including the following: automated exposure control; adjustment of the mA and/or kV; use of iterative reconstruction technique.     COMPARISON:  CTA chest non coronary 05/06/2022, chest radiograph 05/20/2022, MRI thoracic spine 05/19/2022     FINDINGS:  Base of Neck: There is diffuse body wall edema.  Unremarkable, noting the presence of AA PICC line which terminates at the superior cavoatrial junction.     Thoracic soft tissues: There is diffuse body wall edema.     Aorta: Left-sided aortic arch.  No aneurysm and no significant atherosclerosis     Heart: Normal size. No effusion.  No significant coronary atherosclerosis.     Pulmonary vasculature: Pulmonary arteries distribute normally.     Esha/Mediastinum: There are few prominent mediastinal lymph nodes with a prevascular node measuring 0.8 cm in short axis.     Airways: Patent.     Lungs/Pleura: There  are multiple bilateral consolidative densities scattered throughout the lungs, many of which demonstrate cavitation, again suggesting septic emboli.  There is small left pleural fluid, which appears simple and layers dependently.  There is a loculated moderate right pleural effusion, which appears similar in size to the comparison exam however now containing air, which may be due to empyema.  There is adjacent atelectatic change within the bilateral lower lobes.  Overall number and size of consolidative densities has increased since the comparison exam.     Esophagus: Normal.     Upper Abdomen: There is persistent wedge-shaped hypodensity within the spleen, suggesting infarct.     Bones: No acute fracture. No suspicious lytic or sclerotic lesions.     Impression:     Interval progression with increased number and size of consolidative cavitary densities throughout the lungs, most suggestive of septic emboli with larger opacities concerning for necrotizing pneumonia.     Stable size of loculated right moderate pleural effusion, now containing air compatible with hydropneumothorax as reported on prior MRI.  Interval development of air is concerning for empyema noting limited evaluation without intravenous contrast.  New small simple appearing left pleural effusion.     Wedge-shaped splenic hypodensity, suggesting infarction.     Anasarca.           ABG  No results for input(s): PH, PO2, PCO2, HCO3, BE in the last 168 hours.  Assessment/Plan:     * Acute bacterial endocarditis  Medical Mx  CTS note noted  5/23 IV oxacillin.  5/24 IV oxacillin as per Infectious Disease    Empyema of right pleural space  5/20 Suspect empyema, needs chest tube placement  5/21 s/p chest tube placement, adequate expansion - will check Chest X Ray in aa  5/23 tPA DNase.  5/24 tPA for DNase installation via chest tube.  Monitor chest tube output.  Repeat chest x-ray in a.m..    Pulmonary embolism, septic  5/24 continue IV  oxacillin.    Parapneumonic effusion  Complicated by PNA, possible empyema  SP Thora  Cont Abx  5/20/2022 Probable empyema,needs chest tube drainage  5/22 chest tube draining  5/23 60 cc drained yesterday.  Possible tPA and DNase tomorrow.  Continue IV oxacillin.  5/24 monitor chest tube output post tPA and DNase instillation today PA chest tube    IVDU (intravenous drug user)  Cessation/rehab  5/24 outpatient rehab    Discussed with hospital medicine attending     Piyush San MD  Pulmonology  [facility] - Med Surg

## 2022-05-24 NOTE — PROCEDURES
tPA (10 mg) in 30 ml  normal saline and DNase (5 mg) in 30 ml sterile water were administered administered via the chest tube and chest tube clamped for one hour after administration of the drugs.    I performed the procedure.    Piyush San M.D

## 2022-05-24 NOTE — PT/OT/SLP PROGRESS
"Physical Therapy  Treatment    Tianna Leon   MRN: [id number]   Admitting Diagnosis: Acute bacterial endocarditis    PT Received On: 05/24/22  PT Start Time: 0750     PT Stop Time: 0805    PT Total Time (min): 15 min       Billable Minutes:  Therapeutic Activity 15    Treatment Type: Treatment  PT/PTA: PT     PTA Visit Number: 0       General Precautions: Standard, fall  Orthopedic Precautions: N/A   Braces: N/A  Respiratory Status: Nasal cannula         Subjective:  Communicated with nurse Plata and [facility] chart review  prior to session.   pt agreed to oob to chair with inc encouragement.     Pain/Comfort  Pain Rating 1: 0/10  Pain Rating Post-Intervention 1: 0/10    Objective:   Patient found with: peripheral IV, telemetry, oxygen, PureWick, chest tube    Functional Mobility:  Pt met in rm sup in bed saturated in bed with purewick. Pt re-educated on need to call for bathroom for toileting and inc risk of skin breakdown when lying in urine. Pt seated eob with sba and declined walking to bathroom for clean self. Pt t/f to chair with rw and cga. P.T. called for PCT to assist pt in grooming. Pt reported, " wow it was easier to get up today." pt left seated in chair with all needs met and breakfast present.     AM-PAC 6 CLICK MOBILITY  How much help from another person does this patient currently need?   1 = Unable, Total/Dependent Assistance  2 = A lot, Maximum/Moderate Assistance  3 = A little, Minimum/Contact Guard/Supervision  4 = None, Modified [place]/Independent    Turning over in bed (including adjusting bedclothes, sheets and blankets)?: 3  Sitting down on and standing up from a chair with arms (e.g., wheelchair, bedside commode, etc.): 3  Moving from lying on back to sitting on the side of the bed?: 3  Moving to and from a bed to a chair (including a wheelchair)?: 3  Need to walk in hospital room?: 1  Climbing 3-5 steps with a railing?: 1  Basic Mobility Total Score: 14    AM-PAC Raw Score CMS " G-Code Modifier Level of Impairment Assistance   6 % Total / Unable   7 - 9 CM 80 - 100% Maximal Assist   10 - 14 CL 60 - 80% Moderate Assist   15 - 19 CK 40 - 60% Moderate Assist   20 - 22 CJ 20 - 40% Minimal Assist   23 CI 1-20% SBA / CGA   24 CH 0% Independent/ Mod I     Patient left up in chair with call button in reach.    Assessment:  Pt lasha min tx . Pt appears to be self limiting with progress of gross func mobility.     Rehab identified problem list/impairments: Rehab identified problem list/impairments: weakness, impaired endurance, impaired self care skills, impaired functional mobilty, gait instability, impaired balance, decreased safety awareness, decreased lower extremity function, decreased upper extremity function, impaired cardiopulmonary response to activity, impaired cognition    Rehab potential is good.    Activity tolerance: Poor    Discharge recommendations: Discharge Facility/Level of Care Needs: nursing facility, skilled     Barriers to discharge:      Equipment recommendations: Equipment Needed After Discharge: walker, rolling     GOALS:   Multidisciplinary Problems     Physical Therapy Goals        Problem: Physical Therapy    Goal Priority Disciplines Outcome Goal Variances Interventions   Physical Therapy Goal     PT, PT/OT Ongoing, Progressing     Description: LTG'S TO BE MET IN 14 DAYS (5-30-22)  1. PT WILL BE KRISTA WITH BED MOBILITY  2. PT WILL BE KRISTA WITH TF'S  3. PT WILL ' WITH RW AND SPV                   PLAN:    Patient to be seen 3 x/week  to address the above listed problems via therapeutic activities, gait training, therapeutic exercises  Plan of Care expires: 05/30/22  Plan of Care reviewed with: patient         05/24/2022

## 2022-05-24 NOTE — SUBJECTIVE & OBJECTIVE
Interval History:   5/24 seen and examined 98% on room air.  Chest tube output last 24 hours 50 mL.  Afebrile.  Complaining of generalized pain consistent coughing.    Review of Systems   Constitutional:  Positive for malaise/fatigue and weight loss. Negative for chills and fever.   HENT:  Negative for hearing loss and nosebleeds.    Eyes:  Negative for discharge.   Respiratory:  Positive for cough, sputum production and shortness of breath.    Cardiovascular:  Positive for leg swelling. Negative for chest pain.   Gastrointestinal:  Negative for abdominal pain.   Genitourinary:  Negative for hematuria.   Musculoskeletal:  Negative for falls.   Skin:  Negative for itching.   Neurological:  Positive for weakness. Negative for speech change, seizures and loss of consciousness.   Endo/Heme/Allergies:  Negative for polydipsia. Does not bruise/bleed easily.   Psychiatric/Behavioral:  Negative for hallucinations.        Objective:     Vital Signs (Most Recent):  Temp: 99.2 °F (37.3 °C) (05/24/22 1158)  Pulse: 74 (05/24/22 1158)  Resp: 19 (05/24/22 1158)  BP: (!) 102/53 (05/24/22 1158)  SpO2: 98 % (05/24/22 1158)   Vital Signs (24h Range):  Temp:  [97.9 °F (36.6 °C)-99.9 °F (37.7 °C)] 99.2 °F (37.3 °C)  Pulse:  [74-99] 74  Resp:  [16-19] 19  SpO2:  [90 %-99 %] 98 %  BP: (102-149)/(53-77) 102/53     Weight: 97.1 kg (214 lb 1.1 oz)  Body mass index is 35.62 kg/m².      Intake/Output Summary (Last 24 hours) at 5/24/2022 1451  Last data filed at 5/24/2022 0740  Gross per 24 hour   Intake 713.18 ml   Output 5750 ml   Net -5036.82 ml         Physical Exam  Vitals and nursing note reviewed.   Constitutional:       General: She is not in acute distress.     Appearance: She is well-developed. She is ill-appearing.   HENT:      Head: Normocephalic and atraumatic.      Nose: Nose normal.   Eyes:      Extraocular Movements: Extraocular movements intact.   Cardiovascular:      Rate and Rhythm: Normal rate and regular rhythm.    Pulmonary:      Effort: Pulmonary effort is normal.      Breath sounds: No stridor. Rhonchi present.      Comments: Right-side pigtail in place  Abdominal:      General: There is no distension.   Musculoskeletal:         General: No signs of injury.      Cervical back: Normal range of motion and neck supple.   Skin:     General: Skin is dry.   Neurological:      General: No focal deficit present.      Mental Status: She is alert and oriented to person, place, and time. Mental status is at baseline.   Psychiatric:         Behavior: Behavior normal.       Vents:  Oxygen Concentration (%): 28 (05/23/22 1912)    Lines/Drains/Airways       Peripherally Inserted Central Catheter Line  Duration             PICC Double Lumen 05/17/22 1612 right basilic 6 days              Drain  Duration                  Chest Tube 05/21/22 0957 1 Right Pleural 8.5 Fr. 3 days    Female External Urinary Catheter 05/21/22 0700 3 days                    Significant Labs:    CBC/Anemia Profile:  Recent Labs   Lab 05/23/22  0448 05/24/22  0601   WBC 9.97  9.97 11.10   HGB 7.8*  7.8* 7.9*   HCT 25.2*  25.2* 25.0*   *  490* 453*   MCV 89  89 87   RDW 15.8*  15.8* 15.6*          Chemistries:  Recent Labs   Lab 05/23/22  0448 05/24/22  0601    138   K 4.7 4.4   CL 99 99   CO2 31* 31*   BUN 12 12   CREATININE 0.7 0.7   CALCIUM 8.0* 8.1*   ALBUMIN 1.4* 1.5*   PROT 5.7* 5.8*   BILITOT 0.2 0.2   ALKPHOS 106 94   ALT 17 12   AST 13 12        Latest Reference Range & Units 05/07/22 09:05 05/21/22 10:22   Fluid Appearance  Hazy Cloudy   WBC, Body Fluid /cu mm 90311 [1] 776655 (C) [2]   Body Fluid Type  Pleural Fluid, Right Pleural Fluid, Right   Segs, Fluid % 93 88   Lymphs, Fluid % 3 7   Monocytes/Macrophages, Fluid % 4 5   Amylase, Fluid Not established U/L 34 [3]    Glucose, Fluid Not established mg/dL 29 [4]    LD, Fluid Not established U/L 1077 [5]    Body Fluid, Albumin See text g/dL 1.3 [6]    Body Fluid Source Amylase  Pleural  Fluid, Right    Body Fluid Source, Albumin  Pleural Fluid, Right    Body Fluid Source, Glucose  Pleural Fluid, Right    Body Fluid Source, LDH  Pleural Fluid, Right    Body Fluid Source, Total Protein  Pleural Fluid, Right    Body Fluid, Protein Not established g/dL 3.7 [7]    Cholesterol, Body Fluid See Comment mg/dL 79 [8]      [    Significant Imaging:      CT chest without contrast 05/20/2022    Narrative & Impression  EXAMINATION:  CT CHEST WITHOUT CONTRAST     CLINICAL HISTORY:  Empyema;     TECHNIQUE:  Low dose axial images, sagittal and coronal reformations were obtained from the thoracic inlet to the lung bases. Contrast was not administered.  All CT scans at this facility are performed using dose optimization techniques including the following: automated exposure control; adjustment of the mA and/or kV; use of iterative reconstruction technique.     COMPARISON:  CTA chest non coronary 05/06/2022, chest radiograph 05/20/2022, MRI thoracic spine 05/19/2022     FINDINGS:  Base of Neck: There is diffuse body wall edema.  Unremarkable, noting the presence of AA PICC line which terminates at the superior cavoatrial junction.     Thoracic soft tissues: There is diffuse body wall edema.     Aorta: Left-sided aortic arch.  No aneurysm and no significant atherosclerosis     Heart: Normal size. No effusion.  No significant coronary atherosclerosis.     Pulmonary vasculature: Pulmonary arteries distribute normally.     Esha/Mediastinum: There are few prominent mediastinal lymph nodes with a prevascular node measuring 0.8 cm in short axis.     Airways: Patent.     Lungs/Pleura: There are multiple bilateral consolidative densities scattered throughout the lungs, many of which demonstrate cavitation, again suggesting septic emboli.  There is small left pleural fluid, which appears simple and layers dependently.  There is a loculated moderate right pleural effusion, which appears similar in size to the comparison exam  however now containing air, which may be due to empyema.  There is adjacent atelectatic change within the bilateral lower lobes.  Overall number and size of consolidative densities has increased since the comparison exam.     Esophagus: Normal.     Upper Abdomen: There is persistent wedge-shaped hypodensity within the spleen, suggesting infarct.     Bones: No acute fracture. No suspicious lytic or sclerotic lesions.     Impression:     Interval progression with increased number and size of consolidative cavitary densities throughout the lungs, most suggestive of septic emboli with larger opacities concerning for necrotizing pneumonia.     Stable size of loculated right moderate pleural effusion, now containing air compatible with hydropneumothorax as reported on prior MRI.  Interval development of air is concerning for empyema noting limited evaluation without intravenous contrast.  New small simple appearing left pleural effusion.     Wedge-shaped splenic hypodensity, suggesting infarction.     Anasarca.

## 2022-05-24 NOTE — ASSESSMENT & PLAN NOTE
5/20 Suspect empyema, needs chest tube placement  5/21 s/p chest tube placement, adequate expansion - will check Chest X Ray in aa  5/23 tPA DNase.  5/24 tPA for DNase installation via chest tube.  Monitor chest tube output.  Repeat chest x-ray in a.m..

## 2022-05-25 LAB
ALBUMIN SERPL BCP-MCNC: 1.5 G/DL (ref 3.5–5.2)
ALP SERPL-CCNC: 97 U/L (ref 55–135)
ALT SERPL W/O P-5'-P-CCNC: 12 U/L (ref 10–44)
ANION GAP SERPL CALC-SCNC: 9 MMOL/L (ref 8–16)
AST SERPL-CCNC: 10 U/L (ref 10–40)
BACTERIA #/AREA URNS HPF: ABNORMAL /HPF
BASOPHILS # BLD AUTO: 0.05 K/UL (ref 0–0.2)
BASOPHILS NFR BLD: 0.4 % (ref 0–1.9)
BILIRUB SERPL-MCNC: 0.3 MG/DL (ref 0.1–1)
BILIRUB UR QL STRIP: NEGATIVE
BUN SERPL-MCNC: 22 MG/DL (ref 6–20)
CALCIUM SERPL-MCNC: 8 MG/DL (ref 8.7–10.5)
CHLORIDE SERPL-SCNC: 97 MMOL/L (ref 95–110)
CLARITY UR: CLEAR
CO2 SERPL-SCNC: 29 MMOL/L (ref 23–29)
COLOR UR: YELLOW
CREAT SERPL-MCNC: 1 MG/DL (ref 0.5–1.4)
DIFFERENTIAL METHOD: ABNORMAL
EOSINOPHIL # BLD AUTO: 0.5 K/UL (ref 0–0.5)
EOSINOPHIL NFR BLD: 4.2 % (ref 0–8)
ERYTHROCYTE [DISTWIDTH] IN BLOOD BY AUTOMATED COUNT: 15.9 % (ref 11.5–14.5)
EST. GFR  (AFRICAN AMERICAN): >60 ML/MIN/1.73 M^2
EST. GFR  (NON AFRICAN AMERICAN): >60 ML/MIN/1.73 M^2
GLUCOSE SERPL-MCNC: 85 MG/DL (ref 70–110)
GLUCOSE UR QL STRIP: NEGATIVE
HCT VFR BLD AUTO: 24.7 % (ref 37–48.5)
HCT VFR BLD AUTO: 27.5 % (ref 37–48.5)
HGB BLD-MCNC: 7.6 G/DL (ref 12–16)
HGB BLD-MCNC: 8.5 G/DL (ref 12–16)
HGB UR QL STRIP: ABNORMAL
HYALINE CASTS #/AREA URNS LPF: 0 /LPF
IMM GRANULOCYTES # BLD AUTO: 0.08 K/UL (ref 0–0.04)
IMM GRANULOCYTES NFR BLD AUTO: 0.7 % (ref 0–0.5)
KETONES UR QL STRIP: NEGATIVE
LEUKOCYTE ESTERASE UR QL STRIP: NEGATIVE
LYMPHOCYTES # BLD AUTO: 2.7 K/UL (ref 1–4.8)
LYMPHOCYTES NFR BLD: 22.6 % (ref 18–48)
MCH RBC QN AUTO: 27.7 PG (ref 27–31)
MCHC RBC AUTO-ENTMCNC: 30.8 G/DL (ref 32–36)
MCV RBC AUTO: 90 FL (ref 82–98)
MICROSCOPIC COMMENT: ABNORMAL
MONOCYTES # BLD AUTO: 1.4 K/UL (ref 0.3–1)
MONOCYTES NFR BLD: 11.8 % (ref 4–15)
NEUTROPHILS # BLD AUTO: 7.1 K/UL (ref 1.8–7.7)
NEUTROPHILS NFR BLD: 60.3 % (ref 38–73)
NITRITE UR QL STRIP: NEGATIVE
NRBC BLD-RTO: 0 /100 WBC
PH UR STRIP: 6 [PH] (ref 5–8)
PLATELET # BLD AUTO: 386 K/UL (ref 150–450)
PMV BLD AUTO: 8.6 FL (ref 9.2–12.9)
POTASSIUM SERPL-SCNC: 4.5 MMOL/L (ref 3.5–5.1)
PROT SERPL-MCNC: 5.7 G/DL (ref 6–8.4)
PROT UR QL STRIP: ABNORMAL
RBC # BLD AUTO: 2.74 M/UL (ref 4–5.4)
RBC #/AREA URNS HPF: 70 /HPF (ref 0–4)
SODIUM SERPL-SCNC: 135 MMOL/L (ref 136–145)
SP GR UR STRIP: 1.01 (ref 1–1.03)
TRICHOMONAS UR QL MICRO: ABNORMAL
URN SPEC COLLECT METH UR: ABNORMAL
UROBILINOGEN UR STRIP-ACNC: NEGATIVE EU/DL
WBC # BLD AUTO: 11.84 K/UL (ref 3.9–12.7)
WBC #/AREA URNS HPF: 15 /HPF (ref 0–5)
YEAST URNS QL MICRO: ABNORMAL

## 2022-05-25 PROCEDURE — 32562 LYSE CHEST FIBRIN SUBQ DAY: CPT | Mod: ,,, | Performed by: INTERNAL MEDICINE

## 2022-05-25 PROCEDURE — 25000242 PHARM REV CODE 250 ALT 637 W/ HCPCS: Performed by: INTERNAL MEDICINE

## 2022-05-25 PROCEDURE — 85018 HEMOGLOBIN: CPT | Performed by: NURSE PRACTITIONER

## 2022-05-25 PROCEDURE — 87040 BLOOD CULTURE FOR BACTERIA: CPT | Mod: 59 | Performed by: NURSE PRACTITIONER

## 2022-05-25 PROCEDURE — 94799 UNLISTED PULMONARY SVC/PX: CPT

## 2022-05-25 PROCEDURE — 94664 DEMO&/EVAL PT USE INHALER: CPT

## 2022-05-25 PROCEDURE — 32562 PR INSTILL VIA CHEST TUBE AGENT FOR FIBRINOLYSIS, SUBSEQUENT: ICD-10-PCS | Mod: ,,, | Performed by: INTERNAL MEDICINE

## 2022-05-25 PROCEDURE — 36415 COLL VENOUS BLD VENIPUNCTURE: CPT | Performed by: NURSE PRACTITIONER

## 2022-05-25 PROCEDURE — 99233 SBSQ HOSP IP/OBS HIGH 50: CPT | Mod: 25,,, | Performed by: INTERNAL MEDICINE

## 2022-05-25 PROCEDURE — 27000221 HC OXYGEN, UP TO 24 HOURS

## 2022-05-25 PROCEDURE — 94640 AIRWAY INHALATION TREATMENT: CPT

## 2022-05-25 PROCEDURE — 63600175 PHARM REV CODE 636 W HCPCS: Performed by: NURSE PRACTITIONER

## 2022-05-25 PROCEDURE — 85025 COMPLETE CBC W/AUTO DIFF WBC: CPT | Performed by: INTERNAL MEDICINE

## 2022-05-25 PROCEDURE — 85014 HEMATOCRIT: CPT | Performed by: NURSE PRACTITIONER

## 2022-05-25 PROCEDURE — 94761 N-INVAS EAR/PLS OXIMETRY MLT: CPT

## 2022-05-25 PROCEDURE — 99900035 HC TECH TIME PER 15 MIN (STAT)

## 2022-05-25 PROCEDURE — 21400001 HC TELEMETRY ROOM

## 2022-05-25 PROCEDURE — 87077 CULTURE AEROBIC IDENTIFY: CPT | Performed by: NURSE PRACTITIONER

## 2022-05-25 PROCEDURE — 87088 URINE BACTERIA CULTURE: CPT | Performed by: NURSE PRACTITIONER

## 2022-05-25 PROCEDURE — 87040 BLOOD CULTURE FOR BACTERIA: CPT | Mod: 59 | Performed by: INTERNAL MEDICINE

## 2022-05-25 PROCEDURE — 81000 URINALYSIS NONAUTO W/SCOPE: CPT | Performed by: NURSE PRACTITIONER

## 2022-05-25 PROCEDURE — 87186 SC STD MICRODIL/AGAR DIL: CPT | Performed by: NURSE PRACTITIONER

## 2022-05-25 PROCEDURE — 25000003 PHARM REV CODE 250: Performed by: NURSE PRACTITIONER

## 2022-05-25 PROCEDURE — A4217 STERILE WATER/SALINE, 500 ML: HCPCS | Performed by: INTERNAL MEDICINE

## 2022-05-25 PROCEDURE — 80053 COMPREHEN METABOLIC PANEL: CPT | Performed by: NURSE PRACTITIONER

## 2022-05-25 PROCEDURE — 63600175 PHARM REV CODE 636 W HCPCS: Mod: JG | Performed by: INTERNAL MEDICINE

## 2022-05-25 PROCEDURE — 63600175 PHARM REV CODE 636 W HCPCS: Performed by: INTERNAL MEDICINE

## 2022-05-25 PROCEDURE — 27000646 HC AEROBIKA DEVICE

## 2022-05-25 PROCEDURE — 99233 PR SUBSEQUENT HOSPITAL CARE,LEVL III: ICD-10-PCS | Mod: 25,,, | Performed by: INTERNAL MEDICINE

## 2022-05-25 PROCEDURE — 87086 URINE CULTURE/COLONY COUNT: CPT | Performed by: NURSE PRACTITIONER

## 2022-05-25 PROCEDURE — 25000003 PHARM REV CODE 250: Performed by: INTERNAL MEDICINE

## 2022-05-25 RX ORDER — SODIUM CHLORIDE 9 MG/ML
INJECTION, SOLUTION INTRAVENOUS CONTINUOUS
Status: DISCONTINUED | OUTPATIENT
Start: 2022-05-25 | End: 2022-06-05

## 2022-05-25 RX ADMIN — FAMOTIDINE 20 MG: 20 TABLET ORAL at 10:05

## 2022-05-25 RX ADMIN — OXACILLIN SODIUM 12 G: 10 INJECTION, POWDER, FOR SOLUTION INTRAVENOUS at 04:05

## 2022-05-25 RX ADMIN — SODIUM CHLORIDE 500 ML: 0.9 INJECTION, SOLUTION INTRAVENOUS at 12:05

## 2022-05-25 RX ADMIN — MORPHINE SULFATE 15 MG: 15 TABLET, EXTENDED RELEASE ORAL at 09:05

## 2022-05-25 RX ADMIN — ALTEPLASE 10 MG: 2.2 INJECTION, POWDER, LYOPHILIZED, FOR SOLUTION INTRAVENOUS at 02:05

## 2022-05-25 RX ADMIN — IPRATROPIUM BROMIDE AND ALBUTEROL SULFATE 3 ML: 2.5; .5 SOLUTION RESPIRATORY (INHALATION) at 02:05

## 2022-05-25 RX ADMIN — FAMOTIDINE 20 MG: 20 TABLET ORAL at 09:05

## 2022-05-25 RX ADMIN — IPRATROPIUM BROMIDE AND ALBUTEROL SULFATE 3 ML: 2.5; .5 SOLUTION RESPIRATORY (INHALATION) at 07:05

## 2022-05-25 RX ADMIN — ALPRAZOLAM 1 MG: 1 TABLET ORAL at 02:05

## 2022-05-25 RX ADMIN — SODIUM CHLORIDE: 0.9 INJECTION, SOLUTION INTRAVENOUS at 07:05

## 2022-05-25 RX ADMIN — TRAZODONE HYDROCHLORIDE 100 MG: 100 TABLET ORAL at 09:05

## 2022-05-25 RX ADMIN — OXYCODONE HYDROCHLORIDE 5 MG: 5 TABLET ORAL at 07:05

## 2022-05-25 RX ADMIN — ENOXAPARIN SODIUM 40 MG: 40 INJECTION SUBCUTANEOUS at 04:05

## 2022-05-25 RX ADMIN — DORNASE ALFA 5 MG: 1 SOLUTION RESPIRATORY (INHALATION) at 02:05

## 2022-05-25 RX ADMIN — SODIUM CHLORIDE: 0.9 INJECTION, SOLUTION INTRAVENOUS at 01:05

## 2022-05-25 NOTE — ASSESSMENT & PLAN NOTE
5/20 Suspect empyema, needs chest tube placement  5/21 s/p chest tube placement, adequate expansion - will check Chest X Ray in [facility]  5/23 tPA DNase.  5/24 tPA for DNase installation via chest tube.  Monitor chest tube output.  Repeat chest x-ray in a.m..  5/25 chest tube output increased from 50 mL on average per 24 hours to 160 mL last 24 hours.  Purulent drainage noted.  Will repeat tPA plus Dnase instillation.

## 2022-05-25 NOTE — ASSESSMENT & PLAN NOTE
MRI of the lumbar spine-  Large, rim enhancing facet synovial cysts arising posteriorly from the right L2-L3 and left L4-L5 facet joints may be degenerative or sequelae of facet septic arthritis.     Minor enhancement and endplate irregularity at L4-L5 may be degenerative versus less likely the sequelae of spondylodiscitis.  No significant associated endplate destruction or vertebral body height loss.     Will follow IR guided aspiration, will do MRI of the thoracic regime    05/12- will need spinal thoracic MRI - follow cultures from CT guided aspirate     05/16- will need mRI of the thoracic region -she wants it to be done with pain medication-will discuss with the team      05/23-will repeat blood cultures , continue oxacillin

## 2022-05-25 NOTE — PROGRESS NOTES
[facility]  Infectious Disease  Progress Note    Patient Name: Tianna Leon  MRN: [id number]  Admission Date: 5/6/2022  Length of Stay: 19 days  Attending Physician: Elizabeth Kim MD  Primary Care Provider: Spenser Campa MD    Isolation Status: No active isolations  Assessment/Plan:      Empyema of right pleural space  MRI of the thoracic region 0  1. No evidence for disc osteomyelitis in the thoracic spine.  No evidence for epidural abscess.  2. Loculated right hydropneumothorax and extensive bilateral pulmonary infiltrates.     Case discussed with critical care team- will consult pulmonology /CVT  On oxacillin  05/23- s/p chest tube placement -follow pulmonology .  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     Minor enhancement and endplate irregularity at L4-L5 may be degenerative versus less likely the sequelae of spondylodiscitis.  No significant associated endplate destruction or vertebral body height loss.     Will follow IR guided aspiration, will do MRI of the thoracic regime    05/12- will need spinal thoracic MRI - follow cultures from CT guided aspirate     05/16- will need mRI of the thoracic region -she wants it to be done with pain medication-will discuss with the team      05/23-will repeat blood cultures , continue oxacillin    MSSA bacteremia  Isolate is staph Aureus - will continue [email or web address]    05/16- will continue Oxacillin   Will plan to treat for 8 weeks =EOC 07/11/22 05/23-will repeat blood culture -will continue oxacillin      IVDU (intravenous drug user)  This is the crux of the problem - needs drug cessation         Anticipated Disposition:     Thank you for your consult. I will follow-up with patient. Please contact us if you have any  additional questions.    Sunday Hassan MD  Infectious Disease  [facility]    Subjective:     Principal Problem:Acute bacterial endocarditis    HPI:   38 y/o. female  with a PMHx of asthma, COPD,Bipolar  , IVDU and HLD ,active IVDU  ( heroine , cocaine  and amphetamine )   : CTA chest negative for pe . Multiple nodular and cavitary opacities concerning for septic emboli with larger opacities possibly necrotizing pneumonia. CT cervical  and thoracic did not show any acute finding , CT lumbar Possible progression of degenerative changes most notable at L4-5 with moderate to severe spinal canal stenosis at this level.  Echo-  · to moderate tricuspid regurgitation.     There is a 1.3 x 1.5 cm elongated, mobile echodensity seen on the tricuspid valve consistent with vegetation. Recommend CT surgery consult.   Blood culture- staph auerus  Component      Latest Ref Rng & Units 5/8/2022 5/7/2022 5/6/2022   WBC      3.90 - 12.70 K/uL 30.90 (H) 27.52 (H) 29.13 (H)     Interval History:  37 year old woman with tricuspid endocarditis .  Blood cultures- MSSA.  She remains bacteremic.  No MR evidence of epidural abscess.     Large, rim enhancing facet synovial cysts arising posteriorly from the right L2-L3 and left L4-L5 facet joints may be degenerative or sequelae of facet septic arthritis.     Minor enhancement and endplate irregularity at L4-L5 may be degenerative versus less likely the sequelae of spondylodiscitis.  No significant associated endplate destruction or vertebral body height loss.     Multilevel degenerative changes of the lumbar spine are most pronounced at L4-L5 with broad-based disc bulge and prominent central disc protrusion contributing to moderate to severe spinal canal stenosis with moderate left-sided neural foraminal stenosis.     Asymmetric left L5-S1 disc bulge contributes to moderate to severe left-sided neural foraminal stenosis and left lateral recess stenosis.   CT chest -Lungs/Pleura:  Multiple nodular and cavitary opacities concerning for septic emboli.  Additional larger opacities most notably in the right lung base with some internal clearing possibly related to necrotizing pneumonia.  Moderate loculated right pleural fluid and no definite left pleural fluid.  Empyema not excluded.   05/12- she declined thoracic MRI    She had CT guided aspirtaion -pus aspirated  05/16 - she is drowsy but refusing more imaging .   05/19-  MRI of the thoracic region    1. No evidence for disc osteomyelitis in the thoracic spine.  No evidence for epidural abscess.  2. Loculated right hydropneumothorax and extensive bilateral pulmonary infiltrates.   T max 100.5  05/23-she had interval placemet of chest tube.  Cultures from the chest remain negative.  Review of Systems   Constitutional:  Positive for fatigue and fever. Negative for activity change, appetite change, chills, diaphoresis and unexpected weight change.   HENT: Negative.  Negative for congestion, drooling, facial swelling, rhinorrhea, sinus pressure, sneezing and sore throat.    Eyes: Negative.  Negative for discharge, redness, itching and visual disturbance.   Respiratory:  Positive for cough, chest tightness and shortness of breath. Negative for apnea, choking, wheezing and stridor.    Cardiovascular:  Negative for chest pain, palpitations and leg swelling.   Gastrointestinal: Negative.  Negative for abdominal distention, abdominal pain, anal bleeding, blood in stool, constipation, diarrhea, nausea and vomiting.   Endocrine: Negative.    Genitourinary: Negative.  Negative for decreased urine volume, difficulty urinating, dysuria, frequency, hematuria, pelvic pain, urgency, vaginal bleeding and vaginal discharge.   Musculoskeletal:  Positive for arthralgias and back pain. Negative for gait problem, joint swelling, myalgias, neck pain and neck stiffness.   Skin: Negative.  Negative for color change, pallor, rash and wound.   Allergic/Immunologic:  Negative.    Neurological:  Positive for weakness. Negative for dizziness, seizures, facial asymmetry, speech difficulty, light-headedness, numbness and headaches.   Psychiatric/Behavioral:  Positive for confusion. Negative for agitation, hallucinations and suicidal ideas.    All other systems reviewed and are negative.  Objective:     Vital Signs (Most Recent):  Temp: 98.2 °F (36.8 °C) (05/25/22 0410)  Pulse: 70 (05/25/22 0411)  Resp: 17 (05/25/22 0410)  BP: (!) 81/48 (05/25/22 0411)  SpO2: 96 % (05/25/22 0411)   Vital Signs (24h Range):  Temp:  [98.1 °F (36.7 °C)-101.7 °F (38.7 °C)] 98.2 °F (36.8 °C)  Pulse:  [63-86] 70  Resp:  [15-26] 17  SpO2:  [93 %-99 %] 96 %  BP: ()/(45-61) 81/48     Weight: 97.1 kg (214 lb 1.1 oz)  Body mass index is 35.62 kg/m².    Estimated Creatinine Clearance: 124.3 mL/min (based on SCr of 0.7 mg/dL).    Physical Exam  Vitals and nursing note reviewed. Chaperone present: -.   Constitutional:       General: She is not in acute distress.     Appearance: She is well-developed. She is ill-appearing. She is not toxic-appearing.   HENT:      Head: Normocephalic and atraumatic.   Eyes:      Conjunctiva/sclera: Conjunctivae normal.      Pupils: Pupils are equal, round, and reactive to light.   Cardiovascular:      Rate and Rhythm: Normal rate and regular rhythm.      Heart sounds: Normal heart sounds. No murmur heard.  Pulmonary:      Effort: Pulmonary effort is normal. No respiratory distress.      Breath sounds: Normal breath sounds.      Comments: Right sided chest tube noted  Abdominal:      General: Bowel sounds are normal. There is no distension.      Palpations: Abdomen is soft.      Tenderness: There is no abdominal tenderness.   Musculoskeletal:         General: Swelling and tenderness present. No deformity. Normal range of motion.      Cervical back: Normal range of motion and neck supple.      Right lower leg: No edema.      Left lower leg: No edema.   Skin:     General: Skin is  warm and dry.      Coloration: Skin is pale.      Findings: Erythema present.   Neurological:      Mental Status: She is alert and oriented to person, place, and time.      Motor: Weakness present.      Deep Tendon Reflexes: Reflexes are normal and symmetric.   Psychiatric:         Behavior: Behavior normal.       Significant Labs: Blood Culture:   Recent Labs   Lab 05/06/22  1616 05/06/22  1617 05/08/22  1515 05/08/22  1516 05/11/22  1213   LABBLOO Gram stain aer bottle: Gram positive cocci in clusters resembling Staph   Gram stain missael bottle: Gram positive cocci in clusters resembling Staph   Results called to and read back by: Lila Hernandez RN 05/07/2022  10:57  STAPHYLOCOCCUS AUREUS  ID consult required at [facility].  * Gram stain aer bottle: Gram positive cocci in clusters resembling Staph   Results called to and read back by: Lila Hernandez RN 05/07/2022  10:57  Gram stain missael bottle: Gram positive cocci in clusters resembling Staph   05/07/2022  13:40  STAPHYLOCOCCUS AUREUS  ID consult required at [facility].  For susceptibility see order #[id number]  * Gram stain aer bottle: Gram positive cocci in clusters resembling Staph  Results called to and read back by: Chasity Raymundo RN  05/09/2022  15:40  STAPHYLOCOCCUS AUREUS  ID consult required at [facility].  For susceptibility see order #[id number]  * Gram stain aer bottle: Gram positive cocci in clusters resembling Staph   Results called to and read back by: Chasity Raymundo RN  05/09/2022  11:31  STAPHYLOCOCCUS AUREUS  ID consult required at [facility].  For susceptibility see order #[id number]  * No growth after 5 days.  No growth after 5 days.       BMP:   Recent Labs   Lab 05/24/22  0601         K 4.4   CL 99   CO2 31*   BUN 12   CREATININE 0.7   CALCIUM 8.1*       CMP:   Recent Labs   Lab 05/24/22  0601       K 4.4   CL 99   CO2 31*      BUN 12   CREATININE 0.7   CALCIUM 8.1*   PROT 5.8*   ALBUMIN 1.5*   BILITOT 0.2   ALKPHOS 94   AST 12   ALT 12   ANIONGAP 8   EGFRNONAA >60         Significant Imaging: CT: I have reviewed all pertinent results/findings within the past 24 hours:

## 2022-05-25 NOTE — PROGRESS NOTES
[facility] S[facility]  Pulmonology  Progress Note    Patient Name: Tianna Leon  MRN: [id number]  Admission Date: 5/6/2022  Hospital Length of Stay: 19 days  Code Status: Full Code  Attending Provider: Elizabeth Kim MD  Primary Care Provider: Spenser Campa MD   Principal Problem: Acute bacterial endocarditis    Subjective:     Interval History:   5/25 patient seen and examined.  Chest tube output last 24 hours 120 mL.  Afebrile.  Purulent discharge noted in pigtail.  Status post tPA and Dnase yesterday.  Spiked temp yesterday 101. 7.  Chest x-ray reviewed  Review of Systems   Constitutional:  Positive for malaise/fatigue and weight loss. Negative for chills and fever.   HENT:  Negative for hearing loss and nosebleeds.    Eyes:  Negative for discharge.   Respiratory:  Positive for cough, sputum production and shortness of breath.    Cardiovascular:  Positive for leg swelling. Negative for chest pain.   Gastrointestinal:  Negative for abdominal pain.   Genitourinary:  Negative for hematuria.   Musculoskeletal:  Negative for falls.   Skin:  Negative for itching.   Neurological:  Positive for weakness. Negative for speech change, seizures and loss of consciousness.   Endo/Heme/Allergies:  Negative for polydipsia. Does not bruise/bleed easily.   Psychiatric/Behavioral:  Negative for hallucinations.        Objective:     Vital Signs (Most Recent):  Temp: 98.5 °F (36.9 °C) (05/25/22 1148)  Pulse: 88 (05/25/22 1540)  Resp: 20 (05/25/22 1405)  BP: (!) 82/48 (05/25/22 1148)  SpO2: 96 % (05/25/22 1405)   Vital Signs (24h Range):  Temp:  [98.2 °F (36.8 °C)-101.5 °F (38.6 °C)] 98.5 °F (36.9 °C)  Pulse:  [63-88] 88  Resp:  [15-26] 20  SpO2:  [95 %-99 %] 96 %  BP: ()/(45-58) 82/48     Weight: 97.1 kg (214 lb 1.1 oz)  Body mass index is 35.62 kg/m².      Intake/Output Summary (Last 24 hours) at 5/25/2022 1634  Last data filed at 5/25/2022 1600  Gross per 24 hour   Intake 471.55 ml   Output 1360 ml   Net -888.45 ml          Physical Exam  Vitals and nursing note reviewed.   Constitutional:       General: She is not in acute distress.     Appearance: She is well-developed. She is ill-appearing.   HENT:      Head: Normocephalic and atraumatic.      Nose: Nose normal.   Eyes:      Extraocular Movements: Extraocular movements intact.   Cardiovascular:      Rate and Rhythm: Normal rate and regular rhythm.   Pulmonary:      Effort: Pulmonary effort is normal.      Breath sounds: No stridor. Rhonchi present.      Comments: Right-side pigtail in place  Abdominal:      General: There is no distension.   Musculoskeletal:         General: No signs of injury.      Cervical back: Normal range of motion and neck supple.   Skin:     General: Skin is dry.   Neurological:      General: No focal deficit present.      Mental Status: She is alert and oriented to person, place, and time. Mental status is at baseline.   Psychiatric:         Behavior: Behavior normal.       Vents:  Oxygen Concentration (%): 28 (05/25/22 1405)    Lines/Drains/Airways       Peripherally Inserted Central Catheter Line  Duration             PICC Double Lumen 05/17/22 1612 right basilic 8 days              Drain  Duration                  Chest Tube 05/21/22 0957 1 Right Pleural 8.5 Fr. 4 days    Female External Urinary Catheter 05/21/22 0700 4 days                    Significant Labs:    CBC/Anemia Profile:  Recent Labs   Lab 05/24/22  0601 05/25/22  0644 05/25/22  1313   WBC 11.10 11.84  --    HGB 7.9* 7.6* 8.5*   HCT 25.0* 24.7* 27.5*   * 386  --    MCV 87 90  --    RDW 15.6* 15.9*  --           Chemistries:  Recent Labs   Lab 05/24/22  0601 05/25/22  0644    135*   K 4.4 4.5   CL 99 97   CO2 31* 29   BUN 12 22*   CREATININE 0.7 1.0   CALCIUM 8.1* 8.0*   ALBUMIN 1.5* 1.5*   PROT 5.8* 5.7*   BILITOT 0.2 0.3   ALKPHOS 94 97   ALT 12 12   AST 12 10        Latest Reference Range & Units 05/07/22 09:05 05/21/22 10:22   Fluid Appearance  Hazy Cloudy   WBC, Body  Fluid /cu mm 18375 [1] 086620 (C) [2]   Body Fluid Type  Pleural Fluid, Right Pleural Fluid, Right   Segs, Fluid % 93 88   Lymphs, Fluid % 3 7   Monocytes/Macrophages, Fluid % 4 5   Amylase, Fluid Not established U/L 34 [3]    Glucose, Fluid Not established mg/dL 29 [4]    LD, Fluid Not established U/L 1077 [5]    Body Fluid, Albumin See text g/dL 1.3 [6]    Body Fluid Source Amylase  Pleural Fluid, Right    Body Fluid Source, Albumin  Pleural Fluid, Right    Body Fluid Source, Glucose  Pleural Fluid, Right    Body Fluid Source, LDH  Pleural Fluid, Right    Body Fluid Source, Total Protein  Pleural Fluid, Right    Body Fluid, Protein Not established g/dL 3.7 [7]    Cholesterol, Body Fluid See Comment mg/dL 79 [8]          Significant Imaging:       Chest x-ray 05/25/2022     COMPARISON:  Prior radiograph from 05/22/2022.     FINDINGS:  Right-sided PICC in normal position.  Right-sided pigtail thoracostomy tube within the inferior right chest.  Patchy ground-glass and consolidative density throughout the lungs is similar to prior.  Small right pleural fluid.  Heart size within normal limits.  No significant bony findings.     Impression:     No adverse change with no pneumothorax.  Trace right pleural fluid with parenchymal opacities consistent with pneumonia.            CT chest without contrast 05/20/2022    Narrative & Impression  EXAMINATION:  CT CHEST WITHOUT CONTRAST     CLINICAL HISTORY:  Empyema;     TECHNIQUE:  Low dose axial images, sagittal and coronal reformations were obtained from the thoracic inlet to the lung bases. Contrast was not administered.  All CT scans at this facility are performed using dose optimization techniques including the following: automated exposure control; adjustment of the mA and/or kV; use of iterative reconstruction technique.     COMPARISON:  CTA chest non coronary 05/06/2022, chest radiograph 05/20/2022, MRI thoracic spine 05/19/2022     FINDINGS:  Base of Neck: There is  diffuse body wall edema.  Unremarkable, noting the presence of AA PICC line which terminates at the superior cavoatrial junction.     Thoracic soft tissues: There is diffuse body wall edema.     Aorta: Left-sided aortic arch.  No aneurysm and no significant atherosclerosis     Heart: Normal size. No effusion.  No significant coronary atherosclerosis.     Pulmonary vasculature: Pulmonary arteries distribute normally.     Esha/Mediastinum: There are few prominent mediastinal lymph nodes with a prevascular node measuring 0.8 cm in short axis.     Airways: Patent.     Lungs/Pleura: There are multiple bilateral consolidative densities scattered throughout the lungs, many of which demonstrate cavitation, again suggesting septic emboli.  There is small left pleural fluid, which appears simple and layers dependently.  There is a loculated moderate right pleural effusion, which appears similar in size to the comparison exam however now containing air, which may be due to empyema.  There is adjacent atelectatic change within the bilateral lower lobes.  Overall number and size of consolidative densities has increased since the comparison exam.     Esophagus: Normal.     Upper Abdomen: There is persistent wedge-shaped hypodensity within the spleen, suggesting infarct.     Bones: No acute fracture. No suspicious lytic or sclerotic lesions.     Impression:     Interval progression with increased number and size of consolidative cavitary densities throughout the lungs, most suggestive of septic emboli with larger opacities concerning for necrotizing pneumonia.     Stable size of loculated right moderate pleural effusion, now containing air compatible with hydropneumothorax as reported on prior MRI.  Interval development of air is concerning for empyema noting limited evaluation without intravenous contrast.  New small simple appearing left pleural effusion.     Wedge-shaped splenic hypodensity, suggesting infarction.      Anasarca.           ABG  No results for input(s): PH, PO2, PCO2, HCO3, BE in the last 168 hours.  Assessment/Plan:     * Acute bacterial endocarditis  Medical Mx  CTS note noted  5/23 IV oxacillin.  5/24 IV oxacillin as per Infectious Disease  5/25 repeat cultures none relevant.  IV oxacillin as per Infectious Disease x4 weeks.  T-max 101.7° last  24 hours    Empyema of right pleural space  5/20 Suspect empyema, needs chest tube placement  5/21 s/p chest tube placement, adequate expansion - will check Chest X Ray in aam  5/23 tPA DNase.  5/24 tPA for DNase installation via chest tube.  Monitor chest tube output.  Repeat chest x-ray in a.m..  5/25 chest tube output increased from 50 mL on average per 24 hours to 160 mL last 24 hours.  Purulent drainage noted.  Will repeat tPA plus Dnase instillation.    Pulmonary embolism, septic  5/24 continue IV oxacillin.  5/25 on IV  Oxacillin  since 05/09.  Repeat CRP.    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  Repeat CRP    Discussed with hospital medicine attending     Piyush San MD  Pulmonology  [facility]

## 2022-05-25 NOTE — ASSESSMENT & PLAN NOTE
Neurosurge note  Await IR drainage  5/23 on IV oxacillin  5/25 continue IV oxacillin started 5/9.  Repeat CRP

## 2022-05-25 NOTE — PLAN OF CARE
Patient continues to be monitored and treated.  Cardiac monitor in place. Patient continues to run normal  sinus.   Patient's vitals monitored throughout shift. Physician notified of patient's low BP this shift. Patient continues to deny feelings of abnormality.   Chest tube remains in place, set to water seal and gravity. Chest tube output of about 160ml so far this shift.   Safety precautions in place.  Will continue to monitor and treat.

## 2022-05-25 NOTE — PROGRESS NOTES
[facility] Medicine  Progress Note    Patient Name: Tianna Leon  MRN: [id number]  Patient Class: IP- Inpatient   Admission Date: 5/6/2022  Length of Stay: 19 days  Attending Physician: Elizabeth Kim MD  Primary Care Provider: Spenser Campa MD        Subjective:     Principal Problem:Acute bacterial endocarditis        HPI:  38 y/o. female  with a PMHx of asthma, COPD,Bipolar  , IVDU and HLD presented to the ER with a c/o  sob for the last weak which has gotten worse . The SOB is associated with fever , chills , productive cough , back pain  and generalized weakness . She report cough some blood this am .  She is active IVDU  ( heroine , cocaine  and amphetamine ) . She denies any  sick contact , chest pain  , GI/ sx , She also complaning of LE sweeling . Knee pain and B/L LE rash .   ER COURSE: CTA chest negative for pe . Multiple nodular and cavitary opacities concerning for septic emboli with larger opacities possibly necrotizing pneumonia. CT cervical  and thoracic did not show any acute finding , CT lumbar Possible progression of degenerative changes most notable at L4-5 with moderate to severe spinal canal stenosis at this level.  WBC  29 k , na 130 , k 3.4 , cl 89 , procal 2.71   ER VS:  BP Pulse Resp Temp SpO2   (!) 124/58 110 (!) 30 (!) 101.6 °F (38.7 °C) 96 %           Pt will be admitted to inpt with a dx of PNA and severe sepsis       Overview/Hospital Course:  5/7 admitted for pneumonia, severe sepsis in setting of ivdu. Mother at bedside and provides collateral. Patient has struggled with substance abuse for approximately 20 years, worsening over last 3-5 years since life event. Onset of symptoms 1-2 weeks ago. Tolerated thoracentesis with no pneumothorax on chest x-ray. Mri lumbar and echo, pending. Bcx positive, growing gram positive cocci. On vanc, cefepime and flagyl. Infectious disease consulted for bacteremia. 5/8/22 The patient remained afebrile overnight. Currently on  cefepime/vanc/flagyl. Blood cx 5/6/22 are growing MSSA. The patient refused the MRI lumbar spine overnight d/t being unable to lay flat from back pain. Will give pain meds and attempt to get the MRI today to r/o spinal abscess. The ECHO showed a 1.3 x 1.5 cm elongated, mobile echodensity seen on the tricuspid valve consistent with vegetation, CT surgery was consulted. Will need a SUSANNAH. Infectious disease is consulted. Will repeat blood cx today. 5/9/22 No acute events overnight. The patient reports lower back pain is not well controlled with current pain regimen, will adjust. Repeat blood cx are still positive. Sensitivities are back and Staph is sensitive to oxacillin, will D/C vanc/cefepime. Cardiology consulted and plans for a SUSANNAH today. CT surgery following and recommends continuing medical management with appropriate ABX, no surgical intervention at this time. MRI lumbar spine showed moderate to severe spinal canal stenosis with moderate left-sided neural foraminal stenosis, Neurosurgery consulted. 5/10/22 No acute events overnight. The patient reports some improvement in pain with adjustment in pain meds. Repeat blood cx are +. Continue IV oxacillin and flagyl. Infectious disease is consulted. IR consulted to evaluate possible paraspinous muscle myositis versus abscess. Recommend IR drainage. Continue current management. 5/11/22 No acute events overnight. The patient reports pain is still not well controlled at times. The patient reports that she is very anxious about the upcoming SUSANNAH and IR drainage. SUSANNAH was pushed back to this afternoon because the patient ate candy this AM. Continue treatment with IV oxacillin and flagyl. Infectious disease is following. WBC trended down to 31K. Continue current management. 5/12/22 No acute events overnight. The patients WBC improved to 22K overnight. The patient remains on continuous oxacillin infusion. Infectious disease is following. MRI thoracic spine is ordered and  pending. The patients SUSANNAH was postponed again today d/t low H/H. Hgb was noted to be 6.2 this morning, will transfuse 1 unit PRBC. The patient reports pain is still not well controlled. The case was discussed with Neurosurgery who recommended adding extended release pain meds. Approximately 1 ml of purulent drainage aspirated from back yesterday per IR, growing gram + cocci.     5/13: Patient was given 1U PRBC on 5/12, improved from 6.2 --> 7.0, will give an additional unit PRBC. Radiology attempted to bring patient on 5/12 at 1755 for Thoracic MRI, patient refused due to pain and anxiety. Repeat attempt this AM, patient again refused. Will order Ativan 1mg IV to be given with pain medication prior to scan to relieve anxiety and pain, nurse to notify NP when ready to go for scan. Discussed pain management with patient, discussed transition to PO pain medications to being preparing for d/c to LTAC, adjusted PO pain regimen will reevaluate to determine effectiveness. Repeat Blood cultures 5/11/22: NGTD.     5/14: Final anaerobic cultures pending, continue to adjust pain regimen, d/c IV Dilaudid today. Patient currently managed with PO Morphine extended release and PO oxycodone. D/C plan is for LTAC once final antibiotic regimen determined. Patient continues to refuse MRI of thoracic spine.     5/15: Called to room this AM, patient had coughed up blood and mucous. Approx. 1-2 teaspoons of blood in emesis bag, ordered CXR and repeat CBC, CXR showed improvement from previous day, repeat CBC at time of incident, showed a slight decrease in Hgb: 9.7 --> 8.7, when repeated at 1500, Hgb had improved to 10. no further episodes of coughing up blood throughout day. Patient continues to c/o not having enough pain medication but continues to sleep most of the day and will fall asleep when speaking at times. Added IV Toradol to plan.     5/16: No further episodes of coughing blood overnight, patient participated with encouragement  with PT/OT today, recommendations are HH vs. SNF pending progress. Awaiting final antibiotic regimen recommendations. Anaerobic culture results finalized: negative for growth. Afebrile, WBC trending downward.     5/17: Antibiotic regimen per ID is continuous oxacillin until 7/11/22, SW notified of plan, will seek acceptance at LTAC or SNF. Patient choice is [facility]. WBC in normal range. AMS this AM, CT of head: no acute findings. Given narcan and mental status improved. Decreased narcotic dose.     5/18: Per discussion with patient, she is willing to have the MRI of thoracic spine today, will order IV pain medication and anxiety medication prior to scan. Nursing and Radiology aware of needing to premedicate for scan. Patient is more cooperative and participating with care.     5/19: Patient given premedications for MRI, MRI completed, results pending. Patient more agreeable with treatment plan and cooperating. Will need psychiatry follow-up after discharge. Hgb: 6.7, will give 1U PRBC    5/20/22: +LEON and generalized pain. Thoracic MRI showed no evidence for disc osteomyelitis in the thoracic spine and no epidural abscess, Loculated right hydropneumothorax and extensive bilateral pulmonary infiltrates. Ct chest showed septic emboli and moderate right pleural effusion.+splenic infarct  Pulmonology felt likely empyema- consulted IR for chest tube.     5/21/22: s/p right pig tail placement to suction/ drainage system per IR. 60ml pus removed initially. Fluid analysis consistent with empyema. Pleural fluid cultures pending. Now with scant serosanguinous output.+ right lateral chest pain at chest tube site. Denies any other complaints. Status update provided to pt's mother.     5/22/22: Repeatedly requesting IV Dilaudid. Toradol ordered. Afebrile, WBC normal, O2sats stable on 3lites. s/p right pig tail placement to suction/ drainage system per IR on 5/21/22- draining serous draiange with pus. Plan for LTAC placement      5/23/22: More cofortable today. Not getting OOB. Encouraged OOB and rationale provided. Pulmonology recommended Possible tPA and DNase in chest tube tomorrow    5/24/22: Examined OOB in chair. Encouraged pt to continue OOB. Plan for tPA and DNase in chest tube today per Pulmonology  Pt denied LTAC- will pursue SNF    5/25/22: Temp 101.7F last night. Will recheck blood cultures and UA. BP low - IVF bolus given and restarted IVF. ID re-consulted. 160ml of purulent CT drainage output. Dr. San plans to repeat tPA and DNase in chest tube again today.       Interval History: see hospital course    Review of Systems   Constitutional:  Positive for activity change, appetite change and fatigue. Negative for chills, diaphoresis, fever and unexpected weight change.   HENT:  Negative for congestion, hearing loss, mouth sores, postnasal drip, rhinorrhea, sore throat and trouble swallowing.    Eyes:  Negative for discharge and visual disturbance.   Respiratory:  Negative for cough and chest tightness.    Cardiovascular:  Negative for chest pain, palpitations and leg swelling.   Gastrointestinal:  Negative for blood in stool, constipation, diarrhea, nausea and vomiting.   Endocrine: Negative for cold intolerance and heat intolerance.   Genitourinary:  Negative for difficulty urinating, dyspareunia, flank pain and hematuria.   Musculoskeletal:  Positive for back pain and myalgias. Negative for arthralgias.   Skin: Negative.    Neurological:  Positive for weakness. Negative for dizziness and light-headedness.   Hematological:  Negative for adenopathy. Does not bruise/bleed easily.   Psychiatric/Behavioral:  Negative for agitation, behavioral problems, confusion and sleep disturbance. The patient is nervous/anxious.    Objective:     Vital Signs (Most Recent):  Temp: 98.5 °F (36.9 °C) (05/25/22 1148)  Pulse: 88 (05/25/22 1540)  Resp: 20 (05/25/22 1405)  BP: (!) 82/48 (05/25/22 1148)  SpO2: 96 % (05/25/22 1405)   Vital Signs  (24h Range):  Temp:  [98.2 °F (36.8 °C)-101.7 °F (38.7 °C)] 98.5 °F (36.9 °C)  Pulse:  [63-88] 88  Resp:  [15-26] 20  SpO2:  [95 %-99 %] 96 %  BP: ()/(45-58) 82/48     Weight: 97.1 kg (214 lb 1.1 oz)  Body mass index is 35.62 kg/m².    Intake/Output Summary (Last 24 hours) at 5/25/2022 1613  Last data filed at 5/25/2022 0600  Gross per 24 hour   Intake 231.55 ml   Output 1360 ml   Net -1128.45 ml        Physical Exam  Vitals and nursing note reviewed.   Constitutional:       General: She is not in acute distress.     Appearance: She is well-developed.   HENT:      Head: Normocephalic and atraumatic.      Right Ear: Hearing and external ear normal.      Left Ear: Hearing and external ear normal.      Nose: No rhinorrhea.      Right Sinus: No maxillary sinus tenderness or frontal sinus tenderness.      Left Sinus: No maxillary sinus tenderness or frontal sinus tenderness.      Mouth/Throat:      Mouth: No oral lesions.      Pharynx: Uvula midline.   Eyes:      General:         Right eye: No discharge.         Left eye: No discharge.      Conjunctiva/sclera: Conjunctivae normal.      Pupils: Pupils are equal, round, and reactive to light.   Neck:      Thyroid: No thyromegaly.      Vascular: No carotid bruit.      Trachea: No tracheal deviation.   Cardiovascular:      Rate and Rhythm: Normal rate and regular rhythm.      Pulses:           Dorsalis pedis pulses are 2+ on the right side and 2+ on the left side.      Heart sounds: Normal heart sounds, S1 normal and S2 normal. No murmur heard.  Pulmonary:      Effort: Pulmonary effort is normal. No respiratory distress.      Breath sounds: Examination of the right-lower field reveals decreased breath sounds. Examination of the left-lower field reveals decreased breath sounds. Decreased breath sounds present.      Comments: Right pigtail small bore chest tube in place with moderate serous drainage with pus to to drainage system   Chest tube site C/D/I  Chest:    Breasts:      Right: No supraclavicular adenopathy.      Left: No supraclavicular adenopathy.     Abdominal:      General: Bowel sounds are decreased. There is no distension.      Palpations: Abdomen is soft. There is no mass.      Tenderness: There is no abdominal tenderness.   Musculoskeletal:      Cervical back: Normal range of motion.      Thoracic back: Tenderness present. Decreased range of motion.      Lumbar back: Tenderness present. Decreased range of motion.      Right lower leg: Edema present.      Left lower leg: Edema present.   Lymphadenopathy:      Cervical: No cervical adenopathy.      Upper Body:      Right upper body: No supraclavicular adenopathy.      Left upper body: No supraclavicular adenopathy.   Skin:     General: Skin is warm and dry.      Capillary Refill: Capillary refill takes less than 2 seconds.      Findings: No rash.   Neurological:      Mental Status: She is alert and oriented to person, place, and time.      Sensory: No sensory deficit.      Coordination: Coordination normal.      Gait: Gait normal.   Psychiatric:         Attention and Perception: She is inattentive.         Mood and Affect: Mood is anxious. Mood is not depressed.         Speech: Speech normal.         Behavior: Behavior normal.         Thought Content: Thought content normal.         Judgment: Judgment normal.       Significant Labs: All pertinent labs within the past 24 hours have been reviewed.  CBC:   Recent Labs   Lab 05/24/22  0601 05/25/22  0644 05/25/22  1313   WBC 11.10 11.84  --    HGB 7.9* 7.6* 8.5*   HCT 25.0* 24.7* 27.5*   * 386  --        CMP:   Recent Labs   Lab 05/24/22  0601 05/25/22  0644    135*   K 4.4 4.5   CL 99 97   CO2 31* 29    85   BUN 12 22*   CREATININE 0.7 1.0   CALCIUM 8.1* 8.0*   PROT 5.8* 5.7*   ALBUMIN 1.5* 1.5*   BILITOT 0.2 0.3   ALKPHOS 94 97   AST 12 10   ALT 12 12   ANIONGAP 8 9   EGFRNONAA >60 >60         Significant Imaging: I have reviewed all pertinent  imaging results/findings within the past 24 hours.      Assessment/Plan:      * Acute bacterial endocarditis  ECHO showed a 1.3 x 1.5 cm elongated, mobile echodensity seen on the tricuspid valve consistent with vegetation, CT surgery was consulted.   Due to MSSA    -- Infectious disease is consulted.  -- repeat blood cx NGTD.  -- Continue IV ABX.   --Sensitivities are back and Staph is sensitive to oxacillin, D/C vanc/cefepime.   --Cardiology consulted and plans for a SUSANNAH.   --CT surgery following and recommends continuing medical management with appropriate ABX, no surgical intervention at this time.    cont IV oxacillin.      MSSA bacteremia  Gram positive   Infectious disease consulted  Continue broad spectrum intravenous antibiotic(s) given h/o ivdu  Remains febrile and leukocytosis persists  5/8/22 The patient remained afebrile overnight. Currently on cefepime/vanc/flagyl. Blood cx are growing staph aureus. The patient refused the MRI lumbar spine overnight d/t being unable to lay flat from back pain. Will give pain meds and attempt to get the MRI today to r/o spinal abscess. The ECHO showed a 1.3 x 1.5 cm elongated, mobile echodensity seen on the tricuspid valve consistent with vegetation, CT surgery was consulted. Will need a SUSANNAH. Infectious disease is consulted. Will repeat blood cx today.   5/9/22 Repeat blood cx are still positive. Sensitivities are back and Staph is sensitive to oxacillin, will D/C vanc/cefepime. Infectious disease is following   5/10/22 Repeat blood cx are +. Continue IV oxacillin and flagyl. Infectious disease is consulted. IR consulted to evaluate possible paraspinous muscle myositis versus abscess. Recommend IR drainage. Continue current management.    5/11/22 The patient reports pain is still not well controlled at times. The patient reports that she is very anxious about the upcoming SUSANNAH and IR drainage. SUSANNAH was pushed back to this afternoon because the patient ate candy this AM.  Continue treatment with IV oxacillin and flagyl. Infectious disease is following. WBC trended down to 31K. Continue current management.  5/12/22 The patients WBC improved to 22K overnight. The patient remains on continuous oxacillin infusion. Infectious disease is following.  5/14: WBC continues to improve, 18.10 today     5/15: Blood cultures 5/11/22: NGTD     Continuous Oxacillin, EOC: 7/11/22, pending acceptance at [facility] [place], PICC line placed    Empyema of right pleural space  Pulmonology consulted and felt likely empyema   IR consulted for chest tube      s/p right pigtail small bore chest tube placement to suction/drainage system per IR on 5/21/22. 60ml pus removed. Now with Serous drainage with pus. Fluid analysis consistent with empyema. Pleural fluid aerobic culture shows NGTD, Anaerobic culture pending    5/24/22: Dr. San plans for tPA and DNase in chest tube today.   5/25/22: 160ml of purulent CT drainage output. Dr. San plans to repeat tPA and DNase in chest tube again today.     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 IR consulted to evaluate possible paraspinous muscle myositis versus abscess.    --Approximately 1 ml of purulent drainage aspirated 5/11 per IR, growing MSSA  --Continue current regimen      Normocytic anemia  Secondary to acute illness    --Daily CBC  --Transfuse with 1 unit PRBC for Hgb <7.0 or <8.0 if symptomatic   --Hgb: 6.7, give 1U PRBC    Lumbar stenosis without neurogenic claudication        Chronic pulmonary heart disease  - Pulmonology following       IVDU (intravenous drug user)   Will ordet TTE to r/o IE  Will order MRI lumbar wwo- completed   Cont broad spectrum IVAB   --MRI thoracic spine ordered per ID, patient refused on multiple  attempts    5/7   Studies pending for additional sites of infection given h/o ivdu  5/8/22  on cessation     Severe sepsis  This patient does have evidence of infective focus  My overall impression is sepsis. Vital signs were reviewed and noted in progress note.  Antibiotics given-   Antibiotics (From admission, onward)            Start     Stop Route Frequency Ordered    05/09/22 1100  oxacillin 12 g in  mL CONTINUOUS INFUSION         -- IV Every 24 hours (non-standard times) 05/09/22 0932    05/07/22 2100  mupirocin 2 % ointment         05/12 2059 Nasl 2 times daily 05/07/22 1646        Cultures were taken-   Microbiology Results (last 7 days)     Procedure Component Value Units Date/Time    Culture, Respiratory with Gram Stain [[id number]] Collected: 05/16/22 0758    Order Status: Sent Specimen: Respiratory from Sputum Updated: 05/16/22 0759    Culture, Anaerobe [[id number]] Collected: 05/11/22 1440    Order Status: Completed Specimen: Abscess from Back Updated: 05/16/22 0733     Anaerobic Culture No anaerobes isolated    Blood culture [[id number]] Collected: 05/11/22 1213    Order Status: Completed Specimen: Blood Updated: 05/16/22 0612     Blood Culture, Routine No Growth to date      No Growth to date      No Growth to date      No Growth to date      No Growth to date    Blood culture [[id number]] Collected: 05/11/22 1213    Order Status: Completed Specimen: Blood Updated: 05/16/22 0612     Blood Culture, Routine No Growth to date      No Growth to date      No Growth to date      No Growth to date      No Growth to date    Aerobic culture [[id number]]  (Abnormal)  (Susceptibility) Collected: 05/11/22 1440    Order Status: Completed Specimen: Abscess from Back Updated: 05/14/22 1057     Aerobic Bacterial Culture STAPHYLOCOCCUS AUREUS  Many      Gram stain [[id number]] Collected: 05/11/22 1440    Order Status: Completed Specimen: Abscess from Back Updated: 05/12/22 0306     Gram Stain Result Few WBC's       Few Gram positive cocci    Gram stain [[id number]]     Order Status: Canceled Specimen: Joint Fluid from Back     Blood culture [[id number]]  (Abnormal) Collected: 05/08/22 1516    Order Status: Completed Specimen: Blood from Peripheral, Right Hand Updated: 05/11/22 1009     Blood Culture, Routine Gram stain aer bottle: Gram positive cocci in clusters resembling Staph       Results called to and read back by: Chasity Raymundo RN  05/09/2022  11:31      STAPHYLOCOCCUS AUREUS  ID consult required at [facility].  For susceptibility see order #[id number]      Narrative:      Collection has been rescheduled by [email or web address] at 05/08/2022 13:22 Reason:   Pt in mri will be there after another hour [id number]  Collection has been rescheduled by [email or web address] at 05/08/2022 13:22 Reason:   Pt in mri will be there after another hour [id number]    Blood culture [[id number]]  (Abnormal) Collected: 05/08/22 1515    Order Status: Completed Specimen: Blood from Peripheral, Left Arm Updated: 05/11/22 1009     Blood Culture, Routine Gram stain aer bottle: Gram positive cocci in clusters resembling Staph      Results called to and read back by: Chasity Raymundo RN  05/09/2022  15:40      STAPHYLOCOCCUS AUREUS  ID consult required at [facility].  For susceptibility see order #[id number]      Narrative:      Collection has been rescheduled by [email or web address] at 05/08/2022 13:22 Reason:   Pt in mri will be there after another hour [id number]  Collection has been rescheduled by [email or web address] at 05/08/2022 13:22 Reason:   Pt in mri will be there after another hour [id number]        Latest lactate reviewed, they are-  No results for input(s): LACTATE in the last 72 hours.    Organ dysfunction indicated by Acute kidney injury  Source-  lung    Source control Achieved by-   Cont Broad spectrum IVAB     Bacteremia  Infectious disease consulted      PNA (pneumonia)  --Cont oxacillin        Tobacco abuse  - Patient thoroughly counseled  on smoking cessation, pt verbalized understanding. Time of counseling 10 minutes.        COPD (chronic obstructive pulmonary disease)  Cont breathing tx prn  Pulmonology following       VTE Risk Mitigation (From admission, onward)         Ordered     enoxaparin injection 40 mg  Daily         05/06/22 2336     IP VTE HIGH RISK PATIENT  Once         05/06/22 2336     Place sequential compression device  Until discontinued         05/06/22 2336                Discharge Planning   YESSICA:      Code Status: Full Code   Is the patient medically ready for discharge?:     Reason for patient still in hospital (select all that apply): Patient trending condition  Discharge Plan A: Long-term acute care facility (LTAC)   Discharge Delays: None known at this time              Adriana Baltazar NP  Department of Hospital Medicine   [facility] - Med Surg

## 2022-05-25 NOTE — SUBJECTIVE & OBJECTIVE
Interval History: see hospital course    Review of Systems   Constitutional:  Positive for activity change, appetite change and fatigue. Negative for chills, diaphoresis, fever and unexpected weight change.   HENT:  Negative for congestion, hearing loss, mouth sores, postnasal drip, rhinorrhea, sore throat and trouble swallowing.    Eyes:  Negative for discharge and visual disturbance.   Respiratory:  Negative for cough and chest tightness.    Cardiovascular:  Negative for chest pain, palpitations and leg swelling.   Gastrointestinal:  Negative for blood in stool, constipation, diarrhea, nausea and vomiting.   Endocrine: Negative for cold intolerance and heat intolerance.   Genitourinary:  Negative for difficulty urinating, dyspareunia, flank pain and hematuria.   Musculoskeletal:  Positive for back pain and myalgias. Negative for arthralgias.   Skin: Negative.    Neurological:  Positive for weakness. Negative for dizziness and light-headedness.   Hematological:  Negative for adenopathy. Does not bruise/bleed easily.   Psychiatric/Behavioral:  Negative for agitation, behavioral problems, confusion and sleep disturbance. The patient is nervous/anxious.    Objective:     Vital Signs (Most Recent):  Temp: 98.5 °F (36.9 °C) (05/25/22 1148)  Pulse: 88 (05/25/22 1540)  Resp: 20 (05/25/22 1405)  BP: (!) 82/48 (05/25/22 1148)  SpO2: 96 % (05/25/22 1405)   Vital Signs (24h Range):  Temp:  [98.2 °F (36.8 °C)-101.7 °F (38.7 °C)] 98.5 °F (36.9 °C)  Pulse:  [63-88] 88  Resp:  [15-26] 20  SpO2:  [95 %-99 %] 96 %  BP: ()/(45-58) 82/48     Weight: 97.1 kg (214 lb 1.1 oz)  Body mass index is 35.62 kg/m².    Intake/Output Summary (Last 24 hours) at 5/25/2022 1613  Last data filed at 5/25/2022 0600  Gross per 24 hour   Intake 231.55 ml   Output 1360 ml   Net -1128.45 ml        Physical Exam  Vitals and nursing note reviewed.   Constitutional:       General: She is not in acute distress.     Appearance: She is well-developed.    HENT:      Head: Normocephalic and atraumatic.      Right Ear: Hearing and external ear normal.      Left Ear: Hearing and external ear normal.      Nose: No rhinorrhea.      Right Sinus: No maxillary sinus tenderness or frontal sinus tenderness.      Left Sinus: No maxillary sinus tenderness or frontal sinus tenderness.      Mouth/Throat:      Mouth: No oral lesions.      Pharynx: Uvula midline.   Eyes:      General:         Right eye: No discharge.         Left eye: No discharge.      Conjunctiva/sclera: Conjunctivae normal.      Pupils: Pupils are equal, round, and reactive to light.   Neck:      Thyroid: No thyromegaly.      Vascular: No carotid bruit.      Trachea: No tracheal deviation.   Cardiovascular:      Rate and Rhythm: Normal rate and regular rhythm.      Pulses:           Dorsalis pedis pulses are 2+ on the right side and 2+ on the left side.      Heart sounds: Normal heart sounds, S1 normal and S2 normal. No murmur heard.  Pulmonary:      Effort: Pulmonary effort is normal. No respiratory distress.      Breath sounds: Examination of the right-lower field reveals decreased breath sounds. Examination of the left-lower field reveals decreased breath sounds. Decreased breath sounds present.      Comments: Right pigtail small bore chest tube in place with moderate serous drainage with pus to to drainage system   Chest tube site C/D/I  Chest:   Breasts:      Right: No supraclavicular adenopathy.      Left: No supraclavicular adenopathy.     Abdominal:      General: Bowel sounds are decreased. There is no distension.      Palpations: Abdomen is soft. There is no mass.      Tenderness: There is no abdominal tenderness.   Musculoskeletal:      Cervical back: Normal range of motion.      Thoracic back: Tenderness present. Decreased range of motion.      Lumbar back: Tenderness present. Decreased range of motion.      Right lower leg: Edema present.      Left lower leg: Edema present.   Lymphadenopathy:       Cervical: No cervical adenopathy.      Upper Body:      Right upper body: No supraclavicular adenopathy.      Left upper body: No supraclavicular adenopathy.   Skin:     General: Skin is warm and dry.      Capillary Refill: Capillary refill takes less than 2 seconds.      Findings: No rash.   Neurological:      Mental Status: She is alert and oriented to person, place, and time.      Sensory: No sensory deficit.      Coordination: Coordination normal.      Gait: Gait normal.   Psychiatric:         Attention and Perception: She is inattentive.         Mood and Affect: Mood is anxious. Mood is not depressed.         Speech: Speech normal.         Behavior: Behavior normal.         Thought Content: Thought content normal.         Judgment: Judgment normal.       Significant Labs: All pertinent labs within the past 24 hours have been reviewed.  CBC:   Recent Labs   Lab 05/24/22  0601 05/25/22  0644 05/25/22  1313   WBC 11.10 11.84  --    HGB 7.9* 7.6* 8.5*   HCT 25.0* 24.7* 27.5*   * 386  --        CMP:   Recent Labs   Lab 05/24/22  0601 05/25/22  0644    135*   K 4.4 4.5   CL 99 97   CO2 31* 29    85   BUN 12 22*   CREATININE 0.7 1.0   CALCIUM 8.1* 8.0*   PROT 5.8* 5.7*   ALBUMIN 1.5* 1.5*   BILITOT 0.2 0.3   ALKPHOS 94 97   AST 12 10   ALT 12 12   ANIONGAP 8 9   EGFRNONAA >60 >60         Significant Imaging: I have reviewed all pertinent imaging results/findings within the past 24 hours.

## 2022-05-25 NOTE — PROCEDURES
tPA (10 mg) and DNase (5 mg) are administered via the chest tube twice daily for three days, tubes to be clamped for one hour after administration of the drugs, they can both be administered simultaneously   Piyush San M.D

## 2022-05-25 NOTE — SUBJECTIVE & OBJECTIVE
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  Blood cultures- MSSA.  She remains bacteremic.  No MR evidence of epidural abscess.     Large, rim enhancing facet synovial cysts arising posteriorly from the right L2-L3 and left L4-L5 facet joints may be degenerative or sequelae of facet septic arthritis.     Minor enhancement and endplate irregularity at L4-L5 may be degenerative versus less likely the sequelae of spondylodiscitis.  No significant associated endplate destruction or vertebral body height loss.     Multilevel degenerative changes of the lumbar spine are most pronounced at L4-L5 with broad-based disc bulge and prominent central disc protrusion contributing to moderate to severe spinal canal stenosis with moderate left-sided neural foraminal stenosis.     Asymmetric left L5-S1 disc bulge contributes to moderate to severe left-sided neural foraminal stenosis and left lateral recess stenosis.   CT chest -Lungs/Pleura: Multiple nodular and cavitary opacities concerning for septic emboli.  Additional larger opacities most notably in the right lung base with some internal clearing possibly related to necrotizing pneumonia.  Moderate loculated right pleural fluid and no definite left pleural fluid.  Empyema not excluded.   05/12- she declined thoracic MRI    She had CT guided aspirtaion -pus aspirated  05/16 - she is drowsy but refusing more imaging .   05/19-  MRI of the thoracic region    1. No evidence for disc osteomyelitis in the thoracic spine.  No evidence for epidural abscess.  2. Loculated right hydropneumothorax and extensive bilateral pulmonary infiltrates.   T max 100.5  05/23-she had interval placemet of chest tube.  Cultures from the chest remain negative.  Review of Systems   Constitutional:  Positive for fatigue and fever. Negative for activity change, appetite change, chills, diaphoresis and unexpected weight change.   HENT: Negative.  Negative for congestion, drooling,  facial swelling, rhinorrhea, sinus pressure, sneezing and sore throat.    Eyes: Negative.  Negative for discharge, redness, itching and visual disturbance.   Respiratory:  Positive for cough, chest tightness and shortness of breath. Negative for apnea, choking, wheezing and stridor.    Cardiovascular:  Negative for chest pain, palpitations and leg swelling.   Gastrointestinal: Negative.  Negative for abdominal distention, abdominal pain, anal bleeding, blood in stool, constipation, diarrhea, nausea and vomiting.   Endocrine: Negative.    Genitourinary: Negative.  Negative for decreased urine volume, difficulty urinating, dysuria, frequency, hematuria, pelvic pain, urgency, vaginal bleeding and vaginal discharge.   Musculoskeletal:  Positive for arthralgias and back pain. Negative for gait problem, joint swelling, myalgias, neck pain and neck stiffness.   Skin: Negative.  Negative for color change, pallor, rash and wound.   Allergic/Immunologic: Negative.    Neurological:  Positive for weakness. Negative for dizziness, seizures, facial asymmetry, speech difficulty, light-headedness, numbness and headaches.   Psychiatric/Behavioral:  Positive for confusion. Negative for agitation, hallucinations and suicidal ideas.    All other systems reviewed and are negative.  Objective:     Vital Signs (Most Recent):  Temp: 98.2 °F (36.8 °C) (05/25/22 0410)  Pulse: 70 (05/25/22 0411)  Resp: 17 (05/25/22 0410)  BP: (!) 81/48 (05/25/22 0411)  SpO2: 96 % (05/25/22 0411)   Vital Signs (24h Range):  Temp:  [98.1 °F (36.7 °C)-101.7 °F (38.7 °C)] 98.2 °F (36.8 °C)  Pulse:  [63-86] 70  Resp:  [15-26] 17  SpO2:  [93 %-99 %] 96 %  BP: ()/(45-61) 81/48     Weight: 97.1 kg (214 lb 1.1 oz)  Body mass index is 35.62 kg/m².    Estimated Creatinine Clearance: 124.3 mL/min (based on SCr of 0.7 mg/dL).    Physical Exam  Vitals and nursing note reviewed. Chaperone present: -.   Constitutional:       General: She is not in acute distress.      Appearance: She is well-developed. She is ill-appearing. She is not toxic-appearing.   HENT:      Head: Normocephalic and atraumatic.   Eyes:      Conjunctiva/sclera: Conjunctivae normal.      Pupils: Pupils are equal, round, and reactive to light.   Cardiovascular:      Rate and Rhythm: Normal rate and regular rhythm.      Heart sounds: Normal heart sounds. No murmur heard.  Pulmonary:      Effort: Pulmonary effort is normal. No respiratory distress.      Breath sounds: Normal breath sounds.      Comments: Right sided chest tube noted  Abdominal:      General: Bowel sounds are normal. There is no distension.      Palpations: Abdomen is soft.      Tenderness: There is no abdominal tenderness.   Musculoskeletal:         General: Swelling and tenderness present. No deformity. Normal range of motion.      Cervical back: Normal range of motion and neck supple.      Right lower leg: No edema.      Left lower leg: No edema.   Skin:     General: Skin is warm and dry.      Coloration: Skin is pale.      Findings: Erythema present.   Neurological:      Mental Status: She is alert and oriented to person, place, and time.      Motor: Weakness present.      Deep Tendon Reflexes: Reflexes are normal and symmetric.   Psychiatric:         Behavior: Behavior normal.       Significant Labs: Blood Culture:   Recent Labs   Lab 05/06/22  1616 05/06/22  1617 05/08/22  1515 05/08/22  1516 05/11/22  1213   LABBLOO Gram stain aer bottle: Gram positive cocci in clusters resembling Staph   Gram stain missael bottle: Gram positive cocci in clusters resembling Staph   Results called to and read back by: Lila Hernandez RN 05/07/2022  10:57  STAPHYLOCOCCUS AUREUS  ID consult required at [facility] locations.  * Gram stain aer bottle: Gram positive cocci in clusters resembling Staph   Results called to and read back by: Lila Hernandez RN 05/07/2022  10:57  Gram stain missael bottle: Gram positive cocci in clusters resembling  Staph   05/07/2022  13:40  STAPHYLOCOCCUS AUREUS  ID consult required at [facility].  For susceptibility see order #[id number]  * Gram stain aer bottle: Gram positive cocci in clusters resembling Staph  Results called to and read back by: Chasity Raymundo RN  05/09/2022  15:40  STAPHYLOCOCCUS AUREUS  ID consult required at [facility].  For susceptibility see order #[id number]  * Gram stain aer bottle: Gram positive cocci in clusters resembling Staph   Results called to and read back by: Chasity Raymundo RN  05/09/2022  11:31  STAPHYLOCOCCUS AUREUS  ID consult required at [facility].  For susceptibility see order #[id number]  * No growth after 5 days.  No growth after 5 days.       BMP:   Recent Labs   Lab 05/24/22  0601         K 4.4   CL 99   CO2 31*   BUN 12   CREATININE 0.7   CALCIUM 8.1*       CMP:   Recent Labs   Lab 05/24/22  0601      K 4.4   CL 99   CO2 31*      BUN 12   CREATININE 0.7   CALCIUM 8.1*   PROT 5.8*   ALBUMIN 1.5*   BILITOT 0.2   ALKPHOS 94   AST 12   ALT 12   ANIONGAP 8   EGFRNONAA >60         Significant Imaging: CT: I have reviewed all pertinent results/findings within the past 24 hours:

## 2022-05-25 NOTE — PLAN OF CARE
Cm met with patient and patients mother at the bedside to discuss SNF placement. CM provided with in network SNF list. Cm informed that I would send a blanket referral to start the SNF process and check for bed availability. Patient and mother was agreeable. Patient stated she would contact me with preferences once decided.

## 2022-05-25 NOTE — ASSESSMENT & PLAN NOTE
Pulmonology consulted and felt likely empyema   IR consulted for chest tube      s/p right pigtail small bore chest tube placement to suction/drainage system per IR on 5/21/22. 60ml pus removed. Now with Serous drainage with pus. Fluid analysis consistent with empyema. Pleural fluid aerobic culture shows NGTD, Anaerobic culture pending    5/24/22: Dr. San plans for tPA and DNase in chest tube today.   5/25/22: 160ml of purulent CT drainage output. Dr. San plans to repeat tPA and DNase in chest tube again today.

## 2022-05-25 NOTE — NURSING
"Message sent to hospital medicine:  "FYI: Patient's BP has been low this shift. Patient denies any feelings of abnormality. Nightly dose of oral morphine also held. At 0145 this a.m., patient's BP dropped as low as 88/45 with a pulse of 65. Patient's BP now 94/53 with a pulse of 65. Output from chest tube so far this shift: 160ml. Per notes by pulmonary, tPA was inserted via the patient's chest tube yesterday afternoon. Will continue to monitor."  "

## 2022-05-25 NOTE — ASSESSMENT & PLAN NOTE
MRI of the thoracic region 0  1. No evidence for disc osteomyelitis in the thoracic spine.  No evidence for epidural abscess.  2. Loculated right hydropneumothorax and extensive bilateral pulmonary infiltrates.     Case discussed with critical care team- will consult pulmonology /CVT  On oxacillin  05/23- s/p chest tube placement -follow pulmonology .  Continue oxacillin

## 2022-05-25 NOTE — ASSESSMENT & PLAN NOTE
Medical [facility]  CTS note noted  5/23 IV oxacillin.  5/24 IV oxacillin as per Infectious Disease  5/25 repeat cultures none relevant.  IV oxacillin as per Infectious Disease x4 weeks.  T-max 101.7° last  24 hours

## 2022-05-25 NOTE — ASSESSMENT & PLAN NOTE
Gram positive   Infectious disease consulted  Continue broad spectrum intravenous antibiotic(s) given h/o ivdu  Remains febrile and leukocytosis persists  5/8/22 The patient remained afebrile overnight. Currently on cefepime/vanc/flagyl. Blood cx are growing staph aureus. The patient refused the MRI lumbar spine overnight d/t being unable to lay flat from back pain. Will give pain meds and attempt to get the MRI today to r/o spinal abscess. The ECHO showed a 1.3 x 1.5 cm elongated, mobile echodensity seen on the tricuspid valve consistent with vegetation, CT surgery was consulted. Will need a SUSANNAH. Infectious disease is consulted. Will repeat blood cx today.   5/9/22 Repeat blood cx are still positive. Sensitivities are back and Staph is sensitive to oxacillin, will D/C vanc/cefepime. Infectious disease is following   5/10/22 Repeat blood cx are +. Continue IV oxacillin and flagyl. Infectious disease is consulted. IR consulted to evaluate possible paraspinous muscle myositis versus abscess. Recommend IR drainage. Continue current management.    5/11/22 The patient reports pain is still not well controlled at times. The patient reports that she is very anxious about the upcoming SUSANNAH and IR drainage. SUSANNAH was pushed back to this afternoon because the patient ate candy this AM. Continue treatment with IV oxacillin and flagyl. Infectious disease is following. WBC trended down to 31K. Continue current management.  5/12/22 The patients WBC improved to 22K overnight. The patient remains on continuous oxacillin infusion. Infectious disease is following.  5/14: WBC continues to improve, 18.10 today     5/15: Blood cultures 5/11/22: NGTD     Continuous Oxacillin, EOC: 7/11/22, pending acceptance at [facility] [place], PICC line placed

## 2022-05-25 NOTE — SUBJECTIVE & OBJECTIVE
Interval History:   5/25 patient seen and examined.  Chest tube output last 24 hours 120 mL.  Afebrile.  Purulent discharge noted in pigtail.  Status post tPA and Dnase yesterday.  Spiked temp yesterday 101. 7.  Chest x-ray reviewed  Review of Systems   Constitutional:  Positive for malaise/fatigue and weight loss. Negative for chills and fever.   HENT:  Negative for hearing loss and nosebleeds.    Eyes:  Negative for discharge.   Respiratory:  Positive for cough, sputum production and shortness of breath.    Cardiovascular:  Positive for leg swelling. Negative for chest pain.   Gastrointestinal:  Negative for abdominal pain.   Genitourinary:  Negative for hematuria.   Musculoskeletal:  Negative for falls.   Skin:  Negative for itching.   Neurological:  Positive for weakness. Negative for speech change, seizures and loss of consciousness.   Endo/Heme/Allergies:  Negative for polydipsia. Does not bruise/bleed easily.   Psychiatric/Behavioral:  Negative for hallucinations.        Objective:     Vital Signs (Most Recent):  Temp: 98.5 °F (36.9 °C) (05/25/22 1148)  Pulse: 88 (05/25/22 1540)  Resp: 20 (05/25/22 1405)  BP: (!) 82/48 (05/25/22 1148)  SpO2: 96 % (05/25/22 1405)   Vital Signs (24h Range):  Temp:  [98.2 °F (36.8 °C)-101.5 °F (38.6 °C)] 98.5 °F (36.9 °C)  Pulse:  [63-88] 88  Resp:  [15-26] 20  SpO2:  [95 %-99 %] 96 %  BP: ()/(45-58) 82/48     Weight: 97.1 kg (214 lb 1.1 oz)  Body mass index is 35.62 kg/m².      Intake/Output Summary (Last 24 hours) at 5/25/2022 1634  Last data filed at 5/25/2022 1600  Gross per 24 hour   Intake 471.55 ml   Output 1360 ml   Net -888.45 ml         Physical Exam  Vitals and nursing note reviewed.   Constitutional:       General: She is not in acute distress.     Appearance: She is well-developed. She is ill-appearing.   HENT:      Head: Normocephalic and atraumatic.      Nose: Nose normal.   Eyes:      Extraocular Movements: Extraocular movements intact.   Cardiovascular:       Rate and Rhythm: Normal rate and regular rhythm.   Pulmonary:      Effort: Pulmonary effort is normal.      Breath sounds: No stridor. Rhonchi present.      Comments: Right-side pigtail in place  Abdominal:      General: There is no distension.   Musculoskeletal:         General: No signs of injury.      Cervical back: Normal range of motion and neck supple.   Skin:     General: Skin is dry.   Neurological:      General: No focal deficit present.      Mental Status: She is alert and oriented to person, place, and time. Mental status is at baseline.   Psychiatric:         Behavior: Behavior normal.       Vents:  Oxygen Concentration (%): 28 (05/25/22 1405)    Lines/Drains/Airways       Peripherally Inserted Central Catheter Line  Duration             PICC Double Lumen 05/17/22 1612 right basilic 8 days              Drain  Duration                  Chest Tube 05/21/22 0957 1 Right Pleural 8.5 Fr. 4 days    Female External Urinary Catheter 05/21/22 0700 4 days                    Significant Labs:    CBC/Anemia Profile:  Recent Labs   Lab 05/24/22  0601 05/25/22  0644 05/25/22  1313   WBC 11.10 11.84  --    HGB 7.9* 7.6* 8.5*   HCT 25.0* 24.7* 27.5*   * 386  --    MCV 87 90  --    RDW 15.6* 15.9*  --           Chemistries:  Recent Labs   Lab 05/24/22  0601 05/25/22  0644    135*   K 4.4 4.5   CL 99 97   CO2 31* 29   BUN 12 22*   CREATININE 0.7 1.0   CALCIUM 8.1* 8.0*   ALBUMIN 1.5* 1.5*   PROT 5.8* 5.7*   BILITOT 0.2 0.3   ALKPHOS 94 97   ALT 12 12   AST 12 10        Latest Reference Range & Units 05/07/22 09:05 05/21/22 10:22   Fluid Appearance  Hazy Cloudy   WBC, Body Fluid /cu mm 95490 [1] 791946 (C) [2]   Body Fluid Type  Pleural Fluid, Right Pleural Fluid, Right   Segs, Fluid % 93 88   Lymphs, Fluid % 3 7   Monocytes/Macrophages, Fluid % 4 5   Amylase, Fluid Not established U/L 34 [3]    Glucose, Fluid Not established mg/dL 29 [4]    LD, Fluid Not established U/L 1077 [5]    Body Fluid, Albumin See  text g/dL 1.3 [6]    Body Fluid Source Amylase  Pleural Fluid, Right    Body Fluid Source, Albumin  Pleural Fluid, Right    Body Fluid Source, Glucose  Pleural Fluid, Right    Body Fluid Source, LDH  Pleural Fluid, Right    Body Fluid Source, Total Protein  Pleural Fluid, Right    Body Fluid, Protein Not established g/dL 3.7 [7]    Cholesterol, Body Fluid See Comment mg/dL 79 [8]          Significant Imaging:       Chest x-ray 05/25/2022     COMPARISON:  Prior radiograph from 05/22/2022.     FINDINGS:  Right-sided PICC in normal position.  Right-sided pigtail thoracostomy tube within the inferior right chest.  Patchy ground-glass and consolidative density throughout the lungs is similar to prior.  Small right pleural fluid.  Heart size within normal limits.  No significant bony findings.     Impression:     No adverse change with no pneumothorax.  Trace right pleural fluid with parenchymal opacities consistent with pneumonia.            CT chest without contrast 05/20/2022    Narrative & Impression  EXAMINATION:  CT CHEST WITHOUT CONTRAST     CLINICAL HISTORY:  Empyema;     TECHNIQUE:  Low dose axial images, sagittal and coronal reformations were obtained from the thoracic inlet to the lung bases. Contrast was not administered.  All CT scans at this facility are performed using dose optimization techniques including the following: automated exposure control; adjustment of the mA and/or kV; use of iterative reconstruction technique.     COMPARISON:  CTA chest non coronary 05/06/2022, chest radiograph 05/20/2022, MRI thoracic spine 05/19/2022     FINDINGS:  Base of Neck: There is diffuse body wall edema.  Unremarkable, noting the presence of AA PICC line which terminates at the superior cavoatrial junction.     Thoracic soft tissues: There is diffuse body wall edema.     Aorta: Left-sided aortic arch.  No aneurysm and no significant atherosclerosis     Heart: Normal size. No effusion.  No significant coronary  atherosclerosis.     Pulmonary vasculature: Pulmonary arteries distribute normally.     Esha/Mediastinum: There are few prominent mediastinal lymph nodes with a prevascular node measuring 0.8 cm in short axis.     Airways: Patent.     Lungs/Pleura: There are multiple bilateral consolidative densities scattered throughout the lungs, many of which demonstrate cavitation, again suggesting septic emboli.  There is small left pleural fluid, which appears simple and layers dependently.  There is a loculated moderate right pleural effusion, which appears similar in size to the comparison exam however now containing air, which may be due to empyema.  There is adjacent atelectatic change within the bilateral lower lobes.  Overall number and size of consolidative densities has increased since the comparison exam.     Esophagus: Normal.     Upper Abdomen: There is persistent wedge-shaped hypodensity within the spleen, suggesting infarct.     Bones: No acute fracture. No suspicious lytic or sclerotic lesions.     Impression:     Interval progression with increased number and size of consolidative cavitary densities throughout the lungs, most suggestive of septic emboli with larger opacities concerning for necrotizing pneumonia.     Stable size of loculated right moderate pleural effusion, now containing air compatible with hydropneumothorax as reported on prior MRI.  Interval development of air is concerning for empyema noting limited evaluation without intravenous contrast.  New small simple appearing left pleural effusion.     Wedge-shaped splenic hypodensity, suggesting infarction.     Anasarca.

## 2022-05-25 NOTE — ASSESSMENT & PLAN NOTE
Isolate is staph Aureus - will continue Vanco/cefepime.flagyl    05/16- will continue Oxacillin   Will plan to treat for 8 weeks =EOC 07/11/22 05/23-will repeat blood culture -will continue oxacillin

## 2022-05-26 LAB
ALBUMIN SERPL BCP-MCNC: 1.5 G/DL (ref 3.5–5.2)
ALP SERPL-CCNC: 91 U/L (ref 55–135)
ALT SERPL W/O P-5'-P-CCNC: 12 U/L (ref 10–44)
ANION GAP SERPL CALC-SCNC: 9 MMOL/L (ref 8–16)
AST SERPL-CCNC: 12 U/L (ref 10–40)
BASOPHILS # BLD AUTO: 0.04 K/UL (ref 0–0.2)
BASOPHILS NFR BLD: 0.3 % (ref 0–1.9)
BILIRUB SERPL-MCNC: 0.3 MG/DL (ref 0.1–1)
BUN SERPL-MCNC: 21 MG/DL (ref 6–20)
CALCIUM SERPL-MCNC: 7.7 MG/DL (ref 8.7–10.5)
CHLORIDE SERPL-SCNC: 99 MMOL/L (ref 95–110)
CO2 SERPL-SCNC: 27 MMOL/L (ref 23–29)
CREAT SERPL-MCNC: 1.3 MG/DL (ref 0.5–1.4)
CRP SERPL-MCNC: 130.1 MG/L (ref 0–8.2)
DIFFERENTIAL METHOD: ABNORMAL
EOSINOPHIL # BLD AUTO: 0.2 K/UL (ref 0–0.5)
EOSINOPHIL NFR BLD: 1.7 % (ref 0–8)
ERYTHROCYTE [DISTWIDTH] IN BLOOD BY AUTOMATED COUNT: 15.8 % (ref 11.5–14.5)
EST. GFR  (AFRICAN AMERICAN): 60 ML/MIN/1.73 M^2
EST. GFR  (NON AFRICAN AMERICAN): 52 ML/MIN/1.73 M^2
GLUCOSE SERPL-MCNC: 103 MG/DL (ref 70–110)
HCT VFR BLD AUTO: 24.1 % (ref 37–48.5)
HGB BLD-MCNC: 7.6 G/DL (ref 12–16)
IMM GRANULOCYTES # BLD AUTO: 0.11 K/UL (ref 0–0.04)
IMM GRANULOCYTES NFR BLD AUTO: 0.8 % (ref 0–0.5)
LYMPHOCYTES # BLD AUTO: 3.1 K/UL (ref 1–4.8)
LYMPHOCYTES NFR BLD: 23.6 % (ref 18–48)
MCH RBC QN AUTO: 27.4 PG (ref 27–31)
MCHC RBC AUTO-ENTMCNC: 31.5 G/DL (ref 32–36)
MCV RBC AUTO: 87 FL (ref 82–98)
MONOCYTES # BLD AUTO: 1.4 K/UL (ref 0.3–1)
MONOCYTES NFR BLD: 10.8 % (ref 4–15)
NEUTROPHILS # BLD AUTO: 8.4 K/UL (ref 1.8–7.7)
NEUTROPHILS NFR BLD: 62.8 % (ref 38–73)
NRBC BLD-RTO: 0 /100 WBC
PLATELET # BLD AUTO: 402 K/UL (ref 150–450)
PMV BLD AUTO: 8.9 FL (ref 9.2–12.9)
POTASSIUM SERPL-SCNC: 4.3 MMOL/L (ref 3.5–5.1)
PROT SERPL-MCNC: 5.8 G/DL (ref 6–8.4)
RBC # BLD AUTO: 2.77 M/UL (ref 4–5.4)
SODIUM SERPL-SCNC: 135 MMOL/L (ref 136–145)
WBC # BLD AUTO: 13.29 K/UL (ref 3.9–12.7)

## 2022-05-26 PROCEDURE — 99233 PR SUBSEQUENT HOSPITAL CARE,LEVL III: ICD-10-PCS | Mod: ,,, | Performed by: INTERNAL MEDICINE

## 2022-05-26 PROCEDURE — 97110 THERAPEUTIC EXERCISES: CPT

## 2022-05-26 PROCEDURE — 63600175 PHARM REV CODE 636 W HCPCS: Performed by: NURSE PRACTITIONER

## 2022-05-26 PROCEDURE — 94640 AIRWAY INHALATION TREATMENT: CPT

## 2022-05-26 PROCEDURE — 86140 C-REACTIVE PROTEIN: CPT | Performed by: INTERNAL MEDICINE

## 2022-05-26 PROCEDURE — 94664 DEMO&/EVAL PT USE INHALER: CPT

## 2022-05-26 PROCEDURE — 99900035 HC TECH TIME PER 15 MIN (STAT)

## 2022-05-26 PROCEDURE — 25000003 PHARM REV CODE 250: Performed by: NURSE PRACTITIONER

## 2022-05-26 PROCEDURE — 63600175 PHARM REV CODE 636 W HCPCS: Performed by: INTERNAL MEDICINE

## 2022-05-26 PROCEDURE — 80053 COMPREHEN METABOLIC PANEL: CPT | Performed by: NURSE PRACTITIONER

## 2022-05-26 PROCEDURE — 21400001 HC TELEMETRY ROOM

## 2022-05-26 PROCEDURE — 27000221 HC OXYGEN, UP TO 24 HOURS

## 2022-05-26 PROCEDURE — 25000242 PHARM REV CODE 250 ALT 637 W/ HCPCS: Performed by: INTERNAL MEDICINE

## 2022-05-26 PROCEDURE — 99233 SBSQ HOSP IP/OBS HIGH 50: CPT | Mod: ,,, | Performed by: INTERNAL MEDICINE

## 2022-05-26 PROCEDURE — 85025 COMPLETE CBC W/AUTO DIFF WBC: CPT | Performed by: INTERNAL MEDICINE

## 2022-05-26 PROCEDURE — 94761 N-INVAS EAR/PLS OXIMETRY MLT: CPT

## 2022-05-26 PROCEDURE — 97530 THERAPEUTIC ACTIVITIES: CPT

## 2022-05-26 PROCEDURE — 97535 SELF CARE MNGMENT TRAINING: CPT

## 2022-05-26 RX ORDER — METRONIDAZOLE 500 MG/1
2 TABLET ORAL ONCE
Status: COMPLETED | OUTPATIENT
Start: 2022-05-26 | End: 2022-05-26

## 2022-05-26 RX ORDER — LORAZEPAM 1 MG/1
1 TABLET ORAL EVERY 6 HOURS PRN
Status: DISCONTINUED | OUTPATIENT
Start: 2022-05-26 | End: 2022-06-10 | Stop reason: HOSPADM

## 2022-05-26 RX ORDER — ONDANSETRON 4 MG/1
4 TABLET, ORALLY DISINTEGRATING ORAL ONCE
Status: COMPLETED | OUTPATIENT
Start: 2022-05-26 | End: 2022-05-26

## 2022-05-26 RX ADMIN — METRONIDAZOLE 2 G: 500 TABLET ORAL at 05:05

## 2022-05-26 RX ADMIN — ALPRAZOLAM 1 MG: 1 TABLET ORAL at 05:05

## 2022-05-26 RX ADMIN — MORPHINE SULFATE 15 MG: 15 TABLET, EXTENDED RELEASE ORAL at 09:05

## 2022-05-26 RX ADMIN — MORPHINE SULFATE 15 MG: 15 TABLET, EXTENDED RELEASE ORAL at 10:05

## 2022-05-26 RX ADMIN — OXYCODONE HYDROCHLORIDE 5 MG: 5 TABLET ORAL at 11:05

## 2022-05-26 RX ADMIN — ENOXAPARIN SODIUM 40 MG: 40 INJECTION SUBCUTANEOUS at 05:05

## 2022-05-26 RX ADMIN — LORAZEPAM 1 MG: 1 TABLET ORAL at 02:05

## 2022-05-26 RX ADMIN — LORAZEPAM 1 MG: 1 TABLET ORAL at 09:05

## 2022-05-26 RX ADMIN — OXYCODONE HYDROCHLORIDE 5 MG: 5 TABLET ORAL at 05:05

## 2022-05-26 RX ADMIN — ONDANSETRON 4 MG: 4 TABLET, ORALLY DISINTEGRATING ORAL at 05:05

## 2022-05-26 RX ADMIN — FAMOTIDINE 20 MG: 20 TABLET ORAL at 09:05

## 2022-05-26 RX ADMIN — FAMOTIDINE 20 MG: 20 TABLET ORAL at 10:05

## 2022-05-26 RX ADMIN — SODIUM CHLORIDE: 0.9 INJECTION, SOLUTION INTRAVENOUS at 10:05

## 2022-05-26 RX ADMIN — TRAZODONE HYDROCHLORIDE 100 MG: 100 TABLET ORAL at 09:05

## 2022-05-26 RX ADMIN — OXACILLIN SODIUM 12 G: 10 INJECTION, POWDER, FOR SOLUTION INTRAVENOUS at 03:05

## 2022-05-26 RX ADMIN — IPRATROPIUM BROMIDE AND ALBUTEROL SULFATE 3 ML: 2.5; .5 SOLUTION RESPIRATORY (INHALATION) at 07:05

## 2022-05-26 NOTE — ASSESSMENT & PLAN NOTE
PNA in IVDU  Cultures; Blood and sputum  BC +ve G+ve cocci  AbxL OXACILLIN + FLAGYL  5/22 stable, improved aeration   5/26 chest x-ray reviewed.  Continue IV oxacillin.  Right-sided pigtail in place.  Status post tPA and DNase x2.  Bloody drainage around pigtail today status post tPA and DNase yesterday.  Will hold on fibrinolysis.  Monitor chest tube output

## 2022-05-26 NOTE — PLAN OF CARE
Patient remains free of injury. Safety measures maintained. Chest tube in place. IV antibiotics continued. IV fluids continued. Pain managed with PO pain medication. Monitoring continued per nursing rounds. Vitals monitored throughout shift.

## 2022-05-26 NOTE — PLAN OF CARE
Problem: Adult Inpatient Plan of Care  Goal: Plan of Care Review  Outcome: Ongoing, Progressing  Goal: Patient-Specific Goal (Individualized)  Outcome: Ongoing, Progressing  Goal: Absence of Hospital-Acquired Illness or Injury  Outcome: Ongoing, Progressing  Goal: Optimal Comfort and Wellbeing  Outcome: Ongoing, Progressing  Goal: Readiness for Transition of Care  Outcome: Ongoing, Progressing     Problem: Impaired Wound Healing  Goal: Optimal Wound Healing  Outcome: Ongoing, Progressing     Problem: Skin Injury Risk Increased  Goal: Skin Health and Integrity  Outcome: Ongoing, Progressing     Problem: Adjustment to Illness (Sepsis/Septic Shock)  Goal: Optimal Coping  Outcome: Ongoing, Progressing     Problem: Bleeding (Sepsis/Septic Shock)  Goal: Absence of Bleeding  Outcome: Ongoing, Progressing     Problem: Glycemic Control Impaired (Sepsis/Septic Shock)  Goal: Blood Glucose Level Within Desired Range  Outcome: Ongoing, Progressing     Problem: Infection Progression (Sepsis/Septic Shock)  Goal: Absence of Infection Signs and Symptoms  Outcome: Ongoing, Progressing     Problem: Nutrition Impaired (Sepsis/Septic Shock)  Goal: Optimal Nutrition Intake  Outcome: Ongoing, Progressing     Problem: Fluid Imbalance (Pneumonia)  Goal: Fluid Balance  Outcome: Ongoing, Progressing     Problem: Infection (Pneumonia)  Goal: Resolution of Infection Signs and Symptoms  Outcome: Ongoing, Progressing     Problem: Respiratory Compromise (Pneumonia)  Goal: Effective Oxygenation and Ventilation  Outcome: Ongoing, Progressing     Problem: Fall Injury Risk  Goal: Absence of Fall and Fall-Related Injury  Outcome: Ongoing, Progressing     Problem: Infection  Goal: Absence of Infection Signs and Symptoms  Outcome: Ongoing, Progressing     Problem: Self-Care Deficit  Goal: Improved Ability to Complete Activities of Daily Living  Outcome: Ongoing, Progressing

## 2022-05-26 NOTE — PLAN OF CARE
Patient remains free of injury. Right chest wall chest tube in place. Anxiety managed with PO medication. Telemetry monitoring continued. Mother at bedside during day. Right PICC in place, IV antibiotics continued. Patient ambulated in ba with therapy today. Chart check complete per flowsheets.

## 2022-05-26 NOTE — ASSESSMENT & PLAN NOTE
Gram positive   Infectious disease consulted  Continue broad spectrum intravenous antibiotic(s) given h/o ivdu  Remains febrile and leukocytosis persists  5/8/22 The patient remained afebrile overnight. Currently on cefepime/vanc/flagyl. Blood cx are growing staph aureus. The patient refused the MRI lumbar spine overnight d/t being unable to lay flat from back pain. Will give pain meds and attempt to get the MRI today to r/o spinal abscess. The ECHO showed a 1.3 x 1.5 cm elongated, mobile echodensity seen on the tricuspid valve consistent with vegetation, CT surgery was consulted. Will need a SUSANNAH. Infectious disease is consulted. Will repeat blood cx today.   5/9/22 Repeat blood cx are still positive. Sensitivities are back and Staph is sensitive to oxacillin, will D/C vanc/cefepime. Infectious disease is following   5/10/22 Repeat blood cx are +. Continue IV oxacillin and flagyl. Infectious disease is consulted. IR consulted to evaluate possible paraspinous muscle myositis versus abscess. Recommend IR drainage. Continue current management.    5/11/22 The patient reports pain is still not well controlled at times. The patient reports that she is very anxious about the upcoming SUSANNAH and IR drainage. SUSANNAH was pushed back to this afternoon because the patient ate candy this AM. Continue treatment with IV oxacillin and flagyl. Infectious disease is following. WBC trended down to 31K. Continue current management.  5/12/22 The patients WBC improved to 22K overnight. The patient remains on continuous oxacillin infusion. Infectious disease is following.  5/14: WBC continues to improve, 18.10 today     5/15: Blood cultures 5/11/22: NGTD     Continuous Oxacillin, EOC: 7/11/22, pending acceptance at [facility], PICC line placed

## 2022-05-26 NOTE — SUBJECTIVE & OBJECTIVE
Interval History: see hospital course    Review of Systems   Constitutional:  Positive for activity change, appetite change and fatigue. Negative for chills, diaphoresis, fever and unexpected weight change.   HENT:  Negative for congestion, hearing loss, mouth sores, postnasal drip, rhinorrhea, sore throat and trouble swallowing.    Eyes:  Negative for discharge and visual disturbance.   Respiratory:  Negative for cough and chest tightness.    Cardiovascular:  Negative for chest pain, palpitations and leg swelling.   Gastrointestinal:  Negative for blood in stool, constipation, diarrhea, nausea and vomiting.   Endocrine: Negative for cold intolerance and heat intolerance.   Genitourinary:  Negative for difficulty urinating, dyspareunia, flank pain and hematuria.   Musculoskeletal:  Positive for back pain and myalgias. Negative for arthralgias.   Skin: Negative.    Neurological:  Positive for weakness. Negative for dizziness and light-headedness.   Hematological:  Negative for adenopathy. Does not bruise/bleed easily.   Psychiatric/Behavioral:  Negative for agitation, behavioral problems, confusion and sleep disturbance. The patient is nervous/anxious.    Objective:     Vital Signs (Most Recent):  Temp: 99.6 °F (37.6 °C) (05/26/22 1601)  Pulse: 83 (05/26/22 1601)  Resp: 18 (05/26/22 1601)  BP: (!) 111/58 (05/26/22 1601)  SpO2: 99 % (05/26/22 1601)   Vital Signs (24h Range):  Temp:  [98 °F (36.7 °C)-99.6 °F (37.6 °C)] 99.6 °F (37.6 °C)  Pulse:  [] 83  Resp:  [16-20] 18  SpO2:  [93 %-99 %] 99 %  BP: (104-131)/(51-72) 111/58     Weight: 97.1 kg (214 lb 1.1 oz)  Body mass index is 35.62 kg/m².    Intake/Output Summary (Last 24 hours) at 5/26/2022 1707  Last data filed at 5/26/2022 1200  Gross per 24 hour   Intake 3539.13 ml   Output 1375 ml   Net 2164.13 ml        Physical Exam  Vitals and nursing note reviewed.   Constitutional:       General: She is not in acute distress.     Appearance: She is well-developed.    HENT:      Head: Normocephalic and atraumatic.      Right Ear: Hearing and external ear normal.      Left Ear: Hearing and external ear normal.      Nose: No rhinorrhea.      Right Sinus: No maxillary sinus tenderness or frontal sinus tenderness.      Left Sinus: No maxillary sinus tenderness or frontal sinus tenderness.      Mouth/Throat:      Mouth: No oral lesions.      Pharynx: Uvula midline.   Eyes:      General:         Right eye: No discharge.         Left eye: No discharge.      Conjunctiva/sclera: Conjunctivae normal.      Pupils: Pupils are equal, round, and reactive to light.   Neck:      Thyroid: No thyromegaly.      Vascular: No carotid bruit.      Trachea: No tracheal deviation.   Cardiovascular:      Rate and Rhythm: Normal rate and regular rhythm.      Pulses:           Dorsalis pedis pulses are 2+ on the right side and 2+ on the left side.      Heart sounds: Normal heart sounds, S1 normal and S2 normal. No murmur heard.  Pulmonary:      Effort: Pulmonary effort is normal. No respiratory distress.      Breath sounds: Examination of the right-lower field reveals decreased breath sounds. Examination of the left-lower field reveals decreased breath sounds. Decreased breath sounds present.      Comments: Right pigtail small bore chest tube in place with moderate serous drainage with pus to to drainage system   Chest tube site C/D/I  Chest:   Breasts:      Right: No supraclavicular adenopathy.      Left: No supraclavicular adenopathy.     Abdominal:      General: Bowel sounds are decreased. There is no distension.      Palpations: Abdomen is soft. There is no mass.      Tenderness: There is no abdominal tenderness.   Musculoskeletal:      Cervical back: Normal range of motion.      Thoracic back: Tenderness present. Decreased range of motion.      Lumbar back: Tenderness present. Decreased range of motion.      Right lower leg: Edema present.      Left lower leg: Edema present.   Lymphadenopathy:       Cervical: No cervical adenopathy.      Upper Body:      Right upper body: No supraclavicular adenopathy.      Left upper body: No supraclavicular adenopathy.   Skin:     General: Skin is warm and dry.      Capillary Refill: Capillary refill takes less than 2 seconds.      Findings: No rash.   Neurological:      Mental Status: She is alert and oriented to person, place, and time.      Sensory: No sensory deficit.      Coordination: Coordination normal.      Gait: Gait normal.   Psychiatric:         Attention and Perception: She is inattentive.         Mood and Affect: Mood is anxious. Mood is not depressed.         Speech: Speech normal.         Behavior: Behavior normal.         Thought Content: Thought content normal.         Judgment: Judgment normal.       Significant Labs: All pertinent labs within the past 24 hours have been reviewed.  CBC:   Recent Labs   Lab 05/25/22  0644 05/25/22  1313 05/26/22  0513   WBC 11.84  --  13.29*   HGB 7.6* 8.5* 7.6*   HCT 24.7* 27.5* 24.1*     --  402       CMP:   Recent Labs   Lab 05/25/22  0644 05/26/22  0513   * 135*   K 4.5 4.3   CL 97 99   CO2 29 27   GLU 85 103   BUN 22* 21*   CREATININE 1.0 1.3   CALCIUM 8.0* 7.7*   PROT 5.7* 5.8*   ALBUMIN 1.5* 1.5*   BILITOT 0.3 0.3   ALKPHOS 97 91   AST 10 12   ALT 12 12   ANIONGAP 9 9   EGFRNONAA >60 52*         Significant Imaging: I have reviewed all pertinent imaging results/findings within the past 24 hours.

## 2022-05-26 NOTE — ASSESSMENT & PLAN NOTE
Medical Mx  CTS note noted  5/23 IV oxacillin.  5/24 IV oxacillin as per Infectious Disease  5/25 repeat cultures none relevant.  IV oxacillin as per Infectious Disease x4 weeks.  T-max 101.7° last  24 hours  5/26 repeat blood concern negative continue oxacillin gtt     Infant (Birth)

## 2022-05-26 NOTE — ASSESSMENT & PLAN NOTE
Oxygen  Keep SP2> 92%  Bronchodilators  Follow up in Pulmonary for PFT to clarify Dx  5/23 O2 target sat 92-94%.  Nebs p.r.n.  5/26 O2 target sat at home not her% nebs p.r.n..  Smoking cessation

## 2022-05-26 NOTE — ASSESSMENT & PLAN NOTE
5/20 Suspect empyema, needs chest tube placement  5/21 s/p chest tube placement, adequate expansion - will check Chest X Ray in [facility]  5/23 tPA DNase.  5/24 tPA for DNase installation via chest tube.  Monitor chest tube output.  Repeat chest x-ray in a.m..  5/25 chest tube output increased from 50 mL on average per 24 hours to 160 mL last 24 hours.  Purulent drainage noted.  Will repeat tPA plus Dnase instillation.  5/26 chest x-ray reviewed.  Continue IV oxacillin.  Right-sided pigtail in place.  Status post tPA and DNase x2.  Bloody drainage around pigtail today status post tPA and DNase yesterday.  Will hold on fibrinolysis.  Monitor chest tube output

## 2022-05-26 NOTE — ASSESSMENT & PLAN NOTE
Complicated by PNA, possible empyema  SP Thora  Cont Abx  5/20/2022 Probable empyema,needs chest tube drainage  5/22 chest tube draining  5/23 60 cc drained yesterday.  Possible tPA and DNase tomorrow.  Continue IV oxacillin.  5/24 monitor chest tube output post tPA and DNase instillation today PA chest tube  5/26 chest x-ray reviewed.  Continue IV oxacillin.  Right-sided pigtail in place.  Status post tPA and DNase x2.  Bloody drainage around pigtail today status post tPA and DNase yesterday.  Will hold on fibrinolysis.  Monitor chest tube output

## 2022-05-26 NOTE — PLAN OF CARE
Patient currently has SNF denials from [facility], [facility], [place], [facility], [facility], [facility], [facility], [facility], [facility], [facility], [facility], and [facility].    Cm sent additional SNF referrals to [facility], [facility], [facility], [facility], and [facility]. Pending accepting SNF.

## 2022-05-26 NOTE — PROGRESS NOTES
[facility] Surg  Pulmonology  Progress Note    Patient Name: Tianna Leon  MRN: [id number]  Admission Date: 5/6/2022  Hospital Length of Stay: 20 days  Code Status: Full Code  Attending Provider: Elizabeth Kim MD  Primary Care Provider: Spenser Campa MD   Principal Problem: Acute bacterial endocarditis    Subjective:     Interval History:   5/26  Chest tube output last 24 hours 200 mL. Afebrile last 24 hours.  Received tPA and DNase installation via chest tube for 2 consecutive days.  Complains of generalized pain decreased appetite and decreased activity fatigue.  Bleeding noted around chest tube resolved with compression and dressing change.  Review of Systems   Constitutional:  Positive for malaise/fatigue and weight loss. Negative for chills and fever.   HENT:  Negative for hearing loss and nosebleeds.    Eyes:  Negative for discharge.   Respiratory:  Positive for cough, sputum production and shortness of breath.    Cardiovascular:  Positive for leg swelling. Negative for chest pain.   Gastrointestinal:  Negative for abdominal pain.   Genitourinary:  Negative for hematuria.   Musculoskeletal:  Negative for falls.   Skin:  Negative for itching.   Neurological:  Positive for weakness. Negative for speech change, seizures and loss of consciousness.   Endo/Heme/Allergies:  Negative for polydipsia. Does not bruise/bleed easily.   Psychiatric/Behavioral:  Negative for hallucinations.        Objective:     Vital Signs (Most Recent):  Temp: 99.6 °F (37.6 °C) (05/26/22 1601)  Pulse: 83 (05/26/22 1601)  Resp: 18 (05/26/22 1601)  BP: (!) 111/58 (05/26/22 1601)  SpO2: 99 % (05/26/22 1601)   Vital Signs (24h Range):  Temp:  [98 °F (36.7 °C)-99.6 °F (37.6 °C)] 99.6 °F (37.6 °C)  Pulse:  [] 83  Resp:  [16-20] 18  SpO2:  [93 %-99 %] 99 %  BP: (104-131)/(51-72) 111/58     Weight: 97.1 kg (214 lb 1.1 oz)  Body mass index is 35.62 kg/m².      Intake/Output Summary (Last 24 hours) at 5/26/2022 1741  Last data filed at  5/26/2022 1200  Gross per 24 hour   Intake 3539.13 ml   Output 1375 ml   Net 2164.13 ml         Physical Exam  Vitals and nursing note reviewed.   Constitutional:       General: She is not in acute distress.     Appearance: She is well-developed. She is ill-appearing.   HENT:      Head: Normocephalic and atraumatic.      Nose: Nose normal.   Eyes:      Extraocular Movements: Extraocular movements intact.   Cardiovascular:      Rate and Rhythm: Normal rate and regular rhythm.   Pulmonary:      Effort: Pulmonary effort is normal.      Breath sounds: No stridor. Rhonchi present.      Comments: Right-side pigtail in place  Abdominal:      General: There is no distension.   Musculoskeletal:         General: No signs of injury.      Cervical back: Normal range of motion and neck supple.   Skin:     General: Skin is dry.   Neurological:      General: No focal deficit present.      Mental Status: She is alert and oriented to person, place, and time. Mental status is at baseline.   Psychiatric:         Behavior: Behavior normal.       Vents:  Oxygen Concentration (%): 28 (05/26/22 0724)    Lines/Drains/Airways       Peripherally Inserted Central Catheter Line  Duration             PICC Double Lumen 05/17/22 1612 right basilic 9 days              Drain  Duration                  Chest Tube 05/21/22 0957 1 Right Pleural 8.5 Fr. 5 days    Female External Urinary Catheter 05/21/22 0700 5 days                    Significant Labs:    CBC/Anemia Profile:  Recent Labs   Lab 05/25/22  0644 05/25/22  1313 05/26/22  0513   WBC 11.84  --  13.29*   HGB 7.6* 8.5* 7.6*   HCT 24.7* 27.5* 24.1*     --  402   MCV 90  --  87   RDW 15.9*  --  15.8*          Chemistries:  Recent Labs   Lab 05/25/22  0644 05/26/22  0513   * 135*   K 4.5 4.3   CL 97 99   CO2 29 27   BUN 22* 21*   CREATININE 1.0 1.3   CALCIUM 8.0* 7.7*   ALBUMIN 1.5* 1.5*   PROT 5.7* 5.8*   BILITOT 0.3 0.3   ALKPHOS 97 91   ALT 12 12   AST 10 12        Latest Reference  Range & Units 05/07/22 09:05 05/21/22 10:22   Fluid Appearance  Hazy Cloudy   WBC, Body Fluid /cu mm 19735 [1] 017411 (C) [2]   Body Fluid Type  Pleural Fluid, Right Pleural Fluid, Right   Segs, Fluid % 93 88   Lymphs, Fluid % 3 7   Monocytes/Macrophages, Fluid % 4 5   Amylase, Fluid Not established U/L 34 [3]    Glucose, Fluid Not established mg/dL 29 [4]    LD, Fluid Not established U/L 1077 [5]    Body Fluid, Albumin See text g/dL 1.3 [6]    Body Fluid Source Amylase  Pleural Fluid, Right    Body Fluid Source, Albumin  Pleural Fluid, Right    Body Fluid Source, Glucose  Pleural Fluid, Right    Body Fluid Source, LDH  Pleural Fluid, Right    Body Fluid Source, Total Protein  Pleural Fluid, Right    Body Fluid, Protein Not established g/dL 3.7 [7]    Cholesterol, Body Fluid See Comment mg/dL 79 [8]          Significant Imaging:       Chest x-ray 05/26/2022     CLINICAL HISTORY:  chest tube pain;     TECHNIQUE:  Single frontal view of the chest was performed.     COMPARISON:  Chest radiograph 05/25/2022 with priors cardiac     FINDINGS:  Cardiac leads project over the chest.  Stable position of a right PICC.  Interval repositioning of a right pigtail catheter at the lung base.  Similar appearance of patchy ground-glass opacities throughout both lungs.  New small left pleural effusion.  No pneumothorax.  The cardiomediastinal silhouette is unchanged.     Impression:     Slight interval repositioning of a chest tube at the right lung base.  No pneumothorax.     New small left pleural effusion.     Similar appearance of patchy ground-glass opacities throughout both lungs.         CT chest without contrast 05/20/2022    Narrative & Impression  EXAMINATION:  CT CHEST WITHOUT CONTRAST     CLINICAL HISTORY:  Empyema;     TECHNIQUE:  Low dose axial images, sagittal and coronal reformations were obtained from the thoracic inlet to the lung bases. Contrast was not administered.  All CT scans at this facility are performed  using dose optimization techniques including the following: automated exposure control; adjustment of the mA and/or kV; use of iterative reconstruction technique.     COMPARISON:  CTA chest non coronary 05/06/2022, chest radiograph 05/20/2022, MRI thoracic spine 05/19/2022     FINDINGS:  Base of Neck: There is diffuse body wall edema.  Unremarkable, noting the presence of AA PICC line which terminates at the superior cavoatrial junction.     Thoracic soft tissues: There is diffuse body wall edema.     Aorta: Left-sided aortic arch.  No aneurysm and no significant atherosclerosis     Heart: Normal size. No effusion.  No significant coronary atherosclerosis.     Pulmonary vasculature: Pulmonary arteries distribute normally.     Esha/Mediastinum: There are few prominent mediastinal lymph nodes with a prevascular node measuring 0.8 cm in short axis.     Airways: Patent.     Lungs/Pleura: There are multiple bilateral consolidative densities scattered throughout the lungs, many of which demonstrate cavitation, again suggesting septic emboli.  There is small left pleural fluid, which appears simple and layers dependently.  There is a loculated moderate right pleural effusion, which appears similar in size to the comparison exam however now containing air, which may be due to empyema.  There is adjacent atelectatic change within the bilateral lower lobes.  Overall number and size of consolidative densities has increased since the comparison exam.     Esophagus: Normal.     Upper Abdomen: There is persistent wedge-shaped hypodensity within the spleen, suggesting infarct.     Bones: No acute fracture. No suspicious lytic or sclerotic lesions.     Impression:     Interval progression with increased number and size of consolidative cavitary densities throughout the lungs, most suggestive of septic emboli with larger opacities concerning for necrotizing pneumonia.     Stable size of loculated right moderate pleural effusion, now  containing air compatible with hydropneumothorax as reported on prior MRI.  Interval development of air is concerning for empyema noting limited evaluation without intravenous contrast.  New small simple appearing left pleural effusion.     Wedge-shaped splenic hypodensity, suggesting infarction.     Anasarca.           ABG  No results for input(s): PH, PO2, PCO2, HCO3, BE in the last 168 hours.  Assessment/Plan:     * Acute bacterial endocarditis  Medical Mx  CTS note noted  5/23 IV oxacillin.  5/24 IV oxacillin as per Infectious Disease  5/25 repeat cultures none relevant.  IV oxacillin as per Infectious Disease x4 weeks.  T-max 101.7° last  24 hours  5/26 repeat blood concern negative continue oxacillin gtt     Empyema of right pleural space  5/20 Suspect empyema, needs chest tube placement  5/21 s/p chest tube placement, adequate expansion - will check Chest X Ray in aam  5/23 tPA DNase.  5/24 tPA for DNase installation via chest tube.  Monitor chest tube output.  Repeat chest x-ray in a.m..  5/25 chest tube output increased from 50 mL on average per 24 hours to 160 mL last 24 hours.  Purulent drainage noted.  Will repeat tPA plus Dnase instillation.  5/26 chest x-ray reviewed.  Continue IV oxacillin.  Right-sided pigtail in place.  Status post tPA and DNase x2.  Bloody drainage around pigtail today status post tPA and DNase yesterday.  Will hold on fibrinolysis.  Monitor chest tube output    Parapneumonic effusion  Complicated by PNA, possible empyema  SP Thora  Cont Abx  5/20/2022 Probable empyema,needs chest tube drainage  5/22 chest tube draining  5/23 60 cc drained yesterday.  Possible tPA and DNase tomorrow.  Continue IV oxacillin.  5/24 monitor chest tube output post tPA and DNase instillation today PA chest tube  5/26 chest x-ray reviewed.  Continue IV oxacillin.  Right-sided pigtail in place.  Status post tPA and DNase x2.  Bloody drainage around pigtail today status post tPA and DNase yesterday.  Will  hold on fibrinolysis.  Monitor chest tube output    PNA (pneumonia)  PNA in IVDU  Cultures; Blood and sputum  BC +ve G+ve cocci  AbxL OXACILLIN + FLAGYL  5/22 stable, improved aeration   5/26 chest x-ray reviewed.  Continue IV oxacillin.  Right-sided pigtail in place.  Status post tPA and DNase x2.  Bloody drainage around pigtail today status post tPA and DNase yesterday.  Will hold on fibrinolysis.  Monitor chest tube output         COPD (chronic obstructive pulmonary disease)  Oxygen  Keep SP2> 92%  Bronchodilators  Follow up in Pulmonary for PFT to clarify Dx  5/23 O2 target sat 92-94%.  Nebs p.r.n.  5/26 O2 target sat at home not her% nebs p.r.n..  Smoking cessation          Piyush San MD  Pulmonology  [facility]

## 2022-05-26 NOTE — PROGRESS NOTES
[facility]  [facility] Note    Patient Name: Tianna Leon  MRN: [id number]  Patient Class: IP- Inpatient   Admission Date: 5/6/2022  Length of Stay: 20 days  Attending Physician: Elizabeth Kim MD  Primary Care Provider: Spenser Campa MD        Subjective:     Principal Problem:Acute bacterial endocarditis        HPI:  36 y/o. female  with a PMHx of asthma, COPD,Bipolar  , IVDU and HLD presented to the ER with a c/o  sob for the last weak which has gotten worse . The SOB is associated with fever , chills , productive cough , back pain  and generalized weakness . She report cough some blood this am .  She is active IVDU  ( heroine , cocaine  and amphetamine ) . She denies any  sick contact , chest pain  , GI/ sx , She also complaning of LE sweeling . Knee pain and B/L LE rash .   ER COURSE: CTA chest negative for pe . Multiple nodular and cavitary opacities concerning for septic emboli with larger opacities possibly necrotizing pneumonia. CT cervical  and thoracic did not show any acute finding , CT lumbar Possible progression of degenerative changes most notable at L4-5 with moderate to severe spinal canal stenosis at this level.  WBC  29 k , na 130 , k 3.4 , cl 89 , procal 2.71   ER VS:  BP Pulse Resp Temp SpO2   (!) 124/58 110 (!) 30 (!) 101.6 °F (38.7 °C) 96 %           Pt will be admitted to inpt with a dx of PNA and severe sepsis       Overview/Hospital Course:  5/7 admitted for pneumonia, severe sepsis in setting of ivdu. Mother at bedside and provides collateral. Patient has struggled with substance abuse for approximately 20 years, worsening over last 3-5 years since life event. Onset of symptoms 1-2 weeks ago. Tolerated thoracentesis with no pneumothorax on chest x-ray. Mri lumbar and echo, pending. Bcx positive, growing gram positive cocci. On vanc, cefepime and flagyl. Infectious disease consulted for bacteremia. 5/8/22 The patient remained afebrile overnight. Currently on  cefepime/vanc/flagyl. Blood cx 5/6/22 are growing MSSA. The patient refused the MRI lumbar spine overnight d/t being unable to lay flat from back pain. Will give pain meds and attempt to get the MRI today to r/o spinal abscess. The ECHO showed a 1.3 x 1.5 cm elongated, mobile echodensity seen on the tricuspid valve consistent with vegetation, CT surgery was consulted. Will need a SUSANNAH. Infectious disease is consulted. Will repeat blood cx today. 5/9/22 No acute events overnight. The patient reports lower back pain is not well controlled with current pain regimen, will adjust. Repeat blood cx are still positive. Sensitivities are back and Staph is sensitive to oxacillin, will D/C vanc/cefepime. Cardiology consulted and plans for a SUSANNAH today. CT surgery following and recommends continuing medical management with appropriate ABX, no surgical intervention at this time. MRI lumbar spine showed moderate to severe spinal canal stenosis with moderate left-sided neural foraminal stenosis, Neurosurgery consulted. 5/10/22 No acute events overnight. The patient reports some improvement in pain with adjustment in pain meds. Repeat blood cx are +. Continue IV oxacillin and flagyl. Infectious disease is consulted. IR consulted to evaluate possible paraspinous muscle myositis versus abscess. Recommend IR drainage. Continue current management. 5/11/22 No acute events overnight. The patient reports pain is still not well controlled at times. The patient reports that she is very anxious about the upcoming SUSANNAH and IR drainage. SUSANNAH was pushed back to this afternoon because the patient ate candy this AM. Continue treatment with IV oxacillin and flagyl. Infectious disease is following. WBC trended down to 31K. Continue current management. 5/12/22 No acute events overnight. The patients WBC improved to 22K overnight. The patient remains on continuous oxacillin infusion. Infectious disease is following. MRI thoracic spine is ordered and  pending. The patients SUSANNAH was postponed again today d/t low H/H. Hgb was noted to be 6.2 this morning, will transfuse 1 unit PRBC. The patient reports pain is still not well controlled. The case was discussed with Neurosurgery who recommended adding extended release pain meds. Approximately 1 ml of purulent drainage aspirated from back yesterday per IR, growing gram + cocci.     5/13: Patient was given 1U PRBC on 5/12, improved from 6.2 --> 7.0, will give an additional unit PRBC. Radiology attempted to bring patient on 5/12 at 1755 for Thoracic MRI, patient refused due to pain and anxiety. Repeat attempt this AM, patient again refused. Will order Ativan 1mg IV to be given with pain medication prior to scan to relieve anxiety and pain, nurse to notify NP when ready to go for scan. Discussed pain management with patient, discussed transition to PO pain medications to being preparing for d/c to LTAC, adjusted PO pain regimen will reevaluate to determine effectiveness. Repeat Blood cultures 5/11/22: NGTD.     5/14: Final anaerobic cultures pending, continue to adjust pain regimen, d/c IV Dilaudid today. Patient currently managed with PO Morphine extended release and PO oxycodone. D/C plan is for LTAC once final antibiotic regimen determined. Patient continues to refuse MRI of thoracic spine.     5/15: Called to room this AM, patient had coughed up blood and mucous. Approx. 1-2 teaspoons of blood in emesis bag, ordered CXR and repeat CBC, CXR showed improvement from previous day, repeat CBC at time of incident, showed a slight decrease in Hgb: 9.7 --> 8.7, when repeated at 1500, Hgb had improved to 10. no further episodes of coughing up blood throughout day. Patient continues to c/o not having enough pain medication but continues to sleep most of the day and will fall asleep when speaking at times. Added IV Toradol to plan.     5/16: No further episodes of coughing blood overnight, patient participated with encouragement  with PT/OT today, recommendations are HH vs. SNF pending progress. Awaiting final antibiotic regimen recommendations. Anaerobic culture results finalized: negative for growth. Afebrile, WBC trending downward.     5/17: Antibiotic regimen per ID is continuous oxacillin until 7/11/22, SW notified of plan, will seek acceptance at LTAC or SNF. Patient choice is [facility]. WBC in normal range. AMS this AM, CT of head: no acute findings. Given narcan and mental status improved. Decreased narcotic dose.     5/18: Per discussion with patient, she is willing to have the MRI of thoracic spine today, will order IV pain medication and anxiety medication prior to scan. Nursing and Radiology aware of needing to premedicate for scan. Patient is more cooperative and participating with care.     5/19: Patient given premedications for MRI, MRI completed, results pending. Patient more agreeable with treatment plan and cooperating. Will need psychiatry follow-up after discharge. Hgb: 6.7, will give 1U PRBC    5/20/22: +LEON and generalized pain. Thoracic MRI showed no evidence for disc osteomyelitis in the thoracic spine and no epidural abscess, Loculated right hydropneumothorax and extensive bilateral pulmonary infiltrates. Ct chest showed septic emboli and moderate right pleural effusion.+splenic infarct  Pulmonology felt likely empyema- consulted IR for chest tube.     5/21/22: s/p right pig tail placement to suction/ drainage system per IR. 60ml pus removed initially. Fluid analysis consistent with empyema. Pleural fluid cultures pending. Now with scant serosanguinous output.+ right lateral chest pain at chest tube site. Denies any other complaints. Status update provided to pt's mother.     5/22/22: Repeatedly requesting IV Dilaudid. Toradol ordered. Afebrile, WBC normal, O2sats stable on 3lites. s/p right pig tail placement to suction/ drainage system per IR on 5/21/22- draining serous draiange with pus. Plan for LTAC placement  "    5/23/22: More cofortable today. Not getting OOB. Encouraged OOB and rationale provided. Pulmonology recommended Possible tPA and DNase in chest tube tomorrow    5/24/22: Examined OOB in chair. Encouraged pt to continue OOB. Plan for tPA and DNase in chest tube today per Pulmonology  Pt denied LTAC- will pursue SNF    5/25/22: Temp 101.7F last night. Will recheck blood cultures and UA. BP low - IVF bolus given and restarted IVF. ID re-consulted. 160ml of purulent CT drainage output. Dr. San plans to repeat tPA and DNase in chest tube again today.     5/26/22: temp 99.2. c/o of right "lung pain" when eating. Will consult speech to check swallow. +mild leukocytosis. Repeat BC 5/25/22 show NGTD. Repeat UA showed rare trichomas. Chest tube drainage remains purulent       Interval History: see hospital course    Review of Systems   Constitutional:  Positive for activity change, appetite change and fatigue. Negative for chills, diaphoresis, fever and unexpected weight change.   HENT:  Negative for congestion, hearing loss, mouth sores, postnasal drip, rhinorrhea, sore throat and trouble swallowing.    Eyes:  Negative for discharge and visual disturbance.   Respiratory:  Negative for cough and chest tightness.    Cardiovascular:  Negative for chest pain, palpitations and leg swelling.   Gastrointestinal:  Negative for blood in stool, constipation, diarrhea, nausea and vomiting.   Endocrine: Negative for cold intolerance and heat intolerance.   Genitourinary:  Negative for difficulty urinating, dyspareunia, flank pain and hematuria.   Musculoskeletal:  Positive for back pain and myalgias. Negative for arthralgias.   Skin: Negative.    Neurological:  Positive for weakness. Negative for dizziness and light-headedness.   Hematological:  Negative for adenopathy. Does not bruise/bleed easily.   Psychiatric/Behavioral:  Negative for agitation, behavioral problems, confusion and sleep disturbance. The patient is " nervous/anxious.    Objective:     Vital Signs (Most Recent):  Temp: 99.6 °F (37.6 °C) (05/26/22 1601)  Pulse: 83 (05/26/22 1601)  Resp: 18 (05/26/22 1601)  BP: (!) 111/58 (05/26/22 1601)  SpO2: 99 % (05/26/22 1601)   Vital Signs (24h Range):  Temp:  [98 °F (36.7 °C)-99.6 °F (37.6 °C)] 99.6 °F (37.6 °C)  Pulse:  [] 83  Resp:  [16-20] 18  SpO2:  [93 %-99 %] 99 %  BP: (104-131)/(51-72) 111/58     Weight: 97.1 kg (214 lb 1.1 oz)  Body mass index is 35.62 kg/m².    Intake/Output Summary (Last 24 hours) at 5/26/2022 1707  Last data filed at 5/26/2022 1200  Gross per 24 hour   Intake 3539.13 ml   Output 1375 ml   Net 2164.13 ml        Physical Exam  Vitals and nursing note reviewed.   Constitutional:       General: She is not in acute distress.     Appearance: She is well-developed.   HENT:      Head: Normocephalic and atraumatic.      Right Ear: Hearing and external ear normal.      Left Ear: Hearing and external ear normal.      Nose: No rhinorrhea.      Right Sinus: No maxillary sinus tenderness or frontal sinus tenderness.      Left Sinus: No maxillary sinus tenderness or frontal sinus tenderness.      Mouth/Throat:      Mouth: No oral lesions.      Pharynx: Uvula midline.   Eyes:      General:         Right eye: No discharge.         Left eye: No discharge.      Conjunctiva/sclera: Conjunctivae normal.      Pupils: Pupils are equal, round, and reactive to light.   Neck:      Thyroid: No thyromegaly.      Vascular: No carotid bruit.      Trachea: No tracheal deviation.   Cardiovascular:      Rate and Rhythm: Normal rate and regular rhythm.      Pulses:           Dorsalis pedis pulses are 2+ on the right side and 2+ on the left side.      Heart sounds: Normal heart sounds, S1 normal and S2 normal. No murmur heard.  Pulmonary:      Effort: Pulmonary effort is normal. No respiratory distress.      Breath sounds: Examination of the right-lower field reveals decreased breath sounds. Examination of the left-lower  field reveals decreased breath sounds. Decreased breath sounds present.      Comments: Right pigtail small bore chest tube in place with moderate serous drainage with pus to to drainage system   Chest tube site C/D/I  Chest:   Breasts:      Right: No supraclavicular adenopathy.      Left: No supraclavicular adenopathy.     Abdominal:      General: Bowel sounds are decreased. There is no distension.      Palpations: Abdomen is soft. There is no mass.      Tenderness: There is no abdominal tenderness.   Musculoskeletal:      Cervical back: Normal range of motion.      Thoracic back: Tenderness present. Decreased range of motion.      Lumbar back: Tenderness present. Decreased range of motion.      Right lower leg: Edema present.      Left lower leg: Edema present.   Lymphadenopathy:      Cervical: No cervical adenopathy.      Upper Body:      Right upper body: No supraclavicular adenopathy.      Left upper body: No supraclavicular adenopathy.   Skin:     General: Skin is warm and dry.      Capillary Refill: Capillary refill takes less than 2 seconds.      Findings: No rash.   Neurological:      Mental Status: She is alert and oriented to person, place, and time.      Sensory: No sensory deficit.      Coordination: Coordination normal.      Gait: Gait normal.   Psychiatric:         Attention and Perception: She is inattentive.         Mood and Affect: Mood is anxious. Mood is not depressed.         Speech: Speech normal.         Behavior: Behavior normal.         Thought Content: Thought content normal.         Judgment: Judgment normal.       Significant Labs: All pertinent labs within the past 24 hours have been reviewed.  CBC:   Recent Labs   Lab 05/25/22  0644 05/25/22  1313 05/26/22  0513   WBC 11.84  --  13.29*   HGB 7.6* 8.5* 7.6*   HCT 24.7* 27.5* 24.1*     --  402       CMP:   Recent Labs   Lab 05/25/22  0644 05/26/22  0513   * 135*   K 4.5 4.3   CL 97 99   CO2 29 27   GLU 85 103   BUN 22* 21*    CREATININE 1.0 1.3   CALCIUM 8.0* 7.7*   PROT 5.7* 5.8*   ALBUMIN 1.5* 1.5*   BILITOT 0.3 0.3   ALKPHOS 97 91   AST 10 12   ALT 12 12   ANIONGAP 9 9   EGFRNONAA >60 52*         Significant Imaging: I have reviewed all pertinent imaging results/findings within the past 24 hours.      Assessment/Plan:      * Acute bacterial endocarditis  ECHO showed a 1.3 x 1.5 cm elongated, mobile echodensity seen on the tricuspid valve consistent with vegetation, CT surgery was consulted.   Due to MSSA    -- Infectious disease is consulted.  -- repeat blood cx NGTD.  -- Continue IV ABX.   --Sensitivities are back and Staph is sensitive to oxacillin, D/C vanc/cefepime.   --Cardiology consulted and plans for a SUSANNAH.   --CT surgery following and recommends continuing medical management with appropriate ABX, no surgical intervention at this time.    cont IV oxacillin.      MSSA bacteremia  Gram positive   Infectious disease consulted  Continue broad spectrum intravenous antibiotic(s) given h/o ivdu  Remains febrile and leukocytosis persists  5/8/22 The patient remained afebrile overnight. Currently on cefepime/vanc/flagyl. Blood cx are growing staph aureus. The patient refused the MRI lumbar spine overnight d/t being unable to lay flat from back pain. Will give pain meds and attempt to get the MRI today to r/o spinal abscess. The ECHO showed a 1.3 x 1.5 cm elongated, mobile echodensity seen on the tricuspid valve consistent with vegetation, CT surgery was consulted. Will need a SUSANNAH. Infectious disease is consulted. Will repeat blood cx today.   5/9/22 Repeat blood cx are still positive. Sensitivities are back and Staph is sensitive to oxacillin, will D/C vanc/cefepime. Infectious disease is following   5/10/22 Repeat blood cx are +. Continue IV oxacillin and flagyl. Infectious disease is consulted. IR consulted to evaluate possible paraspinous muscle myositis versus abscess. Recommend IR drainage. Continue current management.     5/11/22 The patient reports pain is still not well controlled at times. The patient reports that she is very anxious about the upcoming SUSANNAH and IR drainage. SUSANNAH was pushed back to this afternoon because the patient ate candy this AM. Continue treatment with IV oxacillin and flagyl. Infectious disease is following. WBC trended down to 31K. Continue current management.  5/12/22 The patients WBC improved to 22K overnight. The patient remains on continuous oxacillin infusion. Infectious disease is following.  5/14: WBC continues to improve, 18.10 today     5/15: Blood cultures 5/11/22: NGTD     Continuous Oxacillin, EOC: 7/11/22, pending acceptance at AM, PICC line placed    Empyema of right pleural space  Pulmonology consulted and felt likely empyema   IR consulted for chest tube      s/p right pigtail small bore chest tube placement to suction/drainage system per IR on 5/21/22. 60ml pus removed. Now with Serous drainage with pus. Fluid analysis consistent with empyema. Pleural fluid aerobic culture shows NGTD, Anaerobic culture pending    5/24/22: Dr. San plans for tPA and DNase in chest tube today.   5/25/22: 160ml of purulent CT drainage output. Dr. San plans to repeat tPA and DNase in chest tube again today.     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 IR consulted to evaluate possible paraspinous muscle myositis versus abscess.    --Approximately 1 ml of purulent drainage aspirated 5/11 per IR, growing MSSA  --Continue current regimen      Normocytic anemia  Secondary to acute illness    --Daily CBC  --Transfuse with 1 unit PRBC for Hgb <7.0 or <8.0 if symptomatic   --Hgb: 6.7, give 1U PRBC    Lumbar stenosis without neurogenic claudication        Chronic pulmonary  heart disease  - Pulmonology following       IVDU (intravenous drug user)   Will ordet TTE to r/o IE  Will order MRI lumbar wwo- completed   Cont broad spectrum IVAB   --MRI thoracic spine ordered per ID, patient refused on multiple attempts    5/7   Studies pending for additional sites of infection given h/o ivdu  5/8/22  on cessation     Severe sepsis  This patient does have evidence of infective focus  My overall impression is sepsis. Vital signs were reviewed and noted in progress note.  Antibiotics given-   Antibiotics (From admission, onward)            Start     Stop Route Frequency Ordered    05/09/22 1100  oxacillin 12 g in  mL CONTINUOUS INFUSION         -- IV Every 24 hours (non-standard times) 05/09/22 0932    05/07/22 2100  mupirocin 2 % ointment         05/12 2059 Nasl 2 times daily 05/07/22 1646        Cultures were taken-   Microbiology Results (last 7 days)     Procedure Component Value Units Date/Time    Culture, Respiratory with Gram Stain [[id number]] Collected: 05/16/22 0758    Order Status: Sent Specimen: Respiratory from Sputum Updated: 05/16/22 0759    Culture, Anaerobe [[id number]] Collected: 05/11/22 1440    Order Status: Completed Specimen: Abscess from Back Updated: 05/16/22 0733     Anaerobic Culture No anaerobes isolated    Blood culture [[id number]] Collected: 05/11/22 1213    Order Status: Completed Specimen: Blood Updated: 05/16/22 0612     Blood Culture, Routine No Growth to date      No Growth to date      No Growth to date      No Growth to date      No Growth to date    Blood culture [[id number]] Collected: 05/11/22 1213    Order Status: Completed Specimen: Blood Updated: 05/16/22 0612     Blood Culture, Routine No Growth to date      No Growth to date      No Growth to date      No Growth to date      No Growth to date    Aerobic culture [[id number]]  (Abnormal)  (Susceptibility) Collected: 05/11/22 1440    Order Status: Completed Specimen: Abscess from Back Updated:  05/14/22 1057     Aerobic Bacterial Culture STAPHYLOCOCCUS AUREUS  Many      Gram stain [[id number]] Collected: 05/11/22 1440    Order Status: Completed Specimen: Abscess from Back Updated: 05/12/22 0306     Gram Stain Result Few WBC's      Few Gram positive cocci    Gram stain [[id number]]     Order Status: Canceled Specimen: Joint Fluid from Back     Blood culture [[id number]]  (Abnormal) Collected: 05/08/22 1516    Order Status: Completed Specimen: Blood from Peripheral, Right Hand Updated: 05/11/22 1009     Blood Culture, Routine Gram stain aer bottle: Gram positive cocci in clusters resembling Staph       Results called to and read back by: Chasity Raymundo RN  05/09/2022  11:31      STAPHYLOCOCCUS AUREUS  ID consult required at [facility] [place].  For susceptibility see order #[id number]      Narrative:      Collection has been rescheduled by [email or web address] at 05/08/2022 13:22 Reason:   Pt in mri will be there after another hour [id number]  Collection has been rescheduled by [email or web address] at 05/08/2022 13:22 Reason:   Pt in mri will be there after another hour [id number]    Blood culture [[id number]]  (Abnormal) Collected: 05/08/22 1515    Order Status: Completed Specimen: Blood from Peripheral, Left Arm Updated: 05/11/22 1009     Blood Culture, Routine Gram stain aer bottle: Gram positive cocci in clusters resembling Staph      Results called to and read back by: Chasity Raymundo RN  05/09/2022  15:40      STAPHYLOCOCCUS AUREUS  ID consult required at [facility] [place].  For susceptibility see order #[id number]      Narrative:      Collection has been rescheduled by [email or web address] at 05/08/2022 13:22 Reason:   Pt in mri will be there after another hour [id number]  Collection has been rescheduled by [email or web address] at 05/08/2022 13:22 Reason:   Pt in mri will be there after another hour [id number]        Latest lactate reviewed, they are-  No results for input(s): LACTATE in the last 72 hours.    Organ dysfunction  indicated by Acute kidney injury  Source-  lung    Source control Achieved by-   Cont Broad spectrum IVAB     Bacteremia  Infectious disease consulted      PNA (pneumonia)  --Cont oxacillin        Tobacco abuse  - Patient thoroughly counseled on smoking cessation, pt verbalized understanding. Time of counseling 10 minutes.        COPD (chronic obstructive pulmonary disease)  Cont breathing tx prn  Pulmonology following       VTE Risk Mitigation (From admission, onward)         Ordered     enoxaparin injection 40 mg  Daily         05/06/22 2336     IP VTE HIGH RISK PATIENT  Once         05/06/22 2336     Place sequential compression device  Until discontinued         05/06/22 2336                Discharge Planning   YESSICA:      Code Status: Full Code   Is the patient medically ready for discharge?:     Reason for patient still in hospital (select all that apply): Patient trending condition  Discharge Plan A: Long-term acute care facility (LTAC)   Discharge Delays: None known at this time              Adriana Baltazar NP  Department of Hospital Medicine   [facility] - Med Surg

## 2022-05-26 NOTE — SUBJECTIVE & OBJECTIVE
Interval History:   5/26  Chest tube output last 24 hours 200 mL. Afebrile last 24 hours.  Received tPA and DNase installation via chest tube for 2 consecutive days.  Complains of generalized pain decreased appetite and decreased activity fatigue.  Bleeding noted around chest tube resolved with compression and dressing change.  Review of Systems   Constitutional:  Positive for malaise/fatigue and weight loss. Negative for chills and fever.   HENT:  Negative for hearing loss and nosebleeds.    Eyes:  Negative for discharge.   Respiratory:  Positive for cough, sputum production and shortness of breath.    Cardiovascular:  Positive for leg swelling. Negative for chest pain.   Gastrointestinal:  Negative for abdominal pain.   Genitourinary:  Negative for hematuria.   Musculoskeletal:  Negative for falls.   Skin:  Negative for itching.   Neurological:  Positive for weakness. Negative for speech change, seizures and loss of consciousness.   Endo/Heme/Allergies:  Negative for polydipsia. Does not bruise/bleed easily.   Psychiatric/Behavioral:  Negative for hallucinations.        Objective:     Vital Signs (Most Recent):  Temp: 99.6 °F (37.6 °C) (05/26/22 1601)  Pulse: 83 (05/26/22 1601)  Resp: 18 (05/26/22 1601)  BP: (!) 111/58 (05/26/22 1601)  SpO2: 99 % (05/26/22 1601)   Vital Signs (24h Range):  Temp:  [98 °F (36.7 °C)-99.6 °F (37.6 °C)] 99.6 °F (37.6 °C)  Pulse:  [] 83  Resp:  [16-20] 18  SpO2:  [93 %-99 %] 99 %  BP: (104-131)/(51-72) 111/58     Weight: 97.1 kg (214 lb 1.1 oz)  Body mass index is 35.62 kg/m².      Intake/Output Summary (Last 24 hours) at 5/26/2022 1741  Last data filed at 5/26/2022 1200  Gross per 24 hour   Intake 3539.13 ml   Output 1375 ml   Net 2164.13 ml         Physical Exam  Vitals and nursing note reviewed.   Constitutional:       General: She is not in acute distress.     Appearance: She is well-developed. She is ill-appearing.   HENT:      Head: Normocephalic and atraumatic.      Nose:  Nose normal.   Eyes:      Extraocular Movements: Extraocular movements intact.   Cardiovascular:      Rate and Rhythm: Normal rate and regular rhythm.   Pulmonary:      Effort: Pulmonary effort is normal.      Breath sounds: No stridor. Rhonchi present.      Comments: Right-side pigtail in place  Abdominal:      General: There is no distension.   Musculoskeletal:         General: No signs of injury.      Cervical back: Normal range of motion and neck supple.   Skin:     General: Skin is dry.   Neurological:      General: No focal deficit present.      Mental Status: She is alert and oriented to person, place, and time. Mental status is at baseline.   Psychiatric:         Behavior: Behavior normal.       Vents:  Oxygen Concentration (%): 28 (05/26/22 0724)    Lines/Drains/Airways       Peripherally Inserted Central Catheter Line  Duration             PICC Double Lumen 05/17/22 1612 right basilic 9 days              Drain  Duration                  Chest Tube 05/21/22 0957 1 Right Pleural 8.5 Fr. 5 days    Female External Urinary Catheter 05/21/22 0700 5 days                    Significant Labs:    CBC/Anemia Profile:  Recent Labs   Lab 05/25/22  0644 05/25/22  1313 05/26/22  0513   WBC 11.84  --  13.29*   HGB 7.6* 8.5* 7.6*   HCT 24.7* 27.5* 24.1*     --  402   MCV 90  --  87   RDW 15.9*  --  15.8*          Chemistries:  Recent Labs   Lab 05/25/22  0644 05/26/22  0513   * 135*   K 4.5 4.3   CL 97 99   CO2 29 27   BUN 22* 21*   CREATININE 1.0 1.3   CALCIUM 8.0* 7.7*   ALBUMIN 1.5* 1.5*   PROT 5.7* 5.8*   BILITOT 0.3 0.3   ALKPHOS 97 91   ALT 12 12   AST 10 12        Latest Reference Range & Units 05/07/22 09:05 05/21/22 10:22   Fluid Appearance  Hazy Cloudy   WBC, Body Fluid /cu mm 94981 [1] 826555 (C) [2]   Body Fluid Type  Pleural Fluid, Right Pleural Fluid, Right   Segs, Fluid % 93 88   Lymphs, Fluid % 3 7   Monocytes/Macrophages, Fluid % 4 5   Amylase, Fluid Not established U/L 34 [3]    Glucose,  Fluid Not established mg/dL 29 [4]    LD, Fluid Not established U/L 1077 [5]    Body Fluid, Albumin See text g/dL 1.3 [6]    Body Fluid Source Amylase  Pleural Fluid, Right    Body Fluid Source, Albumin  Pleural Fluid, Right    Body Fluid Source, Glucose  Pleural Fluid, Right    Body Fluid Source, LDH  Pleural Fluid, Right    Body Fluid Source, Total Protein  Pleural Fluid, Right    Body Fluid, Protein Not established g/dL 3.7 [7]    Cholesterol, Body Fluid See Comment mg/dL 79 [8]          Significant Imaging:       Chest x-ray 05/26/2022     CLINICAL HISTORY:  chest tube pain;     TECHNIQUE:  Single frontal view of the chest was performed.     COMPARISON:  Chest radiograph 05/25/2022 with priors cardiac     FINDINGS:  Cardiac leads project over the chest.  Stable position of a right PICC.  Interval repositioning of a right pigtail catheter at the lung base.  Similar appearance of patchy ground-glass opacities throughout both lungs.  New small left pleural effusion.  No pneumothorax.  The cardiomediastinal silhouette is unchanged.     Impression:     Slight interval repositioning of a chest tube at the right lung base.  No pneumothorax.     New small left pleural effusion.     Similar appearance of patchy ground-glass opacities throughout both lungs.         CT chest without contrast 05/20/2022    Narrative & Impression  EXAMINATION:  CT CHEST WITHOUT CONTRAST     CLINICAL HISTORY:  Empyema;     TECHNIQUE:  Low dose axial images, sagittal and coronal reformations were obtained from the thoracic inlet to the lung bases. Contrast was not administered.  All CT scans at this facility are performed using dose optimization techniques including the following: automated exposure control; adjustment of the mA and/or kV; use of iterative reconstruction technique.     COMPARISON:  CTA chest non coronary 05/06/2022, chest radiograph 05/20/2022, MRI thoracic spine 05/19/2022     FINDINGS:  Base of Neck: There is diffuse body  wall edema.  Unremarkable, noting the presence of AA PICC line which terminates at the superior cavoatrial junction.     Thoracic soft tissues: There is diffuse body wall edema.     Aorta: Left-sided aortic arch.  No aneurysm and no significant atherosclerosis     Heart: Normal size. No effusion.  No significant coronary atherosclerosis.     Pulmonary vasculature: Pulmonary arteries distribute normally.     Esha/Mediastinum: There are few prominent mediastinal lymph nodes with a prevascular node measuring 0.8 cm in short axis.     Airways: Patent.     Lungs/Pleura: There are multiple bilateral consolidative densities scattered throughout the lungs, many of which demonstrate cavitation, again suggesting septic emboli.  There is small left pleural fluid, which appears simple and layers dependently.  There is a loculated moderate right pleural effusion, which appears similar in size to the comparison exam however now containing air, which may be due to empyema.  There is adjacent atelectatic change within the bilateral lower lobes.  Overall number and size of consolidative densities has increased since the comparison exam.     Esophagus: Normal.     Upper Abdomen: There is persistent wedge-shaped hypodensity within the spleen, suggesting infarct.     Bones: No acute fracture. No suspicious lytic or sclerotic lesions.     Impression:     Interval progression with increased number and size of consolidative cavitary densities throughout the lungs, most suggestive of septic emboli with larger opacities concerning for necrotizing pneumonia.     Stable size of loculated right moderate pleural effusion, now containing air compatible with hydropneumothorax as reported on prior MRI.  Interval development of air is concerning for empyema noting limited evaluation without intravenous contrast.  New small simple appearing left pleural effusion.     Wedge-shaped splenic hypodensity, suggesting infarction.     Anasarca.

## 2022-05-27 PROBLEM — N39.0 E. COLI UTI: Status: ACTIVE | Noted: 2022-05-27

## 2022-05-27 PROBLEM — B96.20 E. COLI UTI: Status: ACTIVE | Noted: 2022-05-27

## 2022-05-27 LAB
ALBUMIN SERPL BCP-MCNC: 1.5 G/DL (ref 3.5–5.2)
ALP SERPL-CCNC: 84 U/L (ref 55–135)
ALT SERPL W/O P-5'-P-CCNC: 13 U/L (ref 10–44)
ANION GAP SERPL CALC-SCNC: 11 MMOL/L (ref 8–16)
AST SERPL-CCNC: 17 U/L (ref 10–40)
BACTERIA SPEC ANAEROBE CULT: NORMAL
BASOPHILS # BLD AUTO: 0.05 K/UL (ref 0–0.2)
BASOPHILS NFR BLD: 0.4 % (ref 0–1.9)
BILIRUB SERPL-MCNC: 0.2 MG/DL (ref 0.1–1)
BUN SERPL-MCNC: 21 MG/DL (ref 6–20)
CALCIUM SERPL-MCNC: 7.5 MG/DL (ref 8.7–10.5)
CHLORIDE SERPL-SCNC: 101 MMOL/L (ref 95–110)
CO2 SERPL-SCNC: 25 MMOL/L (ref 23–29)
CREAT SERPL-MCNC: 1.4 MG/DL (ref 0.5–1.4)
DIFFERENTIAL METHOD: ABNORMAL
EOSINOPHIL # BLD AUTO: 0.2 K/UL (ref 0–0.5)
EOSINOPHIL NFR BLD: 1.7 % (ref 0–8)
ERYTHROCYTE [DISTWIDTH] IN BLOOD BY AUTOMATED COUNT: 15.3 % (ref 11.5–14.5)
EST. GFR  (AFRICAN AMERICAN): 55 ML/MIN/1.73 M^2
EST. GFR  (NON AFRICAN AMERICAN): 47 ML/MIN/1.73 M^2
GLUCOSE SERPL-MCNC: 101 MG/DL (ref 70–110)
HCT VFR BLD AUTO: 23.5 % (ref 37–48.5)
HGB BLD-MCNC: 7.3 G/DL (ref 12–16)
IMM GRANULOCYTES # BLD AUTO: 0.09 K/UL (ref 0–0.04)
IMM GRANULOCYTES NFR BLD AUTO: 0.7 % (ref 0–0.5)
LYMPHOCYTES # BLD AUTO: 2.2 K/UL (ref 1–4.8)
LYMPHOCYTES NFR BLD: 18.2 % (ref 18–48)
MCH RBC QN AUTO: 27.1 PG (ref 27–31)
MCHC RBC AUTO-ENTMCNC: 31.1 G/DL (ref 32–36)
MCV RBC AUTO: 87 FL (ref 82–98)
MONOCYTES # BLD AUTO: 1.3 K/UL (ref 0.3–1)
MONOCYTES NFR BLD: 10.4 % (ref 4–15)
NEUTROPHILS # BLD AUTO: 8.3 K/UL (ref 1.8–7.7)
NEUTROPHILS NFR BLD: 68.6 % (ref 38–73)
NRBC BLD-RTO: 0 /100 WBC
PLATELET # BLD AUTO: 398 K/UL (ref 150–450)
PMV BLD AUTO: 8.9 FL (ref 9.2–12.9)
POTASSIUM SERPL-SCNC: 4.5 MMOL/L (ref 3.5–5.1)
PROT SERPL-MCNC: 5.7 G/DL (ref 6–8.4)
RBC # BLD AUTO: 2.69 M/UL (ref 4–5.4)
SODIUM SERPL-SCNC: 137 MMOL/L (ref 136–145)
WBC # BLD AUTO: 12.14 K/UL (ref 3.9–12.7)

## 2022-05-27 PROCEDURE — 63600175 PHARM REV CODE 636 W HCPCS: Performed by: INTERNAL MEDICINE

## 2022-05-27 PROCEDURE — 99900035 HC TECH TIME PER 15 MIN (STAT)

## 2022-05-27 PROCEDURE — 80053 COMPREHEN METABOLIC PANEL: CPT | Performed by: NURSE PRACTITIONER

## 2022-05-27 PROCEDURE — 94761 N-INVAS EAR/PLS OXIMETRY MLT: CPT

## 2022-05-27 PROCEDURE — 99233 SBSQ HOSP IP/OBS HIGH 50: CPT | Mod: ,,, | Performed by: INTERNAL MEDICINE

## 2022-05-27 PROCEDURE — 25000003 PHARM REV CODE 250: Performed by: NURSE PRACTITIONER

## 2022-05-27 PROCEDURE — 27000221 HC OXYGEN, UP TO 24 HOURS

## 2022-05-27 PROCEDURE — 92610 EVALUATE SWALLOWING FUNCTION: CPT

## 2022-05-27 PROCEDURE — 97530 THERAPEUTIC ACTIVITIES: CPT

## 2022-05-27 PROCEDURE — 63600175 PHARM REV CODE 636 W HCPCS: Performed by: NURSE PRACTITIONER

## 2022-05-27 PROCEDURE — 94664 DEMO&/EVAL PT USE INHALER: CPT

## 2022-05-27 PROCEDURE — 21400001 HC TELEMETRY ROOM

## 2022-05-27 PROCEDURE — 94640 AIRWAY INHALATION TREATMENT: CPT

## 2022-05-27 PROCEDURE — 25000242 PHARM REV CODE 250 ALT 637 W/ HCPCS: Performed by: INTERNAL MEDICINE

## 2022-05-27 PROCEDURE — 25000003 PHARM REV CODE 250: Performed by: FAMILY MEDICINE

## 2022-05-27 PROCEDURE — 96372 THER/PROPH/DIAG INJ SC/IM: CPT

## 2022-05-27 PROCEDURE — 27000646 HC AEROBIKA DEVICE

## 2022-05-27 PROCEDURE — 97110 THERAPEUTIC EXERCISES: CPT

## 2022-05-27 PROCEDURE — 99233 PR SUBSEQUENT HOSPITAL CARE,LEVL III: ICD-10-PCS | Mod: ,,, | Performed by: INTERNAL MEDICINE

## 2022-05-27 PROCEDURE — 85025 COMPLETE CBC W/AUTO DIFF WBC: CPT | Performed by: INTERNAL MEDICINE

## 2022-05-27 PROCEDURE — 97116 GAIT TRAINING THERAPY: CPT

## 2022-05-27 RX ORDER — HYDROMORPHONE HYDROCHLORIDE 2 MG/ML
1 INJECTION, SOLUTION INTRAMUSCULAR; INTRAVENOUS; SUBCUTANEOUS ONCE
Status: COMPLETED | OUTPATIENT
Start: 2022-05-27 | End: 2022-05-27

## 2022-05-27 RX ORDER — CIPROFLOXACIN 500 MG/1
500 TABLET ORAL EVERY 12 HOURS
Status: DISCONTINUED | OUTPATIENT
Start: 2022-05-27 | End: 2022-05-27

## 2022-05-27 RX ORDER — CIPROFLOXACIN 750 MG/1
750 TABLET, FILM COATED ORAL EVERY 12 HOURS
Status: COMPLETED | OUTPATIENT
Start: 2022-05-27 | End: 2022-05-30

## 2022-05-27 RX ADMIN — ENOXAPARIN SODIUM 40 MG: 40 INJECTION SUBCUTANEOUS at 06:05

## 2022-05-27 RX ADMIN — MORPHINE SULFATE 15 MG: 15 TABLET, EXTENDED RELEASE ORAL at 09:05

## 2022-05-27 RX ADMIN — SODIUM CHLORIDE: 0.9 INJECTION, SOLUTION INTRAVENOUS at 12:05

## 2022-05-27 RX ADMIN — CIPROFLOXACIN 750 MG: 750 TABLET, FILM COATED ORAL at 09:05

## 2022-05-27 RX ADMIN — LORAZEPAM 1 MG: 1 TABLET ORAL at 07:05

## 2022-05-27 RX ADMIN — FAMOTIDINE 20 MG: 20 TABLET ORAL at 09:05

## 2022-05-27 RX ADMIN — TRAZODONE HYDROCHLORIDE 100 MG: 100 TABLET ORAL at 09:05

## 2022-05-27 RX ADMIN — LORAZEPAM 1 MG: 1 TABLET ORAL at 04:05

## 2022-05-27 RX ADMIN — IPRATROPIUM BROMIDE AND ALBUTEROL SULFATE 3 ML: 2.5; .5 SOLUTION RESPIRATORY (INHALATION) at 07:05

## 2022-05-27 RX ADMIN — HYDROMORPHONE HYDROCHLORIDE 1 MG: 2 INJECTION, SOLUTION INTRAMUSCULAR; INTRAVENOUS; SUBCUTANEOUS at 04:05

## 2022-05-27 RX ADMIN — OXACILLIN SODIUM 12 G: 10 INJECTION, POWDER, FOR SOLUTION INTRAVENOUS at 03:05

## 2022-05-27 RX ADMIN — OXYCODONE HYDROCHLORIDE 5 MG: 5 TABLET ORAL at 07:05

## 2022-05-27 NOTE — ASSESSMENT & PLAN NOTE
5/20 Suspect empyema, needs chest tube placement  5/21 s/p chest tube placement, adequate expansion - will check Chest X Ray in [facility]  5/23 tPA DNase.  5/24 tPA for DNase installation via chest tube.  Monitor chest tube output.  Repeat chest x-ray in a.m..  5/25 chest tube output increased from 50 mL on average per 24 hours to 160 mL last 24 hours.  Purulent drainage noted.  Will repeat tPA plus Dnase instillation.  5/26 chest x-ray reviewed.  Continue IV oxacillin.  Right-sided pigtail in place.  Status post tPA and DNase x2.  Bloody drainage around pigtail today status post tPA and DNase yesterday.  Will hold on fibrinolysis.  Monitor chest tube output  5/27 right pigtail in place.  225 mL  Last 24 hours average of 200 mL per day.  Change pleurovac canister to better assess the color of the fluid pus versus serosanguineous.  Keep pigtail in place for now.  Ultrasound bilateral chest assess pleural effusions.  Continue oxacillin drip.

## 2022-05-27 NOTE — ASSESSMENT & PLAN NOTE
Medical [facility]  CTS note noted  5/23 IV oxacillin.  5/24 IV oxacillin as per Infectious Disease  5/25 repeat cultures none relevant.  IV oxacillin as per Infectious Disease x4 weeks.  T-max 101.7° last  24 hours  5/26 repeat blood concern negative continue oxacillin gtt   5/27 continue oxacillin drip

## 2022-05-27 NOTE — ASSESSMENT & PLAN NOTE
ECHO showed a 1.3 x 1.5 cm elongated, mobile echodensity seen on the tricuspid valve consistent with vegetation, CT surgery was consulted.   Due to MSSA    -- Infectious disease is consulted.  -- repeat blood cx NGTD.  -- Continue IV ABX.   --Sensitivities are back and Staph is sensitive to oxacillin, D/C vanc/cefepime.   --Cardiology consulted and plans for a SUSANNAH.   --CT surgery following and recommends continuing medical management with appropriate ABX, no surgical intervention at this time.    cont IV oxacillin.  Plan is to continue x 4 weeks. Afebrile

## 2022-05-27 NOTE — SUBJECTIVE & OBJECTIVE
Interval History:   5/27 seen and examined.  Afebrile.  Chest tube in place.  225 mL drainage yesterday.  No further bleeding around pigtail site.  Severe weakness fatigue and decreased activity and appetite.  Afebrile.  Review of Systems   Constitutional:  Positive for malaise/fatigue and weight loss. Negative for chills and fever.   HENT:  Negative for hearing loss and nosebleeds.    Eyes:  Negative for discharge.   Respiratory:  Positive for cough, sputum production and shortness of breath.    Cardiovascular:  Positive for leg swelling. Negative for chest pain.   Gastrointestinal:  Negative for abdominal pain.   Genitourinary:  Negative for hematuria.   Musculoskeletal:  Negative for falls.   Skin:  Negative for itching.   Neurological:  Positive for weakness. Negative for speech change, seizures and loss of consciousness.   Endo/Heme/Allergies:  Negative for polydipsia. Does not bruise/bleed easily.   Psychiatric/Behavioral:  Negative for hallucinations.        Objective:     Vital Signs (Most Recent):  Temp: 98.4 °F (36.9 °C) (05/27/22 1639)  Pulse: 81 (05/27/22 1639)  Resp: 18 (05/27/22 1639)  BP: (!) 140/67 (05/27/22 1639)  SpO2: 97 % (05/27/22 1639)   Vital Signs (24h Range):  Temp:  [97.5 °F (36.4 °C)-99.4 °F (37.4 °C)] 98.4 °F (36.9 °C)  Pulse:  [] 81  Resp:  [16-22] 18  SpO2:  [96 %-99 %] 97 %  BP: ()/(52-76) 140/67     Weight: 97.1 kg (214 lb 1.1 oz)  Body mass index is 35.62 kg/m².      Intake/Output Summary (Last 24 hours) at 5/27/2022 1713  Last data filed at 5/27/2022 1649  Gross per 24 hour   Intake 5089.03 ml   Output 3450 ml   Net 1639.03 ml         Physical Exam  Vitals and nursing note reviewed.   Constitutional:       General: She is not in acute distress.     Appearance: She is well-developed. She is ill-appearing.   HENT:      Head: Normocephalic and atraumatic.      Nose: Nose normal.   Eyes:      Extraocular Movements: Extraocular movements intact.   Cardiovascular:      Rate  and Rhythm: Normal rate and regular rhythm.   Pulmonary:      Effort: Pulmonary effort is normal.      Breath sounds: No stridor. Rhonchi present.      Comments: Right-side pigtail in place  Abdominal:      General: There is no distension.   Musculoskeletal:         General: No signs of injury.      Cervical back: Normal range of motion and neck supple.   Skin:     General: Skin is dry.   Neurological:      General: No focal deficit present.      Mental Status: She is alert and oriented to person, place, and time. Mental status is at baseline.   Psychiatric:         Behavior: Behavior normal.       Vents:  Oxygen Concentration (%): 32 (05/27/22 0741)    Lines/Drains/Airways       Peripherally Inserted Central Catheter Line  Duration             PICC Double Lumen 05/17/22 1612 right basilic 10 days              Drain  Duration                  Chest Tube 05/21/22 0957 1 Right Pleural 8.5 Fr. 6 days    Female External Urinary Catheter 05/21/22 0700 6 days                    Significant Labs:    CBC/Anemia Profile:  Recent Labs   Lab 05/26/22  0513 05/27/22  0508   WBC 13.29* 12.14   HGB 7.6* 7.3*   HCT 24.1* 23.5*    398   MCV 87 87   RDW 15.8* 15.3*          Chemistries:  Recent Labs   Lab 05/26/22  0513 05/27/22  0508   * 137   K 4.3 4.5   CL 99 101   CO2 27 25   BUN 21* 21*   CREATININE 1.3 1.4   CALCIUM 7.7* 7.5*   ALBUMIN 1.5* 1.5*   PROT 5.8* 5.7*   BILITOT 0.3 0.2   ALKPHOS 91 84   ALT 12 13   AST 12 17        Latest Reference Range & Units 05/07/22 09:05 05/21/22 10:22   Fluid Appearance  Hazy Cloudy   WBC, Body Fluid /cu mm 61122 [1] 562497 (C) [2]   Body Fluid Type  Pleural Fluid, Right Pleural Fluid, Right   Segs, Fluid % 93 88   Lymphs, Fluid % 3 7   Monocytes/Macrophages, Fluid % 4 5   Amylase, Fluid Not established U/L 34 [3]    Glucose, Fluid Not established mg/dL 29 [4]    LD, Fluid Not established U/L 1077 [5]    Body Fluid, Albumin See text g/dL 1.3 [6]    Body Fluid Source Amylase   Pleural Fluid, Right    Body Fluid Source, Albumin  Pleural Fluid, Right    Body Fluid Source, Glucose  Pleural Fluid, Right    Body Fluid Source, LDH  Pleural Fluid, Right    Body Fluid Source, Total Protein  Pleural Fluid, Right    Body Fluid, Protein Not established g/dL 3.7 [7]    Cholesterol, Body Fluid See Comment mg/dL 79 [8]          Significant Imaging:       Chest x-ray 05/26/2022     CLINICAL HISTORY:  chest tube pain;     TECHNIQUE:  Single frontal view of the chest was performed.     COMPARISON:  Chest radiograph 05/25/2022 with priors cardiac     FINDINGS:  Cardiac leads project over the chest.  Stable position of a right PICC.  Interval repositioning of a right pigtail catheter at the lung base.  Similar appearance of patchy ground-glass opacities throughout both lungs.  New small left pleural effusion.  No pneumothorax.  The cardiomediastinal silhouette is unchanged.     Impression:     Slight interval repositioning of a chest tube at the right lung base.  No pneumothorax.     New small left pleural effusion.     Similar appearance of patchy ground-glass opacities throughout both lungs.         CT chest without contrast 05/20/2022    Narrative & Impression  EXAMINATION:  CT CHEST WITHOUT CONTRAST     CLINICAL HISTORY:  Empyema;     TECHNIQUE:  Low dose axial images, sagittal and coronal reformations were obtained from the thoracic inlet to the lung bases. Contrast was not administered.  All CT scans at this facility are performed using dose optimization techniques including the following: automated exposure control; adjustment of the mA and/or kV; use of iterative reconstruction technique.     COMPARISON:  CTA chest non coronary 05/06/2022, chest radiograph 05/20/2022, MRI thoracic spine 05/19/2022     FINDINGS:  Base of Neck: There is diffuse body wall edema.  Unremarkable, noting the presence of AA PICC line which terminates at the superior cavoatrial junction.     Thoracic soft tissues: There is  diffuse body wall edema.     Aorta: Left-sided aortic arch.  No aneurysm and no significant atherosclerosis     Heart: Normal size. No effusion.  No significant coronary atherosclerosis.     Pulmonary vasculature: Pulmonary arteries distribute normally.     Esha/Mediastinum: There are few prominent mediastinal lymph nodes with a prevascular node measuring 0.8 cm in short axis.     Airways: Patent.     Lungs/Pleura: There are multiple bilateral consolidative densities scattered throughout the lungs, many of which demonstrate cavitation, again suggesting septic emboli.  There is small left pleural fluid, which appears simple and layers dependently.  There is a loculated moderate right pleural effusion, which appears similar in size to the comparison exam however now containing air, which may be due to empyema.  There is adjacent atelectatic change within the bilateral lower lobes.  Overall number and size of consolidative densities has increased since the comparison exam.     Esophagus: Normal.     Upper Abdomen: There is persistent wedge-shaped hypodensity within the spleen, suggesting infarct.     Bones: No acute fracture. No suspicious lytic or sclerotic lesions.     Impression:     Interval progression with increased number and size of consolidative cavitary densities throughout the lungs, most suggestive of septic emboli with larger opacities concerning for necrotizing pneumonia.     Stable size of loculated right moderate pleural effusion, now containing air compatible with hydropneumothorax as reported on prior MRI.  Interval development of air is concerning for empyema noting limited evaluation without intravenous contrast.  New small simple appearing left pleural effusion.     Wedge-shaped splenic hypodensity, suggesting infarction.     Anasarca.

## 2022-05-27 NOTE — PT/OT/SLP PROGRESS
Occupational Therapy  Treatment    Tianna Leon   MRN: [id number]   Admitting Diagnosis: Acute bacterial endocarditis    OT Date of Treatment: 05/26/22   OT Start Time: 1150  OT Stop Time: 1235  OT Total Time (min): 45 min    Billable Minutes:  Self Care/Home Management 15 minutes, Therapeutic Activity 15 minutes and Therapeutic Exercise 15 minutes    OT/CHRISTI: OT          General Precautions: Standard, fall  Orthopedic Precautions: N/A  Braces: N/A  Subjective:  Communicated with nurse and epic chart review prior to session.    Pain/Comfort  Pain Rating 1: 8/10  Location 1:  (incision site)  Pain Rating Post-Intervention 2: 8/10    Objective:  Patient found with: oxygen, telemetry, chest tube, peripheral IV, PureWick     Functional Mobility:  Bed Mobility:  Min a with  Rolling r<l    Transfers:      Min a with sit<>stand  Activities of Daily Living:     Feeding  Min a with simple feeding activity    Functional Ambulation:  Min a x 2 50 feet x e  LE max a with miguelina socks  Toilet max a    Balance:   Static Sit: FAIR+: Able to take MINIMAL challenges from all directions  Dynamic Sit: FAIR: Cannot move trunk without losing balance  Static Stand: POOR+: Needs MINIMAL assist to maintain  Dynamic stand: POOR+: Needs MIN (minimal ) assist during gait    Therapeutic Activities and Exercises  Pt educated on hep. Pt verbalized understanding and retunred demonstration. PT PERFORMED 1 SET X 15 REPS OF B UE ROM EXERCISE ( SHOULDER FLEXION, ELBOW FLEXION/EXTENSION, CHEST PRESS  AND HAND  DIGITS FLEX/EXT. ). Pt req extended time to complete all activity>  Pt educated on he  AM-PAC 6 CLICK ADL   How much help from another person does this patient currently need?   1 = Unable, Total/Dependent Assistance  2 = A lot, Maximum/Moderate Assistance  3 = A little, Minimum/Contact Guard/Supervision  4 = None, Modified [place]/Independent    Putting on and taking off regular lower body clothing? : 2  Bathing (including washing,  "rinsing, drying)?: 2  Toileting, which includes using toilet, bedpan, or urinal? : 2 (purewick)  Putting on and taking off regular upper body clothing?: 3  Taking care of personal grooming such as brushing teeth?: 3  Eating meals?: 4  Daily Activity Total Score: 16     AM-PAC Raw Score CMS "G-Code Modifier Level of Impairment Assistance   6 % Total / Unable   7 - 8 CM 80 - 100% Maximal Assist   9-13 CL 60 - 80% Moderate Assist   14 - 19 CK 40 - 60% Moderate Assist   20 - 22 CJ 20 - 40% Minimal Assist   23 CI 1-20% SBA / CGA   24 CH 0% Independent/ Mod I       Patient left up in chair with all lines intact, call button in reach, nurse notified and mother present    ASSESSMENT:  Tianna Leon is a 37 y.o. female with a medical diagnosis of Acute bacterial endocarditis and presents with debility and generalized weakness  .    Rehab identified problem list/impairments: Rehab identified problem list/impairments: weakness, impaired functional mobilty, impaired balance, decreased upper extremity function, impaired endurance, impaired self care skills, gait instability, decreased coordination, decreased lower extremity function, decreased safety awareness, pain, decreased ROM, edema    Rehab potential is good.    Activity tolerance: Good    Discharge recommendations: Discharge Facility/Level of Care Needs: nursing facility, skilled     Barriers to discharge:      Equipment recommendations: walker, rolling     GOALS:   Multidisciplinary Problems     Occupational Therapy Goals        Problem: Occupational Therapy    Goal Priority Disciplines Outcome Interventions   Occupational Therapy Goal     OT, PT/OT Ongoing, Progressing    Description: O.T. GOALS MET BY 5-30-22  SBA WITH UE DRESSING  MIN A WITH LE DRESSING  PT WILL TOLERATE 1 SET X 15 REPS B UE ROM EXERCISE  MIN A WITH TOILETING                   Plan:  Patient to be seen 2 x/week to address the above listed problems via self-care/home management, " therapeutic activities, therapeutic exercises  Plan of Care expires: 05/30/22  Plan of Care reviewed with: patient         05/26/2022

## 2022-05-27 NOTE — ASSESSMENT & PLAN NOTE
Pulmonology consulted and felt likely empyema   IR consulted for chest tube      s/p right pigtail small bore chest tube placement to suction/drainage system per IR on 5/21/22. 60ml pus removed. Now with Serous drainage with pus. Fluid analysis consistent with empyema. Pleural fluid aerobic culture shows NGTD, Anaerobic culture pending    5/24/22: Dr. San plans for tPA and DNase in chest tube today.   5/25/22: 160ml of purulent CT drainage output. Dr. San plans to repeat tPA and DNase in chest tube again today.   5/26/22  Still has purulent drainage noted. Pulmonology following

## 2022-05-27 NOTE — PT/OT/SLP EVAL
Speech Language Pathology Evaluation  Bedside Swallow    Patient Name:  Tianna Leon   MRN:  [id number]  Admitting Diagnosis: Acute bacterial endocarditis    Recommendations:                 General Recommendations:  Follow-up not indicated; MD to consider GI and/or esophageal medical work-up if medically indicated d//t pt c/o odynophagia, constant belching during PO intake and reported hx GERD.  Diet recommendations:  Regular, Thin   Aspiration Precautions: Standard aspiration precautions   General Precautions: Standard, aspiration  Communication strategies:  none    History:     Past Medical History:   Diagnosis Date    ADHD (attention deficit hyperactivity disorder)     Anxiety     Asthma     Bipolar disorder     Cancer     cervical    COPD (chronic obstructive pulmonary disease)     GERD (gastroesophageal reflux disease)     Hepatitis C antibody positive in blood     RNA UNDETECTED 2016    Hyperlipidemia     Intravenous drug abuse     MDD (major depressive disorder)     Mood disorder     PTSD (post-traumatic stress disorder)     Suicidal ideation        Past Surgical History:   Procedure Laterality Date     SECTION  2001    x2    CHOLECYSTECTOMY      COMPUTED TOMOGRAPHY N/A 2022    Procedure: CT (COMPUTED TOMOGRAPHY)/facet joint aspiration;  Surgeon: Lloyd Suarez MD;  Location: [facility];  Service: General;  Laterality: N/A;    COMPUTED TOMOGRAPHY N/A 2022    Procedure: CT (COMPUTED TOMOGRAPHY)/Chest tube insertion;  Surgeon: Lloyd Suarez MD;  Location: [facility];  Service: General;  Laterality: N/A;    HYSTERECTOMY      LAPAROSCOPIC SALPINGECTOMY      ROBOT-ASSISTED LAPAROSCOPIC ABDOMINAL HYSTERECTOMY USING DA SEBASTIEN XI N/A 2019    Procedure: XI ROBOTIC HYSTERECTOMY;  Surgeon: Tabatha Quintanilla MD;  [facility];  Service: OB/GYN;  Laterality: N/A;    ROBOT-ASSISTED LAPAROSCOPIC LYSIS OF ADHESIONS USING DA SEBASTIEN XI N/A 2019     "Procedure: XI ROBOTIC LYSIS, ADHESIONS;  Surgeon: Tabatha Quintanilla MD;  Location: [facility];  Service: OB/GYN;  Laterality: N/A;    ROBOT-ASSISTED SURGICAL REMOVAL OF FALLOPIAN TUBE USING DA SEBASTIEN XI Right 7/23/2019    Procedure: XI ROBOTIC SALPINGECTOMY;  Surgeon: Tabatha Quintanilla MD;  Location: [facility];  Service: OB/GYN;  Laterality: Right;    TONSILLECTOMY      TUBAL LIGATION         Social History: Patient lives with family.    Chest X-Rays: See medical chart.    Prior diet: Pt consumes regular consistency diet/thin liquids; however, intermittently reports odynophagia.    Subjective     Pt seen bedside for ST.  No c/o pain.  Ready to get back to bed and requested nursing assistance.  Pt's mother present.  Patient goals: To go home.     Pain/Comfort:  · Pain Rating 1: 0/10  · Pain Rating Post-Intervention 1: 0/10  · Pain Rating 2: 0/10  · Pain Rating Post-Intervention 2: 0/10    Respiratory Status: Nasal Cannula    Objective:     Oral Musculature Evaluation  · Oral Musculature: WFL  · Dentition: present and adequate  · Secretion Management: adequate  · Mucosal Quality: good  · Mandibular Strength and Mobility: WFL  · Oral Labial Strength and Mobility: WFL  · Lingual Strength and Mobility: WFL  · Velar Elevation: WFL  · Buccal Strength and Mobility: WFL  · Volitional Cough: present  · Volitional Swallow: WFL  · Voice Prior to PO Intake: WFL    Bedside Swallow Eval:   Consistencies Assessed:  · Pt consumed thin liquids, pureed and soft solids during bedside CSE.     Oral Phase:   · WFL    Pharyngeal Phase:   · frequent belching post deglutition with all consistencies.  Pt hitting chest at times d/t "gas pains."   · C/o odynophagia per report; however, denied pain during bedside CSE.    Compensatory Strategies  · None    Treatment: Pt recommended for regular consistency diet/thin liquids following bedside CSE.  MD to consider further esophageal and/or GI work-up d/t pt c/o odynophagia & observed s/s " during PO intake, if medically indicated.  No further SLP intervention recommended at this time.    Assessment:     Tianna Leon is a 37 y.o. female with an SLP diagnosis of possible dysphagia with c/o odynophagia, frequent belching during bedside clinical swallowing evaluation & reported GERD hx.  No overt s/s of dysphagia observed indicating continued SLP intervention at this time.  Pt recommended for regular consistency diet/thin liquids.  MD to reconsult if needed.    Goals:   Multidisciplinary Problems     SLP Goals     Not on file                Plan:     · Plan of Care reviewed with:  patient, mother   · SLP Follow-Up:  No       Discharge recommendations:   (TBD)   Barriers to Discharge:  None    Time Tracking:     SLP Treatment Date:   05/27/22  Speech Start Time:  1130  Speech Stop Time:  1200     Speech Total Time (min):  30 min    Billable Minutes: Eval Swallow and Oral Function 30 minutes    05/27/2022

## 2022-05-27 NOTE — ASSESSMENT & PLAN NOTE
Secondary to acute illness    --Daily CBC  --Transfuse with 1 unit PRBC for Hgb <7.0 or <8.0 if symptomatic   --Hgb: 6.7, give 1U PRBC    5/27/22  Hemoglobin 7.3g/dL. downward trend. Has blood tinged seropurulent drainage. Will give additional unit if continues to trend down.

## 2022-05-27 NOTE — PT/OT/SLP PROGRESS
Physical Therapy  Treatment    Tianna Leon   MRN: [id number]   Admitting Diagnosis: Acute bacterial endocarditis    PT Received On: 05/27/22  PT Start Time: 0810     PT Stop Time: 0835    PT Total Time (min): 25 min       Billable Minutes:  Gait Training 10 and Therapeutic Activity 15    Treatment Type: Treatment  PT/PTA: PT     PTA Visit Number: 0       General Precautions: Standard, fall  Orthopedic Precautions: N/A   Braces: N/A  Respiratory Status: Nasal cannula, flow 3 L/min         Subjective:  Communicated with NURSE AND Epic CHART REVIEW  prior to session.  PT AGREED TO TX WITH MAX ENCOURAGEMENT AND APPEARS TO BE SELF LIMITING     Pain/Comfort  Pain Rating 1: 5/10  Location 1: chest  Pain Rating Post-Intervention 1: 5/10    Objective:   Patient found with: telemetry, peripheral IV, chest tube, PureWick    Functional Mobility:  PT MET IN  . PT AGREED TO TX HOWEVER WOULD NOT INITIATE MOVEMENT WITH INC VERBAL CUES OR TACITLE CUES. PT THEN PT REPORTED SHE NEEDED TO URINATE. P.T. OFFERED TO ASSIST PT TO BATHROOM. PT HOWEVER CONT TO LAY DOWN. P.T. EDUCATED PT ON IMPORTANCE FOR MOBILITY. PT THEN PROCEEDED TO URINATE IN PUREWICK. PT THEN SEATED EOB WITH MIN A AND SCOOTED TO EOB WITH MN A. PT STOOD AND GT TRAINED X18' WITH RW AND MIN A. PT T/F TO CHAIR WITH RW AND SHAYY. PT LEFT SEATED AND RE-EDUCATED ON TE TO COMPLETE      AM-PAC 6 CLICK MOBILITY  How much help from another person does this patient currently need?   1 = Unable, Total/Dependent Assistance  2 = A lot, Maximum/Moderate Assistance  3 = A little, Minimum/Contact Guard/Supervision  4 = None, Modified [place]/Independent    Turning over in bed (including adjusting bedclothes, sheets and blankets)?: 3  Sitting down on and standing up from a chair with arms (e.g., wheelchair, bedside commode, etc.): 3  Moving from lying on back to sitting on the side of the bed?: 3  Moving to and from a bed to a chair (including a wheelchair)?: 3  Need to walk in  hospital room?: 3  Climbing 3-5 steps with a railing?: 1  Basic Mobility Total Score: 16    AM-PAC Raw Score CMS G-Code Modifier Level of Impairment Assistance   6 % Total / Unable   7 - 9 CM 80 - 100% Maximal Assist   10 - 14 CL 60 - 80% Moderate Assist   15 - 19 CK 40 - 60% Moderate Assist   20 - 22 CJ 20 - 40% Minimal Assist   23 CI 1-20% SBA / CGA   24 CH 0% Independent/ Mod I     Patient left up in chair with call button in reach.    Assessment:  Tianna Leon is a 37 y.o. PT MANJIT TX WITH MAX ENCOURAGEMENT     Rehab identified problem list/impairments: Rehab identified problem list/impairments: weakness, impaired endurance, gait instability, impaired balance, impaired self care skills, decreased lower extremity function, decreased upper extremity function, impaired functional mobilty, impaired cognition, decreased safety awareness, decreased ROM, edema, impaired cardiopulmonary response to activity    Rehab potential is good.    Activity tolerance: Poor    Discharge recommendations: Discharge Facility/Level of Care Needs: nursing facility, skilled     Barriers to discharge:      Equipment recommendations: Equipment Needed After Discharge: walker, rolling     GOALS:   Multidisciplinary Problems     Physical Therapy Goals        Problem: Physical Therapy    Goal Priority Disciplines Outcome Goal Variances Interventions   Physical Therapy Goal     PT, PT/OT Ongoing, Not Progressing     Description: LTG'S TO BE MET IN 14 DAYS (5-30-22)  1. PT WILL BE KRISTA WITH BED MOBILITY  2. PT WILL BE KRISTA WITH TF'S  3. PT WILL ' WITH RW AND SPV                   PLAN:    Patient to be seen 3 x/week  to address the above listed problems via therapeutic activities, therapeutic exercises, gait training  Plan of Care expires: 05/30/22  Plan of Care reviewed with: patient         05/27/2022

## 2022-05-27 NOTE — PLAN OF CARE
[facility] - Med Surg  Discharge Reassessment    Primary Care Provider: Spenser Campa MD    Expected Discharge Date:     Reassessment (most recent)     Discharge Reassessment - 05/27/22 1054        Discharge Reassessment    Assessment Type Discharge Planning Reassessment     Did the patient's condition or plan change since previous assessment? No     Discharge Plan discussed with: Patient     Communicated YESSICA with patient/caregiver Date not available/Unable to determine     Discharge Plan A Skilled Nursing Facility     Discharge Plan B Skilled Nursing Facility     DME Needed Upon Discharge  none     Discharge Barriers Identified None        Post-Acute Status    Discharge Delays None known at this time               Cm will continue to follow the patient to assess for needs. Patients DC disposition is SNF. [facility] referrals was sent out, currently no accepting SNF. Pending accepting SNF and medical stability.

## 2022-05-27 NOTE — PROGRESS NOTES
[facility]  Pulmonology  Progress Note    Patient Name: Tianna Leon  MRN: [id number]  Admission Date: 5/6/2022  Hospital Length of Stay: 21 days  Code Status: Full Code  Attending Provider: Elizabeth Kim MD  Primary Care Provider: Spenser Campa MD   Principal Problem: Acute bacterial endocarditis    Subjective:     Interval History:   5/27 seen and examined.  Afebrile.  Chest tube in place.  225 mL drainage yesterday.  No further bleeding around pigtail site.  Severe weakness fatigue and decreased activity and appetite.  Afebrile.  Review of Systems   Constitutional:  Positive for malaise/fatigue and weight loss. Negative for chills and fever.   HENT:  Negative for hearing loss and nosebleeds.    Eyes:  Negative for discharge.   Respiratory:  Positive for cough, sputum production and shortness of breath.    Cardiovascular:  Positive for leg swelling. Negative for chest pain.   Gastrointestinal:  Negative for abdominal pain.   Genitourinary:  Negative for hematuria.   Musculoskeletal:  Negative for falls.   Skin:  Negative for itching.   Neurological:  Positive for weakness. Negative for speech change, seizures and loss of consciousness.   Endo/Heme/Allergies:  Negative for polydipsia. Does not bruise/bleed easily.   Psychiatric/Behavioral:  Negative for hallucinations.        Objective:     Vital Signs (Most Recent):  Temp: 98.4 °F (36.9 °C) (05/27/22 1639)  Pulse: 81 (05/27/22 1639)  Resp: 18 (05/27/22 1639)  BP: (!) 140/67 (05/27/22 1639)  SpO2: 97 % (05/27/22 1639)   Vital Signs (24h Range):  Temp:  [97.5 °F (36.4 °C)-99.4 °F (37.4 °C)] 98.4 °F (36.9 °C)  Pulse:  [] 81  Resp:  [16-22] 18  SpO2:  [96 %-99 %] 97 %  BP: ()/(52-76) 140/67     Weight: 97.1 kg (214 lb 1.1 oz)  Body mass index is 35.62 kg/m².      Intake/Output Summary (Last 24 hours) at 5/27/2022 1713  Last data filed at 5/27/2022 1649  Gross per 24 hour   Intake 5089.03 ml   Output 3450 ml   Net 1639.03 ml         Physical  Exam  Vitals and nursing note reviewed.   Constitutional:       General: She is not in acute distress.     Appearance: She is well-developed. She is ill-appearing.   HENT:      Head: Normocephalic and atraumatic.      Nose: Nose normal.   Eyes:      Extraocular Movements: Extraocular movements intact.   Cardiovascular:      Rate and Rhythm: Normal rate and regular rhythm.   Pulmonary:      Effort: Pulmonary effort is normal.      Breath sounds: No stridor. Rhonchi present.      Comments: Right-side pigtail in place  Abdominal:      General: There is no distension.   Musculoskeletal:         General: No signs of injury.      Cervical back: Normal range of motion and neck supple.   Skin:     General: Skin is dry.   Neurological:      General: No focal deficit present.      Mental Status: She is alert and oriented to person, place, and time. Mental status is at baseline.   Psychiatric:         Behavior: Behavior normal.       Vents:  Oxygen Concentration (%): 32 (05/27/22 0741)    Lines/Drains/Airways       Peripherally Inserted Central Catheter Line  Duration             PICC Double Lumen 05/17/22 1612 right basilic 10 days              Drain  Duration                  Chest Tube 05/21/22 0957 1 Right Pleural 8.5 Fr. 6 days    Female External Urinary Catheter 05/21/22 0700 6 days                    Significant Labs:    CBC/Anemia Profile:  Recent Labs   Lab 05/26/22  0513 05/27/22  0508   WBC 13.29* 12.14   HGB 7.6* 7.3*   HCT 24.1* 23.5*    398   MCV 87 87   RDW 15.8* 15.3*          Chemistries:  Recent Labs   Lab 05/26/22  0513 05/27/22  0508   * 137   K 4.3 4.5   CL 99 101   CO2 27 25   BUN 21* 21*   CREATININE 1.3 1.4   CALCIUM 7.7* 7.5*   ALBUMIN 1.5* 1.5*   PROT 5.8* 5.7*   BILITOT 0.3 0.2   ALKPHOS 91 84   ALT 12 13   AST 12 17        Latest Reference Range & Units 05/07/22 09:05 05/21/22 10:22   Fluid Appearance  Hazy Cloudy   WBC, Body Fluid /cu mm 22903 [1] 051036 (C) [2]   Body Fluid Type   Pleural Fluid, Right Pleural Fluid, Right   Segs, Fluid % 93 88   Lymphs, Fluid % 3 7   Monocytes/Macrophages, Fluid % 4 5   Amylase, Fluid Not established U/L 34 [3]    Glucose, Fluid Not established mg/dL 29 [4]    LD, Fluid Not established U/L 1077 [5]    Body Fluid, Albumin See text g/dL 1.3 [6]    Body Fluid Source Amylase  Pleural Fluid, Right    Body Fluid Source, Albumin  Pleural Fluid, Right    Body Fluid Source, Glucose  Pleural Fluid, Right    Body Fluid Source, LDH  Pleural Fluid, Right    Body Fluid Source, Total Protein  Pleural Fluid, Right    Body Fluid, Protein Not established g/dL 3.7 [7]    Cholesterol, Body Fluid See Comment mg/dL 79 [8]          Significant Imaging:       Chest x-ray 05/26/2022     CLINICAL HISTORY:  chest tube pain;     TECHNIQUE:  Single frontal view of the chest was performed.     COMPARISON:  Chest radiograph 05/25/2022 with priors cardiac     FINDINGS:  Cardiac leads project over the chest.  Stable position of a right PICC.  Interval repositioning of a right pigtail catheter at the lung base.  Similar appearance of patchy ground-glass opacities throughout both lungs.  New small left pleural effusion.  No pneumothorax.  The cardiomediastinal silhouette is unchanged.     Impression:     Slight interval repositioning of a chest tube at the right lung base.  No pneumothorax.     New small left pleural effusion.     Similar appearance of patchy ground-glass opacities throughout both lungs.         CT chest without contrast 05/20/2022    Narrative & Impression  EXAMINATION:  CT CHEST WITHOUT CONTRAST     CLINICAL HISTORY:  Empyema;     TECHNIQUE:  Low dose axial images, sagittal and coronal reformations were obtained from the thoracic inlet to the lung bases. Contrast was not administered.  All CT scans at this facility are performed using dose optimization techniques including the following: automated exposure control; adjustment of the mA and/or kV; use of iterative  reconstruction technique.     COMPARISON:  CTA chest non coronary 05/06/2022, chest radiograph 05/20/2022, MRI thoracic spine 05/19/2022     FINDINGS:  Base of Neck: There is diffuse body wall edema.  Unremarkable, noting the presence of AA PICC line which terminates at the superior cavoatrial junction.     Thoracic soft tissues: There is diffuse body wall edema.     Aorta: Left-sided aortic arch.  No aneurysm and no significant atherosclerosis     Heart: Normal size. No effusion.  No significant coronary atherosclerosis.     Pulmonary vasculature: Pulmonary arteries distribute normally.     Esha/Mediastinum: There are few prominent mediastinal lymph nodes with a prevascular node measuring 0.8 cm in short axis.     Airways: Patent.     Lungs/Pleura: There are multiple bilateral consolidative densities scattered throughout the lungs, many of which demonstrate cavitation, again suggesting septic emboli.  There is small left pleural fluid, which appears simple and layers dependently.  There is a loculated moderate right pleural effusion, which appears similar in size to the comparison exam however now containing air, which may be due to empyema.  There is adjacent atelectatic change within the bilateral lower lobes.  Overall number and size of consolidative densities has increased since the comparison exam.     Esophagus: Normal.     Upper Abdomen: There is persistent wedge-shaped hypodensity within the spleen, suggesting infarct.     Bones: No acute fracture. No suspicious lytic or sclerotic lesions.     Impression:     Interval progression with increased number and size of consolidative cavitary densities throughout the lungs, most suggestive of septic emboli with larger opacities concerning for necrotizing pneumonia.     Stable size of loculated right moderate pleural effusion, now containing air compatible with hydropneumothorax as reported on prior MRI.  Interval development of air is concerning for empyema noting  limited evaluation without intravenous contrast.  New small simple appearing left pleural effusion.     Wedge-shaped splenic hypodensity, suggesting infarction.     Anasarca.           ABG  No results for input(s): PH, PO2, PCO2, HCO3, BE in the last 168 hours.  Assessment/Plan:     * Acute bacterial endocarditis  Medical Mx  CTS note noted  5/23 IV oxacillin.  5/24 IV oxacillin as per Infectious Disease  5/25 repeat cultures none relevant.  IV oxacillin as per Infectious Disease x4 weeks.  T-max 101.7° last  24 hours  5/26 repeat blood concern negative continue oxacillin gtt   5/27 continue oxacillin drip    Empyema of right pleural space  5/20 Suspect empyema, needs chest tube placement  5/21 s/p chest tube placement, adequate expansion - will check Chest X Ray in aa  5/23 tPA DNase.  5/24 tPA for DNase installation via chest tube.  Monitor chest tube output.  Repeat chest x-ray in a.m..  5/25 chest tube output increased from 50 mL on average per 24 hours to 160 mL last 24 hours.  Purulent drainage noted.  Will repeat tPA plus Dnase instillation.  5/26 chest x-ray reviewed.  Continue IV oxacillin.  Right-sided pigtail in place.  Status post tPA and DNase x2.  Bloody drainage around pigtail today status post tPA and DNase yesterday.  Will hold on fibrinolysis.  Monitor chest tube output  5/27 right pigtail in place.  225 mL  Last 24 hours average of 200 mL per day.  Change pleurovac canister to better assess the color of the fluid pus versus serosanguineous.  Keep pigtail in place for now.  Ultrasound bilateral chest assess pleural effusions.  Continue oxacillin drip.    Pulmonary embolism, septic  5/24 continue IV oxacillin.  5/25 on IV  Oxacillin  since 05/09.  Repeat CRP.  5/27 right pigtail in place.  225 mL  Last 24 hours average of 200 mL per day.  Change pleurovac canister to better assess the color of the fluid pus versus serosanguineous.  Keep pigtail in place for now.  Ultrasound bilateral chest assess  pleural effusions.  Continue oxacillin drip.    Parapneumonic effusion  Complicated by PNA, possible empyema  SP Thora  Cont Abx  5/20/2022 Probable empyema,needs chest tube drainage  5/22 chest tube draining  5/23 60 cc drained yesterday.  Possible tPA and DNase tomorrow.  Continue IV oxacillin.  5/24 monitor chest tube output post tPA and DNase instillation today PA chest tube  5/26 chest x-ray reviewed.  Continue IV oxacillin.  Right-sided pigtail in place.  Status post tPA and DNase x2.  Bloody drainage around pigtail today status post tPA and DNase yesterday.  Will hold on fibrinolysis.  Monitor chest tube output  5/27 right pigtail in place.  225 mL  Last 24 hours average of 200 mL per day.  Change pleurovac canister to better assess the color of the fluid pus versus serosanguineous.  Keep pigtail in place for now.  Ultrasound bilateral chest assess pleural effusions.  Continue oxacillin drip.         Piyush San MD  Pulmonology  [facility] - Med Surg

## 2022-05-27 NOTE — ASSESSMENT & PLAN NOTE
5/24 continue IV oxacillin.  5/25 on IV  Oxacillin  since 05/09.  Repeat CRP.  5/27 right pigtail in place.  225 mL  Last 24 hours average of 200 mL per day.  Change pleurovac canister to better assess the color of the fluid pus versus serosanguineous.  Keep pigtail in place for now.  Ultrasound bilateral chest assess pleural effusions.  Continue oxacillin drip.

## 2022-05-27 NOTE — PROGRESS NOTES
Pharmacist Renal Dose Adjustment Note    Tianna Leon is a 37 y.o. female being treated with the medication ciprofloxacin.    Patient Data:    Vital Signs (Most Recent):  Temp: 97.5 °F (36.4 °C) (05/27/22 1232)  Pulse: 79 (05/27/22 1232)  Resp: 18 (05/27/22 1232)  BP: 115/73 (05/27/22 1232)  SpO2: 97 % (05/27/22 1232) Vital Signs (72h Range):  Temp:  [97.5 °F (36.4 °C)-101.7 °F (38.7 °C)]   Pulse:  []   Resp:  [15-26]   BP: ()/(45-76)   SpO2:  [93 %-99 %]      Recent Labs   Lab 05/25/22  0644 05/26/22  0513 05/27/22  0508   CREATININE 1.0 1.3 1.4     Serum creatinine: 1.4 mg/dL 05/27/22 0508  Estimated creatinine clearance: 62.2 mL/min    Ciprofloxacin 500 mg PO every 12 hours will be changed to ciprofloxacin 750 mg PO every 12 hours.    Pharmacist's Name: Marie Cuadra, PharmD

## 2022-05-27 NOTE — PT/OT/SLP PROGRESS
"Occupational Therapy  Treatment    Tianna Leon   MRN: [id number]   Admitting Diagnosis: Acute bacterial endocarditis    OT Date of Treatment: 05/27/22   OT Start Time: 0900  OT Stop Time: 0915  OT Total Time (min): 15 min    Billable Minutes:  Therapeutic Exercise 15 minutes    OT/CHRISTI: OT          General Precautions: Standard, fall  Orthopedic Precautions: N/A  Braces: N/A  Respiratory Status: Room air         Subjective:  Communicated with nurse and epic chart review prior to session.  Pain/Comfort  Pain Rating 1: 8/10    Objective:     Balance:   Static Sit: FAIR+: Able to take MINIMAL challenges from all directions  Dynamic Sit: FAIR+: Maintains balance through MINIMAL excursions of active trunk motion  Therapeutic Activities and Exercises:  Pt educated on hep. Pt performed 1 set x 15 reps b ue rom exercise in all available planes and ranges seated in bed side chair.     AM-PAC 6 CLICK ADL   How much help from another person does this patient currently need?   1 = Unable, Total/Dependent Assistance  2 = A lot, Maximum/Moderate Assistance  3 = A little, Minimum/Contact Guard/Supervision  4 = None, Modified [place]/Independent    Putting on and taking off regular lower body clothing? : 2  Bathing (including washing, rinsing, drying)?: 2  Toileting, which includes using toilet, bedpan, or urinal? : 2  Putting on and taking off regular upper body clothing?: 2  Taking care of personal grooming such as brushing teeth?: 2  Eating meals?: 2  Daily Activity Total Score: 12     AM-PAC Raw Score CMS "G-Code Modifier Level of Impairment Assistance   6 % Total / Unable   7 - 8 CM 80 - 100% Maximal Assist   9-13 CL 60 - 80% Moderate Assist   14 - 19 CK 40 - 60% Moderate Assist   20 - 22 CJ 20 - 40% Minimal Assist   23 CI 1-20% SBA / CGA   24 CH 0% Independent/ Mod I       Patient left up in chair with all lines intact, call button in reach, bed alarm on and nurse hao notified    ASSESSMENT:  Tianna " Thea Leon is a 37 y.o. female with a medical diagnosis of Acute bacterial endocarditis and presents with debility and generalized weakness.    Rehab identified problem list/impairments: Rehab identified problem list/impairments: weakness, impaired cognition, impaired endurance, impaired sensation, decreased upper extremity function, impaired self care skills, impaired functional mobilty, decreased safety awareness, decreased ROM    Rehab potential is good.    Activity tolerance: Good    Discharge recommendations: Discharge Facility/Level of Care Needs: nursing facility, skilled     Barriers to discharge:      Equipment recommendations: walker, rolling     GOALS:   Multidisciplinary Problems     Occupational Therapy Goals        Problem: Occupational Therapy    Goal Priority Disciplines Outcome Interventions   Occupational Therapy Goal     OT, PT/OT Ongoing, Progressing    Description: O.T. GOALS MET BY 5-30-22  SBA WITH UE DRESSING  MIN A WITH LE DRESSING  PT WILL TOLERATE 1 SET X 15 REPS B UE ROM EXERCISE  MIN A WITH TOILETING                   Plan:  Patient to be seen 2 x/week to address the above listed problems via self-care/home management, therapeutic activities, therapeutic exercises  Plan of Care expires: 05/30/22  Plan of Care reviewed with: patient         05/27/2022

## 2022-05-27 NOTE — PLAN OF CARE
Remains injury free. Pain managed with oral medication. Chest tube atrium changed as per MD request, tolerated well. Up to chair, tolerated well. No s/s acute distress. Will monitor.

## 2022-05-27 NOTE — PROGRESS NOTES
[facility] Medicine  Progress Note    Patient Name: Tianna Leon  MRN: [id number]  Patient Class: IP- Inpatient   Admission Date: 5/6/2022  Length of Stay: 21 days  Attending Physician: Elizabeth Kim MD  Primary Care Provider: Spenser Campa MD    Subjective:     Principal Problem:Acute bacterial endocarditis    HPI:  38 y/o. female  with a PMHx of asthma, COPD,Bipolar  , IVDU and HLD presented to the ER with a c/o  sob for the last weak which has gotten worse . The SOB is associated with fever , chills , productive cough , back pain  and generalized weakness . She report cough some blood this am .  She is active IVDU  ( heroine , cocaine  and amphetamine ) . She denies any  sick contact , chest pain  , GI/ sx , She also complaning of LE sweeling . Knee pain and B/L LE rash .   ER COURSE: CTA chest negative for pe . Multiple nodular and cavitary opacities concerning for septic emboli with larger opacities possibly necrotizing pneumonia. CT cervical  and thoracic did not show any acute finding , CT lumbar Possible progression of degenerative changes most notable at L4-5 with moderate to severe spinal canal stenosis at this level.  WBC  29 k , na 130 , k 3.4 , cl 89 , procal 2.71   ER VS:  BP Pulse Resp Temp SpO2   (!) 124/58 110 (!) 30 (!) 101.6 °F (38.7 °C) 96 %           Pt will be admitted to inpt with a dx of PNA and severe sepsis       Overview/Hospital Course:  5/7 admitted for pneumonia, severe sepsis in setting of ivdu. Mother at bedside and provides collateral. Patient has struggled with substance abuse for approximately 20 years, worsening over last 3-5 years since life event. Onset of symptoms 1-2 weeks ago. Tolerated thoracentesis with no pneumothorax on chest x-ray. Mri lumbar and echo, pending. Bcx positive, growing gram positive cocci. On vanc, cefepime and flagyl. Infectious disease consulted for bacteremia. 5/8/22 The patient remained afebrile overnight. Currently on  cefepime/vanc/flagyl. Blood cx 5/6/22 are growing MSSA. The patient refused the MRI lumbar spine overnight d/t being unable to lay flat from back pain. Will give pain meds and attempt to get the MRI today to r/o spinal abscess. The ECHO showed a 1.3 x 1.5 cm elongated, mobile echodensity seen on the tricuspid valve consistent with vegetation, CT surgery was consulted. Will need a SUSANNAH. Infectious disease is consulted. Will repeat blood cx today. 5/9/22 No acute events overnight. The patient reports lower back pain is not well controlled with current pain regimen, will adjust. Repeat blood cx are still positive. Sensitivities are back and Staph is sensitive to oxacillin, will D/C vanc/cefepime. Cardiology consulted and plans for a SUSANNAH today. CT surgery following and recommends continuing medical management with appropriate ABX, no surgical intervention at this time. MRI lumbar spine showed moderate to severe spinal canal stenosis with moderate left-sided neural foraminal stenosis, Neurosurgery consulted. 5/10/22 No acute events overnight. The patient reports some improvement in pain with adjustment in pain meds. Repeat blood cx are +. Continue IV oxacillin and flagyl. Infectious disease is consulted. IR consulted to evaluate possible paraspinous muscle myositis versus abscess. Recommend IR drainage. Continue current management. 5/11/22 No acute events overnight. The patient reports pain is still not well controlled at times. The patient reports that she is very anxious about the upcoming SUSANNAH and IR drainage. SUSANNAH was pushed back to this afternoon because the patient ate candy this AM. Continue treatment with IV oxacillin and flagyl. Infectious disease is following. WBC trended down to 31K. Continue current management. 5/12/22 No acute events overnight. The patients WBC improved to 22K overnight. The patient remains on continuous oxacillin infusion. Infectious disease is following. MRI thoracic spine is ordered and  pending. The patients SUSANNAH was postponed again today d/t low H/H. Hgb was noted to be 6.2 this morning, will transfuse 1 unit PRBC. The patient reports pain is still not well controlled. The case was discussed with Neurosurgery who recommended adding extended release pain meds. Approximately 1 ml of purulent drainage aspirated from back yesterday per IR, growing gram + cocci.     5/13: Patient was given 1U PRBC on 5/12, improved from 6.2 --> 7.0, will give an additional unit PRBC. Radiology attempted to bring patient on 5/12 at 1755 for Thoracic MRI, patient refused due to pain and anxiety. Repeat attempt this AM, patient again refused. Will order Ativan 1mg IV to be given with pain medication prior to scan to relieve anxiety and pain, nurse to notify NP when ready to go for scan. Discussed pain management with patient, discussed transition to PO pain medications to being preparing for d/c to LTAC, adjusted PO pain regimen will reevaluate to determine effectiveness. Repeat Blood cultures 5/11/22: NGTD.     5/14: Final anaerobic cultures pending, continue to adjust pain regimen, d/c IV Dilaudid today. Patient currently managed with PO Morphine extended release and PO oxycodone. D/C plan is for LTAC once final antibiotic regimen determined. Patient continues to refuse MRI of thoracic spine.     5/15: Called to room this AM, patient had coughed up blood and mucous. Approx. 1-2 teaspoons of blood in emesis bag, ordered CXR and repeat CBC, CXR showed improvement from previous day, repeat CBC at time of incident, showed a slight decrease in Hgb: 9.7 --> 8.7, when repeated at 1500, Hgb had improved to 10. no further episodes of coughing up blood throughout day. Patient continues to c/o not having enough pain medication but continues to sleep most of the day and will fall asleep when speaking at times. Added IV Toradol to plan.     5/16: No further episodes of coughing blood overnight, patient participated with encouragement  with PT/OT today, recommendations are HH vs. SNF pending progress. Awaiting final antibiotic regimen recommendations. Anaerobic culture results finalized: negative for growth. Afebrile, WBC trending downward.     5/17: Antibiotic regimen per ID is continuous oxacillin until 7/11/22, SW notified of plan, will seek acceptance at LTAC or SNF. Patient choice is [facility]. WBC in normal range. AMS this AM, CT of head: no acute findings. Given narcan and mental status improved. Decreased narcotic dose.     5/18: Per discussion with patient, she is willing to have the MRI of thoracic spine today, will order IV pain medication and anxiety medication prior to scan. Nursing and Radiology aware of needing to premedicate for scan. Patient is more cooperative and participating with care.     5/19: Patient given premedications for MRI, MRI completed, results pending. Patient more agreeable with treatment plan and cooperating. Will need psychiatry follow-up after discharge. Hgb: 6.7, will give 1U PRBC    5/20/22: +LEON and generalized pain. Thoracic MRI showed no evidence for disc osteomyelitis in the thoracic spine and no epidural abscess, Loculated right hydropneumothorax and extensive bilateral pulmonary infiltrates. Ct chest showed septic emboli and moderate right pleural effusion.+splenic infarct  Pulmonology felt likely empyema- consulted IR for chest tube.     5/21/22: s/p right pig tail placement to suction/ drainage system per IR. 60ml pus removed initially. Fluid analysis consistent with empyema. Pleural fluid cultures pending. Now with scant serosanguinous output.+ right lateral chest pain at chest tube site. Denies any other complaints. Status update provided to pt's mother.     5/22/22: Repeatedly requesting IV Dilaudid. Toradol ordered. Afebrile, WBC normal, O2sats stable on 3lites. s/p right pig tail placement to suction/ drainage system per IR on 5/21/22- draining serous draiange with pus. Plan for LTAC placement  "    5/23/22: More cofortable today. Not getting OOB. Encouraged OOB and rationale provided. Pulmonology recommended Possible tPA and DNase in chest tube tomorrow    5/24/22: Examined OOB in chair. Encouraged pt to continue OOB. Plan for tPA and DNase in chest tube today per Pulmonology  Pt denied LTAC- will pursue SNF    5/25/22: Temp 101.7F last night. Will recheck blood cultures and UA. BP low - IVF bolus given and restarted IVF. ID re-consulted. 160ml of purulent CT drainage output. Dr. San plans to repeat tPA and DNase in chest tube again today.     5/26/22: temp 99.2. c/o of right "lung pain" when eating. Will consult speech to check swallow. +mild leukocytosis. Repeat BC 5/25/22 show NGTD. Repeat UA showed rare trichomas. Chest tube drainage remains purulent     5/17/22  Low grade temp. Downward trend of hemoglobin, related to chest tube drainage. Blood cultures remain negative. Urine culture growing E. Coli. Sensitivities are pending.       Interval History: sitting up in chair. Dressing resinforce.bloodtinged seropurulent drainage noted in TF.    Review of Systems   Constitutional:  Positive for activity change, appetite change and fatigue. Negative for chills, diaphoresis, fever and unexpected weight change.   HENT:  Negative for congestion, hearing loss, mouth sores, postnasal drip, rhinorrhea, sore throat and trouble swallowing.    Eyes:  Negative for discharge and visual disturbance.   Respiratory:  Negative for cough and chest tightness.    Cardiovascular:  Negative for chest pain, palpitations and leg swelling.   Gastrointestinal:  Negative for blood in stool, constipation, diarrhea, nausea and vomiting.   Endocrine: Negative for cold intolerance and heat intolerance.   Genitourinary:  Negative for difficulty urinating, dyspareunia, flank pain and hematuria.   Musculoskeletal:  Positive for back pain and myalgias. Negative for arthralgias.   Skin: Negative.    Neurological:  Positive for weakness. " Negative for dizziness and light-headedness.   Hematological:  Negative for adenopathy. Does not bruise/bleed easily.   Psychiatric/Behavioral:  Negative for agitation, behavioral problems, confusion and sleep disturbance. The patient is nervous/anxious.      Objective:     Vital Signs (Most Recent):  Temp: 98.7 °F (37.1 °C) (05/27/22 0755)  Pulse: 79 (05/27/22 0755)  Resp: 18 (05/27/22 0907)  BP: (!) 92/52 (05/27/22 0755)  SpO2: 97 % (05/27/22 0755)   Vital Signs (24h Range):  Temp:  [98.7 °F (37.1 °C)-99.6 °F (37.6 °C)] 98.7 °F (37.1 °C)  Pulse:  [] 79  Resp:  [16-22] 18  SpO2:  [96 %-99 %] 97 %  BP: ()/(52-76) 92/52     Weight: 97.1 kg (214 lb 1.1 oz)  Body mass index is 35.62 kg/m².    Intake/Output Summary (Last 24 hours) at 5/27/2022 1138  Last data filed at 5/27/2022 0845  Gross per 24 hour   Intake 4063.45 ml   Output 2450 ml   Net 1613.45 ml      Physical Exam  Vitals and nursing note reviewed.   Constitutional:       General: She is not in acute distress.     Appearance: She is well-developed. She is ill-appearing.   HENT:      Head: Normocephalic and atraumatic.      Nose: Nose normal.   Eyes:      Extraocular Movements: Extraocular movements intact.   Cardiovascular:      Rate and Rhythm: Normal rate and regular rhythm.   Pulmonary:      Effort: Pulmonary effort is normal.      Breath sounds: No stridor. Rhonchi present.      Comments: Right-side pigtail in place  Abdominal:      General: There is no distension.   Musculoskeletal:         General: No signs of injury.      Cervical back: Normal range of motion and neck supple.   Skin:     General: Skin is dry.   Neurological:      General: No focal deficit present.      Mental Status: She is alert and oriented to person, place, and time. Mental status is at baseline.   Psychiatric:         Behavior: Behavior normal.     Significant Labs: All pertinent labs within the past 24 hours have been reviewed.  CBC:   Recent Labs   Lab 05/25/22  9991  05/26/22  0513 05/27/22  0508   WBC  --  13.29* 12.14   HGB 8.5* 7.6* 7.3*   HCT 27.5* 24.1* 23.5*   PLT  --  402 398     CMP:   Recent Labs   Lab 05/26/22  0513 05/27/22  0508   * 137   K 4.3 4.5   CL 99 101   CO2 27 25    101   BUN 21* 21*   CREATININE 1.3 1.4   CALCIUM 7.7* 7.5*   PROT 5.8* 5.7*   ALBUMIN 1.5* 1.5*   BILITOT 0.3 0.2   ALKPHOS 91 84   AST 12 17   ALT 12 13   ANIONGAP 9 11   EGFRNONAA 52* 47*       Significant Imaging: I have reviewed all pertinent imaging results/findings within the past 24 hours.      Assessment/Plan:      * Acute bacterial endocarditis  ECHO showed a 1.3 x 1.5 cm elongated, mobile echodensity seen on the tricuspid valve consistent with vegetation, CT surgery was consulted.   Due to MSSA    -- Infectious disease is consulted.  -- repeat blood cx NGTD.  -- Continue IV ABX.   --Sensitivities are back and Staph is sensitive to oxacillin, D/C vanc/cefepime.   --Cardiology consulted and plans for a SUSANNAH.   --CT surgery following and recommends continuing medical management with appropriate ABX, no surgical intervention at this time.    cont IV oxacillin.  Plan is to continue x 4 weeks. Afebrile    Empyema of right pleural space  Pulmonology consulted and felt likely empyema   IR consulted for chest tube      s/p right pigtail small bore chest tube placement to suction/drainage system per IR on 5/21/22. 60ml pus removed. Now with Serous drainage with pus. Fluid analysis consistent with empyema. Pleural fluid aerobic culture shows NGTD, Anaerobic culture pending    5/24/22: Dr. San plans for tPA and DNase in chest tube today.   5/25/22: 160ml of purulent CT drainage output. Dr. San plans to repeat tPA and DNase in chest tube again today.   5/26/22  Still has purulent drainage noted. Pulmonology following    Normocytic anemia  Secondary to acute illness    --Daily CBC  --Transfuse with 1 unit PRBC for Hgb <7.0 or <8.0 if symptomatic   --Hgb: 6.7, give 1U  PRBC    5/27/22  Hemoglobin 7.3g/dL. downward trend. Has blood tinged seropurulent drainage. Will give additional unit if continues to trend down.    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 IR consulted to evaluate possible paraspinous muscle myositis versus abscess.    --Approximately 1 ml of purulent drainage aspirated 5/11 per IR, growing MSSA  --Continue current regimen  --Oxacillin x 4 weeks total    Lumbar stenosis without neurogenic claudication        Chronic pulmonary heart disease  - Pulmonology following       MSSA bacteremia  Gram positive   Infectious disease consulted  Continue broad spectrum intravenous antibiotic(s) given h/o ivdu  Remains febrile and leukocytosis persists  5/8/22 The patient remained afebrile overnight. Currently on cefepime/vanc/flagyl. Blood cx are growing staph aureus. The patient refused the MRI lumbar spine overnight d/t being unable to lay flat from back pain. Will give pain meds and attempt to get the MRI today to r/o spinal abscess. The ECHO showed a 1.3 x 1.5 cm elongated, mobile echodensity seen on the tricuspid valve consistent with vegetation, CT surgery was consulted. Will need a SUSANNAH. Infectious disease is consulted. Will repeat blood cx today.   5/9/22 Repeat blood cx are still positive. Sensitivities are back and Staph is sensitive to oxacillin, will D/C vanc/cefepime. Infectious disease is following   5/10/22 Repeat blood cx are +. Continue IV oxacillin and flagyl. Infectious disease is consulted. IR consulted to evaluate possible paraspinous muscle myositis versus abscess. Recommend IR drainage. Continue current management.    5/11/22 The patient reports pain is still not well controlled at times. The patient reports that she is very anxious about the upcoming SUSANNAH and IR drainage. SUSANNAH was pushed back to this afternoon because the patient ate candy  this AM. Continue treatment with IV oxacillin and flagyl. Infectious disease is following. WBC trended down to 31K. Continue current management.  5/12/22 The patients WBC improved to 22K overnight. The patient remains on continuous oxacillin infusion. Infectious disease is following.  5/14: WBC continues to improve, 18.10 today     5/15: Blood cultures 5/11/22: NGTD     Continuous Oxacillin, [place]OC[place] [facility] [place]    Parapneumonic effusion  Pulmonology consulted  Status post thoracentesis 5/7/22  Culture grew Normal respiratory da    IVDU (intravenous drug user)   Will ordet TTE to r/o IE  Will order MRI lumbar wwo- completed   Cont broad spectrum IVAB   --MRI thoracic spine ordered per ID, patient refused on multiple attempts    5/7   Studies pending for additional sites of infection given h/o ivdu  5/8/22  on cessation     Severe sepsis  This patient does have evidence of infective focus  My overall impression is sepsis. Vital signs were reviewed and noted in progress note.  Antibiotics given-   Antibiotics (From admission, onward)            Start     Stop Route Frequency Ordered    05/09/22 1100  oxacillin 12 g in  mL CONTINUOUS INFUSION         -- IV Every 24 hours (non-standard times) 05/09/22 0932    05/07/22 2100  mupirocin 2 % ointment         05/12 2059 Nasl 2 times daily 05/07/22 1646        Cultures were taken-   Microbiology Results (last 7 days)     Procedure Component Value Units Date/Time    Culture, Respiratory with Gram Stain [[id number]] Collected: 05/16/22 0758    Order Status: Sent Specimen: Respiratory from Sputum Updated: 05/16/22 0759    Culture, Anaerobe [[id number]] Collected: 05/11/22 1440    Order Status: Completed Specimen: Abscess from Back Updated: 05/16/22 0733     Anaerobic Culture No anaerobes isolated    Blood culture [[id number]] Collected: 05/11/22 1213    Order Status: Completed Specimen: Blood Updated: 05/16/22 0612     Blood  Culture, Routine No Growth to date      No Growth to date      No Growth to date      No Growth to date      No Growth to date    Blood culture [[id number]] Collected: 05/11/22 1213    Order Status: Completed Specimen: Blood Updated: 05/16/22 0612     Blood Culture, Routine No Growth to date      No Growth to date      No Growth to date      No Growth to date      No Growth to date    Aerobic culture [[id number]]  (Abnormal)  (Susceptibility) Collected: 05/11/22 1440    Order Status: Completed Specimen: Abscess from Back Updated: 05/14/22 1057     Aerobic Bacterial Culture STAPHYLOCOCCUS AUREUS  Many      Gram stain [[id number]] Collected: 05/11/22 1440    Order Status: Completed Specimen: Abscess from Back Updated: 05/12/22 0306     Gram Stain Result Few WBC's      Few Gram positive cocci    Gram stain [[id number]]     Order Status: Canceled Specimen: Joint Fluid from Back     Blood culture [[id number]]  (Abnormal) Collected: 05/08/22 1516    Order Status: Completed Specimen: Blood from Peripheral, Right Hand Updated: 05/11/22 1009     Blood Culture, Routine Gram stain aer bottle: Gram positive cocci in clusters resembling Staph       Results called to and read back by: Chasity Raymundo RN  05/09/2022  11:31      STAPHYLOCOCCUS AUREUS  ID consult required at [facility] access [facility],[place] and [facility].  For susceptibility see order #[id number]      Narrative:      Collection has been rescheduled by [email or web address] at 05/08/2022 13:22 Reason:   Pt in mri will be there after another hour [id number]  Collection has been rescheduled by [email or web address] at 05/08/2022 13:22 Reason:   Pt in mri will be there after another hour [id number]    Blood culture [[id number]]  (Abnormal) Collected: 05/08/22 1515    Order Status: Completed Specimen: Blood from Peripheral, Left Arm Updated: 05/11/22 1009     Blood Culture, Routine Gram stain aer bottle: Gram positive cocci in clusters resembling Staph      Results called to and read back by: Chasity Raymundo RN  05/09/2022   15:40      STAPHYLOCOCCUS AUREUS  [place] consult required [place] [facility] and [place].  For susceptibility see order #[id number]      Narrative:      Collection has been rescheduled by [email or web address] at 05/08/2022 13:22 Reason:   Pt in mri will be there after another hour [id number]  Collection has been rescheduled by [email or web address] at 05/08/2022 13:22 Reason:   Pt in mri will be there after another hour [id number]        Latest lactate reviewed, they are-  No results for input(s): LACTATE in the last 72 hours.    Organ dysfunction indicated by Acute kidney injury  Source-  lung    Source control Achieved by-   Cont Broad spectrum IVAB     Bacteremia  Infectious disease consulted      PNA (pneumonia)  --Cont oxacillin        Tobacco abuse  - Patient thoroughly counseled on smoking cessation, pt verbalized understanding. Time of counseling 10 minutes.        COPD (chronic obstructive pulmonary disease)  Cont breathing tx prn  Pulmonology following     E. Coli UTI  Add Ciprofloxacin    VTE Risk Mitigation (From admission, onward)         Ordered     enoxaparin injection 40 mg  Daily         05/06/22 2336     IP VTE HIGH RISK PATIENT  Once         05/06/22 2336     Place sequential compression device  Until discontinued         05/06/22 2336                Discharge Planning   YESSICA:      Code Status: Full Code   Is the patient medically ready for discharge?:     Reason for patient still in hospital (select all that apply): Treatment  Discharge Plan A: Skilled Nursing Facility   Discharge Delays: None known at this time    Disposition-awaiting SNF acceptance. Denials noted.    Chandni Lambert NP  Department of Hospital Medicine   [facility] - Med Surg

## 2022-05-28 PROBLEM — Z98.890 STATUS POST THORACENTESIS: Status: ACTIVE | Noted: 2022-05-28

## 2022-05-28 LAB
ALBUMIN SERPL BCP-MCNC: 1.5 G/DL (ref 3.5–5.2)
ALP SERPL-CCNC: 82 U/L (ref 55–135)
ALT SERPL W/O P-5'-P-CCNC: 10 U/L (ref 10–44)
ANION GAP SERPL CALC-SCNC: 9 MMOL/L (ref 8–16)
APPEARANCE FLD: CLEAR
AST SERPL-CCNC: 15 U/L (ref 10–40)
BACTERIA UR CULT: ABNORMAL
BASOPHILS # BLD AUTO: 0.05 K/UL (ref 0–0.2)
BASOPHILS NFR BLD: 0.4 % (ref 0–1.9)
BILIRUB SERPL-MCNC: 0.3 MG/DL (ref 0.1–1)
BODY FLD TYPE: NORMAL
BUN SERPL-MCNC: 21 MG/DL (ref 6–20)
CALCIUM SERPL-MCNC: 7.8 MG/DL (ref 8.7–10.5)
CHLORIDE SERPL-SCNC: 101 MMOL/L (ref 95–110)
CO2 SERPL-SCNC: 25 MMOL/L (ref 23–29)
COLOR FLD: YELLOW
CREAT SERPL-MCNC: 1.6 MG/DL (ref 0.5–1.4)
DIFFERENTIAL METHOD: ABNORMAL
EOSINOPHIL # BLD AUTO: 0.2 K/UL (ref 0–0.5)
EOSINOPHIL NFR BLD: 1.4 % (ref 0–8)
EOSINOPHIL NFR FLD MANUAL: 23 %
ERYTHROCYTE [DISTWIDTH] IN BLOOD BY AUTOMATED COUNT: 15.2 % (ref 11.5–14.5)
EST. GFR  (AFRICAN AMERICAN): 46 ML/MIN/1.73 M^2
EST. GFR  (NON AFRICAN AMERICAN): 40 ML/MIN/1.73 M^2
GLUCOSE SERPL-MCNC: 106 MG/DL (ref 70–110)
HCT VFR BLD AUTO: 25 % (ref 37–48.5)
HGB BLD-MCNC: 8 G/DL (ref 12–16)
IMM GRANULOCYTES # BLD AUTO: 0.12 K/UL (ref 0–0.04)
IMM GRANULOCYTES NFR BLD AUTO: 0.9 % (ref 0–0.5)
LYMPHOCYTES # BLD AUTO: 2.2 K/UL (ref 1–4.8)
LYMPHOCYTES NFR BLD: 15.9 % (ref 18–48)
LYMPHOCYTES NFR FLD MANUAL: 14 %
MCH RBC QN AUTO: 27.2 PG (ref 27–31)
MCHC RBC AUTO-ENTMCNC: 32 G/DL (ref 32–36)
MCV RBC AUTO: 85 FL (ref 82–98)
MONOCYTES # BLD AUTO: 1.3 K/UL (ref 0.3–1)
MONOCYTES NFR BLD: 9.1 % (ref 4–15)
MONOS+MACROS NFR FLD MANUAL: 22 %
NEUTROPHILS # BLD AUTO: 10.2 K/UL (ref 1.8–7.7)
NEUTROPHILS NFR BLD: 72.3 % (ref 38–73)
NEUTROPHILS NFR FLD MANUAL: 41 %
NRBC BLD-RTO: 0 /100 WBC
PLATELET # BLD AUTO: 409 K/UL (ref 150–450)
PMV BLD AUTO: 8.8 FL (ref 9.2–12.9)
POTASSIUM SERPL-SCNC: 4.5 MMOL/L (ref 3.5–5.1)
PROT SERPL-MCNC: 5.8 G/DL (ref 6–8.4)
RBC # BLD AUTO: 2.94 M/UL (ref 4–5.4)
SODIUM SERPL-SCNC: 135 MMOL/L (ref 136–145)
WBC # BLD AUTO: 14.04 K/UL (ref 3.9–12.7)
WBC # FLD: 3065 /CU MM

## 2022-05-28 PROCEDURE — 63600175 PHARM REV CODE 636 W HCPCS: Performed by: NURSE PRACTITIONER

## 2022-05-28 PROCEDURE — 97530 THERAPEUTIC ACTIVITIES: CPT | Mod: CQ

## 2022-05-28 PROCEDURE — 82945 GLUCOSE OTHER FLUID: CPT | Performed by: INTERNAL MEDICINE

## 2022-05-28 PROCEDURE — 87070 CULTURE OTHR SPECIMN AEROBIC: CPT | Performed by: INTERNAL MEDICINE

## 2022-05-28 PROCEDURE — 94761 N-INVAS EAR/PLS OXIMETRY MLT: CPT

## 2022-05-28 PROCEDURE — 99233 PR SUBSEQUENT HOSPITAL CARE,LEVL III: ICD-10-PCS | Mod: 25,,, | Performed by: INTERNAL MEDICINE

## 2022-05-28 PROCEDURE — 21400001 HC TELEMETRY ROOM

## 2022-05-28 PROCEDURE — 99233 SBSQ HOSP IP/OBS HIGH 50: CPT | Mod: 25,,, | Performed by: INTERNAL MEDICINE

## 2022-05-28 PROCEDURE — 25000242 PHARM REV CODE 250 ALT 637 W/ HCPCS: Performed by: INTERNAL MEDICINE

## 2022-05-28 PROCEDURE — 80053 COMPREHEN METABOLIC PANEL: CPT | Performed by: NURSE PRACTITIONER

## 2022-05-28 PROCEDURE — 25000003 PHARM REV CODE 250: Performed by: NURSE PRACTITIONER

## 2022-05-28 PROCEDURE — 94640 AIRWAY INHALATION TREATMENT: CPT

## 2022-05-28 PROCEDURE — 83615 LACTATE (LD) (LDH) ENZYME: CPT | Performed by: INTERNAL MEDICINE

## 2022-05-28 PROCEDURE — 89051 BODY FLUID CELL COUNT: CPT | Performed by: INTERNAL MEDICINE

## 2022-05-28 PROCEDURE — 63600175 PHARM REV CODE 636 W HCPCS: Performed by: INTERNAL MEDICINE

## 2022-05-28 PROCEDURE — 25000003 PHARM REV CODE 250: Performed by: FAMILY MEDICINE

## 2022-05-28 PROCEDURE — 88112 CYTOPATH CELL ENHANCE TECH: CPT | Mod: 26,,, | Performed by: STUDENT IN AN ORGANIZED HEALTH CARE EDUCATION/TRAINING PROGRAM

## 2022-05-28 PROCEDURE — 88112 CYTOPATH CELL ENHANCE TECH: CPT | Performed by: STUDENT IN AN ORGANIZED HEALTH CARE EDUCATION/TRAINING PROGRAM

## 2022-05-28 PROCEDURE — 94664 DEMO&/EVAL PT USE INHALER: CPT

## 2022-05-28 PROCEDURE — 32555 ASPIRATE PLEURA W/ IMAGING: CPT | Mod: LT,,, | Performed by: INTERNAL MEDICINE

## 2022-05-28 PROCEDURE — 84157 ASSAY OF PROTEIN OTHER: CPT | Performed by: INTERNAL MEDICINE

## 2022-05-28 PROCEDURE — 32555 PR THORACEN W/IMAG GUIDANCE: ICD-10-PCS | Mod: LT,,, | Performed by: INTERNAL MEDICINE

## 2022-05-28 PROCEDURE — 27000221 HC OXYGEN, UP TO 24 HOURS

## 2022-05-28 PROCEDURE — 85025 COMPLETE CBC W/AUTO DIFF WBC: CPT | Performed by: INTERNAL MEDICINE

## 2022-05-28 PROCEDURE — 87205 SMEAR GRAM STAIN: CPT | Performed by: INTERNAL MEDICINE

## 2022-05-28 PROCEDURE — 88112 PR  CYTOPATH, CELL ENHANCE TECH: ICD-10-PCS | Mod: 26,,, | Performed by: STUDENT IN AN ORGANIZED HEALTH CARE EDUCATION/TRAINING PROGRAM

## 2022-05-28 PROCEDURE — 99900035 HC TECH TIME PER 15 MIN (STAT)

## 2022-05-28 RX ADMIN — ONDANSETRON 4 MG: 2 INJECTION INTRAMUSCULAR; INTRAVENOUS at 05:05

## 2022-05-28 RX ADMIN — LORAZEPAM 1 MG: 1 TABLET ORAL at 03:05

## 2022-05-28 RX ADMIN — OXACILLIN SODIUM 12 G: 10 INJECTION, POWDER, FOR SOLUTION INTRAVENOUS at 04:05

## 2022-05-28 RX ADMIN — LORAZEPAM 1 MG: 1 TABLET ORAL at 09:05

## 2022-05-28 RX ADMIN — IPRATROPIUM BROMIDE AND ALBUTEROL SULFATE 3 ML: 2.5; .5 SOLUTION RESPIRATORY (INHALATION) at 01:05

## 2022-05-28 RX ADMIN — SODIUM CHLORIDE: 0.9 INJECTION, SOLUTION INTRAVENOUS at 04:05

## 2022-05-28 RX ADMIN — MORPHINE SULFATE 15 MG: 15 TABLET, EXTENDED RELEASE ORAL at 08:05

## 2022-05-28 RX ADMIN — ENOXAPARIN SODIUM 40 MG: 40 INJECTION SUBCUTANEOUS at 05:05

## 2022-05-28 RX ADMIN — IPRATROPIUM BROMIDE AND ALBUTEROL SULFATE 3 ML: 2.5; .5 SOLUTION RESPIRATORY (INHALATION) at 07:05

## 2022-05-28 RX ADMIN — FAMOTIDINE 20 MG: 20 TABLET ORAL at 08:05

## 2022-05-28 RX ADMIN — ONDANSETRON 4 MG: 2 INJECTION INTRAMUSCULAR; INTRAVENOUS at 12:05

## 2022-05-28 RX ADMIN — CIPROFLOXACIN 750 MG: 750 TABLET, FILM COATED ORAL at 09:05

## 2022-05-28 RX ADMIN — OXYCODONE HYDROCHLORIDE 5 MG: 5 TABLET ORAL at 02:05

## 2022-05-28 RX ADMIN — TRAZODONE HYDROCHLORIDE 100 MG: 100 TABLET ORAL at 09:05

## 2022-05-28 RX ADMIN — CIPROFLOXACIN 750 MG: 750 TABLET, FILM COATED ORAL at 08:05

## 2022-05-28 RX ADMIN — FAMOTIDINE 20 MG: 20 TABLET ORAL at 09:05

## 2022-05-28 RX ADMIN — OXYCODONE HYDROCHLORIDE 5 MG: 5 TABLET ORAL at 03:05

## 2022-05-28 RX ADMIN — MORPHINE SULFATE 15 MG: 15 TABLET, EXTENDED RELEASE ORAL at 09:05

## 2022-05-28 NOTE — PLAN OF CARE
PT REPORTS SHE HAS BEEN MOVING A LOT TODAY DUE TO MULTIPLE TESTS BEING DONE BEFORE THIS SESSION    SUPINE-->SIT MOD A, SIT-->SUPINE SBA     SCOOT TO HOB VC FO RU/LE PLACEMENT AND HOW TO ASSIST SELF TO SCOOT TO HOB    WHILE SITTING EOB X 5', SHE BEGAN TO BECOME ANXIOUS AND REPORTS SHE IS UNABLE TO SIT ANY LONGER.     PT OPTED TO RETURN TO LAY SUPINE AND END THIS SESSION.

## 2022-05-28 NOTE — ASSESSMENT & PLAN NOTE
5/24 continue IV oxacillin.  5/25 on IV  Oxacillin  since 05/09.  Repeat CRP.  5/27 right pigtail in place.  225 mL  Last 24 hours average of 200 mL per day.  Change pleurovac canister to better assess the color of the fluid pus versus serosanguineous.  Keep pigtail in place for now.  Ultrasound bilateral chest assess pleural effusions.  Continue oxacillin drip.  5/285/28 keep chest tube on the right side for now.  Repeat CRP.  Checking chemistry microbiology  on left-sided pleural fluid to rule out complicated effusion and need for pigtail.  Continue oxacillin drip.

## 2022-05-28 NOTE — ASSESSMENT & PLAN NOTE
Complicated by PNA, possible empyema  SP Thora  Cont Abx  5/20/2022 Probable empyema,needs chest tube drainage  5/22 chest tube draining  5/23 60 cc drained yesterday.  Possible tPA and DNase tomorrow.  Continue IV oxacillin.  5/24 monitor chest tube output post tPA and DNase instillation today PA chest tube  5/26 chest x-ray reviewed.  Continue IV oxacillin.  Right-sided pigtail in place.  Status post tPA and DNase x2.  Bloody drainage around pigtail today status post tPA and DNase yesterday.  Will hold on fibrinolysis.  Monitor chest tube output  5/27 right pigtail in place.  225 mL  Last 24 hours average of 200 mL per day.  Change pleurovac canister to better assess the color of the fluid pus versus serosanguineous.  Keep pigtail in place for now.  Ultrasound bilateral chest assess pleural effusions.  Continue oxacillin drip.  5/28 keep chest tube on the right side for now.  Repeat CRP.  Checking chemistry microbiology  on left-sided pleural fluid to rule out complicated effusion and need for pigtail.  Continue oxacillin drip.

## 2022-05-28 NOTE — ASSESSMENT & PLAN NOTE
Medical [facility]  CTS note noted  5/23 IV oxacillin.  5/24 IV oxacillin as per Infectious Disease  5/25 repeat cultures none relevant.  IV oxacillin as per Infectious Disease x4 weeks.  T-max 101.7° last  24 hours  5/26 repeat blood concern negative continue oxacillin gtt   5/27 continue oxacillin drip   5/28 oxacillin drip.  Blood culture 5/25 was negative

## 2022-05-28 NOTE — SUBJECTIVE & OBJECTIVE
Interval History: asleep in bed easily around. Still has increased output from chest tube on yesterday. Appears more serosanguinous drainage today.  Ultrasound completed last night shows no significant right sided pleural effusion. Left effusion has <350mls. Pulmonology following.     Review of Systems   Constitutional:  Positive for activity change, appetite change and fatigue. Negative for chills, diaphoresis, fever and unexpected weight change.   HENT:  Negative for congestion, hearing loss, mouth sores, postnasal drip, rhinorrhea, sore throat and trouble swallowing.    Eyes:  Negative for discharge and visual disturbance.   Respiratory:  Negative for cough and chest tightness.    Cardiovascular:  Negative for chest pain, palpitations and leg swelling.   Gastrointestinal:  Negative for blood in stool, constipation, diarrhea, nausea and vomiting.   Endocrine: Negative for cold intolerance and heat intolerance.   Genitourinary:  Negative for difficulty urinating, dyspareunia, flank pain and hematuria.   Musculoskeletal:  Positive for back pain and myalgias. Negative for arthralgias.   Skin: Negative.    Neurological:  Positive for weakness. Negative for dizziness and light-headedness.   Hematological:  Negative for adenopathy. Does not bruise/bleed easily.   Psychiatric/Behavioral:  Negative for agitation, behavioral problems, confusion and sleep disturbance. The patient is nervous/anxious.      Objective:     Vital Signs (Most Recent):  Temp: 98.1 °F (36.7 °C) (05/28/22 0733)  Pulse: 85 (05/28/22 0738)  Resp: 18 (05/28/22 0857)  BP: (!) 167/91 (05/28/22 0733)  SpO2: 96 % (05/28/22 0738)   Vital Signs (24h Range):  Temp:  [97.5 °F (36.4 °C)-100.2 °F (37.9 °C)] 98.1 °F (36.7 °C)  Pulse:  [] 85  Resp:  [14-18] 18  SpO2:  [96 %-99 %] 96 %  BP: (115-167)/(67-91) 167/91     Weight:  (Scale says not calibrated)  Body mass index is 35.62 kg/m².    Intake/Output Summary (Last 24 hours) at 5/28/2022 1104  Last data  filed at 5/28/2022 0000  Gross per 24 hour   Intake 1535.58 ml   Output 2100 ml   Net -564.42 ml      Physical Exam  Vitals and nursing note reviewed.   Constitutional:       General: She is not in acute distress.     Appearance: She is well-developed. She is ill-appearing.   HENT:      Head: Normocephalic and atraumatic.      Nose: Nose normal.   Eyes:      Extraocular Movements: Extraocular movements intact.   Cardiovascular:      Rate and Rhythm: Normal rate and regular rhythm.   Pulmonary:      Effort: Pulmonary effort is normal.      Breath sounds: No stridor. Rhonchi present.      Comments: Right-side pigtail in place    Serosanguinous drainage noted in tubing    Abdominal:      General: There is no distension.   Musculoskeletal:         General: No signs of injury.      Cervical back: Normal range of motion and neck supple.   Skin:     General: Skin is dry.   Neurological:      General: No focal deficit present.      Mental Status: She is alert and oriented to person, place, and time. Mental status is at baseline.   Psychiatric:         Behavior: Behavior normal.   Significant Labs: All pertinent labs within the past 24 hours have been reviewed.  CBC:   Recent Labs   Lab 05/27/22  0508 05/28/22  0621   WBC 12.14 14.04*   HGB 7.3* 8.0*   HCT 23.5* 25.0*    409     CMP:   Recent Labs   Lab 05/27/22  0508 05/28/22  0621    135*   K 4.5 4.5    101   CO2 25 25    106   BUN 21* 21*   CREATININE 1.4 1.6*   CALCIUM 7.5* 7.8*   PROT 5.7* 5.8*   ALBUMIN 1.5* 1.5*   BILITOT 0.2 0.3   ALKPHOS 84 82   AST 17 15   ALT 13 10   ANIONGAP 11 9   EGFRNONAA 47* 40*       Significant Imaging: I have reviewed all pertinent imaging results/findings within the past 24 hours.

## 2022-05-28 NOTE — ASSESSMENT & PLAN NOTE
PNA in IVDU  Cultures; Blood and sputum  BC +ve G+ve cocci  AbxL OXACILLIN + FLAGYL  5/22 stable, improved aeration   5/26 chest x-ray reviewed.  Continue IV oxacillin.  Right-sided pigtail in place.  Status post tPA and DNase x2.  Bloody drainage around pigtail today status post tPA and DNase yesterday.  Will hold on fibrinolysis.  Monitor chest tube output  5/28 keep chest tube on the right side for now.  Repeat CRP.  Checking chemistry microbiology  on left-sided pleural fluid to rule out complicated effusion and need for pigtail.  Continue oxacillin drip.

## 2022-05-28 NOTE — PLAN OF CARE
Remains injury free. Pain managed with oral medication. Right chest tube to water seal. Purewick in use.repositions independently. No s/s acute distress. Will monitor.

## 2022-05-28 NOTE — ASSESSMENT & PLAN NOTE
Secondary to acute illness    --Daily CBC  --Transfuse with 1 unit PRBC for Hgb <7.0 or <8.0 if symptomatic   --Hgb: 6.7, give 1U PRBC    5/27/22  Hemoglobin 7.3g/dL. downward trend. Has blood tinged seropurulent drainage. Will give additional unit if continues to trend down.  5/28/22  Hemoglobin 8.0 today

## 2022-05-28 NOTE — SUBJECTIVE & OBJECTIVE
Interval History:   5/28 patient seen and examined.  T-max 100.2° last 24 hours.  Minimal amount sanguinous chest tube drainage on the right.  Left side ultrasound 330 mL.  Diagnostic thoracentesis performed 20 mL of cloudy pinkish fluid drained on the left.  No panchito pus noted.  Weak sleepy generalized myalgias.  Review of Systems   Constitutional:  Positive for malaise/fatigue and weight loss. Negative for chills and fever.   HENT:  Negative for hearing loss and nosebleeds.    Eyes:  Negative for discharge.   Respiratory:  Positive for cough, sputum production and shortness of breath.    Cardiovascular:  Positive for leg swelling. Negative for chest pain.   Gastrointestinal:  Negative for abdominal pain.   Genitourinary:  Negative for hematuria.   Musculoskeletal:  Negative for falls.   Skin:  Negative for itching.   Neurological:  Positive for weakness. Negative for speech change, seizures and loss of consciousness.   Endo/Heme/Allergies:  Negative for polydipsia. Does not bruise/bleed easily.   Psychiatric/Behavioral:  Negative for hallucinations.        Objective:     Vital Signs (Most Recent):  Temp: 97.8 °F (36.6 °C) (05/28/22 1247)  Pulse: 74 (05/28/22 1300)  Resp: 18 (05/28/22 1444)  BP: (!) 147/84 (05/28/22 1247)  SpO2: 96 % (05/28/22 1300)   Vital Signs (24h Range):  Temp:  [97.8 °F (36.6 °C)-100.2 °F (37.9 °C)] 97.8 °F (36.6 °C)  Pulse:  [] 74  Resp:  [14-18] 18  SpO2:  [96 %-99 %] 96 %  BP: (140-167)/(67-91) 147/84     Weight:  (Scale says not calibrated)  Body mass index is 35.62 kg/m².      Intake/Output Summary (Last 24 hours) at 5/28/2022 1508  Last data filed at 5/28/2022 0000  Gross per 24 hour   Intake 827.58 ml   Output 1100 ml   Net -272.42 ml         Physical Exam  Vitals and nursing note reviewed.   Constitutional:       General: She is not in acute distress.     Appearance: She is well-developed. She is ill-appearing.   HENT:      Head: Normocephalic and atraumatic.      Nose: Nose  normal.   Eyes:      Extraocular Movements: Extraocular movements intact.   Cardiovascular:      Rate and Rhythm: Normal rate and regular rhythm.   Pulmonary:      Effort: Pulmonary effort is normal.      Breath sounds: No stridor. Rhonchi present.      Comments: Right-side pigtail in place  Abdominal:      General: There is no distension.   Musculoskeletal:         General: No signs of injury.      Cervical back: Normal range of motion and neck supple.   Skin:     General: Skin is dry.   Neurological:      General: No focal deficit present.      Mental Status: She is alert and oriented to person, place, and time. Mental status is at baseline.   Psychiatric:         Behavior: Behavior normal.       Vents:  Oxygen Concentration (%): 32 (05/28/22 1300)    Lines/Drains/Airways       Peripherally Inserted Central Catheter Line  Duration             PICC Double Lumen 05/17/22 1612 right basilic 10 days              Drain  Duration                  Chest Tube 05/21/22 0957 1 Right Pleural 8.5 Fr. 7 days    Female External Urinary Catheter 05/21/22 0700 7 days                    Significant Labs:    CBC/Anemia Profile:  Recent Labs   Lab 05/27/22  0508 05/28/22  0621   WBC 12.14 14.04*   HGB 7.3* 8.0*   HCT 23.5* 25.0*    409   MCV 87 85   RDW 15.3* 15.2*          Chemistries:  Recent Labs   Lab 05/27/22  0508 05/28/22  0621    135*   K 4.5 4.5    101   CO2 25 25   BUN 21* 21*   CREATININE 1.4 1.6*   CALCIUM 7.5* 7.8*   ALBUMIN 1.5* 1.5*   PROT 5.7* 5.8*   BILITOT 0.2 0.3   ALKPHOS 84 82   ALT 13 10   AST 17 15        Latest Reference Range & Units 05/07/22 09:05 05/21/22 10:22   Fluid Appearance  Hazy Cloudy   WBC, Body Fluid /cu mm 51149 [1] 363738 (C) [2]   Body Fluid Type  Pleural Fluid, Right Pleural Fluid, Right   Segs, Fluid % 93 88   Lymphs, Fluid % 3 7   Monocytes/Macrophages, Fluid % 4 5   Amylase, Fluid Not established U/L 34 [3]    Glucose, Fluid Not established mg/dL 29 [4]    LD, Fluid Not  established U/L 1077 [5]    Body Fluid, Albumin See text g/dL 1.3 [6]    Body Fluid Source Amylase  Pleural Fluid, Right    Body Fluid Source, Albumin  Pleural Fluid, Right    Body Fluid Source, Glucose  Pleural Fluid, Right    Body Fluid Source, LDH  Pleural Fluid, Right    Body Fluid Source, Total Protein  Pleural Fluid, Right    Body Fluid, Protein Not established g/dL 3.7 [7]    Cholesterol, Body Fluid See Comment mg/dL 79 [8]          Significant Imaging:       Chest x-ray 05/28/2022     COMPARISON:  Comparison 05/26/2022     FINDINGS:  Stable heart size.  Stable patchy infiltrate bilaterally.  No evidence of pneumothorax following thoracentesis.     Right pleural catheter.     Impression:     No evidence of pneumothorax.                Chest x-ray 05/26/2022     CLINICAL HISTORY:  chest tube pain;     TECHNIQUE:  Single frontal view of the chest was performed.     COMPARISON:  Chest radiograph 05/25/2022 with priors cardiac     FINDINGS:  Cardiac leads project over the chest.  Stable position of a right PICC.  Interval repositioning of a right pigtail catheter at the lung base.  Similar appearance of patchy ground-glass opacities throughout both lungs.  New small left pleural effusion.  No pneumothorax.  The cardiomediastinal silhouette is unchanged.     Impression:     Slight interval repositioning of a chest tube at the right lung base.  No pneumothorax.     New small left pleural effusion.     Similar appearance of patchy ground-glass opacities throughout both lungs.         CT chest without contrast 05/20/2022    Narrative & Impression  EXAMINATION:  CT CHEST WITHOUT CONTRAST     CLINICAL HISTORY:  Empyema;     TECHNIQUE:  Low dose axial images, sagittal and coronal reformations were obtained from the thoracic inlet to the lung bases. Contrast was not administered.  All CT scans at this facility are performed using dose optimization techniques including the following: automated exposure control; adjustment  of the mA and/or kV; use of iterative reconstruction technique.     COMPARISON:  CTA chest non coronary 05/06/2022, chest radiograph 05/20/2022, MRI thoracic spine 05/19/2022     FINDINGS:  Base of Neck: There is diffuse body wall edema.  Unremarkable, noting the presence of AA PICC line which terminates at the superior cavoatrial junction.     Thoracic soft tissues: There is diffuse body wall edema.     Aorta: Left-sided aortic arch.  No aneurysm and no significant atherosclerosis     Heart: Normal size. No effusion.  No significant coronary atherosclerosis.     Pulmonary vasculature: Pulmonary arteries distribute normally.     Esha/Mediastinum: There are few prominent mediastinal lymph nodes with a prevascular node measuring 0.8 cm in short axis.     Airways: Patent.     Lungs/Pleura: There are multiple bilateral consolidative densities scattered throughout the lungs, many of which demonstrate cavitation, again suggesting septic emboli.  There is small left pleural fluid, which appears simple and layers dependently.  There is a loculated moderate right pleural effusion, which appears similar in size to the comparison exam however now containing air, which may be due to empyema.  There is adjacent atelectatic change within the bilateral lower lobes.  Overall number and size of consolidative densities has increased since the comparison exam.     Esophagus: Normal.     Upper Abdomen: There is persistent wedge-shaped hypodensity within the spleen, suggesting infarct.     Bones: No acute fracture. No suspicious lytic or sclerotic lesions.     Impression:     Interval progression with increased number and size of consolidative cavitary densities throughout the lungs, most suggestive of septic emboli with larger opacities concerning for necrotizing pneumonia.     Stable size of loculated right moderate pleural effusion, now containing air compatible with hydropneumothorax as reported on prior MRI.  Interval development  of air is concerning for empyema noting limited evaluation without intravenous contrast.  New small simple appearing left pleural effusion.     Wedge-shaped splenic hypodensity, suggesting infarction.     Anasarca.

## 2022-05-28 NOTE — PLAN OF CARE
Problem: Adult Inpatient Plan of Care  Goal: Plan of Care Review  Outcome: Ongoing, Progressing  Goal: Patient-Specific Goal (Individualized)  Outcome: Ongoing, Progressing  Goal: Absence of Hospital-Acquired Illness or Injury  Outcome: Ongoing, Progressing  Goal: Optimal Comfort and Wellbeing  Outcome: Ongoing, Progressing  Goal: Readiness for Transition of Care  Outcome: Ongoing, Progressing     Problem: Impaired Wound Healing  Goal: Optimal Wound Healing  Outcome: Ongoing, Progressing     Problem: Infection  Goal: Absence of Infection Signs and Symptoms  Outcome: Ongoing, Progressing     Problem: Fall Injury Risk  Goal: Absence of Fall and Fall-Related Injury  Outcome: Ongoing, Progressing     Problem: Infection (Pneumonia)  Goal: Resolution of Infection Signs and Symptoms  Outcome: Ongoing, Progressing

## 2022-05-28 NOTE — PROGRESS NOTES
[facility]  Pulmonology  Progress Note    Patient Name: Tianna Leon  MRN: [id number]  Admission Date: 5/6/2022  Hospital Length of Stay: 22 days  Code Status: Full Code  Attending Provider: Elizabeth Kim MD  Primary Care Provider: Spenser Campa MD   Principal Problem: Acute bacterial endocarditis    Subjective:     Interval History:   5/28 patient seen and examined.  T-max 100.2° last 24 hours.  Minimal amount sanguinous chest tube drainage on the right.  Left side ultrasound 330 mL.  Diagnostic thoracentesis performed 20 mL of cloudy pinkish fluid drained on the left.  No panchito pus noted.  Weak sleepy generalized myalgias.  Review of Systems   Constitutional:  Positive for malaise/fatigue and weight loss. Negative for chills and fever.   HENT:  Negative for hearing loss and nosebleeds.    Eyes:  Negative for discharge.   Respiratory:  Positive for cough, sputum production and shortness of breath.    Cardiovascular:  Positive for leg swelling. Negative for chest pain.   Gastrointestinal:  Negative for abdominal pain.   Genitourinary:  Negative for hematuria.   Musculoskeletal:  Negative for falls.   Skin:  Negative for itching.   Neurological:  Positive for weakness. Negative for speech change, seizures and loss of consciousness.   Endo/Heme/Allergies:  Negative for polydipsia. Does not bruise/bleed easily.   Psychiatric/Behavioral:  Negative for hallucinations.        Objective:     Vital Signs (Most Recent):  Temp: 97.8 °F (36.6 °C) (05/28/22 1247)  Pulse: 74 (05/28/22 1300)  Resp: 18 (05/28/22 1444)  BP: (!) 147/84 (05/28/22 1247)  SpO2: 96 % (05/28/22 1300)   Vital Signs (24h Range):  Temp:  [97.8 °F (36.6 °C)-100.2 °F (37.9 °C)] 97.8 °F (36.6 °C)  Pulse:  [] 74  Resp:  [14-18] 18  SpO2:  [96 %-99 %] 96 %  BP: (140-167)/(67-91) 147/84     Weight:  (Scale says not calibrated)  Body mass index is 35.62 kg/m².      Intake/Output Summary (Last 24 hours) at 5/28/2022 1508  Last data filed at  5/28/2022 0000  Gross per 24 hour   Intake 827.58 ml   Output 1100 ml   Net -272.42 ml         Physical Exam  Vitals and nursing note reviewed.   Constitutional:       General: She is not in acute distress.     Appearance: She is well-developed. She is ill-appearing.   HENT:      Head: Normocephalic and atraumatic.      Nose: Nose normal.   Eyes:      Extraocular Movements: Extraocular movements intact.   Cardiovascular:      Rate and Rhythm: Normal rate and regular rhythm.   Pulmonary:      Effort: Pulmonary effort is normal.      Breath sounds: No stridor. Rhonchi present.      Comments: Right-side pigtail in place  Abdominal:      General: There is no distension.   Musculoskeletal:         General: No signs of injury.      Cervical back: Normal range of motion and neck supple.   Skin:     General: Skin is dry.   Neurological:      General: No focal deficit present.      Mental Status: She is alert and oriented to person, place, and time. Mental status is at baseline.   Psychiatric:         Behavior: Behavior normal.       Vents:  Oxygen Concentration (%): 32 (05/28/22 1300)    Lines/Drains/Airways       Peripherally Inserted Central Catheter Line  Duration             PICC Double Lumen 05/17/22 1612 right basilic 10 days              Drain  Duration                  Chest Tube 05/21/22 0957 1 Right Pleural 8.5 Fr. 7 days    Female External Urinary Catheter 05/21/22 0700 7 days                    Significant Labs:    CBC/Anemia Profile:  Recent Labs   Lab 05/27/22  0508 05/28/22  0621   WBC 12.14 14.04*   HGB 7.3* 8.0*   HCT 23.5* 25.0*    409   MCV 87 85   RDW 15.3* 15.2*          Chemistries:  Recent Labs   Lab 05/27/22  0508 05/28/22  0621    135*   K 4.5 4.5    101   CO2 25 25   BUN 21* 21*   CREATININE 1.4 1.6*   CALCIUM 7.5* 7.8*   ALBUMIN 1.5* 1.5*   PROT 5.7* 5.8*   BILITOT 0.2 0.3   ALKPHOS 84 82   ALT 13 10   AST 17 15        Latest Reference Range & Units 05/07/22 09:05 05/21/22 10:22    Fluid Appearance  Hazy Cloudy   WBC, Body Fluid /cu mm 72327 [1] 073649 (C) [2]   Body Fluid Type  Pleural Fluid, Right Pleural Fluid, Right   Segs, Fluid % 93 88   Lymphs, Fluid % 3 7   Monocytes/Macrophages, Fluid % 4 5   Amylase, Fluid Not established U/L 34 [3]    Glucose, Fluid Not established mg/dL 29 [4]    LD, Fluid Not established U/L 1077 [5]    Body Fluid, Albumin See text g/dL 1.3 [6]    Body Fluid Source Amylase  Pleural Fluid, Right    Body Fluid Source, Albumin  Pleural Fluid, Right    Body Fluid Source, Glucose  Pleural Fluid, Right    Body Fluid Source, LDH  Pleural Fluid, Right    Body Fluid Source, Total Protein  Pleural Fluid, Right    Body Fluid, Protein Not established g/dL 3.7 [7]    Cholesterol, Body Fluid See Comment mg/dL 79 [8]          Significant Imaging:       Chest x-ray 05/28/2022     COMPARISON:  Comparison 05/26/2022     FINDINGS:  Stable heart size.  Stable patchy infiltrate bilaterally.  No evidence of pneumothorax following thoracentesis.     Right pleural catheter.     Impression:     No evidence of pneumothorax.                Chest x-ray 05/26/2022     CLINICAL HISTORY:  chest tube pain;     TECHNIQUE:  Single frontal view of the chest was performed.     COMPARISON:  Chest radiograph 05/25/2022 with priors cardiac     FINDINGS:  Cardiac leads project over the chest.  Stable position of a right PICC.  Interval repositioning of a right pigtail catheter at the lung base.  Similar appearance of patchy ground-glass opacities throughout both lungs.  New small left pleural effusion.  No pneumothorax.  The cardiomediastinal silhouette is unchanged.     Impression:     Slight interval repositioning of a chest tube at the right lung base.  No pneumothorax.     New small left pleural effusion.     Similar appearance of patchy ground-glass opacities throughout both lungs.         CT chest without contrast 05/20/2022    Narrative & Impression  EXAMINATION:  CT CHEST WITHOUT CONTRAST      CLINICAL HISTORY:  Empyema;     TECHNIQUE:  Low dose axial images, sagittal and coronal reformations were obtained from the thoracic inlet to the lung bases. Contrast was not administered.  All CT scans at this facility are performed using dose optimization techniques including the following: automated exposure control; adjustment of the mA and/or kV; use of iterative reconstruction technique.     COMPARISON:  CTA chest non coronary 05/06/2022, chest radiograph 05/20/2022, MRI thoracic spine 05/19/2022     FINDINGS:  Base of Neck: There is diffuse body wall edema.  Unremarkable, noting the presence of AA PICC line which terminates at the superior cavoatrial junction.     Thoracic soft tissues: There is diffuse body wall edema.     Aorta: Left-sided aortic arch.  No aneurysm and no significant atherosclerosis     Heart: Normal size. No effusion.  No significant coronary atherosclerosis.     Pulmonary vasculature: Pulmonary arteries distribute normally.     Esha/Mediastinum: There are few prominent mediastinal lymph nodes with a prevascular node measuring 0.8 cm in short axis.     Airways: Patent.     Lungs/Pleura: There are multiple bilateral consolidative densities scattered throughout the lungs, many of which demonstrate cavitation, again suggesting septic emboli.  There is small left pleural fluid, which appears simple and layers dependently.  There is a loculated moderate right pleural effusion, which appears similar in size to the comparison exam however now containing air, which may be due to empyema.  There is adjacent atelectatic change within the bilateral lower lobes.  Overall number and size of consolidative densities has increased since the comparison exam.     Esophagus: Normal.     Upper Abdomen: There is persistent wedge-shaped hypodensity within the spleen, suggesting infarct.     Bones: No acute fracture. No suspicious lytic or sclerotic lesions.     Impression:     Interval progression with  increased number and size of consolidative cavitary densities throughout the lungs, most suggestive of septic emboli with larger opacities concerning for necrotizing pneumonia.     Stable size of loculated right moderate pleural effusion, now containing air compatible with hydropneumothorax as reported on prior MRI.  Interval development of air is concerning for empyema noting limited evaluation without intravenous contrast.  New small simple appearing left pleural effusion.     Wedge-shaped splenic hypodensity, suggesting infarction.     Anasarca.           ABG  No results for input(s): PH, PO2, PCO2, HCO3, BE in the last 168 hours.  Assessment/Plan:     * Acute bacterial endocarditis  Medical Mx  CTS note noted  5/23 IV oxacillin.  5/24 IV oxacillin as per Infectious Disease  5/25 repeat cultures none relevant.  IV oxacillin as per Infectious Disease x4 weeks.  T-max 101.7° last  24 hours  5/26 repeat blood concern negative continue oxacillin gtt   5/27 continue oxacillin drip   5/28 oxacillin drip.  Blood culture 5/25 was negative      Status post thoracentesis  Five hundred twenty-eight diagnostic thoracentesis on the left.  20 cc of cloudy fluid noted.  No panchito pus.  Check chemistry microbiology and cultures.  No pneumothorax noted post Thoracentesis    Empyema of right pleural space  5/20 Suspect empyema, needs chest tube placement  5/21 s/p chest tube placement, adequate expansion - will check Chest X Ray in aa  5/23 tPA DNase.  5/24 tPA for DNase installation via chest tube.  Monitor chest tube output.  Repeat chest x-ray in a.m..  5/25 chest tube output increased from 50 mL on average per 24 hours to 160 mL last 24 hours.  Purulent drainage noted.  Will repeat tPA plus Dnase instillation.  5/26 chest x-ray reviewed.  Continue IV oxacillin.  Right-sided pigtail in place.  Status post tPA and DNase x2.  Bloody drainage around pigtail today status post tPA and DNase yesterday.  Will hold on fibrinolysis.   Monitor chest tube output  5/27 right pigtail in place.  225 mL  Last 24 hours average of 200 mL per day.  Change pleurovac canister to better assess the color of the fluid pus versus serosanguineous.  Keep pigtail in place for now.  Ultrasound bilateral chest assess pleural effusions.  Continue oxacillin drip.  5/28 keep chest tube on the right side for now.  Repeat CRP.  Checking chemistry microbiology  on left-sided pleural fluid to rule out complicated effusion and need for pigtail.  Continue oxacillin drip.     Pulmonary embolism, septic  5/24 continue IV oxacillin.  5/25 on IV  Oxacillin  since 05/09.  Repeat CRP.  5/27 right pigtail in place.  225 mL  Last 24 hours average of 200 mL per day.  Change pleurovac canister to better assess the color of the fluid pus versus serosanguineous.  Keep pigtail in place for now.  Ultrasound bilateral chest assess pleural effusions.  Continue oxacillin drip.  5/285/28 keep chest tube on the right side for now.  Repeat CRP.  Checking chemistry microbiology  on left-sided pleural fluid to rule out complicated effusion and need for pigtail.  Continue oxacillin drip.     Parapneumonic effusion  Complicated by PNA, possible empyema  SP Thora  Cont Abx  5/20/2022 Probable empyema,needs chest tube drainage  5/22 chest tube draining  5/23 60 cc drained yesterday.  Possible tPA and DNase tomorrow.  Continue IV oxacillin.  5/24 monitor chest tube output post tPA and DNase instillation today PA chest tube  5/26 chest x-ray reviewed.  Continue IV oxacillin.  Right-sided pigtail in place.  Status post tPA and DNase x2.  Bloody drainage around pigtail today status post tPA and DNase yesterday.  Will hold on fibrinolysis.  Monitor chest tube output  5/27 right pigtail in place.  225 mL  Last 24 hours average of 200 mL per day.  Change pleurovac canister to better assess the color of the fluid pus versus serosanguineous.  Keep pigtail in place for now.  Ultrasound bilateral chest assess  pleural effusions.  Continue oxacillin drip.  5/28 keep chest tube on the right side for now.  Repeat CRP.  Checking chemistry microbiology  on left-sided pleural fluid to rule out complicated effusion and need for pigtail.  Continue oxacillin drip.     PNA (pneumonia)  PNA in IVDU  Cultures; Blood and sputum  BC +ve G+ve cocci  AbxL OXACILLIN + FLAGYL  5/22 stable, improved aeration   5/26 chest x-ray reviewed.  Continue IV oxacillin.  Right-sided pigtail in place.  Status post tPA and DNase x2.  Bloody drainage around pigtail today status post tPA and DNase yesterday.  Will hold on fibrinolysis.  Monitor chest tube output  5/28 keep chest tube on the right side for now.  Repeat CRP.  Checking chemistry microbiology  on left-sided pleural fluid to rule out complicated effusion and need for pigtail.  Continue oxacillin drip.               Piyush San MD  Pulmonology  [facility] - Med Surg

## 2022-05-28 NOTE — PT/OT/SLP PROGRESS
Physical Therapy  Treatment    Tianna Leon   MRN: [id number]   Admitting Diagnosis: Acute bacterial endocarditis       PT Start Time: 1215     PT Stop Time: 1230    PT Total Time (min): 15 min       Billable Minutes:  Therapeutic Activity 15    Treatment Type: Treatment  PT/PTA: PTA     PTA Visit Number: 1       General Precautions: Standard, fall  Orthopedic Precautions: N/A   Braces:       Subjective:  Communicated with JJ prior to session.      Pain/Comfort  Pain Rating 1: 0/10  Pain Rating Post-Intervention 1: 0/10    Therapeutic Activities and Exercises:    PT REPORTS SHE HAS BEEN MOVING A LOT TODAY DUE TO MULTIPLE TESTS BEING DONE BEFORE THIS SESSION    SUPINE-->SIT MOD A, SIT-->SUPINE SBA     SCOOT TO HOB VC FO RU/LE PLACEMENT AND HOW TO ASSIST SELF TO SCOOT TO HOB    WHILE SITTING EOB X 5', SHE BEGAN TO BECOME ANXIOUS AND REPORTS SHE IS UNABLE TO SIT ANY LONGER.     PT OPTED TO RETURN TO LAY SUPINE AND END THIS SESSION.       AM-PAC 6 CLICK MOBILITY  How much help from another person does this patient currently need?   1 = Unable, Total/Dependent Assistance  2 = A lot, Maximum/Moderate Assistance  3 = A little, Minimum/Contact Guard/Supervision  4 = None, Modified Page/Independent    Turning over in bed (including adjusting bedclothes, sheets and blankets)?: 3  Sitting down on and standing up from a chair with arms (e.g., wheelchair, bedside commode, etc.):  (NA)  Moving from lying on back to sitting on the side of the bed?: 3  Moving to and from a bed to a chair (including a wheelchair)?:  (NA)  Need to walk in hospital room?:  (NA)  Climbing 3-5 steps with a railing?:  (NA)    AM-PAC Raw Score CMS G-Code Modifier Level of Impairment Assistance   6 % Total / Unable   7 - 9 CM 80 - 100% Maximal Assist   10 - 14 CL 60 - 80% Moderate Assist   15 - 19 CK 40 - 60% Moderate Assist   20 - 22 CJ 20 - 40% Minimal Assist   23 CI 1-20% SBA / CGA   24 CH 0% Independent/ Mod I     Patient left  supine with all lines intact, call button in reach, bed alarm on and NSG notified.    Assessment:  Tianna Leon is a 37 y.o. female with a medical diagnosis of Acute bacterial endocarditis and presents with .    Rehab identified problem list/impairments: Rehab identified problem list/impairments: weakness, decreased upper extremity function, impaired balance, impaired endurance, impaired self care skills, impaired functional mobilty    Rehab potential is poor.    Activity tolerance: Poor    Discharge recommendations: Discharge Facility/Level of Care Needs: nursing facility, skilled     Barriers to discharge:      Equipment recommendations: Equipment Needed After Discharge: bedside commode, wheelchair, hospital bed, walker, rolling     GOALS:   Multidisciplinary Problems     Physical Therapy Goals        Problem: Physical Therapy    Goal Priority Disciplines Outcome Goal Variances Interventions   Physical Therapy Goal     PT, PT/OT Ongoing, Not Progressing     Description: LTG'S TO BE MET IN 14 DAYS (5-30-22)  1. PT WILL BE KRISTA WITH BED MOBILITY  2. PT WILL BE KRISTA WITH TF'S  3. PT WILL ' WITH RW AND SPV                   PLAN:    Patient to be seen 3 x/week  to address the above listed problems via gait training, therapeutic activities, therapeutic exercises  Plan of Care expires: 05/30/22  Plan of Care reviewed with: patient         05/28/2022

## 2022-05-28 NOTE — ASSESSMENT & PLAN NOTE
5/20 Suspect empyema, needs chest tube placement  5/21 s/p chest tube placement, adequate expansion - will check Chest X Ray in [facility]  5/23 tPA DNase.  5/24 tPA for DNase installation via chest tube.  Monitor chest tube output.  Repeat chest x-ray in a.m..  5/25 chest tube output increased from 50 mL on average per 24 hours to 160 mL last 24 hours.  Purulent drainage noted.  Will repeat tPA plus Dnase instillation.  5/26 chest x-ray reviewed.  Continue IV oxacillin.  Right-sided pigtail in place.  Status post tPA and DNase x2.  Bloody drainage around pigtail today status post tPA and DNase yesterday.  Will hold on fibrinolysis.  Monitor chest tube output  5/27 right pigtail in place.  225 mL  Last 24 hours average of 200 mL per day.  Change pleurovac canister to better assess the color of the fluid pus versus serosanguineous.  Keep pigtail in place for now.  Ultrasound bilateral chest assess pleural effusions.  Continue oxacillin drip.  5/28 keep chest tube on the right side for now.  Repeat CRP.  Checking chemistry microbiology  on left-sided pleural fluid to rule out complicated effusion and need for pigtail.  Continue oxacillin drip.

## 2022-05-28 NOTE — ASSESSMENT & PLAN NOTE
Five hundred twenty-eight diagnostic thoracentesis on the left.  20 cc of cloudy fluid noted.  No panchito pus.  Check chemistry microbiology and cultures.  No pneumothorax noted post Thoracentesis

## 2022-05-28 NOTE — PROCEDURES
"Tianna Leon is a 37 y.o. female patient.    Temp: 98.1 °F (36.7 °C) (22)  Pulse: 85 (22)  Resp: 18 (22 0857)  BP: (!) 167/91 (22)  SpO2: 96 % (22)  Weight:  (Scale says not calibrated) (22)  Height: 5' 5" (165.1 cm) (22)  Mallampati Scale: Class II  ASA Classification: Class 2    Thoracentesis    Date/Time: 2022 11:26 AM  Location procedure was performed: [facility] MEDICAL SURGICAL UNIT  Performed by: Piyush San MD  Authorized by: Piyush San MD   Assisting provider: Tabatha Hernandez RN  Pre-operative diagnosis: Left pleural effusion  Post-operative diagnosis: Left pleural  Consent Done: Yes  Consent: Verbal consent obtained. Written consent obtained.  Risks and benefits: risks, benefits and alternatives were discussed  Consent given by: patient  Patient understanding: patient states understanding of the procedure being performed  Patient consent: the patient's understanding of the procedure matches consent given  Procedure consent: procedure consent matches procedure scheduled  Relevant documents: relevant documents present and verified  Test results: test results available and properly labeled  Site marked: the operative site was marked  Imaging studies: imaging studies available  Required items: required blood products, implants, devices, and special equipment available  Patient identity confirmed: , verbally with patient, MRN, name and provided demographic data  Time out: Immediately prior to procedure a "time out" was called to verify the correct patient, procedure, equipment, support staff and site/side marked as required.  Procedure purpose: diagnostic  Indications: pleural effusion  Preparation: Patient was prepped and draped in the usual sterile fashion.  Local anesthesia used: yes    Anesthesia:  Local anesthesia used: yes  Local Anesthetic: lidocaine 1% without epinephrine  Anesthetic total: 5 mL    Patient " sedated: no  Description of findings: Small about 300 cc left-sided pleural effusion   Preparation: skin prepped with ChloraPrep  Patient position: sitting  Ultrasound guidance: yes  Location: left posterior  Intercostal space: 7th  Puncture method: needle only  Needle size: 16  Number of attempts: 1  Drainage amount: 25 ml  Drainage characteristics: cloudy  Patient tolerance: Patient tolerated the procedure well with no immediate complications  Chest x-ray performed: yes  Chest x-ray interpreted by: Pending.  Technical procedures used: Needle only  Significant surgical tasks conducted by the assistant(s): Patient monitoring  Complications: No  Specimens: Yes  Implants: No  Drainage Tube: secured      I performed the procedure      Piyush San M.D     5/28/2022

## 2022-05-28 NOTE — PROGRESS NOTES
[facility] Medicine  Progress Note    Patient Name: Tianna Leon  MRN: [id number]  Patient Class: IP- Inpatient   Admission Date: 5/6/2022  Length of Stay: 22 days  Attending Physician: Elizabeth Kim MD  Primary Care Provider: Spenser Campa MD    Subjective:     Principal Problem:Acute bacterial endocarditis        HPI:  36 y/o. female  with a PMHx of asthma, COPD,Bipolar  , IVDU and HLD presented to the ER with a c/o  sob for the last weak which has gotten worse . The SOB is associated with fever , chills , productive cough , back pain  and generalized weakness . She report cough some blood this am .  She is active IVDU  ( heroine , cocaine  and amphetamine ) . She denies any  sick contact , chest pain  , GI/ sx , She also complaning of LE sweeling . Knee pain and B/L LE rash .   ER COURSE: CTA chest negative for pe . Multiple nodular and cavitary opacities concerning for septic emboli with larger opacities possibly necrotizing pneumonia. CT cervical  and thoracic did not show any acute finding , CT lumbar Possible progression of degenerative changes most notable at L4-5 with moderate to severe spinal canal stenosis at this level.  WBC  29 k , na 130 , k 3.4 , cl 89 , procal 2.71   ER VS:  BP Pulse Resp Temp SpO2   (!) 124/58 110 (!) 30 (!) 101.6 °F (38.7 °C) 96 %           Pt will be admitted to inpt with a dx of PNA and severe sepsis     Overview/Hospital Course:  5/7 admitted for pneumonia, severe sepsis in setting of ivdu. Mother at bedside and provides collateral. Patient has struggled with substance abuse for approximately 20 years, worsening over last 3-5 years since life event. Onset of symptoms 1-2 weeks ago. Tolerated thoracentesis with no pneumothorax on chest x-ray. Mri lumbar and echo, pending. Bcx positive, growing gram positive cocci. On vanc, cefepime and flagyl. Infectious disease consulted for bacteremia. 5/8/22 The patient remained afebrile overnight. Currently on  cefepime/vanc/flagyl. Blood cx 5/6/22 are growing MSSA. The patient refused the MRI lumbar spine overnight d/t being unable to lay flat from back pain. Will give pain meds and attempt to get the MRI today to r/o spinal abscess. The ECHO showed a 1.3 x 1.5 cm elongated, mobile echodensity seen on the tricuspid valve consistent with vegetation, CT surgery was consulted. Will need a SUSANNAH. Infectious disease is consulted. Will repeat blood cx today. 5/9/22 No acute events overnight. The patient reports lower back pain is not well controlled with current pain regimen, will adjust. Repeat blood cx are still positive. Sensitivities are back and Staph is sensitive to oxacillin, will D/C vanc/cefepime. Cardiology consulted and plans for a SUSANNAH today. CT surgery following and recommends continuing medical management with appropriate ABX, no surgical intervention at this time. MRI lumbar spine showed moderate to severe spinal canal stenosis with moderate left-sided neural foraminal stenosis, Neurosurgery consulted. 5/10/22 No acute events overnight. The patient reports some improvement in pain with adjustment in pain meds. Repeat blood cx are +. Continue IV oxacillin and flagyl. Infectious disease is consulted. IR consulted to evaluate possible paraspinous muscle myositis versus abscess. Recommend IR drainage. Continue current management. 5/11/22 No acute events overnight. The patient reports pain is still not well controlled at times. The patient reports that she is very anxious about the upcoming SUSANNAH and IR drainage. SUSANNAH was pushed back to this afternoon because the patient ate candy this AM. Continue treatment with IV oxacillin and flagyl. Infectious disease is following. WBC trended down to 31K. Continue current management. 5/12/22 No acute events overnight. The patients WBC improved to 22K overnight. The patient remains on continuous oxacillin infusion. Infectious disease is following. MRI thoracic spine is ordered and  pending. The patients SUSANNAH was postponed again today d/t low H/H. Hgb was noted to be 6.2 this morning, will transfuse 1 unit PRBC. The patient reports pain is still not well controlled. The case was discussed with Neurosurgery who recommended adding extended release pain meds. Approximately 1 ml of purulent drainage aspirated from back yesterday per IR, growing gram + cocci.     5/13: Patient was given 1U PRBC on 5/12, improved from 6.2 --> 7.0, will give an additional unit PRBC. Radiology attempted to bring patient on 5/12 at 1755 for Thoracic MRI, patient refused due to pain and anxiety. Repeat attempt this AM, patient again refused. Will order Ativan 1mg IV to be given with pain medication prior to scan to relieve anxiety and pain, nurse to notify NP when ready to go for scan. Discussed pain management with patient, discussed transition to PO pain medications to being preparing for d/c to LTAC, adjusted PO pain regimen will reevaluate to determine effectiveness. Repeat Blood cultures 5/11/22: NGTD.     5/14: Final anaerobic cultures pending, continue to adjust pain regimen, d/c IV Dilaudid today. Patient currently managed with PO Morphine extended release and PO oxycodone. D/C plan is for LTAC once final antibiotic regimen determined. Patient continues to refuse MRI of thoracic spine.     5/15: Called to room this AM, patient had coughed up blood and mucous. Approx. 1-2 teaspoons of blood in emesis bag, ordered CXR and repeat CBC, CXR showed improvement from previous day, repeat CBC at time of incident, showed a slight decrease in Hgb: 9.7 --> 8.7, when repeated at 1500, Hgb had improved to 10. no further episodes of coughing up blood throughout day. Patient continues to c/o not having enough pain medication but continues to sleep most of the day and will fall asleep when speaking at times. Added IV Toradol to plan.     5/16: No further episodes of coughing blood overnight, patient participated with encouragement  with PT/OT today, recommendations are HH vs. SNF pending progress. Awaiting final antibiotic regimen recommendations. Anaerobic culture results finalized: negative for growth. Afebrile, WBC trending downward.     5/17: Antibiotic regimen per ID is continuous oxacillin until 7/11/22, SW notified of plan, will seek acceptance at LTAC or SNF. Patient choice is [facility]. WBC in normal range. AMS this AM, CT of head: no acute findings. Given narcan and mental status improved. Decreased narcotic dose.     5/18: Per discussion with patient, she is willing to have the MRI of thoracic spine today, will order IV pain medication and anxiety medication prior to scan. Nursing and Radiology aware of needing to premedicate for scan. Patient is more cooperative and participating with care.     5/19: Patient given premedications for MRI, MRI completed, results pending. Patient more agreeable with treatment plan and cooperating. Will need psychiatry follow-up after discharge. Hgb: 6.7, will give 1U PRBC    5/20/22: +LEON and generalized pain. Thoracic MRI showed no evidence for disc osteomyelitis in the thoracic spine and no epidural abscess, Loculated right hydropneumothorax and extensive bilateral pulmonary infiltrates. Ct chest showed septic emboli and moderate right pleural effusion.+splenic infarct  Pulmonology felt likely empyema- consulted IR for chest tube.     5/21/22: s/p right pig tail placement to suction/ drainage system per IR. 60ml pus removed initially. Fluid analysis consistent with empyema. Pleural fluid cultures pending. Now with scant serosanguinous output.+ right lateral chest pain at chest tube site. Denies any other complaints. Status update provided to pt's mother.     5/22/22: Repeatedly requesting IV Dilaudid. Toradol ordered. Afebrile, WBC normal, O2sats stable on 3lites. s/p right pig tail placement to suction/ drainage system per IR on 5/21/22- draining serous draiange with pus. Plan for LTAC placement  "    5/23/22: More cofortable today. Not getting OOB. Encouraged OOB and rationale provided. Pulmonology recommended Possible tPA and DNase in chest tube tomorrow    5/24/22: Examined OOB in chair. Encouraged pt to continue OOB. Plan for tPA and DNase in chest tube today per Pulmonology  Pt denied LTAC- will pursue SNF    5/25/22: Temp 101.7F last night. Will recheck blood cultures and UA. BP low - IVF bolus given and restarted IVF. ID re-consulted. 160ml of purulent CT drainage output. Dr. San plans to repeat tPA and DNase in chest tube again today.     5/26/22: temp 99.2. c/o of right "lung pain" when eating. Will consult speech to check swallow. +mild leukocytosis. Repeat BC 5/25/22 show NGTD. Repeat UA showed rare trichomas. Chest tube drainage remains purulent     5/27/22  Low grade temp. Downward trend of hemoglobin, related to chest tube drainage. Blood cultures remain negative. Urine culture growing E. Coli. Sensitivities are pending.     5/28/22  Urine culture returned pansenstiive. Will continue ciprofloxacin for total of 3 days then discontinue. Still awaiting SNF placement.      Interval History: asleep in bed easily around. Still has increased output from chest tube on yesterday. Appears more serosanguinous drainage today.  Ultrasound completed last night shows no significant right sided pleural effusion. Left effusion has <350mls. Pulmonology following.     Review of Systems   Constitutional:  Positive for activity change, appetite change and fatigue. Negative for chills, diaphoresis, fever and unexpected weight change.   HENT:  Negative for congestion, hearing loss, mouth sores, postnasal drip, rhinorrhea, sore throat and trouble swallowing.    Eyes:  Negative for discharge and visual disturbance.   Respiratory:  Negative for cough and chest tightness.    Cardiovascular:  Negative for chest pain, palpitations and leg swelling.   Gastrointestinal:  Negative for blood in stool, constipation, " diarrhea, nausea and vomiting.   Endocrine: Negative for cold intolerance and heat intolerance.   Genitourinary:  Negative for difficulty urinating, dyspareunia, flank pain and hematuria.   Musculoskeletal:  Positive for back pain and myalgias. Negative for arthralgias.   Skin: Negative.    Neurological:  Positive for weakness. Negative for dizziness and light-headedness.   Hematological:  Negative for adenopathy. Does not bruise/bleed easily.   Psychiatric/Behavioral:  Negative for agitation, behavioral problems, confusion and sleep disturbance. The patient is nervous/anxious.      Objective:     Vital Signs (Most Recent):  Temp: 98.1 °F (36.7 °C) (05/28/22 0733)  Pulse: 85 (05/28/22 0738)  Resp: 18 (05/28/22 0857)  BP: (!) 167/91 (05/28/22 0733)  SpO2: 96 % (05/28/22 0738)   Vital Signs (24h Range):  Temp:  [97.5 °F (36.4 °C)-100.2 °F (37.9 °C)] 98.1 °F (36.7 °C)  Pulse:  [] 85  Resp:  [14-18] 18  SpO2:  [96 %-99 %] 96 %  BP: (115-167)/(67-91) 167/91     Weight:  (Scale says not calibrated)  Body mass index is 35.62 kg/m².    Intake/Output Summary (Last 24 hours) at 5/28/2022 1104  Last data filed at 5/28/2022 0000  Gross per 24 hour   Intake 1535.58 ml   Output 2100 ml   Net -564.42 ml      Physical Exam  Vitals and nursing note reviewed.   Constitutional:       General: She is not in acute distress.     Appearance: She is well-developed. She is ill-appearing.   HENT:      Head: Normocephalic and atraumatic.      Nose: Nose normal.   Eyes:      Extraocular Movements: Extraocular movements intact.   Cardiovascular:      Rate and Rhythm: Normal rate and regular rhythm.   Pulmonary:      Effort: Pulmonary effort is normal.      Breath sounds: No stridor. Rhonchi present.      Comments: Right-side pigtail in place    Serosanguinous drainage noted in tubing    Abdominal:      General: There is no distension.   Musculoskeletal:         General: No signs of injury.      Cervical back: Normal range of motion and  neck supple.   Skin:     General: Skin is dry.   Neurological:      General: No focal deficit present.      Mental Status: She is alert and oriented to person, place, and time. Mental status is at baseline.   Psychiatric:         Behavior: Behavior normal.   Significant Labs: All pertinent labs within the past 24 hours have been reviewed.  CBC:   Recent Labs   Lab 05/27/22  0508 05/28/22  0621   WBC 12.14 14.04*   HGB 7.3* 8.0*   HCT 23.5* 25.0*    409     CMP:   Recent Labs   Lab 05/27/22  0508 05/28/22  0621    135*   K 4.5 4.5    101   CO2 25 25    106   BUN 21* 21*   CREATININE 1.4 1.6*   CALCIUM 7.5* 7.8*   PROT 5.7* 5.8*   ALBUMIN 1.5* 1.5*   BILITOT 0.2 0.3   ALKPHOS 84 82   AST 17 15   ALT 13 10   ANIONGAP 11 9   EGFRNONAA 47* 40*       Significant Imaging: I have reviewed all pertinent imaging results/findings within the past 24 hours.      Assessment/Plan:      * Acute bacterial endocarditis  ECHO showed a 1.3 x 1.5 cm elongated, mobile echodensity seen on the tricuspid valve consistent with vegetation, CT surgery was consulted.   Due to MSSA    -- Infectious disease is consulted.  -- repeat blood cx NGTD.  -- Continue IV ABX.   --Sensitivities are back and Staph is sensitive to oxacillin, D/C vanc/cefepime.   --Cardiology consulted and plans for a SUSANNAH.   --CT surgery following and recommends continuing medical management with appropriate ABX, no surgical intervention at this time.    cont IV oxacillin.  Plan is to continue x 4 weeks. Afebrile    E. coli UTI  Add ciprofloxacin for 3 days. Will stop 5/29/22      Empyema of right pleural space  Pulmonology consulted and felt likely empyema   IR consulted for chest tube      s/p right pigtail small bore chest tube placement to suction/drainage system per IR on 5/21/22. 60ml pus removed. Now with Serous drainage with pus. Fluid analysis consistent with empyema. Pleural fluid aerobic culture shows NGTD, Anaerobic culture  pending    5/24/22: Dr. San plans for tPA and DNase in chest tube today.   5/25/22: 160ml of purulent CT drainage output. Dr. San plans to repeat tPA and DNase in chest tube again today.   5/26/22  Still has purulent drainage noted. Pulmonology following    Normocytic anemia  Secondary to acute illness    --Daily CBC  --Transfuse with 1 unit PRBC for Hgb <7.0 or <8.0 if symptomatic   --Hgb: 6.7, give 1U PRBC    5/27/22  Hemoglobin 7.3g/dL. downward trend. Has blood tinged seropurulent drainage. Will give additional unit if continues to trend down.  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 IR consulted to evaluate possible paraspinous muscle myositis versus abscess.    --Approximately 1 ml of purulent drainage aspirated 5/11 per IR, growing MSSA  --Continue current regimen  --Oxacillin x 4 weeks total    Lumbar stenosis without neurogenic claudication        Chronic pulmonary heart disease  - Pulmonology following       MSSA bacteremia  Gram positive   Infectious disease consulted  Continue broad spectrum intravenous antibiotic(s) given h/o ivdu  Remains febrile and leukocytosis persists  5/8/22 The patient remained afebrile overnight. Currently on cefepime/vanc/flagyl. Blood cx are growing staph aureus. The patient refused the MRI lumbar spine overnight d/t being unable to lay flat from back pain. Will give pain meds and attempt to get the MRI today to r/o spinal abscess. The ECHO showed a 1.3 x 1.5 cm elongated, mobile echodensity seen on the tricuspid valve consistent with vegetation, CT surgery was consulted. Will need a SUSANNAH. Infectious disease is consulted. Will repeat blood cx today.   5/9/22 Repeat blood cx are still positive. Sensitivities are back and Staph is sensitive to oxacillin, will D/C vanc/cefepime. Infectious disease is following   5/10/22  Repeat blood cx are +. Continue IV oxacillin and flagyl. Infectious disease is consulted. IR consulted to evaluate possible paraspinous muscle myositis versus abscess. Recommend IR drainage. Continue current management.    5/11/22 The patient reports pain is still not well controlled at times. The patient reports that she is very anxious about the upcoming SUSANNAH and IR drainage. SUSANNAH was pushed back to this afternoon because the patient ate candy this AM. Continue treatment with IV oxacillin and flagyl. Infectious disease is following. WBC trended down to 31K. Continue current management.  5/12/22 The patients WBC improved to 22K overnight. The patient remains on continuous oxacillin infusion. Infectious disease is following.  5/14: WBC continues to improve, 18.10 today     5/15: Blood cultures 5/11/22: NGTD     Continuous Oxacillin, EOC: 7/11/22, pending acceptance at facility, PICC line placed    Parapneumonic effusion  Pulmonology consulted  Status post thoracentesis 5/7/22   continue CT management per pulmonlogy    IVDU (intravenous drug user)   Will ordet TTE to r/o IE  Will order MRI lumbar wwo- completed   Cont broad spectrum IVAB   --MRI thoracic spine ordered per ID, patient refused on multiple attempts    5/7   Studies pending for additional sites of infection given h/o ivdu  5/8/22  on cessation     Severe sepsis  This patient does have evidence of infective focus  My overall impression is sepsis. Vital signs were reviewed and noted in progress note.  Antibiotics given-   Antibiotics (From admission, onward)            Start     Stop Route Frequency Ordered    05/09/22 1100  oxacillin 12 g in  mL CONTINUOUS INFUSION         -- IV Every 24 hours (non-standard times) 05/09/22 0932    05/07/22 2100  mupirocin 2 % ointment         05/12 2059 Nasl 2 times daily 05/07/22 1646        Cultures were taken-   Microbiology Results (last 7 days)     Procedure Component Value Units Date/Time    Culture,  Respiratory with Gram Stain [[id number]] Collected: 05/16/22 0758    Order Status: Sent Specimen: Respiratory from Sputum Updated: 05/16/22 0759    Culture, Anaerobe [[id number]] Collected: 05/11/22 1440    Order Status: Completed Specimen: Abscess from Back Updated: 05/16/22 0733     Anaerobic Culture No anaerobes isolated    Blood culture [[id number]] Collected: 05/11/22 1213    Order Status: Completed Specimen: Blood Updated: 05/16/22 0612     Blood Culture, Routine No Growth to date      No Growth to date      No Growth to date      No Growth to date      No Growth to date    Blood culture [[id number]] Collected: 05/11/22 1213    Order Status: Completed Specimen: Blood Updated: 05/16/22 0612     Blood Culture, Routine No Growth to date      No Growth to date      No Growth to date      No Growth to date      No Growth to date    Aerobic culture [[id number]]  (Abnormal)  (Susceptibility) Collected: 05/11/22 1440    Order Status: Completed Specimen: Abscess from Back Updated: 05/14/22 1057     Aerobic Bacterial Culture STAPHYLOCOCCUS AUREUS  Many      Gram stain [[id number]] Collected: 05/11/22 1440    Order Status: Completed Specimen: Abscess from Back Updated: 05/12/22 0306     Gram Stain Result Few WBC's      Few Gram positive cocci    Gram stain [[id number]]     Order Status: Canceled Specimen: Joint Fluid from Back     Blood culture [[id number]]  (Abnormal) Collected: 05/08/22 1516    Order Status: Completed Specimen: Blood from Peripheral, Right Hand Updated: 05/11/22 1009     Blood Culture, Routine Gram stain aer bottle: Gram positive cocci in clusters resembling Staph       Results called to and read back by: Chasity Raymundo RN  05/09/2022  11:31      STAPHYLOCOCCUS AUREUS  ID consult required at [facility].[place] and [place] locations.  For susceptibility see order #[id number]      Narrative:      Collection has been rescheduled by [email or web address] at 05/08/2022 13:22 Reason:   Pt in mri will be there after another hour  [id number]  Collection has been rescheduled by [email or web address] at 05/08/2022 13:22 Reason:   Pt in mri will be there after another hour [id number]    Blood culture [[id number]]  (Abnormal) Collected: 05/08/22 1515    Order Status: Completed Specimen: Blood from Peripheral, Left Arm Updated: 05/11/22 1009     Blood Culture, Routine Gram stain aer bottle: Gram positive cocci in clusters resembling Staph      Results called to and read back by: Chasity Raymundo RN  05/09/2022  15:40      STAPHYLOCOCCUS AUREUS  ID consult required at [facility],[place] and [place] locations.  For susceptibility see order #[id number]      Narrative:      Collection has been rescheduled by [email or web address] at 05/08/2022 13:22 Reason:   Pt in mri will be there after another hour [id number]  Collection has been rescheduled by [email or web address] at 05/08/2022 13:22 Reason:   Pt in mri will be there after another hour [id number]        Latest lactate reviewed, they are-  No results for input(s): LACTATE in the last 72 hours.    Organ dysfunction indicated by Acute kidney injury  Source-  lung    Source control Achieved by-   Cont Broad spectrum IVAB     Bacteremia  Infectious disease consulted      PNA (pneumonia)  --Cont oxacillin        Tobacco abuse  - Patient thoroughly counseled on smoking cessation, pt verbalized understanding. Time of counseling 10 minutes.        COPD (chronic obstructive pulmonary disease)  Cont breathing tx prn  Pulmonology following       VTE Risk Mitigation (From admission, onward)         Ordered     enoxaparin injection 40 mg  Daily         05/06/22 2336     IP VTE HIGH RISK PATIENT  Once         05/06/22 2336     Place sequential compression device  Until discontinued         05/06/22 2336                Discharge Planning   YESSICA:      Code Status: Full Code   Is the patient medically ready for discharge?:     Reason for patient still in hospital (select all that apply): Treatment  Discharge Plan A: Skilled Nursing Facility   Discharge Delays: None known at  this time      Chandni Lambert NP  Department of [facility]   [facility]

## 2022-05-28 NOTE — ASSESSMENT & PLAN NOTE
Gram positive   Infectious disease consulted  Continue broad spectrum intravenous antibiotic(s) given h/o ivdu  Remains febrile and leukocytosis persists  5/8/22 The patient remained afebrile overnight. Currently on cefepime/vanc/flagyl. Blood cx are growing staph aureus. The patient refused the MRI lumbar spine overnight d/t being unable to lay flat from back pain. Will give pain meds and attempt to get the MRI today to r/o spinal abscess. The ECHO showed a 1.3 x 1.5 cm elongated, mobile echodensity seen on the tricuspid valve consistent with vegetation, CT surgery was consulted. Will need a SUSANNAH. Infectious disease is consulted. Will repeat blood cx today.   5/9/22 Repeat blood cx are still positive. Sensitivities are back and Staph is sensitive to oxacillin, will D/C vanc/cefepime. Infectious disease is following   5/10/22 Repeat blood cx are +. Continue IV oxacillin and flagyl. Infectious disease is consulted. IR consulted to evaluate possible paraspinous muscle myositis versus abscess. Recommend IR drainage. Continue current management.    5/11/22 The patient reports pain is still not well controlled at times. The patient reports that she is very anxious about the upcoming SUSANNAH and IR drainage. SUSANNAH was pushed back to this afternoon because the patient ate candy this AM. Continue treatment with IV oxacillin and flagyl. Infectious disease is following. WBC trended down to 31K. Continue current management.  5/12/22 The patients WBC improved to 22K overnight. The patient remains on continuous oxacillin infusion. Infectious disease is following.  5/14: WBC continues to improve, 18.10 today     5/15: Blood cultures 5/11/22: NGTD     Continuous Oxacillin, EOC: 7/11/22, pending acceptance at facility, PICC line placed

## 2022-05-29 LAB
ALBUMIN SERPL BCP-MCNC: 1.5 G/DL (ref 3.5–5.2)
ALP SERPL-CCNC: 72 U/L (ref 55–135)
ALT SERPL W/O P-5'-P-CCNC: 8 U/L (ref 10–44)
ANION GAP SERPL CALC-SCNC: 10 MMOL/L (ref 8–16)
AST SERPL-CCNC: 13 U/L (ref 10–40)
BASOPHILS # BLD AUTO: 0.04 K/UL (ref 0–0.2)
BASOPHILS NFR BLD: 0.3 % (ref 0–1.9)
BILIRUB SERPL-MCNC: 0.3 MG/DL (ref 0.1–1)
BODY FLUID SOURCE, LDH: NORMAL
BUN SERPL-MCNC: 21 MG/DL (ref 6–20)
CALCIUM SERPL-MCNC: 7.7 MG/DL (ref 8.7–10.5)
CHLORIDE SERPL-SCNC: 102 MMOL/L (ref 95–110)
CO2 SERPL-SCNC: 24 MMOL/L (ref 23–29)
CREAT SERPL-MCNC: 1.6 MG/DL (ref 0.5–1.4)
DIFFERENTIAL METHOD: ABNORMAL
EOSINOPHIL # BLD AUTO: 0.3 K/UL (ref 0–0.5)
EOSINOPHIL NFR BLD: 2.1 % (ref 0–8)
ERYTHROCYTE [DISTWIDTH] IN BLOOD BY AUTOMATED COUNT: 15 % (ref 11.5–14.5)
EST. GFR  (AFRICAN AMERICAN): 46 ML/MIN/1.73 M^2
EST. GFR  (NON AFRICAN AMERICAN): 40 ML/MIN/1.73 M^2
GLUCOSE FLD-MCNC: 102 MG/DL
GLUCOSE SERPL-MCNC: 102 MG/DL (ref 70–110)
HCT VFR BLD AUTO: 24.4 % (ref 37–48.5)
HGB BLD-MCNC: 8 G/DL (ref 12–16)
IMM GRANULOCYTES # BLD AUTO: 0.09 K/UL (ref 0–0.04)
IMM GRANULOCYTES NFR BLD AUTO: 0.7 % (ref 0–0.5)
LDH FLD L TO P-CCNC: 241 U/L
LYMPHOCYTES # BLD AUTO: 2.2 K/UL (ref 1–4.8)
LYMPHOCYTES NFR BLD: 17.8 % (ref 18–48)
MCH RBC QN AUTO: 27.3 PG (ref 27–31)
MCHC RBC AUTO-ENTMCNC: 32.8 G/DL (ref 32–36)
MCV RBC AUTO: 83 FL (ref 82–98)
MONOCYTES # BLD AUTO: 1.1 K/UL (ref 0.3–1)
MONOCYTES NFR BLD: 9 % (ref 4–15)
NEUTROPHILS # BLD AUTO: 8.5 K/UL (ref 1.8–7.7)
NEUTROPHILS NFR BLD: 70.1 % (ref 38–73)
NRBC BLD-RTO: 0 /100 WBC
PLATELET # BLD AUTO: 428 K/UL (ref 150–450)
PMV BLD AUTO: 8.8 FL (ref 9.2–12.9)
POTASSIUM SERPL-SCNC: 4.2 MMOL/L (ref 3.5–5.1)
PROT FLD-MCNC: 2.6 G/DL
PROT SERPL-MCNC: 5.6 G/DL (ref 6–8.4)
RBC # BLD AUTO: 2.93 M/UL (ref 4–5.4)
SODIUM SERPL-SCNC: 136 MMOL/L (ref 136–145)
SPECIMEN SOURCE: NORMAL
SPECIMEN SOURCE: NORMAL
WBC # BLD AUTO: 12.14 K/UL (ref 3.9–12.7)

## 2022-05-29 PROCEDURE — 85025 COMPLETE CBC W/AUTO DIFF WBC: CPT | Performed by: INTERNAL MEDICINE

## 2022-05-29 PROCEDURE — 94761 N-INVAS EAR/PLS OXIMETRY MLT: CPT

## 2022-05-29 PROCEDURE — 25000003 PHARM REV CODE 250: Performed by: FAMILY MEDICINE

## 2022-05-29 PROCEDURE — 99233 PR SUBSEQUENT HOSPITAL CARE,LEVL III: ICD-10-PCS | Mod: ,,, | Performed by: INTERNAL MEDICINE

## 2022-05-29 PROCEDURE — 80053 COMPREHEN METABOLIC PANEL: CPT | Performed by: NURSE PRACTITIONER

## 2022-05-29 PROCEDURE — 63600175 PHARM REV CODE 636 W HCPCS: Performed by: INTERNAL MEDICINE

## 2022-05-29 PROCEDURE — 25000003 PHARM REV CODE 250: Performed by: NURSE PRACTITIONER

## 2022-05-29 PROCEDURE — 25000242 PHARM REV CODE 250 ALT 637 W/ HCPCS: Performed by: INTERNAL MEDICINE

## 2022-05-29 PROCEDURE — 21400001 HC TELEMETRY ROOM

## 2022-05-29 PROCEDURE — 94664 DEMO&/EVAL PT USE INHALER: CPT

## 2022-05-29 PROCEDURE — 99233 SBSQ HOSP IP/OBS HIGH 50: CPT | Mod: ,,, | Performed by: INTERNAL MEDICINE

## 2022-05-29 PROCEDURE — 94640 AIRWAY INHALATION TREATMENT: CPT

## 2022-05-29 PROCEDURE — 99900035 HC TECH TIME PER 15 MIN (STAT)

## 2022-05-29 PROCEDURE — 63600175 PHARM REV CODE 636 W HCPCS: Performed by: NURSE PRACTITIONER

## 2022-05-29 RX ORDER — IPRATROPIUM BROMIDE AND ALBUTEROL SULFATE 2.5; .5 MG/3ML; MG/3ML
3 SOLUTION RESPIRATORY (INHALATION) EVERY 4 HOURS PRN
Status: DISCONTINUED | OUTPATIENT
Start: 2022-05-29 | End: 2022-06-07

## 2022-05-29 RX ADMIN — CIPROFLOXACIN 750 MG: 750 TABLET, FILM COATED ORAL at 08:05

## 2022-05-29 RX ADMIN — TRAZODONE HYDROCHLORIDE 100 MG: 100 TABLET ORAL at 08:05

## 2022-05-29 RX ADMIN — FAMOTIDINE 20 MG: 20 TABLET ORAL at 08:05

## 2022-05-29 RX ADMIN — ONDANSETRON 4 MG: 2 INJECTION INTRAMUSCULAR; INTRAVENOUS at 12:05

## 2022-05-29 RX ADMIN — CIPROFLOXACIN 750 MG: 750 TABLET, FILM COATED ORAL at 09:05

## 2022-05-29 RX ADMIN — LORAZEPAM 1 MG: 1 TABLET ORAL at 10:05

## 2022-05-29 RX ADMIN — MORPHINE SULFATE 15 MG: 15 TABLET, EXTENDED RELEASE ORAL at 09:05

## 2022-05-29 RX ADMIN — OXYCODONE HYDROCHLORIDE 5 MG: 5 TABLET ORAL at 12:05

## 2022-05-29 RX ADMIN — MORPHINE SULFATE 15 MG: 15 TABLET, EXTENDED RELEASE ORAL at 08:05

## 2022-05-29 RX ADMIN — ENOXAPARIN SODIUM 40 MG: 40 INJECTION SUBCUTANEOUS at 04:05

## 2022-05-29 RX ADMIN — IPRATROPIUM BROMIDE AND ALBUTEROL SULFATE 3 ML: 2.5; .5 SOLUTION RESPIRATORY (INHALATION) at 07:05

## 2022-05-29 RX ADMIN — OXACILLIN SODIUM 12 G: 10 INJECTION, POWDER, FOR SOLUTION INTRAVENOUS at 03:05

## 2022-05-29 RX ADMIN — ONDANSETRON 4 MG: 2 INJECTION INTRAMUSCULAR; INTRAVENOUS at 06:05

## 2022-05-29 RX ADMIN — FAMOTIDINE 20 MG: 20 TABLET ORAL at 09:05

## 2022-05-29 RX ADMIN — OXYCODONE HYDROCHLORIDE 5 MG: 5 TABLET ORAL at 06:05

## 2022-05-29 NOTE — PROGRESS NOTES
[facility]  Pulmonology  Progress Note    Patient Name: Tianna Leon  MRN: [id number]  Admission Date: 5/6/2022  Hospital Length of Stay: 23 days  Code Status: Full Code  Attending Provider: John Huynh MD  Primary Care Provider: Spenser Campa MD   Principal Problem: Acute bacterial endocarditis    Subjective:     Interval History:   5/29 seen and examined.  O2 sat 95% on room air.  Afebrile.  25 cc right chest tube output.  Pleural fluid cell count from left thoracentesis yesterday not showing complicated effusion or empyema,. No organisms on Gram stain  Weak , generalized pain and SOB.   Review of Systems   Constitutional:  Positive for malaise/fatigue and weight loss. Negative for chills and fever.   HENT:  Negative for hearing loss and nosebleeds.    Eyes:  Negative for discharge.   Respiratory:  Positive for cough, sputum production and shortness of breath.    Cardiovascular:  Positive for leg swelling. Negative for chest pain.   Gastrointestinal:  Negative for abdominal pain.   Genitourinary:  Negative for hematuria.   Musculoskeletal:  Negative for falls.   Skin:  Negative for itching.   Neurological:  Positive for weakness. Negative for speech change, seizures and loss of consciousness.   Endo/Heme/Allergies:  Negative for polydipsia. Does not bruise/bleed easily.   Psychiatric/Behavioral:  Negative for hallucinations.        Objective:     Vital Signs (Most Recent):  Temp: 98.6 °F (37 °C) (05/29/22 1224)  Pulse: 81 (05/29/22 1224)  Resp: 18 (05/29/22 1224)  BP: (!) 152/87 (05/29/22 1224)  SpO2: 95 % (05/29/22 1224)   Vital Signs (24h Range):  Temp:  [97.8 °F (36.6 °C)-99 °F (37.2 °C)] 98.6 °F (37 °C)  Pulse:  [74-94] 81  Resp:  [14-20] 18  SpO2:  [93 %-98 %] 95 %  BP: (142-171)/(84-92) 152/87     Weight:  (Scale says not calibrated)  Body mass index is 35.62 kg/m².      Intake/Output Summary (Last 24 hours) at 5/29/2022 1240  Last data filed at 5/29/2022 0731  Gross per 24 hour   Intake  4386.98 ml   Output 1350 ml   Net 3036.98 ml         Physical Exam  Vitals and nursing note reviewed.   Constitutional:       General: She is not in acute distress.     Appearance: She is well-developed. She is ill-appearing.   HENT:      Head: Normocephalic and atraumatic.      Nose: Nose normal.   Eyes:      Extraocular Movements: Extraocular movements intact.   Cardiovascular:      Rate and Rhythm: Normal rate and regular rhythm.   Pulmonary:      Effort: Pulmonary effort is normal.      Breath sounds: No stridor. Rhonchi present.      Comments: Right-side pigtail in place  Abdominal:      General: There is no distension.   Musculoskeletal:         General: No signs of injury.      Cervical back: Normal range of motion and neck supple.   Skin:     General: Skin is dry.   Neurological:      General: No focal deficit present.      Mental Status: She is alert and oriented to person, place, and time. Mental status is at baseline.   Psychiatric:         Behavior: Behavior normal.       Vents:  Oxygen Concentration (%): 32 (05/28/22 1300)    Lines/Drains/Airways       Peripherally Inserted Central Catheter Line  Duration             PICC Double Lumen 05/17/22 1612 right basilic 11 days              Drain  Duration                  Chest Tube 05/21/22 0957 1 Right Pleural 8.5 Fr. 8 days    Female External Urinary Catheter 05/21/22 0700 8 days                    Significant Labs:    CBC/Anemia Profile:  Recent Labs   Lab 05/28/22  0621 05/29/22  0459   WBC 14.04* 12.14   HGB 8.0* 8.0*   HCT 25.0* 24.4*    428   MCV 85 83   RDW 15.2* 15.0*          Chemistries:  Recent Labs   Lab 05/28/22  0621 05/29/22  0459   * 136   K 4.5 4.2    102   CO2 25 24   BUN 21* 21*   CREATININE 1.6* 1.6*   CALCIUM 7.8* 7.7*   ALBUMIN 1.5* 1.5*   PROT 5.8* 5.6*   BILITOT 0.3 0.3   ALKPHOS 82 72   ALT 10 8*   AST 15 13      Latest Reference Range & Units 05/28/22 11:15   Fluid Color  Yellow   Fluid Appearance  Clear   WBC,  Body Fluid /cu mm 3065   Body Fluid Type  Pleural Fluid, Left   Segs, Fluid % 41   Lymphs, Fluid % 14   Monocytes/Macrophages, Fluid % 22   Eos, Fluid % 23   Glucose, Fluid Not established mg/dL 102   LD, Fluid Not established U/L 241   Body Fluid Source, Glucose  Pleural Fluid, Left   Body Fluid Source, LDH  Pleural Fluid, Left   Body Fluid Source, Total Protein  Pleural Fluid, Left   Body Fluid, Protein Not established g/dL 2.6      Latest Reference Range & Units 05/07/22 09:05 05/21/22 10:22   Fluid Appearance  Hazy Cloudy   WBC, Body Fluid /cu mm 52943 [1] 152109 (C) [2]   Body Fluid Type  Pleural Fluid, Right Pleural Fluid, Right   Segs, Fluid % 93 88   Lymphs, Fluid % 3 7   Monocytes/Macrophages, Fluid % 4 5   Amylase, Fluid Not established U/L 34 [3]    Glucose, Fluid Not established mg/dL 29 [4]    LD, Fluid Not established U/L 1077 [5]    Body Fluid, Albumin See text g/dL 1.3 [6]    Body Fluid Source Amylase  Pleural Fluid, Right    Body Fluid Source, Albumin  Pleural Fluid, Right    Body Fluid Source, Glucose  Pleural Fluid, Right    Body Fluid Source, LDH  Pleural Fluid, Right    Body Fluid Source, Total Protein  Pleural Fluid, Right    Body Fluid, Protein Not established g/dL 3.7 [7]    Cholesterol, Body Fluid See Comment mg/dL 79 [8]          Significant Imaging:       Chest x-ray 05/28/2022     COMPARISON:  Comparison 05/26/2022     FINDINGS:  Stable heart size.  Stable patchy infiltrate bilaterally.  No evidence of pneumothorax following thoracentesis.     Right pleural catheter.     Impression:     No evidence of pneumothorax.                Chest x-ray 05/26/2022     CLINICAL HISTORY:  chest tube pain;     TECHNIQUE:  Single frontal view of the chest was performed.     COMPARISON:  Chest radiograph 05/25/2022 with priors cardiac     FINDINGS:  Cardiac leads project over the chest.  Stable position of a right PICC.  Interval repositioning of a right pigtail catheter at the lung base.  Similar  appearance of patchy ground-glass opacities throughout both lungs.  New small left pleural effusion.  No pneumothorax.  The cardiomediastinal silhouette is unchanged.     Impression:     Slight interval repositioning of a chest tube at the right lung base.  No pneumothorax.     New small left pleural effusion.     Similar appearance of patchy ground-glass opacities throughout both lungs.         CT chest without contrast 05/20/2022    Narrative & Impression  EXAMINATION:  CT CHEST WITHOUT CONTRAST     CLINICAL HISTORY:  Empyema;     TECHNIQUE:  Low dose axial images, sagittal and coronal reformations were obtained from the thoracic inlet to the lung bases. Contrast was not administered.  All CT scans at this facility are performed using dose optimization techniques including the following: automated exposure control; adjustment of the mA and/or kV; use of iterative reconstruction technique.     COMPARISON:  CTA chest non coronary 05/06/2022, chest radiograph 05/20/2022, MRI thoracic spine 05/19/2022     FINDINGS:  Base of Neck: There is diffuse body wall edema.  Unremarkable, noting the presence of AA PICC line which terminates at the superior cavoatrial junction.     Thoracic soft tissues: There is diffuse body wall edema.     Aorta: Left-sided aortic arch.  No aneurysm and no significant atherosclerosis     Heart: Normal size. No effusion.  No significant coronary atherosclerosis.     Pulmonary vasculature: Pulmonary arteries distribute normally.     Esha/Mediastinum: There are few prominent mediastinal lymph nodes with a prevascular node measuring 0.8 cm in short axis.     Airways: Patent.     Lungs/Pleura: There are multiple bilateral consolidative densities scattered throughout the lungs, many of which demonstrate cavitation, again suggesting septic emboli.  There is small left pleural fluid, which appears simple and layers dependently.  There is a loculated moderate right pleural effusion, which appears  similar in size to the comparison exam however now containing air, which may be due to empyema.  There is adjacent atelectatic change within the bilateral lower lobes.  Overall number and size of consolidative densities has increased since the comparison exam.     Esophagus: Normal.     Upper Abdomen: There is persistent wedge-shaped hypodensity within the spleen, suggesting infarct.     Bones: No acute fracture. No suspicious lytic or sclerotic lesions.     Impression:     Interval progression with increased number and size of consolidative cavitary densities throughout the lungs, most suggestive of septic emboli with larger opacities concerning for necrotizing pneumonia.     Stable size of loculated right moderate pleural effusion, now containing air compatible with hydropneumothorax as reported on prior MRI.  Interval development of air is concerning for empyema noting limited evaluation without intravenous contrast.  New small simple appearing left pleural effusion.     Wedge-shaped splenic hypodensity, suggesting infarction.     Anasarca.           ABG  No results for input(s): PH, PO2, PCO2, HCO3, BE in the last 168 hours.  Assessment/Plan:     * Acute bacterial endocarditis  Medical Mx  CTS note noted  5/23 IV oxacillin.  5/24 IV oxacillin as per Infectious Disease  5/25 repeat cultures none relevant.  IV oxacillin as per Infectious Disease x4 weeks.  T-max 101.7° last  24 hours  5/26 repeat blood concern negative continue oxacillin gtt   5/27 continue oxacillin drip   5/28 oxacillin drip.  Blood culture 5/25 was negative  5/28 continue same      Empyema of right pleural space  5/20 Suspect empyema, needs chest tube placement  5/21 s/p chest tube placement, adequate expansion - will check Chest X Ray in [facility]  5/23 tPA DNase.  5/24 tPA for DNase installation via chest tube.  Monitor chest tube output.  Repeat chest x-ray in a.m..  5/25 chest tube output increased from 50 mL on average per 24 hours to 160 mL  last 24 hours.  Purulent drainage noted.  Will repeat tPA plus Dnase instillation.  5/26 chest x-ray reviewed.  Continue IV oxacillin.  Right-sided pigtail in place.  Status post tPA and DNase x2.  Bloody drainage around pigtail today status post tPA and DNase yesterday.  Will hold on fibrinolysis.  Monitor chest tube output  5/27 right pigtail in place.  225 mL  Last 24 hours average of 200 mL per day.  Change pleurovac canister to better assess the color of the fluid pus versus serosanguineous.  Keep pigtail in place for now.  Ultrasound bilateral chest assess pleural effusions.  Continue oxacillin drip.  5/28 keep chest tube on the right side for now.  Repeat CRP.  Checking chemistry microbiology  on left-sided pleural fluid to rule out complicated effusion and need for pigtail.  Continue oxacillin drip.   5/29 minimal output right chest tube.  Remove chest tube.  Left-sided effusion not complicated not empyema.  Continue IV oxacillin    Parapneumonic effusion  Complicated by PNA, possible empyema  SP Thora  Cont Abx  5/20/2022 Probable empyema,needs chest tube drainage  5/22 chest tube draining  5/23 60 cc drained yesterday.  Possible tPA and DNase tomorrow.  Continue IV oxacillin.  5/24 monitor chest tube output post tPA and DNase instillation today PA chest tube  5/26 chest x-ray reviewed.  Continue IV oxacillin.  Right-sided pigtail in place.  Status post tPA and DNase x2.  Bloody drainage around pigtail today status post tPA and DNase yesterday.  Will hold on fibrinolysis.  Monitor chest tube output  5/27 right pigtail in place.  225 mL  Last 24 hours average of 200 mL per day.  Change pleurovac canister to better assess the color of the fluid pus versus serosanguineous.  Keep pigtail in place for now.  Ultrasound bilateral chest assess pleural effusions.  Continue oxacillin drip.  5/28 keep chest tube on the right side for now.  Repeat CRP.  Checking chemistry microbiology  on left-sided pleural fluid to  rule out complicated effusion and need for pigtail.  Continue oxacillin drip.   5/29 minimal output right chest tube.  Remove chest tube.  Left-sided effusion not complicated not empyema.  Continue IV oxacillin    PNA (pneumonia)  PNA in IVDU  Cultures; Blood and sputum  BC +ve G+ve cocci  AbxL OXACILLIN + FLAGYL  5/22 stable, improved aeration   5/26 chest x-ray reviewed.  Continue IV oxacillin.  Right-sided pigtail in place.  Status post tPA and DNase x2.  Bloody drainage around pigtail today status post tPA and DNase yesterday.  Will hold on fibrinolysis.  Monitor chest tube output  5/28 keep chest tube on the right side for now.  Repeat CRP.  Checking chemistry microbiology  on left-sided pleural fluid to rule out complicated effusion and need for pigtail.  Continue oxacillin drip.   5/29 minimal output right chest tube.  Remove chest tube.  Left-sided effusion not complicated not empyema.  Continue IV oxacillin       COPD (chronic obstructive pulmonary disease)  Oxygen  Keep SP2> 92%  Bronchodilators  Follow up in Pulmonary for PFT to clarify Dx  5/23 O2 target sat 92-94%.  Nebs p.r.n.  5/26 O2 target sat at home not her% nebs p.r.n..  Smoking cessation   5/28 continue oxygen nebs albuterol Atrovent p.r.n.    Right-sided chest tube removed.  Dressing applied.  Repeat chest x-ray in [email or web address].     Piyush San MD  Pulmonology  [facility]

## 2022-05-29 NOTE — ASSESSMENT & PLAN NOTE
Oxygen  Keep SP2> 92%  Bronchodilators  Follow up in Pulmonary for PFT to clarify Dx  5/23 O2 target sat 92-94%.  Nebs p.r.n.  5/26 O2 target sat at home not her% nebs p.r.n..  Smoking cessation   5/28 continue oxygen nebs albuterol Atrovent p.r.n.

## 2022-05-29 NOTE — SUBJECTIVE & OBJECTIVE
Interval History:   5/29 seen and examined.  O2 sat 95% on room air.  Afebrile.  25 cc right chest tube output.  Pleural fluid cell count from left thoracentesis yesterday not showing complicated effusion or empyema,. No organisms on Gram stain  Weak , generalized pain and SOB.   Review of Systems   Constitutional:  Positive for malaise/fatigue and weight loss. Negative for chills and fever.   HENT:  Negative for hearing loss and nosebleeds.    Eyes:  Negative for discharge.   Respiratory:  Positive for cough, sputum production and shortness of breath.    Cardiovascular:  Positive for leg swelling. Negative for chest pain.   Gastrointestinal:  Negative for abdominal pain.   Genitourinary:  Negative for hematuria.   Musculoskeletal:  Negative for falls.   Skin:  Negative for itching.   Neurological:  Positive for weakness. Negative for speech change, seizures and loss of consciousness.   Endo/Heme/Allergies:  Negative for polydipsia. Does not bruise/bleed easily.   Psychiatric/Behavioral:  Negative for hallucinations.        Objective:     Vital Signs (Most Recent):  Temp: 98.6 °F (37 °C) (05/29/22 1224)  Pulse: 81 (05/29/22 1224)  Resp: 18 (05/29/22 1224)  BP: (!) 152/87 (05/29/22 1224)  SpO2: 95 % (05/29/22 1224)   Vital Signs (24h Range):  Temp:  [97.8 °F (36.6 °C)-99 °F (37.2 °C)] 98.6 °F (37 °C)  Pulse:  [74-94] 81  Resp:  [14-20] 18  SpO2:  [93 %-98 %] 95 %  BP: (142-171)/(84-92) 152/87     Weight:  (Scale says not calibrated)  Body mass index is 35.62 kg/m².      Intake/Output Summary (Last 24 hours) at 5/29/2022 1240  Last data filed at 5/29/2022 0731  Gross per 24 hour   Intake 4386.98 ml   Output 1350 ml   Net 3036.98 ml         Physical Exam  Vitals and nursing note reviewed.   Constitutional:       General: She is not in acute distress.     Appearance: She is well-developed. She is ill-appearing.   HENT:      Head: Normocephalic and atraumatic.      Nose: Nose normal.   Eyes:      Extraocular Movements:  Extraocular movements intact.   Cardiovascular:      Rate and Rhythm: Normal rate and regular rhythm.   Pulmonary:      Effort: Pulmonary effort is normal.      Breath sounds: No stridor. Rhonchi present.      Comments: Right-side pigtail in place  Abdominal:      General: There is no distension.   Musculoskeletal:         General: No signs of injury.      Cervical back: Normal range of motion and neck supple.   Skin:     General: Skin is dry.   Neurological:      General: No focal deficit present.      Mental Status: She is alert and oriented to person, place, and time. Mental status is at baseline.   Psychiatric:         Behavior: Behavior normal.       Vents:  Oxygen Concentration (%): 32 (05/28/22 1300)    Lines/Drains/Airways       Peripherally Inserted Central Catheter Line  Duration             PICC Double Lumen 05/17/22 1612 right basilic 11 days              Drain  Duration                  Chest Tube 05/21/22 0957 1 Right Pleural 8.5 Fr. 8 days    Female External Urinary Catheter 05/21/22 0700 8 days                    Significant Labs:    CBC/Anemia Profile:  Recent Labs   Lab 05/28/22  0621 05/29/22  0459   WBC 14.04* 12.14   HGB 8.0* 8.0*   HCT 25.0* 24.4*    428   MCV 85 83   RDW 15.2* 15.0*          Chemistries:  Recent Labs   Lab 05/28/22  0621 05/29/22  0459   * 136   K 4.5 4.2    102   CO2 25 24   BUN 21* 21*   CREATININE 1.6* 1.6*   CALCIUM 7.8* 7.7*   ALBUMIN 1.5* 1.5*   PROT 5.8* 5.6*   BILITOT 0.3 0.3   ALKPHOS 82 72   ALT 10 8*   AST 15 13      Latest Reference Range & Units 05/28/22 11:15   Fluid Color  Yellow   Fluid Appearance  Clear   WBC, Body Fluid /cu mm 3065   Body Fluid Type  Pleural Fluid, Left   Segs, Fluid % 41   Lymphs, Fluid % 14   Monocytes/Macrophages, Fluid % 22   Eos, Fluid % 23   Glucose, Fluid Not established mg/dL 102   LD, Fluid Not established U/L 241   Body Fluid Source, Glucose  Pleural Fluid, Left   Body Fluid Source, LDH  Pleural Fluid, Left   Body  Fluid Source, Total Protein  Pleural Fluid, Left   Body Fluid, Protein Not established g/dL 2.6      Latest Reference Range & Units 05/07/22 09:05 05/21/22 10:22   Fluid Appearance  Hazy Cloudy   WBC, Body Fluid /cu mm 87198 [1] 813036 (C) [2]   Body Fluid Type  Pleural Fluid, Right Pleural Fluid, Right   Segs, Fluid % 93 88   Lymphs, Fluid % 3 7   Monocytes/Macrophages, Fluid % 4 5   Amylase, Fluid Not established U/L 34 [3]    Glucose, Fluid Not established mg/dL 29 [4]    LD, Fluid Not established U/L 1077 [5]    Body Fluid, Albumin See text g/dL 1.3 [6]    Body Fluid Source Amylase  Pleural Fluid, Right    Body Fluid Source, Albumin  Pleural Fluid, Right    Body Fluid Source, Glucose  Pleural Fluid, Right    Body Fluid Source, LDH  Pleural Fluid, Right    Body Fluid Source, Total Protein  Pleural Fluid, Right    Body Fluid, Protein Not established g/dL 3.7 [7]    Cholesterol, Body Fluid See Comment mg/dL 79 [8]          Significant Imaging:       Chest x-ray 05/28/2022     COMPARISON:  Comparison 05/26/2022     FINDINGS:  Stable heart size.  Stable patchy infiltrate bilaterally.  No evidence of pneumothorax following thoracentesis.     Right pleural catheter.     Impression:     No evidence of pneumothorax.                Chest x-ray 05/26/2022     CLINICAL HISTORY:  chest tube pain;     TECHNIQUE:  Single frontal view of the chest was performed.     COMPARISON:  Chest radiograph 05/25/2022 with priors cardiac     FINDINGS:  Cardiac leads project over the chest.  Stable position of a right PICC.  Interval repositioning of a right pigtail catheter at the lung base.  Similar appearance of patchy ground-glass opacities throughout both lungs.  New small left pleural effusion.  No pneumothorax.  The cardiomediastinal silhouette is unchanged.     Impression:     Slight interval repositioning of a chest tube at the right lung base.  No pneumothorax.     New small left pleural effusion.     Similar appearance of patchy  ground-glass opacities throughout both lungs.         CT chest without contrast 05/20/2022    Narrative & Impression  EXAMINATION:  CT CHEST WITHOUT CONTRAST     CLINICAL HISTORY:  Empyema;     TECHNIQUE:  Low dose axial images, sagittal and coronal reformations were obtained from the thoracic inlet to the lung bases. Contrast was not administered.  All CT scans at this facility are performed using dose optimization techniques including the following: automated exposure control; adjustment of the mA and/or kV; use of iterative reconstruction technique.     COMPARISON:  CTA chest non coronary 05/06/2022, chest radiograph 05/20/2022, MRI thoracic spine 05/19/2022     FINDINGS:  Base of Neck: There is diffuse body wall edema.  Unremarkable, noting the presence of AA PICC line which terminates at the superior cavoatrial junction.     Thoracic soft tissues: There is diffuse body wall edema.     Aorta: Left-sided aortic arch.  No aneurysm and no significant atherosclerosis     Heart: Normal size. No effusion.  No significant coronary atherosclerosis.     Pulmonary vasculature: Pulmonary arteries distribute normally.     Esha/Mediastinum: There are few prominent mediastinal lymph nodes with a prevascular node measuring 0.8 cm in short axis.     Airways: Patent.     Lungs/Pleura: There are multiple bilateral consolidative densities scattered throughout the lungs, many of which demonstrate cavitation, again suggesting septic emboli.  There is small left pleural fluid, which appears simple and layers dependently.  There is a loculated moderate right pleural effusion, which appears similar in size to the comparison exam however now containing air, which may be due to empyema.  There is adjacent atelectatic change within the bilateral lower lobes.  Overall number and size of consolidative densities has increased since the comparison exam.     Esophagus: Normal.     Upper Abdomen: There is persistent wedge-shaped hypodensity  within the spleen, suggesting infarct.     Bones: No acute fracture. No suspicious lytic or sclerotic lesions.     Impression:     Interval progression with increased number and size of consolidative cavitary densities throughout the lungs, most suggestive of septic emboli with larger opacities concerning for necrotizing pneumonia.     Stable size of loculated right moderate pleural effusion, now containing air compatible with hydropneumothorax as reported on prior MRI.  Interval development of air is concerning for empyema noting limited evaluation without intravenous contrast.  New small simple appearing left pleural effusion.     Wedge-shaped splenic hypodensity, suggesting infarction.     Anasarca.

## 2022-05-29 NOTE — ASSESSMENT & PLAN NOTE
Medical Mx  CTS note noted  5/23 IV oxacillin.  5/24 IV oxacillin as per Infectious Disease  5/25 repeat cultures none relevant.  IV oxacillin as per Infectious Disease x4 weeks.  T-max 101.7° last  24 hours  5/26 repeat blood concern negative continue oxacillin gtt   5/27 continue oxacillin drip   5/28 oxacillin drip.  Blood culture 5/25 was negative  5/28 continue same

## 2022-05-29 NOTE — PLAN OF CARE
Problem: Adult Inpatient Plan of Care  Goal: Plan of Care Review  Outcome: Ongoing, Progressing  Goal: Patient-Specific Goal (Individualized)  Outcome: Ongoing, Progressing  Goal: Absence of Hospital-Acquired Illness or Injury  Outcome: Ongoing, Progressing  Goal: Optimal Comfort and Wellbeing  Outcome: Ongoing, Progressing  Goal: Readiness for Transition of Care  Outcome: Ongoing, Progressing     Problem: Impaired Wound Healing  Goal: Optimal Wound Healing  Outcome: Ongoing, Progressing     Problem: Skin Injury Risk Increased  Goal: Skin Health and Integrity  Outcome: Ongoing, Progressing     Problem: Infection (Pneumonia)  Goal: Resolution of Infection Signs and Symptoms  Outcome: Ongoing, Progressing     Problem: Fall Injury Risk  Goal: Absence of Fall and Fall-Related Injury  Outcome: Ongoing, Progressing     Problem: Self-Care Deficit  Goal: Improved Ability to Complete Activities of Daily Living  Outcome: Ongoing, Progressing

## 2022-05-29 NOTE — ASSESSMENT & PLAN NOTE
PNA in IVDU  Cultures; Blood and sputum  BC +ve G+ve cocci  AbxL OXACILLIN + FLAGYL  5/22 stable, improved aeration   5/26 chest x-ray reviewed.  Continue IV oxacillin.  Right-sided pigtail in place.  Status post tPA and DNase x2.  Bloody drainage around pigtail today status post tPA and DNase yesterday.  Will hold on fibrinolysis.  Monitor chest tube output  5/28 keep chest tube on the right side for now.  Repeat CRP.  Checking chemistry microbiology  on left-sided pleural fluid to rule out complicated effusion and need for pigtail.  Continue oxacillin drip.   5/29 minimal output right chest tube.  Remove chest tube.  Left-sided effusion not complicated not empyema.  Continue IV oxacillin

## 2022-05-29 NOTE — PROGRESS NOTES
[facility]  Progress Note    Patient Name: Tianna Leon  MRN: [id number]  Patient Class: IP- Inpatient   Admission Date: 5/6/2022  Length of Stay: 23 days  Attending Physician: John Huynh MD  Primary Care Provider: Spenser Campa MD    Subjective:     Principal Problem:Acute bacterial endocarditis        HPI:  38 y/o. female  with a PMHx of asthma, COPD,Bipolar  , IVDU and HLD presented to the ER with a c/o  sob for the last weak which has gotten worse . The SOB is associated with fever , chills , productive cough , back pain  and generalized weakness . She report cough some blood this am .  She is active IVDU  ( heroine , cocaine  and amphetamine ) . She denies any  sick contact , chest pain  , GI/ sx , She also complaning of LE sweeling . Knee pain and B/L LE rash .   ER COURSE: CTA chest negative for pe . Multiple nodular and cavitary opacities concerning for septic emboli with larger opacities possibly necrotizing pneumonia. CT cervical  and thoracic did not show any acute finding , CT lumbar Possible progression of degenerative changes most notable at L4-5 with moderate to severe spinal canal stenosis at this level.  WBC  29 k , na 130 , k 3.4 , cl 89 , procal 2.71   ER VS:  BP Pulse Resp Temp SpO2   (!) 124/58 110 (!) 30 (!) 101.6 °F (38.7 °C) 96 %           Pt will be admitted to inpt with a dx of PNA and severe sepsis       Overview/Hospital Course:  5/7 admitted for pneumonia, severe sepsis in setting of ivdu. Mother at bedside and provides collateral. Patient has struggled with substance abuse for approximately 20 years, worsening over last 3-5 years since life event. Onset of symptoms 1-2 weeks ago. Tolerated thoracentesis with no pneumothorax on chest x-ray. Mri lumbar and echo, pending. Bcx positive, growing gram positive cocci. On vanc, cefepime and flagyl. Infectious disease consulted for bacteremia. 5/8/22 The patient remained afebrile overnight. Currently on  cefepime/vanc/flagyl. Blood cx 5/6/22 are growing MSSA. The patient refused the MRI lumbar spine overnight d/t being unable to lay flat from back pain. Will give pain meds and attempt to get the MRI today to r/o spinal abscess. The ECHO showed a 1.3 x 1.5 cm elongated, mobile echodensity seen on the tricuspid valve consistent with vegetation, CT surgery was consulted. Will need a SUSANNAH. Infectious disease is consulted. Will repeat blood cx today. 5/9/22 No acute events overnight. The patient reports lower back pain is not well controlled with current pain regimen, will adjust. Repeat blood cx are still positive. Sensitivities are back and Staph is sensitive to oxacillin, will D/C vanc/cefepime. Cardiology consulted and plans for a SUSANNAH today. CT surgery following and recommends continuing medical management with appropriate ABX, no surgical intervention at this time. MRI lumbar spine showed moderate to severe spinal canal stenosis with moderate left-sided neural foraminal stenosis, Neurosurgery consulted. 5/10/22 No acute events overnight. The patient reports some improvement in pain with adjustment in pain meds. Repeat blood cx are +. Continue IV oxacillin and flagyl. Infectious disease is consulted. IR consulted to evaluate possible paraspinous muscle myositis versus abscess. Recommend IR drainage. Continue current management. 5/11/22 No acute events overnight. The patient reports pain is still not well controlled at times. The patient reports that she is very anxious about the upcoming SUSANNAH and IR drainage. SUSANNAH was pushed back to this afternoon because the patient ate candy this AM. Continue treatment with IV oxacillin and flagyl. Infectious disease is following. WBC trended down to 31K. Continue current management. 5/12/22 No acute events overnight. The patients WBC improved to 22K overnight. The patient remains on continuous oxacillin infusion. Infectious disease is following. MRI thoracic spine is ordered and  pending. The patients SUSANNAH was postponed again today d/t low H/H. Hgb was noted to be 6.2 this morning, will transfuse 1 unit PRBC. The patient reports pain is still not well controlled. The case was discussed with Neurosurgery who recommended adding extended release pain meds. Approximately 1 ml of purulent drainage aspirated from back yesterday per IR, growing gram + cocci.     5/13: Patient was given 1U PRBC on 5/12, improved from 6.2 --> 7.0, will give an additional unit PRBC. Radiology attempted to bring patient on 5/12 at 1755 for Thoracic MRI, patient refused due to pain and anxiety. Repeat attempt this AM, patient again refused. Will order Ativan 1mg IV to be given with pain medication prior to scan to relieve anxiety and pain, nurse to notify NP when ready to go for scan. Discussed pain management with patient, discussed transition to PO pain medications to being preparing for d/c to LTAC, adjusted PO pain regimen will reevaluate to determine effectiveness. Repeat Blood cultures 5/11/22: NGTD.     5/14: Final anaerobic cultures pending, continue to adjust pain regimen, d/c IV Dilaudid today. Patient currently managed with PO Morphine extended release and PO oxycodone. D/C plan is for LTAC once final antibiotic regimen determined. Patient continues to refuse MRI of thoracic spine.     5/15: Called to room this AM, patient had coughed up blood and mucous. Approx. 1-2 teaspoons of blood in emesis bag, ordered CXR and repeat CBC, CXR showed improvement from previous day, repeat CBC at time of incident, showed a slight decrease in Hgb: 9.7 --> 8.7, when repeated at 1500, Hgb had improved to 10. no further episodes of coughing up blood throughout day. Patient continues to c/o not having enough pain medication but continues to sleep most of the day and will fall asleep when speaking at times. Added IV Toradol to plan.     5/16: No further episodes of coughing blood overnight, patient participated with encouragement  with PT/OT today, recommendations are HH vs. SNF pending progress. Awaiting final antibiotic regimen recommendations. Anaerobic culture results finalized: negative for growth. Afebrile, WBC trending downward.     5/17: Antibiotic regimen per ID is continuous oxacillin until 7/11/22, SW notified of plan, will seek acceptance at LTAC or SNF. Patient choice is [facility]. WBC in normal range. AMS this AM, CT of head: no acute findings. Given narcan and mental status improved. Decreased narcotic dose.     5/18: Per discussion with patient, she is willing to have the MRI of thoracic spine today, will order IV pain medication and anxiety medication prior to scan. Nursing and Radiology aware of needing to premedicate for scan. Patient is more cooperative and participating with care.     5/19: Patient given premedications for MRI, MRI completed, results pending. Patient more agreeable with treatment plan and cooperating. Will need psychiatry follow-up after discharge. Hgb: 6.7, will give 1U PRBC    5/20/22: +LEON and generalized pain. Thoracic MRI showed no evidence for disc osteomyelitis in the thoracic spine and no epidural abscess, Loculated right hydropneumothorax and extensive bilateral pulmonary infiltrates. Ct chest showed septic emboli and moderate right pleural effusion.+splenic infarct  Pulmonology felt likely empyema- consulted IR for chest tube.     5/21/22: s/p right pig tail placement to suction/ drainage system per IR. 60ml pus removed initially. Fluid analysis consistent with empyema. Pleural fluid cultures pending. Now with scant serosanguinous output.+ right lateral chest pain at chest tube site. Denies any other complaints. Status update provided to pt's mother.     5/22/22: Repeatedly requesting IV Dilaudid. Toradol ordered. Afebrile, WBC normal, O2sats stable on 3lites. s/p right pig tail placement to suction/ drainage system per IR on 5/21/22- draining serous draiange with pus. Plan for LTAC placement  "    5/23/22: More cofortable today. Not getting OOB. Encouraged OOB and rationale provided. Pulmonology recommended Possible tPA and DNase in chest tube tomorrow    5/24/22: Examined OOB in chair. Encouraged pt to continue OOB. Plan for tPA and DNase in chest tube today per Pulmonology  Pt denied LTAC- will pursue SNF    5/25/22: Temp 101.7F last night. Will recheck blood cultures and UA. BP low - IVF bolus given and restarted IVF. ID re-consulted. 160ml of purulent CT drainage output. Dr. San plans to repeat tPA and DNase in chest tube again today.     5/26/22: temp 99.2. c/o of right "lung pain" when eating. Will consult speech to check swallow. +mild leukocytosis. Repeat BC 5/25/22 show NGTD. Repeat UA showed rare trichomas. Chest tube drainage remains purulent     5/27/22  Low grade temp. Downward trend of hemoglobin, related to chest tube drainage. Blood cultures remain negative. Urine culture growing E. Coli. Sensitivities are pending.     5/28/22  Urine culture returned pansenstiive. Will continue ciprofloxacin for total of 3 days then discontinue. Still awaiting SNF placement.    5/29/22 culture of pleural fluid from 5/28/22 negative. Continue CT for now.       Interval History: asleep. Aunty at bedside. No acute events overnight.     Review of Systems   Constitutional:  Positive for activity change, appetite change and fatigue. Negative for chills, diaphoresis, fever and unexpected weight change.   HENT:  Negative for congestion, hearing loss, mouth sores, postnasal drip, rhinorrhea, sore throat and trouble swallowing.    Eyes:  Negative for discharge and visual disturbance.   Respiratory:  Negative for cough and chest tightness.    Cardiovascular:  Negative for chest pain, palpitations and leg swelling.   Gastrointestinal:  Negative for blood in stool, constipation, diarrhea, nausea and vomiting.   Endocrine: Negative for cold intolerance and heat intolerance.   Genitourinary:  Negative for difficulty " urinating, dyspareunia, flank pain and hematuria.   Musculoskeletal:  Positive for back pain and myalgias. Negative for arthralgias.   Skin: Negative.    Neurological:  Positive for weakness. Negative for dizziness and light-headedness.   Hematological:  Negative for adenopathy. Does not bruise/bleed easily.   Psychiatric/Behavioral:  Negative for agitation, behavioral problems, confusion and sleep disturbance. The patient is nervous/anxious.      Objective:     Vital Signs (Most Recent):  Temp: 98.6 °F (37 °C) (05/29/22 1224)  Pulse: 81 (05/29/22 1224)  Resp: 18 (05/29/22 1224)  BP: (!) 152/87 (05/29/22 1224)  SpO2: 95 % (05/29/22 1224)   Vital Signs (24h Range):  Temp:  [97.8 °F (36.6 °C)-99 °F (37.2 °C)] 98.6 °F (37 °C)  Pulse:  [74-94] 81  Resp:  [14-20] 18  SpO2:  [93 %-98 %] 95 %  BP: (142-171)/(84-92) 152/87     Weight:  (Scale says not calibrated)  Body mass index is 35.62 kg/m².    Intake/Output Summary (Last 24 hours) at 5/29/2022 1243  Last data filed at 5/29/2022 0731  Gross per 24 hour   Intake 4386.98 ml   Output 1350 ml   Net 3036.98 ml      Physical Exam  Vitals and nursing note reviewed.   Constitutional:       General: She is not in acute distress.     Appearance: She is well-developed. She is ill-appearing.   HENT:      Head: Normocephalic and atraumatic.      Nose: Nose normal.   Eyes:      Extraocular Movements: Extraocular movements intact.   Cardiovascular:      Rate and Rhythm: Normal rate and regular rhythm.   Pulmonary:      Effort: Pulmonary effort is normal.      Breath sounds: No stridor. Rhonchi present.      Comments: Right-side pigtail in place  Abdominal:      General: There is no distension.   Musculoskeletal:         General: No signs of injury.      Cervical back: Normal range of motion and neck supple.   Skin:     General: Skin is dry.   Neurological:      General: No focal deficit present.      Mental Status: She is alert and oriented to person, place, and time. Mental status is  at baseline.   Psychiatric:         Behavior: Behavior normal.     Significant Labs: All pertinent labs within the past 24 hours have been reviewed.  CBC:   Recent Labs   Lab 05/28/22  0621 05/29/22  0459   WBC 14.04* 12.14   HGB 8.0* 8.0*   HCT 25.0* 24.4*    428     CMP:   Recent Labs   Lab 05/28/22  0621 05/29/22  0459   * 136   K 4.5 4.2    102   CO2 25 24    102   BUN 21* 21*   CREATININE 1.6* 1.6*   CALCIUM 7.8* 7.7*   PROT 5.8* 5.6*   ALBUMIN 1.5* 1.5*   BILITOT 0.3 0.3   ALKPHOS 82 72   AST 15 13   ALT 10 8*   ANIONGAP 9 10   EGFRNONAA 40* 40*       Significant Imaging: I have reviewed all pertinent imaging results/findings within the past 24 hours.      Assessment/Plan:      * Acute bacterial endocarditis  ECHO showed a 1.3 x 1.5 cm elongated, mobile echodensity seen on the tricuspid valve consistent with vegetation, CT surgery was consulted.   Due to MSSA    -- Infectious disease is consulted.  -- repeat blood cx NGTD.  -- Continue IV ABX.   --Sensitivities are back and Staph is sensitive to oxacillin, D/C vanc/cefepime.   --Cardiology consulted and plans for a SUSANNAH.   --CT surgery following and recommends continuing medical management with appropriate ABX, no surgical intervention at this time.    cont IV oxacillin.  Plan is to continue x 4 weeks. Afebrile    E. coli UTI  Add ciprofloxacin for 3 days. Will stop 5/29/22      Empyema of right pleural space  Pulmonology consulted and felt likely empyema   IR consulted for chest tube      s/p right pigtail small bore chest tube placement to suction/drainage system per IR on 5/21/22. 60ml pus removed. Now with Serous drainage with pus. Fluid analysis consistent with empyema. Pleural fluid aerobic culture shows NGTD, Anaerobic culture pending    5/24/22: Dr. San plans for tPA and DNase in chest tube today.   5/25/22: 160ml of purulent CT drainage output. Dr. San plans to repeat tPA and DNase in chest tube again today.    5/26/22  Still has purulent drainage noted. Pulmonology following    Normocytic anemia  Secondary to acute illness    --Daily CBC  --Transfuse with 1 unit PRBC for Hgb <7.0 or <8.0 if symptomatic   --Hgb: 6.7, give 1U PRBC    5/27/22  Hemoglobin 7.3g/dL. downward trend. Has blood tinged seropurulent drainage. Will give additional unit if continues to trend down.  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 IR consulted to evaluate possible paraspinous muscle myositis versus abscess.    --Approximately 1 ml of purulent drainage aspirated 5/11 per IR, growing MSSA  --Continue current regimen  --Oxacillin x 4 weeks total    Lumbar stenosis without neurogenic claudication        Chronic pulmonary heart disease  - Pulmonology following       MSSA bacteremia  Gram positive   Infectious disease consulted  Continue broad spectrum intravenous antibiotic(s) given h/o ivdu  Remains febrile and leukocytosis persists  5/8/22 The patient remained afebrile overnight. Currently on cefepime/vanc/flagyl. Blood cx are growing staph aureus. The patient refused the MRI lumbar spine overnight d/t being unable to lay flat from back pain. Will give pain meds and attempt to get the MRI today to r/o spinal abscess. The ECHO showed a 1.3 x 1.5 cm elongated, mobile echodensity seen on the tricuspid valve consistent with vegetation, CT surgery was consulted. Will need a SUSANNAH. Infectious disease is consulted. Will repeat blood cx today.   5/9/22 Repeat blood cx are still positive. Sensitivities are back and Staph is sensitive to oxacillin, will D/C vanc/cefepime. Infectious disease is following   5/10/22 Repeat blood cx are +. Continue IV oxacillin and flagyl. Infectious disease is consulted. IR consulted to evaluate possible paraspinous muscle myositis versus abscess. Recommend IR drainage. Continue  current management.    5/11/22 The patient reports pain is still not well controlled at times. The patient reports that she is very anxious about the upcoming SUSANNAH and IR drainage. SUSANNAH was pushed back to this afternoon because the patient ate candy this AM. Continue treatment with IV oxacillin and flagyl. Infectious disease is following. WBC trended down to 31K. Continue current management.  5/12/22 The patients WBC improved to 22K overnight. The patient remains on continuous oxacillin infusion. Infectious disease is following.  5/14: WBC continues to improve, 18.10 today     5/15: Blood cultures 5/11/22: NGTD     Continuous Oxacillin, EOC: 7/11/22, pending acceptance at facility, PICC line placed    Parapneumonic effusion  Pulmonology consulted  Status post thoracentesis 5/7/22   continue CT management per pulmonology    5/29/22  Body fluid culture currently negative.     IVDU (intravenous drug user)   Will ordet TTE to r/o IE  Will order MRI lumbar wwo- completed   Cont broad spectrum IVAB   --MRI thoracic spine ordered per ID, patient refused on multiple attempts    5/7   Studies pending for additional sites of infection given h/o ivdu  5/8/22  on cessation     Severe sepsis  This patient does have evidence of infective focus  My overall impression is sepsis. Vital signs were reviewed and noted in progress note.  Antibiotics given-   Antibiotics (From admission, onward)            Start     Stop Route Frequency Ordered    05/09/22 1100  oxacillin 12 g in  mL CONTINUOUS INFUSION         -- IV Every 24 hours (non-standard times) 05/09/22 0932    05/07/22 2100  mupirocin 2 % ointment         05/12 2059 Nasl 2 times daily 05/07/22 1646        Cultures were taken-   Microbiology Results (last 7 days)     Procedure Component Value Units Date/Time    Culture, Respiratory with Gram Stain [[id number]] Collected: 05/16/22 0750    Order Status: Sent Specimen: Respiratory from Sputum Updated: 05/16/22 0751     Culture, Anaerobe [[id number]] Collected: 05/11/22 1440    Order Status: Completed Specimen: Abscess from Back Updated: 05/16/22 0733     Anaerobic Culture No anaerobes isolated    Blood culture [[id number]] Collected: 05/11/22 1213    Order Status: Completed Specimen: Blood Updated: 05/16/22 0612     Blood Culture, Routine No Growth to date      No Growth to date      No Growth to date      No Growth to date      No Growth to date    Blood culture [[id number]] Collected: 05/11/22 1213    Order Status: Completed Specimen: Blood Updated: 05/16/22 0612     Blood Culture, Routine No Growth to date      No Growth to date      No Growth to date      No Growth to date      No Growth to date    Aerobic culture [[id number]]  (Abnormal)  (Susceptibility) Collected: 05/11/22 1440    Order Status: Completed Specimen: Abscess from Back Updated: 05/14/22 1057     Aerobic Bacterial Culture STAPHYLOCOCCUS AUREUS  Many      Gram stain [[id number]] Collected: 05/11/22 1440    Order Status: Completed Specimen: Abscess from Back Updated: 05/12/22 0306     Gram Stain Result Few WBC's      Few Gram positive cocci    Gram stain [[id number]]     Order Status: Canceled Specimen: Joint Fluid from Back     Blood culture [[id number]]  (Abnormal) Collected: 05/08/22 1516    Order Status: Completed Specimen: Blood from Peripheral, Right Hand Updated: 05/11/22 1009     Blood Culture, Routine Gram stain aer bottle: Gram positive cocci in clusters resembling Staph       Results called to and read back by: Chasity Raymundo RN  05/09/2022  11:31      STAPHYLOCOCCUS AUREUS  ID consult required at [facility].  For susceptibility see order #[id number]      Narrative:      Collection has been rescheduled by [email or web address] at 05/08/2022 13:22 Reason:   Pt in mri will be there after another hour [id number]  Collection has been rescheduled by [email or web address] at 05/08/2022 13:22 Reason:   Pt in mri will be there after another hour [id number]    Blood culture  [[id number]]  (Abnormal) Collected: 05/08/22 1515    Order Status: Completed Specimen: Blood from Peripheral, Left Arm Updated: 05/11/22 1009     Blood Culture, Routine Gram stain aer bottle: Gram positive cocci in clusters resembling Staph      Results called to and read back by: Chasity Raymundo RN  05/09/2022  15:40      STAPHYLOCOCCUS AUREUS  ID consult required at [facility].Critical access hospital,[facility] and [facility] locations.  For susceptibility see order #[id number]      Narrative:      Collection has been rescheduled by [email or web address] at 05/08/2022 13:22 Reason:   Pt in mri will be there after another hour [id number]  Collection has been rescheduled by [email or web address] at 05/08/2022 13:22 Reason:   Pt in mri will be there after another hour [id number]        Latest lactate reviewed, they are-  No results for input(s): LACTATE in the last 72 hours.    Organ dysfunction indicated by Acute kidney injury  Source-  lung    Source control Achieved by-   Cont Broad spectrum IVAB     Bacteremia  Infectious disease consulted      PNA (pneumonia)  --Cont oxacillin        Tobacco abuse  - Patient thoroughly counseled on smoking cessation, pt verbalized understanding. Time of counseling 10 minutes.        COPD (chronic obstructive pulmonary disease)  Cont breathing tx prn  Pulmonology following       VTE Risk Mitigation (From admission, onward)         Ordered     enoxaparin injection 40 mg  Daily         05/06/22 2336     IP VTE HIGH RISK PATIENT  Once         05/06/22 2336     Place sequential compression device  Until discontinued         05/06/22 2336                Discharge Planning   YESSICA:      Code Status: Full Code   Is the patient medically ready for discharge?:     Reason for patient still in hospital (select all that apply): Treatment  Discharge Plan A: Skilled Nursing Facility   Discharge Delays: None known at this time        Chandni Lambert NP  Department of Hospital Medicine   [facility] - Med Surg

## 2022-05-29 NOTE — SUBJECTIVE & OBJECTIVE
Interval History: asleep. Aunty at bedside. No acute events overnight.     Review of Systems   Constitutional:  Positive for activity change, appetite change and fatigue. Negative for chills, diaphoresis, fever and unexpected weight change.   HENT:  Negative for congestion, hearing loss, mouth sores, postnasal drip, rhinorrhea, sore throat and trouble swallowing.    Eyes:  Negative for discharge and visual disturbance.   Respiratory:  Negative for cough and chest tightness.    Cardiovascular:  Negative for chest pain, palpitations and leg swelling.   Gastrointestinal:  Negative for blood in stool, constipation, diarrhea, nausea and vomiting.   Endocrine: Negative for cold intolerance and heat intolerance.   Genitourinary:  Negative for difficulty urinating, dyspareunia, flank pain and hematuria.   Musculoskeletal:  Positive for back pain and myalgias. Negative for arthralgias.   Skin: Negative.    Neurological:  Positive for weakness. Negative for dizziness and light-headedness.   Hematological:  Negative for adenopathy. Does not bruise/bleed easily.   Psychiatric/Behavioral:  Negative for agitation, behavioral problems, confusion and sleep disturbance. The patient is nervous/anxious.      Objective:     Vital Signs (Most Recent):  Temp: 98.6 °F (37 °C) (05/29/22 1224)  Pulse: 81 (05/29/22 1224)  Resp: 18 (05/29/22 1224)  BP: (!) 152/87 (05/29/22 1224)  SpO2: 95 % (05/29/22 1224)   Vital Signs (24h Range):  Temp:  [97.8 °F (36.6 °C)-99 °F (37.2 °C)] 98.6 °F (37 °C)  Pulse:  [74-94] 81  Resp:  [14-20] 18  SpO2:  [93 %-98 %] 95 %  BP: (142-171)/(84-92) 152/87     Weight:  (Scale says not calibrated)  Body mass index is 35.62 kg/m².    Intake/Output Summary (Last 24 hours) at 5/29/2022 1243  Last data filed at 5/29/2022 0731  Gross per 24 hour   Intake 4386.98 ml   Output 1350 ml   Net 3036.98 ml      Physical Exam  Vitals and nursing note reviewed.   Constitutional:       General: She is not in acute distress.      Appearance: She is well-developed. She is ill-appearing.   HENT:      Head: Normocephalic and atraumatic.      Nose: Nose normal.   Eyes:      Extraocular Movements: Extraocular movements intact.   Cardiovascular:      Rate and Rhythm: Normal rate and regular rhythm.   Pulmonary:      Effort: Pulmonary effort is normal.      Breath sounds: No stridor. Rhonchi present.      Comments: Right-side pigtail in place  Abdominal:      General: There is no distension.   Musculoskeletal:         General: No signs of injury.      Cervical back: Normal range of motion and neck supple.   Skin:     General: Skin is dry.   Neurological:      General: No focal deficit present.      Mental Status: She is alert and oriented to person, place, and time. Mental status is at baseline.   Psychiatric:         Behavior: Behavior normal.     Significant Labs: All pertinent labs within the past 24 hours have been reviewed.  CBC:   Recent Labs   Lab 05/28/22  0621 05/29/22  0459   WBC 14.04* 12.14   HGB 8.0* 8.0*   HCT 25.0* 24.4*    428     CMP:   Recent Labs   Lab 05/28/22 0621 05/29/22  0459   * 136   K 4.5 4.2    102   CO2 25 24    102   BUN 21* 21*   CREATININE 1.6* 1.6*   CALCIUM 7.8* 7.7*   PROT 5.8* 5.6*   ALBUMIN 1.5* 1.5*   BILITOT 0.3 0.3   ALKPHOS 82 72   AST 15 13   ALT 10 8*   ANIONGAP 9 10   EGFRNONAA 40* 40*       Significant Imaging: I have reviewed all pertinent imaging results/findings within the past 24 hours.

## 2022-05-29 NOTE — ASSESSMENT & PLAN NOTE
Pulmonology consulted  Status post thoracentesis 5/7/22   continue CT management per pulmonology    5/29/22  Body fluid culture currently negative.

## 2022-05-29 NOTE — ASSESSMENT & PLAN NOTE
5/20 Suspect empyema, needs chest tube placement  5/21 s/p chest tube placement, adequate expansion - will check Chest X Ray in [facility]  5/23 tPA DNase.  5/24 tPA for DNase installation via chest tube.  Monitor chest tube output.  Repeat chest x-ray in a.m..  5/25 chest tube output increased from 50 mL on average per 24 hours to 160 mL last 24 hours.  Purulent drainage noted.  Will repeat tPA plus Dnase instillation.  5/26 chest x-ray reviewed.  Continue IV oxacillin.  Right-sided pigtail in place.  Status post tPA and DNase x2.  Bloody drainage around pigtail today status post tPA and DNase yesterday.  Will hold on fibrinolysis.  Monitor chest tube output  5/27 right pigtail in place.  225 mL  Last 24 hours average of 200 mL per day.  Change pleurovac canister to better assess the color of the fluid pus versus serosanguineous.  Keep pigtail in place for now.  Ultrasound bilateral chest assess pleural effusions.  Continue oxacillin drip.  5/28 keep chest tube on the right side for now.  Repeat CRP.  Checking chemistry microbiology  on left-sided pleural fluid to rule out complicated effusion and need for pigtail.  Continue oxacillin drip.   5/29 minimal output right chest tube.  Remove chest tube.  Left-sided effusion not complicated not empyema.  Continue IV oxacillin

## 2022-05-29 NOTE — PLAN OF CARE
Remains injury free. Pain managed with oral medication. Poor appetite. Repositions independently. Chest tube removed per MD, tolerated well. No s/s acute distress. Will monitor.

## 2022-05-29 NOTE — ASSESSMENT & PLAN NOTE
Complicated by PNA, possible empyema  SP Thora  Cont Abx  5/20/2022 Probable empyema,needs chest tube drainage  5/22 chest tube draining  5/23 60 cc drained yesterday.  Possible tPA and DNase tomorrow.  Continue IV oxacillin.  5/24 monitor chest tube output post tPA and DNase instillation today PA chest tube  5/26 chest x-ray reviewed.  Continue IV oxacillin.  Right-sided pigtail in place.  Status post tPA and DNase x2.  Bloody drainage around pigtail today status post tPA and DNase yesterday.  Will hold on fibrinolysis.  Monitor chest tube output  5/27 right pigtail in place.  225 mL  Last 24 hours average of 200 mL per day.  Change pleurovac canister to better assess the color of the fluid pus versus serosanguineous.  Keep pigtail in place for now.  Ultrasound bilateral chest assess pleural effusions.  Continue oxacillin drip.  5/28 keep chest tube on the right side for now.  Repeat CRP.  Checking chemistry microbiology  on left-sided pleural fluid to rule out complicated effusion and need for pigtail.  Continue oxacillin drip.   5/29 minimal output right chest tube.  Remove chest tube.  Left-sided effusion not complicated not empyema.  Continue IV oxacillin

## 2022-05-30 LAB
ALBUMIN SERPL BCP-MCNC: 1.5 G/DL (ref 3.5–5.2)
ALP SERPL-CCNC: 77 U/L (ref 55–135)
ALT SERPL W/O P-5'-P-CCNC: 7 U/L (ref 10–44)
ANION GAP SERPL CALC-SCNC: 12 MMOL/L (ref 8–16)
AST SERPL-CCNC: 14 U/L (ref 10–40)
BACTERIA BLD CULT: NORMAL
BASOPHILS # BLD AUTO: 0.04 K/UL (ref 0–0.2)
BASOPHILS NFR BLD: 0.3 % (ref 0–1.9)
BILIRUB SERPL-MCNC: 0.3 MG/DL (ref 0.1–1)
BUN SERPL-MCNC: 22 MG/DL (ref 6–20)
CALCIUM SERPL-MCNC: 7.8 MG/DL (ref 8.7–10.5)
CHLORIDE SERPL-SCNC: 102 MMOL/L (ref 95–110)
CO2 SERPL-SCNC: 21 MMOL/L (ref 23–29)
CREAT SERPL-MCNC: 1.7 MG/DL (ref 0.5–1.4)
DIFFERENTIAL METHOD: ABNORMAL
EOSINOPHIL # BLD AUTO: 0.2 K/UL (ref 0–0.5)
EOSINOPHIL NFR BLD: 1.2 % (ref 0–8)
ERYTHROCYTE [DISTWIDTH] IN BLOOD BY AUTOMATED COUNT: 15.3 % (ref 11.5–14.5)
EST. GFR  (AFRICAN AMERICAN): 43 ML/MIN/1.73 M^2
EST. GFR  (NON AFRICAN AMERICAN): 37 ML/MIN/1.73 M^2
GLUCOSE SERPL-MCNC: 105 MG/DL (ref 70–110)
HCT VFR BLD AUTO: 26 % (ref 37–48.5)
HGB BLD-MCNC: 8.6 G/DL (ref 12–16)
IMM GRANULOCYTES # BLD AUTO: 0.09 K/UL (ref 0–0.04)
IMM GRANULOCYTES NFR BLD AUTO: 0.7 % (ref 0–0.5)
LYMPHOCYTES # BLD AUTO: 2 K/UL (ref 1–4.8)
LYMPHOCYTES NFR BLD: 15.6 % (ref 18–48)
MCH RBC QN AUTO: 27.6 PG (ref 27–31)
MCHC RBC AUTO-ENTMCNC: 33.1 G/DL (ref 32–36)
MCV RBC AUTO: 83 FL (ref 82–98)
MONOCYTES # BLD AUTO: 1.2 K/UL (ref 0.3–1)
MONOCYTES NFR BLD: 9.3 % (ref 4–15)
NEUTROPHILS # BLD AUTO: 9.3 K/UL (ref 1.8–7.7)
NEUTROPHILS NFR BLD: 72.9 % (ref 38–73)
NRBC BLD-RTO: 0 /100 WBC
PATH INTERP FLD-IMP: NORMAL
PLATELET # BLD AUTO: 417 K/UL (ref 150–450)
PMV BLD AUTO: 8.6 FL (ref 9.2–12.9)
POTASSIUM SERPL-SCNC: 3.8 MMOL/L (ref 3.5–5.1)
PROT SERPL-MCNC: 6.1 G/DL (ref 6–8.4)
RBC # BLD AUTO: 3.12 M/UL (ref 4–5.4)
SODIUM SERPL-SCNC: 135 MMOL/L (ref 136–145)
WBC # BLD AUTO: 12.79 K/UL (ref 3.9–12.7)

## 2022-05-30 PROCEDURE — 94664 DEMO&/EVAL PT USE INHALER: CPT

## 2022-05-30 PROCEDURE — 99233 SBSQ HOSP IP/OBS HIGH 50: CPT | Mod: NSCH,,, | Performed by: INTERNAL MEDICINE

## 2022-05-30 PROCEDURE — 25000003 PHARM REV CODE 250: Performed by: FAMILY MEDICINE

## 2022-05-30 PROCEDURE — 99900035 HC TECH TIME PER 15 MIN (STAT)

## 2022-05-30 PROCEDURE — 94761 N-INVAS EAR/PLS OXIMETRY MLT: CPT

## 2022-05-30 PROCEDURE — 25000003 PHARM REV CODE 250: Performed by: NURSE PRACTITIONER

## 2022-05-30 PROCEDURE — 85025 COMPLETE CBC W/AUTO DIFF WBC: CPT | Performed by: INTERNAL MEDICINE

## 2022-05-30 PROCEDURE — 21400001 HC TELEMETRY ROOM

## 2022-05-30 PROCEDURE — 63600175 PHARM REV CODE 636 W HCPCS: Performed by: NURSE PRACTITIONER

## 2022-05-30 PROCEDURE — 99232 SBSQ HOSP IP/OBS MODERATE 35: CPT | Mod: ,,, | Performed by: INTERNAL MEDICINE

## 2022-05-30 PROCEDURE — 63600175 PHARM REV CODE 636 W HCPCS: Performed by: INTERNAL MEDICINE

## 2022-05-30 PROCEDURE — 80053 COMPREHEN METABOLIC PANEL: CPT | Performed by: NURSE PRACTITIONER

## 2022-05-30 PROCEDURE — 99233 PR SUBSEQUENT HOSPITAL CARE,LEVL III: ICD-10-PCS | Mod: NSCH,,, | Performed by: INTERNAL MEDICINE

## 2022-05-30 PROCEDURE — 99232 PR SUBSEQUENT HOSPITAL CARE,LEVL II: ICD-10-PCS | Mod: ,,, | Performed by: INTERNAL MEDICINE

## 2022-05-30 RX ADMIN — OXACILLIN SODIUM 12 G: 10 INJECTION, POWDER, FOR SOLUTION INTRAVENOUS at 03:05

## 2022-05-30 RX ADMIN — FAMOTIDINE 20 MG: 20 TABLET ORAL at 09:05

## 2022-05-30 RX ADMIN — LORAZEPAM 1 MG: 1 TABLET ORAL at 08:05

## 2022-05-30 RX ADMIN — ENOXAPARIN SODIUM 40 MG: 40 INJECTION SUBCUTANEOUS at 06:05

## 2022-05-30 RX ADMIN — FAMOTIDINE 20 MG: 20 TABLET ORAL at 08:05

## 2022-05-30 RX ADMIN — MORPHINE SULFATE 15 MG: 15 TABLET, EXTENDED RELEASE ORAL at 09:05

## 2022-05-30 RX ADMIN — ONDANSETRON 4 MG: 2 INJECTION INTRAMUSCULAR; INTRAVENOUS at 06:05

## 2022-05-30 RX ADMIN — OXYCODONE HYDROCHLORIDE 5 MG: 5 TABLET ORAL at 05:05

## 2022-05-30 RX ADMIN — MORPHINE SULFATE 15 MG: 15 TABLET, EXTENDED RELEASE ORAL at 08:05

## 2022-05-30 RX ADMIN — LORAZEPAM 1 MG: 1 TABLET ORAL at 02:05

## 2022-05-30 RX ADMIN — CIPROFLOXACIN 750 MG: 750 TABLET, FILM COATED ORAL at 09:05

## 2022-05-30 RX ADMIN — ONDANSETRON 4 MG: 2 INJECTION INTRAMUSCULAR; INTRAVENOUS at 01:05

## 2022-05-30 RX ADMIN — TRAZODONE HYDROCHLORIDE 100 MG: 100 TABLET ORAL at 08:05

## 2022-05-30 RX ADMIN — SODIUM CHLORIDE: 0.9 INJECTION, SOLUTION INTRAVENOUS at 09:05

## 2022-05-30 RX ADMIN — ONDANSETRON 4 MG: 2 INJECTION INTRAMUSCULAR; INTRAVENOUS at 10:05

## 2022-05-30 RX ADMIN — OXYCODONE HYDROCHLORIDE 5 MG: 5 TABLET ORAL at 02:05

## 2022-05-30 NOTE — SUBJECTIVE & OBJECTIVE
Interval History: SNF declines, will explore other abx options. Mother at bedside and plan of care discussed.    Review of Systems   Constitutional:  Positive for activity change, appetite change and fatigue. Negative for chills, diaphoresis, fever and unexpected weight change.   HENT:  Negative for congestion, hearing loss, mouth sores, postnasal drip, rhinorrhea, sore throat and trouble swallowing.    Eyes:  Negative for discharge and visual disturbance.   Respiratory:  Negative for cough and chest tightness.    Cardiovascular:  Negative for chest pain, palpitations and leg swelling.   Gastrointestinal:  Negative for blood in stool, constipation, diarrhea, nausea and vomiting.   Endocrine: Negative for cold intolerance and heat intolerance.   Genitourinary:  Negative for difficulty urinating, dyspareunia, flank pain and hematuria.   Musculoskeletal:  Positive for back pain and myalgias. Negative for arthralgias.   Skin: Negative.    Neurological:  Positive for weakness. Negative for dizziness and light-headedness.   Hematological:  Negative for adenopathy. Does not bruise/bleed easily.   Psychiatric/Behavioral:  Negative for agitation, behavioral problems, confusion and sleep disturbance. The patient is nervous/anxious.          Vital Signs (Most Recent):  Temp: 97.9 °F (36.6 °C) (05/30/22 1542)  Pulse: 81 (05/30/22 1542)  Resp: 17 (05/30/22 1542)  BP: (!) 153/79 (05/30/22 1542)  SpO2: 97 % (05/30/22 1542)   Vital Signs (24h Range):  Temp:  [97.5 °F (36.4 °C)-98.3 °F (36.8 °C)] 97.9 °F (36.6 °C)  Pulse:  [73-97] 81  Resp:  [17-20] 17  SpO2:  [94 %-100 %] 97 %  BP: (135-171)/(72-96) 153/79     Weight:  (Scale says not calibrated)  Body mass index is 35.62 kg/m².    Intake/Output Summary (Last 24 hours) at 5/30/2022 1551  Last data filed at 5/30/2022 1413  Gross per 24 hour   Intake 1415.53 ml   Output 2650 ml   Net -1234.47 ml      Physical Exam  Vitals and nursing note reviewed.   Constitutional:       General:  She is not in acute distress.     Appearance: She is well-developed. She is ill-appearing.   HENT:      Head: Normocephalic and atraumatic.      Nose: Nose normal.   Eyes:      Extraocular Movements: Extraocular movements intact.   Cardiovascular:      Rate and Rhythm: Normal rate and regular rhythm.   Pulmonary:      Effort: Pulmonary effort is normal.      Breath sounds: No stridor. Rhonchi present.      Comments: Right-side pigtail in place  Abdominal:      General: There is no distension.   Musculoskeletal:         General: No signs of injury.      Cervical back: Normal range of motion and neck supple.   Skin:     General: Skin is dry.   Neurological:      General: No focal deficit present.      Mental Status: She is alert and oriented to person, place, and time. Mental status is at baseline.   Psychiatric:         Behavior: Behavior normal.           Significant Labs: All pertinent labs within the past 24 hours have been reviewed.    BMP:   Recent Labs   Lab 05/30/22  0532      *   K 3.8      CO2 21*   BUN 22*   CREATININE 1.7*   CALCIUM 7.8*     CBC:   Recent Labs   Lab 05/29/22  0459 05/30/22  0532   WBC 12.14 12.79*   HGB 8.0* 8.6*   HCT 24.4* 26.0*    417       Significant Imaging: I have reviewed all pertinent imaging results/findings within the past 24 hours.

## 2022-05-30 NOTE — ASSESSMENT & PLAN NOTE
Complicated by PNA, possible empyema  SP Thora  Cont Abx  5/20/2022 Probable empyema,needs chest tube drainage  5/22 chest tube draining  5/23 60 cc drained yesterday.  Possible tPA and DNase tomorrow.  Continue IV oxacillin.  5/24 monitor chest tube output post tPA and DNase instillation today PA chest tube  5/26 chest x-ray reviewed.  Continue IV oxacillin.  Right-sided pigtail in place.  Status post tPA and DNase x2.  Bloody drainage around pigtail today status post tPA and DNase yesterday.  Will hold on fibrinolysis.  Monitor chest tube output  5/27 right pigtail in place.  225 mL  Last 24 hours average of 200 mL per day.  Change pleurovac canister to better assess the color of the fluid pus versus serosanguineous.  Keep pigtail in place for now.  Ultrasound bilateral chest assess pleural effusions.  Continue oxacillin drip.  5/28 keep chest tube on the right side for now.  Repeat CRP.  Checking chemistry microbiology  on left-sided pleural fluid to rule out complicated effusion and need for pigtail.  Continue oxacillin drip.   5/29 minimal output right chest tube.  Remove chest tube.  Left-sided effusion not complicated not empyema.  Continue IV oxacillin   5/30 oxacillin total of 8 weeks.  Chest x-ray stable.  Right-sided pigtail removed.  No evidence of empyema on the left side pleural fluid.

## 2022-05-30 NOTE — PT/OT/SLP PROGRESS
"Occupational Therapy      Patient Name:  Tianna Leon   MRN:  [id number]    Patient not seen today secondary to patient refused therapy session x2, 1 st @905am attempt awaiting MD conference to discuss future surgeries. 2nd attempt  @1137 am reporting that she "had a rough night," and adamantly refused several times despite max encouragement. Will follow-up at next scheduled O.T. therapy visit.  Yolande Quinones, OT  5/30/2022  "

## 2022-05-30 NOTE — PROGRESS NOTES
[facility] Surg  Pulmonology  Progress Note    Patient Name: Tianna Leon  MRN: [id number]  Admission Date: 5/6/2022  Hospital Length of Stay: 24 days  Code Status: Full Code  Attending Provider: Aj Olguin MD  Primary Care Provider: Spenser Campa MD   Principal Problem: Acute bacterial endocarditis    Subjective:     Interval History:    5/30 seen and examined.  Afebrile.  Right-sided pigtail removed yesterday.  Chest x-ray today reviewed.  Stable.  O2 sat 98% on 2 liter/minute.  Cultures from left-sided pleural fluid negative.  Review of Systems   Constitutional:  Positive for malaise/fatigue and weight loss. Negative for chills and fever.   HENT:  Negative for hearing loss and nosebleeds.    Eyes:  Negative for discharge.   Respiratory:  Positive for cough, sputum production and shortness of breath.    Cardiovascular:  Positive for leg swelling. Negative for chest pain.   Gastrointestinal:  Negative for abdominal pain.   Genitourinary:  Negative for hematuria.   Musculoskeletal:  Negative for falls.   Skin:  Negative for itching.   Neurological:  Positive for weakness. Negative for speech change, seizures and loss of consciousness.   Endo/Heme/Allergies:  Negative for polydipsia. Does not bruise/bleed easily.   Psychiatric/Behavioral:  Negative for hallucinations.        Objective:     Vital Signs (Most Recent):  Temp: 97.5 °F (36.4 °C) (05/30/22 1155)  Pulse: 77 (05/30/22 1155)  Resp: 20 (05/30/22 1413)  BP: (!) 141/83 (05/30/22 1155)  SpO2: 98 % (05/30/22 1155)   Vital Signs (24h Range):  Temp:  [97.5 °F (36.4 °C)-98.3 °F (36.8 °C)] 97.5 °F (36.4 °C)  Pulse:  [73-97] 77  Resp:  [17-20] 20  SpO2:  [94 %-100 %] 98 %  BP: (135-171)/(72-96) 141/83     Weight:  (Scale says not calibrated)  Body mass index is 35.62 kg/m².      Intake/Output Summary (Last 24 hours) at 5/30/2022 1521  Last data filed at 5/30/2022 1413  Gross per 24 hour   Intake 1415.53 ml   Output 2650 ml   Net -1234.47 ml          Physical Exam  Vitals and nursing note reviewed.   Constitutional:       General: She is not in acute distress.     Appearance: She is well-developed. She is ill-appearing.   HENT:      Head: Normocephalic and atraumatic.      Nose: Nose normal.   Eyes:      Extraocular Movements: Extraocular movements intact.   Cardiovascular:      Rate and Rhythm: Normal rate and regular rhythm.   Pulmonary:      Effort: Pulmonary effort is normal.      Breath sounds: No stridor. Rhonchi present.      Comments: Right-side  Pigtail removed  Abdominal:      General: There is no distension.   Musculoskeletal:         General: No signs of injury.      Cervical back: Normal range of motion and neck supple.   Skin:     General: Skin is dry.   Neurological:      General: No focal deficit present.      Mental Status: She is alert and oriented to person, place, and time. Mental status is at baseline.   Psychiatric:         Behavior: Behavior normal.       Vents:  Oxygen Concentration (%): 32 (05/28/22 1300)    Lines/Drains/Airways       Peripherally Inserted Central Catheter Line  Duration             PICC Double Lumen 05/17/22 1612 right basilic 12 days              Drain  Duration             Female External Urinary Catheter 05/21/22 0700 9 days                    Significant Labs:    CBC/Anemia Profile:  Recent Labs   Lab 05/29/22  0459 05/30/22  0532   WBC 12.14 12.79*   HGB 8.0* 8.6*   HCT 24.4* 26.0*    417   MCV 83 83   RDW 15.0* 15.3*          Chemistries:  Recent Labs   Lab 05/29/22  0459 05/30/22  0532    135*   K 4.2 3.8    102   CO2 24 21*   BUN 21* 22*   CREATININE 1.6* 1.7*   CALCIUM 7.7* 7.8*   ALBUMIN 1.5* 1.5*   PROT 5.6* 6.1   BILITOT 0.3 0.3   ALKPHOS 72 77   ALT 8* 7*   AST 13 14      Latest Reference Range & Units 05/28/22 11:15   Fluid Color  Yellow   Fluid Appearance  Clear   WBC, Body Fluid /cu mm 3065   Body Fluid Type  Pleural Fluid, Left   Segs, Fluid % 41   Lymphs, Fluid % 14    Monocytes/Macrophages, Fluid % 22   Eos, Fluid % 23   Glucose, Fluid Not established mg/dL 102   LD, Fluid Not established U/L 241   Body Fluid Source, Glucose  Pleural Fluid, Left   Body Fluid Source, LDH  Pleural Fluid, Left   Body Fluid Source, Total Protein  Pleural Fluid, Left   Body Fluid, Protein Not established g/dL 2.6      Latest Reference Range & Units 05/07/22 09:05 05/21/22 10:22   Fluid Appearance  Hazy Cloudy   WBC, Body Fluid /cu mm 66198 [1] 362035 (C) [2]   Body Fluid Type  Pleural Fluid, Right Pleural Fluid, Right   Segs, Fluid % 93 88   Lymphs, Fluid % 3 7   Monocytes/Macrophages, Fluid % 4 5   Amylase, Fluid Not established U/L 34 [3]    Glucose, Fluid Not established mg/dL 29 [4]    LD, Fluid Not established U/L 1077 [5]    Body Fluid, Albumin See text g/dL 1.3 [6]    Body Fluid Source Amylase  Pleural Fluid, Right    Body Fluid Source, Albumin  Pleural Fluid, Right    Body Fluid Source, Glucose  Pleural Fluid, Right    Body Fluid Source, LDH  Pleural Fluid, Right    Body Fluid Source, Total Protein  Pleural Fluid, Right    Body Fluid, Protein Not established g/dL 3.7 [7]    Cholesterol, Body Fluid See Comment mg/dL 79 [8]          Significant Imaging:       Chest x-ray 05/30/2022    TECHNIQUE:  Single frontal view of the chest was performed.     COMPARISON:  05/28/2022     FINDINGS:  Right-sided PICC line tip overlies the SVC in good position.  Heart size is normal.  Right pleural catheter not seen on today's exam.  Again there patchy infiltrates within the right mid lower lung zone.  Cannot exclude small right pleural effusion.  No pneumothorax.    In comparison to the prior study, there is no adverse interval changes.                 CT chest without contrast 05/20/2022    Narrative & Impression  EXAMINATION:  CT CHEST WITHOUT CONTRAST     CLINICAL HISTORY:  Empyema;     TECHNIQUE:  Low dose axial images, sagittal and coronal reformations were obtained from the thoracic inlet to the lung  bases. Contrast was not administered.  All CT scans at this facility are performed using dose optimization techniques including the following: automated exposure control; adjustment of the mA and/or kV; use of iterative reconstruction technique.     COMPARISON:  CTA chest non coronary 05/06/2022, chest radiograph 05/20/2022, MRI thoracic spine 05/19/2022     FINDINGS:  Base of Neck: There is diffuse body wall edema.  Unremarkable, noting the presence of AA PICC line which terminates at the superior cavoatrial junction.     Thoracic soft tissues: There is diffuse body wall edema.     Aorta: Left-sided aortic arch.  No aneurysm and no significant atherosclerosis     Heart: Normal size. No effusion.  No significant coronary atherosclerosis.     Pulmonary vasculature: Pulmonary arteries distribute normally.     Esha/Mediastinum: There are few prominent mediastinal lymph nodes with a prevascular node measuring 0.8 cm in short axis.     Airways: Patent.     Lungs/Pleura: There are multiple bilateral consolidative densities scattered throughout the lungs, many of which demonstrate cavitation, again suggesting septic emboli.  There is small left pleural fluid, which appears simple and layers dependently.  There is a loculated moderate right pleural effusion, which appears similar in size to the comparison exam however now containing air, which may be due to empyema.  There is adjacent atelectatic change within the bilateral lower lobes.  Overall number and size of consolidative densities has increased since the comparison exam.     Esophagus: Normal.     Upper Abdomen: There is persistent wedge-shaped hypodensity within the spleen, suggesting infarct.     Bones: No acute fracture. No suspicious lytic or sclerotic lesions.     Impression:     Interval progression with increased number and size of consolidative cavitary densities throughout the lungs, most suggestive of septic emboli with larger opacities concerning for  necrotizing pneumonia.     Stable size of loculated right moderate pleural effusion, now containing air compatible with hydropneumothorax as reported on prior MRI.  Interval development of air is concerning for empyema noting limited evaluation without intravenous contrast.  New small simple appearing left pleural effusion.     Wedge-shaped splenic hypodensity, suggesting infarction.     Anasarca.           ABG  No results for input(s): PH, PO2, PCO2, HCO3, BE in the last 168 hours.  Assessment/Plan:     * Acute bacterial endocarditis  Medical Mx  CTS note noted  5/23 IV oxacillin.  5/24 IV oxacillin as per Infectious Disease  5/25 repeat cultures none relevant.  IV oxacillin as per Infectious Disease x4 weeks.  T-max 101.7° last  24 hours  5/26 repeat blood concern negative continue oxacillin gtt   5/27 continue oxacillin drip   5/28 oxacillin drip.  Blood culture 5/25 was negative  5/28 continue same   5/30 oxacillin total of 8 weeks.     Parapneumonic effusion  Complicated by PNA, possible empyema  SP Thora  Cont Abx  5/20/2022 Probable empyema,needs chest tube drainage  5/22 chest tube draining  5/23 60 cc drained yesterday.  Possible tPA and DNase tomorrow.  Continue IV oxacillin.  5/24 monitor chest tube output post tPA and DNase instillation today PA chest tube  5/26 chest x-ray reviewed.  Continue IV oxacillin.  Right-sided pigtail in place.  Status post tPA and DNase x2.  Bloody drainage around pigtail today status post tPA and DNase yesterday.  Will hold on fibrinolysis.  Monitor chest tube output  5/27 right pigtail in place.  225 mL  Last 24 hours average of 200 mL per day.  Change pleurovac canister to better assess the color of the fluid pus versus serosanguineous.  Keep pigtail in place for now.  Ultrasound bilateral chest assess pleural effusions.  Continue oxacillin drip.  5/28 keep chest tube on the right side for now.  Repeat CRP.  Checking chemistry microbiology  on left-sided pleural fluid to  rule out complicated effusion and need for pigtail.  Continue oxacillin drip.   5/29 minimal output right chest tube.  Remove chest tube.  Left-sided effusion not complicated not empyema.  Continue IV oxacillin   5/30 oxacillin total of 8 weeks.  Chest x-ray stable.  Right-sided pigtail removed.  No evidence of empyema on the left side pleural fluid.    PNA (pneumonia)  PNA in IVDU  Cultures; Blood and sputum  BC +ve G+ve cocci  AbxL OXACILLIN + FLAGYL  5/22 stable, improved aeration   5/26 chest x-ray reviewed.  Continue IV oxacillin.  Right-sided pigtail in place.  Status post tPA and DNase x2.  Bloody drainage around pigtail today status post tPA and DNase yesterday.  Will hold on fibrinolysis.  Monitor chest tube output  5/28 keep chest tube on the right side for now.  Repeat CRP.  Checking chemistry microbiology  on left-sided pleural fluid to rule out complicated effusion and need for pigtail.  Continue oxacillin drip.   5/29 minimal output right chest tube.  Remove chest tube.  Left-sided effusion not complicated not empyema.  Continue IV oxacillin   5/30 oxacillin total of 8 weeks.  Chest x-ray stable.  Right-sided pigtail removed.  No evidence of empyema on the left side pleural fluid.         Piyush San MD  Pulmonology  [facility]

## 2022-05-30 NOTE — PROGRESS NOTES
[facility]  Infectious Disease  Progress Note    Patient Name: Tianna Leon  MRN: [id number]  Admission Date: 5/6/2022  Length of Stay: 24 days  Attending Physician: Aj Olguin MD  Primary Care Provider: Spenser Campa MD    Isolation Status: No active isolations  Assessment/Plan:      Empyema of right pleural space  MRI of the thoracic region 0  1. No evidence for disc osteomyelitis in the thoracic spine.  No evidence for epidural abscess.  2. Loculated right hydropneumothorax and extensive bilateral pulmonary infiltrates.     Case discussed with critical care team- will consult pulmonology /CVT  On oxacillin  05/23- s/p chest tube placement -follow pulmonology .  Continue oxacillin    05/30- will continue Oxacillin as planned.-will complete 8 weeks of therapy .        Pulmonary embolism, septic  Related to tricuspid endocarditis - continue Oxacillin     MSSA bacteremia  Isolate is staph Aureus - will continue Vanco/[email or web address].flagyl    05/16- will continue Oxacillin   Will plan to treat for 8 weeks =EOC 07/11/22 05/23-will repeat blood culture -will continue oxacillin    05/30- will discuss with primary team about pain control .  Continue Oxacillin .  EOC -07/11/22          Anticipated Disposition:     Thank you for your consult. I will follow-up with patient. Please contact us if you have any additional questions.    Sunday Hassan MD  Infectious Disease  [facility]    Subjective:     Principal Problem:Acute bacterial endocarditis    HPI:   36 y/o. female  with a PMHx of asthma, COPD,Bipolar  , IVDU and HLD ,active IVDU  ( heroine , cocaine  and amphetamine )   : CTA chest negative for pe . Multiple nodular and cavitary opacities concerning for septic emboli with larger opacities possibly necrotizing pneumonia. CT cervical  and thoracic did not show any acute finding , CT lumbar Possible progression of degenerative changes most notable at L4-5 with moderate to severe spinal canal stenosis  at this level.  Echo-  · to moderate tricuspid regurgitation.     There is a 1.3 x 1.5 cm elongated, mobile echodensity seen on the tricuspid valve consistent with vegetation. Recommend CT surgery consult.   Blood culture- staph auerus  Component      Latest Ref Rng & Units 5/8/2022 5/7/2022 5/6/2022   WBC      3.90 - 12.70 K/uL 30.90 (H) 27.52 (H) 29.13 (H)     Interval History:  37 year old woman with tricuspid endocarditis .  Blood cultures- MSSA.  She remains bacteremic.  No MR evidence of epidural abscess.     Large, rim enhancing facet synovial cysts arising posteriorly from the right L2-L3 and left L4-L5 facet joints may be degenerative or sequelae of facet septic arthritis.     Minor enhancement and endplate irregularity at L4-L5 may be degenerative versus less likely the sequelae of spondylodiscitis.  No significant associated endplate destruction or vertebral body height loss.     Multilevel degenerative changes of the lumbar spine are most pronounced at L4-L5 with broad-based disc bulge and prominent central disc protrusion contributing to moderate to severe spinal canal stenosis with moderate left-sided neural foraminal stenosis.     Asymmetric left L5-S1 disc bulge contributes to moderate to severe left-sided neural foraminal stenosis and left lateral recess stenosis.   CT chest -Lungs/Pleura: Multiple nodular and cavitary opacities concerning for septic emboli.  Additional larger opacities most notably in the right lung base with some internal clearing possibly related to necrotizing pneumonia.  Moderate loculated right pleural fluid and no definite left pleural fluid.  Empyema not excluded.   05/12- she declined thoracic MRI    She had CT guided aspirtaion -pus aspirated  05/16 - she is drowsy but refusing more imaging .   05/19-  MRI of the thoracic region    1. No evidence for disc osteomyelitis in the thoracic spine.  No evidence for epidural abscess.  2. Loculated right hydropneumothorax and  extensive bilateral pulmonary infiltrates.   T max 100.5  05/23-she had interval placemet of chest tube.  Cultures from the chest remain negative.    05/30/22-   She reports that she has poor pain control .  Review of Systems   Constitutional:  Positive for fatigue. Negative for activity change, appetite change, chills, diaphoresis, fever and unexpected weight change.   HENT: Negative.  Negative for congestion, drooling, facial swelling, rhinorrhea, sinus pressure, sneezing and sore throat.    Eyes: Negative.  Negative for discharge, redness, itching and visual disturbance.   Respiratory:  Positive for cough, chest tightness and shortness of breath. Negative for apnea, choking, wheezing and stridor.    Cardiovascular:  Negative for chest pain, palpitations and leg swelling.   Gastrointestinal: Negative.  Negative for abdominal distention, abdominal pain, anal bleeding, blood in stool, constipation, diarrhea, nausea and vomiting.   Endocrine: Negative.    Genitourinary: Negative.  Negative for decreased urine volume, difficulty urinating, dysuria, frequency, hematuria, pelvic pain, urgency, vaginal bleeding and vaginal discharge.   Musculoskeletal:  Positive for arthralgias and back pain. Negative for gait problem, joint swelling, myalgias, neck pain and neck stiffness.   Skin: Negative.  Negative for color change, pallor, rash and wound.   Allergic/Immunologic: Negative.    Neurological:  Positive for weakness. Negative for dizziness, seizures, facial asymmetry, speech difficulty, light-headedness, numbness and headaches.   Psychiatric/Behavioral:  Positive for confusion. Negative for agitation, hallucinations and suicidal ideas.    All other systems reviewed and are negative.  Objective:     Vital Signs (Most Recent):  Temp: 98.1 °F (36.7 °C) (05/30/22 0811)  Pulse: 85 (05/30/22 0811)  Resp: 20 (05/30/22 0935)  BP: (!) 164/96 (05/30/22 0811)  SpO2: 96 % (05/30/22 0811)   Vital Signs (24h Range):  Temp:  [97.6 °F  (36.4 °C)-98.6 °F (37 °C)] 98.1 °F (36.7 °C)  Pulse:  [73-97] 85  Resp:  [17-20] 20  SpO2:  [94 %-100 %] 96 %  BP: (135-171)/(72-96) 164/96     Weight:  (Scale says not calibrated)  Body mass index is 35.62 kg/m².    Estimated Creatinine Clearance: 51.2 mL/min (A) (based on SCr of 1.7 mg/dL (H)).    Physical Exam  Vitals and nursing note reviewed. Chaperone present: -.   Constitutional:       General: She is not in acute distress.     Appearance: She is well-developed. She is ill-appearing. She is not toxic-appearing.   HENT:      Head: Normocephalic and atraumatic.   Eyes:      Conjunctiva/sclera: Conjunctivae normal.      Pupils: Pupils are equal, round, and reactive to light.   Cardiovascular:      Rate and Rhythm: Normal rate and regular rhythm.      Heart sounds: Normal heart sounds. No murmur heard.  Pulmonary:      Effort: Pulmonary effort is normal. No respiratory distress.      Breath sounds: Normal breath sounds.      Comments: -  Abdominal:      General: Bowel sounds are normal. There is no distension.      Palpations: Abdomen is soft.      Tenderness: There is no abdominal tenderness.   Musculoskeletal:         General: Swelling present. No tenderness or deformity. Normal range of motion.      Cervical back: Normal range of motion and neck supple.      Right lower leg: No edema.      Left lower leg: No edema.   Skin:     General: Skin is warm and dry.      Coloration: Skin is pale.      Findings: Erythema present.   Neurological:      Mental Status: She is alert and oriented to person, place, and time.      Motor: Weakness present.   Psychiatric:         Behavior: Behavior normal.       Significant Labs: Blood Culture:   Recent Labs   Lab 05/08/22  1516 05/11/22  1213 05/25/22  0901 05/25/22  1220 05/25/22  1313   LABBLOO Gram stain aer bottle: Gram positive cocci in clusters resembling Staph   Results called to and read back by: Chasity Raymundo RN  05/09/2022  11:31  STAPHYLOCOCCUS AUREUS  ID consult  required at [facility],[place] and [facility] locations.  For susceptibility see order #[id number]  * No growth after 5 days.  No growth after 5 days. No Growth to date  No Growth to date  No Growth to date  No Growth to date  No Growth to date  No Growth to date  No Growth to date  No Growth to date  No Growth to date  No Growth to date No Growth to date  No Growth to date  No Growth to date  No Growth to date  No Growth to date No Growth to date  No Growth to date  No Growth to date  No Growth to date  No Growth to date       BMP:   Recent Labs   Lab 05/30/22  0532      *   K 3.8      CO2 21*   BUN 22*   CREATININE 1.7*   CALCIUM 7.8*       CMP:   Recent Labs   Lab 05/29/22  0459 05/30/22  0532    135*   K 4.2 3.8    102   CO2 24 21*    105   BUN 21* 22*   CREATININE 1.6* 1.7*   CALCIUM 7.7* 7.8*   PROT 5.6* 6.1   ALBUMIN 1.5* 1.5*   BILITOT 0.3 0.3   ALKPHOS 72 77   AST 13 14   ALT 8* 7*   ANIONGAP 10 12   EGFRNONAA 40* 37*         Significant Imaging: CT: I have reviewed all pertinent results/findings within the past 24 hours:

## 2022-05-30 NOTE — ASSESSMENT & PLAN NOTE
Pulmonology consulted  Status post thoracentesis 5/7/22   continue CT management per pulmonology    5/29/22  Body fluid culture currently negative.     5/30/22  CT removed   Normal rate, regular rhythm.  Heart sounds S1, S2.  No murmurs, rubs or gallops.

## 2022-05-30 NOTE — PT/OT/SLP PROGRESS
"Physical Therapy      Patient Name:  Tianna Leon   MRN:  [id number]    Patient not seen today secondary to patient refused therapy session x2, reporting that she "had a rough night."  Will follow-up at next scheduled therapy visit.     Viridiana Rhodes, PT  05/30/22           "

## 2022-05-30 NOTE — SUBJECTIVE & OBJECTIVE
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  Blood cultures- MSSA.  She remains bacteremic.  No MR evidence of epidural abscess.     Large, rim enhancing facet synovial cysts arising posteriorly from the right L2-L3 and left L4-L5 facet joints may be degenerative or sequelae of facet septic arthritis.     Minor enhancement and endplate irregularity at L4-L5 may be degenerative versus less likely the sequelae of spondylodiscitis.  No significant associated endplate destruction or vertebral body height loss.     Multilevel degenerative changes of the lumbar spine are most pronounced at L4-L5 with broad-based disc bulge and prominent central disc protrusion contributing to moderate to severe spinal canal stenosis with moderate left-sided neural foraminal stenosis.     Asymmetric left L5-S1 disc bulge contributes to moderate to severe left-sided neural foraminal stenosis and left lateral recess stenosis.   CT chest -Lungs/Pleura: Multiple nodular and cavitary opacities concerning for septic emboli.  Additional larger opacities most notably in the right lung base with some internal clearing possibly related to necrotizing pneumonia.  Moderate loculated right pleural fluid and no definite left pleural fluid.  Empyema not excluded.   05/12- she declined thoracic MRI    She had CT guided aspirtaion -pus aspirated  05/16 - she is drowsy but refusing more imaging .   05/19-  MRI of the thoracic region    1. No evidence for disc osteomyelitis in the thoracic spine.  No evidence for epidural abscess.  2. Loculated right hydropneumothorax and extensive bilateral pulmonary infiltrates.   T max 100.5  05/23-she had interval placemet of chest tube.  Cultures from the chest remain negative.    05/30/22-   She reports that she has poor pain control .  Review of Systems   Constitutional:  Positive for fatigue. Negative for activity change, appetite change, chills, diaphoresis, fever and unexpected weight change.    HENT: Negative.  Negative for congestion, drooling, facial swelling, rhinorrhea, sinus pressure, sneezing and sore throat.    Eyes: Negative.  Negative for discharge, redness, itching and visual disturbance.   Respiratory:  Positive for cough, chest tightness and shortness of breath. Negative for apnea, choking, wheezing and stridor.    Cardiovascular:  Negative for chest pain, palpitations and leg swelling.   Gastrointestinal: Negative.  Negative for abdominal distention, abdominal pain, anal bleeding, blood in stool, constipation, diarrhea, nausea and vomiting.   Endocrine: Negative.    Genitourinary: Negative.  Negative for decreased urine volume, difficulty urinating, dysuria, frequency, hematuria, pelvic pain, urgency, vaginal bleeding and vaginal discharge.   Musculoskeletal:  Positive for arthralgias and back pain. Negative for gait problem, joint swelling, myalgias, neck pain and neck stiffness.   Skin: Negative.  Negative for color change, pallor, rash and wound.   Allergic/Immunologic: Negative.    Neurological:  Positive for weakness. Negative for dizziness, seizures, facial asymmetry, speech difficulty, light-headedness, numbness and headaches.   Psychiatric/Behavioral:  Positive for confusion. Negative for agitation, hallucinations and suicidal ideas.    All other systems reviewed and are negative.  Objective:     Vital Signs (Most Recent):  Temp: 98.1 °F (36.7 °C) (05/30/22 0811)  Pulse: 85 (05/30/22 0811)  Resp: 20 (05/30/22 0935)  BP: (!) 164/96 (05/30/22 0811)  SpO2: 96 % (05/30/22 0811)   Vital Signs (24h Range):  Temp:  [97.6 °F (36.4 °C)-98.6 °F (37 °C)] 98.1 °F (36.7 °C)  Pulse:  [73-97] 85  Resp:  [17-20] 20  SpO2:  [94 %-100 %] 96 %  BP: (135-171)/(72-96) 164/96     Weight:  (Scale says not calibrated)  Body mass index is 35.62 kg/m².    Estimated Creatinine Clearance: 51.2 mL/min (A) (based on SCr of 1.7 mg/dL (H)).    Physical Exam  Vitals and nursing note reviewed. Chaperone present: -.    Constitutional:       General: She is not in acute distress.     Appearance: She is well-developed. She is ill-appearing. She is not toxic-appearing.   HENT:      Head: Normocephalic and atraumatic.   Eyes:      Conjunctiva/sclera: Conjunctivae normal.      Pupils: Pupils are equal, round, and reactive to light.   Cardiovascular:      Rate and Rhythm: Normal rate and regular rhythm.      Heart sounds: Normal heart sounds. No murmur heard.  Pulmonary:      Effort: Pulmonary effort is normal. No respiratory distress.      Breath sounds: Normal breath sounds.      Comments: -  Abdominal:      General: Bowel sounds are normal. There is no distension.      Palpations: Abdomen is soft.      Tenderness: There is no abdominal tenderness.   Musculoskeletal:         General: Swelling present. No tenderness or deformity. Normal range of motion.      Cervical back: Normal range of motion and neck supple.      Right lower leg: No edema.      Left lower leg: No edema.   Skin:     General: Skin is warm and dry.      Coloration: Skin is pale.      Findings: Erythema present.   Neurological:      Mental Status: She is alert and oriented to person, place, and time.      Motor: Weakness present.   Psychiatric:         Behavior: Behavior normal.       Significant Labs: Blood Culture:   Recent Labs   Lab 05/08/22  1516 05/11/22  1213 05/25/22  0901 05/25/22  1220 05/25/22  1313   LABBLOO Gram stain aer bottle: Gram positive cocci in clusters resembling Staph   Results called to and read back by: Chasity Raymundo RN  05/09/2022  11:31  STAPHYLOCOCCUS AUREUS  ID consult required at [facility],[place] and [facility] locations.  For susceptibility see order #[id number]  * No growth after 5 days.  No growth after 5 days. No Growth to date  No Growth to date  No Growth to date  No Growth to date  No Growth to date  No Growth to date  No Growth to date  No Growth to date  No Growth to date  No Growth to date No Growth to date  No  Growth to date  No Growth to date  No Growth to date  No Growth to date No Growth to date  No Growth to date  No Growth to date  No Growth to date  No Growth to date       BMP:   Recent Labs   Lab 05/30/22  0532      *   K 3.8      CO2 21*   BUN 22*   CREATININE 1.7*   CALCIUM 7.8*       CMP:   Recent Labs   Lab 05/29/22  0459 05/30/22  0532    135*   K 4.2 3.8    102   CO2 24 21*    105   BUN 21* 22*   CREATININE 1.6* 1.7*   CALCIUM 7.7* 7.8*   PROT 5.6* 6.1   ALBUMIN 1.5* 1.5*   BILITOT 0.3 0.3   ALKPHOS 72 77   AST 13 14   ALT 8* 7*   ANIONGAP 10 12   EGFRNONAA 40* 37*         Significant Imaging: CT: I have reviewed all pertinent results/findings within the past 24 hours:

## 2022-05-30 NOTE — ASSESSMENT & PLAN NOTE
Isolate is staph Aureus - will continue Vanco/[email or web address].flagyl    05/16- will continue Oxacillin   Will plan to treat for 8 weeks =EOC 07/11/22 05/23-will repeat blood culture -will continue oxacillin    05/30- will discuss with primary team about pain control .  Continue Oxacillin .  EOC -07/11/22

## 2022-05-30 NOTE — PROGRESS NOTES
[facility]  Adult Nutrition  Progress Note    SUMMARY     Recommendations  1) Continue diet as prescribed: regular and encourage PO intake  2) RD to follow up to monitor intake, tolerance, and labs.      Goals: Pt to meet greater than or equal to 75% EEN by RD follow up  Nutrition Goal Status: not met, continues  Communication of RD Recs: POC, sticky note    Assessment and Plan  Nutrition Problem  Inadequate oral intake  Related to (etiology):   Decreased appetite  Signs and Symptoms (as evidenced by):   PO intake 0-75% meals per chart  Interventions(treatment strategy):  Collaboration with other providers  Fat, Na, DM modified diet   Nutrition Diagnosis Status:   Worsening since last assessment    Physical Findings/Malnutrition Assessment  Patient does not meet at least 2 ASPEN criteria for malnutrition at this time.   Will continue to monitor.    N/V:   not indicated   Wounds: not indicated   Edema: 1+ dependent, hands, legs, knees, ankles, feet --> remains  Last Bowel Movement: 05/25/22 Stool Consistency: watery, loose    Mouth/Teeth WDL: WDL except, teeth Teeth Symptoms: tooth/teeth missing  O2 Device (Oxygen Therapy): room air   Hand , Left: moderate  Jose Score: 17  NFPE not performed, pt well nourished/obese    Reason for Assessment  Reason For Assessment: RD f/u  Diagnosis: infection/sepsis (bacteremia) --> Acute bacterial endocarditis  Relevant Medical History: asthma, COPD,Bipolar, IVDU, anxiety, HLD    General Information Comments:     5/16: Remote assessment for coverage. Unable to reach pt on room phone - line busy. Reached out to RN via secure chat - reports she is doing okay with PO intake. Per chart, eating 0-50% meals, with 15 lbs wt gain since admit (+ 4.3 L in I/Os), possibly fluid related. Per MD note pt +appetite change and + abdominal pain and distension. LBM 5/15. JHON NFPE due to remote assesment. RD to monitor and follow up.    5/23: Pt with mostly 100% PO intake since last  "assessment, will dc ONS and Will continue to monitor.      5/30: Pt with decreased PO intake since last assessment, 0% x 2 days. 20 lb weight gain while IP, trace edema noted. Continue with current nutritional plan and encourage PO intake. Will continue to monitor. Awaiting SNF placement.     Nutrition Discharge Planning: regular general, healthful diet    Nutrition Risk Screen  Nutrition Risk Screen: no indicators present    Nutrition/Diet History  Spiritual, Cultural Beliefs, Catholic Practices, Values that Affect Care: no  Food Allergies: NKFA  Factors Affecting Nutritional Intake: decreased appetite, abdominal pain, abdominal distension    Anthropometrics  Temp: 97.5 °F (36.4 °C)  Height Method: Stated  Height: 5' 5" (165.1 cm)  Height (inches): 65 in  Weight Method: Bed Scale  Weight:  (Scale says not calibrated)  Weight (lb): 214.07 lb  Ideal Body Weight (IBW), Female: 125 lb  BMI (Calculated): 35.6  BMI Grade: 35 - 39.9 - obesity - grade II     Labs  Pertinent Labs: reviewed  Lab Results   Component Value Date    ALBUMIN 1.5 (L) 05/30/2022    HGB 8.6 (L) 05/30/2022    HCT 26.0 (L) 05/30/2022    WBC 12.79 (H) 05/30/2022    CALCIUM 7.8 (L) 05/30/2022     Lab Results   Component Value Date     (L) 05/30/2022    K 3.8 05/30/2022    PHOS 5.4 (H) 05/21/2022    BUN 22 (H) 05/30/2022    CREATININE 1.7 (H) 05/30/2022    ESTGFRAFRICA 43 (A) 05/30/2022    EGFRNONAA 37 (A) 05/30/2022     Meds  Pertinent Medications: reviewed    enoxaparin  40 mg Subcutaneous Daily    famotidine  20 mg Oral BID    morphine  15 mg Oral Q12H    oxacillin 12 g in  mL CONTINUOUS INFUSION  12 g Intravenous Q24H    traZODone  100 mg Oral QHS     Continuous Infusions:   sodium chloride 0.9% 75 mL/hr at 05/30/22 0947     PRN Meds:sodium chloride, sodium chloride, sodium chloride, acetaminophen, albuterol-ipratropium, LORazepam, naloxone, ondansetron, oxyCODONE, sodium chloride 0.9%, sodium chloride 0.9%, sodium chloride 0.9% "     Estimated/Assessed Needs  Weight Used For Calorie Calculations: 97.1 kg (214 lb 1.1 oz)  Energy Calorie Requirements (kcal): 1646 (no af, obese)  Energy Need Method: [place]-St Jeor  Protein Requirements: 56-68 (1-1.2g/kg)  Weight Used For Protein Calculations: 56.7 kg (125 lb) (IBW)  Fluid Requirements (mL): 1 mL/kcal or per MD  Estimated Fluid Requirement Method: RDA Method  RDA Method (mL): 1646  CHO Requirement: 205    Nutrition Prescription Ordered  Current Diet Order: regular diet    Evaluation of Received Nutrient/Fluid Intake  Energy Calories Required: not meeting needs  Fluid Required: not meeting needs  Tolerance: tolerating  % Intake of Estimated Energy Needs: 0 - 25 %  % Meal Intake: 0 - 25 %    Monitor and Evaluation  Food and Nutrient Intake: energy intake, food and beverage intake  Food and Nutrient Adminstration: diet order  Knowledge/Beliefs/Attitudes: food and nutrition knowledge/skill  Physical Activity and Function: nutrition-related ADLs and IADLs  Anthropometric Measurements: weight change  Biochemical Data, Medical Tests and Procedures: electrolyte and renal panel, lipid profile, gastrointestinal profile, glucose/endocrine profile, inflammatory profile  Nutrition-Focused Physical Findings: overall appearance, extremities, muscles and bones, skin     Nutrition Follow-Up  Level of nutrition risk: moderate-high  Frequency of follow-up: Twice weekly   Miriam Tamez, MS, RD, LDN

## 2022-05-30 NOTE — PROGRESS NOTES
[facility]  Progress Note    Patient Name: Tianna Leon  MRN: [id number]  Patient Class: IP- Inpatient   Admission Date: 5/6/2022  Length of Stay: 24 days  Attending Physician: Aj Olguin MD  Primary Care Provider: Spenser Campa MD    Subjective:     Principal Problem:Acute bacterial endocarditis        HPI:  36 y/o. female  with a PMHx of asthma, COPD,Bipolar  , IVDU and HLD presented to the ER with a c/o  sob for the last weak which has gotten worse . The SOB is associated with fever , chills , productive cough , back pain  and generalized weakness . She report cough some blood this am .  She is active IVDU  ( heroine , cocaine  and amphetamine ) . She denies any  sick contact , chest pain  , GI/ sx , She also complaning of LE sweeling . Knee pain and B/L LE rash .   ER COURSE: CTA chest negative for pe . Multiple nodular and cavitary opacities concerning for septic emboli with larger opacities possibly necrotizing pneumonia. CT cervical  and thoracic did not show any acute finding , CT lumbar Possible progression of degenerative changes most notable at L4-5 with moderate to severe spinal canal stenosis at this level.  WBC  29 k , na 130 , k 3.4 , cl 89 , procal 2.71   ER VS:  BP Pulse Resp Temp SpO2   (!) 124/58 110 (!) 30 (!) 101.6 °F (38.7 °C) 96 %           Pt will be admitted to inpt with a dx of PNA and severe sepsis       Overview/Hospital Course:  5/7 admitted for pneumonia, severe sepsis in setting of ivdu. Mother at bedside and provides collateral. Patient has struggled with substance abuse for approximately 20 years, worsening over last 3-5 years since life event. Onset of symptoms 1-2 weeks ago. Tolerated thoracentesis with no pneumothorax on chest x-ray. Mri lumbar and echo, pending. Bcx positive, growing gram positive cocci. On vanc, cefepime and flagyl. Infectious disease consulted for bacteremia. 5/8/22 The patient remained afebrile overnight. Currently on  cefepime/vanc/flagyl. Blood cx 5/6/22 are growing MSSA. The patient refused the MRI lumbar spine overnight d/t being unable to lay flat from back pain. Will give pain meds and attempt to get the MRI today to r/o spinal abscess. The ECHO showed a 1.3 x 1.5 cm elongated, mobile echodensity seen on the tricuspid valve consistent with vegetation, CT surgery was consulted. Will need a SUSANNAH. Infectious disease is consulted. Will repeat blood cx today. 5/9/22 No acute events overnight. The patient reports lower back pain is not well controlled with current pain regimen, will adjust. Repeat blood cx are still positive. Sensitivities are back and Staph is sensitive to oxacillin, will D/C vanc/cefepime. Cardiology consulted and plans for a SUSANNAH today. CT surgery following and recommends continuing medical management with appropriate ABX, no surgical intervention at this time. MRI lumbar spine showed moderate to severe spinal canal stenosis with moderate left-sided neural foraminal stenosis, Neurosurgery consulted. 5/10/22 No acute events overnight. The patient reports some improvement in pain with adjustment in pain meds. Repeat blood cx are +. Continue IV oxacillin and flagyl. Infectious disease is consulted. IR consulted to evaluate possible paraspinous muscle myositis versus abscess. Recommend IR drainage. Continue current management. 5/11/22 No acute events overnight. The patient reports pain is still not well controlled at times. The patient reports that she is very anxious about the upcoming SUSANNAH and IR drainage. SUSANNAH was pushed back to this afternoon because the patient ate candy this AM. Continue treatment with IV oxacillin and flagyl. Infectious disease is following. WBC trended down to 31K. Continue current management. 5/12/22 No acute events overnight. The patients WBC improved to 22K overnight. The patient remains on continuous oxacillin infusion. Infectious disease is following. MRI thoracic spine is ordered and  pending. The patients SUSANNAH was postponed again today d/t low H/H. Hgb was noted to be 6.2 this morning, will transfuse 1 unit PRBC. The patient reports pain is still not well controlled. The case was discussed with Neurosurgery who recommended adding extended release pain meds. Approximately 1 ml of purulent drainage aspirated from back yesterday per IR, growing gram + cocci.     5/13: Patient was given 1U PRBC on 5/12, improved from 6.2 --> 7.0, will give an additional unit PRBC. Radiology attempted to bring patient on 5/12 at 1755 for Thoracic MRI, patient refused due to pain and anxiety. Repeat attempt this AM, patient again refused. Will order Ativan 1mg IV to be given with pain medication prior to scan to relieve anxiety and pain, nurse to notify NP when ready to go for scan. Discussed pain management with patient, discussed transition to PO pain medications to being preparing for d/c to LTAC, adjusted PO pain regimen will reevaluate to determine effectiveness. Repeat Blood cultures 5/11/22: NGTD.     5/14: Final anaerobic cultures pending, continue to adjust pain regimen, d/c IV Dilaudid today. Patient currently managed with PO Morphine extended release and PO oxycodone. D/C plan is for LTAC once final antibiotic regimen determined. Patient continues to refuse MRI of thoracic spine.     5/15: Called to room this AM, patient had coughed up blood and mucous. Approx. 1-2 teaspoons of blood in emesis bag, ordered CXR and repeat CBC, CXR showed improvement from previous day, repeat CBC at time of incident, showed a slight decrease in Hgb: 9.7 --> 8.7, when repeated at 1500, Hgb had improved to 10. no further episodes of coughing up blood throughout day. Patient continues to c/o not having enough pain medication but continues to sleep most of the day and will fall asleep when speaking at times. Added IV Toradol to plan.     5/16: No further episodes of coughing blood overnight, patient participated with encouragement  with PT/OT today, recommendations are HH vs. SNF pending progress. Awaiting final antibiotic regimen recommendations. Anaerobic culture results finalized: negative for growth. Afebrile, WBC trending downward.     5/17: Antibiotic regimen per ID is continuous oxacillin until 7/11/22, SW notified of plan, will seek acceptance at LTAC or SNF. Patient choice is [facility]. WBC in normal range. AMS this AM, CT of head: no acute findings. Given narcan and mental status improved. Decreased narcotic dose.     5/18: Per discussion with patient, she is willing to have the MRI of thoracic spine today, will order IV pain medication and anxiety medication prior to scan. Nursing and Radiology aware of needing to premedicate for scan. Patient is more cooperative and participating with care.     5/19: Patient given premedications for MRI, MRI completed, results pending. Patient more agreeable with treatment plan and cooperating. Will need psychiatry follow-up after discharge. Hgb: 6.7, will give 1U PRBC    5/20/22: +LEON and generalized pain. Thoracic MRI showed no evidence for disc osteomyelitis in the thoracic spine and no epidural abscess, Loculated right hydropneumothorax and extensive bilateral pulmonary infiltrates. Ct chest showed septic emboli and moderate right pleural effusion.+splenic infarct  Pulmonology felt likely empyema- consulted IR for chest tube.     5/21/22: s/p right pig tail placement to suction/ drainage system per IR. 60ml pus removed initially. Fluid analysis consistent with empyema. Pleural fluid cultures pending. Now with scant serosanguinous output.+ right lateral chest pain at chest tube site. Denies any other complaints. Status update provided to pt's mother.     5/22/22: Repeatedly requesting IV Dilaudid. Toradol ordered. Afebrile, WBC normal, O2sats stable on 3lites. s/p right pig tail placement to suction/ drainage system per IR on 5/21/22- draining serous draiange with pus. Plan for LTAC placement  "    5/23/22: More cofortable today. Not getting OOB. Encouraged OOB and rationale provided. Pulmonology recommended Possible tPA and DNase in chest tube tomorrow    5/24/22: Examined OOB in chair. Encouraged pt to continue OOB. Plan for tPA and DNase in chest tube today per Pulmonology  Pt denied LTAC- will pursue SNF    5/25/22: Temp 101.7F last night. Will recheck blood cultures and UA. BP low - IVF bolus given and restarted IVF. ID re-consulted. 160ml of purulent CT drainage output. Dr. San plans to repeat tPA and DNase in chest tube again today.     5/26/22: temp 99.2. c/o of right "lung pain" when eating. Will consult speech to check swallow. +mild leukocytosis. Repeat BC 5/25/22 show NGTD. Repeat UA showed rare trichomas. Chest tube drainage remains purulent     5/27/22  Low grade temp. Downward trend of hemoglobin, related to chest tube drainage. Blood cultures remain negative. Urine culture growing E. Coli. Sensitivities are pending.     5/28/22  Urine culture returned pansenstiive. Will continue ciprofloxacin for total of 3 days then discontinue. Still awaiting SNF placement.    5/29/22 culture of pleural fluid from 5/28/22 negative. Continue CT for now.     5/30/22  CT removed yesterday by pulmonology. SNF declines. Will explore other options for IV abx with help from ID.      Interval History: SNF declines, will explore other abx options. Mother at bedside and plan of care discussed.    Review of Systems   Constitutional:  Positive for activity change, appetite change and fatigue. Negative for chills, diaphoresis, fever and unexpected weight change.   HENT:  Negative for congestion, hearing loss, mouth sores, postnasal drip, rhinorrhea, sore throat and trouble swallowing.    Eyes:  Negative for discharge and visual disturbance.   Respiratory:  Negative for cough and chest tightness.    Cardiovascular:  Negative for chest pain, palpitations and leg swelling.   Gastrointestinal:  Negative for blood in " stool, constipation, diarrhea, nausea and vomiting.   Endocrine: Negative for cold intolerance and heat intolerance.   Genitourinary:  Negative for difficulty urinating, dyspareunia, flank pain and hematuria.   Musculoskeletal:  Positive for back pain and myalgias. Negative for arthralgias.   Skin: Negative.    Neurological:  Positive for weakness. Negative for dizziness and light-headedness.   Hematological:  Negative for adenopathy. Does not bruise/bleed easily.   Psychiatric/Behavioral:  Negative for agitation, behavioral problems, confusion and sleep disturbance. The patient is nervous/anxious.          Vital Signs (Most Recent):  Temp: 97.9 °F (36.6 °C) (05/30/22 1542)  Pulse: 81 (05/30/22 1542)  Resp: 17 (05/30/22 1542)  BP: (!) 153/79 (05/30/22 1542)  SpO2: 97 % (05/30/22 1542)   Vital Signs (24h Range):  Temp:  [97.5 °F (36.4 °C)-98.3 °F (36.8 °C)] 97.9 °F (36.6 °C)  Pulse:  [73-97] 81  Resp:  [17-20] 17  SpO2:  [94 %-100 %] 97 %  BP: (135-171)/(72-96) 153/79     Weight:  (Scale says not calibrated)  Body mass index is 35.62 kg/m².    Intake/Output Summary (Last 24 hours) at 5/30/2022 1551  Last data filed at 5/30/2022 1413  Gross per 24 hour   Intake 1415.53 ml   Output 2650 ml   Net -1234.47 ml      Physical Exam  Vitals and nursing note reviewed.   Constitutional:       General: She is not in acute distress.     Appearance: She is well-developed. She is ill-appearing.   HENT:      Head: Normocephalic and atraumatic.      Nose: Nose normal.   Eyes:      Extraocular Movements: Extraocular movements intact.   Cardiovascular:      Rate and Rhythm: Normal rate and regular rhythm.   Pulmonary:      Effort: Pulmonary effort is normal.      Breath sounds: No stridor. Rhonchi present.      Comments: Right-side pigtail in place  Abdominal:      General: There is no distension.   Musculoskeletal:         General: No signs of injury.      Cervical back: Normal range of motion and neck supple.   Skin:     General:  Skin is dry.   Neurological:      General: No focal deficit present.      Mental Status: She is alert and oriented to person, place, and time. Mental status is at baseline.   Psychiatric:         Behavior: Behavior normal.           Significant Labs: All pertinent labs within the past 24 hours have been reviewed.    BMP:   Recent Labs   Lab 05/30/22  0532      *   K 3.8      CO2 21*   BUN 22*   CREATININE 1.7*   CALCIUM 7.8*     CBC:   Recent Labs   Lab 05/29/22  0459 05/30/22  0532   WBC 12.14 12.79*   HGB 8.0* 8.6*   HCT 24.4* 26.0*    417       Significant Imaging: I have reviewed all pertinent imaging results/findings within the past 24 hours.      Assessment/Plan:      * Acute bacterial endocarditis  ECHO showed a 1.3 x 1.5 cm elongated, mobile echodensity seen on the tricuspid valve consistent with vegetation, CT surgery was consulted.   Due to MSSA    -- Infectious disease is consulted.  -- repeat blood cx NGTD.  -- Continue IV ABX.   --Sensitivities are back and Staph is sensitive to oxacillin, D/C vanc/cefepime.   --Cardiology consulted and plans for a SUSANNAH.   --CT surgery following and recommends continuing medical management with appropriate ABX, no surgical intervention at this time.    cont IV oxacillin.  Plan is to continue x 4 weeks. Afebrile    E. coli UTI  Add ciprofloxacin for 3 days. Will stop 5/29/22      Empyema of right pleural space  Pulmonology consulted and felt likely empyema   IR consulted for chest tube      s/p right pigtail small bore chest tube placement to suction/drainage system per IR on 5/21/22. 60ml pus removed. Now with Serous drainage with pus. Fluid analysis consistent with empyema. Pleural fluid aerobic culture shows NGTD, Anaerobic culture pending    5/24/22: Dr. San plans for tPA and DNase in chest tube today.   5/25/22: 160ml of purulent CT drainage output. Dr. San plans to repeat tPA and DNase in chest tube again today.   5/26/22  Still  has purulent drainage noted. Pulmonology following    Normocytic anemia  Secondary to acute illness    --Daily CBC  --Transfuse with 1 unit PRBC for Hgb <7.0 or <8.0 if symptomatic   --Hgb: 6.7, give 1U PRBC    5/27/22  Hemoglobin 7.3g/dL. downward trend. Has blood tinged seropurulent drainage. Will give additional unit if continues to trend down.  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 IR consulted to evaluate possible paraspinous muscle myositis versus abscess.    --Approximately 1 ml of purulent drainage aspirated 5/11 per IR, growing MSSA  --Continue current regimen  --Oxacillin x 4 weeks total    Lumbar stenosis without neurogenic claudication        Chronic pulmonary heart disease  - Pulmonology following       MSSA bacteremia  Gram positive   Infectious disease consulted  Continue broad spectrum intravenous antibiotic(s) given h/o ivdu  Remains febrile and leukocytosis persists  5/8/22 The patient remained afebrile overnight. Currently on cefepime/vanc/flagyl. Blood cx are growing staph aureus. The patient refused the MRI lumbar spine overnight d/t being unable to lay flat from back pain. Will give pain meds and attempt to get the MRI today to r/o spinal abscess. The ECHO showed a 1.3 x 1.5 cm elongated, mobile echodensity seen on the tricuspid valve consistent with vegetation, CT surgery was consulted. Will need a SUSANNAH. Infectious disease is consulted. Will repeat blood cx today.   5/9/22 Repeat blood cx are still positive. Sensitivities are back and Staph is sensitive to oxacillin, will D/C vanc/cefepime. Infectious disease is following   5/10/22 Repeat blood cx are +. Continue IV oxacillin and flagyl. Infectious disease is consulted. IR consulted to evaluate possible paraspinous muscle myositis versus abscess. Recommend IR drainage. Continue current  management.    5/11/22 The patient reports pain is still not well controlled at times. The patient reports that she is very anxious about the upcoming SUSANNAH and IR drainage. SUSANNAH was pushed back to this afternoon because the patient ate candy this AM. Continue treatment with IV oxacillin and flagyl. Infectious disease is following. WBC trended down to 31K. Continue current management.  5/12/22 The patients WBC improved to 22K overnight. The patient remains on continuous oxacillin infusion. Infectious disease is following.  5/14: WBC continues to improve, 18.10 today     5/15: Blood cultures 5/11/22: NGTD     Continuous Oxacillin, EOC: 7/11/22, pending acceptance at facility, PICC line placed    Parapneumonic effusion  Pulmonology consulted  Status post thoracentesis 5/7/22   continue CT management per pulmonology    5/29/22  Body fluid culture currently negative.     5/30/22  CT removed    IVDU (intravenous drug user)   Will ordet TTE to r/o IE  Will order MRI lumbar wwo- completed   Cont broad spectrum IVAB   --MRI thoracic spine ordered per ID, patient refused on multiple attempts    5/7   Studies pending for additional sites of infection given h/o ivdu  5/8/22  on cessation     Severe sepsis  This patient does have evidence of infective focus  My overall impression is sepsis. Vital signs were reviewed and noted in progress note.  Antibiotics given-   Antibiotics (From admission, onward)            Start     Stop Route Frequency Ordered    05/09/22 1100  oxacillin 12 g in  mL CONTINUOUS INFUSION         -- IV Every 24 hours (non-standard times) 05/09/22 0932    05/07/22 2100  mupirocin 2 % ointment         05/12 2059 Nasl 2 times daily 05/07/22 1646        Cultures were taken-   Microbiology Results (last 7 days)     Procedure Component Value Units Date/Time    Culture, Respiratory with Gram Stain [[id number]] Collected: 05/16/22 4348    Order Status: Sent Specimen: Respiratory from Sputum Updated:  05/16/22 0759    Culture, Anaerobe [[id number]] Collected: 05/11/22 1440    Order Status: Completed Specimen: Abscess from Back Updated: 05/16/22 0733     Anaerobic Culture No anaerobes isolated    Blood culture [[id number]] Collected: 05/11/22 1213    Order Status: Completed Specimen: Blood Updated: 05/16/22 0612     Blood Culture, Routine No Growth to date      No Growth to date      No Growth to date      No Growth to date      No Growth to date    Blood culture [[id number]] Collected: 05/11/22 1213    Order Status: Completed Specimen: Blood Updated: 05/16/22 0612     Blood Culture, Routine No Growth to date      No Growth to date      No Growth to date      No Growth to date      No Growth to date    Aerobic culture [[id number]]  (Abnormal)  (Susceptibility) Collected: 05/11/22 1440    Order Status: Completed Specimen: Abscess from Back Updated: 05/14/22 1057     Aerobic Bacterial Culture STAPHYLOCOCCUS AUREUS  Many      Gram stain [[id number]] Collected: 05/11/22 1440    Order Status: Completed Specimen: Abscess from Back Updated: 05/12/22 0306     Gram Stain Result Few WBC's      Few Gram positive cocci    Gram stain [[id number]]     Order Status: Canceled Specimen: Joint Fluid from Back     Blood culture [[id number]]  (Abnormal) Collected: 05/08/22 1516    Order Status: Completed Specimen: Blood from Peripheral, Right Hand Updated: 05/11/22 1009     Blood Culture, Routine Gram stain aer bottle: Gram positive cocci in clusters resembling Staph       Results called to and read back by: Chasity Raymundo RN  05/09/2022  11:31      STAPHYLOCOCCUS AUREUS  ID consult required at [facility].  For susceptibility see order #[id number]      Narrative:      Collection has been rescheduled by [email or web address] at 05/08/2022 13:22 Reason:   Pt in mri will be there after another hour [id number]  Collection has been rescheduled by [email or web address] at 05/08/2022 13:22 Reason:   Pt in mri will be there after another hour [id number]     Blood culture [[id number]]  (Abnormal) Collected: 05/08/22 1515    Order Status: Completed Specimen: Blood from Peripheral, Left Arm Updated: 05/11/22 1009     Blood Culture, Routine Gram stain aer bottle: Gram positive cocci in clusters resembling Staph      Results called to and read back by: Chasity Raymundo RN  05/09/2022  15:40      STAPHYLOCOCCUS AUREUS  ID consult required at [facility] and [facility] locations.  For susceptibility see order #[id number]      Narrative:      Collection has been rescheduled by [email or web address] at 05/08/2022 13:22 Reason:   Pt in mri will be there after another hour [id number]  Collection has been rescheduled by [email or web address] at 05/08/2022 13:22 Reason:   Pt in mri will be there after another hour [id number]        Latest lactate reviewed, they are-  No results for input(s): LACTATE in the last 72 hours.    Organ dysfunction indicated by Acute kidney injury  Source-  lung    Source control Achieved by-   Cont Broad spectrum IVAB     Bacteremia  Infectious disease consulted      PNA (pneumonia)  --Cont oxacillin        Tobacco abuse  - Patient thoroughly counseled on smoking cessation, pt verbalized understanding. Time of counseling 10 minutes.        COPD (chronic obstructive pulmonary disease)  Cont breathing tx prn  Pulmonology following       VTE Risk Mitigation (From admission, onward)         Ordered     enoxaparin injection 40 mg  Daily         05/06/22 2336     IP VTE HIGH RISK PATIENT  Once         05/06/22 2336     Place sequential compression device  Until discontinued         05/06/22 2336                Discharge Planning   YESSICA:      Code Status: Full Code   Is the patient medically ready for discharge?:     Reason for patient still in hospital (select all that apply): Treatment  Discharge Plan A: Skilled Nursing Facility   Discharge Delays: None known at this time          Chandni Lambert NP  Department of Hospital Medicine   [facility] - Med Surg

## 2022-05-30 NOTE — SUBJECTIVE & OBJECTIVE
Interval History:    5/30 seen and examined.  Afebrile.  Right-sided pigtail removed yesterday.  Chest x-ray today reviewed.  Stable.  O2 sat 98% on 2 liter/minute.  Cultures from left-sided pleural fluid negative.  Review of Systems   Constitutional:  Positive for malaise/fatigue and weight loss. Negative for chills and fever.   HENT:  Negative for hearing loss and nosebleeds.    Eyes:  Negative for discharge.   Respiratory:  Positive for cough, sputum production and shortness of breath.    Cardiovascular:  Positive for leg swelling. Negative for chest pain.   Gastrointestinal:  Negative for abdominal pain.   Genitourinary:  Negative for hematuria.   Musculoskeletal:  Negative for falls.   Skin:  Negative for itching.   Neurological:  Positive for weakness. Negative for speech change, seizures and loss of consciousness.   Endo/Heme/Allergies:  Negative for polydipsia. Does not bruise/bleed easily.   Psychiatric/Behavioral:  Negative for hallucinations.        Objective:     Vital Signs (Most Recent):  Temp: 97.5 °F (36.4 °C) (05/30/22 1155)  Pulse: 77 (05/30/22 1155)  Resp: 20 (05/30/22 1413)  BP: (!) 141/83 (05/30/22 1155)  SpO2: 98 % (05/30/22 1155)   Vital Signs (24h Range):  Temp:  [97.5 °F (36.4 °C)-98.3 °F (36.8 °C)] 97.5 °F (36.4 °C)  Pulse:  [73-97] 77  Resp:  [17-20] 20  SpO2:  [94 %-100 %] 98 %  BP: (135-171)/(72-96) 141/83     Weight:  (Scale says not calibrated)  Body mass index is 35.62 kg/m².      Intake/Output Summary (Last 24 hours) at 5/30/2022 1521  Last data filed at 5/30/2022 1413  Gross per 24 hour   Intake 1415.53 ml   Output 2650 ml   Net -1234.47 ml         Physical Exam  Vitals and nursing note reviewed.   Constitutional:       General: She is not in acute distress.     Appearance: She is well-developed. She is ill-appearing.   HENT:      Head: Normocephalic and atraumatic.      Nose: Nose normal.   Eyes:      Extraocular Movements: Extraocular movements intact.   Cardiovascular:      Rate  and Rhythm: Normal rate and regular rhythm.   Pulmonary:      Effort: Pulmonary effort is normal.      Breath sounds: No stridor. Rhonchi present.      Comments: Right-side  Pigtail removed  Abdominal:      General: There is no distension.   Musculoskeletal:         General: No signs of injury.      Cervical back: Normal range of motion and neck supple.   Skin:     General: Skin is dry.   Neurological:      General: No focal deficit present.      Mental Status: She is alert and oriented to person, place, and time. Mental status is at baseline.   Psychiatric:         Behavior: Behavior normal.       Vents:  Oxygen Concentration (%): 32 (05/28/22 1300)    Lines/Drains/Airways       Peripherally Inserted Central Catheter Line  Duration             PICC Double Lumen 05/17/22 1612 right basilic 12 days              Drain  Duration             Female External Urinary Catheter 05/21/22 0700 9 days                    Significant Labs:    CBC/Anemia Profile:  Recent Labs   Lab 05/29/22  0459 05/30/22  0532   WBC 12.14 12.79*   HGB 8.0* 8.6*   HCT 24.4* 26.0*    417   MCV 83 83   RDW 15.0* 15.3*          Chemistries:  Recent Labs   Lab 05/29/22  0459 05/30/22  0532    135*   K 4.2 3.8    102   CO2 24 21*   BUN 21* 22*   CREATININE 1.6* 1.7*   CALCIUM 7.7* 7.8*   ALBUMIN 1.5* 1.5*   PROT 5.6* 6.1   BILITOT 0.3 0.3   ALKPHOS 72 77   ALT 8* 7*   AST 13 14      Latest Reference Range & Units 05/28/22 11:15   Fluid Color  Yellow   Fluid Appearance  Clear   WBC, Body Fluid /cu mm 3065   Body Fluid Type  Pleural Fluid, Left   Segs, Fluid % 41   Lymphs, Fluid % 14   Monocytes/Macrophages, Fluid % 22   Eos, Fluid % 23   Glucose, Fluid Not established mg/dL 102   LD, Fluid Not established U/L 241   Body Fluid Source, Glucose  Pleural Fluid, Left   Body Fluid Source, LDH  Pleural Fluid, Left   Body Fluid Source, Total Protein  Pleural Fluid, Left   Body Fluid, Protein Not established g/dL 2.6      Latest Reference  Range & Units 05/07/22 09:05 05/21/22 10:22   Fluid Appearance  Hazy Cloudy   WBC, Body Fluid /cu mm 43941 [1] 372094 (C) [2]   Body Fluid Type  Pleural Fluid, Right Pleural Fluid, Right   Segs, Fluid % 93 88   Lymphs, Fluid % 3 7   Monocytes/Macrophages, Fluid % 4 5   Amylase, Fluid Not established U/L 34 [3]    Glucose, Fluid Not established mg/dL 29 [4]    LD, Fluid Not established U/L 1077 [5]    Body Fluid, Albumin See text g/dL 1.3 [6]    Body Fluid Source Amylase  Pleural Fluid, Right    Body Fluid Source, Albumin  Pleural Fluid, Right    Body Fluid Source, Glucose  Pleural Fluid, Right    Body Fluid Source, LDH  Pleural Fluid, Right    Body Fluid Source, Total Protein  Pleural Fluid, Right    Body Fluid, Protein Not established g/dL 3.7 [7]    Cholesterol, Body Fluid See Comment mg/dL 79 [8]          Significant Imaging:       Chest x-ray 05/30/2022    TECHNIQUE:  Single frontal view of the chest was performed.     COMPARISON:  05/28/2022     FINDINGS:  Right-sided PICC line tip overlies the SVC in good position.  Heart size is normal.  Right pleural catheter not seen on today's exam.  Again there patchy infiltrates within the right mid lower lung zone.  Cannot exclude small right pleural effusion.  No pneumothorax.    In comparison to the prior study, there is no adverse interval changes.                 CT chest without contrast 05/20/2022    Narrative & Impression  EXAMINATION:  CT CHEST WITHOUT CONTRAST     CLINICAL HISTORY:  Empyema;     TECHNIQUE:  Low dose axial images, sagittal and coronal reformations were obtained from the thoracic inlet to the lung bases. Contrast was not administered.  All CT scans at this facility are performed using dose optimization techniques including the following: automated exposure control; adjustment of the mA and/or kV; use of iterative reconstruction technique.     COMPARISON:  CTA chest non coronary 05/06/2022, chest radiograph 05/20/2022, MRI thoracic spine  05/19/2022     FINDINGS:  Base of Neck: There is diffuse body wall edema.  Unremarkable, noting the presence of AA PICC line which terminates at the superior cavoatrial junction.     Thoracic soft tissues: There is diffuse body wall edema.     Aorta: Left-sided aortic arch.  No aneurysm and no significant atherosclerosis     Heart: Normal size. No effusion.  No significant coronary atherosclerosis.     Pulmonary vasculature: Pulmonary arteries distribute normally.     Esha/Mediastinum: There are few prominent mediastinal lymph nodes with a prevascular node measuring 0.8 cm in short axis.     Airways: Patent.     Lungs/Pleura: There are multiple bilateral consolidative densities scattered throughout the lungs, many of which demonstrate cavitation, again suggesting septic emboli.  There is small left pleural fluid, which appears simple and layers dependently.  There is a loculated moderate right pleural effusion, which appears similar in size to the comparison exam however now containing air, which may be due to empyema.  There is adjacent atelectatic change within the bilateral lower lobes.  Overall number and size of consolidative densities has increased since the comparison exam.     Esophagus: Normal.     Upper Abdomen: There is persistent wedge-shaped hypodensity within the spleen, suggesting infarct.     Bones: No acute fracture. No suspicious lytic or sclerotic lesions.     Impression:     Interval progression with increased number and size of consolidative cavitary densities throughout the lungs, most suggestive of septic emboli with larger opacities concerning for necrotizing pneumonia.     Stable size of loculated right moderate pleural effusion, now containing air compatible with hydropneumothorax as reported on prior MRI.  Interval development of air is concerning for empyema noting limited evaluation without intravenous contrast.  New small simple appearing left pleural effusion.     Wedge-shaped splenic  hypodensity, suggesting infarction.     Anasarca.

## 2022-05-30 NOTE — PLAN OF CARE
RD Recommendations  1) Continue diet as prescribed: regular and encourage PO intake  2) RD to follow up to monitor intake, tolerance, and labs.      Miriam Tamez, MS, RD, LDN

## 2022-05-30 NOTE — ASSESSMENT & PLAN NOTE
PNA in IVDU  Cultures; Blood and sputum  BC +ve G+ve cocci  AbxL OXACILLIN + FLAGYL  5/22 stable, improved aeration   5/26 chest x-ray reviewed.  Continue IV oxacillin.  Right-sided pigtail in place.  Status post tPA and DNase x2.  Bloody drainage around pigtail today status post tPA and DNase yesterday.  Will hold on fibrinolysis.  Monitor chest tube output  5/28 keep chest tube on the right side for now.  Repeat CRP.  Checking chemistry microbiology  on left-sided pleural fluid to rule out complicated effusion and need for pigtail.  Continue oxacillin drip.   5/29 minimal output right chest tube.  Remove chest tube.  Left-sided effusion not complicated not empyema.  Continue IV oxacillin   5/30 oxacillin total of 8 weeks.  Chest x-ray stable.  Right-sided pigtail removed.  No evidence of empyema on the left side pleural fluid.

## 2022-05-30 NOTE — ASSESSMENT & PLAN NOTE
MRI of the thoracic region 0  1. No evidence for disc osteomyelitis in the thoracic spine.  No evidence for epidural abscess.  2. Loculated right hydropneumothorax and extensive bilateral pulmonary infiltrates.     Case discussed with critical care team- will consult pulmonology /CVT  On oxacillin  05/23- s/p chest tube placement -follow pulmonology .  Continue oxacillin    05/30- will continue Oxacillin as planned.-will complete 8 weeks of therapy .

## 2022-05-30 NOTE — ASSESSMENT & PLAN NOTE
Medical [facility]x  CTS note noted  5/23 IV oxacillin.  5/24 IV oxacillin as per Infectious Disease  5/25 repeat cultures none relevant.  IV oxacillin as per Infectious Disease x4 weeks.  T-max 101.7° last  24 hours  5/26 repeat blood concern negative continue oxacillin gtt   5/27 continue oxacillin drip   5/28 oxacillin drip.  Blood culture 5/25 was negative  5/28 continue same   5/30 oxacillin total of 8 weeks.

## 2022-05-31 PROBLEM — U07.1 COVID-19: Status: ACTIVE | Noted: 2022-05-31

## 2022-05-31 LAB
ALBUMIN SERPL BCP-MCNC: 1.6 G/DL (ref 3.5–5.2)
ALP SERPL-CCNC: 80 U/L (ref 55–135)
ALT SERPL W/O P-5'-P-CCNC: 6 U/L (ref 10–44)
ANION GAP SERPL CALC-SCNC: 10 MMOL/L (ref 8–16)
AST SERPL-CCNC: 10 U/L (ref 10–40)
BACTERIA BLD CULT: NORMAL
BASOPHILS # BLD AUTO: 0.06 K/UL (ref 0–0.2)
BASOPHILS NFR BLD: 0.5 % (ref 0–1.9)
BILIRUB SERPL-MCNC: 0.3 MG/DL (ref 0.1–1)
BUN SERPL-MCNC: 21 MG/DL (ref 6–20)
CALCIUM SERPL-MCNC: 7.6 MG/DL (ref 8.7–10.5)
CHLORIDE SERPL-SCNC: 105 MMOL/L (ref 95–110)
CK SERPL-CCNC: 10 U/L (ref 20–180)
CO2 SERPL-SCNC: 23 MMOL/L (ref 23–29)
CREAT SERPL-MCNC: 1.7 MG/DL (ref 0.5–1.4)
DIFFERENTIAL METHOD: ABNORMAL
EOSINOPHIL # BLD AUTO: 0.2 K/UL (ref 0–0.5)
EOSINOPHIL NFR BLD: 1.1 % (ref 0–8)
ERYTHROCYTE [DISTWIDTH] IN BLOOD BY AUTOMATED COUNT: 15.2 % (ref 11.5–14.5)
ERYTHROCYTE [SEDIMENTATION RATE] IN BLOOD BY WESTERGREN METHOD: 78 MM/HR (ref 0–20)
EST. GFR  (AFRICAN AMERICAN): 43 ML/MIN/1.73 M^2
EST. GFR  (NON AFRICAN AMERICAN): 37 ML/MIN/1.73 M^2
FERRITIN SERPL-MCNC: 320 NG/ML (ref 20–300)
GLUCOSE SERPL-MCNC: 107 MG/DL (ref 70–110)
HCT VFR BLD AUTO: 27.5 % (ref 37–48.5)
HGB BLD-MCNC: 9 G/DL (ref 12–16)
IMM GRANULOCYTES # BLD AUTO: 0.09 K/UL (ref 0–0.04)
IMM GRANULOCYTES NFR BLD AUTO: 0.7 % (ref 0–0.5)
LYMPHOCYTES # BLD AUTO: 1.7 K/UL (ref 1–4.8)
LYMPHOCYTES NFR BLD: 12.8 % (ref 18–48)
MCH RBC QN AUTO: 27.3 PG (ref 27–31)
MCHC RBC AUTO-ENTMCNC: 32.7 G/DL (ref 32–36)
MCV RBC AUTO: 83 FL (ref 82–98)
MONOCYTES # BLD AUTO: 1.3 K/UL (ref 0.3–1)
MONOCYTES NFR BLD: 10.1 % (ref 4–15)
NEUTROPHILS # BLD AUTO: 10 K/UL (ref 1.8–7.7)
NEUTROPHILS NFR BLD: 74.8 % (ref 38–73)
NRBC BLD-RTO: 0 /100 WBC
PLATELET # BLD AUTO: 407 K/UL (ref 150–450)
PMV BLD AUTO: 8.4 FL (ref 9.2–12.9)
POTASSIUM SERPL-SCNC: 3.9 MMOL/L (ref 3.5–5.1)
PROT SERPL-MCNC: 5.5 G/DL (ref 6–8.4)
RBC # BLD AUTO: 3.3 M/UL (ref 4–5.4)
SARS-COV-2 RDRP RESP QL NAA+PROBE: POSITIVE
SODIUM SERPL-SCNC: 138 MMOL/L (ref 136–145)
WBC # BLD AUTO: 13.29 K/UL (ref 3.9–12.7)

## 2022-05-31 PROCEDURE — 94640 AIRWAY INHALATION TREATMENT: CPT

## 2022-05-31 PROCEDURE — 25000003 PHARM REV CODE 250: Performed by: NURSE PRACTITIONER

## 2022-05-31 PROCEDURE — 25000242 PHARM REV CODE 250 ALT 637 W/ HCPCS: Performed by: NURSE PRACTITIONER

## 2022-05-31 PROCEDURE — 27000646 HC AEROBIKA DEVICE

## 2022-05-31 PROCEDURE — 94664 DEMO&/EVAL PT USE INHALER: CPT

## 2022-05-31 PROCEDURE — 82550 ASSAY OF CK (CPK): CPT | Performed by: NURSE PRACTITIONER

## 2022-05-31 PROCEDURE — 99232 PR SUBSEQUENT HOSPITAL CARE,LEVL II: ICD-10-PCS | Mod: ,,, | Performed by: INTERNAL MEDICINE

## 2022-05-31 PROCEDURE — 25000242 PHARM REV CODE 250 ALT 637 W/ HCPCS: Performed by: INTERNAL MEDICINE

## 2022-05-31 PROCEDURE — 63600175 PHARM REV CODE 636 W HCPCS: Performed by: INTERNAL MEDICINE

## 2022-05-31 PROCEDURE — 27000207 HC ISOLATION

## 2022-05-31 PROCEDURE — 27000221 HC OXYGEN, UP TO 24 HOURS

## 2022-05-31 PROCEDURE — 97530 THERAPEUTIC ACTIVITIES: CPT

## 2022-05-31 PROCEDURE — 85025 COMPLETE CBC W/AUTO DIFF WBC: CPT | Performed by: INTERNAL MEDICINE

## 2022-05-31 PROCEDURE — U0002 COVID-19 LAB TEST NON-CDC: HCPCS | Performed by: NURSE PRACTITIONER

## 2022-05-31 PROCEDURE — 94761 N-INVAS EAR/PLS OXIMETRY MLT: CPT

## 2022-05-31 PROCEDURE — 99232 SBSQ HOSP IP/OBS MODERATE 35: CPT | Mod: ,,, | Performed by: INTERNAL MEDICINE

## 2022-05-31 PROCEDURE — 97116 GAIT TRAINING THERAPY: CPT | Mod: CQ

## 2022-05-31 PROCEDURE — 85651 RBC SED RATE NONAUTOMATED: CPT | Performed by: NURSE PRACTITIONER

## 2022-05-31 PROCEDURE — 25000003 PHARM REV CODE 250: Performed by: INTERNAL MEDICINE

## 2022-05-31 PROCEDURE — 99900035 HC TECH TIME PER 15 MIN (STAT)

## 2022-05-31 PROCEDURE — 82728 ASSAY OF FERRITIN: CPT | Performed by: NURSE PRACTITIONER

## 2022-05-31 PROCEDURE — 97530 THERAPEUTIC ACTIVITIES: CPT | Mod: CQ

## 2022-05-31 PROCEDURE — 21400001 HC TELEMETRY ROOM

## 2022-05-31 PROCEDURE — 63600175 PHARM REV CODE 636 W HCPCS: Performed by: NURSE PRACTITIONER

## 2022-05-31 PROCEDURE — 80053 COMPREHEN METABOLIC PANEL: CPT | Performed by: NURSE PRACTITIONER

## 2022-05-31 RX ORDER — MORPHINE SULFATE 15 MG/1
15 TABLET, FILM COATED, EXTENDED RELEASE ORAL EVERY 12 HOURS
Qty: 1 TABLET | Refills: 0 | Status: ON HOLD | OUTPATIENT
Start: 2022-05-31 | End: 2022-06-26

## 2022-05-31 RX ORDER — TRAZODONE HYDROCHLORIDE 100 MG/1
100 TABLET ORAL NIGHTLY
Qty: 30 TABLET | Refills: 0 | Status: ON HOLD
Start: 2022-05-31 | End: 2022-06-26

## 2022-05-31 RX ORDER — GLUCAGON 1 MG
1 KIT INJECTION
Status: DISCONTINUED | OUTPATIENT
Start: 2022-05-31 | End: 2022-06-10 | Stop reason: HOSPADM

## 2022-05-31 RX ORDER — IBUPROFEN 200 MG
16 TABLET ORAL
Status: DISCONTINUED | OUTPATIENT
Start: 2022-05-31 | End: 2022-06-10 | Stop reason: HOSPADM

## 2022-05-31 RX ORDER — ASCORBIC ACID 500 MG
500 TABLET ORAL 2 TIMES DAILY
Status: DISCONTINUED | OUTPATIENT
Start: 2022-05-31 | End: 2022-06-07

## 2022-05-31 RX ORDER — ENOXAPARIN SODIUM 100 MG/ML
1 INJECTION SUBCUTANEOUS 2 TIMES DAILY
Status: DISCONTINUED | OUTPATIENT
Start: 2022-05-31 | End: 2022-06-04

## 2022-05-31 RX ORDER — CEFAZOLIN SODIUM 2 G/50ML
2 SOLUTION INTRAVENOUS
Status: DISCONTINUED | OUTPATIENT
Start: 2022-05-31 | End: 2022-05-31

## 2022-05-31 RX ORDER — IBUPROFEN 200 MG
24 TABLET ORAL
Status: DISCONTINUED | OUTPATIENT
Start: 2022-05-31 | End: 2022-06-10 | Stop reason: HOSPADM

## 2022-05-31 RX ORDER — CEFAZOLIN SODIUM 1 G/50ML
1 SOLUTION INTRAVENOUS
Status: DISCONTINUED | OUTPATIENT
Start: 2022-05-31 | End: 2022-05-31 | Stop reason: DRUGHIGH

## 2022-05-31 RX ORDER — LORAZEPAM 1 MG/1
1 TABLET ORAL EVERY 6 HOURS PRN
Qty: 1 TABLET | Refills: 0 | Status: ON HOLD | OUTPATIENT
Start: 2022-05-31 | End: 2022-06-26

## 2022-05-31 RX ORDER — OXYCODONE HYDROCHLORIDE 5 MG/1
5 TABLET ORAL EVERY 6 HOURS PRN
Qty: 1 TABLET | Refills: 0 | Status: ON HOLD | OUTPATIENT
Start: 2022-05-31 | End: 2022-06-26

## 2022-05-31 RX ORDER — ALBUTEROL SULFATE 90 UG/1
2 AEROSOL, METERED RESPIRATORY (INHALATION) EVERY 6 HOURS
Status: DISCONTINUED | OUTPATIENT
Start: 2022-05-31 | End: 2022-06-06

## 2022-05-31 RX ADMIN — ONDANSETRON 4 MG: 2 INJECTION INTRAMUSCULAR; INTRAVENOUS at 08:05

## 2022-05-31 RX ADMIN — LORAZEPAM 1 MG: 1 TABLET ORAL at 08:05

## 2022-05-31 RX ADMIN — OXYCODONE HYDROCHLORIDE 5 MG: 5 TABLET ORAL at 05:05

## 2022-05-31 RX ADMIN — OXACILLIN SODIUM 12 G: 10 INJECTION, POWDER, FOR SOLUTION INTRAVENOUS at 03:05

## 2022-05-31 RX ADMIN — SODIUM CHLORIDE: 0.9 INJECTION, SOLUTION INTRAVENOUS at 06:05

## 2022-05-31 RX ADMIN — LORAZEPAM 1 MG: 1 TABLET ORAL at 05:05

## 2022-05-31 RX ADMIN — OXYCODONE HYDROCHLORIDE 5 MG: 5 TABLET ORAL at 11:05

## 2022-05-31 RX ADMIN — MORPHINE SULFATE 15 MG: 15 TABLET, EXTENDED RELEASE ORAL at 10:05

## 2022-05-31 RX ADMIN — FAMOTIDINE 20 MG: 20 TABLET ORAL at 08:05

## 2022-05-31 RX ADMIN — TRAZODONE HYDROCHLORIDE 100 MG: 100 TABLET ORAL at 08:05

## 2022-05-31 RX ADMIN — ONDANSETRON 4 MG: 2 INJECTION INTRAMUSCULAR; INTRAVENOUS at 01:05

## 2022-05-31 RX ADMIN — ONDANSETRON 4 MG: 2 INJECTION INTRAMUSCULAR; INTRAVENOUS at 06:05

## 2022-05-31 RX ADMIN — FAMOTIDINE 20 MG: 20 TABLET ORAL at 09:05

## 2022-05-31 RX ADMIN — MORPHINE SULFATE 15 MG: 15 TABLET, EXTENDED RELEASE ORAL at 09:05

## 2022-05-31 RX ADMIN — IPRATROPIUM BROMIDE AND ALBUTEROL SULFATE 3 ML: 2.5; .5 SOLUTION RESPIRATORY (INHALATION) at 10:05

## 2022-05-31 RX ADMIN — ENOXAPARIN SODIUM 100 MG: 100 INJECTION SUBCUTANEOUS at 08:05

## 2022-05-31 RX ADMIN — ALBUTEROL SULFATE 2 PUFF: 90 AEROSOL, METERED RESPIRATORY (INHALATION) at 08:05

## 2022-05-31 RX ADMIN — OXYCODONE HYDROCHLORIDE AND ACETAMINOPHEN 500 MG: 500 TABLET ORAL at 08:05

## 2022-05-31 NOTE — PLAN OF CARE
Patient remains free of injury. Chest tube removed yesterday, new dressing applied to site. Telemetry monitoring continued. Right upper arm PICC in place, flushes without difficulty. IV fluids and continuous IV antibiotics continued. 2L oxygen NC. Neuro checks continued. Chart check complete per flowsheets.

## 2022-05-31 NOTE — PT/OT/SLP PROGRESS
Occupational Therapy   Treatment    Name: Tianna Leon  MRN: [id number]  Admitting Diagnosis:  Acute bacterial endocarditis  10 Days Post-Op    Recommendations:     Discharge Recommendations: nursing facility, skilled  Discharge Equipment Recommendations:   (TBD)  Barriers to discharge:       Assessment:     Tianna Leon is a 37 y.o. female with a medical diagnosis of Acute bacterial endocarditis.  She presents with DEBILITY AND GENERALIZED WEAKNESS. Performance deficits affecting function are weakness, impaired endurance, decreased upper extremity function, impaired self care skills, impaired functional mobilty, decreased safety awareness, gait instability, impaired balance, decreased ROM.     Rehab Prognosis:  Good; patient would benefit from acute skilled OT services to address these deficits and reach maximum level of function.       Plan:     Patient to be seen 2 x/week to address the above listed problems via self-care/home management, therapeutic activities, therapeutic exercises  · Plan of Care Expires: 05/30/22  · Plan of Care Reviewed with: patient    Subjective     Pain/Comfort:  · Pain Rating 1: 0/10    Objective:     Communicated with:   NURSE AND Epic CHART REVIEW prior to session.  Patient found HOB elevated with oxygen, telemetry, chest tube, peripheral IV, PureWick upon OT entry to room.    General Precautions: Standard, fall   Orthopedic Precautions:N/A   Braces: N/A       Occupational Performance:     Bed Mobility:    · Patient completed Rolling/Turning to Right with minimum assistance  · Patient completed Scooting/Bridging with minimum assistance  · Patient completed Supine to Sit with minimum assistance     Functional Mobility/Transfers:  · Patient completed Sit <> Stand Transfer with moderate assistance  with  rolling walker   · Patient completed Bed <> Chair Transfer using Step Transfer technique with moderate assistance with rolling walker  Functional Mobility: pt ambulated 50  feet with mod a x rw  b lateral knee buckling and unable to self correct  Activities of Daily Living:  · Lower Body Dressing: moderate assistance miguelina socks  · Toileting: maximal assistance purewick placement      AMPAC 6 Click ADL: 15    Treatment & Education:  Pt educated patient on hep to be performed throughout the day to maintain current level of ROM and strength to BUE. Exercises included: shoulder flex; elbow flex/ext, chest press, hand/digits flex/ ext. Pt also educated on importance of utilizing call light and not attempt to get up without staff assistance due to increased fall risk. Pt encouraged to increase OOB tolerance by remaining up in chair for a minimum, of 2 hours especially with meals. Patient verbalized understanding.       Patient left up in chair with all lines intact, call button in reach, chair alarm on, nurse notified and mother presentEducation:      GOALS:   Multidisciplinary Problems     Occupational Therapy Goals        Problem: Occupational Therapy    Goal Priority Disciplines Outcome Interventions   Occupational Therapy Goal     OT, PT/OT Ongoing, Progressing    Description: O.T. GOALS MET BY 5-30-22  SBA WITH UE DRESSING  MIN A WITH LE DRESSING  PT WILL TOLERATE 1 SET X 15 REPS B UE ROM EXERCISE  MIN A WITH TOILETING                   Time Tracking:     OT Date of Treatment: 05/31/22  OT Start Time: 1000  OT Stop Time: 1030  OT Total Time (min): 30 min    Billable Minutes:Therapeutic Activity 30 minutes    OT/CHRISTI: OT          5/31/2022

## 2022-05-31 NOTE — ASSESSMENT & PLAN NOTE
Pulmonary hypertension  PA estimated 56  Multifactorial  : COPD, IVDU  Need outpatient w/u  5/31 Continue oxygen

## 2022-05-31 NOTE — SUBJECTIVE & OBJECTIVE
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  Blood cultures- MSSA.  She remains bacteremic.  No MR evidence of epidural abscess.     Large, rim enhancing facet synovial cysts arising posteriorly from the right L2-L3 and left L4-L5 facet joints may be degenerative or sequelae of facet septic arthritis.     Minor enhancement and endplate irregularity at L4-L5 may be degenerative versus less likely the sequelae of spondylodiscitis.  No significant associated endplate destruction or vertebral body height loss.     Multilevel degenerative changes of the lumbar spine are most pronounced at L4-L5 with broad-based disc bulge and prominent central disc protrusion contributing to moderate to severe spinal canal stenosis with moderate left-sided neural foraminal stenosis.     Asymmetric left L5-S1 disc bulge contributes to moderate to severe left-sided neural foraminal stenosis and left lateral recess stenosis.   CT chest -Lungs/Pleura: Multiple nodular and cavitary opacities concerning for septic emboli.  Additional larger opacities most notably in the right lung base with some internal clearing possibly related to necrotizing pneumonia.  Moderate loculated right pleural fluid and no definite left pleural fluid.  Empyema not excluded.   05/12- she declined thoracic MRI    She had CT guided aspirtaion -pus aspirated  05/16 - she is drowsy but refusing more imaging .   05/19-  MRI of the thoracic region    1. No evidence for disc osteomyelitis in the thoracic spine.  No evidence for epidural abscess.  2. Loculated right hydropneumothorax and extensive bilateral pulmonary infiltrates.   T max 100.5  05/23-she had interval placemet of chest tube.  Cultures from the chest remain negative.    05/30/22-   She reports that she has poor pain control .    05/31/22- she still complains of back apin .  She is afebrile   06/01/22  She had fever -T max 101.  CT chest -  Lungs/pleura: Continued evolution of the septic  emboli with multiple cavitary nodules scattered throughout the lungs.  There are well-defined air-fluid collections in posterior aspect of the right upper and lower lobes measuring 4.5 x 9.5 cm and 5.5 x 10.0 cm, respectively.  Alternatively, this could represent loculated empyema.  These 2 collections appear to communicate.  Overall appearance of the chest is similar when compared to the recent prior CT examination.  Findings concerning for necrotizing pneumonia, as previously noted.  There is a medium sized left-sided pleural effusion with associated volume loss.       :  Review of Systems   Constitutional:  Positive for fatigue. Negative for activity change, appetite change, chills, diaphoresis, fever and unexpected weight change.   HENT: Negative.  Negative for congestion, drooling, facial swelling, rhinorrhea, sinus pressure, sneezing and sore throat.    Eyes: Negative.  Negative for discharge, redness, itching and visual disturbance.   Respiratory:  Positive for cough, chest tightness and shortness of breath. Negative for apnea, choking, wheezing and stridor.    Cardiovascular:  Negative for chest pain, palpitations and leg swelling.   Gastrointestinal: Negative.  Negative for abdominal distention, abdominal pain, anal bleeding, blood in stool, constipation, diarrhea, nausea and vomiting.   Endocrine: Negative.    Genitourinary: Negative.  Negative for decreased urine volume, difficulty urinating, dysuria, frequency, hematuria, pelvic pain, urgency, vaginal bleeding and vaginal discharge.   Musculoskeletal:  Positive for arthralgias and back pain. Negative for gait problem, joint swelling, myalgias, neck pain and neck stiffness.   Skin: Negative.  Negative for color change, pallor, rash and wound.   Allergic/Immunologic: Negative.    Neurological:  Positive for weakness. Negative for dizziness, seizures, facial asymmetry, speech difficulty, light-headedness, numbness and headaches.   Psychiatric/Behavioral:   Positive for confusion. Negative for agitation, hallucinations and suicidal ideas.    All other systems reviewed and are negative.  Objective:     Vital Signs (Most Recent):  Temp: 99.1 °F (37.3 °C) (05/31/22 1141)  Pulse: 102 (05/31/22 1141)  Resp: 19 (05/31/22 1141)  BP: 132/70 (05/31/22 1141)  SpO2: 96 % (05/31/22 1141)   Vital Signs (24h Range):  Temp:  [96.3 °F (35.7 °C)-99.1 °F (37.3 °C)] 99.1 °F (37.3 °C)  Pulse:  [] 102  Resp:  [14-20] 19  SpO2:  [94 %-99 %] 96 %  BP: (132-167)/(70-93) 132/70     Weight:  (Scale says not calibrated)  Body mass index is 35.62 kg/m².    Estimated Creatinine Clearance: 51.2 mL/min (A) (based on SCr of 1.7 mg/dL (H)).    Physical Exam  Vitals and nursing note reviewed. Chaperone present: -.   Constitutional:       General: She is not in acute distress.     Appearance: She is well-developed. She is ill-appearing. She is not toxic-appearing.   HENT:      Head: Normocephalic and atraumatic.   Eyes:      Conjunctiva/sclera: Conjunctivae normal.      Pupils: Pupils are equal, round, and reactive to light.   Cardiovascular:      Rate and Rhythm: Normal rate and regular rhythm.      Heart sounds: Normal heart sounds. No murmur heard.  Pulmonary:      Effort: Pulmonary effort is normal. No respiratory distress.      Breath sounds: Normal breath sounds.      Comments: -  Abdominal:      General: Bowel sounds are normal. There is no distension.      Palpations: Abdomen is soft.      Tenderness: There is no abdominal tenderness.   Musculoskeletal:         General: Swelling present. No tenderness or deformity. Normal range of motion.      Cervical back: Normal range of motion and neck supple.      Right lower leg: No edema.      Left lower leg: No edema.   Skin:     General: Skin is warm and dry.      Coloration: Skin is pale.      Findings: Erythema present.   Neurological:      Mental Status: She is alert and oriented to person, place, and time.      Motor: Weakness present.    Psychiatric:         Behavior: Behavior normal.       Significant Labs: Blood Culture:   Recent Labs   Lab 05/08/22  1516 05/11/22  1213 05/25/22  0901 05/25/22  1220 05/25/22  1313   LABBLOO Gram stain aer bottle: Gram positive cocci in clusters resembling Staph   Results called to and read back by: Chasity Raymundo RN  05/09/2022  11:31  STAPHYLOCOCCUS AUREUS  ID consult required at [facility],[place] and [facility].  For susceptibility see order #[id number]  * No growth after 5 days.  No growth after 5 days. No growth after 5 days.  No growth after 5 days. No growth after 5 days. No growth after 5 days.       BMP:   Recent Labs   Lab 05/31/22  0530         K 3.9      CO2 23   BUN 21*   CREATININE 1.7*   CALCIUM 7.6*       CMP:   Recent Labs   Lab 05/30/22  0532 05/31/22  0530   * 138   K 3.8 3.9    105   CO2 21* 23    107   BUN 22* 21*   CREATININE 1.7* 1.7*   CALCIUM 7.8* 7.6*   PROT 6.1 5.5*   ALBUMIN 1.5* 1.6*   BILITOT 0.3 0.3   ALKPHOS 77 80   AST 14 10   ALT 7* 6*   ANIONGAP 12 10   EGFRNONAA 37* 37*         Significant Imaging: CT: I have reviewed all pertinent results/findings within the past 24 hours:

## 2022-05-31 NOTE — PLAN OF CARE
Added LAKEISHA Mckeon CM to secure chat between providers for possible alternative IV ABX treatment.

## 2022-05-31 NOTE — SUBJECTIVE & OBJECTIVE
Interval History: depressed, afebrile    Objective:     Vital Signs (Most Recent):  Temp: 99.1 °F (37.3 °C) (05/31/22 1141)  Pulse: 100 (05/31/22 1312)  Resp: 19 (05/31/22 1141)  BP: 132/70 (05/31/22 1141)  SpO2: 96 % (05/31/22 1141) Vital Signs (24h Range):  Temp:  [96.3 °F (35.7 °C)-99.1 °F (37.3 °C)] 99.1 °F (37.3 °C)  Pulse:  [] 100  Resp:  [14-20] 19  SpO2:  [94 %-99 %] 96 %  BP: (132-167)/(70-93) 132/70     Weight:  (Scale says not calibrated)  Body mass index is 35.62 kg/m².      Intake/Output Summary (Last 24 hours) at 5/31/2022 1840  Last data filed at 5/31/2022 0900  Gross per 24 hour   Intake 3588.39 ml   Output 1700 ml   Net 1888.39 ml       Physical Exam  Constitutional:       Appearance: She is obese.   HENT:      Head: Normocephalic.      Nose: Nose normal.   Eyes:      Pupils: Pupils are equal, round, and reactive to light.   Cardiovascular:      Rate and Rhythm: Tachycardia present.   Pulmonary:      Breath sounds: Rales present.   Abdominal:      General: Abdomen is flat.   Musculoskeletal:      Cervical back: Normal range of motion.      Right lower leg: Edema present.      Left lower leg: Edema present.   Skin:     General: Skin is warm.   Neurological:      General: No focal deficit present.      Mental Status: She is alert and oriented to person, place, and time.       Vents:  Oxygen Concentration (%): 28 (05/31/22 1056)    Lines/Drains/Airways       Peripherally Inserted Central Catheter Line  Duration             PICC Double Lumen 05/17/22 1612 right basilic 14 days              Drain  Duration             Female External Urinary Catheter 05/21/22 0700 10 days                    Significant Labs:    CBC/Anemia Profile:  Recent Labs   Lab 05/30/22  0532 05/31/22  0530   WBC 12.79* 13.29*   HGB 8.6* 9.0*   HCT 26.0* 27.5*    407   MCV 83 83   RDW 15.3* 15.2*        Chemistries:  Recent Labs   Lab 05/30/22  0532 05/31/22  0530   * 138   K 3.8 3.9    105   CO2 21* 23    BUN 22* 21*   CREATININE 1.7* 1.7*   CALCIUM 7.8* 7.6*   ALBUMIN 1.5* 1.6*   PROT 6.1 5.5*   BILITOT 0.3 0.3   ALKPHOS 77 80   ALT 7* 6*   AST 14 10       ABGs: No results for input(s): PH, PCO2, HCO3, POCSATURATED, BE in the last 48 hours.  Blood Culture: No results for input(s): LABBLOO in the last 48 hours.  BMP:   Recent Labs   Lab 05/31/22  0530         K 3.9      CO2 23   BUN 21*   CREATININE 1.7*   CALCIUM 7.6*     CMP:   Recent Labs   Lab 05/30/22  0532 05/31/22  0530   * 138   K 3.8 3.9    105   CO2 21* 23    107   BUN 22* 21*   CREATININE 1.7* 1.7*   CALCIUM 7.8* 7.6*   PROT 6.1 5.5*   ALBUMIN 1.5* 1.6*   BILITOT 0.3 0.3   ALKPHOS 77 80   AST 14 10   ALT 7* 6*   ANIONGAP 12 10   EGFRNONAA 37* 37*       Significant Imaging:  I have reviewed all pertinent imaging results/findings within the past 24 hours.

## 2022-05-31 NOTE — DISCHARGE SUMMARY
[facility] Medicine  Discharge Summary      Patient Name: Tianna Leon  MRN: [id number]  Patient Class: IP- Inpatient  Admission Date: 5/6/2022  Hospital Length of Stay: 25 days  Discharge Date and Time:  05/31/2022 2:13 PM  Attending Physician: Aj Olguin MD   Discharging Provider: Chandni Lambert NP  Primary Care Provider: Spenser Campa MD      HPI:   36 y/o. female  with a PMHx of asthma, COPD,Bipolar  , IVDU and HLD presented to the ER with a c/o  sob for the last weak which has gotten worse . The SOB is associated with fever , chills , productive cough , back pain  and generalized weakness . She report cough some blood this am .  She is active IVDU  ( heroine , cocaine  and amphetamine ) . She denies any  sick contact , chest pain  , GI/ sx , She also complaning of LE sweeling . Knee pain and B/L LE rash .   ER COURSE: CTA chest negative for pe . Multiple nodular and cavitary opacities concerning for septic emboli with larger opacities possibly necrotizing pneumonia. CT cervical  and thoracic did not show any acute finding , CT lumbar Possible progression of degenerative changes most notable at L4-5 with moderate to severe spinal canal stenosis at this level.  WBC  29 k , na 130 , k 3.4 , cl 89 , procal 2.71   ER VS:  BP Pulse Resp Temp SpO2   (!) 124/58 110 (!) 30 (!) 101.6 °F (38.7 °C) 96 %           Pt will be admitted to inpt with a dx of PNA and severe sepsis       Procedure(s) (LRB):  CT (COMPUTED TOMOGRAPHY)/Chest tube insertion (N/A)      Hospital Course:   5/7 admitted for pneumonia, severe sepsis in setting of ivdu. Mother at bedside and provides collateral. Patient has struggled with substance abuse for approximately 20 years, worsening over last 3-5 years since life event. Onset of symptoms 1-2 weeks ago. Tolerated thoracentesis with no pneumothorax on chest x-ray. Mri lumbar and echo, pending. Bcx positive, growing gram positive cocci. On vanc, cefepime and  flagyl. Infectious disease consulted for bacteremia. 5/8/22 The patient remained afebrile overnight. Currently on cefepime/vanc/flagyl. Blood cx 5/6/22 are growing MSSA. The patient refused the MRI lumbar spine overnight d/t being unable to lay flat from back pain. Will give pain meds and attempt to get the MRI today to r/o spinal abscess. The ECHO showed a 1.3 x 1.5 cm elongated, mobile echodensity seen on the tricuspid valve consistent with vegetation, CT surgery was consulted. Will need a SUSANNAH. Infectious disease is consulted. Will repeat blood cx today. 5/9/22 No acute events overnight. The patient reports lower back pain is not well controlled with current pain regimen, will adjust. Repeat blood cx are still positive. Sensitivities are back and Staph is sensitive to oxacillin, will D/C vanc/cefepime. Cardiology consulted and plans for a SUSANNAH today. CT surgery following and recommends continuing medical management with appropriate ABX, no surgical intervention at this time. MRI lumbar spine showed moderate to severe spinal canal stenosis with moderate left-sided neural foraminal stenosis, Neurosurgery consulted. 5/10/22 No acute events overnight. The patient reports some improvement in pain with adjustment in pain meds. Repeat blood cx are +. Continue IV oxacillin and flagyl. Infectious disease is consulted. IR consulted to evaluate possible paraspinous muscle myositis versus abscess. Recommend IR drainage. Continue current management. 5/11/22 No acute events overnight. The patient reports pain is still not well controlled at times. The patient reports that she is very anxious about the upcoming SUSANNAH and IR drainage. SUSANNAH was pushed back to this afternoon because the patient ate candy this AM. Continue treatment with IV oxacillin and flagyl. Infectious disease is following. WBC trended down to 31K. Continue current management. 5/12/22 No acute events overnight. The patients WBC improved to 22K overnight. The patient  remains on continuous oxacillin infusion. Infectious disease is following. MRI thoracic spine is ordered and pending. The patients SUSANNAH was postponed again today d/t low H/H. Hgb was noted to be 6.2 this morning, will transfuse 1 unit PRBC. The patient reports pain is still not well controlled. The case was discussed with Neurosurgery who recommended adding extended release pain meds. Approximately 1 ml of purulent drainage aspirated from back yesterday per IR, growing gram + cocci.     5/13: Patient was given 1U PRBC on 5/12, improved from 6.2 --> 7.0, will give an additional unit PRBC. Radiology attempted to bring patient on 5/12 at 1755 for Thoracic MRI, patient refused due to pain and anxiety. Repeat attempt this AM, patient again refused. Will order Ativan 1mg IV to be given with pain medication prior to scan to relieve anxiety and pain, nurse to notify NP when ready to go for scan. Discussed pain management with patient, discussed transition to PO pain medications to being preparing for d/c to LTAC, adjusted PO pain regimen will reevaluate to determine effectiveness. Repeat Blood cultures 5/11/22: NGTD.     5/14: Final anaerobic cultures pending, continue to adjust pain regimen, d/c IV Dilaudid today. Patient currently managed with PO Morphine extended release and PO oxycodone. D/C plan is for LTAC once final antibiotic regimen determined. Patient continues to refuse MRI of thoracic spine.     5/15: Called to room this AM, patient had coughed up blood and mucous. Approx. 1-2 teaspoons of blood in emesis bag, ordered CXR and repeat CBC, CXR showed improvement from previous day, repeat CBC at time of incident, showed a slight decrease in Hgb: 9.7 --> 8.7, when repeated at 1500, Hgb had improved to 10. no further episodes of coughing up blood throughout day. Patient continues to c/o not having enough pain medication but continues to sleep most of the day and will fall asleep when speaking at times. Added IV  Toradol to plan.     5/16: No further episodes of coughing blood overnight, patient participated with encouragement with PT/OT today, recommendations are HH vs. SNF pending progress. Awaiting final antibiotic regimen recommendations. Anaerobic culture results finalized: negative for growth. Afebrile, WBC trending downward.     5/17: Antibiotic regimen per ID is continuous oxacillin until 7/11/22, SW notified of plan, will seek acceptance at LTAC or SNF. Patient choice is [facility]. WBC in normal range. AMS this AM, CT of head: no acute findings. Given narcan and mental status improved. Decreased narcotic dose.     5/18: Per discussion with patient, she is willing to have the MRI of thoracic spine today, will order IV pain medication and anxiety medication prior to scan. Nursing and Radiology aware of needing to premedicate for scan. Patient is more cooperative and participating with care.     5/19: Patient given premedications for MRI, MRI completed, results pending. Patient more agreeable with treatment plan and cooperating. Will need psychiatry follow-up after discharge. Hgb: 6.7, will give 1U PRBC    5/20/22: +LEON and generalized pain. Thoracic MRI showed no evidence for disc osteomyelitis in the thoracic spine and no epidural abscess, Loculated right hydropneumothorax and extensive bilateral pulmonary infiltrates. Ct chest showed septic emboli and moderate right pleural effusion.+splenic infarct  Pulmonology felt likely empyema- consulted IR for chest tube.     5/21/22: s/p right pig tail placement to suction/ drainage system per IR. 60ml pus removed initially. Fluid analysis consistent with empyema. Pleural fluid cultures pending. Now with scant serosanguinous output.+ right lateral chest pain at chest tube site. Denies any other complaints. Status update provided to pt's mother.     5/22/22: Repeatedly requesting IV Dilaudid. Toradol ordered. Afebrile, WBC normal, O2sats stable on 3lites. s/p right pig tail  "placement to suction/ drainage system per IR on 5/21/22- draining serous draiange with pus. Plan for LTAC placement     5/23/22: More cofortable today. Not getting OOB. Encouraged OOB and rationale provided. Pulmonology recommended Possible tPA and DNase in chest tube tomorrow    5/24/22: Examined OOB in chair. Encouraged pt to continue OOB. Plan for tPA and DNase in chest tube today per Pulmonology  Pt denied LTAC- will pursue SNF    5/25/22: Temp 101.7F last night. Will recheck blood cultures and UA. BP low - IVF bolus given and restarted IVF. ID re-consulted. 160ml of purulent CT drainage output. Dr. San plans to repeat tPA and DNase in chest tube again today.     5/26/22: temp 99.2. c/o of right "lung pain" when eating. Will consult speech to check swallow. +mild leukocytosis. Repeat BC 5/25/22 show NGTD. Repeat UA showed rare trichomas. Chest tube drainage remains purulent     5/27/22  Low grade temp. Downward trend of hemoglobin, related to chest tube drainage. Blood cultures remain negative. Urine culture growing E. Coli. Sensitivities are pending.     5/28/22  Urine culture returned pansenstiive. Will continue ciprofloxacin for total of 3 days then discontinue. Still awaiting SNF placement.    5/29/22 culture of pleural fluid from 5/28/22 negative. Continue CT for now.     5/30/22  CT removed yesterday by pulmonology. SNF declines. Will explore other options for IV abx with help from ID.     5/31/22  Patient has been accepted to [facility]. Will transfer today. Will follow up with pulmonology and ID at discharge. She will continue oxacillin for management of bacteremia, septic embolic, and endocarditis.       Goals of Care Treatment Preferences:  Code Status: Full Code      Consults:   Consults (From admission, onward)        Status Ordering Provider     Inpatient consult to Infectious Diseases  Once        Provider:  Sunday Hassan MD    Acknowledged FÉLIX CARDENAS     Inpatient consult to " Interventional Radiology  Once        Provider:  Lloyd Suarez MD    Completed CATHERINE BROWN     Inpatient consult to Pulmonology  Once        Provider:  (Not yet assigned)    Acknowledged GALDINO HASSAN     Inpatient consult to Cardiothoracic Surgery  Once        Provider:  (Not yet assigned)    Acknowledged GALDINO HASSAN     Inpatient consult to PICC team ([facility])  Once        Provider:  (Not yet assigned)    Acknowledged DYLAN DUARTE     Inpatient consult to Interventional Radiology  Once        Provider:  LUCA Rojo    Completed IRVING SWAN     Inpatient consult to Cardiology  Once        Provider:  (Not yet assigned)    Completed ALEC HAZEL     Inpatient consult to Neurosurgery  Once        Provider:  (Not yet assigned)    Completed IRVING SWAN     Inpatient consult to Cardiothoracic Surgery  Once        Provider:  Bobby Blandon MD    Acknowledged IRVING SWAN     Inpatient consult to Infectious Diseases  Once        Provider:  Galdino Hassan MD    Acknowledged ALEC AHZEL     Inpatient consult to Pulmonology  Once        Provider:  Juan Diamond MD    Completed YOVANNY CHAPARRO          No new Assessment & Plan notes have been filed under this hospital service since the last note was generated.  Service: Hospital Medicine    Final Active Diagnoses:    Diagnosis Date Noted POA    PRINCIPAL PROBLEM:  Acute bacterial endocarditis [I33.0] 05/08/2022 Yes    Status post thoracentesis [Z98.890] 05/28/2022 Not Applicable    E. 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    Specialty: Pulmonary Disease  Contact information:  [address] [place] [address] DR Radha B[address]ON [id number] [phone]             Sunday Hassan MD Follow up.    Specialties: Infectious Diseases, Hospitalist  Contact information:  [address] [place] [address] [place] [address]  [phone]                       Patient Instructions:   No discharge procedures on file.    Significant Diagnostic Studies: Labs:   CMP   Recent Labs   Lab 05/30/22  0532 05/31/22  0530   * 138   K 3.8 3.9    105   CO2 21* 23    107   BUN 22* 21*   CREATININE 1.7* 1.7*   CALCIUM 7.8* 7.6*   PROT 6.1 5.5*   ALBUMIN 1.5* 1.6*   BILITOT 0.3 0.3   ALKPHOS 77 80   AST 14 10   ALT 7* 6*   ANIONGAP 12 10   ESTGFRAFRICA 43* 43*   EGFRNONAA 37* 37*    and CBC   Recent Labs   Lab 05/30/22  0532 05/31/22  0530   WBC 12.79* 13.29*   HGB 8.6* 9.0*   HCT 26.0* 27.5*    407       Pending Diagnostic Studies:     Procedure Component Value Units Date/Time    Cytology, Pulmonary [[id number]] Collected: 05/28/22 1115    Order Status: Sent Lab Status: In process Updated: 05/28/22 1447    MRI Thoracic Spine W WO Cont [[id number]] Resulted: 05/11/22 1509    Order Status: Sent Lab Status: No result Updated: 05/14/22 1503    Transesophageal echo (SUSANNAH) [[id number]]     Order Status: Sent Lab Status: No result     Transesophageal echo (SUSANNAH) with possible cardioversion [[id number]] Resulted: 05/09/22 1152    Order Status: Sent Lab Status: No result Updated: 05/09/22  1316         Medications:  Reconciled Home Medications:      Medication List      START taking these medications    LORazepam 1 MG tablet  Commonly known as: ATIVAN  Take 1 tablet (1 mg total) by mouth every 6 (six) hours as needed for Anxiety.     morphine 15 MG 12 hr tablet  Commonly known as: MS CONTIN  Take 1 tablet (15 mg total) by mouth every 12 (twelve) hours.     oxyCODONE 5 MG immediate release tablet  Commonly known as: ROXICODONE  Take 1 tablet (5 mg total) by mouth every 6 (six) hours as needed for Pain.     sodium chloride 0.9% SolP 500 mL with oxacillin 1 gram SolR 12 g  Inject 12 g into the vein once daily.     traZODone 100 MG tablet  Commonly known as: DESYREL  Take 1 tablet (100 mg total) by mouth every evening.        CONTINUE taking these medications    acetaminophen 500 MG tablet  Commonly known as: TYLENOL  Take 500 mg by mouth every 6 (six) hours as needed for Pain.     albuterol 90 mcg/actuation inhaler  Commonly known as: PROVENTIL/VENTOLIN HFA  Inhale 2 puffs into the lungs every 6 (six) hours as needed for Wheezing.     ibuprofen 200 MG tablet  Commonly known as: ADVIL,MOTRIN  Take 200 mg by mouth every 6 (six) hours as needed for Pain.     MUCINEX DM 60-1,200 mg per 12 hr tablet  Generic drug: dextromethorphan-guaiFENesin  Take 1 tablet by mouth every 12 (twelve) hours.        STOP taking these medications    azithromycin 250 MG tablet  Commonly known as: ZITHROMAX Z-YUAN            Indwelling Lines/Drains at time of discharge:   Lines/Drains/Airways     Peripherally Inserted Central Catheter Line  Duration           PICC Double Lumen 05/17/22 1612 right basilic 13 days          Drain  Duration           Female External Urinary Catheter 05/21/22 0700 10 days                Time spent on the discharge of patient: >35 minutes         Chandni Lambert NP  Department of Hospital Medicine  [facility]

## 2022-05-31 NOTE — PLAN OF CARE
[facility]  Discharge Final Note    Primary Care Provider: Spenser Campa MD    Expected Discharge Date: 5/31/2022    Final Discharge Note (most recent)     Final Note - 05/31/22 1452        Final Note    Assessment Type Final Discharge Note     Anticipated Discharge Disposition Skilled Nursing Facility     Hospital Resources/Appts/Education Provided Provided patient/caregiver with written discharge plan information;Appointments scheduled and added to AVS        Post-Acute Status    Discharge Delays None known at this time                 Important Message from Medicare             Contact Info     Piyush San MD   Specialty: Pulmonary Disease    [address] [place] [address] [address] JEEVAN [address] [address]   Phone: [phone]       Next Steps: Follow up    Sunday Hassan MD   Specialty: Infectious Diseases, Hospitalist    [address] [place] [address] [place] [address]   Phone: [phone]       Next Steps: Follow up        Patient to dc to [facility]. FU appts scheduled and added to AVS. No other cm needs.

## 2022-05-31 NOTE — PROGRESS NOTES
Pharmacist Renal Dose Adjustment Note    Tianna Leon is a 37 y.o. female being treated with the medication cefazolin.     Patient Data:    Vital Signs (Most Recent):  Temp: 99.1 °F (37.3 °C) (05/31/22 1141)  Pulse: 102 (05/31/22 1141)  Resp: 19 (05/31/22 1141)  BP: 132/70 (05/31/22 1141)  SpO2: 96 % (05/31/22 1141) Vital Signs (72h Range):  Temp:  [96.3 °F (35.7 °C)-99.1 °F (37.3 °C)]   Pulse:  []   Resp:  [14-20]   BP: (132-171)/(70-96)   SpO2:  [93 %-100 %]      Recent Labs   Lab 05/29/22  0459 05/30/22  0532 05/31/22  0530   CREATININE 1.6* 1.7* 1.7*     Serum creatinine: 1.7 mg/dL (H) 05/31/22 0530  Estimated creatinine clearance: 51.2 mL/min (A)    Medication cefazolin 1 g IV every 12 hours will be changed to cefazolin 2 g IV every 8 hours per pharmacy renal dose adjustment protocol for patients with CrCl greater than 30 mL/min.    Pharmacist's Name: Lucy Nielsen PharmD  Pharmacist's Extension: [phone]    Thank you for allowing us to participate in this patient's care.     Lucy Nielsen PharmD 05/31/2022 1:55 PM

## 2022-05-31 NOTE — ASSESSMENT & PLAN NOTE
5/24 continue IV oxacillin.  5/25 on IV  Oxacillin  since 05/09.  Repeat CRP.  5/27 right pigtail in place.  225 mL  Last 24 hours average of 200 mL per day.  Change pleurovac canister to better assess the color of the fluid pus versus serosanguineous.  Keep pigtail in place for now.  Ultrasound bilateral chest assess pleural effusions.  Continue oxacillin drip.  5/285/28 keep chest tube on the right side for now.  Repeat CRP.  Checking chemistry microbiology  on left-sided pleural fluid to rule out complicated effusion and need for pigtail.  Continue oxacillin drip.   5/31 Continue drip and anticoagulation

## 2022-05-31 NOTE — PT/OT/SLP PROGRESS
Physical Therapy  Treatment    Tianna Leon   MRN: [id number]   Admitting Diagnosis: Acute bacterial endocarditis    PT Received On: 05/31/22  PT Start Time: 1010     PT Stop Time: 1040    PT Total Time (min): 30 min       Billable Minutes:  Gait Training 10 and Therapeutic Activity 20    Treatment Type: Treatment  PT/PTA: PTA     PTA Visit Number: 2       General Precautions: Standard, fall  Orthopedic Precautions: N/A   Braces: N/A  Respiratory Status: Nasal cannula, flow 2 L/min         Subjective:  Communicated with patient's nurse, Meseret, and completed Epic chart review prior to session.  Patient initially refused PT session but eventually agreed with max encouragement.      Pain/Comfort  Pain Rating 1: 0/10  Pain Rating Post-Intervention 1: 0/10    Objective:   Patient found with: telemetry, peripheral IV, PureWick, oxygen    Functional Mobility:  Supine > Sit EOB: Min A (increased time to complete)    STS from EOB > RW: Min-Mod A (VC for hand placement)    50ft x2 trials w/ RW Min-Mod A (multiple short interval standing rest breaks; coughing episodes which would cause near buckling of knees; impulsivity w/ RW mgmt)    Stand pivot T/F w/ RW: Min A (VC for safety w/ RW mgmt)    Educated patient on TE to be performed throughout the day to maintain current level of ROM and strength to BLE. Exercises included: LAQ, Hip Flexion, AP. Re enforced importance of utilizing call light and not attempt to get up without staff assistance due to increased fall risk. Encouraged patient to increase OOB tolerance by remaining up in chair for 2 hour minimum, especially with meals. Patient verbalized understanding.     AM-PAC 6 CLICK MOBILITY  How much help from another person does this patient currently need?   1 = Unable, Total/Dependent Assistance  2 = A lot, Maximum/Moderate Assistance  3 = A little, Minimum/Contact Guard/Supervision  4 = None, Modified [place]/Independent    Turning over in bed (including adjusting  bedclothes, sheets and blankets)?: 3  Sitting down on and standing up from a chair with arms (e.g., wheelchair, bedside commode, etc.): 2  Moving from lying on back to sitting on the side of the bed?: 3  Moving to and from a bed to a chair (including a wheelchair)?: 3  Need to walk in hospital room?: 2  Climbing 3-5 steps with a railing?: 1  Basic Mobility Total Score: 14    AM-PAC Raw Score CMS G-Code Modifier Level of Impairment Assistance   6 % Total / Unable   7 - 9 CM 80 - 100% Maximal Assist   10 - 14 CL 60 - 80% Moderate Assist   15 - 19 CK 40 - 60% Moderate Assist   20 - 22 CJ 20 - 40% Minimal Assist   23 CI 1-20% SBA / CGA   24 CH 0% Independent/ Mod I     Patient left up in chair with all lines intact, call button in reach, chair alarm on and mother and aide present.    Assessment:  Tianna Leon is a 37 y.o. female with a medical diagnosis of Acute bacterial endocarditis and presents with overall decline in functional mobility. Patient would continue to benefit from skilled PT to address functional limitations listed below in order to return to PLOF/decrease caregiver burden.      Rehab identified problem list/impairments: Rehab identified problem list/impairments: weakness, impaired endurance, impaired self care skills, impaired functional mobilty, gait instability, impaired balance, impaired cognition, decreased coordination, decreased safety awareness, decreased ROM, impaired coordination, impaired cardiopulmonary response to activity    Rehab potential is good.    Activity tolerance: Fair    Discharge recommendations: Discharge Facility/Level of Care Needs: nursing facility, skilled     Barriers to discharge:      Equipment recommendations: Equipment Needed After Discharge: bedside commode, walker, rolling     GOALS:   Multidisciplinary Problems     Physical Therapy Goals        Problem: Physical Therapy    Goal Priority Disciplines Outcome Goal Variances Interventions   Physical Therapy  Goal     PT, PT/OT Ongoing, Not Progressing     Description: LTG'S TO BE MET IN 14 DAYS (6-14-22)  1. PT WILL BE KRISTA WITH BED MOBILITY  2. PT WILL BE KRISTA WITH TF'S  3. PT WILL ' WITH RW AND SPV                   PLAN:    Patient to be seen 3 x/week  to address the above listed problems via gait training, therapeutic activities, therapeutic exercises  Plan of Care expires: 06/14/22  Plan of Care reviewed with: patient         05/31/2022

## 2022-05-31 NOTE — PROGRESS NOTES
[facility]  [facility] Note    Patient Name: Tianna Leon  MRN: [id number]  Patient Class: IP- Inpatient   Admission Date: 5/6/2022  Length of Stay: 25 days  Attending Physician: Aj Olguin MD  Primary Care Provider: Spenser Campa MD    Subjective:     Principal Problem:Acute bacterial endocarditis        HPI:  36 y/o. female  with a PMHx of asthma, COPD,Bipolar  , IVDU and HLD presented to the ER with a c/o  sob for the last weak which has gotten worse . The SOB is associated with fever , chills , productive cough , back pain  and generalized weakness . She report cough some blood this am .  She is active IVDU  ( heroine , cocaine  and amphetamine ) . She denies any  sick contact , chest pain  , GI/ sx , She also complaning of LE sweeling . Knee pain and B/L LE rash .   ER COURSE: CTA chest negative for pe . Multiple nodular and cavitary opacities concerning for septic emboli with larger opacities possibly necrotizing pneumonia. CT cervical  and thoracic did not show any acute finding , CT lumbar Possible progression of degenerative changes most notable at L4-5 with moderate to severe spinal canal stenosis at this level.  WBC  29 k , na 130 , k 3.4 , cl 89 , procal 2.71   ER VS:  BP Pulse Resp Temp SpO2   (!) 124/58 110 (!) 30 (!) 101.6 °F (38.7 °C) 96 %           Pt will be admitted to inpt with a dx of PNA and severe sepsis       Overview/Hospital Course:  5/7 admitted for pneumonia, severe sepsis in setting of ivdu. Mother at bedside and provides collateral. Patient has struggled with substance abuse for approximately 20 years, worsening over last 3-5 years since life event. Onset of symptoms 1-2 weeks ago. Tolerated thoracentesis with no pneumothorax on chest x-ray. Mri lumbar and echo, pending. Bcx positive, growing gram positive cocci. On vanc, cefepime and flagyl. Infectious disease consulted for bacteremia. 5/8/22 The patient remained afebrile overnight. Currently on  cefepime/vanc/flagyl. Blood cx 5/6/22 are growing MSSA. The patient refused the MRI lumbar spine overnight d/t being unable to lay flat from back pain. Will give pain meds and attempt to get the MRI today to r/o spinal abscess. The ECHO showed a 1.3 x 1.5 cm elongated, mobile echodensity seen on the tricuspid valve consistent with vegetation, CT surgery was consulted. Will need a SUSANNAH. Infectious disease is consulted. Will repeat blood cx today. 5/9/22 No acute events overnight. The patient reports lower back pain is not well controlled with current pain regimen, will adjust. Repeat blood cx are still positive. Sensitivities are back and Staph is sensitive to oxacillin, will D/C vanc/cefepime. Cardiology consulted and plans for a SUSANNAH today. CT surgery following and recommends continuing medical management with appropriate ABX, no surgical intervention at this time. MRI lumbar spine showed moderate to severe spinal canal stenosis with moderate left-sided neural foraminal stenosis, Neurosurgery consulted. 5/10/22 No acute events overnight. The patient reports some improvement in pain with adjustment in pain meds. Repeat blood cx are +. Continue IV oxacillin and flagyl. Infectious disease is consulted. IR consulted to evaluate possible paraspinous muscle myositis versus abscess. Recommend IR drainage. Continue current management. 5/11/22 No acute events overnight. The patient reports pain is still not well controlled at times. The patient reports that she is very anxious about the upcoming SUSANNAH and IR drainage. SUSANNAH was pushed back to this afternoon because the patient ate candy this AM. Continue treatment with IV oxacillin and flagyl. Infectious disease is following. WBC trended down to 31K. Continue current management. 5/12/22 No acute events overnight. The patients WBC improved to 22K overnight. The patient remains on continuous oxacillin infusion. Infectious disease is following. MRI thoracic spine is ordered and  pending. The patients SUSANNAH was postponed again today d/t low H/H. Hgb was noted to be 6.2 this morning, will transfuse 1 unit PRBC. The patient reports pain is still not well controlled. The case was discussed with Neurosurgery who recommended adding extended release pain meds. Approximately 1 ml of purulent drainage aspirated from back yesterday per IR, growing gram + cocci.     5/13: Patient was given 1U PRBC on 5/12, improved from 6.2 --> 7.0, will give an additional unit PRBC. Radiology attempted to bring patient on 5/12 at 1755 for Thoracic MRI, patient refused due to pain and anxiety. Repeat attempt this AM, patient again refused. Will order Ativan 1mg IV to be given with pain medication prior to scan to relieve anxiety and pain, nurse to notify NP when ready to go for scan. Discussed pain management with patient, discussed transition to PO pain medications to being preparing for d/c to LTAC, adjusted PO pain regimen will reevaluate to determine effectiveness. Repeat Blood cultures 5/11/22: NGTD.     5/14: Final anaerobic cultures pending, continue to adjust pain regimen, d/c IV Dilaudid today. Patient currently managed with PO Morphine extended release and PO oxycodone. D/C plan is for LTAC once final antibiotic regimen determined. Patient continues to refuse MRI of thoracic spine.     5/15: Called to room this AM, patient had coughed up blood and mucous. Approx. 1-2 teaspoons of blood in emesis bag, ordered CXR and repeat CBC, CXR showed improvement from previous day, repeat CBC at time of incident, showed a slight decrease in Hgb: 9.7 --> 8.7, when repeated at 1500, Hgb had improved to 10. no further episodes of coughing up blood throughout day. Patient continues to c/o not having enough pain medication but continues to sleep most of the day and will fall asleep when speaking at times. Added IV Toradol to plan.     5/16: No further episodes of coughing blood overnight, patient participated with encouragement  with PT/OT today, recommendations are HH vs. SNF pending progress. Awaiting final antibiotic regimen recommendations. Anaerobic culture results finalized: negative for growth. Afebrile, WBC trending downward.     5/17: Antibiotic regimen per ID is continuous oxacillin until 7/11/22, SW notified of plan, will seek acceptance at LTAC or SNF. Patient choice is [facility]. WBC in normal range. AMS this AM, CT of head: no acute findings. Given narcan and mental status improved. Decreased narcotic dose.     5/18: Per discussion with patient, she is willing to have the MRI of thoracic spine today, will order IV pain medication and anxiety medication prior to scan. Nursing and Radiology aware of needing to premedicate for scan. Patient is more cooperative and participating with care.     5/19: Patient given premedications for MRI, MRI completed, results pending. Patient more agreeable with treatment plan and cooperating. Will need psychiatry follow-up after discharge. Hgb: 6.7, will give 1U PRBC    5/20/22: +LEON and generalized pain. Thoracic MRI showed no evidence for disc osteomyelitis in the thoracic spine and no epidural abscess, Loculated right hydropneumothorax and extensive bilateral pulmonary infiltrates. Ct chest showed septic emboli and moderate right pleural effusion.+splenic infarct  Pulmonology felt likely empyema- consulted IR for chest tube.     5/21/22: s/p right pig tail placement to suction/ drainage system per IR. 60ml pus removed initially. Fluid analysis consistent with empyema. Pleural fluid cultures pending. Now with scant serosanguinous output.+ right lateral chest pain at chest tube site. Denies any other complaints. Status update provided to pt's mother.     5/22/22: Repeatedly requesting IV Dilaudid. Toradol ordered. Afebrile, WBC normal, O2sats stable on 3lites. s/p right pig tail placement to suction/ drainage system per IR on 5/21/22- draining serous draiange with pus. Plan for LTAC placement  "    5/23/22: More cofortable today. Not getting OOB. Encouraged OOB and rationale provided. Pulmonology recommended Possible tPA and DNase in chest tube tomorrow    5/24/22: Examined OOB in chair. Encouraged pt to continue OOB. Plan for tPA and DNase in chest tube today per Pulmonology  Pt denied LTAC- will pursue SNF    5/25/22: Temp 101.7F last night. Will recheck blood cultures and UA. BP low - IVF bolus given and restarted IVF. ID re-consulted. 160ml of purulent CT drainage output. Dr. San plans to repeat tPA and DNase in chest tube again today.     5/26/22: temp 99.2. c/o of right "lung pain" when eating. Will consult speech to check swallow. +mild leukocytosis. Repeat BC 5/25/22 show NGTD. Repeat UA showed rare trichomas. Chest tube drainage remains purulent     5/27/22  Low grade temp. Downward trend of hemoglobin, related to chest tube drainage. Blood cultures remain negative. Urine culture growing E. Coli. Sensitivities are pending.     5/28/22  Urine culture returned pansenstiive. Will continue ciprofloxacin for total of 3 days then discontinue. Still awaiting SNF placement.    5/29/22 culture of pleural fluid from 5/28/22 negative. Continue CT for now.     5/30/22  CT removed yesterday by pulmonology. SNF declines. Will explore other options for IV abx with help from ID.     5/31/22  Patient has been accepted to [facility]. Will transfer today. Will follow up with pulmonology and ID at discharge. She will continue oxacillin for management of bacteremia, septic embolic, and endocarditis.  Addendum: COVID negative transfer delayed.       Interval History: covid 19 positive. Discharge delayed. asymptom    Review of Systems   Constitutional:  Positive for activity change, appetite change and fatigue. Negative for chills, diaphoresis, fever and unexpected weight change.   HENT:  Negative for congestion, hearing loss, mouth sores, postnasal drip, rhinorrhea, sore throat and trouble swallowing.    Eyes:  " Negative for discharge and visual disturbance.   Respiratory:  Negative for cough and chest tightness.    Cardiovascular:  Negative for chest pain, palpitations and leg swelling.   Gastrointestinal:  Negative for blood in stool, constipation, diarrhea, nausea and vomiting.   Endocrine: Negative for cold intolerance and heat intolerance.   Genitourinary:  Negative for difficulty urinating, dyspareunia, flank pain and hematuria.   Musculoskeletal:  Positive for back pain and myalgias. Negative for arthralgias.   Skin: Negative.    Neurological:  Positive for weakness. Negative for dizziness and light-headedness.   Hematological:  Negative for adenopathy. Does not bruise/bleed easily.   Psychiatric/Behavioral:  Negative for agitation, behavioral problems, confusion and sleep disturbance. The patient is nervous/anxious.      Objective:     Vital Signs (Most Recent):  Temp: 99.1 °F (37.3 °C) (05/31/22 1141)  Pulse: 100 (05/31/22 1312)  Resp: 19 (05/31/22 1141)  BP: 132/70 (05/31/22 1141)  SpO2: 96 % (05/31/22 1141) Vital Signs (24h Range):  Temp:  [96.3 °F (35.7 °C)-99.1 °F (37.3 °C)] 99.1 °F (37.3 °C)  Pulse:  [] 100  Resp:  [14-20] 19  SpO2:  [94 %-99 %] 96 %  BP: (132-167)/(70-93) 132/70     Weight:  (Scale says not calibrated)  Body mass index is 35.62 kg/m².    Intake/Output Summary (Last 24 hours) at 5/31/2022 1653  Last data filed at 5/31/2022 0900  Gross per 24 hour   Intake 3948.39 ml   Output 2200 ml   Net 1748.39 ml      Physical Exam  Vitals and nursing note reviewed.   Constitutional:       General: She is not in acute distress.     Appearance: She is well-developed. She is ill-appearing.   HENT:      Head: Normocephalic and atraumatic.      Nose: Nose normal.   Eyes:      Extraocular Movements: Extraocular movements intact.   Cardiovascular:      Rate and Rhythm: Normal rate and regular rhythm.   Pulmonary:      Effort: Pulmonary effort is normal.      Breath sounds: No stridor. Rhonchi present.       Comments: Right-side pigtail in place  Abdominal:      General: There is no distension.   Musculoskeletal:         General: No signs of injury.      Cervical back: Normal range of motion and neck supple.   Skin:     General: Skin is dry.   Neurological:      General: No focal deficit present.      Mental Status: She is alert and oriented to person, place, and time. Mental status is at baseline.   Psychiatric:         Behavior: Behavior normal.       Significant Labs: All pertinent labs within the past 24 hours have been reviewed.  CBC:   Recent Labs   Lab 05/30/22  0532 05/31/22  0530   WBC 12.79* 13.29*   HGB 8.6* 9.0*   HCT 26.0* 27.5*    407     CMP:   Recent Labs   Lab 05/30/22  0532 05/31/22  0530   * 138   K 3.8 3.9    105   CO2 21* 23    107   BUN 22* 21*   CREATININE 1.7* 1.7*   CALCIUM 7.8* 7.6*   PROT 6.1 5.5*   ALBUMIN 1.5* 1.6*   BILITOT 0.3 0.3   ALKPHOS 77 80   AST 14 10   ALT 7* 6*   ANIONGAP 12 10   EGFRNONAA 37* 37*       Significant Imaging: I have reviewed all pertinent imaging results/findings within the past 24 hours.      Assessment/Plan:      * Acute bacterial endocarditis  ECHO showed a 1.3 x 1.5 cm elongated, mobile echodensity seen on the tricuspid valve consistent with vegetation, CT surgery was consulted.   Due to MSSA    -- Infectious disease is consulted.  -- repeat blood cx NGTD.  -- Continue IV ABX.   --Sensitivities are back and Staph is sensitive to oxacillin, D/C vanc/cefepime.   --Cardiology consulted and plans for a SUSANNAH.   --CT surgery following and recommends continuing medical management with appropriate ABX, no surgical intervention at this time.    cont IV oxacillin.  Plan is to continue x 4 weeks. Afebrile    COVID-19  Asymptomatic.   On room air.   Add Vitamin C and Zinc      E. coli UTI  Add ciprofloxacin for 3 days. Will stop 5/29/22      Empyema of right pleural space  Pulmonology consulted and felt likely empyema   IR consulted for chest tube       s/p right pigtail small bore chest tube placement to suction/drainage system per IR on 5/21/22. 60ml pus removed. Now with Serous drainage with pus. Fluid analysis consistent with empyema. Pleural fluid aerobic culture shows NGTD, Anaerobic culture pending    5/24/22: Dr. San plans for tPA and DNase in chest tube today.   5/25/22: 160ml of purulent CT drainage output. Dr. San plans to repeat tPA and DNase in chest tube again today.   5/26/22  Still has purulent drainage noted. Pulmonology following    Normocytic anemia  Secondary to acute illness    --Daily CBC  --Transfuse with 1 unit PRBC for Hgb <7.0 or <8.0 if symptomatic   --Hgb: 6.7, give 1U PRBC    5/27/22  Hemoglobin 7.3g/dL. downward trend. Has blood tinged seropurulent drainage. Will give additional unit if continues to trend down.  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 IR consulted to evaluate possible paraspinous muscle myositis versus abscess.    --Approximately 1 ml of purulent drainage aspirated 5/11 per IR, growing MSSA  --Continue current regimen  --Oxacillin x 4 weeks total    Lumbar stenosis without neurogenic claudication        Chronic pulmonary heart disease  - Pulmonology following       MSSA bacteremia  Gram positive   Infectious disease consulted  Continue broad spectrum intravenous antibiotic(s) given h/o ivdu  Remains febrile and leukocytosis persists  5/8/22 The patient remained afebrile overnight. Currently on cefepime/vanc/flagyl. Blood cx are growing staph aureus. The patient refused the MRI lumbar spine overnight d/t being unable to lay flat from back pain. Will give pain meds and attempt to get the MRI today to r/o spinal abscess. The ECHO showed a 1.3 x 1.5 cm elongated, mobile echodensity seen on the tricuspid valve consistent with vegetation, CT surgery was  consulted. Will need a SSUANNAH. Infectious disease is consulted. Will repeat blood cx today.   5/9/22 Repeat blood cx are still positive. Sensitivities are back and Staph is sensitive to oxacillin, will D/C vanc/cefepime. Infectious disease is following   5/10/22 Repeat blood cx are +. Continue IV oxacillin and flagyl. Infectious disease is consulted. IR consulted to evaluate possible paraspinous muscle myositis versus abscess. Recommend IR drainage. Continue current management.    5/11/22 The patient reports pain is still not well controlled at times. The patient reports that she is very anxious about the upcoming SUSANNAH and IR drainage. SUSANNAH was pushed back to this afternoon because the patient ate candy this AM. Continue treatment with IV oxacillin and flagyl. Infectious disease is following. WBC trended down to 31K. Continue current management.  5/12/22 The patients WBC improved to 22K overnight. The patient remains on continuous oxacillin infusion. Infectious disease is following.  5/14: WBC continues to improve, 18.10 today     5/15: Blood cultures 5/11/22: NGTD     Continuous Oxacillin, EOC: 7/11/22, pending acceptance at facility, PICC line placed    Parapneumonic effusion  Pulmonology consulted  Status post thoracentesis 5/7/22   continue CT management per pulmonology    5/29/22  Body fluid culture currently negative.     5/30/22  CT removed    IVDU (intravenous drug user)   Will ordet TTE to r/o IE  Will order MRI lumbar wwo- completed   Cont broad spectrum IVAB   --MRI thoracic spine ordered per ID, patient refused on multiple attempts    5/7   Studies pending for additional sites of infection given h/o ivdu  5/8/22  on cessation     Severe sepsis  This patient does have evidence of infective focus  My overall impression is sepsis. Vital signs were reviewed and noted in progress note.  Antibiotics given-   Antibiotics (From admission, onward)            Start     Stop Route Frequency Ordered    05/09/22  1100  oxacillin 12 g in  mL CONTINUOUS INFUSION         -- IV Every 24 hours (non-standard times) 05/09/22 0932    05/07/22 2100  mupirocin 2 % ointment         05/12 2059 Nasl 2 times daily 05/07/22 1646        Cultures were taken-   Microbiology Results (last 7 days)     Procedure Component Value Units Date/Time    Culture, Respiratory with Gram Stain [[id number]] Collected: 05/16/22 0758    Order Status: Sent Specimen: Respiratory from Sputum Updated: 05/16/22 0759    Culture, Anaerobe [[id number]] Collected: 05/11/22 1440    Order Status: Completed Specimen: Abscess from Back Updated: 05/16/22 0733     Anaerobic Culture No anaerobes isolated    Blood culture [[id number]] Collected: 05/11/22 1213    Order Status: Completed Specimen: Blood Updated: 05/16/22 0612     Blood Culture, Routine No Growth to date      No Growth to date      No Growth to date      No Growth to date      No Growth to date    Blood culture [[id number]] Collected: 05/11/22 1213    Order Status: Completed Specimen: Blood Updated: 05/16/22 0612     Blood Culture, Routine No Growth to date      No Growth to date      No Growth to date      No Growth to date      No Growth to date    Aerobic culture [[id number]]  (Abnormal)  (Susceptibility) Collected: 05/11/22 1440    Order Status: Completed Specimen: Abscess from Back Updated: 05/14/22 1057     Aerobic Bacterial Culture STAPHYLOCOCCUS AUREUS  Many      Gram stain [[id number]] Collected: 05/11/22 1440    Order Status: Completed Specimen: Abscess from Back Updated: 05/12/22 0306     Gram Stain Result Few WBC's      Few Gram positive cocci    Gram stain [[id number]]     Order Status: Canceled Specimen: Joint Fluid from Back     Blood culture [[id number]]  (Abnormal) Collected: 05/08/22 1516    Order Status: Completed Specimen: Blood from Peripheral, Right Hand Updated: 05/11/22 1009     Blood Culture, Routine Gram stain aer bottle: Gram positive cocci in clusters resembling Staph       Results  called to and read back by: Chasity Raymundo RN  05/09/2022  11:31      STAPHYLOCOCCUS AUREUS  ID consult required at [facility].  For susceptibility see order #[id number]      Narrative:      Collection has been rescheduled by [email or web address] at 05/08/2022 13:22 Reason:   Pt in mri will be there after another hour [id number]  Collection has been rescheduled by [email or web address] at 05/08/2022 13:22 Reason:   Pt in mri will be there after another hour [id number]    Blood culture [[id number]]  (Abnormal) Collected: 05/08/22 1515    Order Status: Completed Specimen: Blood from Peripheral, Left Arm Updated: 05/11/22 1009     Blood Culture, Routine Gram stain aer bottle: Gram positive cocci in clusters resembling Staph      Results called to and read back by: Chasity Raymundo RN  05/09/2022  15:40      STAPHYLOCOCCUS AUREUS  ID consult required at [facility].  For susceptibility see order #[id number]      Narrative:      Collection has been rescheduled by [email or web address] at 05/08/2022 13:22 Reason:   Pt in mri will be there after another hour [id number]  Collection has been rescheduled by [email or web address] at 05/08/2022 13:22 Reason:   Pt in mri will be there after another hour [id number]        Latest lactate reviewed, they are-  No results for input(s): LACTATE in the last 72 hours.    Organ dysfunction indicated by Acute kidney injury  Source-  lung    Source control Achieved by-   Cont Broad spectrum IVAB     Bacteremia  Infectious disease consulted      PNA (pneumonia)  --Cont oxacillin        Tobacco abuse  - Patient thoroughly counseled on smoking cessation, pt verbalized understanding. Time of counseling 10 minutes.        COPD (chronic obstructive pulmonary disease)  Cont breathing tx prn  Pulmonology following       VTE Risk Mitigation (From admission, onward)         Ordered     enoxaparin injection 100 mg  2 times daily         05/31/22 1704     IP VTE HIGH RISK PATIENT  Once         05/06/22 2336     Place  sequential compression device  Until discontinued         05/06/22 2336                Discharge Planning   YESSICA: 5/31/2022     Code Status: Full Code   Is the patient medically ready for discharge?:     Reason for patient still in hospital (select all that apply): Treatment  Discharge Plan A: Skilled Nursing Facility   Discharge Delays: None known at this time        Chandni Lambert NP  Department of Hospital Medicine   [facility] Med S[facility]

## 2022-05-31 NOTE — SUBJECTIVE & OBJECTIVE
Interval History: covid 19 positive. Discharge delayed. asymptom    Review of Systems   Constitutional:  Positive for activity change, appetite change and fatigue. Negative for chills, diaphoresis, fever and unexpected weight change.   HENT:  Negative for congestion, hearing loss, mouth sores, postnasal drip, rhinorrhea, sore throat and trouble swallowing.    Eyes:  Negative for discharge and visual disturbance.   Respiratory:  Negative for cough and chest tightness.    Cardiovascular:  Negative for chest pain, palpitations and leg swelling.   Gastrointestinal:  Negative for blood in stool, constipation, diarrhea, nausea and vomiting.   Endocrine: Negative for cold intolerance and heat intolerance.   Genitourinary:  Negative for difficulty urinating, dyspareunia, flank pain and hematuria.   Musculoskeletal:  Positive for back pain and myalgias. Negative for arthralgias.   Skin: Negative.    Neurological:  Positive for weakness. Negative for dizziness and light-headedness.   Hematological:  Negative for adenopathy. Does not bruise/bleed easily.   Psychiatric/Behavioral:  Negative for agitation, behavioral problems, confusion and sleep disturbance. The patient is nervous/anxious.      Objective:     Vital Signs (Most Recent):  Temp: 99.1 °F (37.3 °C) (05/31/22 1141)  Pulse: 100 (05/31/22 1312)  Resp: 19 (05/31/22 1141)  BP: 132/70 (05/31/22 1141)  SpO2: 96 % (05/31/22 1141) Vital Signs (24h Range):  Temp:  [96.3 °F (35.7 °C)-99.1 °F (37.3 °C)] 99.1 °F (37.3 °C)  Pulse:  [] 100  Resp:  [14-20] 19  SpO2:  [94 %-99 %] 96 %  BP: (132-167)/(70-93) 132/70     Weight:  (Scale says not calibrated)  Body mass index is 35.62 kg/m².    Intake/Output Summary (Last 24 hours) at 5/31/2022 1653  Last data filed at 5/31/2022 0900  Gross per 24 hour   Intake 3948.39 ml   Output 2200 ml   Net 1748.39 ml      Physical Exam  Vitals and nursing note reviewed.   Constitutional:       General: She is not in acute distress.      Appearance: She is well-developed. She is ill-appearing.   HENT:      Head: Normocephalic and atraumatic.      Nose: Nose normal.   Eyes:      Extraocular Movements: Extraocular movements intact.   Cardiovascular:      Rate and Rhythm: Normal rate and regular rhythm.   Pulmonary:      Effort: Pulmonary effort is normal.      Breath sounds: No stridor. Rhonchi present.      Comments: Right-side pigtail in place  Abdominal:      General: There is no distension.   Musculoskeletal:         General: No signs of injury.      Cervical back: Normal range of motion and neck supple.   Skin:     General: Skin is dry.   Neurological:      General: No focal deficit present.      Mental Status: She is alert and oriented to person, place, and time. Mental status is at baseline.   Psychiatric:         Behavior: Behavior normal.       Significant Labs: All pertinent labs within the past 24 hours have been reviewed.  CBC:   Recent Labs   Lab 05/30/22  0532 05/31/22  0530   WBC 12.79* 13.29*   HGB 8.6* 9.0*   HCT 26.0* 27.5*    407     CMP:   Recent Labs   Lab 05/30/22  0532 05/31/22  0530   * 138   K 3.8 3.9    105   CO2 21* 23    107   BUN 22* 21*   CREATININE 1.7* 1.7*   CALCIUM 7.8* 7.6*   PROT 6.1 5.5*   ALBUMIN 1.5* 1.6*   BILITOT 0.3 0.3   ALKPHOS 77 80   AST 14 10   ALT 7* 6*   ANIONGAP 12 10   EGFRNONAA 37* 37*       Significant Imaging: I have reviewed all pertinent imaging results/findings within the past 24 hours.

## 2022-05-31 NOTE — PROGRESS NOTES
[facility] - Tele[facility] ([facility])  Pulmonology  Progress Note    Patient Name: Tianna Leon  MRN: [id number]  Admission Date: 5/6/2022  Hospital Length of Stay: 25 days  Code Status: Full Code  Attending Provider: Aj Olguin MD  Primary Care Provider: Spenser Campa MD   Principal Problem: Acute bacterial endocarditis    Subjective: Afebrile     Interval History: depressed, afebrile    Objective:     Vital Signs (Most Recent):  Temp: 99.1 °F (37.3 °C) (05/31/22 1141)  Pulse: 100 (05/31/22 1312)  Resp: 19 (05/31/22 1141)  BP: 132/70 (05/31/22 1141)  SpO2: 96 % (05/31/22 1141) Vital Signs (24h Range):  Temp:  [96.3 °F (35.7 °C)-99.1 °F (37.3 °C)] 99.1 °F (37.3 °C)  Pulse:  [] 100  Resp:  [14-20] 19  SpO2:  [94 %-99 %] 96 %  BP: (132-167)/(70-93) 132/70     Weight:  (Scale says not calibrated)  Body mass index is 35.62 kg/m².      Intake/Output Summary (Last 24 hours) at 5/31/2022 1840  Last data filed at 5/31/2022 0900  Gross per 24 hour   Intake 3588.39 ml   Output 1700 ml   Net 1888.39 ml       Physical Exam  Constitutional:       Appearance: She is obese.   HENT:      Head: Normocephalic.      Nose: Nose normal.   Eyes:      Pupils: Pupils are equal, round, and reactive to light.   Cardiovascular:      Rate and Rhythm: Tachycardia present.   Pulmonary:      Breath sounds: Rales present.   Abdominal:      General: Abdomen is flat.   Musculoskeletal:      Cervical back: Normal range of motion.      Right lower leg: Edema present.      Left lower leg: Edema present.   Skin:     General: Skin is warm.   Neurological:      General: No focal deficit present.      Mental Status: She is alert and oriented to person, place, and time.       Vents:  Oxygen Concentration (%): 28 (05/31/22 1056)    Lines/Drains/Airways       Peripherally Inserted Central Catheter Line  Duration             PICC Double Lumen 05/17/22 1612 right basilic 14 days              Drain  Duration             Female External Urinary Catheter  05/21/22 0700 10 days                    Significant Labs:    CBC/Anemia Profile:  Recent Labs   Lab 05/30/22  0532 05/31/22  0530   WBC 12.79* 13.29*   HGB 8.6* 9.0*   HCT 26.0* 27.5*    407   MCV 83 83   RDW 15.3* 15.2*        Chemistries:  Recent Labs   Lab 05/30/22  0532 05/31/22  0530   * 138   K 3.8 3.9    105   CO2 21* 23   BUN 22* 21*   CREATININE 1.7* 1.7*   CALCIUM 7.8* 7.6*   ALBUMIN 1.5* 1.6*   PROT 6.1 5.5*   BILITOT 0.3 0.3   ALKPHOS 77 80   ALT 7* 6*   AST 14 10       ABGs: No results for input(s): PH, PCO2, HCO3, POCSATURATED, BE in the last 48 hours.  Blood Culture: No results for input(s): LABBLOO in the last 48 hours.  BMP:   Recent Labs   Lab 05/31/22  0530         K 3.9      CO2 23   BUN 21*   CREATININE 1.7*   CALCIUM 7.6*     CMP:   Recent Labs   Lab 05/30/22  0532 05/31/22  0530   * 138   K 3.8 3.9    105   CO2 21* 23    107   BUN 22* 21*   CREATININE 1.7* 1.7*   CALCIUM 7.8* 7.6*   PROT 6.1 5.5*   ALBUMIN 1.5* 1.6*   BILITOT 0.3 0.3   ALKPHOS 77 80   AST 14 10   ALT 7* 6*   ANIONGAP 12 10   EGFRNONAA 37* 37*       Significant Imaging:  I have reviewed all pertinent imaging results/findings within the past 24 hours.      ABG  No results for input(s): PH, PO2, PCO2, HCO3, BE in the last 168 hours.  Assessment/Plan:     Empyema of right pleural space  5/20 Suspect empyema, needs chest tube placement  5/21 s/p chest tube placement, adequate expansion - will check Chest X Ray in aa  5/23 tPA DNase.  5/24 tPA for DNase installation via chest tube.  Monitor chest tube output.  Repeat chest x-ray in a.m..  5/25 chest tube output increased from 50 mL on average per 24 hours to 160 mL last 24 hours.  Purulent drainage noted.  Will repeat tPA plus Dnase instillation.  5/26 chest x-ray reviewed.  Continue IV oxacillin.  Right-sided pigtail in place.  Status post tPA and DNase x2.  Bloody drainage around pigtail today status post tPA and DNase  yesterday.  Will hold on fibrinolysis.  Monitor chest tube output  5/27 right pigtail in place.  225 mL  Last 24 hours average of 200 mL per day.  Change pleurovac canister to better assess the color of the fluid pus versus serosanguineous.  Keep pigtail in place for now.  Ultrasound bilateral chest assess pleural effusions.  Continue oxacillin drip.  5/28 keep chest tube on the right side for now.  Repeat CRP.  Checking chemistry microbiology  on left-sided pleural fluid to rule out complicated effusion and need for pigtail.  Continue oxacillin drip.   5/29 minimal output right chest tube.  Remove chest tube.  Left-sided effusion not complicated not empyema.  Continue IV oxacillin  5/31 Resolved    Pulmonary embolism, septic  5/24 continue IV oxacillin.  5/25 on IV  Oxacillin  since 05/09.  Repeat CRP.  5/27 right pigtail in place.  225 mL  Last 24 hours average of 200 mL per day.  Change pleurovac canister to better assess the color of the fluid pus versus serosanguineous.  Keep pigtail in place for now.  Ultrasound bilateral chest assess pleural effusions.  Continue oxacillin drip.  5/285/28 keep chest tube on the right side for now.  Repeat CRP.  Checking chemistry microbiology  on left-sided pleural fluid to rule out complicated effusion and need for pigtail.  Continue oxacillin drip.   5/31 Continue drip and anticoagulation     Chronic pulmonary heart disease  Pulmonary hypertension  PA estimated 56  Multifactorial  : COPD, IVDU  Need outpatient w/u  5/31 Continue oxygen     Parapneumonic effusion  Complicated by PNA, possible empyema  SP Thora  Cont Abx  5/20/2022 Probable empyema,needs chest tube drainage  5/22 chest tube draining  5/23 60 cc drained yesterday.  Possible tPA and DNase tomorrow.  Continue IV oxacillin.  5/24 monitor chest tube output post tPA and DNase instillation today PA chest tube  5/26 chest x-ray reviewed.  Continue IV oxacillin.  Right-sided pigtail in place.  Status post tPA and DNase  x2.  Bloody drainage around pigtail today status post tPA and DNase yesterday.  Will hold on fibrinolysis.  Monitor chest tube output  5/27 right pigtail in place.  225 mL  Last 24 hours average of 200 mL per day.  Change pleurovac canister to better assess the color of the fluid pus versus serosanguineous.  Keep pigtail in place for now.  Ultrasound bilateral chest assess pleural effusions.  Continue oxacillin drip.  5/28 keep chest tube on the right side for now.  Repeat CRP.  Checking chemistry microbiology  on left-sided pleural fluid to rule out complicated effusion and need for pigtail.  Continue oxacillin drip.   5/29 minimal output right chest tube.  Remove chest tube.  Left-sided effusion not complicated not empyema.  Continue IV oxacillin   5/30 oxacillin total of 8 weeks.  Chest x-ray stable.  Right-sided pigtail removed.  No evidence of empyema on the left side pleural fluid.  5/31 Continue above, will sign off           Samuel Saleh MD  Pulmonology  [facility] Tele[facility] ([facility])

## 2022-05-31 NOTE — ASSESSMENT & PLAN NOTE
Complicated by PNA, possible empyema  SP Thora  Cont Abx  5/20/2022 Probable empyema,needs chest tube drainage  5/22 chest tube draining  5/23 60 cc drained yesterday.  Possible tPA and DNase tomorrow.  Continue IV oxacillin.  5/24 monitor chest tube output post tPA and DNase instillation today PA chest tube  5/26 chest x-ray reviewed.  Continue IV oxacillin.  Right-sided pigtail in place.  Status post tPA and DNase x2.  Bloody drainage around pigtail today status post tPA and DNase yesterday.  Will hold on fibrinolysis.  Monitor chest tube output  5/27 right pigtail in place.  225 mL  Last 24 hours average of 200 mL per day.  Change pleurovac canister to better assess the color of the fluid pus versus serosanguineous.  Keep pigtail in place for now.  Ultrasound bilateral chest assess pleural effusions.  Continue oxacillin drip.  5/28 keep chest tube on the right side for now.  Repeat CRP.  Checking chemistry microbiology  on left-sided pleural fluid to rule out complicated effusion and need for pigtail.  Continue oxacillin drip.   5/29 minimal output right chest tube.  Remove chest tube.  Left-sided effusion not complicated not empyema.  Continue IV oxacillin   5/30 oxacillin total of 8 weeks.  Chest x-ray stable.  Right-sided pigtail removed.  No evidence of empyema on the left side pleural fluid.  5/31 Continue above, will sign off

## 2022-05-31 NOTE — ASSESSMENT & PLAN NOTE
5/20 Suspect empyema, needs chest tube placement  5/21 s/p chest tube placement, adequate expansion - will check Chest X Ray in aa  5/23 tPA DNase.  5/24 tPA for DNase installation via chest tube.  Monitor chest tube output.  Repeat chest x-ray in a.m..  5/25 chest tube output increased from 50 mL on average per 24 hours to 160 mL last 24 hours.  Purulent drainage noted.  Will repeat tPA plus Dnase instillation.  5/26 chest x-ray reviewed.  Continue IV oxacillin.  Right-sided pigtail in place.  Status post tPA and DNase x2.  Bloody drainage around pigtail today status post tPA and DNase yesterday.  Will hold on fibrinolysis.  Monitor chest tube output  5/27 right pigtail in place.  225 mL  Last 24 hours average of 200 mL per day.  Change pleurovac canister to better assess the color of the fluid pus versus serosanguineous.  Keep pigtail in place for now.  Ultrasound bilateral chest assess pleural effusions.  Continue oxacillin drip.  5/28 keep chest tube on the right side for now.  Repeat CRP.  Checking chemistry microbiology  on left-sided pleural fluid to rule out complicated effusion and need for pigtail.  Continue oxacillin drip.   5/29 minimal output right chest tube.  Remove chest tube.  Left-sided effusion not complicated not empyema.  Continue IV oxacillin  5/31 Resolved

## 2022-05-31 NOTE — PLAN OF CARE
[facility] - Med Surg  Discharge Reassessment    Primary Care Provider: Spenser Campa MD    Expected Discharge Date:     Reassessment (most recent)     Discharge Reassessment - 05/31/22 1210        Discharge Reassessment    Assessment Type Discharge Planning Reassessment     Did the patient's condition or plan change since previous assessment? No     Discharge Plan discussed with: Patient     Communicated YESSICA with patient/caregiver Date not available/Unable to determine     Discharge Plan A Skilled Nursing Facility     Discharge Plan B Skilled Nursing Facility     DME Needed Upon Discharge  none     Discharge Barriers Identified None     Why the patient remains in the hospital Requires continued medical care        Post-Acute Status    Post-Acute Authorization Placement     Post-Acute Placement Status Set-up Complete/Auth obtained     Discharge Delays None known at this time               Patients DC disposition is SNF. Auth has been received by [facility], patient to dc once medically stable.

## 2022-06-01 LAB
ALBUMIN SERPL BCP-MCNC: 1.5 G/DL (ref 3.5–5.2)
ALP SERPL-CCNC: 69 U/L (ref 55–135)
ALT SERPL W/O P-5'-P-CCNC: 7 U/L (ref 10–44)
ANION GAP SERPL CALC-SCNC: 9 MMOL/L (ref 8–16)
AST SERPL-CCNC: 14 U/L (ref 10–40)
BACTERIA FLD AEROBE CULT: NO GROWTH
BASOPHILS # BLD AUTO: 0.03 K/UL (ref 0–0.2)
BASOPHILS NFR BLD: 0.4 % (ref 0–1.9)
BILIRUB SERPL-MCNC: 0.3 MG/DL (ref 0.1–1)
BUN SERPL-MCNC: 20 MG/DL (ref 6–20)
CALCIUM SERPL-MCNC: 7.7 MG/DL (ref 8.7–10.5)
CHLORIDE SERPL-SCNC: 104 MMOL/L (ref 95–110)
CO2 SERPL-SCNC: 23 MMOL/L (ref 23–29)
CREAT SERPL-MCNC: 1.9 MG/DL (ref 0.5–1.4)
DIFFERENTIAL METHOD: ABNORMAL
EOSINOPHIL # BLD AUTO: 0.1 K/UL (ref 0–0.5)
EOSINOPHIL NFR BLD: 2 % (ref 0–8)
ERYTHROCYTE [DISTWIDTH] IN BLOOD BY AUTOMATED COUNT: 15.9 % (ref 11.5–14.5)
EST. GFR  (AFRICAN AMERICAN): 38 ML/MIN/1.73 M^2
EST. GFR  (NON AFRICAN AMERICAN): 33 ML/MIN/1.73 M^2
GLUCOSE SERPL-MCNC: 87 MG/DL (ref 70–110)
GRAM STN SPEC: NORMAL
HCT VFR BLD AUTO: 23.4 % (ref 37–48.5)
HGB BLD-MCNC: 7.4 G/DL (ref 12–16)
IMM GRANULOCYTES # BLD AUTO: 0.06 K/UL (ref 0–0.04)
IMM GRANULOCYTES NFR BLD AUTO: 0.9 % (ref 0–0.5)
LYMPHOCYTES # BLD AUTO: 2.2 K/UL (ref 1–4.8)
LYMPHOCYTES NFR BLD: 31.1 % (ref 18–48)
MCH RBC QN AUTO: 27.1 PG (ref 27–31)
MCHC RBC AUTO-ENTMCNC: 31.6 G/DL (ref 32–36)
MCV RBC AUTO: 86 FL (ref 82–98)
MONOCYTES # BLD AUTO: 0.8 K/UL (ref 0.3–1)
MONOCYTES NFR BLD: 11.9 % (ref 4–15)
NEUTROPHILS # BLD AUTO: 3.7 K/UL (ref 1.8–7.7)
NEUTROPHILS NFR BLD: 53.7 % (ref 38–73)
NRBC BLD-RTO: 0 /100 WBC
PLATELET # BLD AUTO: 345 K/UL (ref 150–450)
PMV BLD AUTO: 9.3 FL (ref 9.2–12.9)
POTASSIUM SERPL-SCNC: 3.9 MMOL/L (ref 3.5–5.1)
PROT SERPL-MCNC: 5.6 G/DL (ref 6–8.4)
RBC # BLD AUTO: 2.73 M/UL (ref 4–5.4)
SODIUM SERPL-SCNC: 136 MMOL/L (ref 136–145)
WBC # BLD AUTO: 6.97 K/UL (ref 3.9–12.7)

## 2022-06-01 PROCEDURE — 99233 SBSQ HOSP IP/OBS HIGH 50: CPT | Mod: ,,, | Performed by: INTERNAL MEDICINE

## 2022-06-01 PROCEDURE — 27000207 HC ISOLATION

## 2022-06-01 PROCEDURE — 27000221 HC OXYGEN, UP TO 24 HOURS

## 2022-06-01 PROCEDURE — 80053 COMPREHEN METABOLIC PANEL: CPT | Performed by: NURSE PRACTITIONER

## 2022-06-01 PROCEDURE — 94664 DEMO&/EVAL PT USE INHALER: CPT

## 2022-06-01 PROCEDURE — 25000003 PHARM REV CODE 250: Performed by: NURSE PRACTITIONER

## 2022-06-01 PROCEDURE — 21400001 HC TELEMETRY ROOM

## 2022-06-01 PROCEDURE — 63600175 PHARM REV CODE 636 W HCPCS: Performed by: INTERNAL MEDICINE

## 2022-06-01 PROCEDURE — 27000646 HC AEROBIKA DEVICE

## 2022-06-01 PROCEDURE — 99233 PR SUBSEQUENT HOSPITAL CARE,LEVL III: ICD-10-PCS | Mod: ,,, | Performed by: INTERNAL MEDICINE

## 2022-06-01 PROCEDURE — 87040 BLOOD CULTURE FOR BACTERIA: CPT | Performed by: INTERNAL MEDICINE

## 2022-06-01 PROCEDURE — 63600175 PHARM REV CODE 636 W HCPCS: Performed by: NURSE PRACTITIONER

## 2022-06-01 PROCEDURE — 99900035 HC TECH TIME PER 15 MIN (STAT)

## 2022-06-01 PROCEDURE — 85025 COMPLETE CBC W/AUTO DIFF WBC: CPT | Performed by: INTERNAL MEDICINE

## 2022-06-01 PROCEDURE — 94761 N-INVAS EAR/PLS OXIMETRY MLT: CPT

## 2022-06-01 PROCEDURE — 94640 AIRWAY INHALATION TREATMENT: CPT

## 2022-06-01 PROCEDURE — 25000003 PHARM REV CODE 250: Performed by: INTERNAL MEDICINE

## 2022-06-01 RX ORDER — FAMOTIDINE 20 MG/1
20 TABLET, FILM COATED ORAL DAILY
Status: DISCONTINUED | OUTPATIENT
Start: 2022-06-02 | End: 2022-06-04

## 2022-06-01 RX ORDER — LORAZEPAM 2 MG/ML
0.5 INJECTION INTRAMUSCULAR ONCE
Status: COMPLETED | OUTPATIENT
Start: 2022-06-01 | End: 2022-06-01

## 2022-06-01 RX ORDER — MORPHINE SULFATE 4 MG/ML
3 INJECTION, SOLUTION INTRAMUSCULAR; INTRAVENOUS ONCE
Status: COMPLETED | OUTPATIENT
Start: 2022-06-01 | End: 2022-06-01

## 2022-06-01 RX ADMIN — REMDESIVIR 200 MG: 100 INJECTION, POWDER, LYOPHILIZED, FOR SOLUTION INTRAVENOUS at 11:06

## 2022-06-01 RX ADMIN — THERA TABS 1 TABLET: TAB at 08:06

## 2022-06-01 RX ADMIN — ONDANSETRON 4 MG: 2 INJECTION INTRAMUSCULAR; INTRAVENOUS at 09:06

## 2022-06-01 RX ADMIN — OXYCODONE HYDROCHLORIDE 5 MG: 5 TABLET ORAL at 02:06

## 2022-06-01 RX ADMIN — FAMOTIDINE 20 MG: 20 TABLET ORAL at 08:06

## 2022-06-01 RX ADMIN — OXYCODONE HYDROCHLORIDE AND ACETAMINOPHEN 500 MG: 500 TABLET ORAL at 08:06

## 2022-06-01 RX ADMIN — MORPHINE SULFATE 3 MG: 4 INJECTION INTRAVENOUS at 04:06

## 2022-06-01 RX ADMIN — SODIUM CHLORIDE: 0.9 INJECTION, SOLUTION INTRAVENOUS at 11:06

## 2022-06-01 RX ADMIN — ALBUTEROL SULFATE 2 PUFF: 90 AEROSOL, METERED RESPIRATORY (INHALATION) at 08:06

## 2022-06-01 RX ADMIN — MORPHINE SULFATE 15 MG: 15 TABLET, EXTENDED RELEASE ORAL at 09:06

## 2022-06-01 RX ADMIN — ENOXAPARIN SODIUM 100 MG: 100 INJECTION SUBCUTANEOUS at 08:06

## 2022-06-01 RX ADMIN — ALBUTEROL SULFATE 2 PUFF: 90 AEROSOL, METERED RESPIRATORY (INHALATION) at 07:06

## 2022-06-01 RX ADMIN — OXACILLIN SODIUM 12 G: 10 INJECTION, POWDER, FOR SOLUTION INTRAVENOUS at 04:06

## 2022-06-01 RX ADMIN — ALBUTEROL SULFATE 2 PUFF: 90 AEROSOL, METERED RESPIRATORY (INHALATION) at 01:06

## 2022-06-01 RX ADMIN — MORPHINE SULFATE 15 MG: 15 TABLET, EXTENDED RELEASE ORAL at 08:06

## 2022-06-01 RX ADMIN — LORAZEPAM 0.5 MG: 2 INJECTION INTRAMUSCULAR; INTRAVENOUS at 12:06

## 2022-06-01 RX ADMIN — SODIUM CHLORIDE: 0.9 INJECTION, SOLUTION INTRAVENOUS at 05:06

## 2022-06-01 RX ADMIN — TRAZODONE HYDROCHLORIDE 100 MG: 100 TABLET ORAL at 08:06

## 2022-06-01 NOTE — PROGRESS NOTES
[facility] - Telemetry ([facility])  Pulmonology  Progress Note    Patient Name: Tianna Leon  MRN: [id number]  Admission Date: 5/6/2022  Hospital Length of Stay: 26 days  Code Status: Full Code  Attending Provider: John Huynh MD  Primary Care Provider: Spenser Campa MD   Principal Problem: Acute bacterial endocarditis    Subjective:     Interval History:   6/1 seen and examined.  Afebrile T-max 101°.  O2 sat 93% on 2 liter/minute.  Weak SOB decreased activity.  CT chest reviewed.  Review of Systems   Constitutional:  Positive for malaise/fatigue and weight loss. Negative for chills and fever.   HENT:  Negative for hearing loss and nosebleeds.    Eyes:  Negative for discharge.   Respiratory:  Positive for cough, sputum production and shortness of breath.    Cardiovascular:  Positive for leg swelling. Negative for chest pain.   Gastrointestinal:  Negative for abdominal pain.   Genitourinary:  Negative for hematuria.   Musculoskeletal:  Negative for falls.   Skin:  Negative for itching.   Neurological:  Positive for weakness. Negative for speech change, seizures and loss of consciousness.   Endo/Heme/Allergies:  Negative for polydipsia. Does not bruise/bleed easily.   Psychiatric/Behavioral:  Negative for hallucinations.        Objective:     Vital Signs (Most Recent):  Temp: (!) 100.7 °F (38.2 °C) (06/01/22 1558)  Pulse: 94 (06/01/22 1558)  Resp: 18 (06/01/22 1623)  BP: (!) 140/82 (06/01/22 1558)  SpO2: (!) 93 % (06/01/22 1558)   Vital Signs (24h Range):  Temp:  [98.4 °F (36.9 °C)-101.3 °F (38.5 °C)] 100.7 °F (38.2 °C)  Pulse:  [76-94] 94  Resp:  [16-20] 18  SpO2:  [93 %-100 %] 93 %  BP: (106-140)/(60-82) 140/82     Weight: 98.3 kg (216 lb 11.4 oz)  Body mass index is 36.06 kg/m².      Intake/Output Summary (Last 24 hours) at 6/1/2022 1656  Last data filed at 6/1/2022 1400  Gross per 24 hour   Intake 2488.61 ml   Output 450 ml   Net 2038.61 ml         Physical Exam  Vitals and nursing note reviewed.    Constitutional:       General: She is not in acute distress.     Appearance: She is well-developed. She is ill-appearing.   HENT:      Head: Normocephalic and atraumatic.      Nose: Nose normal.   Eyes:      Extraocular Movements: Extraocular movements intact.   Cardiovascular:      Rate and Rhythm: Normal rate and regular rhythm.   Pulmonary:      Effort: Pulmonary effort is normal.      Breath sounds: No stridor. Rhonchi present.      Comments: Right-side  Pigtail removed  Abdominal:      General: There is no distension.   Musculoskeletal:         General: No signs of injury.      Cervical back: Normal range of motion and neck supple.   Skin:     General: Skin is dry.   Neurological:      General: No focal deficit present.      Mental Status: She is alert and oriented to person, place, and time. Mental status is at baseline.   Psychiatric:         Behavior: Behavior normal.       Vents:  Oxygen Concentration (%): 28 (06/01/22 0150)    Lines/Drains/Airways       Peripherally Inserted Central Catheter Line  Duration             PICC Double Lumen 05/17/22 1612 right basilic 15 days              Drain  Duration             Female External Urinary Catheter 05/21/22 0700 11 days                    Significant Labs:    CBC/Anemia Profile:  Recent Labs   Lab 05/31/22  0530 05/31/22  1729 06/01/22  0512   WBC 13.29*  --  6.97   HGB 9.0*  --  7.4*   HCT 27.5*  --  23.4*     --  345   MCV 83  --  86   RDW 15.2*  --  15.9*   FERRITIN  --  320*  --           Chemistries:  Recent Labs   Lab 05/31/22  0530 06/01/22  0512    136   K 3.9 3.9    104   CO2 23 23   BUN 21* 20   CREATININE 1.7* 1.9*   CALCIUM 7.6* 7.7*   ALBUMIN 1.6* 1.5*   PROT 5.5* 5.6*   BILITOT 0.3 0.3   ALKPHOS 80 69   ALT 6* 7*   AST 10 14      Latest Reference Range & Units 05/28/22 11:15   Fluid Color  Yellow   Fluid Appearance  Clear   WBC, Body Fluid /cu mm 3065   Body Fluid Type  Pleural Fluid, Left   Segs, Fluid % 41   Lymphs, Fluid %  14   Monocytes/Macrophages, Fluid % 22   Eos, Fluid % 23   Glucose, Fluid Not established mg/dL 102   LD, Fluid Not established U/L 241   Body Fluid Source, Glucose  Pleural Fluid, Left   Body Fluid Source, LDH  Pleural Fluid, Left   Body Fluid Source, Total Protein  Pleural Fluid, Left   Body Fluid, Protein Not established g/dL 2.6      Latest Reference Range & Units 05/07/22 09:05 05/21/22 10:22   Fluid Appearance  Hazy Cloudy   WBC, Body Fluid /cu mm 83263 [1] 589320 (C) [2]   Body Fluid Type  Pleural Fluid, Right Pleural Fluid, Right   Segs, Fluid % 93 88   Lymphs, Fluid % 3 7   Monocytes/Macrophages, Fluid % 4 5   Amylase, Fluid Not established U/L 34 [3]    Glucose, Fluid Not established mg/dL 29 [4]    LD, Fluid Not established U/L 1077 [5]    Body Fluid, Albumin See text g/dL 1.3 [6]    Body Fluid Source Amylase  Pleural Fluid, Right    Body Fluid Source, Albumin  Pleural Fluid, Right    Body Fluid Source, Glucose  Pleural Fluid, Right    Body Fluid Source, LDH  Pleural Fluid, Right    Body Fluid Source, Total Protein  Pleural Fluid, Right    Body Fluid, Protein Not established g/dL 3.7 [7]    Cholesterol, Body Fluid See Comment mg/dL 79 [8]          Significant Imaging:       Chest x-ray 05/30/2022    TECHNIQUE:  Single frontal view of the chest was performed.     COMPARISON:  05/28/2022     FINDINGS:  Right-sided PICC line tip overlies the SVC in good position.  Heart size is normal.  Right pleural catheter not seen on today's exam.  Again there patchy infiltrates within the right mid lower lung zone.  Cannot exclude small right pleural effusion.  No pneumothorax.    In comparison to the prior study, there is no adverse interval changes.             CT chest without contrast 06/01/2022    Narrative & Impression  EXAMINATION:  CT CHEST WITHOUT CONTRAST     CLINICAL HISTORY:  Pneumonia, unresolved;     TECHNIQUE:  Low-dose axial images acquired from the chest without the use of intravenous contrast.   Sagittal and coronal reformats, as well as axial MIPS were generated for further review.  All CT scans at this location are performed using dose modulation techniques as appropriate to a performed exam including the following: Automated exposure control; adjustment of the mA and/or kV according to patient size (this includes techniques or standardized protocols for targeted exams where dose is matched to indication/reason for exam; i. e. extremities or head); use of iterative reconstruction technique.     COMPARISON:  Chest radiograph 05/30/2022.  CT chest 05/20/2022.     FINDINGS:  Base of Neck: No significant abnormality. Right-sided central venous catheter with tip terminating at the distal SVC.     Aorta: Nonaneurysmal without significant atherosclerotic calcification.     Heart/pericardium: Upper limits normal size heart without significant pericardial fluid.  No significant coronary calcification.     Esha/Mediastinum: Few upper limits normal size mediastinal lymph nodes.     Upper Abdomen: No acute abnormality of the partially imaged upper abdomen.     Thoracic soft tissues: Anasarca.     Bones: No acute fracture. No suspicious lytic or sclerotic lesion.     Airways: Patent.     Lungs/pleura: Continued evolution of the septic emboli with multiple cavitary nodules scattered throughout the lungs.  There are well-defined air-fluid collections in posterior aspect of the right upper and lower lobes measuring 4.5 x 9.5 cm and 5.5 x 10.0 cm, respectively.  Alternatively, this could represent loculated empyema.  These 2 collections appear to communicate.  Overall appearance of the chest is similar when compared to the recent prior CT examination.  Findings concerning for necrotizing pneumonia, as previously noted.  There is a medium sized left-sided pleural effusion with associated volume loss.     Impression:     Evolving findings concerning for septic emboli and necrotizing pneumonia in the posterior aspect of the  right lung versus empyema, as described above.                         CT chest without contrast 05/20/2022    Narrative & Impression  EXAMINATION:  CT CHEST WITHOUT CONTRAST     CLINICAL HISTORY:  Empyema;     TECHNIQUE:  Low dose axial images, sagittal and coronal reformations were obtained from the thoracic inlet to the lung bases. Contrast was not administered.  All CT scans at this facility are performed using dose optimization techniques including the following: automated exposure control; adjustment of the mA and/or kV; use of iterative reconstruction technique.     COMPARISON:  CTA chest non coronary 05/06/2022, chest radiograph 05/20/2022, MRI thoracic spine 05/19/2022     FINDINGS:  Base of Neck: There is diffuse body wall edema.  Unremarkable, noting the presence of AA PICC line which terminates at the superior cavoatrial junction.     Thoracic soft tissues: There is diffuse body wall edema.     Aorta: Left-sided aortic arch.  No aneurysm and no significant atherosclerosis     Heart: Normal size. No effusion.  No significant coronary atherosclerosis.     Pulmonary vasculature: Pulmonary arteries distribute normally.     Esha/Mediastinum: There are few prominent mediastinal lymph nodes with a prevascular node measuring 0.8 cm in short axis.     Airways: Patent.     Lungs/Pleura: There are multiple bilateral consolidative densities scattered throughout the lungs, many of which demonstrate cavitation, again suggesting septic emboli.  There is small left pleural fluid, which appears simple and layers dependently.  There is a loculated moderate right pleural effusion, which appears similar in size to the comparison exam however now containing air, which may be due to empyema.  There is adjacent atelectatic change within the bilateral lower lobes.  Overall number and size of consolidative densities has increased since the comparison exam.     Esophagus: Normal.     Upper Abdomen: There is persistent  wedge-shaped hypodensity within the spleen, suggesting infarct.     Bones: No acute fracture. No suspicious lytic or sclerotic lesions.     Impression:     Interval progression with increased number and size of consolidative cavitary densities throughout the lungs, most suggestive of septic emboli with larger opacities concerning for necrotizing pneumonia.     Stable size of loculated right moderate pleural effusion, now containing air compatible with hydropneumothorax as reported on prior MRI.  Interval development of air is concerning for empyema noting limited evaluation without intravenous contrast.  New small simple appearing left pleural effusion.     Wedge-shaped splenic hypodensity, suggesting infarction.     Anasarca.           ABG  No results for input(s): PH, PO2, PCO2, HCO3, BE in the last 168 hours.  Assessment/Plan:     COVID-19  6/1 - Isolation:   - Airborne, Contact and Droplet Precautions  - Cohort patients into COVID units  - N95 masks must be fit tested, wear eye protection  - 20 second hand hygiene              - Limit visitors per hospital policy              - Consolidating lab draws, nursing care, provider visits, and interventions  IV remdesivir ID follow-up    Empyema of right pleural space  5/20 Suspect empyema, needs chest tube placement  5/21 s/p chest tube placement, adequate expansion - will check Chest X Ray in aa  5/23 tPA DNase.  5/24 tPA for DNase installation via chest tube.  Monitor chest tube output.  Repeat chest x-ray in a.m..  5/25 chest tube output increased from 50 mL on average per 24 hours to 160 mL last 24 hours.  Purulent drainage noted.  Will repeat tPA plus Dnase instillation.  5/26 chest x-ray reviewed.  Continue IV oxacillin.  Right-sided pigtail in place.  Status post tPA and DNase x2.  Bloody drainage around pigtail today status post tPA and DNase yesterday.  Will hold on fibrinolysis.  Monitor chest tube output  5/27 right pigtail in place.  225 mL  Last 24 hours  average of 200 mL per day.  Change pleurovac canister to better assess the color of the fluid pus versus serosanguineous.  Keep pigtail in place for now.  Ultrasound bilateral chest assess pleural effusions.  Continue oxacillin drip.  5/28 keep chest tube on the right side for now.  Repeat CRP.  Checking chemistry microbiology  on left-sided pleural fluid to rule out complicated effusion and need for pigtail.  Continue oxacillin drip.   5/29 minimal output right chest tube.  Remove chest tube.  Left-sided effusion not complicated not empyema.  Continue IV oxacillin    6/1 evolving empyema/necrosis on right lung on CT scan of the chest 06/01/2022.  Continue IV oxacillin.  Re-consult cardiothoracic surgery.    Parapneumonic effusion  Complicated by PNA, possible empyema  SP Thora  Cont Abx  5/20/2022 Probable empyema,needs chest tube drainage  5/22 chest tube draining  5/23 60 cc drained yesterday.  Possible tPA and DNase tomorrow.  Continue IV oxacillin.  5/24 monitor chest tube output post tPA and DNase instillation today PA chest tube  5/26 chest x-ray reviewed.  Continue IV oxacillin.  Right-sided pigtail in place.  Status post tPA and DNase x2.  Bloody drainage around pigtail today status post tPA and DNase yesterday.  Will hold on fibrinolysis.  Monitor chest tube output  5/27 right pigtail in place.  225 mL  Last 24 hours average of 200 mL per day.  Change pleurovac canister to better assess the color of the fluid pus versus serosanguineous.  Keep pigtail in place for now.  Ultrasound bilateral chest assess pleural effusions.  Continue oxacillin drip.  5/28 keep chest tube on the right side for now.  Repeat CRP.  Checking chemistry microbiology  on left-sided pleural fluid to rule out complicated effusion and need for pigtail.  Continue oxacillin drip.   5/29 minimal output right chest tube.  Remove chest tube.  Left-sided effusion not complicated not empyema.  Continue IV oxacillin   5/30 oxacillin total of 8  weeks.  Chest x-ray stable.  Right-sided pigtail removed.  No evidence of empyema on the left side pleural fluid.  5/31 Continue above, will sign off  6/1 worsening and evolving empyema right lung.  Reconsult cardiothoracic surgery.  Continue IV oxacillin.    COPD (chronic obstructive pulmonary disease)  Oxygen  Keep SP2> 92%  Bronchodilators  Follow up in Pulmonary for PFT to clarify Dx  5/23 O2 target sat 92-94%.  Nebs p.r.n.  5/26 O2 target sat at home not her% nebs p.r.n..  Smoking cessation   5/28 continue oxygen nebs albuterol Atrovent p.r.n.  6/1 albuterol Atrovent p.r.n.      Discussed with infectious disease and with hospital medicine team.     Piyush San MD  Pulmonology  [facility] - Telemetry ([facility])

## 2022-06-01 NOTE — ASSESSMENT & PLAN NOTE
6/1 - Isolation:   - Airborne, Contact and Droplet Precautions  - Cohort patients into COVID units  - N95 masks must be fit tested, wear eye protection  - 20 second hand hygiene              - Limit visitors per hospital policy              - Consolidating lab draws, nursing care, provider visits, and interventions  IV remdesivir ID follow-up

## 2022-06-01 NOTE — PROGRESS NOTES
[facility] Medicine  Progress Note    Patient Name: Tianna Leon  MRN: [id number]  Patient Class: IP- Inpatient   Admission Date: 5/6/2022  Length of Stay: 26 days  Attending Physician: John Huynh MD  Primary Care Provider: Spenser Campa MD    Subjective:     Principal Problem:Acute bacterial endocarditis        HPI:  36 y/o. female  with a PMHx of asthma, COPD,Bipolar  , IVDU and HLD presented to the ER with a c/o  sob for the last weak which has gotten worse . The SOB is associated with fever , chills , productive cough , back pain  and generalized weakness . She report cough some blood this am .  She is active IVDU  ( heroine , cocaine  and amphetamine ) . She denies any  sick contact , chest pain  , GI/ sx , She also complaning of LE sweeling . Knee pain and B/L LE rash .   ER COURSE: CTA chest negative for pe . Multiple nodular and cavitary opacities concerning for septic emboli with larger opacities possibly necrotizing pneumonia. CT cervical  and thoracic did not show any acute finding , CT lumbar Possible progression of degenerative changes most notable at L4-5 with moderate to severe spinal canal stenosis at this level.  WBC  29 k , na 130 , k 3.4 , cl 89 , procal 2.71   ER VS:  BP Pulse Resp Temp SpO2   (!) 124/58 110 (!) 30 (!) 101.6 °F (38.7 °C) 96 %           Pt will be admitted to inpt with a dx of PNA and severe sepsis       Overview/Hospital Course:  5/7 admitted for pneumonia, severe sepsis in setting of ivdu. Mother at bedside and provides collateral. Patient has struggled with substance abuse for approximately 20 years, worsening over last 3-5 years since life event. Onset of symptoms 1-2 weeks ago. Tolerated thoracentesis with no pneumothorax on chest x-ray. Mri lumbar and echo, pending. Bcx positive, growing gram positive cocci. On vanc, cefepime and flagyl. Infectious disease consulted for bacteremia. 5/8/22 The patient remained afebrile overnight.  Currently on cefepime/vanc/flagyl. Blood cx 5/6/22 are growing MSSA. The patient refused the MRI lumbar spine overnight d/t being unable to lay flat from back pain. Will give pain meds and attempt to get the MRI today to r/o spinal abscess. The ECHO showed a 1.3 x 1.5 cm elongated, mobile echodensity seen on the tricuspid valve consistent with vegetation, CT surgery was consulted. Will need a SUSANNAH. Infectious disease is consulted. Will repeat blood cx today. 5/9/22 No acute events overnight. The patient reports lower back pain is not well controlled with current pain regimen, will adjust. Repeat blood cx are still positive. Sensitivities are back and Staph is sensitive to oxacillin, will D/C vanc/cefepime. Cardiology consulted and plans for a SUSANNAH today. CT surgery following and recommends continuing medical management with appropriate ABX, no surgical intervention at this time. MRI lumbar spine showed moderate to severe spinal canal stenosis with moderate left-sided neural foraminal stenosis, Neurosurgery consulted. 5/10/22 No acute events overnight. The patient reports some improvement in pain with adjustment in pain meds. Repeat blood cx are +. Continue IV oxacillin and flagyl. Infectious disease is consulted. IR consulted to evaluate possible paraspinous muscle myositis versus abscess. Recommend IR drainage. Continue current management. 5/11/22 No acute events overnight. The patient reports pain is still not well controlled at times. The patient reports that she is very anxious about the upcoming SUSANNAH and IR drainage. SUSANNAH was pushed back to this afternoon because the patient ate candy this AM. Continue treatment with IV oxacillin and flagyl. Infectious disease is following. WBC trended down to 31K. Continue current management. 5/12/22 No acute events overnight. The patients WBC improved to 22K overnight. The patient remains on continuous oxacillin infusion. Infectious disease is following. MRI thoracic spine is  ordered and pending. The patients SUSANNAH was postponed again today d/t low H/H. Hgb was noted to be 6.2 this morning, will transfuse 1 unit PRBC. The patient reports pain is still not well controlled. The case was discussed with Neurosurgery who recommended adding extended release pain meds. Approximately 1 ml of purulent drainage aspirated from back yesterday per IR, growing gram + cocci.     5/13: Patient was given 1U PRBC on 5/12, improved from 6.2 --> 7.0, will give an additional unit PRBC. Radiology attempted to bring patient on 5/12 at 1755 for Thoracic MRI, patient refused due to pain and anxiety. Repeat attempt this AM, patient again refused. Will order Ativan 1mg IV to be given with pain medication prior to scan to relieve anxiety and pain, nurse to notify NP when ready to go for scan. Discussed pain management with patient, discussed transition to PO pain medications to being preparing for d/c to LTAC, adjusted PO pain regimen will reevaluate to determine effectiveness. Repeat Blood cultures 5/11/22: NGTD.     5/14: Final anaerobic cultures pending, continue to adjust pain regimen, d/c IV Dilaudid today. Patient currently managed with PO Morphine extended release and PO oxycodone. D/C plan is for LTAC once final antibiotic regimen determined. Patient continues to refuse MRI of thoracic spine.     5/15: Called to room this AM, patient had coughed up blood and mucous. Approx. 1-2 teaspoons of blood in emesis bag, ordered CXR and repeat CBC, CXR showed improvement from previous day, repeat CBC at time of incident, showed a slight decrease in Hgb: 9.7 --> 8.7, when repeated at 1500, Hgb had improved to 10. no further episodes of coughing up blood throughout day. Patient continues to c/o not having enough pain medication but continues to sleep most of the day and will fall asleep when speaking at times. Added IV Toradol to plan.     5/16: No further episodes of coughing blood overnight, patient participated with  encouragement with PT/OT today, recommendations are HH vs. SNF pending progress. Awaiting final antibiotic regimen recommendations. Anaerobic culture results finalized: negative for growth. Afebrile, WBC trending downward.     5/17: Antibiotic regimen per ID is continuous oxacillin until 7/11/22, SW notified of plan, will seek acceptance at LTAC or SNF. Patient choice is [facility]. WBC in normal range. AMS this AM, CT of head: no acute findings. Given narcan and mental status improved. Decreased narcotic dose.     5/18: Per discussion with patient, she is willing to have the MRI of thoracic spine today, will order IV pain medication and anxiety medication prior to scan. Nursing and Radiology aware of needing to premedicate for scan. Patient is more cooperative and participating with care.     5/19: Patient given premedications for MRI, MRI completed, results pending. Patient more agreeable with treatment plan and cooperating. Will need psychiatry follow-up after discharge. Hgb: 6.7, will give 1U PRBC    5/20/22: +LEON and generalized pain. Thoracic MRI showed no evidence for disc osteomyelitis in the thoracic spine and no epidural abscess, Loculated right hydropneumothorax and extensive bilateral pulmonary infiltrates. Ct chest showed septic emboli and moderate right pleural effusion.+splenic infarct  Pulmonology felt likely empyema- consulted IR for chest tube.     5/21/22: s/p right pig tail placement to suction/ drainage system per IR. 60ml pus removed initially. Fluid analysis consistent with empyema. Pleural fluid cultures pending. Now with scant serosanguinous output.+ right lateral chest pain at chest tube site. Denies any other complaints. Status update provided to pt's mother.     5/22/22: Repeatedly requesting IV Dilaudid. Toradol ordered. Afebrile, WBC normal, O2sats stable on 3lites. s/p right pig tail placement to suction/ drainage system per IR on 5/21/22- draining serous draiange with pus. Plan for LTAC  "placement     5/23/22: More cofortable today. Not getting OOB. Encouraged OOB and rationale provided. Pulmonology recommended Possible tPA and DNase in chest tube tomorrow    5/24/22: Examined OOB in chair. Encouraged pt to continue OOB. Plan for tPA and DNase in chest tube today per Pulmonology  Pt denied LTAC- will pursue SNF    5/25/22: Temp 101.7F last night. Will recheck blood cultures and UA. BP low - IVF bolus given and restarted IVF. ID re-consulted. 160ml of purulent CT drainage output. Dr. San plans to repeat tPA and DNase in chest tube again today.     5/26/22: temp 99.2. c/o of right "lung pain" when eating. Will consult speech to check swallow. +mild leukocytosis. Repeat BC 5/25/22 show NGTD. Repeat UA showed rare trichomas. Chest tube drainage remains purulent     5/27/22  Low grade temp. Downward trend of hemoglobin, related to chest tube drainage. Blood cultures remain negative. Urine culture growing E. Coli. Sensitivities are pending.     5/28/22  Urine culture returned pansenstiive. Will continue ciprofloxacin for total of 3 days then discontinue. Still awaiting SNF placement.    5/29/22 culture of pleural fluid from 5/28/22 negative. Continue CT for now.     5/30/22  CT removed yesterday by pulmonology. SNF declines. Will explore other options for IV abx with help from ID.     5/31/22  Patient has been accepted to [facility]. Will transfer today. Will follow up with pulmonology and ID at discharge. She will continue oxacillin for management of bacteremia, septic embolic, and endocarditis.  Addendum: COVID negative transfer delayed.     6/1/22  COVID positive started on remdesivir. Tmax 101.3. repeat CT scan shows evolving septic embolic/necrotizing pneumonia. Pulmonlogy recommends reconsulting CVT. ID following. Awaiting insurance approval for [facility]. Decline in hemoglobin 7.4, hold on transfusion at this time.       Interval History: Tmax 101.4. has cough. Hurting when moving. CT chest " showed worsening empyema of right lung.   CVT has been reconsulted.     Review of Systems   Constitutional:  Positive for activity change, appetite change and fatigue. Negative for chills, diaphoresis, fever and unexpected weight change.   HENT:  Negative for congestion, hearing loss, mouth sores, postnasal drip, rhinorrhea, sore throat and trouble swallowing.    Eyes:  Negative for discharge and visual disturbance.   Respiratory:  Negative for cough and chest tightness.    Cardiovascular:  Negative for chest pain, palpitations and leg swelling.   Gastrointestinal:  Negative for blood in stool, constipation, diarrhea, nausea and vomiting.   Endocrine: Negative for cold intolerance and heat intolerance.   Genitourinary:  Negative for difficulty urinating, dyspareunia, flank pain and hematuria.   Musculoskeletal:  Positive for back pain and myalgias. Negative for arthralgias.   Skin: Negative.    Neurological:  Positive for weakness. Negative for dizziness and light-headedness.   Hematological:  Negative for adenopathy. Does not bruise/bleed easily.   Psychiatric/Behavioral:  Negative for agitation, behavioral problems, confusion and sleep disturbance. The patient is nervous/anxious.        Objective:     Vital Signs (Most Recent):  Temp: (!) 100.7 °F (38.2 °C) (06/01/22 1558)  Pulse: 94 (06/01/22 1558)  Resp: 18 (06/01/22 1623)  BP: (!) 140/82 (06/01/22 1558)  SpO2: (!) 93 % (06/01/22 1558)   Vital Signs (24h Range):  Temp:  [98.4 °F (36.9 °C)-101.3 °F (38.5 °C)] 100.7 °F (38.2 °C)  Pulse:  [76-94] 94  Resp:  [16-20] 18  SpO2:  [93 %-100 %] 93 %  BP: (106-140)/(60-82) 140/82     Weight: 98.3 kg (216 lb 11.4 oz)  Body mass index is 36.06 kg/m².    Intake/Output Summary (Last 24 hours) at 6/1/2022 1722  Last data filed at 6/1/2022 1400  Gross per 24 hour   Intake 2488.61 ml   Output 450 ml   Net 2038.61 ml      Physical Exam  Vitals and nursing note reviewed.   Constitutional:       General: She is not in acute  distress.     Appearance: She is well-developed. She is ill-appearing.   HENT:      Head: Normocephalic and atraumatic.      Nose: Nose normal.   Eyes:      Extraocular Movements: Extraocular movements intact.   Cardiovascular:      Rate and Rhythm: Normal rate and regular rhythm.   Pulmonary:      Effort: Pulmonary effort is normal.      Breath sounds: No stridor. Rhonchi present.      Comments: Right-side  Pigtail removed  Abdominal:      General: There is no distension.   Musculoskeletal:         General: No signs of injury.      Cervical back: Normal range of motion and neck supple.   Skin:     General: Skin is dry.   Neurological:      General: No focal deficit present.      Mental Status: She is alert and oriented to person, place, and time. Mental status is at baseline.   Psychiatric:         Behavior: Behavior normal.     Significant Labs: All pertinent labs within the past 24 hours have been reviewed.  CBC:   Recent Labs   Lab 05/31/22 0530 06/01/22 0512   WBC 13.29* 6.97   HGB 9.0* 7.4*   HCT 27.5* 23.4*    345     CMP:   Recent Labs   Lab 05/31/22 0530 06/01/22 0512    136   K 3.9 3.9    104   CO2 23 23    87   BUN 21* 20   CREATININE 1.7* 1.9*   CALCIUM 7.6* 7.7*   PROT 5.5* 5.6*   ALBUMIN 1.6* 1.5*   BILITOT 0.3 0.3   ALKPHOS 80 69   AST 10 14   ALT 6* 7*   ANIONGAP 10 9   EGFRNONAA 37* 33*       Significant Imaging:   CT CHEST WITHOUT CONTRAST     CLINICAL HISTORY:  Pneumonia, unresolved;     TECHNIQUE:  Low-dose axial images acquired from the chest without the use of intravenous contrast.  Sagittal and coronal reformats, as well as axial MIPS were generated for further review.  All CT scans at this location are performed using dose modulation techniques as appropriate to a performed exam including the following: Automated exposure control; adjustment of the mA and/or kV according to patient size (this includes techniques or standardized protocols for targeted exams where  dose is matched to indication/reason for exam; i. e. extremities or head); use of iterative reconstruction technique.     COMPARISON:  Chest radiograph 05/30/2022.  CT chest 05/20/2022.     FINDINGS:  Base of Neck: No significant abnormality. Right-sided central venous catheter with tip terminating at the distal SVC.     Aorta: Nonaneurysmal without significant atherosclerotic calcification.     Heart/pericardium: Upper limits normal size heart without significant pericardial fluid.  No significant coronary calcification.     Esha/Mediastinum: Few upper limits normal size mediastinal lymph nodes.     Upper Abdomen: No acute abnormality of the partially imaged upper abdomen.     Thoracic soft tissues: Anasarca.     Bones: No acute fracture. No suspicious lytic or sclerotic lesion.     Airways: Patent.     Lungs/pleura: Continued evolution of the septic emboli with multiple cavitary nodules scattered throughout the lungs.  There are well-defined air-fluid collections in posterior aspect of the right upper and lower lobes measuring 4.5 x 9.5 cm and 5.5 x 10.0 cm, respectively.  Alternatively, this could represent loculated empyema.  These 2 collections appear to communicate.  Overall appearance of the chest is similar when compared to the recent prior CT examination.  Findings concerning for necrotizing pneumonia, as previously noted.  There is a medium sized left-sided pleural effusion with associated volume loss.     Impression:     Evolving findings concerning for septic emboli and necrotizing pneumonia in the posterior aspect of the right lung versus empyema, as described above.  Findings are overall similar when compared to the prior CT examination.  Consider further follow-up/evaluation as warranted.     Additional findings as above.              Assessment/Plan:      * Acute bacterial endocarditis  ECHO showed a 1.3 x 1.5 cm elongated, mobile echodensity seen on the tricuspid valve consistent with vegetation, CT  surgery was consulted.   Due to MSSA    -- Infectious disease is consulted.  -- repeat blood cx NGTD.  -- Continue IV ABX.   --Sensitivities are back and Staph is sensitive to oxacillin, D/C vanc/cefepime.   --Cardiology consulted and plans for a SUSANNAH.   --CT surgery following and recommends continuing medical management with appropriate ABX, no surgical intervention at this time.    cont IV oxacillin.  Plan is to continue x 4 weeks. Afebrile    COVID-19  - COVID-19 testing   - Infection Control notified     - Isolation:   - Airborne, Contact and Droplet Precautions  - Cohort patients into COVID units  - N95 mask, wear eye protection  - 20 second hand hygiene              - Limit visitors per hospital policy              - Consolidating lab draws, nursing care, provider visits, and interventions    - Diagnostics: (leukopenia, hyponatremia, hyperferritinemia, elevated troponin, elevated d-dimer, age, and comorbidities are significant predictors of poor clinical outcome)  CBC, CMP, Ferritin, CRP and Portable CXR    - Management:  Supplemental O2 to maintain SpO2 >92%  Telemetry  Continuous/intermittent Pulse Ox  Albuterol treatment PRN  Remdesivir added    Advance Care Planning   Patient is a full code.               E. coli UTI  Add ciprofloxacin for 3 days. Will stop 5/29/22      Empyema of right pleural space  Pulmonology consulted and felt likely empyema   IR consulted for chest tube      s/p right pigtail small bore chest tube placement to suction/drainage system per IR on 5/21/22. 60ml pus removed. Now with Serous drainage with pus. Fluid analysis consistent with empyema. Pleural fluid aerobic culture shows NGTD, Anaerobic culture pending    5/24/22: Dr. San plans for tPA and DNase in chest tube today.   5/25/22: 160ml of purulent CT drainage output. Dr. San plans to repeat tPA and DNase in chest tube again today.   5/26/22  Still has purulent drainage noted. Pulmonology following    Normocytic  anemia  Secondary to acute illness    --Daily CBC  --Transfuse with 1 unit PRBC for Hgb <7.0 or <8.0 if symptomatic   --Hgb: 6.7, give 1U PRBC    5/27/22  Hemoglobin 7.3g/dL. downward trend. Has blood tinged seropurulent drainage. Will give additional unit if continues to trend down.  5/28/22  Hemoglobin 8.0 today    Pulmonary embolism, septic  2/2 MSSA endocarditis   Cont IV abx     6/1/22  CT shows evolving septic emboli and necrotizing pneumonia. CVT has been re-consulted.    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 IR consulted to evaluate possible paraspinous muscle myositis versus abscess.    --Approximately 1 ml of purulent drainage aspirated 5/11 per IR, growing MSSA  --Continue current regimen  --Oxacillin x 4 weeks total    Lumbar stenosis without neurogenic claudication        Chronic pulmonary heart disease  - Pulmonology following       MSSA bacteremia  Gram positive   Infectious disease consulted  Continue broad spectrum intravenous antibiotic(s) given h/o ivdu  Remains febrile and leukocytosis persists  5/8/22 The patient remained afebrile overnight. Currently on cefepime/vanc/flagyl. Blood cx are growing staph aureus. The patient refused the MRI lumbar spine overnight d/t being unable to lay flat from back pain. Will give pain meds and attempt to get the MRI today to r/o spinal abscess. The ECHO showed a 1.3 x 1.5 cm elongated, mobile echodensity seen on the tricuspid valve consistent with vegetation, CT surgery was consulted. Will need a SUSANNAH. Infectious disease is consulted. Will repeat blood cx today.   5/9/22 Repeat blood cx are still positive. Sensitivities are back and Staph is sensitive to oxacillin, will D/C vanc/cefepime. Infectious disease is following   5/10/22 Repeat blood cx are +. Continue IV oxacillin and flagyl. Infectious disease is consulted. IR consulted to evaluate possible paraspinous muscle myositis versus abscess. Recommend IR  drainage. Continue current management.    5/11/22 The patient reports pain is still not well controlled at times. The patient reports that she is very anxious about the upcoming SUSANNAH and IR drainage. SUSANNAH was pushed back to this afternoon because the patient ate candy this AM. Continue treatment with IV oxacillin and flagyl. Infectious disease is following. WBC trended down to 31K. Continue current management.  5/12/22 The patients WBC improved to 22K overnight. The patient remains on continuous oxacillin infusion. Infectious disease is following.  5/14: WBC continues to improve, 18.10 today     5/15: Blood cultures 5/11/22: NGTD     Continuous Oxacillin, EOC: 7/11/22, pending acceptance at facility, PICC line placed    Parapneumonic effusion  Pulmonology consulted  Status post thoracentesis 5/7/22   continue CT management per pulmonology    5/29/22  Body fluid culture currently negative.     5/30/22  CT removed    6/1/22   Febrile CT shows evolving empyema/necrotizing pneumonia. pulmonology recommends reconsulting CVT.     IVDU (intravenous drug user)   Will ordet TTE to r/o IE  Will order MRI lumbar wwo- completed   Cont broad spectrum IVAB   --MRI thoracic spine ordered per ID, patient refused on multiple attempts    5/7   Studies pending for additional sites of infection given h/o ivdu  5/8/22  on cessation     Severe sepsis  This patient does have evidence of infective focus  My overall impression is sepsis. Vital signs were reviewed and noted in progress note.  Antibiotics given-   Antibiotics (From admission, onward)            Start     Stop Route Frequency Ordered    05/09/22 1100  oxacillin 12 g in  mL CONTINUOUS INFUSION         -- IV Every 24 hours (non-standard times) 05/09/22 0932    05/07/22 2100  mupirocin 2 % ointment         05/12 2059 Nasl 2 times daily 05/07/22 1646        Cultures were taken-   Microbiology Results (last 7 days)     Procedure Component Value Units Date/Time     Culture, Respiratory with Gram Stain [[id number]] Collected: 05/16/22 0758    Order Status: Sent Specimen: Respiratory from Sputum Updated: 05/16/22 0759    Culture, Anaerobe [[id number]] Collected: 05/11/22 1440    Order Status: Completed Specimen: Abscess from Back Updated: 05/16/22 0733     Anaerobic Culture No anaerobes isolated    Blood culture [[id number]] Collected: 05/11/22 1213    Order Status: Completed Specimen: Blood Updated: 05/16/22 0612     Blood Culture, Routine No Growth to date      No Growth to date      No Growth to date      No Growth to date      No Growth to date    Blood culture [[id number]] Collected: 05/11/22 1213    Order Status: Completed Specimen: Blood Updated: 05/16/22 0612     Blood Culture, Routine No Growth to date      No Growth to date      No Growth to date      No Growth to date      No Growth to date    Aerobic culture [[id number]]  (Abnormal)  (Susceptibility) Collected: 05/11/22 1440    Order Status: Completed Specimen: Abscess from Back Updated: 05/14/22 1057     Aerobic Bacterial Culture STAPHYLOCOCCUS AUREUS  Many      Gram stain [[id number]] Collected: 05/11/22 1440    Order Status: Completed Specimen: Abscess from Back Updated: 05/12/22 0306     Gram Stain Result Few WBC's      Few Gram positive cocci    Gram stain [[id number]]     Order Status: Canceled Specimen: Joint Fluid from Back     Blood culture [[id number]]  (Abnormal) Collected: 05/08/22 1516    Order Status: Completed Specimen: Blood from Peripheral, Right Hand Updated: 05/11/22 1009     Blood Culture, Routine Gram stain aer bottle: Gram positive cocci in clusters resembling Staph       Results called to and read back by: Chasity Raymundo RN  05/09/2022  11:31      STAPHYLOCOCCUS AUREUS  ID consult required at [facility]oustapha[facility].  For susceptibility see order #[id number]      Narrative:      Collection has been rescheduled by [email or web address] at 05/08/2022 13:22 Reason:   Pt in mri will be there after another  hour [id number]  Collection has been rescheduled by [email or web address] at 05/08/2022 13:22 Reason:   Pt in mri will be there after another hour [id number]    Blood culture [[id number]]  (Abnormal) Collected: 05/08/22 1515    Order Status: Completed Specimen: Blood from Peripheral, Left Arm Updated: 05/11/22 1009     Blood Culture, Routine Gram stain aer bottle: Gram positive cocci in clusters resembling Staph      Results called to and read back by: Chasity Raymundo RN  05/09/2022  15:40      STAPHYLOCOCCUS AUREUS  ID consult required at [facility].formerly [facility],[place] and [place] locations.  For susceptibility see order #[id number]      Narrative:      Collection has been rescheduled by [email or web address] at 05/08/2022 13:22 Reason:   Pt in mri will be there after another hour [id number]  Collection has been rescheduled by [email or web address] at 05/08/2022 13:22 Reason:   Pt in mri will be there after another hour [id number]        Latest lactate reviewed, they are-  No results for input(s): LACTATE in the last 72 hours.    Organ dysfunction indicated by Acute kidney injury  Source-  lung    Source control Achieved by-   Cont Broad spectrum IVAB     Bacteremia  Infectious disease consulted      PNA (pneumonia)  --Cont oxacillin        Tobacco abuse  - Patient thoroughly counseled on smoking cessation, pt verbalized understanding. Time of counseling 10 minutes.        COPD (chronic obstructive pulmonary disease)  Cont breathing tx prn  Pulmonology following       VTE Risk Mitigation (From admission, onward)         Ordered     enoxaparin injection 100 mg  2 times daily         05/31/22 1704     IP VTE HIGH RISK PATIENT  Once         05/06/22 2336     Place sequential compression device  Until discontinued         05/06/22 2336                Discharge Planning   YESSICA: 5/31/2022     Code Status: Full Code   Is the patient medically ready for discharge?:     Reason for patient still in hospital (select all that apply): Treatment  Discharge Plan A: Skilled Nursing Facility   Discharge  Delays: None known at this time        Chandni Lambert NP  Department of Hospital Medicine   [facility] - Telemetry ([facility])

## 2022-06-01 NOTE — ASSESSMENT & PLAN NOTE
2/2 MSSA endocarditis   Cont IV abx     6/1/22  CT shows evolving septic emboli and necrotizing pneumonia. CVT has been re-consulted.

## 2022-06-01 NOTE — SUBJECTIVE & OBJECTIVE
Interval History: Tmax 101.4. has cough. Hurting when moving. CT chest showed worsening empyema of right lung.   CVT has been reconsulted.     Review of Systems   Constitutional:  Positive for activity change, appetite change and fatigue. Negative for chills, diaphoresis, fever and unexpected weight change.   HENT:  Negative for congestion, hearing loss, mouth sores, postnasal drip, rhinorrhea, sore throat and trouble swallowing.    Eyes:  Negative for discharge and visual disturbance.   Respiratory:  Negative for cough and chest tightness.    Cardiovascular:  Negative for chest pain, palpitations and leg swelling.   Gastrointestinal:  Negative for blood in stool, constipation, diarrhea, nausea and vomiting.   Endocrine: Negative for cold intolerance and heat intolerance.   Genitourinary:  Negative for difficulty urinating, dyspareunia, flank pain and hematuria.   Musculoskeletal:  Positive for back pain and myalgias. Negative for arthralgias.   Skin: Negative.    Neurological:  Positive for weakness. Negative for dizziness and light-headedness.   Hematological:  Negative for adenopathy. Does not bruise/bleed easily.   Psychiatric/Behavioral:  Negative for agitation, behavioral problems, confusion and sleep disturbance. The patient is nervous/anxious.        Objective:     Vital Signs (Most Recent):  Temp: (!) 100.7 °F (38.2 °C) (06/01/22 1558)  Pulse: 94 (06/01/22 1558)  Resp: 18 (06/01/22 1623)  BP: (!) 140/82 (06/01/22 1558)  SpO2: (!) 93 % (06/01/22 1558)   Vital Signs (24h Range):  Temp:  [98.4 °F (36.9 °C)-101.3 °F (38.5 °C)] 100.7 °F (38.2 °C)  Pulse:  [76-94] 94  Resp:  [16-20] 18  SpO2:  [93 %-100 %] 93 %  BP: (106-140)/(60-82) 140/82     Weight: 98.3 kg (216 lb 11.4 oz)  Body mass index is 36.06 kg/m².    Intake/Output Summary (Last 24 hours) at 6/1/2022 1722  Last data filed at 6/1/2022 1400  Gross per 24 hour   Intake 2488.61 ml   Output 450 ml   Net 2038.61 ml      Physical Exam  Vitals and nursing note  reviewed.   Constitutional:       General: She is not in acute distress.     Appearance: She is well-developed. She is ill-appearing.   HENT:      Head: Normocephalic and atraumatic.      Nose: Nose normal.   Eyes:      Extraocular Movements: Extraocular movements intact.   Cardiovascular:      Rate and Rhythm: Normal rate and regular rhythm.   Pulmonary:      Effort: Pulmonary effort is normal.      Breath sounds: No stridor. Rhonchi present.      Comments: Right-side  Pigtail removed  Abdominal:      General: There is no distension.   Musculoskeletal:         General: No signs of injury.      Cervical back: Normal range of motion and neck supple.   Skin:     General: Skin is dry.   Neurological:      General: No focal deficit present.      Mental Status: She is alert and oriented to person, place, and time. Mental status is at baseline.   Psychiatric:         Behavior: Behavior normal.     Significant Labs: All pertinent labs within the past 24 hours have been reviewed.  CBC:   Recent Labs   Lab 05/31/22 0530 06/01/22  0512   WBC 13.29* 6.97   HGB 9.0* 7.4*   HCT 27.5* 23.4*    345     CMP:   Recent Labs   Lab 05/31/22  0530 06/01/22  0512    136   K 3.9 3.9    104   CO2 23 23    87   BUN 21* 20   CREATININE 1.7* 1.9*   CALCIUM 7.6* 7.7*   PROT 5.5* 5.6*   ALBUMIN 1.6* 1.5*   BILITOT 0.3 0.3   ALKPHOS 80 69   AST 10 14   ALT 6* 7*   ANIONGAP 10 9   EGFRNONAA 37* 33*       Significant Imaging:   CT CHEST WITHOUT CONTRAST     CLINICAL HISTORY:  Pneumonia, unresolved;     TECHNIQUE:  Low-dose axial images acquired from the chest without the use of intravenous contrast.  Sagittal and coronal reformats, as well as axial MIPS were generated for further review.  All CT scans at this location are performed using dose modulation techniques as appropriate to a performed exam including the following: Automated exposure control; adjustment of the mA and/or kV according to patient size (this includes  techniques or standardized protocols for targeted exams where dose is matched to indication/reason for exam; i. e. extremities or head); use of iterative reconstruction technique.     COMPARISON:  Chest radiograph 05/30/2022.  CT chest 05/20/2022.     FINDINGS:  Base of Neck: No significant abnormality. Right-sided central venous catheter with tip terminating at the distal SVC.     Aorta: Nonaneurysmal without significant atherosclerotic calcification.     Heart/pericardium: Upper limits normal size heart without significant pericardial fluid.  No significant coronary calcification.     Esha/Mediastinum: Few upper limits normal size mediastinal lymph nodes.     Upper Abdomen: No acute abnormality of the partially imaged upper abdomen.     Thoracic soft tissues: Anasarca.     Bones: No acute fracture. No suspicious lytic or sclerotic lesion.     Airways: Patent.     Lungs/pleura: Continued evolution of the septic emboli with multiple cavitary nodules scattered throughout the lungs.  There are well-defined air-fluid collections in posterior aspect of the right upper and lower lobes measuring 4.5 x 9.5 cm and 5.5 x 10.0 cm, respectively.  Alternatively, this could represent loculated empyema.  These 2 collections appear to communicate.  Overall appearance of the chest is similar when compared to the recent prior CT examination.  Findings concerning for necrotizing pneumonia, as previously noted.  There is a medium sized left-sided pleural effusion with associated volume loss.     Impression:     Evolving findings concerning for septic emboli and necrotizing pneumonia in the posterior aspect of the right lung versus empyema, as described above.  Findings are overall similar when compared to the prior CT examination.  Consider further follow-up/evaluation as warranted.     Additional findings as above.

## 2022-06-01 NOTE — ASSESSMENT & PLAN NOTE
Oxygen  Keep SP2> 92%  Bronchodilators  Follow up in Pulmonary for PFT to clarify Dx  5/23 O2 target sat 92-94%.  Nebs p.r.n.  5/26 O2 target sat at home not her% nebs p.r.n..  Smoking cessation   5/28 continue oxygen nebs albuterol Atrovent p.r.n.  6/1 albuterol Atrovent p.r.n.

## 2022-06-01 NOTE — SUBJECTIVE & OBJECTIVE
Interval History:   6/1 seen and examined.  Afebrile T-max 101°.  O2 sat 93% on 2 liter/minute.  Weak SOB decreased activity.  CT chest reviewed.  Review of Systems   Constitutional:  Positive for malaise/fatigue and weight loss. Negative for chills and fever.   HENT:  Negative for hearing loss and nosebleeds.    Eyes:  Negative for discharge.   Respiratory:  Positive for cough, sputum production and shortness of breath.    Cardiovascular:  Positive for leg swelling. Negative for chest pain.   Gastrointestinal:  Negative for abdominal pain.   Genitourinary:  Negative for hematuria.   Musculoskeletal:  Negative for falls.   Skin:  Negative for itching.   Neurological:  Positive for weakness. Negative for speech change, seizures and loss of consciousness.   Endo/Heme/Allergies:  Negative for polydipsia. Does not bruise/bleed easily.   Psychiatric/Behavioral:  Negative for hallucinations.        Objective:     Vital Signs (Most Recent):  Temp: (!) 100.7 °F (38.2 °C) (06/01/22 1558)  Pulse: 94 (06/01/22 1558)  Resp: 18 (06/01/22 1623)  BP: (!) 140/82 (06/01/22 1558)  SpO2: (!) 93 % (06/01/22 1558)   Vital Signs (24h Range):  Temp:  [98.4 °F (36.9 °C)-101.3 °F (38.5 °C)] 100.7 °F (38.2 °C)  Pulse:  [76-94] 94  Resp:  [16-20] 18  SpO2:  [93 %-100 %] 93 %  BP: (106-140)/(60-82) 140/82     Weight: 98.3 kg (216 lb 11.4 oz)  Body mass index is 36.06 kg/m².      Intake/Output Summary (Last 24 hours) at 6/1/2022 1656  Last data filed at 6/1/2022 1400  Gross per 24 hour   Intake 2488.61 ml   Output 450 ml   Net 2038.61 ml         Physical Exam  Vitals and nursing note reviewed.   Constitutional:       General: She is not in acute distress.     Appearance: She is well-developed. She is ill-appearing.   HENT:      Head: Normocephalic and atraumatic.      Nose: Nose normal.   Eyes:      Extraocular Movements: Extraocular movements intact.   Cardiovascular:      Rate and Rhythm: Normal rate and regular rhythm.   Pulmonary:       Effort: Pulmonary effort is normal.      Breath sounds: No stridor. Rhonchi present.      Comments: Right-side  Pigtail removed  Abdominal:      General: There is no distension.   Musculoskeletal:         General: No signs of injury.      Cervical back: Normal range of motion and neck supple.   Skin:     General: Skin is dry.   Neurological:      General: No focal deficit present.      Mental Status: She is alert and oriented to person, place, and time. Mental status is at baseline.   Psychiatric:         Behavior: Behavior normal.       Vents:  Oxygen Concentration (%): 28 (06/01/22 0150)    Lines/Drains/Airways       Peripherally Inserted Central Catheter Line  Duration             PICC Double Lumen 05/17/22 1612 right basilic 15 days              Drain  Duration             Female External Urinary Catheter 05/21/22 0700 11 days                    Significant Labs:    CBC/Anemia Profile:  Recent Labs   Lab 05/31/22  0530 05/31/22  1729 06/01/22  0512   WBC 13.29*  --  6.97   HGB 9.0*  --  7.4*   HCT 27.5*  --  23.4*     --  345   MCV 83  --  86   RDW 15.2*  --  15.9*   FERRITIN  --  320*  --           Chemistries:  Recent Labs   Lab 05/31/22  0530 06/01/22  0512    136   K 3.9 3.9    104   CO2 23 23   BUN 21* 20   CREATININE 1.7* 1.9*   CALCIUM 7.6* 7.7*   ALBUMIN 1.6* 1.5*   PROT 5.5* 5.6*   BILITOT 0.3 0.3   ALKPHOS 80 69   ALT 6* 7*   AST 10 14      Latest Reference Range & Units 05/28/22 11:15   Fluid Color  Yellow   Fluid Appearance  Clear   WBC, Body Fluid /cu mm 3065   Body Fluid Type  Pleural Fluid, Left   Segs, Fluid % 41   Lymphs, Fluid % 14   Monocytes/Macrophages, Fluid % 22   Eos, Fluid % 23   Glucose, Fluid Not established mg/dL 102   LD, Fluid Not established U/L 241   Body Fluid Source, Glucose  Pleural Fluid, Left   Body Fluid Source, LDH  Pleural Fluid, Left   Body Fluid Source, Total Protein  Pleural Fluid, Left   Body Fluid, Protein Not established g/dL 2.6      Latest  Reference Range & Units 05/07/22 09:05 05/21/22 10:22   Fluid Appearance  Hazy Cloudy   WBC, Body Fluid /cu mm 88967 [1] 730867 (C) [2]   Body Fluid Type  Pleural Fluid, Right Pleural Fluid, Right   Segs, Fluid % 93 88   Lymphs, Fluid % 3 7   Monocytes/Macrophages, Fluid % 4 5   Amylase, Fluid Not established U/L 34 [3]    Glucose, Fluid Not established mg/dL 29 [4]    LD, Fluid Not established U/L 1077 [5]    Body Fluid, Albumin See text g/dL 1.3 [6]    Body Fluid Source Amylase  Pleural Fluid, Right    Body Fluid Source, Albumin  Pleural Fluid, Right    Body Fluid Source, Glucose  Pleural Fluid, Right    Body Fluid Source, LDH  Pleural Fluid, Right    Body Fluid Source, Total Protein  Pleural Fluid, Right    Body Fluid, Protein Not established g/dL 3.7 [7]    Cholesterol, Body Fluid See Comment mg/dL 79 [8]          Significant Imaging:       Chest x-ray 05/30/2022    TECHNIQUE:  Single frontal view of the chest was performed.     COMPARISON:  05/28/2022     FINDINGS:  Right-sided PICC line tip overlies the SVC in good position.  Heart size is normal.  Right pleural catheter not seen on today's exam.  Again there patchy infiltrates within the right mid lower lung zone.  Cannot exclude small right pleural effusion.  No pneumothorax.    In comparison to the prior study, there is no adverse interval changes.             CT chest without contrast 06/01/2022    Narrative & Impression  EXAMINATION:  CT CHEST WITHOUT CONTRAST     CLINICAL HISTORY:  Pneumonia, unresolved;     TECHNIQUE:  Low-dose axial images acquired from the chest without the use of intravenous contrast.  Sagittal and coronal reformats, as well as axial MIPS were generated for further review.  All CT scans at this location are performed using dose modulation techniques as appropriate to a performed exam including the following: Automated exposure control; adjustment of the mA and/or kV according to patient size (this includes techniques or standardized  protocols for targeted exams where dose is matched to indication/reason for exam; i. e. extremities or head); use of iterative reconstruction technique.     COMPARISON:  Chest radiograph 05/30/2022.  CT chest 05/20/2022.     FINDINGS:  Base of Neck: No significant abnormality. Right-sided central venous catheter with tip terminating at the distal SVC.     Aorta: Nonaneurysmal without significant atherosclerotic calcification.     Heart/pericardium: Upper limits normal size heart without significant pericardial fluid.  No significant coronary calcification.     Esha/Mediastinum: Few upper limits normal size mediastinal lymph nodes.     Upper Abdomen: No acute abnormality of the partially imaged upper abdomen.     Thoracic soft tissues: Anasarca.     Bones: No acute fracture. No suspicious lytic or sclerotic lesion.     Airways: Patent.     Lungs/pleura: Continued evolution of the septic emboli with multiple cavitary nodules scattered throughout the lungs.  There are well-defined air-fluid collections in posterior aspect of the right upper and lower lobes measuring 4.5 x 9.5 cm and 5.5 x 10.0 cm, respectively.  Alternatively, this could represent loculated empyema.  These 2 collections appear to communicate.  Overall appearance of the chest is similar when compared to the recent prior CT examination.  Findings concerning for necrotizing pneumonia, as previously noted.  There is a medium sized left-sided pleural effusion with associated volume loss.     Impression:     Evolving findings concerning for septic emboli and necrotizing pneumonia in the posterior aspect of the right lung versus empyema, as described above.                         CT chest without contrast 05/20/2022    Narrative & Impression  EXAMINATION:  CT CHEST WITHOUT CONTRAST     CLINICAL HISTORY:  Empyema;     TECHNIQUE:  Low dose axial images, sagittal and coronal reformations were obtained from the thoracic inlet to the lung bases. Contrast was not  administered.  All CT scans at this facility are performed using dose optimization techniques including the following: automated exposure control; adjustment of the mA and/or kV; use of iterative reconstruction technique.     COMPARISON:  CTA chest non coronary 05/06/2022, chest radiograph 05/20/2022, MRI thoracic spine 05/19/2022     FINDINGS:  Base of Neck: There is diffuse body wall edema.  Unremarkable, noting the presence of AA PICC line which terminates at the superior cavoatrial junction.     Thoracic soft tissues: There is diffuse body wall edema.     Aorta: Left-sided aortic arch.  No aneurysm and no significant atherosclerosis     Heart: Normal size. No effusion.  No significant coronary atherosclerosis.     Pulmonary vasculature: Pulmonary arteries distribute normally.     Esha/Mediastinum: There are few prominent mediastinal lymph nodes with a prevascular node measuring 0.8 cm in short axis.     Airways: Patent.     Lungs/Pleura: There are multiple bilateral consolidative densities scattered throughout the lungs, many of which demonstrate cavitation, again suggesting septic emboli.  There is small left pleural fluid, which appears simple and layers dependently.  There is a loculated moderate right pleural effusion, which appears similar in size to the comparison exam however now containing air, which may be due to empyema.  There is adjacent atelectatic change within the bilateral lower lobes.  Overall number and size of consolidative densities has increased since the comparison exam.     Esophagus: Normal.     Upper Abdomen: There is persistent wedge-shaped hypodensity within the spleen, suggesting infarct.     Bones: No acute fracture. No suspicious lytic or sclerotic lesions.     Impression:     Interval progression with increased number and size of consolidative cavitary densities throughout the lungs, most suggestive of septic emboli with larger opacities concerning for necrotizing pneumonia.      Stable size of loculated right moderate pleural effusion, now containing air compatible with hydropneumothorax as reported on prior MRI.  Interval development of air is concerning for empyema noting limited evaluation without intravenous contrast.  New small simple appearing left pleural effusion.     Wedge-shaped splenic hypodensity, suggesting infarction.     Anasarca.

## 2022-06-01 NOTE — ASSESSMENT & PLAN NOTE
5/20 Suspect empyema, needs chest tube placement  5/21 s/p chest tube placement, adequate expansion - will check Chest X Ray in aa  5/23 tPA DNase.  5/24 tPA for DNase installation via chest tube.  Monitor chest tube output.  Repeat chest x-ray in a.m..  5/25 chest tube output increased from 50 mL on average per 24 hours to 160 mL last 24 hours.  Purulent drainage noted.  Will repeat tPA plus Dnase instillation.  5/26 chest x-ray reviewed.  Continue IV oxacillin.  Right-sided pigtail in place.  Status post tPA and DNase x2.  Bloody drainage around pigtail today status post tPA and DNase yesterday.  Will hold on fibrinolysis.  Monitor chest tube output  5/27 right pigtail in place.  225 mL  Last 24 hours average of 200 mL per day.  Change pleurovac canister to better assess the color of the fluid pus versus serosanguineous.  Keep pigtail in place for now.  Ultrasound bilateral chest assess pleural effusions.  Continue oxacillin drip.  5/28 keep chest tube on the right side for now.  Repeat CRP.  Checking chemistry microbiology  on left-sided pleural fluid to rule out complicated effusion and need for pigtail.  Continue oxacillin drip.   5/29 minimal output right chest tube.  Remove chest tube.  Left-sided effusion not complicated not empyema.  Continue IV oxacillin    6/1 evolving empyema/necrosis on right lung on CT scan of the chest 06/01/2022.  Continue IV oxacillin.  Re-consult cardiothoracic surgery.

## 2022-06-01 NOTE — CONSULTS
Spoke with Shanta at [facility].  Record still shows only SNF approval.  Need to resubmit if we are now requesting LTAC.  Advised Radha with [facility].

## 2022-06-01 NOTE — ASSESSMENT & PLAN NOTE
Complicated by PNA, possible empyema  SP Thora  Cont Abx  5/20/2022 Probable empyema,needs chest tube drainage  5/22 chest tube draining  5/23 60 cc drained yesterday.  Possible tPA and DNase tomorrow.  Continue IV oxacillin.  5/24 monitor chest tube output post tPA and DNase instillation today PA chest tube  5/26 chest x-ray reviewed.  Continue IV oxacillin.  Right-sided pigtail in place.  Status post tPA and DNase x2.  Bloody drainage around pigtail today status post tPA and DNase yesterday.  Will hold on fibrinolysis.  Monitor chest tube output  5/27 right pigtail in place.  225 mL  Last 24 hours average of 200 mL per day.  Change pleurovac canister to better assess the color of the fluid pus versus serosanguineous.  Keep pigtail in place for now.  Ultrasound bilateral chest assess pleural effusions.  Continue oxacillin drip.  5/28 keep chest tube on the right side for now.  Repeat CRP.  Checking chemistry microbiology  on left-sided pleural fluid to rule out complicated effusion and need for pigtail.  Continue oxacillin drip.   5/29 minimal output right chest tube.  Remove chest tube.  Left-sided effusion not complicated not empyema.  Continue IV oxacillin   5/30 oxacillin total of 8 weeks.  Chest x-ray stable.  Right-sided pigtail removed.  No evidence of empyema on the left side pleural fluid.  5/31 Continue above, will sign off  6/1 worsening and evolving empyema right lung.  Reconsult cardiothoracic surgery.  Continue IV oxacillin.

## 2022-06-01 NOTE — PLAN OF CARE
"Plan of care reviewed with patient, pt verbalized understanding.  Pt remains free from falls this shift, safety, airborne, contact, and droplet precautions in place.bed alarm set.  Pt remains free from skin breakdown, pt educated on the importance of frequent weight shift to decrease the risk of pressure injury. Pt verbalized understanding teaching and outcome.  AAOx4,NAD noted at this time.  /67 (BP Location: Left arm, Patient Position: Lying)   Pulse 76   Temp 98.5 °F (36.9 °C) (Oral)   Resp 17   Ht 5' 5" (1.651 m)   Wt 98.3 kg (216 lb 11.4 oz)   LMP 07/08/2019 Comment: provera  SpO2 99%   Breastfeeding No   BMI 36.06 kg/m²    R PICC  Pt remained afebrile.  NS on the tele monitor  Pt admitted for Acute bacterial endocarditis.  Pt currently resting comfortably in bed.  Hourly rounding complete. Bed in lowest position, side rails up, call light in reach.      "

## 2022-06-01 NOTE — ASSESSMENT & PLAN NOTE
- COVID-19 testing   - Infection Control notified     - Isolation:   - Airborne, Contact and Droplet Precautions  - Cohort patients into COVID units  - N95 mask, wear eye protection  - 20 second hand hygiene              - Limit visitors per hospital policy              - Consolidating lab draws, nursing care, provider visits, and interventions    - Diagnostics: (leukopenia, hyponatremia, hyperferritinemia, elevated troponin, elevated d-dimer, age, and comorbidities are significant predictors of poor clinical outcome)  CBC, CMP, Ferritin, CRP and Portable CXR    - Management:  Supplemental O2 to maintain SpO2 >92%  Telemetry  Continuous/intermittent Pulse Ox  Albuterol treatment PRN  Remdesivir added    Advance Care Planning   Patient is a full code.

## 2022-06-01 NOTE — ASSESSMENT & PLAN NOTE
Pulmonology consulted  Status post thoracentesis 5/7/22   continue CT management per pulmonology    5/29/22  Body fluid culture currently negative.     5/30/22  CT removed    6/1/22   Febrile CT shows evolving empyema/necrotizing pneumonia. pulmonology recommends reconsulting CVT.

## 2022-06-02 ENCOUNTER — ANESTHESIA EVENT (OUTPATIENT)
Dept: SURGERY | Facility: HOSPITAL | Age: 37
DRG: 853 | End: 2022-06-02
Payer: MEDICARE

## 2022-06-02 PROBLEM — Z72.0 TOBACCO ABUSE: Status: RESOLVED | Noted: 2019-10-01 | Resolved: 2022-06-02

## 2022-06-02 PROBLEM — B96.20 E. COLI UTI: Status: RESOLVED | Noted: 2022-05-27 | Resolved: 2022-06-02

## 2022-06-02 PROBLEM — J18.9 PNA (PNEUMONIA): Status: RESOLVED | Noted: 2022-05-06 | Resolved: 2022-06-02

## 2022-06-02 PROBLEM — N39.0 E. COLI UTI: Status: RESOLVED | Noted: 2022-05-27 | Resolved: 2022-06-02

## 2022-06-02 LAB
ALBUMIN SERPL BCP-MCNC: 1.6 G/DL (ref 3.5–5.2)
ALP SERPL-CCNC: 71 U/L (ref 55–135)
ALT SERPL W/O P-5'-P-CCNC: 6 U/L (ref 10–44)
ANION GAP SERPL CALC-SCNC: 9 MMOL/L (ref 8–16)
AST SERPL-CCNC: 15 U/L (ref 10–40)
BASOPHILS # BLD AUTO: 0.02 K/UL (ref 0–0.2)
BASOPHILS NFR BLD: 0.4 % (ref 0–1.9)
BILIRUB SERPL-MCNC: 0.2 MG/DL (ref 0.1–1)
BUN SERPL-MCNC: 29 MG/DL (ref 6–20)
CALCIUM SERPL-MCNC: 7.4 MG/DL (ref 8.7–10.5)
CHLORIDE SERPL-SCNC: 104 MMOL/L (ref 95–110)
CO2 SERPL-SCNC: 24 MMOL/L (ref 23–29)
CREAT SERPL-MCNC: 2 MG/DL (ref 0.5–1.4)
DIFFERENTIAL METHOD: ABNORMAL
EOSINOPHIL # BLD AUTO: 0 K/UL (ref 0–0.5)
EOSINOPHIL NFR BLD: 0 % (ref 0–8)
ERYTHROCYTE [DISTWIDTH] IN BLOOD BY AUTOMATED COUNT: 15.9 % (ref 11.5–14.5)
EST. GFR  (AFRICAN AMERICAN): 35 ML/MIN/1.73 M^2
EST. GFR  (NON AFRICAN AMERICAN): 31 ML/MIN/1.73 M^2
FERRITIN SERPL-MCNC: 313 NG/ML (ref 20–300)
GLUCOSE SERPL-MCNC: 112 MG/DL (ref 70–110)
HCT VFR BLD AUTO: 21 % (ref 37–48.5)
HGB BLD-MCNC: 6.6 G/DL (ref 12–16)
IMM GRANULOCYTES # BLD AUTO: 0.03 K/UL (ref 0–0.04)
IMM GRANULOCYTES NFR BLD AUTO: 0.7 % (ref 0–0.5)
LYMPHOCYTES # BLD AUTO: 1.5 K/UL (ref 1–4.8)
LYMPHOCYTES NFR BLD: 33.4 % (ref 18–48)
MCH RBC QN AUTO: 26.7 PG (ref 27–31)
MCHC RBC AUTO-ENTMCNC: 31.4 G/DL (ref 32–36)
MCV RBC AUTO: 85 FL (ref 82–98)
MONOCYTES # BLD AUTO: 0.4 K/UL (ref 0.3–1)
MONOCYTES NFR BLD: 8.8 % (ref 4–15)
NEUTROPHILS # BLD AUTO: 2.6 K/UL (ref 1.8–7.7)
NEUTROPHILS NFR BLD: 56.7 % (ref 38–73)
NRBC BLD-RTO: 0 /100 WBC
PLATELET # BLD AUTO: 277 K/UL (ref 150–450)
PMV BLD AUTO: 8.9 FL (ref 9.2–12.9)
POTASSIUM SERPL-SCNC: 4.8 MMOL/L (ref 3.5–5.1)
PROT SERPL-MCNC: 5.5 G/DL (ref 6–8.4)
RBC # BLD AUTO: 2.47 M/UL (ref 4–5.4)
SODIUM SERPL-SCNC: 137 MMOL/L (ref 136–145)
WBC # BLD AUTO: 4.55 K/UL (ref 3.9–12.7)

## 2022-06-02 PROCEDURE — 25000003 PHARM REV CODE 250: Performed by: INTERNAL MEDICINE

## 2022-06-02 PROCEDURE — 21400001 HC TELEMETRY ROOM

## 2022-06-02 PROCEDURE — 82728 ASSAY OF FERRITIN: CPT | Performed by: NURSE PRACTITIONER

## 2022-06-02 PROCEDURE — 94640 AIRWAY INHALATION TREATMENT: CPT

## 2022-06-02 PROCEDURE — 85025 COMPLETE CBC W/AUTO DIFF WBC: CPT | Performed by: FAMILY MEDICINE

## 2022-06-02 PROCEDURE — 99233 PR SUBSEQUENT HOSPITAL CARE,LEVL III: ICD-10-PCS | Mod: ,,, | Performed by: INTERNAL MEDICINE

## 2022-06-02 PROCEDURE — 99900035 HC TECH TIME PER 15 MIN (STAT)

## 2022-06-02 PROCEDURE — 94761 N-INVAS EAR/PLS OXIMETRY MLT: CPT

## 2022-06-02 PROCEDURE — 27000646 HC AEROBIKA DEVICE

## 2022-06-02 PROCEDURE — 63600175 PHARM REV CODE 636 W HCPCS: Performed by: INTERNAL MEDICINE

## 2022-06-02 PROCEDURE — 80053 COMPREHEN METABOLIC PANEL: CPT | Performed by: FAMILY MEDICINE

## 2022-06-02 PROCEDURE — 27000221 HC OXYGEN, UP TO 24 HOURS

## 2022-06-02 PROCEDURE — 63600175 PHARM REV CODE 636 W HCPCS: Performed by: FAMILY MEDICINE

## 2022-06-02 PROCEDURE — 27000207 HC ISOLATION

## 2022-06-02 PROCEDURE — 94664 DEMO&/EVAL PT USE INHALER: CPT

## 2022-06-02 PROCEDURE — 99233 SBSQ HOSP IP/OBS HIGH 50: CPT | Mod: NSCH,,, | Performed by: INTERNAL MEDICINE

## 2022-06-02 PROCEDURE — 25000003 PHARM REV CODE 250: Performed by: NURSE PRACTITIONER

## 2022-06-02 PROCEDURE — 63600175 PHARM REV CODE 636 W HCPCS: Performed by: NURSE PRACTITIONER

## 2022-06-02 PROCEDURE — 99233 PR SUBSEQUENT HOSPITAL CARE,LEVL III: ICD-10-PCS | Mod: NSCH,,, | Performed by: INTERNAL MEDICINE

## 2022-06-02 PROCEDURE — 99233 SBSQ HOSP IP/OBS HIGH 50: CPT | Mod: ,,, | Performed by: INTERNAL MEDICINE

## 2022-06-02 PROCEDURE — 25000003 PHARM REV CODE 250: Performed by: FAMILY MEDICINE

## 2022-06-02 RX ORDER — MORPHINE SULFATE 30 MG/1
30 TABLET, FILM COATED, EXTENDED RELEASE ORAL EVERY 12 HOURS
Status: DISCONTINUED | OUTPATIENT
Start: 2022-06-02 | End: 2022-06-10 | Stop reason: HOSPADM

## 2022-06-02 RX ORDER — MORPHINE SULFATE 4 MG/ML
4 INJECTION, SOLUTION INTRAMUSCULAR; INTRAVENOUS ONCE
Status: COMPLETED | OUTPATIENT
Start: 2022-06-02 | End: 2022-06-02

## 2022-06-02 RX ADMIN — ENOXAPARIN SODIUM 100 MG: 100 INJECTION SUBCUTANEOUS at 10:06

## 2022-06-02 RX ADMIN — ALBUTEROL SULFATE 2 PUFF: 90 AEROSOL, METERED RESPIRATORY (INHALATION) at 08:06

## 2022-06-02 RX ADMIN — REMDESIVIR 100 MG: 100 INJECTION, POWDER, LYOPHILIZED, FOR SOLUTION INTRAVENOUS at 10:06

## 2022-06-02 RX ADMIN — ENOXAPARIN SODIUM 100 MG: 100 INJECTION SUBCUTANEOUS at 08:06

## 2022-06-02 RX ADMIN — OXYCODONE HYDROCHLORIDE AND ACETAMINOPHEN 500 MG: 500 TABLET ORAL at 10:06

## 2022-06-02 RX ADMIN — TRAZODONE HYDROCHLORIDE 100 MG: 100 TABLET ORAL at 10:06

## 2022-06-02 RX ADMIN — MORPHINE SULFATE 30 MG: 30 TABLET, FILM COATED, EXTENDED RELEASE ORAL at 10:06

## 2022-06-02 RX ADMIN — ONDANSETRON 4 MG: 2 INJECTION INTRAMUSCULAR; INTRAVENOUS at 09:06

## 2022-06-02 RX ADMIN — ONDANSETRON 4 MG: 2 INJECTION INTRAMUSCULAR; INTRAVENOUS at 04:06

## 2022-06-02 RX ADMIN — ALBUTEROL SULFATE 2 PUFF: 90 AEROSOL, METERED RESPIRATORY (INHALATION) at 02:06

## 2022-06-02 RX ADMIN — LORAZEPAM 1 MG: 1 TABLET ORAL at 10:06

## 2022-06-02 RX ADMIN — FAMOTIDINE 20 MG: 20 TABLET ORAL at 08:06

## 2022-06-02 RX ADMIN — THERA TABS 1 TABLET: TAB at 08:06

## 2022-06-02 RX ADMIN — ALBUTEROL SULFATE 2 PUFF: 90 AEROSOL, METERED RESPIRATORY (INHALATION) at 07:06

## 2022-06-02 RX ADMIN — OXACILLIN SODIUM 12 G: 10 INJECTION, POWDER, FOR SOLUTION INTRAVENOUS at 04:06

## 2022-06-02 RX ADMIN — OXYCODONE HYDROCHLORIDE AND ACETAMINOPHEN 500 MG: 500 TABLET ORAL at 08:06

## 2022-06-02 RX ADMIN — SODIUM CHLORIDE: 0.9 INJECTION, SOLUTION INTRAVENOUS at 12:06

## 2022-06-02 RX ADMIN — MORPHINE SULFATE 4 MG: 4 INJECTION INTRAVENOUS at 11:06

## 2022-06-02 RX ADMIN — SODIUM CHLORIDE: 0.9 INJECTION, SOLUTION INTRAVENOUS at 04:06

## 2022-06-02 RX ADMIN — ALBUTEROL SULFATE 2 PUFF: 90 AEROSOL, METERED RESPIRATORY (INHALATION) at 01:06

## 2022-06-02 RX ADMIN — MORPHINE SULFATE 15 MG: 15 TABLET, EXTENDED RELEASE ORAL at 08:06

## 2022-06-02 NOTE — PROGRESS NOTES
The patient is a 37-year-old female IV drug abuser who was admitted with sepsis, necrotizing pneumonia and tricuspid valve endocarditis.  Patient was also treated for a right-sided empyema with pigtail catheter placement and DNA is instillation.  Most recent CT scan shows hydropneumothorax of the right chest suggesting persistent empyema.  In addition, patient tested positive for COVID.  Patient is currently on remdesivir for COVID treatment.    In light of patient's persistent right hydropneumothorax, the patient is a candidate for VATS exploration of the chest with possible evacuation of empyema and decortication.  Patient is currently appropriate antibiotics.  Currently, patient is being actively treated for COVID with remdesivir.  Therefore, VATS procedure will be delayed till Monday next week.  Patient will be scheduled for VATS on 06/06/2022

## 2022-06-02 NOTE — SUBJECTIVE & OBJECTIVE
Interval History: CVT consult on case.  Plan for VATS procedure on Monday given patient's COVID status.  Continue remdesivir.  Continue oxacillin.    Patient complains of pain this morning.  States she has nose bleed.  Also reports hemoptysis.    Review of Systems   Constitutional:  Positive for activity change, appetite change and fatigue. Negative for chills, diaphoresis, fever and unexpected weight change.   HENT:  Negative for congestion, hearing loss, mouth sores, postnasal drip, rhinorrhea, sore throat and trouble swallowing.    Eyes:  Negative for discharge and visual disturbance.   Respiratory:  Negative for cough and chest tightness.    Cardiovascular:  Negative for chest pain, palpitations and leg swelling.   Gastrointestinal:  Negative for blood in stool, constipation, diarrhea, nausea and vomiting.   Endocrine: Negative for cold intolerance and heat intolerance.   Genitourinary:  Negative for difficulty urinating, dyspareunia, flank pain and hematuria.   Musculoskeletal:  Positive for back pain and myalgias. Negative for arthralgias.   Skin: Negative.    Neurological:  Positive for weakness. Negative for dizziness and light-headedness.   Hematological:  Negative for adenopathy. Does not bruise/bleed easily.   Psychiatric/Behavioral:  Negative for agitation, behavioral problems, confusion and sleep disturbance. The patient is nervous/anxious.    Objective:     Vital Signs (Most Recent):  Temp: 98.9 °F (37.2 °C) (06/02/22 0819)  Pulse: 87 (06/02/22 0819)  Resp: 18 (06/02/22 0852)  BP: 133/82 (06/02/22 0819)  SpO2: 99 % (06/02/22 0819) Vital Signs (24h Range):  Temp:  [98.5 °F (36.9 °C)-100.7 °F (38.2 °C)] 98.9 °F (37.2 °C)  Pulse:  [] 87  Resp:  [16-20] 18  SpO2:  [93 %-100 %] 99 %  BP: (131-141)/(74-83) 133/82     Weight: 98.7 kg (217 lb 9.5 oz)  Body mass index is 36.21 kg/m².    Intake/Output Summary (Last 24 hours) at 6/2/2022 1104  Last data filed at 6/2/2022 1023  Gross per 24 hour   Intake 600  [facility] Hospitalist Progress Note     CC: Hospital Follow up    PCP: None Pcp       Assessment/Plan:     Principal Problem:    Lung mass  Active Problems:    Swelling of lower extremity    Hypoxia    Laura Luz 66year old female with PMH sig- unknown (ha ml   Output 2500 ml   Net -1900 ml      Physical Exam  Vitals and nursing note reviewed.   Constitutional:       General: She is not in acute distress.     Appearance: She is well-developed. She is ill-appearing.   HENT:      Head: Normocephalic and atraumatic.      Nose: Nose normal.   Eyes:      Extraocular Movements: Extraocular movements intact.   Cardiovascular:      Rate and Rhythm: Normal rate and regular rhythm.   Pulmonary:      Effort: Pulmonary effort is normal.      Breath sounds: No stridor. Rhonchi present.      Comments: Right-side  Pigtail removed  Abdominal:      General: There is no distension.   Musculoskeletal:         General: No signs of injury.      Cervical back: Normal range of motion and neck supple.   Skin:     General: Skin is dry.   Neurological:      General: No focal deficit present.      Mental Status: She is alert and oriented to person, place, and time. Mental status is at baseline.   Psychiatric:         Behavior: Behavior normal.       Significant Labs: All pertinent labs within the past 24 hours have been reviewed.    Significant Imaging: I have reviewed all pertinent imaging results/findings within the past 24 hours.     °C)  Pulse:  [] 86  Resp:  [18-20] 18  BP: (106-123)/(51-60) 106/53      Intake/Output:    Intake/Output Summary (Last 24 hours) at 12/22/2019 1342  Last data filed at 12/22/2019 1248  Gross per 24 hour   Intake 354 ml   Output 4550 ml   Net -4196 ml 3. Layering left pleural effusion, significantly increased from previous. 4. Diffuse pulmonary interstitial edema.     Dictated by (CST): Apoorva Hutton MD on 12/22/2019 at 11:40     Approved by (CST): Apoorva Hutton MD on 12/22/2019 at 11:43

## 2022-06-02 NOTE — ASSESSMENT & PLAN NOTE
6/1 - Isolation:   - Airborne, Contact and Droplet Precautions  - Cohort patients into COVID units  - N95 masks must be fit tested, wear eye protection  - 20 second hand hygiene              - Limit visitors per hospital policy              - Consolidating lab draws, nursing care, provider visits, and interventions  IV remdesivir ID follow-up  6/2 same

## 2022-06-02 NOTE — PROGRESS NOTES
[facility] - Med Surg  Adult Nutrition  Progress Note    SUMMARY     Recommendations  1) Continue diet as prescribed: regular and encourage PO intake  2) RD to follow up to monitor intake, tolerance, and labs.      Goals: Pt to meet greater than or equal to 75% EEN by RD follow up  Nutrition Goal Status: goal met, continues  Communication of RD Recs: POC, sticky note    Assessment and Plan  Nutrition Problem  Inadequate oral intake  Related to (etiology):   Decreased appetite  Signs and Symptoms (as evidenced by):   PO intake 0-75% meals per chart  Interventions(treatment strategy):  Collaboration with other providers  Fat, Na, DM modified diet   Nutrition Diagnosis Status:   Improving    Physical Findings/Malnutrition Assessment  Patient does not meet at least 2 ASPEN criteria for malnutrition at this time.   Will continue to monitor.    N/V:   not indicated   Wounds: not indicated   Edema: 1+ dependent, hands, legs, knees, ankles, feet --> remains -->: 1+ dependent, generalized, hands, legs, knees, ankles, feet    Last Bowel Movement: 06/01/22 Stool Consistency: watery, loose    Mouth/Teeth WDL: WDL except, other symptoms Teeth Symptoms: tooth/teeth missing  O2 Device (Oxygen Therapy): nasal cannula   Hand , Left: moderate  Jose Score: 18  NFPE not performed, pt well nourished/obese    Reason for Assessment  Reason For Assessment: RD f/u  Diagnosis: infection/sepsis (bacteremia) --> Acute bacterial endocarditis  Relevant Medical History: asthma, COPD,Bipolar, IVDU, anxiety, HLD    General Information Comments:     5/16: Remote assessment for coverage. Unable to reach pt on room phone - line busy. Reached out to RN via secure chat - reports she is doing okay with PO intake. Per chart, eating 0-50% meals, with 15 lbs wt gain since admit (+ 4.3 L in I/Os), possibly fluid related. Per MD note pt +appetite change and + abdominal pain and distension. LBM 5/15. JHON NFPE due to remote assesment. RD to monitor and follow  "up.    5/23: Pt with mostly 100% PO intake since last assessment, will dc ONS and Will continue to monitor.      5/30: Pt with decreased PO intake since last assessment, 0% x 2 days. 20 lb weight gain while IP, trace edema noted. Continue with current nutritional plan and encourage PO intake. Will continue to monitor. Awaiting SNF placement.     6/2: Pt now COVID+, improved appetite and intake since last assessment. ONS has been d/c'd and % intake per EMR. Weight trending up. RD spoke with CA who says pt is picky but they have now identified some options she prefers and tolerating. Will continue to monitor.      Nutrition Discharge Planning: regular general, healthful diet    Nutrition Risk Screen  Nutrition Risk Screen: no indicators present    Nutrition/Diet History  Spiritual, Cultural Beliefs, Jew Practices, Values that Affect Care: no  Food Allergies: NKFA  Factors Affecting Nutritional Intake: None identified at this time    Anthropometrics  Temp: 98.9 °F (37.2 °C)  Height Method: Stated  Height: 5' 5" (165.1 cm)  Height (inches): 65 in  Weight Method: Bed Scale  Weight: 98.7 kg (217 lb 9.5 oz)  Weight (lb): 217.6 lb  Ideal Body Weight (IBW), Female: 125 lb  BMI (Calculated): 36.2  BMI Grade: 35 - 39.9 - obesity - grade II     Labs  Pertinent Labs: reviewed  Lab Results   Component Value Date    ALBUMIN 1.5 (L) 06/01/2022    HGB 7.4 (L) 06/01/2022    HCT 23.4 (L) 06/01/2022    WBC 6.97 06/01/2022    CALCIUM 7.7 (L) 06/01/2022     Lab Results   Component Value Date     06/01/2022    K 3.9 06/01/2022    PHOS 5.4 (H) 05/21/2022    BUN 20 06/01/2022    CREATININE 1.9 (H) 06/01/2022    ESTGFRAFRICA 38 (A) 06/01/2022    EGFRNONAA 33 (A) 06/01/2022     No results found for: POCTGLUCOSE  Lab Results   Component Value Date    HGBA1C 5.3 07/08/2020       Lab Results   Component Value Date    ALT 7 (L) 06/01/2022    AST 14 06/01/2022    ALKPHOS 69 06/01/2022    BILITOT 0.3 06/01/2022     No results " found for: HDL, LDL, CHOL, TRIG    Meds  Pertinent Medications: reviewed    albuterol  2 puff Inhalation Q6H    ascorbic acid (vitamin C)  500 mg Oral BID    enoxaparin  1 mg/kg Subcutaneous BID    famotidine  20 mg Oral Daily    morphine  15 mg Oral Q12H    multivitamin  1 tablet Oral Daily    oxacillin 12 g in  mL CONTINUOUS INFUSION  12 g Intravenous Q24H    remdesivir infusion  100 mg Intravenous Daily    traZODone  100 mg Oral QHS     Continuous Infusions:   sodium chloride 0.9% 75 mL/hr at 06/02/22 0020     PRN Meds:sodium chloride, sodium chloride, sodium chloride, acetaminophen, albuterol-ipratropium, dextrose 10%, dextrose 10%, glucagon (human recombinant), glucose, glucose, LORazepam, naloxone, ondansetron, oxyCODONE, sodium chloride 0.9%, sodium chloride 0.9%, sodium chloride 0.9%      Estimated/Assessed Needs  Weight Used For Calorie Calculations: 97.1 kg (214 lb 1.1 oz)  Energy Calorie Requirements (kcal): 1646 (no af, obese)  Energy Need Method: [facility]  Protein Requirements: 56-68 (1-1.2g/kg)  Weight Used For Protein Calculations: 56.7 kg (125 lb) (IBW)  Fluid Requirements (mL): 1 mL/kcal or per MD  Estimated Fluid Requirement Method: RDA Method  RDA Method (mL): 1646  CHO Requirement: 205g 50% CHO    Nutrition Prescription Ordered  Current Diet Order: regular diet    Evaluation of Received Nutrient/Fluid Intake  Energy Calories Required: meeting needs  Fluid Required: meeting needs  Tolerance: tolerating  % Intake of Estimated Energy Needs: 75 - 100 %  % Meal Intake: 75 - 100 %    Monitor and Evaluation  Food and Nutrient Intake: energy intake, food and beverage intake  Food and Nutrient Adminstration: diet order  Anthropometric Measurements: weight change, weight, BMI  Biochemical Data, Medical Tests and Procedures: electrolyte and renal panel, lipid profile, gastrointestinal profile, glucose/endocrine profile, inflammatory profile  Nutrition-Focused Physical Findings:  overall appearance    Nutrition Follow-Up  Level of nutrition risk: low-moderate / Once weekly   Miriam Tamez, MS, RD, LDN

## 2022-06-02 NOTE — SUBJECTIVE & OBJECTIVE
Interval History:   6/2 seen and examined.  T-max 100°.  O2 sat 98% on 2 liter/minute.  Complains of SOB diffuse pain requesting pain medicine.  Decreased activity and appetite.  Review of Systems   Constitutional:  Positive for malaise/fatigue and weight loss. Negative for chills and fever.   HENT:  Negative for hearing loss and nosebleeds.    Eyes:  Negative for discharge.   Respiratory:  Positive for cough, sputum production and shortness of breath.    Cardiovascular:  Positive for leg swelling. Negative for chest pain.   Gastrointestinal:  Negative for abdominal pain.   Genitourinary:  Negative for hematuria.   Musculoskeletal:  Negative for falls.   Skin:  Negative for itching.   Neurological:  Positive for weakness. Negative for speech change, seizures and loss of consciousness.   Endo/Heme/Allergies:  Negative for polydipsia. Does not bruise/bleed easily.   Psychiatric/Behavioral:  Negative for hallucinations.        Objective:     Vital Signs (Most Recent):  Temp: 98.9 °F (37.2 °C) (06/02/22 1137)  Pulse: 72 (06/02/22 1412)  Resp: 18 (06/02/22 1412)  BP: (!) 151/80 (06/02/22 1137)  SpO2: 98 % (06/02/22 1412)   Vital Signs (24h Range):  Temp:  [98.9 °F (37.2 °C)-100.2 °F (37.9 °C)] 98.9 °F (37.2 °C)  Pulse:  [] 72  Resp:  [16-20] 18  SpO2:  [94 %-100 %] 98 %  BP: (132-151)/(74-83) 151/80     Weight: 98.7 kg (217 lb 9.5 oz)  Body mass index is 36.21 kg/m².      Intake/Output Summary (Last 24 hours) at 6/2/2022 1613  Last data filed at 6/2/2022 1400  Gross per 24 hour   Intake 720 ml   Output 2950 ml   Net -2230 ml         Physical Exam  Vitals and nursing note reviewed.   Constitutional:       General: She is not in acute distress.     Appearance: She is well-developed. She is ill-appearing.   HENT:      Head: Normocephalic and atraumatic.      Nose: Nose normal.   Eyes:      Extraocular Movements: Extraocular movements intact.   Cardiovascular:      Rate and Rhythm: Normal rate and regular rhythm.    Pulmonary:      Effort: Pulmonary effort is normal.      Breath sounds: No stridor. Rhonchi present.   Abdominal:      General: There is no distension.   Musculoskeletal:         General: No signs of injury.      Cervical back: Normal range of motion and neck supple.   Skin:     General: Skin is dry.   Neurological:      General: No focal deficit present.      Mental Status: She is alert and oriented to person, place, and time. Mental status is at baseline.       Vents:  Oxygen Concentration (%): 32 (06/02/22 0104)    Lines/Drains/Airways       Peripherally Inserted Central Catheter Line  Duration             PICC Double Lumen 05/17/22 1612 right basilic 16 days              Drain  Duration             Female External Urinary Catheter 05/21/22 0700 12 days                    Significant Labs:    CBC/Anemia Profile:  Recent Labs   Lab 05/31/22  1729 06/01/22  0512   WBC  --  6.97   HGB  --  7.4*   HCT  --  23.4*   PLT  --  345   MCV  --  86   RDW  --  15.9*   FERRITIN 320*  --           Chemistries:  Recent Labs   Lab 06/01/22  0512      K 3.9      CO2 23   BUN 20   CREATININE 1.9*   CALCIUM 7.7*   ALBUMIN 1.5*   PROT 5.6*   BILITOT 0.3   ALKPHOS 69   ALT 7*   AST 14      Latest Reference Range & Units 05/28/22 11:15   Fluid Color  Yellow   Fluid Appearance  Clear   WBC, Body Fluid /cu mm 3065   Body Fluid Type  Pleural Fluid, Left   Segs, Fluid % 41   Lymphs, Fluid % 14   Monocytes/Macrophages, Fluid % 22   Eos, Fluid % 23   Glucose, Fluid Not established mg/dL 102   LD, Fluid Not established U/L 241   Body Fluid Source, Glucose  Pleural Fluid, Left   Body Fluid Source, LDH  Pleural Fluid, Left   Body Fluid Source, Total Protein  Pleural Fluid, Left   Body Fluid, Protein Not established g/dL 2.6      Latest Reference Range & Units 05/07/22 09:05 05/21/22 10:22   Fluid Appearance  Hazy Cloudy   WBC, Body Fluid /cu mm 55512 [1] 289789 (C) [2]   Body Fluid Type  Pleural Fluid, Right Pleural Fluid, Right    Segs, Fluid % 93 88   Lymphs, Fluid % 3 7   Monocytes/Macrophages, Fluid % 4 5   Amylase, Fluid Not established U/L 34 [3]    Glucose, Fluid Not established mg/dL 29 [4]    LD, Fluid Not established U/L 1077 [5]    Body Fluid, Albumin See text g/dL 1.3 [6]    Body Fluid Source Amylase  Pleural Fluid, Right    Body Fluid Source, Albumin  Pleural Fluid, Right    Body Fluid Source, Glucose  Pleural Fluid, Right    Body Fluid Source, LDH  Pleural Fluid, Right    Body Fluid Source, Total Protein  Pleural Fluid, Right    Body Fluid, Protein Not established g/dL 3.7 [7]    Cholesterol, Body Fluid See Comment mg/dL 79 [8]          Significant Imaging:       Chest x-ray 05/30/2022    TECHNIQUE:  Single frontal view of the chest was performed.     COMPARISON:  05/28/2022     FINDINGS:  Right-sided PICC line tip overlies the SVC in good position.  Heart size is normal.  Right pleural catheter not seen on today's exam.  Again there patchy infiltrates within the right mid lower lung zone.  Cannot exclude small right pleural effusion.  No pneumothorax.    In comparison to the prior study, there is no adverse interval changes.             CT chest without contrast 06/01/2022    Narrative & Impression  EXAMINATION:  CT CHEST WITHOUT CONTRAST     CLINICAL HISTORY:  Pneumonia, unresolved;     TECHNIQUE:  Low-dose axial images acquired from the chest without the use of intravenous contrast.  Sagittal and coronal reformats, as well as axial MIPS were generated for further review.  All CT scans at this location are performed using dose modulation techniques as appropriate to a performed exam including the following: Automated exposure control; adjustment of the mA and/or kV according to patient size (this includes techniques or standardized protocols for targeted exams where dose is matched to indication/reason for exam; i. e. extremities or head); use of iterative reconstruction technique.     COMPARISON:  Chest radiograph  05/30/2022.  CT chest 05/20/2022.     FINDINGS:  Base of Neck: No significant abnormality. Right-sided central venous catheter with tip terminating at the distal SVC.     Aorta: Nonaneurysmal without significant atherosclerotic calcification.     Heart/pericardium: Upper limits normal size heart without significant pericardial fluid.  No significant coronary calcification.     Esha/Mediastinum: Few upper limits normal size mediastinal lymph nodes.     Upper Abdomen: No acute abnormality of the partially imaged upper abdomen.     Thoracic soft tissues: Anasarca.     Bones: No acute fracture. No suspicious lytic or sclerotic lesion.     Airways: Patent.     Lungs/pleura: Continued evolution of the septic emboli with multiple cavitary nodules scattered throughout the lungs.  There are well-defined air-fluid collections in posterior aspect of the right upper and lower lobes measuring 4.5 x 9.5 cm and 5.5 x 10.0 cm, respectively.  Alternatively, this could represent loculated empyema.  These 2 collections appear to communicate.  Overall appearance of the chest is similar when compared to the recent prior CT examination.  Findings concerning for necrotizing pneumonia, as previously noted.  There is a medium sized left-sided pleural effusion with associated volume loss.     Impression:     Evolving findings concerning for septic emboli and necrotizing pneumonia in the posterior aspect of the right lung versus empyema, as described above.                         CT chest without contrast 05/20/2022    Narrative & Impression  EXAMINATION:  CT CHEST WITHOUT CONTRAST     CLINICAL HISTORY:  Empyema;     TECHNIQUE:  Low dose axial images, sagittal and coronal reformations were obtained from the thoracic inlet to the lung bases. Contrast was not administered.  All CT scans at this facility are performed using dose optimization techniques including the following: automated exposure control; adjustment of the mA and/or kV; use of  iterative reconstruction technique.     COMPARISON:  CTA chest non coronary 05/06/2022, chest radiograph 05/20/2022, MRI thoracic spine 05/19/2022     FINDINGS:  Base of Neck: There is diffuse body wall edema.  Unremarkable, noting the presence of AA PICC line which terminates at the superior cavoatrial junction.     Thoracic soft tissues: There is diffuse body wall edema.     Aorta: Left-sided aortic arch.  No aneurysm and no significant atherosclerosis     Heart: Normal size. No effusion.  No significant coronary atherosclerosis.     Pulmonary vasculature: Pulmonary arteries distribute normally.     Esha/Mediastinum: There are few prominent mediastinal lymph nodes with a prevascular node measuring 0.8 cm in short axis.     Airways: Patent.     Lungs/Pleura: There are multiple bilateral consolidative densities scattered throughout the lungs, many of which demonstrate cavitation, again suggesting septic emboli.  There is small left pleural fluid, which appears simple and layers dependently.  There is a loculated moderate right pleural effusion, which appears similar in size to the comparison exam however now containing air, which may be due to empyema.  There is adjacent atelectatic change within the bilateral lower lobes.  Overall number and size of consolidative densities has increased since the comparison exam.     Esophagus: Normal.     Upper Abdomen: There is persistent wedge-shaped hypodensity within the spleen, suggesting infarct.     Bones: No acute fracture. No suspicious lytic or sclerotic lesions.     Impression:     Interval progression with increased number and size of consolidative cavitary densities throughout the lungs, most suggestive of septic emboli with larger opacities concerning for necrotizing pneumonia.     Stable size of loculated right moderate pleural effusion, now containing air compatible with hydropneumothorax as reported on prior MRI.  Interval development of air is concerning for  empyema noting limited evaluation without intravenous contrast.  New small simple appearing left pleural effusion.     Wedge-shaped splenic hypodensity, suggesting infarction.     Anasarca.

## 2022-06-02 NOTE — PROGRESS NOTES
[facility])  Infectious Disease  Progress Note    Patient Name: Tianna Leon  MRN: [id number]  Admission Date: 5/6/2022  Length of Stay: 27 days  Attending Physician: Delmer Guzmán MD  Primary Care Provider: Spenser Campa MD    Isolation Status: Airborne and Contact and Droplet  Assessment/Plan:      * Acute bacterial endocarditis  05/31- can switch to IV Cefaz    COVID-19  - COVID-19 testing   - Infection Control notified     - Isolation:   - Airborne, Contact and Droplet Precautions  - Cohort patients into COVID units              - Limit visitors per hospital policy              - Consolidating lab draws, nursing care, provider visits, and interventions      - Management:  Supplemental O2 to maintain SpO2 >92%  Continuous/intermittent Pulse Ox  Albuterol treatment PRN     Will add Remdisivir    Advance Care Planning            Empyema of right pleural space  MRI of the thoracic region 0  1. No evidence for disc osteomyelitis in the thoracic spine.  No evidence for epidural abscess.  2. Loculated right hydropneumothorax and extensive bilateral pulmonary infiltrates.     Case discussed with critical care team- will consult pulmonology /CVT  On oxacillin  05/23- s/p chest tube placement -follow pulmonology .  Continue oxacillin    05/30- will continue Oxacillin as planned.-will complete 8 weeks of therapy .    06/02/22-  Case discussed with CVT -will plan for VATS next week.  Continue Oxacillin  IR follow up for pigtail if feasible      Pulmonary embolism, septic  Related to tricuspid endocarditis - continue Oxacillin     IVDU (intravenous drug user)  This is the crux of the problem - needs drug cessation         Anticipated Disposition:     Thank you for your consult. I will follow-up with patient. Please contact us if you have any additional questions.    Sunday Hassan MD  Infectious Disease  [facility] ([facility])    Subjective:     Principal Problem:Acute bacterial endocarditis    HPI:    38 y/o. female  with a PMHx of asthma, COPD,Bipolar  , IVDU and HLD ,active IVDU  ( heroine , cocaine  and amphetamine )   : CTA chest negative for pe . Multiple nodular and cavitary opacities concerning for septic emboli with larger opacities possibly necrotizing pneumonia. CT cervical  and thoracic did not show any acute finding , CT lumbar Possible progression of degenerative changes most notable at L4-5 with moderate to severe spinal canal stenosis at this level.  Echo-  · to moderate tricuspid regurgitation.     There is a 1.3 x 1.5 cm elongated, mobile echodensity seen on the tricuspid valve consistent with vegetation. Recommend CT surgery consult.   Blood culture- staph auerus  Component      Latest Ref Rng & Units 5/8/2022 5/7/2022 5/6/2022   WBC      3.90 - 12.70 K/uL 30.90 (H) 27.52 (H) 29.13 (H)     Interval History:  37 year old woman with tricuspid endocarditis .  Blood cultures- MSSA.  She remains bacteremic.  No MR evidence of epidural abscess.     Large, rim enhancing facet synovial cysts arising posteriorly from the right L2-L3 and left L4-L5 facet joints may be degenerative or sequelae of facet septic arthritis.     Minor enhancement and endplate irregularity at L4-L5 may be degenerative versus less likely the sequelae of spondylodiscitis.  No significant associated endplate destruction or vertebral body height loss.     Multilevel degenerative changes of the lumbar spine are most pronounced at L4-L5 with broad-based disc bulge and prominent central disc protrusion contributing to moderate to severe spinal canal stenosis with moderate left-sided neural foraminal stenosis.     Asymmetric left L5-S1 disc bulge contributes to moderate to severe left-sided neural foraminal stenosis and left lateral recess stenosis.   CT chest -Lungs/Pleura: Multiple nodular and cavitary opacities concerning for septic emboli.  Additional larger opacities most notably in the right lung base with some internal clearing  possibly related to necrotizing pneumonia.  Moderate loculated right pleural fluid and no definite left pleural fluid.  Empyema not excluded.   05/12- she declined thoracic MRI    She had CT guided aspirtaion -pus aspirated  05/16 - she is drowsy but refusing more imaging .   05/19-  MRI of the thoracic region    1. No evidence for disc osteomyelitis in the thoracic spine.  No evidence for epidural abscess.  2. Loculated right hydropneumothorax and extensive bilateral pulmonary infiltrates.   T max 100.5  05/23-she had interval placemet of chest tube.  Cultures from the chest remain negative.    05/30/22-   She reports that she has poor pain control .    05/31/22- she still complains of back apin .  She is afebrile   06/01/22  She had fever -T max 101.  CT chest -  Lungs/pleura: Continued evolution of the septic emboli with multiple cavitary nodules scattered throughout the lungs.  There are well-defined air-fluid collections in posterior aspect of the right upper and lower lobes measuring 4.5 x 9.5 cm and 5.5 x 10.0 cm, respectively.  Alternatively, this could represent loculated empyema.  These 2 collections appear to communicate.  Overall appearance of the chest is similar when compared to the recent prior CT examination.  Findings concerning for necrotizing pneumonia, as previously noted.  There is a medium sized left-sided pleural effusion with associated volume loss.       :  Review of Systems   Constitutional:  Positive for fatigue. Negative for activity change, appetite change, chills, diaphoresis, fever and unexpected weight change.   HENT: Negative.  Negative for congestion, drooling, facial swelling, rhinorrhea, sinus pressure, sneezing and sore throat.    Eyes: Negative.  Negative for discharge, redness, itching and visual disturbance.   Respiratory:  Positive for cough, chest tightness and shortness of breath. Negative for apnea, choking, wheezing and stridor.    Cardiovascular:  Negative for chest  pain, palpitations and leg swelling.   Gastrointestinal: Negative.  Negative for abdominal distention, abdominal pain, anal bleeding, blood in stool, constipation, diarrhea, nausea and vomiting.   Endocrine: Negative.    Genitourinary: Negative.  Negative for decreased urine volume, difficulty urinating, dysuria, frequency, hematuria, pelvic pain, urgency, vaginal bleeding and vaginal discharge.   Musculoskeletal:  Positive for arthralgias and back pain. Negative for gait problem, joint swelling, myalgias, neck pain and neck stiffness.   Skin: Negative.  Negative for color change, pallor, rash and wound.   Allergic/Immunologic: Negative.    Neurological:  Positive for weakness. Negative for dizziness, seizures, facial asymmetry, speech difficulty, light-headedness, numbness and headaches.   Psychiatric/Behavioral:  Positive for confusion. Negative for agitation, hallucinations and suicidal ideas.    All other systems reviewed and are negative.  Objective:     Vital Signs (Most Recent):  Temp: 99.1 °F (37.3 °C) (05/31/22 1141)  Pulse: 102 (05/31/22 1141)  Resp: 19 (05/31/22 1141)  BP: 132/70 (05/31/22 1141)  SpO2: 96 % (05/31/22 1141)   Vital Signs (24h Range):  Temp:  [96.3 °F (35.7 °C)-99.1 °F (37.3 °C)] 99.1 °F (37.3 °C)  Pulse:  [] 102  Resp:  [14-20] 19  SpO2:  [94 %-99 %] 96 %  BP: (132-167)/(70-93) 132/70     Weight:  (Scale says not calibrated)  Body mass index is 35.62 kg/m².    Estimated Creatinine Clearance: 51.2 mL/min (A) (based on SCr of 1.7 mg/dL (H)).    Physical Exam  Vitals and nursing note reviewed. Chaperone present: -.   Constitutional:       General: She is not in acute distress.     Appearance: She is well-developed. She is ill-appearing. She is not toxic-appearing.   HENT:      Head: Normocephalic and atraumatic.   Eyes:      Conjunctiva/sclera: Conjunctivae normal.      Pupils: Pupils are equal, round, and reactive to light.   Cardiovascular:      Rate and Rhythm: Normal rate and  regular rhythm.      Heart sounds: Normal heart sounds. No murmur heard.  Pulmonary:      Effort: Pulmonary effort is normal. No respiratory distress.      Breath sounds: Normal breath sounds.      Comments: -  Abdominal:      General: Bowel sounds are normal. There is no distension.      Palpations: Abdomen is soft.      Tenderness: There is no abdominal tenderness.   Musculoskeletal:         General: Swelling present. No tenderness or deformity. Normal range of motion.      Cervical back: Normal range of motion and neck supple.      Right lower leg: No edema.      Left lower leg: No edema.   Skin:     General: Skin is warm and dry.      Coloration: Skin is pale.      Findings: Erythema present.   Neurological:      Mental Status: She is alert and oriented to person, place, and time.      Motor: Weakness present.   Psychiatric:         Behavior: Behavior normal.       Significant Labs: Blood Culture:   Recent Labs   Lab 05/08/22  1516 05/11/22  1213 05/25/22  0901 05/25/22  1220 05/25/22  1313   LABBLOO Gram stain aer bottle: Gram positive cocci in clusters resembling Staph   Results called to and read back by: Chasity Raymundo RN  05/09/2022  11:31  STAPHYLOCOCCUS AUREUS  ID consult required at [facility] and [facility] locations.  For susceptibility see order #[id number]  * No growth after 5 days.  No growth after 5 days. No growth after 5 days.  No growth after 5 days. No growth after 5 days. No growth after 5 days.       BMP:   Recent Labs   Lab 05/31/22  0530         K 3.9      CO2 23   BUN 21*   CREATININE 1.7*   CALCIUM 7.6*       CMP:   Recent Labs   Lab 05/30/22  0532 05/31/22  0530   * 138   K 3.8 3.9    105   CO2 21* 23    107   BUN 22* 21*   CREATININE 1.7* 1.7*   CALCIUM 7.8* 7.6*   PROT 6.1 5.5*   ALBUMIN 1.5* 1.6*   BILITOT 0.3 0.3   ALKPHOS 77 80   AST 14 10   ALT 7* 6*   ANIONGAP 12 10   EGFRNONAA 37* 37*         Significant Imaging: CT: I have reviewed  all pertinent results/findings within the past 24 hours:

## 2022-06-02 NOTE — PROGRESS NOTES
[facility] - Telemetry ([facility])  Pulmonology  Progress Note    Patient Name: Tianna Leon  MRN: [id number]  Admission Date: 5/6/2022  Hospital Length of Stay: 27 days  Code Status: Full Code  Attending Provider: Delmer Guzmán MD  Primary Care Provider: Spenser Campa MD   Principal Problem: Acute bacterial endocarditis    Subjective:     Interval History:   6/2 seen and examined.  T-max 100°.  O2 sat 98% on 2 liter/minute.  Complains of SOB diffuse pain requesting pain medicine.  Decreased activity and appetite.  Review of Systems   Constitutional:  Positive for malaise/fatigue and weight loss. Negative for chills and fever.   HENT:  Negative for hearing loss and nosebleeds.    Eyes:  Negative for discharge.   Respiratory:  Positive for cough, sputum production and shortness of breath.    Cardiovascular:  Positive for leg swelling. Negative for chest pain.   Gastrointestinal:  Negative for abdominal pain.   Genitourinary:  Negative for hematuria.   Musculoskeletal:  Negative for falls.   Skin:  Negative for itching.   Neurological:  Positive for weakness. Negative for speech change, seizures and loss of consciousness.   Endo/Heme/Allergies:  Negative for polydipsia. Does not bruise/bleed easily.   Psychiatric/Behavioral:  Negative for hallucinations.        Objective:     Vital Signs (Most Recent):  Temp: 98.9 °F (37.2 °C) (06/02/22 1137)  Pulse: 72 (06/02/22 1412)  Resp: 18 (06/02/22 1412)  BP: (!) 151/80 (06/02/22 1137)  SpO2: 98 % (06/02/22 1412)   Vital Signs (24h Range):  Temp:  [98.9 °F (37.2 °C)-100.2 °F (37.9 °C)] 98.9 °F (37.2 °C)  Pulse:  [] 72  Resp:  [16-20] 18  SpO2:  [94 %-100 %] 98 %  BP: (132-151)/(74-83) 151/80     Weight: 98.7 kg (217 lb 9.5 oz)  Body mass index is 36.21 kg/m².      Intake/Output Summary (Last 24 hours) at 6/2/2022 1613  Last data filed at 6/2/2022 1400  Gross per 24 hour   Intake 720 ml   Output 2950 ml   Net -2230 ml         Physical Exam  Vitals and nursing note reviewed.    Constitutional:       General: She is not in acute distress.     Appearance: She is well-developed. She is ill-appearing.   HENT:      Head: Normocephalic and atraumatic.      Nose: Nose normal.   Eyes:      Extraocular Movements: Extraocular movements intact.   Cardiovascular:      Rate and Rhythm: Normal rate and regular rhythm.   Pulmonary:      Effort: Pulmonary effort is normal.      Breath sounds: No stridor. Rhonchi present.   Abdominal:      General: There is no distension.   Musculoskeletal:         General: No signs of injury.      Cervical back: Normal range of motion and neck supple.   Skin:     General: Skin is dry.   Neurological:      General: No focal deficit present.      Mental Status: She is alert and oriented to person, place, and time. Mental status is at baseline.       Vents:  Oxygen Concentration (%): 32 (06/02/22 0104)    Lines/Drains/Airways       Peripherally Inserted Central Catheter Line  Duration             PICC Double Lumen 05/17/22 1612 right basilic 16 days              Drain  Duration             Female External Urinary Catheter 05/21/22 0700 12 days                    Significant Labs:    CBC/Anemia Profile:  Recent Labs   Lab 05/31/22  1729 06/01/22  0512   WBC  --  6.97   HGB  --  7.4*   HCT  --  23.4*   PLT  --  345   MCV  --  86   RDW  --  15.9*   FERRITIN 320*  --           Chemistries:  Recent Labs   Lab 06/01/22  0512      K 3.9      CO2 23   BUN 20   CREATININE 1.9*   CALCIUM 7.7*   ALBUMIN 1.5*   PROT 5.6*   BILITOT 0.3   ALKPHOS 69   ALT 7*   AST 14      Latest Reference Range & Units 05/28/22 11:15   Fluid Color  Yellow   Fluid Appearance  Clear   WBC, Body Fluid /cu mm 3065   Body Fluid Type  Pleural Fluid, Left   Segs, Fluid % 41   Lymphs, Fluid % 14   Monocytes/Macrophages, Fluid % 22   Eos, Fluid % 23   Glucose, Fluid Not established mg/dL 102   LD, Fluid Not established U/L 241   Body Fluid Source, Glucose  Pleural Fluid, Left   Body Fluid Source, LDH   Pleural Fluid, Left   Body Fluid Source, Total Protein  Pleural Fluid, Left   Body Fluid, Protein Not established g/dL 2.6      Latest Reference Range & Units 05/07/22 09:05 05/21/22 10:22   Fluid Appearance  Hazy Cloudy   WBC, Body Fluid /cu mm 79287 [1] 689958 (C) [2]   Body Fluid Type  Pleural Fluid, Right Pleural Fluid, Right   Segs, Fluid % 93 88   Lymphs, Fluid % 3 7   Monocytes/Macrophages, Fluid % 4 5   Amylase, Fluid Not established U/L 34 [3]    Glucose, Fluid Not established mg/dL 29 [4]    LD, Fluid Not established U/L 1077 [5]    Body Fluid, Albumin See text g/dL 1.3 [6]    Body Fluid Source Amylase  Pleural Fluid, Right    Body Fluid Source, Albumin  Pleural Fluid, Right    Body Fluid Source, Glucose  Pleural Fluid, Right    Body Fluid Source, LDH  Pleural Fluid, Right    Body Fluid Source, Total Protein  Pleural Fluid, Right    Body Fluid, Protein Not established g/dL 3.7 [7]    Cholesterol, Body Fluid See Comment mg/dL 79 [8]          Significant Imaging:       Chest x-ray 05/30/2022    TECHNIQUE:  Single frontal view of the chest was performed.     COMPARISON:  05/28/2022     FINDINGS:  Right-sided PICC line tip overlies the SVC in good position.  Heart size is normal.  Right pleural catheter not seen on today's exam.  Again there patchy infiltrates within the right mid lower lung zone.  Cannot exclude small right pleural effusion.  No pneumothorax.    In comparison to the prior study, there is no adverse interval changes.             CT chest without contrast 06/01/2022    Narrative & Impression  EXAMINATION:  CT CHEST WITHOUT CONTRAST     CLINICAL HISTORY:  Pneumonia, unresolved;     TECHNIQUE:  Low-dose axial images acquired from the chest without the use of intravenous contrast.  Sagittal and coronal reformats, as well as axial MIPS were generated for further review.  All CT scans at this location are performed using dose modulation techniques as appropriate to a performed exam including the  following: Automated exposure control; adjustment of the mA and/or kV according to patient size (this includes techniques or standardized protocols for targeted exams where dose is matched to indication/reason for exam; i. e. extremities or head); use of iterative reconstruction technique.     COMPARISON:  Chest radiograph 05/30/2022.  CT chest 05/20/2022.     FINDINGS:  Base of Neck: No significant abnormality. Right-sided central venous catheter with tip terminating at the distal SVC.     Aorta: Nonaneurysmal without significant atherosclerotic calcification.     Heart/pericardium: Upper limits normal size heart without significant pericardial fluid.  No significant coronary calcification.     Esha/Mediastinum: Few upper limits normal size mediastinal lymph nodes.     Upper Abdomen: No acute abnormality of the partially imaged upper abdomen.     Thoracic soft tissues: Anasarca.     Bones: No acute fracture. No suspicious lytic or sclerotic lesion.     Airways: Patent.     Lungs/pleura: Continued evolution of the septic emboli with multiple cavitary nodules scattered throughout the lungs.  There are well-defined air-fluid collections in posterior aspect of the right upper and lower lobes measuring 4.5 x 9.5 cm and 5.5 x 10.0 cm, respectively.  Alternatively, this could represent loculated empyema.  These 2 collections appear to communicate.  Overall appearance of the chest is similar when compared to the recent prior CT examination.  Findings concerning for necrotizing pneumonia, as previously noted.  There is a medium sized left-sided pleural effusion with associated volume loss.     Impression:     Evolving findings concerning for septic emboli and necrotizing pneumonia in the posterior aspect of the right lung versus empyema, as described above.                         CT chest without contrast 05/20/2022    Narrative & Impression  EXAMINATION:  CT CHEST WITHOUT CONTRAST     CLINICAL HISTORY:  Empyema;      TECHNIQUE:  Low dose axial images, sagittal and coronal reformations were obtained from the thoracic inlet to the lung bases. Contrast was not administered.  All CT scans at this facility are performed using dose optimization techniques including the following: automated exposure control; adjustment of the mA and/or kV; use of iterative reconstruction technique.     COMPARISON:  CTA chest non coronary 05/06/2022, chest radiograph 05/20/2022, MRI thoracic spine 05/19/2022     FINDINGS:  Base of Neck: There is diffuse body wall edema.  Unremarkable, noting the presence of AA PICC line which terminates at the superior cavoatrial junction.     Thoracic soft tissues: There is diffuse body wall edema.     Aorta: Left-sided aortic arch.  No aneurysm and no significant atherosclerosis     Heart: Normal size. No effusion.  No significant coronary atherosclerosis.     Pulmonary vasculature: Pulmonary arteries distribute normally.     Esha/Mediastinum: There are few prominent mediastinal lymph nodes with a prevascular node measuring 0.8 cm in short axis.     Airways: Patent.     Lungs/Pleura: There are multiple bilateral consolidative densities scattered throughout the lungs, many of which demonstrate cavitation, again suggesting septic emboli.  There is small left pleural fluid, which appears simple and layers dependently.  There is a loculated moderate right pleural effusion, which appears similar in size to the comparison exam however now containing air, which may be due to empyema.  There is adjacent atelectatic change within the bilateral lower lobes.  Overall number and size of consolidative densities has increased since the comparison exam.     Esophagus: Normal.     Upper Abdomen: There is persistent wedge-shaped hypodensity within the spleen, suggesting infarct.     Bones: No acute fracture. No suspicious lytic or sclerotic lesions.     Impression:     Interval progression with increased number and size of  consolidative cavitary densities throughout the lungs, most suggestive of septic emboli with larger opacities concerning for necrotizing pneumonia.     Stable size of loculated right moderate pleural effusion, now containing air compatible with hydropneumothorax as reported on prior MRI.  Interval development of air is concerning for empyema noting limited evaluation without intravenous contrast.  New small simple appearing left pleural effusion.     Wedge-shaped splenic hypodensity, suggesting infarction.     Anasarca.           ABG  No results for input(s): PH, PO2, PCO2, HCO3, BE in the last 168 hours.  Assessment/Plan:     COVID-19  6/1 - Isolation:   - Airborne, Contact and Droplet Precautions  - Cohort patients into COVID units  - N95 masks must be fit tested, wear eye protection  - 20 second hand hygiene              - Limit visitors per hospital policy              - Consolidating lab draws, nursing care, provider visits, and interventions  IV remdesivir ID follow-up  6/2 same    Empyema of right pleural space  5/20 Suspect empyema, needs chest tube placement  5/21 s/p chest tube placement, adequate expansion - will check Chest X Ray in [facility]  5/23 tPA DNase.  5/24 tPA for DNase installation via chest tube.  Monitor chest tube output.  Repeat chest x-ray in a.m..  5/25 chest tube output increased from 50 mL on average per 24 hours to 160 mL last 24 hours.  Purulent drainage noted.  Will repeat tPA plus Dnase instillation.  5/26 chest x-ray reviewed.  Continue IV oxacillin.  Right-sided pigtail in place.  Status post tPA and DNase x2.  Bloody drainage around pigtail today status post tPA and DNase yesterday.  Will hold on fibrinolysis.  Monitor chest tube output  5/27 right pigtail in place.  225 mL  Last 24 hours average of 200 mL per day.  Change pleurovac canister to better assess the color of the fluid pus versus serosanguineous.  Keep pigtail in place for now.  Ultrasound bilateral chest assess pleural  effusions.  Continue oxacillin drip.  5/28 keep chest tube on the right side for now.  Repeat CRP.  Checking chemistry microbiology  on left-sided pleural fluid to rule out complicated effusion and need for pigtail.  Continue oxacillin drip.   5/29 minimal output right chest tube.  Remove chest tube.  Left-sided effusion not complicated not empyema.  Continue IV oxacillin    6/1 evolving empyema/necrosis on right lung on CT scan of the chest 06/01/2022.  Continue IV oxacillin.  Re-consult cardiothoracic surgery.  6/2 /treat COVID.  Cardiothoracic surgery input noted.  VATS procedure will be delayed till Monday next week.  Patient will be scheduled for VATS on 06/06/2022            Parapneumonic effusion  Complicated by PNA, possible empyema  SP Thora  Cont Abx  5/20/2022 Probable empyema,needs chest tube drainage  5/22 chest tube draining  5/23 60 cc drained yesterday.  Possible tPA and DNase tomorrow.  Continue IV oxacillin.  5/24 monitor chest tube output post tPA and DNase instillation today PA chest tube  5/26 chest x-ray reviewed.  Continue IV oxacillin.  Right-sided pigtail in place.  Status post tPA and DNase x2.  Bloody drainage around pigtail today status post tPA and DNase yesterday.  Will hold on fibrinolysis.  Monitor chest tube output  5/27 right pigtail in place.  225 mL  Last 24 hours average of 200 mL per day.  Change pleurovac canister to better assess the color of the fluid pus versus serosanguineous.  Keep pigtail in place for now.  Ultrasound bilateral chest assess pleural effusions.  Continue oxacillin drip.  5/28 keep chest tube on the right side for now.  Repeat CRP.  Checking chemistry microbiology  on left-sided pleural fluid to rule out complicated effusion and need for pigtail.  Continue oxacillin drip.   5/29 minimal output right chest tube.  Remove chest tube.  Left-sided effusion not complicated not empyema.  Continue IV oxacillin   5/30 oxacillin total of 8 weeks.  Chest x-ray stable.   Right-sided pigtail removed.  No evidence of empyema on the left side pleural fluid.  5/31 Continue above, will sign off  6/1 worsening and evolving empyema right lung.  Reconsult cardiothoracic surgery.  Continue IV oxacillin.  6/2 discussed with Interventional Radiology and Cardiothoracic surgery.  Treat COVID for now.  VATS early next week.  Cardiothoracic surgery input today noted.  Repeat chest x-ray.    COPD (chronic obstructive pulmonary disease)  Oxygen  Keep SP2> 92%  Bronchodilators  Follow up in Pulmonary for PFT to clarify Dx  5/23 O2 target sat 92-94%.  Nebs p.r.n.  5/26 O2 target sat at home not her% nebs p.r.n..  Smoking cessation   5/28 continue oxygen nebs albuterol Atrovent p.r.n.  6/1 albuterol Atrovent p.r.n.  6/2 albuterol Atrovent p.r.n..  O2 target sat 92-94%.         Piyush San MD  Pulmonology  [facility] - Telemetry ([facility])

## 2022-06-02 NOTE — ASSESSMENT & PLAN NOTE
Oxygen  Keep SP2> 92%  Bronchodilators  Follow up in Pulmonary for PFT to clarify Dx  5/23 O2 target sat 92-94%.  Nebs p.r.n.  5/26 O2 target sat at home not her% nebs p.r.n..  Smoking cessation   5/28 continue oxygen nebs albuterol Atrovent p.r.n.  6/1 albuterol Atrovent p.r.n.  6/2 albuterol Atrovent p.r.n..  O2 target sat 92-94%.

## 2022-06-02 NOTE — ASSESSMENT & PLAN NOTE
Pulmonology consulted and felt likely empyema   IR consulted for chest tube      s/p right pigtail small bore chest tube placement to suction/drainage system per IR on 5/21/22. 60ml pus removed. Now with Serous drainage with pus. Fluid analysis consistent with empyema. Pleural fluid aerobic culture shows NGTD, Anaerobic culture pending    5/24/22: Dr. San plans for tPA and DNase in chest tube today.   5/25/22: 160ml of purulent CT drainage output. Dr. San plans to repeat tPA and DNase in chest tube again today.   5/26/22  Still has purulent drainage noted. Pulmonology following    6/2:  Worsening empyema noted  CTS consult on case  Plan for VATS procedure on Monday

## 2022-06-02 NOTE — ASSESSMENT & PLAN NOTE
- COVID-19 testing   - Infection Control notified     - Isolation:   - Airborne, Contact and Droplet Precautions  - Cohort patients into COVID units              - Limit visitors per hospital policy              - Consolidating lab draws, nursing care, provider visits, and interventions      - Management:  Supplemental O2 to maintain SpO2 >92%  Continuous/intermittent Pulse Ox  Albuterol treatment PRN     Will add Remdisivir    Advance Care Planning

## 2022-06-02 NOTE — ASSESSMENT & PLAN NOTE
ECHO showed a 1.3 x 1.5 cm elongated, mobile echodensity seen on the tricuspid valve consistent with vegetation, CT surgery was consulted.   Due to MSSA    -- Infectious disease is consulted.  -- repeat blood cx NGTD.  -- Continue IV ABX.   --Sensitivities are back and Staph is sensitive to oxacillin, D/C vanc/cefepime.   --Cardiology consulted and plans for a SUSANNAH.   --CT surgery following and recommends continuing medical management with appropriate ABX, no surgical intervention at this time.    cont IV oxacillin.  Plan is to continue x 4 weeks. Pt febrile.

## 2022-06-02 NOTE — ASSESSMENT & PLAN NOTE
Secondary to acute illness    --Daily CBC  --Transfuse with 1 unit PRBC for Hgb <7.0 or <8.0 if symptomatic   --Hgb: 6.7, give 1U PRBC    5/27/22  Hemoglobin 7.3g/dL. downward trend. Has blood tinged seropurulent drainage. Will give additional unit if continues to trend down.  5/28/22  Hemoglobin 8.0 today    6/2:  Repeat H&H pending

## 2022-06-02 NOTE — PROGRESS NOTES
[facility] Medicine  Progress Note    Patient Name: Tianna Leon  MRN: [id number]  Patient Class: IP- Inpatient   Admission Date: 5/6/2022  Length of Stay: 27 days  Attending Physician: Delmer Guzmán MD  Primary Care Provider: Spenser Campa MD        Subjective:     Principal Problem:Acute bacterial endocarditis        HPI:  36 y/o. female  with a PMHx of asthma, COPD,Bipolar  , IVDU and HLD presented to the ER with a c/o  sob for the last weak which has gotten worse . The SOB is associated with fever , chills , productive cough , back pain  and generalized weakness . She report cough some blood this am .  She is active IVDU  ( heroine , cocaine  and amphetamine ) . She denies any  sick contact , chest pain  , GI/ sx , She also complaning of LE sweeling . Knee pain and B/L LE rash .   ER COURSE: CTA chest negative for pe . Multiple nodular and cavitary opacities concerning for septic emboli with larger opacities possibly necrotizing pneumonia. CT cervical  and thoracic did not show any acute finding , CT lumbar Possible progression of degenerative changes most notable at L4-5 with moderate to severe spinal canal stenosis at this level.  WBC  29 k , na 130 , k 3.4 , cl 89 , procal 2.71   ER VS:  BP Pulse Resp Temp SpO2   (!) 124/58 110 (!) 30 (!) 101.6 °F (38.7 °C) 96 %           Pt will be admitted to inpt with a dx of PNA and severe sepsis       Overview/Hospital Course:  5/7 admitted for pneumonia, severe sepsis in setting of ivdu. Mother at bedside and provides collateral. Patient has struggled with substance abuse for approximately 20 years, worsening over last 3-5 years since life event. Onset of symptoms 1-2 weeks ago. Tolerated thoracentesis with no pneumothorax on chest x-ray. Mri lumbar and echo, pending. Bcx positive, growing gram positive cocci. On vanc, cefepime and flagyl. Infectious disease consulted for bacteremia. 5/8/22 The patient remained afebrile overnight.  Currently on cefepime/vanc/flagyl. Blood cx 5/6/22 are growing MSSA. The patient refused the MRI lumbar spine overnight d/t being unable to lay flat from back pain. Will give pain meds and attempt to get the MRI today to r/o spinal abscess. The ECHO showed a 1.3 x 1.5 cm elongated, mobile echodensity seen on the tricuspid valve consistent with vegetation, CT surgery was consulted. Will need a SUSANNAH. Infectious disease is consulted. Will repeat blood cx today. 5/9/22 No acute events overnight. The patient reports lower back pain is not well controlled with current pain regimen, will adjust. Repeat blood cx are still positive. Sensitivities are back and Staph is sensitive to oxacillin, will D/C vanc/cefepime. Cardiology consulted and plans for a SUSANNAH today. CT surgery following and recommends continuing medical management with appropriate ABX, no surgical intervention at this time. MRI lumbar spine showed moderate to severe spinal canal stenosis with moderate left-sided neural foraminal stenosis, Neurosurgery consulted. 5/10/22 No acute events overnight. The patient reports some improvement in pain with adjustment in pain meds. Repeat blood cx are +. Continue IV oxacillin and flagyl. Infectious disease is consulted. IR consulted to evaluate possible paraspinous muscle myositis versus abscess. Recommend IR drainage. Continue current management. 5/11/22 No acute events overnight. The patient reports pain is still not well controlled at times. The patient reports that she is very anxious about the upcoming SUSANNAH and IR drainage. SUSANNAH was pushed back to this afternoon because the patient ate candy this AM. Continue treatment with IV oxacillin and flagyl. Infectious disease is following. WBC trended down to 31K. Continue current management. 5/12/22 No acute events overnight. The patients WBC improved to 22K overnight. The patient remains on continuous oxacillin infusion. Infectious disease is following. MRI thoracic spine is  ordered and pending. The patients SUSANNAH was postponed again today d/t low H/H. Hgb was noted to be 6.2 this morning, will transfuse 1 unit PRBC. The patient reports pain is still not well controlled. The case was discussed with Neurosurgery who recommended adding extended release pain meds. Approximately 1 ml of purulent drainage aspirated from back yesterday per IR, growing gram + cocci.     5/13: Patient was given 1U PRBC on 5/12, improved from 6.2 --> 7.0, will give an additional unit PRBC. Radiology attempted to bring patient on 5/12 at 1755 for Thoracic MRI, patient refused due to pain and anxiety. Repeat attempt this AM, patient again refused. Will order Ativan 1mg IV to be given with pain medication prior to scan to relieve anxiety and pain, nurse to notify NP when ready to go for scan. Discussed pain management with patient, discussed transition to PO pain medications to being preparing for d/c to LTAC, adjusted PO pain regimen will reevaluate to determine effectiveness. Repeat Blood cultures 5/11/22: NGTD.     5/14: Final anaerobic cultures pending, continue to adjust pain regimen, d/c IV Dilaudid today. Patient currently managed with PO Morphine extended release and PO oxycodone. D/C plan is for LTAC once final antibiotic regimen determined. Patient continues to refuse MRI of thoracic spine.     5/15: Called to room this AM, patient had coughed up blood and mucous. Approx. 1-2 teaspoons of blood in emesis bag, ordered CXR and repeat CBC, CXR showed improvement from previous day, repeat CBC at time of incident, showed a slight decrease in Hgb: 9.7 --> 8.7, when repeated at 1500, Hgb had improved to 10. no further episodes of coughing up blood throughout day. Patient continues to c/o not having enough pain medication but continues to sleep most of the day and will fall asleep when speaking at times. Added IV Toradol to plan.     5/16: No further episodes of coughing blood overnight, patient participated with  encouragement with PT/OT today, recommendations are HH vs. SNF pending progress. Awaiting final antibiotic regimen recommendations. Anaerobic culture results finalized: negative for growth. Afebrile, WBC trending downward.     5/17: Antibiotic regimen per ID is continuous oxacillin until 7/11/22, SW notified of plan, will seek acceptance at LTAC or SNF. Patient choice is [facility]. WBC in normal range. AMS this AM, CT of head: no acute findings. Given narcan and mental status improved. Decreased narcotic dose.     5/18: Per discussion with patient, she is willing to have the MRI of thoracic spine today, will order IV pain medication and anxiety medication prior to scan. Nursing and Radiology aware of needing to premedicate for scan. Patient is more cooperative and participating with care.     5/19: Patient given premedications for MRI, MRI completed, results pending. Patient more agreeable with treatment plan and cooperating. Will need psychiatry follow-up after discharge. Hgb: 6.7, will give 1U PRBC    5/20/22: +LEON and generalized pain. Thoracic MRI showed no evidence for disc osteomyelitis in the thoracic spine and no epidural abscess, Loculated right hydropneumothorax and extensive bilateral pulmonary infiltrates. Ct chest showed septic emboli and moderate right pleural effusion.+splenic infarct  Pulmonology felt likely empyema- consulted IR for chest tube.     5/21/22: s/p right pig tail placement to suction/ drainage system per IR. 60ml pus removed initially. Fluid analysis consistent with empyema. Pleural fluid cultures pending. Now with scant serosanguinous output.+ right lateral chest pain at chest tube site. Denies any other complaints. Status update provided to pt's mother.     5/22/22: Repeatedly requesting IV Dilaudid. Toradol ordered. Afebrile, WBC normal, O2sats stable on 3lites. s/p right pig tail placement to suction/ drainage system per IR on 5/21/22- draining serous draiange with pus. Plan for LTAC  "placement     5/23/22: More cofortable today. Not getting OOB. Encouraged OOB and rationale provided. Pulmonology recommended Possible tPA and DNase in chest tube tomorrow    5/24/22: Examined OOB in chair. Encouraged pt to continue OOB. Plan for tPA and DNase in chest tube today per Pulmonology  Pt denied LTAC- will pursue SNF    5/25/22: Temp 101.7F last night. Will recheck blood cultures and UA. BP low - IVF bolus given and restarted IVF. ID re-consulted. 160ml of purulent CT drainage output. Dr. San plans to repeat tPA and DNase in chest tube again today.     5/26/22: temp 99.2. c/o of right "lung pain" when eating. Will consult speech to check swallow. +mild leukocytosis. Repeat BC 5/25/22 show NGTD. Repeat UA showed rare trichomas. Chest tube drainage remains purulent     5/27/22  Low grade temp. Downward trend of hemoglobin, related to chest tube drainage. Blood cultures remain negative. Urine culture growing E. Coli. Sensitivities are pending.     5/28/22  Urine culture returned pansenstiive. Will continue ciprofloxacin for total of 3 days then discontinue. Still awaiting SNF placement.    5/29/22 culture of pleural fluid from 5/28/22 negative. Continue CT for now.     5/30/22  CT removed yesterday by pulmonology. SNF declines. Will explore other options for IV abx with help from ID.     5/31/22  Patient has been accepted to [facility]. Will transfer today. Will follow up with pulmonology and ID at discharge. She will continue oxacillin for management of bacteremia, septic embolic, and endocarditis.  Addendum: COVID negative transfer delayed.     6/1/22  COVID positive started on remdesivir. Tmax 101.3. repeat CT scan shows evolving septic embolic/necrotizing pneumonia. Pulmonlogy recommends reconsulting CVT. ID following. Awaiting insurance approval for [facility]. Decline in hemoglobin 7.4, hold on transfusion at this time.   6/2:  CVT consult on case.  Plan for VATS procedure on Monday given patient's " COVID status.  Continue remdesivir.  Continue oxacillin.      Interval History: CVT consult on case.  Plan for VATS procedure on Monday given patient's COVID status.  Continue remdesivir.  Continue oxacillin.    Patient complains of pain this morning.  States she has nose bleed.  Also reports hemoptysis.    Review of Systems   Constitutional:  Positive for activity change, appetite change and fatigue. Negative for chills, diaphoresis, fever and unexpected weight change.   HENT:  Negative for congestion, hearing loss, mouth sores, postnasal drip, rhinorrhea, sore throat and trouble swallowing.    Eyes:  Negative for discharge and visual disturbance.   Respiratory:  Negative for cough and chest tightness.    Cardiovascular:  Negative for chest pain, palpitations and leg swelling.   Gastrointestinal:  Negative for blood in stool, constipation, diarrhea, nausea and vomiting.   Endocrine: Negative for cold intolerance and heat intolerance.   Genitourinary:  Negative for difficulty urinating, dyspareunia, flank pain and hematuria.   Musculoskeletal:  Positive for back pain and myalgias. Negative for arthralgias.   Skin: Negative.    Neurological:  Positive for weakness. Negative for dizziness and light-headedness.   Hematological:  Negative for adenopathy. Does not bruise/bleed easily.   Psychiatric/Behavioral:  Negative for agitation, behavioral problems, confusion and sleep disturbance. The patient is nervous/anxious.    Objective:     Vital Signs (Most Recent):  Temp: 98.9 °F (37.2 °C) (06/02/22 0819)  Pulse: 87 (06/02/22 0819)  Resp: 18 (06/02/22 0852)  BP: 133/82 (06/02/22 0819)  SpO2: 99 % (06/02/22 0819) Vital Signs (24h Range):  Temp:  [98.5 °F (36.9 °C)-100.7 °F (38.2 °C)] 98.9 °F (37.2 °C)  Pulse:  [] 87  Resp:  [16-20] 18  SpO2:  [93 %-100 %] 99 %  BP: (131-141)/(74-83) 133/82     Weight: 98.7 kg (217 lb 9.5 oz)  Body mass index is 36.21 kg/m².    Intake/Output Summary (Last 24 hours) at 6/2/2022  1104  Last data filed at 6/2/2022 1023  Gross per 24 hour   Intake 600 ml   Output 2500 ml   Net -1900 ml      Physical Exam  Vitals and nursing note reviewed.   Constitutional:       General: She is not in acute distress.     Appearance: She is well-developed. She is ill-appearing.   HENT:      Head: Normocephalic and atraumatic.      Nose: Nose normal.   Eyes:      Extraocular Movements: Extraocular movements intact.   Cardiovascular:      Rate and Rhythm: Normal rate and regular rhythm.   Pulmonary:      Effort: Pulmonary effort is normal.      Breath sounds: No stridor. Rhonchi present.      Comments: Right-side  Pigtail removed  Abdominal:      General: There is no distension.   Musculoskeletal:         General: No signs of injury.      Cervical back: Normal range of motion and neck supple.   Skin:     General: Skin is dry.   Neurological:      General: No focal deficit present.      Mental Status: She is alert and oriented to person, place, and time. Mental status is at baseline.   Psychiatric:         Behavior: Behavior normal.       Significant Labs: All pertinent labs within the past 24 hours have been reviewed.    Significant Imaging: I have reviewed all pertinent imaging results/findings within the past 24 hours.        Assessment/Plan:      * Acute bacterial endocarditis  ECHO showed a 1.3 x 1.5 cm elongated, mobile echodensity seen on the tricuspid valve consistent with vegetation, CT surgery was consulted.   Due to MSSA    -- Infectious disease is consulted.  -- repeat blood cx NGTD.  -- Continue IV ABX.   --Sensitivities are back and Staph is sensitive to oxacillin, D/C vanc/cefepime.   --Cardiology consulted and plans for a SUSANNAH.   --CT surgery following and recommends continuing medical management with appropriate ABX, no surgical intervention at this time.    cont IV oxacillin.  Plan is to continue x 4 weeks. Pt febrile.    COVID-19  - COVID-19 testing   - Infection Control notified     - Isolation:    - Airborne, Contact and Droplet Precautions  - Cohort patients into COVID units  - N95 mask, wear eye protection  - 20 second hand hygiene              - Limit visitors per hospital policy              - Consolidating lab draws, nursing care, provider visits, and interventions    - Diagnostics: (leukopenia, hyponatremia, hyperferritinemia, elevated troponin, elevated d-dimer, age, and comorbidities are significant predictors of poor clinical outcome)  CBC, CMP, Ferritin, CRP and Portable CXR    - Management:  Supplemental O2 to maintain SpO2 >92%  Telemetry  Continuous/intermittent Pulse Ox  Albuterol treatment PRN  Remdesivir added    Advance Care Planning   Patient is a full code.               Empyema of right pleural space  Pulmonology consulted and felt likely empyema   IR consulted for chest tube      s/p right pigtail small bore chest tube placement to suction/drainage system per IR on 5/21/22. 60ml pus removed. Now with Serous drainage with pus. Fluid analysis consistent with empyema. Pleural fluid aerobic culture shows NGTD, Anaerobic culture pending    5/24/22: Dr. San plans for tPA and DNase in chest tube today.   5/25/22: 160ml of purulent CT drainage output. Dr. San plans to repeat tPA and DNase in chest tube again today.   5/26/22  Still has purulent drainage noted. Pulmonology following    6/2:  Worsening empyema noted  CTS consult on case  Plan for VATS procedure on Monday    Normocytic anemia  Secondary to acute illness    --Daily CBC  --Transfuse with 1 unit PRBC for Hgb <7.0 or <8.0 if symptomatic   --Hgb: 6.7, give 1U PRBC    5/27/22  Hemoglobin 7.3g/dL. downward trend. Has blood tinged seropurulent drainage. Will give additional unit if continues to trend down.  5/28/22  Hemoglobin 8.0 today    6/2:  Repeat H&H pending      Pulmonary embolism, septic  2/2 MSSA endocarditis   Cont IV abx     6/2/22  CT shows evolving septic emboli and necrotizing pneumonia. CVT has been  re-consulted.  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 IR consulted to evaluate possible paraspinous muscle myositis versus abscess.    --Approximately 1 ml of purulent drainage aspirated 5/11 per IR, growing MSSA  --Continue current regimen  --Oxacillin x 4 weeks total    Lumbar stenosis without neurogenic claudication        Chronic pulmonary heart disease  - Pulmonology following       MSSA bacteremia  5/15: Blood cultures 5/11/22: NGTD     Continuous Oxacillin, EOC: 7/11/22, pending acceptance at facility, PICC line placed    Parapneumonic effusion  Pulmonology consulted  Status post thoracentesis 5/7/22   continue CT management per pulmonology    5/29/22  Body fluid culture currently negative.     5/30/22  CT removed    6/1/22   Febrile CT shows evolving empyema/necrotizing pneumonia. pulmonology recommends reconsulting CVT.     IVDU (intravenous drug user)   Will ordet TTE to r/o IE  Will order MRI lumbar wwo- completed   Cont broad spectrum IVAB   --MRI thoracic spine ordered per ID, patient refused on multiple attempts    5/7   Studies pending for additional sites of infection given h/o ivdu  5/8/22  on cessation     Severe sepsis  This patient does have evidence of infective focus  My overall impression is sepsis. Vital signs were reviewed and noted in progress note.  Antibiotics given-   Antibiotics (From admission, onward)            Start     Stop Route Frequency Ordered    05/09/22 1100  oxacillin 12 g in  mL CONTINUOUS INFUSION         -- IV Every 24 hours (non-standard times) 05/09/22 0932    05/07/22 2100  mupirocin 2 % ointment         05/12 2059 Nasl 2 times daily 05/07/22 1646        Cultures were taken-   Microbiology Results (last 7 days)     Procedure Component Value Units Date/Time    Culture, Respiratory with Gram Stain [[id number]] Collected: 05/16/22 0755    Order Status: Sent Specimen:  Respiratory from Sputum Updated: 05/16/22 0759    Culture, Anaerobe [[id number]] Collected: 05/11/22 1440    Order Status: Completed Specimen: Abscess from Back Updated: 05/16/22 0733     Anaerobic Culture No anaerobes isolated    Blood culture [[id number]] Collected: 05/11/22 1213    Order Status: Completed Specimen: Blood Updated: 05/16/22 0612     Blood Culture, Routine No Growth to date      No Growth to date      No Growth to date      No Growth to date      No Growth to date    Blood culture [[id number]] Collected: 05/11/22 1213    Order Status: Completed Specimen: Blood Updated: 05/16/22 0612     Blood Culture, Routine No Growth to date      No Growth to date      No Growth to date      No Growth to date      No Growth to date    Aerobic culture [[id number]]  (Abnormal)  (Susceptibility) Collected: 05/11/22 1440    Order Status: Completed Specimen: Abscess from Back Updated: 05/14/22 1057     Aerobic Bacterial Culture STAPHYLOCOCCUS AUREUS  Many      Gram stain [[id number]] Collected: 05/11/22 1440    Order Status: Completed Specimen: Abscess from Back Updated: 05/12/22 0306     Gram Stain Result Few WBC's      Few Gram positive cocci    Gram stain [[id number]]     Order Status: Canceled Specimen: Joint Fluid from Back     Blood culture [[id number]]  (Abnormal) Collected: 05/08/22 1516    Order Status: Completed Specimen: Blood from Peripheral, Right Hand Updated: 05/11/22 1009     Blood Culture, Routine Gram stain aer bottle: Gram positive cocci in clusters resembling Staph       Results called to and read back by: Chasity Raymundo RN  05/09/2022  11:31      STAPHYLOCOCCUS AUREUS  ID consult required at [facility].[place] and [place] locations.  For susceptibility see order #[id number]      Narrative:      Collection has been rescheduled by [email or web address] at 05/08/2022 13:22 Reason:   Pt in mri will be there after another hour [id number]  Collection has been rescheduled by [email or web address] at 05/08/2022 13:22 Reason:   Pt in mri will be  there after another hour [id number]    Blood culture [[id number]]  (Abnormal) Collected: 05/08/22 1515    Order Status: Completed Specimen: Blood from Peripheral, Left Arm Updated: 05/11/22 1009     Blood Culture, Routine Gram stain aer bottle: Gram positive cocci in clusters resembling Staph      Results called to and read back by: Chasity Raymundo RN  05/09/2022  15:40      STAPHYLOCOCCUS AUREUS  ID consult required at [facility] and [facility].  For susceptibility see order #[id number]      Narrative:      Collection has been rescheduled by [email or web address] at 05/08/2022 13:22 Reason:   Pt in mri will be there after another hour [id number]  Collection has been rescheduled by [email or web address] at 05/08/2022 13:22 Reason:   Pt in mri will be there after another hour [id number]        Latest lactate reviewed, they are-  No results for input(s): LACTATE in the last 72 hours.    Organ dysfunction indicated by Acute kidney injury  Source-  lung    Source control Achieved by-   Cont Broad spectrum IVAB     Bacteremia  Infectious disease consulted      COPD (chronic obstructive pulmonary disease)  Cont breathing tx prn  Pulmonology following       VTE Risk Mitigation (From admission, onward)         Ordered     enoxaparin injection 100 mg  2 times daily         05/31/22 1704     IP VTE HIGH RISK PATIENT  Once         05/06/22 2336     Place sequential compression device  Until discontinued         05/06/22 2336                Discharge Planning   YESSICA: 5/31/2022     Code Status: Full Code   Is the patient medically ready for discharge?:     Reason for patient still in hospital (select all that apply): Patient trending condition  Discharge Plan A: Skilled Nursing Facility   Discharge Delays: None known at this time              Delmer Guzmán MD  Department of Hospital Medicine   [facility] - Telemetry ([facility])

## 2022-06-02 NOTE — ASSESSMENT & PLAN NOTE
5/20 Suspect empyema, needs chest tube placement  5/21 s/p chest tube placement, adequate expansion - will check Chest X Ray in [facility]  5/23 tPA DNase.  5/24 tPA for DNase installation via chest tube.  Monitor chest tube output.  Repeat chest x-ray in a.m..  5/25 chest tube output increased from 50 mL on average per 24 hours to 160 mL last 24 hours.  Purulent drainage noted.  Will repeat tPA plus Dnase instillation.  5/26 chest x-ray reviewed.  Continue IV oxacillin.  Right-sided pigtail in place.  Status post tPA and DNase x2.  Bloody drainage around pigtail today status post tPA and DNase yesterday.  Will hold on fibrinolysis.  Monitor chest tube output  5/27 right pigtail in place.  225 mL  Last 24 hours average of 200 mL per day.  Change pleurovac canister to better assess the color of the fluid pus versus serosanguineous.  Keep pigtail in place for now.  Ultrasound bilateral chest assess pleural effusions.  Continue oxacillin drip.  5/28 keep chest tube on the right side for now.  Repeat CRP.  Checking chemistry microbiology  on left-sided pleural fluid to rule out complicated effusion and need for pigtail.  Continue oxacillin drip.   5/29 minimal output right chest tube.  Remove chest tube.  Left-sided effusion not complicated not empyema.  Continue IV oxacillin    6/1 evolving empyema/necrosis on right lung on CT scan of the chest 06/01/2022.  Continue IV oxacillin.  Re-consult cardiothoracic surgery.  6/2 /treat COVID.  Cardiothoracic surgery input noted.  VATS procedure will be delayed till Monday next week.  Patient will be scheduled for VATS on 06/06/2022

## 2022-06-02 NOTE — PLAN OF CARE
Patient resting in bed. Plan of care reviewed with patient. Verbalized understanding. Will continue current treatment plan.

## 2022-06-02 NOTE — ASSESSMENT & PLAN NOTE
2/2 MSSA endocarditis   Cont IV abx     6/2/22  CT shows evolving septic emboli and necrotizing pneumonia. CVT has been re-consulted.  Vats procedure planned for Monday

## 2022-06-02 NOTE — ASSESSMENT & PLAN NOTE
5/15: Blood cultures 5/11/22: NGTD     Continuous Oxacillin, EOC: 7/11/22, pending acceptance at facility, PICC line placed

## 2022-06-02 NOTE — ASSESSMENT & PLAN NOTE
MRI of the thoracic region 0  1. No evidence for disc osteomyelitis in the thoracic spine.  No evidence for epidural abscess.  2. Loculated right hydropneumothorax and extensive bilateral pulmonary infiltrates.     Case discussed with critical care team- will consult pulmonology /CVT  On oxacillin  05/23- s/p chest tube placement -follow pulmonology .  Continue oxacillin    05/30- will continue Oxacillin as planned.-will complete 8 weeks of therapy .    06/02/22-  Case discussed with CVT -will plan for VATS next week.  Continue Oxacillin  IR follow up for pigtail if feasible

## 2022-06-02 NOTE — ASSESSMENT & PLAN NOTE
Complicated by PNA, possible empyema  SP Thora  Cont Abx  5/20/2022 Probable empyema,needs chest tube drainage  5/22 chest tube draining  5/23 60 cc drained yesterday.  Possible tPA and DNase tomorrow.  Continue IV oxacillin.  5/24 monitor chest tube output post tPA and DNase instillation today PA chest tube  5/26 chest x-ray reviewed.  Continue IV oxacillin.  Right-sided pigtail in place.  Status post tPA and DNase x2.  Bloody drainage around pigtail today status post tPA and DNase yesterday.  Will hold on fibrinolysis.  Monitor chest tube output  5/27 right pigtail in place.  225 mL  Last 24 hours average of 200 mL per day.  Change pleurovac canister to better assess the color of the fluid pus versus serosanguineous.  Keep pigtail in place for now.  Ultrasound bilateral chest assess pleural effusions.  Continue oxacillin drip.  5/28 keep chest tube on the right side for now.  Repeat CRP.  Checking chemistry microbiology  on left-sided pleural fluid to rule out complicated effusion and need for pigtail.  Continue oxacillin drip.   5/29 minimal output right chest tube.  Remove chest tube.  Left-sided effusion not complicated not empyema.  Continue IV oxacillin   5/30 oxacillin total of 8 weeks.  Chest x-ray stable.  Right-sided pigtail removed.  No evidence of empyema on the left side pleural fluid.  5/31 Continue above, will sign off  6/1 worsening and evolving empyema right lung.  Reconsult cardiothoracic surgery.  Continue IV oxacillin.  6/2 discussed with Interventional Radiology and Cardiothoracic surgery.  Treat COVID for now.  VATS early next week.  Cardiothoracic surgery input today noted.  Repeat chest x-ray.

## 2022-06-02 NOTE — PT/OT/SLP PROGRESS
"Physical Therapy      Patient Name:  Tianna Leon   MRN:  [id number]    12:10 p.m.  Patient refused due to feeling unwell. States she has gotten "bad news" of having to have lung surgery. Patient also states she has been coughing up blood.  Will follow up during next scheduled PT session.         "

## 2022-06-02 NOTE — PLAN OF CARE
Per Radha with [facility] they have received insurance auth (auth is good through Wednesday of next week). Per progress note patient to have VATS Monday. [facility] will continue to follow.

## 2022-06-02 NOTE — PLAN OF CARE
Received phone call from [facility] stating [facility] may require P2P for aleks [place] to call if this is needed.    CM sent updates via careport to [facility] and left message with liaison, Radha.

## 2022-06-03 PROBLEM — M48.061 LUMBAR STENOSIS WITHOUT NEUROGENIC CLAUDICATION: Status: RESOLVED | Noted: 2022-05-09 | Resolved: 2022-06-03

## 2022-06-03 PROBLEM — N17.9 AKI (ACUTE KIDNEY INJURY): Status: ACTIVE | Noted: 2022-06-03

## 2022-06-03 LAB
ABO + RH BLD: NORMAL
ALBUMIN SERPL BCP-MCNC: 1.6 G/DL (ref 3.5–5.2)
ALP SERPL-CCNC: 75 U/L (ref 55–135)
ALT SERPL W/O P-5'-P-CCNC: 11 U/L (ref 10–44)
ANION GAP SERPL CALC-SCNC: 11 MMOL/L (ref 8–16)
AST SERPL-CCNC: 17 U/L (ref 10–40)
BASOPHILS # BLD AUTO: 0.01 K/UL (ref 0–0.2)
BASOPHILS NFR BLD: 0.2 % (ref 0–1.9)
BILIRUB SERPL-MCNC: 0.2 MG/DL (ref 0.1–1)
BLD GP AB SCN CELLS X3 SERPL QL: NORMAL
BLD PROD TYP BPU: NORMAL
BLOOD UNIT EXPIRATION DATE: NORMAL
BLOOD UNIT TYPE CODE: 5100
BLOOD UNIT TYPE: NORMAL
BUN SERPL-MCNC: 31 MG/DL (ref 6–20)
CALCIUM SERPL-MCNC: 7.7 MG/DL (ref 8.7–10.5)
CHLORIDE SERPL-SCNC: 106 MMOL/L (ref 95–110)
CO2 SERPL-SCNC: 24 MMOL/L (ref 23–29)
CODING SYSTEM: NORMAL
CREAT SERPL-MCNC: 2.1 MG/DL (ref 0.5–1.4)
CRP SERPL-MCNC: 31.2 MG/L (ref 0–8.2)
DIFFERENTIAL METHOD: ABNORMAL
DISPENSE STATUS: NORMAL
EOSINOPHIL # BLD AUTO: 0.1 K/UL (ref 0–0.5)
EOSINOPHIL NFR BLD: 2 % (ref 0–8)
ERYTHROCYTE [DISTWIDTH] IN BLOOD BY AUTOMATED COUNT: 16.1 % (ref 11.5–14.5)
EST. GFR  (AFRICAN AMERICAN): 33 ML/MIN/1.73 M^2
EST. GFR  (NON AFRICAN AMERICAN): 29 ML/MIN/1.73 M^2
FINAL PATHOLOGIC DIAGNOSIS: NORMAL
GLUCOSE SERPL-MCNC: 118 MG/DL (ref 70–110)
HCT VFR BLD AUTO: 22.8 % (ref 37–48.5)
HGB BLD-MCNC: 7.2 G/DL (ref 12–16)
IMM GRANULOCYTES # BLD AUTO: 0.05 K/UL (ref 0–0.04)
IMM GRANULOCYTES NFR BLD AUTO: 0.8 % (ref 0–0.5)
LYMPHOCYTES # BLD AUTO: 1.8 K/UL (ref 1–4.8)
LYMPHOCYTES NFR BLD: 28.3 % (ref 18–48)
Lab: NORMAL
MCH RBC QN AUTO: 27 PG (ref 27–31)
MCHC RBC AUTO-ENTMCNC: 31.6 G/DL (ref 32–36)
MCV RBC AUTO: 85 FL (ref 82–98)
MICROSCOPIC EXAM: NORMAL
MONOCYTES # BLD AUTO: 0.5 K/UL (ref 0.3–1)
MONOCYTES NFR BLD: 8 % (ref 4–15)
NEUTROPHILS # BLD AUTO: 3.9 K/UL (ref 1.8–7.7)
NEUTROPHILS NFR BLD: 60.7 % (ref 38–73)
NRBC BLD-RTO: 0 /100 WBC
NUM UNITS TRANS PACKED RBC: NORMAL
PLATELET # BLD AUTO: 286 K/UL (ref 150–450)
PMV BLD AUTO: 8.5 FL (ref 9.2–12.9)
POTASSIUM SERPL-SCNC: 4.5 MMOL/L (ref 3.5–5.1)
PROT SERPL-MCNC: 6 G/DL (ref 6–8.4)
RBC # BLD AUTO: 2.67 M/UL (ref 4–5.4)
SODIUM SERPL-SCNC: 141 MMOL/L (ref 136–145)
WBC # BLD AUTO: 6.37 K/UL (ref 3.9–12.7)

## 2022-06-03 PROCEDURE — 99233 PR SUBSEQUENT HOSPITAL CARE,LEVL III: ICD-10-PCS | Mod: NSCH,,, | Performed by: INTERNAL MEDICINE

## 2022-06-03 PROCEDURE — 25000003 PHARM REV CODE 250: Performed by: INTERNAL MEDICINE

## 2022-06-03 PROCEDURE — 80053 COMPREHEN METABOLIC PANEL: CPT | Performed by: NURSE PRACTITIONER

## 2022-06-03 PROCEDURE — 27000646 HC AEROBIKA DEVICE

## 2022-06-03 PROCEDURE — 86900 BLOOD TYPING SEROLOGIC ABO: CPT | Performed by: FAMILY MEDICINE

## 2022-06-03 PROCEDURE — 25000003 PHARM REV CODE 250: Performed by: NURSE PRACTITIONER

## 2022-06-03 PROCEDURE — 94799 UNLISTED PULMONARY SVC/PX: CPT

## 2022-06-03 PROCEDURE — 86140 C-REACTIVE PROTEIN: CPT | Performed by: INTERNAL MEDICINE

## 2022-06-03 PROCEDURE — 99900035 HC TECH TIME PER 15 MIN (STAT)

## 2022-06-03 PROCEDURE — 21400001 HC TELEMETRY ROOM

## 2022-06-03 PROCEDURE — 94761 N-INVAS EAR/PLS OXIMETRY MLT: CPT

## 2022-06-03 PROCEDURE — 99233 SBSQ HOSP IP/OBS HIGH 50: CPT | Mod: ,,, | Performed by: INTERNAL MEDICINE

## 2022-06-03 PROCEDURE — 85025 COMPLETE CBC W/AUTO DIFF WBC: CPT | Performed by: FAMILY MEDICINE

## 2022-06-03 PROCEDURE — 86850 RBC ANTIBODY SCREEN: CPT | Performed by: FAMILY MEDICINE

## 2022-06-03 PROCEDURE — 94664 DEMO&/EVAL PT USE INHALER: CPT

## 2022-06-03 PROCEDURE — 99233 SBSQ HOSP IP/OBS HIGH 50: CPT | Mod: NSCH,,, | Performed by: INTERNAL MEDICINE

## 2022-06-03 PROCEDURE — 27000207 HC ISOLATION

## 2022-06-03 PROCEDURE — P9016 RBC LEUKOCYTES REDUCED: HCPCS | Performed by: FAMILY MEDICINE

## 2022-06-03 PROCEDURE — 25000003 PHARM REV CODE 250: Performed by: FAMILY MEDICINE

## 2022-06-03 PROCEDURE — 94640 AIRWAY INHALATION TREATMENT: CPT

## 2022-06-03 PROCEDURE — 63600175 PHARM REV CODE 636 W HCPCS: Performed by: INTERNAL MEDICINE

## 2022-06-03 PROCEDURE — 27000221 HC OXYGEN, UP TO 24 HOURS

## 2022-06-03 PROCEDURE — 86920 COMPATIBILITY TEST SPIN: CPT | Performed by: FAMILY MEDICINE

## 2022-06-03 PROCEDURE — 99233 PR SUBSEQUENT HOSPITAL CARE,LEVL III: ICD-10-PCS | Mod: ,,, | Performed by: INTERNAL MEDICINE

## 2022-06-03 PROCEDURE — 63600175 PHARM REV CODE 636 W HCPCS: Performed by: NURSE PRACTITIONER

## 2022-06-03 RX ORDER — MORPHINE SULFATE 2 MG/ML
2 INJECTION, SOLUTION INTRAMUSCULAR; INTRAVENOUS ONCE
Status: COMPLETED | OUTPATIENT
Start: 2022-06-03 | End: 2022-06-03

## 2022-06-03 RX ADMIN — OXACILLIN SODIUM 12 G: 10 INJECTION, POWDER, FOR SOLUTION INTRAVENOUS at 05:06

## 2022-06-03 RX ADMIN — ALBUTEROL SULFATE 2 PUFF: 90 AEROSOL, METERED RESPIRATORY (INHALATION) at 08:06

## 2022-06-03 RX ADMIN — ALBUTEROL SULFATE 2 PUFF: 90 AEROSOL, METERED RESPIRATORY (INHALATION) at 12:06

## 2022-06-03 RX ADMIN — TRAZODONE HYDROCHLORIDE 100 MG: 100 TABLET ORAL at 08:06

## 2022-06-03 RX ADMIN — REMDESIVIR 100 MG: 100 INJECTION, POWDER, LYOPHILIZED, FOR SOLUTION INTRAVENOUS at 10:06

## 2022-06-03 RX ADMIN — ENOXAPARIN SODIUM 100 MG: 100 INJECTION SUBCUTANEOUS at 08:06

## 2022-06-03 RX ADMIN — THERA TABS 1 TABLET: TAB at 09:06

## 2022-06-03 RX ADMIN — MORPHINE SULFATE 2 MG: 2 INJECTION, SOLUTION INTRAMUSCULAR; INTRAVENOUS at 09:06

## 2022-06-03 RX ADMIN — ONDANSETRON 4 MG: 2 INJECTION INTRAMUSCULAR; INTRAVENOUS at 12:06

## 2022-06-03 RX ADMIN — MORPHINE SULFATE 30 MG: 30 TABLET, FILM COATED, EXTENDED RELEASE ORAL at 01:06

## 2022-06-03 RX ADMIN — OXYCODONE HYDROCHLORIDE 5 MG: 5 TABLET ORAL at 06:06

## 2022-06-03 RX ADMIN — OXYCODONE HYDROCHLORIDE AND ACETAMINOPHEN 500 MG: 500 TABLET ORAL at 08:06

## 2022-06-03 RX ADMIN — SODIUM CHLORIDE: 0.9 INJECTION, SOLUTION INTRAVENOUS at 06:06

## 2022-06-03 RX ADMIN — OXYCODONE HYDROCHLORIDE AND ACETAMINOPHEN 500 MG: 500 TABLET ORAL at 09:06

## 2022-06-03 RX ADMIN — ENOXAPARIN SODIUM 100 MG: 100 INJECTION SUBCUTANEOUS at 09:06

## 2022-06-03 RX ADMIN — FAMOTIDINE 20 MG: 20 TABLET ORAL at 09:06

## 2022-06-03 RX ADMIN — ALBUTEROL SULFATE 2 PUFF: 90 AEROSOL, METERED RESPIRATORY (INHALATION) at 07:06

## 2022-06-03 RX ADMIN — ONDANSETRON 4 MG: 2 INJECTION INTRAMUSCULAR; INTRAVENOUS at 02:06

## 2022-06-03 NOTE — PROGRESS NOTES
[facility] Medicine  Progress Note    Patient Name: Tianna Leon  MRN: [id number]  Patient Class: IP- Inpatient   Admission Date: 5/6/2022  Length of Stay: 28 days  Attending Physician: Delmer Guzmán MD  Primary Care Provider: Spenser Campa MD        Subjective:     Principal Problem:Acute bacterial endocarditis        HPI:  38 y/o. female  with a PMHx of asthma, COPD,Bipolar  , IVDU and HLD presented to the ER with a c/o  sob for the last weak which has gotten worse . The SOB is associated with fever , chills , productive cough , back pain  and generalized weakness . She report cough some blood this am .  She is active IVDU  ( heroine , cocaine  and amphetamine ) . She denies any  sick contact , chest pain  , GI/ sx , She also complaning of LE sweeling . Knee pain and B/L LE rash .   ER COURSE: CTA chest negative for pe . Multiple nodular and cavitary opacities concerning for septic emboli with larger opacities possibly necrotizing pneumonia. CT cervical  and thoracic did not show any acute finding , CT lumbar Possible progression of degenerative changes most notable at L4-5 with moderate to severe spinal canal stenosis at this level.  WBC  29 k , na 130 , k 3.4 , cl 89 , procal 2.71   ER VS:  BP Pulse Resp Temp SpO2   (!) 124/58 110 (!) 30 (!) 101.6 °F (38.7 °C) 96 %           Pt will be admitted to inpt with a dx of PNA and severe sepsis       Overview/Hospital Course:  5/7 admitted for pneumonia, severe sepsis in setting of ivdu. Mother at bedside and provides collateral. Patient has struggled with substance abuse for approximately 20 years, worsening over last 3-5 years since life event. Onset of symptoms 1-2 weeks ago. Tolerated thoracentesis with no pneumothorax on chest x-ray. Mri lumbar and echo, pending. Bcx positive, growing gram positive cocci. On vanc, cefepime and flagyl. Infectious disease consulted for bacteremia. 5/8/22 The patient remained afebrile overnight.  Currently on cefepime/vanc/flagyl. Blood cx 5/6/22 are growing MSSA. The patient refused the MRI lumbar spine overnight d/t being unable to lay flat from back pain. Will give pain meds and attempt to get the MRI today to r/o spinal abscess. The ECHO showed a 1.3 x 1.5 cm elongated, mobile echodensity seen on the tricuspid valve consistent with vegetation, CT surgery was consulted. Will need a SUSANNAH. Infectious disease is consulted. Will repeat blood cx today. 5/9/22 No acute events overnight. The patient reports lower back pain is not well controlled with current pain regimen, will adjust. Repeat blood cx are still positive. Sensitivities are back and Staph is sensitive to oxacillin, will D/C vanc/cefepime. Cardiology consulted and plans for a SUSANNAH today. CT surgery following and recommends continuing medical management with appropriate ABX, no surgical intervention at this time. MRI lumbar spine showed moderate to severe spinal canal stenosis with moderate left-sided neural foraminal stenosis, Neurosurgery consulted. 5/10/22 No acute events overnight. The patient reports some improvement in pain with adjustment in pain meds. Repeat blood cx are +. Continue IV oxacillin and flagyl. Infectious disease is consulted. IR consulted to evaluate possible paraspinous muscle myositis versus abscess. Recommend IR drainage. Continue current management. 5/11/22 No acute events overnight. The patient reports pain is still not well controlled at times. The patient reports that she is very anxious about the upcoming SUSANNAH and IR drainage. SUSANNAH was pushed back to this afternoon because the patient ate candy this AM. Continue treatment with IV oxacillin and flagyl. Infectious disease is following. WBC trended down to 31K. Continue current management. 5/12/22 No acute events overnight. The patients WBC improved to 22K overnight. The patient remains on continuous oxacillin infusion. Infectious disease is following. MRI thoracic spine is  ordered and pending. The patients SUSANNAH was postponed again today d/t low H/H. Hgb was noted to be 6.2 this morning, will transfuse 1 unit PRBC. The patient reports pain is still not well controlled. The case was discussed with Neurosurgery who recommended adding extended release pain meds. Approximately 1 ml of purulent drainage aspirated from back yesterday per IR, growing gram + cocci.     5/13: Patient was given 1U PRBC on 5/12, improved from 6.2 --> 7.0, will give an additional unit PRBC. Radiology attempted to bring patient on 5/12 at 1755 for Thoracic MRI, patient refused due to pain and anxiety. Repeat attempt this AM, patient again refused. Will order Ativan 1mg IV to be given with pain medication prior to scan to relieve anxiety and pain, nurse to notify NP when ready to go for scan. Discussed pain management with patient, discussed transition to PO pain medications to being preparing for d/c to LTAC, adjusted PO pain regimen will reevaluate to determine effectiveness. Repeat Blood cultures 5/11/22: NGTD.     5/14: Final anaerobic cultures pending, continue to adjust pain regimen, d/c IV Dilaudid today. Patient currently managed with PO Morphine extended release and PO oxycodone. D/C plan is for LTAC once final antibiotic regimen determined. Patient continues to refuse MRI of thoracic spine.     5/15: Called to room this AM, patient had coughed up blood and mucous. Approx. 1-2 teaspoons of blood in emesis bag, ordered CXR and repeat CBC, CXR showed improvement from previous day, repeat CBC at time of incident, showed a slight decrease in Hgb: 9.7 --> 8.7, when repeated at 1500, Hgb had improved to 10. no further episodes of coughing up blood throughout day. Patient continues to c/o not having enough pain medication but continues to sleep most of the day and will fall asleep when speaking at times. Added IV Toradol to plan.     5/16: No further episodes of coughing blood overnight, patient participated with  encouragement with PT/OT today, recommendations are HH vs. SNF pending progress. Awaiting final antibiotic regimen recommendations. Anaerobic culture results finalized: negative for growth. Afebrile, WBC trending downward.     5/17: Antibiotic regimen per ID is continuous oxacillin until 7/11/22, SW notified of plan, will seek acceptance at LTAC or SNF. Patient choice is [facility]. WBC in normal range. AMS this AM, CT of head: no acute findings. Given narcan and mental status improved. Decreased narcotic dose.     5/18: Per discussion with patient, she is willing to have the MRI of thoracic spine today, will order IV pain medication and anxiety medication prior to scan. Nursing and Radiology aware of needing to premedicate for scan. Patient is more cooperative and participating with care.     5/19: Patient given premedications for MRI, MRI completed, results pending. Patient more agreeable with treatment plan and cooperating. Will need psychiatry follow-up after discharge. Hgb: 6.7, will give 1U PRBC    5/20/22: +LEON and generalized pain. Thoracic MRI showed no evidence for disc osteomyelitis in the thoracic spine and no epidural abscess, Loculated right hydropneumothorax and extensive bilateral pulmonary infiltrates. Ct chest showed septic emboli and moderate right pleural effusion.+splenic infarct  Pulmonology felt likely empyema- consulted IR for chest tube.     5/21/22: s/p right pig tail placement to suction/ drainage system per IR. 60ml pus removed initially. Fluid analysis consistent with empyema. Pleural fluid cultures pending. Now with scant serosanguinous output.+ right lateral chest pain at chest tube site. Denies any other complaints. Status update provided to pt's mother.     5/22/22: Repeatedly requesting IV Dilaudid. Toradol ordered. Afebrile, WBC normal, O2sats stable on 3lites. s/p right pig tail placement to suction/ drainage system per IR on 5/21/22- draining serous draiange with pus. Plan for LTAC  "placement     5/23/22: More cofortable today. Not getting OOB. Encouraged OOB and rationale provided. Pulmonology recommended Possible tPA and DNase in chest tube tomorrow    5/24/22: Examined OOB in chair. Encouraged pt to continue OOB. Plan for tPA and DNase in chest tube today per Pulmonology  Pt denied LTAC- will pursue SNF    5/25/22: Temp 101.7F last night. Will recheck blood cultures and UA. BP low - IVF bolus given and restarted IVF. ID re-consulted. 160ml of purulent CT drainage output. Dr. San plans to repeat tPA and DNase in chest tube again today.     5/26/22: temp 99.2. c/o of right "lung pain" when eating. Will consult speech to check swallow. +mild leukocytosis. Repeat BC 5/25/22 show NGTD. Repeat UA showed rare trichomas. Chest tube drainage remains purulent     5/27/22  Low grade temp. Downward trend of hemoglobin, related to chest tube drainage. Blood cultures remain negative. Urine culture growing E. Coli. Sensitivities are pending.     5/28/22  Urine culture returned pansenstiive. Will continue ciprofloxacin for total of 3 days then discontinue. Still awaiting SNF placement.    5/29/22 culture of pleural fluid from 5/28/22 negative. Continue CT for now.     5/30/22  CT removed yesterday by pulmonology. SNF declines. Will explore other options for IV abx with help from ID.     5/31/22  Patient has been accepted to [facility] ([place] Will transfer today. Will follow up with pulmonology and ID at discharge. She will continue oxacillin for management of bacteremia, septic embolic, and endocarditis.  Addendum: COVID negative transfer delayed.     6/1/22  COVID positive started on remdesivir. Tmax 101.3. repeat CT scan shows evolving septic embolic/necrotizing pneumonia. Pulmonlogy recommends reconsulting CVT. ID following. Awaiting insurance approval for [facility]. Decline in hemoglobin 7.4, hold on transfusion at this time.   6/2:  CVT consult on case.  Plan for VATS procedure on Monday given patient's " COVID status.  Continue remdesivir.  Continue oxacillin.  6/3: VATS planned for Monday. Will transfuse 1 unit prbc. No further hemoptysis noted. Cont oxacillin.       Interval History: VATS planned for Monday. Will transfuse 1 unit prbc. No further hemoptysis noted. Cont oxacillin.     Patient complains of back pain and anxiety.     Review of Systems   Constitutional:  Positive for activity change, appetite change and fatigue. Negative for chills, diaphoresis, fever and unexpected weight change.   HENT:  Negative for congestion, hearing loss, mouth sores, postnasal drip, rhinorrhea, sore throat and trouble swallowing.    Eyes:  Negative for discharge and visual disturbance.   Respiratory:  Negative for cough and chest tightness.    Cardiovascular:  Negative for chest pain, palpitations and leg swelling.   Gastrointestinal:  Negative for blood in stool, constipation, diarrhea, nausea and vomiting.   Endocrine: Negative for cold intolerance and heat intolerance.   Genitourinary:  Negative for difficulty urinating, dyspareunia, flank pain and hematuria.   Musculoskeletal:  Positive for back pain and myalgias. Negative for arthralgias.   Skin: Negative.    Neurological:  Positive for weakness. Negative for dizziness and light-headedness.   Hematological:  Negative for adenopathy. Does not bruise/bleed easily.   Psychiatric/Behavioral:  Negative for agitation, behavioral problems, confusion and sleep disturbance. The patient is nervous/anxious.    Objective:     Vital Signs (Most Recent):  Temp: 99.3 °F (37.4 °C) (06/03/22 1517)  Pulse: 79 (06/03/22 1517)  Resp: 18 (06/03/22 1517)  BP: (!) 155/82 (06/03/22 1517)  SpO2: 98 % (06/03/22 1517)   Vital Signs (24h Range):  Temp:  [97.3 °F (36.3 °C)-99.3 °F (37.4 °C)] 99.3 °F (37.4 °C)  Pulse:  [63-93] 79  Resp:  [16-20] 18  SpO2:  [94 %-100 %] 98 %  BP: (147-171)/(81-88) 155/82     Weight: 98.7 kg (217 lb 9.5 oz)  Body mass index is 36.21 kg/m².    Intake/Output Summary (Last  24 hours) at 6/3/2022 1535  Last data filed at 6/3/2022 1400  Gross per 24 hour   Intake 670 ml   Output 3600 ml   Net -2930 ml      Physical Exam  Vitals and nursing note reviewed.   Constitutional:       General: She is not in acute distress.     Appearance: She is well-developed. She is ill-appearing.   HENT:      Head: Normocephalic and atraumatic.      Nose: Nose normal.   Eyes:      Extraocular Movements: Extraocular movements intact.   Cardiovascular:      Rate and Rhythm: Normal rate and regular rhythm.   Pulmonary:      Effort: Pulmonary effort is normal.      Breath sounds: No stridor. Rhonchi present.      Comments: Right-side  Pigtail removed  Abdominal:      General: There is no distension.   Musculoskeletal:         General: No signs of injury.      Cervical back: Normal range of motion and neck supple.   Skin:     General: Skin is dry.   Neurological:      General: No focal deficit present.      Mental Status: She is alert and oriented to person, place, and time. Mental status is at baseline.   Psychiatric:         Behavior: Behavior normal.       Significant Labs: All pertinent labs within the past 24 hours have been reviewed.    Significant Imaging: I have reviewed all pertinent imaging results/findings within the past 24 hours.      Assessment/Plan:      * Acute bacterial endocarditis  ECHO showed a 1.3 x 1.5 cm elongated, mobile echodensity seen on the tricuspid valve consistent with vegetation, CT surgery was consulted.   Due to MSSA    -- Infectious disease is consulted.  -- repeat blood cx NGTD.  -- Continue IV ABX.   --Sensitivities are back and Staph is sensitive to oxacillin, D/C vanc/cefepime.   --Cardiology consulted and plans for a SUSANNAH.   --CT surgery following and recommends continuing medical management with appropriate ABX, no surgical intervention at this time.    cont IV oxacillin.  Plan is to continue x 4 weeks. Pt febrile.        COVID-19  - COVID-19 testing   - Infection Control  notified     - Isolation:   - Airborne, Contact and Droplet Precautions  - Cohort patients into COVID units  - N95 mask, wear eye protection  - 20 second hand hygiene              - Limit visitors per hospital policy              - Consolidating lab draws, nursing care, provider visits, and interventions    - Diagnostics: (leukopenia, hyponatremia, hyperferritinemia, elevated troponin, elevated d-dimer, age, and comorbidities are significant predictors of poor clinical outcome)  CBC, CMP, Ferritin, CRP and Portable CXR    - Management:  Supplemental O2 to maintain SpO2 >92%  Telemetry  Continuous/intermittent Pulse Ox  Albuterol treatment PRN  Remdesivir added    Advance Care Planning   Patient is a full code.               Empyema of right pleural space  Pulmonology consulted and felt likely empyema   IR consulted for chest tube      s/p right pigtail small bore chest tube placement to suction/drainage system per IR on 5/21/22. 60ml pus removed. Now with Serous drainage with pus. Fluid analysis consistent with empyema. Pleural fluid aerobic culture shows NGTD, Anaerobic culture pending    5/24/22: Dr. San plans for tPA and DNase in chest tube today.   5/25/22: 160ml of purulent CT drainage output. Dr. San plans to repeat tPA and DNase in chest tube again today.   5/26/22  Still has purulent drainage noted. Pulmonology following    6/3:  Worsening empyema noted  CTS consult on case  Plan for VATS procedure on Monday    Normocytic anemia  Secondary to acute illness    --Daily CBC  --Transfuse with 1 unit PRBC for Hgb <7.0 or <8.0 if symptomatic   --Hgb: 6.7, give 1U PRBC    5/27/22  Hemoglobin 7.3g/dL. downward trend. Has blood tinged seropurulent drainage. Will give additional unit if continues to trend down.  5/28/22  Hemoglobin 8.0 today    6/3:  hgb < 7  Will transfuse 1 unit prbc  No further hemoptysis reported  Cont to monitor       Pulmonary embolism, septic  2/2 MSSA endocarditis   Cont IV abx      6/3/22  CT shows evolving septic emboli and necrotizing pneumonia. CVT has been re-consulted.  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 IR consulted to evaluate possible paraspinous muscle myositis versus abscess.    --Approximately 1 ml of purulent drainage aspirated 5/11 per IR, growing MSSA  --Continue current regimen  --Oxacillin x 4 weeks total    Chronic pulmonary heart disease  - Pulmonology following       MSSA bacteremia  5/15: Blood cultures 5/11/22: NGTD     Continuous Oxacillin, EOC: 7/11/22, pending acceptance at facility, PICC line placed    6/3:  Cont on oxacillin  ID on case     Parapneumonic effusion  Pulmonology consulted  Status post thoracentesis 5/7/22   continue CT management per pulmonology    5/29/22  Body fluid culture currently negative.     5/30/22  CT removed    6/1/22   Febrile CT shows evolving empyema/necrotizing pneumonia. pulmonology recommends reconsulting CVT.     IVDU (intravenous drug user)   Will ordet TTE to r/o IE  Will order MRI lumbar wwo- completed   Cont broad spectrum IVAB   --MRI thoracic spine ordered per ID, patient refused on multiple attempts    5/7   Studies pending for additional sites of infection given h/o ivdu  5/8/22  on cessation     Severe sepsis  This patient does have evidence of infective focus  My overall impression is sepsis. Vital signs were reviewed and noted in progress note.  Antibiotics given-   Antibiotics (From admission, onward)            Start     Stop Route Frequency Ordered    05/09/22 1100  oxacillin 12 g in  mL CONTINUOUS INFUSION         -- IV Every 24 hours (non-standard times) 05/09/22 0932    05/07/22 2100  mupirocin 2 % ointment         05/12 2059 Nasl 2 times daily 05/07/22 1646        Cultures were taken-   Microbiology Results (last 7 days)     Procedure Component Value Units Date/Time    Culture, Respiratory with Gram Stain  [[id number]] Collected: 05/16/22 0758    Order Status: Sent Specimen: Respiratory from Sputum Updated: 05/16/22 0759    Culture, Anaerobe [[id number]] Collected: 05/11/22 1440    Order Status: Completed Specimen: Abscess from Back Updated: 05/16/22 0733     Anaerobic Culture No anaerobes isolated    Blood culture [[id number]] Collected: 05/11/22 1213    Order Status: Completed Specimen: Blood Updated: 05/16/22 0612     Blood Culture, Routine No Growth to date      No Growth to date      No Growth to date      No Growth to date      No Growth to date    Blood culture [[id number]] Collected: 05/11/22 1213    Order Status: Completed Specimen: Blood Updated: 05/16/22 0612     Blood Culture, Routine No Growth to date      No Growth to date      No Growth to date      No Growth to date      No Growth to date    Aerobic culture [[id number]]  (Abnormal)  (Susceptibility) Collected: 05/11/22 1440    Order Status: Completed Specimen: Abscess from Back Updated: 05/14/22 1057     Aerobic Bacterial Culture STAPHYLOCOCCUS AUREUS  Many      Gram stain [[id number]] Collected: 05/11/22 1440    Order Status: Completed Specimen: Abscess from Back Updated: 05/12/22 0306     Gram Stain Result Few WBC's      Few Gram positive cocci    Gram stain [[id number]]     Order Status: Canceled Specimen: Joint Fluid from Back     Blood culture [[id number]]  (Abnormal) Collected: 05/08/22 1516    Order Status: Completed Specimen: Blood from Peripheral, Right Hand Updated: 05/11/22 1009     Blood Culture, Routine Gram stain aer bottle: Gram positive cocci in clusters resembling Staph       Results called to and read back by: Chasity Raymundo RN  05/09/2022  11:31      STAPHYLOCOCCUS AUREUS  ID consult required at [facility],[place] and [facility].  For susceptibility see order #[id number]      Narrative:      Collection has been rescheduled by [email or web address] at 05/08/2022 13:22 Reason:   Pt in mri will be there after another hour [id number]  Collection has been  rescheduled by [email or web address] at 05/08/2022 13:22 Reason:   Pt in mri will be there after another hour [id number]    Blood culture [[id number]]  (Abnormal) Collected: 05/08/22 1515    Order Status: Completed Specimen: Blood from Peripheral, Left Arm Updated: 05/11/22 1009     Blood Culture, Routine Gram stain aer bottle: Gram positive cocci in clusters resembling Staph      Results called to and read back by: Chasity Raymundo RN  05/09/2022  15:40      STAPHYLOCOCCUS AUREUS  ID consult required at [facility],[place] and [facility] locations.  For susceptibility see order #[id number]      Narrative:      Collection has been rescheduled by [email or web address] at 05/08/2022 13:22 Reason:   Pt in mri will be there after another hour [id number]  Collection has been rescheduled by [email or web address] at 05/08/2022 13:22 Reason:   Pt in mri will be there after another hour [id number]        Latest lactate reviewed, they are-  No results for input(s): LACTATE in the last 72 hours.    Organ dysfunction indicated by Acute kidney injury  Source-  lung    Source control Achieved by-   Cont Broad spectrum IVAB     Bacteremia  Infectious disease consulted      COPD (chronic obstructive pulmonary disease)  Cont breathing tx prn  Pulmonology following       VTE Risk Mitigation (From admission, onward)         Ordered     enoxaparin injection 100 mg  2 times daily         05/31/22 1704     IP VTE HIGH RISK PATIENT  Once         05/06/22 2336     Place sequential compression device  Until discontinued         05/06/22 2336                Discharge Planning   YESSICA: 5/31/2022     Code Status: Full Code   Is the patient medically ready for discharge?:     Reason for patient still in hospital (select all that apply): Patient trending condition  Discharge Plan A: Skilled Nursing Facility   Discharge Delays: None known at this time              Delmer Guzmán MD  Department of Hospital Medicine   [place] [facility] ([facility])

## 2022-06-03 NOTE — NURSING
Patient in bed with complaints of nose bleed. Applied pressure until bleeding stopped  given. MD notified and present in room. No new orders noted.

## 2022-06-03 NOTE — ASSESSMENT & PLAN NOTE
Secondary to acute illness    --Daily CBC  --Transfuse with 1 unit PRBC for Hgb <7.0 or <8.0 if symptomatic   --Hgb: 6.7, give 1U PRBC    5/27/22  Hemoglobin 7.3g/dL. downward trend. Has blood tinged seropurulent drainage. Will give additional unit if continues to trend down.  5/28/22  Hemoglobin 8.0 today    6/3:  hgb < 7  Will transfuse 1 unit prbc  No further hemoptysis reported  Cont to monitor

## 2022-06-03 NOTE — SUBJECTIVE & OBJECTIVE
Interval History: VATS planned for Monday. Will transfuse 1 unit prbc. No further hemoptysis noted. Cont oxacillin.     Patient complains of back pain and anxiety.     Review of Systems   Constitutional:  Positive for activity change, appetite change and fatigue. Negative for chills, diaphoresis, fever and unexpected weight change.   HENT:  Negative for congestion, hearing loss, mouth sores, postnasal drip, rhinorrhea, sore throat and trouble swallowing.    Eyes:  Negative for discharge and visual disturbance.   Respiratory:  Negative for cough and chest tightness.    Cardiovascular:  Negative for chest pain, palpitations and leg swelling.   Gastrointestinal:  Negative for blood in stool, constipation, diarrhea, nausea and vomiting.   Endocrine: Negative for cold intolerance and heat intolerance.   Genitourinary:  Negative for difficulty urinating, dyspareunia, flank pain and hematuria.   Musculoskeletal:  Positive for back pain and myalgias. Negative for arthralgias.   Skin: Negative.    Neurological:  Positive for weakness. Negative for dizziness and light-headedness.   Hematological:  Negative for adenopathy. Does not bruise/bleed easily.   Psychiatric/Behavioral:  Negative for agitation, behavioral problems, confusion and sleep disturbance. The patient is nervous/anxious.    Objective:     Vital Signs (Most Recent):  Temp: 99.3 °F (37.4 °C) (06/03/22 1517)  Pulse: 79 (06/03/22 1517)  Resp: 18 (06/03/22 1517)  BP: (!) 155/82 (06/03/22 1517)  SpO2: 98 % (06/03/22 1517)   Vital Signs (24h Range):  Temp:  [97.3 °F (36.3 °C)-99.3 °F (37.4 °C)] 99.3 °F (37.4 °C)  Pulse:  [63-93] 79  Resp:  [16-20] 18  SpO2:  [94 %-100 %] 98 %  BP: (147-171)/(81-88) 155/82     Weight: 98.7 kg (217 lb 9.5 oz)  Body mass index is 36.21 kg/m².    Intake/Output Summary (Last 24 hours) at 6/3/2022 1535  Last data filed at 6/3/2022 1400  Gross per 24 hour   Intake 670 ml   Output 3600 ml   Net -2930 ml      Physical Exam  Vitals and nursing  note reviewed.   Constitutional:       General: She is not in acute distress.     Appearance: She is well-developed. She is ill-appearing.   HENT:      Head: Normocephalic and atraumatic.      Nose: Nose normal.   Eyes:      Extraocular Movements: Extraocular movements intact.   Cardiovascular:      Rate and Rhythm: Normal rate and regular rhythm.   Pulmonary:      Effort: Pulmonary effort is normal.      Breath sounds: No stridor. Rhonchi present.      Comments: Right-side  Pigtail removed  Abdominal:      General: There is no distension.   Musculoskeletal:         General: No signs of injury.      Cervical back: Normal range of motion and neck supple.   Skin:     General: Skin is dry.   Neurological:      General: No focal deficit present.      Mental Status: She is alert and oriented to person, place, and time. Mental status is at baseline.   Psychiatric:         Behavior: Behavior normal.       Significant Labs: All pertinent labs within the past 24 hours have been reviewed.    Significant Imaging: I have reviewed all pertinent imaging results/findings within the past 24 hours.

## 2022-06-03 NOTE — ASSESSMENT & PLAN NOTE
5/20 Suspect empyema, needs chest tube placement  5/21 s/p chest tube placement, adequate expansion - will check Chest X Ray in [facility]  5/23 tPA DNase.  5/24 tPA for DNase installation via chest tube.  Monitor chest tube output.  Repeat chest x-ray in a.m..  5/25 chest tube output increased from 50 mL on average per 24 hours to 160 mL last 24 hours.  Purulent drainage noted.  Will repeat tPA plus Dnase instillation.  5/26 chest x-ray reviewed.  Continue IV oxacillin.  Right-sided pigtail in place.  Status post tPA and DNase x2.  Bloody drainage around pigtail today status post tPA and DNase yesterday.  Will hold on fibrinolysis.  Monitor chest tube output  5/27 right pigtail in place.  225 mL  Last 24 hours average of 200 mL per day.  Change pleurovac canister to better assess the color of the fluid pus versus serosanguineous.  Keep pigtail in place for now.  Ultrasound bilateral chest assess pleural effusions.  Continue oxacillin drip.  5/28 keep chest tube on the right side for now.  Repeat CRP.  Checking chemistry microbiology  on left-sided pleural fluid to rule out complicated effusion and need for pigtail.  Continue oxacillin drip.   5/29 minimal output right chest tube.  Remove chest tube.  Left-sided effusion not complicated not empyema.  Continue IV oxacillin    6/1 evolving empyema/necrosis on right lung on CT scan of the chest 06/01/2022.  Continue IV oxacillin.  Re-consult cardiothoracic surgery.  6/2 /treat COVID.  Cardiothoracic surgery input noted.  VATS procedure will be delayed till Monday next week.  Patient will be scheduled for VATS on 06/06/2022      6/3 and suspected VATS 06/06/2022 treating COVID.

## 2022-06-03 NOTE — ASSESSMENT & PLAN NOTE
5/24 continue IV oxacillin.  5/25 on IV  Oxacillin  since 05/09.  Repeat CRP.  5/27 right pigtail in place.  225 mL  Last 24 hours average of 200 mL per day.  Change pleurovac canister to better assess the color of the fluid pus versus serosanguineous.  Keep pigtail in place for now.  Ultrasound bilateral chest assess pleural effusions.  Continue oxacillin drip.  5/285/28 keep chest tube on the right side for now.  Repeat CRP.  Checking chemistry microbiology  on left-sided pleural fluid to rule out complicated effusion and need for pigtail.  Continue oxacillin drip.   5/31 Continue drip and anticoagulation   6/3 likely the cause of hemoptysis.  Treat MSSA septicemia.

## 2022-06-03 NOTE — CONSULTS
[facility] - Telemetry (Hospital)  Infectious Disease  Consult Note    Patient Name: Tianna Leon  MRN: [id number]  Admission Date: 5/6/2022  Hospital Length of Stay: 28 days  Attending Physician: Delmer Guzmán MD  Primary Care Provider: Spenser Campa MD     Isolation Status: Airborne and Contact and Droplet    Patient information was obtained from patient, past medical records and ER records.      Consults  Assessment/Plan:     * Acute bacterial endocarditis    06/03/22- will continue Oxacillin   Follow repeat blood cultures         BEE (acute kidney injury)  Will monitor renal function -]  Avoid nephrotoxic meds     COVID-19  - COVID-19 testing   - Infection Control notified     - Isolation:   - Airborne, Contact and Droplet Precautions  - Cohort patients into COVID units              - Limit visitors per hospital policy              - Consolidating lab draws, nursing care, provider visits, and interventions      - Management:  Supplemental O2 to maintain SpO2 >92%  Continuous/intermittent Pulse Ox  Albuterol treatment PRN     Will add Remdisivir    Advance Care Planning            Empyema of right pleural space  MRI of the thoracic region 0  1. No evidence for disc osteomyelitis in the thoracic spine.  No evidence for epidural abscess.  2. Loculated right hydropneumothorax and extensive bilateral pulmonary infiltrates.     Case discussed with critical care team- will consult pulmonology /CVT  On oxacillin  05/23- s/p chest tube placement -follow pulmonology .  Continue oxacillin    06/03/22- will continue oxacillin, follow CVT for VATS   05/30- will continue Oxacillin as planned.-will complete 8 weeks of therapy .    06/02/22-  Case discussed with CVT -will plan for VATS next week.  Continue Oxacillin  IR follow up for pigtail if feasible      Pulmonary embolism, septic  Related to tricuspid endocarditis - continue Oxacillin     IVDU (intravenous drug user)  This is the crux of the problem - needs drug cessation          Thank you for your consult. I will follow-up with patient. Please contact us if you have any additional questions.    Sunday Hassan MD  Infectious Disease  [facility] - Telemetry ([facility])    Subjective:     Principal Problem: Acute bacterial endocarditis    HPI:   36 y/o. female  with a PMHx of asthma, COPD,Bipolar  , IVDU and HLD ,active IVDU  ( heroine , cocaine  and amphetamine )   : CTA chest negative for pe . Multiple nodular and cavitary opacities concerning for septic emboli with larger opacities possibly necrotizing pneumonia. CT cervical  and thoracic did not show any acute finding , CT lumbar Possible progression of degenerative changes most notable at L4-5 with moderate to severe spinal canal stenosis at this level.  Echo-  · to moderate tricuspid regurgitation.     There is a 1.3 x 1.5 cm elongated, mobile echodensity seen on the tricuspid valve consistent with vegetation. Recommend CT surgery consult.   Blood culture- staph auerus  Component      Latest Ref Rng & Units 5/8/2022 5/7/2022 5/6/2022   WBC      3.90 - 12.70 K/uL 30.90 (H) 27.52 (H) 29.13 (H)       Interval History:  37 year old woman with tricuspid endocarditis .  Blood cultures- MSSA.  She remains bacteremic.  No MR evidence of epidural abscess.     Large, rim enhancing facet synovial cysts arising posteriorly from the right L2-L3 and left L4-L5 facet joints may be degenerative or sequelae of facet septic arthritis.     Minor enhancement and endplate irregularity at L4-L5 may be degenerative versus less likely the sequelae of spondylodiscitis.  No significant associated endplate destruction or vertebral body height loss.     Multilevel degenerative changes of the lumbar spine are most pronounced at L4-L5 with broad-based disc bulge and prominent central disc protrusion contributing to moderate to severe spinal canal stenosis with moderate left-sided neural foraminal stenosis.     Asymmetric left L5-S1 disc bulge contributes to  moderate to severe left-sided neural foraminal stenosis and left lateral recess stenosis.   CT chest -Lungs/Pleura: Multiple nodular and cavitary opacities concerning for septic emboli.  Additional larger opacities most notably in the right lung base with some internal clearing possibly related to necrotizing pneumonia.  Moderate loculated right pleural fluid and no definite left pleural fluid.  Empyema not excluded.   05/12- she declined thoracic MRI    She had CT guided aspirtaion -pus aspirated  05/16 - she is drowsy but refusing more imaging .   05/19-  MRI of the thoracic region    1. No evidence for disc osteomyelitis in the thoracic spine.  No evidence for epidural abscess.  2. Loculated right hydropneumothorax and extensive bilateral pulmonary infiltrates.   T max 100.5  05/23-she had interval placemet of chest tube.  Cultures from the chest remain negative.    05/30/22-   She reports that she has poor pain control .    05/31/22- she still complains of back apin .  She is afebrile   06/01/22  She had fever -T max 101.  CT chest -  Lungs/pleura: Continued evolution of the septic emboli with multiple cavitary nodules scattered throughout the lungs.  There are well-defined air-fluid collections in posterior aspect of the right upper and lower lobes measuring 4.5 x 9.5 cm and 5.5 x 10.0 cm, respectively.  Alternatively, this could represent loculated empyema.  These 2 collections appear to communicate.  Overall appearance of the chest is similar when compared to the recent prior CT examination.  Findings concerning for necrotizing pneumonia, as previously noted.  There is a medium sized left-sided pleural effusion with associated volume loss.     06/03- she reports hemotysis and persistent chest pain.  :  Review of Systems   Constitutional:  Positive for fatigue. Negative for activity change, appetite change, chills, diaphoresis, fever and unexpected weight change.   HENT: Negative.  Negative for congestion,  drooling, facial swelling, rhinorrhea, sinus pressure, sneezing and sore throat.    Eyes: Negative.  Negative for discharge, redness, itching and visual disturbance.   Respiratory:  Positive for cough, chest tightness and shortness of breath. Negative for apnea, choking, wheezing and stridor.    Cardiovascular:  Negative for chest pain, palpitations and leg swelling.   Gastrointestinal: Negative.  Negative for abdominal distention, abdominal pain, anal bleeding, blood in stool, constipation, diarrhea, nausea and vomiting.   Endocrine: Negative.    Genitourinary: Negative.  Negative for decreased urine volume, difficulty urinating, dysuria, frequency, hematuria, pelvic pain, urgency, vaginal bleeding and vaginal discharge.   Musculoskeletal:  Positive for arthralgias and back pain. Negative for gait problem, joint swelling, myalgias, neck pain and neck stiffness.   Skin: Negative.  Negative for color change, pallor, rash and wound.   Allergic/Immunologic: Negative.    Neurological:  Positive for weakness. Negative for dizziness, seizures, facial asymmetry, speech difficulty, light-headedness, numbness and headaches.   Psychiatric/Behavioral:  Negative for agitation, hallucinations and suicidal ideas.    All other systems reviewed and are negative.  Objective:     Vital Signs (Most Recent):  Temp: 97.3 °F (36.3 °C) (06/03/22 1159)  Pulse: 73 (06/03/22 1300)  Resp: 18 (06/03/22 1322)  BP: (!) 171/81 (06/03/22 1159)  SpO2: 99 % (06/03/22 1159)   Vital Signs (24h Range):  Temp:  [97.3 °F (36.3 °C)-98 °F (36.7 °C)] 97.3 °F (36.3 °C)  Pulse:  [63-93] 73  Resp:  [16-20] 18  SpO2:  [94 %-100 %] 99 %  BP: (147-171)/(81-88) 171/81     Weight: 98.7 kg (217 lb 9.5 oz)  Body mass index is 36.21 kg/m².    Estimated Creatinine Clearance: 43.9 mL/min (A) (based on SCr of 2 mg/dL (H)).    Physical Exam  Vitals and nursing note reviewed. Chaperone present: -.   Constitutional:       General: She is not in acute distress.      Appearance: She is well-developed. She is ill-appearing. She is not toxic-appearing.   HENT:      Head: Normocephalic and atraumatic.   Eyes:      Conjunctiva/sclera: Conjunctivae normal.      Pupils: Pupils are equal, round, and reactive to light.   Cardiovascular:      Rate and Rhythm: Normal rate and regular rhythm.      Pulses: Normal pulses.      Heart sounds: Normal heart sounds. No murmur heard.  Pulmonary:      Effort: Pulmonary effort is normal. No respiratory distress.      Breath sounds: Normal breath sounds.      Comments: Conversational dyspnoea-  Abdominal:      General: Bowel sounds are normal. There is no distension.      Palpations: Abdomen is soft.      Tenderness: There is no abdominal tenderness.   Musculoskeletal:         General: Swelling present. No tenderness or deformity. Normal range of motion.      Cervical back: Normal range of motion and neck supple.      Right lower leg: No edema.      Left lower leg: No edema.   Skin:     General: Skin is warm and dry.      Coloration: Skin is pale.      Findings: Erythema present.   Neurological:      Mental Status: She is alert and oriented to person, place, and time.      Motor: Weakness present.   Psychiatric:         Behavior: Behavior normal.       Significant Labs: Blood Culture:   Recent Labs   Lab 05/11/22  1213 05/25/22  0901 05/25/22  1220 05/25/22  1313 06/01/22  1132   LABBLOO No growth after 5 days.  No growth after 5 days. No growth after 5 days.  No growth after 5 days. No growth after 5 days. No growth after 5 days. No Growth to date  No Growth to date  No Growth to date  No Growth to date       BMP:   Recent Labs   Lab 06/02/22  1619   *      K 4.8      CO2 24   BUN 29*   CREATININE 2.0*   CALCIUM 7.4*       CMP:   Recent Labs   Lab 06/02/22  1619      K 4.8      CO2 24   *   BUN 29*   CREATININE 2.0*   CALCIUM 7.4*   PROT 5.5*   ALBUMIN 1.6*   BILITOT 0.2   ALKPHOS 71   AST 15   ALT 6*    ANIONGAP 9   EGFRNONAA 31*         Significant Imaging: CT: I have reviewed all pertinent results/findings within the past 24 hours:

## 2022-06-03 NOTE — PLAN OF CARE
"AAOx4  NAD  VSS    H&H-6.6/21, repeat H&H-7.2/22.8, 1unit PRBC currently infusing at 120ml/hr. Pt tolerating well, no reactions noted.     Repeat Type & Screen to be ordered for Sunday pm, pt having a VATS procedure Monday & has 2 units PRBC ordered for procedure.    BP (!) 171/109 (BP Location: Left arm, Patient Position: Lying)   Pulse 85   Temp 98.4 °F (36.9 °C) (Oral)   Resp 18   Ht 5' 5" (1.651 m)   Wt 98.7 kg (217 lb 9.5 oz)   LMP 07/08/2019 Comment: provera  SpO2 96%   Breastfeeding No   BMI 36.21 kg/m²     PO Oxycodone given 1845. PO morphine to be re-timed rt late admin as a result of IV morphine given this morning at same scheduled time as PO. Per Dr. Guzmán, hold PO morphine until around noon. Message sent to pharmacy to retime.    No questions, complaints, requests at current time. Shift report given to oncoming nurseElda.  "

## 2022-06-03 NOTE — ASSESSMENT & PLAN NOTE
Pulmonology consulted and felt likely empyema   IR consulted for chest tube      s/p right pigtail small bore chest tube placement to suction/drainage system per IR on 5/21/22. 60ml pus removed. Now with Serous drainage with pus. Fluid analysis consistent with empyema. Pleural fluid aerobic culture shows NGTD, Anaerobic culture pending    5/24/22: Dr. San plans for tPA and DNase in chest tube today.   5/25/22: 160ml of purulent CT drainage output. Dr. San plans to repeat tPA and DNase in chest tube again today.   5/26/22  Still has purulent drainage noted. Pulmonology following    6/3:  Worsening empyema noted  CTS consult on case  Plan for VATS procedure on Monday

## 2022-06-03 NOTE — ASSESSMENT & PLAN NOTE
Complicated by PNA, possible empyema  SP Thora  Cont Abx  5/20/2022 Probable empyema,needs chest tube drainage  5/22 chest tube draining  5/23 60 cc drained yesterday.  Possible tPA and DNase tomorrow.  Continue IV oxacillin.  5/24 monitor chest tube output post tPA and DNase instillation today PA chest tube  5/26 chest x-ray reviewed.  Continue IV oxacillin.  Right-sided pigtail in place.  Status post tPA and DNase x2.  Bloody drainage around pigtail today status post tPA and DNase yesterday.  Will hold on fibrinolysis.  Monitor chest tube output  5/27 right pigtail in place.  225 mL  Last 24 hours average of 200 mL per day.  Change pleurovac canister to better assess the color of the fluid pus versus serosanguineous.  Keep pigtail in place for now.  Ultrasound bilateral chest assess pleural effusions.  Continue oxacillin drip.  5/28 keep chest tube on the right side for now.  Repeat CRP.  Checking chemistry microbiology  on left-sided pleural fluid to rule out complicated effusion and need for pigtail.  Continue oxacillin drip.   5/29 minimal output right chest tube.  Remove chest tube.  Left-sided effusion not complicated not empyema.  Continue IV oxacillin   5/30 oxacillin total of 8 weeks.  Chest x-ray stable.  Right-sided pigtail removed.  No evidence of empyema on the left side pleural fluid.  5/31 Continue above, will sign off  6/1 worsening and evolving empyema right lung.  Reconsult cardiothoracic surgery.  Continue IV oxacillin.  6/2 discussed with Interventional Radiology and Cardiothoracic surgery.  Treat COVID for now.  VATS early next week.  Cardiothoracic surgery input today noted.  Repeat chest x-ray.  6/3 IV oxacillin.  Treat COVID-19.  Anticipated VATS next Monday

## 2022-06-03 NOTE — ASSESSMENT & PLAN NOTE
MRI of the thoracic region 0  1. No evidence for disc osteomyelitis in the thoracic spine.  No evidence for epidural abscess.  2. Loculated right hydropneumothorax and extensive bilateral pulmonary infiltrates.     Case discussed with critical care team- will consult pulmonology /CVT  On oxacillin  05/23- s/p chest tube placement -follow pulmonology .  Continue oxacillin    06/03/22- will continue oxacillin, follow CVT for VATS   05/30- will continue Oxacillin as planned.-will complete 8 weeks of therapy .    06/02/22-  Case discussed with CVT -will plan for VATS next week.  Continue Oxacillin  IR follow up for pigtail if feasible

## 2022-06-03 NOTE — PLAN OF CARE
Patient resting in bed. NADN. Plan of care reviewed with patient and Mom. Verbalized understanding. Will continue current treatment plan.

## 2022-06-03 NOTE — ASSESSMENT & PLAN NOTE
Medical [facility]  CTS note noted  5/23 IV oxacillin.  5/24 IV oxacillin as per Infectious Disease  5/25 repeat cultures none relevant.  IV oxacillin as per Infectious Disease x4 weeks.  T-max 101.7° last  24 hours  5/26 repeat blood concern negative continue oxacillin gtt   5/27 continue oxacillin drip   5/28 oxacillin drip.  Blood culture 5/25 was negative  5/28 continue same   5/30 oxacillin total of 8 weeks.   6/3 same

## 2022-06-03 NOTE — SUBJECTIVE & OBJECTIVE
Interval History:   6/3 O2 sat 98% on 1 liter/minute.  T-max 99.3°.  CRP pending.  WBC 6 K. blood-tinged sputum.  On IV oxacillin.  Input from cardiothoracic surgery intervention Radiology and Infectious Disease noted.  Creatinine increased to 2.  Urine output 4 L last 24 hours.  On remdesivir.  Review of Systems   Constitutional:  Positive for malaise/fatigue and weight loss. Negative for chills and fever.   HENT:  Negative for hearing loss and nosebleeds.    Eyes:  Negative for discharge.   Respiratory:  Positive for cough, sputum production and shortness of breath.    Cardiovascular:  Positive for leg swelling. Negative for chest pain.   Gastrointestinal:  Negative for abdominal pain.   Genitourinary:  Negative for hematuria.   Musculoskeletal:  Negative for falls.   Skin:  Negative for itching.   Neurological:  Positive for weakness. Negative for speech change, seizures and loss of consciousness.   Endo/Heme/Allergies:  Negative for polydipsia. Does not bruise/bleed easily.   Psychiatric/Behavioral:  Negative for hallucinations.        Objective:     Vital Signs (Most Recent):  Temp: 99.3 °F (37.4 °C) (06/03/22 1517)  Pulse: 79 (06/03/22 1517)  Resp: 18 (06/03/22 1517)  BP: (!) 155/82 (06/03/22 1517)  SpO2: 98 % (06/03/22 1517)   Vital Signs (24h Range):  Temp:  [97.3 °F (36.3 °C)-99.3 °F (37.4 °C)] 99.3 °F (37.4 °C)  Pulse:  [63-93] 79  Resp:  [16-20] 18  SpO2:  [94 %-100 %] 98 %  BP: (147-171)/(81-88) 155/82     Weight: 98.7 kg (217 lb 9.5 oz)  Body mass index is 36.21 kg/m².      Intake/Output Summary (Last 24 hours) at 6/3/2022 1556  Last data filed at 6/3/2022 1400  Gross per 24 hour   Intake 670 ml   Output 3600 ml   Net -2930 ml         Physical Exam  Vitals and nursing note reviewed.   Constitutional:       General: She is not in acute distress.     Appearance: She is well-developed. She is ill-appearing.   HENT:      Head: Normocephalic and atraumatic.      Nose: Nose normal.   Eyes:      Extraocular  Movements: Extraocular movements intact.   Cardiovascular:      Rate and Rhythm: Normal rate and regular rhythm.   Pulmonary:      Effort: Pulmonary effort is normal.      Breath sounds: No stridor. Rhonchi present.   Abdominal:      General: There is no distension.   Musculoskeletal:         General: No signs of injury.      Cervical back: Normal range of motion and neck supple.   Skin:     General: Skin is dry.   Neurological:      General: No focal deficit present.      Mental Status: She is alert and oriented to person, place, and time. Mental status is at baseline.       Vents:  Oxygen Concentration (%): 32 (06/02/22 0104)    Lines/Drains/Airways       Peripherally Inserted Central Catheter Line  Duration             PICC Double Lumen 05/17/22 1612 right basilic 16 days              Drain  Duration             Female External Urinary Catheter 05/21/22 0700 13 days                    Significant Labs:    CBC/Anemia Profile:  Recent Labs   Lab 06/02/22  1619 06/02/22  1656 06/03/22  1532   WBC 4.55  --  6.37   HGB 6.6*  --  7.2*   HCT 21.0*  --  22.8*     --  286   MCV 85  --  85   RDW 15.9*  --  16.1*   FERRITIN  --  313*  --           Chemistries:  Recent Labs   Lab 06/02/22  1619      K 4.8      CO2 24   BUN 29*   CREATININE 2.0*   CALCIUM 7.4*   ALBUMIN 1.6*   PROT 5.5*   BILITOT 0.2   ALKPHOS 71   ALT 6*   AST 15      Latest Reference Range & Units 05/28/22 11:15   Fluid Color  Yellow   Fluid Appearance  Clear   WBC, Body Fluid /cu mm 3065   Body Fluid Type  Pleural Fluid, Left   Segs, Fluid % 41   Lymphs, Fluid % 14   Monocytes/Macrophages, Fluid % 22   Eos, Fluid % 23   Glucose, Fluid Not established mg/dL 102   LD, Fluid Not established U/L 241   Body Fluid Source, Glucose  Pleural Fluid, Left   Body Fluid Source, LDH  Pleural Fluid, Left   Body Fluid Source, Total Protein  Pleural Fluid, Left   Body Fluid, Protein Not established g/dL 2.6      Latest Reference Range & Units 05/07/22  09:05 05/21/22 10:22   Fluid Appearance  Hazy Cloudy   WBC, Body Fluid /cu mm 59948 [1] 657515 (C) [2]   Body Fluid Type  Pleural Fluid, Right Pleural Fluid, Right   Segs, Fluid % 93 88   Lymphs, Fluid % 3 7   Monocytes/Macrophages, Fluid % 4 5   Amylase, Fluid Not established U/L 34 [3]    Glucose, Fluid Not established mg/dL 29 [4]    LD, Fluid Not established U/L 1077 [5]    Body Fluid, Albumin See text g/dL 1.3 [6]    Body Fluid Source Amylase  Pleural Fluid, Right    Body Fluid Source, Albumin  Pleural Fluid, Right    Body Fluid Source, Glucose  Pleural Fluid, Right    Body Fluid Source, LDH  Pleural Fluid, Right    Body Fluid Source, Total Protein  Pleural Fluid, Right    Body Fluid, Protein Not established g/dL 3.7 [7]    Cholesterol, Body Fluid See Comment mg/dL 79 [8]          Significant Imaging:       Chest x-ray 05/30/2022    TECHNIQUE:  Single frontal view of the chest was performed.     COMPARISON:  05/28/2022     FINDINGS:  Right-sided PICC line tip overlies the SVC in good position.  Heart size is normal.  Right pleural catheter not seen on today's exam.  Again there patchy infiltrates within the right mid lower lung zone.  Cannot exclude small right pleural effusion.  No pneumothorax.    In comparison to the prior study, there is no adverse interval changes.             CT chest without contrast 06/01/2022    Narrative & Impression  EXAMINATION:  CT CHEST WITHOUT CONTRAST     CLINICAL HISTORY:  Pneumonia, unresolved;     TECHNIQUE:  Low-dose axial images acquired from the chest without the use of intravenous contrast.  Sagittal and coronal reformats, as well as axial MIPS were generated for further review.  All CT scans at this location are performed using dose modulation techniques as appropriate to a performed exam including the following: Automated exposure control; adjustment of the mA and/or kV according to patient size (this includes techniques or standardized protocols for targeted exams  where dose is matched to indication/reason for exam; i. e. extremities or head); use of iterative reconstruction technique.     COMPARISON:  Chest radiograph 05/30/2022.  CT chest 05/20/2022.     FINDINGS:  Base of Neck: No significant abnormality. Right-sided central venous catheter with tip terminating at the distal SVC.     Aorta: Nonaneurysmal without significant atherosclerotic calcification.     Heart/pericardium: Upper limits normal size heart without significant pericardial fluid.  No significant coronary calcification.     Esha/Mediastinum: Few upper limits normal size mediastinal lymph nodes.     Upper Abdomen: No acute abnormality of the partially imaged upper abdomen.     Thoracic soft tissues: Anasarca.     Bones: No acute fracture. No suspicious lytic or sclerotic lesion.     Airways: Patent.     Lungs/pleura: Continued evolution of the septic emboli with multiple cavitary nodules scattered throughout the lungs.  There are well-defined air-fluid collections in posterior aspect of the right upper and lower lobes measuring 4.5 x 9.5 cm and 5.5 x 10.0 cm, respectively.  Alternatively, this could represent loculated empyema.  These 2 collections appear to communicate.  Overall appearance of the chest is similar when compared to the recent prior CT examination.  Findings concerning for necrotizing pneumonia, as previously noted.  There is a medium sized left-sided pleural effusion with associated volume loss.     Impression:     Evolving findings concerning for septic emboli and necrotizing pneumonia in the posterior aspect of the right lung versus empyema, as described above.                         CT chest without contrast 05/20/2022    Narrative & Impression  EXAMINATION:  CT CHEST WITHOUT CONTRAST     CLINICAL HISTORY:  Empyema;     TECHNIQUE:  Low dose axial images, sagittal and coronal reformations were obtained from the thoracic inlet to the lung bases. Contrast was not administered.  All CT scans  at this facility are performed using dose optimization techniques including the following: automated exposure control; adjustment of the mA and/or kV; use of iterative reconstruction technique.     COMPARISON:  CTA chest non coronary 05/06/2022, chest radiograph 05/20/2022, MRI thoracic spine 05/19/2022     FINDINGS:  Base of Neck: There is diffuse body wall edema.  Unremarkable, noting the presence of AA PICC line which terminates at the superior cavoatrial junction.     Thoracic soft tissues: There is diffuse body wall edema.     Aorta: Left-sided aortic arch.  No aneurysm and no significant atherosclerosis     Heart: Normal size. No effusion.  No significant coronary atherosclerosis.     Pulmonary vasculature: Pulmonary arteries distribute normally.     Esha/Mediastinum: There are few prominent mediastinal lymph nodes with a prevascular node measuring 0.8 cm in short axis.     Airways: Patent.     Lungs/Pleura: There are multiple bilateral consolidative densities scattered throughout the lungs, many of which demonstrate cavitation, again suggesting septic emboli.  There is small left pleural fluid, which appears simple and layers dependently.  There is a loculated moderate right pleural effusion, which appears similar in size to the comparison exam however now containing air, which may be due to empyema.  There is adjacent atelectatic change within the bilateral lower lobes.  Overall number and size of consolidative densities has increased since the comparison exam.     Esophagus: Normal.     Upper Abdomen: There is persistent wedge-shaped hypodensity within the spleen, suggesting infarct.     Bones: No acute fracture. No suspicious lytic or sclerotic lesions.     Impression:     Interval progression with increased number and size of consolidative cavitary densities throughout the lungs, most suggestive of septic emboli with larger opacities concerning for necrotizing pneumonia.     Stable size of loculated right  moderate pleural effusion, now containing air compatible with hydropneumothorax as reported on prior MRI.  Interval development of air is concerning for empyema noting limited evaluation without intravenous contrast.  New small simple appearing left pleural effusion.     Wedge-shaped splenic hypodensity, suggesting infarction.     Anasarca.

## 2022-06-03 NOTE — PT/OT/SLP PROGRESS
Physical Therapy      Patient Name:  Tianna Leon   MRN:  [id number]    Patient not seen today secondary to Patient unwilling to participate. Declined to participate due to already got up today and does not want to do anything due to having surgery on Monday Will follow-up

## 2022-06-03 NOTE — CHAPLAIN
Consult visit with patient.  I was contacted by the charge nurse stating that pt would like to talk to me.  I responded to her bed side and took time to hear her story.  Pt is going through a lot both spiritually and emotionally and she shared these struggles with me.  Pt had a lot of questions and I did my best to answer them for her.  Pt asked for a Bible and I brought one to her.  She currently had no other needs and spiritual care remains available as needed.    Chaplain Bennie Arriaga M.Div., [facility]

## 2022-06-03 NOTE — PLAN OF CARE
AAOx4. No signs of distress. Able to verbalize needs and follow commands. Pt is anxious  and cooperative. Report pain to lower back. States pain is 4/10. Prn pain med effective. Vital signs stable. Sinus Rhythm on tele monitor. On 2L O2 via NC.  R upper arm Picc is patent. Pt continent of b/b. Purewick in place. Requires assist x1. Pt remains free of falls. Meds given as prescribed.  POC reviewed with pt and pt verbalized understanding. Interventions implemented as appropriate. Pt able to turn self. Encouraged pt  to turn frequently. Educated on importance of adhering to POC after dc; verbalized understanding. Resting quietly in bed.   Bed low. Side rails up x2, wheels locked, call light within reach.

## 2022-06-03 NOTE — PROGRESS NOTES
[facility] ([facility])  Pulmonology  Progress Note    Patient Name: Tianna Leon  MRN: [id number]  Admission Date: 5/6/2022  Hospital Length of Stay: 28 days  Code Status: Full Code  Attending Provider: Delmer Guzmán MD  Primary Care Provider: Spenser Campa MD   Principal Problem: Acute bacterial endocarditis    Subjective:     Interval History:   6/3 O2 sat 98% on 1 liter/minute.  T-max 99.3°.  CRP pending.  WBC 6 K. blood-tinged sputum.  On IV oxacillin.  Input from cardiothoracic surgery intervention Radiology and Infectious Disease noted.  Creatinine increased to 2.  Urine output 4 L last 24 hours.  On remdesivir.  Review of Systems   Constitutional:  Positive for malaise/fatigue and weight loss. Negative for chills and fever.   HENT:  Negative for hearing loss and nosebleeds.    Eyes:  Negative for discharge.   Respiratory:  Positive for cough, sputum production and shortness of breath.    Cardiovascular:  Positive for leg swelling. Negative for chest pain.   Gastrointestinal:  Negative for abdominal pain.   Genitourinary:  Negative for hematuria.   Musculoskeletal:  Negative for falls.   Skin:  Negative for itching.   Neurological:  Positive for weakness. Negative for speech change, seizures and loss of consciousness.   Endo/Heme/Allergies:  Negative for polydipsia. Does not bruise/bleed easily.   Psychiatric/Behavioral:  Negative for hallucinations.        Objective:     Vital Signs (Most Recent):  Temp: 99.3 °F (37.4 °C) (06/03/22 1517)  Pulse: 79 (06/03/22 1517)  Resp: 18 (06/03/22 1517)  BP: (!) 155/82 (06/03/22 1517)  SpO2: 98 % (06/03/22 1517)   Vital Signs (24h Range):  Temp:  [97.3 °F (36.3 °C)-99.3 °F (37.4 °C)] 99.3 °F (37.4 °C)  Pulse:  [63-93] 79  Resp:  [16-20] 18  SpO2:  [94 %-100 %] 98 %  BP: (147-171)/(81-88) 155/82     Weight: 98.7 kg (217 lb 9.5 oz)  Body mass index is 36.21 kg/m².      Intake/Output Summary (Last 24 hours) at 6/3/2022 155  Last data filed at 6/3/2022 1400  Gross per  24 hour   Intake 670 ml   Output 3600 ml   Net -2930 ml         Physical Exam  Vitals and nursing note reviewed.   Constitutional:       General: She is not in acute distress.     Appearance: She is well-developed. She is ill-appearing.   HENT:      Head: Normocephalic and atraumatic.      Nose: Nose normal.   Eyes:      Extraocular Movements: Extraocular movements intact.   Cardiovascular:      Rate and Rhythm: Normal rate and regular rhythm.   Pulmonary:      Effort: Pulmonary effort is normal.      Breath sounds: No stridor. Rhonchi present.   Abdominal:      General: There is no distension.   Musculoskeletal:         General: No signs of injury.      Cervical back: Normal range of motion and neck supple.   Skin:     General: Skin is dry.   Neurological:      General: No focal deficit present.      Mental Status: She is alert and oriented to person, place, and time. Mental status is at baseline.       Vents:  Oxygen Concentration (%): 32 (06/02/22 0104)    Lines/Drains/Airways       Peripherally Inserted Central Catheter Line  Duration             PICC Double Lumen 05/17/22 1612 right basilic 16 days              Drain  Duration             Female External Urinary Catheter 05/21/22 0700 13 days                    Significant Labs:    CBC/Anemia Profile:  Recent Labs   Lab 06/02/22  1619 06/02/22  1656 06/03/22  1532   WBC 4.55  --  6.37   HGB 6.6*  --  7.2*   HCT 21.0*  --  22.8*     --  286   MCV 85  --  85   RDW 15.9*  --  16.1*   FERRITIN  --  313*  --           Chemistries:  Recent Labs   Lab 06/02/22  1619      K 4.8      CO2 24   BUN 29*   CREATININE 2.0*   CALCIUM 7.4*   ALBUMIN 1.6*   PROT 5.5*   BILITOT 0.2   ALKPHOS 71   ALT 6*   AST 15      Latest Reference Range & Units 05/28/22 11:15   Fluid Color  Yellow   Fluid Appearance  Clear   WBC, Body Fluid /cu mm 3065   Body Fluid Type  Pleural Fluid, Left   Segs, Fluid % 41   Lymphs, Fluid % 14   Monocytes/Macrophages, Fluid % 22   Eos,  Fluid % 23   Glucose, Fluid Not established mg/dL 102   LD, Fluid Not established U/L 241   Body Fluid Source, Glucose  Pleural Fluid, Left   Body Fluid Source, LDH  Pleural Fluid, Left   Body Fluid Source, Total Protein  Pleural Fluid, Left   Body Fluid, Protein Not established g/dL 2.6      Latest Reference Range & Units 05/07/22 09:05 05/21/22 10:22   Fluid Appearance  Hazy Cloudy   WBC, Body Fluid /cu mm 32853 [1] 827565 (C) [2]   Body Fluid Type  Pleural Fluid, Right Pleural Fluid, Right   Segs, Fluid % 93 88   Lymphs, Fluid % 3 7   Monocytes/Macrophages, Fluid % 4 5   Amylase, Fluid Not established U/L 34 [3]    Glucose, Fluid Not established mg/dL 29 [4]    LD, Fluid Not established U/L 1077 [5]    Body Fluid, Albumin See text g/dL 1.3 [6]    Body Fluid Source Amylase  Pleural Fluid, Right    Body Fluid Source, Albumin  Pleural Fluid, Right    Body Fluid Source, Glucose  Pleural Fluid, Right    Body Fluid Source, LDH  Pleural Fluid, Right    Body Fluid Source, Total Protein  Pleural Fluid, Right    Body Fluid, Protein Not established g/dL 3.7 [7]    Cholesterol, Body Fluid See Comment mg/dL 79 [8]          Significant Imaging:       Chest x-ray 05/30/2022    TECHNIQUE:  Single frontal view of the chest was performed.     COMPARISON:  05/28/2022     FINDINGS:  Right-sided PICC line tip overlies the SVC in good position.  Heart size is normal.  Right pleural catheter not seen on today's exam.  Again there patchy infiltrates within the right mid lower lung zone.  Cannot exclude small right pleural effusion.  No pneumothorax.    In comparison to the prior study, there is no adverse interval changes.             CT chest without contrast 06/01/2022    Narrative & Impression  EXAMINATION:  CT CHEST WITHOUT CONTRAST     CLINICAL HISTORY:  Pneumonia, unresolved;     TECHNIQUE:  Low-dose axial images acquired from the chest without the use of intravenous contrast.  Sagittal and coronal reformats, as well as axial MIPS  were generated for further review.  All CT scans at this location are performed using dose modulation techniques as appropriate to a performed exam including the following: Automated exposure control; adjustment of the mA and/or kV according to patient size (this includes techniques or standardized protocols for targeted exams where dose is matched to indication/reason for exam; i. e. extremities or head); use of iterative reconstruction technique.     COMPARISON:  Chest radiograph 05/30/2022.  CT chest 05/20/2022.     FINDINGS:  Base of Neck: No significant abnormality. Right-sided central venous catheter with tip terminating at the distal SVC.     Aorta: Nonaneurysmal without significant atherosclerotic calcification.     Heart/pericardium: Upper limits normal size heart without significant pericardial fluid.  No significant coronary calcification.     Esha/Mediastinum: Few upper limits normal size mediastinal lymph nodes.     Upper Abdomen: No acute abnormality of the partially imaged upper abdomen.     Thoracic soft tissues: Anasarca.     Bones: No acute fracture. No suspicious lytic or sclerotic lesion.     Airways: Patent.     Lungs/pleura: Continued evolution of the septic emboli with multiple cavitary nodules scattered throughout the lungs.  There are well-defined air-fluid collections in posterior aspect of the right upper and lower lobes measuring 4.5 x 9.5 cm and 5.5 x 10.0 cm, respectively.  Alternatively, this could represent loculated empyema.  These 2 collections appear to communicate.  Overall appearance of the chest is similar when compared to the recent prior CT examination.  Findings concerning for necrotizing pneumonia, as previously noted.  There is a medium sized left-sided pleural effusion with associated volume loss.     Impression:     Evolving findings concerning for septic emboli and necrotizing pneumonia in the posterior aspect of the right lung versus empyema, as described above.                          CT chest without contrast 05/20/2022    Narrative & Impression  EXAMINATION:  CT CHEST WITHOUT CONTRAST     CLINICAL HISTORY:  Empyema;     TECHNIQUE:  Low dose axial images, sagittal and coronal reformations were obtained from the thoracic inlet to the lung bases. Contrast was not administered.  All CT scans at this facility are performed using dose optimization techniques including the following: automated exposure control; adjustment of the mA and/or kV; use of iterative reconstruction technique.     COMPARISON:  CTA chest non coronary 05/06/2022, chest radiograph 05/20/2022, MRI thoracic spine 05/19/2022     FINDINGS:  Base of Neck: There is diffuse body wall edema.  Unremarkable, noting the presence of AA PICC line which terminates at the superior cavoatrial junction.     Thoracic soft tissues: There is diffuse body wall edema.     Aorta: Left-sided aortic arch.  No aneurysm and no significant atherosclerosis     Heart: Normal size. No effusion.  No significant coronary atherosclerosis.     Pulmonary vasculature: Pulmonary arteries distribute normally.     Esha/Mediastinum: There are few prominent mediastinal lymph nodes with a prevascular node measuring 0.8 cm in short axis.     Airways: Patent.     Lungs/Pleura: There are multiple bilateral consolidative densities scattered throughout the lungs, many of which demonstrate cavitation, again suggesting septic emboli.  There is small left pleural fluid, which appears simple and layers dependently.  There is a loculated moderate right pleural effusion, which appears similar in size to the comparison exam however now containing air, which may be due to empyema.  There is adjacent atelectatic change within the bilateral lower lobes.  Overall number and size of consolidative densities has increased since the comparison exam.     Esophagus: Normal.     Upper Abdomen: There is persistent wedge-shaped hypodensity within the spleen, suggesting  infarct.     Bones: No acute fracture. No suspicious lytic or sclerotic lesions.     Impression:     Interval progression with increased number and size of consolidative cavitary densities throughout the lungs, most suggestive of septic emboli with larger opacities concerning for necrotizing pneumonia.     Stable size of loculated right moderate pleural effusion, now containing air compatible with hydropneumothorax as reported on prior MRI.  Interval development of air is concerning for empyema noting limited evaluation without intravenous contrast.  New small simple appearing left pleural effusion.     Wedge-shaped splenic hypodensity, suggesting infarction.     Anasarca.           ABG  No results for input(s): PH, PO2, PCO2, HCO3, BE in the last 168 hours.  Assessment/Plan:     * Acute bacterial endocarditis  Medical Mx  CTS note noted  5/23 IV oxacillin.  5/24 IV oxacillin as per Infectious Disease  5/25 repeat cultures none relevant.  IV oxacillin as per Infectious Disease x4 weeks.  T-max 101.7° last  24 hours  5/26 repeat blood concern negative continue oxacillin gtt   5/27 continue oxacillin drip   5/28 oxacillin drip.  Blood culture 5/25 was negative  5/28 continue same   5/30 oxacillin total of 8 weeks.   6/3 same    BEE (acute kidney injury)  6/3 avoid nephrotoxic drugs.  Non oliguric.  IV fluid.  Monitor BMP.  Monitor urine output    COVID-19  6/1 - Isolation:   - Airborne, Contact and Droplet Precautions  - Cohort patients into COVID units  - N95 masks must be fit tested, wear eye protection  - 20 second hand hygiene              - Limit visitors per hospital policy              - Consolidating lab draws, nursing care, provider visits, and interventions  IV remdesivir ID follow-up  6/2 same  6/3 on therapeutic Lovenox and IV remdesivir    Empyema of right pleural space  5/20 Suspect empyema, needs chest tube placement  5/21 s/p chest tube placement, adequate expansion - will check Chest X Ray in  aam  5/23 tPA DNase.  5/24 tPA for DNase installation via chest tube.  Monitor chest tube output.  Repeat chest x-ray in a.m..  5/25 chest tube output increased from 50 mL on average per 24 hours to 160 mL last 24 hours.  Purulent drainage noted.  Will repeat tPA plus Dnase instillation.  5/26 chest x-ray reviewed.  Continue IV oxacillin.  Right-sided pigtail in place.  Status post tPA and DNase x2.  Bloody drainage around pigtail today status post tPA and DNase yesterday.  Will hold on fibrinolysis.  Monitor chest tube output  5/27 right pigtail in place.  225 mL  Last 24 hours average of 200 mL per day.  Change pleurovac canister to better assess the color of the fluid pus versus serosanguineous.  Keep pigtail in place for now.  Ultrasound bilateral chest assess pleural effusions.  Continue oxacillin drip.  5/28 keep chest tube on the right side for now.  Repeat CRP.  Checking chemistry microbiology  on left-sided pleural fluid to rule out complicated effusion and need for pigtail.  Continue oxacillin drip.   5/29 minimal output right chest tube.  Remove chest tube.  Left-sided effusion not complicated not empyema.  Continue IV oxacillin    6/1 evolving empyema/necrosis on right lung on CT scan of the chest 06/01/2022.  Continue IV oxacillin.  Re-consult cardiothoracic surgery.  6/2 /treat COVID.  Cardiothoracic surgery input noted.  VATS procedure will be delayed till Monday next week.  Patient will be scheduled for VATS on 06/06/2022      6/3 and suspected VATS 06/06/2022 treating COVID.      Pulmonary embolism, septic  5/24 continue IV oxacillin.  5/25 on IV  Oxacillin  since 05/09.  Repeat CRP.  5/27 right pigtail in place.  225 mL  Last 24 hours average of 200 mL per day.  Change pleurovac canister to better assess the color of the fluid pus versus serosanguineous.  Keep pigtail in place for now.  Ultrasound bilateral chest assess pleural effusions.  Continue oxacillin drip.  5/285/28 keep chest tube on the  right side for now.  Repeat CRP.  Checking chemistry microbiology  on left-sided pleural fluid to rule out complicated effusion and need for pigtail.  Continue oxacillin drip.   5/31 Continue drip and anticoagulation   6/3 likely the cause of hemoptysis.  Treat MSSA septicemia.    Parapneumonic effusion  Complicated by PNA, possible empyema  SP Thora  Cont Abx  5/20/2022 Probable empyema,needs chest tube drainage  5/22 chest tube draining  5/23 60 cc drained yesterday.  Possible tPA and DNase tomorrow.  Continue IV oxacillin.  5/24 monitor chest tube output post tPA and DNase instillation today PA chest tube  5/26 chest x-ray reviewed.  Continue IV oxacillin.  Right-sided pigtail in place.  Status post tPA and DNase x2.  Bloody drainage around pigtail today status post tPA and DNase yesterday.  Will hold on fibrinolysis.  Monitor chest tube output  5/27 right pigtail in place.  225 mL  Last 24 hours average of 200 mL per day.  Change pleurovac canister to better assess the color of the fluid pus versus serosanguineous.  Keep pigtail in place for now.  Ultrasound bilateral chest assess pleural effusions.  Continue oxacillin drip.  5/28 keep chest tube on the right side for now.  Repeat CRP.  Checking chemistry microbiology  on left-sided pleural fluid to rule out complicated effusion and need for pigtail.  Continue oxacillin drip.   5/29 minimal output right chest tube.  Remove chest tube.  Left-sided effusion not complicated not empyema.  Continue IV oxacillin   5/30 oxacillin total of 8 weeks.  Chest x-ray stable.  Right-sided pigtail removed.  No evidence of empyema on the left side pleural fluid.  5/31 Continue above, will sign off  6/1 worsening and evolving empyema right lung.  Reconsult cardiothoracic surgery.  Continue IV oxacillin.  6/2 discussed with Interventional Radiology and Cardiothoracic surgery.  Treat COVID for now.  VATS early next week.  Cardiothoracic surgery input today noted.  Repeat chest  x-ray.  6/3 IV oxacillin.  Treat COVID-19.  Anticipated VATS next Monday      Poor overall prognosis     Piyush San MD  Pulmonology  [facility] Telemetry ([facility])

## 2022-06-03 NOTE — SUBJECTIVE & OBJECTIVE
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  Blood cultures- MSSA.  She remains bacteremic.  No MR evidence of epidural abscess.     Large, rim enhancing facet synovial cysts arising posteriorly from the right L2-L3 and left L4-L5 facet joints may be degenerative or sequelae of facet septic arthritis.     Minor enhancement and endplate irregularity at L4-L5 may be degenerative versus less likely the sequelae of spondylodiscitis.  No significant associated endplate destruction or vertebral body height loss.     Multilevel degenerative changes of the lumbar spine are most pronounced at L4-L5 with broad-based disc bulge and prominent central disc protrusion contributing to moderate to severe spinal canal stenosis with moderate left-sided neural foraminal stenosis.     Asymmetric left L5-S1 disc bulge contributes to moderate to severe left-sided neural foraminal stenosis and left lateral recess stenosis.   CT chest -Lungs/Pleura: Multiple nodular and cavitary opacities concerning for septic emboli.  Additional larger opacities most notably in the right lung base with some internal clearing possibly related to necrotizing pneumonia.  Moderate loculated right pleural fluid and no definite left pleural fluid.  Empyema not excluded.   05/12- she declined thoracic MRI    She had CT guided aspirtaion -pus aspirated  05/16 - she is drowsy but refusing more imaging .   05/19-  MRI of the thoracic region    1. No evidence for disc osteomyelitis in the thoracic spine.  No evidence for epidural abscess.  2. Loculated right hydropneumothorax and extensive bilateral pulmonary infiltrates.   T max 100.5  05/23-she had interval placemet of chest tube.  Cultures from the chest remain negative.    05/30/22-   She reports that she has poor pain control .    05/31/22- she still complains of back apin .  She is afebrile   06/01/22  She had fever -T max 101.  CT chest -  Lungs/pleura: Continued evolution of the septic  emboli with multiple cavitary nodules scattered throughout the lungs.  There are well-defined air-fluid collections in posterior aspect of the right upper and lower lobes measuring 4.5 x 9.5 cm and 5.5 x 10.0 cm, respectively.  Alternatively, this could represent loculated empyema.  These 2 collections appear to communicate.  Overall appearance of the chest is similar when compared to the recent prior CT examination.  Findings concerning for necrotizing pneumonia, as previously noted.  There is a medium sized left-sided pleural effusion with associated volume loss.     06/03- she reports hemotysis and persistent chest pain.  :  Review of Systems   Constitutional:  Positive for fatigue. Negative for activity change, appetite change, chills, diaphoresis, fever and unexpected weight change.   HENT: Negative.  Negative for congestion, drooling, facial swelling, rhinorrhea, sinus pressure, sneezing and sore throat.    Eyes: Negative.  Negative for discharge, redness, itching and visual disturbance.   Respiratory:  Positive for cough, chest tightness and shortness of breath. Negative for apnea, choking, wheezing and stridor.    Cardiovascular:  Negative for chest pain, palpitations and leg swelling.   Gastrointestinal: Negative.  Negative for abdominal distention, abdominal pain, anal bleeding, blood in stool, constipation, diarrhea, nausea and vomiting.   Endocrine: Negative.    Genitourinary: Negative.  Negative for decreased urine volume, difficulty urinating, dysuria, frequency, hematuria, pelvic pain, urgency, vaginal bleeding and vaginal discharge.   Musculoskeletal:  Positive for arthralgias and back pain. Negative for gait problem, joint swelling, myalgias, neck pain and neck stiffness.   Skin: Negative.  Negative for color change, pallor, rash and wound.   Allergic/Immunologic: Negative.    Neurological:  Positive for weakness. Negative for dizziness, seizures, facial asymmetry, speech difficulty,  light-headedness, numbness and headaches.   Psychiatric/Behavioral:  Negative for agitation, hallucinations and suicidal ideas.    All other systems reviewed and are negative.  Objective:     Vital Signs (Most Recent):  Temp: 97.3 °F (36.3 °C) (06/03/22 1159)  Pulse: 73 (06/03/22 1300)  Resp: 18 (06/03/22 1322)  BP: (!) 171/81 (06/03/22 1159)  SpO2: 99 % (06/03/22 1159)   Vital Signs (24h Range):  Temp:  [97.3 °F (36.3 °C)-98 °F (36.7 °C)] 97.3 °F (36.3 °C)  Pulse:  [63-93] 73  Resp:  [16-20] 18  SpO2:  [94 %-100 %] 99 %  BP: (147-171)/(81-88) 171/81     Weight: 98.7 kg (217 lb 9.5 oz)  Body mass index is 36.21 kg/m².    Estimated Creatinine Clearance: 43.9 mL/min (A) (based on SCr of 2 mg/dL (H)).    Physical Exam  Vitals and nursing note reviewed. Chaperone present: -.   Constitutional:       General: She is not in acute distress.     Appearance: She is well-developed. She is ill-appearing. She is not toxic-appearing.   HENT:      Head: Normocephalic and atraumatic.   Eyes:      Conjunctiva/sclera: Conjunctivae normal.      Pupils: Pupils are equal, round, and reactive to light.   Cardiovascular:      Rate and Rhythm: Normal rate and regular rhythm.      Pulses: Normal pulses.      Heart sounds: Normal heart sounds. No murmur heard.  Pulmonary:      Effort: Pulmonary effort is normal. No respiratory distress.      Breath sounds: Normal breath sounds.      Comments: Conversational dyspnoea-  Abdominal:      General: Bowel sounds are normal. There is no distension.      Palpations: Abdomen is soft.      Tenderness: There is no abdominal tenderness.   Musculoskeletal:         General: Swelling present. No tenderness or deformity. Normal range of motion.      Cervical back: Normal range of motion and neck supple.      Right lower leg: No edema.      Left lower leg: No edema.   Skin:     General: Skin is warm and dry.      Coloration: Skin is pale.      Findings: Erythema present.   Neurological:      Mental Status:  She is alert and oriented to person, place, and time.      Motor: Weakness present.   Psychiatric:         Behavior: Behavior normal.       Significant Labs: Blood Culture:   Recent Labs   Lab 05/11/22  1213 05/25/22  0901 05/25/22  1220 05/25/22  1313 06/01/22  1132   LABBLOO No growth after 5 days.  No growth after 5 days. No growth after 5 days.  No growth after 5 days. No growth after 5 days. No growth after 5 days. No Growth to date  No Growth to date  No Growth to date  No Growth to date       BMP:   Recent Labs   Lab 06/02/22  1619   *      K 4.8      CO2 24   BUN 29*   CREATININE 2.0*   CALCIUM 7.4*       CMP:   Recent Labs   Lab 06/02/22  1619      K 4.8      CO2 24   *   BUN 29*   CREATININE 2.0*   CALCIUM 7.4*   PROT 5.5*   ALBUMIN 1.6*   BILITOT 0.2   ALKPHOS 71   AST 15   ALT 6*   ANIONGAP 9   EGFRNONAA 31*         Significant Imaging: CT: I have reviewed all pertinent results/findings within the past 24 hours:

## 2022-06-03 NOTE — ASSESSMENT & PLAN NOTE
6/1 - Isolation:   - Airborne, Contact and Droplet Precautions  - Cohort patients into COVID units  - N95 masks must be fit tested, wear eye protection  - 20 second hand hygiene              - Limit visitors per hospital policy              - Consolidating lab draws, nursing care, provider visits, and interventions  IV remdesivir ID follow-up  6/2 same  6/3 on therapeutic Lovenox and IV remdesivir

## 2022-06-03 NOTE — PT/OT/SLP PROGRESS
"Occupational Therapy      Patient Name:  Tianna Leon   MRN:  [id number]  Patient refused due to feeling unwell. States she has gotten "bad news" of having to have lung surgery. Patient also states she has been coughing up blood.  Will follow up during next scheduled OT session.     Yolande Quinones, OT  [id number]  6/2/2022  "

## 2022-06-03 NOTE — ASSESSMENT & PLAN NOTE
2/2 MSSA endocarditis   Cont IV abx     6/3/22  CT shows evolving septic emboli and necrotizing pneumonia. CVT has been re-consulted.  Vats procedure planned for Monday

## 2022-06-03 NOTE — ASSESSMENT & PLAN NOTE
5/15: Blood cultures 5/11/22: NGTD     Continuous Oxacillin, EOC: 7/11/22, pending acceptance at facility, PICC line placed    6/3:  Cont on oxacillin  ID on case

## 2022-06-04 PROBLEM — J18.9 PARAPNEUMONIC EFFUSION: Status: RESOLVED | Noted: 2022-05-07 | Resolved: 2022-06-04

## 2022-06-04 PROBLEM — J91.8 PARAPNEUMONIC EFFUSION: Status: RESOLVED | Noted: 2022-05-07 | Resolved: 2022-06-04

## 2022-06-04 PROBLEM — Z98.890 STATUS POST THORACENTESIS: Status: RESOLVED | Noted: 2022-05-28 | Resolved: 2022-06-04

## 2022-06-04 LAB
APTT BLDCRRT: 37.5 SEC (ref 21–32)
APTT BLDCRRT: 39.4 SEC (ref 21–32)
BASOPHILS # BLD AUTO: 0.02 K/UL (ref 0–0.2)
BASOPHILS # BLD AUTO: 0.02 K/UL (ref 0–0.2)
BASOPHILS NFR BLD: 0.3 % (ref 0–1.9)
BASOPHILS NFR BLD: 0.3 % (ref 0–1.9)
DIFFERENTIAL METHOD: ABNORMAL
DIFFERENTIAL METHOD: ABNORMAL
EOSINOPHIL # BLD AUTO: 0.1 K/UL (ref 0–0.5)
EOSINOPHIL # BLD AUTO: 0.1 K/UL (ref 0–0.5)
EOSINOPHIL NFR BLD: 1.1 % (ref 0–8)
EOSINOPHIL NFR BLD: 1.3 % (ref 0–8)
ERYTHROCYTE [DISTWIDTH] IN BLOOD BY AUTOMATED COUNT: 16.1 % (ref 11.5–14.5)
ERYTHROCYTE [DISTWIDTH] IN BLOOD BY AUTOMATED COUNT: 16.2 % (ref 11.5–14.5)
HCT VFR BLD AUTO: 24.1 % (ref 37–48.5)
HCT VFR BLD AUTO: 24.4 % (ref 37–48.5)
HGB BLD-MCNC: 7.7 G/DL (ref 12–16)
HGB BLD-MCNC: 7.7 G/DL (ref 12–16)
IMM GRANULOCYTES # BLD AUTO: 0.02 K/UL (ref 0–0.04)
IMM GRANULOCYTES # BLD AUTO: 0.04 K/UL (ref 0–0.04)
IMM GRANULOCYTES NFR BLD AUTO: 0.3 % (ref 0–0.5)
IMM GRANULOCYTES NFR BLD AUTO: 0.5 % (ref 0–0.5)
INR PPP: 1.1 (ref 0.8–1.2)
LYMPHOCYTES # BLD AUTO: 2.2 K/UL (ref 1–4.8)
LYMPHOCYTES # BLD AUTO: 2.3 K/UL (ref 1–4.8)
LYMPHOCYTES NFR BLD: 28.9 % (ref 18–48)
LYMPHOCYTES NFR BLD: 36.3 % (ref 18–48)
MCH RBC QN AUTO: 26.7 PG (ref 27–31)
MCH RBC QN AUTO: 27 PG (ref 27–31)
MCHC RBC AUTO-ENTMCNC: 31.6 G/DL (ref 32–36)
MCHC RBC AUTO-ENTMCNC: 32 G/DL (ref 32–36)
MCV RBC AUTO: 84 FL (ref 82–98)
MCV RBC AUTO: 86 FL (ref 82–98)
MONOCYTES # BLD AUTO: 0.5 K/UL (ref 0.3–1)
MONOCYTES # BLD AUTO: 0.8 K/UL (ref 0.3–1)
MONOCYTES NFR BLD: 10.3 % (ref 4–15)
MONOCYTES NFR BLD: 8.5 % (ref 4–15)
NEUTROPHILS # BLD AUTO: 3.3 K/UL (ref 1.8–7.7)
NEUTROPHILS # BLD AUTO: 4.4 K/UL (ref 1.8–7.7)
NEUTROPHILS NFR BLD: 53.3 % (ref 38–73)
NEUTROPHILS NFR BLD: 58.9 % (ref 38–73)
NRBC BLD-RTO: 0 /100 WBC
NRBC BLD-RTO: 0 /100 WBC
PLATELET # BLD AUTO: 257 K/UL (ref 150–450)
PLATELET # BLD AUTO: 271 K/UL (ref 150–450)
PMV BLD AUTO: 8.6 FL (ref 9.2–12.9)
PMV BLD AUTO: 8.8 FL (ref 9.2–12.9)
PROTHROMBIN TIME: 11.6 SEC (ref 9–12.5)
RBC # BLD AUTO: 2.85 M/UL (ref 4–5.4)
RBC # BLD AUTO: 2.88 M/UL (ref 4–5.4)
WBC # BLD AUTO: 6.23 K/UL (ref 3.9–12.7)
WBC # BLD AUTO: 7.54 K/UL (ref 3.9–12.7)

## 2022-06-04 PROCEDURE — 99233 PR SUBSEQUENT HOSPITAL CARE,LEVL III: ICD-10-PCS | Mod: ,,, | Performed by: INTERNAL MEDICINE

## 2022-06-04 PROCEDURE — 25000003 PHARM REV CODE 250: Performed by: THORACIC SURGERY (CARDIOTHORACIC VASCULAR SURGERY)

## 2022-06-04 PROCEDURE — 63600175 PHARM REV CODE 636 W HCPCS: Performed by: THORACIC SURGERY (CARDIOTHORACIC VASCULAR SURGERY)

## 2022-06-04 PROCEDURE — 99900035 HC TECH TIME PER 15 MIN (STAT)

## 2022-06-04 PROCEDURE — 94799 UNLISTED PULMONARY SVC/PX: CPT

## 2022-06-04 PROCEDURE — 25000003 PHARM REV CODE 250: Performed by: NURSE PRACTITIONER

## 2022-06-04 PROCEDURE — 99233 SBSQ HOSP IP/OBS HIGH 50: CPT | Mod: ,,, | Performed by: INTERNAL MEDICINE

## 2022-06-04 PROCEDURE — 63600175 PHARM REV CODE 636 W HCPCS: Performed by: INTERNAL MEDICINE

## 2022-06-04 PROCEDURE — C1751 CATH, INF, PER/CENT/MIDLINE: HCPCS

## 2022-06-04 PROCEDURE — 85610 PROTHROMBIN TIME: CPT | Performed by: THORACIC SURGERY (CARDIOTHORACIC VASCULAR SURGERY)

## 2022-06-04 PROCEDURE — 94640 AIRWAY INHALATION TREATMENT: CPT

## 2022-06-04 PROCEDURE — 21400001 HC TELEMETRY ROOM

## 2022-06-04 PROCEDURE — 63600175 PHARM REV CODE 636 W HCPCS: Mod: TB | Performed by: NURSE PRACTITIONER

## 2022-06-04 PROCEDURE — 85730 THROMBOPLASTIN TIME PARTIAL: CPT | Mod: 91 | Performed by: FAMILY MEDICINE

## 2022-06-04 PROCEDURE — 85025 COMPLETE CBC W/AUTO DIFF WBC: CPT | Mod: 91 | Performed by: INTERNAL MEDICINE

## 2022-06-04 PROCEDURE — 85025 COMPLETE CBC W/AUTO DIFF WBC: CPT | Performed by: THORACIC SURGERY (CARDIOTHORACIC VASCULAR SURGERY)

## 2022-06-04 PROCEDURE — 85730 THROMBOPLASTIN TIME PARTIAL: CPT | Performed by: THORACIC SURGERY (CARDIOTHORACIC VASCULAR SURGERY)

## 2022-06-04 PROCEDURE — 25000003 PHARM REV CODE 250: Performed by: INTERNAL MEDICINE

## 2022-06-04 PROCEDURE — 27000646 HC AEROBIKA DEVICE

## 2022-06-04 PROCEDURE — 63600175 PHARM REV CODE 636 W HCPCS: Performed by: FAMILY MEDICINE

## 2022-06-04 PROCEDURE — 27000207 HC ISOLATION

## 2022-06-04 PROCEDURE — 27000221 HC OXYGEN, UP TO 24 HOURS

## 2022-06-04 PROCEDURE — 94761 N-INVAS EAR/PLS OXIMETRY MLT: CPT

## 2022-06-04 PROCEDURE — 25000003 PHARM REV CODE 250: Performed by: FAMILY MEDICINE

## 2022-06-04 PROCEDURE — 94664 DEMO&/EVAL PT USE INHALER: CPT

## 2022-06-04 RX ORDER — HEPARIN SODIUM,PORCINE/D5W 25000/250
0-40 INTRAVENOUS SOLUTION INTRAVENOUS CONTINUOUS
Status: DISPENSED | OUTPATIENT
Start: 2022-06-04 | End: 2022-06-06

## 2022-06-04 RX ORDER — HYDRALAZINE HYDROCHLORIDE 20 MG/ML
10 INJECTION INTRAMUSCULAR; INTRAVENOUS EVERY 8 HOURS PRN
Status: DISCONTINUED | OUTPATIENT
Start: 2022-06-04 | End: 2022-06-10 | Stop reason: HOSPADM

## 2022-06-04 RX ORDER — MORPHINE SULFATE 4 MG/ML
4 INJECTION, SOLUTION INTRAMUSCULAR; INTRAVENOUS ONCE
Status: COMPLETED | OUTPATIENT
Start: 2022-06-04 | End: 2022-06-04

## 2022-06-04 RX ORDER — HEPARIN SODIUM,PORCINE/D5W 25000/250
0-40 INTRAVENOUS SOLUTION INTRAVENOUS CONTINUOUS
Status: DISCONTINUED | OUTPATIENT
Start: 2022-06-04 | End: 2022-06-04

## 2022-06-04 RX ADMIN — LORAZEPAM 1 MG: 1 TABLET ORAL at 11:06

## 2022-06-04 RX ADMIN — ALBUTEROL SULFATE 2 PUFF: 90 AEROSOL, METERED RESPIRATORY (INHALATION) at 07:06

## 2022-06-04 RX ADMIN — SODIUM CHLORIDE: 0.9 INJECTION, SOLUTION INTRAVENOUS at 01:06

## 2022-06-04 RX ADMIN — MORPHINE SULFATE 4 MG: 4 INJECTION INTRAVENOUS at 01:06

## 2022-06-04 RX ADMIN — OXYCODONE HYDROCHLORIDE AND ACETAMINOPHEN 500 MG: 500 TABLET ORAL at 09:06

## 2022-06-04 RX ADMIN — TRAZODONE HYDROCHLORIDE 100 MG: 100 TABLET ORAL at 08:06

## 2022-06-04 RX ADMIN — OXYCODONE HYDROCHLORIDE 5 MG: 5 TABLET ORAL at 05:06

## 2022-06-04 RX ADMIN — OXACILLIN SODIUM 12 G: 10 INJECTION, POWDER, FOR SOLUTION INTRAVENOUS at 03:06

## 2022-06-04 RX ADMIN — MORPHINE SULFATE 30 MG: 30 TABLET, FILM COATED, EXTENDED RELEASE ORAL at 11:06

## 2022-06-04 RX ADMIN — MORPHINE SULFATE 30 MG: 30 TABLET, FILM COATED, EXTENDED RELEASE ORAL at 01:06

## 2022-06-04 RX ADMIN — OXYCODONE HYDROCHLORIDE 5 MG: 5 TABLET ORAL at 03:06

## 2022-06-04 RX ADMIN — HEPARIN SODIUM 12 UNITS/KG/HR: 5000 INJECTION INTRAVENOUS; SUBCUTANEOUS at 10:06

## 2022-06-04 RX ADMIN — ONDANSETRON 4 MG: 2 INJECTION INTRAMUSCULAR; INTRAVENOUS at 04:06

## 2022-06-04 RX ADMIN — ALBUTEROL SULFATE 2 PUFF: 90 AEROSOL, METERED RESPIRATORY (INHALATION) at 08:06

## 2022-06-04 RX ADMIN — LORAZEPAM 1 MG: 1 TABLET ORAL at 08:06

## 2022-06-04 RX ADMIN — ONDANSETRON 4 MG: 2 INJECTION INTRAMUSCULAR; INTRAVENOUS at 11:06

## 2022-06-04 RX ADMIN — REMDESIVIR 100 MG: 100 INJECTION, POWDER, LYOPHILIZED, FOR SOLUTION INTRAVENOUS at 09:06

## 2022-06-04 RX ADMIN — OXYCODONE HYDROCHLORIDE AND ACETAMINOPHEN 500 MG: 500 TABLET ORAL at 08:06

## 2022-06-04 RX ADMIN — ALBUTEROL SULFATE 2 PUFF: 90 AEROSOL, METERED RESPIRATORY (INHALATION) at 12:06

## 2022-06-04 RX ADMIN — THERA TABS 1 TABLET: TAB at 09:06

## 2022-06-04 RX ADMIN — ONDANSETRON 4 MG: 2 INJECTION INTRAMUSCULAR; INTRAVENOUS at 01:06

## 2022-06-04 RX ADMIN — OXYCODONE HYDROCHLORIDE 5 MG: 5 TABLET ORAL at 09:06

## 2022-06-04 NOTE — ASSESSMENT & PLAN NOTE
Secondary to acute illness    --Daily CBC  --Transfuse with 1 unit PRBC for Hgb <7.0 or <8.0 if symptomatic   --Hgb: 6.7, give 1U PRBC    5/27/22  Hemoglobin 7.3g/dL. downward trend. Has blood tinged seropurulent drainage. Will give additional unit if continues to trend down.  5/28/22  Hemoglobin 8.0 today    6/4:  H/h stable post 1 unit prbc  Cont to monitor

## 2022-06-04 NOTE — SUBJECTIVE & OBJECTIVE
Interval History:   6/4O2 sat 96% on 2 liter/minute.  Afebrile.  CRP 30. Significantly decreased.  Last blood cultures negative.  Awaiting VATS for complicated right-sided pleural effusion Monday.  Generalized pain weakness and decreased activity   Review of Systems   Constitutional:  Positive for malaise/fatigue and weight loss. Negative for chills and fever.   HENT:  Negative for hearing loss and nosebleeds.    Eyes:  Negative for discharge.   Respiratory:  Positive for cough, sputum production and shortness of breath.    Cardiovascular:  Positive for leg swelling. Negative for chest pain.   Gastrointestinal:  Negative for abdominal pain.   Genitourinary:  Negative for hematuria.   Musculoskeletal:  Negative for falls.   Skin:  Negative for itching.   Neurological:  Positive for weakness. Negative for speech change, seizures and loss of consciousness.   Endo/Heme/Allergies:  Negative for polydipsia. Does not bruise/bleed easily.   Psychiatric/Behavioral:  Negative for hallucinations.        Objective:     Vital Signs (Most Recent):  Temp: 98.3 °F (36.8 °C) (06/04/22 0737)  Pulse: 85 (06/04/22 1113)  Resp: 18 (06/04/22 1113)  BP: 132/73 (06/04/22 0737)  SpO2: 96 % (06/04/22 0831)   Vital Signs (24h Range):  Temp:  [97.3 °F (36.3 °C)-99.6 °F (37.6 °C)] 98.3 °F (36.8 °C)  Pulse:  [70-93] 85  Resp:  [16-20] 18  SpO2:  [96 %-100 %] 96 %  BP: (130-179)/() 132/73     Weight: 97.5 kg (214 lb 15.2 oz)  Body mass index is 35.77 kg/m².      Intake/Output Summary (Last 24 hours) at 6/4/2022 1130  Last data filed at 6/4/2022 0427  Gross per 24 hour   Intake 756 ml   Output 4200 ml   Net -3444 ml         Physical Exam  Vitals and nursing note reviewed.   Constitutional:       General: She is not in acute distress.     Appearance: She is well-developed. She is ill-appearing.   HENT:      Head: Normocephalic and atraumatic.      Nose: Nose normal.   Eyes:      Extraocular Movements: Extraocular movements intact.    Cardiovascular:      Rate and Rhythm: Normal rate and regular rhythm.   Pulmonary:      Effort: Pulmonary effort is normal.      Breath sounds: No stridor. Rhonchi present.   Abdominal:      General: There is no distension.   Musculoskeletal:         General: No signs of injury.      Cervical back: Normal range of motion and neck supple.   Skin:     General: Skin is dry.   Neurological:      General: No focal deficit present.      Mental Status: She is alert and oriented to person, place, and time. Mental status is at baseline.       Vents:  Oxygen Concentration (%): 24 (06/04/22 0831)    Lines/Drains/Airways       Peripherally Inserted Central Catheter Line  Duration             PICC Double Lumen 05/17/22 1612 right basilic 17 days              Drain  Duration             Female External Urinary Catheter 05/21/22 0700 14 days                    Significant Labs:    CBC/Anemia Profile:  Recent Labs   Lab 06/02/22  1656 06/03/22  1532 06/04/22  0642 06/04/22  0931   WBC  --  6.37 7.54 6.23   HGB  --  7.2* 7.7* 7.7*   HCT  --  22.8* 24.4* 24.1*   PLT  --  286 271 257   MCV  --  85 86 84   RDW  --  16.1* 16.2* 16.1*   FERRITIN 313*  --   --   --           Chemistries:  Recent Labs   Lab 06/02/22  1619 06/03/22  1532    141   K 4.8 4.5    106   CO2 24 24   BUN 29* 31*   CREATININE 2.0* 2.1*   CALCIUM 7.4* 7.7*   ALBUMIN 1.6* 1.6*   PROT 5.5* 6.0   BILITOT 0.2 0.2   ALKPHOS 71 75   ALT 6* 11   AST 15 17      Latest Reference Range & Units 05/28/22 11:15   Fluid Color  Yellow   Fluid Appearance  Clear   WBC, Body Fluid /cu mm 3065   Body Fluid Type  Pleural Fluid, Left   Segs, Fluid % 41   Lymphs, Fluid % 14   Monocytes/Macrophages, Fluid % 22   Eos, Fluid % 23   Glucose, Fluid Not established mg/dL 102   LD, Fluid Not established U/L 241   Body Fluid Source, Glucose  Pleural Fluid, Left   Body Fluid Source, LDH  Pleural Fluid, Left   Body Fluid Source, Total Protein  Pleural Fluid, Left   Body Fluid,  Protein Not established g/dL 2.6      Latest Reference Range & Units 05/07/22 09:05 05/21/22 10:22   Fluid Appearance  Hazy Cloudy   WBC, Body Fluid /cu mm 54248 [1] 932203 (C) [2]   Body Fluid Type  Pleural Fluid, Right Pleural Fluid, Right   Segs, Fluid % 93 88   Lymphs, Fluid % 3 7   Monocytes/Macrophages, Fluid % 4 5   Amylase, Fluid Not established U/L 34 [3]    Glucose, Fluid Not established mg/dL 29 [4]    LD, Fluid Not established U/L 1077 [5]    Body Fluid, Albumin See text g/dL 1.3 [6]    Body Fluid Source Amylase  Pleural Fluid, Right    Body Fluid Source, Albumin  Pleural Fluid, Right    Body Fluid Source, Glucose  Pleural Fluid, Right    Body Fluid Source, LDH  Pleural Fluid, Right    Body Fluid Source, Total Protein  Pleural Fluid, Right    Body Fluid, Protein Not established g/dL 3.7 [7]    Cholesterol, Body Fluid See Comment mg/dL 79 [8]          Significant Imaging:       Chest x-ray 05/30/2022    TECHNIQUE:  Single frontal view of the chest was performed.     COMPARISON:  05/28/2022     FINDINGS:  Right-sided PICC line tip overlies the SVC in good position.  Heart size is normal.  Right pleural catheter not seen on today's exam.  Again there patchy infiltrates within the right mid lower lung zone.  Cannot exclude small right pleural effusion.  No pneumothorax.    In comparison to the prior study, there is no adverse interval changes.             CT chest without contrast 06/01/2022    Narrative & Impression  EXAMINATION:  CT CHEST WITHOUT CONTRAST     CLINICAL HISTORY:  Pneumonia, unresolved;     TECHNIQUE:  Low-dose axial images acquired from the chest without the use of intravenous contrast.  Sagittal and coronal reformats, as well as axial MIPS were generated for further review.  All CT scans at this location are performed using dose modulation techniques as appropriate to a performed exam including the following: Automated exposure control; adjustment of the mA and/or kV according to patient  size (this includes techniques or standardized protocols for targeted exams where dose is matched to indication/reason for exam; i. e. extremities or head); use of iterative reconstruction technique.     COMPARISON:  Chest radiograph 05/30/2022.  CT chest 05/20/2022.     FINDINGS:  Base of Neck: No significant abnormality. Right-sided central venous catheter with tip terminating at the distal SVC.     Aorta: Nonaneurysmal without significant atherosclerotic calcification.     Heart/pericardium: Upper limits normal size heart without significant pericardial fluid.  No significant coronary calcification.     Esha/Mediastinum: Few upper limits normal size mediastinal lymph nodes.     Upper Abdomen: No acute abnormality of the partially imaged upper abdomen.     Thoracic soft tissues: Anasarca.     Bones: No acute fracture. No suspicious lytic or sclerotic lesion.     Airways: Patent.     Lungs/pleura: Continued evolution of the septic emboli with multiple cavitary nodules scattered throughout the lungs.  There are well-defined air-fluid collections in posterior aspect of the right upper and lower lobes measuring 4.5 x 9.5 cm and 5.5 x 10.0 cm, respectively.  Alternatively, this could represent loculated empyema.  These 2 collections appear to communicate.  Overall appearance of the chest is similar when compared to the recent prior CT examination.  Findings concerning for necrotizing pneumonia, as previously noted.  There is a medium sized left-sided pleural effusion with associated volume loss.     Impression:     Evolving findings concerning for septic emboli and necrotizing pneumonia in the posterior aspect of the right lung versus empyema, as described above.                         CT chest without contrast 05/20/2022    Narrative & Impression  EXAMINATION:  CT CHEST WITHOUT CONTRAST     CLINICAL HISTORY:  Empyema;     TECHNIQUE:  Low dose axial images, sagittal and coronal reformations were obtained from the  thoracic inlet to the lung bases. Contrast was not administered.  All CT scans at this facility are performed using dose optimization techniques including the following: automated exposure control; adjustment of the mA and/or kV; use of iterative reconstruction technique.     COMPARISON:  CTA chest non coronary 05/06/2022, chest radiograph 05/20/2022, MRI thoracic spine 05/19/2022     FINDINGS:  Base of Neck: There is diffuse body wall edema.  Unremarkable, noting the presence of AA PICC line which terminates at the superior cavoatrial junction.     Thoracic soft tissues: There is diffuse body wall edema.     Aorta: Left-sided aortic arch.  No aneurysm and no significant atherosclerosis     Heart: Normal size. No effusion.  No significant coronary atherosclerosis.     Pulmonary vasculature: Pulmonary arteries distribute normally.     Esha/Mediastinum: There are few prominent mediastinal lymph nodes with a prevascular node measuring 0.8 cm in short axis.     Airways: Patent.     Lungs/Pleura: There are multiple bilateral consolidative densities scattered throughout the lungs, many of which demonstrate cavitation, again suggesting septic emboli.  There is small left pleural fluid, which appears simple and layers dependently.  There is a loculated moderate right pleural effusion, which appears similar in size to the comparison exam however now containing air, which may be due to empyema.  There is adjacent atelectatic change within the bilateral lower lobes.  Overall number and size of consolidative densities has increased since the comparison exam.     Esophagus: Normal.     Upper Abdomen: There is persistent wedge-shaped hypodensity within the spleen, suggesting infarct.     Bones: No acute fracture. No suspicious lytic or sclerotic lesions.     Impression:     Interval progression with increased number and size of consolidative cavitary densities throughout the lungs, most suggestive of septic emboli with larger  opacities concerning for necrotizing pneumonia.     Stable size of loculated right moderate pleural effusion, now containing air compatible with hydropneumothorax as reported on prior MRI.  Interval development of air is concerning for empyema noting limited evaluation without intravenous contrast.  New small simple appearing left pleural effusion.     Wedge-shaped splenic hypodensity, suggesting infarction.     Anasarca.

## 2022-06-04 NOTE — PT/OT/SLP PROGRESS
Physical Therapy      Patient Name:  Tianna Leon   MRN:  [id number]    08:00 a.m.  Per patient's nurse, patient refusing to participate in PT session due to c/o increased pain and fatigue.  Will follow up during next scheduled PT session.

## 2022-06-04 NOTE — PLAN OF CARE
AAOx4. No signs of distress. Able to verbalize needs and follow commands. Pt is anxious  and cooperative. Report pain to lower back. States pain is 4/10. Prn pain med effective. Vital signs stable. Sinus Rhythm on tele monitor. On 1L O2 via NC.  R upper arm Picc is patent. Pt continent of b/b. Purewick in place. Requires assist x1. Pt remains free of falls. Meds given as prescribed.  POC reviewed with pt and pt verbalized understanding. Interventions implemented as appropriate. Pt able to turn self. Encouraged pt  to turn frequently. Educated on importance of adhering to POC after dc; verbalized understanding. Resting quietly in bed.   Bed low. Side rails up x2, wheels locked, call light within reach.

## 2022-06-04 NOTE — ASSESSMENT & PLAN NOTE
6/3 avoid nephrotoxic drugs.  Non oliguric.  IV fluid.  Monitor BMP.  Monitor urine output  6/4  urine output 5 L last 24 hours.  Creatinine 2.1.  Repeat creatinine monitor urine output.  Repeat creatinine monitor urine output

## 2022-06-04 NOTE — ASSESSMENT & PLAN NOTE
6/1 - Isolation:   - Airborne, Contact and Droplet Precautions  - Cohort patients into COVID units  - N95 masks must be fit tested, wear eye protection  - 20 second hand hygiene              - Limit visitors per hospital policy              - Consolidating lab draws, nursing care, provider visits, and interventions  IV remdesivir ID follow-up  6/2 same  6/3 on therapeutic Lovenox and IV remdesivir  6/4 IV remdesivir

## 2022-06-04 NOTE — PT/OT/SLP PROGRESS
Occupational Therapy      Patient Name:  Tianna Leon   MRN:  [id number]    Patient not seen today secondary to Patient unwilling to participate. Declined to participate due to already got up today and does not want to do anything due to having surgery on Moonday, Spoke to Case mangement and pt regarding possible dischareged from skilled O.T due to lack of participation [facility] therapy session.Will follow-up    Yolande Quinones OT  6/3/2022

## 2022-06-04 NOTE — PROGRESS NOTES
[facility] - Telemetry ([facility])  Pulmonology  Progress Note    Patient Name: Tianna Leon  MRN: [id number]  Admission Date: 5/6/2022  Hospital Length of Stay: 29 days  Code Status: Full Code  Attending Provider: Delmer Guzmán MD  Primary Care Provider: Spenser Campa MD   Principal Problem: Acute bacterial endocarditis    Subjective:     Interval History:   6/4O2 sat 96% on 2 liter/minute.  Afebrile.  CRP 30. Significantly decreased.  Last blood cultures negative.  Awaiting VATS for complicated right-sided pleural effusion Monday.  Generalized pain weakness and decreased activity   Review of Systems   Constitutional:  Positive for malaise/fatigue and weight loss. Negative for chills and fever.   HENT:  Negative for hearing loss and nosebleeds.    Eyes:  Negative for discharge.   Respiratory:  Positive for cough, sputum production and shortness of breath.    Cardiovascular:  Positive for leg swelling. Negative for chest pain.   Gastrointestinal:  Negative for abdominal pain.   Genitourinary:  Negative for hematuria.   Musculoskeletal:  Negative for falls.   Skin:  Negative for itching.   Neurological:  Positive for weakness. Negative for speech change, seizures and loss of consciousness.   Endo/Heme/Allergies:  Negative for polydipsia. Does not bruise/bleed easily.   Psychiatric/Behavioral:  Negative for hallucinations.        Objective:     Vital Signs (Most Recent):  Temp: 98.3 °F (36.8 °C) (06/04/22 0737)  Pulse: 85 (06/04/22 1113)  Resp: 18 (06/04/22 1113)  BP: 132/73 (06/04/22 0737)  SpO2: 96 % (06/04/22 0831)   Vital Signs (24h Range):  Temp:  [97.3 °F (36.3 °C)-99.6 °F (37.6 °C)] 98.3 °F (36.8 °C)  Pulse:  [70-93] 85  Resp:  [16-20] 18  SpO2:  [96 %-100 %] 96 %  BP: (130-179)/() 132/73     Weight: 97.5 kg (214 lb 15.2 oz)  Body mass index is 35.77 kg/m².      Intake/Output Summary (Last 24 hours) at 6/4/2022 1130  Last data filed at 6/4/2022 0427  Gross per 24 hour   Intake 756 ml   Output 4200 ml   Net  -3444 ml         Physical Exam  Vitals and nursing note reviewed.   Constitutional:       General: She is not in acute distress.     Appearance: She is well-developed. She is ill-appearing.   HENT:      Head: Normocephalic and atraumatic.      Nose: Nose normal.   Eyes:      Extraocular Movements: Extraocular movements intact.   Cardiovascular:      Rate and Rhythm: Normal rate and regular rhythm.   Pulmonary:      Effort: Pulmonary effort is normal.      Breath sounds: No stridor. Rhonchi present.   Abdominal:      General: There is no distension.   Musculoskeletal:         General: No signs of injury.      Cervical back: Normal range of motion and neck supple.   Skin:     General: Skin is dry.   Neurological:      General: No focal deficit present.      Mental Status: She is alert and oriented to person, place, and time. Mental status is at baseline.       Vents:  Oxygen Concentration (%): 24 (06/04/22 0831)    Lines/Drains/Airways       Peripherally Inserted Central Catheter Line  Duration             PICC Double Lumen 05/17/22 1612 right basilic 17 days              Drain  Duration             Female External Urinary Catheter 05/21/22 0700 14 days                    Significant Labs:    CBC/Anemia Profile:  Recent Labs   Lab 06/02/22  1656 06/03/22  1532 06/04/22  0642 06/04/22  0931   WBC  --  6.37 7.54 6.23   HGB  --  7.2* 7.7* 7.7*   HCT  --  22.8* 24.4* 24.1*   PLT  --  286 271 257   MCV  --  85 86 84   RDW  --  16.1* 16.2* 16.1*   FERRITIN 313*  --   --   --           Chemistries:  Recent Labs   Lab 06/02/22  1619 06/03/22  1532    141   K 4.8 4.5    106   CO2 24 24   BUN 29* 31*   CREATININE 2.0* 2.1*   CALCIUM 7.4* 7.7*   ALBUMIN 1.6* 1.6*   PROT 5.5* 6.0   BILITOT 0.2 0.2   ALKPHOS 71 75   ALT 6* 11   AST 15 17      Latest Reference Range & Units 05/28/22 11:15   Fluid Color  Yellow   Fluid Appearance  Clear   WBC, Body Fluid /cu mm 3065   Body Fluid Type  Pleural Fluid, Left   Segs, Fluid %  41   Lymphs, Fluid % 14   Monocytes/Macrophages, Fluid % 22   Eos, Fluid % 23   Glucose, Fluid Not established mg/dL 102   LD, Fluid Not established U/L 241   Body Fluid Source, Glucose  Pleural Fluid, Left   Body Fluid Source, LDH  Pleural Fluid, Left   Body Fluid Source, Total Protein  Pleural Fluid, Left   Body Fluid, Protein Not established g/dL 2.6      Latest Reference Range & Units 05/07/22 09:05 05/21/22 10:22   Fluid Appearance  Hazy Cloudy   WBC, Body Fluid /cu mm 23168 [1] 468915 (C) [2]   Body Fluid Type  Pleural Fluid, Right Pleural Fluid, Right   Segs, Fluid % 93 88   Lymphs, Fluid % 3 7   Monocytes/Macrophages, Fluid % 4 5   Amylase, Fluid Not established U/L 34 [3]    Glucose, Fluid Not established mg/dL 29 [4]    LD, Fluid Not established U/L 1077 [5]    Body Fluid, Albumin See text g/dL 1.3 [6]    Body Fluid Source Amylase  Pleural Fluid, Right    Body Fluid Source, Albumin  Pleural Fluid, Right    Body Fluid Source, Glucose  Pleural Fluid, Right    Body Fluid Source, LDH  Pleural Fluid, Right    Body Fluid Source, Total Protein  Pleural Fluid, Right    Body Fluid, Protein Not established g/dL 3.7 [7]    Cholesterol, Body Fluid See Comment mg/dL 79 [8]          Significant Imaging:       Chest x-ray 05/30/2022    TECHNIQUE:  Single frontal view of the chest was performed.     COMPARISON:  05/28/2022     FINDINGS:  Right-sided PICC line tip overlies the SVC in good position.  Heart size is normal.  Right pleural catheter not seen on today's exam.  Again there patchy infiltrates within the right mid lower lung zone.  Cannot exclude small right pleural effusion.  No pneumothorax.    In comparison to the prior study, there is no adverse interval changes.             CT chest without contrast 06/01/2022    Narrative & Impression  EXAMINATION:  CT CHEST WITHOUT CONTRAST     CLINICAL HISTORY:  Pneumonia, unresolved;     TECHNIQUE:  Low-dose axial images acquired from the chest without the use of  intravenous contrast.  Sagittal and coronal reformats, as well as axial MIPS were generated for further review.  All CT scans at this location are performed using dose modulation techniques as appropriate to a performed exam including the following: Automated exposure control; adjustment of the mA and/or kV according to patient size (this includes techniques or standardized protocols for targeted exams where dose is matched to indication/reason for exam; i. e. extremities or head); use of iterative reconstruction technique.     COMPARISON:  Chest radiograph 05/30/2022.  CT chest 05/20/2022.     FINDINGS:  Base of Neck: No significant abnormality. Right-sided central venous catheter with tip terminating at the distal SVC.     Aorta: Nonaneurysmal without significant atherosclerotic calcification.     Heart/pericardium: Upper limits normal size heart without significant pericardial fluid.  No significant coronary calcification.     Esha/Mediastinum: Few upper limits normal size mediastinal lymph nodes.     Upper Abdomen: No acute abnormality of the partially imaged upper abdomen.     Thoracic soft tissues: Anasarca.     Bones: No acute fracture. No suspicious lytic or sclerotic lesion.     Airways: Patent.     Lungs/pleura: Continued evolution of the septic emboli with multiple cavitary nodules scattered throughout the lungs.  There are well-defined air-fluid collections in posterior aspect of the right upper and lower lobes measuring 4.5 x 9.5 cm and 5.5 x 10.0 cm, respectively.  Alternatively, this could represent loculated empyema.  These 2 collections appear to communicate.  Overall appearance of the chest is similar when compared to the recent prior CT examination.  Findings concerning for necrotizing pneumonia, as previously noted.  There is a medium sized left-sided pleural effusion with associated volume loss.     Impression:     Evolving findings concerning for septic emboli and necrotizing pneumonia in the  posterior aspect of the right lung versus empyema, as described above.                         CT chest without contrast 05/20/2022    Narrative & Impression  EXAMINATION:  CT CHEST WITHOUT CONTRAST     CLINICAL HISTORY:  Empyema;     TECHNIQUE:  Low dose axial images, sagittal and coronal reformations were obtained from the thoracic inlet to the lung bases. Contrast was not administered.  All CT scans at this facility are performed using dose optimization techniques including the following: automated exposure control; adjustment of the mA and/or kV; use of iterative reconstruction technique.     COMPARISON:  CTA chest non coronary 05/06/2022, chest radiograph 05/20/2022, MRI thoracic spine 05/19/2022     FINDINGS:  Base of Neck: There is diffuse body wall edema.  Unremarkable, noting the presence of AA PICC line which terminates at the superior cavoatrial junction.     Thoracic soft tissues: There is diffuse body wall edema.     Aorta: Left-sided aortic arch.  No aneurysm and no significant atherosclerosis     Heart: Normal size. No effusion.  No significant coronary atherosclerosis.     Pulmonary vasculature: Pulmonary arteries distribute normally.     Esha/Mediastinum: There are few prominent mediastinal lymph nodes with a prevascular node measuring 0.8 cm in short axis.     Airways: Patent.     Lungs/Pleura: There are multiple bilateral consolidative densities scattered throughout the lungs, many of which demonstrate cavitation, again suggesting septic emboli.  There is small left pleural fluid, which appears simple and layers dependently.  There is a loculated moderate right pleural effusion, which appears similar in size to the comparison exam however now containing air, which may be due to empyema.  There is adjacent atelectatic change within the bilateral lower lobes.  Overall number and size of consolidative densities has increased since the comparison exam.     Esophagus: Normal.     Upper Abdomen: There  is persistent wedge-shaped hypodensity within the spleen, suggesting infarct.     Bones: No acute fracture. No suspicious lytic or sclerotic lesions.     Impression:     Interval progression with increased number and size of consolidative cavitary densities throughout the lungs, most suggestive of septic emboli with larger opacities concerning for necrotizing pneumonia.     Stable size of loculated right moderate pleural effusion, now containing air compatible with hydropneumothorax as reported on prior MRI.  Interval development of air is concerning for empyema noting limited evaluation without intravenous contrast.  New small simple appearing left pleural effusion.     Wedge-shaped splenic hypodensity, suggesting infarction.     Anasarca.           ABG  No results for input(s): PH, PO2, PCO2, HCO3, BE in the last 168 hours.  Assessment/Plan:     BEE (acute kidney injury)  6/3 avoid nephrotoxic drugs.  Non oliguric.  IV fluid.  Monitor BMP.  Monitor urine output  6/4  urine output 5 L last 24 hours.  Creatinine 2.1.  Repeat creatinine monitor urine output.  Repeat creatinine monitor urine output    COVID-19  6/1 - Isolation:   - Airborne, Contact and Droplet Precautions  - Cohort patients into COVID units  - N95 masks must be fit tested, wear eye protection  - 20 second hand hygiene              - Limit visitors per hospital policy              - Consolidating lab draws, nursing care, provider visits, and interventions  IV remdesivir ID follow-up  6/2 same  6/3 on therapeutic Lovenox and IV remdesivir  6/4 IV remdesivir    Empyema of right pleural space  5/20 Suspect empyema, needs chest tube placement  5/21 s/p chest tube placement, adequate expansion - will check Chest X Ray in [facility]  5/23 tPA DNase.  5/24 tPA for DNase installation via chest tube.  Monitor chest tube output.  Repeat chest x-ray in a.m..  5/25 chest tube output increased from 50 mL on average per 24 hours to 160 mL last 24 hours.  Purulent drainage  noted.  Will repeat tPA plus Dnase instillation.  5/26 chest x-ray reviewed.  Continue IV oxacillin.  Right-sided pigtail in place.  Status post tPA and DNase x2.  Bloody drainage around pigtail today status post tPA and DNase yesterday.  Will hold on fibrinolysis.  Monitor chest tube output  5/27 right pigtail in place.  225 mL  Last 24 hours average of 200 mL per day.  Change pleurovac canister to better assess the color of the fluid pus versus serosanguineous.  Keep pigtail in place for now.  Ultrasound bilateral chest assess pleural effusions.  Continue oxacillin drip.  5/28 keep chest tube on the right side for now.  Repeat CRP.  Checking chemistry microbiology  on left-sided pleural fluid to rule out complicated effusion and need for pigtail.  Continue oxacillin drip.   5/29 minimal output right chest tube.  Remove chest tube.  Left-sided effusion not complicated not empyema.  Continue IV oxacillin    6/1 evolving empyema/necrosis on right lung on CT scan of the chest 06/01/2022.  Continue IV oxacillin.  Re-consult cardiothoracic surgery.  6/2 /treat COVID.  Cardiothoracic surgery input noted.  VATS procedure will be delayed till Monday next week.  Patient will be scheduled for VATS on 06/06/2022      6/3 VATS 06/06/2022 treating COVID.  06/04/2022 IV oxacillin.  VATS Monday as per CT surgery      MSSA bacteremia  +ve BC  IVDU  Cont OXACILLIN FLAGYL  5/23 IV oxacillin.    Therapeutic Lovenox changed to IV heparin preoperatively, on anticoagulation for COVID-19.  Monitor PTT .  Discussed with hospital medicine attending       Piyush San MD  Pulmonology  [facility] - Tele[facility] ([facility])

## 2022-06-04 NOTE — ASSESSMENT & PLAN NOTE
5/15: Blood cultures 5/11/22: NGTD     Continuous Oxacillin, EOC: 7/11/22, pending acceptance at facility, PICC line placed    6/4:  Cont on oxacillin  ID on case   Repeat blood cultures negative

## 2022-06-04 NOTE — ASSESSMENT & PLAN NOTE
Pulmonology consulted and felt likely empyema   IR consulted for chest tube      s/p right pigtail small bore chest tube placement to suction/drainage system per IR on 5/21/22. 60ml pus removed. Now with Serous drainage with pus. Fluid analysis consistent with empyema. Pleural fluid aerobic culture shows NGTD, Anaerobic culture pending    5/24/22: Dr. San plans for tPA and DNase in chest tube today.   5/25/22: 160ml of purulent CT drainage output. Dr. San plans to repeat tPA and DNase in chest tube again today.   5/26/22  Still has purulent drainage noted. Pulmonology following    6/4:  Worsening empyema noted  CTS consult on case  Plan for VATS procedure on Monday

## 2022-06-04 NOTE — ASSESSMENT & PLAN NOTE
5/20 Suspect empyema, needs chest tube placement  5/21 s/p chest tube placement, adequate expansion - will check Chest X Ray in [facility]  5/23 tPA DNase.  5/24 tPA for DNase installation via chest tube.  Monitor chest tube output.  Repeat chest x-ray in a.m..  5/25 chest tube output increased from 50 mL on average per 24 hours to 160 mL last 24 hours.  Purulent drainage noted.  Will repeat tPA plus Dnase instillation.  5/26 chest x-ray reviewed.  Continue IV oxacillin.  Right-sided pigtail in place.  Status post tPA and DNase x2.  Bloody drainage around pigtail today status post tPA and DNase yesterday.  Will hold on fibrinolysis.  Monitor chest tube output  5/27 right pigtail in place.  225 mL  Last 24 hours average of 200 mL per day.  Change pleurovac canister to better assess the color of the fluid pus versus serosanguineous.  Keep pigtail in place for now.  Ultrasound bilateral chest assess pleural effusions.  Continue oxacillin drip.  5/28 keep chest tube on the right side for now.  Repeat CRP.  Checking chemistry microbiology  on left-sided pleural fluid to rule out complicated effusion and need for pigtail.  Continue oxacillin drip.   5/29 minimal output right chest tube.  Remove chest tube.  Left-sided effusion not complicated not empyema.  Continue IV oxacillin    6/1 evolving empyema/necrosis on right lung on CT scan of the chest 06/01/2022.  Continue IV oxacillin.  Re-consult cardiothoracic surgery.  6/2 /treat COVID.  Cardiothoracic surgery input noted.  VATS procedure will be delayed till Monday next week.  Patient will be scheduled for VATS on 06/06/2022      6/3 VATS 06/06/2022 treating COVID.  06/04/2022 IV oxacillin.  VATS Monday as per CT surgery

## 2022-06-04 NOTE — PROGRESS NOTES
[facility] Medicine  Progress Note    Patient Name: Tianna Leon  MRN: [id number]  Patient Class: IP- Inpatient   Admission Date: 5/6/2022  Length of Stay: 29 days  Attending Physician: Delmer Guzmán MD  Primary Care Provider: Spenser Campa MD        Subjective:     Principal Problem:Acute bacterial endocarditis        HPI:  38 y/o. female  with a PMHx of asthma, COPD,Bipolar  , IVDU and HLD presented to the ER with a c/o  sob for the last weak which has gotten worse . The SOB is associated with fever , chills , productive cough , back pain  and generalized weakness . She report cough some blood this am .  She is active IVDU  ( heroine , cocaine  and amphetamine ) . She denies any  sick contact , chest pain  , GI/ sx , She also complaning of LE sweeling . Knee pain and B/L LE rash .   ER COURSE: CTA chest negative for pe . Multiple nodular and cavitary opacities concerning for septic emboli with larger opacities possibly necrotizing pneumonia. CT cervical  and thoracic did not show any acute finding , CT lumbar Possible progression of degenerative changes most notable at L4-5 with moderate to severe spinal canal stenosis at this level.  WBC  29 k , na 130 , k 3.4 , cl 89 , procal 2.71   ER VS:  BP Pulse Resp Temp SpO2   (!) 124/58 110 (!) 30 (!) 101.6 °F (38.7 °C) 96 %           Pt will be admitted to inpt with a dx of PNA and severe sepsis       Overview/Hospital Course:  5/7 admitted for pneumonia, severe sepsis in setting of ivdu. Mother at bedside and provides collateral. Patient has struggled with substance abuse for approximately 20 years, worsening over last 3-5 years since life event. Onset of symptoms 1-2 weeks ago. Tolerated thoracentesis with no pneumothorax on chest x-ray. Mri lumbar and echo, pending. Bcx positive, growing gram positive cocci. On vanc, cefepime and flagyl. Infectious disease consulted for bacteremia. 5/8/22 The patient remained afebrile overnight.  Currently on cefepime/vanc/flagyl. Blood cx 5/6/22 are growing MSSA. The patient refused the MRI lumbar spine overnight d/t being unable to lay flat from back pain. Will give pain meds and attempt to get the MRI today to r/o spinal abscess. The ECHO showed a 1.3 x 1.5 cm elongated, mobile echodensity seen on the tricuspid valve consistent with vegetation, CT surgery was consulted. Will need a SUSANNAH. Infectious disease is consulted. Will repeat blood cx today. 5/9/22 No acute events overnight. The patient reports lower back pain is not well controlled with current pain regimen, will adjust. Repeat blood cx are still positive. Sensitivities are back and Staph is sensitive to oxacillin, will D/C vanc/cefepime. Cardiology consulted and plans for a SUSANNAH today. CT surgery following and recommends continuing medical management with appropriate ABX, no surgical intervention at this time. MRI lumbar spine showed moderate to severe spinal canal stenosis with moderate left-sided neural foraminal stenosis, Neurosurgery consulted. 5/10/22 No acute events overnight. The patient reports some improvement in pain with adjustment in pain meds. Repeat blood cx are +. Continue IV oxacillin and flagyl. Infectious disease is consulted. IR consulted to evaluate possible paraspinous muscle myositis versus abscess. Recommend IR drainage. Continue current management. 5/11/22 No acute events overnight. The patient reports pain is still not well controlled at times. The patient reports that she is very anxious about the upcoming SUSANNAH and IR drainage. SUSANNAH was pushed back to this afternoon because the patient ate candy this AM. Continue treatment with IV oxacillin and flagyl. Infectious disease is following. WBC trended down to 31K. Continue current management. 5/12/22 No acute events overnight. The patients WBC improved to 22K overnight. The patient remains on continuous oxacillin infusion. Infectious disease is following. MRI thoracic spine is  ordered and pending. The patients SUSANNAH was postponed again today d/t low H/H. Hgb was noted to be 6.2 this morning, will transfuse 1 unit PRBC. The patient reports pain is still not well controlled. The case was discussed with Neurosurgery who recommended adding extended release pain meds. Approximately 1 ml of purulent drainage aspirated from back yesterday per IR, growing gram + cocci.     5/13: Patient was given 1U PRBC on 5/12, improved from 6.2 --> 7.0, will give an additional unit PRBC. Radiology attempted to bring patient on 5/12 at 1755 for Thoracic MRI, patient refused due to pain and anxiety. Repeat attempt this AM, patient again refused. Will order Ativan 1mg IV to be given with pain medication prior to scan to relieve anxiety and pain, nurse to notify NP when ready to go for scan. Discussed pain management with patient, discussed transition to PO pain medications to being preparing for d/c to LTAC, adjusted PO pain regimen will reevaluate to determine effectiveness. Repeat Blood cultures 5/11/22: NGTD.     5/14: Final anaerobic cultures pending, continue to adjust pain regimen, d/c IV Dilaudid today. Patient currently managed with PO Morphine extended release and PO oxycodone. D/C plan is for LTAC once final antibiotic regimen determined. Patient continues to refuse MRI of thoracic spine.     5/15: Called to room this AM, patient had coughed up blood and mucous. Approx. 1-2 teaspoons of blood in emesis bag, ordered CXR and repeat CBC, CXR showed improvement from previous day, repeat CBC at time of incident, showed a slight decrease in Hgb: 9.7 --> 8.7, when repeated at 1500, Hgb had improved to 10. no further episodes of coughing up blood throughout day. Patient continues to c/o not having enough pain medication but continues to sleep most of the day and will fall asleep when speaking at times. Added IV Toradol to plan.     5/16: No further episodes of coughing blood overnight, patient participated with  encouragement with PT/OT today, recommendations are HH vs. SNF pending progress. Awaiting final antibiotic regimen recommendations. Anaerobic culture results finalized: negative for growth. Afebrile, WBC trending downward.     5/17: Antibiotic regimen per ID is continuous oxacillin until 7/11/22, SW notified of plan, will seek acceptance at LTAC or SNF. Patient choice is [facility]. WBC in normal range. AMS this AM, CT of head: no acute findings. Given narcan and mental status improved. Decreased narcotic dose.     5/18: Per discussion with patient, she is willing to have the MRI of thoracic spine today, will order IV pain medication and anxiety medication prior to scan. Nursing and Radiology aware of needing to premedicate for scan. Patient is more cooperative and participating with care.     5/19: Patient given premedications for MRI, MRI completed, results pending. Patient more agreeable with treatment plan and cooperating. Will need psychiatry follow-up after discharge. Hgb: 6.7, will give 1U PRBC    5/20/22: +LEON and generalized pain. Thoracic MRI showed no evidence for disc osteomyelitis in the thoracic spine and no epidural abscess, Loculated right hydropneumothorax and extensive bilateral pulmonary infiltrates. Ct chest showed septic emboli and moderate right pleural effusion.+splenic infarct  Pulmonology felt likely empyema- consulted IR for chest tube.     5/21/22: s/p right pig tail placement to suction/ drainage system per IR. 60ml pus removed initially. Fluid analysis consistent with empyema. Pleural fluid cultures pending. Now with scant serosanguinous output.+ right lateral chest pain at chest tube site. Denies any other complaints. Status update provided to pt's mother.     5/22/22: Repeatedly requesting IV Dilaudid. Toradol ordered. Afebrile, WBC normal, O2sats stable on 3lites. s/p right pig tail placement to suction/ drainage system per IR on 5/21/22- draining serous draiange with pus. Plan for LTAC  "placement     5/23/22: More cofortable today. Not getting OOB. Encouraged OOB and rationale provided. Pulmonology recommended Possible tPA and DNase in chest tube tomorrow    5/24/22: Examined OOB in chair. Encouraged pt to continue OOB. Plan for tPA and DNase in chest tube today per Pulmonology  Pt denied LTAC- will pursue SNF    5/25/22: Temp 101.7F last night. Will recheck blood cultures and UA. BP low - IVF bolus given and restarted IVF. ID re-consulted. 160ml of purulent CT drainage output. Dr. San plans to repeat tPA and DNase in chest tube again today.     5/26/22: temp 99.2. c/o of right "lung pain" when eating. Will consult speech to check swallow. +mild leukocytosis. Repeat BC 5/25/22 show NGTD. Repeat UA showed rare trichomas. Chest tube drainage remains purulent     5/27/22  Low grade temp. Downward trend of hemoglobin, related to chest tube drainage. Blood cultures remain negative. Urine culture growing E. Coli. Sensitivities are pending.     5/28/22  Urine culture returned pansenstiive. Will continue ciprofloxacin for total of 3 days then discontinue. Still awaiting SNF placement.    5/29/22 culture of pleural fluid from 5/28/22 negative. Continue CT for now.     5/30/22  CT removed yesterday by pulmonology. SNF declines. Will explore other options for IV abx with help from ID.     5/31/22  Patient has been accepted to [facility]. Will transfer today. Will follow up with pulmonology and ID at discharge. She will continue oxacillin for management of bacteremia, septic embolic, and endocarditis.  Addendum: COVID negative transfer delayed.     6/1/22  COVID positive started on remdesivir. Tmax 101.3. repeat CT scan shows evolving septic embolic/necrotizing pneumonia. Pulmonlogy recommends reconsulting CVT. ID following. Awaiting insurance approval for [facility]. Decline in hemoglobin 7.4, hold on transfusion at this time.   6/2:  CVT consult on case.  Plan for VATS procedure on Monday given patient's " COVID status.  Continue remdesivir.  Continue oxacillin.  6/3: VATS planned for Monday. Will transfuse 1 unit prbc. No further hemoptysis noted. Cont oxacillin.   6/4:  Vats procedure tomorrow.  H&H improved status post 1 unit packed RBC.  NPO midnight.  Continue oxacillin.      Interval History: Vats procedure Monday.  H&H improved status post 1 unit packed RBC.  NPO midnight tomorrow night.  Continue oxacillin.    No acute issues reported overnight.     Review of Systems   Constitutional:  Positive for activity change, appetite change and fatigue. Negative for chills, diaphoresis, fever and unexpected weight change.   HENT:  Negative for congestion, hearing loss, mouth sores, postnasal drip, rhinorrhea, sore throat and trouble swallowing.    Eyes:  Negative for discharge and visual disturbance.   Respiratory:  Negative for cough and chest tightness.    Cardiovascular:  Negative for chest pain, palpitations and leg swelling.   Gastrointestinal:  Negative for blood in stool, constipation, diarrhea, nausea and vomiting.   Endocrine: Negative for cold intolerance and heat intolerance.   Genitourinary:  Negative for difficulty urinating, dyspareunia, flank pain and hematuria.   Musculoskeletal:  Positive for back pain and myalgias. Negative for arthralgias.   Skin: Negative.    Neurological:  Positive for weakness. Negative for dizziness and light-headedness.   Hematological:  Negative for adenopathy. Does not bruise/bleed easily.   Psychiatric/Behavioral:  Negative for agitation, behavioral problems, confusion and sleep disturbance. The patient is nervous/anxious.    Objective:     Vital Signs (Most Recent):  Temp: 98.3 °F (36.8 °C) (06/04/22 0737)  Pulse: 72 (06/04/22 0831)  Resp: 18 (06/04/22 0831)  BP: 132/73 (06/04/22 0737)  SpO2: 96 % (06/04/22 0831) Vital Signs (24h Range):  Temp:  [97.3 °F (36.3 °C)-99.6 °F (37.6 °C)] 98.3 °F (36.8 °C)  Pulse:  [70-93] 72  Resp:  [16-20] 18  SpO2:  [96 %-100 %] 96 %  BP:  (130-179)/() 132/73     Weight: 97.5 kg (214 lb 15.2 oz)  Body mass index is 35.77 kg/m².    Intake/Output Summary (Last 24 hours) at 6/4/2022 1021  Last data filed at 6/4/2022 0427  Gross per 24 hour   Intake 756 ml   Output 5200 ml   Net -4444 ml      Physical Exam  Vitals and nursing note reviewed.   Constitutional:       General: She is not in acute distress.     Appearance: She is well-developed. She is ill-appearing.   HENT:      Head: Normocephalic and atraumatic.      Nose: Nose normal.   Eyes:      Extraocular Movements: Extraocular movements intact.   Cardiovascular:      Rate and Rhythm: Normal rate and regular rhythm.   Pulmonary:      Effort: Pulmonary effort is normal.      Breath sounds: No stridor. Rhonchi present.      Comments: Right-side  Pigtail removed  Abdominal:      General: There is no distension.   Musculoskeletal:         General: No signs of injury.      Cervical back: Normal range of motion and neck supple.   Skin:     General: Skin is dry.   Neurological:      General: No focal deficit present.      Mental Status: She is alert and oriented to person, place, and time. Mental status is at baseline.   Psychiatric:         Behavior: Behavior normal.       Significant Labs: All pertinent labs within the past 24 hours have been reviewed.    Significant Imaging: I have reviewed all pertinent imaging results/findings within the past 24 hours.        Assessment/Plan:      * Acute bacterial endocarditis  ECHO showed a 1.3 x 1.5 cm elongated, mobile echodensity seen on the tricuspid valve consistent with vegetation, CT surgery was consulted.   Due to MSSA    Cont oxacillin  ID consulted on case           BEE (acute kidney injury)  Creatinine trending up  Cont to monitor  Will consider nephro consult   Monitor UOP    COVID-19  - COVID-19 testing   - Infection Control notified     - Isolation:   - Airborne, Contact and Droplet Precautions  - Cohort patients into COVID units  - N95 mask, wear  eye protection  - 20 second hand hygiene              - Limit visitors per hospital policy              - Consolidating lab draws, nursing care, provider visits, and interventions    - Diagnostics: (leukopenia, hyponatremia, hyperferritinemia, elevated troponin, elevated d-dimer, age, and comorbidities are significant predictors of poor clinical outcome)  CBC, CMP, Ferritin, CRP and Portable CXR    - Management:  Supplemental O2 to maintain SpO2 >92%  Telemetry  Continuous/intermittent Pulse Ox  Albuterol treatment PRN  Remdesivir added    Advance Care Planning   Patient is a full code.         Cont supportive care  remdesivir       Empyema of right pleural space  Pulmonology consulted and felt likely empyema   IR consulted for chest tube      s/p right pigtail small bore chest tube placement to suction/drainage system per IR on 5/21/22. 60ml pus removed. Now with Serous drainage with pus. Fluid analysis consistent with empyema. Pleural fluid aerobic culture shows NGTD, Anaerobic culture pending    5/24/22: Dr. San plans for tPA and DNase in chest tube today.   5/25/22: 160ml of purulent CT drainage output. Dr. San plans to repeat tPA and DNase in chest tube again today.   5/26/22  Still has purulent drainage noted. Pulmonology following    6/4:  Worsening empyema noted  CTS consult on case  Plan for VATS procedure on Monday    Normocytic anemia  Secondary to acute illness    --Daily CBC  --Transfuse with 1 unit PRBC for Hgb <7.0 or <8.0 if symptomatic   --Hgb: 6.7, give 1U PRBC    5/27/22  Hemoglobin 7.3g/dL. downward trend. Has blood tinged seropurulent drainage. Will give additional unit if continues to trend down.  5/28/22  Hemoglobin 8.0 today    6/4:  H/h stable post 1 unit prbc  Cont to monitor     Pulmonary embolism, septic  6/4/22  CT shows evolving septic emboli and necrotizing pneumonia. CVT has been re-consulted.  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 IR consulted to evaluate possible paraspinous muscle myositis versus abscess.    --Approximately 1 ml of purulent drainage aspirated 5/11 per IR, growing MSSA  --Continue current regimen  --Oxacillin x 4 weeks total    Chronic pulmonary heart disease  - Pulmonology following       MSSA bacteremia  5/15: Blood cultures 5/11/22: NGTD     Continuous Oxacillin, EOC: 7/11/22, pending acceptance at facility, PICC line placed    6/4:  Cont on oxacillin  ID on case   Repeat blood cultures negative     IVDU (intravenous drug user)   Will ordet TTE to r/o IE  Will order MRI lumbar wwo- completed   Cont broad spectrum IVAB   --MRI thoracic spine ordered per ID, patient refused on multiple attempts    5/7   Studies pending for additional sites of infection given h/o ivdu  5/8/22  on cessation     Severe sepsis  This patient does have evidence of infective focus  My overall impression is sepsis. Vital signs were reviewed and noted in progress note.  Antibiotics given-   Antibiotics (From admission, onward)            Start     Stop Route Frequency Ordered    05/09/22 1100  oxacillin 12 g in  mL CONTINUOUS INFUSION         -- IV Every 24 hours (non-standard times) 05/09/22 0932    05/07/22 2100  mupirocin 2 % ointment         05/12 2059 Nasl 2 times daily 05/07/22 1646        Cultures were taken-   Microbiology Results (last 7 days)     Procedure Component Value Units Date/Time    Culture, Respiratory with Gram Stain [[id number]] Collected: 05/16/22 0758    Order Status: Sent Specimen: Respiratory from Sputum Updated: 05/16/22 0759    Culture, Anaerobe [[id number]] Collected: 05/11/22 1440    Order Status: Completed Specimen: Abscess from Back Updated: 05/16/22 0733     Anaerobic Culture No anaerobes isolated    Blood culture [[id number]] Collected: 05/11/22 1213    Order Status: Completed Specimen: Blood Updated: 05/16/22 0612     Blood Culture, Routine No Growth to date       No Growth to date      No Growth to date      No Growth to date      No Growth to date    Blood culture [[id number]] Collected: 05/11/22 1213    Order Status: Completed Specimen: Blood Updated: 05/16/22 0612     Blood Culture, Routine No Growth to date      No Growth to date      No Growth to date      No Growth to date      No Growth to date    Aerobic culture [[id number]]  (Abnormal)  (Susceptibility) Collected: 05/11/22 1440    Order Status: Completed Specimen: Abscess from Back Updated: 05/14/22 1057     Aerobic Bacterial Culture STAPHYLOCOCCUS AUREUS  Many      Gram stain [[id number]] Collected: 05/11/22 1440    Order Status: Completed Specimen: Abscess from Back Updated: 05/12/22 0306     Gram Stain Result Few WBC's      Few Gram positive cocci    Gram stain [[id number]]     Order Status: Canceled Specimen: Joint Fluid from Back     Blood culture [[id number]]  (Abnormal) Collected: 05/08/22 1516    Order Status: Completed Specimen: Blood from Peripheral, Right Hand Updated: 05/11/22 1009     Blood Culture, Routine Gram stain aer bottle: Gram positive cocci in clusters resembling Staph       Results called to and read back by: Chasity Raymundo RN  05/09/2022  11:31      STAPHYLOCOCCUS AUREUS  ID consult required at [facility],[place] and [facility].  For susceptibility see order #[id number]      Narrative:      Collection has been rescheduled by [email or web address] at 05/08/2022 13:22 Reason:   Pt in mri will be there after another hour [id number]  Collection has been rescheduled by [email or web address] at 05/08/2022 13:22 Reason:   Pt in mri will be there after another hour [id number]    Blood culture [[id number]]  (Abnormal) Collected: 05/08/22 1515    Order Status: Completed Specimen: Blood from Peripheral, Left Arm Updated: 05/11/22 1009     Blood Culture, Routine Gram stain aer bottle: Gram positive cocci in clusters resembling Staph      Results called to and read back by: Chasity Raymundo RN  05/09/2022  15:40      STAPHYLOCOCCUS AUREUS  ID  consult required at [facility],[place] and [facility].  For susceptibility see order #[id number]      Narrative:      Collection has been rescheduled by [email or web address] at 05/08/2022 13:22 Reason:   Pt in mri will be there after another hour [id number]  Collection has been rescheduled by [email or web address] at 05/08/2022 13:22 Reason:   Pt in mri will be there after another hour [id number]        Latest lactate reviewed, they are-  No results for input(s): LACTATE in the last 72 hours.    Organ dysfunction indicated by Acute kidney injury  Source-  lung    Source control Achieved by-   Cont Broad spectrum IVAB     Bacteremia  Infectious disease consulted      COPD (chronic obstructive pulmonary disease)  Cont breathing tx prn  Pulmonology following       VTE Risk Mitigation (From admission, onward)         Ordered     heparin 25,000 units in dextrose 5% (100 units/ml) IV bolus from bag - ADDITIONAL PRN BOLUS - 60 units/kg  As needed (PRN)        Question:  Heparin Infusion Adjustment (DO NOT MODIFY ANSWER)  Answer:  \\[email or web address]\epic\Images\Pharmacy\HeparinInfusions\heparin LOW INTENSITY nomogram for [facility] [id number]    06/04/22 0824     heparin 25,000 units in dextrose 5% (100 units/ml) IV bolus from bag - ADDITIONAL PRN BOLUS - 30 units/kg  As needed (PRN)        Question:  Heparin Infusion Adjustment (DO NOT MODIFY ANSWER)  Answer:  [email or web address]\epic\Images\Pharmacy\HeparinInfusions\heparin LOW INTENSITY nomogram for [facility] [id number]    06/04/22 0824     heparin 25,000 units in dextrose 5% 250 mL (100 units/mL) infusion LOW INTENSITY nomogram - [facility]  Continuous        Question Answer Comment   Heparin Infusion Adjustment (DO NOT MODIFY ANSWER) \\[email or web address]\epic\Images\Pharmacy\HeparinInfusions\heparin LOW INTENSITY nomogram for [facility] [id number].pdf    Begin at (in units/kg/hr) 12        06/04/22 0825     IP VTE HIGH RISK PATIENT  Once         05/06/22 2336     Place sequential compression device  Until discontinued         05/06/22 2336                 Discharge Planning   YESSICA: 5/31/2022     Code Status: Full Code   Is the patient medically ready for discharge?:     Reason for patient still in hospital (select all that apply): Patient trending condition  Discharge Plan A: Skilled Nursing Facility   Discharge Delays: None known at this time              Delmer Guzmán MD  Department of Hospital Medicine   [facility] ([facility])

## 2022-06-04 NOTE — ASSESSMENT & PLAN NOTE
- COVID-19 testing   - Infection Control notified     - Isolation:   - Airborne, Contact and Droplet Precautions  - Cohort patients into COVID units  - N95 mask, wear eye protection  - 20 second hand hygiene              - Limit visitors per hospital policy              - Consolidating lab draws, nursing care, provider visits, and interventions    - Diagnostics: (leukopenia, hyponatremia, hyperferritinemia, elevated troponin, elevated d-dimer, age, and comorbidities are significant predictors of poor clinical outcome)  CBC, CMP, Ferritin, CRP and Portable CXR    - Management:  Supplemental O2 to maintain SpO2 >92%  Telemetry  Continuous/intermittent Pulse Ox  Albuterol treatment PRN  Remdesivir added    Advance Care Planning   Patient is a full code.          Cont supportive care  remdesivir

## 2022-06-04 NOTE — PLAN OF CARE
Problem: Adult Inpatient Plan of Care  Goal: Plan of Care Review  Outcome: Ongoing, Progressing  Goal: Optimal Comfort and Wellbeing  Outcome: Ongoing, Progressing     Problem: Skin Injury Risk Increased  Goal: Skin Health and Integrity  Outcome: Ongoing, Progressing     Problem: Adjustment to Illness (Sepsis/Septic Shock)  Goal: Optimal Coping  Outcome: Ongoing, Progressing     Problem: Infection Progression (Sepsis/Septic Shock)  Goal: Absence of Infection Signs and Symptoms  Outcome: Ongoing, Progressing     Problem: Infection (Pneumonia)  Goal: Resolution of Infection Signs and Symptoms  Outcome: Ongoing, Progressing    Pt complains of pain, refusing to get out of bed because pain isnt controlled , x1 morphine does given, weight shifting in bed, bed bath given. Hep gtt started, 1st aptt therapeutic. Pending VATS for monday

## 2022-06-04 NOTE — SUBJECTIVE & OBJECTIVE
Interval History: Vats procedure tomorrow.  H&H improved status post 1 unit packed RBC.  NPO midnight.  Continue oxacillin.    No acute issues reported overnight.     Review of Systems   Constitutional:  Positive for activity change, appetite change and fatigue. Negative for chills, diaphoresis, fever and unexpected weight change.   HENT:  Negative for congestion, hearing loss, mouth sores, postnasal drip, rhinorrhea, sore throat and trouble swallowing.    Eyes:  Negative for discharge and visual disturbance.   Respiratory:  Negative for cough and chest tightness.    Cardiovascular:  Negative for chest pain, palpitations and leg swelling.   Gastrointestinal:  Negative for blood in stool, constipation, diarrhea, nausea and vomiting.   Endocrine: Negative for cold intolerance and heat intolerance.   Genitourinary:  Negative for difficulty urinating, dyspareunia, flank pain and hematuria.   Musculoskeletal:  Positive for back pain and myalgias. Negative for arthralgias.   Skin: Negative.    Neurological:  Positive for weakness. Negative for dizziness and light-headedness.   Hematological:  Negative for adenopathy. Does not bruise/bleed easily.   Psychiatric/Behavioral:  Negative for agitation, behavioral problems, confusion and sleep disturbance. The patient is nervous/anxious.    Objective:     Vital Signs (Most Recent):  Temp: 98.3 °F (36.8 °C) (06/04/22 0737)  Pulse: 72 (06/04/22 0831)  Resp: 18 (06/04/22 0831)  BP: 132/73 (06/04/22 0737)  SpO2: 96 % (06/04/22 0831) Vital Signs (24h Range):  Temp:  [97.3 °F (36.3 °C)-99.6 °F (37.6 °C)] 98.3 °F (36.8 °C)  Pulse:  [70-93] 72  Resp:  [16-20] 18  SpO2:  [96 %-100 %] 96 %  BP: (130-179)/() 132/73     Weight: 97.5 kg (214 lb 15.2 oz)  Body mass index is 35.77 kg/m².    Intake/Output Summary (Last 24 hours) at 6/4/2022 1021  Last data filed at 6/4/2022 0427  Gross per 24 hour   Intake 756 ml   Output 5200 ml   Net -4444 ml      Physical Exam  Vitals and nursing note  reviewed.   Constitutional:       General: She is not in acute distress.     Appearance: She is well-developed. She is ill-appearing.   HENT:      Head: Normocephalic and atraumatic.      Nose: Nose normal.   Eyes:      Extraocular Movements: Extraocular movements intact.   Cardiovascular:      Rate and Rhythm: Normal rate and regular rhythm.   Pulmonary:      Effort: Pulmonary effort is normal.      Breath sounds: No stridor. Rhonchi present.      Comments: Right-side  Pigtail removed  Abdominal:      General: There is no distension.   Musculoskeletal:         General: No signs of injury.      Cervical back: Normal range of motion and neck supple.   Skin:     General: Skin is dry.   Neurological:      General: No focal deficit present.      Mental Status: She is alert and oriented to person, place, and time. Mental status is at baseline.   Psychiatric:         Behavior: Behavior normal.       Significant Labs: All pertinent labs within the past 24 hours have been reviewed.    Significant Imaging: I have reviewed all pertinent imaging results/findings within the past 24 hours.

## 2022-06-04 NOTE — ASSESSMENT & PLAN NOTE
6/4/22  CT shows evolving septic emboli and necrotizing pneumonia. CVT has been re-consulted.  Vats procedure planned for Monday

## 2022-06-04 NOTE — ASSESSMENT & PLAN NOTE
ECHO showed a 1.3 x 1.5 cm elongated, mobile echodensity seen on the tricuspid valve consistent with vegetation, CT surgery was consulted.   Due to MSSA    Cont oxacillin  ID consulted on case

## 2022-06-05 LAB
ALBUMIN SERPL BCP-MCNC: 1.6 G/DL (ref 3.5–5.2)
ALP SERPL-CCNC: 80 U/L (ref 55–135)
ALT SERPL W/O P-5'-P-CCNC: 14 U/L (ref 10–44)
ANION GAP SERPL CALC-SCNC: 9 MMOL/L (ref 8–16)
APTT BLDCRRT: 36.1 SEC (ref 21–32)
APTT BLDCRRT: 36.4 SEC (ref 21–32)
APTT BLDCRRT: 36.7 SEC (ref 21–32)
AST SERPL-CCNC: 24 U/L (ref 10–40)
BASOPHILS # BLD AUTO: 0.02 K/UL (ref 0–0.2)
BASOPHILS # BLD AUTO: 0.02 K/UL (ref 0–0.2)
BASOPHILS # BLD AUTO: 0.03 K/UL (ref 0–0.2)
BASOPHILS NFR BLD: 0.4 % (ref 0–1.9)
BILIRUB SERPL-MCNC: 0.3 MG/DL (ref 0.1–1)
BUN SERPL-MCNC: 31 MG/DL (ref 6–20)
CALCIUM SERPL-MCNC: 7.9 MG/DL (ref 8.7–10.5)
CHLORIDE SERPL-SCNC: 106 MMOL/L (ref 95–110)
CO2 SERPL-SCNC: 25 MMOL/L (ref 23–29)
CREAT SERPL-MCNC: 1.9 MG/DL (ref 0.5–1.4)
DIFFERENTIAL METHOD: ABNORMAL
EOSINOPHIL # BLD AUTO: 0.1 K/UL (ref 0–0.5)
EOSINOPHIL NFR BLD: 1 % (ref 0–8)
EOSINOPHIL NFR BLD: 1 % (ref 0–8)
EOSINOPHIL NFR BLD: 1.2 % (ref 0–8)
ERYTHROCYTE [DISTWIDTH] IN BLOOD BY AUTOMATED COUNT: 16 % (ref 11.5–14.5)
ERYTHROCYTE [DISTWIDTH] IN BLOOD BY AUTOMATED COUNT: 16.1 % (ref 11.5–14.5)
ERYTHROCYTE [DISTWIDTH] IN BLOOD BY AUTOMATED COUNT: 16.1 % (ref 11.5–14.5)
EST. GFR  (AFRICAN AMERICAN): 38 ML/MIN/1.73 M^2
EST. GFR  (NON AFRICAN AMERICAN): 33 ML/MIN/1.73 M^2
GLUCOSE SERPL-MCNC: 102 MG/DL (ref 70–110)
HCT VFR BLD AUTO: 27.1 % (ref 37–48.5)
HCT VFR BLD AUTO: 34.2 % (ref 37–48.5)
HCT VFR BLD AUTO: 34.2 % (ref 37–48.5)
HGB BLD-MCNC: 10.9 G/DL (ref 12–16)
HGB BLD-MCNC: 10.9 G/DL (ref 12–16)
HGB BLD-MCNC: 8.6 G/DL (ref 12–16)
IMM GRANULOCYTES # BLD AUTO: 0.02 K/UL (ref 0–0.04)
IMM GRANULOCYTES # BLD AUTO: 0.02 K/UL (ref 0–0.04)
IMM GRANULOCYTES # BLD AUTO: 0.03 K/UL (ref 0–0.04)
IMM GRANULOCYTES NFR BLD AUTO: 0.4 % (ref 0–0.5)
LYMPHOCYTES # BLD AUTO: 1.6 K/UL (ref 1–4.8)
LYMPHOCYTES # BLD AUTO: 1.6 K/UL (ref 1–4.8)
LYMPHOCYTES # BLD AUTO: 2.6 K/UL (ref 1–4.8)
LYMPHOCYTES NFR BLD: 30.1 % (ref 18–48)
LYMPHOCYTES NFR BLD: 30.1 % (ref 18–48)
LYMPHOCYTES NFR BLD: 31.4 % (ref 18–48)
MCH RBC QN AUTO: 26.5 PG (ref 27–31)
MCHC RBC AUTO-ENTMCNC: 31.7 G/DL (ref 32–36)
MCHC RBC AUTO-ENTMCNC: 31.9 G/DL (ref 32–36)
MCHC RBC AUTO-ENTMCNC: 31.9 G/DL (ref 32–36)
MCV RBC AUTO: 83 FL (ref 82–98)
MONOCYTES # BLD AUTO: 0.5 K/UL (ref 0.3–1)
MONOCYTES # BLD AUTO: 0.5 K/UL (ref 0.3–1)
MONOCYTES # BLD AUTO: 0.8 K/UL (ref 0.3–1)
MONOCYTES NFR BLD: 10 % (ref 4–15)
MONOCYTES NFR BLD: 10 % (ref 4–15)
MONOCYTES NFR BLD: 9.5 % (ref 4–15)
NEUTROPHILS # BLD AUTO: 3 K/UL (ref 1.8–7.7)
NEUTROPHILS # BLD AUTO: 3 K/UL (ref 1.8–7.7)
NEUTROPHILS # BLD AUTO: 4.7 K/UL (ref 1.8–7.7)
NEUTROPHILS NFR BLD: 57.1 % (ref 38–73)
NEUTROPHILS NFR BLD: 58.1 % (ref 38–73)
NEUTROPHILS NFR BLD: 58.1 % (ref 38–73)
NRBC BLD-RTO: 0 /100 WBC
PLATELET # BLD AUTO: 228 K/UL (ref 150–450)
PLATELET # BLD AUTO: 228 K/UL (ref 150–450)
PLATELET # BLD AUTO: 285 K/UL (ref 150–450)
PMV BLD AUTO: 8.6 FL (ref 9.2–12.9)
PMV BLD AUTO: 8.7 FL (ref 9.2–12.9)
PMV BLD AUTO: 8.7 FL (ref 9.2–12.9)
POTASSIUM SERPL-SCNC: 4.2 MMOL/L (ref 3.5–5.1)
PROT SERPL-MCNC: 6 G/DL (ref 6–8.4)
RBC # BLD AUTO: 3.25 M/UL (ref 4–5.4)
RBC # BLD AUTO: 4.12 M/UL (ref 4–5.4)
RBC # BLD AUTO: 4.12 M/UL (ref 4–5.4)
SODIUM SERPL-SCNC: 140 MMOL/L (ref 136–145)
WBC # BLD AUTO: 5.21 K/UL (ref 3.9–12.7)
WBC # BLD AUTO: 5.21 K/UL (ref 3.9–12.7)
WBC # BLD AUTO: 8.21 K/UL (ref 3.9–12.7)

## 2022-06-05 PROCEDURE — 94664 DEMO&/EVAL PT USE INHALER: CPT

## 2022-06-05 PROCEDURE — 85730 THROMBOPLASTIN TIME PARTIAL: CPT | Mod: 91 | Performed by: FAMILY MEDICINE

## 2022-06-05 PROCEDURE — 25000003 PHARM REV CODE 250: Performed by: NURSE PRACTITIONER

## 2022-06-05 PROCEDURE — 21400001 HC TELEMETRY ROOM

## 2022-06-05 PROCEDURE — 85730 THROMBOPLASTIN TIME PARTIAL: CPT | Performed by: FAMILY MEDICINE

## 2022-06-05 PROCEDURE — 27000207 HC ISOLATION

## 2022-06-05 PROCEDURE — 63600175 PHARM REV CODE 636 W HCPCS: Performed by: INTERNAL MEDICINE

## 2022-06-05 PROCEDURE — 99233 SBSQ HOSP IP/OBS HIGH 50: CPT | Mod: NSCH,,, | Performed by: INTERNAL MEDICINE

## 2022-06-05 PROCEDURE — 94761 N-INVAS EAR/PLS OXIMETRY MLT: CPT

## 2022-06-05 PROCEDURE — 94640 AIRWAY INHALATION TREATMENT: CPT

## 2022-06-05 PROCEDURE — 94799 UNLISTED PULMONARY SVC/PX: CPT

## 2022-06-05 PROCEDURE — 99233 SBSQ HOSP IP/OBS HIGH 50: CPT | Mod: ,,, | Performed by: INTERNAL MEDICINE

## 2022-06-05 PROCEDURE — 25000003 PHARM REV CODE 250: Performed by: THORACIC SURGERY (CARDIOTHORACIC VASCULAR SURGERY)

## 2022-06-05 PROCEDURE — 27000221 HC OXYGEN, UP TO 24 HOURS

## 2022-06-05 PROCEDURE — 85025 COMPLETE CBC W/AUTO DIFF WBC: CPT | Mod: 91 | Performed by: INTERNAL MEDICINE

## 2022-06-05 PROCEDURE — 99900035 HC TECH TIME PER 15 MIN (STAT)

## 2022-06-05 PROCEDURE — 99233 PR SUBSEQUENT HOSPITAL CARE,LEVL III: ICD-10-PCS | Mod: ,,, | Performed by: INTERNAL MEDICINE

## 2022-06-05 PROCEDURE — 25000003 PHARM REV CODE 250: Performed by: FAMILY MEDICINE

## 2022-06-05 PROCEDURE — 80053 COMPREHEN METABOLIC PANEL: CPT | Performed by: NURSE PRACTITIONER

## 2022-06-05 PROCEDURE — 25000003 PHARM REV CODE 250: Performed by: INTERNAL MEDICINE

## 2022-06-05 PROCEDURE — 63600175 PHARM REV CODE 636 W HCPCS: Performed by: THORACIC SURGERY (CARDIOTHORACIC VASCULAR SURGERY)

## 2022-06-05 PROCEDURE — 99233 PR SUBSEQUENT HOSPITAL CARE,LEVL III: ICD-10-PCS | Mod: NSCH,,, | Performed by: INTERNAL MEDICINE

## 2022-06-05 PROCEDURE — 63600175 PHARM REV CODE 636 W HCPCS: Mod: TB | Performed by: NURSE PRACTITIONER

## 2022-06-05 RX ADMIN — THERA TABS 1 TABLET: TAB at 10:06

## 2022-06-05 RX ADMIN — MORPHINE SULFATE 30 MG: 30 TABLET, FILM COATED, EXTENDED RELEASE ORAL at 12:06

## 2022-06-05 RX ADMIN — ALBUTEROL SULFATE 2 PUFF: 90 AEROSOL, METERED RESPIRATORY (INHALATION) at 07:06

## 2022-06-05 RX ADMIN — OXYCODONE HYDROCHLORIDE 5 MG: 5 TABLET ORAL at 05:06

## 2022-06-05 RX ADMIN — OXYCODONE HYDROCHLORIDE AND ACETAMINOPHEN 500 MG: 500 TABLET ORAL at 10:06

## 2022-06-05 RX ADMIN — OXACILLIN SODIUM 12 G: 10 INJECTION, POWDER, FOR SOLUTION INTRAVENOUS at 03:06

## 2022-06-05 RX ADMIN — ALBUTEROL SULFATE 2 PUFF: 90 AEROSOL, METERED RESPIRATORY (INHALATION) at 12:06

## 2022-06-05 RX ADMIN — LORAZEPAM 1 MG: 1 TABLET ORAL at 10:06

## 2022-06-05 RX ADMIN — ONDANSETRON 4 MG: 2 INJECTION INTRAMUSCULAR; INTRAVENOUS at 05:06

## 2022-06-05 RX ADMIN — HEPARIN SODIUM 14 UNITS/KG/HR: 5000 INJECTION INTRAVENOUS; SUBCUTANEOUS at 04:06

## 2022-06-05 RX ADMIN — ONDANSETRON 4 MG: 2 INJECTION INTRAMUSCULAR; INTRAVENOUS at 12:06

## 2022-06-05 RX ADMIN — HEPARIN SODIUM 18 UNITS/KG/HR: 5000 INJECTION INTRAVENOUS; SUBCUTANEOUS at 07:06

## 2022-06-05 RX ADMIN — TRAZODONE HYDROCHLORIDE 100 MG: 100 TABLET ORAL at 10:06

## 2022-06-05 RX ADMIN — HEPARIN SODIUM 14 UNITS/KG/HR: 5000 INJECTION INTRAVENOUS; SUBCUTANEOUS at 08:06

## 2022-06-05 RX ADMIN — REMDESIVIR 100 MG: 100 INJECTION, POWDER, LYOPHILIZED, FOR SOLUTION INTRAVENOUS at 10:06

## 2022-06-05 NOTE — ASSESSMENT & PLAN NOTE
6/5/22  CT shows evolving septic emboli and necrotizing pneumonia. CVT has been re-consulted.  Vats procedure planned for Monday

## 2022-06-05 NOTE — ASSESSMENT & PLAN NOTE
6/3 avoid nephrotoxic drugs.  Non oliguric.  IV fluid.  Monitor BMP.  Monitor urine output  6/4  urine output 5 L last 24 hours.  Creatinine 2.1.  Repeat creatinine monitor urine output.  Repeat creatinine monitor urine output  06/05/2022 polyuric 5 L daily.  Discontinue IV fluid.  Creatinine 1.9 stable.

## 2022-06-05 NOTE — ASSESSMENT & PLAN NOTE
Pulmonology consulted and felt likely empyema   IR consulted for chest tube      s/p right pigtail small bore chest tube placement to suction/drainage system per IR on 5/21/22. 60ml pus removed. Now with Serous drainage with pus. Fluid analysis consistent with empyema. Pleural fluid aerobic culture shows NGTD, Anaerobic culture pending    5/24/22: Dr. San plans for tPA and DNase in chest tube today.   5/25/22: 160ml of purulent CT drainage output. Dr. San plans to repeat tPA and DNase in chest tube again today.   5/26/22  Still has purulent drainage noted. Pulmonology following    6/5:  Worsening empyema noted  CTS consult on case  Plan for VATS procedure on Monday

## 2022-06-05 NOTE — PLAN OF CARE
Patient continues to be monitored and treated.  Heart monitor in place.  Heparin infusing at 14units. Next PTT scheduled for 1009.   PICC care to right upper arm. Integrity maintained.   Therapeutic communication supported. Patient encouraged to discuss feelings. Patient also encouraged towards being involved in her care. Patient also encouraged to pursue health. Feelings of helplessness and loneliness acknowledged.   IV oxicillin continues to infuse.   Patient's skin assessed. No breakdown noted.   Will continue to monitor and treat.

## 2022-06-05 NOTE — PROGRESS NOTES
[facility] - Telemetry ([facility])  Pulmonology  Progress Note    Patient Name: Tianna Leon  MRN: [id number]  Admission Date: 5/6/2022  Hospital Length of Stay: 30 days  Code Status: Full Code  Attending Provider: Delmer Guzmán MD  Primary Care Provider: Spenser Campa MD   Principal Problem: Acute bacterial endocarditis    Subjective:     Interval History:   6/5 T-max 100.4°.  Rule output 5 L. creatinine 1.9.  On IV heparin for COVID-19  No major events overnight.  Chest x-ray reviewed.  Worsening right-sided findings.  Review of Systems   Constitutional:  Positive for malaise/fatigue and weight loss. Negative for chills and fever.   HENT:  Negative for hearing loss and nosebleeds.    Eyes:  Negative for discharge.   Respiratory:  Positive for cough, sputum production and shortness of breath.    Cardiovascular:  Positive for leg swelling. Negative for chest pain.   Gastrointestinal:  Negative for abdominal pain.   Genitourinary:  Negative for hematuria.   Musculoskeletal:  Negative for falls.   Skin:  Negative for itching.   Neurological:  Positive for weakness. Negative for speech change, seizures and loss of consciousness.   Endo/Heme/Allergies:  Negative for polydipsia. Does not bruise/bleed easily.   Psychiatric/Behavioral:  Negative for hallucinations.        Objective:     Vital Signs (Most Recent):  Temp: 97.8 °F (36.6 °C) (06/05/22 1211)  Pulse: 76 (06/05/22 1211)  Resp: 16 (06/05/22 1211)  BP: (!) 163/86 (06/05/22 1211)  SpO2: 100 % (06/05/22 1211)   Vital Signs (24h Range):  Temp:  [97.8 °F (36.6 °C)-100.2 °F (37.9 °C)] 97.8 °F (36.6 °C)  Pulse:  [72-90] 76  Resp:  [16-24] 16  SpO2:  [93 %-100 %] 100 %  BP: (131-173)/() 163/86     Weight: 94 kg (207 lb 3.7 oz)  Body mass index is 34.49 kg/m².      Intake/Output Summary (Last 24 hours) at 6/5/2022 1212  Last data filed at 6/5/2022 0631  Gross per 24 hour   Intake 7106.85 ml   Output 3750 ml   Net 3356.85 ml         Physical Exam  Vitals and nursing note  reviewed.   Constitutional:       General: She is not in acute distress.     Appearance: She is well-developed. She is ill-appearing.   HENT:      Head: Normocephalic and atraumatic.      Nose: Nose normal.   Eyes:      Extraocular Movements: Extraocular movements intact.   Cardiovascular:      Rate and Rhythm: Normal rate and regular rhythm.   Pulmonary:      Effort: Pulmonary effort is normal.      Breath sounds: No stridor. Rhonchi present.   Abdominal:      General: There is no distension.   Musculoskeletal:         General: No signs of injury.      Cervical back: Normal range of motion and neck supple.   Skin:     General: Skin is dry.   Neurological:      General: No focal deficit present.      Mental Status: She is alert and oriented to person, place, and time. Mental status is at baseline.       Vents:  Oxygen Concentration (%): 24 (06/04/22 0831)    Lines/Drains/Airways       Peripherally Inserted Central Catheter Line  Duration             PICC Double Lumen 05/17/22 1612 right basilic 18 days              Drain  Duration             Female External Urinary Catheter 05/21/22 0700 15 days                    Significant Labs:    CBC/Anemia Profile:  Recent Labs   Lab 06/04/22  0931 06/05/22  0055 06/05/22  1006   WBC 6.23 8.21 5.21  5.21   HGB 7.7* 8.6* 10.9*  10.9*   HCT 24.1* 27.1* 34.2*  34.2*    285 228  228   MCV 84 83 83  83   RDW 16.1* 16.0* 16.1*  16.1*          Chemistries:  Recent Labs   Lab 06/03/22  1532 06/05/22  1006    140   K 4.5 4.2    106   CO2 24 25   BUN 31* 31*   CREATININE 2.1* 1.9*   CALCIUM 7.7* 7.9*   ALBUMIN 1.6* 1.6*   PROT 6.0 6.0   BILITOT 0.2 0.3   ALKPHOS 75 80   ALT 11 14   AST 17 24      Latest Reference Range & Units 05/28/22 11:15   Fluid Color  Yellow   Fluid Appearance  Clear   WBC, Body Fluid /cu mm 3065   Body Fluid Type  Pleural Fluid, Left   Segs, Fluid % 41   Lymphs, Fluid % 14   Monocytes/Macrophages, Fluid % 22   Eos, Fluid % 23   Glucose,  Fluid Not established mg/dL 102   LD, Fluid Not established U/L 241   Body Fluid Source, Glucose  Pleural Fluid, Left   Body Fluid Source, LDH  Pleural Fluid, Left   Body Fluid Source, Total Protein  Pleural Fluid, Left   Body Fluid, Protein Not established g/dL 2.6      Latest Reference Range & Units 05/07/22 09:05 05/21/22 10:22   Fluid Appearance  Hazy Cloudy   WBC, Body Fluid /cu mm 20223 [1] 607553 (C) [2]   Body Fluid Type  Pleural Fluid, Right Pleural Fluid, Right   Segs, Fluid % 93 88   Lymphs, Fluid % 3 7   Monocytes/Macrophages, Fluid % 4 5   Amylase, Fluid Not established U/L 34 [3]    Glucose, Fluid Not established mg/dL 29 [4]    LD, Fluid Not established U/L 1077 [5]    Body Fluid, Albumin See text g/dL 1.3 [6]    Body Fluid Source Amylase  Pleural Fluid, Right    Body Fluid Source, Albumin  Pleural Fluid, Right    Body Fluid Source, Glucose  Pleural Fluid, Right    Body Fluid Source, LDH  Pleural Fluid, Right    Body Fluid Source, Total Protein  Pleural Fluid, Right    Body Fluid, Protein Not established g/dL 3.7 [7]    Cholesterol, Body Fluid See Comment mg/dL 79 [8]          Significant Imaging:     Chest x-ray 06/05/2022    CLINICAL HISTORY:  Pneumonia.  Follow-up., Pre Op; to be done on sunday 6/5/22 per dr dee;     COMPARISON:  05/30/2022 chest x-ray     FINDINGS:  A right PICC line is present.  The cardiac size is enlarged     There are patchy areas of infiltrate.  There is greater consolidation at the right lung base as compared to the previous exam.     Impression:     Bilateral infiltrates with worsening consolidation right lower lobe              CT chest without contrast 06/01/2022    Narrative & Impression  EXAMINATION:  CT CHEST WITHOUT CONTRAST     CLINICAL HISTORY:  Pneumonia, unresolved;     TECHNIQUE:  Low-dose axial images acquired from the chest without the use of intravenous contrast.  Sagittal and coronal reformats, as well as axial MIPS were generated for further review.  All  CT scans at this location are performed using dose modulation techniques as appropriate to a performed exam including the following: Automated exposure control; adjustment of the mA and/or kV according to patient size (this includes techniques or standardized protocols for targeted exams where dose is matched to indication/reason for exam; i. e. extremities or head); use of iterative reconstruction technique.     COMPARISON:  Chest radiograph 05/30/2022.  CT chest 05/20/2022.     FINDINGS:  Base of Neck: No significant abnormality. Right-sided central venous catheter with tip terminating at the distal SVC.     Aorta: Nonaneurysmal without significant atherosclerotic calcification.     Heart/pericardium: Upper limits normal size heart without significant pericardial fluid.  No significant coronary calcification.     Esha/Mediastinum: Few upper limits normal size mediastinal lymph nodes.     Upper Abdomen: No acute abnormality of the partially imaged upper abdomen.     Thoracic soft tissues: Anasarca.     Bones: No acute fracture. No suspicious lytic or sclerotic lesion.     Airways: Patent.     Lungs/pleura: Continued evolution of the septic emboli with multiple cavitary nodules scattered throughout the lungs.  There are well-defined air-fluid collections in posterior aspect of the right upper and lower lobes measuring 4.5 x 9.5 cm and 5.5 x 10.0 cm, respectively.  Alternatively, this could represent loculated empyema.  These 2 collections appear to communicate.  Overall appearance of the chest is similar when compared to the recent prior CT examination.  Findings concerning for necrotizing pneumonia, as previously noted.  There is a medium sized left-sided pleural effusion with associated volume loss.     Impression:     Evolving findings concerning for septic emboli and necrotizing pneumonia in the posterior aspect of the right lung versus empyema, as described above.                         CT chest without  contrast 05/20/2022    Narrative & Impression  EXAMINATION:  CT CHEST WITHOUT CONTRAST     CLINICAL HISTORY:  Empyema;     TECHNIQUE:  Low dose axial images, sagittal and coronal reformations were obtained from the thoracic inlet to the lung bases. Contrast was not administered.  All CT scans at this facility are performed using dose optimization techniques including the following: automated exposure control; adjustment of the mA and/or kV; use of iterative reconstruction technique.     COMPARISON:  CTA chest non coronary 05/06/2022, chest radiograph 05/20/2022, MRI thoracic spine 05/19/2022     FINDINGS:  Base of Neck: There is diffuse body wall edema.  Unremarkable, noting the presence of AA PICC line which terminates at the superior cavoatrial junction.     Thoracic soft tissues: There is diffuse body wall edema.     Aorta: Left-sided aortic arch.  No aneurysm and no significant atherosclerosis     Heart: Normal size. No effusion.  No significant coronary atherosclerosis.     Pulmonary vasculature: Pulmonary arteries distribute normally.     Esha/Mediastinum: There are few prominent mediastinal lymph nodes with a prevascular node measuring 0.8 cm in short axis.     Airways: Patent.     Lungs/Pleura: There are multiple bilateral consolidative densities scattered throughout the lungs, many of which demonstrate cavitation, again suggesting septic emboli.  There is small left pleural fluid, which appears simple and layers dependently.  There is a loculated moderate right pleural effusion, which appears similar in size to the comparison exam however now containing air, which may be due to empyema.  There is adjacent atelectatic change within the bilateral lower lobes.  Overall number and size of consolidative densities has increased since the comparison exam.     Esophagus: Normal.     Upper Abdomen: There is persistent wedge-shaped hypodensity within the spleen, suggesting infarct.     Bones: No acute fracture. No  suspicious lytic or sclerotic lesions.     Impression:     Interval progression with increased number and size of consolidative cavitary densities throughout the lungs, most suggestive of septic emboli with larger opacities concerning for necrotizing pneumonia.     Stable size of loculated right moderate pleural effusion, now containing air compatible with hydropneumothorax as reported on prior MRI.  Interval development of air is concerning for empyema noting limited evaluation without intravenous contrast.  New small simple appearing left pleural effusion.     Wedge-shaped splenic hypodensity, suggesting infarction.     Anasarca.           ABG  No results for input(s): PH, PO2, PCO2, HCO3, BE in the last 168 hours.  Assessment/Plan:     BEE (acute kidney injury)  6/3 avoid nephrotoxic drugs.  Non oliguric.  IV fluid.  Monitor BMP.  Monitor urine output  6/4  urine output 5 L last 24 hours.  Creatinine 2.1.  Repeat creatinine monitor urine output.  Repeat creatinine monitor urine output  06/05/2022 polyuric 5 L daily.  Discontinue IV fluid.  Creatinine 1.9 stable.    COVID-19  6/1 - Isolation:   - Airborne, Contact and Droplet Precautions  - Cohort patients into COVID units  - N95 masks must be fit tested, wear eye protection  - 20 second hand hygiene              - Limit visitors per hospital policy              - Consolidating lab draws, nursing care, provider visits, and interventions  IV remdesivir ID follow-up  6/2 same  6/3 on therapeutic Lovenox and IV remdesivir  6/4 IV remdesivir  6/5 completed IV remdesivir.  Isolation precautions.  On IV heparin for thromboembolism prophylaxis.  Monitor PTT.  Discontinue 4 hours prior to VATS    Empyema of right pleural space  5/20 Suspect empyema, needs chest tube placement  5/21 s/p chest tube placement, adequate expansion - will check Chest X Ray [facility]  5/23 tPA DNase.  5/24 tPA for DNase installation via chest tube.  Monitor chest tube output.  Repeat chest x-ray in  a.m..  5/25 chest tube output increased from 50 mL on average per 24 hours to 160 mL last 24 hours.  Purulent drainage noted.  Will repeat tPA plus Dnase instillation.  5/26 chest x-ray reviewed.  Continue IV oxacillin.  Right-sided pigtail in place.  Status post tPA and DNase x2.  Bloody drainage around pigtail today status post tPA and DNase yesterday.  Will hold on fibrinolysis.  Monitor chest tube output  5/27 right pigtail in place.  225 mL  Last 24 hours average of 200 mL per day.  Change pleurovac canister to better assess the color of the fluid pus versus serosanguineous.  Keep pigtail in place for now.  Ultrasound bilateral chest assess pleural effusions.  Continue oxacillin drip.  5/28 keep chest tube on the right side for now.  Repeat CRP.  Checking chemistry microbiology  on left-sided pleural fluid to rule out complicated effusion and need for pigtail.  Continue oxacillin drip.   5/29 minimal output right chest tube.  Remove chest tube.  Left-sided effusion not complicated not empyema.  Continue IV oxacillin    6/1 evolving empyema/necrosis on right lung on CT scan of the chest 06/01/2022.  Continue IV oxacillin.  Re-consult cardiothoracic surgery.  6/2 /treat COVID.  Cardiothoracic surgery input noted.  VATS procedure will be delayed till Monday next week.  Patient will be scheduled for VATS on 06/06/2022      6/3 VATS 06/06/2022 treating COVID.  06/04/2022 IV oxacillin.  VATS Monday as per CT surgery  6/5 worsening infiltrate on the right on chest x-ray today.  Plan for VATS as per CT surgery in a.m..      MSSA bacteremia  +ve BC  IVDU  Cont OXACILLIN FLAGYL  5/23 IV oxacillin.  6/5 8 weeks of IV oxacillin drip    COPD (chronic obstructive pulmonary disease)  Oxygen  Keep SP2> 92%  Bronchodilators  Follow up in Pulmonary for PFT to clarify Dx  5/23 O2 target sat 92-94%.  Nebs p.r.n.  5/26 O2 target sat at home not her% nebs p.r.n..  Smoking cessation   5/28 continue oxygen nebs albuterol Atrovent  p.r.n.  6/1 albuterol Atrovent p.r.n.  6/2 albuterol Atrovent p.r.n..  O2 target sat 92-94%.  6/5 albuterol Atrovent p.r.n..  Oxygen keep O2 sat 92 this 94%.  Treat empyema septic emboli MSSA with IV oxacillin josh San MD  Pulmonology  [facility] - Telemetry ([facility])

## 2022-06-05 NOTE — SUBJECTIVE & OBJECTIVE
Interval History: VATS procedure tomorrow.  H&H stable.  NPO at midnight.    Patient complaining of pain.  Otherwise denies any other issues.    Review of Systems   Constitutional:  Positive for activity change, appetite change and fatigue. Negative for chills, diaphoresis, fever and unexpected weight change.   HENT:  Negative for congestion, hearing loss, mouth sores, postnasal drip, rhinorrhea, sore throat and trouble swallowing.    Eyes:  Negative for discharge and visual disturbance.   Respiratory:  Negative for cough and chest tightness.    Cardiovascular:  Negative for chest pain, palpitations and leg swelling.   Gastrointestinal:  Negative for blood in stool, constipation, diarrhea, nausea and vomiting.   Endocrine: Negative for cold intolerance and heat intolerance.   Genitourinary:  Negative for difficulty urinating, dyspareunia, flank pain and hematuria.   Musculoskeletal:  Positive for back pain and myalgias. Negative for arthralgias.   Skin: Negative.    Neurological:  Positive for weakness. Negative for dizziness and light-headedness.   Hematological:  Negative for adenopathy. Does not bruise/bleed easily.   Psychiatric/Behavioral:  Negative for agitation, behavioral problems, confusion and sleep disturbance. The patient is nervous/anxious.    Objective:     Vital Signs (Most Recent):  Temp: 98.1 °F (36.7 °C) (06/05/22 0728)  Pulse: 80 (06/05/22 0737)  Resp: 16 (06/05/22 0737)  BP: (!) 141/79 (06/05/22 0728)  SpO2: (!) 94 % (06/05/22 0737)   Vital Signs (24h Range):  Temp:  [98.1 °F (36.7 °C)-100.2 °F (37.9 °C)] 98.1 °F (36.7 °C)  Pulse:  [70-90] 80  Resp:  [2-24] 16  SpO2:  [93 %-98 %] 94 %  BP: (131-173)/() 141/79     Weight: 94 kg (207 lb 3.7 oz)  Body mass index is 34.49 kg/m².    Intake/Output Summary (Last 24 hours) at 6/5/2022 0940  Last data filed at 6/5/2022 0631  Gross per 24 hour   Intake 7226.85 ml   Output 4550 ml   Net 2676.85 ml      Physical Exam  Vitals and nursing note reviewed.    Constitutional:       General: She is not in acute distress.     Appearance: She is well-developed. She is ill-appearing.   HENT:      Head: Normocephalic and atraumatic.      Nose: Nose normal.   Eyes:      Extraocular Movements: Extraocular movements intact.   Cardiovascular:      Rate and Rhythm: Normal rate and regular rhythm.   Pulmonary:      Effort: Pulmonary effort is normal.      Breath sounds: No stridor. Rhonchi present.      Comments: Right-side  Pigtail removed  Abdominal:      General: There is no distension.   Musculoskeletal:         General: No signs of injury.      Cervical back: Normal range of motion and neck supple.   Skin:     General: Skin is dry.   Neurological:      General: No focal deficit present.      Mental Status: She is alert and oriented to person, place, and time. Mental status is at baseline.   Psychiatric:         Behavior: Behavior normal.       Significant Labs: All pertinent labs within the past 24 hours have been reviewed.    Significant Imaging: I have reviewed all pertinent imaging results/findings within the past 24 hours.

## 2022-06-05 NOTE — ASSESSMENT & PLAN NOTE
- COVID-19 testing   - Infection Control notified     - Isolation:   - Airborne, Contact and Droplet Precautions  - Cohort patients into COVID units  - N95 mask, wear eye protection  - 20 second hand hygiene              - Limit visitors per hospital policy              - Consolidating lab draws, nursing care, provider visits, and interventions    - Diagnostics: (leukopenia, hyponatremia, hyperferritinemia, elevated troponin, elevated d-dimer, age, and comorbidities are significant predictors of poor clinical outcome)  CBC, CMP, Ferritin, CRP and Portable CXR    - Management:  Supplemental O2 to maintain SpO2 >92%  Telemetry  Continuous/intermittent Pulse Ox  Albuterol treatment PRN  Remdesivir added    Advance Care Planning   Patient is a full code.          Cont supportive care  remdesivir course completed

## 2022-06-05 NOTE — ASSESSMENT & PLAN NOTE
5/20 Suspect empyema, needs chest tube placement  5/21 s/p chest tube placement, adequate expansion - will check Chest X Ray in [facility]  5/23 tPA DNase.  5/24 tPA for DNase installation via chest tube.  Monitor chest tube output.  Repeat chest x-ray in a.m..  5/25 chest tube output increased from 50 mL on average per 24 hours to 160 mL last 24 hours.  Purulent drainage noted.  Will repeat tPA plus Dnase instillation.  5/26 chest x-ray reviewed.  Continue IV oxacillin.  Right-sided pigtail in place.  Status post tPA and DNase x2.  Bloody drainage around pigtail today status post tPA and DNase yesterday.  Will hold on fibrinolysis.  Monitor chest tube output  5/27 right pigtail in place.  225 mL  Last 24 hours average of 200 mL per day.  Change pleurovac canister to better assess the color of the fluid pus versus serosanguineous.  Keep pigtail in place for now.  Ultrasound bilateral chest assess pleural effusions.  Continue oxacillin drip.  5/28 keep chest tube on the right side for now.  Repeat CRP.  Checking chemistry microbiology  on left-sided pleural fluid to rule out complicated effusion and need for pigtail.  Continue oxacillin drip.   5/29 minimal output right chest tube.  Remove chest tube.  Left-sided effusion not complicated not empyema.  Continue IV oxacillin    6/1 evolving empyema/necrosis on right lung on CT scan of the chest 06/01/2022.  Continue IV oxacillin.  Re-consult cardiothoracic surgery.  6/2 /treat COVID.  Cardiothoracic surgery input noted.  VATS procedure will be delayed till Monday next week.  Patient will be scheduled for VATS on 06/06/2022      6/3 VATS 06/06/2022 treating COVID.  06/04/2022 IV oxacillin.  VATS Monday as per CT surgery  6/5 worsening infiltrate on the right on chest x-ray today.  Plan for VATS as per CT surgery in a.m..

## 2022-06-05 NOTE — PLAN OF CARE
Problem: Adult Inpatient Plan of Care  Goal: Plan of Care Review  Outcome: Ongoing, Progressing  Goal: Optimal Comfort and Wellbeing  Outcome: Ongoing, Progressing  Goal: Readiness for Transition of Care  Outcome: Ongoing, Progressing     Problem: Skin Injury Risk Increased  Goal: Skin Health and Integrity  Outcome: Ongoing, Progressing     Problem: Bleeding (Sepsis/Septic Shock)  Goal: Absence of Bleeding  Outcome: Ongoing, Progressing       Able to turn in bed, refused therapy, encouraged to get out of bed and sit in chair and use commode but has pain in her back, on hep gtt bolus and rate increased to 16 units/kg/hr. Purewick change, barrier cream applies to backside. IVF d/c'd. Intermittent nausea prn zofran given. Good appetite, family brought food. Npo at midnight VATS scheduled for 1130 tomorrow.

## 2022-06-05 NOTE — ASSESSMENT & PLAN NOTE
Oxygen  Keep SP2> 92%  Bronchodilators  Follow up in Pulmonary for PFT to clarify Dx  5/23 O2 target sat 92-94%.  Nebs p.r.n.  5/26 O2 target sat at home not her% nebs p.r.n..  Smoking cessation   5/28 continue oxygen nebs albuterol Atrovent p.r.n.  6/1 albuterol Atrovent p.r.n.  6/2 albuterol Atrovent p.r.n..  O2 target sat 92-94%.  6/5 albuterol Atrovent p.r.n..  Oxygen keep O2 sat 92 this 94%.  Treat empyema septic emboli MSSA with IV oxacillin drip

## 2022-06-05 NOTE — PROGRESS NOTES
[facility]  Progress Note    Patient Name: Tianna Leon  MRN: [id number]  Patient Class: IP- Inpatient   Admission Date: 5/6/2022  Length of Stay: 30 days  Attending Physician: Delmer Guzmán MD  Primary Care Provider: Spenser Campa MD        Subjective:     Principal Problem:Acute bacterial endocarditis        HPI:  38 y/o. female  with a PMHx of asthma, COPD,Bipolar  , IVDU and HLD presented to the ER with a c/o  sob for the last weak which has gotten worse . The SOB is associated with fever , chills , productive cough , back pain  and generalized weakness . She report cough some blood this am .  She is active IVDU  ( heroine , cocaine  and amphetamine ) . She denies any  sick contact , chest pain  , GI/ sx , She also complaning of LE sweeling . Knee pain and B/L LE rash .   ER COURSE: CTA chest negative for pe . Multiple nodular and cavitary opacities concerning for septic emboli with larger opacities possibly necrotizing pneumonia. CT cervical  and thoracic did not show any acute finding , CT lumbar Possible progression of degenerative changes most notable at L4-5 with moderate to severe spinal canal stenosis at this level.  WBC  29 k , na 130 , k 3.4 , cl 89 , procal 2.71   ER VS:  BP Pulse Resp Temp SpO2   (!) 124/58 110 (!) 30 (!) 101.6 °F (38.7 °C) 96 %           Pt will be admitted to inpt with a dx of PNA and severe sepsis       Overview/Hospital Course:  5/7 admitted for pneumonia, severe sepsis in setting of ivdu. Mother at bedside and provides collateral. Patient has struggled with substance abuse for approximately 20 years, worsening over last 3-5 years since life event. Onset of symptoms 1-2 weeks ago. Tolerated thoracentesis with no pneumothorax on chest x-ray. Mri lumbar and echo, pending. Bcx positive, growing gram positive cocci. On vanc, cefepime and flagyl. Infectious disease consulted for bacteremia. 5/8/22 The patient remained afebrile overnight.  Currently on cefepime/vanc/flagyl. Blood cx 5/6/22 are growing MSSA. The patient refused the MRI lumbar spine overnight d/t being unable to lay flat from back pain. Will give pain meds and attempt to get the MRI today to r/o spinal abscess. The ECHO showed a 1.3 x 1.5 cm elongated, mobile echodensity seen on the tricuspid valve consistent with vegetation, CT surgery was consulted. Will need a SUSANNAH. Infectious disease is consulted. Will repeat blood cx today. 5/9/22 No acute events overnight. The patient reports lower back pain is not well controlled with current pain regimen, will adjust. Repeat blood cx are still positive. Sensitivities are back and Staph is sensitive to oxacillin, will D/C vanc/cefepime. Cardiology consulted and plans for a SUSANNAH today. CT surgery following and recommends continuing medical management with appropriate ABX, no surgical intervention at this time. MRI lumbar spine showed moderate to severe spinal canal stenosis with moderate left-sided neural foraminal stenosis, Neurosurgery consulted. 5/10/22 No acute events overnight. The patient reports some improvement in pain with adjustment in pain meds. Repeat blood cx are +. Continue IV oxacillin and flagyl. Infectious disease is consulted. IR consulted to evaluate possible paraspinous muscle myositis versus abscess. Recommend IR drainage. Continue current management. 5/11/22 No acute events overnight. The patient reports pain is still not well controlled at times. The patient reports that she is very anxious about the upcoming SUSANNAH and IR drainage. SUSANNAH was pushed back to this afternoon because the patient ate candy this AM. Continue treatment with IV oxacillin and flagyl. Infectious disease is following. WBC trended down to 31K. Continue current management. 5/12/22 No acute events overnight. The patients WBC improved to 22K overnight. The patient remains on continuous oxacillin infusion. Infectious disease is following. MRI thoracic spine is  ordered and pending. The patients SUSANNAH was postponed again today d/t low H/H. Hgb was noted to be 6.2 this morning, will transfuse 1 unit PRBC. The patient reports pain is still not well controlled. The case was discussed with Neurosurgery who recommended adding extended release pain meds. Approximately 1 ml of purulent drainage aspirated from back yesterday per IR, growing gram + cocci.     5/13: Patient was given 1U PRBC on 5/12, improved from 6.2 --> 7.0, will give an additional unit PRBC. Radiology attempted to bring patient on 5/12 at 1755 for Thoracic MRI, patient refused due to pain and anxiety. Repeat attempt this AM, patient again refused. Will order Ativan 1mg IV to be given with pain medication prior to scan to relieve anxiety and pain, nurse to notify NP when ready to go for scan. Discussed pain management with patient, discussed transition to PO pain medications to being preparing for d/c to LTAC, adjusted PO pain regimen will reevaluate to determine effectiveness. Repeat Blood cultures 5/11/22: NGTD.     5/14: Final anaerobic cultures pending, continue to adjust pain regimen, d/c IV Dilaudid today. Patient currently managed with PO Morphine extended release and PO oxycodone. D/C plan is for LTAC once final antibiotic regimen determined. Patient continues to refuse MRI of thoracic spine.     5/15: Called to room this AM, patient had coughed up blood and mucous. Approx. 1-2 teaspoons of blood in emesis bag, ordered CXR and repeat CBC, CXR showed improvement from previous day, repeat CBC at time of incident, showed a slight decrease in Hgb: 9.7 --> 8.7, when repeated at 1500, Hgb had improved to 10. no further episodes of coughing up blood throughout day. Patient continues to c/o not having enough pain medication but continues to sleep most of the day and will fall asleep when speaking at times. Added IV Toradol to plan.     5/16: No further episodes of coughing blood overnight, patient participated with  encouragement with PT/OT today, recommendations are HH vs. SNF pending progress. Awaiting final antibiotic regimen recommendations. Anaerobic culture results finalized: negative for growth. Afebrile, WBC trending downward.     5/17: Antibiotic regimen per ID is continuous oxacillin until 7/11/22, SW notified of plan, will seek acceptance at LTAC or SNF. Patient choice is [facility]. WBC in normal range. AMS this AM, CT of head: no acute findings. Given narcan and mental status improved. Decreased narcotic dose.     5/18: Per discussion with patient, she is willing to have the MRI of thoracic spine today, will order IV pain medication and anxiety medication prior to scan. Nursing and Radiology aware of needing to premedicate for scan. Patient is more cooperative and participating with care.     5/19: Patient given premedications for MRI, MRI completed, results pending. Patient more agreeable with treatment plan and cooperating. Will need psychiatry follow-up after discharge. Hgb: 6.7, will give 1U PRBC    5/20/22: +LEON and generalized pain. Thoracic MRI showed no evidence for disc osteomyelitis in the thoracic spine and no epidural abscess, Loculated right hydropneumothorax and extensive bilateral pulmonary infiltrates. Ct chest showed septic emboli and moderate right pleural effusion.+splenic infarct  Pulmonology felt likely empyema- consulted IR for chest tube.     5/21/22: s/p right pig tail placement to suction/ drainage system per IR. 60ml pus removed initially. Fluid analysis consistent with empyema. Pleural fluid cultures pending. Now with scant serosanguinous output.+ right lateral chest pain at chest tube site. Denies any other complaints. Status update provided to pt's mother.     5/22/22: Repeatedly requesting IV Dilaudid. Toradol ordered. Afebrile, WBC normal, O2sats stable on 3lites. s/p right pig tail placement to suction/ drainage system per IR on 5/21/22- draining serous draiange with pus. Plan for LTAC  "placement     5/23/22: More cofortable today. Not getting OOB. Encouraged OOB and rationale provided. Pulmonology recommended Possible tPA and DNase in chest tube tomorrow    5/24/22: Examined OOB in chair. Encouraged pt to continue OOB. Plan for tPA and DNase in chest tube today per Pulmonology  Pt denied LTAC- will pursue SNF    5/25/22: Temp 101.7F last night. Will recheck blood cultures and UA. BP low - IVF bolus given and restarted IVF. ID re-consulted. 160ml of purulent CT drainage output. Dr. San plans to repeat tPA and DNase in chest tube again today.     5/26/22: temp 99.2. c/o of right "lung pain" when eating. Will consult speech to check swallow. +mild leukocytosis. Repeat BC 5/25/22 show NGTD. Repeat UA showed rare trichomas. Chest tube drainage remains purulent     5/27/22  Low grade temp. Downward trend of hemoglobin, related to chest tube drainage. Blood cultures remain negative. Urine culture growing E. Coli. Sensitivities are pending.     5/28/22  Urine culture returned pansenstiive. Will continue ciprofloxacin for total of 3 days then discontinue. Still awaiting SNF placement.    5/29/22 culture of pleural fluid from 5/28/22 negative. Continue CT for now.     5/30/22  CT removed yesterday by pulmonology. SNF declines. Will explore other options for IV abx with help from ID.     5/31/22  Patient has been accepted to [facility]. Will transfer today. Will follow up with pulmonology and ID at discharge. She will continue oxacillin for management of bacteremia, septic embolic, and endocarditis.  Addendum: COVID negative transfer delayed.     6/1/22  COVID positive started on remdesivir. Tmax 101.3. repeat CT scan shows evolving septic embolic/necrotizing pneumonia. Pulmonlogy recommends reconsulting CVT. ID following. Awaiting insurance approval for [facility]. Decline in hemoglobin 7.4, hold on transfusion at this time.   6/2:  CVT consult on case.  Plan for VATS procedure on Monday given patient's " COVID status.  Continue remdesivir.  Continue oxacillin.  6/3: VATS planned for Monday. Will transfuse 1 unit prbc. No further hemoptysis noted. Cont oxacillin.   6/4:  Vats procedure Monday .  H&H improved status post 1 unit packed RBC.  NPO midnight.  Continue oxacillin.  6/5:  VATS procedure tomorrow.  H&H stable.  NPO at midnight.      Interval History: VATS procedure tomorrow.  H&H stable.  NPO at midnight.    Patient complaining of pain.  Otherwise denies any other issues.    Review of Systems   Constitutional:  Positive for activity change, appetite change and fatigue. Negative for chills, diaphoresis, fever and unexpected weight change.   HENT:  Negative for congestion, hearing loss, mouth sores, postnasal drip, rhinorrhea, sore throat and trouble swallowing.    Eyes:  Negative for discharge and visual disturbance.   Respiratory:  Negative for cough and chest tightness.    Cardiovascular:  Negative for chest pain, palpitations and leg swelling.   Gastrointestinal:  Negative for blood in stool, constipation, diarrhea, nausea and vomiting.   Endocrine: Negative for cold intolerance and heat intolerance.   Genitourinary:  Negative for difficulty urinating, dyspareunia, flank pain and hematuria.   Musculoskeletal:  Positive for back pain and myalgias. Negative for arthralgias.   Skin: Negative.    Neurological:  Positive for weakness. Negative for dizziness and light-headedness.   Hematological:  Negative for adenopathy. Does not bruise/bleed easily.   Psychiatric/Behavioral:  Negative for agitation, behavioral problems, confusion and sleep disturbance. The patient is nervous/anxious.    Objective:     Vital Signs (Most Recent):  Temp: 98.1 °F (36.7 °C) (06/05/22 0728)  Pulse: 80 (06/05/22 0737)  Resp: 16 (06/05/22 0737)  BP: (!) 141/79 (06/05/22 0728)  SpO2: (!) 94 % (06/05/22 0737)   Vital Signs (24h Range):  Temp:  [98.1 °F (36.7 °C)-100.2 °F (37.9 °C)] 98.1 °F (36.7 °C)  Pulse:  [70-90] 80  Resp:  [2-24]  16  SpO2:  [93 %-98 %] 94 %  BP: (131-173)/() 141/79     Weight: 94 kg (207 lb 3.7 oz)  Body mass index is 34.49 kg/m².    Intake/Output Summary (Last 24 hours) at 6/5/2022 0940  Last data filed at 6/5/2022 0631  Gross per 24 hour   Intake 7226.85 ml   Output 4550 ml   Net 2676.85 ml      Physical Exam  Vitals and nursing note reviewed.   Constitutional:       General: She is not in acute distress.     Appearance: She is well-developed. She is ill-appearing.   HENT:      Head: Normocephalic and atraumatic.      Nose: Nose normal.   Eyes:      Extraocular Movements: Extraocular movements intact.   Cardiovascular:      Rate and Rhythm: Normal rate and regular rhythm.   Pulmonary:      Effort: Pulmonary effort is normal.      Breath sounds: No stridor. Rhonchi present.      Comments: Right-side  Pigtail removed  Abdominal:      General: There is no distension.   Musculoskeletal:         General: No signs of injury.      Cervical back: Normal range of motion and neck supple.   Skin:     General: Skin is dry.   Neurological:      General: No focal deficit present.      Mental Status: She is alert and oriented to person, place, and time. Mental status is at baseline.   Psychiatric:         Behavior: Behavior normal.       Significant Labs: All pertinent labs within the past 24 hours have been reviewed.    Significant Imaging: I have reviewed all pertinent imaging results/findings within the past 24 hours.      Assessment/Plan:      * Acute bacterial endocarditis  ECHO showed a 1.3 x 1.5 cm elongated, mobile echodensity seen on the tricuspid valve consistent with vegetation, CT surgery was consulted.   Due to MSSA    Cont oxacillin  ID consulted on case           BEE (acute kidney injury)  Awaiting labs this a.m.      COVID-19  - COVID-19 testing   - Infection Control notified     - Isolation:   - Airborne, Contact and Droplet Precautions  - Cohort patients into COVID units  - N95 mask, wear eye protection  - 20  second hand hygiene              - Limit visitors per hospital policy              - Consolidating lab draws, nursing care, provider visits, and interventions    - Diagnostics: (leukopenia, hyponatremia, hyperferritinemia, elevated troponin, elevated d-dimer, age, and comorbidities are significant predictors of poor clinical outcome)  CBC, CMP, Ferritin, CRP and Portable CXR    - Management:  Supplemental O2 to maintain SpO2 >92%  Telemetry  Continuous/intermittent Pulse Ox  Albuterol treatment PRN  Remdesivir added    Advance Care Planning   Patient is a full code.         Cont supportive care  remdesivir course completed      Empyema of right pleural space  Pulmonology consulted and felt likely empyema   IR consulted for chest tube      s/p right pigtail small bore chest tube placement to suction/drainage system per IR on 5/21/22. 60ml pus removed. Now with Serous drainage with pus. Fluid analysis consistent with empyema. Pleural fluid aerobic culture shows NGTD, Anaerobic culture pending    5/24/22: Dr. San plans for tPA and DNase in chest tube today.   5/25/22: 160ml of purulent CT drainage output. Dr. San plans to repeat tPA and DNase in chest tube again today.   5/26/22  Still has purulent drainage noted. Pulmonology following    6/5:  Worsening empyema noted  CTS consult on case  Plan for VATS procedure on Monday    Normocytic anemia  Secondary to acute illness    --Daily CBC  --Transfuse with 1 unit PRBC for Hgb <7.0 or <8.0 if symptomatic   --Hgb: 6.7, give 1U PRBC    5/27/22  Hemoglobin 7.3g/dL. downward trend. Has blood tinged seropurulent drainage. Will give additional unit if continues to trend down.  5/28/22  Hemoglobin 8.0 today    6/5:  H/h stable post 1 unit prbc  Cont to monitor     Pulmonary embolism, septic  6/5/22  CT shows evolving septic emboli and necrotizing pneumonia. CVT has been re-consulted.  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 IR consulted to evaluate possible paraspinous muscle myositis versus abscess.    --Approximately 1 ml of purulent drainage aspirated 5/11 per IR, growing MSSA  --Continue current regimen  --Oxacillin x 4 weeks total    Chronic pulmonary heart disease  - Pulmonology following       MSSA bacteremia  5/15: Blood cultures 5/11/22: NGTD     Continuous Oxacillin, EOC: 7/11/22, pending acceptance at facility, PICC line placed    6/5:  Cont on oxacillin  ID on case   Repeat blood cultures negative     IVDU (intravenous drug user)   Will ordet TTE to r/o IE  Will order MRI lumbar wwo- completed   Cont broad spectrum IVAB   --MRI thoracic spine ordered per ID, patient refused on multiple attempts    5/7   Studies pending for additional sites of infection given h/o ivdu  5/8/22  on cessation     Severe sepsis  This patient does have evidence of infective focus  My overall impression is sepsis. Vital signs were reviewed and noted in progress note.  Antibiotics given-   Antibiotics (From admission, onward)            Start     Stop Route Frequency Ordered    05/09/22 1100  oxacillin 12 g in  mL CONTINUOUS INFUSION         -- IV Every 24 hours (non-standard times) 05/09/22 0932    05/07/22 2100  mupirocin 2 % ointment         05/12 2059 Nasl 2 times daily 05/07/22 1646        Cultures were taken-   Microbiology Results (last 7 days)     Procedure Component Value Units Date/Time    Culture, Respiratory with Gram Stain [[id number]] Collected: 05/16/22 0758    Order Status: Sent Specimen: Respiratory from Sputum Updated: 05/16/22 0759    Culture, Anaerobe [[id number]] Collected: 05/11/22 1440    Order Status: Completed Specimen: Abscess from Back Updated: 05/16/22 0733     Anaerobic Culture No anaerobes isolated    Blood culture [[id number]] Collected: 05/11/22 1213    Order Status: Completed Specimen: Blood Updated: 05/16/22 0612     Blood Culture, Routine No Growth to date       No Growth to date      No Growth to date      No Growth to date      No Growth to date    Blood culture [[id number]] Collected: 05/11/22 1213    Order Status: Completed Specimen: Blood Updated: 05/16/22 0612     Blood Culture, Routine No Growth to date      No Growth to date      No Growth to date      No Growth to date      No Growth to date    Aerobic culture [[id number]]  (Abnormal)  (Susceptibility) Collected: 05/11/22 1440    Order Status: Completed Specimen: Abscess from Back Updated: 05/14/22 1057     Aerobic Bacterial Culture STAPHYLOCOCCUS AUREUS  Many      Gram stain [[id number]] Collected: 05/11/22 1440    Order Status: Completed Specimen: Abscess from Back Updated: 05/12/22 0306     Gram Stain Result Few WBC's      Few Gram positive cocci    Gram stain [[id number]]     Order Status: Canceled Specimen: Joint Fluid from Back     Blood culture [[id number]]  (Abnormal) Collected: 05/08/22 1516    Order Status: Completed Specimen: Blood from Peripheral, Right Hand Updated: 05/11/22 1009     Blood Culture, Routine Gram stain aer bottle: Gram positive cocci in clusters resembling Staph       Results called to and read back by: Chasity Raymundo RN  05/09/2022  11:31      STAPHYLOCOCCUS AUREUS  ID consult required at [facility] and [facility].  For susceptibility see order #[id number]      Narrative:      Collection has been rescheduled by [email or web address] at 05/08/2022 13:22 Reason:   Pt in mri will be there after another hour [id number]  Collection has been rescheduled by [email or web address] at 05/08/2022 13:22 Reason:   Pt in mri will be there after another hour [id number]    Blood culture [[id number]]  (Abnormal) Collected: 05/08/22 1515    Order Status: Completed Specimen: Blood from Peripheral, Left Arm Updated: 05/11/22 1009     Blood Culture, Routine Gram stain aer bottle: Gram positive cocci in clusters resembling Staph      Results called to and read back by: Chasity Raymundo RN  05/09/2022  15:40      STAPHYLOCOCCUS AUREUS  ID  consult required at [facility] and [facility].  For susceptibility see order #[id number]      Narrative:      Collection has been rescheduled by [email or web address] at 05/08/2022 13:22 Reason:   Pt in mri will be there after another hour [id number]  Collection has been rescheduled by [email or web address] at 05/08/2022 13:22 Reason:   Pt in mri will be there after another hour [id number]        Latest lactate reviewed, they are-  No results for input(s): LACTATE in the last 72 hours.    Organ dysfunction indicated by Acute kidney injury  Source-  lung    Source control Achieved by-   Cont Broad spectrum IVAB     Bacteremia  Infectious disease consulted      COPD (chronic obstructive pulmonary disease)  Cont breathing tx prn  Pulmonology following       VTE Risk Mitigation (From admission, onward)         Ordered     heparin 25,000 units in dextrose 5% (100 units/ml) IV bolus from bag - ADDITIONAL PRN BOLUS - 60 units/kg  As needed (PRN)        Question:  Heparin Infusion Adjustment (DO NOT MODIFY ANSWER)  Answer:  \\[email or web address]\epic\Images\Pharmacy\HeparinInfusions\heparin LOW INTENSITY nomogram for [facility] [id number]    06/04/22 0824     heparin 25,000 units in dextrose 5% (100 units/ml) IV bolus from bag - ADDITIONAL PRN BOLUS - 30 units/kg  As needed (PRN)        Question:  Heparin Infusion Adjustment (DO NOT MODIFY ANSWER)  Answer:  [email or web address]\epic\Images\Pharmacy\HeparinInfusions\heparin LOW INTENSITY nomogram for [facility] [id number].pdf    06/04/22 0824     heparin 25,000 units in dextrose 5% 250 mL (100 units/mL) infusion LOW INTENSITY nomogram - [facility]  Continuous        Question Answer Comment   Heparin Infusion Adjustment (DO NOT MODIFY ANSWER) [email or web address]\epic\Images\Pharmacy\HeparinInfusions\heparin LOW INTENSITY nomogram for [facility] [id number].pdf    Begin at (in units/kg/hr) 12        06/04/22 0825     IP VTE HIGH RISK PATIENT  Once         05/06/22 2336     Place sequential compression device  Until discontinued         05/06/22 2336                 Discharge Planning   YESSICA: 5/31/2022     Code Status: Full Code   Is the patient medically ready for discharge?:     Reason for patient still in hospital (select all that apply): Patient trending condition  Discharge Plan A: Skilled Nursing Facility   Discharge Delays: None known at this time              Delmer Guzmán MD  Department of Hospital Medicine   [facility])

## 2022-06-05 NOTE — ASSESSMENT & PLAN NOTE
Secondary to acute illness    --Daily CBC  --Transfuse with 1 unit PRBC for Hgb <7.0 or <8.0 if symptomatic   --Hgb: 6.7, give 1U PRBC    5/27/22  Hemoglobin 7.3g/dL. downward trend. Has blood tinged seropurulent drainage. Will give additional unit if continues to trend down.  5/28/22  Hemoglobin 8.0 today    6/5:  H/h stable post 1 unit prbc  Cont to monitor

## 2022-06-05 NOTE — ASSESSMENT & PLAN NOTE
5/15: Blood cultures 5/11/22: NGTD     Continuous Oxacillin, EOC: 7/11/22, pending acceptance at facility, PICC line placed    6/5:  Cont on oxacillin  ID on case   Repeat blood cultures negative

## 2022-06-05 NOTE — PT/OT/SLP PROGRESS
Physical Therapy      Patient Name:  Tianna Leon   MRN:  [id number]    Patient not seen today secondary to Patient unwilling to participate. Will follow-up tomorrow.  Amy Ordonez PTA ( 09:45)

## 2022-06-05 NOTE — SUBJECTIVE & OBJECTIVE
Interval History:   6/5 T-max 100.4°.  Rule output 5 L. creatinine 1.9.  On IV heparin for COVID-19  No major events overnight.  Chest x-ray reviewed.  Worsening right-sided findings.  Review of Systems   Constitutional:  Positive for malaise/fatigue and weight loss. Negative for chills and fever.   HENT:  Negative for hearing loss and nosebleeds.    Eyes:  Negative for discharge.   Respiratory:  Positive for cough, sputum production and shortness of breath.    Cardiovascular:  Positive for leg swelling. Negative for chest pain.   Gastrointestinal:  Negative for abdominal pain.   Genitourinary:  Negative for hematuria.   Musculoskeletal:  Negative for falls.   Skin:  Negative for itching.   Neurological:  Positive for weakness. Negative for speech change, seizures and loss of consciousness.   Endo/Heme/Allergies:  Negative for polydipsia. Does not bruise/bleed easily.   Psychiatric/Behavioral:  Negative for hallucinations.        Objective:     Vital Signs (Most Recent):  Temp: 97.8 °F (36.6 °C) (06/05/22 1211)  Pulse: 76 (06/05/22 1211)  Resp: 16 (06/05/22 1211)  BP: (!) 163/86 (06/05/22 1211)  SpO2: 100 % (06/05/22 1211)   Vital Signs (24h Range):  Temp:  [97.8 °F (36.6 °C)-100.2 °F (37.9 °C)] 97.8 °F (36.6 °C)  Pulse:  [72-90] 76  Resp:  [16-24] 16  SpO2:  [93 %-100 %] 100 %  BP: (131-173)/() 163/86     Weight: 94 kg (207 lb 3.7 oz)  Body mass index is 34.49 kg/m².      Intake/Output Summary (Last 24 hours) at 6/5/2022 1212  Last data filed at 6/5/2022 0631  Gross per 24 hour   Intake 7106.85 ml   Output 3750 ml   Net 3356.85 ml         Physical Exam  Vitals and nursing note reviewed.   Constitutional:       General: She is not in acute distress.     Appearance: She is well-developed. She is ill-appearing.   HENT:      Head: Normocephalic and atraumatic.      Nose: Nose normal.   Eyes:      Extraocular Movements: Extraocular movements intact.   Cardiovascular:      Rate and Rhythm: Normal rate and regular  rhythm.   Pulmonary:      Effort: Pulmonary effort is normal.      Breath sounds: No stridor. Rhonchi present.   Abdominal:      General: There is no distension.   Musculoskeletal:         General: No signs of injury.      Cervical back: Normal range of motion and neck supple.   Skin:     General: Skin is dry.   Neurological:      General: No focal deficit present.      Mental Status: She is alert and oriented to person, place, and time. Mental status is at baseline.       Vents:  Oxygen Concentration (%): 24 (06/04/22 0831)    Lines/Drains/Airways       Peripherally Inserted Central Catheter Line  Duration             PICC Double Lumen 05/17/22 1612 right basilic 18 days              Drain  Duration             Female External Urinary Catheter 05/21/22 0700 15 days                    Significant Labs:    CBC/Anemia Profile:  Recent Labs   Lab 06/04/22  0931 06/05/22  0055 06/05/22  1006   WBC 6.23 8.21 5.21  5.21   HGB 7.7* 8.6* 10.9*  10.9*   HCT 24.1* 27.1* 34.2*  34.2*    285 228  228   MCV 84 83 83  83   RDW 16.1* 16.0* 16.1*  16.1*          Chemistries:  Recent Labs   Lab 06/03/22  1532 06/05/22  1006    140   K 4.5 4.2    106   CO2 24 25   BUN 31* 31*   CREATININE 2.1* 1.9*   CALCIUM 7.7* 7.9*   ALBUMIN 1.6* 1.6*   PROT 6.0 6.0   BILITOT 0.2 0.3   ALKPHOS 75 80   ALT 11 14   AST 17 24      Latest Reference Range & Units 05/28/22 11:15   Fluid Color  Yellow   Fluid Appearance  Clear   WBC, Body Fluid /cu mm 3065   Body Fluid Type  Pleural Fluid, Left   Segs, Fluid % 41   Lymphs, Fluid % 14   Monocytes/Macrophages, Fluid % 22   Eos, Fluid % 23   Glucose, Fluid Not established mg/dL 102   LD, Fluid Not established U/L 241   Body Fluid Source, Glucose  Pleural Fluid, Left   Body Fluid Source, LDH  Pleural Fluid, Left   Body Fluid Source, Total Protein  Pleural Fluid, Left   Body Fluid, Protein Not established g/dL 2.6      Latest Reference Range & Units 05/07/22 09:05 05/21/22 10:22    Fluid Appearance  Hazy Cloudy   WBC, Body Fluid /cu mm 59453 [1] 710413 (C) [2]   Body Fluid Type  Pleural Fluid, Right Pleural Fluid, Right   Segs, Fluid % 93 88   Lymphs, Fluid % 3 7   Monocytes/Macrophages, Fluid % 4 5   Amylase, Fluid Not established U/L 34 [3]    Glucose, Fluid Not established mg/dL 29 [4]    LD, Fluid Not established U/L 1077 [5]    Body Fluid, Albumin See text g/dL 1.3 [6]    Body Fluid Source Amylase  Pleural Fluid, Right    Body Fluid Source, Albumin  Pleural Fluid, Right    Body Fluid Source, Glucose  Pleural Fluid, Right    Body Fluid Source, LDH  Pleural Fluid, Right    Body Fluid Source, Total Protein  Pleural Fluid, Right    Body Fluid, Protein Not established g/dL 3.7 [7]    Cholesterol, Body Fluid See Comment mg/dL 79 [8]          Significant Imaging:     Chest x-ray 06/05/2022    CLINICAL HISTORY:  Pneumonia.  Follow-up., Pre Op; to be done on sunday 6/5/22 per dr dee;     COMPARISON:  05/30/2022 chest x-ray     FINDINGS:  A right PICC line is present.  The cardiac size is enlarged     There are patchy areas of infiltrate.  There is greater consolidation at the right lung base as compared to the previous exam.     Impression:     Bilateral infiltrates with worsening consolidation right lower lobe              CT chest without contrast 06/01/2022    Narrative & Impression  EXAMINATION:  CT CHEST WITHOUT CONTRAST     CLINICAL HISTORY:  Pneumonia, unresolved;     TECHNIQUE:  Low-dose axial images acquired from the chest without the use of intravenous contrast.  Sagittal and coronal reformats, as well as axial MIPS were generated for further review.  All CT scans at this location are performed using dose modulation techniques as appropriate to a performed exam including the following: Automated exposure control; adjustment of the mA and/or kV according to patient size (this includes techniques or standardized protocols for targeted exams where dose is matched to indication/reason  for exam; i. e. extremities or head); use of iterative reconstruction technique.     COMPARISON:  Chest radiograph 05/30/2022.  CT chest 05/20/2022.     FINDINGS:  Base of Neck: No significant abnormality. Right-sided central venous catheter with tip terminating at the distal SVC.     Aorta: Nonaneurysmal without significant atherosclerotic calcification.     Heart/pericardium: Upper limits normal size heart without significant pericardial fluid.  No significant coronary calcification.     Esha/Mediastinum: Few upper limits normal size mediastinal lymph nodes.     Upper Abdomen: No acute abnormality of the partially imaged upper abdomen.     Thoracic soft tissues: Anasarca.     Bones: No acute fracture. No suspicious lytic or sclerotic lesion.     Airways: Patent.     Lungs/pleura: Continued evolution of the septic emboli with multiple cavitary nodules scattered throughout the lungs.  There are well-defined air-fluid collections in posterior aspect of the right upper and lower lobes measuring 4.5 x 9.5 cm and 5.5 x 10.0 cm, respectively.  Alternatively, this could represent loculated empyema.  These 2 collections appear to communicate.  Overall appearance of the chest is similar when compared to the recent prior CT examination.  Findings concerning for necrotizing pneumonia, as previously noted.  There is a medium sized left-sided pleural effusion with associated volume loss.     Impression:     Evolving findings concerning for septic emboli and necrotizing pneumonia in the posterior aspect of the right lung versus empyema, as described above.                         CT chest without contrast 05/20/2022    Narrative & Impression  EXAMINATION:  CT CHEST WITHOUT CONTRAST     CLINICAL HISTORY:  Empyema;     TECHNIQUE:  Low dose axial images, sagittal and coronal reformations were obtained from the thoracic inlet to the lung bases. Contrast was not administered.  All CT scans at this facility are performed using dose  optimization techniques including the following: automated exposure control; adjustment of the mA and/or kV; use of iterative reconstruction technique.     COMPARISON:  CTA chest non coronary 05/06/2022, chest radiograph 05/20/2022, MRI thoracic spine 05/19/2022     FINDINGS:  Base of Neck: There is diffuse body wall edema.  Unremarkable, noting the presence of AA PICC line which terminates at the superior cavoatrial junction.     Thoracic soft tissues: There is diffuse body wall edema.     Aorta: Left-sided aortic arch.  No aneurysm and no significant atherosclerosis     Heart: Normal size. No effusion.  No significant coronary atherosclerosis.     Pulmonary vasculature: Pulmonary arteries distribute normally.     Esha/Mediastinum: There are few prominent mediastinal lymph nodes with a prevascular node measuring 0.8 cm in short axis.     Airways: Patent.     Lungs/Pleura: There are multiple bilateral consolidative densities scattered throughout the lungs, many of which demonstrate cavitation, again suggesting septic emboli.  There is small left pleural fluid, which appears simple and layers dependently.  There is a loculated moderate right pleural effusion, which appears similar in size to the comparison exam however now containing air, which may be due to empyema.  There is adjacent atelectatic change within the bilateral lower lobes.  Overall number and size of consolidative densities has increased since the comparison exam.     Esophagus: Normal.     Upper Abdomen: There is persistent wedge-shaped hypodensity within the spleen, suggesting infarct.     Bones: No acute fracture. No suspicious lytic or sclerotic lesions.     Impression:     Interval progression with increased number and size of consolidative cavitary densities throughout the lungs, most suggestive of septic emboli with larger opacities concerning for necrotizing pneumonia.     Stable size of loculated right moderate pleural effusion, now containing  air compatible with hydropneumothorax as reported on prior MRI.  Interval development of air is concerning for empyema noting limited evaluation without intravenous contrast.  New small simple appearing left pleural effusion.     Wedge-shaped splenic hypodensity, suggesting infarction.     Anasarca.

## 2022-06-05 NOTE — ASSESSMENT & PLAN NOTE
6/1 - Isolation:   - Airborne, Contact and Droplet Precautions  - Cohort patients into COVID units  - N95 masks must be fit tested, wear eye protection  - 20 second hand hygiene              - Limit visitors per hospital policy              - Consolidating lab draws, nursing care, provider visits, and interventions  IV remdesivir ID follow-up  6/2 same  6/3 on therapeutic Lovenox and IV remdesivir  6/4 IV remdesivir  6/5 completed IV remdesivir.  Isolation precautions.  On IV heparin for thromboembolism prophylaxis.  Monitor PTT.  Discontinue 4 hours prior to VATS

## 2022-06-06 ENCOUNTER — ANESTHESIA (OUTPATIENT)
Dept: SURGERY | Facility: HOSPITAL | Age: 37
DRG: 853 | End: 2022-06-06
Payer: MEDICARE

## 2022-06-06 LAB
ABO + RH BLD: NORMAL
ALBUMIN SERPL BCP-MCNC: 1.7 G/DL (ref 3.5–5.2)
ALLENS TEST: ABNORMAL
ALP SERPL-CCNC: 83 U/L (ref 55–135)
ALT SERPL W/O P-5'-P-CCNC: 14 U/L (ref 10–44)
ANION GAP SERPL CALC-SCNC: 12 MMOL/L (ref 8–16)
ANION GAP SERPL CALC-SCNC: 17 MMOL/L (ref 8–16)
APTT BLDCRRT: 37.8 SEC (ref 21–32)
APTT BLDCRRT: 39.2 SEC (ref 21–32)
AST SERPL-CCNC: 21 U/L (ref 10–40)
BACTERIA BLD CULT: NORMAL
BACTERIA BLD CULT: NORMAL
BASOPHILS # BLD AUTO: 0.02 K/UL (ref 0–0.2)
BASOPHILS # BLD AUTO: 0.03 K/UL (ref 0–0.2)
BASOPHILS NFR BLD: 0.2 % (ref 0–1.9)
BASOPHILS NFR BLD: 0.2 % (ref 0–1.9)
BILIRUB SERPL-MCNC: 0.3 MG/DL (ref 0.1–1)
BLD GP AB SCN CELLS X3 SERPL QL: NORMAL
BUN SERPL-MCNC: 27 MG/DL (ref 6–20)
BUN SERPL-MCNC: 30 MG/DL (ref 6–20)
CALCIUM SERPL-MCNC: 7.6 MG/DL (ref 8.7–10.5)
CALCIUM SERPL-MCNC: 8.2 MG/DL (ref 8.7–10.5)
CHLORIDE SERPL-SCNC: 106 MMOL/L (ref 95–110)
CHLORIDE SERPL-SCNC: 107 MMOL/L (ref 95–110)
CO2 SERPL-SCNC: 19 MMOL/L (ref 23–29)
CO2 SERPL-SCNC: 24 MMOL/L (ref 23–29)
CREAT SERPL-MCNC: 1.8 MG/DL (ref 0.5–1.4)
CREAT SERPL-MCNC: 1.8 MG/DL (ref 0.5–1.4)
DELSYS: ABNORMAL
DIFFERENTIAL METHOD: ABNORMAL
DIFFERENTIAL METHOD: ABNORMAL
EOSINOPHIL # BLD AUTO: 0.1 K/UL (ref 0–0.5)
EOSINOPHIL # BLD AUTO: 0.1 K/UL (ref 0–0.5)
EOSINOPHIL NFR BLD: 0.4 % (ref 0–8)
EOSINOPHIL NFR BLD: 1.7 % (ref 0–8)
ERYTHROCYTE [DISTWIDTH] IN BLOOD BY AUTOMATED COUNT: 15.9 % (ref 11.5–14.5)
ERYTHROCYTE [DISTWIDTH] IN BLOOD BY AUTOMATED COUNT: 16 % (ref 11.5–14.5)
ERYTHROCYTE [SEDIMENTATION RATE] IN BLOOD BY WESTERGREN METHOD: 20 MM/H
EST. GFR  (AFRICAN AMERICAN): 40 ML/MIN/1.73 M^2
EST. GFR  (AFRICAN AMERICAN): 40 ML/MIN/1.73 M^2
EST. GFR  (NON AFRICAN AMERICAN): 35 ML/MIN/1.73 M^2
EST. GFR  (NON AFRICAN AMERICAN): 35 ML/MIN/1.73 M^2
FIO2: 100
GLUCOSE SERPL-MCNC: 118 MG/DL (ref 70–110)
GLUCOSE SERPL-MCNC: 97 MG/DL (ref 70–110)
HCO3 UR-SCNC: 26.3 MMOL/L (ref 24–28)
HCT VFR BLD AUTO: 24.4 % (ref 37–48.5)
HCT VFR BLD AUTO: 27.5 % (ref 37–48.5)
HGB BLD-MCNC: 8 G/DL (ref 12–16)
HGB BLD-MCNC: 8.9 G/DL (ref 12–16)
IMM GRANULOCYTES # BLD AUTO: 0.03 K/UL (ref 0–0.04)
IMM GRANULOCYTES # BLD AUTO: 0.11 K/UL (ref 0–0.04)
IMM GRANULOCYTES NFR BLD AUTO: 0.4 % (ref 0–0.5)
IMM GRANULOCYTES NFR BLD AUTO: 0.7 % (ref 0–0.5)
LYMPHOCYTES # BLD AUTO: 2.4 K/UL (ref 1–4.8)
LYMPHOCYTES # BLD AUTO: 2.6 K/UL (ref 1–4.8)
LYMPHOCYTES NFR BLD: 15.7 % (ref 18–48)
LYMPHOCYTES NFR BLD: 31 % (ref 18–48)
MCH RBC QN AUTO: 26.7 PG (ref 27–31)
MCH RBC QN AUTO: 27.2 PG (ref 27–31)
MCHC RBC AUTO-ENTMCNC: 32.4 G/DL (ref 32–36)
MCHC RBC AUTO-ENTMCNC: 32.8 G/DL (ref 32–36)
MCV RBC AUTO: 83 FL (ref 82–98)
MCV RBC AUTO: 83 FL (ref 82–98)
MODE: ABNORMAL
MONOCYTES # BLD AUTO: 0.6 K/UL (ref 0.3–1)
MONOCYTES # BLD AUTO: 0.8 K/UL (ref 0.3–1)
MONOCYTES NFR BLD: 5.3 % (ref 4–15)
MONOCYTES NFR BLD: 7.5 % (ref 4–15)
NEUTROPHILS # BLD AUTO: 12.1 K/UL (ref 1.8–7.7)
NEUTROPHILS # BLD AUTO: 5 K/UL (ref 1.8–7.7)
NEUTROPHILS NFR BLD: 59.2 % (ref 38–73)
NEUTROPHILS NFR BLD: 77.7 % (ref 38–73)
NRBC BLD-RTO: 0 /100 WBC
NRBC BLD-RTO: 0 /100 WBC
PCO2 BLDA: 41 MMHG (ref 35–45)
PEEP: 5
PH SMN: 7.42 [PH] (ref 7.35–7.45)
PLATELET # BLD AUTO: 284 K/UL (ref 150–450)
PLATELET # BLD AUTO: 348 K/UL (ref 150–450)
PMV BLD AUTO: 9 FL (ref 9.2–12.9)
PMV BLD AUTO: 9.1 FL (ref 9.2–12.9)
PO2 BLDA: 173 MMHG (ref 80–100)
POC BE: 2 MMOL/L
POC SATURATED O2: 100 % (ref 95–100)
POTASSIUM SERPL-SCNC: 3.8 MMOL/L (ref 3.5–5.1)
POTASSIUM SERPL-SCNC: 4.4 MMOL/L (ref 3.5–5.1)
PROT SERPL-MCNC: 5.7 G/DL (ref 6–8.4)
RBC # BLD AUTO: 2.94 M/UL (ref 4–5.4)
RBC # BLD AUTO: 3.33 M/UL (ref 4–5.4)
SAMPLE: ABNORMAL
SITE: ABNORMAL
SODIUM SERPL-SCNC: 142 MMOL/L (ref 136–145)
SODIUM SERPL-SCNC: 143 MMOL/L (ref 136–145)
VT: 450
WBC # BLD AUTO: 15.57 K/UL (ref 3.9–12.7)
WBC # BLD AUTO: 8.43 K/UL (ref 3.9–12.7)

## 2022-06-06 PROCEDURE — 63600175 PHARM REV CODE 636 W HCPCS: Performed by: THORACIC SURGERY (CARDIOTHORACIC VASCULAR SURGERY)

## 2022-06-06 PROCEDURE — 87102 FUNGUS ISOLATION CULTURE: CPT | Mod: 59 | Performed by: FAMILY MEDICINE

## 2022-06-06 PROCEDURE — 86901 BLOOD TYPING SEROLOGIC RH(D): CPT | Performed by: FAMILY MEDICINE

## 2022-06-06 PROCEDURE — 94640 AIRWAY INHALATION TREATMENT: CPT

## 2022-06-06 PROCEDURE — 63600175 PHARM REV CODE 636 W HCPCS: Performed by: INTERNAL MEDICINE

## 2022-06-06 PROCEDURE — 97110 THERAPEUTIC EXERCISES: CPT

## 2022-06-06 PROCEDURE — 94002 VENT MGMT INPAT INIT DAY: CPT

## 2022-06-06 PROCEDURE — 36000710: Performed by: THORACIC SURGERY (CARDIOTHORACIC VASCULAR SURGERY)

## 2022-06-06 PROCEDURE — 87070 CULTURE OTHR SPECIMN AEROBIC: CPT | Mod: 59 | Performed by: FAMILY MEDICINE

## 2022-06-06 PROCEDURE — 36000711: Performed by: THORACIC SURGERY (CARDIOTHORACIC VASCULAR SURGERY)

## 2022-06-06 PROCEDURE — 37000009 HC ANESTHESIA EA ADD 15 MINS: Performed by: THORACIC SURGERY (CARDIOTHORACIC VASCULAR SURGERY)

## 2022-06-06 PROCEDURE — 37799 UNLISTED PX VASCULAR SURGERY: CPT

## 2022-06-06 PROCEDURE — 25000003 PHARM REV CODE 250: Performed by: INTERNAL MEDICINE

## 2022-06-06 PROCEDURE — 88305 TISSUE EXAM BY PATHOLOGIST: CPT | Mod: 26,59,, | Performed by: PATHOLOGY

## 2022-06-06 PROCEDURE — 99900035 HC TECH TIME PER 15 MIN (STAT)

## 2022-06-06 PROCEDURE — 82803 BLOOD GASES ANY COMBINATION: CPT

## 2022-06-06 PROCEDURE — C1729 CATH, DRAINAGE: HCPCS | Performed by: THORACIC SURGERY (CARDIOTHORACIC VASCULAR SURGERY)

## 2022-06-06 PROCEDURE — 88305 TISSUE EXAM BY PATHOLOGIST: CPT | Mod: 26,,, | Performed by: PATHOLOGY

## 2022-06-06 PROCEDURE — 63600175 PHARM REV CODE 636 W HCPCS: Performed by: NURSE ANESTHETIST, CERTIFIED REGISTERED

## 2022-06-06 PROCEDURE — 85730 THROMBOPLASTIN TIME PARTIAL: CPT | Performed by: FAMILY MEDICINE

## 2022-06-06 PROCEDURE — 85025 COMPLETE CBC W/AUTO DIFF WBC: CPT | Performed by: FAMILY MEDICINE

## 2022-06-06 PROCEDURE — 94761 N-INVAS EAR/PLS OXIMETRY MLT: CPT

## 2022-06-06 PROCEDURE — 99232 SBSQ HOSP IP/OBS MODERATE 35: CPT | Mod: ,,, | Performed by: INTERNAL MEDICINE

## 2022-06-06 PROCEDURE — 25000003 PHARM REV CODE 250: Performed by: NURSE PRACTITIONER

## 2022-06-06 PROCEDURE — 99232 PR SUBSEQUENT HOSPITAL CARE,LEVL II: ICD-10-PCS | Mod: ,,, | Performed by: INTERNAL MEDICINE

## 2022-06-06 PROCEDURE — C9399 UNCLASSIFIED DRUGS OR BIOLOG: HCPCS | Performed by: THORACIC SURGERY (CARDIOTHORACIC VASCULAR SURGERY)

## 2022-06-06 PROCEDURE — 32656 THORACOSCOPY W/PLEURECTOMY: CPT | Mod: RT,,, | Performed by: THORACIC SURGERY (CARDIOTHORACIC VASCULAR SURGERY)

## 2022-06-06 PROCEDURE — 87205 SMEAR GRAM STAIN: CPT | Performed by: FAMILY MEDICINE

## 2022-06-06 PROCEDURE — 88112 CYTOPATH CELL ENHANCE TECH: CPT | Mod: 26,,, | Performed by: PATHOLOGY

## 2022-06-06 PROCEDURE — 87070 CULTURE OTHR SPECIMN AEROBIC: CPT | Performed by: FAMILY MEDICINE

## 2022-06-06 PROCEDURE — 88305 TISSUE EXAM BY PATHOLOGIST: CPT | Performed by: PATHOLOGY

## 2022-06-06 PROCEDURE — 86920 COMPATIBILITY TEST SPIN: CPT | Performed by: THORACIC SURGERY (CARDIOTHORACIC VASCULAR SURGERY)

## 2022-06-06 PROCEDURE — 20000000 HC ICU ROOM

## 2022-06-06 PROCEDURE — 80053 COMPREHEN METABOLIC PANEL: CPT | Performed by: FAMILY MEDICINE

## 2022-06-06 PROCEDURE — 87206 SMEAR FLUORESCENT/ACID STAI: CPT | Mod: 91 | Performed by: FAMILY MEDICINE

## 2022-06-06 PROCEDURE — 37000008 HC ANESTHESIA 1ST 15 MINUTES: Performed by: THORACIC SURGERY (CARDIOTHORACIC VASCULAR SURGERY)

## 2022-06-06 PROCEDURE — 87116 MYCOBACTERIA CULTURE: CPT | Mod: 59 | Performed by: FAMILY MEDICINE

## 2022-06-06 PROCEDURE — 25000003 PHARM REV CODE 250: Performed by: FAMILY MEDICINE

## 2022-06-06 PROCEDURE — 27000207 HC ISOLATION

## 2022-06-06 PROCEDURE — 88112 PR  CYTOPATH, CELL ENHANCE TECH: ICD-10-PCS | Mod: 26,,, | Performed by: PATHOLOGY

## 2022-06-06 PROCEDURE — 27000221 HC OXYGEN, UP TO 24 HOURS

## 2022-06-06 PROCEDURE — 88305 TISSUE EXAM BY PATHOLOGIST: ICD-10-PCS | Mod: 26,,, | Performed by: PATHOLOGY

## 2022-06-06 PROCEDURE — 97530 THERAPEUTIC ACTIVITIES: CPT

## 2022-06-06 PROCEDURE — 32656 PR THORACOSCOPY SURG PART PLEURECTOMY: ICD-10-PCS | Mod: RT,,, | Performed by: THORACIC SURGERY (CARDIOTHORACIC VASCULAR SURGERY)

## 2022-06-06 PROCEDURE — 88112 CYTOPATH CELL ENHANCE TECH: CPT | Performed by: PATHOLOGY

## 2022-06-06 PROCEDURE — 25000003 PHARM REV CODE 250: Performed by: NURSE ANESTHETIST, CERTIFIED REGISTERED

## 2022-06-06 PROCEDURE — 88305 TISSUE EXAM BY PATHOLOGIST: ICD-10-PCS | Mod: 26,59,, | Performed by: PATHOLOGY

## 2022-06-06 PROCEDURE — 88305 TISSUE EXAM BY PATHOLOGIST: CPT | Mod: 59 | Performed by: PATHOLOGY

## 2022-06-06 PROCEDURE — 25000003 PHARM REV CODE 250: Performed by: THORACIC SURGERY (CARDIOTHORACIC VASCULAR SURGERY)

## 2022-06-06 PROCEDURE — 27201423 OPTIME MED/SURG SUP & DEVICES STERILE SUPPLY: Performed by: THORACIC SURGERY (CARDIOTHORACIC VASCULAR SURGERY)

## 2022-06-06 PROCEDURE — 87075 CULTR BACTERIA EXCEPT BLOOD: CPT | Performed by: FAMILY MEDICINE

## 2022-06-06 PROCEDURE — 80048 BASIC METABOLIC PNL TOTAL CA: CPT | Mod: XB | Performed by: THORACIC SURGERY (CARDIOTHORACIC VASCULAR SURGERY)

## 2022-06-06 RX ORDER — FAMOTIDINE 20 MG/1
20 TABLET, FILM COATED ORAL DAILY
Status: DISCONTINUED | OUTPATIENT
Start: 2022-06-07 | End: 2022-06-10 | Stop reason: HOSPADM

## 2022-06-06 RX ORDER — CHLORHEXIDINE GLUCONATE ORAL RINSE 1.2 MG/ML
15 SOLUTION DENTAL 2 TIMES DAILY
Status: DISCONTINUED | OUTPATIENT
Start: 2022-06-06 | End: 2022-06-07

## 2022-06-06 RX ORDER — FENTANYL CITRATE 50 UG/ML
INJECTION, SOLUTION INTRAMUSCULAR; INTRAVENOUS
Status: DISCONTINUED | OUTPATIENT
Start: 2022-06-06 | End: 2022-06-06

## 2022-06-06 RX ORDER — LIDOCAINE HYDROCHLORIDE 20 MG/ML
INJECTION INTRAVENOUS
Status: DISCONTINUED | OUTPATIENT
Start: 2022-06-06 | End: 2022-06-06

## 2022-06-06 RX ORDER — POLYETHYLENE GLYCOL 3350 17 G/17G
17 POWDER, FOR SOLUTION ORAL DAILY
Status: DISCONTINUED | OUTPATIENT
Start: 2022-06-07 | End: 2022-06-06 | Stop reason: SDUPTHER

## 2022-06-06 RX ORDER — IPRATROPIUM BROMIDE 0.5 MG/2.5ML
0.5 SOLUTION RESPIRATORY (INHALATION) EVERY 4 HOURS
Status: DISCONTINUED | OUTPATIENT
Start: 2022-06-07 | End: 2022-06-07

## 2022-06-06 RX ORDER — POLYETHYLENE GLYCOL 3350 17 G/17G
17 POWDER, FOR SOLUTION ORAL DAILY
Status: DISCONTINUED | OUTPATIENT
Start: 2022-06-07 | End: 2022-06-10 | Stop reason: HOSPADM

## 2022-06-06 RX ORDER — OXYCODONE HYDROCHLORIDE 5 MG/1
5 TABLET ORAL EVERY 4 HOURS PRN
Status: DISCONTINUED | OUTPATIENT
Start: 2022-06-06 | End: 2022-06-07

## 2022-06-06 RX ORDER — SUCCINYLCHOLINE CHLORIDE 20 MG/ML
INJECTION INTRAMUSCULAR; INTRAVENOUS
Status: DISCONTINUED | OUTPATIENT
Start: 2022-06-06 | End: 2022-06-06

## 2022-06-06 RX ORDER — PHENYLEPHRINE HYDROCHLORIDE 10 MG/ML
INJECTION INTRAVENOUS
Status: DISCONTINUED | OUTPATIENT
Start: 2022-06-06 | End: 2022-06-06

## 2022-06-06 RX ORDER — MIDAZOLAM HYDROCHLORIDE 1 MG/ML
INJECTION, SOLUTION INTRAMUSCULAR; INTRAVENOUS
Status: DISCONTINUED | OUTPATIENT
Start: 2022-06-06 | End: 2022-06-06

## 2022-06-06 RX ORDER — MUPIROCIN 20 MG/G
1 OINTMENT TOPICAL 2 TIMES DAILY
Status: DISCONTINUED | OUTPATIENT
Start: 2022-06-06 | End: 2022-06-10 | Stop reason: HOSPADM

## 2022-06-06 RX ORDER — BUPIVACAINE HYDROCHLORIDE 2.5 MG/ML
INJECTION, SOLUTION EPIDURAL; INFILTRATION; INTRACAUDAL
Status: DISCONTINUED | OUTPATIENT
Start: 2022-06-06 | End: 2022-06-06 | Stop reason: HOSPADM

## 2022-06-06 RX ORDER — KETAMINE HYDROCHLORIDE 50 MG/ML
INJECTION, SOLUTION INTRAMUSCULAR; INTRAVENOUS
Status: DISCONTINUED | OUTPATIENT
Start: 2022-06-06 | End: 2022-06-06

## 2022-06-06 RX ORDER — FENTANYL CITRATE-0.9 % NACL/PF 10 MCG/ML
0-200 PLASTIC BAG, INJECTION (ML) INTRAVENOUS CONTINUOUS
Status: DISCONTINUED | OUTPATIENT
Start: 2022-06-06 | End: 2022-06-07

## 2022-06-06 RX ORDER — ONDANSETRON 2 MG/ML
INJECTION INTRAMUSCULAR; INTRAVENOUS
Status: DISCONTINUED | OUTPATIENT
Start: 2022-06-06 | End: 2022-06-06

## 2022-06-06 RX ORDER — ROCURONIUM BROMIDE 10 MG/ML
INJECTION, SOLUTION INTRAVENOUS
Status: DISCONTINUED | OUTPATIENT
Start: 2022-06-06 | End: 2022-06-06

## 2022-06-06 RX ORDER — DOCUSATE SODIUM 100 MG/1
100 CAPSULE, LIQUID FILLED ORAL 2 TIMES DAILY
Status: DISCONTINUED | OUTPATIENT
Start: 2022-06-06 | End: 2022-06-10 | Stop reason: HOSPADM

## 2022-06-06 RX ORDER — BISACODYL 10 MG
10 SUPPOSITORY, RECTAL RECTAL DAILY PRN
Status: DISCONTINUED | OUTPATIENT
Start: 2022-06-07 | End: 2022-06-10 | Stop reason: HOSPADM

## 2022-06-06 RX ORDER — PROPOFOL 10 MG/ML
VIAL (ML) INTRAVENOUS
Status: DISCONTINUED | OUTPATIENT
Start: 2022-06-06 | End: 2022-06-06

## 2022-06-06 RX ORDER — DEXTROSE MONOHYDRATE, SODIUM CHLORIDE, AND POTASSIUM CHLORIDE 50; 1.49; 4.5 G/1000ML; G/1000ML; G/1000ML
INJECTION, SOLUTION INTRAVENOUS CONTINUOUS
Status: DISCONTINUED | OUTPATIENT
Start: 2022-06-06 | End: 2022-06-10 | Stop reason: HOSPADM

## 2022-06-06 RX ORDER — DEXMEDETOMIDINE HYDROCHLORIDE 4 UG/ML
0-1.4 INJECTION INTRAVENOUS CONTINUOUS
Status: DISCONTINUED | OUTPATIENT
Start: 2022-06-06 | End: 2022-06-07

## 2022-06-06 RX ORDER — ONDANSETRON 8 MG/1
8 TABLET, ORALLY DISINTEGRATING ORAL EVERY 8 HOURS PRN
Status: DISCONTINUED | OUTPATIENT
Start: 2022-06-06 | End: 2022-06-10 | Stop reason: HOSPADM

## 2022-06-06 RX ORDER — HEPARIN SODIUM 5000 [USP'U]/ML
5000 INJECTION, SOLUTION INTRAVENOUS; SUBCUTANEOUS EVERY 8 HOURS
Status: DISCONTINUED | OUTPATIENT
Start: 2022-06-06 | End: 2022-06-10 | Stop reason: HOSPADM

## 2022-06-06 RX ORDER — CHLORHEXIDINE GLUCONATE ORAL RINSE 1.2 MG/ML
10 SOLUTION DENTAL
Status: DISCONTINUED | OUTPATIENT
Start: 2022-06-06 | End: 2022-06-07 | Stop reason: HOSPADM

## 2022-06-06 RX ORDER — MORPHINE SULFATE 4 MG/ML
3 INJECTION, SOLUTION INTRAMUSCULAR; INTRAVENOUS
Status: DISCONTINUED | OUTPATIENT
Start: 2022-06-06 | End: 2022-06-07

## 2022-06-06 RX ORDER — METOCLOPRAMIDE HYDROCHLORIDE 5 MG/ML
5 INJECTION INTRAMUSCULAR; INTRAVENOUS EVERY 6 HOURS PRN
Status: DISCONTINUED | OUTPATIENT
Start: 2022-06-06 | End: 2022-06-10 | Stop reason: HOSPADM

## 2022-06-06 RX ADMIN — Medication 25 MCG/HR: at 08:06

## 2022-06-06 RX ADMIN — ALBUTEROL SULFATE 2 PUFF: 90 AEROSOL, METERED RESPIRATORY (INHALATION) at 12:06

## 2022-06-06 RX ADMIN — FENTANYL CITRATE 50 MCG: 50 INJECTION, SOLUTION INTRAMUSCULAR; INTRAVENOUS at 06:06

## 2022-06-06 RX ADMIN — SODIUM CHLORIDE, POTASSIUM CHLORIDE, SODIUM LACTATE AND CALCIUM CHLORIDE: 600; 310; 30; 20 INJECTION, SOLUTION INTRAVENOUS at 05:06

## 2022-06-06 RX ADMIN — OXACILLIN SODIUM 12 G: 10 INJECTION, POWDER, FOR SOLUTION INTRAVENOUS at 05:06

## 2022-06-06 RX ADMIN — MORPHINE SULFATE 30 MG: 30 TABLET, FILM COATED, EXTENDED RELEASE ORAL at 11:06

## 2022-06-06 RX ADMIN — DEXTROSE, SODIUM CHLORIDE, AND POTASSIUM CHLORIDE: 5; .45; .15 INJECTION INTRAVENOUS at 08:06

## 2022-06-06 RX ADMIN — SUCCINYLCHOLINE CHLORIDE 140 MG: 20 INJECTION, SOLUTION INTRAMUSCULAR; INTRAVENOUS at 05:06

## 2022-06-06 RX ADMIN — KETAMINE HYDROCHLORIDE 20 MG: 50 INJECTION, SOLUTION, CONCENTRATE INTRAMUSCULAR; INTRAVENOUS at 07:06

## 2022-06-06 RX ADMIN — ROCURONIUM BROMIDE 5 MG: 10 INJECTION, SOLUTION INTRAVENOUS at 05:06

## 2022-06-06 RX ADMIN — CHLORHEXIDINE GLUCONATE 0.12% ORAL RINSE 15 ML: 1.2 LIQUID ORAL at 08:06

## 2022-06-06 RX ADMIN — MORPHINE SULFATE 3 MG: 4 INJECTION INTRAVENOUS at 08:06

## 2022-06-06 RX ADMIN — THERA TABS 1 TABLET: TAB at 08:06

## 2022-06-06 RX ADMIN — HEPARIN SODIUM 20 UNITS/KG/HR: 5000 INJECTION INTRAVENOUS; SUBCUTANEOUS at 09:06

## 2022-06-06 RX ADMIN — ROCURONIUM BROMIDE 30 MG: 10 INJECTION, SOLUTION INTRAVENOUS at 07:06

## 2022-06-06 RX ADMIN — SODIUM CHLORIDE: 9 INJECTION, SOLUTION INTRAVENOUS at 07:06

## 2022-06-06 RX ADMIN — ONDANSETRON 4 MG: 2 INJECTION, SOLUTION INTRAMUSCULAR; INTRAVENOUS at 07:06

## 2022-06-06 RX ADMIN — OXYCODONE HYDROCHLORIDE AND ACETAMINOPHEN 500 MG: 500 TABLET ORAL at 08:06

## 2022-06-06 RX ADMIN — MIDAZOLAM 2 MG: 1 INJECTION INTRAMUSCULAR; INTRAVENOUS at 05:06

## 2022-06-06 RX ADMIN — ROCURONIUM BROMIDE 20 MG: 10 INJECTION, SOLUTION INTRAVENOUS at 06:06

## 2022-06-06 RX ADMIN — SODIUM CHLORIDE: 9 INJECTION, SOLUTION INTRAVENOUS at 05:06

## 2022-06-06 RX ADMIN — FENTANYL CITRATE 100 MCG: 50 INJECTION, SOLUTION INTRAMUSCULAR; INTRAVENOUS at 05:06

## 2022-06-06 RX ADMIN — DEXMEDETOMIDINE HYDROCHLORIDE 0.2 MCG/KG/HR: 4 INJECTION INTRAVENOUS at 08:06

## 2022-06-06 RX ADMIN — MUPIROCIN 1 G: 20 OINTMENT TOPICAL at 08:06

## 2022-06-06 RX ADMIN — ROCURONIUM BROMIDE 45 MG: 10 INJECTION, SOLUTION INTRAVENOUS at 05:06

## 2022-06-06 RX ADMIN — PHENYLEPHRINE HYDROCHLORIDE 100 MCG: 10 INJECTION INTRAVENOUS at 06:06

## 2022-06-06 RX ADMIN — HEPARIN SODIUM 18 UNITS/KG/HR: 5000 INJECTION INTRAVENOUS; SUBCUTANEOUS at 03:06

## 2022-06-06 RX ADMIN — OXACILLIN SODIUM 12 G: 10 INJECTION, POWDER, FOR SOLUTION INTRAVENOUS at 11:06

## 2022-06-06 RX ADMIN — PROPOFOL 150 MG: 10 INJECTION, EMULSION INTRAVENOUS at 05:06

## 2022-06-06 RX ADMIN — HEPARIN SODIUM 5000 UNITS: 5000 INJECTION INTRAVENOUS; SUBCUTANEOUS at 11:06

## 2022-06-06 RX ADMIN — ALBUTEROL SULFATE 2 PUFF: 90 AEROSOL, METERED RESPIRATORY (INHALATION) at 07:06

## 2022-06-06 RX ADMIN — ONDANSETRON 4 MG: 2 INJECTION INTRAMUSCULAR; INTRAVENOUS at 01:06

## 2022-06-06 RX ADMIN — LIDOCAINE HYDROCHLORIDE 100 MG: 20 INJECTION, SOLUTION INTRAVENOUS at 05:06

## 2022-06-06 RX ADMIN — MORPHINE SULFATE 30 MG: 30 TABLET, FILM COATED, EXTENDED RELEASE ORAL at 01:06

## 2022-06-06 NOTE — PT/OT/SLP PROGRESS
Physical Therapy  Treatment    Tianna Leon   MRN: [id number]   Admitting Diagnosis: Acute bacterial endocarditis    PT Received On: 06/06/22  PT Start Time: 0745     PT Stop Time: 0810    PT Total Time (min): 25 min       Billable Minutes:  Therapeutic Activity 15 and Therapeutic Exercise 10    Treatment Type: Treatment  PT/PTA: PT     PTA Visit Number: 0       General Precautions: Standard, fall  Orthopedic Precautions: N/A   Braces: N/A           Subjective:  Communicated with nurse Kaylee and epic chart review  prior to session.   Pt agreed to tx with max encouragement.     Pain/Comfort  Pain Rating 1: 10/10  Location 1: chest  Pain Rating Post-Intervention 1: 10/10    Objective:   Patient found with: telemetry, peripheral IV, PureWick    Functional Mobility:  PT MET IN  AND EDUCATED ON IMPORTANCE OF PARTICIPATION WITH P.T. TO PROGRESS AND FOR PT WELL BEING. P.T. ALSO EDUCATED PT THAT IF PT CONTINUED TO REFUSE TX PT WILL BE D/C FROM P.T. D/T NONCOMPLIANCE. PT REPORTED UNDERSTANDING AND AGREED TO TX. PT SUP.SIT EOB WITH SBA AND INC TIME TO COMPLETE WITH PAIN REPORTED IN CHEST. PT SEATED EOB FOR B LE TE X 10 REPS OF AP, TKE AND MIP WITH LIMITED ROM. PT STOOD WITH RW AND MIN A FOR PRE-GT MIP X 10 REPS AND SIDE STEPPED TO RIGHT X 3 STEPS. PT DECLINED OOB TO CHAIR AT THIS TIME. PT RETURNED SEATED EOB AND SUP IN BED WITH CGA. PT LEFT WITH ALL NEEDS MET AND CALL BELL IN REACH.     AM-PAC 6 CLICK MOBILITY  How much help from another person does this patient currently need?   1 = Unable, Total/Dependent Assistance  2 = A lot, Maximum/Moderate Assistance  3 = A little, Minimum/Contact Guard/Supervision  4 = None, Modified [place]/Independent    Turning over in bed (including adjusting bedclothes, sheets and blankets)?: 4  Sitting down on and standing up from a chair with arms (e.g., wheelchair, bedside commode, etc.): 3  Moving from lying on back to sitting on the side of the bed?: 4  Moving to and from a bed  to a chair (including a wheelchair)?: 3  Need to walk in hospital room?: 1  Climbing 3-5 steps with a railing?: 1  Basic Mobility Total Score: 16    AM-PAC Raw Score CMS G-Code Modifier Level of Impairment Assistance   6 % Total / Unable   7 - 9 CM 80 - 100% Maximal Assist   10 - 14 CL 60 - 80% Moderate Assist   15 - 19 CK 40 - 60% Moderate Assist   20 - 22 CJ 20 - 40% Minimal Assist   23 CI 1-20% SBA / CGA   24 CH 0% Independent/ Mod I     Patient left HOB elevated with call button in reach.    Assessment:  PT MANJIT MIN TX WITH INC ENCOURAGEMENT     Rehab identified problem list/impairments: Rehab identified problem list/impairments: weakness, impaired endurance, gait instability, impaired functional mobilty, decreased upper extremity function, decreased lower extremity function, impaired self care skills, impaired balance, decreased safety awareness, pain, decreased ROM    Rehab potential is good.    Activity tolerance: Fair    Discharge recommendations: Discharge Facility/Level of Care Needs: nursing facility, skilled     Barriers to discharge:      Equipment recommendations: Equipment Needed After Discharge:  (TBD)     GOALS:   Multidisciplinary Problems     Physical Therapy Goals        Problem: Physical Therapy    Goal Priority Disciplines Outcome Goal Variances Interventions   Physical Therapy Goal     PT, PT/OT Ongoing, Progressing     Description: LTG'S TO BE MET IN 14 DAYS (6-14-22)  1. PT WILL BE KRISTA WITH BED MOBILITY  2. PT WILL BE KRISTA WITH TF'S  3. PT WILL ' WITH RW AND SPV                   PLAN:    Patient to be seen 3 x/week  to address the above listed problems via gait training, therapeutic activities, therapeutic exercises  Plan of Care expires: 06/14/22  Plan of Care reviewed with: patient         06/06/2022

## 2022-06-06 NOTE — ANESTHESIA PROCEDURE NOTES
Intubation    Date/Time: 6/6/2022 5:42 PM  Performed by: Miles Tamez CRNA  Authorized by: Miles Tamez CRNA     Intubation:     Induction:  Intravenous    Intubated:  Postinduction    Mask Ventilation:  Easy with oral airway    Attempts:  1    Attempted By:  CRNA    Method of Intubation:  Direct    Blade:  Tomas 3    Laryngeal View Grade: Grade I - full view of cords      Difficult Airway Encountered?: No      Complications:  None    Airway Device:  Double lumen tube left    Airway Device Size:  37F    Style/Cuff Inflation:  Cuffed (inflated to minimal occlusive pressure)    Secured at:  The lips    Placement Verified By:  Capnometry and Fiber optic visualization    Complicating Factors:  None    Findings Post-Intubation:  BS equal bilateral and atraumatic/condition of teeth unchanged  Notes:      Placement verified via fiberoptic scope per dr modi

## 2022-06-06 NOTE — PLAN OF CARE
Patient continues to be monitored and treated.   Cardiac monitor remains in place. Patient running normal sinus.   Patient to remain NPO after midnight. Patient verbalizes understanding. VATS to be performed today.  IV heparin continues to infuse, presently infusing at 18units/kg. Continue to await first therapeutic reading. Next PTT to be collected at 0145.  IV abx continues to infuse.   Labs to be drawn via unit collect this a.m.  New type and screen to be collected this a.m.  Patient strongly advised to participate in physical therapy and in ADL's as much as possible. Patient verbalizes understanding but shows no signs of acceptance or eagerness to participate in care.   Comfort needs met.  Will continue to monitor and treat.

## 2022-06-06 NOTE — SUBJECTIVE & OBJECTIVE
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  Blood cultures- MSSA.  She remains bacteremic.  No MR evidence of epidural abscess.     Large, rim enhancing facet synovial cysts arising posteriorly from the right L2-L3 and left L4-L5 facet joints may be degenerative or sequelae of facet septic arthritis.     Minor enhancement and endplate irregularity at L4-L5 may be degenerative versus less likely the sequelae of spondylodiscitis.  No significant associated endplate destruction or vertebral body height loss.     Multilevel degenerative changes of the lumbar spine are most pronounced at L4-L5 with broad-based disc bulge and prominent central disc protrusion contributing to moderate to severe spinal canal stenosis with moderate left-sided neural foraminal stenosis.     Asymmetric left L5-S1 disc bulge contributes to moderate to severe left-sided neural foraminal stenosis and left lateral recess stenosis.   CT chest -Lungs/Pleura: Multiple nodular and cavitary opacities concerning for septic emboli.  Additional larger opacities most notably in the right lung base with some internal clearing possibly related to necrotizing pneumonia.  Moderate loculated right pleural fluid and no definite left pleural fluid.  Empyema not excluded.   05/12- she declined thoracic MRI    She had CT guided aspirtaion -pus aspirated  05/16 - she is drowsy but refusing more imaging .   05/19-  MRI of the thoracic region    1. No evidence for disc osteomyelitis in the thoracic spine.  No evidence for epidural abscess.  2. Loculated right hydropneumothorax and extensive bilateral pulmonary infiltrates.   T max 100.5  05/23-she had interval placemet of chest tube.  Cultures from the chest remain negative.    05/30/22-   She reports that she has poor pain control .    05/31/22- she still complains of back apin .  She is afebrile   06/01/22  She had fever -T max 101.  CT chest -  Lungs/pleura: Continued evolution of the septic  emboli with multiple cavitary nodules scattered throughout the lungs.  There are well-defined air-fluid collections in posterior aspect of the right upper and lower lobes measuring 4.5 x 9.5 cm and 5.5 x 10.0 cm, respectively.  Alternatively, this could represent loculated empyema.  These 2 collections appear to communicate.  Overall appearance of the chest is similar when compared to the recent prior CT examination.  Findings concerning for necrotizing pneumonia, as previously noted.  There is a medium sized left-sided pleural effusion with associated volume loss.     06/03- she reports hemotysis and persistent chest pain.  :06/06/22- she reeports sub optimal pain control..  Blood culture- 06/01- no growth   Review of Systems   Constitutional:  Positive for fatigue. Negative for activity change, appetite change, chills, diaphoresis, fever and unexpected weight change.   HENT: Negative.  Negative for congestion, drooling, facial swelling, rhinorrhea, sinus pressure, sneezing and sore throat.    Eyes: Negative.  Negative for discharge, redness, itching and visual disturbance.   Respiratory:  Positive for cough, chest tightness and shortness of breath. Negative for apnea, choking, wheezing and stridor.    Cardiovascular:  Negative for chest pain, palpitations and leg swelling.   Gastrointestinal: Negative.  Negative for abdominal distention, abdominal pain, anal bleeding, blood in stool, constipation, diarrhea, nausea and vomiting.   Endocrine: Negative.    Genitourinary: Negative.  Negative for decreased urine volume, difficulty urinating, dysuria, frequency, hematuria, pelvic pain, urgency, vaginal bleeding and vaginal discharge.   Musculoskeletal:  Positive for arthralgias and back pain. Negative for gait problem, joint swelling, myalgias, neck pain and neck stiffness.   Skin: Negative.  Negative for color change, pallor, rash and wound.   Allergic/Immunologic: Negative.    Neurological:  Positive for weakness.  Negative for dizziness, seizures, facial asymmetry, speech difficulty, light-headedness, numbness and headaches.   Psychiatric/Behavioral:  Negative for agitation, hallucinations and suicidal ideas.    All other systems reviewed and are negative.  Objective:     Vital Signs (Most Recent):  Temp: 98.2 °F (36.8 °C) (06/06/22 0400)  Pulse: 70 (06/06/22 0530)  Resp: 18 (06/06/22 0400)  BP: (!) 143/84 (06/06/22 0400)  SpO2: (!) 93 % (06/06/22 0400)   Vital Signs (24h Range):  Temp:  [97.8 °F (36.6 °C)-98.3 °F (36.8 °C)] 98.2 °F (36.8 °C)  Pulse:  [70-83] 70  Resp:  [16-20] 18  SpO2:  [92 %-100 %] 93 %  BP: (141-170)/(79-89) 143/84     Weight: 94.3 kg (207 lb 14.3 oz)  Body mass index is 34.6 kg/m².    Estimated Creatinine Clearance: 47.6 mL/min (A) (based on SCr of 1.8 mg/dL (H)).    Physical Exam  Vitals and nursing note reviewed. Chaperone present: -.   Constitutional:       General: She is not in acute distress.     Appearance: She is well-developed. She is not ill-appearing or toxic-appearing.   HENT:      Head: Normocephalic and atraumatic.   Eyes:      Conjunctiva/sclera: Conjunctivae normal.      Pupils: Pupils are equal, round, and reactive to light.   Cardiovascular:      Rate and Rhythm: Normal rate and regular rhythm.      Pulses: Normal pulses.      Heart sounds: Normal heart sounds. No murmur heard.  Pulmonary:      Effort: Pulmonary effort is normal. No respiratory distress.      Breath sounds: Normal breath sounds.      Comments: Conversational dyspnoea-  Abdominal:      General: Bowel sounds are normal. There is no distension.      Palpations: Abdomen is soft.      Tenderness: There is no abdominal tenderness.   Musculoskeletal:         General: Swelling present. No tenderness or deformity. Normal range of motion.      Cervical back: Normal range of motion and neck supple.      Right lower leg: No edema.      Left lower leg: No edema.   Skin:     General: Skin is warm and dry.      Coloration: Skin is  pale.      Findings: Erythema present.   Neurological:      Mental Status: She is alert and oriented to person, place, and time.      Motor: Weakness present.   Psychiatric:         Behavior: Behavior normal.       Significant Labs: Blood Culture:   Recent Labs   Lab 05/11/22  1213 05/25/22  0901 05/25/22  1220 05/25/22  1313 06/01/22  1132   LABBLOO No growth after 5 days.  No growth after 5 days. No growth after 5 days.  No growth after 5 days. No growth after 5 days. No growth after 5 days. No Growth to date  No Growth to date  No Growth to date  No Growth to date  No Growth to date  No Growth to date  No Growth to date  No Growth to date  No Growth to date  No Growth to date       BMP:   Recent Labs   Lab 06/06/22  0122   GLU 97      K 4.4      CO2 24   BUN 30*   CREATININE 1.8*   CALCIUM 7.6*       CMP:   Recent Labs   Lab 06/05/22  1006 06/06/22  0122    142   K 4.2 4.4    106   CO2 25 24    97   BUN 31* 30*   CREATININE 1.9* 1.8*   CALCIUM 7.9* 7.6*   PROT 6.0 5.7*   ALBUMIN 1.6* 1.7*   BILITOT 0.3 0.3   ALKPHOS 80 83   AST 24 21   ALT 14 14   ANIONGAP 9 12   EGFRNONAA 33* 35*         Significant Imaging: CT: I have reviewed all pertinent results/findings within the past 24 hours:

## 2022-06-06 NOTE — SUBJECTIVE & OBJECTIVE
Interval History:    06/06: seen and examined: await decortication, T max: 98.4F, 2.0 LPM    Respiratory ROS: positive for - cough  negative for - hemoptysis, sputum changes, tachypnea, or wheezing     Objective:     Vital Signs (Most Recent):  Temp: 97.7 °F (36.5 °C) (06/06/22 1204)  Pulse: 80 (06/06/22 1230)  Resp: 16 (06/06/22 1230)  BP: 136/87 (06/06/22 1204)  SpO2: 95 % (06/06/22 1230) Vital Signs (24h Range):  Temp:  [97.7 °F (36.5 °C)-98.4 °F (36.9 °C)] 97.7 °F (36.5 °C)  Pulse:  [65-83] 80  Resp:  [14-20] 16  SpO2:  [92 %-99 %] 95 %  BP: (136-170)/(81-89) 136/87     Weight: 94.3 kg (207 lb 14.3 oz)  Body mass index is 34.6 kg/m².      Intake/Output Summary (Last 24 hours) at 6/6/2022 1546  Last data filed at 6/6/2022 1459  Gross per 24 hour   Intake 240 ml   Output 2850 ml   Net -2610 ml       Physical Exam  Vitals and nursing note reviewed.   Constitutional:       Appearance: Normal appearance.      Interventions: Nasal cannula in place.       HENT:      Head: Normocephalic and atraumatic.      Nose: Nose normal.   Eyes:      Extraocular Movements: Extraocular movements intact.      Pupils: Pupils are equal, round, and reactive to light.   Cardiovascular:      Rate and Rhythm: Normal rate and regular rhythm.      Pulses: Normal pulses.      Heart sounds: Normal heart sounds.   Pulmonary:      Effort: Pulmonary effort is normal.      Breath sounds: Normal breath sounds.   Abdominal:      General: Bowel sounds are normal.      Palpations: Abdomen is soft.   Musculoskeletal:      Cervical back: Normal range of motion and neck supple.   Skin:     Comments: Multiple tattoos   Neurological:      General: No focal deficit present.      Mental Status: She is alert and oriented to person, place, and time.   Psychiatric:         Mood and Affect: Mood normal.       Vents:  Oxygen Concentration (%): 28 (06/06/22 0029)    Lines/Drains/Airways       Peripherally Inserted Central Catheter Line  Duration             PICC  Double Lumen 05/17/22 1612 right basilic 19 days              Drain  Duration             Female External Urinary Catheter 05/21/22 0700 16 days                    Significant Labs:    CBC/Anemia Profile:  Recent Labs   Lab 06/05/22  0055 06/05/22  1006 06/06/22  0122   WBC 8.21 5.21  5.21 8.43   HGB 8.6* 10.9*  10.9* 8.0*   HCT 27.1* 34.2*  34.2* 24.4*    228  228 284   MCV 83 83  83 83   RDW 16.0* 16.1*  16.1* 15.9*        Chemistries:  Recent Labs   Lab 06/05/22  1006 06/06/22  0122    142   K 4.2 4.4    106   CO2 25 24   BUN 31* 30*   CREATININE 1.9* 1.8*   CALCIUM 7.9* 7.6*   ALBUMIN 1.6* 1.7*   PROT 6.0 5.7*   BILITOT 0.3 0.3   ALKPHOS 80 83   ALT 14 14   AST 24 21       All pertinent labs within the past 24 hours have been reviewed.    Significant Imaging:  I have reviewed all pertinent imaging results/findings within the past 24 hours.    X-Ray Chest AP Portable  Narrative: EXAMINATION:  XR CHEST AP PORTABLE    CLINICAL HISTORY:  Pneumonia.  Follow-up., Pre Op; to be done on sunday 6/5/22 per dr dee;    COMPARISON:  05/30/2022 chest x-ray    FINDINGS:  A right PICC line is present.  The cardiac size is enlarged    There are patchy areas of infiltrate.  There is greater consolidation at the right lung base as compared to the previous exam.  Impression: Bilateral infiltrates with worsening consolidation right lower lobe.    Electronically signed by: Diogenes Crisostomo MD  Date:    06/05/2022  Time:    07:46

## 2022-06-06 NOTE — PT/OT/SLP PROGRESS
Occupational Therapy      Patient Name:  Tianna Leon   MRN:  [id number]    Patient not seen today secondary to PT IS CURRENTLY PREPPING  FOR SX. WILL ATTEMPT TX NEXT SESSION.  Yolande Quinones, OT  [phone]  6/6/2022

## 2022-06-06 NOTE — PROGRESS NOTES
[facility] ([facility])  Infectious Disease  Progress Note    Patient Name: Tianna Leon  MRN: [id number]  Admission Date: 5/6/2022  Length of Stay: 31 days  Attending Physician: Delmer Guzmán MD  Primary Care Provider: Spenser Campa MD    Isolation Status: Airborne and Contact and Droplet  Assessment/Plan:      * Acute bacterial endocarditis    06/03/22- will continue Oxacillin   Follow repeat blood cultures         BEE (acute kidney injury)  Will monitor renal function -]  Avoid nephrotoxic meds     COVID-19  - COVID-19 testing   - Infection Control notified     - Isolation:   - Airborne, Contact and Droplet Precautions  - Cohort patients into COVID units              - Limit visitors per hospital policy              - Consolidating lab draws, nursing care, provider visits, and interventions      - Management:  Supplemental O2 to maintain SpO2 >92%  Continuous/intermittent Pulse Ox  Albuterol treatment PRN     Will add Remdisivir    Advance Care Planning     06/06- will continue follow up and monitoring          Empyema of right pleural space  MRI of the thoracic region 0  1. No evidence for disc osteomyelitis in the thoracic spine.  No evidence for epidural abscess.  2. Loculated right hydropneumothorax and extensive bilateral pulmonary infiltrates.     Case discussed with critical care team- will consult pulmonology /CVT  On oxacillin  05/23- s/p chest tube placement -follow pulmonology .  Continue oxacillin    06/03/22- will continue oxacillin, follow CVT for VATS   05/30- will continue Oxacillin as planned.-will complete 8 weeks of therapy .    06/02/22-  Case discussed with CVT -will plan for VATS next week.  Continue Oxacillin  IR follow up for pigtail if feasible    06/06/22- will continue Oxacillin , follow CVT for VATS  Continue close monitoring .      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     Minor enhancement and endplate irregularity at L4-L5 may be degenerative versus less likely the sequelae of spondylodiscitis.  No significant associated endplate destruction or vertebral body height loss.     Will follow IR guided aspiration, will do MRI of the thoracic regime    05/12- will need spinal thoracic MRI - follow cultures from CT guided aspirate     05/16- will need mRI of the thoracic region -she wants it to be done with pain medication-will discuss with the team      05/23-will repeat blood cultures , continue oxacillin    IVDU (intravenous drug user)  This is the crux of the problem - needs drug cessation         Anticipated Disposition:     Thank you for your consult. I will follow-up with patient. Please contact us if you have any additional questions.    Sunday Hassan MD  Infectious Disease  [facility] Tele[facility] (H[facility])    Subjective:     Principal Problem:Acute bacterial endocarditis    HPI:   36 y/o. female  with a PMHx of asthma, COPD,Bipolar  , IVDU and HLD ,active IVDU  ( heroine , cocaine  and amphetamine )   : CTA chest negative for pe . Multiple nodular and cavitary opacities concerning for septic emboli with larger opacities possibly necrotizing pneumonia. CT cervical  and thoracic did not show any acute finding , CT lumbar Possible progression of degenerative changes most notable at L4-5 with moderate to severe spinal canal stenosis at this level.  Echo-  · to moderate tricuspid regurgitation.     There is a 1.3 x 1.5 cm elongated, mobile echodensity seen on the tricuspid valve consistent with vegetation. Recommend CT surgery consult.   Blood culture- staph auerus  Component      Latest Ref Rng & Units 5/8/2022 5/7/2022 5/6/2022   WBC      3.90 - 12.70 K/uL 30.90 (H) 27.52 (H) 29.13 (H)     Interval History:  37 year old woman with tricuspid endocarditis .  Blood cultures-  MSSA.  She remains bacteremic.  No MR evidence of epidural abscess.     Large, rim enhancing facet synovial cysts arising posteriorly from the right L2-L3 and left L4-L5 facet joints may be degenerative or sequelae of facet septic arthritis.     Minor enhancement and endplate irregularity at L4-L5 may be degenerative versus less likely the sequelae of spondylodiscitis.  No significant associated endplate destruction or vertebral body height loss.     Multilevel degenerative changes of the lumbar spine are most pronounced at L4-L5 with broad-based disc bulge and prominent central disc protrusion contributing to moderate to severe spinal canal stenosis with moderate left-sided neural foraminal stenosis.     Asymmetric left L5-S1 disc bulge contributes to moderate to severe left-sided neural foraminal stenosis and left lateral recess stenosis.   CT chest -Lungs/Pleura: Multiple nodular and cavitary opacities concerning for septic emboli.  Additional larger opacities most notably in the right lung base with some internal clearing possibly related to necrotizing pneumonia.  Moderate loculated right pleural fluid and no definite left pleural fluid.  Empyema not excluded.   05/12- she declined thoracic MRI    She had CT guided aspirtaion -pus aspirated  05/16 - she is drowsy but refusing more imaging .   05/19-  MRI of the thoracic region    1. No evidence for disc osteomyelitis in the thoracic spine.  No evidence for epidural abscess.  2. Loculated right hydropneumothorax and extensive bilateral pulmonary infiltrates.   T max 100.5  05/23-she had interval placemet of chest tube.  Cultures from the chest remain negative.    05/30/22-   She reports that she has poor pain control .    05/31/22- she still complains of back apin .  She is afebrile   06/01/22  She had fever -T max 101.  CT chest -  Lungs/pleura: Continued evolution of the septic emboli with multiple cavitary nodules scattered throughout the lungs.  There are  well-defined air-fluid collections in posterior aspect of the right upper and lower lobes measuring 4.5 x 9.5 cm and 5.5 x 10.0 cm, respectively.  Alternatively, this could represent loculated empyema.  These 2 collections appear to communicate.  Overall appearance of the chest is similar when compared to the recent prior CT examination.  Findings concerning for necrotizing pneumonia, as previously noted.  There is a medium sized left-sided pleural effusion with associated volume loss.     06/03- she reports hemotysis and persistent chest pain.  :06/06/22- she reeports sub optimal pain control..  Blood culture- 06/01- no growth   Review of Systems   Constitutional:  Positive for fatigue. Negative for activity change, appetite change, chills, diaphoresis, fever and unexpected weight change.   HENT: Negative.  Negative for congestion, drooling, facial swelling, rhinorrhea, sinus pressure, sneezing and sore throat.    Eyes: Negative.  Negative for discharge, redness, itching and visual disturbance.   Respiratory:  Positive for cough, chest tightness and shortness of breath. Negative for apnea, choking, wheezing and stridor.    Cardiovascular:  Negative for chest pain, palpitations and leg swelling.   Gastrointestinal: Negative.  Negative for abdominal distention, abdominal pain, anal bleeding, blood in stool, constipation, diarrhea, nausea and vomiting.   Endocrine: Negative.    Genitourinary: Negative.  Negative for decreased urine volume, difficulty urinating, dysuria, frequency, hematuria, pelvic pain, urgency, vaginal bleeding and vaginal discharge.   Musculoskeletal:  Positive for arthralgias and back pain. Negative for gait problem, joint swelling, myalgias, neck pain and neck stiffness.   Skin: Negative.  Negative for color change, pallor, rash and wound.   Allergic/Immunologic: Negative.    Neurological:  Positive for weakness. Negative for dizziness, seizures, facial asymmetry, speech difficulty,  light-headedness, numbness and headaches.   Psychiatric/Behavioral:  Negative for agitation, hallucinations and suicidal ideas.    All other systems reviewed and are negative.  Objective:     Vital Signs (Most Recent):  Temp: 98.2 °F (36.8 °C) (06/06/22 0400)  Pulse: 70 (06/06/22 0530)  Resp: 18 (06/06/22 0400)  BP: (!) 143/84 (06/06/22 0400)  SpO2: (!) 93 % (06/06/22 0400)   Vital Signs (24h Range):  Temp:  [97.8 °F (36.6 °C)-98.3 °F (36.8 °C)] 98.2 °F (36.8 °C)  Pulse:  [70-83] 70  Resp:  [16-20] 18  SpO2:  [92 %-100 %] 93 %  BP: (141-170)/(79-89) 143/84     Weight: 94.3 kg (207 lb 14.3 oz)  Body mass index is 34.6 kg/m².    Estimated Creatinine Clearance: 47.6 mL/min (A) (based on SCr of 1.8 mg/dL (H)).    Physical Exam  Vitals and nursing note reviewed. Chaperone present: -.   Constitutional:       General: She is not in acute distress.     Appearance: She is well-developed. She is not ill-appearing or toxic-appearing.   HENT:      Head: Normocephalic and atraumatic.   Eyes:      Conjunctiva/sclera: Conjunctivae normal.      Pupils: Pupils are equal, round, and reactive to light.   Cardiovascular:      Rate and Rhythm: Normal rate and regular rhythm.      Pulses: Normal pulses.      Heart sounds: Normal heart sounds. No murmur heard.  Pulmonary:      Effort: Pulmonary effort is normal. No respiratory distress.      Breath sounds: Normal breath sounds.      Comments: Conversational dyspnoea-  Abdominal:      General: Bowel sounds are normal. There is no distension.      Palpations: Abdomen is soft.      Tenderness: There is no abdominal tenderness.   Musculoskeletal:         General: Swelling present. No tenderness or deformity. Normal range of motion.      Cervical back: Normal range of motion and neck supple.      Right lower leg: No edema.      Left lower leg: No edema.   Skin:     General: Skin is warm and dry.      Coloration: Skin is pale.      Findings: Erythema present.   Neurological:      Mental Status:  She is alert and oriented to person, place, and time.      Motor: Weakness present.   Psychiatric:         Behavior: Behavior normal.       Significant Labs: Blood Culture:   Recent Labs   Lab 05/11/22  1213 05/25/22  0901 05/25/22  1220 05/25/22  1313 06/01/22  1132   LABBLOO No growth after 5 days.  No growth after 5 days. No growth after 5 days.  No growth after 5 days. No growth after 5 days. No growth after 5 days. No Growth to date  No Growth to date  No Growth to date  No Growth to date  No Growth to date  No Growth to date  No Growth to date  No Growth to date  No Growth to date  No Growth to date       BMP:   Recent Labs   Lab 06/06/22  0122   GLU 97      K 4.4      CO2 24   BUN 30*   CREATININE 1.8*   CALCIUM 7.6*       CMP:   Recent Labs   Lab 06/05/22  1006 06/06/22  0122    142   K 4.2 4.4    106   CO2 25 24    97   BUN 31* 30*   CREATININE 1.9* 1.8*   CALCIUM 7.9* 7.6*   PROT 6.0 5.7*   ALBUMIN 1.6* 1.7*   BILITOT 0.3 0.3   ALKPHOS 80 83   AST 24 21   ALT 14 14   ANIONGAP 9 12   EGFRNONAA 33* 35*         Significant Imaging: CT: I have reviewed all pertinent results/findings within the past 24 hours:

## 2022-06-06 NOTE — PLAN OF CARE
Patient resting In bed with eyes closed awaiting VATS procedure. Plan of care reviewed with patient. Verbalized understanding. Remains free of falls. Nadn. Will continue current treatment plan.

## 2022-06-06 NOTE — PLAN OF CARE
Patient had a piece of jewelry to her upper right lip that she was unable to remove.  Educated on the use of cautery and the risk of a possible burn in case of an equipment malfunction.  Patient verbalized understanding.

## 2022-06-06 NOTE — PROGRESS NOTES
[facility]  Progress Note    Patient Name: Tianna Leon  MRN: [id number]  Patient Class: IP- Inpatient   Admission Date: 5/6/2022  Length of Stay: 31 days  Attending Physician: Delmer Guzmán MD  Primary Care Provider: Spenser Campa MD        Subjective:     Principal Problem:Acute bacterial endocarditis        HPI:  38 y/o. female  with a PMHx of asthma, COPD,Bipolar  , IVDU and HLD presented to the ER with a c/o  sob for the last weak which has gotten worse . The SOB is associated with fever , chills , productive cough , back pain  and generalized weakness . She report cough some blood this am .  She is active IVDU  ( heroine , cocaine  and amphetamine ) . She denies any  sick contact , chest pain  , GI/ sx , She also complaning of LE sweeling . Knee pain and B/L LE rash .   ER COURSE: CTA chest negative for pe . Multiple nodular and cavitary opacities concerning for septic emboli with larger opacities possibly necrotizing pneumonia. CT cervical  and thoracic did not show any acute finding , CT lumbar Possible progression of degenerative changes most notable at L4-5 with moderate to severe spinal canal stenosis at this level.  WBC  29 k , na 130 , k 3.4 , cl 89 , procal 2.71   ER VS:  BP Pulse Resp Temp SpO2   (!) 124/58 110 (!) 30 (!) 101.6 °F (38.7 °C) 96 %           Pt will be admitted to inpt with a dx of PNA and severe sepsis       Overview/Hospital Course:  5/7 admitted for pneumonia, severe sepsis in setting of ivdu. Mother at bedside and provides collateral. Patient has struggled with substance abuse for approximately 20 years, worsening over last 3-5 years since life event. Onset of symptoms 1-2 weeks ago. Tolerated thoracentesis with no pneumothorax on chest x-ray. Mri lumbar and echo, pending. Bcx positive, growing gram positive cocci. On vanc, cefepime and flagyl. Infectious disease consulted for bacteremia. 5/8/22 The patient remained afebrile overnight.  Currently on cefepime/vanc/flagyl. Blood cx 5/6/22 are growing MSSA. The patient refused the MRI lumbar spine overnight d/t being unable to lay flat from back pain. Will give pain meds and attempt to get the MRI today to r/o spinal abscess. The ECHO showed a 1.3 x 1.5 cm elongated, mobile echodensity seen on the tricuspid valve consistent with vegetation, CT surgery was consulted. Will need a SUSANNAH. Infectious disease is consulted. Will repeat blood cx today. 5/9/22 No acute events overnight. The patient reports lower back pain is not well controlled with current pain regimen, will adjust. Repeat blood cx are still positive. Sensitivities are back and Staph is sensitive to oxacillin, will D/C vanc/cefepime. Cardiology consulted and plans for a SUSANNAH today. CT surgery following and recommends continuing medical management with appropriate ABX, no surgical intervention at this time. MRI lumbar spine showed moderate to severe spinal canal stenosis with moderate left-sided neural foraminal stenosis, Neurosurgery consulted. 5/10/22 No acute events overnight. The patient reports some improvement in pain with adjustment in pain meds. Repeat blood cx are +. Continue IV oxacillin and flagyl. Infectious disease is consulted. IR consulted to evaluate possible paraspinous muscle myositis versus abscess. Recommend IR drainage. Continue current management. 5/11/22 No acute events overnight. The patient reports pain is still not well controlled at times. The patient reports that she is very anxious about the upcoming SUSANNAH and IR drainage. SUSANNAH was pushed back to this afternoon because the patient ate candy this AM. Continue treatment with IV oxacillin and flagyl. Infectious disease is following. WBC trended down to 31K. Continue current management. 5/12/22 No acute events overnight. The patients WBC improved to 22K overnight. The patient remains on continuous oxacillin infusion. Infectious disease is following. MRI thoracic spine is  ordered and pending. The patients SUSANNAH was postponed again today d/t low H/H. Hgb was noted to be 6.2 this morning, will transfuse 1 unit PRBC. The patient reports pain is still not well controlled. The case was discussed with Neurosurgery who recommended adding extended release pain meds. Approximately 1 ml of purulent drainage aspirated from back yesterday per IR, growing gram + cocci.     5/13: Patient was given 1U PRBC on 5/12, improved from 6.2 --> 7.0, will give an additional unit PRBC. Radiology attempted to bring patient on 5/12 at 1755 for Thoracic MRI, patient refused due to pain and anxiety. Repeat attempt this AM, patient again refused. Will order Ativan 1mg IV to be given with pain medication prior to scan to relieve anxiety and pain, nurse to notify NP when ready to go for scan. Discussed pain management with patient, discussed transition to PO pain medications to being preparing for d/c to LTAC, adjusted PO pain regimen will reevaluate to determine effectiveness. Repeat Blood cultures 5/11/22: NGTD.     5/14: Final anaerobic cultures pending, continue to adjust pain regimen, d/c IV Dilaudid today. Patient currently managed with PO Morphine extended release and PO oxycodone. D/C plan is for LTAC once final antibiotic regimen determined. Patient continues to refuse MRI of thoracic spine.     5/15: Called to room this AM, patient had coughed up blood and mucous. Approx. 1-2 teaspoons of blood in emesis bag, ordered CXR and repeat CBC, CXR showed improvement from previous day, repeat CBC at time of incident, showed a slight decrease in Hgb: 9.7 --> 8.7, when repeated at 1500, Hgb had improved to 10. no further episodes of coughing up blood throughout day. Patient continues to c/o not having enough pain medication but continues to sleep most of the day and will fall asleep when speaking at times. Added IV Toradol to plan.     5/16: No further episodes of coughing blood overnight, patient participated with  encouragement with PT/OT today, recommendations are HH vs. SNF pending progress. Awaiting final antibiotic regimen recommendations. Anaerobic culture results finalized: negative for growth. Afebrile, WBC trending downward.     5/17: Antibiotic regimen per ID is continuous oxacillin until 7/11/22, SW notified of plan, will seek acceptance at LTAC or SNF. Patient choice is [facility]. WBC in normal range. AMS this AM, CT of head: no acute findings. Given narcan and mental status improved. Decreased narcotic dose.     5/18: Per discussion with patient, she is willing to have the MRI of thoracic spine today, will order IV pain medication and anxiety medication prior to scan. Nursing and Radiology aware of needing to premedicate for scan. Patient is more cooperative and participating with care.     5/19: Patient given premedications for MRI, MRI completed, results pending. Patient more agreeable with treatment plan and cooperating. Will need psychiatry follow-up after discharge. Hgb: 6.7, will give 1U PRBC    5/20/22: +LEON and generalized pain. Thoracic MRI showed no evidence for disc osteomyelitis in the thoracic spine and no epidural abscess, Loculated right hydropneumothorax and extensive bilateral pulmonary infiltrates. Ct chest showed septic emboli and moderate right pleural effusion.+splenic infarct  Pulmonology felt likely empyema- consulted IR for chest tube.     5/21/22: s/p right pig tail placement to suction/ drainage system per IR. 60ml pus removed initially. Fluid analysis consistent with empyema. Pleural fluid cultures pending. Now with scant serosanguinous output.+ right lateral chest pain at chest tube site. Denies any other complaints. Status update provided to pt's mother.     5/22/22: Repeatedly requesting IV Dilaudid. Toradol ordered. Afebrile, WBC normal, O2sats stable on 3lites. s/p right pig tail placement to suction/ drainage system per IR on 5/21/22- draining serous draiange with pus. Plan for LTAC  "placement     5/23/22: More cofortable today. Not getting OOB. Encouraged OOB and rationale provided. Pulmonology recommended Possible tPA and DNase in chest tube tomorrow    5/24/22: Examined OOB in chair. Encouraged pt to continue OOB. Plan for tPA and DNase in chest tube today per Pulmonology  Pt denied LTAC- will pursue SNF    5/25/22: Temp 101.7F last night. Will recheck blood cultures and UA. BP low - IVF bolus given and restarted IVF. ID re-consulted. 160ml of purulent CT drainage output. Dr. San plans to repeat tPA and DNase in chest tube again today.     5/26/22: temp 99.2. c/o of right "lung pain" when eating. Will consult speech to check swallow. +mild leukocytosis. Repeat BC 5/25/22 show NGTD. Repeat UA showed rare trichomas. Chest tube drainage remains purulent     5/27/22  Low grade temp. Downward trend of hemoglobin, related to chest tube drainage. Blood cultures remain negative. Urine culture growing E. Coli. Sensitivities are pending.     5/28/22  Urine culture returned pansenstiive. Will continue ciprofloxacin for total of 3 days then discontinue. Still awaiting SNF placement.    5/29/22 culture of pleural fluid from 5/28/22 negative. Continue CT for now.     5/30/22  CT removed yesterday by pulmonology. SNF declines. Will explore other options for IV abx with help from ID.     5/31/22  Patient has been accepted to [facility]. Will transfer today. Will follow up with pulmonology and ID at discharge. She will continue oxacillin for management of bacteremia, septic embolic, and endocarditis.  Addendum: COVID negative transfer delayed.     6/1/22  COVID positive started on remdesivir. Tmax 101.3. repeat CT scan shows evolving septic embolic/necrotizing pneumonia. Pulmonlogy recommends reconsulting CVT. ID following. Awaiting insurance approval for [facility]. Decline in hemoglobin 7.4, hold on transfusion at this time.   6/2:  CVT consult on case.  Plan for VATS procedure on Monday given patient's " COVID status.  Continue remdesivir.  Continue oxacillin.  6/3: VATS planned for Monday. Will transfuse 1 unit prbc. No further hemoptysis noted. Cont oxacillin.   6/4:  Vats procedure Monday .  H&H improved status post 1 unit packed RBC.  NPO midnight.  Continue oxacillin.  6/5:  VATS procedure tomorrow.  H&H stable.  NPO at midnight.  6/6: VATS procedure today.       Interval History: VATS procedure today. Patient denies any issues overnight. H/H stable.     Review of Systems   Constitutional:  Positive for activity change, appetite change and fatigue. Negative for chills, diaphoresis, fever and unexpected weight change.   HENT:  Negative for congestion, hearing loss, mouth sores, postnasal drip, rhinorrhea, sore throat and trouble swallowing.    Eyes:  Negative for discharge and visual disturbance.   Respiratory:  Negative for cough and chest tightness.    Cardiovascular:  Negative for chest pain, palpitations and leg swelling.   Gastrointestinal:  Negative for blood in stool, constipation, diarrhea, nausea and vomiting.   Endocrine: Negative for cold intolerance and heat intolerance.   Genitourinary:  Negative for difficulty urinating, dyspareunia, flank pain and hematuria.   Musculoskeletal:  Positive for back pain and myalgias. Negative for arthralgias.   Skin: Negative.    Neurological:  Positive for weakness. Negative for dizziness and light-headedness.   Hematological:  Negative for adenopathy. Does not bruise/bleed easily.   Psychiatric/Behavioral:  Negative for agitation, behavioral problems, confusion and sleep disturbance. The patient is nervous/anxious.    Objective:     Vital Signs (Most Recent):  Temp: 97.7 °F (36.5 °C) (06/06/22 1204)  Pulse: 80 (06/06/22 1230)  Resp: 16 (06/06/22 1230)  BP: 136/87 (06/06/22 1204)  SpO2: 95 % (06/06/22 1230) Vital Signs (24h Range):  Temp:  [97.7 °F (36.5 °C)-98.4 °F (36.9 °C)] 97.7 °F (36.5 °C)  Pulse:  [65-83] 80  Resp:  [14-20] 16  SpO2:  [92 %-99 %] 95 %  BP:  (136-170)/(81-89) 136/87     Weight: 94.3 kg (207 lb 14.3 oz)  Body mass index is 34.6 kg/m².    Intake/Output Summary (Last 24 hours) at 6/6/2022 1239  Last data filed at 6/6/2022 0600  Gross per 24 hour   Intake 240 ml   Output 1950 ml   Net -1710 ml      Physical Exam  Vitals and nursing note reviewed.   Constitutional:       General: She is not in acute distress.     Appearance: She is well-developed. She is ill-appearing.   HENT:      Head: Normocephalic and atraumatic.      Nose: Nose normal.   Eyes:      Extraocular Movements: Extraocular movements intact.   Cardiovascular:      Rate and Rhythm: Normal rate and regular rhythm.   Pulmonary:      Effort: Pulmonary effort is normal.      Breath sounds: No stridor. Rhonchi present.      Comments: Right-side  Pigtail removed  Abdominal:      General: There is no distension.   Musculoskeletal:         General: No signs of injury.      Cervical back: Normal range of motion and neck supple.   Skin:     General: Skin is dry.   Neurological:      General: No focal deficit present.      Mental Status: She is alert and oriented to person, place, and time. Mental status is at baseline.   Psychiatric:         Behavior: Behavior normal.       Significant Labs: All pertinent labs within the past 24 hours have been reviewed.    Significant Imaging: I have reviewed all pertinent imaging results/findings within the past 24 hours.      Assessment/Plan:      * Acute bacterial endocarditis  ECHO showed a 1.3 x 1.5 cm elongated, mobile echodensity seen on the tricuspid valve consistent with vegetation, CT surgery was consulted.   Due to MSSA    Cont oxacillin  ID consulted on case           BEE (acute kidney injury)  Creatinine improving        COVID-19  - COVID-19 testing   - Infection Control notified     - Isolation:   - Airborne, Contact and Droplet Precautions  - Cohort patients into COVID units  - N95 mask, wear eye protection  - 20 second hand hygiene              - Limit  visitors per hospital policy              - Consolidating lab draws, nursing care, provider visits, and interventions    - Diagnostics: (leukopenia, hyponatremia, hyperferritinemia, elevated troponin, elevated d-dimer, age, and comorbidities are significant predictors of poor clinical outcome)  CBC, CMP, Ferritin, CRP and Portable CXR    - Management:  Supplemental O2 to maintain SpO2 >92%  Telemetry  Continuous/intermittent Pulse Ox  Albuterol treatment PRN  Remdesivir added    Advance Care Planning   Patient is a full code.         Cont supportive care  remdesivir course completed      Empyema of right pleural space  Pulmonology consulted and felt likely empyema   IR consulted for chest tube      s/p right pigtail small bore chest tube placement to suction/drainage system per IR on 5/21/22. 60ml pus removed. Now with Serous drainage with pus. Fluid analysis consistent with empyema. Pleural fluid aerobic culture shows NGTD, Anaerobic culture pending    5/24/22: Dr. San plans for tPA and DNase in chest tube today.   5/25/22: 160ml of purulent CT drainage output. Dr. San plans to repeat tPA and DNase in chest tube again today.   5/26/22  Still has purulent drainage noted. Pulmonology following    6/6:  Worsening empyema noted  CTS consult on case  Plan for VATS procedure today    Normocytic anemia  Secondary to acute illness    --Daily CBC  --Transfuse with 1 unit PRBC for Hgb <7.0 or <8.0 if symptomatic   --Hgb: 6.7, give 1U PRBC    5/27/22  Hemoglobin 7.3g/dL. downward trend. Has blood tinged seropurulent drainage. Will give additional unit if continues to trend down.  5/28/22  Hemoglobin 8.0 today    6/5:  H/h stable post 1 unit prbc  Cont to monitor     Pulmonary embolism, septic  6/5/22  CT shows evolving septic emboli and necrotizing pneumonia. CVT has been re-consulted.  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 IR  consulted to evaluate possible paraspinous muscle myositis versus abscess.    --Approximately 1 ml of purulent drainage aspirated 5/11 per IR, growing MSSA  --Continue current regimen  --Oxacillin x 4 weeks total    Chronic pulmonary heart disease  - Pulmonology following       MSSA bacteremia  5/15: Blood cultures 5/11/22: NGTD     Continuous Oxacillin, EOC: 7/11/22, pending acceptance at facility, PICC line placed    6/6:  Cont on oxacillin  ID on case   Repeat blood cultures negative     IVDU (intravenous drug user)   Will ordet TTE to r/o IE  Will order MRI lumbar wwo- completed   Cont broad spectrum IVAB   --MRI thoracic spine ordered per ID, patient refused on multiple attempts    5/7   Studies pending for additional sites of infection given h/o ivdu  5/8/22  on cessation     Severe sepsis  This patient does have evidence of infective focus  My overall impression is sepsis. Vital signs were reviewed and noted in progress note.  Antibiotics given-   Antibiotics (From admission, onward)            Start     Stop Route Frequency Ordered    05/09/22 1100  oxacillin 12 g in  mL CONTINUOUS INFUSION         -- IV Every 24 hours (non-standard times) 05/09/22 0932    05/07/22 2100  mupirocin 2 % ointment         05/12 2059 Nasl 2 times daily 05/07/22 1646        Cultures were taken-   Microbiology Results (last 7 days)     Procedure Component Value Units Date/Time    Culture, Respiratory with Gram Stain [[id number]] Collected: 05/16/22 0758    Order Status: Sent Specimen: Respiratory from Sputum Updated: 05/16/22 0759    Culture, Anaerobe [[id number]] Collected: 05/11/22 1440    Order Status: Completed Specimen: Abscess from Back Updated: 05/16/22 0733     Anaerobic Culture No anaerobes isolated    Blood culture [[id number]] Collected: 05/11/22 1213    Order Status: Completed Specimen: Blood Updated: 05/16/22 0612     Blood Culture, Routine No Growth to date      No Growth to date      No Growth to date       No Growth to date      No Growth to date    Blood culture [[id number]] Collected: 05/11/22 1213    Order Status: Completed Specimen: Blood Updated: 05/16/22 0612     Blood Culture, Routine No Growth to date      No Growth to date      No Growth to date      No Growth to date      No Growth to date    Aerobic culture [[id number]]  (Abnormal)  (Susceptibility) Collected: 05/11/22 1440    Order Status: Completed Specimen: Abscess from Back Updated: 05/14/22 1057     Aerobic Bacterial Culture STAPHYLOCOCCUS AUREUS  Many      Gram stain [[id number]] Collected: 05/11/22 1440    Order Status: Completed Specimen: Abscess from Back Updated: 05/12/22 0306     Gram Stain Result Few WBC's      Few Gram positive cocci    Gram stain [[id number]]     Order Status: Canceled Specimen: Joint Fluid from Back     Blood culture [[id number]]  (Abnormal) Collected: 05/08/22 1516    Order Status: Completed Specimen: Blood from Peripheral, Right Hand Updated: 05/11/22 1009     Blood Culture, Routine Gram stain aer bottle: Gram positive cocci in clusters resembling Staph       Results called to and read back by: Chasity Raymundo RN  05/09/2022  11:31      STAPHYLOCOCCUS AUREUS  ID consult required at [facility].Rosario Gerard and Reece nathan.  For susceptibility see order #[id number]      Narrative:      Collection has been rescheduled by [email or web address] at 05/08/2022 13:22 Reason:   Pt in mri will be there after another hour [id number]  Collection has been rescheduled by [email or web address] at 05/08/2022 13:22 Reason:   Pt in mri will be there after another hour [id number]    Blood culture [[id number]]  (Abnormal) Collected: 05/08/22 1515    Order Status: Completed Specimen: Blood from Peripheral, Left Arm Updated: 05/11/22 1009     Blood Culture, Routine Gram stain aer bottle: Gram positive cocci in clusters resembling Staph      Results called to and read back by: Chasity Raymundo RN  05/09/2022  15:40      STAPHYLOCOCCUS AUREUS  ID consult required at [facility].Rosario Gerard and  [facility].  For susceptibility see order #[id number]      Narrative:      Collection has been rescheduled by [email or web address] at 05/08/2022 13:22 Reason:   Pt in mri will be there after another hour [id number]  Collection has been rescheduled by [email or web address] at 05/08/2022 13:22 Reason:   Pt in mri will be there after another hour [id number]        Latest lactate reviewed, they are-  No results for input(s): LACTATE in the last 72 hours.    Organ dysfunction indicated by Acute kidney injury  Source-  lung    Source control Achieved by-   Cont Broad spectrum IVAB     Bacteremia  Infectious disease consulted      COPD (chronic obstructive pulmonary disease)  Cont breathing tx prn  Pulmonology following       VTE Risk Mitigation (From admission, onward)         Ordered     heparin 25,000 units in dextrose 5% 250 mL (100 units/mL) infusion LOW INTENSITY nomogram - [facility]  Continuous        Question Answer Comment   Heparin Infusion Adjustment (DO NOT MODIFY ANSWER) [email or web address]\[email or web address] LOW INTENSITY nomogram for [facility] [email or web address]    Begin at (in units/kg/hr) 12        06/04/22 0825     IP VTE HIGH RISK PATIENT  Once         05/06/22 2336     Place sequential compression device  Until discontinued         05/06/22 2336                Discharge Planning   YESSICA: 5/31/2022     Code Status: Full Code   Is the patient medically ready for discharge?:     Reason for patient still in hospital (select all that apply): Patient trending condition  Discharge Plan A: Skilled Nursing Facility   Discharge Delays: None known at this time              Delmer Guzmán MD  Department of Hospital Medicine   [facility] - Telemetry ([facility])

## 2022-06-06 NOTE — ASSESSMENT & PLAN NOTE
5/15: Blood cultures 5/11/22: NGTD     Continuous Oxacillin, EOC: 7/11/22, pending acceptance at facility, PICC line placed    6/6:  Cont on oxacillin  ID on case   Repeat blood cultures negative

## 2022-06-06 NOTE — ASSESSMENT & PLAN NOTE
MRI of the thoracic region 0  1. No evidence for disc osteomyelitis in the thoracic spine.  No evidence for epidural abscess.  2. Loculated right hydropneumothorax and extensive bilateral pulmonary infiltrates.     Case discussed with critical care team- will consult pulmonology /CVT  On oxacillin  05/23- s/p chest tube placement -follow pulmonology .  Continue oxacillin    06/03/22- will continue oxacillin, follow CVT for VATS   05/30- will continue Oxacillin as planned.-will complete 8 weeks of therapy .    06/02/22-  Case discussed with CVT -will plan for VATS next week.  Continue Oxacillin  IR follow up for pigtail if feasible    06/06/22- will continue Oxacillin , follow CVT for VATS  Continue close monitoring .

## 2022-06-06 NOTE — ASSESSMENT & PLAN NOTE
Oxygen  Keep SP2> 92%  Bronchodilators  Follow up in Pulmonary for PFT to clarify Dx  5/23 O2 target sat 92-94%.  Nebs p.r.n.  5/26 O2 target sat at home not her% nebs p.r.n..  Smoking cessation   5/28 continue oxygen nebs albuterol Atrovent p.r.n.  6/1 albuterol Atrovent p.r.n.  6/2 albuterol Atrovent p.r.n..  O2 target sat 92-94%.  6/5 albuterol Atrovent p.r.n..  Oxygen keep O2 sat 92 this 94%.  Treat empyema septic emboli MSSA with IV oxacillin drip    06/06: Continue bronchodilators, Keep Spo2 > 92%, Abx CONTINOUS OXACILLIN

## 2022-06-06 NOTE — ASSESSMENT & PLAN NOTE
Medical Mx  CTS note noted  5/23 IV oxacillin.  5/24 IV oxacillin as per Infectious Disease  5/25 repeat cultures none relevant.  IV oxacillin as per Infectious Disease x4 weeks.  T-max 101.7° last  24 hours  5/26 repeat blood concern negative continue oxacillin gtt   5/27 continue oxacillin drip   5/28 oxacillin drip.  Blood culture 5/25 was negative  5/28 continue same   5/30 oxacillin total of 8 weeks.   6/3 same  06/06: continue OXACILLIN

## 2022-06-06 NOTE — ASSESSMENT & PLAN NOTE
5/20 Suspect empyema, needs chest tube placement  5/21 s/p chest tube placement, adequate expansion - will check Chest X Ray in [facility]  5/23 tPA DNase.  5/24 tPA for DNase installation via chest tube.  Monitor chest tube output.  Repeat chest x-ray in a.m..  5/25 chest tube output increased from 50 mL on average per 24 hours to 160 mL last 24 hours.  Purulent drainage noted.  Will repeat tPA plus Dnase instillation.  5/26 chest x-ray reviewed.  Continue IV oxacillin.  Right-sided pigtail in place.  Status post tPA and DNase x2.  Bloody drainage around pigtail today status post tPA and DNase yesterday.  Will hold on fibrinolysis.  Monitor chest tube output  5/27 right pigtail in place.  225 mL  Last 24 hours average of 200 mL per day.  Change pleurovac canister to better assess the color of the fluid pus versus serosanguineous.  Keep pigtail in place for now.  Ultrasound bilateral chest assess pleural effusions.  Continue oxacillin drip.  5/28 keep chest tube on the right side for now.  Repeat CRP.  Checking chemistry microbiology  on left-sided pleural fluid to rule out complicated effusion and need for pigtail.  Continue oxacillin drip.   5/29 minimal output right chest tube.  Remove chest tube.  Left-sided effusion not complicated not empyema.  Continue IV oxacillin    6/1 evolving empyema/necrosis on right lung on CT scan of the chest 06/01/2022.  Continue IV oxacillin.  Re-consult cardiothoracic surgery.  6/2 /treat COVID.  Cardiothoracic surgery input noted.  VATS procedure will be delayed till Monday next week.  Patient will be scheduled for VATS on 06/06/2022      6/3 VATS 06/06/2022 treating COVID.  06/04/2022 IV oxacillin.  VATS Monday as per CT surgery  6/5 worsening infiltrate on the right on chest x-ray today.  Plan for VATS as per CT surgery in a.m..  06/06: for VATS per CT surgery

## 2022-06-06 NOTE — PLAN OF CARE
PT AGREED TO THERAPY TODAY WITH INC ENCOURAGEMENT. PT COMPLETED BED MOBILITY WITH  CGA>SBA AND STOOD FOR PRE-GT WITH MIN A AND RW. PT DECLINED OOB TO CHAIR.

## 2022-06-06 NOTE — ASSESSMENT & PLAN NOTE
- COVID-19 testing   - Infection Control notified     - Isolation:   - Airborne, Contact and Droplet Precautions  - Cohort patients into COVID units              - Limit visitors per hospital policy              - Consolidating lab draws, nursing care, provider visits, and interventions      - Management:  Supplemental O2 to maintain SpO2 >92%  Continuous/intermittent Pulse Ox  Albuterol treatment PRN     Will add Remdisivir    Advance Care Planning     06/06- will continue follow up and monitoring

## 2022-06-06 NOTE — ASSESSMENT & PLAN NOTE
Pulmonology consulted and felt likely empyema   IR consulted for chest tube      s/p right pigtail small bore chest tube placement to suction/drainage system per IR on 5/21/22. 60ml pus removed. Now with Serous drainage with pus. Fluid analysis consistent with empyema. Pleural fluid aerobic culture shows NGTD, Anaerobic culture pending    5/24/22: Dr. San plans for tPA and DNase in chest tube today.   5/25/22: 160ml of purulent CT drainage output. Dr. San plans to repeat tPA and DNase in chest tube again today.   5/26/22  Still has purulent drainage noted. Pulmonology following    6/6:  Worsening empyema noted  CTS consult on case  Plan for VATS procedure today

## 2022-06-06 NOTE — SUBJECTIVE & OBJECTIVE
Interval History: VATS procedure today. Patient denies any issues overnight. H/H stable.     Review of Systems   Constitutional:  Positive for activity change, appetite change and fatigue. Negative for chills, diaphoresis, fever and unexpected weight change.   HENT:  Negative for congestion, hearing loss, mouth sores, postnasal drip, rhinorrhea, sore throat and trouble swallowing.    Eyes:  Negative for discharge and visual disturbance.   Respiratory:  Negative for cough and chest tightness.    Cardiovascular:  Negative for chest pain, palpitations and leg swelling.   Gastrointestinal:  Negative for blood in stool, constipation, diarrhea, nausea and vomiting.   Endocrine: Negative for cold intolerance and heat intolerance.   Genitourinary:  Negative for difficulty urinating, dyspareunia, flank pain and hematuria.   Musculoskeletal:  Positive for back pain and myalgias. Negative for arthralgias.   Skin: Negative.    Neurological:  Positive for weakness. Negative for dizziness and light-headedness.   Hematological:  Negative for adenopathy. Does not bruise/bleed easily.   Psychiatric/Behavioral:  Negative for agitation, behavioral problems, confusion and sleep disturbance. The patient is nervous/anxious.    Objective:     Vital Signs (Most Recent):  Temp: 97.7 °F (36.5 °C) (06/06/22 1204)  Pulse: 80 (06/06/22 1230)  Resp: 16 (06/06/22 1230)  BP: 136/87 (06/06/22 1204)  SpO2: 95 % (06/06/22 1230) Vital Signs (24h Range):  Temp:  [97.7 °F (36.5 °C)-98.4 °F (36.9 °C)] 97.7 °F (36.5 °C)  Pulse:  [65-83] 80  Resp:  [14-20] 16  SpO2:  [92 %-99 %] 95 %  BP: (136-170)/(81-89) 136/87     Weight: 94.3 kg (207 lb 14.3 oz)  Body mass index is 34.6 kg/m².    Intake/Output Summary (Last 24 hours) at 6/6/2022 1239  Last data filed at 6/6/2022 0600  Gross per 24 hour   Intake 240 ml   Output 1950 ml   Net -1710 ml      Physical Exam  Vitals and nursing note reviewed.   Constitutional:       General: She is not in acute distress.      Appearance: She is well-developed. She is ill-appearing.   HENT:      Head: Normocephalic and atraumatic.      Nose: Nose normal.   Eyes:      Extraocular Movements: Extraocular movements intact.   Cardiovascular:      Rate and Rhythm: Normal rate and regular rhythm.   Pulmonary:      Effort: Pulmonary effort is normal.      Breath sounds: No stridor. Rhonchi present.      Comments: Right-side  Pigtail removed  Abdominal:      General: There is no distension.   Musculoskeletal:         General: No signs of injury.      Cervical back: Normal range of motion and neck supple.   Skin:     General: Skin is dry.   Neurological:      General: No focal deficit present.      Mental Status: She is alert and oriented to person, place, and time. Mental status is at baseline.   Psychiatric:         Behavior: Behavior normal.       Significant Labs: All pertinent labs within the past 24 hours have been reviewed.    Significant Imaging: I have reviewed all pertinent imaging results/findings within the past 24 hours.

## 2022-06-06 NOTE — PROGRESS NOTES
[facility] - Telemetry ([facility])  Pulmonology  Progress Note    Patient Name: Tianna Leon  MRN: [id number]  Admission Date: 5/6/2022  Hospital Length of Stay: 31 days  Code Status: Full Code  Attending Provider: Delmer Guzmán MD  Primary Care Provider: Spenser Campa MD   Principal Problem: Acute bacterial endocarditis    Subjective:     Interval History:    06/06: seen and examined: await decortication, T max: 98.4F, 2.0 LPM    Respiratory ROS: positive for - cough  negative for - hemoptysis, sputum changes, tachypnea, or wheezing     Objective:     Vital Signs (Most Recent):  Temp: 97.7 °F (36.5 °C) (06/06/22 1204)  Pulse: 80 (06/06/22 1230)  Resp: 16 (06/06/22 1230)  BP: 136/87 (06/06/22 1204)  SpO2: 95 % (06/06/22 1230) Vital Signs (24h Range):  Temp:  [97.7 °F (36.5 °C)-98.4 °F (36.9 °C)] 97.7 °F (36.5 °C)  Pulse:  [65-83] 80  Resp:  [14-20] 16  SpO2:  [92 %-99 %] 95 %  BP: (136-170)/(81-89) 136/87     Weight: 94.3 kg (207 lb 14.3 oz)  Body mass index is 34.6 kg/m².      Intake/Output Summary (Last 24 hours) at 6/6/2022 1546  Last data filed at 6/6/2022 1459  Gross per 24 hour   Intake 240 ml   Output 2850 ml   Net -2610 ml       Physical Exam  Vitals and nursing note reviewed.   Constitutional:       Appearance: Normal appearance.      Interventions: Nasal cannula in place.       HENT:      Head: Normocephalic and atraumatic.      Nose: Nose normal.   Eyes:      Extraocular Movements: Extraocular movements intact.      Pupils: Pupils are equal, round, and reactive to light.   Cardiovascular:      Rate and Rhythm: Normal rate and regular rhythm.      Pulses: Normal pulses.      Heart sounds: Normal heart sounds.   Pulmonary:      Effort: Pulmonary effort is normal.      Breath sounds: Normal breath sounds.   Abdominal:      General: Bowel sounds are normal.      Palpations: Abdomen is soft.   Musculoskeletal:      Cervical back: Normal range of motion and neck supple.   Skin:     Comments: Multiple tattoos    Neurological:      General: No focal deficit present.      Mental Status: She is alert and oriented to person, place, and time.   Psychiatric:         Mood and Affect: Mood normal.       Vents:  Oxygen Concentration (%): 28 (06/06/22 0029)    Lines/Drains/Airways       Peripherally Inserted Central Catheter Line  Duration             PICC Double Lumen 05/17/22 1612 right basilic 19 days              Drain  Duration             Female External Urinary Catheter 05/21/22 0700 16 days                    Significant Labs:    CBC/Anemia Profile:  Recent Labs   Lab 06/05/22  0055 06/05/22  1006 06/06/22  0122   WBC 8.21 5.21  5.21 8.43   HGB 8.6* 10.9*  10.9* 8.0*   HCT 27.1* 34.2*  34.2* 24.4*    228  228 284   MCV 83 83  83 83   RDW 16.0* 16.1*  16.1* 15.9*        Chemistries:  Recent Labs   Lab 06/05/22  1006 06/06/22  0122    142   K 4.2 4.4    106   CO2 25 24   BUN 31* 30*   CREATININE 1.9* 1.8*   CALCIUM 7.9* 7.6*   ALBUMIN 1.6* 1.7*   PROT 6.0 5.7*   BILITOT 0.3 0.3   ALKPHOS 80 83   ALT 14 14   AST 24 21       All pertinent labs within the past 24 hours have been reviewed.    Significant Imaging:  I have reviewed all pertinent imaging results/findings within the past 24 hours.    X-Ray Chest AP Portable  Narrative: EXAMINATION:  XR CHEST AP PORTABLE    CLINICAL HISTORY:  Pneumonia.  Follow-up., Pre Op; to be done on sunday 6/5/22 per dr dee;    COMPARISON:  05/30/2022 chest x-ray    FINDINGS:  A right PICC line is present.  The cardiac size is enlarged    There are patchy areas of infiltrate.  There is greater consolidation at the right lung base as compared to the previous exam.  Impression: Bilateral infiltrates with worsening consolidation right lower lobe.    Electronically signed by: Diogenes Crisostomo MD  Date:    06/05/2022  Time:    07:46       ABG  No results for input(s): PH, PO2, PCO2, HCO3, BE in the last 168 hours.  Assessment/Plan:     * Acute bacterial  endocarditis  Medical Mx  CTS note noted  5/23 IV oxacillin.  5/24 IV oxacillin as per Infectious Disease  5/25 repeat cultures none relevant.  IV oxacillin as per Infectious Disease x4 weeks.  T-max 101.7° last  24 hours  5/26 repeat blood concern negative continue oxacillin gtt   5/27 continue oxacillin drip   5/28 oxacillin drip.  Blood culture 5/25 was negative  5/28 continue same   5/30 oxacillin total of 8 weeks.   6/3 same  06/06: continue OXACILLIN    Empyema of right pleural space  5/20 Suspect empyema, needs chest tube placement  5/21 s/p chest tube placement, adequate expansion - will check Chest X Ray in aam  5/23 tPA DNase.  5/24 tPA for DNase installation via chest tube.  Monitor chest tube output.  Repeat chest x-ray in a.m..  5/25 chest tube output increased from 50 mL on average per 24 hours to 160 mL last 24 hours.  Purulent drainage noted.  Will repeat tPA plus Dnase instillation.  5/26 chest x-ray reviewed.  Continue IV oxacillin.  Right-sided pigtail in place.  Status post tPA and DNase x2.  Bloody drainage around pigtail today status post tPA and DNase yesterday.  Will hold on fibrinolysis.  Monitor chest tube output  5/27 right pigtail in place.  225 mL  Last 24 hours average of 200 mL per day.  Change pleurovac canister to better assess the color of the fluid pus versus serosanguineous.  Keep pigtail in place for now.  Ultrasound bilateral chest assess pleural effusions.  Continue oxacillin drip.  5/28 keep chest tube on the right side for now.  Repeat CRP.  Checking chemistry microbiology  on left-sided pleural fluid to rule out complicated effusion and need for pigtail.  Continue oxacillin drip.   5/29 minimal output right chest tube.  Remove chest tube.  Left-sided effusion not complicated not empyema.  Continue IV oxacillin    6/1 evolving empyema/necrosis on right lung on CT scan of the chest 06/01/2022.  Continue IV oxacillin.  Re-consult cardiothoracic surgery.  6/2 /treat COVID.   Cardiothoracic surgery input noted.  VATS procedure will be delayed till Monday next week.  Patient will be scheduled for VATS on 06/06/2022      6/3 VATS 06/06/2022 treating COVID.  06/04/2022 IV oxacillin.  VATS Monday as per CT surgery  6/5 worsening infiltrate on the right on chest x-ray today.  Plan for VATS as per CT surgery in a.m..  06/06: for VATS per CT surgery      IVDU (intravenous drug user)  Cessation/rehab    outpatient rehab    COPD (chronic obstructive pulmonary disease)  Oxygen  Keep SP2> 92%  Bronchodilators  Follow up in Pulmonary for PFT to clarify Dx  5/23 O2 target sat 92-94%.  Nebs p.r.n.  5/26 O2 target sat at home not her% nebs p.r.n..  Smoking cessation   5/28 continue oxygen nebs albuterol Atrovent p.r.n.  6/1 albuterol Atrovent p.r.n.  6/2 albuterol Atrovent p.r.n..  O2 target sat 92-94%.  6/5 albuterol Atrovent p.r.n..  Oxygen keep O2 sat 92 this 94%.  Treat empyema septic emboli MSSA with IV oxacillin drip    06/06: Continue bronchodilators, Keep Spo2 > 92%, Abx CONTINOUS OXACILLIN           Alonzo Morrison MD  Pulmonology  [facility] - Telemetry ([facility])

## 2022-06-06 NOTE — ANESTHESIA PREPROCEDURE EVALUATION
06/06/2022  Tianna Loen is a 37 y.o., female.    Patient Active Problem List   Diagnosis    Panlobular emphysema    Dysmenorrhea    Female stress incontinence    Cystocele, midline    Abdominal pain    S/P laparoscopic hysterectomy    Anxiety    Bipolar 1 disorder    COPD (chronic obstructive pulmonary disease)    Degeneration of lumbosacral intervertebral disc    Borderline personality disorder    Morbid obesity with BMI of 40.0-44.9, adult    Major depressive disorder, recurrent episode, moderate    Lumbar disc disease with radiculopathy    Hypertriglyceridemia    PTSD (post-traumatic stress disorder)    Overactive bladder    Gastroesophageal reflux disease without esophagitis    Severe sepsis    IVDU (intravenous drug user)    MSSA bacteremia    Chronic pulmonary heart disease    Acute bacterial endocarditis    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    I have reviewed the NPO Status.   I have reviewed the Medications.     Review of Systems  Anesthesia Hx:  No problems with previous Anesthesia  Denies Family Hx of Anesthesia complications.   Denies Personal Hx of Anesthesia complications.   Hematology/Oncology:     Oncology Normal    -- Anemia:   EENT/Dental:EENT/Dental Normal   Cardiovascular:   Valvular problems/Murmurs ECG has been reviewed. IV drug abuse with tricuspid endocarditis Septic pulmonary emboli  Pulmonary HT    ECHO    · The left ventricle is normal in size with eccentric hypertrophy and normal systolic function.  · Normal left ventricular diastolic function.  · The estimated PA systolic pressure is 56 mmHg.  · Normal right ventricular size with  normal right ventricular systolic function.  · There is pulmonary hypertension.  · Elevated central venous pressure (15 mmHg).  · Mild pulmonic regurgitation.  · The estimated ejection fraction is 60%.  · Mild to moderate tricuspid regurgitation.      Pulmonary:   Pneumonia COPD Asthma Necrotizing COVID pneumonia in isolation  Hydropneumothorax  Septic emboli   Renal/:   Chronic Renal Disease    Hepatic/GI:   GERD    Musculoskeletal:  Spine Disorders: lumbar Degenerative disease    Neurological:  Neurology Normal    Endocrine:  Endocrine Normal    Dermatological:  Skin Normal    Psych:   Psychiatric History anxiety depression Bipolar ds  PTSD         Physical Exam  General: Alert and Oriented    Airway:  Mallampati: II   Mouth Opening: Normal  TM Distance: Normal  Tongue: Normal  Neck ROM: Normal ROM        Anesthesia Plan  Type of Anesthesia, risks & benefits discussed:    Anesthesia Type: Gen ETT  Intra-op Monitoring Plan: Art Line and Standard ASA Monitors  Induction:  IV  Informed Consent: Informed consent signed with the Patient and all parties understand the risks and agree with anesthesia plan.  All questions answered.   ASA Score: 3  Day of Surgery Review of History & Physical: I have interviewed and examined the patient. I have reviewed the patient's H&P dated:     Ready For Surgery From Anesthesia Perspective.     .

## 2022-06-07 PROBLEM — Z93.8 STATUS POST CHEST TUBE PLACEMENT: Status: ACTIVE | Noted: 2022-06-07

## 2022-06-07 LAB
ALBUMIN SERPL BCP-MCNC: 1.7 G/DL (ref 3.5–5.2)
ALP SERPL-CCNC: 88 U/L (ref 55–135)
ALT SERPL W/O P-5'-P-CCNC: 12 U/L (ref 10–44)
ANION GAP SERPL CALC-SCNC: 13 MMOL/L (ref 8–16)
APTT BLDCRRT: 31.4 SEC (ref 21–32)
AST SERPL-CCNC: 18 U/L (ref 10–40)
BASOPHILS # BLD AUTO: 0.02 K/UL (ref 0–0.2)
BASOPHILS NFR BLD: 0.2 % (ref 0–1.9)
BILIRUB SERPL-MCNC: 0.3 MG/DL (ref 0.1–1)
BUN SERPL-MCNC: 31 MG/DL (ref 6–20)
CALCIUM SERPL-MCNC: 7.9 MG/DL (ref 8.7–10.5)
CHLORIDE SERPL-SCNC: 106 MMOL/L (ref 95–110)
CO2 SERPL-SCNC: 21 MMOL/L (ref 23–29)
CREAT SERPL-MCNC: 1.9 MG/DL (ref 0.5–1.4)
DIFFERENTIAL METHOD: ABNORMAL
EOSINOPHIL # BLD AUTO: 0 K/UL (ref 0–0.5)
EOSINOPHIL NFR BLD: 0.2 % (ref 0–8)
ERYTHROCYTE [DISTWIDTH] IN BLOOD BY AUTOMATED COUNT: 16.3 % (ref 11.5–14.5)
EST. GFR  (AFRICAN AMERICAN): 38 ML/MIN/1.73 M^2
EST. GFR  (NON AFRICAN AMERICAN): 33 ML/MIN/1.73 M^2
GLUCOSE SERPL-MCNC: 113 MG/DL (ref 70–110)
HCT VFR BLD AUTO: 24.4 % (ref 37–48.5)
HGB BLD-MCNC: 7.8 G/DL (ref 12–16)
IMM GRANULOCYTES # BLD AUTO: 0.08 K/UL (ref 0–0.04)
IMM GRANULOCYTES NFR BLD AUTO: 0.7 % (ref 0–0.5)
LYMPHOCYTES # BLD AUTO: 2.5 K/UL (ref 1–4.8)
LYMPHOCYTES NFR BLD: 21.1 % (ref 18–48)
MCH RBC QN AUTO: 26.6 PG (ref 27–31)
MCHC RBC AUTO-ENTMCNC: 32 G/DL (ref 32–36)
MCV RBC AUTO: 83 FL (ref 82–98)
MONOCYTES # BLD AUTO: 0.9 K/UL (ref 0.3–1)
MONOCYTES NFR BLD: 7.5 % (ref 4–15)
NEUTROPHILS # BLD AUTO: 8.4 K/UL (ref 1.8–7.7)
NEUTROPHILS NFR BLD: 70.3 % (ref 38–73)
NRBC BLD-RTO: 0 /100 WBC
PLATELET # BLD AUTO: 339 K/UL (ref 150–450)
PMV BLD AUTO: 9.1 FL (ref 9.2–12.9)
POTASSIUM SERPL-SCNC: 4.2 MMOL/L (ref 3.5–5.1)
PROT SERPL-MCNC: 6.2 G/DL (ref 6–8.4)
RBC # BLD AUTO: 2.93 M/UL (ref 4–5.4)
SODIUM SERPL-SCNC: 140 MMOL/L (ref 136–145)
WBC # BLD AUTO: 11.92 K/UL (ref 3.9–12.7)

## 2022-06-07 PROCEDURE — 99233 PR SUBSEQUENT HOSPITAL CARE,LEVL III: ICD-10-PCS | Mod: NSCH,,, | Performed by: INTERNAL MEDICINE

## 2022-06-07 PROCEDURE — 27000207 HC ISOLATION

## 2022-06-07 PROCEDURE — 25000003 PHARM REV CODE 250: Performed by: NURSE PRACTITIONER

## 2022-06-07 PROCEDURE — 94664 DEMO&/EVAL PT USE INHALER: CPT

## 2022-06-07 PROCEDURE — 94640 AIRWAY INHALATION TREATMENT: CPT

## 2022-06-07 PROCEDURE — 80053 COMPREHEN METABOLIC PANEL: CPT | Performed by: FAMILY MEDICINE

## 2022-06-07 PROCEDURE — 94799 UNLISTED PULMONARY SVC/PX: CPT

## 2022-06-07 PROCEDURE — 25000003 PHARM REV CODE 250: Performed by: FAMILY MEDICINE

## 2022-06-07 PROCEDURE — 27000221 HC OXYGEN, UP TO 24 HOURS

## 2022-06-07 PROCEDURE — 25000003 PHARM REV CODE 250: Performed by: INTERNAL MEDICINE

## 2022-06-07 PROCEDURE — 85730 THROMBOPLASTIN TIME PARTIAL: CPT | Performed by: FAMILY MEDICINE

## 2022-06-07 PROCEDURE — 63600175 PHARM REV CODE 636 W HCPCS: Performed by: THORACIC SURGERY (CARDIOTHORACIC VASCULAR SURGERY)

## 2022-06-07 PROCEDURE — 99291 CRITICAL CARE FIRST HOUR: CPT | Mod: ,,, | Performed by: INTERNAL MEDICINE

## 2022-06-07 PROCEDURE — 99233 SBSQ HOSP IP/OBS HIGH 50: CPT | Mod: NSCH,,, | Performed by: INTERNAL MEDICINE

## 2022-06-07 PROCEDURE — 25000242 PHARM REV CODE 250 ALT 637 W/ HCPCS: Performed by: THORACIC SURGERY (CARDIOTHORACIC VASCULAR SURGERY)

## 2022-06-07 PROCEDURE — 94761 N-INVAS EAR/PLS OXIMETRY MLT: CPT

## 2022-06-07 PROCEDURE — 63600175 PHARM REV CODE 636 W HCPCS: Performed by: INTERNAL MEDICINE

## 2022-06-07 PROCEDURE — 25000003 PHARM REV CODE 250: Performed by: THORACIC SURGERY (CARDIOTHORACIC VASCULAR SURGERY)

## 2022-06-07 PROCEDURE — 99900035 HC TECH TIME PER 15 MIN (STAT)

## 2022-06-07 PROCEDURE — 21400001 HC TELEMETRY ROOM

## 2022-06-07 PROCEDURE — 99291 PR CRITICAL CARE, E/M 30-74 MINUTES: ICD-10-PCS | Mod: ,,, | Performed by: INTERNAL MEDICINE

## 2022-06-07 PROCEDURE — 85025 COMPLETE CBC W/AUTO DIFF WBC: CPT | Performed by: THORACIC SURGERY (CARDIOTHORACIC VASCULAR SURGERY)

## 2022-06-07 RX ORDER — HYDROMORPHONE HYDROCHLORIDE 1 MG/ML
1 INJECTION, SOLUTION INTRAMUSCULAR; INTRAVENOUS; SUBCUTANEOUS ONCE
Status: COMPLETED | OUTPATIENT
Start: 2022-06-07 | End: 2022-06-07

## 2022-06-07 RX ORDER — OXYCODONE HYDROCHLORIDE 5 MG/1
5 TABLET ORAL EVERY 6 HOURS PRN
Status: DISCONTINUED | OUTPATIENT
Start: 2022-06-07 | End: 2022-06-10 | Stop reason: HOSPADM

## 2022-06-07 RX ORDER — ALBUTEROL SULFATE 90 UG/1
2 AEROSOL, METERED RESPIRATORY (INHALATION) EVERY 6 HOURS
Status: DISCONTINUED | OUTPATIENT
Start: 2022-06-07 | End: 2022-06-07

## 2022-06-07 RX ORDER — ASCORBIC ACID 500 MG
500 TABLET ORAL DAILY
Status: DISCONTINUED | OUTPATIENT
Start: 2022-06-08 | End: 2022-06-10 | Stop reason: HOSPADM

## 2022-06-07 RX ORDER — CHLORHEXIDINE GLUCONATE ORAL RINSE 1.2 MG/ML
10 SOLUTION DENTAL 2 TIMES DAILY
Status: DISCONTINUED | OUTPATIENT
Start: 2022-06-07 | End: 2022-06-10 | Stop reason: HOSPADM

## 2022-06-07 RX ADMIN — HEPARIN SODIUM 5000 UNITS: 5000 INJECTION INTRAVENOUS; SUBCUTANEOUS at 05:06

## 2022-06-07 RX ADMIN — MORPHINE SULFATE 3 MG: 4 INJECTION INTRAVENOUS at 01:06

## 2022-06-07 RX ADMIN — METOCLOPRAMIDE 5 MG: 5 INJECTION, SOLUTION INTRAMUSCULAR; INTRAVENOUS at 08:06

## 2022-06-07 RX ADMIN — OXYCODONE HYDROCHLORIDE 5 MG: 5 TABLET ORAL at 12:06

## 2022-06-07 RX ADMIN — MUPIROCIN 1 G: 20 OINTMENT TOPICAL at 08:06

## 2022-06-07 RX ADMIN — OXYCODONE HYDROCHLORIDE AND ACETAMINOPHEN 500 MG: 500 TABLET ORAL at 08:06

## 2022-06-07 RX ADMIN — FAMOTIDINE 20 MG: 20 TABLET ORAL at 08:06

## 2022-06-07 RX ADMIN — TRAZODONE HYDROCHLORIDE 100 MG: 100 TABLET ORAL at 08:06

## 2022-06-07 RX ADMIN — POLYETHYLENE GLYCOL 3350 17 G: 17 POWDER, FOR SOLUTION ORAL at 08:06

## 2022-06-07 RX ADMIN — LORAZEPAM 1 MG: 1 TABLET ORAL at 01:06

## 2022-06-07 RX ADMIN — MORPHINE SULFATE 30 MG: 30 TABLET, FILM COATED, EXTENDED RELEASE ORAL at 11:06

## 2022-06-07 RX ADMIN — CHLORHEXIDINE GLUCONATE 0.12% ORAL RINSE 15 ML: 1.2 LIQUID ORAL at 08:06

## 2022-06-07 RX ADMIN — IPRATROPIUM BROMIDE AND ALBUTEROL 1 PUFF: 20; 100 SPRAY, METERED RESPIRATORY (INHALATION) at 08:06

## 2022-06-07 RX ADMIN — THERA TABS 1 TABLET: TAB at 08:06

## 2022-06-07 RX ADMIN — ONDANSETRON 8 MG: 8 TABLET, ORALLY DISINTEGRATING ORAL at 05:06

## 2022-06-07 RX ADMIN — OXACILLIN SODIUM 12 G: 10 INJECTION, POWDER, FOR SOLUTION INTRAVENOUS at 11:06

## 2022-06-07 RX ADMIN — MUPIROCIN 1 G: 20 OINTMENT TOPICAL at 09:06

## 2022-06-07 RX ADMIN — DOCUSATE SODIUM 100 MG: 100 CAPSULE, LIQUID FILLED ORAL at 08:06

## 2022-06-07 RX ADMIN — HYDROMORPHONE HYDROCHLORIDE 1 MG: 1 INJECTION, SOLUTION INTRAMUSCULAR; INTRAVENOUS; SUBCUTANEOUS at 08:06

## 2022-06-07 RX ADMIN — MORPHINE SULFATE 3 MG: 4 INJECTION INTRAVENOUS at 08:06

## 2022-06-07 RX ADMIN — MORPHINE SULFATE 3 MG: 4 INJECTION INTRAVENOUS at 10:06

## 2022-06-07 RX ADMIN — LORAZEPAM 1 MG: 1 TABLET ORAL at 08:06

## 2022-06-07 RX ADMIN — IPRATROPIUM BROMIDE 0.5 MG: 0.5 SOLUTION RESPIRATORY (INHALATION) at 12:06

## 2022-06-07 RX ADMIN — HEPARIN SODIUM 5000 UNITS: 5000 INJECTION INTRAVENOUS; SUBCUTANEOUS at 10:06

## 2022-06-07 RX ADMIN — MORPHINE SULFATE 3 MG: 4 INJECTION INTRAVENOUS at 05:06

## 2022-06-07 RX ADMIN — CHLORHEXIDINE GLUCONATE 0.12% ORAL RINSE 10 ML: 1.2 LIQUID ORAL at 08:06

## 2022-06-07 RX ADMIN — HEPARIN SODIUM 5000 UNITS: 5000 INJECTION INTRAVENOUS; SUBCUTANEOUS at 01:06

## 2022-06-07 RX ADMIN — ONDANSETRON 8 MG: 8 TABLET, ORALLY DISINTEGRATING ORAL at 04:06

## 2022-06-07 RX ADMIN — LORAZEPAM 1 MG: 1 TABLET ORAL at 12:06

## 2022-06-07 RX ADMIN — OXYCODONE HYDROCHLORIDE 5 MG: 5 TABLET ORAL at 08:06

## 2022-06-07 NOTE — TRANSFER OF CARE
"Anesthesia Transfer of Care Note    Patient: Tianna Leon    Procedure(s) Performed: Procedure(s) (LRB):  VATS (VIDEO-ASSISTED THORACOSCOPIC SURGERY) (Right)  BLOCK, NERVE, INTERCOSTAL, 2 OR MORE (Right)    Patient location: ICU    Anesthesia Type: general    Transport from OR: Continuos invasive BP monitoring in transport. Continuous SpO2 monitoring in transport. Continuous ECG monitoring in transport. Upon arrival to PACU/ICU, patient attached to ventilator and auscultated to confirm bilateral breath sounds and adequate TV    Post pain: adequate analgesia    Post assessment: no apparent anesthetic complications and tolerated procedure well    Post vital signs: stable    Level of consciousness: sedated    Nausea/Vomiting: no nausea/vomiting    Complications: none    Transfer of care protocol was followed      Last vitals:   Visit Vitals  /70 (BP Location: Left arm, Patient Position: Lying)   Pulse 77   Temp 37 °C (98.6 °F) (Oral)   Resp 19   Ht 5' 5" (1.651 m)   Wt 94.3 kg (207 lb 14.3 oz)   LMP 07/08/2019   SpO2 97%   Breastfeeding No   BMI 34.60 kg/m²     "

## 2022-06-07 NOTE — CONSULTS
Patient is being seen by RD. Will continue to provide recommendations as needed.     Thanks,   Kailey Rice RD,LDN

## 2022-06-07 NOTE — HOSPITAL COURSE
06/07/2022  The patient is postop day 1 status post VATS procedure evacuation of empyema     06/08/2022  The patient is postop day 2 status post VATS procedure for evacuation of empyema.    06/09/2022  The patient is postop day 3 status post VATS procedure for evacuation of empyema.    06/10/2022  The patient is postop day 4 status post VATS procedure for evacuation of empyema.

## 2022-06-07 NOTE — ASSESSMENT & PLAN NOTE
Oxygen  Keep SP2> 92%  Bronchodilators  Follow up in Pulmonary for PFT to clarify Dx  5/23 O2 target sat 92-94%.  Nebs p.r.n.  5/26 O2 target sat at home not her% nebs p.r.n..  Smoking cessation   5/28 continue oxygen nebs albuterol Atrovent p.r.n.  6/1 albuterol Atrovent p.r.n.  6/2 albuterol Atrovent p.r.n..  O2 target sat 92-94%.  6/5 albuterol Atrovent p.r.n..  Oxygen keep O2 sat 92 this 94%.  Treat empyema septic emboli MSSA with IV oxacillin drip    06/06: Continue bronchodilators, Keep Spo2 > 92%, Abx CONTINOUS OXACILLIN, COMBIVENT MDI

## 2022-06-07 NOTE — SUBJECTIVE & OBJECTIVE
Interval History: s/p vats procedure. Patient self extubated approx 1 am. Currently doing well. Will step down from ICU. LTAC placement pending.     Review of Systems   Constitutional:  Positive for activity change, appetite change and fatigue. Negative for chills, diaphoresis, fever and unexpected weight change.   HENT:  Negative for congestion, hearing loss, mouth sores, postnasal drip, rhinorrhea, sore throat and trouble swallowing.    Eyes:  Negative for discharge and visual disturbance.   Respiratory:  Negative for cough and chest tightness.    Cardiovascular:  Negative for chest pain, palpitations and leg swelling.   Gastrointestinal:  Negative for blood in stool, constipation, diarrhea, nausea and vomiting.   Endocrine: Negative for cold intolerance and heat intolerance.   Genitourinary:  Negative for difficulty urinating, dyspareunia, flank pain and hematuria.   Musculoskeletal:  Positive for back pain and myalgias. Negative for arthralgias.   Skin: Negative.    Neurological:  Positive for weakness. Negative for dizziness and light-headedness.   Hematological:  Negative for adenopathy. Does not bruise/bleed easily.   Psychiatric/Behavioral:  Negative for agitation, behavioral problems, confusion and sleep disturbance. The patient is nervous/anxious.    Objective:     Vital Signs (Most Recent):  Temp: 99 °F (37.2 °C) (06/07/22 0330)  Pulse: 83 (06/07/22 0900)  Resp: (!) 26 (06/07/22 0900)  BP: 122/66 (06/07/22 0900)  SpO2: 99 % (06/07/22 0900)   Vital Signs (24h Range):  Temp:  [96.8 °F (36 °C)-99.6 °F (37.6 °C)] 99 °F (37.2 °C)  Pulse:  [] 83  Resp:  [10-39] 26  SpO2:  [93 %-100 %] 99 %  BP: ()/(53-98) 122/66  Arterial Line BP: ()/() 102/49     Weight: 88.1 kg (194 lb 3.6 oz)  Body mass index is 32.32 kg/m².    Intake/Output Summary (Last 24 hours) at 6/7/2022 0935  Last data filed at 6/7/2022 0900  Gross per 24 hour   Intake 3959.75 ml   Output 3700 ml   Net 259.75 ml      Physical  Exam  Vitals and nursing note reviewed.   Constitutional:       General: She is not in acute distress.     Appearance: She is well-developed. She is ill-appearing.   HENT:      Head: Normocephalic and atraumatic.      Nose: Nose normal.   Eyes:      Extraocular Movements: Extraocular movements intact.   Cardiovascular:      Rate and Rhythm: Normal rate and regular rhythm.   Pulmonary:      Effort: Pulmonary effort is normal.      Breath sounds: No stridor. Rhonchi present.      Comments: Right sided chest tube  Abdominal:      General: There is no distension.   Musculoskeletal:         General: No signs of injury.      Cervical back: Normal range of motion and neck supple.   Skin:     General: Skin is dry.   Neurological:      General: No focal deficit present.      Mental Status: She is alert and oriented to person, place, and time. Mental status is at baseline.   Psychiatric:         Behavior: Behavior normal.       Significant Labs: All pertinent labs within the past 24 hours have been reviewed.    Significant Imaging: I have reviewed all pertinent imaging results/findings within the past 24 hours.

## 2022-06-07 NOTE — ASSESSMENT & PLAN NOTE
5/15: Blood cultures 5/11/22: NGTD     Continuous Oxacillin, EOC: 7/11/22, pending acceptance at facility, PICC line placed    6/7:  Cont on oxacillin  ID on case   Repeat blood cultures negative

## 2022-06-07 NOTE — EICU
Brief Admit Note     37 yr old female s/p VATS for Empeyema .     S/p VATS / Intubated   Endocarditis   Empeyema    CXR seen.   Goal is to extubate tonight   Hold sedation   Pain control   SCD   GI prophylaxis   Continue oxacillin for endocarditis   Monitor renal function

## 2022-06-07 NOTE — ASSESSMENT & PLAN NOTE
Pulmonology consulted and felt likely empyema   IR consulted for chest tube      s/p right pigtail small bore chest tube placement to suction/drainage system per IR on 5/21/22. 60ml pus removed. Now with Serous drainage with pus. Fluid analysis consistent with empyema. Pleural fluid aerobic culture shows NGTD, Anaerobic culture pending    5/24/22: Dr. San plans for tPA and DNase in chest tube today.   5/25/22: 160ml of purulent CT drainage output. Dr. San plans to repeat tPA and DNase in chest tube again today.   5/26/22  Still has purulent drainage noted. Pulmonology following    6/7:  Worsening empyema noted  CTS consult on case  S/p VATS procedure 6/6  Chest tubes in place

## 2022-06-07 NOTE — SUBJECTIVE & OBJECTIVE
Interval History  06/07: seen and examined : POD #1: VATS decortication, Has a line, pain 5/10, T max: 99.6F, UO 3515 mls, chamber has 210 mls, no air leak     dextrose 5 % and 0.45 % NaCl with KCl 20 mEq 25 mL/hr at 06/07/22 0900        Review of Systems   Constitutional:  Positive for malaise/fatigue.   HENT: Negative.     Eyes: Negative.    Respiratory: Negative.     Cardiovascular:  Positive for chest pain.   Gastrointestinal: Negative.    Genitourinary: Negative.    Musculoskeletal:  Positive for myalgias.   Skin: Negative.    Neurological: Negative.    Endo/Heme/Allergies: Negative.    Psychiatric/Behavioral: Negative.         Objective:     Vital Signs (Most Recent):  Temp: 99 °F (37.2 °C) (06/07/22 0330)  Pulse: 83 (06/07/22 0900)  Resp: (!) 26 (06/07/22 0900)  BP: 122/66 (06/07/22 0900)  SpO2: 99 % (06/07/22 0900) Vital Signs (24h Range):  Temp:  [96.8 °F (36 °C)-99.6 °F (37.6 °C)] 99 °F (37.2 °C)  Pulse:  [] 83  Resp:  [10-39] 26  SpO2:  [93 %-100 %] 99 %  BP: ()/(53-98) 122/66  Arterial Line BP: ()/() 102/49     Weight: 88.1 kg (194 lb 3.6 oz)  Body mass index is 32.32 kg/m².      Intake/Output Summary (Last 24 hours) at 6/7/2022 0954  Last data filed at 6/7/2022 0900  Gross per 24 hour   Intake 3959.75 ml   Output 3700 ml   Net 259.75 ml       Physical Exam  Vitals and nursing note reviewed.   Constitutional:       Appearance: Normal appearance.      Interventions: Nasal cannula in place.       HENT:      Head: Normocephalic and atraumatic.      Nose: Nose normal.   Eyes:      Extraocular Movements: Extraocular movements intact.      Pupils: Pupils are equal, round, and reactive to light.   Cardiovascular:      Rate and Rhythm: Normal rate and regular rhythm.      Pulses: Normal pulses.      Heart sounds: Normal heart sounds.   Pulmonary:      Effort: Pulmonary effort is normal.      Breath sounds: Normal breath sounds.   Abdominal:      General: Bowel sounds are normal.       Palpations: Abdomen is soft.   Musculoskeletal:      Cervical back: Normal range of motion and neck supple.   Skin:     Comments: Multiple tattoos   Neurological:      General: No focal deficit present.      Mental Status: She is alert and oriented to person, place, and time.   Psychiatric:         Mood and Affect: Mood normal.       Vents:  Vent Mode: A/C (06/06/22 2339)  Ventilator Initiated: Yes (06/06/22 2013)  Set Rate: 20 BPM (06/06/22 2339)  Vt Set: 400 mL (06/06/22 2339)  Pressure Support: 0 cmH20 (06/06/22 2339)  PEEP/CPAP: 5 cmH20 (06/06/22 2339)  Oxygen Concentration (%): 80 (06/07/22 0100)  Peak Airway Pressure: 26 cmH2O (06/06/22 2339)  Plateau Pressure: 0 cmH20 (06/06/22 2339)  Total Ve: 8.39 mL (06/06/22 2339)  F/VT Ratio<105 (RSBI): (!) 55.1 (06/06/22 2339)    Lines/Drains/Airways       Peripherally Inserted Central Catheter Line  Duration             PICC Double Lumen 05/17/22 1612 right basilic 20 days              Drain  Duration                  Urethral Catheter 06/06/22 0545 Latex;Straight-tip 16 Fr. 1 day         Y Chest Tube 1 and 2 06/06/22 1924 1 Right Pleural 36 Fr. 2 Right Other (Comment) 36 Fr. <1 day              Arterial Line  Duration             Arterial Line 06/06/22 1746 Left Radial <1 day              Peripheral Intravenous Line  Duration                  Peripheral IV - Single Lumen 06/06/22 1749 20 G Left Antecubital <1 day                    Significant Labs:    CBC/Anemia Profile:  Recent Labs   Lab 06/06/22 0122 06/06/22 2036 06/07/22  0524   WBC 8.43 15.57* 11.92   HGB 8.0* 8.9* 7.8*   HCT 24.4* 27.5* 24.4*    348 339   MCV 83 83 83   RDW 15.9* 16.0* 16.3*        Chemistries:  Recent Labs   Lab 06/05/22  1006 06/06/22 0122 06/06/22 2036 06/07/22  0524    142 143 140   K 4.2 4.4 3.8 4.2    106 107 106   CO2 25 24 19* 21*   BUN 31* 30* 27* 31*   CREATININE 1.9* 1.8* 1.8* 1.9*   CALCIUM 7.9* 7.6* 8.2* 7.9*   ALBUMIN 1.6* 1.7*  --  1.7*   PROT 6.0 5.7*  --   6.2   BILITOT 0.3 0.3  --  0.3   ALKPHOS 80 83  --  88   ALT 14 14  --  12   AST 24 21  --  18       All pertinent labs within the past 24 hours have been reviewed.    Significant Imaging:  I have reviewed all pertinent imaging results/findings within the past 24 hours.    X-Ray Chest 1 View  Narrative: EXAMINATION:  XR CHEST 1 VIEW    CLINICAL HISTORY:  follow up;    FINDINGS:  Comparison study 06/06/2022.  There has been interval removal of the endotracheal tube.  Otherwise, stable chest x-ray.    Normal size heart.  The pulmonary vasculature is congested/crowded with overall shallow breath.    Right-sided PICC line catheter tip right atrium.  There are (2) right-sided chest tubes in stable positioning.  No evidence of a pneumothorax.  Chest wall/right supraclavicular fossa subcutaneous edema is again noted.    Bilateral patchy hazy opacities perihilar distribution extending to the adjacent upper and lower lungs.  There is focal consolidation involving the right lung base, infiltrate versus atelectasis.  Suspect bilateral small layering pleural effusions with generalized bibasilar haziness.  Additionally, bibasilar consolidation/atelectasis is noted obscuring the hemidiaphragms.  Impression: Interval removal of the endotracheal tube.  No evidence of pneumothorax.  Otherwise, stable abnormal chest x-ray, detailed above.    Electronically signed by: Migel Vo MD  Date:    06/07/2022  Time:    08:52

## 2022-06-07 NOTE — ASSESSMENT & PLAN NOTE
Medical [facility]  CTS note noted  5/23 IV oxacillin.  5/24 IV oxacillin as per Infectious Disease  5/25 repeat cultures none relevant.  IV oxacillin as per Infectious Disease x4 weeks.  T-max 101.7° last  24 hours  5/26 repeat blood concern negative continue oxacillin gtt   5/27 continue oxacillin drip   5/28 oxacillin drip.  Blood culture 5/25 was negative  5/28 continue same   5/30 oxacillin total of 8 weeks.   6/3 same  06/06: continue OXACILLIN  06/07: continue OXACILLIN

## 2022-06-07 NOTE — ASSESSMENT & PLAN NOTE
ECHO showed a 1.3 x 1.5 cm elongated, mobile echodensity seen on the tricuspid valve consistent with vegetation, CT surgery was consulted.   Due to MSSA    Cont oxacillin  ID consulted on case   LTAC placement pending

## 2022-06-07 NOTE — ANESTHESIA PROCEDURE NOTES
Intubation    Date/Time: 6/6/2022 7:41 PM  Performed by: Miles Tamez CRNA  Authorized by: Raghav Marte MD     Intubation:     Attempts:  1    Method of Intubation:  Direct    Blade:  Posadas 2    Laryngeal View Grade: Grade I - full view of cords      Difficult Airway Encountered?: No      Complications:  None    Airway Device:  Oral endotracheal tube    Airway Device Size:  7.5    Style/Cuff Inflation:  Cuffed (inflated to minimal occlusive pressure)    Tube secured:  22    Secured at:  The lips    Placement Verified By:  Capnometry    Complicating Factors:  None    Findings Post-Intubation:  BS equal bilateral and atraumatic/condition of teeth unchanged

## 2022-06-07 NOTE — NURSING TRANSFER
Nursing Transfer Note      6/6/2022     Reason patient is being transferred: post op     Transfer To: ICU 11    Transfer via bed    Transfer with portable vent to O2, cardiac monitoring    Transported by OR team    Medicines sent: N/A    Any special needs or follow-up needed: n/a    Chart send with patient: Yes    Notified: mother    Patient reassessed at: 06/06/22, 2015 (date, time)    Upon arrival to floor: cardiac monitor applied, patient oriented to room, call bell in reach and bed in lowest position

## 2022-06-07 NOTE — OP NOTE
On license of [facility] ([facility])  Cardiothoracic Surgery  Operative Note    SUMMARY     Date of Procedure: 6/6/2022     Procedure: Procedure(s) (LRB):  VATS (VIDEO-ASSISTED THORACOSCOPIC SURGERY) (Right)  BLOCK, NERVE, INTERCOSTAL, 2 OR MORE (Right)    Video-assisted thoracoscopic surgery with evacuation of empyema  Surgeon(s) and Role:     * Bobby Blandon MD - Primary    Assisting Surgeon: None    Pre-Operative Diagnosis: Empyema of right pleural space [J86.9]    Post-Operative Diagnosis: Post-Op Diagnosis Codes:     * Empyema of right pleural space [J86.9]    Anesthesia: General    Operative Findings (including complications, if any):  Dense well vascularized adhesions between the lung and the pleura.  Well developed an adherent rind to visceral and parietal pleura.  200 cc of bloody purulent material.    Description of Technical Procedures:  The patient was placed in the right lateral decubitus position.  The patient was then prepped and draped sterilely.  A 1 cm incision was then made in the 8th intercostal space posteriorly.  This was then carried down with cautery until the pleural space was entered.  Dense adhesions were then encountered.  This were then taken down bluntly.  A trocar was then introduced.  Through the trocar a 2nd 1 cm incision was then made in the 7th intercostal space mid axillary line.  And a 3rd incision was made in the anterior axillary line the 5th intercostal space.  Instruments and the thoracoscope was then introduced through this incisions with trocars.  Inspection of the pleura showed multiple dense well developed vascular and a at Eileen adhesions between the parietal and visceral pleura.  This was then taken down carefully.  Several pockets of bloody purulent liquid were then entered.  To a total of about 200 mL.  The rind on the parietal pleural was then stripped carefully.  At this point the patient was not tolerating single lung ventilation.  The right lung was then re-expanded.   CO2 insufflation was then started into the pleural space.  An attempt was made to remove the rind on the lower lobe.  However, the rind on the lower lobe parietal pleura was severely adherent and did not pale a wave from the lower lobe.  Further attempts at removing this rind was likely to lead to left lung injury.  The decision was then made to fully re-expand the right lung.  The right lung was observed to expand adequately in the lower lobe.  The decision was then made to irrigate out the pleural space with warm saline solutions.  Two hundred thirty-six Azeri chest tubes were then inserted into the mid axillary line incisions.  The posterior incision was closed with 2 0 Vicryl sutures to the fascia and 4 Monocryl sutures to the skin.      Significant Surgical Tasks Conducted by the Assistant(s), if Applicable: n/a    Estimated Blood Loss (EBL):  250 mL         Implants: * No implants in log *    Specimens:   Specimen (24h ago, onward)             Start     Ordered    06/06/22 1933  Specimen to Pathology, Surgery Cardiovascular  Once        Comments: Pre-op Diagnosis: Empyema of right pleural space [J86.9]Procedure(s): RIGHT VATS (VIDEO-ASSISTED THORACOSCOPIC SURGERY); BLOCK, NERVE, INTERCOSTAL, 2 OR MORE Number of specimens:     1Name of specimens:  PLEURAL RIND     References:    Click here for ordering Quick Tip   Question Answer Comment   Procedure Type: Cardiovascular    Specimen Class: Routine/Screening    Which provider would you like to cc? ELLIS RAND    Release to patient Immediate        06/06/22 1940                        Condition: Stable    Disposition: ICU - intubated and hemodynamically stable.    Attestation: I performed the procedure.

## 2022-06-07 NOTE — PROGRESS NOTES
[facility] - Intensive Care ([facility])  Cardiothoracic Surgery  Progress Note    Patient Name: Tianna Leon  MRN: [id number]  Admission Date: 5/6/2022  Hospital Length of Stay: 32 days  Code Status: Full Code   Attending Physician: Delmer Guzmán MD   Referring Provider: Self, Aaareferral  Principal Problem:Acute bacterial endocarditis            Subjective:     Post-Op Info:  Procedure(s) (LRB):  VATS (VIDEO-ASSISTED THORACOSCOPIC SURGERY) (Right)  BLOCK, NERVE, INTERCOSTAL, 2 OR MORE (Right)   1 Day Post-Op     Interval History: The patient is postop day 1 status post VATS procedure evacuation of empyema     ROS  Medications:  Continuous Infusions:   dextrose 5 % and 0.45 % NaCl with KCl 20 mEq 25 mL/hr at 06/07/22 0900     Scheduled Meds:   [START ON 6/8/2022] ascorbic acid (vitamin C)  500 mg Oral Daily    chlorhexidine  10 mL Mouth/Throat BID    docusate sodium  100 mg Oral BID    famotidine  20 mg Oral Daily    heparin (porcine)  5,000 Units Subcutaneous Q8H    ipratropium-albuteroL  1 puff Inhalation Q6H    morphine  30 mg Oral Q12H    multivitamin  1 tablet Oral Daily    mupirocin  1 g Nasal BID    oxacillin 12 g in  mL CONTINUOUS INFUSION  12 g Intravenous Q24H    polyethylene glycol  17 g Oral Daily    traZODone  100 mg Oral QHS     PRN Meds:sodium chloride, sodium chloride, sodium chloride, acetaminophen, bisacodyL, dextrose 10%, dextrose 10%, glucagon (human recombinant), glucose, glucose, hydrALAZINE, LORazepam, metoclopramide HCl, morphine, naloxone, ondansetron, oxyCODONE, pneumoc 20-susan conj-dip cr(PF), sodium chloride 0.9%, sodium chloride 0.9%, sodium chloride 0.9%     Objective:     Vital Signs (Most Recent):  Temp: 99 °F (37.2 °C) (06/07/22 0330)  Pulse: 83 (06/07/22 0900)  Resp: (!) 26 (06/07/22 0900)  BP: 122/66 (06/07/22 0900)  SpO2: 99 % (06/07/22 0900)   Vital Signs (24h Range):  Temp:  [96.8 °F (36 °C)-99.6 °F (37.6 °C)] 99 °F (37.2 °C)  Pulse:  [] 83  Resp:  [10-39]  26  SpO2:  [93 %-100 %] 99 %  BP: ()/(53-98) 122/66  Arterial Line BP: ()/() 102/49     Weight: 88.1 kg (194 lb 3.6 oz)  Body mass index is 32.32 kg/m².    SpO2: 99 %  O2 Device (Oxygen Therapy): nasal cannula    Intake/Output - Last 3 Shifts         06/05 0700  06/06 0659 06/06 0700  06/07 0659 06/07 0700  06/08 0659    P.O. 600 480     I.V. (mL/kg)  275.9 (3.1) 75 (0.9)    IV Piggyback  3066.4 62.5    Total Intake(mL/kg) 600 (6.4) 3822.3 (43.4) 137.4 (1.6)    Urine (mL/kg/hr) 3050 (1.3) 3515 (1.7) 225 (0.9)    Stool 0      Chest Tube  250 10    Total Output 3050 3765 235    Net -2450 +57.3 -97.6           Stool Occurrence 0 x              Lines/Drains/Airways       Peripherally Inserted Central Catheter Line  Duration             PICC Double Lumen 05/17/22 1612 right basilic 20 days              Drain  Duration                  Urethral Catheter 06/06/22 0545 Latex;Straight-tip 16 Fr. 1 day         Y Chest Tube 1 and 2 06/06/22 1924 1 Right Pleural 36 Fr. 2 Right Other (Comment) 36 Fr. <1 day              Arterial Line  Duration             Arterial Line 06/06/22 1746 Left Radial <1 day              Peripheral Intravenous Line  Duration                  Peripheral IV - Single Lumen 06/06/22 1749 20 G Left Antecubital <1 day                    Physical Exam  Constitutional:       Appearance: Normal appearance.   HENT:      Head: Normocephalic and atraumatic.   Cardiovascular:      Rate and Rhythm: Normal rate and regular rhythm.      Pulses: Normal pulses.      Heart sounds: Normal heart sounds.   Pulmonary:      Effort: Pulmonary effort is normal.      Breath sounds: Normal breath sounds.   Abdominal:      General: Abdomen is flat.      Palpations: Abdomen is soft.   Musculoskeletal:      Right lower leg: No edema.      Left lower leg: No edema.   Skin:     General: Skin is warm and dry.   Neurological:      Mental Status: She is alert and oriented to person, place, and time.   Psychiatric:          Behavior: Behavior normal.       Significant Labs:  All pertinent labs from the last 24 hours have been reviewed.    Significant Diagnostics:  I have reviewed all pertinent imaging results/findings within the past 24 hours.    Assessment/Plan:     Empyema of right pleural space  06/07/2022  The patient is postop day 1 status post thoracoscopic evaporation of right empyema.  Patient was admitted to the ICU for close monitoring.  Patient is currently extubated.  Patient is afebrile and white count is 11.92.  Continue chest tubes to suction.  Chest tubes have a total drainage of 250 mL since surgery.  Continue patient on antibiotics  Patient will be transferred to step-down unit.        Bobby Blandon MD  Cardiothoracic Surgery  [facility] - Intensive Care ([facility])

## 2022-06-07 NOTE — PLAN OF CARE
Pt AAOx4. Vitals stable throughout shift. Normal sinus on cardiac monitor. Hemodynamically stable. Afebrile. O2 sats stable on 2 liter nasal cannula. Chest tube in secure and in place. Portillo in place with adequate urine output. Ambulated to chair. Tolerated well. Diet ordered and pt tolerating well.  Complaints of pain and nausea today treated with PRN. See MATTHEW Brian updated at bedside. Suction at bedside. Pt educated on the importance of mobility as it relates post op outcomes. Will continue to educate and encourage pt to be mobile and turn and cough. Emergency equipment at bedside. All alarms active and audible. Will continue to monitor.   Problem: Adult Inpatient Plan of Care  Goal: Plan of Care Review  Outcome: Ongoing, Progressing     Problem: Adult Inpatient Plan of Care  Goal: Absence of Hospital-Acquired Illness or Injury  Outcome: Ongoing, Progressing     Problem: Skin Injury Risk Increased  Goal: Skin Health and Integrity  Outcome: Ongoing, Progressing

## 2022-06-07 NOTE — PROGRESS NOTES
Patient extubated herself. She was placed on a Nasal Cannula and monitored to assure she could maintain adequate RR and Spo2

## 2022-06-07 NOTE — ANESTHESIA POSTPROCEDURE EVALUATION
Anesthesia Post Evaluation    Patient: Tianna Leon    Procedure(s) Performed: Procedure(s) (LRB):  VATS (VIDEO-ASSISTED THORACOSCOPIC SURGERY) (Right)  BLOCK, NERVE, INTERCOSTAL, 2 OR MORE (Right)    Final Anesthesia Type: general      Patient location during evaluation: ICU  Patient participation: No - Unable to Participate, Intubation  Level of consciousness: sedated  Post-procedure vital signs: reviewed and stable  Pain management: adequate  Airway patency: patent    PONV status at discharge: No PONV  Anesthetic complications: no      Cardiovascular status: blood pressure returned to baseline and hemodynamically stable  Respiratory status: intubated and ventilator  Hydration status: euvolemic  Follow-up not needed.          Vitals Value Taken Time   /70 06/06/22 1619   Temp 37 °C (98.6 °F) 06/06/22 1619   Pulse 77 06/06/22 1619   Resp 19 06/06/22 1619   SpO2 97 % 06/06/22 1619         No case tracking events are documented in the log.      Pain/Ginger Score: Pain Rating Prior to Med Admin: 6 (6/6/2022 11:30 AM)  Pain Rating Post Med Admin: 5 (6/6/2022  2:20 AM)

## 2022-06-07 NOTE — PROGRESS NOTES
[facility] - Intensive Care ([facility])  [facility] Medicine  Progress Note    Patient Name: Tianna Leon  MRN: [id number]  Patient Class: IP- Inpatient   Admission Date: 5/6/2022  Length of Stay: 32 days  Attending Physician: Delmer Guzmán MD  Primary Care Provider: Spenser Campa MD        Subjective:     Principal Problem:Acute bacterial endocarditis        HPI:  38 y/o. female  with a PMHx of asthma, COPD,Bipolar  , IVDU and HLD presented to the ER with a c/o  sob for the last weak which has gotten worse . The SOB is associated with fever , chills , productive cough , back pain  and generalized weakness . She report cough some blood this am .  She is active IVDU  ( heroine , cocaine  and amphetamine ) . She denies any  sick contact , chest pain  , GI/ sx , She also complaning of LE sweeling . Knee pain and B/L LE rash .   ER COURSE: CTA chest negative for pe . Multiple nodular and cavitary opacities concerning for septic emboli with larger opacities possibly necrotizing pneumonia. CT cervical  and thoracic did not show any acute finding , CT lumbar Possible progression of degenerative changes most notable at L4-5 with moderate to severe spinal canal stenosis at this level.  WBC  29 k , na 130 , k 3.4 , cl 89 , procal 2.71   ER VS:  BP Pulse Resp Temp SpO2   (!) 124/58 110 (!) 30 (!) 101.6 °F (38.7 °C) 96 %           Pt will be admitted to inpt with a dx of PNA and severe sepsis       Overview/Hospital Course:  5/7 admitted for pneumonia, severe sepsis in setting of ivdu. Mother at bedside and provides collateral. Patient has struggled with substance abuse for approximately 20 years, worsening over last 3-5 years since life event. Onset of symptoms 1-2 weeks ago. Tolerated thoracentesis with no pneumothorax on chest x-ray. Mri lumbar and echo, pending. Bcx positive, growing gram positive cocci. On vanc, cefepime and flagyl. Infectious disease consulted for bacteremia. 5/8/22 The patient remained afebrile overnight.  Currently on cefepime/vanc/flagyl. Blood cx 5/6/22 are growing MSSA. The patient refused the MRI lumbar spine overnight d/t being unable to lay flat from back pain. Will give pain meds and attempt to get the MRI today to r/o spinal abscess. The ECHO showed a 1.3 x 1.5 cm elongated, mobile echodensity seen on the tricuspid valve consistent with vegetation, CT surgery was consulted. Will need a SUSANNHA. Infectious disease is consulted. Will repeat blood cx today. 5/9/22 No acute events overnight. The patient reports lower back pain is not well controlled with current pain regimen, will adjust. Repeat blood cx are still positive. Sensitivities are back and Staph is sensitive to oxacillin, will D/C vanc/cefepime. Cardiology consulted and plans for a SUSANNAH today. CT surgery following and recommends continuing medical management with appropriate ABX, no surgical intervention at this time. MRI lumbar spine showed moderate to severe spinal canal stenosis with moderate left-sided neural foraminal stenosis, Neurosurgery consulted. 5/10/22 No acute events overnight. The patient reports some improvement in pain with adjustment in pain meds. Repeat blood cx are +. Continue IV oxacillin and flagyl. Infectious disease is consulted. IR consulted to evaluate possible paraspinous muscle myositis versus abscess. Recommend IR drainage. Continue current management. 5/11/22 No acute events overnight. The patient reports pain is still not well controlled at times. The patient reports that she is very anxious about the upcoming SUSANNAH and IR drainage. SUASNNAH was pushed back to this afternoon because the patient ate candy this AM. Continue treatment with IV oxacillin and flagyl. Infectious disease is following. WBC trended down to 31K. Continue current management. 5/12/22 No acute events overnight. The patients WBC improved to 22K overnight. The patient remains on continuous oxacillin infusion. Infectious disease is following. MRI thoracic spine is  ordered and pending. The patients SUSANNAH was postponed again today d/t low H/H. Hgb was noted to be 6.2 this morning, will transfuse 1 unit PRBC. The patient reports pain is still not well controlled. The case was discussed with Neurosurgery who recommended adding extended release pain meds. Approximately 1 ml of purulent drainage aspirated from back yesterday per IR, growing gram + cocci.     5/13: Patient was given 1U PRBC on 5/12, improved from 6.2 --> 7.0, will give an additional unit PRBC. Radiology attempted to bring patient on 5/12 at 1755 for Thoracic MRI, patient refused due to pain and anxiety. Repeat attempt this AM, patient again refused. Will order Ativan 1mg IV to be given with pain medication prior to scan to relieve anxiety and pain, nurse to notify NP when ready to go for scan. Discussed pain management with patient, discussed transition to PO pain medications to being preparing for d/c to LTAC, adjusted PO pain regimen will reevaluate to determine effectiveness. Repeat Blood cultures 5/11/22: NGTD.     5/14: Final anaerobic cultures pending, continue to adjust pain regimen, d/c IV Dilaudid today. Patient currently managed with PO Morphine extended release and PO oxycodone. D/C plan is for LTAC once final antibiotic regimen determined. Patient continues to refuse MRI of thoracic spine.     5/15: Called to room this AM, patient had coughed up blood and mucous. Approx. 1-2 teaspoons of blood in emesis bag, ordered CXR and repeat CBC, CXR showed improvement from previous day, repeat CBC at time of incident, showed a slight decrease in Hgb: 9.7 --> 8.7, when repeated at 1500, Hgb had improved to 10. no further episodes of coughing up blood throughout day. Patient continues to c/o not having enough pain medication but continues to sleep most of the day and will fall asleep when speaking at times. Added IV Toradol to plan.     5/16: No further episodes of coughing blood overnight, patient participated with  encouragement with PT/OT today, recommendations are HH vs. SNF pending progress. Awaiting final antibiotic regimen recommendations. Anaerobic culture results finalized: negative for growth. Afebrile, WBC trending downward.     5/17: Antibiotic regimen per ID is continuous oxacillin until 7/11/22, SW notified of plan, will seek acceptance at LTAC or SNF. Patient choice is [facility]. WBC in normal range. AMS this AM, CT of head: no acute findings. Given narcan and mental status improved. Decreased narcotic dose.     5/18: Per discussion with patient, she is willing to have the MRI of thoracic spine today, will order IV pain medication and anxiety medication prior to scan. Nursing and Radiology aware of needing to premedicate for scan. Patient is more cooperative and participating with care.     5/19: Patient given premedications for MRI, MRI completed, results pending. Patient more agreeable with treatment plan and cooperating. Will need psychiatry follow-up after discharge. Hgb: 6.7, will give 1U PRBC    5/20/22: +LEON and generalized pain. Thoracic MRI showed no evidence for disc osteomyelitis in the thoracic spine and no epidural abscess, Loculated right hydropneumothorax and extensive bilateral pulmonary infiltrates. Ct chest showed septic emboli and moderate right pleural effusion.+splenic infarct  Pulmonology felt likely empyema- consulted IR for chest tube.     5/21/22: s/p right pig tail placement to suction/ drainage system per IR. 60ml pus removed initially. Fluid analysis consistent with empyema. Pleural fluid cultures pending. Now with scant serosanguinous output.+ right lateral chest pain at chest tube site. Denies any other complaints. Status update provided to pt's mother.     5/22/22: Repeatedly requesting IV Dilaudid. Toradol ordered. Afebrile, WBC normal, O2sats stable on 3lites. s/p right pig tail placement to suction/ drainage system per IR on 5/21/22- draining serous draiange with pus. Plan for LTAC  "placement     5/23/22: More cofortable today. Not getting OOB. Encouraged OOB and rationale provided. Pulmonology recommended Possible tPA and DNase in chest tube tomorrow    5/24/22: Examined OOB in chair. Encouraged pt to continue OOB. Plan for tPA and DNase in chest tube today per Pulmonology  Pt denied LTAC- will pursue SNF    5/25/22: Temp 101.7F last night. Will recheck blood cultures and UA. BP low - IVF bolus given and restarted IVF. ID re-consulted. 160ml of purulent CT drainage output. Dr. San plans to repeat tPA and DNase in chest tube again today.     5/26/22: temp 99.2. c/o of right "lung pain" when eating. Will consult speech to check swallow. +mild leukocytosis. Repeat BC 5/25/22 show NGTD. Repeat UA showed rare trichomas. Chest tube drainage remains purulent     5/27/22  Low grade temp. Downward trend of hemoglobin, related to chest tube drainage. Blood cultures remain negative. Urine culture growing E. Coli. Sensitivities are pending.     5/28/22  Urine culture returned pansenstiive. Will continue ciprofloxacin for total of 3 days then discontinue. Still awaiting SNF placement.    5/29/22 culture of pleural fluid from 5/28/22 negative. Continue CT for now.     5/30/22  CT removed yesterday by pulmonology. SNF declines. Will explore other options for IV abx with help from ID.     5/31/22  Patient has been accepted to [facility]. Will transfer today. Will follow up with pulmonology and ID at discharge. She will continue oxacillin for management of bacteremia, septic embolic, and endocarditis.  Addendum: COVID negative transfer delayed.     6/1/22  COVID positive started on remdesivir. Tmax 101.3. repeat CT scan shows evolving septic embolic/necrotizing pneumonia. Pulmonlogy recommends reconsulting CVT. ID following. Awaiting insurance approval for [facility]. Decline in hemoglobin 7.4, hold on transfusion at this time.   6/2:  CVT consult on case.  Plan for VATS procedure on Monday given patient's " COVID status.  Continue remdesivir.  Continue oxacillin.  6/3: VATS planned for Monday. Will transfuse 1 unit prbc. No further hemoptysis noted. Cont oxacillin.   6/4:  Vats procedure Monday .  H&H improved status post 1 unit packed RBC.  NPO midnight.  Continue oxacillin.  6/5:  VATS procedure tomorrow.  H&H stable.  NPO at midnight.  6/6: VATS procedure today.   6/7: s/p vats procedure. Patient self extubated approx 1 am. Currently doing well. Will step down from ICU. LTAC placement pending.       Interval History: s/p vats procedure. Patient self extubated approx 1 am. Currently doing well. Will step down from ICU. LTAC placement pending.     Review of Systems   Constitutional:  Positive for activity change, appetite change and fatigue. Negative for chills, diaphoresis, fever and unexpected weight change.   HENT:  Negative for congestion, hearing loss, mouth sores, postnasal drip, rhinorrhea, sore throat and trouble swallowing.    Eyes:  Negative for discharge and visual disturbance.   Respiratory:  Negative for cough and chest tightness.    Cardiovascular:  Negative for chest pain, palpitations and leg swelling.   Gastrointestinal:  Negative for blood in stool, constipation, diarrhea, nausea and vomiting.   Endocrine: Negative for cold intolerance and heat intolerance.   Genitourinary:  Negative for difficulty urinating, dyspareunia, flank pain and hematuria.   Musculoskeletal:  Positive for back pain and myalgias. Negative for arthralgias.   Skin: Negative.    Neurological:  Positive for weakness. Negative for dizziness and light-headedness.   Hematological:  Negative for adenopathy. Does not bruise/bleed easily.   Psychiatric/Behavioral:  Negative for agitation, behavioral problems, confusion and sleep disturbance. The patient is nervous/anxious.    Objective:     Vital Signs (Most Recent):  Temp: 99 °F (37.2 °C) (06/07/22 0330)  Pulse: 83 (06/07/22 0900)  Resp: (!) 26 (06/07/22 0900)  BP: 122/66 (06/07/22  0900)  SpO2: 99 % (06/07/22 0900)   Vital Signs (24h Range):  Temp:  [96.8 °F (36 °C)-99.6 °F (37.6 °C)] 99 °F (37.2 °C)  Pulse:  [] 83  Resp:  [10-39] 26  SpO2:  [93 %-100 %] 99 %  BP: ()/(53-98) 122/66  Arterial Line BP: ()/() 102/49     Weight: 88.1 kg (194 lb 3.6 oz)  Body mass index is 32.32 kg/m².    Intake/Output Summary (Last 24 hours) at 6/7/2022 0935  Last data filed at 6/7/2022 0900  Gross per 24 hour   Intake 3959.75 ml   Output 3700 ml   Net 259.75 ml      Physical Exam  Vitals and nursing note reviewed.   Constitutional:       General: She is not in acute distress.     Appearance: She is well-developed. She is ill-appearing.   HENT:      Head: Normocephalic and atraumatic.      Nose: Nose normal.   Eyes:      Extraocular Movements: Extraocular movements intact.   Cardiovascular:      Rate and Rhythm: Normal rate and regular rhythm.   Pulmonary:      Effort: Pulmonary effort is normal.      Breath sounds: No stridor. Rhonchi present.      Comments: Right sided chest tube  Abdominal:      General: There is no distension.   Musculoskeletal:         General: No signs of injury.      Cervical back: Normal range of motion and neck supple.   Skin:     General: Skin is dry.   Neurological:      General: No focal deficit present.      Mental Status: She is alert and oriented to person, place, and time. Mental status is at baseline.   Psychiatric:         Behavior: Behavior normal.       Significant Labs: All pertinent labs within the past 24 hours have been reviewed.    Significant Imaging: I have reviewed all pertinent imaging results/findings within the past 24 hours.      Assessment/Plan:      * Acute bacterial endocarditis  ECHO showed a 1.3 x 1.5 cm elongated, mobile echodensity seen on the tricuspid valve consistent with vegetation, CT surgery was consulted.   Due to MSSA    Cont oxacillin  ID consulted on case   LTAC placement pending        BEE (acute kidney injury)  Creatinine  improving        COVID-19  - COVID-19 testing   - Infection Control notified     - Isolation:   - Airborne, Contact and Droplet Precautions  - Cohort patients into COVID units  - N95 mask, wear eye protection  - 20 second hand hygiene              - Limit visitors per hospital policy              - Consolidating lab draws, nursing care, provider visits, and interventions    - Diagnostics: (leukopenia, hyponatremia, hyperferritinemia, elevated troponin, elevated d-dimer, age, and comorbidities are significant predictors of poor clinical outcome)  CBC, CMP, Ferritin, CRP and Portable CXR    - Management:  Supplemental O2 to maintain SpO2 >92%  Telemetry  Continuous/intermittent Pulse Ox  Albuterol treatment PRN  Remdesivir added    Advance Care Planning   Patient is a full code.         Cont supportive care  remdesivir course completed      Empyema of right pleural space  Pulmonology consulted and felt likely empyema   IR consulted for chest tube      s/p right pigtail small bore chest tube placement to suction/drainage system per IR on 5/21/22. 60ml pus removed. Now with Serous drainage with pus. Fluid analysis consistent with empyema. Pleural fluid aerobic culture shows NGTD, Anaerobic culture pending    5/24/22: Dr. San plans for tPA and DNase in chest tube today.   5/25/22: 160ml of purulent CT drainage output. Dr. San plans to repeat tPA and DNase in chest tube again today.   5/26/22  Still has purulent drainage noted. Pulmonology following    6/7:  Worsening empyema noted  CTS consult on case  S/p VATS procedure 6/6  Chest tubes in place    Normocytic anemia  Secondary to acute illness    --Daily CBC  --Transfuse with 1 unit PRBC for Hgb <7.0 or <8.0 if symptomatic   --Hgb: 6.7, give 1U PRBC    5/27/22  Hemoglobin 7.3g/dL. downward trend. Has blood tinged seropurulent drainage. Will give additional unit if continues to trend down.  5/28/22  Hemoglobin 8.0 today    6/5:  H/h stable post 1 unit  prbc  Cont to monitor     Pulmonary embolism, septic  6/5/22  CT shows evolving septic emboli and necrotizing pneumonia. CVT has been re-consulted.  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 IR consulted to evaluate possible paraspinous muscle myositis versus abscess.    --Approximately 1 ml of purulent drainage aspirated 5/11 per IR, growing MSSA  --Continue current regimen  --Oxacillin x 4 weeks total    Chronic pulmonary heart disease  - Pulmonology following       MSSA bacteremia  5/15: Blood cultures 5/11/22: NGTD     Continuous Oxacillin, EOC: 7/11/22, pending acceptance at facility, PICC line placed    6/7:  Cont on oxacillin  ID on case   Repeat blood cultures negative     IVDU (intravenous drug user)   Will ordet TTE to r/o IE  Will order MRI lumbar wwo- completed   Cont broad spectrum IVAB   --MRI thoracic spine ordered per ID, patient refused on multiple attempts    5/7   Studies pending for additional sites of infection given h/o ivdu  5/8/22  on cessation     Severe sepsis  This patient does have evidence of infective focus  My overall impression is sepsis. Vital signs were reviewed and noted in progress note.  Antibiotics given-   Antibiotics (From admission, onward)            Start     Stop Route Frequency Ordered    05/09/22 1100  oxacillin 12 g in  mL CONTINUOUS INFUSION         -- IV Every 24 hours (non-standard times) 05/09/22 0932    05/07/22 2100  mupirocin 2 % ointment         05/12 2059 Nasl 2 times daily 05/07/22 1646        Cultures were taken-   Microbiology Results (last 7 days)     Procedure Component Value Units Date/Time    Culture, Respiratory with Gram Stain [[id number]] Collected: 05/16/22 0758    Order Status: Sent Specimen: Respiratory from Sputum Updated: 05/16/22 0759    Culture, Anaerobe [[id number]] Collected: 05/11/22 1440    Order Status: Completed Specimen: Abscess from Back  Updated: 05/16/22 0733     Anaerobic Culture No anaerobes isolated    Blood culture [[id number]] Collected: 05/11/22 1213    Order Status: Completed Specimen: Blood Updated: 05/16/22 0612     Blood Culture, Routine No Growth to date      No Growth to date      No Growth to date      No Growth to date      No Growth to date    Blood culture [[id number]] Collected: 05/11/22 1213    Order Status: Completed Specimen: Blood Updated: 05/16/22 0612     Blood Culture, Routine No Growth to date      No Growth to date      No Growth to date      No Growth to date      No Growth to date    Aerobic culture [[id number]]  (Abnormal)  (Susceptibility) Collected: 05/11/22 1440    Order Status: Completed Specimen: Abscess from Back Updated: 05/14/22 1057     Aerobic Bacterial Culture STAPHYLOCOCCUS AUREUS  Many      Gram stain [[id number]] Collected: 05/11/22 1440    Order Status: Completed Specimen: Abscess from Back Updated: 05/12/22 0306     Gram Stain Result Few WBC's      Few Gram positive cocci    Gram stain [[id number]]     Order Status: Canceled Specimen: Joint Fluid from Back     Blood culture [[id number]]  (Abnormal) Collected: 05/08/22 1516    Order Status: Completed Specimen: Blood from Peripheral, Right Hand Updated: 05/11/22 1009     Blood Culture, Routine Gram stain aer bottle: Gram positive cocci in clusters resembling Staph       Results called to and read back by: Chasity Raymundo RN  05/09/2022  11:31      STAPHYLOCOCCUS AUREUS  ID consult required at [facility] and [facility].  For susceptibility see order #[id number]      Narrative:      Collection has been rescheduled by [email or web address] at 05/08/2022 13:22 Reason:   Pt in mri will be there after another hour [id number]  Collection has been rescheduled by [email or web address] at 05/08/2022 13:22 Reason:   Pt in mri will be there after another hour [id number]    Blood culture [[id number]]  (Abnormal) Collected: 05/08/22 1515    Order Status: Completed Specimen: Blood from Peripheral, Left  Arm Updated: 05/11/22 1009     Blood Culture, Routine Gram stain aer bottle: Gram positive cocci in clusters resembling Staph      Results called to and read back by: Chasity Raymundo RN  05/09/2022  15:40      STAPHYLOCOCCUS AUREUS  ID consult required at [facility] [place] and [place] locations.  For susceptibility see order #[id number]      Narrative:      Collection has been rescheduled by [email or web address] at 05/08/2022 13:22 Reason:   Pt in mri will be there after another hour [id number]  Collection has been rescheduled by [email or web address] at 05/08/2022 13:22 Reason:   Pt in mri will be there after another hour [id number]        Latest lactate reviewed, they are-  No results for input(s): LACTATE in the last 72 hours.    Organ dysfunction indicated by Acute kidney injury  Source-  lung    Source control Achieved by-   Cont Broad spectrum IVAB     Bacteremia  Infectious disease consulted      COPD (chronic obstructive pulmonary disease)  Cont breathing tx prn  Pulmonology following       VTE Risk Mitigation (From admission, onward)         Ordered     heparin (porcine) injection 5,000 Units  Every 8 hours         06/06/22 1934     IP VTE HIGH RISK PATIENT  Once         06/06/22 1934     Place sequential compression device  Until discontinued         06/06/22 1934     heparin 25,000 units in dextrose 5% 250 mL (100 units/mL) infusion LOW INTENSITY nomogram - [facility]  Continuous        Question Answer Comment   Heparin Infusion Adjustment (DO NOT MODIFY ANSWER) [email or web address]\[email or web address] LOW INTENSITY nomogram for [facility] [email or web address]    Begin at (in units/kg/hr) 12        06/04/22 0825     Place sequential compression device  Until discontinued         05/06/22 2336                Discharge Planning   YESSICA: 5/31/2022     Code Status: Full Code   Is the patient medically ready for discharge?:     Reason for patient still in hospital (select all that apply): Patient trending condition  Discharge Plan A: Skilled Nursing  Facility   Discharge Delays: None known at this time        Critical care time spent on the evaluation and treatment of severe organ dysfunction, review of pertinent labs and imaging studies, discussions with consulting providers and discussions with patient/family: 37 minutes.      Delmer Guzmán MD  Department of Hospital Medicine   [facility] - Intensive Care ([facility])

## 2022-06-07 NOTE — NURSING
Sedation weaned in anticipation of extubation per MD request. At 0105 patient alert and calm upon exiting room. Around 0120 patient woke up, became confused and agitated and removed ET tube. Nurse to bedside. 2L Nasal cannula place, Respiratory therapist notified, Vital signs stable, patient awake/alert/oriented.

## 2022-06-07 NOTE — ASSESSMENT & PLAN NOTE
06/07/2022  The patient is postop day 1 status post thoracoscopic evaporation of right empyema.  Patient was admitted to the ICU for close monitoring.  Patient is currently extubated.  Patient is afebrile and white count is 11.92.  Continue chest tubes to suction.  Chest tubes have a total drainage of 250 mL since surgery.  Continue patient on antibiotics  Patient will be transferred to step-down unit.

## 2022-06-07 NOTE — PROGRESS NOTES
[facility] - Intensive Care ([facility])  Critical Care Medicine  Progress Note    Patient Name: Tianna Leon  MRN: [id number]  Admission Date: 5/6/2022  Hospital Length of Stay: 32 days  Code Status: Full Code  Attending Provider: Delmer Guzmán MD  Primary Care Provider: Spenser Campa MD   Principal Problem: Acute bacterial endocarditis    Subjective:     HPI:  Tianna Leon is 37 y.o.  Asked to see for abn chest CT  Presented yesterday, fever, cough, low oxygen Sat  She is active IVDU  ( heroine , cocaine  and amphetamine   Imaging show loculated right pleural effusion on chest CT  T max 101.6F, Wbcc 29 k, Procal 2.71  Drug screen +ve for cocaince and amphetamine    Past Medical History:   Diagnosis Date    ADHD (attention deficit hyperactivity disorder)     Anxiety     Asthma     Bipolar disorder     Cancer     cervical    COPD (chronic obstructive pulmonary disease)     GERD (gastroesophageal reflux disease)     Hepatitis C antibody positive in blood     RNA UNDETECTED 5/18/2016    Hyperlipidemia     Intravenous drug abuse     MDD (major depressive disorder)     Mood disorder     PTSD (post-traumatic stress disorder)     Suicidal ideation           Hospital/ICU Course:  Per05/07: seen and examined  05/08: seen and examined: SP thora, CXR reveiwed, Echo Pul HTN, feels better, T max 98.9F, wbcc 10 k, LDH 1077, BC +ve G+ve Cocci  05/09: seen and examined: T max 99.9F, CTS note reveiwed, Wbcc 30 K, BC+ve Staph  05/10: seen and examined T ma x 99.6F, NS note reviewed, C/O back pain? Paraspinal process, Wbcc 35 K  on Abx, Oxacillin  05/11: seen and examined T ma x 99.2F, NS note reviewed, IR procedure planned, C/O back pain? Paraspinal process, Wbcc 31 K  on Abx, Oxacillin  5/20/2022 Asked to re-evaluate patient. Still with shortness of breath at rest, anxious. Thoracentesis performed on 5/7/2022 with exudative effusion with elevated WBC, gram stain - no results, aerobic cultures no results. Clinically  an empyema. MRI performed - no spinal abscess.  5/21 Underwent placement of chest tube with drainage. C/o chest discomfort at site. Patient is on scheduled oral medications for pain control - will keep with present regimen  5/22 Resting quietly this am , no new c/o . Chest X Ray - stable  5/23.  Patient seen and examined.  O2 sat 92% on 2 liters/minute.  Body fluid studies reviewed.  Status post right-sided pigtail placement 05/21/2022.  Cultures reviewed.  5/24 seen and examined 98% on room air.  Chest tube output last 24 hours 50 mL.  Afebrile.  Complaining of generalized pain consistent coughing.  5/25 patient seen and examined.  Chest tube output last 24 hours 120 mL.  Afebrile.  Purulent discharge noted in pigtail.  Status post tPA and Dnase yesterday.  Spiked temp yesterday 101. 7  5/26  Chest tube output last 24 hours 200 mL. Afebrile last 24 hours.  Received tPA and DNase installation via chest tube for 2 consecutive days.  Complains of generalized pain decreased appetite and decreased activity fatigue.  Bleeding noted around chest tube resolved with compression and dressing change.  5/27 seen and examined.  Afebrile.  Chest tube in place.  225 mL drainage yesterday.  No further bleeding around pigtail site.  Severe weakness fatigue and decreased activity and appetite.  Afebrile.  5/28 patient seen and examined.  T-max 100.2° last 24 hours.  Minimal amount sanguinous chest tube drainage on the right.  Left side ultrasound 330 mL.  Diagnostic thoracentesis performed 20 mL of cloudy pinkish fluid drained on the left.  No panchito pus noted.  Weak sleepy generalized myalgias.  Over  5/29 seen and examined.  O2 sat 95% on room air.  Afebrile.  25 cc right chest tube output.  Pleural fluid cell count from left thoracentesis yesterday not showing complicated effusion or empyema.  Weak , generalized pain and SOB  5/30 seen and examined.  Afebrile.  Right-sided pigtail removed yesterday.  Chest x-ray today reviewed.   Stable.  O2 sat 98% on 2 liter/minute.  Cultures from left-sided pleural fluid negative.  5/31 Afebrile, no new pulmonary complaints, O2 sat 97-98% on oxygen  6/1 seen and examined.  Afebrile T-max 101°.  O2 sat 93% on 2 liter/minute.  Weak SOB decreased activity.  CT chest reviewed.  6/2 seen and examined.  T-max 100°.  O2 sat 98% on 2 liter/minute.  Complains of SOB diffuse pain requesting pain medicine.  Decreased activity and appetite.  6/3 O2 sat 98% on 1 liter/minute.  T-max 99.3°.  CRP pending.  WBC 6 K. blood-tinged sputum.  On IV oxacillin.  Input from cardiothoracic surgery intervention Radiology and Infectious Disease noted.    6/4O2 sat 96% on 2 liter/minute.  Afebrile.  CRP 30. Significantly decreased.  Last blood cultures negative.  Awaiting VATS for complicated right-sided pleural effusion Monday.  Generalized pain weakness and decreased activity   6/5 T-max 100.4°.  Rule output 5 L. creatinine 1.9.  On IV heparin.  No major events overnight.      06/06: seen and examined: await decortication, T max: 98.4F, 2.0 LPM    06/07: seen and examined : POD #1: VATS decortication, Has a line, pain 5/10, T max: 99.6F, UO 3515 mls, chamber has 210 mls, no air leak      Interval History  06/07: seen and examined : POD #1: VATS decortication, Has a line, pain 5/10, T max: 99.6F, UO 3515 mls, chamber has 210 mls, no air leak     dextrose 5 % and 0.45 % NaCl with KCl 20 mEq 25 mL/hr at 06/07/22 0900        Review of Systems   Constitutional:  Positive for malaise/fatigue.   HENT: Negative.     Eyes: Negative.    Respiratory: Negative.     Cardiovascular:  Positive for chest pain.   Gastrointestinal: Negative.    Genitourinary: Negative.    Musculoskeletal:  Positive for myalgias.   Skin: Negative.    Neurological: Negative.    Endo/Heme/Allergies: Negative.    Psychiatric/Behavioral: Negative.         Objective:     Vital Signs (Most Recent):  Temp: 99 °F (37.2 °C) (06/07/22 0330)  Pulse: 83 (06/07/22 0900)  Resp:  (!) 26 (06/07/22 0900)  BP: 122/66 (06/07/22 0900)  SpO2: 99 % (06/07/22 0900) Vital Signs (24h Range):  Temp:  [96.8 °F (36 °C)-99.6 °F (37.6 °C)] 99 °F (37.2 °C)  Pulse:  [] 83  Resp:  [10-39] 26  SpO2:  [93 %-100 %] 99 %  BP: ()/(53-98) 122/66  Arterial Line BP: ()/() 102/49     Weight: 88.1 kg (194 lb 3.6 oz)  Body mass index is 32.32 kg/m².      Intake/Output Summary (Last 24 hours) at 6/7/2022 0954  Last data filed at 6/7/2022 0900  Gross per 24 hour   Intake 3959.75 ml   Output 3700 ml   Net 259.75 ml       Physical Exam  Vitals and nursing note reviewed.   Constitutional:       Appearance: Normal appearance.      Interventions: Nasal cannula in place.       HENT:      Head: Normocephalic and atraumatic.      Nose: Nose normal.   Eyes:      Extraocular Movements: Extraocular movements intact.      Pupils: Pupils are equal, round, and reactive to light.   Cardiovascular:      Rate and Rhythm: Normal rate and regular rhythm.      Pulses: Normal pulses.      Heart sounds: Normal heart sounds.   Pulmonary:      Effort: Pulmonary effort is normal.      Breath sounds: Normal breath sounds.   Abdominal:      General: Bowel sounds are normal.      Palpations: Abdomen is soft.   Musculoskeletal:      Cervical back: Normal range of motion and neck supple.   Skin:     Comments: Multiple tattoos   Neurological:      General: No focal deficit present.      Mental Status: She is alert and oriented to person, place, and time.   Psychiatric:         Mood and Affect: Mood normal.       Vents:  Vent Mode: A/C (06/06/22 2339)  Ventilator Initiated: Yes (06/06/22 2013)  Set Rate: 20 BPM (06/06/22 2339)  Vt Set: 400 mL (06/06/22 2339)  Pressure Support: 0 cmH20 (06/06/22 2339)  PEEP/CPAP: 5 cmH20 (06/06/22 2339)  Oxygen Concentration (%): 80 (06/07/22 0100)  Peak Airway Pressure: 26 cmH2O (06/06/22 2339)  Plateau Pressure: 0 cmH20 (06/06/22 2339)  Total Ve: 8.39 mL (06/06/22 2339)  F/VT Ratio<105 (RSBI):  (!) 55.1 (06/06/22 2339)    Lines/Drains/Airways       Peripherally Inserted Central Catheter Line  Duration             PICC Double Lumen 05/17/22 1612 right basilic 20 days              Drain  Duration                  Urethral Catheter 06/06/22 0545 Latex;Straight-tip 16 Fr. 1 day         Y Chest Tube 1 and 2 06/06/22 1924 1 Right Pleural 36 Fr. 2 Right Other (Comment) 36 Fr. <1 day              Arterial Line  Duration             Arterial Line 06/06/22 1746 Left Radial <1 day              Peripheral Intravenous Line  Duration                  Peripheral IV - Single Lumen 06/06/22 1749 20 G Left Antecubital <1 day                    Significant Labs:    CBC/Anemia Profile:  Recent Labs   Lab 06/06/22 0122 06/06/22 2036 06/07/22  0524   WBC 8.43 15.57* 11.92   HGB 8.0* 8.9* 7.8*   HCT 24.4* 27.5* 24.4*    348 339   MCV 83 83 83   RDW 15.9* 16.0* 16.3*        Chemistries:  Recent Labs   Lab 06/05/22  1006 06/06/22  0122 06/06/22 2036 06/07/22  0524    142 143 140   K 4.2 4.4 3.8 4.2    106 107 106   CO2 25 24 19* 21*   BUN 31* 30* 27* 31*   CREATININE 1.9* 1.8* 1.8* 1.9*   CALCIUM 7.9* 7.6* 8.2* 7.9*   ALBUMIN 1.6* 1.7*  --  1.7*   PROT 6.0 5.7*  --  6.2   BILITOT 0.3 0.3  --  0.3   ALKPHOS 80 83  --  88   ALT 14 14  --  12   AST 24 21  --  18       All pertinent labs within the past 24 hours have been reviewed.    Significant Imaging:  I have reviewed all pertinent imaging results/findings within the past 24 hours.    X-Ray Chest 1 View  Narrative: EXAMINATION:  XR CHEST 1 VIEW    CLINICAL HISTORY:  follow up;    FINDINGS:  Comparison study 06/06/2022.  There has been interval removal of the endotracheal tube.  Otherwise, stable chest x-ray.    Normal size heart.  The pulmonary vasculature is congested/crowded with overall shallow breath.    Right-sided PICC line catheter tip right atrium.  There are (2) right-sided chest tubes in stable positioning.  No evidence of a pneumothorax.  Chest  wall/right supraclavicular fossa subcutaneous edema is again noted.    Bilateral patchy hazy opacities perihilar distribution extending to the adjacent upper and lower lungs.  There is focal consolidation involving the right lung base, infiltrate versus atelectasis.  Suspect bilateral small layering pleural effusions with generalized bibasilar haziness.  Additionally, bibasilar consolidation/atelectasis is noted obscuring the hemidiaphragms.  Impression: Interval removal of the endotracheal tube.  No evidence of pneumothorax.  Otherwise, stable abnormal chest x-ray, detailed above.    Electronically signed by: Migel Vo MD  Date:    06/07/2022  Time:    08:52       ABG  Recent Labs   Lab 06/06/22  2143   PH 7.415   PO2 173*   PCO2 41.0   HCO3 26.3   BE 2     Assessment/Plan:     Psychiatric  IVDU (intravenous drug user)  Cessation/rehab  Outpatient rehab    Pulmonary  Status post chest tube placement  X2 chest tube to suction  Monitor output  Pain control  INCENTIVE SPIROMETRY, DEEP BREATHING    Empyema of right pleural space  5/20 Suspect empyema, needs chest tube placement  5/21 s/p chest tube placement, adequate expansion - will check [facility]  5/23 tPA DNase.  5/24 tPA for DNase installation via chest tube.  Monitor chest tube output.  Repeat chest x-ray in a.m..  5/25 chest tube output increased from 50 mL on average per 24 hours to 160 mL last 24 hours.  Purulent drainage noted.  Will repeat tPA plus Dnase instillation.  5/26 chest x-ray reviewed.  Continue IV oxacillin.  Right-sided pigtail in place.  Status post tPA and DNase x2.  Bloody drainage around pigtail today status post tPA and DNase yesterday.  Will hold on fibrinolysis.  Monitor chest tube output  5/27 right pigtail in place.  225 mL  Last 24 hours average of 200 mL per day.  Change pleurovac canister to better assess the color of the fluid pus versus serosanguineous.  Keep pigtail in place for now.  Ultrasound bilateral chest assess  pleural effusions.  Continue oxacillin drip.  5/28 keep chest tube on the right side for now.  Repeat CRP.  Checking chemistry microbiology  on left-sided pleural fluid to rule out complicated effusion and need for pigtail.  Continue oxacillin drip.   5/29 minimal output right chest tube.  Remove chest tube.  Left-sided effusion not complicated not empyema.  Continue IV oxacillin    6/1 evolving empyema/necrosis on right lung on CT scan of the chest 06/01/2022.  Continue IV oxacillin.  Re-consult cardiothoracic surgery.  6/2 /treat COVID.  Cardiothoracic surgery input noted.  VATS procedure will be delayed till Monday next week.  Patient will be scheduled for VATS on 06/06/2022      6/3 VATS 06/06/2022 treating COVID.  06/04/2022 IV oxacillin.  VATS Monday as per CT surgery  6/5 worsening infiltrate on the right on chest x-ray today.  Plan for VATS as per CT surgery in a.m..  06/06: for VATS per CT surgery  06/07: POD #1 VATS DECORTICATION, chest tube per surgery      COPD (chronic obstructive pulmonary disease)  Oxygen  Keep SP2> 92%  Bronchodilators  Follow up in Pulmonary for PFT to clarify Dx  5/23 O2 target sat 92-94%.  Nebs p.r.n.  5/26 O2 target sat at home not her% nebs p.r.n..  Smoking cessation   5/28 continue oxygen nebs albuterol Atrovent p.r.n.  6/1 albuterol Atrovent p.r.n.  6/2 albuterol Atrovent p.r.n..  O2 target sat 92-94%.  6/5 albuterol Atrovent p.r.n..  Oxygen keep O2 sat 92 this 94%.  Treat empyema septic emboli MSSA with IV oxacillin drip    06/06: Continue bronchodilators, Keep Spo2 > 92%, Abx CONTINOUS OXACILLIN, COMBIVENT MDI    Cardiac/Vascular  * Acute bacterial endocarditis  Medical Mx  CTS note noted  5/23 IV oxacillin.  5/24 IV oxacillin as per Infectious Disease  5/25 repeat cultures none relevant.  IV oxacillin as per Infectious Disease x4 weeks.  T-max 101.7° last  24 hours  5/26 repeat blood concern negative continue oxacillin gtt   5/27 continue oxacillin drip   5/28 oxacillin  drip.  Blood culture 5/25 was negative  5/28 continue same   5/30 oxacillin total of 8 weeks.   6/3 same  06/06: continue OXACILLIN  06/07: continue OXACILLIN    Chronic pulmonary heart disease  Pulmonary hypertension  PA estimated 56  Multifactorial  : COPD, IVDU  Need outpatient w/u  5/31 Continue oxygen     Renal/  BEE (acute kidney injury)  6/3 avoid nephrotoxic drugs.  Non oliguric.  IV fluid.  Monitor BMP.  Monitor urine output  6/4  urine output 5 L last 24 hours.  Creatinine 2.1.  Repeat creatinine monitor urine output.  Repeat creatinine monitor urine output  06/05/2022 polyuric 5 L daily.  Discontinue IV fluid.  Creatinine 1.9 stable.    ID  COVID-19  6/1 - Isolation:   - Airborne, Contact and Droplet Precautions  - Cohort patients into COVID units  - N95 masks must be fit tested, wear eye protection  - 20 second hand hygiene              - Limit visitors per hospital policy              - Consolidating lab draws, nursing care, provider visits, and interventions  IV remdesivir ID follow-up  6/2 same  6/3 on therapeutic Lovenox and IV remdesivir  6/4 IV remdesivir  6/5 completed IV remdesivir.  Isolation precautions.  On IV heparin for thromboembolism prophylaxis.  Monitor PTT.  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  Repeat CRP    MSSA bacteremia  +ve BC  IVDU  Cont OXACILLIN FLAGYL  5/23 IV oxacillin.  6/5 8 weeks of IV oxacillin drip    Hematology  Pulmonary embolism, septic  5/24 continue IV oxacillin.  5/25 on IV  Oxacillin  since 05/09.  Repeat CRP.  5/27 right pigtail in place.  225 mL  Last 24 hours average of 200 mL per day.  Change pleurovac canister to better assess the color of the fluid pus versus serosanguineous.  Keep pigtail in place for now.  Ultrasound bilateral chest assess pleural effusions.  Continue oxacillin drip.  5/285/28 keep chest  tube on the right side for now.  Repeat CRP.  Checking chemistry microbiology  on left-sided pleural fluid to rule out complicated effusion and need for pigtail.  Continue oxacillin drip.   5/31 Continue drip and anticoagulation   6/3 likely the cause of hemoptysis.  Treat MSSA septicemia.     Critical Care Daily Checklist:    A: Awake: RASS Goal/Actual Goal: RASS Goal: 0-->alert and calm  Actual: Carballo Agitation Sedation Scale (RASS): Alert and calm   B: Spontaneous Breathing Trial Performed?     C: SAT & SBT Coordinated?  N/A                      D: Delirium: CAM-ICU Overall CAM-ICU: Negative   E: Early Mobility Performed? Yes   F: Feeding Goal: Goals: Pt to meet greater than or equal to 75% EEN by RD follow up  Status: Nutrition Goal Status: new   Current Diet Order   Procedures    Diet NPO Except for: Sips with Medication     Order Specific Question:   Except for     Answer:   Sips with Medication      AS: Analgesia/Sedation Per post orders   T: Thromboembolic Prophylaxis SCD   H: HOB > 300 Yes   U: Stress Ulcer Prophylaxis (if needed) YES   G: Glucose Control SSI   B: Bowel Function Stool Occurrence: 0   I: Indwelling Catheter (Lines & Portillo) Necessity PICC   D: De-escalation of Antimicrobials/Pharmacotherapies OXACILLIN    Plan for the day/ETD Transfer to floor    Code Status:  Family/Goals of Care: Full Code  TBD     Critical Care Time: 34 minutes  Critical secondary to Patient has a condition that poses threat to life and bodily function: EMPYEMA, SP VATS DECORTICATION, POST OP CARE      Critical care was time spent personally by me on the following activities: development of treatment plan with patient or surrogate and bedside caregivers, discussions with consultants, evaluation of patient's response to treatment, examination of patient, ordering and performing treatments and interventions, ordering and review of laboratory studies, ordering and review of radiographic studies, pulse oximetry,  re-evaluation of patient's condition. This critical care time did not overlap with that of any other provider or involve time for any procedures.     Alonzo Morrison MD  Critical Care Medicine  Former[facility] - Intensive Care ([facility])

## 2022-06-07 NOTE — SUBJECTIVE & OBJECTIVE
Interval History: The patient is postop day 1 status post VATS procedure evacuation of empyema     ROS  Medications:  Continuous Infusions:   dextrose 5 % and 0.45 % NaCl with KCl 20 mEq 25 mL/hr at 06/07/22 0900     Scheduled Meds:   [START ON 6/8/2022] ascorbic acid (vitamin C)  500 mg Oral Daily    chlorhexidine  10 mL Mouth/Throat BID    docusate sodium  100 mg Oral BID    famotidine  20 mg Oral Daily    heparin (porcine)  5,000 Units Subcutaneous Q8H    ipratropium-albuteroL  1 puff Inhalation Q6H    morphine  30 mg Oral Q12H    multivitamin  1 tablet Oral Daily    mupirocin  1 g Nasal BID    oxacillin 12 g in  mL CONTINUOUS INFUSION  12 g Intravenous Q24H    polyethylene glycol  17 g Oral Daily    traZODone  100 mg Oral QHS     PRN Meds:sodium chloride, sodium chloride, sodium chloride, acetaminophen, bisacodyL, dextrose 10%, dextrose 10%, glucagon (human recombinant), glucose, glucose, hydrALAZINE, LORazepam, metoclopramide HCl, morphine, naloxone, ondansetron, oxyCODONE, pneumoc 20-susan conj-dip cr(PF), sodium chloride 0.9%, sodium chloride 0.9%, sodium chloride 0.9%     Objective:     Vital Signs (Most Recent):  Temp: 99 °F (37.2 °C) (06/07/22 0330)  Pulse: 83 (06/07/22 0900)  Resp: (!) 26 (06/07/22 0900)  BP: 122/66 (06/07/22 0900)  SpO2: 99 % (06/07/22 0900)   Vital Signs (24h Range):  Temp:  [96.8 °F (36 °C)-99.6 °F (37.6 °C)] 99 °F (37.2 °C)  Pulse:  [] 83  Resp:  [10-39] 26  SpO2:  [93 %-100 %] 99 %  BP: ()/(53-98) 122/66  Arterial Line BP: ()/() 102/49     Weight: 88.1 kg (194 lb 3.6 oz)  Body mass index is 32.32 kg/m².    SpO2: 99 %  O2 Device (Oxygen Therapy): nasal cannula    Intake/Output - Last 3 Shifts         06/05 0700  06/06 0659 06/06 0700  06/07 0659 06/07 0700  06/08 0659    P.O. 600 480     I.V. (mL/kg)  275.9 (3.1) 75 (0.9)    IV Piggyback  3066.4 62.5    Total Intake(mL/kg) 600 (6.4) 3822.3 (43.4) 137.4 (1.6)    Urine (mL/kg/hr) 3050 (1.3) 3515 (1.7) 225  (0.9)    Stool 0      Chest Tube  250 10    Total Output 3050 3765 235    Net -2450 +57.3 -97.6           Stool Occurrence 0 x              Lines/Drains/Airways       Peripherally Inserted Central Catheter Line  Duration             PICC Double Lumen 05/17/22 1612 right basilic 20 days              Drain  Duration                  Urethral Catheter 06/06/22 0545 Latex;Straight-tip 16 Fr. 1 day         Y Chest Tube 1 and 2 06/06/22 1924 1 Right Pleural 36 Fr. 2 Right Other (Comment) 36 Fr. <1 day              Arterial Line  Duration             Arterial Line 06/06/22 1746 Left Radial <1 day              Peripheral Intravenous Line  Duration                  Peripheral IV - Single Lumen 06/06/22 1749 20 G Left Antecubital <1 day                    Physical Exam  Constitutional:       Appearance: Normal appearance.   HENT:      Head: Normocephalic and atraumatic.   Cardiovascular:      Rate and Rhythm: Normal rate and regular rhythm.      Pulses: Normal pulses.      Heart sounds: Normal heart sounds.   Pulmonary:      Effort: Pulmonary effort is normal.      Breath sounds: Normal breath sounds.   Abdominal:      General: Abdomen is flat.      Palpations: Abdomen is soft.   Musculoskeletal:      Right lower leg: No edema.      Left lower leg: No edema.   Skin:     General: Skin is warm and dry.   Neurological:      Mental Status: She is alert and oriented to person, place, and time.   Psychiatric:         Behavior: Behavior normal.       Significant Labs:  All pertinent labs from the last 24 hours have been reviewed.    Significant Diagnostics:  I have reviewed all pertinent imaging results/findings within the past 24 hours.

## 2022-06-07 NOTE — PLAN OF CARE
VSS. Tmax 99.9 orally. Tolerating 2L NC. Chest tube x2 to right side intact. Adequate urine output to sanchez catheter. No BM. PRN treatment of pain, anxiety, and nausea per order, see MAR. Continuous Oxacillin restarted post op per order. Continuous IVF per order. Pt refusing turns and getting out of bed to chair this AM; education performed. OC reviewed with patient. Safety and fall precautions maintained.

## 2022-06-07 NOTE — ASSESSMENT & PLAN NOTE
5/20 Suspect empyema, needs chest tube placement  5/21 s/p chest tube placement, adequate expansion - will check Chest X Ray in [facility]  5/23 tPA DNase.  5/24 tPA for DNase installation via chest tube.  Monitor chest tube output.  Repeat chest x-ray in a.m..  5/25 chest tube output increased from 50 mL on average per 24 hours to 160 mL last 24 hours.  Purulent drainage noted.  Will repeat tPA plus Dnase instillation.  5/26 chest x-ray reviewed.  Continue IV oxacillin.  Right-sided pigtail in place.  Status post tPA and DNase x2.  Bloody drainage around pigtail today status post tPA and DNase yesterday.  Will hold on fibrinolysis.  Monitor chest tube output  5/27 right pigtail in place.  225 mL  Last 24 hours average of 200 mL per day.  Change pleurovac canister to better assess the color of the fluid pus versus serosanguineous.  Keep pigtail in place for now.  Ultrasound bilateral chest assess pleural effusions.  Continue oxacillin drip.  5/28 keep chest tube on the right side for now.  Repeat CRP.  Checking chemistry microbiology  on left-sided pleural fluid to rule out complicated effusion and need for pigtail.  Continue oxacillin drip.   5/29 minimal output right chest tube.  Remove chest tube.  Left-sided effusion not complicated not empyema.  Continue IV oxacillin    6/1 evolving empyema/necrosis on right lung on CT scan of the chest 06/01/2022.  Continue IV oxacillin.  Re-consult cardiothoracic surgery.  6/2 /treat COVID.  Cardiothoracic surgery input noted.  VATS procedure will be delayed till Monday next week.  Patient will be scheduled for VATS on 06/06/2022      6/3 VATS 06/06/2022 treating COVID.  06/04/2022 IV oxacillin.  VATS Monday as per CT surgery  6/5 worsening infiltrate on the right on chest x-ray today.  Plan for VATS as per CT surgery in a.m..  06/06: for VATS per CT surgery  06/07: POD #1 VATS DECORTICATION, chest tube per surgery

## 2022-06-08 LAB
ABO + RH BLD: NORMAL
ALBUMIN SERPL BCP-MCNC: 1.6 G/DL (ref 3.5–5.2)
ALP SERPL-CCNC: 72 U/L (ref 55–135)
ALT SERPL W/O P-5'-P-CCNC: 9 U/L (ref 10–44)
ANION GAP SERPL CALC-SCNC: 8 MMOL/L (ref 8–16)
APTT BLDCRRT: 32.8 SEC (ref 21–32)
AST SERPL-CCNC: 13 U/L (ref 10–40)
BASOPHILS # BLD AUTO: 0.02 K/UL (ref 0–0.2)
BASOPHILS NFR BLD: 0.2 % (ref 0–1.9)
BILIRUB SERPL-MCNC: 0.3 MG/DL (ref 0.1–1)
BLD GP AB SCN CELLS X3 SERPL QL: NORMAL
BUN SERPL-MCNC: 25 MG/DL (ref 6–20)
CALCIUM SERPL-MCNC: 8.1 MG/DL (ref 8.7–10.5)
CHLORIDE SERPL-SCNC: 103 MMOL/L (ref 95–110)
CO2 SERPL-SCNC: 25 MMOL/L (ref 23–29)
CREAT SERPL-MCNC: 1.8 MG/DL (ref 0.5–1.4)
DIFFERENTIAL METHOD: ABNORMAL
EOSINOPHIL # BLD AUTO: 0.1 K/UL (ref 0–0.5)
EOSINOPHIL NFR BLD: 1.4 % (ref 0–8)
ERYTHROCYTE [DISTWIDTH] IN BLOOD BY AUTOMATED COUNT: 16.7 % (ref 11.5–14.5)
EST. GFR  (AFRICAN AMERICAN): 40 ML/MIN/1.73 M^2
EST. GFR  (NON AFRICAN AMERICAN): 35 ML/MIN/1.73 M^2
GLUCOSE SERPL-MCNC: 131 MG/DL (ref 70–110)
HCT VFR BLD AUTO: 22.6 % (ref 37–48.5)
HGB BLD-MCNC: 7.1 G/DL (ref 12–16)
IMM GRANULOCYTES # BLD AUTO: 0.06 K/UL (ref 0–0.04)
IMM GRANULOCYTES NFR BLD AUTO: 0.6 % (ref 0–0.5)
LYMPHOCYTES # BLD AUTO: 2.6 K/UL (ref 1–4.8)
LYMPHOCYTES NFR BLD: 25.5 % (ref 18–48)
MCH RBC QN AUTO: 27 PG (ref 27–31)
MCHC RBC AUTO-ENTMCNC: 31.4 G/DL (ref 32–36)
MCV RBC AUTO: 86 FL (ref 82–98)
MONOCYTES # BLD AUTO: 1.1 K/UL (ref 0.3–1)
MONOCYTES NFR BLD: 11.3 % (ref 4–15)
NEUTROPHILS # BLD AUTO: 6.1 K/UL (ref 1.8–7.7)
NEUTROPHILS NFR BLD: 61 % (ref 38–73)
NRBC BLD-RTO: 0 /100 WBC
PLATELET # BLD AUTO: 329 K/UL (ref 150–450)
PMV BLD AUTO: 9.2 FL (ref 9.2–12.9)
POTASSIUM SERPL-SCNC: 3.7 MMOL/L (ref 3.5–5.1)
PROT SERPL-MCNC: 5.9 G/DL (ref 6–8.4)
RBC # BLD AUTO: 2.63 M/UL (ref 4–5.4)
SODIUM SERPL-SCNC: 136 MMOL/L (ref 136–145)
WBC # BLD AUTO: 10 K/UL (ref 3.9–12.7)

## 2022-06-08 PROCEDURE — 99900035 HC TECH TIME PER 15 MIN (STAT)

## 2022-06-08 PROCEDURE — 94640 AIRWAY INHALATION TREATMENT: CPT

## 2022-06-08 PROCEDURE — 27000646 HC AEROBIKA DEVICE

## 2022-06-08 PROCEDURE — 86920 COMPATIBILITY TEST SPIN: CPT | Performed by: THORACIC SURGERY (CARDIOTHORACIC VASCULAR SURGERY)

## 2022-06-08 PROCEDURE — 85730 THROMBOPLASTIN TIME PARTIAL: CPT | Performed by: FAMILY MEDICINE

## 2022-06-08 PROCEDURE — 94761 N-INVAS EAR/PLS OXIMETRY MLT: CPT

## 2022-06-08 PROCEDURE — 27000207 HC ISOLATION

## 2022-06-08 PROCEDURE — 25000003 PHARM REV CODE 250: Performed by: THORACIC SURGERY (CARDIOTHORACIC VASCULAR SURGERY)

## 2022-06-08 PROCEDURE — 25000003 PHARM REV CODE 250: Performed by: NURSE PRACTITIONER

## 2022-06-08 PROCEDURE — 63600175 PHARM REV CODE 636 W HCPCS: Performed by: INTERNAL MEDICINE

## 2022-06-08 PROCEDURE — 27000221 HC OXYGEN, UP TO 24 HOURS

## 2022-06-08 PROCEDURE — 94664 DEMO&/EVAL PT USE INHALER: CPT

## 2022-06-08 PROCEDURE — 80053 COMPREHEN METABOLIC PANEL: CPT | Performed by: FAMILY MEDICINE

## 2022-06-08 PROCEDURE — 97530 THERAPEUTIC ACTIVITIES: CPT

## 2022-06-08 PROCEDURE — 99232 PR SUBSEQUENT HOSPITAL CARE,LEVL II: ICD-10-PCS | Mod: ,,, | Performed by: INTERNAL MEDICINE

## 2022-06-08 PROCEDURE — 86850 RBC ANTIBODY SCREEN: CPT | Performed by: THORACIC SURGERY (CARDIOTHORACIC VASCULAR SURGERY)

## 2022-06-08 PROCEDURE — 85025 COMPLETE CBC W/AUTO DIFF WBC: CPT | Performed by: THORACIC SURGERY (CARDIOTHORACIC VASCULAR SURGERY)

## 2022-06-08 PROCEDURE — 97530 THERAPEUTIC ACTIVITIES: CPT | Mod: CQ

## 2022-06-08 PROCEDURE — 63600175 PHARM REV CODE 636 W HCPCS: Performed by: THORACIC SURGERY (CARDIOTHORACIC VASCULAR SURGERY)

## 2022-06-08 PROCEDURE — 25000003 PHARM REV CODE 250: Performed by: FAMILY MEDICINE

## 2022-06-08 PROCEDURE — 99232 SBSQ HOSP IP/OBS MODERATE 35: CPT | Mod: ,,, | Performed by: INTERNAL MEDICINE

## 2022-06-08 PROCEDURE — 21400001 HC TELEMETRY ROOM

## 2022-06-08 PROCEDURE — 25000003 PHARM REV CODE 250: Performed by: INTERNAL MEDICINE

## 2022-06-08 RX ORDER — FUROSEMIDE 10 MG/ML
40 INJECTION INTRAMUSCULAR; INTRAVENOUS ONCE
Status: COMPLETED | OUTPATIENT
Start: 2022-06-08 | End: 2022-06-08

## 2022-06-08 RX ADMIN — OXYCODONE HYDROCHLORIDE 5 MG: 5 TABLET ORAL at 06:06

## 2022-06-08 RX ADMIN — LORAZEPAM 1 MG: 1 TABLET ORAL at 05:06

## 2022-06-08 RX ADMIN — IPRATROPIUM BROMIDE AND ALBUTEROL 1 PUFF: 20; 100 SPRAY, METERED RESPIRATORY (INHALATION) at 01:06

## 2022-06-08 RX ADMIN — FUROSEMIDE 40 MG: 40 INJECTION, SOLUTION INTRAMUSCULAR; INTRAVENOUS at 11:06

## 2022-06-08 RX ADMIN — OXACILLIN SODIUM 12 G: 10 INJECTION, POWDER, FOR SOLUTION INTRAVENOUS at 11:06

## 2022-06-08 RX ADMIN — THERA TABS 1 TABLET: TAB at 09:06

## 2022-06-08 RX ADMIN — FAMOTIDINE 20 MG: 20 TABLET ORAL at 09:06

## 2022-06-08 RX ADMIN — HEPARIN SODIUM 5000 UNITS: 5000 INJECTION INTRAVENOUS; SUBCUTANEOUS at 09:06

## 2022-06-08 RX ADMIN — TRAZODONE HYDROCHLORIDE 100 MG: 100 TABLET ORAL at 09:06

## 2022-06-08 RX ADMIN — CHLORHEXIDINE GLUCONATE 0.12% ORAL RINSE 10 ML: 1.2 LIQUID ORAL at 09:06

## 2022-06-08 RX ADMIN — HEPARIN SODIUM 5000 UNITS: 5000 INJECTION INTRAVENOUS; SUBCUTANEOUS at 05:06

## 2022-06-08 RX ADMIN — DOCUSATE SODIUM 100 MG: 100 CAPSULE, LIQUID FILLED ORAL at 09:06

## 2022-06-08 RX ADMIN — ONDANSETRON 8 MG: 8 TABLET, ORALLY DISINTEGRATING ORAL at 05:06

## 2022-06-08 RX ADMIN — IPRATROPIUM BROMIDE AND ALBUTEROL 1 PUFF: 20; 100 SPRAY, METERED RESPIRATORY (INHALATION) at 07:06

## 2022-06-08 RX ADMIN — MORPHINE SULFATE 30 MG: 30 TABLET, FILM COATED, EXTENDED RELEASE ORAL at 11:06

## 2022-06-08 RX ADMIN — OXYCODONE HYDROCHLORIDE AND ACETAMINOPHEN 500 MG: 500 TABLET ORAL at 09:06

## 2022-06-08 RX ADMIN — ONDANSETRON 8 MG: 8 TABLET, ORALLY DISINTEGRATING ORAL at 06:06

## 2022-06-08 RX ADMIN — IPRATROPIUM BROMIDE AND ALBUTEROL 1 PUFF: 20; 100 SPRAY, METERED RESPIRATORY (INHALATION) at 08:06

## 2022-06-08 RX ADMIN — MUPIROCIN 1 G: 20 OINTMENT TOPICAL at 09:06

## 2022-06-08 NOTE — PT/OT/SLP PROGRESS
Physical Therapy  Treatment    Tianna Leon   MRN: [id number]   Admitting Diagnosis: Acute bacterial endocarditis    PT Received On: 06/08/22  PT Start Time: 0820     PT Stop Time: 0845    PT Total Time (min): 25 min       Billable Minutes:  Therapeutic Activity 25    Treatment Type: Treatment  PT/PTA: PTA     PTA Visit Number: 1       General Precautions: Standard, airborne, contact, droplet, fall, respiratory  Orthopedic Precautions: N/A   Braces: N/A  Respiratory Status: Nasal cannula, flow 2 L/min (Patient frequently removing O2 despite education)         Subjective:  Communicated with patient's nurse, Jackson, and completed Epic chart review prior to session.  Patient agreed to PT session with some encouragement.    Pain/Comfort  Pain Rating 1: 0/10  Pain Rating Post-Intervention 1: 0/10    Objective:   Patient found with: telemetry, peripheral IV, PICC line, chest tube, sanchez catheter    Functional Mobility:  Supine > sit EOB: CGA (VC for sequencing)    Forward scoot towards EOB: SBA    STS from EOB > RW: min A (VC for hand placement)    Stand pivot T/F to chair w/ RW: Min A (increased time to complete)    Educated patient on importance of increased tolerance to upright position by getting OOB and up in chair. Patient given a minimum goal of 2 consecutive hours. Encouraged patient to perform AROM TE to BLE throughout the day within all available planes of motion. Re enforced importance of utilizing call light to meet needs in room and not attempt to get up without staff assistance. Patient verbalized understanding and agreed to comply.     AM-PAC 6 CLICK MOBILITY  How much help from another person does this patient currently need?   1 = Unable, Total/Dependent Assistance  2 = A lot, Maximum/Moderate Assistance  3 = A little, Minimum/Contact Guard/Supervision  4 = None, Modified [place]/Independent    Turning over in bed (including adjusting bedclothes, sheets and blankets)?: 4  Sitting down on and  standing up from a chair with arms (e.g., wheelchair, bedside commode, etc.): 3  Moving from lying on back to sitting on the side of the bed?: 4  Moving to and from a bed to a chair (including a wheelchair)?: 3  Need to walk in hospital room?: 1  Climbing 3-5 steps with a railing?: 1  Basic Mobility Total Score: 16    AM-PAC Raw Score CMS G-Code Modifier Level of Impairment Assistance   6 % Total / Unable   7 - 9 CM 80 - 100% Maximal Assist   10 - 14 CL 60 - 80% Moderate Assist   15 - 19 CK 40 - 60% Moderate Assist   20 - 22 CJ 20 - 40% Minimal Assist   23 CI 1-20% SBA / CGA   24 CH 0% Independent/ Mod I     Patient left up in chair with all lines intact, call button in reach and chair alarm on.    Assessment:  Tianna Leon is a 37 y.o. female with a medical diagnosis of Acute bacterial endocarditis and presents with overall decline in functional mobility. Patient would continue to benefit from skilled PT to address functional limitations listed below in order to return to PLOF/decrease caregiver burden.     Rehab identified problem list/impairments: Rehab identified problem list/impairments: weakness, impaired endurance, impaired self care skills, impaired functional mobilty, gait instability, impaired balance, decreased coordination, decreased upper extremity function, decreased lower extremity function, decreased safety awareness, decreased ROM, impaired cardiopulmonary response to activity    Rehab potential is fair.    Activity tolerance: Poor    Discharge recommendations: Discharge Facility/Level of Care Needs: nursing facility, skilled     Barriers to discharge:      Equipment recommendations: Equipment Needed After Discharge: other (see comments) (TBD BY NEXT LEVEL OF CARE)     GOALS:   Multidisciplinary Problems     Physical Therapy Goals        Problem: Physical Therapy    Goal Priority Disciplines Outcome Goal Variances Interventions   Physical Therapy Goal     PT, PT/OT Ongoing,  Progressing     Description: LTG'S TO BE MET IN 14 DAYS (6-14-22)  1. PT WILL BE KRISTA WITH BED MOBILITY  2. PT WILL BE KRISTA WITH TF'S  3. PT WILL ' WITH RW AND SPV                   PLAN:    Patient to be seen 3 x/week  to address the above listed problems via gait training, therapeutic activities, therapeutic exercises  Plan of Care expires: 06/14/22  Plan of Care reviewed with: patient         06/08/2022

## 2022-06-08 NOTE — NURSING
Gave report to RENETTA Ron via phone. Pt transferred with all Pt belongings. VSS upon arrival tele unit.RENETTA Ron at beside for bedside report hand off. Pt left with call bell in hand. Cell phone at bedside. wheels locked. Chest tube properly connected to suction. Spoke to mother Karyn via telephone and updated on pt tx to room 225.

## 2022-06-08 NOTE — PT/OT/SLP PROGRESS
Occupational Therapy   Treatment    Name: Tianna Leon  MRN: [id number]  Admitting Diagnosis:  Acute bacterial endocarditis  2 Days Post-Op    Recommendations:     Discharge Recommendations: nursing facility, skilled  Discharge Equipment Recommendations:   (TBD at next level of care)  Barriers to discharge:  None    Assessment:     Tianna Leon is a 37 y.o. female with a medical diagnosis of Acute bacterial endocarditis.  She presents with the following performance deficits affecting function are weakness, impaired endurance, impaired self care skills, impaired functional mobilty, impaired balance, decreased safety awareness, pain, impaired cardiopulmonary response to activity.     Rehab Prognosis:  Fair and Poor; patient would benefit from acute skilled OT services to address these deficits and reach maximum level of function.       Plan:     Patient to be seen 2 x/week to address the above listed problems via self-care/home management, therapeutic activities, therapeutic exercises  · Plan of Care Expires: 05/30/22  · Plan of Care Reviewed with: patient    Subjective     Pain/Comfort:  · Pain Rating 1:  (did not rate. moaning throughout)    Objective:     Communicated with: NurseJackson, prior to session.  Patient found supine with telemetry, PICC line, chest tube, sanchez catheter, oxygen upon OT entry to room.    General Precautions: Standard, airborne, contact, droplet, fall, respiratory   Orthopedic Precautions:N/A   Braces: N/A  Respiratory Status: Room air    Bed Mobility:    · Patient completed Supine to Sit with contact guard assistance and with side rail     Functional Mobility/Transfers:  · Patient completed Sit <> Stand Transfer with minimum assistance  with  rolling walker   · Patient completed Bed <> Chair Transfer using Step Transfer technique with minimum assistance with rolling walker  · Functional Mobility: Patient able to initiate steps to bedside chair with RW and min A.    Activities  of Daily Living:  · Feeding:  setup from bedside chair for eating breakfast    [facility] 6 Click ADL: 15    Treatment & Education:  Patient provided with encouragement to participate in treatment this date, in agreement to OOB to chair only. Increased time to minimize pain throughout. Encouraged completion of B UE AROM therex while seated in bedside chair and to call for A to transfer back to bed. Stated understanding.    Patient left up in chair with all lines intact, call button in reach and chair alarm onEducation:      GOALS:   Multidisciplinary Problems     Occupational Therapy Goals        Problem: Occupational Therapy    Goal Priority Disciplines Outcome Interventions   Occupational Therapy Goal     OT, PT/OT Ongoing, Progressing    Description: O.T. GOALS MET BY 5-30-22  SBA WITH UE DRESSING  MIN A WITH LE DRESSING  PT WILL TOLERATE 1 SET X 15 REPS B UE ROM EXERCISE  MIN A WITH TOILETING                   Time Tracking:     OT Date of Treatment: 06/08/22  OT Start Time: 0810  OT Stop Time: 0835  OT Total Time (min): 25 min    Billable Minutes:Therapeutic Activity 25    6/8/2022

## 2022-06-08 NOTE — ASSESSMENT & PLAN NOTE
06/07/2022  The patient is postop day 1 status post thoracoscopic evaporation of right empyema.  Patient was admitted to the ICU for close monitoring.  Patient is currently extubated.  Patient is afebrile and white count is 11.92.  Continue chest tubes to suction.  Chest tubes have a total drainage of 250 mL since surgery.  Continue patient on antibiotics  Patient will be transferred to step-down unit.    06/08/2022  The patient is postop day 2 status post thoracoscopic evacuation of right empyema.  The patient is afebrile and white count is 10. Chest tube continues to suction.  Chest tube output is trending down.  Patient appears fluid overloaded will give a dose of Lasix.  Anticipate chest tube will be removed in the next 24-48 hours.

## 2022-06-08 NOTE — ASSESSMENT & PLAN NOTE
Pulmonology consulted and felt likely empyema   IR consulted for chest tube      s/p right pigtail small bore chest tube placement to suction/drainage system per IR on 5/21/22. 60ml pus removed. Now with Serous drainage with pus. Fluid analysis consistent with empyema. Pleural fluid aerobic culture shows NGTD, Anaerobic culture pending    5/24/22: Dr. San plans for tPA and DNase in chest tube today.   5/25/22: 160ml of purulent CT drainage output. Dr. San plans to repeat tPA and DNase in chest tube again today.   5/26/22  Still has purulent drainage noted. Pulmonology following    6/8:  Worsening empyema noted  CTS consult on case  S/p VATS procedure 6/6  Chest tubes in place  Plan for dc to [facility] when chest tubes are out

## 2022-06-08 NOTE — ASSESSMENT & PLAN NOTE
5/20 Suspect empyema, needs chest tube placement  5/21 s/p chest tube placement, adequate expansion - will check Chest X Ray in [facility]  5/23 tPA DNase.  5/24 tPA for DNase installation via chest tube.  Monitor chest tube output.  Repeat chest x-ray in a.m..  5/25 chest tube output increased from 50 mL on average per 24 hours to 160 mL last 24 hours.  Purulent drainage noted.  Will repeat tPA plus Dnase instillation.  5/26 chest x-ray reviewed.  Continue IV oxacillin.  Right-sided pigtail in place.  Status post tPA and DNase x2.  Bloody drainage around pigtail today status post tPA and DNase yesterday.  Will hold on fibrinolysis.  Monitor chest tube output  5/27 right pigtail in place.  225 mL  Last 24 hours average of 200 mL per day.  Change pleurovac canister to better assess the color of the fluid pus versus serosanguineous.  Keep pigtail in place for now.  Ultrasound bilateral chest assess pleural effusions.  Continue oxacillin drip.  5/28 keep chest tube on the right side for now.  Repeat CRP.  Checking chemistry microbiology  on left-sided pleural fluid to rule out complicated effusion and need for pigtail.  Continue oxacillin drip.   5/29 minimal output right chest tube.  Remove chest tube.  Left-sided effusion not complicated not empyema.  Continue IV oxacillin    6/1 evolving empyema/necrosis on right lung on CT scan of the chest 06/01/2022.  Continue IV oxacillin.  Re-consult cardiothoracic surgery.  6/2 /treat COVID.  Cardiothoracic surgery input noted.  VATS procedure will be delayed till Monday next week.  Patient will be scheduled for VATS on 06/06/2022      6/3 VATS 06/06/2022 treating COVID.  06/04/2022 IV oxacillin.  VATS Monday as per CT surgery  6/5 worsening infiltrate on the right on chest x-ray today.  Plan for VATS as per CT surgery in a.m..  06/06: for VATS per CT surgery  06/07: POD #1 VATS DECORTICATION, chest tube per surgery  06/08: POD #2 VATS DECORTICATION, chest tube per surgery

## 2022-06-08 NOTE — SUBJECTIVE & OBJECTIVE
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  Blood cultures- MSSA.  She remains bacteremic.  No MR evidence of epidural abscess.     Large, rim enhancing facet synovial cysts arising posteriorly from the right L2-L3 and left L4-L5 facet joints may be degenerative or sequelae of facet septic arthritis.     Minor enhancement and endplate irregularity at L4-L5 may be degenerative versus less likely the sequelae of spondylodiscitis.  No significant associated endplate destruction or vertebral body height loss.     Multilevel degenerative changes of the lumbar spine are most pronounced at L4-L5 with broad-based disc bulge and prominent central disc protrusion contributing to moderate to severe spinal canal stenosis with moderate left-sided neural foraminal stenosis.     Asymmetric left L5-S1 disc bulge contributes to moderate to severe left-sided neural foraminal stenosis and left lateral recess stenosis.   CT chest -Lungs/Pleura: Multiple nodular and cavitary opacities concerning for septic emboli.  Additional larger opacities most notably in the right lung base with some internal clearing possibly related to necrotizing pneumonia.  Moderate loculated right pleural fluid and no definite left pleural fluid.  Empyema not excluded.   05/12- she declined thoracic MRI    She had CT guided aspirtaion -pus aspirated  05/16 - she is drowsy but refusing more imaging .   05/19-  MRI of the thoracic region    1. No evidence for disc osteomyelitis in the thoracic spine.  No evidence for epidural abscess.  2. Loculated right hydropneumothorax and extensive bilateral pulmonary infiltrates.   T max 100.5  05/23-she had interval placemet of chest tube.  Cultures from the chest remain negative.    05/30/22-   She reports that she has poor pain control .    05/31/22- she still complains of back apin .  She is afebrile   06/01/22  She had fever -T max 101.  CT chest -  Lungs/pleura: Continued evolution of the septic  emboli with multiple cavitary nodules scattered throughout the lungs.  There are well-defined air-fluid collections in posterior aspect of the right upper and lower lobes measuring 4.5 x 9.5 cm and 5.5 x 10.0 cm, respectively.  Alternatively, this could represent loculated empyema.  These 2 collections appear to communicate.  Overall appearance of the chest is similar when compared to the recent prior CT examination.  Findings concerning for necrotizing pneumonia, as previously noted.  There is a medium sized left-sided pleural effusion with associated volume loss.     06/03- she reports hemotysis and persistent chest pain.  :06/06/22- she reeports sub optimal pain control..  Blood culture- 06/01- no growth     06/07/22- she is in tearful distress due to pain.  She had interval VATS-06/06/22-op findings-   Dense well vascularized adhesions between the lung and the pleura.  Well developed an adherent rind to visceral and parietal pleura.  200 cc of bloody purulent material.    Review of Systems   Constitutional:  Positive for fatigue. Negative for activity change, appetite change, chills, diaphoresis, fever and unexpected weight change.   HENT: Negative.  Negative for congestion, drooling, facial swelling, rhinorrhea, sinus pressure, sneezing and sore throat.    Eyes: Negative.  Negative for discharge, redness, itching and visual disturbance.   Respiratory:  Positive for cough, chest tightness and shortness of breath. Negative for apnea, choking, wheezing and stridor.    Cardiovascular:  Negative for chest pain, palpitations and leg swelling.   Gastrointestinal: Negative.  Negative for abdominal distention, abdominal pain, anal bleeding, blood in stool, constipation, diarrhea, nausea and vomiting.   Endocrine: Negative.    Genitourinary: Negative.  Negative for decreased urine volume, difficulty urinating, dysuria, frequency, hematuria, pelvic pain, urgency, vaginal bleeding and vaginal discharge.   Musculoskeletal:   Positive for arthralgias and back pain. Negative for gait problem, joint swelling, myalgias, neck pain and neck stiffness.   Skin: Negative.  Negative for color change, pallor, rash and wound.   Allergic/Immunologic: Negative.    Neurological:  Positive for weakness. Negative for dizziness, seizures, facial asymmetry, speech difficulty, light-headedness, numbness and headaches.   Psychiatric/Behavioral:  Negative for agitation, hallucinations and suicidal ideas.    All other systems reviewed and are negative.  Objective:     Vital Signs (Most Recent):  Temp: 99.6 °F (37.6 °C) (06/07/22 2331)  Pulse: 88 (06/08/22 0500)  Resp: 20 (06/08/22 0100)  BP: (!) 101/56 (06/07/22 2331)  SpO2: 95 % (06/08/22 0100)   Vital Signs (24h Range):  Temp:  [98.8 °F (37.1 °C)-99.6 °F (37.6 °C)] 99.6 °F (37.6 °C)  Pulse:  [] 88  Resp:  [10-44] 20  SpO2:  [92 %-99 %] 95 %  BP: ()/(53-75) 101/56  Arterial Line BP: ()/(49-52) 102/49     Weight: 91 kg (200 lb 9.9 oz)  Body mass index is 33.38 kg/m².    Estimated Creatinine Clearance: 44.3 mL/min (A) (based on SCr of 1.9 mg/dL (H)).    Physical Exam  Vitals and nursing note reviewed. Chaperone present: -.   Constitutional:       General: She is not in acute distress.     Appearance: She is well-developed. She is not ill-appearing or toxic-appearing.   HENT:      Head: Normocephalic and atraumatic.   Eyes:      Conjunctiva/sclera: Conjunctivae normal.      Pupils: Pupils are equal, round, and reactive to light.   Cardiovascular:      Rate and Rhythm: Normal rate and regular rhythm.      Pulses: Normal pulses.      Heart sounds: Normal heart sounds. No murmur heard.  Pulmonary:      Effort: Pulmonary effort is normal. No respiratory distress.      Breath sounds: Normal breath sounds.      Comments: Chest tube noted on the right   Abdominal:      General: Bowel sounds are normal. There is no distension.      Palpations: Abdomen is soft.      Tenderness: There is no abdominal  tenderness.   Musculoskeletal:         General: Swelling present. No tenderness or deformity. Normal range of motion.      Cervical back: Normal range of motion and neck supple.      Right lower leg: No edema.      Left lower leg: No edema.   Skin:     General: Skin is warm and dry.      Coloration: Skin is pale.      Findings: Erythema present.   Neurological:      Mental Status: She is alert and oriented to person, place, and time.      Motor: Weakness present.   Psychiatric:         Behavior: Behavior normal.       Significant Labs: Blood Culture:   Recent Labs   Lab 05/11/22  1213 05/25/22  0901 05/25/22  1220 05/25/22  1313 06/01/22  1132   LABBLOO No growth after 5 days.  No growth after 5 days. No growth after 5 days.  No growth after 5 days. No growth after 5 days. No growth after 5 days. No growth after 5 days.  No growth after 5 days.       BMP:   Recent Labs   Lab 06/07/22  0524   *      K 4.2      CO2 21*   BUN 31*   CREATININE 1.9*   CALCIUM 7.9*       CMP:   Recent Labs   Lab 06/06/22  2036 06/07/22  0524    140   K 3.8 4.2    106   CO2 19* 21*   * 113*   BUN 27* 31*   CREATININE 1.8* 1.9*   CALCIUM 8.2* 7.9*   PROT  --  6.2   ALBUMIN  --  1.7*   BILITOT  --  0.3   ALKPHOS  --  88   AST  --  18   ALT  --  12   ANIONGAP 17* 13   EGFRNONAA 35* 33*         Significant Imaging: CT: I have reviewed all pertinent results/findings within the past 24 hours:

## 2022-06-08 NOTE — PROGRESS NOTES
[facility] - Telemetry ([facility])  Cardiothoracic Surgery  Progress Note    Patient Name: Tianna Leon  MRN: [id number]  Admission Date: 5/6/2022  Hospital Length of Stay: 33 days  Code Status: Full Code   Attending Physician: Bobby Blandon MD   Referring Provider: Self, Aaareferral  Principal Problem:Acute bacterial endocarditis            Subjective:     Post-Op Info:  Procedure(s) (LRB):  VATS (VIDEO-ASSISTED THORACOSCOPIC SURGERY) (Right)  BLOCK, NERVE, INTERCOSTAL, 2 OR MORE (Right)   2 Days Post-Op     Interval History:  Patient is postop day 2 for VATS procedure, evacuation of empyema.    ROS  Medications:  Continuous Infusions:   dextrose 5 % and 0.45 % NaCl with KCl 20 mEq 25 mL/hr at 06/07/22 2205     Scheduled Meds:   ascorbic acid (vitamin C)  500 mg Oral Daily    chlorhexidine  10 mL Mouth/Throat BID    docusate sodium  100 mg Oral BID    famotidine  20 mg Oral Daily    heparin (porcine)  5,000 Units Subcutaneous Q8H    ipratropium-albuteroL  1 puff Inhalation Q6H    morphine  30 mg Oral Q12H    multivitamin  1 tablet Oral Daily    mupirocin  1 g Nasal BID    oxacillin 12 g in  mL CONTINUOUS INFUSION  12 g Intravenous Q24H    polyethylene glycol  17 g Oral Daily    traZODone  100 mg Oral QHS     PRN Meds:sodium chloride, sodium chloride, sodium chloride, acetaminophen, bisacodyL, dextrose 10%, dextrose 10%, glucagon (human recombinant), glucose, glucose, hydrALAZINE, LORazepam, metoclopramide HCl, naloxone, ondansetron, oxyCODONE, pneumoc 20-susan conj-dip cr(PF), sodium chloride 0.9%, sodium chloride 0.9%, sodium chloride 0.9%     Objective:     Vital Signs (Most Recent):  Temp: 99.6 °F (37.6 °C) (06/07/22 2331)  Pulse: 95 (06/08/22 0910)  Resp: 18 (06/08/22 0910)  BP: 138/67 (06/08/22 0910)  SpO2: (!) 93 % (06/08/22 0910)   Vital Signs (24h Range):  Temp:  [98.8 °F (37.1 °C)-99.6 °F (37.6 °C)] 99.6 °F (37.6 °C)  Pulse:  [] 95  Resp:  [12-44] 18  SpO2:  [92 %-98 %] 93 %  BP:  ()/(53-75) 138/67     Weight: 91 kg (200 lb 9.9 oz)  Body mass index is 33.38 kg/m².    SpO2: (!) 93 %  O2 Device (Oxygen Therapy): nasal cannula    Intake/Output - Last 3 Shifts         06/06 0700  06/07 0659 06/07 0700  06/08 0659 06/08 0700  06/09 0659    P.O. 480 100     I.V. (mL/kg) 275.9 (3.1) 401.9 (4.4)     IV Piggyback 3066.4 335.1     Total Intake(mL/kg) 3822.3 (43.4) 837 (9.2)     Urine (mL/kg/hr) 3515 (1.7) 1100 (0.5)     Stool       Chest Tube 250 160 20    Total Output 3765 1260 20    Net +57.3 -423 -20                   Lines/Drains/Airways       Peripherally Inserted Central Catheter Line  Duration             PICC Double Lumen 05/17/22 1612 right basilic 21 days              Drain  Duration                  Urethral Catheter 06/06/22 0545 Latex;Straight-tip 16 Fr. 2 days         Y Chest Tube 1 and 2 06/06/22 1924 1 Right Pleural 36 Fr. 2 Right Other (Comment) 36 Fr. 1 day                    Physical Exam  Constitutional:       Appearance: Normal appearance.   HENT:      Head: Normocephalic and atraumatic.   Cardiovascular:      Rate and Rhythm: Normal rate and regular rhythm.      Heart sounds: Normal heart sounds.   Pulmonary:      Breath sounds: Normal breath sounds.   Abdominal:      General: Abdomen is flat.      Palpations: Abdomen is soft.   Skin:     General: Skin is warm and dry.   Neurological:      General: No focal deficit present.      Mental Status: She is alert and oriented to person, place, and time.      Comments: Patient is sleepy but easily arousable.       Significant Labs:  All pertinent labs from the last 24 hours have been reviewed.    Significant Diagnostics:  I have reviewed all pertinent imaging results/findings within the past 24 hours.    Assessment/Plan:     Empyema of right pleural space  06/07/2022  The patient is postop day 1 status post thoracoscopic evaporation of right empyema.  Patient was admitted to the ICU for close monitoring.  Patient is currently  extubated.  Patient is afebrile and white count is 11.92.  Continue chest tubes to suction.  Chest tubes have a total drainage of 250 mL since surgery.  Continue patient on antibiotics  Patient will be transferred to step-down unit.    06/08/2022  The patient is postop day 2 status post thoracoscopic evacuation of right empyema.  The patient is afebrile and white count is 10. Chest tube continues to suction.  Chest tube output is trending down.  Anticipate chest tube will be removed in the next 24-48 hours.  Patient appears fluid overloaded.  Will give a dose of Lasix.        Bobby Blandon MD  Cardiothoracic Surgery  [facility] - Telemetry ([facility])

## 2022-06-08 NOTE — SUBJECTIVE & OBJECTIVE
Interval History:   06/08: seen and examined: POD #2: VATS decortication, OOB, still has pain, T Max: 99.6F, UO 1100mls, also got lasix , chamber 160 mls, no leak.    Oxygen Concentration (%):  [24-28] 28     Respiratory ROS: positive for - cough  negative for - hemoptysis, pleuritic pain, sputum changes, tachypnea, or wheezing     Objective:     Vital Signs (Most Recent):  Temp: 98.4 °F (36.9 °C) (06/08/22 1338)  Pulse: 95 (06/08/22 1346)  Resp: 18 (06/08/22 1346)  BP: 135/76 (06/08/22 1338)  SpO2: 100 % (06/08/22 1346) Vital Signs (24h Range):  Temp:  [98.4 °F (36.9 °C)-99.6 °F (37.6 °C)] 98.4 °F (36.9 °C)  Pulse:  [] 95  Resp:  [12-40] 18  SpO2:  [92 %-100 %] 100 %  BP: ()/(53-76) 135/76     Weight: 91 kg (200 lb 9.9 oz)  Body mass index is 33.38 kg/m².      Intake/Output Summary (Last 24 hours) at 6/8/2022 1427  Last data filed at 6/8/2022 0705  Gross per 24 hour   Intake 470.34 ml   Output 700 ml   Net -229.66 ml       Physical Exam  Vitals and nursing note reviewed.   Constitutional:       Appearance: Normal appearance.      Interventions: Nasal cannula in place.       HENT:      Head: Normocephalic and atraumatic.      Nose: Nose normal.   Eyes:      Extraocular Movements: Extraocular movements intact.      Pupils: Pupils are equal, round, and reactive to light.   Cardiovascular:      Rate and Rhythm: Normal rate and regular rhythm.      Pulses: Normal pulses.      Heart sounds: Normal heart sounds.   Pulmonary:      Effort: Pulmonary effort is normal.      Breath sounds: Normal breath sounds.   Abdominal:      General: Bowel sounds are normal.      Palpations: Abdomen is soft.   Musculoskeletal:      Cervical back: Normal range of motion and neck supple.   Skin:     Comments: Multiple tattoos   Neurological:      General: No focal deficit present.      Mental Status: She is alert and oriented to person, place, and time.   Psychiatric:         Mood and Affect: Mood normal.       Vents:  Vent Mode:  A/C (06/06/22 2339)  Ventilator Initiated: Yes (06/06/22 2013)  Set Rate: 20 BPM (06/06/22 2339)  Vt Set: 400 mL (06/06/22 2339)  Pressure Support: 0 cmH20 (06/06/22 2339)  PEEP/CPAP: 5 cmH20 (06/06/22 2339)  Oxygen Concentration (%): 28 (06/08/22 0112)  Peak Airway Pressure: 26 cmH2O (06/06/22 2339)  Plateau Pressure: 0 cmH20 (06/06/22 2339)  Total Ve: 8.39 mL (06/06/22 2339)  F/VT Ratio<105 (RSBI): (!) 55.1 (06/06/22 2339)    Lines/Drains/Airways       Peripherally Inserted Central Catheter Line  Duration             PICC Double Lumen 05/17/22 1612 right basilic 21 days              Drain  Duration                  Urethral Catheter 06/06/22 0545 Latex;Straight-tip 16 Fr. 2 days         Y Chest Tube 1 and 2 06/06/22 1924 1 Right Pleural 36 Fr. 2 Right Other (Comment) 36 Fr. 1 day                    Significant Labs:    CBC/Anemia Profile:  Recent Labs   Lab 06/06/22 2036 06/07/22  0524 06/08/22  0636   WBC 15.57* 11.92 10.00   HGB 8.9* 7.8* 7.1*   HCT 27.5* 24.4* 22.6*    339 329   MCV 83 83 86   RDW 16.0* 16.3* 16.7*        Chemistries:  Recent Labs   Lab 06/06/22 2036 06/07/22  0524 06/08/22  0636    140 136   K 3.8 4.2 3.7    106 103   CO2 19* 21* 25   BUN 27* 31* 25*   CREATININE 1.8* 1.9* 1.8*   CALCIUM 8.2* 7.9* 8.1*   ALBUMIN  --  1.7* 1.6*   PROT  --  6.2 5.9*   BILITOT  --  0.3 0.3   ALKPHOS  --  88 72   ALT  --  12 9*   AST  --  18 13       All pertinent labs within the past 24 hours have been reviewed.    Significant Imaging:  I have reviewed all pertinent imaging results/findings within the past 24 hours.    X-Ray Chest AP Portable  Narrative: EXAMINATION:  XR CHEST AP PORTABLE    CLINICAL HISTORY:  s/p vats;.    TECHNIQUE:  Single frontal portable view of the chest was performed.    COMPARISON:  06/07/2022    FINDINGS:  Compared to 06/07/2022, no adverse interval change.    Small bilateral pleural effusions unchanged.  Right-sided chest tubes satisfactory position.  No  pneumothorax.  Impression: Compared to 06/07/2022, no adverse interval change.    Electronically signed by: Nate Brasher  Date:    06/08/2022  Time:    07:54

## 2022-06-08 NOTE — SUBJECTIVE & OBJECTIVE
Interval History:  Patient is postop day 2 for VATS procedure, evacuation of empyema.    ROS  Medications:  Continuous Infusions:   dextrose 5 % and 0.45 % NaCl with KCl 20 mEq 25 mL/hr at 06/07/22 2205     Scheduled Meds:   ascorbic acid (vitamin C)  500 mg Oral Daily    chlorhexidine  10 mL Mouth/Throat BID    docusate sodium  100 mg Oral BID    famotidine  20 mg Oral Daily    heparin (porcine)  5,000 Units Subcutaneous Q8H    ipratropium-albuteroL  1 puff Inhalation Q6H    morphine  30 mg Oral Q12H    multivitamin  1 tablet Oral Daily    mupirocin  1 g Nasal BID    oxacillin 12 g in  mL CONTINUOUS INFUSION  12 g Intravenous Q24H    polyethylene glycol  17 g Oral Daily    traZODone  100 mg Oral QHS     PRN Meds:sodium chloride, sodium chloride, sodium chloride, acetaminophen, bisacodyL, dextrose 10%, dextrose 10%, glucagon (human recombinant), glucose, glucose, hydrALAZINE, LORazepam, metoclopramide HCl, naloxone, ondansetron, oxyCODONE, pneumoc 20-susan conj-dip cr(PF), sodium chloride 0.9%, sodium chloride 0.9%, sodium chloride 0.9%     Objective:     Vital Signs (Most Recent):  Temp: 99.6 °F (37.6 °C) (06/07/22 2331)  Pulse: 95 (06/08/22 0910)  Resp: 18 (06/08/22 0910)  BP: 138/67 (06/08/22 0910)  SpO2: (!) 93 % (06/08/22 0910)   Vital Signs (24h Range):  Temp:  [98.8 °F (37.1 °C)-99.6 °F (37.6 °C)] 99.6 °F (37.6 °C)  Pulse:  [] 95  Resp:  [12-44] 18  SpO2:  [92 %-98 %] 93 %  BP: ()/(53-75) 138/67     Weight: 91 kg (200 lb 9.9 oz)  Body mass index is 33.38 kg/m².    SpO2: (!) 93 %  O2 Device (Oxygen Therapy): nasal cannula    Intake/Output - Last 3 Shifts         06/06 0700 06/07 0659 06/07 0700 06/08 0659 06/08 0700  06/09 0659    P.O. 480 100     I.V. (mL/kg) 275.9 (3.1) 401.9 (4.4)     IV Piggyback 3066.4 335.1     Total Intake(mL/kg) 3822.3 (43.4) 837 (9.2)     Urine (mL/kg/hr) 3515 (1.7) 1100 (0.5)     Stool       Chest Tube 250 160 20    Total Output 3425 1260 20    Net +57.3 -423 -20                    Lines/Drains/Airways       Peripherally Inserted Central Catheter Line  Duration             PICC Double Lumen 05/17/22 1612 right basilic 21 days              Drain  Duration                  Urethral Catheter 06/06/22 0545 Latex;Straight-tip 16 Fr. 2 days         Y Chest Tube 1 and 2 06/06/22 1924 1 Right Pleural 36 Fr. 2 Right Other (Comment) 36 Fr. 1 day                    Physical Exam  Constitutional:       Appearance: Normal appearance.   HENT:      Head: Normocephalic and atraumatic.   Cardiovascular:      Rate and Rhythm: Normal rate and regular rhythm.      Heart sounds: Normal heart sounds.   Pulmonary:      Breath sounds: Normal breath sounds.   Abdominal:      General: Abdomen is flat.      Palpations: Abdomen is soft.   Skin:     General: Skin is warm and dry.   Neurological:      General: No focal deficit present.      Mental Status: She is alert and oriented to person, place, and time.      Comments: Patient is sleepy but easily arousable.       Significant Labs:  All pertinent labs from the last 24 hours have been reviewed.    Significant Diagnostics:  I have reviewed all pertinent imaging results/findings within the past 24 hours.

## 2022-06-08 NOTE — ASSESSMENT & PLAN NOTE
MRI of the thoracic region 0  1. No evidence for disc osteomyelitis in the thoracic spine.  No evidence for epidural abscess.  2. Loculated right hydropneumothorax and extensive bilateral pulmonary infiltrates.     Case discussed with critical care team- will consult pulmonology /CVT  On oxacillin  05/23- s/p chest tube placement -follow pulmonology .  Continue oxacillin    06/03/22- will continue oxacillin, follow CVT for VATS   05/30- will continue Oxacillin as planned.-will complete 8 weeks of therapy .    06/02/22-  Case discussed with CVT -will plan for VATS next week.  Continue Oxacillin  IR follow up for pigtail if feasible    06/06/22- will continue Oxacillin , follow CVT for VATS  Continue close monitoring .  06/07-s/p VATS - will plan to treat for up to 8 weeks

## 2022-06-08 NOTE — PROGRESS NOTES
[facility] - Telemetry ([facility])  Pulmonology  Progress Note    Patient Name: Tianna Leon  MRN: [id number]  Admission Date: 5/6/2022  Hospital Length of Stay: 33 days  Code Status: Full Code  Attending Provider: Bobby Blandon MD  Primary Care Provider: Spenser Campa MD   Principal Problem: Acute bacterial endocarditis    Subjective:     Interval History:   06/08: seen and examined: POD #2: VATS decortication, OOB, still has pain, T Max: 99.6F, UO 1100mls, also got lasix , chamber 160 mls, no leak.    Oxygen Concentration (%):  [24-28] 28     Respiratory ROS: positive for - cough  negative for - hemoptysis, pleuritic pain, sputum changes, tachypnea, or wheezing     Objective:     Vital Signs (Most Recent):  Temp: 98.4 °F (36.9 °C) (06/08/22 1338)  Pulse: 95 (06/08/22 1346)  Resp: 18 (06/08/22 1346)  BP: 135/76 (06/08/22 1338)  SpO2: 100 % (06/08/22 1346) Vital Signs (24h Range):  Temp:  [98.4 °F (36.9 °C)-99.6 °F (37.6 °C)] 98.4 °F (36.9 °C)  Pulse:  [] 95  Resp:  [12-40] 18  SpO2:  [92 %-100 %] 100 %  BP: ()/(53-76) 135/76     Weight: 91 kg (200 lb 9.9 oz)  Body mass index is 33.38 kg/m².      Intake/Output Summary (Last 24 hours) at 6/8/2022 1427  Last data filed at 6/8/2022 0705  Gross per 24 hour   Intake 470.34 ml   Output 700 ml   Net -229.66 ml       Physical Exam  Vitals and nursing note reviewed.   Constitutional:       Appearance: Normal appearance.      Interventions: Nasal cannula in place.       HENT:      Head: Normocephalic and atraumatic.      Nose: Nose normal.   Eyes:      Extraocular Movements: Extraocular movements intact.      Pupils: Pupils are equal, round, and reactive to light.   Cardiovascular:      Rate and Rhythm: Normal rate and regular rhythm.      Pulses: Normal pulses.      Heart sounds: Normal heart sounds.   Pulmonary:      Effort: Pulmonary effort is normal.      Breath sounds: Normal breath sounds.   Abdominal:      General: Bowel sounds are normal.       Palpations: Abdomen is soft.   Musculoskeletal:      Cervical back: Normal range of motion and neck supple.   Skin:     Comments: Multiple tattoos   Neurological:      General: No focal deficit present.      Mental Status: She is alert and oriented to person, place, and time.   Psychiatric:         Mood and Affect: Mood normal.       Vents:  Vent Mode: A/C (06/06/22 2339)  Ventilator Initiated: Yes (06/06/22 2013)  Set Rate: 20 BPM (06/06/22 2339)  Vt Set: 400 mL (06/06/22 2339)  Pressure Support: 0 cmH20 (06/06/22 2339)  PEEP/CPAP: 5 cmH20 (06/06/22 2339)  Oxygen Concentration (%): 28 (06/08/22 0112)  Peak Airway Pressure: 26 cmH2O (06/06/22 2339)  Plateau Pressure: 0 cmH20 (06/06/22 2339)  Total Ve: 8.39 mL (06/06/22 2339)  F/VT Ratio<105 (RSBI): (!) 55.1 (06/06/22 2339)    Lines/Drains/Airways       Peripherally Inserted Central Catheter Line  Duration             PICC Double Lumen 05/17/22 1612 right basilic 21 days              Drain  Duration                  Urethral Catheter 06/06/22 0545 Latex;Straight-tip 16 Fr. 2 days         Y Chest Tube 1 and 2 06/06/22 1924 1 Right Pleural 36 Fr. 2 Right Other (Comment) 36 Fr. 1 day                    Significant Labs:    CBC/Anemia Profile:  Recent Labs   Lab 06/06/22 2036 06/07/22 0524 06/08/22 0636   WBC 15.57* 11.92 10.00   HGB 8.9* 7.8* 7.1*   HCT 27.5* 24.4* 22.6*    339 329   MCV 83 83 86   RDW 16.0* 16.3* 16.7*        Chemistries:  Recent Labs   Lab 06/06/22 2036 06/07/22 0524 06/08/22 0636    140 136   K 3.8 4.2 3.7    106 103   CO2 19* 21* 25   BUN 27* 31* 25*   CREATININE 1.8* 1.9* 1.8*   CALCIUM 8.2* 7.9* 8.1*   ALBUMIN  --  1.7* 1.6*   PROT  --  6.2 5.9*   BILITOT  --  0.3 0.3   ALKPHOS  --  88 72   ALT  --  12 9*   AST  --  18 13       All pertinent labs within the past 24 hours have been reviewed.    Significant Imaging:  I have reviewed all pertinent imaging results/findings within the past 24 hours.    X-Ray Chest AP  Portable  Narrative: EXAMINATION:  XR CHEST AP PORTABLE    CLINICAL HISTORY:  s/p vats;.    TECHNIQUE:  Single frontal portable view of the chest was performed.    COMPARISON:  06/07/2022    FINDINGS:  Compared to 06/07/2022, no adverse interval change.    Small bilateral pleural effusions unchanged.  Right-sided chest tubes satisfactory position.  No pneumothorax.  Impression: Compared to 06/07/2022, no adverse interval change.    Electronically signed by: Nate Brasher  Date:    06/08/2022  Time:    07:54       ABG  Recent Labs   Lab 06/06/22  2143   PH 7.415   PO2 173*   PCO2 41.0   HCO3 26.3   BE 2     Assessment/Plan:     * Acute bacterial endocarditis  Medical Mx  CTS note noted  5/23 IV oxacillin.  5/24 IV oxacillin as per Infectious Disease  5/25 repeat cultures none relevant.  IV oxacillin as per Infectious Disease x4 weeks.  T-max 101.7° last  24 hours  5/26 repeat blood concern negative continue oxacillin gtt   5/27 continue oxacillin drip   5/28 oxacillin drip.  Blood culture 5/25 was negative  5/28 continue same   5/30 oxacillin total of 8 weeks.   6/3 same  06/06: continue OXACILLIN  06/07: continue OXACILLIN    Status post chest tube placement  X2 chest tube to suction  Monitor output  Pain control  INCENTIVE SPIROMETRY, DEEP BREATHING    Empyema of right pleural space  5/20 Suspect empyema, needs chest tube placement  5/21 s/p chest tube placement, adequate expansion - will check Chest X Ray in [facility]  5/23 tPA DNase.  5/24 tPA for DNase installation via chest tube.  Monitor chest tube output.  Repeat chest x-ray in a.m..  5/25 chest tube output increased from 50 mL on average per 24 hours to 160 mL last 24 hours.  Purulent drainage noted.  Will repeat tPA plus Dnase instillation.  5/26 chest x-ray reviewed.  Continue IV oxacillin.  Right-sided pigtail in place.  Status post tPA and DNase x2.  Bloody drainage around pigtail today status post tPA and DNase yesterday.  Will hold on fibrinolysis.  Monitor  chest tube output  5/27 right pigtail in place.  225 mL  Last 24 hours average of 200 mL per day.  Change pleurovac canister to better assess the color of the fluid pus versus serosanguineous.  Keep pigtail in place for now.  Ultrasound bilateral chest assess pleural effusions.  Continue oxacillin drip.  5/28 keep chest tube on the right side for now.  Repeat CRP.  Checking chemistry microbiology  on left-sided pleural fluid to rule out complicated effusion and need for pigtail.  Continue oxacillin drip.   5/29 minimal output right chest tube.  Remove chest tube.  Left-sided effusion not complicated not empyema.  Continue IV oxacillin    6/1 evolving empyema/necrosis on right lung on CT scan of the chest 06/01/2022.  Continue IV oxacillin.  Re-consult cardiothoracic surgery.  6/2 /treat COVID.  Cardiothoracic surgery input noted.  VATS procedure will be delayed till Monday next week.  Patient will be scheduled for VATS on 06/06/2022      6/3 VATS 06/06/2022 treating COVID.  06/04/2022 IV oxacillin.  VATS Monday as per CT surgery  6/5 worsening infiltrate on the right on chest x-ray today.  Plan for VATS as per CT surgery in a.m..  06/06: for VATS per CT surgery  06/07: POD #1 VATS DECORTICATION, chest tube per surgery  06/08: POD #2 VATS DECORTICATION, chest tube per surgery      IVDU (intravenous drug user)  Cessation/rehab  Outpatient rehab    COPD (chronic obstructive pulmonary disease)  Oxygen  Keep SP2> 92%  Bronchodilators  Follow up in Pulmonary for PFT to clarify Dx  5/23 O2 target sat 92-94%.  Nebs p.r.n.  5/26 O2 target sat at home not her% nebs p.r.n..  Smoking cessation   5/28 continue oxygen nebs albuterol Atrovent p.r.n.  6/1 albuterol Atrovent p.r.n.  6/2 albuterol Atrovent p.r.n..  O2 target sat 92-94%.  6/5 albuterol Atrovent p.r.n..  Oxygen keep O2 sat 92 this 94%.  Treat empyema septic emboli MSSA with IV oxacillin drip    06/06: Continue bronchodilators, Keep Spo2 > 92%, Abx CONTINOUS OXACILLIN,  COMBIVENT MDI    06/08: Continue bronchodilators, Keep Spo2 > 92%, Abx CONTINOUS OXACILLIN, BEV Morrison MD  Pulmonology  [facility] - Telemetry ([facility])

## 2022-06-08 NOTE — ASSESSMENT & PLAN NOTE
Oxygen  Keep SP2> 92%  Bronchodilators  Follow up in Pulmonary for PFT to clarify Dx  5/23 O2 target sat 92-94%.  Nebs p.r.n.  5/26 O2 target sat at home not her% nebs p.r.n..  Smoking cessation   5/28 continue oxygen nebs albuterol Atrovent p.r.n.  6/1 albuterol Atrovent p.r.n.  6/2 albuterol Atrovent p.r.n..  O2 target sat 92-94%.  6/5 albuterol Atrovent p.r.n..  Oxygen keep O2 sat 92 this 94%.  Treat empyema septic emboli MSSA with IV oxacillin drip    06/06: Continue bronchodilators, Keep Spo2 > 92%, Abx CONTINOUS OXACILLIN, COMBIVENT MDI    06/08: Continue bronchodilators, Keep Spo2 > 92%, Abx CONTINOUS OXACILLIN, COMBIVENT MDI

## 2022-06-08 NOTE — PLAN OF CARE
Patient AAOX 4. VSS. Patient remained afebrile throughout shift. IV medication administered per order Patient remained free of falls this shift Patient free from pain this shift  Plan of care reviewed. Patient verbalized understanding. Patient moving/turning self. Frequent weight shifting encouraged. Patient SR on monitor Bed low, side rails up x2, wheels locked, call light in reach. Bed alarm maintained for safety. Patient instructed to call for assistance. Hourly rounding completed. Will continue to monitor.

## 2022-06-08 NOTE — PLAN OF CARE
Patient with fair participation in treatment. Sup>sit with CGA, sit>stand min A, step>pivot to bedside chair with RW and min A. Recommending LTACH at d/c.

## 2022-06-08 NOTE — PROGRESS NOTES
[facility]  Progress Note    Patient Name: Tianna Leon  MRN: [id number]  Patient Class: IP- Inpatient   Admission Date: 5/6/2022  Length of Stay: 33 days  Attending Physician: Bobby Blandon MD  Primary Care Provider: Spenser Campa MD        Subjective:     Principal Problem:Acute bacterial endocarditis        HPI:  36 y/o. female  with a PMHx of asthma, COPD,Bipolar  , IVDU and HLD presented to the ER with a c/o  sob for the last weak which has gotten worse . The SOB is associated with fever , chills , productive cough , back pain  and generalized weakness . She report cough some blood this am .  She is active IVDU  ( heroine , cocaine  and amphetamine ) . She denies any  sick contact , chest pain  , GI/ sx , She also complaning of LE sweeling . Knee pain and B/L LE rash .   ER COURSE: CTA chest negative for pe . Multiple nodular and cavitary opacities concerning for septic emboli with larger opacities possibly necrotizing pneumonia. CT cervical  and thoracic did not show any acute finding , CT lumbar Possible progression of degenerative changes most notable at L4-5 with moderate to severe spinal canal stenosis at this level.  WBC  29 k , na 130 , k 3.4 , cl 89 , procal 2.71   ER VS:  BP Pulse Resp Temp SpO2   (!) 124/58 110 (!) 30 (!) 101.6 °F (38.7 °C) 96 %           Pt will be admitted to inpt with a dx of PNA and severe sepsis       Overview/Hospital Course:  5/7 admitted for pneumonia, severe sepsis in setting of ivdu. Mother at bedside and provides collateral. Patient has struggled with substance abuse for approximately 20 years, worsening over last 3-5 years since life event. Onset of symptoms 1-2 weeks ago. Tolerated thoracentesis with no pneumothorax on chest x-ray. Mri lumbar and echo, pending. Bcx positive, growing gram positive cocci. On vanc, cefepime and flagyl. Infectious disease consulted for bacteremia. 5/8/22 The patient remained afebrile overnight.  Currently on cefepime/vanc/flagyl. Blood cx 5/6/22 are growing MSSA. The patient refused the MRI lumbar spine overnight d/t being unable to lay flat from back pain. Will give pain meds and attempt to get the MRI today to r/o spinal abscess. The ECHO showed a 1.3 x 1.5 cm elongated, mobile echodensity seen on the tricuspid valve consistent with vegetation, CT surgery was consulted. Will need a SUSANNAH. Infectious disease is consulted. Will repeat blood cx today. 5/9/22 No acute events overnight. The patient reports lower back pain is not well controlled with current pain regimen, will adjust. Repeat blood cx are still positive. Sensitivities are back and Staph is sensitive to oxacillin, will D/C vanc/cefepime. Cardiology consulted and plans for a SUSANNAH today. CT surgery following and recommends continuing medical management with appropriate ABX, no surgical intervention at this time. MRI lumbar spine showed moderate to severe spinal canal stenosis with moderate left-sided neural foraminal stenosis, Neurosurgery consulted. 5/10/22 No acute events overnight. The patient reports some improvement in pain with adjustment in pain meds. Repeat blood cx are +. Continue IV oxacillin and flagyl. Infectious disease is consulted. IR consulted to evaluate possible paraspinous muscle myositis versus abscess. Recommend IR drainage. Continue current management. 5/11/22 No acute events overnight. The patient reports pain is still not well controlled at times. The patient reports that she is very anxious about the upcoming SUSANNAH and IR drainage. SUSANNAH was pushed back to this afternoon because the patient ate candy this AM. Continue treatment with IV oxacillin and flagyl. Infectious disease is following. WBC trended down to 31K. Continue current management. 5/12/22 No acute events overnight. The patients WBC improved to 22K overnight. The patient remains on continuous oxacillin infusion. Infectious disease is following. MRI thoracic spine is  ordered and pending. The patients SUSANNAH was postponed again today d/t low H/H. Hgb was noted to be 6.2 this morning, will transfuse 1 unit PRBC. The patient reports pain is still not well controlled. The case was discussed with Neurosurgery who recommended adding extended release pain meds. Approximately 1 ml of purulent drainage aspirated from back yesterday per IR, growing gram + cocci.     5/13: Patient was given 1U PRBC on 5/12, improved from 6.2 --> 7.0, will give an additional unit PRBC. Radiology attempted to bring patient on 5/12 at 1755 for Thoracic MRI, patient refused due to pain and anxiety. Repeat attempt this AM, patient again refused. Will order Ativan 1mg IV to be given with pain medication prior to scan to relieve anxiety and pain, nurse to notify NP when ready to go for scan. Discussed pain management with patient, discussed transition to PO pain medications to being preparing for d/c to LTAC, adjusted PO pain regimen will reevaluate to determine effectiveness. Repeat Blood cultures 5/11/22: NGTD.     5/14: Final anaerobic cultures pending, continue to adjust pain regimen, d/c IV Dilaudid today. Patient currently managed with PO Morphine extended release and PO oxycodone. D/C plan is for LTAC once final antibiotic regimen determined. Patient continues to refuse MRI of thoracic spine.     5/15: Called to room this AM, patient had coughed up blood and mucous. Approx. 1-2 teaspoons of blood in emesis bag, ordered CXR and repeat CBC, CXR showed improvement from previous day, repeat CBC at time of incident, showed a slight decrease in Hgb: 9.7 --> 8.7, when repeated at 1500, Hgb had improved to 10. no further episodes of coughing up blood throughout day. Patient continues to c/o not having enough pain medication but continues to sleep most of the day and will fall asleep when speaking at times. Added IV Toradol to plan.     5/16: No further episodes of coughing blood overnight, patient participated with  encouragement with PT/OT today, recommendations are HH vs. SNF pending progress. Awaiting final antibiotic regimen recommendations. Anaerobic culture results finalized: negative for growth. Afebrile, WBC trending downward.     5/17: Antibiotic regimen per ID is continuous oxacillin until 7/11/22, SW notified of plan, will seek acceptance at LTAC or SNF. Patient choice is [facility]. WBC in normal range. AMS this AM, CT of head: no acute findings. Given narcan and mental status improved. Decreased narcotic dose.     5/18: Per discussion with patient, she is willing to have the MRI of thoracic spine today, will order IV pain medication and anxiety medication prior to scan. Nursing and Radiology aware of needing to premedicate for scan. Patient is more cooperative and participating with care.     5/19: Patient given premedications for MRI, MRI completed, results pending. Patient more agreeable with treatment plan and cooperating. Will need psychiatry follow-up after discharge. Hgb: 6.7, will give 1U PRBC    5/20/22: +LEON and generalized pain. Thoracic MRI showed no evidence for disc osteomyelitis in the thoracic spine and no epidural abscess, Loculated right hydropneumothorax and extensive bilateral pulmonary infiltrates. Ct chest showed septic emboli and moderate right pleural effusion.+splenic infarct  Pulmonology felt likely empyema- consulted IR for chest tube.     5/21/22: s/p right pig tail placement to suction/ drainage system per IR. 60ml pus removed initially. Fluid analysis consistent with empyema. Pleural fluid cultures pending. Now with scant serosanguinous output.+ right lateral chest pain at chest tube site. Denies any other complaints. Status update provided to pt's mother.     5/22/22: Repeatedly requesting IV Dilaudid. Toradol ordered. Afebrile, WBC normal, O2sats stable on 3lites. s/p right pig tail placement to suction/ drainage system per IR on 5/21/22- draining serous draiange with pus. Plan for LTAC  "placement     5/23/22: More cofortable today. Not getting OOB. Encouraged OOB and rationale provided. Pulmonology recommended Possible tPA and DNase in chest tube tomorrow    5/24/22: Examined OOB in chair. Encouraged pt to continue OOB. Plan for tPA and DNase in chest tube today per Pulmonology  Pt denied LTAC- will pursue SNF    5/25/22: Temp 101.7F last night. Will recheck blood cultures and UA. BP low - IVF bolus given and restarted IVF. ID re-consulted. 160ml of purulent CT drainage output. Dr. San plans to repeat tPA and DNase in chest tube again today.     5/26/22: temp 99.2. c/o of right "lung pain" when eating. Will consult speech to check swallow. +mild leukocytosis. Repeat BC 5/25/22 show NGTD. Repeat UA showed rare trichomas. Chest tube drainage remains purulent     5/27/22  Low grade temp. Downward trend of hemoglobin, related to chest tube drainage. Blood cultures remain negative. Urine culture growing E. Coli. Sensitivities are pending.     5/28/22  Urine culture returned pansenstiive. Will continue ciprofloxacin for total of 3 days then discontinue. Still awaiting SNF placement.    5/29/22 culture of pleural fluid from 5/28/22 negative. Continue CT for now.     5/30/22  CT removed yesterday by pulmonology. SNF declines. Will explore other options for IV abx with help from ID.     5/31/22  Patient has been accepted to [facility]. Will transfer today. Will follow up with pulmonology and ID at discharge. She will continue oxacillin for management of bacteremia, septic embolic, and endocarditis.  Addendum: COVID negative transfer delayed.     6/1/22  COVID positive started on remdesivir. Tmax 101.3. repeat CT scan shows evolving septic embolic/necrotizing pneumonia. Pulmonlogy recommends reconsulting CVT. ID following. Awaiting insurance approval for [facility]. Decline in hemoglobin 7.4, hold on transfusion at this time.   6/2:  CVT consult on case.  Plan for VATS procedure on Monday given patient's " COVID status.  Continue remdesivir.  Continue oxacillin.  6/3: VATS planned for Monday. Will transfuse 1 unit prbc. No further hemoptysis noted. Cont oxacillin.   6/4:  Vats procedure Monday .  H&H improved status post 1 unit packed RBC.  NPO midnight.  Continue oxacillin.  6/5:  VATS procedure tomorrow.  H&H stable.  NPO at midnight.  6/6: VATS procedure today.   6/7: s/p vats procedure. Patient self extubated approx 1 am. Currently doing well. Will step down from ICU. LTAC placement pending.   6/8: stepped down from icu. Cont chest tube, current being managed by CTS. Cont current abx therapy. Plans for LTAC once chest tubes are out.       Interval History: stepped down from icu. Cont chest tube, current being managed by CTS. Cont current abx therapy. Plans for LTAC once chest tubes are out.     Review of Systems  Objective:     Vital Signs (Most Recent):  Temp: 99.6 °F (37.6 °C) (06/07/22 2331)  Pulse: 95 (06/08/22 0910)  Resp: 18 (06/08/22 1133)  BP: 138/67 (06/08/22 0910)  SpO2: (!) 93 % (06/08/22 0910)   Vital Signs (24h Range):  Temp:  [98.9 °F (37.2 °C)-99.6 °F (37.6 °C)] 99.6 °F (37.6 °C)  Pulse:  [] 95  Resp:  [12-40] 18  SpO2:  [92 %-98 %] 93 %  BP: ()/(53-75) 138/67     Weight: 91 kg (200 lb 9.9 oz)  Body mass index is 33.38 kg/m².    Intake/Output Summary (Last 24 hours) at 6/8/2022 1320  Last data filed at 6/8/2022 0705  Gross per 24 hour   Intake 516.16 ml   Output 700 ml   Net -183.84 ml      Physical Exam  Vitals and nursing note reviewed.   Constitutional:       General: She is not in acute distress.     Appearance: She is well-developed. She is ill-appearing.   HENT:      Head: Normocephalic and atraumatic.      Nose: Nose normal.   Eyes:      Extraocular Movements: Extraocular movements intact.   Cardiovascular:      Rate and Rhythm: Normal rate and regular rhythm.   Pulmonary:      Effort: Pulmonary effort is normal.      Breath sounds: No stridor. Rhonchi present.      Comments:  Right sided chest tube  Abdominal:      General: There is no distension.   Musculoskeletal:         General: No signs of injury.      Cervical back: Normal range of motion and neck supple.   Skin:     General: Skin is dry.   Neurological:      General: No focal deficit present.      Mental Status: She is alert and oriented to person, place, and time. Mental status is at baseline.   Psychiatric:         Behavior: Behavior normal.       Significant Labs: All pertinent labs within the past 24 hours have been reviewed.    Significant Imaging: I have reviewed all pertinent imaging results/findings within the past 24 hours.      Assessment/Plan:      * Acute bacterial endocarditis  ECHO showed a 1.3 x 1.5 cm elongated, mobile echodensity seen on the tricuspid valve consistent with vegetation, CT surgery was consulted.   Due to MSSA    Cont oxacillin  ID consulted on case   LTAC placement pending        BEE (acute kidney injury)  Stable   Cont to monitor       COVID-19  - COVID-19 testing   - Infection Control notified     - Isolation:   - Airborne, Contact and Droplet Precautions  - Cohort patients into COVID units  - N95 mask, wear eye protection  - 20 second hand hygiene              - Limit visitors per hospital policy              - Consolidating lab draws, nursing care, provider visits, and interventions    - Diagnostics: (leukopenia, hyponatremia, hyperferritinemia, elevated troponin, elevated d-dimer, age, and comorbidities are significant predictors of poor clinical outcome)  CBC, CMP, Ferritin, CRP and Portable CXR    - Management:  Supplemental O2 to maintain SpO2 >92%  Telemetry  Continuous/intermittent Pulse Ox  Albuterol treatment PRN  Remdesivir added    Advance Care Planning   Patient is a full code.         Cont supportive care  remdesivir course completed      Empyema of right pleural space  Pulmonology consulted and felt likely empyema   IR consulted for chest tube      s/p right pigtail small bore chest  tube placement to suction/drainage system per IR on 5/21/22. 60ml pus removed. Now with Serous drainage with pus. Fluid analysis consistent with empyema. Pleural fluid aerobic culture shows NGTD, Anaerobic culture pending    5/24/22: Dr. San plans for tPA and DNase in chest tube today.   5/25/22: 160ml of purulent CT drainage output. Dr. San plans to repeat tPA and DNase in chest tube again today.   5/26/22  Still has purulent drainage noted. Pulmonology following    6/8:  Worsening empyema noted  CTS consult on case  S/p VATS procedure 6/6  Chest tubes in place  Plan for dc to ltac when chest tubes are out    Normocytic anemia  Secondary to acute illness    --Daily CBC  --Transfuse with 1 unit PRBC for Hgb <7.0 or <8.0 if symptomatic   --Hgb: 6.7, give 1U PRBC    5/27/22  Hemoglobin 7.3g/dL. downward trend. Has blood tinged seropurulent drainage. Will give additional unit if continues to trend down.  5/28/22  Hemoglobin 8.0 today    6/5:  H/h stable post 1 unit prbc  Cont to monitor     Pulmonary embolism, septic  6/5/22  CT shows evolving septic emboli and necrotizing pneumonia. CVT has been re-consulted.  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 IR consulted to evaluate possible paraspinous muscle myositis versus abscess.    --Approximately 1 ml of purulent drainage aspirated 5/11 per IR, growing MSSA  --Continue current regimen  --Oxacillin x 4 weeks total    Chronic pulmonary heart disease  - Pulmonology following       MSSA bacteremia  5/15: Blood cultures 5/11/22: NGTD     Continuous Oxacillin, EOC: 7/11/22, pending acceptance at facility, PICC line placed    6/7:  Cont on oxacillin  ID on case   Repeat blood cultures negative     IVDU (intravenous drug user)   Will ordet TTE to r/o IE  Will order MRI lumbar wwo- completed   Cont broad spectrum IVAB   --MRI thoracic spine ordered per ID, patient refused on multiple  attempts    5/7   Studies pending for additional sites of infection given h/o ivdu  5/8/22  on cessation     Severe sepsis  This patient does have evidence of infective focus  My overall impression is sepsis. Vital signs were reviewed and noted in progress note.  Antibiotics given-   Antibiotics (From admission, onward)            Start     Stop Route Frequency Ordered    05/09/22 1100  oxacillin 12 g in  mL CONTINUOUS INFUSION         -- IV Every 24 hours (non-standard times) 05/09/22 0932    05/07/22 2100  mupirocin 2 % ointment         05/12 2059 Nasl 2 times daily 05/07/22 1646        Cultures were taken-   Microbiology Results (last 7 days)     Procedure Component Value Units Date/Time    Culture, Respiratory with Gram Stain [[id number]] Collected: 05/16/22 0758    Order Status: Sent Specimen: Respiratory from Sputum Updated: 05/16/22 0759    Culture, Anaerobe [[id number]] Collected: 05/11/22 1440    Order Status: Completed Specimen: Abscess from Back Updated: 05/16/22 0733     Anaerobic Culture No anaerobes isolated    Blood culture [[id number]] Collected: 05/11/22 1213    Order Status: Completed Specimen: Blood Updated: 05/16/22 0612     Blood Culture, Routine No Growth to date      No Growth to date      No Growth to date      No Growth to date      No Growth to date    Blood culture [[id number]] Collected: 05/11/22 1213    Order Status: Completed Specimen: Blood Updated: 05/16/22 0612     Blood Culture, Routine No Growth to date      No Growth to date      No Growth to date      No Growth to date      No Growth to date    Aerobic culture [[id number]]  (Abnormal)  (Susceptibility) Collected: 05/11/22 1440    Order Status: Completed Specimen: Abscess from Back Updated: 05/14/22 1057     Aerobic Bacterial Culture STAPHYLOCOCCUS AUREUS  Many      Gram stain [[id number]] Collected: 05/11/22 1440    Order Status: Completed Specimen: Abscess from Back Updated: 05/12/22 0306     Gram Stain Result Few WBC's       Few Gram positive cocci    Gram stain [[id number]]     Order Status: Canceled Specimen: Joint Fluid from Back     Blood culture [[id number]]  (Abnormal) Collected: 05/08/22 1516    Order Status: Completed Specimen: Blood from Peripheral, Right Hand Updated: 05/11/22 1009     Blood Culture, Routine Gram stain aer bottle: Gram positive cocci in clusters resembling Staph       Results called to and read back by: Chasity Raymundo RN  05/09/2022  11:31      STAPHYLOCOCCUS AUREUS  ID consult required at [facility].  For susceptibility see order #[id number]      Narrative:      Collection has been rescheduled by [email or web address] at 05/08/2022 13:22 Reason:   Pt in mri will be there after another hour [id number]  Collection has been rescheduled by [email or web address] at 05/08/2022 13:22 Reason:   Pt in mri will be there after another hour [id number]    Blood culture [[id number]]  (Abnormal) Collected: 05/08/22 1515    Order Status: Completed Specimen: Blood from Peripheral, Left Arm Updated: 05/11/22 1009     Blood Culture, Routine Gram stain aer bottle: Gram positive cocci in clusters resembling Staph      Results called to and read back by: Chasity Raymundo RN  05/09/2022  15:40      STAPHYLOCOCCUS AUREUS  ID consult required at [facility].  For susceptibility see order #[id number]      Narrative:      Collection has been rescheduled by [email or web address] at 05/08/2022 13:22 Reason:   Pt in mri will be there after another hour [id number]  Collection has been rescheduled by [email or web address] at 05/08/2022 13:22 Reason:   Pt in mri will be there after another hour [id number]        Latest lactate reviewed, they are-  No results for input(s): LACTATE in the last 72 hours.    Organ dysfunction indicated by Acute kidney injury  Source-  lung    Source control Achieved by-   Cont Broad spectrum IVAB     Bacteremia  Infectious disease consulted      COPD (chronic obstructive pulmonary disease)  Cont breathing tx prn  Pulmonology  following       VTE Risk Mitigation (From admission, onward)         Ordered     heparin (porcine) injection 5,000 Units  Every 8 hours         06/06/22 1934     IP VTE HIGH RISK PATIENT  Once         06/06/22 1934     Place sequential compression device  Until discontinued         06/06/22 1934     heparin 25,000 units in dextrose 5% 250 mL (100 units/mL) infusion LOW INTENSITY nomogram - [facility]  Continuous        Question Answer Comment   Heparin Infusion Adjustment (DO NOT MODIFY ANSWER) [email or web address]\[email or web address]\[email or web address] LOW INTENSITY nomogram for [facility] [id number]    Begin at (in units/kg/hr) 12        06/04/22 0825     Place sequential compression device  Until discontinued         05/06/22 2336                Discharge Planning   YESSICA: 5/31/2022     Code Status: Full Code   Is the patient medically ready for discharge?:     Reason for patient still in hospital (select all that apply): Patient trending condition  Discharge Plan A: Skilled Nursing Facility   Discharge Delays: None known at this time              Delmer Guzmán MD  Department of Hospital Medicine   [facility] - Telemetry ([facility])

## 2022-06-08 NOTE — PLAN OF CARE
Ongoing (interventions implemented as appropriate)  Pt AAO x4.  VSS  Pt able to make needs known.  Pt remained afebrile throughout this shift.   Pt up to chair with max assistance, gait unsteady   Pt remained free of falls this shift.   Pt denies pain this shift.  Plan of care reviewed. Patient verbalizes understanding.   Pt moving/turing independent. Frequent weight shifting encouraged.  Patient sinus rhythm on monitor.   Bed low, side rails up x 2, wheels locked, call light in reach.   Hourly rounding completed.   Will continue to monitor.

## 2022-06-08 NOTE — ASSESSMENT & PLAN NOTE
06/03/22- will continue Oxacillin   Follow repeat blood cultures     06/08/22-continue Oxacillin as scheduled

## 2022-06-08 NOTE — ASSESSMENT & PLAN NOTE
Isolate is staph Aureus - will continue [email or web address]    05/16- will continue Oxacillin   Will plan to treat for 8 weeks =EOC 07/11/22 05/23-will repeat blood culture -will continue oxacillin    05/30- will discuss with primary team about pain control .  Continue Oxacillin .  EOC -07/11/22 06/08/22- will continue Oxacillin, EOC -07/11/22

## 2022-06-08 NOTE — PROGRESS NOTES
[facility] Tele[facility] ([facility])  Infectious Disease  Progress Note    Patient Name: Tianna Leon  MRN: [id number]  Admission Date: 5/6/2022  Length of Stay: 33 days  Attending Physician: Bobby Blandon MD  Primary Care Provider: Spenser Campa MD    Isolation Status: Airborne and Contact and Droplet  Assessment/Plan:      * Acute bacterial endocarditis    06/03/22- will continue Oxacillin   Follow repeat blood cultures     06/08/22-continue Oxacillin as scheduled     Status post chest tube placement  CVT follow up     BEE (acute kidney injury)  Will monitor renal function -]  Avoid nephrotoxic meds     Empyema of right pleural space  MRI of the thoracic region 0  1. No evidence for disc osteomyelitis in the thoracic spine.  No evidence for epidural abscess.  2. Loculated right hydropneumothorax and extensive bilateral pulmonary infiltrates.     Case discussed with critical care team- will consult pulmonology /CVT  On oxacillin  05/23- s/p chest tube placement -follow pulmonology .  Continue oxacillin    06/03/22- will continue oxacillin, follow CVT for VATS   05/30- will continue Oxacillin as planned.-will complete 8 weeks of therapy .    06/02/22-  Case discussed with CVT -will plan for VATS next week.  Continue Oxacillin  IR follow up for pigtail if feasible    06/06/22- will continue Oxacillin , follow CVT for VATS  Continue close monitoring .  06/07-s/p VATS - will plan to treat for up to 8 weeks       MSSA bacteremia  Isolate is staph Aureus - will continue [email or web address]    05/16- will continue Oxacillin   Will plan to treat for 8 weeks =EOC 07/11/22 05/23-will repeat blood culture -will continue oxacillin    05/30- will discuss with primary team about pain control .  Continue Oxacillin .  EOC -07/11/22 06/08/22- will continue Oxacillin, EOC -07/11/22          Anticipated Disposition:     Thank you for your consult. I will follow-up with patient. Please contact us if you have any additional  questions.    Sunday Hassan MD  Infectious Disease  [facility] Tele[facility] ([facility])    Subjective:     Principal Problem:Acute bacterial endocarditis    HPI:   38 y/o. female  with a PMHx of asthma, COPD,Bipolar  , IVDU and HLD ,active IVDU  ( heroine , cocaine  and amphetamine )   : CTA chest negative for pe . Multiple nodular and cavitary opacities concerning for septic emboli with larger opacities possibly necrotizing pneumonia. CT cervical  and thoracic did not show any acute finding , CT lumbar Possible progression of degenerative changes most notable at L4-5 with moderate to severe spinal canal stenosis at this level.  Echo-  · to moderate tricuspid regurgitation.     There is a 1.3 x 1.5 cm elongated, mobile echodensity seen on the tricuspid valve consistent with vegetation. Recommend CT surgery consult.   Blood culture- staph auerus  Component      Latest Ref Rng & Units 5/8/2022 5/7/2022 5/6/2022   WBC      3.90 - 12.70 K/uL 30.90 (H) 27.52 (H) 29.13 (H)     Interval History:  37 year old woman with tricuspid endocarditis .  Blood cultures- MSSA.  She remains bacteremic.  No MR evidence of epidural abscess.     Large, rim enhancing facet synovial cysts arising posteriorly from the right L2-L3 and left L4-L5 facet joints may be degenerative or sequelae of facet septic arthritis.     Minor enhancement and endplate irregularity at L4-L5 may be degenerative versus less likely the sequelae of spondylodiscitis.  No significant associated endplate destruction or vertebral body height loss.     Multilevel degenerative changes of the lumbar spine are most pronounced at L4-L5 with broad-based disc bulge and prominent central disc protrusion contributing to moderate to severe spinal canal stenosis with moderate left-sided neural foraminal stenosis.     Asymmetric left L5-S1 disc bulge contributes to moderate to severe left-sided neural foraminal stenosis and left lateral recess stenosis.   CT chest -Lungs/Pleura:  Multiple nodular and cavitary opacities concerning for septic emboli.  Additional larger opacities most notably in the right lung base with some internal clearing possibly related to necrotizing pneumonia.  Moderate loculated right pleural fluid and no definite left pleural fluid.  Empyema not excluded.   05/12- she declined thoracic MRI    She had CT guided aspirtaion -pus aspirated  05/16 - she is drowsy but refusing more imaging .   05/19-  MRI of the thoracic region    1. No evidence for disc osteomyelitis in the thoracic spine.  No evidence for epidural abscess.  2. Loculated right hydropneumothorax and extensive bilateral pulmonary infiltrates.   T max 100.5  05/23-she had interval placemet of chest tube.  Cultures from the chest remain negative.    05/30/22-   She reports that she has poor pain control .    05/31/22- she still complains of back apin .  She is afebrile   06/01/22  She had fever -T max 101.  CT chest -  Lungs/pleura: Continued evolution of the septic emboli with multiple cavitary nodules scattered throughout the lungs.  There are well-defined air-fluid collections in posterior aspect of the right upper and lower lobes measuring 4.5 x 9.5 cm and 5.5 x 10.0 cm, respectively.  Alternatively, this could represent loculated empyema.  These 2 collections appear to communicate.  Overall appearance of the chest is similar when compared to the recent prior CT examination.  Findings concerning for necrotizing pneumonia, as previously noted.  There is a medium sized left-sided pleural effusion with associated volume loss.     06/03- she reports hemotysis and persistent chest pain.  :06/06/22- she reeports sub optimal pain control..  Blood culture- 06/01- no growth     06/07/22- she is in tearful distress due to pain.  She had interval VATS-06/06/22-op findings-   Dense well vascularized adhesions between the lung and the pleura.  Well developed an adherent rind to visceral and parietal pleura.  200 cc of  bloody purulent material.    Review of Systems   Constitutional:  Positive for fatigue. Negative for activity change, appetite change, chills, diaphoresis, fever and unexpected weight change.   HENT: Negative.  Negative for congestion, drooling, facial swelling, rhinorrhea, sinus pressure, sneezing and sore throat.    Eyes: Negative.  Negative for discharge, redness, itching and visual disturbance.   Respiratory:  Positive for cough, chest tightness and shortness of breath. Negative for apnea, choking, wheezing and stridor.    Cardiovascular:  Negative for chest pain, palpitations and leg swelling.   Gastrointestinal: Negative.  Negative for abdominal distention, abdominal pain, anal bleeding, blood in stool, constipation, diarrhea, nausea and vomiting.   Endocrine: Negative.    Genitourinary: Negative.  Negative for decreased urine volume, difficulty urinating, dysuria, frequency, hematuria, pelvic pain, urgency, vaginal bleeding and vaginal discharge.   Musculoskeletal:  Positive for arthralgias and back pain. Negative for gait problem, joint swelling, myalgias, neck pain and neck stiffness.   Skin: Negative.  Negative for color change, pallor, rash and wound.   Allergic/Immunologic: Negative.    Neurological:  Positive for weakness. Negative for dizziness, seizures, facial asymmetry, speech difficulty, light-headedness, numbness and headaches.   Psychiatric/Behavioral:  Negative for agitation, hallucinations and suicidal ideas.    All other systems reviewed and are negative.  Objective:     Vital Signs (Most Recent):  Temp: 99.6 °F (37.6 °C) (06/07/22 2331)  Pulse: 88 (06/08/22 0500)  Resp: 20 (06/08/22 0100)  BP: (!) 101/56 (06/07/22 2331)  SpO2: 95 % (06/08/22 0100)   Vital Signs (24h Range):  Temp:  [98.8 °F (37.1 °C)-99.6 °F (37.6 °C)] 99.6 °F (37.6 °C)  Pulse:  [] 88  Resp:  [10-44] 20  SpO2:  [92 %-99 %] 95 %  BP: ()/(53-75) 101/56  Arterial Line BP: ()/(49-52) 102/49     Weight: 91 kg  (200 lb 9.9 oz)  Body mass index is 33.38 kg/m².    Estimated Creatinine Clearance: 44.3 mL/min (A) (based on SCr of 1.9 mg/dL (H)).    Physical Exam  Vitals and nursing note reviewed. Chaperone present: -.   Constitutional:       General: She is not in acute distress.     Appearance: She is well-developed. She is not ill-appearing or toxic-appearing.   HENT:      Head: Normocephalic and atraumatic.   Eyes:      Conjunctiva/sclera: Conjunctivae normal.      Pupils: Pupils are equal, round, and reactive to light.   Cardiovascular:      Rate and Rhythm: Normal rate and regular rhythm.      Pulses: Normal pulses.      Heart sounds: Normal heart sounds. No murmur heard.  Pulmonary:      Effort: Pulmonary effort is normal. No respiratory distress.      Breath sounds: Normal breath sounds.      Comments: Chest tube noted on the right   Abdominal:      General: Bowel sounds are normal. There is no distension.      Palpations: Abdomen is soft.      Tenderness: There is no abdominal tenderness.   Musculoskeletal:         General: Swelling present. No tenderness or deformity. Normal range of motion.      Cervical back: Normal range of motion and neck supple.      Right lower leg: No edema.      Left lower leg: No edema.   Skin:     General: Skin is warm and dry.      Coloration: Skin is pale.      Findings: Erythema present.   Neurological:      Mental Status: She is alert and oriented to person, place, and time.      Motor: Weakness present.   Psychiatric:         Behavior: Behavior normal.       Significant Labs: Blood Culture:   Recent Labs   Lab 05/11/22  1213 05/25/22  0901 05/25/22  1220 05/25/22  1313 06/01/22  1132   LABBLOO No growth after 5 days.  No growth after 5 days. No growth after 5 days.  No growth after 5 days. No growth after 5 days. No growth after 5 days. No growth after 5 days.  No growth after 5 days.       BMP:   Recent Labs   Lab 06/07/22  0524   *      K 4.2      CO2 21*   BUN  31*   CREATININE 1.9*   CALCIUM 7.9*       CMP:   Recent Labs   Lab 06/06/22 2036 06/07/22  0524    140   K 3.8 4.2    106   CO2 19* 21*   * 113*   BUN 27* 31*   CREATININE 1.8* 1.9*   CALCIUM 8.2* 7.9*   PROT  --  6.2   ALBUMIN  --  1.7*   BILITOT  --  0.3   ALKPHOS  --  88   AST  --  18   ALT  --  12   ANIONGAP 17* 13   EGFRNONAA 35* 33*         Significant Imaging: CT: I have reviewed all pertinent results/findings within the past 24 hours:

## 2022-06-08 NOTE — SUBJECTIVE & OBJECTIVE
Interval History: stepped down from icu. Cont chest tube, current being managed by CTS. Cont current abx therapy. Plans for LTAC once chest tubes are out.     Review of Systems  Objective:     Vital Signs (Most Recent):  Temp: 99.6 °F (37.6 °C) (06/07/22 2331)  Pulse: 95 (06/08/22 0910)  Resp: 18 (06/08/22 1133)  BP: 138/67 (06/08/22 0910)  SpO2: (!) 93 % (06/08/22 0910)   Vital Signs (24h Range):  Temp:  [98.9 °F (37.2 °C)-99.6 °F (37.6 °C)] 99.6 °F (37.6 °C)  Pulse:  [] 95  Resp:  [12-40] 18  SpO2:  [92 %-98 %] 93 %  BP: ()/(53-75) 138/67     Weight: 91 kg (200 lb 9.9 oz)  Body mass index is 33.38 kg/m².    Intake/Output Summary (Last 24 hours) at 6/8/2022 1320  Last data filed at 6/8/2022 0705  Gross per 24 hour   Intake 516.16 ml   Output 700 ml   Net -183.84 ml      Physical Exam  Vitals and nursing note reviewed.   Constitutional:       General: She is not in acute distress.     Appearance: She is well-developed. She is ill-appearing.   HENT:      Head: Normocephalic and atraumatic.      Nose: Nose normal.   Eyes:      Extraocular Movements: Extraocular movements intact.   Cardiovascular:      Rate and Rhythm: Normal rate and regular rhythm.   Pulmonary:      Effort: Pulmonary effort is normal.      Breath sounds: No stridor. Rhonchi present.      Comments: Right sided chest tube  Abdominal:      General: There is no distension.   Musculoskeletal:         General: No signs of injury.      Cervical back: Normal range of motion and neck supple.   Skin:     General: Skin is dry.   Neurological:      General: No focal deficit present.      Mental Status: She is alert and oriented to person, place, and time. Mental status is at baseline.   Psychiatric:         Behavior: Behavior normal.       Significant Labs: All pertinent labs within the past 24 hours have been reviewed.    Significant Imaging: I have reviewed all pertinent imaging results/findings within the past 24 hours.

## 2022-06-09 PROBLEM — I27.9 CHRONIC PULMONARY HEART DISEASE: Status: RESOLVED | Noted: 2022-05-08 | Resolved: 2022-06-09

## 2022-06-09 PROBLEM — G06.1: Status: RESOLVED | Noted: 2022-05-09 | Resolved: 2022-06-09

## 2022-06-09 LAB
ALBUMIN SERPL BCP-MCNC: 1.6 G/DL (ref 3.5–5.2)
ALP SERPL-CCNC: 73 U/L (ref 55–135)
ALT SERPL W/O P-5'-P-CCNC: 9 U/L (ref 10–44)
ANION GAP SERPL CALC-SCNC: 9 MMOL/L (ref 8–16)
APTT BLDCRRT: 30.4 SEC (ref 21–32)
AST SERPL-CCNC: 12 U/L (ref 10–40)
BASOPHILS # BLD AUTO: 0.02 K/UL (ref 0–0.2)
BASOPHILS NFR BLD: 0.2 % (ref 0–1.9)
BILIRUB SERPL-MCNC: 0.3 MG/DL (ref 0.1–1)
BLD PROD TYP BPU: NORMAL
BLOOD UNIT EXPIRATION DATE: NORMAL
BLOOD UNIT TYPE CODE: 5100
BLOOD UNIT TYPE: NORMAL
BUN SERPL-MCNC: 21 MG/DL (ref 6–20)
CALCIUM SERPL-MCNC: 8.3 MG/DL (ref 8.7–10.5)
CHLORIDE SERPL-SCNC: 104 MMOL/L (ref 95–110)
CO2 SERPL-SCNC: 26 MMOL/L (ref 23–29)
CODING SYSTEM: NORMAL
CREAT SERPL-MCNC: 1.8 MG/DL (ref 0.5–1.4)
DIFFERENTIAL METHOD: ABNORMAL
DISPENSE STATUS: NORMAL
EOSINOPHIL # BLD AUTO: 0.2 K/UL (ref 0–0.5)
EOSINOPHIL NFR BLD: 2.1 % (ref 0–8)
ERYTHROCYTE [DISTWIDTH] IN BLOOD BY AUTOMATED COUNT: 16.5 % (ref 11.5–14.5)
EST. GFR  (AFRICAN AMERICAN): 40 ML/MIN/1.73 M^2
EST. GFR  (NON AFRICAN AMERICAN): 35 ML/MIN/1.73 M^2
GLUCOSE SERPL-MCNC: 139 MG/DL (ref 70–110)
HCT VFR BLD AUTO: 19.6 % (ref 37–48.5)
HGB BLD-MCNC: 6.1 G/DL (ref 12–16)
IMM GRANULOCYTES # BLD AUTO: 0.07 K/UL (ref 0–0.04)
IMM GRANULOCYTES NFR BLD AUTO: 0.7 % (ref 0–0.5)
LYMPHOCYTES # BLD AUTO: 2.8 K/UL (ref 1–4.8)
LYMPHOCYTES NFR BLD: 29.4 % (ref 18–48)
MCH RBC QN AUTO: 26.9 PG (ref 27–31)
MCHC RBC AUTO-ENTMCNC: 31.1 G/DL (ref 32–36)
MCV RBC AUTO: 86 FL (ref 82–98)
MONOCYTES # BLD AUTO: 1 K/UL (ref 0.3–1)
MONOCYTES NFR BLD: 10.8 % (ref 4–15)
NEUTROPHILS # BLD AUTO: 5.4 K/UL (ref 1.8–7.7)
NEUTROPHILS NFR BLD: 56.8 % (ref 38–73)
NRBC BLD-RTO: 0 /100 WBC
NUM UNITS TRANS PACKED RBC: NORMAL
PLATELET # BLD AUTO: 347 K/UL (ref 150–450)
PMV BLD AUTO: 9.2 FL (ref 9.2–12.9)
POTASSIUM SERPL-SCNC: 3.8 MMOL/L (ref 3.5–5.1)
PROT SERPL-MCNC: 6.1 G/DL (ref 6–8.4)
RBC # BLD AUTO: 2.27 M/UL (ref 4–5.4)
SODIUM SERPL-SCNC: 139 MMOL/L (ref 136–145)
WBC # BLD AUTO: 9.44 K/UL (ref 3.9–12.7)

## 2022-06-09 PROCEDURE — 97530 THERAPEUTIC ACTIVITIES: CPT | Mod: CQ

## 2022-06-09 PROCEDURE — 94761 N-INVAS EAR/PLS OXIMETRY MLT: CPT

## 2022-06-09 PROCEDURE — 25000003 PHARM REV CODE 250: Performed by: NURSE PRACTITIONER

## 2022-06-09 PROCEDURE — 21400001 HC TELEMETRY ROOM

## 2022-06-09 PROCEDURE — 99900035 HC TECH TIME PER 15 MIN (STAT)

## 2022-06-09 PROCEDURE — 97530 THERAPEUTIC ACTIVITIES: CPT

## 2022-06-09 PROCEDURE — 85730 THROMBOPLASTIN TIME PARTIAL: CPT | Performed by: FAMILY MEDICINE

## 2022-06-09 PROCEDURE — 25000003 PHARM REV CODE 250: Performed by: FAMILY MEDICINE

## 2022-06-09 PROCEDURE — 25000003 PHARM REV CODE 250: Performed by: THORACIC SURGERY (CARDIOTHORACIC VASCULAR SURGERY)

## 2022-06-09 PROCEDURE — 27000646 HC AEROBIKA DEVICE

## 2022-06-09 PROCEDURE — 80053 COMPREHEN METABOLIC PANEL: CPT | Performed by: FAMILY MEDICINE

## 2022-06-09 PROCEDURE — 97110 THERAPEUTIC EXERCISES: CPT | Mod: CQ

## 2022-06-09 PROCEDURE — 63600175 PHARM REV CODE 636 W HCPCS: Performed by: THORACIC SURGERY (CARDIOTHORACIC VASCULAR SURGERY)

## 2022-06-09 PROCEDURE — 99232 SBSQ HOSP IP/OBS MODERATE 35: CPT | Mod: ,,, | Performed by: INTERNAL MEDICINE

## 2022-06-09 PROCEDURE — 94664 DEMO&/EVAL PT USE INHALER: CPT

## 2022-06-09 PROCEDURE — 25000003 PHARM REV CODE 250: Performed by: INTERNAL MEDICINE

## 2022-06-09 PROCEDURE — 27000207 HC ISOLATION

## 2022-06-09 PROCEDURE — 99232 PR SUBSEQUENT HOSPITAL CARE,LEVL II: ICD-10-PCS | Mod: ,,, | Performed by: INTERNAL MEDICINE

## 2022-06-09 PROCEDURE — 36430 TRANSFUSION BLD/BLD COMPNT: CPT

## 2022-06-09 PROCEDURE — 27000221 HC OXYGEN, UP TO 24 HOURS

## 2022-06-09 PROCEDURE — 63600175 PHARM REV CODE 636 W HCPCS: Performed by: INTERNAL MEDICINE

## 2022-06-09 PROCEDURE — P9016 RBC LEUKOCYTES REDUCED: HCPCS | Performed by: THORACIC SURGERY (CARDIOTHORACIC VASCULAR SURGERY)

## 2022-06-09 PROCEDURE — 85025 COMPLETE CBC W/AUTO DIFF WBC: CPT | Performed by: THORACIC SURGERY (CARDIOTHORACIC VASCULAR SURGERY)

## 2022-06-09 PROCEDURE — 94640 AIRWAY INHALATION TREATMENT: CPT

## 2022-06-09 RX ORDER — FUROSEMIDE 10 MG/ML
40 INJECTION INTRAMUSCULAR; INTRAVENOUS
Status: DISCONTINUED | OUTPATIENT
Start: 2022-06-09 | End: 2022-06-10 | Stop reason: HOSPADM

## 2022-06-09 RX ORDER — HYDROCODONE BITARTRATE AND ACETAMINOPHEN 500; 5 MG/1; MG/1
TABLET ORAL
Status: DISCONTINUED | OUTPATIENT
Start: 2022-06-09 | End: 2022-06-10 | Stop reason: HOSPADM

## 2022-06-09 RX ADMIN — FUROSEMIDE 40 MG: 10 INJECTION, SOLUTION INTRAMUSCULAR; INTRAVENOUS at 11:06

## 2022-06-09 RX ADMIN — MORPHINE SULFATE 30 MG: 30 TABLET, FILM COATED, EXTENDED RELEASE ORAL at 11:06

## 2022-06-09 RX ADMIN — OXYCODONE HYDROCHLORIDE 5 MG: 5 TABLET ORAL at 06:06

## 2022-06-09 RX ADMIN — HEPARIN SODIUM 5000 UNITS: 5000 INJECTION INTRAVENOUS; SUBCUTANEOUS at 05:06

## 2022-06-09 RX ADMIN — OXYCODONE HYDROCHLORIDE AND ACETAMINOPHEN 500 MG: 500 TABLET ORAL at 08:06

## 2022-06-09 RX ADMIN — FUROSEMIDE 40 MG: 10 INJECTION, SOLUTION INTRAMUSCULAR; INTRAVENOUS at 09:06

## 2022-06-09 RX ADMIN — CHLORHEXIDINE GLUCONATE 0.12% ORAL RINSE 10 ML: 1.2 LIQUID ORAL at 08:06

## 2022-06-09 RX ADMIN — IPRATROPIUM BROMIDE AND ALBUTEROL 1 PUFF: 20; 100 SPRAY, METERED RESPIRATORY (INHALATION) at 07:06

## 2022-06-09 RX ADMIN — TRAZODONE HYDROCHLORIDE 100 MG: 100 TABLET ORAL at 08:06

## 2022-06-09 RX ADMIN — MORPHINE SULFATE 30 MG: 30 TABLET, FILM COATED, EXTENDED RELEASE ORAL at 10:06

## 2022-06-09 RX ADMIN — ONDANSETRON 8 MG: 8 TABLET, ORALLY DISINTEGRATING ORAL at 06:06

## 2022-06-09 RX ADMIN — FAMOTIDINE 20 MG: 20 TABLET ORAL at 08:06

## 2022-06-09 RX ADMIN — HEPARIN SODIUM 5000 UNITS: 5000 INJECTION INTRAVENOUS; SUBCUTANEOUS at 09:06

## 2022-06-09 RX ADMIN — OXACILLIN SODIUM 12 G: 10 INJECTION, POWDER, FOR SOLUTION INTRAVENOUS at 11:06

## 2022-06-09 RX ADMIN — HEPARIN SODIUM 5000 UNITS: 5000 INJECTION INTRAVENOUS; SUBCUTANEOUS at 04:06

## 2022-06-09 RX ADMIN — MUPIROCIN 1 G: 20 OINTMENT TOPICAL at 08:06

## 2022-06-09 RX ADMIN — IPRATROPIUM BROMIDE AND ALBUTEROL 1 PUFF: 20; 100 SPRAY, METERED RESPIRATORY (INHALATION) at 01:06

## 2022-06-09 RX ADMIN — DOCUSATE SODIUM 100 MG: 100 CAPSULE, LIQUID FILLED ORAL at 08:06

## 2022-06-09 RX ADMIN — THERA TABS 1 TABLET: TAB at 08:06

## 2022-06-09 RX ADMIN — IPRATROPIUM BROMIDE AND ALBUTEROL 1 PUFF: 20; 100 SPRAY, METERED RESPIRATORY (INHALATION) at 12:06

## 2022-06-09 NOTE — PROGRESS NOTES
[facility] - Tele[facility] ([facility])  Pulmonology  Progress Note    Patient Name: Tianna Leon  MRN: [id number]  Admission Date: 5/6/2022  Hospital Length of Stay: 34 days  Code Status: Full Code  Attending Provider: Bobby Blandon MD  Primary Care Provider: Spenser Campa MD   Principal Problem: Acute bacterial endocarditis    Subjective:     Interval History:   06/09: seen and examined: POD #3: VATS decortication, OOB, still has pain, T Max: 98.8 F, UO 3500mls, also got lasix , chamber 70 mls, no leak. H &H 6.1/19.6    Respiratory ROS: positive for - cough and pleuritic pain  negative for - hemoptysis, pleuritic pain, stridor, tachypnea, or wheezing     Objective:     Vital Signs (Most Recent):  Temp: 98.1 °F (36.7 °C) (06/09/22 1145)  Pulse: 83 (06/09/22 1145)  Resp: 18 (06/09/22 1145)  BP: 119/71 (06/09/22 1145)  SpO2: 96 % (06/09/22 1145) Vital Signs (24h Range):  Temp:  [98 °F (36.7 °C)-98.8 °F (37.1 °C)] 98.1 °F (36.7 °C)  Pulse:  [83-95] 83  Resp:  [17-20] 18  SpO2:  [87 %-100 %] 96 %  BP: (105-153)/(64-82) 119/71     Weight: 89.2 kg (196 lb 10.4 oz)  Body mass index is 32.72 kg/m².      Intake/Output Summary (Last 24 hours) at 6/9/2022 1154  Last data filed at 6/9/2022 0549  Gross per 24 hour   Intake --   Output 3550 ml   Net -3550 ml       Physical Exam  Vitals and nursing note reviewed.   Constitutional:       Appearance: Normal appearance.      Interventions: Nasal cannula in place.       HENT:      Head: Normocephalic and atraumatic.      Nose: Nose normal.   Eyes:      Extraocular Movements: Extraocular movements intact.      Pupils: Pupils are equal, round, and reactive to light.   Cardiovascular:      Rate and Rhythm: Normal rate and regular rhythm.      Pulses: Normal pulses.      Heart sounds: Normal heart sounds.   Pulmonary:      Effort: Pulmonary effort is normal.      Breath sounds: Normal breath sounds.   Abdominal:      General: Bowel sounds are normal.      Palpations: Abdomen is soft.    Musculoskeletal:      Cervical back: Normal range of motion and neck supple.      Right lower leg: Edema present.      Left lower leg: Edema present.   Skin:     General: Skin is warm.      Comments: Multiple tattoos   Neurological:      General: No focal deficit present.      Mental Status: She is alert and oriented to person, place, and time.   Psychiatric:         Mood and Affect: Mood normal.       Vents:  Vent Mode: A/C (06/06/22 2339)  Ventilator Initiated: Yes (06/06/22 2013)  Set Rate: 20 BPM (06/06/22 2339)  Vt Set: 400 mL (06/06/22 2339)  Pressure Support: 0 cmH20 (06/06/22 2339)  PEEP/CPAP: 5 cmH20 (06/06/22 2339)  Oxygen Concentration (%): 28 (06/09/22 0014)  Peak Airway Pressure: 26 cmH2O (06/06/22 2339)  Plateau Pressure: 0 cmH20 (06/06/22 2339)  Total Ve: 8.39 mL (06/06/22 2339)  F/VT Ratio<105 (RSBI): (!) 55.1 (06/06/22 2339)    Lines/Drains/Airways       Peripherally Inserted Central Catheter Line  Duration             PICC Double Lumen 05/17/22 1612 right basilic 22 days              Drain  Duration                  Urethral Catheter 06/06/22 0545 Latex;Straight-tip 16 Fr. 3 days         Y Chest Tube 1 and 2 06/06/22 1924 1 Right Pleural 36 Fr. 2 Right Other (Comment) 36 Fr. 2 days                    Significant Labs:    CBC/Anemia Profile:  Recent Labs   Lab 06/08/22 0636 06/09/22 0619   WBC 10.00 9.44   HGB 7.1* 6.1*   HCT 22.6* 19.6*    347   MCV 86 86   RDW 16.7* 16.5*        Chemistries:  Recent Labs   Lab 06/08/22 0636 06/09/22 0619    139   K 3.7 3.8    104   CO2 25 26   BUN 25* 21*   CREATININE 1.8* 1.8*   CALCIUM 8.1* 8.3*   ALBUMIN 1.6* 1.6*   PROT 5.9* 6.1   BILITOT 0.3 0.3   ALKPHOS 72 73   ALT 9* 9*   AST 13 12       All pertinent labs within the past 24 hours have been reviewed.    Significant Imaging:  I have reviewed all pertinent imaging results/findings within the past 24 hours.    X-Ray Chest AP Portable  Narrative: EXAMINATION:  XR CHEST AP  PORTABLE    CLINICAL HISTORY:  s/p vats;    TECHNIQUE:  Single frontal view of the chest was performed.    COMPARISON:  06/08/2022.    FINDINGS:  Small bilateral pleural effusions unchanged as well as bibasilar infiltrates.  Right-sided chest tubes satisfactory position.  No pneumothorax. In comparison to the prior study, there is no adverse interval changes.  Impression: In comparison to the prior study, there is no adverse interval changes    Electronically signed by: Lloyd Suarez MD  Date:    06/09/2022  Time:    07:58        ABG  Recent Labs   Lab 06/06/22  2143   PH 7.415   PO2 173*   PCO2 41.0   HCO3 26.3   BE 2     Assessment/Plan:     * Acute bacterial endocarditis  Medical Mx  CTS note noted  5/23 IV oxacillin.  5/24 IV oxacillin as per Infectious Disease  5/25 repeat cultures none relevant.  IV oxacillin as per Infectious Disease x4 weeks.  T-max 101.7° last  24 hours  5/26 repeat blood concern negative continue oxacillin gtt   5/27 continue oxacillin drip   5/28 oxacillin drip.  Blood culture 5/25 was negative  5/28 continue same   5/30 oxacillin total of 8 weeks.   6/3 same  06/06: continue OXACILLIN  06/07: continue OXACILLIN  06/09: continue OXACILLIN    Status post chest tube placement  X2 chest tube to suction  Monitor output  Pain control  INCENTIVE SPIROMETRY, DEEP BREATHING    Empyema of right pleural space  5/20 Suspect empyema, needs chest tube placement  5/21 s/p chest tube placement, adequate expansion - will check Chest X Ray in [facility]  5/23 tPA DNase.  5/24 tPA for DNase installation via chest tube.  Monitor chest tube output.  Repeat chest x-ray in a.m..  5/25 chest tube output increased from 50 mL on average per 24 hours to 160 mL last 24 hours.  Purulent drainage noted.  Will repeat tPA plus Dnase instillation.  5/26 chest x-ray reviewed.  Continue IV oxacillin.  Right-sided pigtail in place.  Status post tPA and DNase x2.  Bloody drainage around pigtail today status post tPA and DNase  yesterday.  Will hold on fibrinolysis.  Monitor chest tube output  5/27 right pigtail in place.  225 mL  Last 24 hours average of 200 mL per day.  Change pleurovac canister to better assess the color of the fluid pus versus serosanguineous.  Keep pigtail in place for now.  Ultrasound bilateral chest assess pleural effusions.  Continue oxacillin drip.  5/28 keep chest tube on the right side for now.  Repeat CRP.  Checking chemistry microbiology  on left-sided pleural fluid to rule out complicated effusion and need for pigtail.  Continue oxacillin drip.   5/29 minimal output right chest tube.  Remove chest tube.  Left-sided effusion not complicated not empyema.  Continue IV oxacillin    6/1 evolving empyema/necrosis on right lung on CT scan of the chest 06/01/2022.  Continue IV oxacillin.  Re-consult cardiothoracic surgery.  6/2 /treat COVID.  Cardiothoracic surgery input noted.  VATS procedure will be delayed till Monday next week.  Patient will be scheduled for VATS on 06/06/2022      6/3 VATS 06/06/2022 treating COVID.  06/04/2022 IV oxacillin.  VATS Monday as per CT surgery  6/5 worsening infiltrate on the right on chest x-ray today.  Plan for VATS as per CT surgery in a.m..  06/06: for VATS per CT surgery  06/07: POD #1 VATS DECORTICATION, chest tube per surgery  06/08: POD #2 VATS DECORTICATION, chest tube per surgery  06/09: POD #3 VATS DECORTICATION, chest tube per surgery      IVDU (intravenous drug user)  Cessation/rehab  Outpatient rehab           Alonzo Morrison MD  Pulmonology  '[place] [facility] ([facility])

## 2022-06-09 NOTE — SUBJECTIVE & OBJECTIVE
Interval History:  The patient is postop day 3 status post VATS procedure for evacuation of empyema.    ROS  Medications:  Continuous Infusions:   dextrose 5 % and 0.45 % NaCl with KCl 20 mEq 25 mL/hr at 06/07/22 2205     Scheduled Meds:   ascorbic acid (vitamin C)  500 mg Oral Daily    chlorhexidine  10 mL Mouth/Throat BID    docusate sodium  100 mg Oral BID    famotidine  20 mg Oral Daily    furosemide (LASIX) injection  40 mg Intravenous Q12H    heparin (porcine)  5,000 Units Subcutaneous Q8H    ipratropium-albuteroL  1 puff Inhalation Q6H    morphine  30 mg Oral Q12H    multivitamin  1 tablet Oral Daily    mupirocin  1 g Nasal BID    oxacillin 12 g in  mL CONTINUOUS INFUSION  12 g Intravenous Q24H    polyethylene glycol  17 g Oral Daily    traZODone  100 mg Oral QHS     PRN Meds:sodium chloride, acetaminophen, bisacodyL, dextrose 10%, dextrose 10%, glucagon (human recombinant), glucose, glucose, hydrALAZINE, LORazepam, metoclopramide HCl, naloxone, ondansetron, oxyCODONE, pneumoc 20-susan conj-dip cr(PF), sodium chloride 0.9%, sodium chloride 0.9%, sodium chloride 0.9%     Objective:     Vital Signs (Most Recent):  Temp: 98.1 °F (36.7 °C) (06/09/22 1145)  Pulse: 83 (06/09/22 1145)  Resp: 18 (06/09/22 1145)  BP: 119/71 (06/09/22 1145)  SpO2: 96 % (06/09/22 1145) Vital Signs (24h Range):  Temp:  [98 °F (36.7 °C)-98.8 °F (37.1 °C)] 98.1 °F (36.7 °C)  Pulse:  [83-95] 83  Resp:  [17-20] 18  SpO2:  [87 %-100 %] 96 %  BP: (105-153)/(64-82) 119/71     Weight: 89.2 kg (196 lb 10.4 oz)  Body mass index is 32.72 kg/m².    SpO2: 96 %  O2 Device (Oxygen Therapy): nasal cannula    Intake/Output - Last 3 Shifts         06/07 0700 06/08 0659 06/08 0700 06/09 0659 06/09 0700  06/10 0659    P.O. 100      I.V. (mL/kg) 401.9 (4.4)      IV Piggyback 335.1      Total Intake(mL/kg) 837 (9.2)      Urine (mL/kg/hr) 1100 (0.5) 3500 (1.6)     Chest Tube 160 70     Total Output 1260 3650     Net -020 -0462                     Lines/Drains/Airways       Peripherally Inserted Central Catheter Line  Duration             PICC Double Lumen 05/17/22 1612 right basilic 22 days              Drain  Duration                  Urethral Catheter 06/06/22 0545 Latex;Straight-tip 16 Fr. 3 days         Y Chest Tube 1 and 2 06/06/22 1924 1 Right Pleural 36 Fr. 2 Right Other (Comment) 36 Fr. 2 days                    Physical Exam  Constitutional:       Appearance: Normal appearance. She is obese.   HENT:      Head: Normocephalic and atraumatic.   Cardiovascular:      Rate and Rhythm: Normal rate and regular rhythm.      Heart sounds: Normal heart sounds.   Pulmonary:      Effort: Pulmonary effort is normal.      Breath sounds: Normal breath sounds.   Abdominal:      General: Abdomen is flat. Bowel sounds are normal.      Palpations: Abdomen is soft.   Musculoskeletal:      Right lower leg: No edema.      Left lower leg: No edema.   Skin:     General: Skin is warm and dry.   Neurological:      Comments: Patient appears sleepy but easily arousable.  Patient is oriented x3 and neuro exam is nonfocal.  Patient answers all questions appropriately.       Significant Labs:  All pertinent labs from the last 24 hours have been reviewed.    Significant Diagnostics:

## 2022-06-09 NOTE — ASSESSMENT & PLAN NOTE
Medical Mx  CTS note noted  5/23 IV oxacillin.  5/24 IV oxacillin as per Infectious Disease  5/25 repeat cultures none relevant.  IV oxacillin as per Infectious Disease x4 weeks.  T-max 101.7° last  24 hours  5/26 repeat blood concern negative continue oxacillin gtt   5/27 continue oxacillin drip   5/28 oxacillin drip.  Blood culture 5/25 was negative  5/28 continue same   5/30 oxacillin total of 8 weeks.   6/3 same  06/06: continue OXACILLIN  06/07: continue OXACILLIN  06/09: continue OXACILLIN

## 2022-06-09 NOTE — ASSESSMENT & PLAN NOTE
Pulmonology consulted and felt likely empyema   IR consulted for chest tube      s/p right pigtail small bore chest tube placement to suction/drainage system per IR on 5/21/22. 60ml pus removed. Now with Serous drainage with pus. Fluid analysis consistent with empyema. Pleural fluid aerobic culture shows NGTD, Anaerobic culture pending    5/24/22: Dr. San plans for tPA and DNase in chest tube today.   5/25/22: 160ml of purulent CT drainage output. Dr. San plans to repeat tPA and DNase in chest tube again today.   5/26/22  Still has purulent drainage noted. Pulmonology following    6/9:  Worsening empyema noted  CTS consult on case  S/p VATS procedure 6/6  Chest tubes in place  Plan for dc to [facility] when chest tubes are out

## 2022-06-09 NOTE — PLAN OF CARE
Ongoing (interventions implemented as appropriate)  Pt AAO x4.  VSS  Pt able to make needs known.  Pt remained afebrile throughout this shift.   Pt up to the side of bed  Pt remained free of falls this shift.   Pt denies pain this shift.  Plan of care reviewed. Patient verbalizes understanding.   Pt moving/turing independent. Frequent weight shifting encouraged.  Patient sinus rhythm on monitor.   Bed low, side rails up x 2, wheels locked, call light in reach.   Hourly rounding completed.   Will continue to monitor.

## 2022-06-09 NOTE — PROGRESS NOTES
[facility] - Med Surg  Adult Nutrition  Progress Note    SUMMARY     Recommendations  1. Continue diet as prescribed: regular and encourage PO intake.   2. RD to follow up to monitor intake, tolerance, and labs.      Goals:   1.Pt to meet greater than or equal to 75% EEN by RD follow up.    Nutrition Goal Status: progressing towards goal     Communication of RD Recs: POC, sticky note    Assessment and Plan  Nutrition Problem  Inadequate oral intake  Related to (etiology):   Decreased appetite  Signs and Symptoms (as evidenced by):   PO intake 0-75% meals per chart  Interventions(treatment strategy):  Collaboration with other providers  Encourage PO intake   Nutrition Diagnosis Status:   Continues    Physical Findings/Malnutrition Assessment  Patient does not meet at least 2 ASPEN criteria for malnutrition at this time.   Will continue to monitor.    N/V: Nausea/Vomiting Signs/Symptoms: nausea intermittent not indicated   Wounds: not indicated   Edema: 1+ dependent, hands, legs, knees, ankles, feet --> remains -->: 1+ dependent, generalized, hands, legs, knees, ankles, feet    Last Bowel Movement: 06/03/22 Stool Consistency: watery, loose    Mouth/Teeth WDL: WDL except, teeth Teeth Symptoms: decay/caries, tooth/teeth missing  O2 Device (Oxygen Therapy): nasal cannula   Hand , Left: moderate  Jose Score: 15  NFPE not performed, pt well nourished/obese    Reason for Assessment  Reason For Assessment: RD f/u  Diagnosis: infection/sepsis (bacteremia) --> Acute bacterial endocarditis  Relevant Medical History: asthma, COPD,Bipolar, IVDU, anxiety, HLD    General Information Comments:     5/16: Remote assessment for coverage. Unable to reach pt on room phone - line busy. Reached out to RN via secure chat - reports she is doing okay with PO intake. Per chart, eating 0-50% meals, with 15 lbs wt gain since admit (+ 4.3 L in I/Os), possibly fluid related. Per MD note pt +appetite change and + abdominal pain and distension.  "LBM 5/15. JHON NFPE due to remote assesment. RD to monitor and follow up.    5/23: Pt with mostly 100% PO intake since last assessment, will dc ONS and Will continue to monitor.      5/30: Pt with decreased PO intake since last assessment, 0% x 2 days. 20 lb weight gain while IP, trace edema noted. Continue with current nutritional plan and encourage PO intake. Will continue to monitor. Awaiting SNF placement.     6/2: Pt now COVID+, improved appetite and intake since last assessment. ONS has been d/c'd and % intake per EMR. Weight trending up. RD spoke with CA who says pt is picky but they have now identified some options she prefers and tolerating. Will continue to monitor.      6/9: Patient seen for follow up. Patient is on a regular diet. Patient did not answer phone. Patient is eating 50% of PO intake her EMR. Patient has weight gain per past encounters. Patients weight has been fluctuating since admission. Patients last BM 6/3. Patient has generalized edema. Will continue to monitor.      Nutrition Discharge Planning: regular general, healthful diet    Nutrition Risk Screen  Nutrition Risk Screen: no indicators present    Nutrition/Diet History  Spiritual, Cultural Beliefs, Yazidi Practices, Values that Affect Care: no  Food Allergies: NKFA  Factors Affecting Nutritional Intake: None identified at this time    Anthropometrics  Temp: 98.5 °F (36.9 °C)  Height Method: Stated  Height: 5' 5" (165.1 cm)  Height (inches): 65 in  Weight Method: Bed Scale  Weight: 89.2 kg (196 lb 10.4 oz)  Weight (lb): 196.65 lb  Ideal Body Weight (IBW), Female: 125 lb  BMI (Calculated): 34.5  BMI Grade: 35 - 39.9 - obesity - grade II     Labs  Pertinent Labs: reviewed  Lab Results   Component Value Date    ALBUMIN 1.6 (L) 06/09/2022    HGB 6.1 (L) 06/09/2022    HCT 19.6 (LL) 06/09/2022    WBC 9.44 06/09/2022    CALCIUM 8.3 (L) 06/09/2022     Lab Results   Component Value Date     06/09/2022    K 3.8 06/09/2022    PHOS " 5.4 (H) 05/21/2022    BUN 21 (H) 06/09/2022    CREATININE 1.8 (H) 06/09/2022    ESTGFRAFRICA 40 (A) 06/09/2022    EGFRNONAA 35 (A) 06/09/2022     No results found for: POCTGLUCOSE  Lab Results   Component Value Date    HGBA1C 5.3 07/08/2020       Lab Results   Component Value Date    ALT 9 (L) 06/09/2022    AST 12 06/09/2022    ALKPHOS 73 06/09/2022    BILITOT 0.3 06/09/2022     No results found for: HDL, LDL, CHOL, TRIG    Meds  Pertinent Medications: reviewed    ascorbic acid (vitamin C)  500 mg Oral Daily    chlorhexidine  10 mL Mouth/Throat BID    docusate sodium  100 mg Oral BID    famotidine  20 mg Oral Daily    furosemide (LASIX) injection  40 mg Intravenous Q12H    heparin (porcine)  5,000 Units Subcutaneous Q8H    ipratropium-albuteroL  1 puff Inhalation Q6H    morphine  30 mg Oral Q12H    multivitamin  1 tablet Oral Daily    mupirocin  1 g Nasal BID    oxacillin 12 g in  mL CONTINUOUS INFUSION  12 g Intravenous Q24H    polyethylene glycol  17 g Oral Daily    traZODone  100 mg Oral QHS     Continuous Infusions:   dextrose 5 % and 0.45 % NaCl with KCl 20 mEq 25 mL/hr at 06/07/22 2205     PRN Meds:sodium chloride, acetaminophen, bisacodyL, dextrose 10%, dextrose 10%, glucagon (human recombinant), glucose, glucose, hydrALAZINE, LORazepam, metoclopramide HCl, naloxone, ondansetron, oxyCODONE, pneumoc 20-susan conj-dip cr(PF), sodium chloride 0.9%, sodium chloride 0.9%, sodium chloride 0.9%      Estimated/Assessed Needs  Weight Used For Calorie Calculations: 97.1 kg (214 lb 1.1 oz)  Energy Calorie Requirements (kcal): 1646 (no af, obese)  Energy Need Method: [facility]  Protein Requirements: 56-68 (1-1.2g/kg)  Weight Used For Protein Calculations: 56.7 kg (125 lb) (IBW)  Fluid Requirements (mL): 1 mL/kcal or per MD  Estimated Fluid Requirement Method: RDA Method  RDA Method (mL): 1646  CHO Requirement: 205g 50% CHO    Nutrition Prescription Ordered  Current Diet Order: regular  diet    Evaluation of Received Nutrient/Fluid Intake  Energy Calories Required: not meeting needs  Fluid Required: not meeting needs  Tolerance: tolerating  % Intake of Estimated Energy Needs: 50 - 75 %  % Meal Intake: 50 - 75 %    Monitor and Evaluation  Food and Nutrient Intake: energy intake, food and beverage intake  Food and Nutrient Adminstration: diet order  Anthropometric Measurements: weight change, weight, BMI  Biochemical Data, Medical Tests and Procedures: electrolyte and renal panel, lipid profile, gastrointestinal profile, glucose/endocrine profile, inflammatory profile  Nutrition-Focused Physical Findings: overall appearance    Nutrition Follow-Up  Level of nutrition risk: low-moderate / Once weekly   Kailey Rice RD,LDN

## 2022-06-09 NOTE — PLAN OF CARE
Recommendations: 6/9  1. Continue diet as prescribed: regular and encourage PO intake.   2. RD to follow up to monitor intake, tolerance, and labs.     Kailey Rice RD,LDN

## 2022-06-09 NOTE — PLAN OF CARE
Patient AAOX 4. VSS. Patient remained afebrile throughout shift. IV medication administered per order. Patient free from pain this shift. Plan of care reviewed. Patient verbalized understanding. Patient moving/turning self. Frequent weight shifting encouraged. Patient SR on monitor Bed low, side rails up x2, wheels locked, call light in reach. Bed alarm maintained for safety. Patient instructed to call for assistance. Hourly rounding completed. Will continue to monitor.

## 2022-06-09 NOTE — ASSESSMENT & PLAN NOTE
5/15: Blood cultures 5/11/22: NGTD     Continuous Oxacillin, EOC: 7/11/22, pending acceptance at facility, PICC line placed    6/9:  Cont on oxacillin  ID on case   Repeat blood cultures negative

## 2022-06-09 NOTE — CHAPLAIN
Follow up visit with patient.  Pt was very down compared to my first visit.  It does seem like she has given up.  I tried to encourage her not to give up on her fight with her health and to keep working with all the team who is trying to help her.  Pt stated that she would but then went off to sleep.  Pt mentioned needs that she had from her nurse and I let him know.  Spiritual care remains available as needed.    Chaplain Bennie Arriaga M.Div., [facility]

## 2022-06-09 NOTE — SUBJECTIVE & OBJECTIVE
Interval History:   06/09: seen and examined: POD #3: VATS decortication, OOB, still has pain, T Max: 98.8 F, UO 3500mls, also got lasix , chamber 70 mls, no leak. H &H 6.1/19.6    Respiratory ROS: positive for - cough and pleuritic pain  negative for - hemoptysis, pleuritic pain, stridor, tachypnea, or wheezing     Objective:     Vital Signs (Most Recent):  Temp: 98.1 °F (36.7 °C) (06/09/22 1145)  Pulse: 83 (06/09/22 1145)  Resp: 18 (06/09/22 1145)  BP: 119/71 (06/09/22 1145)  SpO2: 96 % (06/09/22 1145) Vital Signs (24h Range):  Temp:  [98 °F (36.7 °C)-98.8 °F (37.1 °C)] 98.1 °F (36.7 °C)  Pulse:  [83-95] 83  Resp:  [17-20] 18  SpO2:  [87 %-100 %] 96 %  BP: (105-153)/(64-82) 119/71     Weight: 89.2 kg (196 lb 10.4 oz)  Body mass index is 32.72 kg/m².      Intake/Output Summary (Last 24 hours) at 6/9/2022 1154  Last data filed at 6/9/2022 0549  Gross per 24 hour   Intake --   Output 3550 ml   Net -3550 ml       Physical Exam  Vitals and nursing note reviewed.   Constitutional:       Appearance: Normal appearance.      Interventions: Nasal cannula in place.       HENT:      Head: Normocephalic and atraumatic.      Nose: Nose normal.   Eyes:      Extraocular Movements: Extraocular movements intact.      Pupils: Pupils are equal, round, and reactive to light.   Cardiovascular:      Rate and Rhythm: Normal rate and regular rhythm.      Pulses: Normal pulses.      Heart sounds: Normal heart sounds.   Pulmonary:      Effort: Pulmonary effort is normal.      Breath sounds: Normal breath sounds.   Abdominal:      General: Bowel sounds are normal.      Palpations: Abdomen is soft.   Musculoskeletal:      Cervical back: Normal range of motion and neck supple.      Right lower leg: Edema present.      Left lower leg: Edema present.   Skin:     General: Skin is warm.      Comments: Multiple tattoos   Neurological:      General: No focal deficit present.      Mental Status: She is alert and oriented to person, place, and time.    Psychiatric:         Mood and Affect: Mood normal.       Vents:  Vent Mode: A/C (06/06/22 2339)  Ventilator Initiated: Yes (06/06/22 2013)  Set Rate: 20 BPM (06/06/22 2339)  Vt Set: 400 mL (06/06/22 2339)  Pressure Support: 0 cmH20 (06/06/22 2339)  PEEP/CPAP: 5 cmH20 (06/06/22 2339)  Oxygen Concentration (%): 28 (06/09/22 0014)  Peak Airway Pressure: 26 cmH2O (06/06/22 2339)  Plateau Pressure: 0 cmH20 (06/06/22 2339)  Total Ve: 8.39 mL (06/06/22 2339)  F/VT Ratio<105 (RSBI): (!) 55.1 (06/06/22 2339)    Lines/Drains/Airways       Peripherally Inserted Central Catheter Line  Duration             PICC Double Lumen 05/17/22 1612 right basilic 22 days              Drain  Duration                  Urethral Catheter 06/06/22 0545 Latex;Straight-tip 16 Fr. 3 days         Y Chest Tube 1 and 2 06/06/22 1924 1 Right Pleural 36 Fr. 2 Right Other (Comment) 36 Fr. 2 days                    Significant Labs:    CBC/Anemia Profile:  Recent Labs   Lab 06/08/22 0636 06/09/22 0619   WBC 10.00 9.44   HGB 7.1* 6.1*   HCT 22.6* 19.6*    347   MCV 86 86   RDW 16.7* 16.5*        Chemistries:  Recent Labs   Lab 06/08/22 0636 06/09/22 0619    139   K 3.7 3.8    104   CO2 25 26   BUN 25* 21*   CREATININE 1.8* 1.8*   CALCIUM 8.1* 8.3*   ALBUMIN 1.6* 1.6*   PROT 5.9* 6.1   BILITOT 0.3 0.3   ALKPHOS 72 73   ALT 9* 9*   AST 13 12       All pertinent labs within the past 24 hours have been reviewed.    Significant Imaging:  I have reviewed all pertinent imaging results/findings within the past 24 hours.    X-Ray Chest AP Portable  Narrative: EXAMINATION:  XR CHEST AP PORTABLE    CLINICAL HISTORY:  s/p vats;    TECHNIQUE:  Single frontal view of the chest was performed.    COMPARISON:  06/08/2022.    FINDINGS:  Small bilateral pleural effusions unchanged as well as bibasilar infiltrates.  Right-sided chest tubes satisfactory position.  No pneumothorax. In comparison to the prior study, there is no adverse interval  changes.  Impression: In comparison to the prior study, there is no adverse interval changes    Electronically signed by: Lloyd Suarez MD  Date:    06/09/2022  Time:    07:58

## 2022-06-09 NOTE — PROGRESS NOTES
[facility] Medicine  Progress Note    Patient Name: Tianna Leon  MRN: [id number]  Patient Class: IP- Inpatient   Admission Date: 5/6/2022  Length of Stay: 34 days  Attending Physician: Bobby Blandon MD  Primary Care Provider: Spenser Campa MD        Subjective:     Principal Problem:Acute bacterial endocarditis        HPI:  36 y/o. female  with a PMHx of asthma, COPD,Bipolar  , IVDU and HLD presented to the ER with a c/o  sob for the last weak which has gotten worse . The SOB is associated with fever , chills , productive cough , back pain  and generalized weakness . She report cough some blood this am .  She is active IVDU  ( heroine , cocaine  and amphetamine ) . She denies any  sick contact , chest pain  , GI/ sx , She also complaning of LE sweeling . Knee pain and B/L LE rash .   ER COURSE: CTA chest negative for pe . Multiple nodular and cavitary opacities concerning for septic emboli with larger opacities possibly necrotizing pneumonia. CT cervical  and thoracic did not show any acute finding , CT lumbar Possible progression of degenerative changes most notable at L4-5 with moderate to severe spinal canal stenosis at this level.  WBC  29 k , na 130 , k 3.4 , cl 89 , procal 2.71   ER VS:  BP Pulse Resp Temp SpO2   (!) 124/58 110 (!) 30 (!) 101.6 °F (38.7 °C) 96 %           Pt will be admitted to inpt with a dx of PNA and severe sepsis       Overview/Hospital Course:  5/7 admitted for pneumonia, severe sepsis in setting of ivdu. Mother at bedside and provides collateral. Patient has struggled with substance abuse for approximately 20 years, worsening over last 3-5 years since life event. Onset of symptoms 1-2 weeks ago. Tolerated thoracentesis with no pneumothorax on chest x-ray. Mri lumbar and echo, pending. Bcx positive, growing gram positive cocci. On vanc, cefepime and flagyl. Infectious disease consulted for bacteremia. 5/8/22 The patient remained afebrile overnight.  Currently on cefepime/vanc/flagyl. Blood cx 5/6/22 are growing MSSA. The patient refused the MRI lumbar spine overnight d/t being unable to lay flat from back pain. Will give pain meds and attempt to get the MRI today to r/o spinal abscess. The ECHO showed a 1.3 x 1.5 cm elongated, mobile echodensity seen on the tricuspid valve consistent with vegetation, CT surgery was consulted. Will need a SUSANNAH. Infectious disease is consulted. Will repeat blood cx today. 5/9/22 No acute events overnight. The patient reports lower back pain is not well controlled with current pain regimen, will adjust. Repeat blood cx are still positive. Sensitivities are back and Staph is sensitive to oxacillin, will D/C vanc/cefepime. Cardiology consulted and plans for a SUSANNAH today. CT surgery following and recommends continuing medical management with appropriate ABX, no surgical intervention at this time. MRI lumbar spine showed moderate to severe spinal canal stenosis with moderate left-sided neural foraminal stenosis, Neurosurgery consulted. 5/10/22 No acute events overnight. The patient reports some improvement in pain with adjustment in pain meds. Repeat blood cx are +. Continue IV oxacillin and flagyl. Infectious disease is consulted. IR consulted to evaluate possible paraspinous muscle myositis versus abscess. Recommend IR drainage. Continue current management. 5/11/22 No acute events overnight. The patient reports pain is still not well controlled at times. The patient reports that she is very anxious about the upcoming SUSANNAH and IR drainage. SUSANNAH was pushed back to this afternoon because the patient ate candy this AM. Continue treatment with IV oxacillin and flagyl. Infectious disease is following. WBC trended down to 31K. Continue current management. 5/12/22 No acute events overnight. The patients WBC improved to 22K overnight. The patient remains on continuous oxacillin infusion. Infectious disease is following. MRI thoracic spine is  ordered and pending. The patients SUSANNAH was postponed again today d/t low H/H. Hgb was noted to be 6.2 this morning, will transfuse 1 unit PRBC. The patient reports pain is still not well controlled. The case was discussed with Neurosurgery who recommended adding extended release pain meds. Approximately 1 ml of purulent drainage aspirated from back yesterday per IR, growing gram + cocci.     5/13: Patient was given 1U PRBC on 5/12, improved from 6.2 --> 7.0, will give an additional unit PRBC. Radiology attempted to bring patient on 5/12 at 1755 for Thoracic MRI, patient refused due to pain and anxiety. Repeat attempt this AM, patient again refused. Will order Ativan 1mg IV to be given with pain medication prior to scan to relieve anxiety and pain, nurse to notify NP when ready to go for scan. Discussed pain management with patient, discussed transition to PO pain medications to being preparing for d/c to LTAC, adjusted PO pain regimen will reevaluate to determine effectiveness. Repeat Blood cultures 5/11/22: NGTD.     5/14: Final anaerobic cultures pending, continue to adjust pain regimen, d/c IV Dilaudid today. Patient currently managed with PO Morphine extended release and PO oxycodone. D/C plan is for LTAC once final antibiotic regimen determined. Patient continues to refuse MRI of thoracic spine.     5/15: Called to room this AM, patient had coughed up blood and mucous. Approx. 1-2 teaspoons of blood in emesis bag, ordered CXR and repeat CBC, CXR showed improvement from previous day, repeat CBC at time of incident, showed a slight decrease in Hgb: 9.7 --> 8.7, when repeated at 1500, Hgb had improved to 10. no further episodes of coughing up blood throughout day. Patient continues to c/o not having enough pain medication but continues to sleep most of the day and will fall asleep when speaking at times. Added IV Toradol to plan.     5/16: No further episodes of coughing blood overnight, patient participated with  encouragement with PT/OT today, recommendations are HH vs. SNF pending progress. Awaiting final antibiotic regimen recommendations. Anaerobic culture results finalized: negative for growth. Afebrile, WBC trending downward.     5/17: Antibiotic regimen per ID is continuous oxacillin until 7/11/22, SW notified of plan, will seek acceptance at LTAC or SNF. Patient choice is [facility]. WBC in normal range. AMS this AM, CT of head: no acute findings. Given narcan and mental status improved. Decreased narcotic dose.     5/18: Per discussion with patient, she is willing to have the MRI of thoracic spine today, will order IV pain medication and anxiety medication prior to scan. Nursing and Radiology aware of needing to premedicate for scan. Patient is more cooperative and participating with care.     5/19: Patient given premedications for MRI, MRI completed, results pending. Patient more agreeable with treatment plan and cooperating. Will need psychiatry follow-up after discharge. Hgb: 6.7, will give 1U PRBC    5/20/22: +LEON and generalized pain. Thoracic MRI showed no evidence for disc osteomyelitis in the thoracic spine and no epidural abscess, Loculated right hydropneumothorax and extensive bilateral pulmonary infiltrates. Ct chest showed septic emboli and moderate right pleural effusion.+splenic infarct  Pulmonology felt likely empyema- consulted IR for chest tube.     5/21/22: s/p right pig tail placement to suction/ drainage system per IR. 60ml pus removed initially. Fluid analysis consistent with empyema. Pleural fluid cultures pending. Now with scant serosanguinous output.+ right lateral chest pain at chest tube site. Denies any other complaints. Status update provided to pt's mother.     5/22/22: Repeatedly requesting IV Dilaudid. Toradol ordered. Afebrile, WBC normal, O2sats stable on 3lites. s/p right pig tail placement to suction/ drainage system per IR on 5/21/22- draining serous draiange with pus. Plan for LTAC  "placement     5/23/22: More cofortable today. Not getting OOB. Encouraged OOB and rationale provided. Pulmonology recommended Possible tPA and DNase in chest tube tomorrow    5/24/22: Examined OOB in chair. Encouraged pt to continue OOB. Plan for tPA and DNase in chest tube today per Pulmonology  Pt denied LTAC- will pursue SNF    5/25/22: Temp 101.7F last night. Will recheck blood cultures and UA. BP low - IVF bolus given and restarted IVF. ID re-consulted. 160ml of purulent CT drainage output. Dr. San plans to repeat tPA and DNase in chest tube again today.     5/26/22: temp 99.2. c/o of right "lung pain" when eating. Will consult speech to check swallow. +mild leukocytosis. Repeat BC 5/25/22 show NGTD. Repeat UA showed rare trichomas. Chest tube drainage remains purulent     5/27/22  Low grade temp. Downward trend of hemoglobin, related to chest tube drainage. Blood cultures remain negative. Urine culture growing E. Coli. Sensitivities are pending.     5/28/22  Urine culture returned pansenstiive. Will continue ciprofloxacin for total of 3 days then discontinue. Still awaiting SNF placement.    5/29/22 culture of pleural fluid from 5/28/22 negative. Continue CT for now.     5/30/22  CT removed yesterday by pulmonology. SNF declines. Will explore other options for IV abx with help from ID.     5/31/22  Patient has been accepted to [facility]. Will transfer today. Will follow up with pulmonology and ID at discharge. She will continue oxacillin for management of bacteremia, septic embolic, and endocarditis.  Addendum: COVID negative transfer delayed.     6/1/22  COVID positive started on remdesivir. Tmax 101.3. repeat CT scan shows evolving septic embolic/necrotizing pneumonia. Pulmonlogy recommends reconsulting CVT. ID following. Awaiting insurance approval for [facility]. Decline in hemoglobin 7.4, hold on transfusion at this time.   6/2:  CVT consult on case.  Plan for VATS procedure on Monday given patient's " COVID status.  Continue remdesivir.  Continue oxacillin.  6/3: VATS planned for Monday. Will transfuse 1 unit prbc. No further hemoptysis noted. Cont oxacillin.   6/4:  Vats procedure Monday .  H&H improved status post 1 unit packed RBC.  NPO midnight.  Continue oxacillin.  6/5:  VATS procedure tomorrow.  H&H stable.  NPO at midnight.  6/6: VATS procedure today.   6/7: s/p vats procedure. Patient self extubated approx 1 am. Currently doing well. Will step down from ICU. LTAC placement pending.   6/8: stepped down from icu. Cont chest tube, current being managed by CTS. Cont current abx therapy. Plans for LTAC once chest tubes are out.   6/9: h/h low today. Transfuse 2 units prbc per CTS. Cont chest tube.       Interval History: h/h low today. Transfuse 2 units prbc per CTS. Cont chest tube.     Pain controlled. Patient complained of neck pain this am. Otherwise denies any other issues.     Review of Systems   Constitutional:  Positive for fatigue. Negative for fever.   HENT:  Negative for sinus pressure.    Eyes:  Negative for visual disturbance.   Respiratory:  Negative for shortness of breath.    Cardiovascular:  Negative for chest pain.   Gastrointestinal:  Negative for nausea and vomiting.   Genitourinary:  Negative for difficulty urinating.   Musculoskeletal:  Negative for back pain.   Skin:  Negative for rash.   Neurological:  Negative for headaches.   Psychiatric/Behavioral:  Negative for confusion.    Objective:     Vital Signs (Most Recent):  Temp: 98.5 °F (36.9 °C) (06/09/22 0754)  Pulse: 86 (06/09/22 0758)  Resp: 20 (06/09/22 0754)  BP: (!) 153/76 (06/09/22 0754)  SpO2: (!) 93 % (06/09/22 0758)   Vital Signs (24h Range):  Temp:  [98.4 °F (36.9 °C)-98.8 °F (37.1 °C)] 98.5 °F (36.9 °C)  Pulse:  [83-95] 86  Resp:  [17-20] 20  SpO2:  [87 %-100 %] 93 %  BP: (105-153)/(64-78) 153/76     Weight: 89.2 kg (196 lb 10.4 oz)  Body mass index is 32.72 kg/m².    Intake/Output Summary (Last 24 hours) at 6/9/2022  1056  Last data filed at 6/9/2022 0549  Gross per 24 hour   Intake --   Output 3550 ml   Net -3550 ml      Physical Exam  Vitals and nursing note reviewed.   Constitutional:       General: She is not in acute distress.     Appearance: She is well-developed. She is not ill-appearing.      Comments: Slightly lethargic   HENT:      Head: Normocephalic and atraumatic.      Nose: Nose normal.   Eyes:      Extraocular Movements: Extraocular movements intact.   Cardiovascular:      Rate and Rhythm: Normal rate and regular rhythm.   Pulmonary:      Effort: Pulmonary effort is normal.      Breath sounds: No stridor. No rhonchi.      Comments: Right sided chest tube  Abdominal:      General: There is no distension.   Musculoskeletal:         General: No signs of injury.      Cervical back: Normal range of motion and neck supple.   Skin:     General: Skin is dry.   Neurological:      General: No focal deficit present.      Mental Status: She is alert and oriented to person, place, and time. Mental status is at baseline.   Psychiatric:         Behavior: Behavior normal.       Significant Labs: All pertinent labs within the past 24 hours have been reviewed.    Significant Imaging: I have reviewed all pertinent imaging results/findings within the past 24 hours.      Assessment/Plan:      * Acute bacterial endocarditis  ECHO showed a 1.3 x 1.5 cm elongated, mobile echodensity seen on the tricuspid valve consistent with vegetation, CT surgery was consulted.   Due to MSSA    Cont oxacillin  ID consulted on case   LTAC placement pending        BEE (acute kidney injury)  Stable   Cont to monitor       COVID-19  - COVID-19 testing   - Infection Control notified     - Isolation:   - Airborne, Contact and Droplet Precautions  - Cohort patients into COVID units  - N95 mask, wear eye protection  - 20 second hand hygiene              - Limit visitors per hospital policy              - Consolidating lab draws, nursing care, provider visits,  and interventions    - Diagnostics: (leukopenia, hyponatremia, hyperferritinemia, elevated troponin, elevated d-dimer, age, and comorbidities are significant predictors of poor clinical outcome)  CBC, CMP, Ferritin, CRP and Portable CXR    - Management:  Supplemental O2 to maintain SpO2 >92%  Telemetry  Continuous/intermittent Pulse Ox  Albuterol treatment PRN  Remdesivir added    Advance Care Planning   Patient is a full code.         Cont supportive care  remdesivir course completed      Empyema of right pleural space  Pulmonology consulted and felt likely empyema   IR consulted for chest tube      s/p right pigtail small bore chest tube placement to suction/drainage system per IR on 5/21/22. 60ml pus removed. Now with Serous drainage with pus. Fluid analysis consistent with empyema. Pleural fluid aerobic culture shows NGTD, Anaerobic culture pending    5/24/22: Dr. San plans for tPA and DNase in chest tube today.   5/25/22: 160ml of purulent CT drainage output. Dr. San plans to repeat tPA and DNase in chest tube again today.   5/26/22  Still has purulent drainage noted. Pulmonology following    6/9:  Worsening empyema noted  CTS consult on case  S/p VATS procedure 6/6  Chest tubes in place  Plan for dc to ltac when chest tubes are out    Normocytic anemia  6/9:  Hemoglobin low  Blood tinged output noted in chest tube  Will transfuse 2 units prbc  Cont to monitor h/h     Pulmonary embolism, septic  6/5/22  CT shows evolving septic emboli and necrotizing pneumonia. CVT has been re-consulted.  Vats procedure planned for Monday    MSSA bacteremia  5/15: Blood cultures 5/11/22: NGTD     Continuous Oxacillin, EOC: 7/11/22, pending acceptance at facility, PICC line placed    6/9:  Cont on oxacillin  ID on case   Repeat blood cultures negative     IVDU (intravenous drug user)   Will ordet TTE to r/o IE  Will order MRI lumbar wwo- completed   Cont broad spectrum IVAB   --MRI thoracic spine ordered per ID,  patient refused on multiple attempts    5/7   Studies pending for additional sites of infection given h/o ivdu  5/8/22  on cessation     Severe sepsis  This patient does have evidence of infective focus  My overall impression is sepsis. Vital signs were reviewed and noted in progress note.  Antibiotics given-   Antibiotics (From admission, onward)            Start     Stop Route Frequency Ordered    05/09/22 1100  oxacillin 12 g in  mL CONTINUOUS INFUSION         -- IV Every 24 hours (non-standard times) 05/09/22 0932    05/07/22 2100  mupirocin 2 % ointment         05/12 2059 Nasl 2 times daily 05/07/22 1646        Cultures were taken-   Microbiology Results (last 7 days)     Procedure Component Value Units Date/Time    Culture, Respiratory with Gram Stain [[id number]] Collected: 05/16/22 0758    Order Status: Sent Specimen: Respiratory from Sputum Updated: 05/16/22 0759    Culture, Anaerobe [[id number]] Collected: 05/11/22 1440    Order Status: Completed Specimen: Abscess from Back Updated: 05/16/22 0733     Anaerobic Culture No anaerobes isolated    Blood culture [[id number]] Collected: 05/11/22 1213    Order Status: Completed Specimen: Blood Updated: 05/16/22 0612     Blood Culture, Routine No Growth to date      No Growth to date      No Growth to date      No Growth to date      No Growth to date    Blood culture [[id number]] Collected: 05/11/22 1213    Order Status: Completed Specimen: Blood Updated: 05/16/22 0612     Blood Culture, Routine No Growth to date      No Growth to date      No Growth to date      No Growth to date      No Growth to date    Aerobic culture [[id number]]  (Abnormal)  (Susceptibility) Collected: 05/11/22 1440    Order Status: Completed Specimen: Abscess from Back Updated: 05/14/22 1057     Aerobic Bacterial Culture STAPHYLOCOCCUS AUREUS  Many      Gram stain [[id number]] Collected: 05/11/22 1440    Order Status: Completed Specimen: Abscess from Back Updated: 05/12/22 0306      Gram Stain Result Few WBC's      Few Gram positive cocci    Gram stain [[id number]]     Order Status: Canceled Specimen: Joint Fluid from Back     Blood culture [[id number]]  (Abnormal) Collected: 05/08/22 1516    Order Status: Completed Specimen: Blood from Peripheral, Right Hand Updated: 05/11/22 1009     Blood Culture, Routine Gram stain aer bottle: Gram positive cocci in clusters resembling Staph       Results called to and read back by: Chasity Raymundo RN  05/09/2022  11:31      STAPHYLOCOCCUS AUREUS  ID consult required at [facility].  For susceptibility see order #[id number]      Narrative:      Collection has been rescheduled by [email or web address] at 05/08/2022 13:22 Reason:   Pt in mri will be there after another hour [id number]  Collection has been rescheduled by [email or web address] at 05/08/2022 13:22 Reason:   Pt in mri will be there after another hour [id number]    Blood culture [[id number]]  (Abnormal) Collected: 05/08/22 1515    Order Status: Completed Specimen: Blood from Peripheral, Left Arm Updated: 05/11/22 1009     Blood Culture, Routine Gram stain aer bottle: Gram positive cocci in clusters resembling Staph      Results called to and read back by: Chasity Raymundo RN  05/09/2022  15:40      STAPHYLOCOCCUS AUREUS  ID consult required at [facility].  For susceptibility see order #[id number]      Narrative:      Collection has been rescheduled by [email or web address] at 05/08/2022 13:22 Reason:   Pt in mri will be there after another hour [id number]  Collection has been rescheduled by [email or web address] at 05/08/2022 13:22 Reason:   Pt in mri will be there after another hour [id number]        Latest lactate reviewed, they are-  No results for input(s): LACTATE in the last 72 hours.    Organ dysfunction indicated by Acute kidney injury  Source-  lung    Source control Achieved by-   Cont Broad spectrum IVAB     Bacteremia  Infectious disease consulted      COPD (chronic obstructive pulmonary disease)  Cont  breathing tx prn  Pulmonology following       VTE Risk Mitigation (From admission, onward)         Ordered     heparin (porcine) injection 5,000 Units  Every 8 hours         06/06/22 1934     IP VTE HIGH RISK PATIENT  Once         06/06/22 1934     Place sequential compression device  Until discontinued         06/06/22 1934     heparin 25,000 units in dextrose 5% 250 mL (100 units/mL) infusion LOW INTENSITY nomogram - [facility]  Continuous        Question Answer Comment   Heparin Infusion Adjustment (DO NOT MODIFY ANSWER) [email or web address]\[email or web address] LOW INTENSITY nomogram for [facility] [email or web address]    Begin at (in units/kg/hr) 12        06/04/22 0825     Place sequential compression device  Until discontinued         05/06/22 2336                Discharge Planning   YESSICA: 5/31/2022     Code Status: Full Code   Is the patient medically ready for discharge?:     Reason for patient still in hospital (select all that apply): Patient trending condition  Discharge Plan A: Skilled Nursing Facility   Discharge Delays: None known at this time              Delmer Guzmán MD  Department of Hospital Medicine   [facility] - Telemetry ([facility])

## 2022-06-09 NOTE — ASSESSMENT & PLAN NOTE
5/20 Suspect empyema, needs chest tube placement  5/21 s/p chest tube placement, adequate expansion - will check Chest X Ray in [facility]  5/23 tPA DNase.  5/24 tPA for DNase installation via chest tube.  Monitor chest tube output.  Repeat chest x-ray in a.m..  5/25 chest tube output increased from 50 mL on average per 24 hours to 160 mL last 24 hours.  Purulent drainage noted.  Will repeat tPA plus Dnase instillation.  5/26 chest x-ray reviewed.  Continue IV oxacillin.  Right-sided pigtail in place.  Status post tPA and DNase x2.  Bloody drainage around pigtail today status post tPA and DNase yesterday.  Will hold on fibrinolysis.  Monitor chest tube output  5/27 right pigtail in place.  225 mL  Last 24 hours average of 200 mL per day.  Change pleurovac canister to better assess the color of the fluid pus versus serosanguineous.  Keep pigtail in place for now.  Ultrasound bilateral chest assess pleural effusions.  Continue oxacillin drip.  5/28 keep chest tube on the right side for now.  Repeat CRP.  Checking chemistry microbiology  on left-sided pleural fluid to rule out complicated effusion and need for pigtail.  Continue oxacillin drip.   5/29 minimal output right chest tube.  Remove chest tube.  Left-sided effusion not complicated not empyema.  Continue IV oxacillin    6/1 evolving empyema/necrosis on right lung on CT scan of the chest 06/01/2022.  Continue IV oxacillin.  Re-consult cardiothoracic surgery.  6/2 /treat COVID.  Cardiothoracic surgery input noted.  VATS procedure will be delayed till Monday next week.  Patient will be scheduled for VATS on 06/06/2022      6/3 VATS 06/06/2022 treating COVID.  06/04/2022 IV oxacillin.  VATS Monday as per CT surgery  6/5 worsening infiltrate on the right on chest x-ray today.  Plan for VATS as per CT surgery in a.m..  06/06: for VATS per CT surgery  06/07: POD #1 VATS DECORTICATION, chest tube per surgery  06/08: POD #2 VATS DECORTICATION, chest tube per surgery  06/09:  POD #3 VATS DECORTICATION, chest tube per surgery

## 2022-06-09 NOTE — PROGRESS NOTES
[facility] - Telemetry ([facility])  Cardiothoracic Surgery  Progress Note    Patient Name: Tianna Leon  MRN: [id number]  Admission Date: 5/6/2022  Hospital Length of Stay: 34 days  Code Status: Full Code   Attending Physician: Bobby Blandon MD   Referring Provider: Self, Aaareferral  Principal Problem:Acute bacterial endocarditis            Subjective:     Post-Op Info:  Procedure(s) (LRB):  VATS (VIDEO-ASSISTED THORACOSCOPIC SURGERY) (Right)  BLOCK, NERVE, INTERCOSTAL, 2 OR MORE (Right)   3 Days Post-Op     Interval History:  The patient is postop day 3 status post VATS procedure for evacuation of empyema.    ROS  Medications:  Continuous Infusions:   dextrose 5 % and 0.45 % NaCl with KCl 20 mEq 25 mL/hr at 06/07/22 2205     Scheduled Meds:   ascorbic acid (vitamin C)  500 mg Oral Daily    chlorhexidine  10 mL Mouth/Throat BID    docusate sodium  100 mg Oral BID    famotidine  20 mg Oral Daily    furosemide (LASIX) injection  40 mg Intravenous Q12H    heparin (porcine)  5,000 Units Subcutaneous Q8H    ipratropium-albuteroL  1 puff Inhalation Q6H    morphine  30 mg Oral Q12H    multivitamin  1 tablet Oral Daily    mupirocin  1 g Nasal BID    oxacillin 12 g in  mL CONTINUOUS INFUSION  12 g Intravenous Q24H    polyethylene glycol  17 g Oral Daily    traZODone  100 mg Oral QHS     PRN Meds:sodium chloride, acetaminophen, bisacodyL, dextrose 10%, dextrose 10%, glucagon (human recombinant), glucose, glucose, hydrALAZINE, LORazepam, metoclopramide HCl, naloxone, ondansetron, oxyCODONE, pneumoc 20-susan conj-dip cr(PF), sodium chloride 0.9%, sodium chloride 0.9%, sodium chloride 0.9%     Objective:     Vital Signs (Most Recent):  Temp: 98.1 °F (36.7 °C) (06/09/22 1145)  Pulse: 83 (06/09/22 1145)  Resp: 18 (06/09/22 1145)  BP: 119/71 (06/09/22 1145)  SpO2: 96 % (06/09/22 1145) Vital Signs (24h Range):  Temp:  [98 °F (36.7 °C)-98.8 °F (37.1 °C)] 98.1 °F (36.7 °C)  Pulse:  [83-95] 83  Resp:  [17-20]  18  SpO2:  [87 %-100 %] 96 %  BP: (105-153)/(64-82) 119/71     Weight: 89.2 kg (196 lb 10.4 oz)  Body mass index is 32.72 kg/m².    SpO2: 96 %  O2 Device (Oxygen Therapy): nasal cannula    Intake/Output - Last 3 Shifts         06/07 0700  06/08 0659 06/08 0700  06/09 0659 06/09 0700  06/10 0659    P.O. 100      I.V. (mL/kg) 401.9 (4.4)      IV Piggyback 335.1      Total Intake(mL/kg) 837 (9.2)      Urine (mL/kg/hr) 1100 (0.5) 3500 (1.6)     Chest Tube 160 70     Total Output 1260 3570     Net -423 -3570                    Lines/Drains/Airways       Peripherally Inserted Central Catheter Line  Duration             PICC Double Lumen 05/17/22 1612 right basilic 22 days              Drain  Duration                  Urethral Catheter 06/06/22 0545 Latex;Straight-tip 16 Fr. 3 days         Y Chest Tube 1 and 2 06/06/22 1924 1 Right Pleural 36 Fr. 2 Right Other (Comment) 36 Fr. 2 days                    Physical Exam  Constitutional:       Appearance: Normal appearance. She is obese.   HENT:      Head: Normocephalic and atraumatic.   Cardiovascular:      Rate and Rhythm: Normal rate and regular rhythm.      Heart sounds: Normal heart sounds.   Pulmonary:      Effort: Pulmonary effort is normal.      Breath sounds: Normal breath sounds.   Abdominal:      General: Abdomen is flat. Bowel sounds are normal.      Palpations: Abdomen is soft.   Musculoskeletal:      Right lower leg: No edema.      Left lower leg: No edema.   Skin:     General: Skin is warm and dry.   Neurological:      Comments: Patient appears sleepy but easily arousable.  Patient is oriented x3 and neuro exam is nonfocal.  Patient answers all questions appropriately.       Significant Labs:  All pertinent labs from the last 24 hours have been reviewed.    Significant Diagnostics:      Assessment/Plan:     Empyema of right pleural space  06/07/2022  The patient is postop day 1 status post thoracoscopic evaporation of right empyema.  Patient was admitted to the  ICU for close monitoring.  Patient is currently extubated.  Patient is afebrile and white count is 11.92.  Continue chest tubes to suction.  Chest tubes have a total drainage of 250 mL since surgery.  Continue patient on antibiotics  Patient will be transferred to step-down unit.    06/08/2022  The patient is postop day 2 status post thoracoscopic evacuation of right empyema.  The patient is afebrile and white count is 10. Chest tube continues to suction.  Chest tube output is trending down.  Patient appears fluid overloaded will give a dose of Lasix.  Anticipate chest tube will be removed in the next 24-48 hours.    06/09/2022  The patient is postop day 3 status post thoracoscopic evacuation of right empyema.  Patient is afebrile.  Chest tube is to suction.  Chest tube output over the past 24 hours is 70. Patient is still appears fluid overloaded.  Will continue with Lasix.  Anticipate chest tube removal in the next 24-48 hours.  Patient has a hematocrit of 19. Will transfuse 2 units packed red blood cells.          Bobby Blandon MD  Cardiothoracic Surgery  [facility] - Telemetry ([facility])

## 2022-06-09 NOTE — ASSESSMENT & PLAN NOTE
6/9:  Hemoglobin low  Blood tinged output noted in chest tube  Will transfuse 2 units prbc  Cont to monitor h/h

## 2022-06-09 NOTE — SUBJECTIVE & OBJECTIVE
Interval History: h/h low today. Transfuse 2 units prbc per CTS. Cont chest tube.     Pain controlled. Patient complained of neck pain this am. Otherwise denies any other issues.     Review of Systems   Constitutional:  Positive for fatigue. Negative for fever.   HENT:  Negative for sinus pressure.    Eyes:  Negative for visual disturbance.   Respiratory:  Negative for shortness of breath.    Cardiovascular:  Negative for chest pain.   Gastrointestinal:  Negative for nausea and vomiting.   Genitourinary:  Negative for difficulty urinating.   Musculoskeletal:  Negative for back pain.   Skin:  Negative for rash.   Neurological:  Negative for headaches.   Psychiatric/Behavioral:  Negative for confusion.    Objective:     Vital Signs (Most Recent):  Temp: 98.5 °F (36.9 °C) (06/09/22 0754)  Pulse: 86 (06/09/22 0758)  Resp: 20 (06/09/22 0754)  BP: (!) 153/76 (06/09/22 0754)  SpO2: (!) 93 % (06/09/22 0758)   Vital Signs (24h Range):  Temp:  [98.4 °F (36.9 °C)-98.8 °F (37.1 °C)] 98.5 °F (36.9 °C)  Pulse:  [83-95] 86  Resp:  [17-20] 20  SpO2:  [87 %-100 %] 93 %  BP: (105-153)/(64-78) 153/76     Weight: 89.2 kg (196 lb 10.4 oz)  Body mass index is 32.72 kg/m².    Intake/Output Summary (Last 24 hours) at 6/9/2022 1056  Last data filed at 6/9/2022 0549  Gross per 24 hour   Intake --   Output 3550 ml   Net -3550 ml      Physical Exam  Vitals and nursing note reviewed.   Constitutional:       General: She is not in acute distress.     Appearance: She is well-developed. She is not ill-appearing.      Comments: Slightly lethargic   HENT:      Head: Normocephalic and atraumatic.      Nose: Nose normal.   Eyes:      Extraocular Movements: Extraocular movements intact.   Cardiovascular:      Rate and Rhythm: Normal rate and regular rhythm.   Pulmonary:      Effort: Pulmonary effort is normal.      Breath sounds: No stridor. No rhonchi.      Comments: Right sided chest tube  Abdominal:      General: There is no distension.    Musculoskeletal:         General: No signs of injury.      Cervical back: Normal range of motion and neck supple.   Skin:     General: Skin is dry.   Neurological:      General: No focal deficit present.      Mental Status: She is alert and oriented to person, place, and time. Mental status is at baseline.   Psychiatric:         Behavior: Behavior normal.       Significant Labs: All pertinent labs within the past 24 hours have been reviewed.    Significant Imaging: I have reviewed all pertinent imaging results/findings within the past 24 hours.

## 2022-06-09 NOTE — ASSESSMENT & PLAN NOTE
06/07/2022  The patient is postop day 1 status post thoracoscopic evaporation of right empyema.  Patient was admitted to the ICU for close monitoring.  Patient is currently extubated.  Patient is afebrile and white count is 11.92.  Continue chest tubes to suction.  Chest tubes have a total drainage of 250 mL since surgery.  Continue patient on antibiotics  Patient will be transferred to step-down unit.    06/08/2022  The patient is postop day 2 status post thoracoscopic evacuation of right empyema.  The patient is afebrile and white count is 10. Chest tube continues to suction.  Chest tube output is trending down.  Patient appears fluid overloaded will give a dose of Lasix.  Anticipate chest tube will be removed in the next 24-48 hours.    06/09/2022  The patient is postop day 3 status post thoracoscopic evacuation of right empyema.  Patient is afebrile.  Chest tube is to suction.  Chest tube output over the past 24 hours is 70. Patient is still appears fluid overloaded.  Will continue with Lasix.  Anticipate chest tube removal in the next 24-48 hours.  Patient has a hematocrit of 19. Will transfuse 2 units packed red blood cells.

## 2022-06-10 VITALS
TEMPERATURE: 98 F | DIASTOLIC BLOOD PRESSURE: 91 MMHG | OXYGEN SATURATION: 97 % | SYSTOLIC BLOOD PRESSURE: 172 MMHG | RESPIRATION RATE: 18 BRPM | HEART RATE: 90 BPM | BODY MASS INDEX: 32.44 KG/M2 | WEIGHT: 194.69 LBS | HEIGHT: 65 IN

## 2022-06-10 LAB
ALBUMIN SERPL BCP-MCNC: 1.8 G/DL (ref 3.5–5.2)
ALP SERPL-CCNC: 90 U/L (ref 55–135)
ALT SERPL W/O P-5'-P-CCNC: 10 U/L (ref 10–44)
ANION GAP SERPL CALC-SCNC: 13 MMOL/L (ref 8–16)
APTT BLDCRRT: <21 SEC (ref 21–32)
AST SERPL-CCNC: 14 U/L (ref 10–40)
BACTERIA SPEC AEROBE CULT: NO GROWTH
BASOPHILS # BLD AUTO: 0.05 K/UL (ref 0–0.2)
BASOPHILS NFR BLD: 0.5 % (ref 0–1.9)
BILIRUB SERPL-MCNC: 0.3 MG/DL (ref 0.1–1)
BUN SERPL-MCNC: 19 MG/DL (ref 6–20)
CALCIUM SERPL-MCNC: 8.3 MG/DL (ref 8.7–10.5)
CHLORIDE SERPL-SCNC: 102 MMOL/L (ref 95–110)
CO2 SERPL-SCNC: 26 MMOL/L (ref 23–29)
CREAT SERPL-MCNC: 1.8 MG/DL (ref 0.5–1.4)
DIFFERENTIAL METHOD: ABNORMAL
EOSINOPHIL # BLD AUTO: 0.4 K/UL (ref 0–0.5)
EOSINOPHIL NFR BLD: 3.7 % (ref 0–8)
ERYTHROCYTE [DISTWIDTH] IN BLOOD BY AUTOMATED COUNT: 16.6 % (ref 11.5–14.5)
EST. GFR  (AFRICAN AMERICAN): 40 ML/MIN/1.73 M^2
EST. GFR  (NON AFRICAN AMERICAN): 35 ML/MIN/1.73 M^2
FINAL PATHOLOGIC DIAGNOSIS: NORMAL
FINAL PATHOLOGIC DIAGNOSIS: NORMAL
GLUCOSE SERPL-MCNC: 92 MG/DL (ref 70–110)
GROSS: NORMAL
HCT VFR BLD AUTO: 27.5 % (ref 37–48.5)
HGB BLD-MCNC: 8.9 G/DL (ref 12–16)
IMM GRANULOCYTES # BLD AUTO: 0.08 K/UL (ref 0–0.04)
IMM GRANULOCYTES NFR BLD AUTO: 0.8 % (ref 0–0.5)
LYMPHOCYTES # BLD AUTO: 3.3 K/UL (ref 1–4.8)
LYMPHOCYTES NFR BLD: 31.8 % (ref 18–48)
Lab: NORMAL
Lab: NORMAL
MCH RBC QN AUTO: 27.4 PG (ref 27–31)
MCHC RBC AUTO-ENTMCNC: 32.4 G/DL (ref 32–36)
MCV RBC AUTO: 85 FL (ref 82–98)
MONOCYTES # BLD AUTO: 1.1 K/UL (ref 0.3–1)
MONOCYTES NFR BLD: 11 % (ref 4–15)
NEUTROPHILS # BLD AUTO: 5.4 K/UL (ref 1.8–7.7)
NEUTROPHILS NFR BLD: 52.2 % (ref 38–73)
NRBC BLD-RTO: 0 /100 WBC
PLATELET # BLD AUTO: 412 K/UL (ref 150–450)
PMV BLD AUTO: 9 FL (ref 9.2–12.9)
POCT GLUCOSE: 114 MG/DL (ref 70–110)
POTASSIUM SERPL-SCNC: 3.9 MMOL/L (ref 3.5–5.1)
PROT SERPL-MCNC: 6.9 G/DL (ref 6–8.4)
RBC # BLD AUTO: 3.25 M/UL (ref 4–5.4)
SODIUM SERPL-SCNC: 141 MMOL/L (ref 136–145)
WBC # BLD AUTO: 10.32 K/UL (ref 3.9–12.7)

## 2022-06-10 PROCEDURE — 27000221 HC OXYGEN, UP TO 24 HOURS

## 2022-06-10 PROCEDURE — 25000003 PHARM REV CODE 250: Performed by: NURSE PRACTITIONER

## 2022-06-10 PROCEDURE — 99900035 HC TECH TIME PER 15 MIN (STAT)

## 2022-06-10 PROCEDURE — 99232 SBSQ HOSP IP/OBS MODERATE 35: CPT | Mod: ,,, | Performed by: INTERNAL MEDICINE

## 2022-06-10 PROCEDURE — 27000646 HC AEROBIKA DEVICE

## 2022-06-10 PROCEDURE — 97530 THERAPEUTIC ACTIVITIES: CPT | Mod: CQ

## 2022-06-10 PROCEDURE — 63600175 PHARM REV CODE 636 W HCPCS: Performed by: THORACIC SURGERY (CARDIOTHORACIC VASCULAR SURGERY)

## 2022-06-10 PROCEDURE — 94664 DEMO&/EVAL PT USE INHALER: CPT

## 2022-06-10 PROCEDURE — 80053 COMPREHEN METABOLIC PANEL: CPT | Performed by: FAMILY MEDICINE

## 2022-06-10 PROCEDURE — 25000003 PHARM REV CODE 250: Performed by: THORACIC SURGERY (CARDIOTHORACIC VASCULAR SURGERY)

## 2022-06-10 PROCEDURE — 94761 N-INVAS EAR/PLS OXIMETRY MLT: CPT

## 2022-06-10 PROCEDURE — 25000003 PHARM REV CODE 250: Performed by: FAMILY MEDICINE

## 2022-06-10 PROCEDURE — 85025 COMPLETE CBC W/AUTO DIFF WBC: CPT | Performed by: THORACIC SURGERY (CARDIOTHORACIC VASCULAR SURGERY)

## 2022-06-10 PROCEDURE — 85730 THROMBOPLASTIN TIME PARTIAL: CPT | Performed by: FAMILY MEDICINE

## 2022-06-10 PROCEDURE — 97110 THERAPEUTIC EXERCISES: CPT

## 2022-06-10 PROCEDURE — 99232 PR SUBSEQUENT HOSPITAL CARE,LEVL II: ICD-10-PCS | Mod: ,,, | Performed by: INTERNAL MEDICINE

## 2022-06-10 PROCEDURE — 94640 AIRWAY INHALATION TREATMENT: CPT

## 2022-06-10 RX ORDER — POLYETHYLENE GLYCOL 3350 17 G/17G
17 POWDER, FOR SOLUTION ORAL DAILY
Qty: 90 EACH | Refills: 3 | Status: ON HOLD | OUTPATIENT
Start: 2022-06-11 | End: 2022-06-26

## 2022-06-10 RX ORDER — ASCORBIC ACID 500 MG
500 TABLET ORAL DAILY
Qty: 10 TABLET | Refills: 0 | Status: ON HOLD | OUTPATIENT
Start: 2022-06-11 | End: 2022-06-26

## 2022-06-10 RX ORDER — FUROSEMIDE 40 MG/1
TABLET ORAL
Qty: 60 TABLET | Refills: 0 | Status: ON HOLD | OUTPATIENT
Start: 2022-06-10 | End: 2022-06-26

## 2022-06-10 RX ADMIN — MUPIROCIN 1 G: 20 OINTMENT TOPICAL at 09:06

## 2022-06-10 RX ADMIN — FUROSEMIDE 40 MG: 10 INJECTION, SOLUTION INTRAMUSCULAR; INTRAVENOUS at 09:06

## 2022-06-10 RX ADMIN — IPRATROPIUM BROMIDE AND ALBUTEROL 1 PUFF: 20; 100 SPRAY, METERED RESPIRATORY (INHALATION) at 12:06

## 2022-06-10 RX ADMIN — FAMOTIDINE 20 MG: 20 TABLET ORAL at 08:06

## 2022-06-10 RX ADMIN — THERA TABS 1 TABLET: TAB at 08:06

## 2022-06-10 RX ADMIN — CHLORHEXIDINE GLUCONATE 0.12% ORAL RINSE 10 ML: 1.2 LIQUID ORAL at 08:06

## 2022-06-10 RX ADMIN — IPRATROPIUM BROMIDE AND ALBUTEROL 1 PUFF: 20; 100 SPRAY, METERED RESPIRATORY (INHALATION) at 07:06

## 2022-06-10 RX ADMIN — IPRATROPIUM BROMIDE AND ALBUTEROL 1 PUFF: 20; 100 SPRAY, METERED RESPIRATORY (INHALATION) at 01:06

## 2022-06-10 RX ADMIN — OXYCODONE HYDROCHLORIDE AND ACETAMINOPHEN 500 MG: 500 TABLET ORAL at 08:06

## 2022-06-10 RX ADMIN — POLYETHYLENE GLYCOL 3350 17 G: 17 POWDER, FOR SOLUTION ORAL at 09:06

## 2022-06-10 RX ADMIN — OXYCODONE HYDROCHLORIDE 5 MG: 5 TABLET ORAL at 09:06

## 2022-06-10 RX ADMIN — MORPHINE SULFATE 30 MG: 30 TABLET, FILM COATED, EXTENDED RELEASE ORAL at 12:06

## 2022-06-10 RX ADMIN — HEPARIN SODIUM 5000 UNITS: 5000 INJECTION INTRAVENOUS; SUBCUTANEOUS at 06:06

## 2022-06-10 RX ADMIN — DOCUSATE SODIUM 100 MG: 100 CAPSULE, LIQUID FILLED ORAL at 09:06

## 2022-06-10 NOTE — ASSESSMENT & PLAN NOTE
[facility]  CTS note noted  5/23 IV oxacillin.  5/24 IV oxacillin as per Infectious Disease  5/25 repeat cultures none relevant.  IV oxacillin as per Infectious Disease x4 weeks.  T-max 101.7° last  24 hours  5/26 repeat blood concern negative continue oxacillin gtt   5/27 continue oxacillin drip   5/28 oxacillin drip.  Blood culture 5/25 was negative  5/28 continue same   5/30 oxacillin total of 8 weeks.   6/3 same  06/06: continue OXACILLIN  06/07: continue OXACILLIN  06/09: continue OXACILLIN  06/10: continue OXACILLIN

## 2022-06-10 NOTE — ASSESSMENT & PLAN NOTE
06/07/2022  The patient is postop day 1 status post thoracoscopic evaporation of right empyema.  Patient was admitted to the ICU for close monitoring.  Patient is currently extubated.  Patient is afebrile and white count is 11.92.  Continue chest tubes to suction.  Chest tubes have a total drainage of 250 mL since surgery.  Continue patient on antibiotics  Patient will be transferred to step-down unit.    06/08/2022  The patient is postop day 2 status post thoracoscopic evacuation of right empyema.  The patient is afebrile and white count is 10. Chest tube continues to suction.  Chest tube output is trending down.  Patient appears fluid overloaded will give a dose of Lasix.  Anticipate chest tube will be removed in the next 24-48 hours.    06/09/2022  The patient is postop day 3 status post thoracoscopic evacuation of right empyema.  Patient is afebrile.  Chest tube is to suction.  Chest tube output over the past 24 hours is 70. Patient is still appears fluid overloaded.  Will continue with Lasix.  Anticipate chest tube removal in the next 24-48 hours.  Patient has a hematocrit of 19. Will transfuse 2 units packed red blood cells.      06/10/2022  The patient is postop day 4 status post VATS procedure for evacuation of right empyema.  The patient continues to improve.  Chest tube output was 50 mL over the past 24 hours.  Chest tubes will be discontinued.  Patient will continue with antibiotic therapy.  Patient is status post blood transfusion yesterday with hematocrit of 27 today.

## 2022-06-10 NOTE — PROGRESS NOTES
[facility] - Telemetry ([facility])  Cardiothoracic Surgery  Progress Note    Patient Name: Tianna Leon  MRN: [id number]  Admission Date: 5/6/2022  Hospital Length of Stay: 35 days  Code Status: Full Code   Attending Physician: Bobby Blandon MD   Referring Provider: Self, Aaareferral  Principal Problem:Acute bacterial endocarditis            Subjective:     Post-Op Info:  Procedure(s) (LRB):  VATS (VIDEO-ASSISTED THORACOSCOPIC SURGERY) (Right)  BLOCK, NERVE, INTERCOSTAL, 2 OR MORE (Right)   4 Days Post-Op     Interval History:  Patient is status post VATS procedure for evacuation of empyema.    ROS  Medications:  Continuous Infusions:   dextrose 5 % and 0.45 % NaCl with KCl 20 mEq 25 mL/hr at 06/07/22 2205     Scheduled Meds:   ascorbic acid (vitamin C)  500 mg Oral Daily    chlorhexidine  10 mL Mouth/Throat BID    docusate sodium  100 mg Oral BID    famotidine  20 mg Oral Daily    furosemide (LASIX) injection  40 mg Intravenous Q12H    heparin (porcine)  5,000 Units Subcutaneous Q8H    ipratropium-albuteroL  1 puff Inhalation Q6H    morphine  30 mg Oral Q12H    multivitamin  1 tablet Oral Daily    mupirocin  1 g Nasal BID    oxacillin 12 g in  mL CONTINUOUS INFUSION  12 g Intravenous Q24H    polyethylene glycol  17 g Oral Daily    traZODone  100 mg Oral QHS     PRN Meds:sodium chloride, acetaminophen, bisacodyL, dextrose 10%, dextrose 10%, glucagon (human recombinant), glucose, glucose, hydrALAZINE, LORazepam, metoclopramide HCl, naloxone, ondansetron, oxyCODONE, pneumoc 20-susan conj-dip cr(PF), sodium chloride 0.9%, sodium chloride 0.9%, sodium chloride 0.9%     Objective:     Vital Signs (Most Recent):  Temp: 98.1 °F (36.7 °C) (06/10/22 0808)  Pulse: 82 (06/10/22 0808)  Resp: 20 (06/10/22 0808)  BP: (!) 154/80 (06/10/22 0808)  SpO2: 96 % (06/10/22 0808)   Vital Signs (24h Range):  Temp:  [96.8 °F (36 °C)-98.1 °F (36.7 °C)] 98.1 °F (36.7 °C)  Pulse:  [70-93] 82  Resp:  [17-22] 20  SpO2:  [92  %-96 %] 96 %  BP: (119-161)/(71-90) 154/80     Weight: 88.3 kg (194 lb 10.7 oz)  Body mass index is 32.39 kg/m².    SpO2: 96 %  O2 Device (Oxygen Therapy): nasal cannula    Intake/Output - Last 3 Shifts         06/08 0700  06/09 0659 06/09 0700  06/10 0659 06/10 0700  06/11 0659    P.O.       I.V. (mL/kg)       Blood  371.3     IV Piggyback       Total Intake(mL/kg)  371.3 (4.2)     Urine (mL/kg/hr) 3500 (1.6) 2500 (1.2)     Chest Tube 70 50     Total Output 3570 2550     Net -3570 -2178.8                    Lines/Drains/Airways       Peripherally Inserted Central Catheter Line  Duration             PICC Double Lumen 05/17/22 1612 right basilic 23 days              Drain  Duration                  Urethral Catheter 06/06/22 0545 Latex;Straight-tip 16 Fr. 4 days         Y Chest Tube 1 and 2 06/06/22 1924 1 Right Pleural 36 Fr. 2 Right Other (Comment) 36 Fr. 3 days                    Physical Exam  Constitutional:       Appearance: She is obese.   HENT:      Head: Normocephalic and atraumatic.   Cardiovascular:      Rate and Rhythm: Normal rate and regular rhythm.      Pulses: Normal pulses.      Heart sounds: Normal heart sounds.   Pulmonary:      Effort: Pulmonary effort is normal.      Breath sounds: Normal breath sounds.   Abdominal:      General: Abdomen is flat. Bowel sounds are normal.      Palpations: Abdomen is soft.   Musculoskeletal:      Right lower leg: No edema.      Left lower leg: No edema.   Skin:     General: Skin is warm and dry.   Neurological:      Mental Status: She is alert and oriented to person, place, and time.       Significant Labs:  All pertinent labs from the last 24 hours have been reviewed.    Significant Diagnostics:  I have reviewed all pertinent imaging results/findings within the past 24 hours.    Assessment/Plan:     Empyema of right pleural space  06/07/2022  The patient is postop day 1 status post thoracoscopic evaporation of right empyema.  Patient was admitted to the ICU for  close monitoring.  Patient is currently extubated.  Patient is afebrile and white count is 11.92.  Continue chest tubes to suction.  Chest tubes have a total drainage of 250 mL since surgery.  Continue patient on antibiotics  Patient will be transferred to step-down unit.    06/08/2022  The patient is postop day 2 status post thoracoscopic evacuation of right empyema.  The patient is afebrile and white count is 10. Chest tube continues to suction.  Chest tube output is trending down.  Patient appears fluid overloaded will give a dose of Lasix.  Anticipate chest tube will be removed in the next 24-48 hours.    06/09/2022  The patient is postop day 3 status post thoracoscopic evacuation of right empyema.  Patient is afebrile.  Chest tube is to suction.  Chest tube output over the past 24 hours is 70. Patient is still appears fluid overloaded.  Will continue with Lasix.  Anticipate chest tube removal in the next 24-48 hours.  Patient has a hematocrit of 19. Will transfuse 2 units packed red blood cells.      06/10/2022  The patient is postop day 4 status post VATS procedure for evacuation of right empyema.  The patient continues to improve.  Chest tube output was 50 mL over the past 24 hours.  Chest tubes will be discontinued.  Patient will continue with antibiotic therapy.  Patient is status post blood transfusion yesterday with hematocrit of 27 today.        Bobby Blandon MD  Cardiothoracic Surgery  [facility] - Telemetry ([facility])

## 2022-06-10 NOTE — PROGRESS NOTES
[facility] - Telemetry ([facility])  Pulmonology  Progress Note    Patient Name: Tianna Leon  MRN: [id number]  Admission Date: 5/6/2022  Hospital Length of Stay: 35 days  Code Status: Full Code  Attending Provider: Bobby Blandon MD  Primary Care Provider: Spenser Campa MD   Principal Problem: Acute bacterial endocarditis    Subjective:     Interval History:   06/10: seen and examined: POD #4:  VATS decortication, OOB, still has pain, T Max: 98.1 F, UO 2500mls,     Respiratory ROS: no cough, shortness of breath, or wheezing      Objective:     Vital Signs (Most Recent):  Temp: 98.1 °F (36.7 °C) (06/10/22 0808)  Pulse: 82 (06/10/22 0808)  Resp: 19 (06/10/22 0941)  BP: (!) 154/80 (06/10/22 0808)  SpO2: 96 % (06/10/22 0808)   Vital Signs (24h Range):  Temp:  [96.8 °F (36 °C)-98.1 °F (36.7 °C)] 98.1 °F (36.7 °C)  Pulse:  [70-92] 82  Resp:  [17-22] 19  SpO2:  [92 %-96 %] 96 %  BP: (119-161)/(71-90) 154/80     Weight: 88.3 kg (194 lb 10.7 oz)  Body mass index is 32.39 kg/m².      Intake/Output Summary (Last 24 hours) at 6/10/2022 1121  Last data filed at 6/10/2022 0941  Gross per 24 hour   Intake 791.25 ml   Output 2550 ml   Net -1758.75 ml       Physical Exam  Vitals and nursing note reviewed.   Constitutional:       Appearance: Normal appearance.      Interventions: Nasal cannula in place.       HENT:      Head: Normocephalic and atraumatic.      Nose: Nose normal.   Eyes:      Extraocular Movements: Extraocular movements intact.      Pupils: Pupils are equal, round, and reactive to light.   Cardiovascular:      Rate and Rhythm: Normal rate and regular rhythm.      Pulses: Normal pulses.      Heart sounds: Normal heart sounds.   Pulmonary:      Effort: Pulmonary effort is normal.      Breath sounds: Normal breath sounds.   Abdominal:      General: Bowel sounds are normal.      Palpations: Abdomen is soft.   Musculoskeletal:      Cervical back: Normal range of motion and neck supple.      Right lower leg: No edema.       Left lower leg: No edema.   Skin:     General: Skin is warm.      Comments: Multiple tattoos   Neurological:      General: No focal deficit present.      Mental Status: She is alert and oriented to person, place, and time.   Psychiatric:         Mood and Affect: Mood normal.       Vents:  Vent Mode: A/C (06/06/22 2339)  Ventilator Initiated: Yes (06/06/22 2013)  Set Rate: 20 BPM (06/06/22 2339)  Vt Set: 400 mL (06/06/22 2339)  Pressure Support: 0 cmH20 (06/06/22 2339)  PEEP/CPAP: 5 cmH20 (06/06/22 2339)  Oxygen Concentration (%): 28 (06/10/22 0808)  Peak Airway Pressure: 26 cmH2O (06/06/22 2339)  Plateau Pressure: 0 cmH20 (06/06/22 2339)  Total Ve: 8.39 mL (06/06/22 2339)  F/VT Ratio<105 (RSBI): (!) 55.1 (06/06/22 2339)    Lines/Drains/Airways       Peripherally Inserted Central Catheter Line  Duration             PICC Double Lumen 05/17/22 1612 right basilic 23 days              Drain  Duration                  Urethral Catheter 06/06/22 0545 Latex;Straight-tip 16 Fr. 4 days         Y Chest Tube 1 and 2 06/06/22 1924 1 Right Pleural 36 Fr. 2 Right Other (Comment) 36 Fr. 3 days                    Significant Labs:    CBC/Anemia Profile:  Recent Labs   Lab 06/09/22  0619 06/10/22  0655   WBC 9.44 10.32   HGB 6.1* 8.9*   HCT 19.6* 27.5*    412   MCV 86 85   RDW 16.5* 16.6*        Chemistries:  Recent Labs   Lab 06/09/22  0619 06/10/22  0655    141   K 3.8 3.9    102   CO2 26 26   BUN 21* 19   CREATININE 1.8* 1.8*   CALCIUM 8.3* 8.3*   ALBUMIN 1.6* 1.8*   PROT 6.1 6.9   BILITOT 0.3 0.3   ALKPHOS 73 90   ALT 9* 10   AST 12 14       All pertinent labs within the past 24 hours have been reviewed.    Significant Imaging:  I have reviewed all pertinent imaging results/findings within the past 24 hours.    X-Ray Chest AP Portable  Narrative: EXAMINATION:  XR CHEST AP PORTABLE    CLINICAL HISTORY:  s/p vats;    FINDINGS:  Comparison is made with the most recent prior chest x-ray.  Right chest tubes and  right PICC line are satisfactory in position..  Heart size is upper limits of normal..  Mildly improving small bilateral pleural effusions and bibasilar infiltrates.  No pneumothorax or new infiltrate identified.  Impression: See above.    Electronically signed by: John Tellez MD  Date:    06/10/2022  Time:    08:17       ABG  Recent Labs   Lab 06/06/22  2143   PH 7.415   PO2 173*   PCO2 41.0   HCO3 26.3   BE 2     Assessment/Plan:     * Acute bacterial endocarditis  Medical Mx  CTS note noted  5/23 IV oxacillin.  5/24 IV oxacillin as per Infectious Disease  5/25 repeat cultures none relevant.  IV oxacillin as per Infectious Disease x4 weeks.  T-max 101.7° last  24 hours  5/26 repeat blood concern negative continue oxacillin gtt   5/27 continue oxacillin drip   5/28 oxacillin drip.  Blood culture 5/25 was negative  5/28 continue same   5/30 oxacillin total of 8 weeks.   6/3 same  06/06: continue OXACILLIN  06/07: continue OXACILLIN  06/09: continue OXACILLIN  06/10: continue OXACILLIN    Status post chest tube placement  X2 chest tube to suction  Monitor output  Pain control  INCENTIVE SPIROMETRY, DEEP BREATHING  SP transfusion yesterday    Empyema of right pleural space  5/20 Suspect empyema, needs chest tube placement  5/21 s/p chest tube placement, adequate expansion - will check Chest X Ray in [facility]  5/23 tPA DNase.  5/24 tPA for DNase installation via chest tube.  Monitor chest tube output.  Repeat chest x-ray in a.m..  5/25 chest tube output increased from 50 mL on average per 24 hours to 160 mL last 24 hours.  Purulent drainage noted.  Will repeat tPA plus Dnase instillation.  5/26 chest x-ray reviewed.  Continue IV oxacillin.  Right-sided pigtail in place.  Status post tPA and DNase x2.  Bloody drainage around pigtail today status post tPA and DNase yesterday.  Will hold on fibrinolysis.  Monitor chest tube output  5/27 right pigtail in place.  225 mL  Last 24 hours average of 200 mL per day.  Change  pleurovac canister to better assess the color of the fluid pus versus serosanguineous.  Keep pigtail in place for now.  Ultrasound bilateral chest assess pleural effusions.  Continue oxacillin drip.  5/28 keep chest tube on the right side for now.  Repeat CRP.  Checking chemistry microbiology  on left-sided pleural fluid to rule out complicated effusion and need for pigtail.  Continue oxacillin drip.   5/29 minimal output right chest tube.  Remove chest tube.  Left-sided effusion not complicated not empyema.  Continue IV oxacillin    6/1 evolving empyema/necrosis on right lung on CT scan of the chest 06/01/2022.  Continue IV oxacillin.  Re-consult cardiothoracic surgery.  6/2 /treat COVID.  Cardiothoracic surgery input noted.  VATS procedure will be delayed till Monday next week.  Patient will be scheduled for VATS on 06/06/2022      6/3 VATS 06/06/2022 treating COVID.  06/04/2022 IV oxacillin.  VATS Monday as per CT surgery  6/5 worsening infiltrate on the right on chest x-ray today.  Plan for VATS as per CT surgery in a.m..  06/06: for VATS per CT surgery  06/07: POD #1 VATS DECORTICATION, chest tube per surgery  06/08: POD #2 VATS DECORTICATION, chest tube per surgery  06/09: POD #3 VATS DECORTICATION, chest tube per surgery  06/10: POD # 4 VATS DECORTICATION, chest tube per surgery    IVDU (intravenous drug user)  Cessation/rehab  Outpatient rehab    COPD (chronic obstructive pulmonary disease)  Oxygen  Keep SP2> 92%  Bronchodilators  Follow up in Pulmonary for PFT to clarify Dx  5/23 O2 target sat 92-94%.  Nebs p.r.n.  5/26 O2 target sat at home not her% nebs p.r.n..  Smoking cessation   5/28 continue oxygen nebs albuterol Atrovent p.r.n.  6/1 albuterol Atrovent p.r.n.  6/2 albuterol Atrovent p.r.n..  O2 target sat 92-94%.  6/5 albuterol Atrovent p.r.n..  Oxygen keep O2 sat 92 this 94%.  Treat empyema septic emboli MSSA with IV oxacillin drip    06/06: Continue bronchodilators, Keep Spo2 > 92%, Abx CONTINOUS  BALJINDERLINBEV    06/08: Continue bronchodilators, Keep Spo2 > 92%, Abx CONTINOUS OXACILBEV BEST    06/10: Continue bronchodilators, Keep Spo2 > 92%, Abx CONTINOUS OXACILLIN, BEV Morrison MD  Pulmonology  [facility] - Telemetry ([facility])

## 2022-06-10 NOTE — PLAN OF CARE
Pt refused to get OOB today , however she did work on LE ROM and exercises and worked on LONG sitting in bed. She was more cooperative today.

## 2022-06-10 NOTE — DISCHARGE SUMMARY
[facility] Tele[facility] ([facility])  [facility] Medicine  Discharge Summary      Patient Name: Tianna Leon  MRN: [id number]  Patient Class: IP- Inpatient  Admission Date: 5/6/2022  Hospital Length of Stay: 35 days  Discharge Date and Time:  06/10/2022 1:04 PM  Attending Physician: Bobby Blandon MD   Discharging Provider: Aurelio Rangel MD  Primary Care Provider: Spenser Campa MD      HPI:   38 y/o. female  with a PMHx of asthma, COPD,Bipolar  , IVDU and HLD presented to the ER with a c/o  sob for the last weak which has gotten worse . The SOB is associated with fever , chills , productive cough , back pain  and generalized weakness . She report cough some blood this am .  She is active IVDU  ( heroine , cocaine  and amphetamine ) . She denies any  sick contact , chest pain  , GI/ sx , She also complaning of LE sweeling . Knee pain and B/L LE rash .   ER COURSE: CTA chest negative for pe . Multiple nodular and cavitary opacities concerning for septic emboli with larger opacities possibly necrotizing pneumonia. CT cervical  and thoracic did not show any acute finding , CT lumbar Possible progression of degenerative changes most notable at L4-5 with moderate to severe spinal canal stenosis at this level.  WBC  29 k , na 130 , k 3.4 , cl 89 , procal 2.71   ER VS:  BP Pulse Resp Temp SpO2   (!) 124/58 110 (!) 30 (!) 101.6 °F (38.7 °C) 96 %           Pt will be admitted to inpt with a dx of PNA and severe sepsis       Procedure(s) (LRB):  VATS (VIDEO-ASSISTED THORACOSCOPIC SURGERY) (Right)  BLOCK, NERVE, INTERCOSTAL, 2 OR MORE (Right)      Hospital Course:   5/7 admitted for pneumonia, severe sepsis in setting of ivdu. Mother at bedside and provides collateral. Patient has struggled with substance abuse for approximately 20 years, worsening over last 3-5 years since life event. Onset of symptoms 1-2 weeks ago. Tolerated thoracentesis with no pneumothorax on chest x-ray. Mri lumbar and echo, pending. Bcx  positive, growing gram positive cocci. On vanc, cefepime and flagyl. Infectious disease consulted for bacteremia. 5/8/22 The patient remained afebrile overnight. Currently on cefepime/vanc/flagyl. Blood cx 5/6/22 are growing MSSA. The patient refused the MRI lumbar spine overnight d/t being unable to lay flat from back pain. Will give pain meds and attempt to get the MRI today to r/o spinal abscess. The ECHO showed a 1.3 x 1.5 cm elongated, mobile echodensity seen on the tricuspid valve consistent with vegetation, CT surgery was consulted. Will need a SUSANNAH. Infectious disease is consulted. Will repeat blood cx today. 5/9/22 No acute events overnight. The patient reports lower back pain is not well controlled with current pain regimen, will adjust. Repeat blood cx are still positive. Sensitivities are back and Staph is sensitive to oxacillin, will D/C vanc/cefepime. Cardiology consulted and plans for a SUSANNAH today. CT surgery following and recommends continuing medical management with appropriate ABX, no surgical intervention at this time. MRI lumbar spine showed moderate to severe spinal canal stenosis with moderate left-sided neural foraminal stenosis, Neurosurgery consulted. 5/10/22 No acute events overnight. The patient reports some improvement in pain with adjustment in pain meds. Repeat blood cx are +. Continue IV oxacillin and flagyl. Infectious disease is consulted. IR consulted to evaluate possible paraspinous muscle myositis versus abscess. Recommend IR drainage. Continue current management. 5/11/22 No acute events overnight. The patient reports pain is still not well controlled at times. The patient reports that she is very anxious about the upcoming SUSANNAH and IR drainage. SUSANNAH was pushed back to this afternoon because the patient ate candy this AM. Continue treatment with IV oxacillin and flagyl. Infectious disease is following. WBC trended down to 31K. Continue current management. 5/12/22 No acute events  overnight. The patients WBC improved to 22K overnight. The patient remains on continuous oxacillin infusion. Infectious disease is following. MRI thoracic spine is ordered and pending. The patients SUSANNAH was postponed again today d/t low H/H. Hgb was noted to be 6.2 this morning, will transfuse 1 unit PRBC. The patient reports pain is still not well controlled. The case was discussed with Neurosurgery who recommended adding extended release pain meds. Approximately 1 ml of purulent drainage aspirated from back yesterday per IR, growing gram + cocci.     5/13: Patient was given 1U PRBC on 5/12, improved from 6.2 --> 7.0, will give an additional unit PRBC. Radiology attempted to bring patient on 5/12 at 1755 for Thoracic MRI, patient refused due to pain and anxiety. Repeat attempt this AM, patient again refused. Will order Ativan 1mg IV to be given with pain medication prior to scan to relieve anxiety and pain, nurse to notify NP when ready to go for scan. Discussed pain management with patient, discussed transition to PO pain medications to being preparing for d/c to LTAC, adjusted PO pain regimen will reevaluate to determine effectiveness. Repeat Blood cultures 5/11/22: NGTD.     5/14: Final anaerobic cultures pending, continue to adjust pain regimen, d/c IV Dilaudid today. Patient currently managed with PO Morphine extended release and PO oxycodone. D/C plan is for LTAC once final antibiotic regimen determined. Patient continues to refuse MRI of thoracic spine.     5/15: Called to room this AM, patient had coughed up blood and mucous. Approx. 1-2 teaspoons of blood in emesis bag, ordered CXR and repeat CBC, CXR showed improvement from previous day, repeat CBC at time of incident, showed a slight decrease in Hgb: 9.7 --> 8.7, when repeated at 1500, Hgb had improved to 10. no further episodes of coughing up blood throughout day. Patient continues to c/o not having enough pain medication but continues to sleep most of  the day and will fall asleep when speaking at times. Added IV Toradol to plan.     5/16: No further episodes of coughing blood overnight, patient participated with encouragement with PT/OT today, recommendations are HH vs. SNF pending progress. Awaiting final antibiotic regimen recommendations. Anaerobic culture results finalized: negative for growth. Afebrile, WBC trending downward.     5/17: Antibiotic regimen per ID is continuous oxacillin until 7/11/22, SW notified of plan, will seek acceptance at LTAC or SNF. Patient choice is [facility]. WBC in normal range. AMS this AM, CT of head: no acute findings. Given narcan and mental status improved. Decreased narcotic dose.     5/18: Per discussion with patient, she is willing to have the MRI of thoracic spine today, will order IV pain medication and anxiety medication prior to scan. Nursing and Radiology aware of needing to premedicate for scan. Patient is more cooperative and participating with care.     5/19: Patient given premedications for MRI, MRI completed, results pending. Patient more agreeable with treatment plan and cooperating. Will need psychiatry follow-up after discharge. Hgb: 6.7, will give 1U PRBC    5/20/22: +LEON and generalized pain. Thoracic MRI showed no evidence for disc osteomyelitis in the thoracic spine and no epidural abscess, Loculated right hydropneumothorax and extensive bilateral pulmonary infiltrates. Ct chest showed septic emboli and moderate right pleural effusion.+splenic infarct  Pulmonology felt likely empyema- consulted IR for chest tube.     5/21/22: s/p right pig tail placement to suction/ drainage system per IR. 60ml pus removed initially. Fluid analysis consistent with empyema. Pleural fluid cultures pending. Now with scant serosanguinous output.+ right lateral chest pain at chest tube site. Denies any other complaints. Status update provided to pt's mother.     5/22/22: Repeatedly requesting IV Dilaudid. Toradol ordered. Afebrile,  "WBC normal, O2sats stable on 3lites. s/p right pig tail placement to suction/ drainage system per IR on 5/21/22- draining serous draiange with pus. Plan for LTAC placement     5/23/22: More cofortable today. Not getting OOB. Encouraged OOB and rationale provided. Pulmonology recommended Possible tPA and DNase in chest tube tomorrow    5/24/22: Examined OOB in chair. Encouraged pt to continue OOB. Plan for tPA and DNase in chest tube today per Pulmonology  Pt denied LTAC- will pursue SNF    5/25/22: Temp 101.7F last night. Will recheck blood cultures and UA. BP low - IVF bolus given and restarted IVF. ID re-consulted. 160ml of purulent CT drainage output. Dr. San plans to repeat tPA and DNase in chest tube again today.     5/26/22: temp 99.2. c/o of right "lung pain" when eating. Will consult speech to check swallow. +mild leukocytosis. Repeat BC 5/25/22 show NGTD. Repeat UA showed rare trichomas. Chest tube drainage remains purulent     5/27/22  Low grade temp. Downward trend of hemoglobin, related to chest tube drainage. Blood cultures remain negative. Urine culture growing E. Coli. Sensitivities are pending.     5/28/22  Urine culture returned pansenstiive. Will continue ciprofloxacin for total of 3 days then discontinue. Still awaiting SNF placement.    5/29/22 culture of pleural fluid from 5/28/22 negative. Continue CT for now.     5/30/22  CT removed yesterday by pulmonology. SNF declines. Will explore other options for IV abx with help from ID.     5/31/22  Patient has been accepted to [facility]. Will transfer today. Will follow up with pulmonology and ID at discharge. She will continue oxacillin for management of bacteremia, septic embolic, and endocarditis.  Addendum: COVID negative transfer delayed.     6/1/22  COVID positive started on remdesivir. Tmax 101.3. repeat CT scan shows evolving septic embolic/necrotizing pneumonia. Pulmonlogy recommends reconsulting CVT. ID following. Awaiting insurance " approval for [facility]. Decline in hemoglobin 7.4, hold on transfusion at this time.   6/2:  CVT consult on case.  Plan for VATS procedure on Monday given patient's COVID status.  Continue remdesivir.  Continue oxacillin.  6/3: VATS planned for Monday. Will transfuse 1 unit prbc. No further hemoptysis noted. Cont oxacillin.   6/4:  Vats procedure Monday .  H&H improved status post 1 unit packed RBC.  NPO midnight.  Continue oxacillin.  6/5:  VATS procedure tomorrow.  H&H stable.  NPO at midnight.  6/6: VATS procedure today.   6/7: s/p vats procedure. Patient self extubated approx 1 am. Currently doing well. Will step down from ICU. LTAC placement pending.   6/8: stepped down from icu. Cont chest tube, current being managed by CTS. Cont current abx therapy. Plans for LTAC once chest tubes are out.   6/9: h/h low today. Transfuse 2 units prbc per CTS. Cont chest tube.   6/10 Pt was seen and examined at bedside . She was determined to be suitable for d/c . She will be d/c  to [facility]  H/H stable . The Chest tube  DC by CTS . She will be d/c on oxacillin cont infusion  for  MSSA IE  EOC 7/11/22        Goals of Care Treatment Preferences:  Code Status: Full Code      Consults:   Consults (From admission, onward)        Status Ordering Provider     Inpatient consult to Respiratory Care  Once        Provider:  (Not yet assigned)    Acknowledged ELLIS RAND     IP consult case management/social work  Once        Provider:  (Not yet assigned)    Completed ELLIS RAND     Inpatient consult to Registered Dietitian/Nutritionist  Once        Provider:  (Not yet assigned)    Completed ELLIS RAND     Inpatient consult to Social Work  Once        Provider:  (Not yet assigned)    Completed ITZEL GELLER     Inpatient consult to Infectious Diseases  Once        Provider:  Sunday Hassan MD    Acknowledged FÉLIX CARDENAS     Inpatient consult to Interventional Radiology  Once        Provider:  Lolyd Suarez,  MD    Completed CATHERINE BROWN     Inpatient consult to Cardiothoracic Surgery  Once        Provider:  (Not yet assigned)    Acknowledged GALDINO HASSAN     Inpatient consult to PICC team ([facility])  Once        Provider:  (Not yet assigned)    Acknowledged DYLAN DUARTE     Inpatient consult to Interventional Radiology  Once        Provider:  LUCA Rojo    Completed IRVING SWAN     Inpatient consult to Cardiology  Once        Provider:  (Not yet assigned)    Completed ALEC HAZEL     Inpatient consult to Neurosurgery  Once        Provider:  (Not yet assigned)    Completed IRVING SWAN     Inpatient consult to Cardiothoracic Surgery  Once        Provider:  Bobby Blandon MD    Acknowledged IRVING SWAN     Inpatient consult to Infectious Diseases  Once        Provider:  Galdino Hassan MD    Acknowledged ALEC HAZEL     Inpatient consult to Pulmonology  Once        Provider:  Juan Diamond MD    Completed YOVANNY CHAPARRO          No new Assessment & Plan notes have been filed under this hospital service since the last note was generated.  Service: Hospital Medicine    Final Active Diagnoses:    Diagnosis Date Noted POA    PRINCIPAL PROBLEM:  Acute bacterial endocarditis [I33.0] 05/08/2022 Yes    Status post chest tube placement [Z93.8] 06/07/2022 Not Applicable    BEE (acute kidney injury) [N17.9] 06/03/2022 Yes    COVID-19 [U07.1] 05/31/2022 No    Empyema of right pleural space [J86.9] 05/20/2022 Yes    Normocytic anemia [D64.9] 05/12/2022 Yes    Pulmonary embolism, septic [I26.90] 05/11/2022 Yes    MSSA bacteremia [R78.81, B95.61] 05/07/2022 Yes    IVDU (intravenous drug user) [F19.90] 05/06/2022 Yes    COPD (chronic obstructive pulmonary disease) [J44.9] 12/29/2017 Yes      Problems Resolved During this Admission:    Diagnosis Date Noted Date Resolved POA    Status post thoracentesis [Z98.890] 05/28/2022 06/04/2022 Not Applicable    E. 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    Specialty: Pulmonary Disease  Contact information:  [address] [place] [address] [address] Radha [place] [address]  [phone]             Sunday Hassan MD Follow up.    Specialties: Infectious Diseases, Hospitalist  Contact information:  [address] [place] [address] [address] [place] [address]  [phone]             Spenser Campa MD Follow up in 4 week(s).    Specialty: Family Medicine  Contact information:  [address] [facility]  [address]  [facility] [address] [phone]                       Patient Instructions:      Diet Adult Regular     Activity as tolerated       Significant Diagnostic Studies: Labs:   BMP:   Recent Labs   Lab 06/09/22  0619 06/10/22  0655   * 92    141   K 3.8 3.9    102   CO2 26 26   BUN 21* 19   CREATININE 1.8* 1.8*   CALCIUM 8.3* 8.3*   , CMP   Recent Labs   Lab 06/09/22  0619 06/10/22  0655    141   K 3.8 3.9    102   CO2 26 26   * 92   BUN 21* 19   CREATININE 1.8* 1.8*   CALCIUM 8.3* 8.3*   PROT 6.1 6.9   ALBUMIN 1.6* 1.8*   BILITOT 0.3 0.3   ALKPHOS 73 90   AST 12 14   ALT 9* 10   ANIONGAP 9 13   ESTGFRAFRICA 40* 40*   EGFRNONAA 35* 35*    and CBC   Recent Labs   Lab 06/09/22  0619 06/10/22  0655   WBC 9.44 10.32   HGB 6.1* 8.9*   HCT 19.6* 27.5*    412     Microbiology:   Blood Culture   Lab Results   Component Value Date    LABBLOO No growth after 5 days.  06/01/2022    LABBLOO No growth after 5 days. 06/01/2022       Pending Diagnostic Studies:     Procedure Component Value Units Date/Time    Comprehensive Metabolic Panel [[id number]] Collected: 06/04/22 0642    Order Status: Sent Lab Status: In process Updated: 06/04/22 0643    Specimen: Blood     MRI Thoracic Spine W WO Cont [[id number]] Resulted: 05/11/22 1509    Order Status: Sent Lab Status: No result Updated: 05/14/22 1503    Transesophageal echo (SUSANNAH) [[id number]]     Order Status: Sent Lab Status: No result     Transesophageal echo (SUSANNAH) with possible cardioversion [[id number]] Resulted: 05/09/22 1152    Order Status: Sent Lab Status: No result Updated: 05/09/22 1316    X-Ray Chest AP Single View [[id number]]     Order Status: Sent Lab Status: No result          Medications:  Reconciled Home Medications:      Medication List      START taking these medications    ascorbic acid (vitamin C) 500 MG tablet  Commonly known as: VITAMIN C  Take 1 tablet (500 mg total) by mouth once daily. for 10 days  Start taking on: June 11, 2022     furosemide 40 MG tablet  Commonly known as: LASIX  Take 40 mg po BID x 7 days then after 40 mg po daily     LORazepam 1 MG tablet  Commonly known as: ATIVAN  Take 1 tablet (1 mg total) by mouth every 6 (six) hours as needed for Anxiety.     morphine 15 MG 12 hr tablet  Commonly known as: MS CONTIN  Take 1 tablet (15 mg total) by mouth every 12 (twelve) hours.     multivitamin Tab  Take 1 tablet by mouth once daily.  Start taking on: June 11, 2022     oxyCODONE 5 MG immediate release tablet  Commonly known as: ROXICODONE  Take 1 tablet (5 mg total) by mouth every 6 (six) hours as needed for Pain.     polyethylene glycol 17 gram Pwpk  Commonly known as: GLYCOLAX  Take 17 g by mouth once daily.  Start taking on: June 11, 2022     * sodium chloride 0.9% SolP 500 mL with oxacillin 1 gram SolR 12 g  Inject 12 g into the vein once daily.     * sodium chloride 0.9% SolP 500 mL with oxacillin 1  gram SolR 12 g  Inject 12 g into the vein continuous.     traZODone 100 MG tablet  Commonly known as: DESYREL  Take 1 tablet (100 mg total) by mouth every evening.         * This list has 2 medication(s) that are the same as other medications prescribed for you. Read the directions carefully, and ask your doctor or other care provider to review them with you.            CONTINUE taking these medications    acetaminophen 500 MG tablet  Commonly known as: TYLENOL  Take 500 mg by mouth every 6 (six) hours as needed for Pain.     albuterol 90 mcg/actuation inhaler  Commonly known as: PROVENTIL/VENTOLIN HFA  Inhale 2 puffs into the lungs every 6 (six) hours as needed for Wheezing.     ibuprofen 200 MG tablet  Commonly known as: ADVIL,MOTRIN  Take 200 mg by mouth every 6 (six) hours as needed for Pain.     MUCINEX DM 60-1,200 mg per 12 hr tablet  Generic drug: dextromethorphan-guaiFENesin  Take 1 tablet by mouth every 12 (twelve) hours.        STOP taking these medications    azithromycin 250 MG tablet  Commonly known as: ZITHROMAX Z-YUAN            Indwelling Lines/Drains at time of discharge:   Lines/Drains/Airways     Peripherally Inserted Central Catheter Line  Duration           PICC Double Lumen 05/17/22 1612 right basilic 23 days          Drain  Duration                Urethral Catheter 06/06/22 0545 Latex;Straight-tip 16 Fr. 4 days         Y Chest Tube 1 and 2 06/06/22 1924 1 Right Pleural 36 Fr. 2 Right Other (Comment) 36 Fr. 3 days                Time spent on the discharge of patient: 31 minutes         Aurelio Rangel MD  Department of Hospital Medicine  [facility] - Telemetry ([facility])

## 2022-06-10 NOTE — PT/OT/SLP PROGRESS
"Physical Therapy  Treatment    Tianna Leon   MRN: [id number]   Admitting Diagnosis: Acute bacterial endocarditis    PT Received On: 06/09/22  PT Start Time: 1800     PT Stop Time: 1830    PT Total Time (min): 30 min       Billable Minutes:  Therapeutic Activity 15 and Therapeutic Exercise 15    Treatment Type: Treatment  PT/PTA: PTA     PTA Visit Number: 2       General Precautions: Standard, airborne, contact, droplet, fall, respiratory  Orthopedic Precautions: N/A   Braces: N/A  Respiratory Status: Nasal cannula, flow 2 L/min         Subjective:  Communicated with Epic and nurse Jackson, prior to session.  "I haven't had any pain meds all day. I am a 10/10" "Can you sit me up with the bed like Sagrario does?"    Pain/Comfort  Pain Rating 1: 10/10  Location 1: back  Pain Addressed 1: Reposition, Distraction, Nurse notified  Pain Rating Post-Intervention 1: 10/10    Objective:   Patient found with: telemetry, oxygen, chest tube, PICC line, sanchez catheter    Functional Mobility:  Bed Mobility:     refused    Transfers:    refused    Gait:     refused      Therapeutic Activities and Exercises:  Pt requested assistance with reposition in bed. HOB elevated per pt's direction. She pulled on lower side rails to pull into a long sitting position and maintained the position ~2 min while I applied lotion to her back and placed an additional pillow behind her back for comfort. Once she was in a better positon, she performed 20 reps of active, supine LE exercises: Ap, Heel slides, HIp ab/addution. She occasionally complained of not being able to breath or that she was going to have a hyperventilate, easily redirected her and encouraged pursed lip breathing. She required increased time for all activity. Capable of more, exhibits self limiting behavior.     AM-PAC 6 CLICK MOBILITY  How much help from another person does this patient currently need?   1 = Unable, Total/Dependent Assistance  2 = A lot, Maximum/Moderate " Assistance  3 = A little, Minimum/Contact Guard/Supervision  4 = None, Modified [place]/Independent    Turning over in bed (including adjusting bedclothes, sheets and blankets)?: 4  Sitting down on and standing up from a chair with arms (e.g., wheelchair, bedside commode, etc.): 3  Moving from lying on back to sitting on the side of the bed?: 4  Moving to and from a bed to a chair (including a wheelchair)?: 3  Need to walk in hospital room?: 1  Climbing 3-5 steps with a railing?: 1  Basic Mobility Total Score: 16    AM-PAC Raw Score CMS G-Code Modifier Level of Impairment Assistance   6 % Total / Unable   7 - 9 CM 80 - 100% Maximal Assist   10 - 14 CL 60 - 80% Moderate Assist   15 - 19 CK 40 - 60% Moderate Assist   20 - 22 CJ 20 - 40% Minimal Assist   23 CI 1-20% SBA / CGA   24 CH 0% Independent/ Mod I     Patient left HOB elevated with all lines intact, call button in reach and nurse Jackson notified.    Assessment:  Tianna Leon is a 37 y.o. female with a medical diagnosis of Acute bacterial endocarditis and presents with ongoing mobility limitations.    Rehab identified problem list/impairments: Rehab identified problem list/impairments: weakness, gait instability, decreased ROM, impaired cardiopulmonary response to activity, decreased upper extremity function, impaired endurance, impaired balance, decreased lower extremity function, impaired coordination, impaired joint extensibility, impaired muscle length, decreased safety awareness, impaired self care skills, pain, impaired functional mobilty, edema    Rehab potential is fair.    Activity tolerance: Fair    Discharge recommendations: Discharge Facility/Level of Care Needs: nursing facility, skilled     Barriers to discharge:      Equipment recommendations: Equipment Needed After Discharge:  (TBD)     GOALS:   Multidisciplinary Problems     Physical Therapy Goals        Problem: Physical Therapy    Goal Priority Disciplines Outcome Goal  Variances Interventions   Physical Therapy Goal     PT, PT/OT Ongoing, Progressing     Description: LTG'S TO BE MET IN 14 DAYS (6-14-22)  1. PT WILL BE KRISTA WITH BED MOBILITY  2. PT WILL BE KRISTA WITH TF'S  3. PT WILL ' WITH RW AND SPV                   PLAN:    Patient to be seen 3 x/week  to address the above listed problems via gait training, therapeutic activities, therapeutic exercises  Plan of Care expires: 06/14/22  Plan of Care reviewed with: patient         06/09/2022

## 2022-06-10 NOTE — ASSESSMENT & PLAN NOTE
Oxygen  Keep SP2> 92%  Bronchodilators  Follow up in Pulmonary for PFT to clarify Dx  5/23 O2 target sat 92-94%.  Nebs p.r.n.  5/26 O2 target sat at home not her% nebs p.r.n..  Smoking cessation   5/28 continue oxygen nebs albuterol Atrovent p.r.n.  6/1 albuterol Atrovent p.r.n.  6/2 albuterol Atrovent p.r.n..  O2 target sat 92-94%.  6/5 albuterol Atrovent p.r.n..  Oxygen keep O2 sat 92 this 94%.  Treat empyema septic emboli MSSA with IV oxacillin drip    06/06: Continue bronchodilators, Keep Spo2 > 92%, Abx CONTINOUS OXACILLIN, COMBIVENT MDI    06/08: Continue bronchodilators, Keep Spo2 > 92%, Abx CONTINOUS OXACILLIN, COMBIVENT MDI    06/10: Continue bronchodilators, Keep Spo2 > 92%, Abx CONTINOUS OXACILLIN, COMBIVENT MDI

## 2022-06-10 NOTE — PLAN OF CARE
Patient AAOX 4. VSS. Patient remained afebrile throughout shift. IV medication administered per order. Patient on routine and prn medication for pain management. Plan of care reviewed. Patient verbalized understanding. Patient moving/turning self. Frequent weight shifting encouraged. Patient SR on monitor Bed low, side rails up x2, wheels locked, call light in reach. Bed alarm maintained for safety. Patient instructed to call for assistance. Hourly rounding completed. Will continue to monitor.

## 2022-06-10 NOTE — ASSESSMENT & PLAN NOTE
5/20 Suspect empyema, needs chest tube placement  5/21 s/p chest tube placement, adequate expansion - will check Chest X Ray in [facility]  5/23 tPA DNase.  5/24 tPA for DNase installation via chest tube.  Monitor chest tube output.  Repeat chest x-ray in a.m..  5/25 chest tube output increased from 50 mL on average per 24 hours to 160 mL last 24 hours.  Purulent drainage noted.  Will repeat tPA plus Dnase instillation.  5/26 chest x-ray reviewed.  Continue IV oxacillin.  Right-sided pigtail in place.  Status post tPA and DNase x2.  Bloody drainage around pigtail today status post tPA and DNase yesterday.  Will hold on fibrinolysis.  Monitor chest tube output  5/27 right pigtail in place.  225 mL  Last 24 hours average of 200 mL per day.  Change pleurovac canister to better assess the color of the fluid pus versus serosanguineous.  Keep pigtail in place for now.  Ultrasound bilateral chest assess pleural effusions.  Continue oxacillin drip.  5/28 keep chest tube on the right side for now.  Repeat CRP.  Checking chemistry microbiology  on left-sided pleural fluid to rule out complicated effusion and need for pigtail.  Continue oxacillin drip.   5/29 minimal output right chest tube.  Remove chest tube.  Left-sided effusion not complicated not empyema.  Continue IV oxacillin    6/1 evolving empyema/necrosis on right lung on CT scan of the chest 06/01/2022.  Continue IV oxacillin.  Re-consult cardiothoracic surgery.  6/2 /treat COVID.  Cardiothoracic surgery input noted.  VATS procedure will be delayed till Monday next week.  Patient will be scheduled for VATS on 06/06/2022      6/3 VATS 06/06/2022 treating COVID.  06/04/2022 IV oxacillin.  VATS Monday as per CT surgery  6/5 worsening infiltrate on the right on chest x-ray today.  Plan for VATS as per CT surgery in a.m..  06/06: for VATS per CT surgery  06/07: POD #1 VATS DECORTICATION, chest tube per surgery  06/08: POD #2 VATS DECORTICATION, chest tube per surgery  06/09:  POD #3 VATS DECORTICATION, chest tube per surgery  06/10: POD # 4 VATS DECORTICATION, chest tube per surgery

## 2022-06-10 NOTE — PLAN OF CARE
[facility] - Telemetry (Hospital)  Discharge Final Note    Primary Care Provider: Spenser Campa MD    Expected Discharge Date: 6/10/2022    Final Discharge Note (most recent)     Final Note - 06/10/22 1351        Final Note    Assessment Type Final Discharge Note     Anticipated Discharge Disposition [facility] Resources/Appts/Education Provided Post-Acute resouces added to AVS        Post-Acute Status    Post-Acute Authorization Placement     Post-Acute Placement Status Set-up Complete/Auth obtained                 Important Message from Medicare             Contact Info     Piyush San MD   Specialty: Pulmonary Disease    [address] [place] [address] [address] [place] [address]   Phone: [phone]       Next Steps: Follow up    Sunday Hassan MD   Specialty: Infectious Diseases, Hospitalist    [address] [place] [address] [address]IVE  [facility] [address]   Phone: [phone]       Next Steps: Follow up    Spenser Campa MD   Specialty: Family Medicine   Relationship: PCP - General    [address] [place] [address]  [address]  [facility] [address]   Phone: [phone]       Next Steps: Follow up in 4 week(s)        DC Dispo: [facility]  Transport: stretcher transport to be set up by LT[facility]  Patient/family notified: CM notified patient, patient states she will call and tell her mom    Number for report provided to nurse and all orders uploaded to [facility].

## 2022-06-10 NOTE — CHAPLAIN
Follow up visit with patient.  Pt is really going through some spiritual struggles.  Pt has had a lot of questions about some of the things she is and has been facing.  Pt currently had no other needs and spiritual care remains available as needed.    Chaplain Bennie Arriaga M.Div., [facility]

## 2022-06-10 NOTE — PT/OT/SLP PROGRESS
"Physical Therapy  Treatment    Tianna Leon   MRN: [id number]   Admitting Diagnosis: Acute bacterial endocarditis    PT Received On: 06/10/22  PT Start Time: 1115     PT Stop Time: 1125    PT Total Time (min): 10 min       Billable Minutes:  Therapeutic Activity 10    Treatment Type: Treatment  PT/PTA: PTA     PTA Visit Number: 3       General Precautions: Standard, airborne, contact, droplet, fall, respiratory  Orthopedic Precautions: N/A   Braces: N/A  Respiratory Status: Nasal cannula, flow 2 L/min         Subjective:  Communicated with patient's nurse, Luanne, and completed Epic chart review prior to session.  Patient resistant to PT session.    Pain/Comfort  Pain Rating 1: 0/10  Pain Rating Post-Intervention 1: 0/10    Objective:   Patient found with: telemetry, chest tube, PICC line, oxygen, sanchez catheter    Educated patient on importance of full and active participation in functional mobility training to prevent further loss of ROM and strength. Encouraged patient to perform AROM TE to BLE, within all available planes of motion, throughout the day. Also encouraged patient to get up with staff to the chair during the day and to the BSC when she needed to void.     Agreed to AROM TE to BLE x15 reps: Heel Slides, AP, SLR    Patient became highly anxious when D/C was mentioned and began to hyperventilate. Attempted to calm patient but was unsuccessful. Patient kept repeating "I'm not doing anything else!" "I want to talk to Nilda." "I want Nilda right now" "I need to leave."   notified of patient request.     AM-PAC 6 CLICK MOBILITY  How much help from another person does this patient currently need?   1 = Unable, Total/Dependent Assistance  2 = A lot, Maximum/Moderate Assistance  3 = A little, Minimum/Contact Guard/Supervision  4 = None, Modified [place]/Independent    Turning over in bed (including adjusting bedclothes, sheets and blankets)?: 4  Sitting down on and standing up from a chair " with arms (e.g., wheelchair, bedside commode, etc.): 3  Moving from lying on back to sitting on the side of the bed?: 4  Moving to and from a bed to a chair (including a wheelchair)?: 3  Need to walk in hospital room?: 1  Climbing 3-5 steps with a railing?: 1  Basic Mobility Total Score: 16    AM-PAC Raw Score CMS G-Code Modifier Level of Impairment Assistance   6 % Total / Unable   7 - 9 CM 80 - 100% Maximal Assist   10 - 14 CL 60 - 80% Moderate Assist   15 - 19 CK 40 - 60% Moderate Assist   20 - 22 CJ 20 - 40% Minimal Assist   23 CI 1-20% SBA / CGA   24 CH 0% Independent/ Mod I     Patient left HOB elevated with all lines intact and call button in reach.    Assessment:  Tianna Leon is a 37 y.o. female with a medical diagnosis of Acute bacterial endocarditis and presents with overall decline in functional mobility. Patient would continue to benefit from skilled PT to address functional limitations listed below in order to return to PLOF/decrease caregiver burden. Patient's greatest limiting factor is her self limitation. Patient has adequate muscular strength to perform bed mobility, transfers and gait training with very little assistance but often refuses to do so. Consistently educated by therapy and nursing staff on benefits of mobility and maintaining strength to progress towards independence.     Rehab identified problem list/impairments: Rehab identified problem list/impairments: weakness, impaired endurance, impaired self care skills, impaired functional mobilty, gait instability, impaired balance, decreased coordination, decreased safety awareness, pain, decreased ROM, impaired coordination, impaired cardiopulmonary response to activity, edema    Rehab potential is fair.    Activity tolerance: Poor    Discharge recommendations: Discharge Facility/Level of Care Needs: nursing facility, skilled     Barriers to discharge:      Equipment recommendations: Equipment Needed After Discharge: other  (see comments) (TBD BY NEXT LEVEL OF CARE)     GOALS:   Multidisciplinary Problems     Physical Therapy Goals        Problem: Physical Therapy    Goal Priority Disciplines Outcome Goal Variances Interventions   Physical Therapy Goal     PT, PT/OT Ongoing, Progressing     Description: LTG'S TO BE MET IN 14 DAYS (6-14-22)  1. PT WILL BE KRISTA WITH BED MOBILITY  2. PT WILL BE KRISTA WITH TF'S  3. PT WILL ' WITH RW AND SPV                   PLAN:    Patient to be seen 3 x/week  to address the above listed problems via gait training, therapeutic activities, therapeutic exercises  Plan of Care expires: 06/14/22  Plan of Care reviewed with: patient         06/10/2022

## 2022-06-10 NOTE — SUBJECTIVE & OBJECTIVE
Interval History:   06/10: seen and examined: POD #4:  VATS decortication, OOB, still has pain, T Max: 98.1 F, UO 2500mls,     Respiratory ROS: no cough, shortness of breath, or wheezing      Objective:     Vital Signs (Most Recent):  Temp: 98.1 °F (36.7 °C) (06/10/22 0808)  Pulse: 82 (06/10/22 0808)  Resp: 19 (06/10/22 0941)  BP: (!) 154/80 (06/10/22 0808)  SpO2: 96 % (06/10/22 0808)   Vital Signs (24h Range):  Temp:  [96.8 °F (36 °C)-98.1 °F (36.7 °C)] 98.1 °F (36.7 °C)  Pulse:  [70-92] 82  Resp:  [17-22] 19  SpO2:  [92 %-96 %] 96 %  BP: (119-161)/(71-90) 154/80     Weight: 88.3 kg (194 lb 10.7 oz)  Body mass index is 32.39 kg/m².      Intake/Output Summary (Last 24 hours) at 6/10/2022 1121  Last data filed at 6/10/2022 0941  Gross per 24 hour   Intake 791.25 ml   Output 2550 ml   Net -1758.75 ml       Physical Exam  Vitals and nursing note reviewed.   Constitutional:       Appearance: Normal appearance.      Interventions: Nasal cannula in place.       HENT:      Head: Normocephalic and atraumatic.      Nose: Nose normal.   Eyes:      Extraocular Movements: Extraocular movements intact.      Pupils: Pupils are equal, round, and reactive to light.   Cardiovascular:      Rate and Rhythm: Normal rate and regular rhythm.      Pulses: Normal pulses.      Heart sounds: Normal heart sounds.   Pulmonary:      Effort: Pulmonary effort is normal.      Breath sounds: Normal breath sounds.   Abdominal:      General: Bowel sounds are normal.      Palpations: Abdomen is soft.   Musculoskeletal:      Cervical back: Normal range of motion and neck supple.      Right lower leg: No edema.      Left lower leg: No edema.   Skin:     General: Skin is warm.      Comments: Multiple tattoos   Neurological:      General: No focal deficit present.      Mental Status: She is alert and oriented to person, place, and time.   Psychiatric:         Mood and Affect: Mood normal.       Vents:  Vent Mode: A/C (06/06/22 6427)  Ventilator Initiated:  Yes (06/06/22 2013)  Set Rate: 20 BPM (06/06/22 2339)  Vt Set: 400 mL (06/06/22 2339)  Pressure Support: 0 cmH20 (06/06/22 2339)  PEEP/CPAP: 5 cmH20 (06/06/22 2339)  Oxygen Concentration (%): 28 (06/10/22 0808)  Peak Airway Pressure: 26 cmH2O (06/06/22 2339)  Plateau Pressure: 0 cmH20 (06/06/22 2339)  Total Ve: 8.39 mL (06/06/22 2339)  F/VT Ratio<105 (RSBI): (!) 55.1 (06/06/22 2339)    Lines/Drains/Airways       Peripherally Inserted Central Catheter Line  Duration             PICC Double Lumen 05/17/22 1612 right basilic 23 days              Drain  Duration                  Urethral Catheter 06/06/22 0545 Latex;Straight-tip 16 Fr. 4 days         Y Chest Tube 1 and 2 06/06/22 1924 1 Right Pleural 36 Fr. 2 Right Other (Comment) 36 Fr. 3 days                    Significant Labs:    CBC/Anemia Profile:  Recent Labs   Lab 06/09/22  0619 06/10/22  0655   WBC 9.44 10.32   HGB 6.1* 8.9*   HCT 19.6* 27.5*    412   MCV 86 85   RDW 16.5* 16.6*        Chemistries:  Recent Labs   Lab 06/09/22  0619 06/10/22  0655    141   K 3.8 3.9    102   CO2 26 26   BUN 21* 19   CREATININE 1.8* 1.8*   CALCIUM 8.3* 8.3*   ALBUMIN 1.6* 1.8*   PROT 6.1 6.9   BILITOT 0.3 0.3   ALKPHOS 73 90   ALT 9* 10   AST 12 14       All pertinent labs within the past 24 hours have been reviewed.    Significant Imaging:  I have reviewed all pertinent imaging results/findings within the past 24 hours.    X-Ray Chest AP Portable  Narrative: EXAMINATION:  XR CHEST AP PORTABLE    CLINICAL HISTORY:  s/p vats;    FINDINGS:  Comparison is made with the most recent prior chest x-ray.  Right chest tubes and right PICC line are satisfactory in position..  Heart size is upper limits of normal..  Mildly improving small bilateral pleural effusions and bibasilar infiltrates.  No pneumothorax or new infiltrate identified.  Impression: See above.    Electronically signed by: John Tellez MD  Date:    06/10/2022  Time:    08:17

## 2022-06-10 NOTE — PT/OT/SLP PROGRESS
Occupational Therapy      Patient Name:  Tianna Leon   MRN:  [id number]    S: PT COOPERATIVE WITH THERAPY SESSION  O: PT SEEN IN ROOM WITH C/O  6/10 AT INCISION SITE.(CHEST TUBE) PT REQ MOD A WITH USE  OF BED RAIL FOR REPOSITION SELF IN BED X 4 ATTEMPTS.  PT EDUCATED OF HEP.  A: PT VERBALIZED UNDERSTANDING OF HEP.PT EDUCATED ON UTILIZING THE CALL BUTTON FOR ALL NEEDSP: CONTINUE WITH POC. RECOMMEND : [facility]  Yolande Quinones, OT  [phone]  1 TA

## 2022-06-10 NOTE — PLAN OF CARE
CM met with patient and updated on d/c planning progress. Informed that [facility] is awaiting [employer] auth but anticipates receiving it today. Patient is agreeable to plan. CM will update patient/nurse when auth received.

## 2022-06-10 NOTE — ASSESSMENT & PLAN NOTE
X2 chest tube to suction  Monitor output  Pain control  INCENTIVE SPIROMETRY, DEEP BREATHING  SP transfusion yesterday

## 2022-06-10 NOTE — PLAN OF CARE
Per Dr. Blandon chest tubes have been d/c'd this morning. TODD updated hospitalist and [facility]. [facility] submitting for updated auth.

## 2022-06-10 NOTE — SUBJECTIVE & OBJECTIVE
Interval History:  Patient is status post VATS procedure for evacuation of empyema.    ROS  Medications:  Continuous Infusions:   dextrose 5 % and 0.45 % NaCl with KCl 20 mEq 25 mL/hr at 06/07/22 2205     Scheduled Meds:   ascorbic acid (vitamin C)  500 mg Oral Daily    chlorhexidine  10 mL Mouth/Throat BID    docusate sodium  100 mg Oral BID    famotidine  20 mg Oral Daily    furosemide (LASIX) injection  40 mg Intravenous Q12H    heparin (porcine)  5,000 Units Subcutaneous Q8H    ipratropium-albuteroL  1 puff Inhalation Q6H    morphine  30 mg Oral Q12H    multivitamin  1 tablet Oral Daily    mupirocin  1 g Nasal BID    oxacillin 12 g in  mL CONTINUOUS INFUSION  12 g Intravenous Q24H    polyethylene glycol  17 g Oral Daily    traZODone  100 mg Oral QHS     PRN Meds:sodium chloride, acetaminophen, bisacodyL, dextrose 10%, dextrose 10%, glucagon (human recombinant), glucose, glucose, hydrALAZINE, LORazepam, metoclopramide HCl, naloxone, ondansetron, oxyCODONE, pneumoc 20-susan conj-dip cr(PF), sodium chloride 0.9%, sodium chloride 0.9%, sodium chloride 0.9%     Objective:     Vital Signs (Most Recent):  Temp: 98.1 °F (36.7 °C) (06/10/22 0808)  Pulse: 82 (06/10/22 0808)  Resp: 20 (06/10/22 0808)  BP: (!) 154/80 (06/10/22 0808)  SpO2: 96 % (06/10/22 0808)   Vital Signs (24h Range):  Temp:  [96.8 °F (36 °C)-98.1 °F (36.7 °C)] 98.1 °F (36.7 °C)  Pulse:  [70-93] 82  Resp:  [17-22] 20  SpO2:  [92 %-96 %] 96 %  BP: (119-161)/(71-90) 154/80     Weight: 88.3 kg (194 lb 10.7 oz)  Body mass index is 32.39 kg/m².    SpO2: 96 %  O2 Device (Oxygen Therapy): nasal cannula    Intake/Output - Last 3 Shifts         06/08 0700  06/09 0659 06/09 0700  06/10 0659 06/10 0700 06/11 0659    P.O.       I.V. (mL/kg)       Blood  371.3     IV Piggyback       Total Intake(mL/kg)  371.3 (4.2)     Urine (mL/kg/hr) 3500 (1.6) 2500 (1.2)     Chest Tube 70 50     Total Output 3570 2550     Net -2834 -7587.8                     Lines/Drains/Airways       Peripherally Inserted Central Catheter Line  Duration             PICC Double Lumen 05/17/22 1612 right basilic 23 days              Drain  Duration                  Urethral Catheter 06/06/22 0545 Latex;Straight-tip 16 Fr. 4 days         Y Chest Tube 1 and 2 06/06/22 1924 1 Right Pleural 36 Fr. 2 Right Other (Comment) 36 Fr. 3 days                    Physical Exam  Constitutional:       Appearance: She is obese.   HENT:      Head: Normocephalic and atraumatic.   Cardiovascular:      Rate and Rhythm: Normal rate and regular rhythm.      Pulses: Normal pulses.      Heart sounds: Normal heart sounds.   Pulmonary:      Effort: Pulmonary effort is normal.      Breath sounds: Normal breath sounds.   Abdominal:      General: Abdomen is flat. Bowel sounds are normal.      Palpations: Abdomen is soft.   Musculoskeletal:      Right lower leg: No edema.      Left lower leg: No edema.   Skin:     General: Skin is warm and dry.   Neurological:      Mental Status: She is alert and oriented to person, place, and time.       Significant Labs:  All pertinent labs from the last 24 hours have been reviewed.    Significant Diagnostics:  I have reviewed all pertinent imaging results/findings within the past 24 hours.

## 2022-06-11 LAB
BACTERIA FLD AEROBE CULT: NO GROWTH
BACTERIA SPEC AEROBE CULT: NO GROWTH
GRAM STN SPEC: NORMAL
GRAM STN SPEC: NORMAL

## 2022-06-11 NOTE — PT/OT/SLP PROGRESS
Occupational Therapy    :  Patient Name:  Tianna Leon   MRN:  [id number]    S:PT AGREEABLE TO BED THERAPEUTIC EXERCISE  O: PT SEEN IN ROOM NOT FEELING WELL. PT AGREEABLE TO BED THERAPEUTIC EXERCISE. PT EDUCATED ON HEP . PT PERFORMED 1 SET X 15 B UE ROM EXERCISE SUPINE IN BED WITH HOB ELEVATED. PT EDUCATED ON HEP. PT VERBALIZED UNDERSTANDING. PT PERFORMED 1 SET X 15 REPS B UE  ROM EXERCISE(CHEST PRESS, SHOULDER FLEXION, ELBOW FLEX./EXT AND HAND/DIGITS FLEX/EXT).  A: PT DISPLAYS IMPROVEMENTS WITH B UE STRENGTH/ENDURANCE AS EVIDENCE BY COMPLETING HEP,  P: CONTINUE WITH POC. RECOMMEND: [facility] VS SNF  Yolande Quinones, OT  1 RAINA  12:15-12:30  6/10/2022

## 2022-06-16 LAB
BACTERIA SPEC ANAEROBE CULT: NORMAL
BACTERIA SPEC ANAEROBE CULT: NORMAL

## 2022-06-19 ENCOUNTER — HOSPITAL ENCOUNTER (INPATIENT)
Facility: HOSPITAL | Age: 37
LOS: 7 days | Discharge: HOSPICE | DRG: 870 | End: 2022-06-26
Attending: FAMILY MEDICINE | Admitting: INTERNAL MEDICINE
Payer: MEDICARE

## 2022-06-19 DIAGNOSIS — Z97.8 ENDOTRACHEAL TUBE PRESENT: ICD-10-CM

## 2022-06-19 DIAGNOSIS — I46.9 CARDIAC ARREST: Primary | ICD-10-CM

## 2022-06-19 DIAGNOSIS — G93.1 ANOXIC ENCEPHALOPATHY: ICD-10-CM

## 2022-06-19 DIAGNOSIS — Z46.59 ENCOUNTER FOR OROGASTRIC (OG) TUBE PLACEMENT: ICD-10-CM

## 2022-06-19 PROBLEM — I07.9 ENDOCARDITIS OF TRICUSPID VALVE: Status: ACTIVE | Noted: 2022-06-19

## 2022-06-19 LAB
ALBUMIN SERPL BCP-MCNC: 2 G/DL (ref 3.5–5.2)
ALLENS TEST: ABNORMAL
ALP SERPL-CCNC: 135 U/L (ref 55–135)
ALT SERPL W/O P-5'-P-CCNC: 37 U/L (ref 10–44)
AMPHET+METHAMPHET UR QL: ABNORMAL
ANION GAP SERPL CALC-SCNC: 17 MMOL/L (ref 8–16)
ANISOCYTOSIS BLD QL SMEAR: ABNORMAL
AST SERPL-CCNC: 53 U/L (ref 10–40)
BACTERIA #/AREA URNS HPF: ABNORMAL /HPF
BARBITURATES UR QL SCN>200 NG/ML: NEGATIVE
BASOPHILS # BLD AUTO: ABNORMAL K/UL (ref 0–0.2)
BASOPHILS NFR BLD: 0 % (ref 0–1.9)
BENZODIAZ UR QL SCN>200 NG/ML: NEGATIVE
BILIRUB SERPL-MCNC: 0.2 MG/DL (ref 0.1–1)
BILIRUB UR QL STRIP: NEGATIVE
BNP SERPL-MCNC: 285 PG/ML (ref 0–99)
BUN SERPL-MCNC: 23 MG/DL (ref 6–20)
BZE UR QL SCN: NEGATIVE
CALCIUM SERPL-MCNC: 8.3 MG/DL (ref 8.7–10.5)
CANNABINOIDS UR QL SCN: NEGATIVE
CHLORIDE SERPL-SCNC: 110 MMOL/L (ref 95–110)
CLARITY UR: CLEAR
CO2 SERPL-SCNC: 14 MMOL/L (ref 23–29)
COLOR UR: YELLOW
CREAT SERPL-MCNC: 1.8 MG/DL (ref 0.5–1.4)
CREAT UR-MCNC: 34.8 MG/DL (ref 15–325)
CTP QC/QA: YES
DACRYOCYTES BLD QL SMEAR: ABNORMAL
DELSYS: ABNORMAL
DIFFERENTIAL METHOD: ABNORMAL
EOSINOPHIL # BLD AUTO: ABNORMAL K/UL (ref 0–0.5)
EOSINOPHIL NFR BLD: 0 % (ref 0–8)
ERYTHROCYTE [DISTWIDTH] IN BLOOD BY AUTOMATED COUNT: 16 % (ref 11.5–14.5)
ERYTHROCYTE [SEDIMENTATION RATE] IN BLOOD BY WESTERGREN METHOD: 18 MM/H
EST. GFR  (AFRICAN AMERICAN): 40 ML/MIN/1.73 M^2
EST. GFR  (NON AFRICAN AMERICAN): 35 ML/MIN/1.73 M^2
FIO2: 50
GLUCOSE SERPL-MCNC: 165 MG/DL (ref 70–110)
GLUCOSE UR QL STRIP: ABNORMAL
GRAN CASTS #/AREA URNS LPF: 5 /LPF
HCO3 UR-SCNC: 19.3 MMOL/L (ref 24–28)
HCT VFR BLD AUTO: 26.7 % (ref 37–48.5)
HGB BLD-MCNC: 8.4 G/DL (ref 12–16)
HGB UR QL STRIP: ABNORMAL
HYALINE CASTS #/AREA URNS LPF: 0 /LPF
IMM GRANULOCYTES # BLD AUTO: ABNORMAL K/UL (ref 0–0.04)
IMM GRANULOCYTES NFR BLD AUTO: ABNORMAL % (ref 0–0.5)
INR PPP: 1.1 (ref 0.8–1.2)
KETONES UR QL STRIP: NEGATIVE
LACTATE SERPL-SCNC: 1.6 MMOL/L (ref 0.5–2.2)
LACTATE SERPL-SCNC: 4 MMOL/L (ref 0.5–2.2)
LEUKOCYTE ESTERASE UR QL STRIP: NEGATIVE
LYMPHOCYTES # BLD AUTO: ABNORMAL K/UL (ref 1–4.8)
LYMPHOCYTES NFR BLD: 7 % (ref 18–48)
MCH RBC QN AUTO: 27.7 PG (ref 27–31)
MCHC RBC AUTO-ENTMCNC: 31.5 G/DL (ref 32–36)
MCV RBC AUTO: 88 FL (ref 82–98)
METAMYELOCYTES NFR BLD MANUAL: 2 %
METHADONE UR QL SCN>300 NG/ML: NEGATIVE
MICROSCOPIC COMMENT: ABNORMAL
MODE: ABNORMAL
MONOCYTES # BLD AUTO: ABNORMAL K/UL (ref 0.3–1)
MONOCYTES NFR BLD: 1 % (ref 4–15)
MYELOCYTES NFR BLD MANUAL: 4 %
NEUTROPHILS NFR BLD: 67 % (ref 38–73)
NEUTS BAND NFR BLD MANUAL: 19 %
NITRITE UR QL STRIP: NEGATIVE
NRBC BLD-RTO: 0 /100 WBC
OPIATES UR QL SCN: ABNORMAL
OVALOCYTES BLD QL SMEAR: ABNORMAL
PCO2 BLDA: 47.8 MMHG (ref 35–45)
PCP UR QL SCN>25 NG/ML: NEGATIVE
PEEP: 5
PH SMN: 7.21 [PH] (ref 7.35–7.45)
PH UR STRIP: 6 [PH] (ref 5–8)
PLATELET # BLD AUTO: 468 K/UL (ref 150–450)
PLATELET BLD QL SMEAR: ABNORMAL
PMV BLD AUTO: 8.6 FL (ref 9.2–12.9)
PO2 BLDA: 75 MMHG (ref 80–100)
POC BE: -8 MMOL/L
POC SATURATED O2: 92 % (ref 95–100)
POIKILOCYTOSIS BLD QL SMEAR: SLIGHT
POTASSIUM SERPL-SCNC: 4.6 MMOL/L (ref 3.5–5.1)
PROT SERPL-MCNC: 6.7 G/DL (ref 6–8.4)
PROT UR QL STRIP: ABNORMAL
PROTHROMBIN TIME: 11.8 SEC (ref 9–12.5)
RBC # BLD AUTO: 3.03 M/UL (ref 4–5.4)
RBC #/AREA URNS HPF: 30 /HPF (ref 0–4)
SAMPLE: ABNORMAL
SARS-COV-2 RDRP RESP QL NAA+PROBE: NEGATIVE
SCHISTOCYTES BLD QL SMEAR: PRESENT
SITE: ABNORMAL
SODIUM SERPL-SCNC: 141 MMOL/L (ref 136–145)
SP GR UR STRIP: >=1.03 (ref 1–1.03)
TOXICOLOGY INFORMATION: ABNORMAL
TROPONIN I SERPL DL<=0.01 NG/ML-MCNC: 0.18 NG/ML (ref 0–0.03)
URN SPEC COLLECT METH UR: ABNORMAL
UROBILINOGEN UR STRIP-ACNC: NEGATIVE EU/DL
VT: 480
WBC # BLD AUTO: 44.18 K/UL (ref 3.9–12.7)
WBC #/AREA URNS HPF: 6 /HPF (ref 0–5)

## 2022-06-19 PROCEDURE — 83880 ASSAY OF NATRIURETIC PEPTIDE: CPT | Performed by: FAMILY MEDICINE

## 2022-06-19 PROCEDURE — 63600175 PHARM REV CODE 636 W HCPCS: Performed by: FAMILY MEDICINE

## 2022-06-19 PROCEDURE — 25000003 PHARM REV CODE 250: Performed by: EMERGENCY MEDICINE

## 2022-06-19 PROCEDURE — 94002 VENT MGMT INPAT INIT DAY: CPT

## 2022-06-19 PROCEDURE — 80053 COMPREHEN METABOLIC PANEL: CPT | Performed by: FAMILY MEDICINE

## 2022-06-19 PROCEDURE — 27100108

## 2022-06-19 PROCEDURE — 36600 WITHDRAWAL OF ARTERIAL BLOOD: CPT

## 2022-06-19 PROCEDURE — 99900035 HC TECH TIME PER 15 MIN (STAT)

## 2022-06-19 PROCEDURE — 63600175 PHARM REV CODE 636 W HCPCS

## 2022-06-19 PROCEDURE — 85610 PROTHROMBIN TIME: CPT | Performed by: FAMILY MEDICINE

## 2022-06-19 PROCEDURE — 99291 CRITICAL CARE FIRST HOUR: CPT | Mod: 25

## 2022-06-19 PROCEDURE — 85027 COMPLETE CBC AUTOMATED: CPT | Performed by: FAMILY MEDICINE

## 2022-06-19 PROCEDURE — 25000003 PHARM REV CODE 250: Performed by: FAMILY MEDICINE

## 2022-06-19 PROCEDURE — 20000000 HC ICU ROOM

## 2022-06-19 PROCEDURE — 80307 DRUG TEST PRSMV CHEM ANLYZR: CPT | Performed by: FAMILY MEDICINE

## 2022-06-19 PROCEDURE — 96374 THER/PROPH/DIAG INJ IV PUSH: CPT

## 2022-06-19 PROCEDURE — 83605 ASSAY OF LACTIC ACID: CPT | Performed by: FAMILY MEDICINE

## 2022-06-19 PROCEDURE — 96361 HYDRATE IV INFUSION ADD-ON: CPT

## 2022-06-19 PROCEDURE — 81000 URINALYSIS NONAUTO W/SCOPE: CPT | Mod: 59 | Performed by: FAMILY MEDICINE

## 2022-06-19 PROCEDURE — 36415 COLL VENOUS BLD VENIPUNCTURE: CPT | Performed by: FAMILY MEDICINE

## 2022-06-19 PROCEDURE — 93010 EKG 12-LEAD: ICD-10-PCS | Mod: ,,, | Performed by: INTERNAL MEDICINE

## 2022-06-19 PROCEDURE — U0002 COVID-19 LAB TEST NON-CDC: HCPCS | Performed by: FAMILY MEDICINE

## 2022-06-19 PROCEDURE — 87040 BLOOD CULTURE FOR BACTERIA: CPT | Mod: 59 | Performed by: FAMILY MEDICINE

## 2022-06-19 PROCEDURE — 82803 BLOOD GASES ANY COMBINATION: CPT

## 2022-06-19 PROCEDURE — 93010 ELECTROCARDIOGRAM REPORT: CPT | Mod: ,,, | Performed by: INTERNAL MEDICINE

## 2022-06-19 PROCEDURE — 93005 ELECTROCARDIOGRAM TRACING: CPT

## 2022-06-19 PROCEDURE — 85007 BL SMEAR W/DIFF WBC COUNT: CPT | Performed by: FAMILY MEDICINE

## 2022-06-19 PROCEDURE — 84484 ASSAY OF TROPONIN QUANT: CPT | Performed by: FAMILY MEDICINE

## 2022-06-19 PROCEDURE — 25000003 PHARM REV CODE 250: Performed by: INTERNAL MEDICINE

## 2022-06-19 RX ORDER — PROPOFOL 10 MG/ML
20 INJECTION, EMULSION INTRAVENOUS
Status: COMPLETED | OUTPATIENT
Start: 2022-06-19 | End: 2022-06-19

## 2022-06-19 RX ORDER — PANTOPRAZOLE SODIUM 40 MG/1
40 FOR SUSPENSION ORAL DAILY
Status: DISCONTINUED | OUTPATIENT
Start: 2022-06-19 | End: 2022-06-26

## 2022-06-19 RX ORDER — PROPOFOL 10 MG/ML
45 INJECTION, EMULSION INTRAVENOUS
Status: DISCONTINUED | OUTPATIENT
Start: 2022-06-19 | End: 2022-06-19

## 2022-06-19 RX ORDER — SODIUM CHLORIDE 9 MG/ML
INJECTION, SOLUTION INTRAVENOUS CONTINUOUS
Status: DISCONTINUED | OUTPATIENT
Start: 2022-06-19 | End: 2022-06-26

## 2022-06-19 RX ORDER — HEPARIN SODIUM 5000 [USP'U]/ML
5000 INJECTION, SOLUTION INTRAVENOUS; SUBCUTANEOUS EVERY 8 HOURS
Status: DISCONTINUED | OUTPATIENT
Start: 2022-06-19 | End: 2022-06-26

## 2022-06-19 RX ORDER — PROPOFOL 10 MG/ML
45 INJECTION, EMULSION INTRAVENOUS CONTINUOUS
Status: DISCONTINUED | OUTPATIENT
Start: 2022-06-20 | End: 2022-06-20

## 2022-06-19 RX ORDER — PROPOFOL 10 MG/ML
INJECTION, EMULSION INTRAVENOUS
Status: COMPLETED
Start: 2022-06-19 | End: 2022-06-19

## 2022-06-19 RX ORDER — ONDANSETRON 2 MG/ML
4 INJECTION INTRAMUSCULAR; INTRAVENOUS EVERY 8 HOURS PRN
Status: DISCONTINUED | OUTPATIENT
Start: 2022-06-19 | End: 2022-06-26 | Stop reason: HOSPADM

## 2022-06-19 RX ORDER — SODIUM CHLORIDE 9 MG/ML
INJECTION, SOLUTION INTRAVENOUS CONTINUOUS
Status: ACTIVE | OUTPATIENT
Start: 2022-06-19 | End: 2022-06-20

## 2022-06-19 RX ORDER — CEFEPIME HYDROCHLORIDE 1 G/50ML
2 INJECTION, SOLUTION INTRAVENOUS
Status: DISCONTINUED | OUTPATIENT
Start: 2022-06-19 | End: 2022-06-20

## 2022-06-19 RX ORDER — FENTANYL CITRATE-0.9 % NACL/PF 10 MCG/ML
0-250 PLASTIC BAG, INJECTION (ML) INTRAVENOUS CONTINUOUS
Status: DISCONTINUED | OUTPATIENT
Start: 2022-06-20 | End: 2022-06-20

## 2022-06-19 RX ADMIN — SODIUM CHLORIDE: 0.9 INJECTION, SOLUTION INTRAVENOUS at 11:06

## 2022-06-19 RX ADMIN — PIPERACILLIN SODIUM AND TAZOBACTAM SODIUM 4.5 G: 4; .5 INJECTION, POWDER, LYOPHILIZED, FOR SOLUTION INTRAVENOUS at 09:06

## 2022-06-19 RX ADMIN — PROPOFOL 20 MCG/KG/MIN: 10 INJECTION, EMULSION INTRAVENOUS at 08:06

## 2022-06-19 RX ADMIN — VANCOMYCIN HYDROCHLORIDE 2250 MG: 10 INJECTION, POWDER, LYOPHILIZED, FOR SOLUTION INTRAVENOUS at 11:06

## 2022-06-19 RX ADMIN — SODIUM CHLORIDE, SODIUM LACTATE, POTASSIUM CHLORIDE, AND CALCIUM CHLORIDE 2640 ML: .6; .31; .03; .02 INJECTION, SOLUTION INTRAVENOUS at 07:06

## 2022-06-19 RX ADMIN — SODIUM CHLORIDE: 0.9 INJECTION, SOLUTION INTRAVENOUS at 10:06

## 2022-06-20 PROBLEM — J96.01 ACUTE RESPIRATORY FAILURE WITH HYPOXIA AND HYPERCAPNIA: Status: ACTIVE | Noted: 2022-06-20

## 2022-06-20 PROBLEM — Z99.11 ON MECHANICALLY ASSISTED VENTILATION: Status: ACTIVE | Noted: 2022-06-20

## 2022-06-20 PROBLEM — J96.02 ACUTE RESPIRATORY FAILURE WITH HYPOXIA AND HYPERCAPNIA: Status: ACTIVE | Noted: 2022-06-20

## 2022-06-20 LAB
ABO + RH BLD: NORMAL
ALBUMIN SERPL BCP-MCNC: 1.9 G/DL (ref 3.5–5.2)
ALLENS TEST: ABNORMAL
ALP SERPL-CCNC: 112 U/L (ref 55–135)
ALT SERPL W/O P-5'-P-CCNC: 34 U/L (ref 10–44)
ANION GAP SERPL CALC-SCNC: 14 MMOL/L (ref 8–16)
AST SERPL-CCNC: 37 U/L (ref 10–40)
BASOPHILS # BLD AUTO: 0.02 K/UL (ref 0–0.2)
BASOPHILS NFR BLD: 0.1 % (ref 0–1.9)
BILIRUB SERPL-MCNC: 0.2 MG/DL (ref 0.1–1)
BLD GP AB SCN CELLS X3 SERPL QL: NORMAL
BLD PROD TYP BPU: NORMAL
BLOOD UNIT EXPIRATION DATE: NORMAL
BLOOD UNIT TYPE CODE: 5100
BLOOD UNIT TYPE: NORMAL
BUN SERPL-MCNC: 22 MG/DL (ref 6–20)
CALCIUM SERPL-MCNC: 8.7 MG/DL (ref 8.7–10.5)
CHLORIDE SERPL-SCNC: 110 MMOL/L (ref 95–110)
CO2 SERPL-SCNC: 21 MMOL/L (ref 23–29)
CODING SYSTEM: NORMAL
CREAT SERPL-MCNC: 1.4 MG/DL (ref 0.5–1.4)
DELSYS: ABNORMAL
DIFFERENTIAL METHOD: ABNORMAL
DISPENSE STATUS: NORMAL
EOSINOPHIL # BLD AUTO: 0 K/UL (ref 0–0.5)
EOSINOPHIL NFR BLD: 0.2 % (ref 0–8)
ERYTHROCYTE [DISTWIDTH] IN BLOOD BY AUTOMATED COUNT: 15.9 % (ref 11.5–14.5)
ERYTHROCYTE [SEDIMENTATION RATE] IN BLOOD BY WESTERGREN METHOD: 18 MM/H
EST. GFR  (AFRICAN AMERICAN): 55 ML/MIN/1.73 M^2
EST. GFR  (NON AFRICAN AMERICAN): 47 ML/MIN/1.73 M^2
FIO2: 30
GLUCOSE SERPL-MCNC: 94 MG/DL (ref 70–110)
HCO3 UR-SCNC: 23.8 MMOL/L (ref 24–28)
HCT VFR BLD AUTO: 21.1 % (ref 37–48.5)
HCT VFR BLD AUTO: 22.8 % (ref 37–48.5)
HGB BLD-MCNC: 6.6 G/DL (ref 12–16)
HGB BLD-MCNC: 7.4 G/DL (ref 12–16)
IMM GRANULOCYTES # BLD AUTO: 0.39 K/UL (ref 0–0.04)
IMM GRANULOCYTES NFR BLD AUTO: 2.4 % (ref 0–0.5)
LACTATE SERPL-SCNC: 0.7 MMOL/L (ref 0.5–2.2)
LYMPHOCYTES # BLD AUTO: 2.6 K/UL (ref 1–4.8)
LYMPHOCYTES NFR BLD: 15.8 % (ref 18–48)
MAGNESIUM SERPL-MCNC: 1.6 MG/DL (ref 1.6–2.6)
MCH RBC QN AUTO: 26.6 PG (ref 27–31)
MCHC RBC AUTO-ENTMCNC: 31.3 G/DL (ref 32–36)
MCV RBC AUTO: 85 FL (ref 82–98)
MODE: ABNORMAL
MONOCYTES # BLD AUTO: 0.9 K/UL (ref 0.3–1)
MONOCYTES NFR BLD: 5.5 % (ref 4–15)
NEUTROPHILS # BLD AUTO: 12.6 K/UL (ref 1.8–7.7)
NEUTROPHILS NFR BLD: 76 % (ref 38–73)
NRBC BLD-RTO: 0 /100 WBC
NUM UNITS TRANS PACKED RBC: NORMAL
PCO2 BLDA: 34.9 MMHG (ref 35–45)
PEEP: 5
PH SMN: 7.44 [PH] (ref 7.35–7.45)
PLATELET # BLD AUTO: 323 K/UL (ref 150–450)
PMV BLD AUTO: 8.7 FL (ref 9.2–12.9)
PO2 BLDA: 96 MMHG (ref 80–100)
POC BE: 0 MMOL/L
POC SATURATED O2: 98 % (ref 95–100)
POTASSIUM SERPL-SCNC: 3.6 MMOL/L (ref 3.5–5.1)
PROT SERPL-MCNC: 6.4 G/DL (ref 6–8.4)
RBC # BLD AUTO: 2.48 M/UL (ref 4–5.4)
SAMPLE: ABNORMAL
SITE: ABNORMAL
SODIUM SERPL-SCNC: 145 MMOL/L (ref 136–145)
TROPONIN I SERPL DL<=0.01 NG/ML-MCNC: 0.13 NG/ML (ref 0–0.03)
VT: 480
WBC # BLD AUTO: 16.49 K/UL (ref 3.9–12.7)

## 2022-06-20 PROCEDURE — 83735 ASSAY OF MAGNESIUM: CPT | Performed by: INTERNAL MEDICINE

## 2022-06-20 PROCEDURE — 99291 CRITICAL CARE FIRST HOUR: CPT | Mod: ,,, | Performed by: NURSE PRACTITIONER

## 2022-06-20 PROCEDURE — 93010 EKG 12-LEAD: ICD-10-PCS | Mod: ,,, | Performed by: INTERNAL MEDICINE

## 2022-06-20 PROCEDURE — 99900035 HC TECH TIME PER 15 MIN (STAT)

## 2022-06-20 PROCEDURE — 82803 BLOOD GASES ANY COMBINATION: CPT

## 2022-06-20 PROCEDURE — 99900026 HC AIRWAY MAINTENANCE (STAT)

## 2022-06-20 PROCEDURE — 94003 VENT MGMT INPAT SUBQ DAY: CPT

## 2022-06-20 PROCEDURE — 86920 COMPATIBILITY TEST SPIN: CPT | Performed by: INTERNAL MEDICINE

## 2022-06-20 PROCEDURE — 63600175 PHARM REV CODE 636 W HCPCS: Performed by: INTERNAL MEDICINE

## 2022-06-20 PROCEDURE — 25000003 PHARM REV CODE 250: Performed by: INTERNAL MEDICINE

## 2022-06-20 PROCEDURE — 87070 CULTURE OTHR SPECIMN AEROBIC: CPT | Performed by: NURSE PRACTITIONER

## 2022-06-20 PROCEDURE — 36430 TRANSFUSION BLD/BLD COMPNT: CPT

## 2022-06-20 PROCEDURE — 93005 ELECTROCARDIOGRAM TRACING: CPT

## 2022-06-20 PROCEDURE — 27000221 HC OXYGEN, UP TO 24 HOURS

## 2022-06-20 PROCEDURE — 85014 HEMATOCRIT: CPT | Performed by: EMERGENCY MEDICINE

## 2022-06-20 PROCEDURE — 86901 BLOOD TYPING SEROLOGIC RH(D): CPT | Performed by: INTERNAL MEDICINE

## 2022-06-20 PROCEDURE — 93010 ELECTROCARDIOGRAM REPORT: CPT | Mod: ,,, | Performed by: INTERNAL MEDICINE

## 2022-06-20 PROCEDURE — 87205 SMEAR GRAM STAIN: CPT | Performed by: NURSE PRACTITIONER

## 2022-06-20 PROCEDURE — 63600175 PHARM REV CODE 636 W HCPCS: Performed by: NURSE PRACTITIONER

## 2022-06-20 PROCEDURE — 20000000 HC ICU ROOM

## 2022-06-20 PROCEDURE — 99291 PR CRITICAL CARE, E/M 30-74 MINUTES: ICD-10-PCS | Mod: ,,, | Performed by: NURSE PRACTITIONER

## 2022-06-20 PROCEDURE — 84484 ASSAY OF TROPONIN QUANT: CPT | Performed by: INTERNAL MEDICINE

## 2022-06-20 PROCEDURE — 85018 HEMOGLOBIN: CPT | Performed by: EMERGENCY MEDICINE

## 2022-06-20 PROCEDURE — 36600 WITHDRAWAL OF ARTERIAL BLOOD: CPT

## 2022-06-20 PROCEDURE — 85025 COMPLETE CBC W/AUTO DIFF WBC: CPT | Performed by: INTERNAL MEDICINE

## 2022-06-20 PROCEDURE — P9016 RBC LEUKOCYTES REDUCED: HCPCS | Performed by: INTERNAL MEDICINE

## 2022-06-20 PROCEDURE — 25000003 PHARM REV CODE 250: Performed by: NURSE PRACTITIONER

## 2022-06-20 PROCEDURE — 80053 COMPREHEN METABOLIC PANEL: CPT | Performed by: INTERNAL MEDICINE

## 2022-06-20 PROCEDURE — 83605 ASSAY OF LACTIC ACID: CPT | Performed by: INTERNAL MEDICINE

## 2022-06-20 RX ORDER — PROPOFOL 10 MG/ML
45 INJECTION, EMULSION INTRAVENOUS CONTINUOUS
Status: DISCONTINUED | OUTPATIENT
Start: 2022-06-20 | End: 2022-06-21

## 2022-06-20 RX ORDER — MAGNESIUM SULFATE HEPTAHYDRATE 40 MG/ML
2 INJECTION, SOLUTION INTRAVENOUS
Status: COMPLETED | OUTPATIENT
Start: 2022-06-20 | End: 2022-06-20

## 2022-06-20 RX ORDER — HYDROCODONE BITARTRATE AND ACETAMINOPHEN 500; 5 MG/1; MG/1
TABLET ORAL
Status: DISCONTINUED | OUTPATIENT
Start: 2022-06-20 | End: 2022-06-23

## 2022-06-20 RX ORDER — MUPIROCIN 20 MG/G
OINTMENT TOPICAL 2 TIMES DAILY
Status: COMPLETED | OUTPATIENT
Start: 2022-06-20 | End: 2022-06-24

## 2022-06-20 RX ORDER — IPRATROPIUM BROMIDE AND ALBUTEROL SULFATE 2.5; .5 MG/3ML; MG/3ML
3 SOLUTION RESPIRATORY (INHALATION) EVERY 4 HOURS PRN
Status: ACTIVE | OUTPATIENT
Start: 2022-06-20 | End: 2022-06-25

## 2022-06-20 RX ORDER — POTASSIUM CHLORIDE 29.8 MG/ML
40 INJECTION INTRAVENOUS ONCE
Status: COMPLETED | OUTPATIENT
Start: 2022-06-20 | End: 2022-06-20

## 2022-06-20 RX ORDER — FENTANYL CITRATE-0.9 % NACL/PF 10 MCG/ML
0-250 PLASTIC BAG, INJECTION (ML) INTRAVENOUS CONTINUOUS
Status: DISCONTINUED | OUTPATIENT
Start: 2022-06-20 | End: 2022-06-24

## 2022-06-20 RX ADMIN — SODIUM CHLORIDE: 0.9 INJECTION, SOLUTION INTRAVENOUS at 07:06

## 2022-06-20 RX ADMIN — OXACILLIN SODIUM 12 G: 10 INJECTION, POWDER, FOR SOLUTION INTRAVENOUS at 09:06

## 2022-06-20 RX ADMIN — PANTOPRAZOLE SODIUM 40 MG: 40 GRANULE, DELAYED RELEASE ORAL at 08:06

## 2022-06-20 RX ADMIN — PROPOFOL 45 MCG/KG/MIN: 10 INJECTION, EMULSION INTRAVENOUS at 04:06

## 2022-06-20 RX ADMIN — MUPIROCIN: 20 OINTMENT TOPICAL at 08:06

## 2022-06-20 RX ADMIN — HEPARIN SODIUM 5000 UNITS: 5000 INJECTION INTRAVENOUS; SUBCUTANEOUS at 06:06

## 2022-06-20 RX ADMIN — Medication 50 MCG/HR: at 09:06

## 2022-06-20 RX ADMIN — HEPARIN SODIUM 5000 UNITS: 5000 INJECTION INTRAVENOUS; SUBCUTANEOUS at 02:06

## 2022-06-20 RX ADMIN — POTASSIUM CHLORIDE 40 MEQ: 29.8 INJECTION, SOLUTION INTRAVENOUS at 08:06

## 2022-06-20 RX ADMIN — PROPOFOL 22 MCG/KG/MIN: 10 INJECTION, EMULSION INTRAVENOUS at 09:06

## 2022-06-20 RX ADMIN — PROPOFOL 45 MCG/KG/MIN: 10 INJECTION, EMULSION INTRAVENOUS at 12:06

## 2022-06-20 RX ADMIN — MAGNESIUM SULFATE HEPTAHYDRATE 2 G: 2 INJECTION, SOLUTION INTRAVENOUS at 11:06

## 2022-06-20 RX ADMIN — PANTOPRAZOLE SODIUM 40 MG: 40 GRANULE, DELAYED RELEASE ORAL at 12:06

## 2022-06-20 RX ADMIN — CEFEPIME HYDROCHLORIDE 2 G: 2 INJECTION, SOLUTION INTRAVENOUS at 12:06

## 2022-06-20 RX ADMIN — MUPIROCIN: 20 OINTMENT TOPICAL at 09:06

## 2022-06-20 RX ADMIN — PROPOFOL 40 MCG/KG/MIN: 10 INJECTION, EMULSION INTRAVENOUS at 09:06

## 2022-06-20 RX ADMIN — HEPARIN SODIUM 5000 UNITS: 5000 INJECTION INTRAVENOUS; SUBCUTANEOUS at 09:06

## 2022-06-20 RX ADMIN — HEPARIN SODIUM 5000 UNITS: 5000 INJECTION INTRAVENOUS; SUBCUTANEOUS at 12:06

## 2022-06-20 RX ADMIN — MAGNESIUM SULFATE HEPTAHYDRATE 2 G: 2 INJECTION, SOLUTION INTRAVENOUS at 08:06

## 2022-06-20 RX ADMIN — Medication 25 MCG/HR: at 12:06

## 2022-06-20 NOTE — ASSESSMENT & PLAN NOTE
-during previous admission, was supposed to be on oxacillin until 7/10/22  -repeat cultures pending  -ID consult    May resume Oxacillin soon, if blood Cx remain NGTD

## 2022-06-20 NOTE — PROGRESS NOTES
Therapy with Vancomycin complete and/or consult discontinued by provider.  Pharmacy will sign off, please re-consult as needed.    Latoya Duke, BelleD

## 2022-06-20 NOTE — ASSESSMENT & PLAN NOTE
-IV drug use at [facility]  -BP stable at this time, not requiring pressors  -consult pulmonary for vent management

## 2022-06-20 NOTE — ASSESSMENT & PLAN NOTE
-MSSA bacteremia during previous admission  -Follow up on repeat blood cultures  -IV vanc and cefepime empirically for now  -ID consult    Cont Vanc/Cefepime

## 2022-06-20 NOTE — PLAN OF CARE
[facility] - Intensive Care (Hospital)  Initial Discharge Assessment       Primary Care Provider: Spenser Campa MD    Admission Diagnosis: Cardiac arrest [I46.9]  Encounter for orogastric (OG) tube placement [Z46.59]  Endotracheal tube present [Z97.8]    Admission Date: 6/19/2022  Expected Discharge Date:     Discharge Barriers Identified: Substance Abuse    Payor: HU[employer] MANAGED MEDICARE / Plan: HU[employer]NA SNP (SPECIAL NEEDS PLAN) / Product Type: Medicare Advantage /     Extended Emergency Contact Information  Primary Emergency Contact: Karyn Hammond  Address: [address] [place] [address] [place]  Mobile Phone: [phone]  Relation: Mother  Secondary Emergency Contact: Sharonda Taylor  Mobile Phone: [phone]  Relation: Relative    Discharge Plan A: Other (patient remains critically ill at this time, pending clinical improvement for d/c planning.)         [id number] - [place], [place] - [address] [address]  [address] [address]  [facility] [address]  Phone: [phone] Fax: [phone]    [facility] [place], [place] - [address]  [address]  [facility] [address]  Phone: [phone] Fax: [phone]    [id number] [address] [facility] [address] [facility] [address]  [address] [facility] [address]  Phone: [phone] Fax: [phone]      Initial Assessment (most recent)     Adult Discharge Assessment - 06/20/22 1313        Discharge Assessment    Assessment Type Discharge Planning Assessment     Confirmed/corrected address, phone number and insurance Yes     Confirmed Demographics Correct on Facesheet     Source of Information facility verbal report;health record     Communicated YESSICA with patient/caregiver Date not available/Unable to determine     Reason For Admission CARDIAC  ARREST     Lives With alone     Facility Arrived From: [facility]     Do you expect to return to your current  living situation? Other (see comments)   pending hospital course    Prior to hospitilization cognitive status: Unable to Assess     Current cognitive status: Unable to Assess     Walking or Climbing Stairs Difficulty none     Dressing/Bathing Difficulty none     Equipment Currently Used at Home none     Readmission within 30 days? Yes     Patient currently being followed by outpatient case management? No     Do you currently have service(s) that help you manage your care at home? No     Do you take prescription medications? Yes     Do you have prescription coverage? Yes     Do you have any problems affording any of your prescribed medications? TBD     Is the patient taking medications as prescribed? --   unknown    How do you get to doctors appointments? other (see comments)   unknown    Are you on dialysis? No     Discharge Plan A Other   patient remains critically ill at this time, pending clinical improvement for d/c planning.    DME Needed Upon Discharge  none     Discharge Plan discussed with: --   [facility] staff    Discharge Barriers Identified Substance Abuse                 Readmission Assessment (most recent)     Readmission Assessment - 06/20/22 1316        Readmission    Why were you readmitted? New medical problem     When you left the hospital how did you feel? patient intubated     When you left the hospital where did you go? Long Term Acute Care     Did patient/caregiver refused recommended DC plan? Yes     Tell me about what happened between when you left the hospital and the day you returned. patient sent to ED from [facility] after injecting substance into PICC line and coding at [facility] facility     Did you try to manage your symptoms your self? No     Did you try to see or did see a doctor or nurse before you came? Yes     Were you seen? Yes     Was this a planned readmission? No                Anticipated DC Dispo: unknown, patient remains critically ill at this time.   Prior Level of Function:  patient was independent at home prior to LTAC stay, previously lived alone  PCP: Dr. Campa  Comments:  Patient is currently intubated in ICU, CM contacted Radha from [facility] for initial assessment. [facility] will likely not accept patient back due to patient injecting substance into PICC line at facility. Radha will discuss with administration and contact CM with confirmed answer. CM following for needs.

## 2022-06-20 NOTE — ASSESSMENT & PLAN NOTE
On previous admission s/t IVDU  Continue scheudled Oxacillin scheuled until 7/10/22  Repeat blood cultures pending for follow up

## 2022-06-20 NOTE — ASSESSMENT & PLAN NOTE
Patient with Hypercapnic Respiratory failure which is Acute.  she is not on home oxygen. Supplemental oxygen was provided and noted- Vent Mode: A/C  Oxygen Concentration (%):  [30-60] 30  Resp Rate Total:  [18 br/min-39 br/min] 22 br/min  Vt Set:  [420 mL-480 mL] 420 mL  PEEP/CPAP:  [5 cmH20] 5 cmH20  Pressure Support:  [0 cmH20] 0 cmH20  Mean Airway Pressure:  [9.6 lfR40-69 cmH20] 20 cmH20.   Signs/symptoms of respiratory failure include- on vent, AMS. Contributing diagnoses includes - Interstitial lung disease and Pneumonia Labs and images were reviewed. Patient Has recent ABG, which has been reviewed. Will treat underlying causes and adjust management of respiratory failure as follows- Vent support, nebs, IV Abx    6/20- continue vent support as she failed SAT/SBT this am

## 2022-06-20 NOTE — HOSPITAL COURSE
Admitted to ICU @ 2128.   6/20 - Seen and examined at start of shift. Remains intubated and sedated on mechanical vent. Failed brief SAT/SBT with tachycardia, tachypnea   6/21 - Remains intubated/sedated on mechanical vent with minimal oxygen demand. Continues failing SAT/SBT with tachypnea, tachycardia. Sedation changed to precedex    6/22 - Remains intubated/sedated on mechanical vent with minimal oxygen demand. Not appropriate for SAT/SBT today d/t agitation, tachypnea, arrhythmia on sedation despite Versed infusion added   NOTE - At approx 1015, staff informed that pt had a bradycardic/?PEA event lasting 3 sec during turning, spontaneously resolved to SR 60s  6/23 - Had another cardiac arrest this AM about 0500 hr with PEA/Asystole with ROSC attained.  Currently supine in bed and intubated on mech ventilation in no distress sedated on Fentanyl and Versed infusions not requiring pressor support.  Mother at bed side and update given and all questions answered.  Mother request DNR status at this time.    6/24 - supine intubated on mech ventilation obtunded without neuro improvement with sedation weaning.  No distress and tolerating TF and not requiring pressor support.  Patient develops hypertension, tachypnea and restlessness with any turning or suctioning once weaned down on sedation and requires resuming higher doses of sedation.  This is preventing sedation vacation for neuro assessments   6/25 - supine intubated on mech ventilation.  Still with periods of restlessness, tachycardia, hypertension and tachypnea last 24 hours increased episodes on min Fentanyl infusion for neuro assessments which patient remains obtunded and unresponsive.  Tolerating TF and frequent diarrhea.    6/26 - Supine in bed still intubated on vent and unresponsive.  Met with mom, daughter and aunt yesterday and discussed current critical state and no neuro improvement now day #8 of hospitalization post cardiac arrest.  Family elects  comfort care with palliative extubation today.  Cont Fentanyl infusion for now.

## 2022-06-20 NOTE — ASSESSMENT & PLAN NOTE
With significant drop this morning and no noted bleeding  Suspect some dilutional component  Transfused one unit PRBC  Recheck in am

## 2022-06-20 NOTE — HOSPITAL COURSE
37 F WF, Hx of IVDA, MDD, bipolar disorder, anxiety, PTSD, suicidal ideation, COPD, ADHD, asthma, cervical cancer, GERD, Hep C, HLD who was d/uri from this hosp on 6/10 following 35 day admission during which she was treated for MSSA tricuspid endocarditis and right empyema, s/p VATS/decortication. She was also COVID + during admission. On 6/10, she was d/c to [facility] and continued on oxacillin (to continue through 7/10/22) via PICC line. Yesterday, she was found unresponsive in cardiac arrest at LT. She was resuscitated after 1 round of Epi and arrived intubated. Report given to ED was that patient injected an unknown substance into her PICC line at [facility].   In ED, Lactate 4.0, ABG 7.215/47.8/75/19.3, WBC 44.18, Hgb 8.4, Cr 1.8, AST 53, , troponin 0.184, Tox positive for opiates and amphetamine. CXR w/multifocal infiltrates, CT head w/no acute finding. Given Vancomycin and Cefepime and placed on sedation for mechanical ventilation. Admitted to ICU for Cardiac Arrest. Continued IVDA. Tricuspid endocarditis. MSSA bacteremia. Right Empyema s/p VATS.    6/20- Remains intubated and sedated on mechanical vent. Failed brief SAT/SBT with tachycardia, tachypnea. WBC down to 16, H/H dropped to 6.6/21- got  a unit of blood, H/H improved to 7.4/23. Bun/Cr improved 22/1.4. Troponin coming down- likely demand ischemia from Cardiac Arrest as a result of IV drug.      6/21- Appreciate ICU team. Pt remains intubated, sedated on vent, was on Fentanyl/Diprivan this am, Again failed SAT/SBT this am, also TG nearly 426, hence sedation lowered and switched Diprivan to Precedex and will try SAT/SBT. WBC down to 11, H/H 7/22, HCO3 21, Bun down to 24. Await repeat SAT/SBT. Good Urine output. Abx were changed to Oxacillin again yesterday due to her hx of MSSA.    6/22- Remains intubated sedated on Vent- Precedex, Fenatnyl, Versed. Pt had a few periods of Bradycardia on Precedex gtt last night- eICU had to intervene and given  Versed. Again this am, while pt having BM and agitated, Asystole alarming then pt went into NSR 60s- Precedex d/uri. Earlier CXR showed fluid load- given small dose IV Lasix. Getting TF @40 cc/hr. Tmax 101.8F last night. Good urine output. WBC increased to 15.8, H/H 8/25.     6/23- remains intubated, sedated on vent, on Versed/Fentanyl gtts. Sedation has been lowered. She 2 back to back cardiac arrests this morning around 0500 hr with PEA/Asystole with ROSC attained. Not requiring pressor support. Mother was at bed side and was updated about her critical condition and poor prognosis and all questions answered. Mother requested DNR status at this time.  Na increased 152, Cl 117, WBC down to 13, H/H 7/24. Getting continuous Oxacillin gtt for her Endocarditis.      6/24: remains intubated, sedated on vent, on Versed/Fentanyl gtts. No pressors. Unable to assess neuro status as patient develops hypertension, tachypnea and restlessness with any turning or suctioning once weaned down on sedation. Tmax 101.3F    6/25: No improvement in neuro status from day prior. Episodes of restlessness, tachycardia, hypertension and tachypnea when sedation weaned. Intubated, off pressors. Tolerating tube feeds, +diarrhea    6/26: Mother, grandmother, daughter and Aunts X 2 at bed side and discussed patient's critical state and obtunded neuro state with no neuro improvement on Day # 8 hospitalization post arrest and [facility].  Again offered Trach/PEG/NHP but Family states patient had Living Will made in 2016 and declared to family as well she would not want long term supportive devices such as trach/peg/NHP.  All family present wish to proceed with palliative vent withdrawal and comfort care. Discharge to inpatient hospice

## 2022-06-20 NOTE — EICU
37 year old female admitted with cardiac arrest after injecting an unknown substance in her PICC line.1 round of CPR with epinephrine given to ROSC.Patient recently admitted in rehabilitation for IV antibiotics administration for endocarditis.   Past history of MSSA with tricuspid endocarditis to be treated till 7/10/22,Right sided empyema s/p VATS 6/6/2022, ADHD,Anxiety,asthma,bipolar disorder,COPD,GERD,Hepatitis C,HLD,IVDU,Mood disorder,tobacco user    On camera assessment intubated and sedated on propofol at 40 mcg/kg/min and GCS 7 when sedation discontinued for assessment  OH 89,SPO2 100%,/94,RR25  CMV/18/480/30%/PEEP5  MV10.5L/min and peak airway pressure 25    Lab data  Drug screen opiates and amphetamines positive  Troponin 0.184  WBC 44.18  HB 8.49Was 8.9 2 weeks ago)  LA 4  Creatinine 1.8(same as baseline)  ABG 7.215/47.8/75  Chest xray with right lung changes similar to prior admission    Assessment and plan:  1.Cardiac arrest-most likely related to pulmonary embolism from  Methamphetamine injection injection-supportive care,does not meet criteria for hypothermia protocols GCS 7,Check EKG and trend troponin.  2.Acute hypercapnic respiratory failure-duonebs every 4 hours prn,daily SBT/SAT.  3.Sepsis- most likely bacteremia -was on oxacillin-now on cefepime and vancomycin,deescalate with culture results,trend lactate.  4.Psychiatric disorder-may need psychiatry follow up,on sedation with propofol and fentanyl to keep RASS -4.  DVT prophylaxis-SQ heparin.  SUP-Pantoprazole

## 2022-06-20 NOTE — ED PROVIDER NOTES
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  After EMS arrived she had cardiac arrest, received one round of epi with ROSC......        Review of patient's allergies indicates:   Allergen Reactions    Sulfamethoxazole-trimethoprim      Past Medical History:   Diagnosis Date    ADHD (attention deficit hyperactivity disorder)     Anxiety     Asthma     Bipolar disorder     Cancer     cervical    COPD (chronic obstructive pulmonary disease)     GERD (gastroesophageal reflux disease)     Hepatitis C antibody positive in blood     RNA UNDETECTED 2016    Hyperlipidemia     Intravenous drug abuse     MDD (major depressive disorder)     Mood disorder     PTSD (post-traumatic stress disorder)     Suicidal ideation      Past Surgical History:   Procedure Laterality Date     SECTION  2001    x2    CHOLECYSTECTOMY      COMPUTED TOMOGRAPHY N/A 2022    Procedure: CT (COMPUTED TOMOGRAPHY)/facet joint aspiration;  Surgeon: Lloyd Suarez MD;  Location: [facility];  Service: General;  Laterality: N/A;    COMPUTED TOMOGRAPHY N/A 2022    Procedure: CT (COMPUTED TOMOGRAPHY)/Chest tube insertion;  Surgeon: Lloyd Suarez MD;  Location: [facility];  Service: General;  Laterality: N/A;    HYSTERECTOMY      INJECTION OF ANESTHETIC AGENT AROUND MULTIPLE INTERCOSTAL NERVES Right 2022    Procedure: BLOCK, NERVE, INTERCOSTAL, 2 OR MORE;  Surgeon: Bobby Blandon MD;  Location: [facility];  Service: Cardiothoracic;  Laterality: Right;    LAPAROSCOPIC SALPINGECTOMY      ROBOT-ASSISTED LAPAROSCOPIC ABDOMINAL HYSTERECTOMY USING DA SEBASTIEN XI N/A 2019    Procedure:  XI ROBOTIC HYSTERECTOMY;  Surgeon: Tabatha Quintanilla MD;  Location: [facility] OR;  Service: OB/GYN;  Laterality: N/A;    ROBOT-ASSISTED LAPAROSCOPIC LYSIS OF ADHESIONS USING DA SEBASTIEN XI N/A 7/23/2019    Procedure: XI ROBOTIC LYSIS, ADHESIONS;  Surgeon: Tabatha Quintanilla MD;  Location: [facility] OR;  Service: OB/GYN;  Laterality: N/A;    ROBOT-ASSISTED SURGICAL REMOVAL OF FALLOPIAN TUBE USING DA SEBASTIEN XI Right 7/23/2019    Procedure: XI ROBOTIC SALPINGECTOMY;  Surgeon: Tabatha Quintanilla MD;  Location: [facility] OR;  Service: OB/GYN;  Laterality: Right;    TONSILLECTOMY      TUBAL LIGATION      VIDEO-ASSISTED THORACOSCOPIC SURGERY (VATS) Right 6/6/2022    Procedure: VATS (VIDEO-ASSISTED THORACOSCOPIC SURGERY);  Surgeon: Bobby Blandon MD;  Location: [facility] OR;  Service: Cardiothoracic;  Laterality: Right;  WITH EVACUATION OF EMPYEMA     Family History   Problem Relation Age of Onset    COPD Mother     Asthma Neg Hx     Cancer Neg Hx      Social History     Tobacco Use    Smoking status: Current Every Day Smoker     Packs/day: 1.50     Types: Cigarettes    Smokeless tobacco: Never Used    Tobacco comment: no smoking after midnight prior to surgery   Substance Use Topics    Alcohol use: Yes     Comment: Occassional     Drug use: Not Currently     Types: IV, Methamphetamines, Heroin     Comment: denies heroin use.  Last used meth 10/2018  No illegal drugs prior to sx     Review of Systems    Physical Exam     Initial Vitals   BP Pulse Resp Temp SpO2   06/19/22 1911 06/19/22 1911 06/19/22 1911 06/19/22 1930 06/19/22 1911   (!) 134/98 (!) 143 (!) 37 99.9 °F (37.7 °C) 99 %      MAP       --                Physical Exam    ED Course   Critical Care    Date/Time: 6/19/2022 7:59 PM  Performed by: Maru Chan MD  Authorized by: Maru Chan MD   Direct patient critical care time: 15 minutes  Additional history critical care time: 10 minutes  Ordering / reviewing critical care time: 5  minutes  Documentation critical care time: 5 minutes  Consulting other physicians critical care time: 5 minutes  Total critical care time (exclusive of procedural time) : 40 minutes  Critical care time was exclusive of separately billable procedures and treating other patients and teaching time.  Critical care was necessary to treat or prevent imminent or life-threatening deterioration of the following conditions: cardiac arrest.  Critical care was time spent personally by me on the following activities: blood draw for specimens, development of treatment plan with patient or surrogate, discussions with consultants, interpretation of cardiac output measurements, evaluation of patient's response to treatment, obtaining history from patient or surrogate, examination of patient, ordering and performing treatments and interventions, ordering and review of laboratory studies, pulse oximetry, ordering and review of radiographic studies, re-evaluation of patient's condition and review of old charts.        Labs Reviewed   CBC W/ AUTO DIFFERENTIAL - Abnormal; Notable for the following components:       Result Value    WBC 44.18 (*)     RBC 3.03 (*)     Hemoglobin 8.4 (*)     Hematocrit 26.7 (*)     MCHC 31.5 (*)     RDW 16.0 (*)     Platelets 468 (*)     MPV 8.6 (*)     Lymph % 7.0 (*)     Mono % 1.0 (*)     Platelet Estimate Clumped (*)     All other components within normal limits   COMPREHENSIVE METABOLIC PANEL - Abnormal; Notable for the following components:    CO2 14 (*)     Glucose 165 (*)     BUN 23 (*)     Creatinine 1.8 (*)     Calcium 8.3 (*)     Albumin 2.0 (*)     AST 53 (*)     Anion Gap 17 (*)     eGFR if  40 (*)     eGFR if non  35 (*)     All other components within normal limits   LACTIC ACID, PLASMA - Abnormal; Notable for the following components:    Lactate (Lactic Acid) 4.0 (*)     All other components within normal limits    Narrative:     la critical result(s) called  and verbal readback obtained from judy dunaway by EZEKIEL 06/19/2022 20:18   URINALYSIS, REFLEX TO URINE CULTURE - Abnormal; Notable for the following components:    Specific Gravity, UA >=1.030 (*)     Protein, UA 2+ (*)     Glucose, UA Trace (*)     Occult Blood UA 3+ (*)     All other components within normal limits    Narrative:     Specimen Source->Urine   B-TYPE NATRIURETIC PEPTIDE - Abnormal; Notable for the following components:     (*)     All other components within normal limits   DRUG SCREEN PANEL, URINE EMERGENCY - Abnormal; Notable for the following components:    Opiate Scrn, Ur Presumptive Positive (*)     Amphetamine Screen, Ur Presumptive Positive (*)     All other components within normal limits    Narrative:     Specimen Source->Urine   TROPONIN I - Abnormal; Notable for the following components:    Troponin I 0.184 (*)     All other components within normal limits   URINALYSIS MICROSCOPIC - Abnormal; Notable for the following components:    RBC, UA 30 (*)     WBC, UA 6 (*)     Granular Casts, UA 5 (*)     All other components within normal limits    Narrative:     Specimen Source->Urine   ISTAT PROCEDURE - Abnormal; Notable for the following components:    POC PH 7.215 (*)     POC PCO2 47.8 (*)     POC PO2 75 (*)     POC HCO3 19.3 (*)     POC SATURATED O2 92 (*)     All other components within normal limits   PROTIME-INR   SARS-COV-2 RDRP GENE    Narrative:     This test utilizes isothermal nucleic acid amplification   technology to detect the SARS-CoV-2 RdRp nucleic acid segment.   The analytical sensitivity (limit of detection) is 125 genome   equivalents/mL.   A POSITIVE result implies infection with the SARS-CoV-2 virus;   the patient is presumed to be contagious.     A NEGATIVE result means that SARS-CoV-2 nucleic acids are not   present above the limit of detection. A NEGATIVE result should be   treated as presumptive. It does not rule out the possibility of   COVID-19 and should not be  the sole basis for treatment decisions.   If COVID-19 is strongly suspected based on clinical and exposure   history, re-testing using an alternate molecular assay should be   considered.   This test is only for use under the Food and Drug   Administration s Emergency Use Authorization (EUA).   Commercial kits are provided by [employer].   Performance characteristics of the EUA have been independently   verified by [facility] Department of   Pathology and Laboratory Medicine.   _________________________________________________________________   The authorized Fact Sheet for Healthcare Providers and the authorized Fact   Sheet for Patients of the ID NOW COVID-19 are available on the FDA   website:     https://www.fda.gov/media/888497/download  https://www.fda.gov/media/208611/download                ECG Results          EKG 12-lead (Final result)  Result time 06/21/22 12:53:44    Final result by Interface, Lab In [facility] (06/21/22 12:53:44)                 Narrative:    Test Reason : I46.9,    Vent. Rate : 139 BPM     Atrial Rate : 139 BPM     P-R Int : 112 ms          QRS Dur : 066 ms      QT Int : 302 ms       P-R-T Axes : 071 047 036 degrees     QTc Int : 459 ms    Sinus tachycardia  Otherwise normal ECG  When compared with ECG of 06-MAY-2022 17:02,  Questionable change in QRS duration  Confirmed by ELIZ BRANDT MD ([id number]) on 6/21/2022 12:53:34 PM    Referred By: [email or web address]   SELF           Confirmed By:ELIZ BRANDT MD                             EKG 12-LEAD (Final result)  Result time 06/20/22 16:24:38    Final result by Unknown User (06/20/22 16:24:38)                                Imaging Results          CT Head Without Contrast (Final result)  Result time 06/19/22 22:59:47    Final result by Demario Morin MD (06/19/22 22:59:47)                 Impression:      No definite acute intracranial CT abnormality.  Clinical correlation and further evaluation as warranted.    All CT scans at this  facility are performed  using dose modulation techniques as appropriate to performed exam including the following:  automated exposure control; adjustment of mA and/or kV according to the patients size (this includes techniques or standardized protocols for targeted exams where dose is matched to indication/reason for exam: i.e. extremities or head);  iterative reconstruction technique.      Electronically signed by: Demario Morin  Date:    06/19/2022  Time:    22:59             Narrative:    EXAMINATION:  CT HEAD WITHOUT CONTRAST    CLINICAL HISTORY:  Mental status change, unknown cause;    TECHNIQUE:  Low dose axial CT images obtained throughout the head without intravenous contrast. Sagittal and coronal reconstructions were performed.    COMPARISON:  Multiple priors.    FINDINGS:  Intracranial compartment:    Ventricles and sulci are normal in size for age without evidence of hydrocephalus. No extra-axial blood or fluid collections.    Gray-white differentiation is fairly well preserved.  No parenchymal mass, hemorrhage, edema or major vascular distribution infarct.    Skull/extracranial contents (limited evaluation): No fracture. Mastoid air cells and paranasal sinuses are essentially clear.                               X-Ray Chest 1 View (Final result)  Result time 06/19/22 20:58:09    Final result by Demario Morin MD (06/19/22 20:58:09)                 Impression:      As above.      Electronically signed by: Demario Morin  Date:    06/19/2022  Time:    20:58             Narrative:    EXAMINATION:  XR CHEST 1 VIEW    CLINICAL HISTORY:  Encounter for fitting and adjustment of other gastrointestinal appliance and device    TECHNIQUE:  Single frontal view of the chest was performed.    COMPARISON:  None    FINDINGS:  Endotracheal tube tip in improved position, 3.5 cm from the august.  Enteric tube tip and side hole overlie the stomach in good position.  Left PICC line tip in good position.    Lungs  unchanged.    The cardiac silhouette is normal in size. The hilar and mediastinal contours are unremarkable.    Bones are intact.                                X-Ray Chest 1 View (Final result)  Result time 06/19/22 19:33:31    Final result by Demario Morin MD (06/19/22 19:33:31)                 Impression:      As above.    This report was flagged in Epic as abnormal.      Electronically signed by: Demario Morin  Date:    06/19/2022  Time:    19:33             Narrative:    EXAMINATION:  XR CHEST 1 VIEW    CLINICAL HISTORY:  Presence of other specified devices    TECHNIQUE:  Single frontal view of the chest was performed.    COMPARISON:  Multiple priors.    FINDINGS:  Endo tracheal tube tip 2.5 cm from the august.  Consider retraction by 1 cm.  Enteric tube tip and side hole below the diaphragm.    Right greater than left pulmonary opacities concerning for infection.  Septic emboli could be considered.    The cardiac silhouette is normal in size. The hilar and mediastinal contours are unremarkable.    Bones are intact.                                 Medications   0.9%  NaCl infusion ( Intravenous Verify Only 6/21/22 0300)   ondansetron injection 4 mg (has no administration in time range)   heparin (porcine) injection 5,000 Units (5,000 Units Subcutaneous Given 6/24/22 1358)   0.9%  NaCl infusion ( Intravenous Verify Only 6/24/22 1600)   pantoprazole suspension 40 mg (40 mg Per OG tube Given 6/24/22 0844)   albuterol-ipratropium 2.5 mg-0.5 mg/3 mL nebulizer solution 3 mL (has no administration in time range)   mupirocin 2 % ointment ( Nasal Given 6/24/22 0906)   oxacillin 12 g in  mL CONTINUOUS INFUSION ( Intravenous Verify Only 6/24/22 1600)   fentaNYL 2500 mcg in 0.9% sodium chloride 250 mL infusion premix (titrating) (25 mcg/hr Intravenous Verify Only 6/24/22 1600)   chlorhexidine 0.12 % solution 15 mL (15 mLs Mouth/Throat Given 6/24/22 0900)   midazolam (VERSED) 1 mg/mL injection 2 mg (2 mg  Intravenous Not Given 6/24/22 1220)   potassium bicarbonate disintegrating tablet 50 mEq (50 mEq Per OG tube Given 6/23/22 2238)   potassium bicarbonate disintegrating tablet 35 mEq (35 mEq Per OG tube Given 6/23/22 0820)   potassium bicarbonate disintegrating tablet 60 mEq (has no administration in time range)   magnesium oxide tablet 800 mg (800 mg Per OG tube Given 6/24/22 0251)   magnesium oxide tablet 800 mg (has no administration in time range)   potassium, sodium phosphates 280-160-250 mg packet 2 packet (has no administration in time range)   potassium, sodium phosphates 280-160-250 mg packet 2 packet (has no administration in time range)   potassium, sodium phosphates 280-160-250 mg packet 2 packet (has no administration in time range)   bisacodyL suppository 10 mg (has no administration in time range)   hydrALAZINE injection 10 mg (10 mg Intravenous Given 6/24/22 1206)   cefepime in dextrose 5 % IVPB 2 g (0 g Intravenous Stopped 6/24/22 1147)   metronidazole IVPB 500 mg (0 mg Intravenous Stopped 6/24/22 1305)   lactated ringers bolus 2,640 mL (0 mL/kg × 88 kg Intravenous Stopped 6/19/22 2234)   propofol (DIPRIVAN) 10 mg/mL infusion (40 mcg/kg/min × 88 kg Intravenous Rate/Dose Change 6/19/22 2100)   piperacillin-tazobactam 4.5 g in dextrose 5 % 100 mL IVPB (ready to mix system) (0 g Intravenous Stopped 6/19/22 2209)   vancomycin (VANCOCIN) 2,250 mg in dextrose 5 % 500 mL IVPB (0 mg Intravenous Stopped 6/20/22 0205)   potassium chloride 40 mEq in 100 mL IVPB (FOR CENTRAL LINE ADMINISTRATION ONLY) (0 mEq Intravenous Stopped 6/20/22 1252)   magnesium sulfate 2g in water 50mL IVPB (premix) (0 g Intravenous Stopped 6/20/22 1337)   potassium bicarbonate disintegrating tablet 25 mEq (25 mEq Per OG tube Given 6/21/22 0813)   midazolam (VERSED) 1 mg/mL injection (2 mg  Given 6/21/22 2221)   furosemide injection 20 mg (20 mg Intravenous Given 6/22/22 0948)   potassium bicarbonate disintegrating tablet 25 mEq (25 mEq  Per OG tube Given 6/22/22 0949)   magnesium oxide tablet 400 mg (400 mg Per OG tube Given 6/22/22 0949)   EPINEPHrine 0.1 mg/mL injection (1 mg Intravenous Given 6/23/22 0533)   lorazepam injection 2 mg (2 mg Intravenous Given 6/23/22 2131)   midazolam (VERSED) 1 mg/mL injection 2 mg (2 mg Intravenous Given 6/23/22 2140)   magnesium sulfate in dextrose IVPB (premix) 1 g (0 g Intravenous Stopped 6/24/22 0835)     Medical Decision Making:   Clinical Tests:   Lab Tests: Ordered and Reviewed  Radiological Study: Ordered and Reviewed     9:29 PM: Discussed case with Rogelio Betancourt MD (Internal Medicine). Dr. Olguin agrees with current care and management of pt and accepts admission.   Admitting Service: Hospital Medicine  Admitting Physician: Dr. Olguin  Admit to: ICU     9:39 PM: Re-evaluated pt. I have discussed test results, shared treatment plan, and the need for admission with patient and family at bedside. Pt and family express understanding at this time and agree with all information. All questions answered. Pt and family have no further questions or concerns at this time. Pt is ready for admit.                      Clinical Impression:   Final diagnoses:  [Z97.8] Endotracheal tube present  [I46.9] Cardiac arrest (Primary)  [Z46.59] Encounter for orogastric (OG) tube placement          ED Disposition Condition    Admit               Maru Chan MD  06/24/22 2818

## 2022-06-20 NOTE — CONSULTS
[facility] - Intensive Care (Hospital)  Adult Nutrition  Consult Note    SUMMARY     Recommendations    Recommendation/Intervention:   1. When medically appropriate, initiate Peptamen Intense VHP:   -Goal rate @ 25mL/hr.   -Provides 600 calories, 55g protein, and 504mL H2O.   -Propofol provides 557 calories @ rate of 21.1.   -100mL H2O flush q 4-6 hours or per MD/NP.   -Check Mg, K+, Na, Phos, and Glucose before and during initiation;Correct as indicated.     2. Weekly weights per RD follow up.    Goals:   1. Enteral Nutrition initiation within 24 hours.    Nutrition Goal Status: new    Communication of RD Recs:  (Plan of care;Sticky Note)    Assessment and Plan    Nutrition Problem  Inadequate oral intake     Related to (etiology):   Decreased ability to consume sufficient energy     Signs and Symptoms (as evidenced by):   NPO, intubated     Interventions/Recommendations (treatment strategy):  Enteral Nutrition Initiation   Collaboration with Medical Providers   Weekly Weights     Nutrition Diagnosis Status:   New         Malnutrition Assessment   Patient does not meet criteria for malnutrition dx. Will continue to monitor.         Reason for Assessment    Reason For Assessment: consult  Diagnosis:  (cardiac arrest)  Relevant Medical History: COPD, HLD, IV drug abuse, gerd, cancer, hep c  Interdisciplinary Rounds: attended  General Information Comments:     6/20:RD consulted for intubation. Patient is NPO and intubated. Patient is receiving propofol. Labs have been reviewed. Patient has 4+ severe edema to legs, ankles, and feet. Patients last BM 6/19. Patients weight per past encounters and since admission have been stable. Will continue to monitor.  Nutrition Discharge Planning: pending medical course    Nutrition Risk Screen    Nutrition Risk Screen: tube feeding or parenteral nutrition    Nutrition/Diet History    Patient Reported Diet/Restrictions/Preferences:  (unknown)  Spiritual, Cultural Beliefs, Anglican  "Practices, Values that Affect Care: no  Food Allergies: NKFA  Factors Affecting Nutritional Intake: NPO, on mechanical ventilation    Anthropometrics    Temp: 98.1 °F (36.7 °C)  Height Method: Estimated  Height: 5' 5" (165.1 cm)  Height (inches): 65 in  Weight Method: Bed Scale  Weight: 87.3 kg (192 lb 7.4 oz)  Weight (lb): 192.46 lb  Ideal Body Weight (IBW), Female: 125 lb  % Ideal Body Weight, Female (lb): 153.97 %  BMI (Calculated): 32  BMI Grade: 30 - 34.9- obesity - grade I       Lab/Procedures/Meds  BMP  Lab Results   Component Value Date     06/20/2022    K 3.6 06/20/2022     06/20/2022    CO2 21 (L) 06/20/2022    BUN 22 (H) 06/20/2022    CREATININE 1.4 06/20/2022    CALCIUM 8.7 06/20/2022    ANIONGAP 14 06/20/2022    ESTGFRAFRICA 55 (A) 06/20/2022    EGFRNONAA 47 (A) 06/20/2022     ,[place]  Lab Results   Component Value Date    CALCIUM 8.7 06/20/2022    PHOS 5.4 (H) 05/21/2022     Lab Results   Component Value Date    LABPROT 11.8 06/19/2022    ALBUMIN 1.9 (L) 06/20/2022     Pertinent Labs Reviewed: reviewed  Pertinent Medications Reviewed: reviewed  Scheduled Meds:   heparin (porcine)  5,000 Units Subcutaneous Q8H    magnesium sulfate IVPB  2 g Intravenous Q2H    mupirocin   Nasal BID    oxacillin 12 g in  mL CONTINUOUS INFUSION  12 g Intravenous Q24H    pantoprazole  40 mg Per OG tube Daily     Continuous Infusions:   sodium chloride 0.9% 125 mL/hr at 06/20/22 0800    sodium chloride 0.9% Stopped (06/20/22 0350)    fentanyl 50 mcg/hr (06/20/22 0919)    propofoL 40 mcg/kg/min (06/20/22 0948)     PRN Meds:.sodium chloride, albuterol-ipratropium, ondansetron    Physical Findings/Assessment         Estimated/Assessed Needs    Weight Used For Calorie Calculations: 87.3 kg (192 lb 7.4 oz)  Energy Calorie Requirements (kcal): 960-1222 (11-14kcal/kg, permissive underfeeding, BMI >30)  Energy Need Method: Kcal/kg  Protein Requirements: 45-56 (0.8-1.0g/kg, renal labs)  Weight Used For " Protein Calculations: 56.8 kg (125 lb 3.5 oz) (IBW)  Fluid Requirements (mL): 960-1222  Estimated Fluid Requirement Method: RDA Method  RDA Method (mL): 960         Nutrition Prescription Ordered    Current Diet Order: NPO    Evaluation of Received Nutrient/Fluid Intake    Lipid Calories (kcals): 557 kcals  % Kcal Needs: 45%  % Protein Needs: 0%  Energy Calories Required: not meeting needs  Protein Required: not meeting needs  Fluid Required: not meeting needs  Tolerance:  (Patient NPO)  % Intake of Estimated Energy Needs: 25 - 50 %  % Meal Intake: NPO    Nutrition Risk    Level of Risk/Frequency of Follow-up: moderate - high       Monitor and Evaluation    Food and Nutrient Intake: energy intake, enteral nutrition intake  Food and Nutrient Adminstration: enteral and parenteral nutrition administration  Anthropometric Measurements: weight, weight change, body mass index  Biochemical Data, Medical Tests and Procedures: electrolyte and renal panel, gastrointestinal profile, glucose/endocrine profile, inflammatory profile, lipid profile  Nutrition-Focused Physical Findings: overall appearance       Nutrition Follow-Up    RD Follow-up?: Yes   Kailey Rice RD,LILIANAN

## 2022-06-20 NOTE — ASSESSMENT & PLAN NOTE
-IV drug use at LTAC  -BP stable at this time, not requiring pressors  -consult pulmonary for vent management    Doing better, on Vent, hemodynamically stable

## 2022-06-20 NOTE — PLAN OF CARE
Recommendation/Intervention: 6/20  1. When medically appropriate, initiate Peptamen Intense VHP:   -Goal rate @ 25mL/hr.   -Provides 600 calories, 55g protein, and 504mL H2O.   -Propofol provides 557 calories @ rate of 21.1.   -100mL H2O flush q 4-6 hours or per MD/NP.   -Check Mg, K+, Na, Phos, and Glucose before and during initiation;Correct as indicated.      2. Weekly weights per RD follow up.    Kailey Rice RD,LDN

## 2022-06-20 NOTE — ASSESSMENT & PLAN NOTE
Improving with IVF resuscitation and supportive care  Continue IVF for now  Monitor creatinine  Avoid nephrotoxins  Strict I&O

## 2022-06-20 NOTE — ASSESSMENT & PLAN NOTE
MSSA bacteremia treatment in place from prior admit  ID consult; discharge plan was for oxacillin continuous to complete on 7/11/2022  Repeat blood cultures pending  Continue Oxacillin

## 2022-06-20 NOTE — PROGRESS NOTES
[facility] - Intensive Care ([facility])  [facility] Medicine  Progress Note    Patient Name: Tianna Leon  MRN: [id number]  Patient Class: IP- Inpatient   Admission Date: 6/19/2022  Length of Stay: 1 days  Attending Physician: Aj Olguin MD  Primary Care Provider: Spenser Campa MD        Subjective:     Principal Problem:Cardiac arrest        HPI:  Ms. Leon is a 37 year old  female with long standing history of IV drug abuse, was recently discharged from this facility on 6/10/22 after a 35 day hospital stay. During previous admission she was found to have MSSA Tricuspid Endocarditis, treated on oxacillin (to be continued until 7/10/22). Also had right empyema, s/p VATS and chest tube. She was COVID positive during that admission. She was discharged to [facility] on 6/10. Today she was found unresponsive after she apparently injected an unknown substance into her left PICC line at the facility. She coded, received one round of epi prior to ROSC. Intubated prior to arrival. WBC 44,000, bands 19%. Lactic acid 4.0  Cr 1.8 CO2 14. CXR multifocal infiltrates. BP stable. On propofol. Received IV Zosyn.     Cardiac Arrest. Continued IV drug abuse. Tricuspid endocarditis. MSSA bacteremia. Right Empyema.      Overview/Hospital Course:  37 F WF, Hx of IVDA, MDD, bipolar disorder, anxiety, PTSD, suicidal ideation, COPD, ADHD, asthma, cervical cancer, GERD, Hep C, HLD who was d/uri from this hosp on 6/19 following 35 day admission during which she was treated for MSSA tricuspid endocarditis and right empyema, s/p VATS/decortication. She was also COVID + during admission. On 6/10, she was d/c to [facility] and continued on oxacillin (to continue through 7/10/22) via PICC line. Yesterday, she was found unresponsive in cardiac arrest at [facility]. She was resuscitated after 1 round of Epi and arrived intubated. Report given to ED was that patient injected an unknown substance into her PICC line at [facility].   In ED, Lactate 4.0,  ABG 7.215/47.8/75/19.3, WBC 44.18, Hgb 8.4, Cr 1.8, AST 53, , troponin 0.184, Tox positive for opiates and amphetamine. CXR w/multifocal infiltrates, CT head w/no acute finding. Given Vancomycin and Cefepime and placed on sedation for mechanical ventilation. Admitted to ICU for Cardiac Arrest. Continued IVDA. Tricuspid endocarditis. MSSA bacteremia. Right Empyema s/p VATS.    6/20- Remains intubated and sedated on mechanical vent. Failed brief SAT/SBT with tachycardia, tachypnea. WBC down to 16, H/H dropped to 6.6/21- got  a unit of blood, H/H improved to 7.4/23. Bun/Cr improved 22/1.4. Troponin coming down- likely demand ischemia from Cardiac Arrest as a result of IV drug.          Interval History: Remains intubated and sedated on mechanical vent. Failed brief SAT/SBT with tachycardia, tachypnea. WBC down to 16, H/H dropped to 6.6/21- got  a unit of blood, H/H improved to 7.4/23. Bun/Cr improved 22/1.4. Troponin coming down- likely demand ischemia from Cardiac Arrest as a result of IV drug.      Review of Systems   Unable to perform ROS: Intubated   Objective:     Vital Signs (Most Recent):  Temp: 98.2 °F (36.8 °C) (06/20/22 1200)  Pulse: 95 (06/20/22 1415)  Resp: (!) 22 (06/20/22 1415)  BP: (!) 167/110 (06/20/22 1415)  SpO2: 100 % (06/20/22 1415)   Vital Signs (24h Range):  Temp:  [97.9 °F (36.6 °C)-99.9 °F (37.7 °C)] 98.2 °F (36.8 °C)  Pulse:  [] 95  Resp:  [18-40] 22  SpO2:  [92 %-100 %] 100 %  BP: (108-219)/() 167/110     Weight: 87.3 kg (192 lb 7.4 oz)  Body mass index is 32.03 kg/m².    Intake/Output Summary (Last 24 hours) at 6/20/2022 1453  Last data filed at 6/20/2022 1400  Gross per 24 hour   Intake 6634.65 ml   Output 3225 ml   Net 3409.65 ml      Physical Exam  Vitals and nursing note reviewed.   Constitutional:       Appearance: She is ill-appearing and toxic-appearing.      Interventions: She is intubated.   HENT:      Head: Normocephalic and atraumatic.      Mouth/Throat:       Mouth: Mucous membranes are dry.      Dentition: Abnormal dentition.   Eyes:      General: No scleral icterus.     Conjunctiva/sclera: Conjunctivae normal.   Cardiovascular:      Rate and Rhythm: Regular rhythm. Tachycardia present.   Pulmonary:      Effort: She is intubated.      Comments: Coarse breath sounds  Abdominal:      General: There is no distension.      Palpations: Abdomen is soft.   Musculoskeletal:         General: Swelling present.   Skin:     Coloration: Skin is not jaundiced.      Comments: Numerous track marks. Numerous tattoes.    Neurological:      Comments: Sedated on propofol        Vent Mode: A/C  Oxygen Concentration (%):  [30-60] 30  Resp Rate Total:  [18 br/min-39 br/min] 22 br/min  Vt Set:  [420 mL-480 mL] 420 mL  PEEP/CPAP:  [5 cmH20] 5 cmH20  Pressure Support:  [0 cmH20] 0 cmH20  Mean Airway Pressure:  [9.6 crU73-32 cmH20] 20 cmH20  Temp:  [97.9 °F (36.6 °C)-99.9 °F (37.7 °C)] 98.2 °F (36.8 °C)  Pulse:  [] 95  Resp:  [18-40] 22  SpO2:  [92 %-100 %] 100 %  BP: (108-219)/() 167/110   Art pH/pCO2/pO2/HCO3:  7.441/34.9/96/23.8 (06/20 0410)  Lab Results   Component Value Date    CVF26GHOQUBL Negative 06/19/2022    [id number] Positive (A) 05/31/2022    TKF28RQOOODV Negative 05/06/2022      Recent Labs   Lab 06/19/22 1953 06/19/22 2010 06/19/22 2010 06/19/22  2104 06/19/22  2321 06/20/22  0512 06/20/22  1244   LACTATE 4.0*  --   --   --  1.6 0.7  --    TROPONINI  --   --   --  0.184*  --  0.126*  --    NA  --  141  --   --   --  145  --    WBC  --  44.18*  --   --   --  16.49*  --    GRAN  --  67.0  --   --   --  76.0*  12.6*  --    LYMPH  --  7.0*  CANCELED  --   --   --  15.8*  2.6  --    HGB  --  8.4*   < >  --   --  6.6* 7.4*   HCT  --  26.7*   < >  --   --  21.1* 22.8*   BUN  --  23*  --   --   --  22*  --    CREATININE  --  1.8*  --   --   --  1.4  --    ESTGFRAFRICA  --  40*  --   --   --  55*  --    EGFRNONAA  --  35*  --   --   --  47*  --    K  --  4.6  --   --    --  3.6  --    CL  --  110  --   --   --  110  --    CO2  --  14*  --   --   --  21*  --    MG  --   --   --   --   --  1.6  --     < > = values in this interval not displayed.     Microbiology Results (last 7 days)       Procedure Component Value Units Date/Time    Culture, Respiratory with Gram Stain [[id number]] Collected: 06/20/22 1350    Order Status: Sent Specimen: Respiratory from Endotracheal Aspirate Updated: 06/20/22 1350    Blood culture x two cultures. Draw prior to antibiotics. [[id number]] Collected: 06/19/22 2105    Order Status: Completed Specimen: Blood from Peripheral, Wrist, Right Updated: 06/20/22 1315     Blood Culture, Routine No Growth to date    Narrative:      Aerobic and anaerobic    Blood culture x two cultures. Draw prior to antibiotics. [[id number]] Collected: 06/19/22 2030    Order Status: Completed Specimen: Blood from Peripheral, Antecubital, Right Updated: 06/20/22 1315     Blood Culture, Routine No Growth to date    Narrative:      Aerobic and anaerobic          Antibiotics (From admission, onward)                Start     Stop Route Frequency Ordered    06/20/22 0900  mupirocin 2 % ointment  (DECOLONIZATION PROTOCOL ORDERS)         06/25 0859 Nasl 2 times daily 06/20/22 0750    06/20/22 0900  oxacillin 12 g in  mL CONTINUOUS INFUSION         -- IV Every 24 hours (non-standard times) 06/20/22 0752          Anticoagulants       Ordered     Route Frequency Start Stop    06/19/22 2233  heparin (porcine)         SubQ Every 8 hours 06/19/22 2245 --          CT Head Without Contrast  Narrative: EXAMINATION:  CT HEAD WITHOUT CONTRAST    CLINICAL HISTORY:  Mental status change, unknown cause;    TECHNIQUE:  Low dose axial CT images obtained throughout the head without intravenous contrast. Sagittal and coronal reconstructions were performed.    COMPARISON:  Multiple priors.    FINDINGS:  Intracranial compartment:    Ventricles and sulci are normal in size for age without evidence of  hydrocephalus. No extra-axial blood or fluid collections.    Gray-white differentiation is fairly well preserved.  No parenchymal mass, hemorrhage, edema or major vascular distribution infarct.    Skull/extracranial contents (limited evaluation): No fracture. Mastoid air cells and paranasal sinuses are essentially clear.  Impression: No definite acute intracranial CT abnormality.  Clinical correlation and further evaluation as warranted.    All CT scans at this facility are performed  using dose modulation techniques as appropriate to performed exam including the following:  automated exposure control; adjustment of mA and/or kV according to the patients size (this includes techniques or standardized protocols for targeted exams where dose is matched to indication/reason for exam: i.e. extremities or head);  iterative reconstruction technique.    Electronically signed by: Demario Morin  Date:    06/19/2022  Time:    22:59  X-Ray Chest 1 View  Narrative: EXAMINATION:  XR CHEST 1 VIEW    CLINICAL HISTORY:  Encounter for fitting and adjustment of other gastrointestinal appliance and device    TECHNIQUE:  Single frontal view of the chest was performed.    COMPARISON:  None    FINDINGS:  Endotracheal tube tip in improved position, 3.5 cm from the august.  Enteric tube tip and side hole overlie the stomach in good position.  Left PICC line tip in good position.    Lungs unchanged.    The cardiac silhouette is normal in size. The hilar and mediastinal contours are unremarkable.    Bones are intact.  Impression: As above.    Electronically signed by: Demario Morin  Date:    06/19/2022  Time:    20:58  X-Ray Chest 1 View  Narrative: EXAMINATION:  XR CHEST 1 VIEW    CLINICAL HISTORY:  Presence of other specified devices    TECHNIQUE:  Single frontal view of the chest was performed.    COMPARISON:  Multiple priors.    FINDINGS:  Endo tracheal tube tip 2.5 cm from the august.  Consider retraction by 1 cm.  Enteric tube tip and  side hole below the diaphragm.    Right greater than left pulmonary opacities concerning for infection.  Septic emboli could be considered.    The cardiac silhouette is normal in size. The hilar and mediastinal contours are unremarkable.    Bones are intact.  Impression: As above.    This report was flagged in Epic as abnormal.    Electronically signed by: Demario Morin  Date:    06/19/2022  Time:    19:33   Significant Labs: All pertinent labs within the past 24 hours have been reviewed.    Significant Imaging: I have reviewed all pertinent imaging results/findings within the past 24 hours.      Assessment/Plan:      * Cardiac arrest  -IV drug use at LTAC  -BP stable at this time, not requiring pressors  -consult pulmonary for vent management    Doing better, on Vent, hemodynamically stable    Acute respiratory failure with hypoxia and hypercapnia on Vent s/p Cardiac Arrest  Patient with Hypercapnic Respiratory failure which is Acute.  she is not on home oxygen. Supplemental oxygen was provided and noted- Vent Mode: A/C  Oxygen Concentration (%):  [30-60] 30  Resp Rate Total:  [18 br/min-39 br/min] 22 br/min  Vt Set:  [420 mL-480 mL] 420 mL  PEEP/CPAP:  [5 cmH20] 5 cmH20  Pressure Support:  [0 cmH20] 0 cmH20  Mean Airway Pressure:  [9.6 cwQ45-61 cmH20] 20 cmH20.   Signs/symptoms of respiratory failure include- on vent, AMS. Contributing diagnoses includes - Interstitial lung disease and Pneumonia Labs and images were reviewed. Patient Has recent ABG, which has been reviewed. Will treat underlying causes and adjust management of respiratory failure as follows- Vent support, nebs, IV Abx    6/20- continue vent support as she failed SAT/SBT this am    On mechanically assisted ventilation  See resp failure above      Normocytic anemia  H/H dropped to 6.6- Transfused 1 unit- now 7.4/23      BEE (acute kidney injury)  -IV fluids  -Labs in AM    improving      Endocarditis of tricuspid valve  -MSSA bacteremia during  previous admission  -Follow up on repeat blood cultures  -IV vanc and cefepime empirically for now  -ID consult    Cont Vanc/Cefepime      Empyema of right pleural space  -s/p VATS      MSSA bacteremia  -during previous admission, was supposed to be on oxacillin until 7/10/22  -repeat cultures pending  -ID consult    May resume Oxacillin soon, if blood Cx remain NGTD    IVDU (intravenous drug user)  -continued active IV drug use while at [facility]      Pulmonary embolism, septic         Borderline personality disorder         VTE Risk Mitigation (From admission, onward)         Ordered     heparin (porcine) injection 5,000 Units  Every 8 hours         06/19/22 2233     Place sequential compression device  Until discontinued         06/19/22 2233                Discharge Planning   YESSICA:      Code Status: Prior   Is the patient medically ready for discharge?:     Reason for patient still in hospital (select all that apply): Patient unstable, Patient trending condition, Laboratory test, Treatment, Imaging and Consult recommendations  Discharge Plan A: Other (patient remains critically ill at this time, pending clinical improvement for d/c planning.)        Seen and discussed with Dr. TONI San and the ICU team  Condition: Critical  Prognosis: Guarded to poor      Critical care time spent on the evaluation and treatment of severe organ dysfunction, review of pertinent labs and imaging studies, discussions with consulting providers and discussions with patient/family: 45 minutes.      Aj Olguin MD  Department of Hospital Medicine   [facility] - Intensive Care ([facility])

## 2022-06-20 NOTE — PLAN OF CARE
Pt intubated and sedated on ventilator. Normal sinus rhythm on cardiac monitor. O2 sats stable on 30% fiO2. Hemodynamically stable. Afebrile. Failed SBT twice today. OG in place unclamped on LIWS. Portillo in place with good urine output. Turned q2 using wedge. Heel boots on. SCDs on and active. All alarms active and audible. Emergency equipment remains at bedside. Mother updated at bedside this afternoon. Will continue to monitor.   Problem: Adult Inpatient Plan of Care  Goal: Plan of Care Review  Outcome: Ongoing, Progressing  Goal: Patient-Specific Goal (Individualized)  Outcome: Ongoing, Progressing  Goal: Absence of Hospital-Acquired Illness or Injury  Outcome: Ongoing, Progressing  Goal: Optimal Comfort and Wellbeing  Outcome: Ongoing, Progressing  Goal: Readiness for Transition of Care  Outcome: Ongoing, Progressing     Problem: Fall Injury Risk  Goal: Absence of Fall and Fall-Related Injury  Outcome: Ongoing, Progressing     Problem: Restraint, Nonbehavioral (Nonviolent)  Goal: Absence of Harm or Injury  Outcome: Ongoing, Progressing     Problem: Adjustment to Illness (Sepsis/Septic Shock)  Goal: Optimal Coping  Outcome: Ongoing, Progressing     Problem: Bleeding (Sepsis/Septic Shock)  Goal: Absence of Bleeding  Outcome: Ongoing, Progressing     Problem: Glycemic Control Impaired (Sepsis/Septic Shock)  Goal: Blood Glucose Level Within Desired Range  Outcome: Ongoing, Progressing     Problem: Infection Progression (Sepsis/Septic Shock)  Goal: Absence of Infection Signs and Symptoms  Outcome: Ongoing, Progressing     Problem: Nutrition Impaired (Sepsis/Septic Shock)  Goal: Optimal Nutrition Intake  Outcome: Ongoing, Progressing     Problem: Fluid and Electrolyte Imbalance (Acute Kidney Injury/Impairment)  Goal: Fluid and Electrolyte Balance  Outcome: Ongoing, Progressing     Problem: Oral Intake Inadequate (Acute Kidney Injury/Impairment)  Goal: Optimal Nutrition Intake  Outcome: Ongoing, Progressing     Problem:  Renal Function Impairment (Acute Kidney Injury/Impairment)  Goal: Effective Renal Function  Outcome: Ongoing, Progressing     Problem: Skin Injury Risk Increased  Goal: Skin Health and Integrity  Outcome: Ongoing, Progressing     Problem: Infection  Goal: Absence of Infection Signs and Symptoms  Outcome: Ongoing, Progressing     Problem: Communication Impairment (Mechanical Ventilation, Invasive)  Goal: Effective Communication  Outcome: Ongoing, Progressing     Problem: Device-Related Complication Risk (Mechanical Ventilation, Invasive)  Goal: Optimal Device Function  Outcome: Ongoing, Progressing     Problem: Inability to Wean (Mechanical Ventilation, Invasive)  Goal: Mechanical Ventilation Liberation  Outcome: Ongoing, Progressing     Problem: Nutrition Impairment (Mechanical Ventilation, Invasive)  Goal: Optimal Nutrition Delivery  Outcome: Ongoing, Progressing     Problem: Skin and Tissue Injury (Mechanical Ventilation, Invasive)  Goal: Absence of Device-Related Skin and Tissue Injury  Outcome: Ongoing, Progressing     Problem: Ventilator-Induced Lung Injury (Mechanical Ventilation, Invasive)  Goal: Absence of Ventilator-Induced Lung Injury  Outcome: Ongoing, Progressing     Problem: Communication Impairment (Artificial Airway)  Goal: Effective Communication  Outcome: Ongoing, Progressing     Problem: Device-Related Complication Risk (Artificial Airway)  Goal: Optimal Device Function  Outcome: Ongoing, Progressing     Problem: Skin and Tissue Injury (Artificial Airway)  Goal: Absence of Device-Related Skin or Tissue Injury  Outcome: Ongoing, Progressing     Problem: Coping Ineffective  Goal: Effective Coping  Outcome: Ongoing, Progressing

## 2022-06-20 NOTE — HPI
37 F w/pmh significant for IV drug abuse, MDD, bipolar disorder, anxiety, PTSD, suicidal ideation, COPD, ADHD, anxiety, asthma, cervical cancer, GERD, Hep C, HLD.   She was d/c from OSH on 6/19 following 35 day admission during which she was treated for MSSA tricuspid endocarditis and right empyema, s/p VATS/decortication. She was also COVID + during admission. On 6/10, she was d/c to [facility] and continued on oxacillin (to continue through 7/10/22) via PICC line.     Yesterday, she was found unresponsive in cardiac arrest at LTAC. She was resuscitated after 1 round of Epi and arrived intubated. Report given to ED was that patient injected an unknown substance into her PICC line at [facility].   In ED, labs significant for lactate of 4.0, ABG pH 7.215, PCO2 47.8, PO2 75, HCO3 19.3, WBC 44.18, Hgb 8.4, Cr 1.8, AST 53, , troponin 0.184, tox positive for opiates and amphetamine.   CXR w/multifocal infiltrates, CT head w/no acute finding.   Given vancomycin and cefepime and placed on sedation for mechanical ventilation.

## 2022-06-20 NOTE — ASSESSMENT & PLAN NOTE
-during previous admission, was supposed to be on oxacillin until 7/10/22  -repeat cultures pending  -ID consult

## 2022-06-20 NOTE — SUBJECTIVE & OBJECTIVE
Past Medical History:   Diagnosis Date    ADHD (attention deficit hyperactivity disorder)     Anxiety     Asthma     Bipolar disorder     Cancer     cervical    COPD (chronic obstructive pulmonary disease)     GERD (gastroesophageal reflux disease)     Hepatitis C antibody positive in blood     RNA UNDETECTED 2016    Hyperlipidemia     Intravenous drug abuse     MDD (major depressive disorder)     Mood disorder     PTSD (post-traumatic stress disorder)     Suicidal ideation        Past Surgical History:   Procedure Laterality Date     SECTION  2001    x2    CHOLECYSTECTOMY      COMPUTED TOMOGRAPHY N/A 2022    Procedure: CT (COMPUTED TOMOGRAPHY)/facet joint aspiration;  Surgeon: Lloyd Suarez MD;  Location: [facility];  Service: General;  Laterality: N/A;    COMPUTED TOMOGRAPHY N/A 2022    Procedure: CT (COMPUTED TOMOGRAPHY)/Chest tube insertion;  Surgeon: Lloyd Suarez MD;  Location: [facility];  Service: General;  Laterality: N/A;    HYSTERECTOMY      INJECTION OF ANESTHETIC AGENT AROUND MULTIPLE INTERCOSTAL NERVES Right 2022    Procedure: BLOCK, NERVE, INTERCOSTAL, 2 OR MORE;  Surgeon: Bobby Blandon MD;  Location: [facility];  Service: Cardiothoracic;  Laterality: Right;    LAPAROSCOPIC SALPINGECTOMY      ROBOT-ASSISTED LAPAROSCOPIC ABDOMINAL HYSTERECTOMY USING DA SEBASTIEN XI N/A 2019    Procedure: XI ROBOTIC HYSTERECTOMY;  Surgeon: Tabatha Quintanilla MD;  Location: [facility];  Service: OB/GYN;  Laterality: N/A;    ROBOT-ASSISTED LAPAROSCOPIC LYSIS OF ADHESIONS USING DA SEBASTIEN XI N/A 2019    Procedure: XI ROBOTIC LYSIS, ADHESIONS;  Surgeon: Tabatha Quintanilla MD;  Location: [facility];  Service: OB/GYN;  Laterality: N/A;    ROBOT-ASSISTED SURGICAL REMOVAL OF FALLOPIAN TUBE USING DA SEBASTIEN XI Right 2019    Procedure: XI ROBOTIC SALPINGECTOMY;  Surgeon: Tabatha Quintanilla MD;  Location: [facility];  Service: OB/GYN;  Laterality: Right;    TONSILLECTOMY      TUBAL  LIGATION      VIDEO-ASSISTED THORACOSCOPIC SURGERY (VATS) Right 6/6/2022    Procedure: VATS (VIDEO-ASSISTED THORACOSCOPIC SURGERY);  Surgeon: Bobby Blandon MD;  Location: [facility];  Service: Cardiothoracic;  Laterality: Right;  WITH EVACUATION OF EMPYEMA       Review of patient's allergies indicates:   Allergen Reactions    Sulfamethoxazole-trimethoprim        Family History       Problem Relation (Age of Onset)    COPD Mother          Tobacco Use    Smoking status: Current Every Day Smoker     Packs/day: 1.50     Types: Cigarettes    Smokeless tobacco: Never Used    Tobacco comment: no smoking after midnight prior to surgery   Substance and Sexual Activity    Alcohol use: Yes     Comment: Occassional     Drug use: Not Currently     Types: IV, Methamphetamines, Heroin     Comment: denies heroin use.  Last used meth 10/2018  No illegal drugs prior to sx    Sexual activity: Not Currently     Partners: Male     Birth control/protection: None         Review of Systems   Unable to perform ROS: Intubated   Objective:     Vital Signs (Most Recent):  Temp: 98.1 °F (36.7 °C) (06/20/22 0715)  Pulse: 68 (06/20/22 0730)  Resp: (!) 22 (06/20/22 0730)  BP: (!) 162/109 (06/20/22 0730)  SpO2: 100 % (06/20/22 0730)   Vital Signs (24h Range):  Temp:  [97.9 °F (36.6 °C)-99.9 °F (37.7 °C)] 98.1 °F (36.7 °C)  Pulse:  [] 68  Resp:  [18-40] 22  SpO2:  [92 %-100 %] 100 %  BP: (108-208)/() 162/109     Weight: 87.3 kg (192 lb 7.4 oz)  Body mass index is 32.03 kg/m².      Intake/Output Summary (Last 24 hours) at 6/20/2022 0746  Last data filed at 6/20/2022 0700  Gross per 24 hour   Intake 4507.49 ml   Output 2675 ml   Net 1832.49 ml      sodium chloride 0.9% 125 mL/hr at 06/20/22 0712    sodium chloride 0.9% Stopped (06/20/22 0350)    fentanyl 25 mcg/hr (06/20/22 0700)    propofoL 45 mcg/kg/min (06/20/22 0700)       Physical Exam  Vitals and nursing note reviewed.   Constitutional:       Appearance: She is obese. She is  ill-appearing and toxic-appearing.      Interventions: She is sedated, intubated and restrained.   HENT:      Mouth/Throat:      Mouth: Mucous membranes are moist.      Pharynx: Oropharynx is clear.   Eyes:      Pupils: Pupils are equal, round, and reactive to light.   Neck:      Vascular: No JVD.   Cardiovascular:      Rate and Rhythm: Normal rate and regular rhythm.      Pulses:           Radial pulses are 2+ on the right side and 2+ on the left side.        Dorsalis pedis pulses are 2+ on the right side and 2+ on the left side.   Pulmonary:      Effort: She is intubated.      Comments: Breath sounds coarse  Abdominal:      General: Bowel sounds are decreased.      Palpations: Abdomen is soft.   Musculoskeletal:      Right lower leg: No edema.      Left lower leg: No edema.   Skin:     General: Skin is warm and dry.      Capillary Refill: Capillary refill takes less than 2 seconds.   Neurological:      GCS: GCS eye subscore is 1. GCS verbal subscore is 1. GCS motor subscore is 5.       Vents:  Vent Mode: A/C (06/20/22 0715)  Ventilator Initiated: Yes (06/19/22 1915)  Set Rate: 18 BPM (06/20/22 0715)  Vt Set: 480 mL (06/20/22 0715)  Pressure Support: 0 cmH20 (06/20/22 0715)  PEEP/CPAP: 5 cmH20 (06/20/22 0715)  Oxygen Concentration (%): 30 (06/20/22 0730)  Peak Airway Pressure: 28 cmH2O (06/20/22 0715)  Plateau Pressure: 22 cmH20 (06/20/22 0715)  Total Ve: 8.8 mL (06/20/22 0715)  F/VT Ratio<105 (RSBI): (!) 36.73 (06/20/22 0715)    Lines/Drains/Airways       Peripherally Inserted Central Catheter Line  Duration             PICC Double Lumen -- days              Drain  Duration                  NG/OG Tube 06/19/22 1916 [place] sump 14 Fr. Center mouth <1 day         Urethral Catheter 06/19/22 2039 Latex 16 Fr. <1 day              Airway  Duration                  Airway - Non-Surgical 06/19/22 2306 <1 day              Peripheral Intravenous Line  Duration                  External Jugular IV 06/19/22 1910 <1 day          Peripheral IV - Single Lumen 06/19/22 1921 20 G Right Shoulder <1 day         Peripheral IV - Single Lumen 06/19/22 2121 20 G Right Wrist <1 day                    Significant Labs:    CBC/Anemia Profile:  Recent Labs   Lab 06/19/22 2010 06/20/22  0512   WBC 44.18* 16.49*   HGB 8.4* 6.6*   HCT 26.7* 21.1*   * 323   MCV 88 85   RDW 16.0* 15.9*        Chemistries:  Recent Labs   Lab 06/19/22 2010 06/20/22  0512    145   K 4.6 3.6    110   CO2 14* 21*   BUN 23* 22*   CREATININE 1.8* 1.4   CALCIUM 8.3* 8.7   ALBUMIN 2.0* 1.9*   PROT 6.7 6.4   BILITOT 0.2 0.2   ALKPHOS 135 112   ALT 37 34   AST 53* 37   MG  --  1.6       All pertinent labs within the past 24 hours have been reviewed.    Significant Imaging:   I have reviewed all pertinent imaging results/findings within the past 24 hours.

## 2022-06-20 NOTE — ASSESSMENT & PLAN NOTE
IV drug abuse continues in LTAC; direct etiology of cardiac arrest  Continue to encourage cessation efforts; grim survival prognosis with continued IV drug use with current health problems

## 2022-06-20 NOTE — SUBJECTIVE & OBJECTIVE
Past Medical History:   Diagnosis Date    ADHD (attention deficit hyperactivity disorder)     Anxiety     Asthma     Bipolar disorder     Cancer     cervical    COPD (chronic obstructive pulmonary disease)     GERD (gastroesophageal reflux disease)     Hepatitis C antibody positive in blood     RNA UNDETECTED 2016    Hyperlipidemia     Intravenous drug abuse     MDD (major depressive disorder)     Mood disorder     PTSD (post-traumatic stress disorder)     Suicidal ideation        Past Surgical History:   Procedure Laterality Date     SECTION  2001    x2    CHOLECYSTECTOMY      COMPUTED TOMOGRAPHY N/A 2022    Procedure: CT (COMPUTED TOMOGRAPHY)/facet joint aspiration;  Surgeon: Lloyd Suarez MD;  Location: [facility];  Service: General;  Laterality: N/A;    COMPUTED TOMOGRAPHY N/A 2022    Procedure: CT (COMPUTED TOMOGRAPHY)/Chest tube insertion;  Surgeon: Lloyd Suarez MD;  Location: [facility];  Service: General;  Laterality: N/A;    HYSTERECTOMY      INJECTION OF ANESTHETIC AGENT AROUND MULTIPLE INTERCOSTAL NERVES Right 2022    Procedure: BLOCK, NERVE, INTERCOSTAL, 2 OR MORE;  Surgeon: Bobby Blandon MD;  Location: [facility];  Service: Cardiothoracic;  Laterality: Right;    LAPAROSCOPIC SALPINGECTOMY      ROBOT-ASSISTED LAPAROSCOPIC ABDOMINAL HYSTERECTOMY USING DA SEBASTIEN XI N/A 2019    Procedure: XI ROBOTIC HYSTERECTOMY;  Surgeon: Tabatha Quintanilla MD;  Location: [facility];  Service: OB/GYN;  Laterality: N/A;    ROBOT-ASSISTED LAPAROSCOPIC LYSIS OF ADHESIONS USING DA SEBASTIEN XI N/A 2019    Procedure: XI ROBOTIC LYSIS, ADHESIONS;  Surgeon: Tabatha Quintanilla MD;  Location: [facility];  Service: OB/GYN;  Laterality: N/A;    ROBOT-ASSISTED SURGICAL REMOVAL OF FALLOPIAN TUBE USING DA SEBASTIEN XI Right 2019    Procedure: XI ROBOTIC SALPINGECTOMY;  Surgeon: Tabatha Quintanilla MD;  Location: [facility];  Service: OB/GYN;  Laterality: Right;    TONSILLECTOMY      TUBAL  LIGATION      VIDEO-ASSISTED THORACOSCOPIC SURGERY (VATS) Right 6/6/2022    Procedure: VATS (VIDEO-ASSISTED THORACOSCOPIC SURGERY);  Surgeon: Bobby Blandon MD;  Location: [facility];  Service: Cardiothoracic;  Laterality: Right;  WITH EVACUATION OF EMPYEMA       Review of patient's allergies indicates:   Allergen Reactions    Sulfamethoxazole-trimethoprim        No current facility-administered medications on file prior to encounter.     Current Outpatient Medications on File Prior to Encounter   Medication Sig    acetaminophen (TYLENOL) 500 MG tablet Take 500 mg by mouth every 6 (six) hours as needed for Pain.    albuterol (PROVENTIL/VENTOLIN HFA) 90 mcg/actuation inhaler Inhale 2 puffs into the lungs every 6 (six) hours as needed for Wheezing.    ascorbic acid, vitamin C, (VITAMIN C) 500 MG tablet Take 1 tablet (500 mg total) by mouth once daily. for 10 days    dextromethorphan-guaiFENesin (MUCINEX DM) 60-1,200 mg per 12 hr tablet Take 1 tablet by mouth every 12 (twelve) hours.    furosemide (LASIX) 40 MG tablet Take 40 mg po BID x 7 days then after 40 mg po daily    ibuprofen (ADVIL,MOTRIN) 200 MG tablet Take 200 mg by mouth every 6 (six) hours as needed for Pain.    LORazepam (ATIVAN) 1 MG tablet Take 1 tablet (1 mg total) by mouth every 6 (six) hours as needed for Anxiety.    morphine (MS CONTIN) 15 MG 12 hr tablet Take 1 tablet (15 mg total) by mouth every 12 (twelve) hours.    multivitamin Tab Take 1 tablet by mouth once daily.    oxyCODONE (ROXICODONE) 5 MG immediate release tablet Take 1 tablet (5 mg total) by mouth every 6 (six) hours as needed for Pain.    polyethylene glycol (GLYCOLAX) 17 gram PwPk Take 17 g by mouth once daily.    sodium chloride 0.9% SolP 500 mL with oxacillin 1 gram SolR 12 g Inject 12 g into the vein once daily.    sodium chloride 0.9% SolP 500 mL with oxacillin 1 gram SolR 12 g Inject 12 g into the vein continuous.    traZODone (DESYREL) 100 MG tablet Take 1 tablet (100 mg total) by  mouth every evening.     Family History       Problem Relation (Age of Onset)    COPD Mother          Tobacco Use    Smoking status: Current Every Day Smoker     Packs/day: 1.50     Types: Cigarettes    Smokeless tobacco: Never Used    Tobacco comment: no smoking after midnight prior to surgery   Substance and Sexual Activity    Alcohol use: Yes     Comment: Occassional     Drug use: Not Currently     Types: IV, Methamphetamines, Heroin     Comment: denies heroin use.  Last used meth 10/2018  No illegal drugs prior to sx    Sexual activity: Not Currently     Partners: Male     Birth control/protection: None     Review of Systems   Unable to perform ROS: Intubated   Objective:     Vital Signs (Most Recent):  Temp: 98.8 °F (37.1 °C) (06/19/22 2132)  Pulse: 91 (06/19/22 2200)  Resp: (!) 23 (06/19/22 2200)  BP: 119/80 (06/19/22 2200)  SpO2: 100 % (06/19/22 2200)   Vital Signs (24h Range):  Temp:  [98.8 °F (37.1 °C)-99.9 °F (37.7 °C)] 98.8 °F (37.1 °C)  Pulse:  [] 91  Resp:  [23-40] 23  SpO2:  [92 %-100 %] 100 %  BP: (119-165)/(77-98) 119/80     Weight: 88 kg (194 lb)  Body mass index is 32.28 kg/m².    Physical Exam  Vitals and nursing note reviewed.   Constitutional:       Appearance: She is ill-appearing and toxic-appearing.      Interventions: She is intubated.   HENT:      Head: Normocephalic and atraumatic.      Mouth/Throat:      Mouth: Mucous membranes are dry.      Dentition: Abnormal dentition.   Eyes:      General: No scleral icterus.     Conjunctiva/sclera: Conjunctivae normal.   Cardiovascular:      Rate and Rhythm: Regular rhythm. Tachycardia present.   Pulmonary:      Effort: She is intubated.      Comments: Coarse breath sounds  Abdominal:      General: There is no distension.      Palpations: Abdomen is soft.   Musculoskeletal:         General: Swelling present.   Skin:     Coloration: Skin is not jaundiced.      Comments: Numerous track marks. Numerous tattoes.    Neurological:      Comments:  Sedated on propofol           Significant Labs: All pertinent labs within the past 24 hours have been reviewed.  Recent Lab Results  (Last 5 results in the past 24 hours)        06/19/22 2138 06/19/22 2104 06/19/22 2039 06/19/22 2010 06/19/22  1953        Benzodiazepines     Negative           Methadone metabolites     Negative           Phencyclidine     Negative           Albumin       2.0         Alkaline Phosphatase       135         ALT       37         Amphetamine Screen, Ur     Presumptive Positive           Anion Gap       17         Aniso       Moderate         Appearance, UA     Clear           AST       53         Bacteria, UA     Rare           Bands       19.0         Barbiturate Screen, Ur     Negative           Baso #       CANCELED  Comment: Result canceled by the ancillary.         Basophil %       0.0         Bilirubin (UA)     Negative           BILIRUBIN TOTAL       0.2  Comment: For infants and newborns, interpretation of results should be based  on gestational age, weight and in agreement with clinical  observations.    Premature Infant recommended reference ranges:  Up to 24 hours.............<8.0 mg/dL  Up to 48 hours............<12.0 mg/dL  3-5 days..................<15.0 mg/dL  6-29 days.................<15.0 mg/dL           BNP       285  Comment: Values of less than 100 pg/ml are consistent with non-CHF populations.         BUN       23         Calcium       8.3         Chloride       110         CO2       14         Cocaine (Metab.)     Negative           Color, UA     Yellow           Creatinine       1.8         Creatinine, Urine     34.8           Differential Method       Manual         eGFR if        40         eGFR if non        35  Comment: Calculation used to obtain the estimated glomerular filtration  rate (eGFR) is the CKD-EPI equation.            Eos #       CANCELED  Comment: Result canceled by the ancillary.         Eosinophil %        0.0         Glucose       165         Glucose, UA     Trace           Gran %       67.0         Granular Casts, UA     5           Hematocrit       26.7         Hemoglobin       8.4         Hyaline Casts, UA     0           Immature Grans (Abs)       CANCELED  Comment: Mild elevation in immature granulocytes is non specific and   can be seen in a variety of conditions including stress response,   acute inflammation, trauma and pregnancy. Correlation with other   laboratory and clinical findings is essential.    Result canceled by the ancillary.           Immature Granulocytes       CANCELED  Comment: Result canceled by the ancillary.         INR   1.1  Comment: Coumadin Therapy:  2.0 - 3.0 for INR for all indicators except mechanical heart valves  and antiphospholipid syndromes which should use 2.5 - 3.5.               Ketones, UA     Negative           Lactate, Yoel         4.0  Comment: Falsely low lactic acid results can be found in samples   containing >=13.0 mg/dL total bilirubin and/or >=3.5 mg/dL   direct bilirubin.  la critical result(s) called and verbal readback obtained from judy dunaway by TC3 06/19/2022 20:18         Leukocytes, UA     Negative           Lymph #       CANCELED  Comment: Result canceled by the ancillary.         Lymph %       7.0         MCH       27.7         MCHC       31.5         MCV       88         Metamyelocytes       2.0         Microscopic Comment     SEE COMMENT  Comment: Other formed elements not mentioned in the report are not   present in the microscopic examination.              Mono #       CANCELED  Comment: Result canceled by the ancillary.         Mono %       1.0         MPV       8.6         Myelocytes       4.0         NITRITE UA     Negative           nRBC       0         Occult Blood UA     3+           Opiate Scrn, Ur     Presumptive Positive           Ovalocytes       Occasional         pH, UA     6.0           Platelet Estimate       Clumped         Platelets        468         Poikilocytosis       Slight         Potassium       4.6         PROTEIN TOTAL       6.7         Protein, UA     2+  Comment: Recommend a 24 hour urine protein or a urine   protein/creatinine ratio if globulin induced proteinuria is  clinically suspected.             Protime   11.8              Acceptable Yes               RBC       3.03         RBC, UA     30           RDW       16.0         SARS-CoV-2 RNA, Amplification, Qual Negative               Schistocytes       Present         Sodium       141         Specific Gravity, UA     >=1.030           Specimen UA     Urine, Catheterized           Teardrop Cells       Occasional         Marijuana (THC) Metabolite     Negative           Toxicology Information     SEE COMMENT  Comment: This screen includes the following classes of drugs at the listed   cut-off:    Benzodiazepines 200 ng/ml  Methadone 300 ng/ml  Cocaine metabolite 300 ng/ml  Opiates 300 ng/ml  Barbiturates 200 ng/ml  Amphetamines 1000 ng/ml  Marijuana metabs (THC) 50 ng/ml  Phencyclidine (PCP) 25 ng/ml    This is a screening test. If results do not correlate with clinical   presentation, then a confirmatory send out test is advised.     This report is intended for use in clinical monitoring and management   of   patients. It is not intended for use in employment related drug   testing.             Troponin I   0.184  Comment: The reference interval for Troponin I represents the 99th percentile   cutoff   for our facility and is consistent with 3rd generation assay   performance.               UROBILINOGEN UA     Negative           WBC, UA     6           WBC       44.18                                Significant Imaging: I have reviewed all pertinent imaging results/findings within the past 24 hours.  I have reviewed and interpreted all pertinent imaging results/findings within the past 24 hours.    Imaging Results              CT Head Without Contrast (In process)                       X-Ray Chest 1 View (Final result)  Result time 06/19/22 20:58:09      Final result by Demario Morin MD (06/19/22 20:58:09)                   Impression:      As above.      Electronically signed by: Demario Morin  Date:    06/19/2022  Time:    20:58               Narrative:    EXAMINATION:  XR CHEST 1 VIEW    CLINICAL HISTORY:  Encounter for fitting and adjustment of other gastrointestinal appliance and device    TECHNIQUE:  Single frontal view of the chest was performed.    COMPARISON:  None    FINDINGS:  Endotracheal tube tip in improved position, 3.5 cm from the august.  Enteric tube tip and side hole overlie the stomach in good position.  Left PICC line tip in good position.    Lungs unchanged.    The cardiac silhouette is normal in size. The hilar and mediastinal contours are unremarkable.    Bones are intact.                                        X-Ray Chest 1 View (Final result)  Result time 06/19/22 19:33:31      Final result by Demario Morin MD (06/19/22 19:33:31)                   Impression:      As above.    This report was flagged in Epic as abnormal.      Electronically signed by: Demario Morin  Date:    06/19/2022  Time:    19:33               Narrative:    EXAMINATION:  XR CHEST 1 VIEW    CLINICAL HISTORY:  Presence of other specified devices    TECHNIQUE:  Single frontal view of the chest was performed.    COMPARISON:  Multiple priors.    FINDINGS:  Endo tracheal tube tip 2.5 cm from the august.  Consider retraction by 1 cm.  Enteric tube tip and side hole below the diaphragm.    Right greater than left pulmonary opacities concerning for infection.  Septic emboli could be considered.    The cardiac silhouette is normal in size. The hilar and mediastinal contours are unremarkable.    Bones are intact.                                      I have independently reviewed and interpreted the EKG.     I have independently reviewed all pertinent labs within the past 24 hours.    I have  independently reviewed, visualized and interpreted all pertinent imaging results within the past 24 hours and discussed the findings with the ED physician, Dr. Chan

## 2022-06-20 NOTE — PROGRESS NOTES
Pharmacokinetic Initial Assessment: IV Vancomycin    Assessment/Plan:    Initiate intravenous vancomycin with loading dose of 2250 mg once with subsequent doses when random concentrations are less than 20 mcg/mL  Desired empiric serum trough concentration is 15 to 20 mcg/mL  Draw vancomycin trough level 60 min prior to third dose on 6/21 at approximately 22:30  Pharmacy will continue to follow and monitor vancomycin.      Please contact pharmacy at extension [phone] with any questions regarding this assessment.     Thank you for the consult,   Gurjit Dunn       Patient brief summary:  Tianna Leon is a 37 y.o. female initiated on antimicrobial therapy with IV Vancomycin for treatment of suspected bacteremia    Drug Allergies:   Review of patient's allergies indicates:   Allergen Reactions    Sulfamethoxazole-trimethoprim        Actual Body Weight:   88.4kg    Renal Function:   Estimated Creatinine Clearance: 46.1 mL/min (A) (based on SCr of 1.8 mg/dL (H)).,     Dialysis Method (if applicable):  N/A    CBC (last 72 hours):  Recent Labs   Lab Result Units 06/19/22 2010   WBC K/uL 44.18*   Hemoglobin g/dL 8.4*   Hematocrit % 26.7*   Platelets K/uL 468*   Gran % % 67.0   Lymph % % 7.0*   Mono % % 1.0*   Eosinophil % % 0.0   Basophil % % 0.0   Differential Method  Manual       Metabolic Panel (last 72 hours):  Recent Labs   Lab Result Units 06/19/22 2010 06/19/22 2039   Sodium mmol/L 141  --    Potassium mmol/L 4.6  --    Chloride mmol/L 110  --    CO2 mmol/L 14*  --    Glucose mg/dL 165*  --    Glucose, UA   --  Trace*   BUN mg/dL 23*  --    Creatinine mg/dL 1.8*  --    Creatinine, Urine mg/dL  --  34.8   Albumin g/dL 2.0*  --    Total Bilirubin mg/dL 0.2  --    Alkaline Phosphatase U/L 135  --    AST U/L 53*  --    ALT U/L 37  --        Drug levels (last 3 results):  No results for input(s): VANCOMYCINRA, VANCORANDOM, VANCOMYCINPE, VANCOPEAK, VANCOMYCINTR, VANCOTROUGH in the last 72 hours.    Microbiologic  Results:  Microbiology Results (last 7 days)       Procedure Component Value Units Date/Time    Blood culture x two cultures. Draw prior to antibiotics. [[id number]] Collected: 06/19/22 2030    Order Status: Sent Specimen: Blood from Peripheral, Antecubital, Right Updated: 06/19/22 2105    Blood culture x two cultures. Draw prior to antibiotics. [[id number]] Collected: 06/19/22 2105    Order Status: Sent Specimen: Blood from Peripheral, Wrist, Right Updated: 06/19/22 2105

## 2022-06-20 NOTE — ASSESSMENT & PLAN NOTE
-MSSA bacteremia during previous admission  -Follow up on repeat blood cultures  -IV vanc and cefepime empirically for now  -ID consult

## 2022-06-20 NOTE — H&P
[facility] - Emergency Dept.  [facility]  History & Physical    Patient Name: Tianna Leon  MRN: [id number]  Patient Class: IP- Inpatient  Admission Date: 2022  Attending Physician: Maru Chan MD   Primary Care Provider: Spenser Campa MD         Patient information was obtained from past medical records and ER records.     Subjective:     Principal Problem:Cardiac arrest    Chief Complaint:   Chief Complaint   Patient presents with    Cardiac Arrest        HPI: Ms. Leon is a 37 year old  female with long standing history of IV drug abuse, was recently discharged from this facility on 6/10/22 after a 35 day hospital stay. During previous admission she was found to have MSSA Tricuspid Endocarditis, treated on oxacillin (to be continued until 7/10/22). Also had right empyema, s/p VATS and chest tube. She was COVID positive during that admission. She was discharged to [facility] on 6/10. Today she was found unresponsive after she apparently injected an unknown substance into her left PICC line at the facility. She coded, received one round of epi prior to ROSC. Intubated prior to arrival. WBC 44,000, bands 19%. Lactic acid 4.0  Cr 1.8 CO2 14. CXR multifocal infiltrates. BP stable. On propofol. Received IV Zosyn.    Cardiac Arrest. Continued IV drug abuse. Tricuspid endocarditis. MSSA bacteremia. Right Empyema.      Past Medical History:   Diagnosis Date    ADHD (attention deficit hyperactivity disorder)     Anxiety     Asthma     Bipolar disorder     Cancer     cervical    COPD (chronic obstructive pulmonary disease)     GERD (gastroesophageal reflux disease)     Hepatitis C antibody positive in blood     RNA UNDETECTED 2016    Hyperlipidemia     Intravenous drug abuse     MDD (major depressive disorder)     Mood disorder     PTSD (post-traumatic stress disorder)     Suicidal ideation        Past Surgical History:   Procedure Laterality Date     SECTION       x2    CHOLECYSTECTOMY      COMPUTED TOMOGRAPHY N/A 5/11/2022    Procedure: CT (COMPUTED TOMOGRAPHY)/facet joint aspiration;  Surgeon: Lloyd Suarez MD;  Location: [facility];  Service: General;  Laterality: N/A;    COMPUTED TOMOGRAPHY N/A 5/21/2022    Procedure: CT (COMPUTED TOMOGRAPHY)/Chest tube insertion;  Surgeon: Lloyd Suarez MD;  Location: [facility];  Service: General;  Laterality: N/A;    HYSTERECTOMY      INJECTION OF ANESTHETIC AGENT AROUND MULTIPLE INTERCOSTAL NERVES Right 6/6/2022    Procedure: BLOCK, NERVE, INTERCOSTAL, 2 OR MORE;  Surgeon: Bobby Blandon MD;  Location: [facility];  Service: Cardiothoracic;  Laterality: Right;    LAPAROSCOPIC SALPINGECTOMY      ROBOT-ASSISTED LAPAROSCOPIC ABDOMINAL HYSTERECTOMY USING DA SEBASTIEN XI N/A 7/23/2019    Procedure: XI ROBOTIC HYSTERECTOMY;  Surgeon: Tabatha Quintanilla MD;  Location: [facility];  Service: OB/GYN;  Laterality: N/A;    ROBOT-ASSISTED LAPAROSCOPIC LYSIS OF ADHESIONS USING DA SEBASTIEN XI N/A 7/23/2019    Procedure: XI ROBOTIC LYSIS, ADHESIONS;  Surgeon: Tabatha Quintanilla MD;  Location: [facility];  Service: OB/GYN;  Laterality: N/A;    ROBOT-ASSISTED SURGICAL REMOVAL OF FALLOPIAN TUBE USING DA SEBASTIEN XI Right 7/23/2019    Procedure: XI ROBOTIC SALPINGECTOMY;  Surgeon: Tabatha Quintanilla MD;  Location: [facility];  Service: OB/GYN;  Laterality: Right;    TONSILLECTOMY      TUBAL LIGATION      VIDEO-ASSISTED THORACOSCOPIC SURGERY (VATS) Right 6/6/2022    Procedure: VATS (VIDEO-ASSISTED THORACOSCOPIC SURGERY);  Surgeon: Bobby Blandon MD;  Location: [facility];  Service: Cardiothoracic;  Laterality: Right;  WITH EVACUATION OF EMPYEMA       Review of patient's allergies indicates:   Allergen Reactions    Sulfamethoxazole-trimethoprim        No current facility-administered medications on file prior to encounter.     Current Outpatient Medications on File Prior to Encounter   Medication Sig    acetaminophen (TYLENOL) 500 MG tablet Take  500 mg by mouth every 6 (six) hours as needed for Pain.    albuterol (PROVENTIL/VENTOLIN HFA) 90 mcg/actuation inhaler Inhale 2 puffs into the lungs every 6 (six) hours as needed for Wheezing.    ascorbic acid, vitamin C, (VITAMIN C) 500 MG tablet Take 1 tablet (500 mg total) by mouth once daily. for 10 days    dextromethorphan-guaiFENesin (MUCINEX DM) 60-1,200 mg per 12 hr tablet Take 1 tablet by mouth every 12 (twelve) hours.    furosemide (LASIX) 40 MG tablet Take 40 mg po BID x 7 days then after 40 mg po daily    ibuprofen (ADVIL,MOTRIN) 200 MG tablet Take 200 mg by mouth every 6 (six) hours as needed for Pain.    LORazepam (ATIVAN) 1 MG tablet Take 1 tablet (1 mg total) by mouth every 6 (six) hours as needed for Anxiety.    morphine (MS CONTIN) 15 MG 12 hr tablet Take 1 tablet (15 mg total) by mouth every 12 (twelve) hours.    multivitamin Tab Take 1 tablet by mouth once daily.    oxyCODONE (ROXICODONE) 5 MG immediate release tablet Take 1 tablet (5 mg total) by mouth every 6 (six) hours as needed for Pain.    polyethylene glycol (GLYCOLAX) 17 gram PwPk Take 17 g by mouth once daily.    sodium chloride 0.9% SolP 500 mL with oxacillin 1 gram SolR 12 g Inject 12 g into the vein once daily.    sodium chloride 0.9% SolP 500 mL with oxacillin 1 gram SolR 12 g Inject 12 g into the vein continuous.    traZODone (DESYREL) 100 MG tablet Take 1 tablet (100 mg total) by mouth every evening.     Family History       Problem Relation (Age of Onset)    COPD Mother          Tobacco Use    Smoking status: Current Every Day Smoker     Packs/day: 1.50     Types: Cigarettes    Smokeless tobacco: Never Used    Tobacco comment: no smoking after midnight prior to surgery   Substance and Sexual Activity    Alcohol use: Yes     Comment: Occassional     Drug use: Not Currently     Types: IV, Methamphetamines, Heroin     Comment: denies heroin use.  Last used meth 10/2018  No illegal drugs prior to sx    Sexual  activity: Not Currently     Partners: Male     Birth control/protection: None     Review of Systems   Unable to perform ROS: Intubated   Objective:     Vital Signs (Most Recent):  Temp: 98.8 °F (37.1 °C) (06/19/22 2132)  Pulse: 91 (06/19/22 2200)  Resp: (!) 23 (06/19/22 2200)  BP: 119/80 (06/19/22 2200)  SpO2: 100 % (06/19/22 2200)   Vital Signs (24h Range):  Temp:  [98.8 °F (37.1 °C)-99.9 °F (37.7 °C)] 98.8 °F (37.1 °C)  Pulse:  [] 91  Resp:  [23-40] 23  SpO2:  [92 %-100 %] 100 %  BP: (119-165)/(77-98) 119/80     Weight: 88 kg (194 lb)  Body mass index is 32.28 kg/m².    Physical Exam  Vitals and nursing note reviewed.   Constitutional:       Appearance: She is ill-appearing and toxic-appearing.      Interventions: She is intubated.   HENT:      Head: Normocephalic and atraumatic.      Mouth/Throat:      Mouth: Mucous membranes are dry.      Dentition: Abnormal dentition.   Eyes:      General: No scleral icterus.     Conjunctiva/sclera: Conjunctivae normal.   Cardiovascular:      Rate and Rhythm: Regular rhythm. Tachycardia present.   Pulmonary:      Effort: She is intubated.      Comments: Coarse breath sounds  Abdominal:      General: There is no distension.      Palpations: Abdomen is soft.   Musculoskeletal:         General: Swelling present.   Skin:     Coloration: Skin is not jaundiced.      Comments: Numerous track marks. Numerous tattoes.    Neurological:      Comments: Sedated on propofol           Significant Labs: All pertinent labs within the past 24 hours have been reviewed.  Recent Lab Results  (Last 5 results in the past 24 hours)        06/19/22 2138 06/19/22 2104 06/19/22 2039 06/19/22 2010 06/19/22  1953        Benzodiazepines     Negative           Methadone metabolites     Negative           Phencyclidine     Negative           Albumin       2.0         Alkaline Phosphatase       135         ALT       37         Amphetamine Screen, Ur     Presumptive Positive           Anion  Gap       17         Aniso       Moderate         Appearance, UA     Clear           AST       53         Bacteria, UA     Rare           Bands       19.0         Barbiturate Screen, Ur     Negative           Baso #       CANCELED  Comment: Result canceled by the ancillary.         Basophil %       0.0         Bilirubin (UA)     Negative           BILIRUBIN TOTAL       0.2  Comment: For infants and newborns, interpretation of results should be based  on gestational age, weight and in agreement with clinical  observations.    Premature Infant recommended reference ranges:  Up to 24 hours.............<8.0 mg/dL  Up to 48 hours............<12.0 mg/dL  3-5 days..................<15.0 mg/dL  6-29 days.................<15.0 mg/dL           BNP       285  Comment: Values of less than 100 pg/ml are consistent with non-CHF populations.         BUN       23         Calcium       8.3         Chloride       110         CO2       14         Cocaine (Metab.)     Negative           Color, UA     Yellow           Creatinine       1.8         Creatinine, Urine     34.8           Differential Method       Manual         eGFR if        40         eGFR if non        35  Comment: Calculation used to obtain the estimated glomerular filtration  rate (eGFR) is the CKD-EPI equation.            Eos #       CANCELED  Comment: Result canceled by the ancillary.         Eosinophil %       0.0         Glucose       165         Glucose, UA     Trace           Gran %       67.0         Granular Casts, UA     5           Hematocrit       26.7         Hemoglobin       8.4         Hyaline Casts, UA     0           Immature Grans (Abs)       CANCELED  Comment: Mild elevation in immature granulocytes is non specific and   can be seen in a variety of conditions including stress response,   acute inflammation, trauma and pregnancy. Correlation with other   laboratory and clinical findings is essential.    Result canceled by  the ancillary.           Immature Granulocytes       CANCELED  Comment: Result canceled by the ancillary.         INR   1.1  Comment: Coumadin Therapy:  2.0 - 3.0 for INR for all indicators except mechanical heart valves  and antiphospholipid syndromes which should use 2.5 - 3.5.               Ketones, UA     Negative           Lactate, Yoel         4.0  Comment: Falsely low lactic acid results can be found in samples   containing >=13.0 mg/dL total bilirubin and/or >=3.5 mg/dL   direct bilirubin.  la critical result(s) called and verbal readback obtained from judy dunaway by TC3 06/19/2022 20:18         Leukocytes, UA     Negative           Lymph #       CANCELED  Comment: Result canceled by the ancillary.         Lymph %       7.0         MCH       27.7         MCHC       31.5         MCV       88         Metamyelocytes       2.0         Microscopic Comment     SEE COMMENT  Comment: Other formed elements not mentioned in the report are not   present in the microscopic examination.              Mono #       CANCELED  Comment: Result canceled by the ancillary.         Mono %       1.0         MPV       8.6         Myelocytes       4.0         NITRITE UA     Negative           nRBC       0         Occult Blood UA     3+           Opiate Scrn, Ur     Presumptive Positive           Ovalocytes       Occasional         pH, UA     6.0           Platelet Estimate       Clumped         Platelets       468         Poikilocytosis       Slight         Potassium       4.6         PROTEIN TOTAL       6.7         Protein, UA     2+  Comment: Recommend a 24 hour urine protein or a urine   protein/creatinine ratio if globulin induced proteinuria is  clinically suspected.             Protime   11.8              Acceptable Yes               RBC       3.03         RBC, UA     30           RDW       16.0         SARS-CoV-2 RNA, Amplification, Qual Negative               Schistocytes       Present         Sodium       141          Specific Gravity, UA     >=1.030           Specimen UA     Urine, Catheterized           Teardrop Cells       Occasional         Marijuana (THC) Metabolite     Negative           Toxicology Information     SEE COMMENT  Comment: This screen includes the following classes of drugs at the listed   cut-off:    Benzodiazepines 200 ng/ml  Methadone 300 ng/ml  Cocaine metabolite 300 ng/ml  Opiates 300 ng/ml  Barbiturates 200 ng/ml  Amphetamines 1000 ng/ml  Marijuana metabs (THC) 50 ng/ml  Phencyclidine (PCP) 25 ng/ml    This is a screening test. If results do not correlate with clinical   presentation, then a confirmatory send out test is advised.     This report is intended for use in clinical monitoring and management   of   patients. It is not intended for use in employment related drug   testing.             Troponin I   0.184  Comment: The reference interval for Troponin I represents the 99th percentile   cutoff   for our facility and is consistent with 3rd generation assay   performance.               UROBILINOGEN UA     Negative           WBC, UA     6           WBC       44.18                                Significant Imaging: I have reviewed all pertinent imaging results/findings within the past 24 hours.  I have reviewed and interpreted all pertinent imaging results/findings within the past 24 hours.    Imaging Results              CT Head Without Contrast (In process)                      X-Ray Chest 1 View (Final result)  Result time 06/19/22 20:58:09      Final result by Demario Morin MD (06/19/22 20:58:09)                   Impression:      As above.      Electronically signed by: Demario Morin  Date:    06/19/2022  Time:    20:58               Narrative:    EXAMINATION:  XR CHEST 1 VIEW    CLINICAL HISTORY:  Encounter for fitting and adjustment of other gastrointestinal appliance and device    TECHNIQUE:  Single frontal view of the chest was performed.    COMPARISON:  None    FINDINGS:  Endotracheal  tube tip in improved position, 3.5 cm from the august.  Enteric tube tip and side hole overlie the stomach in good position.  Left PICC line tip in good position.    Lungs unchanged.    The cardiac silhouette is normal in size. The hilar and mediastinal contours are unremarkable.    Bones are intact.                                        X-Ray Chest 1 View (Final result)  Result time 06/19/22 19:33:31      Final result by Demario Morin MD (06/19/22 19:33:31)                   Impression:      As above.    This report was flagged in Epic as abnormal.      Electronically signed by: Demario Morin  Date:    06/19/2022  Time:    19:33               Narrative:    EXAMINATION:  XR CHEST 1 VIEW    CLINICAL HISTORY:  Presence of other specified devices    TECHNIQUE:  Single frontal view of the chest was performed.    COMPARISON:  Multiple priors.    FINDINGS:  Endo tracheal tube tip 2.5 cm from the august.  Consider retraction by 1 cm.  Enteric tube tip and side hole below the diaphragm.    Right greater than left pulmonary opacities concerning for infection.  Septic emboli could be considered.    The cardiac silhouette is normal in size. The hilar and mediastinal contours are unremarkable.    Bones are intact.                                      I have independently reviewed and interpreted the EKG.     I have independently reviewed all pertinent labs within the past 24 hours.    I have independently reviewed, visualized and interpreted all pertinent imaging results within the past 24 hours and discussed the findings with the ED physician, Dr. Chan          Assessment/Plan:     * Cardiac arrest  -IV drug use at LTAC  -BP stable at this time, not requiring pressors  -consult pulmonary for vent management      Endocarditis of tricuspid valve  -MSSA bacteremia during previous admission  -Follow up on repeat blood cultures  -IV vanc and cefepime empirically for now  -ID consult      MSSA bacteremia  -during previous  admission, was supposed to be on oxacillin until 7/10/22  -repeat cultures pending  -ID consult      IVDU (intravenous drug user)  -continued active IV drug use while at [facility]      BEE (acute kidney injury)  -IV fluids  -Labs in AM      Empyema of right pleural space  -s/p VATS      Pulmonary embolism, septic         Borderline personality disorder         VTE Risk Mitigation (From admission, onward)         Ordered     heparin (porcine) injection 5,000 Units  Every 8 hours         06/19/22 2233     Place sequential compression device  Until discontinued         06/19/22 2233              Critical care time: 45 minutes  Critical care time was exclusive of separately billable procedures and treating other patients.       Rogelio Betancourt MD  Department of Hospital Medicine   [facility] - Emergency Dept.

## 2022-06-20 NOTE — PROGRESS NOTES
Pharmacokinetic Initial Assessment: IV Vancomycin    Assessment/Plan:    Initiate intravenous vancomycin with loading dose of 2250 mg once with subsequent doses when random concentrations are less than 20 mcg/mL  Desired empiric serum trough concentration is 15 to 20 mcg/mL  Draw vancomycin trough level 60 min prior to third dose on 6/21 at approximately 22:30  Pharmacy will continue to follow and monitor vancomycin.      Please contact pharmacy at extension [phone] with any questions regarding this assessment.     Thank you for the consult,   Gurjit Dunn       Patient brief summary:  Tianna Leon is a 37 y.o. female initiated on antimicrobial therapy with IV Vancomycin for treatment of suspected bacteremia    Drug Allergies:   Review of patient's allergies indicates:   Allergen Reactions    Sulfamethoxazole-trimethoprim        Actual Body Weight:   88.4kg    Renal Function:   Estimated Creatinine Clearance: 46.1 mL/min (A) (based on SCr of 1.8 mg/dL (H)).,     Dialysis Method (if applicable):  N/A    CBC (last 72 hours):  Recent Labs   Lab Result Units 06/19/22 2010   WBC K/uL 44.18*   Hemoglobin g/dL 8.4*   Hematocrit % 26.7*   Platelets K/uL 468*   Gran % % 67.0   Lymph % % 7.0*   Mono % % 1.0*   Eosinophil % % 0.0   Basophil % % 0.0   Differential Method  Manual       Metabolic Panel (last 72 hours):  Recent Labs   Lab Result Units 06/19/22 2010 06/19/22 2039   Sodium mmol/L 141  --    Potassium mmol/L 4.6  --    Chloride mmol/L 110  --    CO2 mmol/L 14*  --    Glucose mg/dL 165*  --    Glucose, UA   --  Trace*   BUN mg/dL 23*  --    Creatinine mg/dL 1.8*  --    Creatinine, Urine mg/dL  --  34.8   Albumin g/dL 2.0*  --    Total Bilirubin mg/dL 0.2  --    Alkaline Phosphatase U/L 135  --    AST U/L 53*  --    ALT U/L 37  --        Drug levels (last 3 results):  No results for input(s): VANCOMYCINRA, VANCORANDOM, VANCOMYCINPE, VANCOPEAK, VANCOMYCINTR, VANCOTROUGH in the last 72 hours.    Microbiologic  Results:  Microbiology Results (last 7 days)       Procedure Component Value Units Date/Time    Blood culture x two cultures. Draw prior to antibiotics. [[id number]] Collected: 06/19/22 2030    Order Status: Sent Specimen: Blood from Peripheral, Antecubital, Right Updated: 06/19/22 2105    Blood culture x two cultures. Draw prior to antibiotics. [[id number]] Collected: 06/19/22 2105    Order Status: Sent Specimen: Blood from Peripheral, Wrist, Right Updated: 06/19/22 2105

## 2022-06-20 NOTE — ASSESSMENT & PLAN NOTE
S/t illicit IV drug injection  Vent settings reviewed and adjusted to optimize gas exchange  Sedation for vent control  abg daily and prn to assess therapy  Acidosis corrected on am abg  Daily SAT/SBT for extubation readiness

## 2022-06-20 NOTE — ASSESSMENT & PLAN NOTE
Noted on prior admit  Likely etiology of infiltrates on current chest xray  Sputum culture for completeness  Continue oxacillin as above

## 2022-06-20 NOTE — HPI
Ms. Leon is a 37 year old  female with long standing history of IV drug abuse, was recently discharged from this facility on 6/10/22 after a 35 day hospital stay. During previous admission she was found to have MSSA Tricuspid Endocarditis, treated on oxacillin (to be continued until 7/10/22). Also had right empyema, s/p VATS and chest tube. She was COVID positive during that admission. She was discharged to [facility] on 6/10. Today she was found unresponsive after she apparently injected an unknown substance into her left PICC line at the facility. She coded, received one round of epi prior to ROSC. Intubated prior to arrival. WBC 44,000, bands 19%. Lactic acid 4.0  Cr 1.8 CO2 14. CXR multifocal infiltrates. BP stable. On propofol. Received IV Zosyn.     Cardiac Arrest. Continued IV drug abuse. Tricuspid endocarditis. MSSA bacteremia. Right Empyema.

## 2022-06-20 NOTE — NURSING
SAT performed. Pt moves upper extremities spontaneously but does not follow commands. Increase in RR to 35-40s and increase in HR to 140s. Sedation resumed per MD orders.

## 2022-06-20 NOTE — PLAN OF CARE
Pt arrived from ER intubated and sedated. SAT attempted but failed, see previous note. Titration of critical gtts per protocol, see flow sheet. Tolerated vent settings. OG to LIS. Excellent urine output to sanchez catheter. Afebrile. BSWR intact per order. Purple port of MILA PICC occluded. Drop in H/H this AM, 1 unit PRBC to transfuse per eICU; T&S sent to lab. POC reviewed with patient's mother, Karyn. Safety and fall precaution maintained.

## 2022-06-20 NOTE — CONSULTS
[facility] ([facility])  Critical Care Medicine  Consult Note    Patient Name: Tianna Leon  MRN: [id number]  Admission Date: 6/19/2022  Hospital Length of Stay: 1 days  Code Status: Prior  Attending Physician: Aj Olguin MD   Primary Care Provider: Spenser Campa MD   Principal Problem: Cardiac arrest    Subjective:     HPI:  37 F w/pmh significant for IV drug abuse, MDD, bipolar disorder, anxiety, PTSD, suicidal ideation, COPD, ADHD, anxiety, asthma, cervical cancer, GERD, Hep C, HLD.   She was d/c from OSH on 6/19 following 35 day admission during which she was treated for MSSA tricuspid endocarditis and right empyema, s/p VATS/decortication. She was also COVID + during admission. On 6/10, she was d/c to [facility] and continued on oxacillin (to continue through 7/10/22) via PICC line.     Yesterday, she was found unresponsive in cardiac arrest at LTAC. She was resuscitated after 1 round of Epi and arrived intubated. Report given to ED was that patient injected an unknown substance into her PICC line at LTAC.   In ED, labs significant for lactate of 4.0, ABG pH 7.215, PCO2 47.8, PO2 75, HCO3 19.3, WBC 44.18, Hgb 8.4, Cr 1.8, AST 53, , troponin 0.184, tox positive for opiates and amphetamine.   CXR w/multifocal infiltrates, CT head w/no acute finding.   Given vancomycin and cefepime and placed on sedation for mechanical ventilation.       Hospital/ICU Course:  Admitted to ICU @ 2128.   6/20 - Seen and examined at start of shift. Remains intubated and sedated on mechanical vent. Failed brief SAT/SBT with tachycardia, tachypnea       Past Medical History:   Diagnosis Date    ADHD (attention deficit hyperactivity disorder)     Anxiety     Asthma     Bipolar disorder     Cancer     cervical    COPD (chronic obstructive pulmonary disease)     GERD (gastroesophageal reflux disease)     Hepatitis C antibody positive in blood     RNA UNDETECTED 5/18/2016    Hyperlipidemia      Intravenous drug abuse     MDD (major depressive disorder)     Mood disorder     PTSD (post-traumatic stress disorder)     Suicidal ideation        Past Surgical History:   Procedure Laterality Date     SECTION  2001    x2    CHOLECYSTECTOMY      COMPUTED TOMOGRAPHY N/A 2022    Procedure: CT (COMPUTED TOMOGRAPHY)/facet joint aspiration;  Surgeon: Lloyd Suarez MD;  Location: [facility];  Service: General;  Laterality: N/A;    COMPUTED TOMOGRAPHY N/A 2022    Procedure: CT (COMPUTED TOMOGRAPHY)/Chest tube insertion;  Surgeon: Lloyd Suarez MD;  Location: [facility];  Service: General;  Laterality: N/A;    HYSTERECTOMY      INJECTION OF ANESTHETIC AGENT AROUND MULTIPLE INTERCOSTAL NERVES Right 2022    Procedure: BLOCK, NERVE, INTERCOSTAL, 2 OR MORE;  Surgeon: Bobby Blandon MD;  Location: [facility];  Service: Cardiothoracic;  Laterality: Right;    LAPAROSCOPIC SALPINGECTOMY      ROBOT-ASSISTED LAPAROSCOPIC ABDOMINAL HYSTERECTOMY USING DA SEBASTIEN XI N/A 2019    Procedure: XI ROBOTIC HYSTERECTOMY;  Surgeon: Tabatha Quintanilla MD;  Location: [facility];  Service: OB/GYN;  Laterality: N/A;    ROBOT-ASSISTED LAPAROSCOPIC LYSIS OF ADHESIONS USING DA SEBASTIEN XI N/A 2019    Procedure: XI ROBOTIC LYSIS, ADHESIONS;  Surgeon: Tabatha Quintanilla MD;  Location: [facility];  Service: OB/GYN;  Laterality: N/A;    ROBOT-ASSISTED SURGICAL REMOVAL OF FALLOPIAN TUBE USING DA SEBASTIEN XI Right 2019    Procedure: XI ROBOTIC SALPINGECTOMY;  Surgeon: Tabatha Quintanilla MD;  Location: [facility];  Service: OB/GYN;  Laterality: Right;    TONSILLECTOMY      TUBAL LIGATION      VIDEO-ASSISTED THORACOSCOPIC SURGERY (VATS) Right 2022    Procedure: VATS (VIDEO-ASSISTED THORACOSCOPIC SURGERY);  Surgeon: Bobby Blandon MD;  Location: [facility];  Service: Cardiothoracic;  Laterality: Right;  WITH EVACUATION OF EMPYEMA       Review of patient's allergies indicates:   Allergen Reactions     Sulfamethoxazole-trimethoprim        Family History       Problem Relation (Age of Onset)    COPD Mother          Tobacco Use    Smoking status: Current Every Day Smoker     Packs/day: 1.50     Types: Cigarettes    Smokeless tobacco: Never Used    Tobacco comment: no smoking after midnight prior to surgery   Substance and Sexual Activity    Alcohol use: Yes     Comment: Occassional     Drug use: Not Currently     Types: IV, Methamphetamines, Heroin     Comment: denies heroin use.  Last used meth 10/2018  No illegal drugs prior to sx    Sexual activity: Not Currently     Partners: Male     Birth control/protection: None         Review of Systems   Unable to perform ROS: Intubated   Objective:     Vital Signs (Most Recent):  Temp: 98.1 °F (36.7 °C) (06/20/22 0715)  Pulse: 68 (06/20/22 0730)  Resp: (!) 22 (06/20/22 0730)  BP: (!) 162/109 (06/20/22 0730)  SpO2: 100 % (06/20/22 0730)   Vital Signs (24h Range):  Temp:  [97.9 °F (36.6 °C)-99.9 °F (37.7 °C)] 98.1 °F (36.7 °C)  Pulse:  [] 68  Resp:  [18-40] 22  SpO2:  [92 %-100 %] 100 %  BP: (108-208)/() 162/109     Weight: 87.3 kg (192 lb 7.4 oz)  Body mass index is 32.03 kg/m².      Intake/Output Summary (Last 24 hours) at 6/20/2022 0746  Last data filed at 6/20/2022 0700  Gross per 24 hour   Intake 4507.49 ml   Output 2675 ml   Net 1832.49 ml      sodium chloride 0.9% 125 mL/hr at 06/20/22 0712    sodium chloride 0.9% Stopped (06/20/22 0350)    fentanyl 25 mcg/hr (06/20/22 0700)    propofoL 45 mcg/kg/min (06/20/22 0700)       Physical Exam  Vitals and nursing note reviewed.   Constitutional:       Appearance: She is obese. She is ill-appearing and toxic-appearing.      Interventions: She is sedated, intubated and restrained.   HENT:      Mouth/Throat:      Mouth: Mucous membranes are moist.      Pharynx: Oropharynx is clear.   Eyes:      Pupils: Pupils are equal, round, and reactive to light.   Neck:      Vascular: No JVD.   Cardiovascular:      Rate  and Rhythm: Normal rate and regular rhythm.      Pulses:           Radial pulses are 2+ on the right side and 2+ on the left side.        Dorsalis pedis pulses are 2+ on the right side and 2+ on the left side.   Pulmonary:      Effort: She is intubated.      Comments: Breath sounds coarse  Abdominal:      General: Bowel sounds are decreased.      Palpations: Abdomen is soft.   Musculoskeletal:      Right lower leg: No edema.      Left lower leg: No edema.   Skin:     General: Skin is warm and dry.      Capillary Refill: Capillary refill takes less than 2 seconds.   Neurological:      GCS: GCS eye subscore is 1. GCS verbal subscore is 1. GCS motor subscore is 5.       Vents:  Vent Mode: A/C (06/20/22 0715)  Ventilator Initiated: Yes (06/19/22 1915)  Set Rate: 18 BPM (06/20/22 0715)  Vt Set: 480 mL (06/20/22 0715)  Pressure Support: 0 cmH20 (06/20/22 0715)  PEEP/CPAP: 5 cmH20 (06/20/22 0715)  Oxygen Concentration (%): 30 (06/20/22 0730)  Peak Airway Pressure: 28 cmH2O (06/20/22 0715)  Plateau Pressure: 22 cmH20 (06/20/22 0715)  Total Ve: 8.8 mL (06/20/22 0715)  F/VT Ratio<105 (RSBI): (!) 36.73 (06/20/22 0715)    Lines/Drains/Airways       Peripherally Inserted Central Catheter Line  Duration             PICC Double Lumen -- days              Drain  Duration                  NG/OG Tube 06/19/22 1916 Porter sump 14 Fr. Center mouth <1 day         Urethral Catheter 06/19/22 2039 Latex 16 Fr. <1 day              Airway  Duration                  Airway - Non-Surgical 06/19/22 2306 <1 day              Peripheral Intravenous Line  Duration                  External Jugular IV 06/19/22 1910 <1 day         Peripheral IV - Single Lumen 06/19/22 1921 20 G Right Shoulder <1 day         Peripheral IV - Single Lumen 06/19/22 2121 20 G Right Wrist <1 day                    Significant Labs:    CBC/Anemia Profile:  Recent Labs   Lab 06/19/22 2010 06/20/22  0512   WBC 44.18* 16.49*   HGB 8.4* 6.6*   HCT 26.7* 21.1*   * 323    MCV 88 85   RDW 16.0* 15.9*        Chemistries:  Recent Labs   Lab 06/19/22 2010 06/20/22  0512    145   K 4.6 3.6    110   CO2 14* 21*   BUN 23* 22*   CREATININE 1.8* 1.4   CALCIUM 8.3* 8.7   ALBUMIN 2.0* 1.9*   PROT 6.7 6.4   BILITOT 0.2 0.2   ALKPHOS 135 112   ALT 37 34   AST 53* 37   MG  --  1.6       All pertinent labs within the past 24 hours have been reviewed.    Significant Imaging:   I have reviewed all pertinent imaging results/findings within the past 24 hours.      ABG  Recent Labs   Lab 06/20/22  0410   PH 7.441   PO2 96   PCO2 34.9*   HCO3 23.8*   BE 0     Assessment/Plan:     Psychiatric  IVDU (intravenous drug user)  IV drug abuse continues in LTAC; direct etiology of cardiac arrest  Continue to encourage cessation efforts; grim survival prognosis with continued IV drug use with current health problems    Pulmonary  Acute respiratory failure with hypoxia and hypercapnia  S/t illicit IV drug injection  Vent settings reviewed and adjusted to optimize gas exchange  Sedation for vent control  abg daily and prn to assess therapy  Acidosis corrected on am abg  Daily SAT/SBT for extubation readiness    On mechanically assisted ventilation  See full plan under resp failure  VAP prophylaxis  Daily SAT/SBT for extubation readiness    COPD (chronic obstructive pulmonary disease)  Duo-neb q 4 prn    Cardiac/Vascular  * Cardiac arrest  S/t illicit IV injection  ROSC after 1 round of epi  Hemodynamically stable    Endocarditis of tricuspid valve  MSSA bacteremia treatment in place from prior admit  ID consult; discharge plan was for oxacillin continuous to complete on 7/11/2022  Repeat blood cultures pending  Continue Oxacillin    Renal/  BEE (acute kidney injury)  Improving with IVF resuscitation and supportive care  Continue IVF for now  Monitor creatinine  Avoid nephrotoxins  Strict I&O    ID  MSSA bacteremia  On previous admission s/t IVDU  Continue scheudled Oxacillin scheuled until  7/10/22  Repeat blood cultures pending for follow up    Hematology  Pulmonary embolism, septic  Noted on prior admit  Likely etiology of infiltrates on current chest xray  Sputum culture for completeness  Continue oxacillin as above    Oncology  Normocytic anemia  With significant drop this morning and no noted bleeding  Suspect some dilutional component  Transfused one unit PRBC  Recheck in am        Critical Care Daily Checklist:    A: Awake: RASS Goal/Actual Goal: RASS Goal: -2-->light sedation  Actual: Carballo Agitation Sedation Scale (RASS): Deep sedation   B: Spontaneous Breathing Trial Performed?     C: SAT & SBT Coordinated?  failed                      D: Delirium: CAM-ICU Overall CAM-ICU: Positive   E: Early Mobility Performed? Yes   F: Feeding Goal: Goals: 1. Enteral Nutrition initiation within 24 hours.  Status: Nutrition Goal Status: new   Current Diet Order   Procedures    Diet NPO      AS: Analgesia/Sedation Fentanyl, propofol   T: Thromboembolic Prophylaxis heparin   H: HOB > 300 Yes   U: Stress Ulcer Prophylaxis (if needed) PPI   G: Glucose Control monitor   B: Bowel Function Stool Occurrence: 1   I: Indwelling Catheter (Lines & Portillo) Necessity reviewed   D: De-escalation of Antimicrobials/Pharmacotherapies reviewed    Plan for the day/ETD As above    Family/Goals of Care: Return to LTAC for continued abx on discharge   I have discussed case and plan of care in detail with Dr San and Dr Olguin; Status and plan of care were discussed with team on multidisciplinary rounds.    Critical Care Time: 60 minutes  Critical secondary to respiratory failure on mechanical vent after cardiac arrest s/t IVDU   Critical care was time spent personally by me on the following activities: development of treatment plan with patient or surrogate and bedside caregivers, discussions with consultants, evaluation of patient's response to treatment, examination of patient, ordering and performing treatments and  interventions, ordering and review of laboratory studies, ordering and review of radiographic studies, pulse oximetry, re-evaluation of patient's condition. This critical care time did not overlap with that of any other provider or involve time for any procedures.    Thank you for your consult.      VALENTINA Juares-BC  Critical Care Medicine  [facility] ([facility])

## 2022-06-20 NOTE — SUBJECTIVE & OBJECTIVE
Interval History: Remains intubated and sedated on mechanical vent. Failed brief SAT/SBT with tachycardia, tachypnea. WBC down to 16, H/H dropped to 6.6/21- got  a unit of blood, H/H improved to 7.4/23. Bun/Cr improved 22/1.4. Troponin coming down- likely demand ischemia from Cardiac Arrest as a result of IV drug.      Review of Systems   Unable to perform ROS: Intubated   Objective:     Vital Signs (Most Recent):  Temp: 98.2 °F (36.8 °C) (06/20/22 1200)  Pulse: 95 (06/20/22 1415)  Resp: (!) 22 (06/20/22 1415)  BP: (!) 167/110 (06/20/22 1415)  SpO2: 100 % (06/20/22 1415)   Vital Signs (24h Range):  Temp:  [97.9 °F (36.6 °C)-99.9 °F (37.7 °C)] 98.2 °F (36.8 °C)  Pulse:  [] 95  Resp:  [18-40] 22  SpO2:  [92 %-100 %] 100 %  BP: (108-219)/() 167/110     Weight: 87.3 kg (192 lb 7.4 oz)  Body mass index is 32.03 kg/m².    Intake/Output Summary (Last 24 hours) at 6/20/2022 1453  Last data filed at 6/20/2022 1400  Gross per 24 hour   Intake 6634.65 ml   Output 3225 ml   Net 3409.65 ml      Physical Exam  Vitals and nursing note reviewed.   Constitutional:       Appearance: She is ill-appearing and toxic-appearing.      Interventions: She is intubated.   HENT:      Head: Normocephalic and atraumatic.      Mouth/Throat:      Mouth: Mucous membranes are dry.      Dentition: Abnormal dentition.   Eyes:      General: No scleral icterus.     Conjunctiva/sclera: Conjunctivae normal.   Cardiovascular:      Rate and Rhythm: Regular rhythm. Tachycardia present.   Pulmonary:      Effort: She is intubated.      Comments: Coarse breath sounds  Abdominal:      General: There is no distension.      Palpations: Abdomen is soft.   Musculoskeletal:         General: Swelling present.   Skin:     Coloration: Skin is not jaundiced.      Comments: Numerous track marks. Numerous tattoes.    Neurological:      Comments: Sedated on propofol        Vent Mode: A/C  Oxygen Concentration (%):  [30-60] 30  Resp Rate Total:  [18 br/min-39  br/min] 22 br/min  Vt Set:  [420 mL-480 mL] 420 mL  PEEP/CPAP:  [5 cmH20] 5 cmH20  Pressure Support:  [0 cmH20] 0 cmH20  Mean Airway Pressure:  [9.6 zqA45-52 cmH20] 20 cmH20  Temp:  [97.9 °F (36.6 °C)-99.9 °F (37.7 °C)] 98.2 °F (36.8 °C)  Pulse:  [] 95  Resp:  [18-40] 22  SpO2:  [92 %-100 %] 100 %  BP: (108-219)/() 167/110   Art pH/pCO2/pO2/HCO3:  7.441/34.9/96/23.8 (06/20 0410)  Lab Results   Component Value Date    [id number] Negative 06/19/2022    OCY26SGPOGOQ Positive (A) 05/31/2022    [id number] Negative 05/06/2022      Recent Labs   Lab 06/19/22 1953 06/19/22 2010 06/19/22 2010 06/19/22 2104 06/19/22  2321 06/20/22  0512 06/20/22  1244   LACTATE 4.0*  --   --   --  1.6 0.7  --    TROPONINI  --   --   --  0.184*  --  0.126*  --    NA  --  141  --   --   --  145  --    WBC  --  44.18*  --   --   --  16.49*  --    GRAN  --  67.0  --   --   --  76.0*  12.6*  --    LYMPH  --  7.0*  CANCELED  --   --   --  15.8*  2.6  --    HGB  --  8.4*   < >  --   --  6.6* 7.4*   HCT  --  26.7*   < >  --   --  21.1* 22.8*   BUN  --  23*  --   --   --  22*  --    CREATININE  --  1.8*  --   --   --  1.4  --    ESTGFRAFRICA  --  40*  --   --   --  55*  --    EGFRNONAA  --  35*  --   --   --  47*  --    K  --  4.6  --   --   --  3.6  --    CL  --  110  --   --   --  110  --    CO2  --  14*  --   --   --  21*  --    MG  --   --   --   --   --  1.6  --     < > = values in this interval not displayed.     Microbiology Results (last 7 days)       Procedure Component Value Units Date/Time    Culture, Respiratory with Gram Stain [[id number]] Collected: 06/20/22 1350    Order Status: Sent Specimen: Respiratory from Endotracheal Aspirate Updated: 06/20/22 1350    Blood culture x two cultures. Draw prior to antibiotics. [[id number]] Collected: 06/19/22 2105    Order Status: Completed Specimen: Blood from Peripheral, Wrist, Right Updated: 06/20/22 1315     Blood Culture, Routine No Growth to date    Narrative:      Aerobic  and anaerobic    Blood culture x two cultures. Draw prior to antibiotics. [[id number]] Collected: 06/19/22 2030    Order Status: Completed Specimen: Blood from Peripheral, Antecubital, Right Updated: 06/20/22 1315     Blood Culture, Routine No Growth to date    Narrative:      Aerobic and anaerobic          Antibiotics (From admission, onward)                Start     Stop Route Frequency Ordered    06/20/22 0900  mupirocin 2 % ointment  (DECOLONIZATION PROTOCOL ORDERS)         06/25 0859 Nasl 2 times daily 06/20/22 0750    06/20/22 0900  oxacillin 12 g in  mL CONTINUOUS INFUSION         -- IV Every 24 hours (non-standard times) 06/20/22 0752          Anticoagulants       Ordered     Route Frequency Start Stop    06/19/22 2233  heparin (porcine)         SubQ Every 8 hours 06/19/22 2245 --          CT Head Without Contrast  Narrative: EXAMINATION:  CT HEAD WITHOUT CONTRAST    CLINICAL HISTORY:  Mental status change, unknown cause;    TECHNIQUE:  Low dose axial CT images obtained throughout the head without intravenous contrast. Sagittal and coronal reconstructions were performed.    COMPARISON:  Multiple priors.    FINDINGS:  Intracranial compartment:    Ventricles and sulci are normal in size for age without evidence of hydrocephalus. No extra-axial blood or fluid collections.    Gray-white differentiation is fairly well preserved.  No parenchymal mass, hemorrhage, edema or major vascular distribution infarct.    Skull/extracranial contents (limited evaluation): No fracture. Mastoid air cells and paranasal sinuses are essentially clear.  Impression: No definite acute intracranial CT abnormality.  Clinical correlation and further evaluation as warranted.    All CT scans at this facility are performed  using dose modulation techniques as appropriate to performed exam including the following:  automated exposure control; adjustment of mA and/or kV according to the patients size (this includes techniques or  standardized protocols for targeted exams where dose is matched to indication/reason for exam: i.e. extremities or head);  iterative reconstruction technique.    Electronically signed by: Demario Morin  Date:    06/19/2022  Time:    22:59  X-Ray Chest 1 View  Narrative: EXAMINATION:  XR CHEST 1 VIEW    CLINICAL HISTORY:  Encounter for fitting and adjustment of other gastrointestinal appliance and device    TECHNIQUE:  Single frontal view of the chest was performed.    COMPARISON:  None    FINDINGS:  Endotracheal tube tip in improved position, 3.5 cm from the august.  Enteric tube tip and side hole overlie the stomach in good position.  Left PICC line tip in good position.    Lungs unchanged.    The cardiac silhouette is normal in size. The hilar and mediastinal contours are unremarkable.    Bones are intact.  Impression: As above.    Electronically signed by: Demario Morin  Date:    06/19/2022  Time:    20:58  X-Ray Chest 1 View  Narrative: EXAMINATION:  XR CHEST 1 VIEW    CLINICAL HISTORY:  Presence of other specified devices    TECHNIQUE:  Single frontal view of the chest was performed.    COMPARISON:  Multiple priors.    FINDINGS:  Endo tracheal tube tip 2.5 cm from the august.  Consider retraction by 1 cm.  Enteric tube tip and side hole below the diaphragm.    Right greater than left pulmonary opacities concerning for infection.  Septic emboli could be considered.    The cardiac silhouette is normal in size. The hilar and mediastinal contours are unremarkable.    Bones are intact.  Impression: As above.    This report was flagged in Epic as abnormal.    Electronically signed by: Demario Morin  Date:    06/19/2022  Time:    19:33   Significant Labs: All pertinent labs within the past 24 hours have been reviewed.    Significant Imaging: I have reviewed all pertinent imaging results/findings within the past 24 hours.

## 2022-06-21 PROBLEM — G93.40 ACUTE ENCEPHALOPATHY: Status: ACTIVE | Noted: 2022-06-21

## 2022-06-21 PROBLEM — Z98.890 S/P THORACOTOMY: Status: ACTIVE | Noted: 2022-06-07

## 2022-06-21 PROBLEM — J86.9 EMPYEMA OF RIGHT PLEURAL SPACE: Status: RESOLVED | Noted: 2022-05-20 | Resolved: 2022-06-21

## 2022-06-21 LAB
ALLENS TEST: ABNORMAL
ANION GAP SERPL CALC-SCNC: 9 MMOL/L (ref 8–16)
BASOPHILS # BLD AUTO: 0.04 K/UL (ref 0–0.2)
BASOPHILS NFR BLD: 0.4 % (ref 0–1.9)
BUN SERPL-MCNC: 24 MG/DL (ref 6–20)
CALCIUM SERPL-MCNC: 8.6 MG/DL (ref 8.7–10.5)
CHLORIDE SERPL-SCNC: 116 MMOL/L (ref 95–110)
CO2 SERPL-SCNC: 21 MMOL/L (ref 23–29)
CREAT SERPL-MCNC: 1.2 MG/DL (ref 0.5–1.4)
DELSYS: ABNORMAL
DIFFERENTIAL METHOD: ABNORMAL
EOSINOPHIL # BLD AUTO: 0.3 K/UL (ref 0–0.5)
EOSINOPHIL NFR BLD: 2.2 % (ref 0–8)
ERYTHROCYTE [DISTWIDTH] IN BLOOD BY AUTOMATED COUNT: 16.4 % (ref 11.5–14.5)
ERYTHROCYTE [SEDIMENTATION RATE] IN BLOOD BY WESTERGREN METHOD: 22 MM/H
EST. GFR  (AFRICAN AMERICAN): >60 ML/MIN/1.73 M^2
EST. GFR  (NON AFRICAN AMERICAN): 57 ML/MIN/1.73 M^2
FIO2: 30
GLUCOSE SERPL-MCNC: 87 MG/DL (ref 70–110)
HCO3 UR-SCNC: 21.9 MMOL/L (ref 24–28)
HCT VFR BLD AUTO: 22.4 % (ref 37–48.5)
HGB BLD-MCNC: 7.1 G/DL (ref 12–16)
IMM GRANULOCYTES # BLD AUTO: 0.2 K/UL (ref 0–0.04)
IMM GRANULOCYTES NFR BLD AUTO: 1.8 % (ref 0–0.5)
LYMPHOCYTES # BLD AUTO: 3.8 K/UL (ref 1–4.8)
LYMPHOCYTES NFR BLD: 32.9 % (ref 18–48)
MAGNESIUM SERPL-MCNC: 2.1 MG/DL (ref 1.6–2.6)
MCH RBC QN AUTO: 27.1 PG (ref 27–31)
MCHC RBC AUTO-ENTMCNC: 31.7 G/DL (ref 32–36)
MCV RBC AUTO: 86 FL (ref 82–98)
MODE: ABNORMAL
MONOCYTES # BLD AUTO: 1.2 K/UL (ref 0.3–1)
MONOCYTES NFR BLD: 10.2 % (ref 4–15)
NEUTROPHILS # BLD AUTO: 6 K/UL (ref 1.8–7.7)
NEUTROPHILS NFR BLD: 52.5 % (ref 38–73)
NRBC BLD-RTO: 0 /100 WBC
PCO2 BLDA: 35.6 MMHG (ref 35–45)
PEEP: 5
PH SMN: 7.4 [PH] (ref 7.35–7.45)
PLATELET # BLD AUTO: 326 K/UL (ref 150–450)
PMV BLD AUTO: 9.3 FL (ref 9.2–12.9)
PO2 BLDA: 96 MMHG (ref 80–100)
POC BE: -3 MMOL/L
POC SATURATED O2: 98 % (ref 95–100)
POTASSIUM SERPL-SCNC: 3.4 MMOL/L (ref 3.5–5.1)
RBC # BLD AUTO: 2.62 M/UL (ref 4–5.4)
SAMPLE: ABNORMAL
SITE: ABNORMAL
SODIUM SERPL-SCNC: 146 MMOL/L (ref 136–145)
TRIGL SERPL-MCNC: 426 MG/DL (ref 30–150)
VT: 420
WBC # BLD AUTO: 11.41 K/UL (ref 3.9–12.7)

## 2022-06-21 PROCEDURE — 63600175 PHARM REV CODE 636 W HCPCS: Performed by: NURSE PRACTITIONER

## 2022-06-21 PROCEDURE — 99900026 HC AIRWAY MAINTENANCE (STAT)

## 2022-06-21 PROCEDURE — 84478 ASSAY OF TRIGLYCERIDES: CPT | Performed by: EMERGENCY MEDICINE

## 2022-06-21 PROCEDURE — 85025 COMPLETE CBC W/AUTO DIFF WBC: CPT | Performed by: NURSE PRACTITIONER

## 2022-06-21 PROCEDURE — 63600175 PHARM REV CODE 636 W HCPCS: Performed by: SURGERY

## 2022-06-21 PROCEDURE — 82803 BLOOD GASES ANY COMBINATION: CPT

## 2022-06-21 PROCEDURE — 99291 CRITICAL CARE FIRST HOUR: CPT | Mod: ,,, | Performed by: NURSE PRACTITIONER

## 2022-06-21 PROCEDURE — 63600175 PHARM REV CODE 636 W HCPCS

## 2022-06-21 PROCEDURE — 25000003 PHARM REV CODE 250: Performed by: EMERGENCY MEDICINE

## 2022-06-21 PROCEDURE — 25000003 PHARM REV CODE 250: Performed by: NURSE PRACTITIONER

## 2022-06-21 PROCEDURE — 20000000 HC ICU ROOM

## 2022-06-21 PROCEDURE — 36600 WITHDRAWAL OF ARTERIAL BLOOD: CPT

## 2022-06-21 PROCEDURE — 25000003 PHARM REV CODE 250: Performed by: INTERNAL MEDICINE

## 2022-06-21 PROCEDURE — 63600175 PHARM REV CODE 636 W HCPCS: Performed by: INTERNAL MEDICINE

## 2022-06-21 PROCEDURE — 83735 ASSAY OF MAGNESIUM: CPT | Performed by: NURSE PRACTITIONER

## 2022-06-21 PROCEDURE — 99900035 HC TECH TIME PER 15 MIN (STAT)

## 2022-06-21 PROCEDURE — 25000003 PHARM REV CODE 250: Performed by: SURGERY

## 2022-06-21 PROCEDURE — 99291 PR CRITICAL CARE, E/M 30-74 MINUTES: ICD-10-PCS | Mod: ,,, | Performed by: NURSE PRACTITIONER

## 2022-06-21 PROCEDURE — 94761 N-INVAS EAR/PLS OXIMETRY MLT: CPT

## 2022-06-21 PROCEDURE — C9399 UNCLASSIFIED DRUGS OR BIOLOG: HCPCS | Performed by: NURSE PRACTITIONER

## 2022-06-21 PROCEDURE — 80048 BASIC METABOLIC PNL TOTAL CA: CPT | Performed by: NURSE PRACTITIONER

## 2022-06-21 PROCEDURE — 27000221 HC OXYGEN, UP TO 24 HOURS

## 2022-06-21 PROCEDURE — 94003 VENT MGMT INPAT SUBQ DAY: CPT

## 2022-06-21 RX ORDER — MIDAZOLAM HYDROCHLORIDE 1 MG/ML
2 INJECTION INTRAMUSCULAR; INTRAVENOUS
Status: DISCONTINUED | OUTPATIENT
Start: 2022-06-21 | End: 2022-06-26

## 2022-06-21 RX ORDER — CHLORHEXIDINE GLUCONATE ORAL RINSE 1.2 MG/ML
15 SOLUTION DENTAL 2 TIMES DAILY
Status: DISCONTINUED | OUTPATIENT
Start: 2022-06-21 | End: 2022-06-26

## 2022-06-21 RX ORDER — DEXMEDETOMIDINE HYDROCHLORIDE 4 UG/ML
0-1.5 INJECTION INTRAVENOUS CONTINUOUS
Status: DISCONTINUED | OUTPATIENT
Start: 2022-06-21 | End: 2022-06-22

## 2022-06-21 RX ORDER — MIDAZOLAM HYDROCHLORIDE 1 MG/ML
INJECTION INTRAMUSCULAR; INTRAVENOUS
Status: COMPLETED
Start: 2022-06-21 | End: 2022-06-23

## 2022-06-21 RX ORDER — HYDRALAZINE HYDROCHLORIDE 20 MG/ML
5 INJECTION INTRAMUSCULAR; INTRAVENOUS EVERY 6 HOURS PRN
Status: DISCONTINUED | OUTPATIENT
Start: 2022-06-21 | End: 2022-06-23

## 2022-06-21 RX ADMIN — DEXMEDETOMIDINE HYDROCHLORIDE 0.5 MCG/KG/HR: 4 INJECTION INTRAVENOUS at 08:06

## 2022-06-21 RX ADMIN — MUPIROCIN: 20 OINTMENT TOPICAL at 08:06

## 2022-06-21 RX ADMIN — HYDRALAZINE HYDROCHLORIDE 5 MG: 20 INJECTION INTRAMUSCULAR; INTRAVENOUS at 09:06

## 2022-06-21 RX ADMIN — HEPARIN SODIUM 5000 UNITS: 5000 INJECTION INTRAVENOUS; SUBCUTANEOUS at 08:06

## 2022-06-21 RX ADMIN — HEPARIN SODIUM 5000 UNITS: 5000 INJECTION INTRAVENOUS; SUBCUTANEOUS at 09:06

## 2022-06-21 RX ADMIN — SODIUM CHLORIDE: 0.9 INJECTION, SOLUTION INTRAVENOUS at 03:06

## 2022-06-21 RX ADMIN — Medication 75 MCG/HR: at 07:06

## 2022-06-21 RX ADMIN — PROPOFOL 25 MCG/KG/MIN: 10 INJECTION, EMULSION INTRAVENOUS at 03:06

## 2022-06-21 RX ADMIN — OXACILLIN SODIUM 12 G: 10 INJECTION, POWDER, FOR SOLUTION INTRAVENOUS at 09:06

## 2022-06-21 RX ADMIN — CHLORHEXIDINE GLUCONATE 0.12% ORAL RINSE 15 ML: 1.2 LIQUID ORAL at 01:06

## 2022-06-21 RX ADMIN — MIDAZOLAM 1 MG/HR: 5 INJECTION INTRAMUSCULAR; INTRAVENOUS at 11:06

## 2022-06-21 RX ADMIN — DEXMEDETOMIDINE HYDROCHLORIDE 0.7 MCG/KG/HR: 4 INJECTION INTRAVENOUS at 01:06

## 2022-06-21 RX ADMIN — PANTOPRAZOLE SODIUM 40 MG: 40 GRANULE, DELAYED RELEASE ORAL at 08:06

## 2022-06-21 RX ADMIN — DEXMEDETOMIDINE HYDROCHLORIDE 0.9 MCG/KG/HR: 4 INJECTION INTRAVENOUS at 07:06

## 2022-06-21 RX ADMIN — CHLORHEXIDINE GLUCONATE 0.12% ORAL RINSE 15 ML: 1.2 LIQUID ORAL at 08:06

## 2022-06-21 RX ADMIN — HEPARIN SODIUM 5000 UNITS: 5000 INJECTION INTRAVENOUS; SUBCUTANEOUS at 01:06

## 2022-06-21 RX ADMIN — POTASSIUM BICARBONATE 25 MEQ: 978 TABLET, EFFERVESCENT ORAL at 08:06

## 2022-06-21 RX ADMIN — MIDAZOLAM 2 MG: 1 INJECTION INTRAMUSCULAR; INTRAVENOUS at 10:06

## 2022-06-21 NOTE — ASSESSMENT & PLAN NOTE
IV drug abuse continues in LTAC; direct etiology of cardiac arrest  Continue to encourage cessation efforts; grim survival prognosis with continued IV drug use given current health problems

## 2022-06-21 NOTE — PROGRESS NOTES
[facility] - Intensive Care ([facility])  [facility] Medicine  Progress Note    Patient Name: Tianna Leon  MRN: [id number]  Patient Class: IP- Inpatient   Admission Date: 6/19/2022  Length of Stay: 2 days  Attending Physician: Aj Olguin MD  Primary Care Provider: Spenser Campa MD        Subjective:     Principal Problem:Cardiac arrest        HPI:  Ms. Leon is a 37 year old  female with long standing history of IV drug abuse, was recently discharged from this facility on 6/10/22 after a 35 day hospital stay. During previous admission she was found to have MSSA Tricuspid Endocarditis, treated on oxacillin (to be continued until 7/10/22). Also had right empyema, s/p VATS and chest tube. She was COVID positive during that admission. She was discharged to [facility] on 6/10. Today she was found unresponsive after she apparently injected an unknown substance into her left PICC line at the facility. She coded, received one round of epi prior to ROSC. Intubated prior to arrival. WBC 44,000, bands 19%. Lactic acid 4.0  Cr 1.8 CO2 14. CXR multifocal infiltrates. BP stable. On propofol. Received IV Zosyn.     Cardiac Arrest. Continued IV drug abuse. Tricuspid endocarditis. MSSA bacteremia. Right Empyema.      Overview/Hospital Course:  37 F WF, Hx of IVDA, MDD, bipolar disorder, anxiety, PTSD, suicidal ideation, COPD, ADHD, asthma, cervical cancer, GERD, Hep C, HLD who was d/uri from this hosp on 6/10 following 35 day admission during which she was treated for MSSA tricuspid endocarditis and right empyema, s/p VATS/decortication. She was also COVID + during admission. On 6/10, she was d/c to [facility] and continued on oxacillin (to continue through 7/10/22) via PICC line. Yesterday, she was found unresponsive in cardiac arrest at [facility]. She was resuscitated after 1 round of Epi and arrived intubated. Report given to ED was that patient injected an unknown substance into her PICC line at [facility].   In ED, Lactate 4.0,  ABG 7.215/47.8/75/19.3, WBC 44.18, Hgb 8.4, Cr 1.8, AST 53, , troponin 0.184, Tox positive for opiates and amphetamine. CXR w/multifocal infiltrates, CT head w/no acute finding. Given Vancomycin and Cefepime and placed on sedation for mechanical ventilation. Admitted to ICU for Cardiac Arrest. Continued IVDA. Tricuspid endocarditis. MSSA bacteremia. Right Empyema s/p VATS.    6/20- Remains intubated and sedated on mechanical vent. Failed brief SAT/SBT with tachycardia, tachypnea. WBC down to 16, H/H dropped to 6.6/21- got  a unit of blood, H/H improved to 7.4/23. Bun/Cr improved 22/1.4. Troponin coming down- likely demand ischemia from Cardiac Arrest as a result of IV drug.      6/21- Appreciate ICU team. Pt remains intubated, sedated on vent, was on Fentanyl/Diprivan this am, Again failed SAT/SBT this am, also TG nearly 426, hence sedation lowered and switched Diprivan to Precedex and will try SAT/SBT. WBC down to 11, H/H 7/22, HCO3 21, Bun down to 24. Await repeat SAT/SBT. Good Urine output. Abx were changed to Oxacillin again yesterday due to her hx of MSSA.      Interval History: Pt remains intubated, sedated on vent, was on Fentanyl/Diprivan this am, Again failed SAT/SBT this am, also TG nearly 426, hence sedation lowered and switched Diprivan to Precedex and will try SAT/SBT. WBC down to 11, H/H 7/22, HCO3 21, Bun down to 24. Await repeat SAT/SBT. Good Urine output. Abx were changed to Oxacillin again yesterday due to her Hx of MSSA.     Review of Systems   Unable to perform ROS: Intubated   Objective:     Vital Signs (Most Recent):  Temp: 98.3 °F (36.8 °C) (06/21/22 1130)  Pulse: 66 (06/21/22 1400)  Resp: (!) 22 (06/21/22 1400)  BP: 138/87 (06/21/22 1400)  SpO2: 99 % (06/21/22 1400)   Vital Signs (24h Range):  Temp:  [98.1 °F (36.7 °C)-98.7 °F (37.1 °C)] 98.3 °F (36.8 °C)  Pulse:  [] 66  Resp:  [22-43] 22  SpO2:  [92 %-100 %] 99 %  BP: (110-181)/() 138/87     Weight: 86.3 kg (190 lb 4.1  oz)  Body mass index is 31.66 kg/m².    Intake/Output Summary (Last 24 hours) at 6/21/2022 1439  Last data filed at 6/21/2022 1400  Gross per 24 hour   Intake 2903.85 ml   Output 1995 ml   Net 908.85 ml      Physical Exam  Vitals and nursing note reviewed.   Constitutional:       Appearance: She is obese. She is ill-appearing and toxic-appearing.      Interventions: She is sedated, intubated and restrained.   HENT:      Mouth/Throat:      Mouth: Mucous membranes are moist.      Pharynx: Oropharynx is clear.   Eyes:      Pupils: Pupils are equal, round, and reactive to light.   Neck:      Vascular: No JVD.   Cardiovascular:      Rate and Rhythm: Normal rate and regular rhythm.      Pulses:           Radial pulses are 2+ on the right side and 2+ on the left side.        Dorsalis pedis pulses are 2+ on the right side and 2+ on the left side.   Pulmonary:      Effort: She is intubated.      Breath sounds: Decreased breath sounds present.      Comments: Breath sounds coarse  Chest:       Abdominal:      General: Bowel sounds are decreased.      Palpations: Abdomen is soft.   Musculoskeletal:      Cervical back: Neck supple.      Right lower leg: No edema.      Left lower leg: No edema.   Skin:     General: Skin is warm and dry.      Capillary Refill: Capillary refill takes less than 2 seconds.   Neurological:      GCS: GCS eye subscore is 4. GCS verbal subscore is 1. GCS motor subscore is 5.        Vent Mode: A/C  Oxygen Concentration (%):  [30] 30  Resp Rate Total:  [22 br/min-56 br/min] 22 br/min  Vt Set:  [420 mL] 420 mL  PEEP/CPAP:  [5 cmH20] 5 cmH20  Pressure Support:  [0 cmH20] 0 cmH20  Mean Airway Pressure:  [7.7 wfC69-13 cmH20] 11 cmH20  Temp:  [98.1 °F (36.7 °C)-98.7 °F (37.1 °C)] 98.3 °F (36.8 °C)  Pulse:  [] 66  Resp:  [22-43] 22  SpO2:  [92 %-100 %] 99 %  BP: (110-181)/() 138/87   Art pH/pCO2/pO2/HCO3:  7.398/35.6/96/21.9 (06/21 3045)  Lab Results   Component Value Date    [id number] Negative  06/19/2022    [id number] Positive (A) 05/31/2022    PKB11QYRLXZX Negative 05/06/2022      Recent Labs   Lab 06/19/22 1953 06/19/22 2010 06/19/22 2010 06/19/22  2104 06/19/22  2321 06/20/22  0512 06/20/22  1244 06/21/22  0517   LACTATE 4.0*  --   --   --  1.6 0.7  --   --    TROPONINI  --   --   --  0.184*  --  0.126*  --   --    NA  --  141  --   --   --  145  --  146*   WBC  --  44.18*  --   --   --  16.49*  --  11.41   GRAN  --  67.0   < >  --   --  76.0*  12.6*  --  52.5  6.0   LYMPH  --  7.0*  CANCELED   < >  --   --  15.8*  2.6  --  32.9  3.8   HGB  --  8.4*   < >  --   --  6.6* 7.4* 7.1*   HCT  --  26.7*   < >  --   --  21.1* 22.8* 22.4*   BUN  --  23*  --   --   --  22*  --  24*   CREATININE  --  1.8*  --   --   --  1.4  --  1.2   ESTGFRAFRICA  --  40*  --   --   --  55*  --  >60   EGFRNONAA  --  35*  --   --   --  47*  --  57*   K  --  4.6  --   --   --  3.6  --  3.4*   CL  --  110  --   --   --  110  --  116*   CO2  --  14*  --   --   --  21*  --  21*   MG  --   --   --   --   --  1.6  --  2.1    < > = values in this interval not displayed.     Microbiology Results (last 7 days)       Procedure Component Value Units Date/Time    Blood culture x two cultures. Draw prior to antibiotics. [[id number]] Collected: 06/19/22 2030    Order Status: Completed Specimen: Blood from Peripheral, Antecubital, Right Updated: 06/21/22 0812     Blood Culture, Routine No Growth to date      No Growth to date    Narrative:      Aerobic and anaerobic    Blood culture x two cultures. Draw prior to antibiotics. [[id number]] Collected: 06/19/22 2105    Order Status: Completed Specimen: Blood from Peripheral, Wrist, Right Updated: 06/21/22 0812     Blood Culture, Routine No Growth to date      No Growth to date    Narrative:      Aerobic and anaerobic    Culture, Respiratory with Gram Stain [[id number]] Collected: 06/20/22 1350    Order Status: Completed Specimen: Respiratory from Endotracheal Aspirate Updated: 06/21/22 0320      Gram Stain (Respiratory) <10 epithelial cells per low power field.     Gram Stain (Respiratory) Moderate WBC's     Gram Stain (Respiratory) No organisms seen          Antibiotics (From admission, onward)                Start     Stop Route Frequency Ordered    06/20/22 0900  mupirocin 2 % ointment  (DECOLONIZATION PROTOCOL ORDERS)         06/25 0859 Nasl 2 times daily 06/20/22 0750    06/20/22 0900  oxacillin 12 g in  mL CONTINUOUS INFUSION         07/11 2359 IV Every 24 hours (non-standard times) 06/20/22 0752          Anticoagulants       Ordered     Route Frequency Start Stop    06/19/22 2233  heparin (porcine)         SubQ Every 8 hours 06/19/22 2245 --          CT Head Without Contrast  Narrative: EXAMINATION:  CT HEAD WITHOUT CONTRAST    CLINICAL HISTORY:  Mental status change, unknown cause;    TECHNIQUE:  Low dose axial CT images obtained throughout the head without intravenous contrast. Sagittal and coronal reconstructions were performed.    COMPARISON:  Multiple priors.    FINDINGS:  Intracranial compartment:    Ventricles and sulci are normal in size for age without evidence of hydrocephalus. No extra-axial blood or fluid collections.    Gray-white differentiation is fairly well preserved.  No parenchymal mass, hemorrhage, edema or major vascular distribution infarct.    Skull/extracranial contents (limited evaluation): No fracture. Mastoid air cells and paranasal sinuses are essentially clear.  Impression: No definite acute intracranial CT abnormality.  Clinical correlation and further evaluation as warranted.    All CT scans at this facility are performed  using dose modulation techniques as appropriate to performed exam including the following:  automated exposure control; adjustment of mA and/or kV according to the patients size (this includes techniques or standardized protocols for targeted exams where dose is matched to indication/reason for exam: i.e. extremities or head);  iterative  reconstruction technique.    Electronically signed by: Demario Morin  Date:    06/19/2022  Time:    22:59  X-Ray Chest 1 View  Narrative: EXAMINATION:  XR CHEST 1 VIEW    CLINICAL HISTORY:  Encounter for fitting and adjustment of other gastrointestinal appliance and device    TECHNIQUE:  Single frontal view of the chest was performed.    COMPARISON:  None    FINDINGS:  Endotracheal tube tip in improved position, 3.5 cm from the august.  Enteric tube tip and side hole overlie the stomach in good position.  Left PICC line tip in good position.    Lungs unchanged.    The cardiac silhouette is normal in size. The hilar and mediastinal contours are unremarkable.    Bones are intact.  Impression: As above.    Electronically signed by: Demario Morin  Date:    06/19/2022  Time:    20:58  X-Ray Chest 1 View  Narrative: EXAMINATION:  XR CHEST 1 VIEW    CLINICAL HISTORY:  Presence of other specified devices    TECHNIQUE:  Single frontal view of the chest was performed.    COMPARISON:  Multiple priors.    FINDINGS:  Endo tracheal tube tip 2.5 cm from the august.  Consider retraction by 1 cm.  Enteric tube tip and side hole below the diaphragm.    Right greater than left pulmonary opacities concerning for infection.  Septic emboli could be considered.    The cardiac silhouette is normal in size. The hilar and mediastinal contours are unremarkable.    Bones are intact.  Impression: As above.    This report was flagged in Epic as abnormal.    Electronically signed by: Demario Morin  Date:    06/19/2022  Time:    19:33   Significant Labs: All pertinent labs within the past 24 hours have been reviewed.    Significant Imaging: I have reviewed all pertinent imaging results/findings within the past 24 hours.      Assessment/Plan:      * Cardiac arrest  -IV drug use at LTAC  -BP stable at this time, not requiring pressors  -consult pulmonary for vent management    Doing better, on Vent, hemodynamically stable    Day 3 post cardiac  arrest- on vent, trying to extubate    Acute respiratory failure with hypoxia and hypercapnia on Vent s/p Cardiac Arrest  Patient with Hypercapnic Respiratory failure which is Acute.  she is not on home oxygen. Supplemental oxygen was provided and noted- Vent Mode: A/C  Oxygen Concentration (%):  [30] 30  Resp Rate Total:  [22 br/min-56 br/min] 22 br/min  Vt Set:  [420 mL] 420 mL  PEEP/CPAP:  [5 cmH20] 5 cmH20  Pressure Support:  [0 cmH20] 0 cmH20  Mean Airway Pressure:  [7.7 zqT14-91 cmH20] 11 cmH20.   Signs/symptoms of respiratory failure include- on vent, AMS. Contributing diagnoses includes - Interstitial lung disease and Pneumonia Labs and images were reviewed. Patient Has recent ABG, which has been reviewed. Will treat underlying causes and adjust management of respiratory failure as follows- Vent support, nebs, IV Abx    6/20- continue vent support as she failed SAT/SBT this am  6/21- trying to extubate, failed SAT/SBT this am    On mechanically assisted ventilation  See resp failure above      Normocytic anemia  H/H dropped to 6.6- Transfused 1 unit- now 7.4/23    No active bleeding, Anemia of Chronic Disease      BEE (acute kidney injury)  -IV fluids  -Labs in AM    improving    resolved    Endocarditis of tricuspid valve  -MSSA bacteremia during previous admission  -Follow up on repeat blood cultures  -IV vanc and cefepime empirically for now  -ID consult    Cont Vanc/Cefepime    Changed to Oxacillin yesterday      MSSA bacteremia  -during previous admission, was supposed to be on oxacillin until 7/10/22  -repeat cultures pending  -ID consult    May resume Oxacillin soon, if blood Cx remain NGTD    Resumed yesterday    IVDU (intravenous drug user)  -continued active IV drug use while at [facility]      Pulmonary embolism, septic         Acute encephalopathy  S/p cardiac arrest and on vent on Sedation      Borderline personality disorder         VTE Risk Mitigation (From admission, onward)         Ordered      heparin (porcine) injection 5,000 Units  Every 8 hours         06/19/22 2233     Place sequential compression device  Until discontinued         06/19/22 2233                Discharge Planning   YESSICA:      Code Status: Prior   Is the patient medically ready for discharge?:     Reason for patient still in hospital (select all that apply): Patient trending condition, Laboratory test, Treatment, Imaging and Consult recommendations  Discharge Plan A: Other (patient remains critically ill at this time, pending clinical improvement for d/c planning.)        Seen and discussed with Dr. San and the ICU team  Condition: Critical  Prognosis: Guarded to poor      Critical care time spent on the evaluation and treatment of severe organ dysfunction, review of pertinent labs and imaging studies, discussions with consulting providers and discussions with patient/family: 37 minutes.      Aj Olguin MD  Department of Hospital Medicine   [facility] - Intensive Care ([facility])

## 2022-06-21 NOTE — PROGRESS NOTES
[facility] ([facility])  Critical Care Medicine  Progress Note    Patient Name: Tianna Leon  MRN: [id number]  Admission Date: 6/19/2022  Hospital Length of Stay: 2 days  Code Status: Prior  Attending Physician: Aj Olguin MD   Primary Care Provider: Spenser Campa MD   Principal Problem: Cardiac arrest    Subjective:     HPI:  37 F w/pmh significant for IV drug abuse, MDD, bipolar disorder, anxiety, PTSD, suicidal ideation, COPD, ADHD, anxiety, asthma, cervical cancer, GERD, Hep C, HLD.   She was d/c from OSH on 6/19 following 35 day admission during which she was treated for MSSA tricuspid endocarditis and right empyema, s/p VATS/decortication. She was also COVID + during admission. [facility], [facility] and continued on oxacillin (to continue through 7/10/22) via PICC line.     Yesterday, she was found unresponsive in cardiac arrest at LTAC. She was resuscitated after 1 round of Epi and arrived intubated. Report given to ED was that patient injected an unknown substance into her PICC line at LTAC.   In ED, labs significant for lactate of 4.0, ABG pH 7.215, PCO2 47.8, PO2 75, HCO3 19.3, WBC 44.18, Hgb 8.4, Cr 1.8, AST 53, , troponin 0.184, tox positive for opiates and amphetamine.   CXR w/multifocal infiltrates, CT head w/no acute finding.   Given vancomycin and cefepime and placed on sedation for mechanical ventilation.       Hospital/ICU Course:  Admitted to ICU @ 2128.   6/20 - Seen and examined at start of shift. Remains intubated and sedated on mechanical vent. Failed brief SAT/SBT with tachycardia, tachypnea   6/21 - Remains intubated/sedated on mechanical vent with minimal oxygen demand. Continues failing SAT/SBT with tachypnea, tachycardia. Sedation changed to precedex            Review of Systems   Unable to perform ROS: Intubated   Objective:     Vital Signs (Most Recent):  Temp: 98.7 °F (37.1 °C) (06/21/22 0300)  Pulse: 68 (06/21/22 0600)  Resp: (!) 22 (06/21/22  0600)  BP: 133/84 (06/21/22 0600)  SpO2: 99 % (06/21/22 0600)   Vital Signs (24h Range):  Temp:  [98.1 °F (36.7 °C)-98.7 °F (37.1 °C)] 98.7 °F (37.1 °C)  Pulse:  [] 68  Resp:  [18-40] 22  SpO2:  [96 %-100 %] 99 %  BP: (110-219)/() 133/84     Weight: 86.3 kg (190 lb 4.1 oz)  Body mass index is 31.66 kg/m².      Intake/Output Summary (Last 24 hours) at 6/21/2022 0723  Last data filed at 6/21/2022 0600  Gross per 24 hour   Intake 4543 ml   Output 1935 ml   Net 2608 ml        sodium chloride 0.9% 10 mL/hr at 06/21/22 0600    dexmedetomidine (PRECEDEX) infusion 0.5 mcg/kg/hr (06/21/22 0811)    fentanyl 50 mcg/hr (06/21/22 0600)       Physical Exam  Vitals and nursing note reviewed.   Constitutional:       Appearance: She is obese. She is ill-appearing and toxic-appearing.      Interventions: She is sedated, intubated and restrained.   HENT:      Mouth/Throat:      Mouth: Mucous membranes are moist.      Pharynx: Oropharynx is clear.   Eyes:      Pupils: Pupils are equal, round, and reactive to light.   Neck:      Vascular: No JVD.   Cardiovascular:      Rate and Rhythm: Normal rate and regular rhythm.      Pulses:           Radial pulses are 2+ on the right side and 2+ on the left side.        Dorsalis pedis pulses are 2+ on the right side and 2+ on the left side.   Pulmonary:      Effort: She is intubated.      Breath sounds: Decreased breath sounds present.      Comments: Breath sounds coarse  Chest:       Abdominal:      General: Bowel sounds are decreased.      Palpations: Abdomen is soft.   Musculoskeletal:      Cervical back: Neck supple.      Right lower leg: No edema.      Left lower leg: No edema.   Skin:     General: Skin is warm and dry.      Capillary Refill: Capillary refill takes less than 2 seconds.   Neurological:      GCS: GCS eye subscore is 4. GCS verbal subscore is 1. GCS motor subscore is 5.       Vents:  Vent Mode: A/C (06/21/22 0520)  Ventilator Initiated: Yes (06/19/22 1915)  Set  Rate: 22 BPM (06/21/22 0520)  Vt Set: 420 mL (06/21/22 0520)  Pressure Support: 0 cmH20 (06/21/22 0520)  PEEP/CPAP: 5 cmH20 (06/21/22 0520)  Oxygen Concentration (%): 30 (06/21/22 0600)  Peak Airway Pressure: 24 cmH2O (06/21/22 0520)  Plateau Pressure: 22 cmH20 (06/21/22 0520)  Total Ve: 9.44 mL (06/21/22 0520)  F/VT Ratio<105 (RSBI): (!) 51.28 (06/21/22 0520)    Lines/Drains/Airways       Peripherally Inserted Central Catheter Line  Duration             PICC Double Lumen -- days              Drain  Duration                  NG/OG Tube 06/19/22 1916 [place] sump 14 Fr. Center mouth 1 day         Urethral Catheter 06/19/22 2039 Latex 16 Fr. 1 day              Airway  Duration                  Airway - Non-Surgical 06/19/22 2306 1 day              Peripheral Intravenous Line  Duration                  Peripheral IV - Single Lumen 06/19/22 1921 20 G Right Shoulder 1 day                    Significant Labs:    CBC/Anemia Profile:  Recent Labs   Lab 06/19/22 2010 06/20/22  0512 06/20/22  1244 06/21/22  0517   WBC 44.18* 16.49*  --  11.41   HGB 8.4* 6.6* 7.4* 7.1*   HCT 26.7* 21.1* 22.8* 22.4*   * 323  --  326   MCV 88 85  --  86   RDW 16.0* 15.9*  --  16.4*          Chemistries:  Recent Labs   Lab 06/19/22 2010 06/20/22  0512 06/21/22  0517    145 146*   K 4.6 3.6 3.4*    110 116*   CO2 14* 21* 21*   BUN 23* 22* 24*   CREATININE 1.8* 1.4 1.2   CALCIUM 8.3* 8.7 8.6*   ALBUMIN 2.0* 1.9*  --    PROT 6.7 6.4  --    BILITOT 0.2 0.2  --    ALKPHOS 135 112  --    ALT 37 34  --    AST 53* 37  --    MG  --  1.6 2.1         All pertinent labs within the past 24 hours have been reviewed.    Significant Imaging:   I have reviewed all pertinent imaging results/findings within the past 24 hours.      ABG  Recent Labs   Lab 06/21/22  0359   PH 7.398   PO2 96   PCO2 35.6   HCO3 21.9*   BE -3     Assessment/Plan:     Neuro  Acute encephalopathy  Suspect S/t illicit drug injection to PICC leading to brief cardiac  arrest  During sedation vacation eyes open spontaneously. Does not track or follow commands  SAT daily; repeat again this afternoon  If not improvement/fails to extubate, repeat CT head tomorrow    Psychiatric  IVDU (intravenous drug user)  IV drug abuse continues in LTAC; direct etiology of cardiac arrest  Continue to encourage cessation efforts; grim survival prognosis with continued IV drug use given current health problems    Pulmonary  Acute respiratory failure with hypoxia and hypercapnia on Vent s/p Cardiac Arrest  S/t illicit IV drug injection  Vent settings reviewed adjusted for optimal gas exchange  Sedation for vent control  abg daily and prn to assess therapy  Daily SAT/SBT for extubation readiness    On mechanically assisted ventilation  See full plan under resp failure  VAP prophylaxis  Daily SAT/SBT for extubation readiness    S/P VATS on 6/6/2022  POD15 with sutures remaining to Rt chest wall (CT sites) x 2  Will confirm with CTS and remove sutures    COPD (chronic obstructive pulmonary disease)  Duo-neb q 4 prn    Cardiac/Vascular  * Cardiac arrest  S/t illicit IV injection  ROSC after 1 round of epi  Hemodynamically stable    Endocarditis of tricuspid valve  MSSA bacteremia treatment in place from prior admit  ID consult; discharge plan was for oxacillin continuous to complete on 7/11/2022  Repeat blood cultures no growth to date  Continue Oxacillin    Hypertriglyceridemia  Triglycerides 426 this morning after 24 hours of propofol infusion  D/c propofol  Re-check in on 6/23    Renal/  BEE (acute kidney injury)  Resolved with support  Monitor creatinine   Strict I&O    ID  MSSA bacteremia  On previous admission s/t IVDU  Continue scheudled Oxacillin scheuled until 7/10/22  Repeat blood cultures no growth to date    Hematology  Pulmonary embolism, septic  Noted on prior admit  Likely etiology of infiltrates on current chest xray  Continue oxacillin as above    Oncology  Normocytic  anemia  Transfused one unit PRBC 6/20  Hgb 7.1 this morning; no overt bleeding  Monitor w/daily cbc      Critical Care Daily Checklist:    A: Awake: RASS Goal/Actual Goal: RASS Goal: -2-->light sedation  Actual: [place] Agitation Sedation Scale (RASS): Light sedation   B: Spontaneous Breathing Trial Performed?     C: SAT & SBT Coordinated?  failed                      D: Delirium: CAM-ICU Overall CAM-ICU: Positive   E: Early Mobility Performed? Yes   F: Feeding Goal: Goals: 1. Enteral Nutrition initiation within 24 hours.  Status: Nutrition Goal Status: new   Current Diet Order   Procedures    Diet NPO      AS: Analgesia/Sedation Fentanyl, precedex   T: Thromboembolic Prophylaxis heparin   H: HOB > 300 Yes   U: Stress Ulcer Prophylaxis (if needed) PPI   G: Glucose Control monitor   B: Bowel Function Stool Occurrence: 1   I: Indwelling Catheter (Lines & Portillo) Necessity reviewed   D: De-escalation of Antimicrobials/Pharmacotherapies reviewed    Plan for the day/ETD As above    Family/Goals of Care: Return to LTAC for continued abx on discharge   I have discussed case and plan of care in detail with Dr Jaswinder Blandon, and Dr Olguin; Status and plan of care were discussed with team on multidisciplinary rounds.    Critical Care Time: 60 minutes  Critical secondary to respiratory failure on mechanical vent after cardiac arrest s/t IVDU   Critical care was time spent personally by me on the following activities: development of treatment plan with patient or surrogate and bedside caregivers, discussions with consultants, evaluation of patient's response to treatment, examination of patient, ordering and performing treatments and interventions, ordering and review of laboratory studies, ordering and review of radiographic studies, pulse oximetry, re-evaluation of patient's condition. This critical care time did not overlap with that of any other provider or involve time for any procedures.      Angelita Kirkland,  ACNP-BC  Critical Care Medicine  [facility] ([facility])

## 2022-06-21 NOTE — PROGRESS NOTES
Recommendations: 6/21    1. Recommend to consider Peptamen Intense VHP:   -Goal rate @ 50mL/hr.   -Provides 1200 calories, 110g protein, and 1008mL H2O.   -100mL H2O flush q 4-6 hours or per MD/NP.   -Check Mg, K+, Na, Phos, and Glucose before and during initiation;Correct as indicated.     Thanks,  Kailey Rice RD,LDN

## 2022-06-21 NOTE — ASSESSMENT & PLAN NOTE
MSSA bacteremia treatment in place from prior admit  ID consult; discharge plan was for oxacillin continuous to complete on 7/11/2022  Repeat blood cultures no growth to date  Continue Oxacillin

## 2022-06-21 NOTE — PLAN OF CARE
Plan of care reviewed with pt and pt's mother at bedside.   Pt remains on vent support as well as cont sedation precedex and fentanyl to PICC.  Cont abx infusing to PIV.  Pt failed SAT/SBT this am.   VSS.  TF initiated this shift to OGT.   Portillo cath in place draining cloudy yellow urine with sediment noted.  BM this shift, incontinence care provided.  Turned/repositioned q2hr with wedge and pillow support. Heels floated. SCDs in use.  Plans for another SAT/SBT in am and repeat head CT if pt fails again.

## 2022-06-21 NOTE — SUBJECTIVE & OBJECTIVE
Review of Systems   Unable to perform ROS: Intubated   Objective:     Vital Signs (Most Recent):  Temp: 98.7 °F (37.1 °C) (06/21/22 0300)  Pulse: 68 (06/21/22 0600)  Resp: (!) 22 (06/21/22 0600)  BP: 133/84 (06/21/22 0600)  SpO2: 99 % (06/21/22 0600)   Vital Signs (24h Range):  Temp:  [98.1 °F (36.7 °C)-98.7 °F (37.1 °C)] 98.7 °F (37.1 °C)  Pulse:  [] 68  Resp:  [18-40] 22  SpO2:  [96 %-100 %] 99 %  BP: (110-219)/() 133/84     Weight: 86.3 kg (190 lb 4.1 oz)  Body mass index is 31.66 kg/m².      Intake/Output Summary (Last 24 hours) at 6/21/2022 0723  Last data filed at 6/21/2022 0600  Gross per 24 hour   Intake 4543 ml   Output 1935 ml   Net 2608 ml        sodium chloride 0.9% 10 mL/hr at 06/21/22 0600    dexmedetomidine (PRECEDEX) infusion 0.5 mcg/kg/hr (06/21/22 0811)    fentanyl 50 mcg/hr (06/21/22 0600)       Physical Exam  Vitals and nursing note reviewed.   Constitutional:       Appearance: She is obese. She is ill-appearing and toxic-appearing.      Interventions: She is sedated, intubated and restrained.   HENT:      Mouth/Throat:      Mouth: Mucous membranes are moist.      Pharynx: Oropharynx is clear.   Eyes:      Pupils: Pupils are equal, round, and reactive to light.   Neck:      Vascular: No JVD.   Cardiovascular:      Rate and Rhythm: Normal rate and regular rhythm.      Pulses:           Radial pulses are 2+ on the right side and 2+ on the left side.        Dorsalis pedis pulses are 2+ on the right side and 2+ on the left side.   Pulmonary:      Effort: She is intubated.      Breath sounds: Decreased breath sounds present.      Comments: Breath sounds coarse  Chest:       Abdominal:      General: Bowel sounds are decreased.      Palpations: Abdomen is soft.   Musculoskeletal:      Cervical back: Neck supple.      Right lower leg: No edema.      Left lower leg: No edema.   Skin:     General: Skin is warm and dry.      Capillary Refill: Capillary refill takes less than 2 seconds.    Neurological:      GCS: GCS eye subscore is 4. GCS verbal subscore is 1. GCS motor subscore is 5.       Vents:  Vent Mode: A/C (06/21/22 0520)  Ventilator Initiated: Yes (06/19/22 1915)  Set Rate: 22 BPM (06/21/22 0520)  Vt Set: 420 mL (06/21/22 0520)  Pressure Support: 0 cmH20 (06/21/22 0520)  PEEP/CPAP: 5 cmH20 (06/21/22 0520)  Oxygen Concentration (%): 30 (06/21/22 0600)  Peak Airway Pressure: 24 cmH2O (06/21/22 0520)  Plateau Pressure: 22 cmH20 (06/21/22 0520)  Total Ve: 9.44 mL (06/21/22 0520)  F/VT Ratio<105 (RSBI): (!) 51.28 (06/21/22 0520)    Lines/Drains/Airways       Peripherally Inserted Central Catheter Line  Duration             PICC Double Lumen -- days              Drain  Duration                  NG/OG Tube 06/19/22 1916 [place] sump 14 Fr. Center mouth 1 day         Urethral Catheter 06/19/22 2039 Latex 16 Fr. 1 day              Airway  Duration                  Airway - Non-Surgical 06/19/22 2306 1 day              Peripheral Intravenous Line  Duration                  Peripheral IV - Single Lumen 06/19/22 1921 20 G Right Shoulder 1 day                    Significant Labs:    CBC/Anemia Profile:  Recent Labs   Lab 06/19/22 2010 06/20/22  0512 06/20/22  1244 06/21/22  0517   WBC 44.18* 16.49*  --  11.41   HGB 8.4* 6.6* 7.4* 7.1*   HCT 26.7* 21.1* 22.8* 22.4*   * 323  --  326   MCV 88 85  --  86   RDW 16.0* 15.9*  --  16.4*          Chemistries:  Recent Labs   Lab 06/19/22 2010 06/20/22  0512 06/21/22  0517    145 146*   K 4.6 3.6 3.4*    110 116*   CO2 14* 21* 21*   BUN 23* 22* 24*   CREATININE 1.8* 1.4 1.2   CALCIUM 8.3* 8.7 8.6*   ALBUMIN 2.0* 1.9*  --    PROT 6.7 6.4  --    BILITOT 0.2 0.2  --    ALKPHOS 135 112  --    ALT 37 34  --    AST 53* 37  --    MG  --  1.6 2.1         All pertinent labs within the past 24 hours have been reviewed.    Significant Imaging:   I have reviewed all pertinent imaging results/findings within the past 24 hours.

## 2022-06-21 NOTE — SUBJECTIVE & OBJECTIVE
Interval History: Pt remains intubated, sedated on vent, was on Fentanyl/Diprivan this am, Again failed SAT/SBT this am, also TG nearly 426, hence sedation lowered and switched Diprivan to Precedex and will try SAT/SBT. WBC down to 11, H/H 7/22, HCO3 21, Bun down to 24. Await repeat SAT/SBT. Good Urine output. Abx were changed to Oxacillin again yesterday due to her Hx of MSSA.     Review of Systems   Unable to perform ROS: Intubated   Objective:     Vital Signs (Most Recent):  Temp: 98.3 °F (36.8 °C) (06/21/22 1130)  Pulse: 66 (06/21/22 1400)  Resp: (!) 22 (06/21/22 1400)  BP: 138/87 (06/21/22 1400)  SpO2: 99 % (06/21/22 1400)   Vital Signs (24h Range):  Temp:  [98.1 °F (36.7 °C)-98.7 °F (37.1 °C)] 98.3 °F (36.8 °C)  Pulse:  [] 66  Resp:  [22-43] 22  SpO2:  [92 %-100 %] 99 %  BP: (110-181)/() 138/87     Weight: 86.3 kg (190 lb 4.1 oz)  Body mass index is 31.66 kg/m².    Intake/Output Summary (Last 24 hours) at 6/21/2022 1439  Last data filed at 6/21/2022 1400  Gross per 24 hour   Intake 2903.85 ml   Output 1995 ml   Net 908.85 ml      Physical Exam  Vitals and nursing note reviewed.   Constitutional:       Appearance: She is obese. She is ill-appearing and toxic-appearing.      Interventions: She is sedated, intubated and restrained.   HENT:      Mouth/Throat:      Mouth: Mucous membranes are moist.      Pharynx: Oropharynx is clear.   Eyes:      Pupils: Pupils are equal, round, and reactive to light.   Neck:      Vascular: No JVD.   Cardiovascular:      Rate and Rhythm: Normal rate and regular rhythm.      Pulses:           Radial pulses are 2+ on the right side and 2+ on the left side.        Dorsalis pedis pulses are 2+ on the right side and 2+ on the left side.   Pulmonary:      Effort: She is intubated.      Breath sounds: Decreased breath sounds present.      Comments: Breath sounds coarse  Chest:       Abdominal:      General: Bowel sounds are decreased.      Palpations: Abdomen is soft.    Musculoskeletal:      Cervical back: Neck supple.      Right lower leg: No edema.      Left lower leg: No edema.   Skin:     General: Skin is warm and dry.      Capillary Refill: Capillary refill takes less than 2 seconds.   Neurological:      GCS: GCS eye subscore is 4. GCS verbal subscore is 1. GCS motor subscore is 5.        Vent Mode: A/C  Oxygen Concentration (%):  [30] 30  Resp Rate Total:  [22 br/min-56 br/min] 22 br/min  Vt Set:  [420 mL] 420 mL  PEEP/CPAP:  [5 cmH20] 5 cmH20  Pressure Support:  [0 cmH20] 0 cmH20  Mean Airway Pressure:  [7.7 edV09-68 cmH20] 11 cmH20  Temp:  [98.1 °F (36.7 °C)-98.7 °F (37.1 °C)] 98.3 °F (36.8 °C)  Pulse:  [] 66  Resp:  [22-43] 22  SpO2:  [92 %-100 %] 99 %  BP: (110-181)/() 138/87   Art pH/pCO2/pO2/HCO3:  7.398/35.6/96/21.9 (06/21 0359)  Lab Results   Component Value Date    [id number] Negative 06/19/2022    [id number] Positive (A) 05/31/2022    [id number] Negative 05/06/2022      Recent Labs   Lab 06/19/22 1953 06/19/22 2010 06/19/22 2010 06/19/22  2104 06/19/22  2321 06/20/22  0512 06/20/22  1244 06/21/22  0517   LACTATE 4.0*  --   --   --  1.6 0.7  --   --    TROPONINI  --   --   --  0.184*  --  0.126*  --   --    NA  --  141  --   --   --  145  --  146*   WBC  --  44.18*  --   --   --  16.49*  --  11.41   GRAN  --  67.0   < >  --   --  76.0*  12.6*  --  52.5  6.0   LYMPH  --  7.0*  CANCELED   < >  --   --  15.8*  2.6  --  32.9  3.8   HGB  --  8.4*   < >  --   --  6.6* 7.4* 7.1*   HCT  --  26.7*   < >  --   --  21.1* 22.8* 22.4*   BUN  --  23*  --   --   --  22*  --  24*   CREATININE  --  1.8*  --   --   --  1.4  --  1.2   ESTGFRAFRICA  --  40*  --   --   --  55*  --  >60   EGFRNONAA  --  35*  --   --   --  47*  --  57*   K  --  4.6  --   --   --  3.6  --  3.4*   CL  --  110  --   --   --  110  --  116*   CO2  --  14*  --   --   --  21*  --  21*   MG  --   --   --   --   --  1.6  --  2.1    < > = values in this interval not displayed.      Microbiology Results (last 7 days)       Procedure Component Value Units Date/Time    Blood culture x two cultures. Draw prior to antibiotics. [[id number]] Collected: 06/19/22 2030    Order Status: Completed Specimen: Blood from Peripheral, Antecubital, Right Updated: 06/21/22 0812     Blood Culture, Routine No Growth to date      No Growth to date    Narrative:      Aerobic and anaerobic    Blood culture x two cultures. Draw prior to antibiotics. [[id number]] Collected: 06/19/22 2105    Order Status: Completed Specimen: Blood from Peripheral, Wrist, Right Updated: 06/21/22 0812     Blood Culture, Routine No Growth to date      No Growth to date    Narrative:      Aerobic and anaerobic    Culture, Respiratory with Gram Stain [[id number]] Collected: 06/20/22 1350    Order Status: Completed Specimen: Respiratory from Endotracheal Aspirate Updated: 06/21/22 0320     Gram Stain (Respiratory) <10 epithelial cells per low power field.     Gram Stain (Respiratory) Moderate WBC's     Gram Stain (Respiratory) No organisms seen          Antibiotics (From admission, onward)                Start     Stop Route Frequency Ordered    06/20/22 0900  mupirocin 2 % ointment  (DECOLONIZATION PROTOCOL ORDERS)         06/25 0859 Nasl 2 times daily 06/20/22 0750    06/20/22 0900  oxacillin 12 g in  mL CONTINUOUS INFUSION         07/11 2359 IV Every 24 hours (non-standard times) 06/20/22 0752          Anticoagulants       Ordered     Route Frequency Start Stop    06/19/22 2233  heparin (porcine)         SubQ Every 8 hours 06/19/22 2245 --          CT Head Without Contrast  Narrative: EXAMINATION:  CT HEAD WITHOUT CONTRAST    CLINICAL HISTORY:  Mental status change, unknown cause;    TECHNIQUE:  Low dose axial CT images obtained throughout the head without intravenous contrast. Sagittal and coronal reconstructions were performed.    COMPARISON:  Multiple priors.    FINDINGS:  Intracranial compartment:    Ventricles and sulci are  normal in size for age without evidence of hydrocephalus. No extra-axial blood or fluid collections.    Gray-white differentiation is fairly well preserved.  No parenchymal mass, hemorrhage, edema or major vascular distribution infarct.    Skull/extracranial contents (limited evaluation): No fracture. Mastoid air cells and paranasal sinuses are essentially clear.  Impression: No definite acute intracranial CT abnormality.  Clinical correlation and further evaluation as warranted.    All CT scans at this facility are performed  using dose modulation techniques as appropriate to performed exam including the following:  automated exposure control; adjustment of mA and/or kV according to the patients size (this includes techniques or standardized protocols for targeted exams where dose is matched to indication/reason for exam: i.e. extremities or head);  iterative reconstruction technique.    Electronically signed by: Demario Morin  Date:    06/19/2022  Time:    22:59  X-Ray Chest 1 View  Narrative: EXAMINATION:  XR CHEST 1 VIEW    CLINICAL HISTORY:  Encounter for fitting and adjustment of other gastrointestinal appliance and device    TECHNIQUE:  Single frontal view of the chest was performed.    COMPARISON:  None    FINDINGS:  Endotracheal tube tip in improved position, 3.5 cm from the august.  Enteric tube tip and side hole overlie the stomach in good position.  Left PICC line tip in good position.    Lungs unchanged.    The cardiac silhouette is normal in size. The hilar and mediastinal contours are unremarkable.    Bones are intact.  Impression: As above.    Electronically signed by: Demario Morin  Date:    06/19/2022  Time:    20:58  X-Ray Chest 1 View  Narrative: EXAMINATION:  XR CHEST 1 VIEW    CLINICAL HISTORY:  Presence of other specified devices    TECHNIQUE:  Single frontal view of the chest was performed.    COMPARISON:  Multiple priors.    FINDINGS:  Endo tracheal tube tip 2.5 cm from the august.  Consider  retraction by 1 cm.  Enteric tube tip and side hole below the diaphragm.    Right greater than left pulmonary opacities concerning for infection.  Septic emboli could be considered.    The cardiac silhouette is normal in size. The hilar and mediastinal contours are unremarkable.    Bones are intact.  Impression: As above.    This report was flagged in Epic as abnormal.    Electronically signed by: Demario Morin  Date:    06/19/2022  Time:    19:33   Significant Labs: All pertinent labs within the past 24 hours have been reviewed.    Significant Imaging: I have reviewed all pertinent imaging results/findings within the past 24 hours.

## 2022-06-21 NOTE — NURSING
Brief SAT/SBT performed. Pt became tachypneic and tachycardic. Fentanyl resumed at previous rate per direction of critical care NP at bedside. Pt currently on cont precedex and fentanyl titrated for RASS goal of -2. Per NP will reattempt SAT/SBT this afternoon and if fails again will initiate TF.

## 2022-06-21 NOTE — ASSESSMENT & PLAN NOTE
-during previous admission, was supposed to be on oxacillin until 7/10/22  -repeat cultures pending  -ID consult    May resume Oxacillin soon, if blood Cx remain NGTD    Resumed yesterday

## 2022-06-21 NOTE — ASSESSMENT & PLAN NOTE
On previous admission s/t IVDU  Continue scheudled Oxacillin scheuled until 7/10/22  Repeat blood cultures no growth to date

## 2022-06-21 NOTE — ASSESSMENT & PLAN NOTE
POD15 with sutures remaining to Rt chest wall (CT sites) x 2  Will confirm with CTS and remove sutures

## 2022-06-21 NOTE — ASSESSMENT & PLAN NOTE
-MSSA bacteremia during previous admission  -Follow up on repeat blood cultures  -IV vanc and cefepime empirically for now  -ID consult    Cont Vanc/Cefepime    Changed to Oxacillin yesterday

## 2022-06-21 NOTE — ASSESSMENT & PLAN NOTE
Suspect S/t illicit drug injection to PICC leading to brief cardiac arrest  During sedation vacation eyes open spontaneously. Does not track or follow commands  SAT daily; repeat again this afternoon  If not improvement/fails to extubate, repeat CT head tomorrow

## 2022-06-21 NOTE — ASSESSMENT & PLAN NOTE
Patient with Hypercapnic Respiratory failure which is Acute.  she is not on home oxygen. Supplemental oxygen was provided and noted- Vent Mode: A/C  Oxygen Concentration (%):  [30] 30  Resp Rate Total:  [22 br/min-56 br/min] 22 br/min  Vt Set:  [420 mL] 420 mL  PEEP/CPAP:  [5 cmH20] 5 cmH20  Pressure Support:  [0 cmH20] 0 cmH20  Mean Airway Pressure:  [7.7 pyW24-79 cmH20] 11 cmH20.   Signs/symptoms of respiratory failure include- on vent, AMS. Contributing diagnoses includes - Interstitial lung disease and Pneumonia Labs and images were reviewed. Patient Has recent ABG, which has been reviewed. Will treat underlying causes and adjust management of respiratory failure as follows- Vent support, nebs, IV Abx    6/20- continue vent support as she failed SAT/SBT this am  6/21- trying to extubate, failed SAT/SBT this am

## 2022-06-21 NOTE — PLAN OF CARE
RASS -2, sedated on propofol @ 25, fentanyl @ 50. Sedation paused for neuro assessment at start of shift. Positive cough, gag, and pupil reflexes. Pt has no eye opening, withdraws to painful stimulation in lower extremities only, no movement of upper extremities. After sedation was paused for approx an hour, pt became tachycardic, tachypneic, and diaphoretic. Sedation resumed at half of previous dose. Remains intubated and ventilated. NSR. BP stable. OGT to LIWS. No BM overnight. Voiding per sanchez catheter, tea colored urine, approx  mls/hr. Afebrile. Pt repositioned q2 hrs, heel boots in place.

## 2022-06-21 NOTE — ASSESSMENT & PLAN NOTE
H/H dropped to 6.6- Transfused 1 unit- now 7.4/23    No active bleeding, Anemia of Chronic Disease

## 2022-06-21 NOTE — ASSESSMENT & PLAN NOTE
-IV drug use at LTAC  -BP stable at this time, not requiring pressors  -consult pulmonary for vent management    Doing better, on Vent, hemodynamically stable    Day 3 post cardiac arrest- on vent, trying to extubate

## 2022-06-21 NOTE — ASSESSMENT & PLAN NOTE
Noted on prior admit  Likely etiology of infiltrates on current chest xray  Continue oxacillin as above

## 2022-06-21 NOTE — ASSESSMENT & PLAN NOTE
Triglycerides 426 this morning after 24 hours of propofol infusion  D/c propofol  Re-check in on 6/23

## 2022-06-21 NOTE — ASSESSMENT & PLAN NOTE
S/t illicit IV drug injection  Vent settings reviewed adjusted for optimal gas exchange  Sedation for vent control  abg daily and prn to assess therapy  Daily SAT/SBT for extubation readiness

## 2022-06-22 PROBLEM — N17.9 AKI (ACUTE KIDNEY INJURY): Status: RESOLVED | Noted: 2022-06-03 | Resolved: 2022-06-22

## 2022-06-22 PROBLEM — J44.9 COPD (CHRONIC OBSTRUCTIVE PULMONARY DISEASE): Chronic | Status: ACTIVE | Noted: 2017-12-29

## 2022-06-22 PROBLEM — F19.90 IVDU (INTRAVENOUS DRUG USER): Chronic | Status: ACTIVE | Noted: 2022-05-06

## 2022-06-22 PROBLEM — G93.1 ANOXIC ENCEPHALOPATHY: Status: ACTIVE | Noted: 2022-06-21

## 2022-06-22 PROBLEM — F60.3 BORDERLINE PERSONALITY DISORDER: Chronic | Status: ACTIVE | Noted: 2019-01-21

## 2022-06-22 LAB
ALLENS TEST: ABNORMAL
ANION GAP SERPL CALC-SCNC: 10 MMOL/L (ref 8–16)
BASOPHILS # BLD AUTO: 0.08 K/UL (ref 0–0.2)
BASOPHILS NFR BLD: 0.5 % (ref 0–1.9)
BUN SERPL-MCNC: 27 MG/DL (ref 6–20)
CALCIUM SERPL-MCNC: 8.3 MG/DL (ref 8.7–10.5)
CHLORIDE SERPL-SCNC: 117 MMOL/L (ref 95–110)
CO2 SERPL-SCNC: 20 MMOL/L (ref 23–29)
CREAT SERPL-MCNC: 1.2 MG/DL (ref 0.5–1.4)
DELSYS: ABNORMAL
DIFFERENTIAL METHOD: ABNORMAL
EOSINOPHIL # BLD AUTO: 0 K/UL (ref 0–0.5)
EOSINOPHIL NFR BLD: 0.2 % (ref 0–8)
ERYTHROCYTE [DISTWIDTH] IN BLOOD BY AUTOMATED COUNT: 16.1 % (ref 11.5–14.5)
ERYTHROCYTE [SEDIMENTATION RATE] IN BLOOD BY WESTERGREN METHOD: 22 MM/H
EST. GFR  (AFRICAN AMERICAN): >60 ML/MIN/1.73 M^2
EST. GFR  (NON AFRICAN AMERICAN): 57 ML/MIN/1.73 M^2
FIO2: 30
GLUCOSE SERPL-MCNC: 112 MG/DL (ref 70–110)
HCO3 UR-SCNC: 22.3 MMOL/L (ref 24–28)
HCT VFR BLD AUTO: 25.5 % (ref 37–48.5)
HGB BLD-MCNC: 8 G/DL (ref 12–16)
IMM GRANULOCYTES # BLD AUTO: 0.6 K/UL (ref 0–0.04)
IMM GRANULOCYTES NFR BLD AUTO: 3.8 % (ref 0–0.5)
LYMPHOCYTES # BLD AUTO: 3.7 K/UL (ref 1–4.8)
LYMPHOCYTES NFR BLD: 23.5 % (ref 18–48)
MAGNESIUM SERPL-MCNC: 1.9 MG/DL (ref 1.6–2.6)
MCH RBC QN AUTO: 26.9 PG (ref 27–31)
MCHC RBC AUTO-ENTMCNC: 31.4 G/DL (ref 32–36)
MCV RBC AUTO: 86 FL (ref 82–98)
MODE: ABNORMAL
MONOCYTES # BLD AUTO: 1.3 K/UL (ref 0.3–1)
MONOCYTES NFR BLD: 8.4 % (ref 4–15)
NEUTROPHILS # BLD AUTO: 10.1 K/UL (ref 1.8–7.7)
NEUTROPHILS NFR BLD: 63.6 % (ref 38–73)
NRBC BLD-RTO: 0 /100 WBC
PCO2 BLDA: 34.6 MMHG (ref 35–45)
PEEP: 5
PH SMN: 7.42 [PH] (ref 7.35–7.45)
PLATELET # BLD AUTO: 374 K/UL (ref 150–450)
PMV BLD AUTO: 9.2 FL (ref 9.2–12.9)
PO2 BLDA: 95 MMHG (ref 80–100)
POC BE: -2 MMOL/L
POC SATURATED O2: 98 % (ref 95–100)
POTASSIUM SERPL-SCNC: 3.8 MMOL/L (ref 3.5–5.1)
RBC # BLD AUTO: 2.97 M/UL (ref 4–5.4)
SAMPLE: ABNORMAL
SITE: ABNORMAL
SODIUM SERPL-SCNC: 147 MMOL/L (ref 136–145)
VT: 420
WBC # BLD AUTO: 15.87 K/UL (ref 3.9–12.7)

## 2022-06-22 PROCEDURE — 20000000 HC ICU ROOM

## 2022-06-22 PROCEDURE — 82803 BLOOD GASES ANY COMBINATION: CPT

## 2022-06-22 PROCEDURE — 80048 BASIC METABOLIC PNL TOTAL CA: CPT | Performed by: NURSE PRACTITIONER

## 2022-06-22 PROCEDURE — 99291 PR CRITICAL CARE, E/M 30-74 MINUTES: ICD-10-PCS | Mod: ,,, | Performed by: NURSE PRACTITIONER

## 2022-06-22 PROCEDURE — 99900026 HC AIRWAY MAINTENANCE (STAT)

## 2022-06-22 PROCEDURE — 63600175 PHARM REV CODE 636 W HCPCS: Performed by: NURSE PRACTITIONER

## 2022-06-22 PROCEDURE — 94003 VENT MGMT INPAT SUBQ DAY: CPT

## 2022-06-22 PROCEDURE — 25000003 PHARM REV CODE 250: Performed by: INTERNAL MEDICINE

## 2022-06-22 PROCEDURE — 83735 ASSAY OF MAGNESIUM: CPT | Performed by: NURSE PRACTITIONER

## 2022-06-22 PROCEDURE — 63600175 PHARM REV CODE 636 W HCPCS: Performed by: SURGERY

## 2022-06-22 PROCEDURE — 36600 WITHDRAWAL OF ARTERIAL BLOOD: CPT

## 2022-06-22 PROCEDURE — 99291 CRITICAL CARE FIRST HOUR: CPT | Mod: ,,, | Performed by: NURSE PRACTITIONER

## 2022-06-22 PROCEDURE — 25000003 PHARM REV CODE 250: Performed by: EMERGENCY MEDICINE

## 2022-06-22 PROCEDURE — C9399 UNCLASSIFIED DRUGS OR BIOLOG: HCPCS | Performed by: NURSE PRACTITIONER

## 2022-06-22 PROCEDURE — 99900035 HC TECH TIME PER 15 MIN (STAT)

## 2022-06-22 PROCEDURE — 63600175 PHARM REV CODE 636 W HCPCS: Performed by: EMERGENCY MEDICINE

## 2022-06-22 PROCEDURE — 25000003 PHARM REV CODE 250: Performed by: NURSE PRACTITIONER

## 2022-06-22 PROCEDURE — 85025 COMPLETE CBC W/AUTO DIFF WBC: CPT | Performed by: NURSE PRACTITIONER

## 2022-06-22 PROCEDURE — 27000221 HC OXYGEN, UP TO 24 HOURS

## 2022-06-22 PROCEDURE — 94761 N-INVAS EAR/PLS OXIMETRY MLT: CPT

## 2022-06-22 PROCEDURE — 63600175 PHARM REV CODE 636 W HCPCS: Performed by: INTERNAL MEDICINE

## 2022-06-22 PROCEDURE — 87040 BLOOD CULTURE FOR BACTERIA: CPT | Mod: 59 | Performed by: NURSE PRACTITIONER

## 2022-06-22 RX ORDER — LANOLIN ALCOHOL/MO/W.PET/CERES
400 CREAM (GRAM) TOPICAL ONCE
Status: COMPLETED | OUTPATIENT
Start: 2022-06-22 | End: 2022-06-22

## 2022-06-22 RX ORDER — DEXMEDETOMIDINE HYDROCHLORIDE 4 UG/ML
0-1.5 INJECTION INTRAVENOUS CONTINUOUS
Status: DISCONTINUED | OUTPATIENT
Start: 2022-06-22 | End: 2022-06-22

## 2022-06-22 RX ORDER — FUROSEMIDE 10 MG/ML
20 INJECTION INTRAMUSCULAR; INTRAVENOUS ONCE
Status: COMPLETED | OUTPATIENT
Start: 2022-06-22 | End: 2022-06-22

## 2022-06-22 RX ADMIN — MUPIROCIN: 20 OINTMENT TOPICAL at 09:06

## 2022-06-22 RX ADMIN — MUPIROCIN: 20 OINTMENT TOPICAL at 08:06

## 2022-06-22 RX ADMIN — CHLORHEXIDINE GLUCONATE 0.12% ORAL RINSE 15 ML: 1.2 LIQUID ORAL at 09:06

## 2022-06-22 RX ADMIN — Medication 200 MCG/HR: at 01:06

## 2022-06-22 RX ADMIN — MIDAZOLAM 10 MG/HR: 5 INJECTION INTRAMUSCULAR; INTRAVENOUS at 02:06

## 2022-06-22 RX ADMIN — POTASSIUM BICARBONATE 25 MEQ: 977.5 TABLET, EFFERVESCENT ORAL at 09:06

## 2022-06-22 RX ADMIN — HEPARIN SODIUM 5000 UNITS: 5000 INJECTION INTRAVENOUS; SUBCUTANEOUS at 09:06

## 2022-06-22 RX ADMIN — Medication 400 MG: at 09:06

## 2022-06-22 RX ADMIN — HEPARIN SODIUM 5000 UNITS: 5000 INJECTION INTRAVENOUS; SUBCUTANEOUS at 06:06

## 2022-06-22 RX ADMIN — PANTOPRAZOLE SODIUM 40 MG: 40 GRANULE, DELAYED RELEASE ORAL at 08:06

## 2022-06-22 RX ADMIN — DEXMEDETOMIDINE HYDROCHLORIDE 1 MCG/KG/HR: 4 INJECTION INTRAVENOUS at 07:06

## 2022-06-22 RX ADMIN — CHLORHEXIDINE GLUCONATE 0.12% ORAL RINSE 15 ML: 1.2 LIQUID ORAL at 08:06

## 2022-06-22 RX ADMIN — OXACILLIN SODIUM 12 G: 10 INJECTION, POWDER, FOR SOLUTION INTRAVENOUS at 08:06

## 2022-06-22 RX ADMIN — HYDRALAZINE HYDROCHLORIDE 5 MG: 20 INJECTION INTRAMUSCULAR; INTRAVENOUS at 06:06

## 2022-06-22 RX ADMIN — FUROSEMIDE 20 MG: 10 INJECTION, SOLUTION INTRAMUSCULAR; INTRAVENOUS at 09:06

## 2022-06-22 RX ADMIN — HEPARIN SODIUM 5000 UNITS: 5000 INJECTION INTRAVENOUS; SUBCUTANEOUS at 01:06

## 2022-06-22 NOTE — ASSESSMENT & PLAN NOTE
S/t illicit IV drug injection  Vent settings reviewed adjusted for optimal gas exchange  Sedation for vent control  abg daily and prn to assess therapy  Daily SAT/SBT for extubation readiness - not appropriate for SAT/SBT today  CXR suggest fluid overload; oxygen demand unchanged; diurese

## 2022-06-22 NOTE — PLAN OF CARE
During shift pt became tachycardic, increased RR and increased peak pressures on vent, increased fentanyl and Precedex, but increase in sedation was not successful so notified EICU Dr. Ramsay and requested for additional sedation, received orders for prn Versed pushes and to initiate Versed gtt, and stop precedex once Versed started. See MAR. Pt is now on Fentanyl and Versed gtt, sbp maintain between 140-150's, have prn order for Hydralazine as needed. Pt is vented fio2 30%, BUE restrained. NSR on monitor although she had intermittent SB into 30's. TMAX 101.8. Noted x1 bm. Portillo to gravity with UO 630cc this shift. Pt turned q2 hr. Withdraws from pain only. Pupils equal and reactive, cough and gag reflex intact.     Problem: Adult Inpatient Plan of Care  Goal: Plan of Care Review  Outcome: Ongoing, Progressing  Goal: Patient-Specific Goal (Individualized)  Outcome: Ongoing, Progressing  Goal: Absence of Hospital-Acquired Illness or Injury  Outcome: Ongoing, Progressing  Goal: Optimal Comfort and Wellbeing  Outcome: Ongoing, Progressing  Goal: Readiness for Transition of Care  Outcome: Ongoing, Progressing     Problem: Fall Injury Risk  Goal: Absence of Fall and Fall-Related Injury  Outcome: Ongoing, Progressing     Problem: Restraint, Nonbehavioral (Nonviolent)  Goal: Absence of Harm or Injury  Outcome: Ongoing, Progressing     Problem: Adjustment to Illness (Sepsis/Septic Shock)  Goal: Optimal Coping  Outcome: Ongoing, Progressing     Problem: Bleeding (Sepsis/Septic Shock)  Goal: Absence of Bleeding  Outcome: Ongoing, Progressing     Problem: Glycemic Control Impaired (Sepsis/Septic Shock)  Goal: Blood Glucose Level Within Desired Range  Outcome: Ongoing, Progressing     Problem: Infection Progression (Sepsis/Septic Shock)  Goal: Absence of Infection Signs and Symptoms  Outcome: Ongoing, Progressing     Problem: Nutrition Impaired (Sepsis/Septic Shock)  Goal: Optimal Nutrition Intake  Outcome: Ongoing,  Progressing     Problem: Fluid and Electrolyte Imbalance (Acute Kidney Injury/Impairment)  Goal: Fluid and Electrolyte Balance  Outcome: Ongoing, Progressing     Problem: Oral Intake Inadequate (Acute Kidney Injury/Impairment)  Goal: Optimal Nutrition Intake  Outcome: Ongoing, Progressing     Problem: Renal Function Impairment (Acute Kidney Injury/Impairment)  Goal: Effective Renal Function  Outcome: Ongoing, Progressing     Problem: Skin Injury Risk Increased  Goal: Skin Health and Integrity  Outcome: Ongoing, Progressing     Problem: Infection  Goal: Absence of Infection Signs and Symptoms  Outcome: Ongoing, Progressing     Problem: Communication Impairment (Mechanical Ventilation, Invasive)  Goal: Effective Communication  Outcome: Ongoing, Progressing     Problem: Device-Related Complication Risk (Mechanical Ventilation, Invasive)  Goal: Optimal Device Function  Outcome: Ongoing, Progressing     Problem: Inability to Wean (Mechanical Ventilation, Invasive)  Goal: Mechanical Ventilation Liberation  Outcome: Ongoing, Progressing     Problem: Nutrition Impairment (Mechanical Ventilation, Invasive)  Goal: Optimal Nutrition Delivery  Outcome: Ongoing, Progressing     Problem: Skin and Tissue Injury (Mechanical Ventilation, Invasive)  Goal: Absence of Device-Related Skin and Tissue Injury  Outcome: Ongoing, Progressing     Problem: Ventilator-Induced Lung Injury (Mechanical Ventilation, Invasive)  Goal: Absence of Ventilator-Induced Lung Injury  Outcome: Ongoing, Progressing     Problem: Communication Impairment (Artificial Airway)  Goal: Effective Communication  Outcome: Ongoing, Progressing     Problem: Device-Related Complication Risk (Artificial Airway)  Goal: Optimal Device Function  Outcome: Ongoing, Progressing     Problem: Skin and Tissue Injury (Artificial Airway)  Goal: Absence of Device-Related Skin or Tissue Injury  Outcome: Ongoing, Progressing     Problem: Coping Ineffective  Goal: Effective  Coping  Outcome: Ongoing, Progressing

## 2022-06-22 NOTE — ASSESSMENT & PLAN NOTE
Suspect S/t illicit drug injection to PICC leading to brief cardiac arrest  Does not track or follow commands  CT head repeated w/no acute findings  Continue supportive care and neuro monitoring; MRI for more definitive neuro imaging not possible while requiring continuous sedation

## 2022-06-22 NOTE — SUBJECTIVE & OBJECTIVE
Review of Systems   Unable to perform ROS: Intubated   Objective:     Vital Signs (Most Recent):  Temp: 98.1 °F (36.7 °C) (06/22/22 0712)  Pulse: 107 (06/22/22 0712)  Resp: (!) 39 (06/22/22 0712)  BP: (!) 141/95 (06/22/22 0712)  SpO2: 95 % (06/22/22 0712)   Vital Signs (24h Range):  Temp:  [98.1 °F (36.7 °C)-100.1 °F (37.8 °C)] 98.1 °F (36.7 °C)  Pulse:  [] 107  Resp:  [22-46] 39  SpO2:  [92 %-100 %] 95 %  BP: (124-194)/() 141/95     Weight: 86.3 kg (190 lb 4.1 oz)  Body mass index is 31.66 kg/m².      Intake/Output Summary (Last 24 hours) at 6/22/2022 0732  Last data filed at 6/22/2022 0700  Gross per 24 hour   Intake 1588.44 ml   Output 1525 ml   Net 63.44 ml        sodium chloride 0.9% 10 mL/hr at 06/21/22 1800    dexmedetomidine (PRECEDEX) infusion Stopped (06/22/22 0029)    fentanyl 100 mcg/hr (06/22/22 0629)    midazolam 1 mg/hr (06/22/22 0630)       Physical Exam  Vitals and nursing note reviewed.   Constitutional:       Appearance: She is obese. She is ill-appearing, toxic-appearing and diaphoretic.      Interventions: She is sedated, intubated and restrained.   HENT:      Mouth/Throat:      Mouth: Mucous membranes are moist.      Pharynx: Oropharynx is clear.   Eyes:      General: Gaze aligned appropriately.      Pupils: Pupils are equal, round, and reactive to light.   Neck:      Vascular: No JVD.   Cardiovascular:      Rate and Rhythm: Regular rhythm. Tachycardia present.      Pulses:           Radial pulses are 2+ on the right side and 2+ on the left side.        Dorsalis pedis pulses are 2+ on the right side and 2+ on the left side.      Comments: Intermittent bradycardia and tachycardia  Pulmonary:      Effort: Tachypnea present. She is intubated.      Comments: Breath sounds coarse  Chest:       Abdominal:      General: Bowel sounds are decreased.      Palpations: Abdomen is soft.   Musculoskeletal:      Cervical back: Neck supple.      Right lower leg: No edema.      Left lower leg:  No edema.   Skin:     General: Skin is warm.      Capillary Refill: Capillary refill takes less than 2 seconds.   Neurological:      GCS: GCS eye subscore is 2. GCS verbal subscore is 1. GCS motor subscore is 4.      Comments: No focus or track when eyes open; no noted purposeful motor movement       Vents:  Vent Mode: A/C (06/22/22 0707)  Ventilator Initiated: Yes (06/19/22 1915)  Set Rate: 22 BPM (06/22/22 0707)  Vt Set: 420 mL (06/22/22 0707)  Pressure Support: 0 cmH20 (06/22/22 0707)  PEEP/CPAP: 5 cmH20 (06/22/22 0707)  Oxygen Concentration (%): 30 (06/22/22 0712)  Peak Airway Pressure: 30 cmH2O (06/22/22 0707)  Plateau Pressure: 22 cmH20 (06/22/22 0707)  Total Ve: 17 mL (06/22/22 0707)  F/VT Ratio<105 (RSBI): (!) 65.95 (06/22/22 0707)    Lines/Drains/Airways       Peripherally Inserted Central Catheter Line  Duration             PICC Double Lumen -- days              Drain  Duration                  NG/OG Tube 06/19/22 1916 [place] sump 14 Fr. Center mouth 2 days         Urethral Catheter 06/19/22 2039 Latex 16 Fr. 2 days              Airway  Duration                  Airway - Non-Surgical 06/19/22 2306 2 days              Peripheral Intravenous Line  Duration                  Peripheral IV - Single Lumen 06/19/22 1921 20 G Right Shoulder 2 days                    Significant Labs:    CBC/Anemia Profile:  Recent Labs   Lab 06/20/22  1244 06/21/22  0517 06/22/22  0410   WBC  --  11.41 15.87*   HGB 7.4* 7.1* 8.0*   HCT 22.8* 22.4* 25.5*   PLT  --  326 374   MCV  --  86 86   RDW  --  16.4* 16.1*          Chemistries:  Recent Labs   Lab 06/21/22  0517 06/22/22  0410   * 147*   K 3.4* 3.8   * 117*   CO2 21* 20*   BUN 24* 27*   CREATININE 1.2 1.2   CALCIUM 8.6* 8.3*   MG 2.1 1.9         All pertinent labs within the past 24 hours have been reviewed.    Significant Imaging:   I have reviewed all pertinent imaging results/findings within the past 24 hours.

## 2022-06-22 NOTE — ASSESSMENT & PLAN NOTE
S/t illicit IV injection  ROSC after 1 round of epi  May be contributing to encephalopathy but less likely

## 2022-06-22 NOTE — SUBJECTIVE & OBJECTIVE
Interval History: Remains intubated sedated on Vent- Precedex, Fenatnyl, Versed. Pt had a few periods of Bradycardia on Precedex gtt last night- eICU had to intervene and given Versed. Again this am, while pt having BM and agitated, Asystole alarming then pt went into NSR 60s- Precedex d/uri. Earlier CXR showed fluid load- given small dose IV Lasix. Getting TF @40 cc/hr. Tmax 101.8F last night. Good urine output. WBC increased to 15.8, H/H 8/25.     Review of Systems   Unable to perform ROS: Intubated   Objective:     Vital Signs (Most Recent):  Temp: 98.1 °F (36.7 °C) (06/22/22 0712)  Pulse: 63 (06/22/22 1115)  Resp: (!) 22 (06/22/22 1115)  BP: 97/62 (06/22/22 1115)  SpO2: 97 % (06/22/22 1115)   Vital Signs (24h Range):  Temp:  [98.1 °F (36.7 °C)-100.1 °F (37.8 °C)] 98.1 °F (36.7 °C)  Pulse:  [] 63  Resp:  [22-46] 22  SpO2:  [84 %-100 %] 97 %  BP: ()/() 97/62     Weight: 86.3 kg (190 lb 4.1 oz)  Body mass index is 31.66 kg/m².    Intake/Output Summary (Last 24 hours) at 6/22/2022 1213  Last data filed at 6/22/2022 1200  Gross per 24 hour   Intake 1955.74 ml   Output 1950 ml   Net 5.74 ml      Physical Exam  Vitals and nursing note reviewed.   Constitutional:       Appearance: She is obese. She is ill-appearing. She is not toxic-appearing or diaphoretic.      Interventions: She is sedated, intubated and restrained.   HENT:      Mouth/Throat:      Mouth: Mucous membranes are moist.      Pharynx: Oropharynx is clear.   Eyes:      General: Gaze aligned appropriately.      Pupils: Pupils are equal, round, and reactive to light.   Neck:      Vascular: No JVD.   Cardiovascular:      Rate and Rhythm: Regular rhythm. Bradycardia present.      Pulses:           Radial pulses are 2+ on the right side and 2+ on the left side.        Dorsalis pedis pulses are 2+ on the right side and 2+ on the left side.      Comments: Intermittent bradycardia and tachycardia  Pulmonary:      Effort: Tachypnea present. She is  intubated.      Comments: Breath sounds coarse  Chest:          Comments: Purse string sutures removed yesterday, sites clean, dry, no redness or drainage  Abdominal:      General: Bowel sounds are decreased.      Palpations: Abdomen is soft.   Musculoskeletal:      Cervical back: Neck supple.      Right lower leg: No edema.      Left lower leg: No edema.   Skin:     General: Skin is warm.      Capillary Refill: Capillary refill takes less than 2 seconds.   Neurological:      GCS: GCS eye subscore is 4. GCS verbal subscore is 1. GCS motor subscore is 4.        Vent Mode: A/C  Oxygen Concentration (%):  [30] 30  Resp Rate Total:  [22 br/min-46 br/min] 22 br/min  Vt Set:  [420 mL] 420 mL  PEEP/CPAP:  [5 cmH20] 5 cmH20  Pressure Support:  [0 cmH20] 0 cmH20  Mean Airway Pressure:  [8.7 vvB49-83 cmH20] 14 cmH20  Temp:  [98.1 °F (36.7 °C)-100.1 °F (37.8 °C)] 98.1 °F (36.7 °C)  Pulse:  [] 63  Resp:  [22-46] 22  SpO2:  [84 %-100 %] 97 %  BP: ()/() 97/62   Art pH/pCO2/pO2/HCO3:  7.417/34.6/95/22.3 (06/22 0337)  Lab Results   Component Value Date    [id number] Negative 06/19/2022    BFR91HTKXFVG Positive (A) 05/31/2022    [id number] Negative 05/06/2022      Recent Labs   Lab 06/19/22 1953 06/19/22 2010 06/19/22 2104 06/19/22  2321 06/20/22  0512 06/20/22  1244 06/21/22  0517 06/22/22  0410   LACTATE 4.0*  --   --  1.6 0.7  --   --   --    TROPONINI  --   --  0.184*  --  0.126*  --   --   --    NA  --    < >  --   --  145  --  146* 147*   WBC  --    < >  --   --  16.49*  --  11.41 15.87*   GRAN  --    < >  --   --  76.0*  12.6*  --  52.5  6.0 63.6  10.1*   LYMPH  --    < >  --   --  15.8*  2.6  --  32.9  3.8 23.5  3.7   HGB  --    < >  --   --  6.6*   < > 7.1* 8.0*   HCT  --    < >  --   --  21.1*   < > 22.4* 25.5*   BUN  --    < >  --   --  22*  --  24* 27*   CREATININE  --    < >  --   --  1.4  --  1.2 1.2   ESTGFRAFRICA  --    < >  --   --  55*  --  >60 >60   EGFRNONAA  --    < >  --   --  47*   --  57* 57*   K  --    < >  --   --  3.6  --  3.4* 3.8   CL  --    < >  --   --  110  --  116* 117*   CO2  --    < >  --   --  21*  --  21* 20*   MG  --   --   --   --  1.6  --  2.1 1.9    < > = values in this interval not displayed.     Microbiology Results (last 7 days)       Procedure Component Value Units Date/Time    Blood culture [[id number]]     Order Status: No result Specimen: Blood     Blood culture [[id number]]     Order Status: No result Specimen: Blood     Culture, Respiratory with Gram Stain [[id number]] Collected: 06/20/22 1350    Order Status: Completed Specimen: Respiratory from Endotracheal Aspirate Updated: 06/22/22 1013     Respiratory Culture Normal respiratory da     Gram Stain (Respiratory) <10 epithelial cells per low power field.     Gram Stain (Respiratory) Moderate WBC's     Gram Stain (Respiratory) No organisms seen    Blood culture x two cultures. Draw prior to antibiotics. [[id number]] Collected: 06/19/22 2105    Order Status: Completed Specimen: Blood from Peripheral, Wrist, Right Updated: 06/22/22 0812     Blood Culture, Routine No Growth to date      No Growth to date      No Growth to date    Narrative:      Aerobic and anaerobic    Blood culture x two cultures. Draw prior to antibiotics. [[id number]] Collected: 06/19/22 2030    Order Status: Completed Specimen: Blood from Peripheral, Antecubital, Right Updated: 06/22/22 0812     Blood Culture, Routine No Growth to date      No Growth to date      No Growth to date    Narrative:      Aerobic and anaerobic          Antibiotics (From admission, onward)                Start     Stop Route Frequency Ordered    06/20/22 0900  mupirocin 2 % ointment  (DECOLONIZATION PROTOCOL ORDERS)         06/25 0859 Nasl 2 times daily 06/20/22 0750    06/20/22 0900  oxacillin 12 g in  mL CONTINUOUS INFUSION         07/11 2359 IV Every 24 hours (non-standard times) 06/20/22 0752          Anticoagulants       Ordered     Route Frequency Start Stop     06/19/22 2233  heparin (porcine)         SubQ Every 8 hours 06/19/22 2245 --          CT Head Without Contrast  Narrative: EXAMINATION:  CT HEAD WITHOUT CONTRAST    CLINICAL HISTORY:  follow up encepalopathy;    TECHNIQUE:  Low dose axial CT images obtained throughout the head without intravenous contrast. Sagittal and coronal reconstructions were performed.    All CT scans at this facility use dose modulation, iterative reconstruction, and/or weight based dosing when appropriate to reduce radiation dose to as low as reasonable achievable.    COMPARISON:  06/19/2022    FINDINGS:  Intracranial compartment:    The brain parenchyma appears normal. No parenchymal mass, hemorrhage, edema or major vascular distribution infarct.    Ventricles and sulci are normal in size for age without evidence of hydrocephalus.    No extra-axial blood or fluid collections.    Skull/extracranial contents (limited evaluation): No fracture. Trace right mastoid effusion.  Left mastoid air cells and paranasal sinuses are essentially clear.  Impression: No acute abnormality.  MRI can be obtained as clinically warranted.    All CT scans at this facility use dose modulation, iterative reconstruction, and/or weight based dosing when appropriate to reduce radiation dose to as low as reasonable achievable.    Electronically signed by: Lloyd Suarez MD  Date:    06/22/2022  Time:    12:11  X-Ray Chest 1 View  Narrative: EXAMINATION:  XR CHEST 1 VIEW    CLINICAL HISTORY:  ventilator support, change in resp status;    TECHNIQUE:  Single frontal view of the chest was performed.    COMPARISON:  06/19/2022.    FINDINGS:  Tubes and lines are stable.   Slightly improved aeration.  In comparison to the prior study, there is no adverse interval changes.  Impression: In comparison to the prior study, there is no adverse interval changes    Electronically signed by: Lloyd Suarez MD  Date:    06/22/2022  Time:    08:00   Significant Labs: All pertinent labs  within the past 24 hours have been reviewed.    Significant Imaging: I have reviewed all pertinent imaging results/findings within the past 24 hours.

## 2022-06-22 NOTE — ASSESSMENT & PLAN NOTE
On previous admission s/t IVDU  Continue scheudled Oxacillin scheuled until 7/11/22  Blood cultures on 6/19 no growth to date  Fever last night reported 101.8, no treatment; wbc up - Repeat blood cultures today

## 2022-06-22 NOTE — NURSING
Pt with sudden agitation including tachypnea and tachycardia, fighting ventilator, accessory muscle use in breathing noted. Pt suctioned with minimal secretions noted. O2 sat remains stable. Titrate up on sedation meds. NP to bedside to assess pt.

## 2022-06-22 NOTE — ASSESSMENT & PLAN NOTE
-IV drug use at LTAC  -BP stable at this time, not requiring pressors  -consult pulmonary for vent management    Doing better, on Vent, hemodynamically stable    Day 3 post cardiac arrest- on vent, trying to extubate    Day 4 on vent- Febrile, Bradycardic on Precedex- unstable- no weaning attempt today

## 2022-06-22 NOTE — PLAN OF CARE
Plan of care reviewed with pt and pt's mother at bedside.   Pt remains on vent support as well as cont sedation versed and fentanyl to PICC.  Cont abx infusing to PIV.  CT head completed this shift.  No SAT/SBT attempted this shift due to pt's instability.  Heart rate has remained stable since precedex d/c this am.  Remained afebrile.  Repeat CXR completed, IV lasix x1 for fluid overload, O2 sats remain stable.  TF to OGT at goal rate, pt tolerating.   Portillo cath in place draining cloudy yellow urine with sediment noted.  BM this shift, incontinence care provided.  Turned/repositioned q2hr with wedge and pillow support. Heels floated. SCDs in use.

## 2022-06-22 NOTE — EICU
eICU Physician Virtual/Remote Brief Evaluation Note      Message from RN  /101, P 69  No BP meds  Chart reviewed  Hydralazine 5 mg q.6h p.r.n. BP greater than 180/120 ordered      VIKTOR Ramsay MD  [facility] Attending  [phone]    This report has been created through the use of [employer] dictation software. Typographical and content errors may occur with this process. While efforts are made to detect and correct such errors, in some cases errors will persist. For this reason, wording in this document should be considered in the proper context and not strictly verbatim

## 2022-06-22 NOTE — ASSESSMENT & PLAN NOTE
-MSSA bacteremia during previous admission  -Follow up on repeat blood cultures  -IV vanc and cefepime empirically for now  -ID consult    Cont Vanc/Cefepime    Changed to Oxacillin yesterday    Continue Oxacillin

## 2022-06-22 NOTE — PROGRESS NOTES
[facility] - Intensive Care ([facility])  [facility] Medicine  Progress Note    Patient Name: Tianna Leon  MRN: [id number]  Patient Class: IP- Inpatient   Admission Date: 6/19/2022  Length of Stay: 3 days  Attending Physician: Aj Olguin MD  Primary Care Provider: Spenser Campa MD        Subjective:     Principal Problem:Cardiac arrest        HPI:  Ms. Leon is a 37 year old  female with long standing history of IV drug abuse, was recently discharged from this facility on 6/10/22 after a 35 day hospital stay. During previous admission she was found to have MSSA Tricuspid Endocarditis, treated on oxacillin (to be continued until 7/10/22). Also had right empyema, s/p VATS and chest tube. She was COVID positive during that admission. She was discharged to [facility] on 6/10. Today she was found unresponsive after she apparently injected an unknown substance into her left PICC line at the facility. She coded, received one round of epi prior to ROSC. Intubated prior to arrival. WBC 44,000, bands 19%. Lactic acid 4.0  Cr 1.8 CO2 14. CXR multifocal infiltrates. BP stable. On propofol. Received IV Zosyn.     Cardiac Arrest. Continued IV drug abuse. Tricuspid endocarditis. MSSA bacteremia. Right Empyema.      Overview/Hospital Course:  37 F WF, Hx of IVDA, MDD, bipolar disorder, anxiety, PTSD, suicidal ideation, COPD, ADHD, asthma, cervical cancer, GERD, Hep C, HLD who was d/uri from this hosp on 6/10 following 35 day admission during which she was treated for MSSA tricuspid endocarditis and right empyema, s/p VATS/decortication. She was also COVID + during admission. On 6/10, she was d/c to [facility] and continued on oxacillin (to continue through 7/10/22) via PICC line. Yesterday, she was found unresponsive in cardiac arrest at [facility]. She was resuscitated after 1 round of Epi and arrived intubated. Report given to ED was that patient injected an unknown substance into her PICC line at [facility].   In ED, Lactate 4.0,  ABG 7.215/47.8/75/19.3, WBC 44.18, Hgb 8.4, Cr 1.8, AST 53, , troponin 0.184, Tox positive for opiates and amphetamine. CXR w/multifocal infiltrates, CT head w/no acute finding. Given Vancomycin and Cefepime and placed on sedation for mechanical ventilation. Admitted to ICU for Cardiac Arrest. Continued IVDA. Tricuspid endocarditis. MSSA bacteremia. Right Empyema s/p VATS.    6/20- Remains intubated and sedated on mechanical vent. Failed brief SAT/SBT with tachycardia, tachypnea. WBC down to 16, H/H dropped to 6.6/21- got  a unit of blood, H/H improved to 7.4/23. Bun/Cr improved 22/1.4. Troponin coming down- likely demand ischemia from Cardiac Arrest as a result of IV drug.      6/21- Appreciate ICU team. Pt remains intubated, sedated on vent, was on Fentanyl/Diprivan this am, Again failed SAT/SBT this am, also TG nearly 426, hence sedation lowered and switched Diprivan to Precedex and will try SAT/SBT. WBC down to 11, H/H 7/22, HCO3 21, Bun down to 24. Await repeat SAT/SBT. Good Urine output. Abx were changed to Oxacillin again yesterday due to her hx of MSSA.    6/22- Remains intubated sedated on Vent- Precedex, Fenatnyl, Versed. Pt had a few periods of Bradycardia on Precedex gtt last night- eICU had to intervene and given Versed. Again this am, while pt having BM and agitated, Asystole alarming then pt went into NSR 60s- Precedex d/uri. Earlier CXR showed fluid load- given small dose IV Lasix. Getting TF @40 cc/hr. Tmax 101.8F last night. Good urine output. WBC increased to 15.8, H/H 8/25.       Interval History: Remains intubated sedated on Vent- Precedex, Fenatnyl, Versed. Pt had a few periods of Bradycardia on Precedex gtt last night- eICU had to intervene and given Versed. Again this am, while pt having BM and agitated, Asystole alarming then pt went into NSR 60s- Precedex d/uri. Earlier CXR showed fluid load- given small dose IV Lasix. Getting TF @40 cc/hr. Tmax 101.8F last night. Good urine output.  WBC increased to 15.8, H/H 8/25.     Review of Systems   Unable to perform ROS: Intubated   Objective:     Vital Signs (Most Recent):  Temp: 98.1 °F (36.7 °C) (06/22/22 0712)  Pulse: 63 (06/22/22 1115)  Resp: (!) 22 (06/22/22 1115)  BP: 97/62 (06/22/22 1115)  SpO2: 97 % (06/22/22 1115)   Vital Signs (24h Range):  Temp:  [98.1 °F (36.7 °C)-100.1 °F (37.8 °C)] 98.1 °F (36.7 °C)  Pulse:  [] 63  Resp:  [22-46] 22  SpO2:  [84 %-100 %] 97 %  BP: ()/() 97/62     Weight: 86.3 kg (190 lb 4.1 oz)  Body mass index is 31.66 kg/m².    Intake/Output Summary (Last 24 hours) at 6/22/2022 1213  Last data filed at 6/22/2022 1200  Gross per 24 hour   Intake 1955.74 ml   Output 1950 ml   Net 5.74 ml      Physical Exam  Vitals and nursing note reviewed.   Constitutional:       Appearance: She is obese. She is ill-appearing. She is not toxic-appearing or diaphoretic.      Interventions: She is sedated, intubated and restrained.   HENT:      Mouth/Throat:      Mouth: Mucous membranes are moist.      Pharynx: Oropharynx is clear.   Eyes:      General: Gaze aligned appropriately.      Pupils: Pupils are equal, round, and reactive to light.   Neck:      Vascular: No JVD.   Cardiovascular:      Rate and Rhythm: Regular rhythm. Bradycardia present.      Pulses:           Radial pulses are 2+ on the right side and 2+ on the left side.        Dorsalis pedis pulses are 2+ on the right side and 2+ on the left side.      Comments: Intermittent bradycardia and tachycardia  Pulmonary:      Effort: Tachypnea present. She is intubated.      Comments: Breath sounds coarse  Chest:          Comments: Purse string sutures removed yesterday, sites clean, dry, no redness or drainage  Abdominal:      General: Bowel sounds are decreased.      Palpations: Abdomen is soft.   Musculoskeletal:      Cervical back: Neck supple.      Right lower leg: No edema.      Left lower leg: No edema.   Skin:     General: Skin is warm.      Capillary Refill:  Capillary refill takes less than 2 seconds.   Neurological:      GCS: GCS eye subscore is 4. GCS verbal subscore is 1. GCS motor subscore is 4.        Vent Mode: A/C  Oxygen Concentration (%):  [30] 30  Resp Rate Total:  [22 br/min-46 br/min] 22 br/min  Vt Set:  [420 mL] 420 mL  PEEP/CPAP:  [5 cmH20] 5 cmH20  Pressure Support:  [0 cmH20] 0 cmH20  Mean Airway Pressure:  [8.7 tnU83-77 cmH20] 14 cmH20  Temp:  [98.1 °F (36.7 °C)-100.1 °F (37.8 °C)] 98.1 °F (36.7 °C)  Pulse:  [] 63  Resp:  [22-46] 22  SpO2:  [84 %-100 %] 97 %  BP: ()/() 97/62   Art pH/pCO2/pO2/HCO3:  7.417/34.6/95/22.3 (06/22 0337)  Lab Results   Component Value Date    [id number] Negative 06/19/2022    [id number] Positive (A) 05/31/2022    [id number] Negative 05/06/2022      Recent Labs   Lab 06/19/22 1953 06/19/22 2010 06/19/22  2104 06/19/22  2321 06/20/22  0512 06/20/22  1244 06/21/22  0517 06/22/22  0410   LACTATE 4.0*  --   --  1.6 0.7  --   --   --    TROPONINI  --   --  0.184*  --  0.126*  --   --   --    NA  --    < >  --   --  145  --  146* 147*   WBC  --    < >  --   --  16.49*  --  11.41 15.87*   GRAN  --    < >  --   --  76.0*  12.6*  --  52.5  6.0 63.6  10.1*   LYMPH  --    < >  --   --  15.8*  2.6  --  32.9  3.8 23.5  3.7   HGB  --    < >  --   --  6.6*   < > 7.1* 8.0*   HCT  --    < >  --   --  21.1*   < > 22.4* 25.5*   BUN  --    < >  --   --  22*  --  24* 27*   CREATININE  --    < >  --   --  1.4  --  1.2 1.2   ESTGFRAFRICA  --    < >  --   --  55*  --  >60 >60   EGFRNONAA  --    < >  --   --  47*  --  57* 57*   K  --    < >  --   --  3.6  --  3.4* 3.8   CL  --    < >  --   --  110  --  116* 117*   CO2  --    < >  --   --  21*  --  21* 20*   MG  --   --   --   --  1.6  --  2.1 1.9    < > = values in this interval not displayed.     Microbiology Results (last 7 days)       Procedure Component Value Units Date/Time    Blood culture [[id number]]     Order Status: No result Specimen: Blood     Blood culture  [[id number]]     Order Status: No result Specimen: Blood     Culture, Respiratory with Gram Stain [[id number]] Collected: 06/20/22 1350    Order Status: Completed Specimen: Respiratory from Endotracheal Aspirate Updated: 06/22/22 1013     Respiratory Culture Normal respiratory da     Gram Stain (Respiratory) <10 epithelial cells per low power field.     Gram Stain (Respiratory) Moderate WBC's     Gram Stain (Respiratory) No organisms seen    Blood culture x two cultures. Draw prior to antibiotics. [[id number]] Collected: 06/19/22 2105    Order Status: Completed Specimen: Blood from Peripheral, Wrist, Right Updated: 06/22/22 0812     Blood Culture, Routine No Growth to date      No Growth to date      No Growth to date    Narrative:      Aerobic and anaerobic    Blood culture x two cultures. Draw prior to antibiotics. [[id number]] Collected: 06/19/22 2030    Order Status: Completed Specimen: Blood from Peripheral, Antecubital, Right Updated: 06/22/22 0812     Blood Culture, Routine No Growth to date      No Growth to date      No Growth to date    Narrative:      Aerobic and anaerobic          Antibiotics (From admission, onward)                Start     Stop Route Frequency Ordered    06/20/22 0900  mupirocin 2 % ointment  (DECOLONIZATION PROTOCOL ORDERS)         06/25 0859 Nasl 2 times daily 06/20/22 0750    06/20/22 0900  oxacillin 12 g in  mL CONTINUOUS INFUSION         07/11 2359 IV Every 24 hours (non-standard times) 06/20/22 0752          Anticoagulants       Ordered     Route Frequency Start Stop    06/19/22 2233  heparin (porcine)         SubQ Every 8 hours 06/19/22 2245 --          CT Head Without Contrast  Narrative: EXAMINATION:  CT HEAD WITHOUT CONTRAST    CLINICAL HISTORY:  follow up encepalopathy;    TECHNIQUE:  Low dose axial CT images obtained throughout the head without intravenous contrast. Sagittal and coronal reconstructions were performed.    All CT scans at this facility use dose  modulation, iterative reconstruction, and/or weight based dosing when appropriate to reduce radiation dose to as low as reasonable achievable.    COMPARISON:  06/19/2022    FINDINGS:  Intracranial compartment:    The brain parenchyma appears normal. No parenchymal mass, hemorrhage, edema or major vascular distribution infarct.    Ventricles and sulci are normal in size for age without evidence of hydrocephalus.    No extra-axial blood or fluid collections.    Skull/extracranial contents (limited evaluation): No fracture. Trace right mastoid effusion.  Left mastoid air cells and paranasal sinuses are essentially clear.  Impression: No acute abnormality.  MRI can be obtained as clinically warranted.    All CT scans at this facility use dose modulation, iterative reconstruction, and/or weight based dosing when appropriate to reduce radiation dose to as low as reasonable achievable.    Electronically signed by: Lloyd Suarez MD  Date:    06/22/2022  Time:    12:11  X-Ray Chest 1 View  Narrative: EXAMINATION:  XR CHEST 1 VIEW    CLINICAL HISTORY:  ventilator support, change in resp status;    TECHNIQUE:  Single frontal view of the chest was performed.    COMPARISON:  06/19/2022.    FINDINGS:  Tubes and lines are stable.   Slightly improved aeration.  In comparison to the prior study, there is no adverse interval changes.  Impression: In comparison to the prior study, there is no adverse interval changes    Electronically signed by: Lloyd Suarez MD  Date:    06/22/2022  Time:    08:00   Significant Labs: All pertinent labs within the past 24 hours have been reviewed.    Significant Imaging: I have reviewed all pertinent imaging results/findings within the past 24 hours.      Assessment/Plan:      * Cardiac arrest  -IV drug use at LTAC  -BP stable at this time, not requiring pressors  -consult pulmonary for vent management    Doing better, on Vent, hemodynamically stable    Day 3 post cardiac arrest- on vent, trying to  extubate    Day 4 on vent- Febrile, Bradycardic on Precedex- unstable- no weaning attempt today    Acute respiratory failure with hypoxia and hypercapnia on Vent s/p Cardiac Arrest  Patient with Hypercapnic Respiratory failure which is Acute.  she is not on home oxygen. Supplemental oxygen was provided and noted- Vent Mode: A/C  Oxygen Concentration (%):  [30] 30  Resp Rate Total:  [22 br/min-46 br/min] 22 br/min  Vt Set:  [420 mL] 420 mL  PEEP/CPAP:  [5 cmH20] 5 cmH20  Pressure Support:  [0 cmH20] 0 cmH20  Mean Airway Pressure:  [8.7 xqF30-65 cmH20] 13 cmH20.   Signs/symptoms of respiratory failure include- on vent, AMS. Contributing diagnoses includes - Interstitial lung disease and Pneumonia Labs and images were reviewed. Patient Has recent ABG, which has been reviewed. Will treat underlying causes and adjust management of respiratory failure as follows- Vent support, nebs, IV Abx    6/20- continue vent support as she failed SAT/SBT this am  6/21- trying to extubate, failed SAT/SBT this am  6/4- see Cardiac arrest above    On mechanically assisted ventilation  See resp failure above      Normocytic anemia  H/H dropped to 6.6- Transfused 1 unit- now 7.4/23    No active bleeding, Anemia of Chronic Disease    S/p Transfusion, H/H 8/26    Endocarditis of tricuspid valve  -MSSA bacteremia during previous admission  -Follow up on repeat blood cultures  -IV vanc and cefepime empirically for now  -ID consult    Cont Vanc/Cefepime    Changed to Oxacillin yesterday    Continue Oxacillin    MSSA bacteremia  -during previous admission, was supposed to be on oxacillin until 7/10/22  -repeat cultures pending  -ID consult    May resume Oxacillin soon, if blood Cx remain NGTD    Resumed yesterday    IVDU (intravenous drug user)  -continued active IV drug use while at [facility]      Pulmonary embolism, septic         Acute encephalopathy  S/p cardiac arrest and on vent on Sedation      Borderline personality disorder         VTE  Risk Mitigation (From admission, onward)         Ordered     heparin (porcine) injection 5,000 Units  Every 8 hours         06/19/22 2233     Place sequential compression device  Until discontinued         06/19/22 2233                Discharge Planning   YESSICA:      Code Status: Prior   Is the patient medically ready for discharge?:     Reason for patient still in hospital (select all that apply): Patient trending condition, Laboratory test, Treatment, Imaging and Consult recommendations  Discharge Plan A: Other (patient remains critically ill at this time, pending clinical improvement for d/c planning.)        Seen and discussed with Dr. San and the ICU team  Condition: Critical  Prognosis: Guarded to poor      Critical care time spent on the evaluation and treatment of severe organ dysfunction, review of pertinent labs and imaging studies, discussions with consulting providers and discussions with patient/family: 41 minutes.      Aj Olguin MD  Department of Hospital Medicine   [facility] - Intensive Care ([facility])

## 2022-06-22 NOTE — PROGRESS NOTES
[facility] ([facility])  Critical Care Medicine  Progress Note    Patient Name: Tianna Leon  MRN: [id number]  Admission Date: 6/19/2022  Hospital Length of Stay: 3 days  Code Status: Prior  Attending Physician: Aj Olguin MD   Primary Care Provider: Spenser Campa MD   Principal Problem: Cardiac arrest    Subjective:     HPI:  37 F w/pmh significant for IV drug abuse, MDD, bipolar disorder, anxiety, PTSD, suicidal ideation, COPD, ADHD, anxiety, asthma, cervical cancer, GERD, Hep C, HLD.   She was d/c from OSH on 6/19 following 35 day admission during which she was treated for MSSA tricuspid endocarditis and right empyema, s/p VATS/decortication. She was also COVID + during admission. On 6/10, she was d/c to [facility] and continued on oxacillin (to continue through 7/10/22) via PICC line.     Yesterday, she was found unresponsive in cardiac arrest at LTAC. She was resuscitated after 1 round of Epi and arrived intubated. Report given to ED was that patient injected an unknown substance into her PICC line at LTAC.   In ED, labs significant for lactate of 4.0, ABG pH 7.215, PCO2 47.8, PO2 75, HCO3 19.3, WBC 44.18, Hgb 8.4, Cr 1.8, AST 53, , troponin 0.184, tox positive for opiates and amphetamine.   CXR w/multifocal infiltrates, CT head w/no acute finding.   Given vancomycin and cefepime and placed on sedation for mechanical ventilation.       Hospital/ICU Course:  Admitted to ICU @ 2128.   6/20 - Seen and examined at start of shift. Remains intubated and sedated on mechanical vent. Failed brief SAT/SBT with tachycardia, tachypnea   6/21 - Remains intubated/sedated on mechanical vent with minimal oxygen demand. Continues failing SAT/SBT with tachypnea, tachycardia. Sedation changed to precedex    6/22 - Remains intubated/sedated on mechanical vent with minimal oxygen demand. Not appropriate for SAT/SBT today d/t agitation, tachypnea, arrhythmia on sedation despite Versed infusion added    NOTE - At approx 1015, staff informed that pt had a bradycardic/?PEA event lasting 3 sec during turning, spontaneously resolved to SR 60s          Review of Systems   Unable to perform ROS: Intubated   Objective:     Vital Signs (Most Recent):  Temp: 98.1 °F (36.7 °C) (06/22/22 0712)  Pulse: 107 (06/22/22 0712)  Resp: (!) 39 (06/22/22 0712)  BP: (!) 141/95 (06/22/22 0712)  SpO2: 95 % (06/22/22 0712)   Vital Signs (24h Range):  Temp:  [98.1 °F (36.7 °C)-100.1 °F (37.8 °C)] 98.1 °F (36.7 °C)  Pulse:  [] 107  Resp:  [22-46] 39  SpO2:  [92 %-100 %] 95 %  BP: (124-194)/() 141/95     Weight: 86.3 kg (190 lb 4.1 oz)  Body mass index is 31.66 kg/m².      Intake/Output Summary (Last 24 hours) at 6/22/2022 0732  Last data filed at 6/22/2022 0700  Gross per 24 hour   Intake 1588.44 ml   Output 1525 ml   Net 63.44 ml        sodium chloride 0.9% 10 mL/hr at 06/21/22 1800    dexmedetomidine (PRECEDEX) infusion Stopped (06/22/22 0029)    fentanyl 100 mcg/hr (06/22/22 0629)    midazolam 1 mg/hr (06/22/22 0630)       Physical Exam  Vitals and nursing note reviewed.   Constitutional:       Appearance: She is obese. She is ill-appearing, toxic-appearing and diaphoretic.      Interventions: She is sedated, intubated and restrained.   HENT:      Mouth/Throat:      Mouth: Mucous membranes are moist.      Pharynx: Oropharynx is clear.   Eyes:      General: Gaze aligned appropriately.      Pupils: Pupils are equal, round, and reactive to light.   Neck:      Vascular: No JVD.   Cardiovascular:      Rate and Rhythm: Regular rhythm. Tachycardia present.      Pulses:           Radial pulses are 2+ on the right side and 2+ on the left side.        Dorsalis pedis pulses are 2+ on the right side and 2+ on the left side.      Comments: Intermittent bradycardia and tachycardia  Pulmonary:      Effort: Tachypnea present. She is intubated.      Comments: Breath sounds coarse  Chest:       Abdominal:      General: Bowel sounds are  decreased.      Palpations: Abdomen is soft.   Musculoskeletal:      Cervical back: Neck supple.      Right lower leg: No edema.      Left lower leg: No edema.   Skin:     General: Skin is warm.      Capillary Refill: Capillary refill takes less than 2 seconds.   Neurological:      GCS: GCS eye subscore is 2. GCS verbal subscore is 1. GCS motor subscore is 4.      Comments: No focus or track when eyes open; no noted purposeful motor movement       Vents:  Vent Mode: A/C (06/22/22 0707)  Ventilator Initiated: Yes (06/19/22 1915)  Set Rate: 22 BPM (06/22/22 0707)  Vt Set: 420 mL (06/22/22 0707)  Pressure Support: 0 cmH20 (06/22/22 0707)  PEEP/CPAP: 5 cmH20 (06/22/22 0707)  Oxygen Concentration (%): 30 (06/22/22 0712)  Peak Airway Pressure: 30 cmH2O (06/22/22 0707)  Plateau Pressure: 22 cmH20 (06/22/22 0707)  Total Ve: 17 mL (06/22/22 0707)  F/VT Ratio<105 (RSBI): (!) 65.95 (06/22/22 0707)    Lines/Drains/Airways       Peripherally Inserted Central Catheter Line  Duration             PICC Double Lumen -- days              Drain  Duration                  NG/OG Tube 06/19/22 1916 Winston sump 14 Fr. Center mouth 2 days         Urethral Catheter 06/19/22 2039 Latex 16 Fr. 2 days              Airway  Duration                  Airway - Non-Surgical 06/19/22 2306 2 days              Peripheral Intravenous Line  Duration                  Peripheral IV - Single Lumen 06/19/22 1921 20 G Right Shoulder 2 days                    Significant Labs:    CBC/Anemia Profile:  Recent Labs   Lab 06/20/22  1244 06/21/22  0517 06/22/22  0410   WBC  --  11.41 15.87*   HGB 7.4* 7.1* 8.0*   HCT 22.8* 22.4* 25.5*   PLT  --  326 374   MCV  --  86 86   RDW  --  16.4* 16.1*          Chemistries:  Recent Labs   Lab 06/21/22  0517 06/22/22  0410   * 147*   K 3.4* 3.8   * 117*   CO2 21* 20*   BUN 24* 27*   CREATININE 1.2 1.2   CALCIUM 8.6* 8.3*   MG 2.1 1.9         All pertinent labs within the past 24 hours have been  reviewed.    Significant Imaging:   I have reviewed all pertinent imaging results/findings within the past 24 hours.      ABG  Recent Labs   Lab 06/22/22  0337   PH 7.417   PO2 95   PCO2 34.6*   HCO3 22.3*   BE -2     Assessment/Plan:     Neuro  Acute encephalopathy  Suspect S/t illicit drug injection to PICC leading to brief cardiac arrest  Does not track or follow commands  CT head repeated w/no acute findings  Continue supportive care and neuro monitoring; MRI for more definitive neuro imaging not possible while requiring continuous sedation    Psychiatric  IVDU (intravenous drug user)  IV drug abuse continues in LTAC; direct etiology of cardiac arrest  Continue to encourage cessation efforts; grim survival prognosis with continued IV drug use given current health problems    Pulmonary  Acute respiratory failure with hypoxia and hypercapnia on Vent s/p Cardiac Arrest  S/t illicit IV drug injection  Vent settings reviewed adjusted for optimal gas exchange  Sedation for vent control  abg daily and prn to assess therapy  Daily SAT/SBT for extubation readiness - not appropriate for SAT/SBT today  CXR suggest fluid overload; oxygen demand unchanged; diurese    On mechanically assisted ventilation  See full plan under resp failure  VAP prophylaxis  Daily SAT/SBT for extubation readiness     S/P VATS on 6/6/2022  POD16   Old CT sutures removed 6/21 per CTS recs  CXR improving    COPD (chronic obstructive pulmonary disease)  Duo-neb q 4 prn    Cardiac/Vascular  * Cardiac arrest  S/t illicit IV injection  ROSC after 1 round of epi  May be contributing to encephalopathy but less likely    Endocarditis of tricuspid valve  MSSA bacteremia treatment in place from prior admit  ID consult; discharge plan was for oxacillin continuous to complete on 7/11/2022  Continue Oxacillin    Hypertriglyceridemia  Triglycerides 426 after 24 hours of propofol infusion  Propofol d/c 6/21  Re-check on 6/23    ID  MSSA bacteremia  On previous  admission s/t IVDU  Continue scheudled Oxacillin scheuled until 7/11/22  Blood cultures on 6/19 no growth to date  Fever last night reported 101.8, no treatment; wbc up - Repeat blood cultures today    Hematology  Pulmonary embolism, septic  Noted on prior admit  Continue oxacillin as above    Oncology  Normocytic anemia  Transfused one unit PRBC 6/20  Hgb 8 this morning; no overt bleeding  Monitor w/daily cbc      Critical Care Daily Checklist:    A: Awake: RASS Goal/Actual Goal: RASS Goal: -2-->light sedation  Actual: Carballo Agitation Sedation Scale (RASS): Light sedation   B: Spontaneous Breathing Trial Performed?     C: SAT & SBT Coordinated?  failed                      D: Delirium: CAM-ICU Overall CAM-ICU: Positive   E: Early Mobility Performed? Yes   F: Feeding Goal: Goals: 1. Enteral Nutrition initiation within 24 hours.  Status: Nutrition Goal Status: new   Current Diet Order   Procedures    Diet NPO      AS: Analgesia/Sedation Fentanyl, versed   T: Thromboembolic Prophylaxis heparin   H: HOB > 300 Yes   U: Stress Ulcer Prophylaxis (if needed) PPI   G: Glucose Control monitor   B: Bowel Function Stool Occurrence: 1   I: Indwelling Catheter (Lines & Portillo) Necessity reviewed   D: De-escalation of Antimicrobials/Pharmacotherapies reviewed    Plan for the day/ETD As above    Family/Goals of Care: Return to LTAC for continued abx on discharge   I have discussed case and plan of care in detail with Dr San and Dr Olguin; Status and plan of care were discussed with team on multidisciplinary rounds.    Critical Care Time: 70 minutes  Critical secondary to encephalopathy & respiratory failure on mechanical vent after cardiac arrest s/t IVDU   Critical care was time spent personally by me on the following activities: development of treatment plan with patient or surrogate and bedside caregivers, discussions with consultants, evaluation of patient's response to treatment, examination of patient, ordering and  performing treatments and interventions, ordering and review of laboratory studies, ordering and review of radiographic studies, pulse oximetry, re-evaluation of patient's condition. This critical care time did not overlap with that of any other provider or involve time for any procedures.      VALENTINA Juares-BC  Critical Care Medicine  [facility] ([facility])

## 2022-06-22 NOTE — ASSESSMENT & PLAN NOTE
Patient with Hypercapnic Respiratory failure which is Acute.  she is not on home oxygen. Supplemental oxygen was provided and noted- Vent Mode: A/C  Oxygen Concentration (%):  [30] 30  Resp Rate Total:  [22 br/min-46 br/min] 22 br/min  Vt Set:  [420 mL] 420 mL  PEEP/CPAP:  [5 cmH20] 5 cmH20  Pressure Support:  [0 cmH20] 0 cmH20  Mean Airway Pressure:  [8.7 ntA48-51 cmH20] 13 cmH20.   Signs/symptoms of respiratory failure include- on vent, AMS. Contributing diagnoses includes - Interstitial lung disease and Pneumonia Labs and images were reviewed. Patient Has recent ABG, which has been reviewed. Will treat underlying causes and adjust management of respiratory failure as follows- Vent support, nebs, IV Abx    6/20- continue vent support as she failed SAT/SBT this am  6/21- trying to extubate, failed SAT/SBT this am  6/4- see Cardiac arrest above

## 2022-06-22 NOTE — NURSING
Asystole alarming on cardiac monitor. RNs to bedside. Pt with pulse present on assessment. Upon review of monitor events. Pt appeared to become bradycardic as low as 31bpm followed by a very brief period of possible PEA (approx 3 seconds). On assessment pt's heart rate had already returned to NSR in 60s. NP at bedside. Precedex off, versed up to 10 per provider who will adjust order to reflect new max dose. Pt appears stable at this time, will continue to monitor closely.

## 2022-06-22 NOTE — ASSESSMENT & PLAN NOTE
H/H dropped to 6.6- Transfused 1 unit- now 7.4/23    No active bleeding, Anemia of Chronic Disease    S/p Transfusion, H/H 8/26

## 2022-06-22 NOTE — ASSESSMENT & PLAN NOTE
MSSA bacteremia treatment in place from prior admit  ID consult; discharge plan was for oxacillin continuous to complete on 7/11/2022  Continue Oxacillin

## 2022-06-22 NOTE — EICU
eICU Physician Virtual/Remote Brief Evaluation Note      Telephone call RN  Patient very agitated, intubated  Was on propofol which had to be discontinued due to elevated triglycerides  Chart reviewed, patient observed, discussed with RN  /102, P 63, RR 34, O2 sat 99  Episodes of bradycardia into the 30s  Versed 2 mg IV push and Versed drip started  Discontinue Precedex when Versed started      VIKTOR Ramsay MD  [facility] Attending  [phone]    This report has been created through the use of M-Modal dictation software. Typographical and content errors may occur with this process. While efforts are made to detect and correct such errors, in some cases errors will persist. For this reason, wording in this document should be considered in the proper context and not strictly verbatim

## 2022-06-23 LAB
ALLENS TEST: ABNORMAL
ALLENS TEST: NORMAL
ANION GAP SERPL CALC-SCNC: 11 MMOL/L (ref 8–16)
ANION GAP SERPL CALC-SCNC: 13 MMOL/L (ref 8–16)
ANISOCYTOSIS BLD QL SMEAR: SLIGHT
BACTERIA SPEC AEROBE CULT: NORMAL
BACTERIA SPEC AEROBE CULT: NORMAL
BASOPHILS # BLD AUTO: 0.06 K/UL (ref 0–0.2)
BASOPHILS # BLD AUTO: 0.08 K/UL (ref 0–0.2)
BASOPHILS NFR BLD: 0.4 % (ref 0–1.9)
BASOPHILS NFR BLD: 0.4 % (ref 0–1.9)
BUN SERPL-MCNC: 32 MG/DL (ref 6–20)
BUN SERPL-MCNC: 32 MG/DL (ref 6–20)
CALCIUM SERPL-MCNC: 8.1 MG/DL (ref 8.7–10.5)
CALCIUM SERPL-MCNC: 8.9 MG/DL (ref 8.7–10.5)
CHLORIDE SERPL-SCNC: 116 MMOL/L (ref 95–110)
CHLORIDE SERPL-SCNC: 118 MMOL/L (ref 95–110)
CO2 SERPL-SCNC: 21 MMOL/L (ref 23–29)
CO2 SERPL-SCNC: 23 MMOL/L (ref 23–29)
CREAT SERPL-MCNC: 1 MG/DL (ref 0.5–1.4)
CREAT SERPL-MCNC: 1.1 MG/DL (ref 0.5–1.4)
DACRYOCYTES BLD QL SMEAR: ABNORMAL
DELSYS: ABNORMAL
DELSYS: NORMAL
DIFFERENTIAL METHOD: ABNORMAL
DIFFERENTIAL METHOD: ABNORMAL
EOSINOPHIL # BLD AUTO: 0.2 K/UL (ref 0–0.5)
EOSINOPHIL # BLD AUTO: 0.3 K/UL (ref 0–0.5)
EOSINOPHIL NFR BLD: 1.6 % (ref 0–8)
EOSINOPHIL NFR BLD: 1.8 % (ref 0–8)
ERYTHROCYTE [DISTWIDTH] IN BLOOD BY AUTOMATED COUNT: 16.6 % (ref 11.5–14.5)
ERYTHROCYTE [DISTWIDTH] IN BLOOD BY AUTOMATED COUNT: 16.8 % (ref 11.5–14.5)
ERYTHROCYTE [SEDIMENTATION RATE] IN BLOOD BY WESTERGREN METHOD: 22 MM/H
EST. GFR  (AFRICAN AMERICAN): >60 ML/MIN/1.73 M^2
EST. GFR  (AFRICAN AMERICAN): >60 ML/MIN/1.73 M^2
EST. GFR  (NON AFRICAN AMERICAN): >60 ML/MIN/1.73 M^2
EST. GFR  (NON AFRICAN AMERICAN): >60 ML/MIN/1.73 M^2
FIO2: 100
FIO2: 30
FIO2: 35
FIO2: 40
GLUCOSE SERPL-MCNC: 103 MG/DL (ref 70–110)
GLUCOSE SERPL-MCNC: 143 MG/DL (ref 70–110)
GLUCOSE SERPL-MCNC: 206 MG/DL (ref 70–110)
GLUCOSE SERPL-MCNC: 94 MG/DL (ref 70–110)
GRAM STN SPEC: NORMAL
HCO3 UR-SCNC: 22.9 MMOL/L (ref 24–28)
HCO3 UR-SCNC: 23.5 MMOL/L (ref 24–28)
HCO3 UR-SCNC: 24.6 MMOL/L (ref 24–28)
HCO3 UR-SCNC: 25.6 MMOL/L (ref 24–28)
HCT VFR BLD AUTO: 22.4 % (ref 37–48.5)
HCT VFR BLD AUTO: 22.7 % (ref 37–48.5)
HCT VFR BLD AUTO: 26.2 % (ref 37–48.5)
HCT VFR BLD CALC: 20 %PCV (ref 36–54)
HCT VFR BLD CALC: 32 %PCV (ref 36–54)
HGB BLD-MCNC: 7 G/DL (ref 12–16)
HGB BLD-MCNC: 7.2 G/DL (ref 12–16)
HGB BLD-MCNC: 8.1 G/DL (ref 12–16)
IMM GRANULOCYTES # BLD AUTO: 0.62 K/UL (ref 0–0.04)
IMM GRANULOCYTES # BLD AUTO: 0.77 K/UL (ref 0–0.04)
IMM GRANULOCYTES NFR BLD AUTO: 4.1 % (ref 0–0.5)
IMM GRANULOCYTES NFR BLD AUTO: 4.6 % (ref 0–0.5)
LYMPHOCYTES # BLD AUTO: 4.7 K/UL (ref 1–4.8)
LYMPHOCYTES # BLD AUTO: 6.3 K/UL (ref 1–4.8)
LYMPHOCYTES NFR BLD: 33.9 % (ref 18–48)
LYMPHOCYTES NFR BLD: 35.2 % (ref 18–48)
MAGNESIUM SERPL-MCNC: 1.7 MG/DL (ref 1.6–2.6)
MAGNESIUM SERPL-MCNC: 1.8 MG/DL (ref 1.6–2.6)
MCH RBC QN AUTO: 27.5 PG (ref 27–31)
MCH RBC QN AUTO: 27.5 PG (ref 27–31)
MCHC RBC AUTO-ENTMCNC: 30.9 G/DL (ref 32–36)
MCHC RBC AUTO-ENTMCNC: 31.3 G/DL (ref 32–36)
MCV RBC AUTO: 88 FL (ref 82–98)
MCV RBC AUTO: 89 FL (ref 82–98)
MODE: ABNORMAL
MODE: ABNORMAL
MODE: NORMAL
MONOCYTES # BLD AUTO: 1.1 K/UL (ref 0.3–1)
MONOCYTES # BLD AUTO: 1.4 K/UL (ref 0.3–1)
MONOCYTES NFR BLD: 7.5 % (ref 4–15)
MONOCYTES NFR BLD: 8.4 % (ref 4–15)
NEUTROPHILS # BLD AUTO: 6.7 K/UL (ref 1.8–7.7)
NEUTROPHILS # BLD AUTO: 9.8 K/UL (ref 1.8–7.7)
NEUTROPHILS NFR BLD: 49.8 % (ref 38–73)
NEUTROPHILS NFR BLD: 52.3 % (ref 38–73)
NRBC BLD-RTO: 0 /100 WBC
NRBC BLD-RTO: 1 /100 WBC
OVALOCYTES BLD QL SMEAR: ABNORMAL
PCO2 BLDA: 33.6 MMHG (ref 35–45)
PCO2 BLDA: 35.8 MMHG (ref 35–45)
PCO2 BLDA: 37.5 MMHG (ref 35–45)
PCO2 BLDA: 49.4 MMHG (ref 35–45)
PEEP: 5
PH SMN: 7.27 [PH] (ref 7.35–7.45)
PH SMN: 7.44 [PH] (ref 7.35–7.45)
PH SMN: 7.45 [PH] (ref 7.35–7.45)
PH SMN: 7.45 [PH] (ref 7.35–7.45)
PHOSPHATE SERPL-MCNC: 3 MG/DL (ref 2.7–4.5)
PHOSPHATE SERPL-MCNC: 3.3 MG/DL (ref 2.7–4.5)
PLATELET # BLD AUTO: 357 K/UL (ref 150–450)
PLATELET # BLD AUTO: 369 K/UL (ref 150–450)
PLATELET BLD QL SMEAR: ABNORMAL
PMV BLD AUTO: 9.5 FL (ref 9.2–12.9)
PMV BLD AUTO: 9.5 FL (ref 9.2–12.9)
PO2 BLDA: 237 MMHG (ref 80–100)
PO2 BLDA: 78 MMHG (ref 80–100)
PO2 BLDA: 81 MMHG (ref 80–100)
PO2 BLDA: 93 MMHG (ref 80–100)
POC BE: -1 MMOL/L
POC BE: -4 MMOL/L
POC BE: 1 MMOL/L
POC BE: 1 MMOL/L
POC IONIZED CALCIUM: 1.24 MMOL/L (ref 1.06–1.42)
POC IONIZED CALCIUM: 1.27 MMOL/L (ref 1.06–1.42)
POC SATURATED O2: 100 % (ref 95–100)
POC SATURATED O2: 96 % (ref 95–100)
POC SATURATED O2: 96 % (ref 95–100)
POC SATURATED O2: 98 % (ref 95–100)
POCT GLUCOSE: 131 MG/DL (ref 70–110)
POIKILOCYTOSIS BLD QL SMEAR: SLIGHT
POTASSIUM BLD-SCNC: 3.2 MMOL/L (ref 3.5–5.1)
POTASSIUM BLD-SCNC: 4.1 MMOL/L (ref 3.5–5.1)
POTASSIUM SERPL-SCNC: 3.3 MMOL/L (ref 3.5–5.1)
POTASSIUM SERPL-SCNC: 3.8 MMOL/L (ref 3.5–5.1)
RBC # BLD AUTO: 2.55 M/UL (ref 4–5.4)
RBC # BLD AUTO: 2.95 M/UL (ref 4–5.4)
SAMPLE: ABNORMAL
SAMPLE: NORMAL
SITE: ABNORMAL
SITE: NORMAL
SODIUM BLD-SCNC: 148 MMOL/L (ref 136–145)
SODIUM BLD-SCNC: 150 MMOL/L (ref 136–145)
SODIUM SERPL-SCNC: 150 MMOL/L (ref 136–145)
SODIUM SERPL-SCNC: 152 MMOL/L (ref 136–145)
SP02: 99
SP02: 99
VT: 380
VT: 420
VT: 420
WBC # BLD AUTO: 13.41 K/UL (ref 3.9–12.7)
WBC # BLD AUTO: 18.68 K/UL (ref 3.9–12.7)

## 2022-06-23 PROCEDURE — 84132 ASSAY OF SERUM POTASSIUM: CPT

## 2022-06-23 PROCEDURE — 25000003 PHARM REV CODE 250: Performed by: NURSE PRACTITIONER

## 2022-06-23 PROCEDURE — 80048 BASIC METABOLIC PNL TOTAL CA: CPT | Performed by: NURSE PRACTITIONER

## 2022-06-23 PROCEDURE — 85025 COMPLETE CBC W/AUTO DIFF WBC: CPT | Mod: 91 | Performed by: NURSE PRACTITIONER

## 2022-06-23 PROCEDURE — 63600175 PHARM REV CODE 636 W HCPCS

## 2022-06-23 PROCEDURE — 80048 BASIC METABOLIC PNL TOTAL CA: CPT | Mod: 91 | Performed by: INTERNAL MEDICINE

## 2022-06-23 PROCEDURE — 36620 INSERTION CATHETER ARTERY: CPT

## 2022-06-23 PROCEDURE — 82803 BLOOD GASES ANY COMBINATION: CPT

## 2022-06-23 PROCEDURE — 25000003 PHARM REV CODE 250: Performed by: INTERNAL MEDICINE

## 2022-06-23 PROCEDURE — 85014 HEMATOCRIT: CPT | Performed by: NURSE PRACTITIONER

## 2022-06-23 PROCEDURE — 63600175 PHARM REV CODE 636 W HCPCS: Performed by: SURGERY

## 2022-06-23 PROCEDURE — 63600175 PHARM REV CODE 636 W HCPCS: Performed by: NURSE PRACTITIONER

## 2022-06-23 PROCEDURE — 99900035 HC TECH TIME PER 15 MIN (STAT)

## 2022-06-23 PROCEDURE — 63600175 PHARM REV CODE 636 W HCPCS: Performed by: INTERNAL MEDICINE

## 2022-06-23 PROCEDURE — 82330 ASSAY OF CALCIUM: CPT

## 2022-06-23 PROCEDURE — 25000003 PHARM REV CODE 250: Performed by: SURGERY

## 2022-06-23 PROCEDURE — 20000000 HC ICU ROOM

## 2022-06-23 PROCEDURE — 83735 ASSAY OF MAGNESIUM: CPT | Performed by: NURSE PRACTITIONER

## 2022-06-23 PROCEDURE — 85025 COMPLETE CBC W/AUTO DIFF WBC: CPT | Performed by: NURSE PRACTITIONER

## 2022-06-23 PROCEDURE — 84100 ASSAY OF PHOSPHORUS: CPT | Mod: 91 | Performed by: INTERNAL MEDICINE

## 2022-06-23 PROCEDURE — 99291 CRITICAL CARE FIRST HOUR: CPT | Mod: ,,, | Performed by: NURSE PRACTITIONER

## 2022-06-23 PROCEDURE — 94761 N-INVAS EAR/PLS OXIMETRY MLT: CPT

## 2022-06-23 PROCEDURE — 27000221 HC OXYGEN, UP TO 24 HOURS

## 2022-06-23 PROCEDURE — 25000003 PHARM REV CODE 250: Performed by: EMERGENCY MEDICINE

## 2022-06-23 PROCEDURE — 99291 PR CRITICAL CARE, E/M 30-74 MINUTES: ICD-10-PCS | Mod: ,,, | Performed by: NURSE PRACTITIONER

## 2022-06-23 PROCEDURE — 84100 ASSAY OF PHOSPHORUS: CPT | Performed by: NURSE PRACTITIONER

## 2022-06-23 PROCEDURE — 94003 VENT MGMT INPAT SUBQ DAY: CPT

## 2022-06-23 PROCEDURE — 84295 ASSAY OF SERUM SODIUM: CPT

## 2022-06-23 PROCEDURE — 99900026 HC AIRWAY MAINTENANCE (STAT)

## 2022-06-23 PROCEDURE — 92950 HEART/LUNG RESUSCITATION CPR: CPT

## 2022-06-23 PROCEDURE — 83735 ASSAY OF MAGNESIUM: CPT | Mod: 91 | Performed by: INTERNAL MEDICINE

## 2022-06-23 PROCEDURE — 63600175 PHARM REV CODE 636 W HCPCS: Performed by: EMERGENCY MEDICINE

## 2022-06-23 PROCEDURE — 36600 WITHDRAWAL OF ARTERIAL BLOOD: CPT

## 2022-06-23 PROCEDURE — 85014 HEMATOCRIT: CPT

## 2022-06-23 PROCEDURE — 37799 UNLISTED PX VASCULAR SURGERY: CPT

## 2022-06-23 PROCEDURE — 85018 HEMOGLOBIN: CPT | Performed by: NURSE PRACTITIONER

## 2022-06-23 RX ORDER — LANOLIN ALCOHOL/MO/W.PET/CERES
800 CREAM (GRAM) TOPICAL
Status: DISCONTINUED | OUTPATIENT
Start: 2022-06-23 | End: 2022-06-26

## 2022-06-23 RX ORDER — SODIUM,POTASSIUM PHOSPHATES 280-250MG
2 POWDER IN PACKET (EA) ORAL
Status: DISCONTINUED | OUTPATIENT
Start: 2022-06-23 | End: 2022-06-26

## 2022-06-23 RX ORDER — BISACODYL 10 MG
10 SUPPOSITORY, RECTAL RECTAL DAILY PRN
Status: DISCONTINUED | OUTPATIENT
Start: 2022-06-23 | End: 2022-06-26

## 2022-06-23 RX ORDER — HYDRALAZINE HYDROCHLORIDE 20 MG/ML
10 INJECTION INTRAMUSCULAR; INTRAVENOUS EVERY 6 HOURS PRN
Status: DISCONTINUED | OUTPATIENT
Start: 2022-06-23 | End: 2022-06-24

## 2022-06-23 RX ORDER — EPINEPHRINE 0.1 MG/ML
INJECTION INTRAVENOUS CODE/TRAUMA/SEDATION MEDICATION
Status: COMPLETED | OUTPATIENT
Start: 2022-06-23 | End: 2022-06-23

## 2022-06-23 RX ORDER — MIDAZOLAM HYDROCHLORIDE 1 MG/ML
INJECTION INTRAMUSCULAR; INTRAVENOUS
Status: DISPENSED
Start: 2022-06-23 | End: 2022-06-24

## 2022-06-23 RX ORDER — LORAZEPAM 2 MG/ML
2 INJECTION INTRAMUSCULAR ONCE
Status: COMPLETED | OUTPATIENT
Start: 2022-06-23 | End: 2022-06-23

## 2022-06-23 RX ORDER — LORAZEPAM 2 MG/ML
INJECTION INTRAMUSCULAR
Status: COMPLETED
Start: 2022-06-23 | End: 2022-06-23

## 2022-06-23 RX ORDER — MIDAZOLAM HYDROCHLORIDE 1 MG/ML
2 INJECTION INTRAMUSCULAR; INTRAVENOUS ONCE
Status: COMPLETED | OUTPATIENT
Start: 2022-06-23 | End: 2022-06-23

## 2022-06-23 RX ADMIN — MIDAZOLAM 7 MG/HR: 5 INJECTION INTRAMUSCULAR; INTRAVENOUS at 10:06

## 2022-06-23 RX ADMIN — MUPIROCIN: 20 OINTMENT TOPICAL at 08:06

## 2022-06-23 RX ADMIN — HEPARIN SODIUM 5000 UNITS: 5000 INJECTION INTRAVENOUS; SUBCUTANEOUS at 09:06

## 2022-06-23 RX ADMIN — Medication 800 MG: at 06:06

## 2022-06-23 RX ADMIN — EPINEPHRINE 1 MG: 0.1 INJECTION, SOLUTION ENDOTRACHEAL; INTRACARDIAC; INTRAVENOUS at 05:06

## 2022-06-23 RX ADMIN — MIDAZOLAM 7 MG/HR: 5 INJECTION INTRAMUSCULAR; INTRAVENOUS at 01:06

## 2022-06-23 RX ADMIN — POTASSIUM BICARBONATE 35 MEQ: 391 TABLET, EFFERVESCENT ORAL at 06:06

## 2022-06-23 RX ADMIN — Medication 800 MG: at 11:06

## 2022-06-23 RX ADMIN — MIDAZOLAM 2 MG: 1 INJECTION INTRAMUSCULAR; INTRAVENOUS at 09:06

## 2022-06-23 RX ADMIN — LORAZEPAM 2 MG: 2 INJECTION INTRAMUSCULAR; INTRAVENOUS at 09:06

## 2022-06-23 RX ADMIN — OXACILLIN SODIUM 12 G: 10 INJECTION, POWDER, FOR SOLUTION INTRAVENOUS at 08:06

## 2022-06-23 RX ADMIN — MIDAZOLAM HYDROCHLORIDE 2 MG: 1 INJECTION INTRAMUSCULAR; INTRAVENOUS at 09:06

## 2022-06-23 RX ADMIN — HEPARIN SODIUM 5000 UNITS: 5000 INJECTION INTRAVENOUS; SUBCUTANEOUS at 06:06

## 2022-06-23 RX ADMIN — CHLORHEXIDINE GLUCONATE 0.12% ORAL RINSE 15 ML: 1.2 LIQUID ORAL at 08:06

## 2022-06-23 RX ADMIN — Medication 800 MG: at 10:06

## 2022-06-23 RX ADMIN — PANTOPRAZOLE SODIUM 40 MG: 40 GRANULE, DELAYED RELEASE ORAL at 08:06

## 2022-06-23 RX ADMIN — POTASSIUM BICARBONATE 50 MEQ: 978 TABLET, EFFERVESCENT ORAL at 10:06

## 2022-06-23 RX ADMIN — LORAZEPAM 2 MG: 2 INJECTION INTRAMUSCULAR at 09:06

## 2022-06-23 RX ADMIN — Medication 150 MCG/HR: at 05:06

## 2022-06-23 RX ADMIN — HYDRALAZINE HYDROCHLORIDE 10 MG: 20 INJECTION INTRAMUSCULAR; INTRAVENOUS at 09:06

## 2022-06-23 RX ADMIN — HEPARIN SODIUM 5000 UNITS: 5000 INJECTION INTRAVENOUS; SUBCUTANEOUS at 01:06

## 2022-06-23 RX ADMIN — POTASSIUM BICARBONATE 35 MEQ: 391 TABLET, EFFERVESCENT ORAL at 08:06

## 2022-06-23 RX ADMIN — Medication 150 MCG/HR: at 03:06

## 2022-06-23 NOTE — PROGRESS NOTES
[facility] ([facility])  [facility] Medicine  Progress Note    Patient Name: Tianna Leon  MRN: [id number]  Patient Class: IP- Inpatient   Admission Date: 6/19/2022  Length of Stay: 4 days  Attending Physician: Aj Olguin MD  Primary Care Provider: Spenser Campa MD        Subjective:     Principal Problem:Cardiac arrest        HPI:  Ms. Leon is a 37 year old  female with long standing history of IV drug abuse, was recently discharged from this facility on 6/10/22 after a 35 day hospital stay. During previous admission she was found to have MSSA Tricuspid Endocarditis, treated on oxacillin (to be continued until 7/10/22). Also had right empyema, s/p VATS and chest tube. She was COVID positive during that admission. She was discharged to [facility] on 6/10. Today she was found unresponsive after she apparently injected an unknown substance into her left PICC line at the facility. She coded, received one round of epi prior to ROSC. Intubated prior to arrival. WBC 44,000, bands 19%. Lactic acid 4.0  Cr 1.8 CO2 14. CXR multifocal infiltrates. BP stable. On propofol. Received IV Zosyn.     Cardiac Arrest. Continued IV drug abuse. Tricuspid endocarditis. MSSA bacteremia. Right Empyema.      Overview/Hospital Course:  37 F WF, Hx of IVDA, MDD, bipolar disorder, anxiety, PTSD, suicidal ideation, COPD, ADHD, asthma, cervical cancer, GERD, Hep C, HLD who was d/uri from this hosp on 6/10 following 35 day admission during which she was treated for MSSA tricuspid endocarditis and right empyema, s/p VATS/decortication. She was also COVID + during admission. On 6/10, she was d/c to [facility] and continued on oxacillin (to continue through 7/10/22) via PICC line. Yesterday, she was found unresponsive in cardiac arrest at [facility]. She was resuscitated after 1 round of Epi and arrived intubated. Report given to ED was that patient injected an unknown substance into her PICC line at [facility].   In ED, Lactate 4.0,  ABG 7.215/47.8/75/19.3, WBC 44.18, Hgb 8.4, Cr 1.8, AST 53, , troponin 0.184, Tox positive for opiates and amphetamine. CXR w/multifocal infiltrates, CT head w/no acute finding. Given Vancomycin and Cefepime and placed on sedation for mechanical ventilation. Admitted to ICU for Cardiac Arrest. Continued IVDA. Tricuspid endocarditis. MSSA bacteremia. Right Empyema s/p VATS.    6/20- Remains intubated and sedated on mechanical vent. Failed brief SAT/SBT with tachycardia, tachypnea. WBC down to 16, H/H dropped to 6.6/21- got  a unit of blood, H/H improved to 7.4/23. Bun/Cr improved 22/1.4. Troponin coming down- likely demand ischemia from Cardiac Arrest as a result of IV drug.      6/21- Appreciate ICU team. Pt remains intubated, sedated on vent, was on Fentanyl/Diprivan this am, Again failed SAT/SBT this am, also TG nearly 426, hence sedation lowered and switched Diprivan to Precedex and will try SAT/SBT. WBC down to 11, H/H 7/22, HCO3 21, Bun down to 24. Await repeat SAT/SBT. Good Urine output. Abx were changed to Oxacillin again yesterday due to her hx of MSSA.    6/22- Remains intubated sedated on Vent- Precedex, Fenatnyl, Versed. Pt had a few periods of Bradycardia on Precedex gtt last night- eICU had to intervene and given Versed. Again this am, while pt having BM and agitated, Asystole alarming then pt went into NSR 60s- Precedex d/uri. Earlier CXR showed fluid load- given small dose IV Lasix. Getting TF @40 cc/hr. Tmax 101.8F last night. Good urine output. WBC increased to 15.8, H/H 8/25.     6/23- remains intubated, sedated on vent, on Versed/Fentanyl gtts. Sedation has been lowered. She 2 back to back cardiac arrests this morning around 0500 hr with PEA/Asystole with ROSC attained. Not requiring pressor support. Mother was at bed side and was updated about her critical condition and poor prognosis and all questions answered. Mother requested DNR status at this time.  Na increased 152, Cl 117, WBC down  to 13, H/H 7/24. Getting continuous Oxacillin gtt for her Endocarditis.        Interval History: remains intubated, sedated on vent, on Versed/Fentanyl gtts. Sedation has been lowered. She 2 back to back cardiac arrests this morning around 0500 hr with PEA/Asystole with ROSC attained. Not requiring pressor support. Mother was at bed side and was updated about her critical condition and poor prognosis and all questions answered. Mother requested DNR status at this time.  Na increased 152, Cl 117, WBC down to 13, H/H 7/24. Getting continuous Oxacillin gtt for her Endocarditis.      Review of Systems   Unable to perform ROS: Intubated   Objective:     Vital Signs (Most Recent):  Temp: 98.2 °F (36.8 °C) (06/23/22 1515)  Pulse: 69 (06/23/22 1630)  Resp: (!) 22 (06/23/22 1630)  BP: (!) 153/83 (06/23/22 1600)  SpO2: 95 % (06/23/22 1630)   Vital Signs (24h Range):  Temp:  [98.2 °F (36.8 °C)-100.5 °F (38.1 °C)] 98.2 °F (36.8 °C)  Pulse:  [] 69  Resp:  [22-74] 22  SpO2:  [91 %-100 %] 95 %  BP: (100-199)/() 153/83  Arterial Line BP: (110-222)/() 149/70     Weight: 86.3 kg (190 lb 4.1 oz)  Body mass index is 31.66 kg/m².    Intake/Output Summary (Last 24 hours) at 6/23/2022 1655  Last data filed at 6/23/2022 1630  Gross per 24 hour   Intake 2289.75 ml   Output 1980 ml   Net 309.75 ml      Physical Exam  Vitals and nursing note reviewed.   Constitutional:       General: She is not in acute distress.     Appearance: She is obese. She is ill-appearing. She is not diaphoretic.      Interventions: She is sedated and intubated.   HENT:      Mouth/Throat:      Mouth: Mucous membranes are moist.      Pharynx: Oropharynx is clear.   Eyes:      Pupils: Pupils are equal, round, and reactive to light.      Comments: Has downward left gaze bilaterally   Neck:      Vascular: No JVD.   Cardiovascular:      Rate and Rhythm: Regular rhythm. Tachycardia present.      Pulses:           Radial pulses are 2+ on the right side and  2+ on the left side.        Dorsalis pedis pulses are 1+ on the right side and 1+ on the left side.      Comments: Intermittent bradycardia and tachycardia  Pulmonary:      Effort: No tachypnea, accessory muscle usage or respiratory distress. She is intubated.      Breath sounds: Normal breath sounds.   Chest:      Chest wall: No deformity.       Abdominal:      General: Bowel sounds are decreased. There is no distension.      Palpations: Abdomen is soft.      Tenderness: There is no abdominal tenderness.   Genitourinary:     Comments: Portillo in place  Musculoskeletal:      Cervical back: Neck supple.      Right lower le+ Pitting Edema present.      Left lower le+ Pitting Edema present.      Right foot: No deformity.      Left foot: No deformity.   Lymphadenopathy:      Cervical: No cervical adenopathy.   Skin:     General: Skin is warm and dry.      Capillary Refill: Capillary refill takes less than 2 seconds.          Neurological:      Comments: No focus or track when eyes open; no noted purposeful motor movement.  + carini and gag reflex.  Withdraws all 4 extremities to pain but weakly.  But still on sedation actively weaning slowly        Vent Mode: A/C  Oxygen Concentration (%):  [] 35  Resp Rate Total:  [22 br/min-36 br/min] 22 br/min  Vt Set:  [380 mL-420 mL] 380 mL  PEEP/CPAP:  [5 cmH20] 5 cmH20  Pressure Support:  [0 cmH20] 0 cmH20  Mean Airway Pressure:  [12 llS69-18 cmH20] 14 cmH20  Temp:  [98.2 °F (36.8 °C)-100.5 °F (38.1 °C)] 98.2 °F (36.8 °C)  Pulse:  [] 69  Resp:  [22-74] 22  SpO2:  [91 %-100 %] 95 %  BP: (100-199)/() 153/83  Arterial Line BP: (110-222)/() 149/70   Art pH/pCO2/pO2/HCO3:  7.446/35.8/93/24.6 ( 1130)  Lab Results   Component Value Date    PDF49LFNXCGP Negative 2022    GHM44WJMJGTZ Positive (A) 2022    JZG29FDUZRXS Negative 2022      Recent Labs   Lab 06/19/22  1953 22  2104 22  2321 22  0512  06/20/22  1244 06/21/22  0517 06/22/22  0410 06/23/22  0407 06/23/22  0551 06/23/22  1130 06/23/22  1210   LACTATE 4.0*  --   --  1.6 0.7  --   --   --   --   --   --   --    TROPONINI  --   --  0.184*  --  0.126*  --   --   --   --   --   --   --    NA  --    < >  --   --  145  --  146* 147* 152*  --   --   --    WBC  --    < >  --   --  16.49*  --  11.41 15.87* 13.41*  --   --   --    GRAN  --    < >  --   --  76.0*  12.6*  --  52.5  6.0 63.6  10.1* 49.8  6.7  --   --   --    LYMPH  --    < >  --   --  15.8*  2.6  --  32.9  3.8 23.5  3.7 35.2  4.7  --   --   --    HGB  --    < >  --   --  6.6*   < > 7.1* 8.0* 7.0*  --   --  7.2*   HCT  --    < >  --   --  21.1*   < > 22.4* 25.5* 22.4*   < > 20* 22.7*   BUN  --    < >  --   --  22*  --  24* 27* 32*  --   --   --    CREATININE  --    < >  --   --  1.4  --  1.2 1.2 1.1  --   --   --    ESTGFRAFRICA  --    < >  --   --  55*  --  >60 >60 >60  --   --   --    EGFRNONAA  --    < >  --   --  47*  --  57* 57* >60  --   --   --    K  --    < >  --   --  3.6  --  3.4* 3.8 3.3*  --   --   --    CL  --    < >  --   --  110  --  116* 117* 118*  --   --   --    CO2  --    < >  --   --  21*  --  21* 20* 23  --   --   --    PHOS  --   --   --   --   --   --   --   --  3.0  --   --   --    MG  --    < >  --   --  1.6  --  2.1 1.9 1.8  --   --   --     < > = values in this interval not displayed.     Microbiology Results (last 7 days)       Procedure Component Value Units Date/Time    Blood culture x two cultures. Draw prior to antibiotics. [[id number]] Collected: 06/19/22 2105    Order Status: Completed Specimen: Blood from Peripheral, Wrist, Right Updated: 06/23/22 0812     Blood Culture, Routine No Growth to date      No Growth to date      No Growth to date      No Growth to date    Narrative:      Aerobic and anaerobic    Blood culture x two cultures. Draw prior to antibiotics. [[id number]] Collected: 06/19/22 2030    Order Status: Completed Specimen: Blood from  Peripheral, Antecubital, Right Updated: 06/23/22 0812     Blood Culture, Routine No Growth to date      No Growth to date      No Growth to date      No Growth to date    Narrative:      Aerobic and anaerobic    Culture, Respiratory with Gram Stain [[id number]] Collected: 06/20/22 1350    Order Status: Completed Specimen: Respiratory from Endotracheal Aspirate Updated: 06/23/22 0734     Respiratory Culture Normal respiratory da      No S aureus or Pseudomonas isolated.     Gram Stain (Respiratory) <10 epithelial cells per low power field.     Gram Stain (Respiratory) Moderate WBC's     Gram Stain (Respiratory) No organisms seen    Blood culture [[id number]] Collected: 06/22/22 1311    Order Status: Completed Specimen: Blood Updated: 06/23/22 0515     Blood Culture, Routine No Growth to date    Blood culture [[id number]] Collected: 06/22/22 1311    Order Status: Completed Specimen: Blood Updated: 06/23/22 0515     Blood Culture, Routine No Growth to date          Antibiotics (From admission, onward)                Start     Stop Route Frequency Ordered    06/20/22 0900  mupirocin 2 % ointment  (DECOLONIZATION PROTOCOL ORDERS)         06/25 0859 Nasl 2 times daily 06/20/22 0750    06/20/22 0900  oxacillin 12 g in  mL CONTINUOUS INFUSION         07/11 2359 IV Every 24 hours (non-standard times) 06/20/22 0752          Anticoagulants       Ordered     Route Frequency Start Stop    06/19/22 2233  heparin (porcine)         SubQ Every 8 hours 06/19/22 2245 --          X-Ray Chest 1 View  Narrative: EXAMINATION:  XR CHEST 1 VIEW    CLINICAL HISTORY:  Respiratory distress., respiratory failure;    COMPARISON:  06/22/2022 chest x-ray.    FINDINGS:  Endotracheal tube, left PICC line and nasogastric tube remain.  Cardiac size is mildly enlarged    There are bilateral infiltrates present which appear stable.  Impression: Stable chest x-ray.    Electronically signed by: Diogenes Crisostomo  MD  Date:    06/23/2022  Time:    07:16   Significant Labs: All pertinent labs within the past 24 hours have been reviewed.    Significant Imaging: I have reviewed all pertinent imaging results/findings within the past 24 hours.      Assessment/Plan:      * Cardiac arrest x 2  -IV drug use at LTAC  -BP stable at this time, not requiring pressors  -consult pulmonary for vent management    Doing better, on Vent, hemodynamically stable    Day 3 post cardiac arrest- on vent, trying to extubate    Day 4 on vent- Febrile, Bradycardic on Precedex- unstable- no weaning attempt today    Day 5 since Cardiac arrest, now had another 2 PEAs/Cardiac Arrests this am with ROSC but since then appears to have suffered significant Anoxic Brain Injury. Has corneal and gag reflex but no eye contact or response    Acute respiratory failure with hypoxia and hypercapnia on Vent s/p Cardiac Arrest  Patient with Hypercapnic Respiratory failure which is Acute.  she is not on home oxygen. Supplemental oxygen was provided and noted- Vent Mode: A/C  Oxygen Concentration (%):  [] 35  Resp Rate Total:  [22 br/min-36 br/min] 22 br/min  Vt Set:  [380 mL-420 mL] 380 mL  PEEP/CPAP:  [5 cmH20] 5 cmH20  Pressure Support:  [0 cmH20] 0 cmH20  Mean Airway Pressure:  [12 ioF91-78 cmH20] 14 cmH20.   Signs/symptoms of respiratory failure include- on vent, AMS. Contributing diagnoses includes - Interstitial lung disease and Pneumonia Labs and images were reviewed. Patient Has recent ABG, which has been reviewed. Will treat underlying causes and adjust management of respiratory failure as follows- Vent support, nebs, IV Abx    6/20- continue vent support as she failed SAT/SBT this am  6/21- trying to extubate, failed SAT/SBT this am  6/23- see Cardiac arrest above  6/24- Day 5 on vent- had 2 PEAs this am, decreased responsiveness    On mechanically assisted ventilation  See resp failure above      Normocytic anemia  H/H dropped to 6.6- Transfused 1 unit-  now 7.4/23    No active bleeding, Anemia of Chronic Disease    S/p Transfusion, H/H 8/26    H/H 7.2/23    Anoxic encephalopathy  S/p cardiac arrest and on vent on Sedation    6/23- had 2 more back to back episodes of cardiac Arrests/PEAs, now with decreased mental status despite lowering sedation    Endocarditis of tricuspid valve  -MSSA bacteremia during previous admission  -Follow up on repeat blood cultures  -IV vanc and cefepime empirically for now  -ID consult    Cont Vanc/Cefepime    Changed to Oxacillin yesterday    Continue Oxacillin    MSSA bacteremia  -during previous admission, was supposed to be on oxacillin until 7/10/22  -repeat cultures pending  -ID consult    May resume Oxacillin soon, if blood Cx remain NGTD    Resumed yesterday    Cont Oxacillin    IVDU (intravenous drug user) / Drug addiction  -continued active IV drug use while at [facility]      Pulmonary embolism, septic         Borderline personality disorder         VTE Risk Mitigation (From admission, onward)         Ordered     heparin (porcine) injection 5,000 Units  Every 8 hours         06/19/22 2233     Place sequential compression device  Until discontinued         06/19/22 2233                Discharge Planning   YESSICA:      Code Status: DNR   Is the patient medically ready for discharge?:     Reason for patient still in hospital (select all that apply): Patient unstable, Patient new problem, Patient trending condition, Laboratory test, Treatment, Imaging and Consult recommendations  Discharge Plan A: Other (patient remains critically ill at this time, pending clinical improvement for d/c planning.)        Seen and discussed with Dr. TONI San and the ICU team  Condition: Critical  Prognosis: Poor      Critical care time spent on the evaluation and treatment of severe organ dysfunction, review of pertinent labs and imaging studies, discussions with consulting providers and discussions with patient/family: 45 minutes.      Aj Olguin,  MD  Department of Hospital Medicine   [facility] ([facility])

## 2022-06-23 NOTE — PROGRESS NOTES
During xray pt went PEA and chest compressions was immediately started as this nurse was bedside at the time of PEA and ACLS protocol initiated, restraints removed and all sedation stopped. EICU Dr. Ramsay on video call running code blue. Dennis JACKSON with EICU verbalized documentation of code blue,see note.    0600-Once ROSC acheived Dr. Ramsay verbalized new orders, see orders. This nurse administered electrolyte prn protocol order per BMP results. ABG obtained, sedation restarted as pt started to overbreathe ventilator with high peak pressures. Pt mother notified by Charge Nurse  April and is en route to hospital.

## 2022-06-23 NOTE — SUBJECTIVE & OBJECTIVE
Interval History: remains intubated, sedated on vent, on Versed/Fentanyl gtts. Sedation has been lowered. She 2 back to back cardiac arrests this morning around 0500 hr with PEA/Asystole with ROSC attained. Not requiring pressor support. Mother was at bed side and was updated about her critical condition and poor prognosis and all questions answered. Mother requested DNR status at this time.  Na increased 152, Cl 117, WBC down to 13, H/H 7/24. Getting continuous Oxacillin gtt for her Endocarditis.      Review of Systems   Unable to perform ROS: Intubated   Objective:     Vital Signs (Most Recent):  Temp: 98.2 °F (36.8 °C) (06/23/22 1515)  Pulse: 69 (06/23/22 1630)  Resp: (!) 22 (06/23/22 1630)  BP: (!) 153/83 (06/23/22 1600)  SpO2: 95 % (06/23/22 1630)   Vital Signs (24h Range):  Temp:  [98.2 °F (36.8 °C)-100.5 °F (38.1 °C)] 98.2 °F (36.8 °C)  Pulse:  [] 69  Resp:  [22-74] 22  SpO2:  [91 %-100 %] 95 %  BP: (100-199)/() 153/83  Arterial Line BP: (110-222)/() 149/70     Weight: 86.3 kg (190 lb 4.1 oz)  Body mass index is 31.66 kg/m².    Intake/Output Summary (Last 24 hours) at 6/23/2022 1655  Last data filed at 6/23/2022 1630  Gross per 24 hour   Intake 2289.75 ml   Output 1980 ml   Net 309.75 ml      Physical Exam  Vitals and nursing note reviewed.   Constitutional:       General: She is not in acute distress.     Appearance: She is obese. She is ill-appearing. She is not diaphoretic.      Interventions: She is sedated and intubated.   HENT:      Mouth/Throat:      Mouth: Mucous membranes are moist.      Pharynx: Oropharynx is clear.   Eyes:      Pupils: Pupils are equal, round, and reactive to light.      Comments: Has downward left gaze bilaterally   Neck:      Vascular: No JVD.   Cardiovascular:      Rate and Rhythm: Regular rhythm. Tachycardia present.      Pulses:           Radial pulses are 2+ on the right side and 2+ on the left side.        Dorsalis pedis pulses are 1+ on the right side and  1+ on the left side.      Comments: Intermittent bradycardia and tachycardia  Pulmonary:      Effort: No tachypnea, accessory muscle usage or respiratory distress. She is intubated.      Breath sounds: Normal breath sounds.   Chest:      Chest wall: No deformity.       Abdominal:      General: Bowel sounds are decreased. There is no distension.      Palpations: Abdomen is soft.      Tenderness: There is no abdominal tenderness.   Genitourinary:     Comments: Portillo in place  Musculoskeletal:      Cervical back: Neck supple.      Right lower le+ Pitting Edema present.      Left lower le+ Pitting Edema present.      Right foot: No deformity.      Left foot: No deformity.   Lymphadenopathy:      Cervical: No cervical adenopathy.   Skin:     General: Skin is warm and dry.      Capillary Refill: Capillary refill takes less than 2 seconds.          Neurological:      Comments: No focus or track when eyes open; no noted purposeful motor movement.  + carini and gag reflex.  Withdraws all 4 extremities to pain but weakly.  But still on sedation actively weaning slowly        Vent Mode: A/C  Oxygen Concentration (%):  [] 35  Resp Rate Total:  [22 br/min-36 br/min] 22 br/min  Vt Set:  [380 mL-420 mL] 380 mL  PEEP/CPAP:  [5 cmH20] 5 cmH20  Pressure Support:  [0 cmH20] 0 cmH20  Mean Airway Pressure:  [12 mkU67-87 cmH20] 14 cmH20  Temp:  [98.2 °F (36.8 °C)-100.5 °F (38.1 °C)] 98.2 °F (36.8 °C)  Pulse:  [] 69  Resp:  [22-74] 22  SpO2:  [91 %-100 %] 95 %  BP: (100-199)/() 153/83  Arterial Line BP: (110-222)/() 149/70   Art pH/pCO2/pO2/HCO3:  7.446/35.8/93/24.6 ( 1130)  Lab Results   Component Value Date    EPT39QEYIJNK Negative 2022    BMP41IDTUCFX Positive (A) 2022    XTC05JMMMWLA Negative 2022      Recent Labs   Lab 22/22  0512 22  1244 22  0517 22  0410 22  0407 22  0551  06/23/22  1130 06/23/22  1210   LACTATE 4.0*  --   --  1.6 0.7  --   --   --   --   --   --   --    TROPONINI  --   --  0.184*  --  0.126*  --   --   --   --   --   --   --    NA  --    < >  --   --  145  --  146* 147* 152*  --   --   --    WBC  --    < >  --   --  16.49*  --  11.41 15.87* 13.41*  --   --   --    GRAN  --    < >  --   --  76.0*  12.6*  --  52.5  6.0 63.6  10.1* 49.8  6.7  --   --   --    LYMPH  --    < >  --   --  15.8*  2.6  --  32.9  3.8 23.5  3.7 35.2  4.7  --   --   --    HGB  --    < >  --   --  6.6*   < > 7.1* 8.0* 7.0*  --   --  7.2*   HCT  --    < >  --   --  21.1*   < > 22.4* 25.5* 22.4*   < > 20* 22.7*   BUN  --    < >  --   --  22*  --  24* 27* 32*  --   --   --    CREATININE  --    < >  --   --  1.4  --  1.2 1.2 1.1  --   --   --    ESTGFRAFRICA  --    < >  --   --  55*  --  >60 >60 >60  --   --   --    EGFRNONAA  --    < >  --   --  47*  --  57* 57* >60  --   --   --    K  --    < >  --   --  3.6  --  3.4* 3.8 3.3*  --   --   --    CL  --    < >  --   --  110  --  116* 117* 118*  --   --   --    CO2  --    < >  --   --  21*  --  21* 20* 23  --   --   --    PHOS  --   --   --   --   --   --   --   --  3.0  --   --   --    MG  --    < >  --   --  1.6  --  2.1 1.9 1.8  --   --   --     < > = values in this interval not displayed.     Microbiology Results (last 7 days)       Procedure Component Value Units Date/Time    Blood culture x two cultures. Draw prior to antibiotics. [[id number]] Collected: 06/19/22 2105    Order Status: Completed Specimen: Blood from Peripheral, Wrist, Right Updated: 06/23/22 0812     Blood Culture, Routine No Growth to date      No Growth to date      No Growth to date      No Growth to date    Narrative:      Aerobic and anaerobic    Blood culture x two cultures. Draw prior to antibiotics. [[id number]] Collected: 06/19/22 2030    Order Status: Completed Specimen: Blood from Peripheral, Antecubital, Right Updated: 06/23/22 0812     Blood Culture, Routine No  Growth to date      No Growth to date      No Growth to date      No Growth to date    Narrative:      Aerobic and anaerobic    Culture, Respiratory with Gram Stain [[id number]] Collected: 06/20/22 1350    Order Status: Completed Specimen: Respiratory from Endotracheal Aspirate Updated: 06/23/22 0734     Respiratory Culture Normal respiratory da      No S aureus or Pseudomonas isolated.     Gram Stain (Respiratory) <10 epithelial cells per low power field.     Gram Stain (Respiratory) Moderate WBC's     Gram Stain (Respiratory) No organisms seen    Blood culture [[id number]] Collected: 06/22/22 1311    Order Status: Completed Specimen: Blood Updated: 06/23/22 0515     Blood Culture, Routine No Growth to date    Blood culture [[id number]] Collected: 06/22/22 1311    Order Status: Completed Specimen: Blood Updated: 06/23/22 0515     Blood Culture, Routine No Growth to date          Antibiotics (From admission, onward)                Start     Stop Route Frequency Ordered    06/20/22 0900  mupirocin 2 % ointment  (DECOLONIZATION PROTOCOL ORDERS)         06/25 0859 Nasl 2 times daily 06/20/22 0750    06/20/22 0900  oxacillin 12 g in  mL CONTINUOUS INFUSION         07/11 2359 IV Every 24 hours (non-standard times) 06/20/22 0752          Anticoagulants       Ordered     Route Frequency Start Stop    06/19/22 2233  heparin (porcine)         SubQ Every 8 hours 06/19/22 2245 --          X-Ray Chest 1 View  Narrative: EXAMINATION:  XR CHEST 1 VIEW    CLINICAL HISTORY:  Respiratory distress., respiratory failure;    COMPARISON:  06/22/2022 chest x-ray.    FINDINGS:  Endotracheal tube, left PICC line and nasogastric tube remain.  Cardiac size is mildly enlarged    There are bilateral infiltrates present which appear stable.  Impression: Stable chest x-ray.    Electronically signed by: Diogenes Crisostomo MD  Date:    06/23/2022  Time:    07:16   Significant Labs: All pertinent labs within the past 24 hours have been  reviewed.    Significant Imaging: I have reviewed all pertinent imaging results/findings within the past 24 hours.

## 2022-06-23 NOTE — ASSESSMENT & PLAN NOTE
-during previous admission, was supposed to be on oxacillin until 7/10/22  -repeat cultures pending  -ID consult    May resume Oxacillin soon, if blood Cx remain NGTD    Resumed yesterday    Cont Oxacillin

## 2022-06-23 NOTE — PLAN OF CARE
Recommendation/Intervention: 6/23  1. Recommend to continue Peptamen Intense VHP when medically able:   -Goal rate @ 50mL/hr.   -Provides 1200 calories, 110g protein, and 1008mL H2O.   -100mL H2O flush q 4-6 hours or per MD/NP.   -Check Mg, K+, Na, Phos, and Glucose before and during initiation;Correct as indicated.      2. Weekly weights per RD follow up.    Kailey Rice RD,LDN

## 2022-06-23 NOTE — PLAN OF CARE
Plan of care reviewed with pt and pt's mother at bedside.   Pt made DNR this shift by mother.  Pt remains on vent support as well as cont sedation versed and fentanyl to PICC.  Cont abx infusing to PIV.  Intermittent bradycardia, as low as 55, mostly NSR this shift.  Remained afebrile.  TF to OGT at goal rate, pt tolerating.   Portillo cath in place draining cloudy yellow urine with sediment noted, good output see flowsheets.  Turned/repositioned q2hr with wedge and pillow support. Heels floated. SCDs in use.

## 2022-06-23 NOTE — ASSESSMENT & PLAN NOTE
Patient with Hypercapnic Respiratory failure which is Acute.  she is not on home oxygen. Supplemental oxygen was provided and noted- Vent Mode: A/C  Oxygen Concentration (%):  [] 35  Resp Rate Total:  [22 br/min-36 br/min] 22 br/min  Vt Set:  [380 mL-420 mL] 380 mL  PEEP/CPAP:  [5 cmH20] 5 cmH20  Pressure Support:  [0 cmH20] 0 cmH20  Mean Airway Pressure:  [12 iyN21-67 cmH20] 14 cmH20.   Signs/symptoms of respiratory failure include- on vent, AMS. Contributing diagnoses includes - Interstitial lung disease and Pneumonia Labs and images were reviewed. Patient Has recent ABG, which has been reviewed. Will treat underlying causes and adjust management of respiratory failure as follows- Vent support, nebs, IV Abx    6/20- continue vent support as she failed SAT/SBT this am  6/21- trying to extubate, failed SAT/SBT this am  6/23- see Cardiac arrest above  6/24- Day 5 on vent- had 2 PEAs this am, decreased responsiveness

## 2022-06-23 NOTE — PROGRESS NOTES
[facility] - Intensive Care (Hospital)  Adult Nutrition  Consult Note    SUMMARY     Recommendations    Recommendation/Intervention:   1. Recommend to continue Peptamen Intense VHP when medically able:   -Goal rate @ 50mL/hr.   -Provides 1200 calories, 110g protein, and 1008mL H2O.   -100mL H2O flush q 4-6 hours or per MD/NP.   -Check Mg, K+, Na, Phos, and Glucose before and during initiation;Correct as indicated.     2. Weekly weights per RD follow up.    Goals:   1. Restart EN when medically able by RD follow up.     Nutrition Goal Status: goal not met     Communication of RD Recs: (Plan of care;Sticky Note)    Assessment and Plan    Nutrition Problem  Inadequate oral intake     Related to (etiology):   Decreased ability to consume sufficient energy     Signs and Symptoms (as evidenced by):   NPO, intubated     Interventions/Recommendations (treatment strategy):  Enteral Nutrition   Collaboration with Medical Providers   Weekly Weights     Nutrition Diagnosis Status:   Continues         Malnutrition Assessment   Patient does not meet criteria for malnutrition dx. Will continue to monitor.         Reason for Assessment    Reason For Assessment: follow up   Diagnosis:  (cardiac arrest)  Relevant Medical History: COPD, HLD, IV drug abuse, gerd, cancer, hep c  Interdisciplinary Rounds: attended  General Information Comments:     6/20:RD consulted for intubation. Patient is NPO and intubated. Patient is receiving propofol. Labs have been reviewed. Patient has 4+ severe edema to legs, ankles, and feet. Patients last BM 6/19. Patients weight per past encounters and since admission have been stable. Will continue to monitor.    6/23: Patient seen for follow up. Patient is NPO and intubated. TF was stopped due to pt coded. TF will restart when medically appropriate. Patient has 2+ mild generalized edema. Patients last BM 6/22. Will continue to monitor.     Nutrition Discharge Planning: pending medical course    Nutrition  "Risk Screen    Nutrition Risk Screen: tube feeding or parenteral nutrition    Nutrition/Diet History    Patient Reported Diet/Restrictions/Preferences:  (unknown)  Spiritual, Cultural Beliefs, Episcopal Practices, Values that Affect Care: no  Food Allergies: NKFA  Factors Affecting Nutritional Intake: NPO, on mechanical ventilation    Anthropometrics    Temp: 98.6 °F (37 °C)  Height Method: Estimated  Height: 5' 5" (165.1 cm)  Height (inches): 65 in  Weight Method: Bed Scale  Weight: 86.3 kg (190 lb 4.1 oz)  Weight (lb): 190.26 lb  Ideal Body Weight (IBW), Female: 125 lb  % Ideal Body Weight, Female (lb): 153.97 %  BMI (Calculated): 31.7  BMI Grade: 30 - 34.9- obesity - grade I       Lab/Procedures/Meds  BMP  Lab Results   Component Value Date     (H) 06/23/2022    K 3.3 (L) 06/23/2022     (H) 06/23/2022    CO2 23 06/23/2022    BUN 32 (H) 06/23/2022    CREATININE 1.1 06/23/2022    CALCIUM 8.1 (L) 06/23/2022    ANIONGAP 11 06/23/2022    ESTGFRAFRICA >60 06/23/2022    EGFRNONAA >60 06/23/2022     ,l[place]le  Lab Results   Component Value Date    CALCIUM 8.1 (L) 06/23/2022    PHOS 3.0 06/23/2022     Lab Results   Component Value Date    LABPROT 11.8 06/19/2022    ALBUMIN 1.9 (L) 06/20/2022     Pertinent Labs Reviewed: reviewed  Pertinent Medications Reviewed: reviewed  Scheduled Meds:   chlorhexidine  15 mL Mouth/Throat BID    heparin (porcine)  5,000 Units Subcutaneous Q8H    mupirocin   Nasal BID    oxacillin 12 g in  mL CONTINUOUS INFUSION  12 g Intravenous Q24H    pantoprazole  40 mg Per OG tube Daily     Continuous Infusions:   sodium chloride 0.9% Stopped (06/22/22 1146)    fentanyl 200 mcg/hr (06/23/22 0900)    midazolam 7 mg/hr (06/23/22 0930)     PRN Meds:.sodium chloride, albuterol-ipratropium, hydrALAZINE, magnesium oxide, magnesium oxide, midazolam, ondansetron, potassium bicarbonate, potassium bicarbonate, potassium bicarbonate, potassium, sodium phosphates, potassium, sodium " phosphates, potassium, sodium phosphates    Physical Findings/Assessment         Estimated/Assessed Needs    Weight Used For Calorie Calculations: 87.3 kg (192 lb 7.4 oz)  Energy Calorie Requirements (kcal): 960-1222 (11-14kcal/kg, permissive underfeeding, BMI >30)  Energy Need Method: Kcal/kg  Protein Requirements: 45-56 (0.8-1.0g/kg, renal labs)  Weight Used For Protein Calculations: 56.8 kg (125 lb 3.5 oz) (IBW)  Fluid Requirements (mL): 960-1222  Estimated Fluid Requirement Method: RDA Method  RDA Method (mL): 960         Nutrition Prescription Ordered    Current Diet Order: NPO    Evaluation of Received Nutrient/Fluid Intake    % Kcal Needs: 0%  % Protein Needs: 0%  Energy Calories Required: not meeting needs  Protein Required: not meeting needs  Fluid Required: not meeting needs  Tolerance:  (Patient NPO)  % Intake of Estimated Energy Needs: 0 - 25 %  % Meal Intake: NPO    Nutrition Risk    Level of Risk/Frequency of Follow-up: moderate - high       Monitor and Evaluation    Food and Nutrient Intake: energy intake, enteral nutrition intake  Food and Nutrient Adminstration: enteral and parenteral nutrition administration  Anthropometric Measurements: weight, weight change, body mass index  Biochemical Data, Medical Tests and Procedures: electrolyte and renal panel, gastrointestinal profile, glucose/endocrine profile, inflammatory profile, lipid profile  Nutrition-Focused Physical Findings: overall appearance       Nutrition Follow-Up    RD Follow-up?: Yes   Kailey Rice RD,LDN

## 2022-06-23 NOTE — ASSESSMENT & PLAN NOTE
Suspect S/t illicit drug injection to PICC leading to brief cardiac arrest, unclear if anoxic injury present due to difficulty keeping patient calm with sedation weaning  Does not track or follow commands  CT head repeated 6/22 w/no acute findings  Continue supportive care and neuro monitoring; MRI for more definitive neuro imaging not possible while requiring continuous sedation

## 2022-06-23 NOTE — ASSESSMENT & PLAN NOTE
S/t illicit IV drug injection  Vent settings reviewed adjusted for optimal gas exchange  Sedation for vent control  abg daily and prn to assess therapy  Daily SAT/SBT for extubation readiness - not appropriate for SAT/SBT today  CXR suggest fluid overload, hold further diuresing given recent cardiac arrest this AM

## 2022-06-23 NOTE — ASSESSMENT & PLAN NOTE
S/p cardiac arrest and on vent on Sedation    6/23- had 2 more back to back episodes of cardiac Arrests/PEAs, now with decreased mental status despite lowering sedation

## 2022-06-23 NOTE — ASSESSMENT & PLAN NOTE
-IV drug use at LTAC  -BP stable at this time, not requiring pressors  -consult pulmonary for vent management    Doing better, on Vent, hemodynamically stable    Day 3 post cardiac arrest- on vent, trying to extubate    Day 4 on vent- Febrile, Bradycardic on Precedex- unstable- no weaning attempt today    Day 5 since Cardiac arrest, now had another 2 PEAs/Cardiac Arrests this am with ROSC but since then appears to have suffered significant Anoxic Brain Injury. Has corneal and gag reflex but no eye contact or response

## 2022-06-23 NOTE — ASSESSMENT & PLAN NOTE
Transfused one unit PRBC 6/20  Hgb 7 this morning; no overt bleeding  Monitor w/daily cbc  Conservative transfusion protocol

## 2022-06-23 NOTE — SUBJECTIVE & OBJECTIVE
Review of Systems   Unable to perform ROS: Intubated       Objective:     Vital Signs (Most Recent):  Temp: 98.6 °F (37 °C) (06/23/22 0730)  Pulse: (!) 58 (06/23/22 0930)  Resp: (!) 22 (06/23/22 0930)  BP: 100/69 (06/23/22 0900)  SpO2: 100 % (06/23/22 0930)   Vital Signs (24h Range):  Temp:  [98.3 °F (36.8 °C)-100.5 °F (38.1 °C)] 98.6 °F (37 °C)  Pulse:  [] 58  Resp:  [22-74] 22  SpO2:  [91 %-100 %] 100 %  BP: ()/() 100/69  Arterial Line BP: (120-222)/() 120/61     Weight: 86.3 kg (190 lb 4.1 oz)  Body mass index is 31.66 kg/m².      Intake/Output Summary (Last 24 hours) at 6/23/2022 1058  Last data filed at 6/23/2022 0940  Gross per 24 hour   Intake 2114.36 ml   Output 2205 ml   Net -90.64 ml       Physical Exam  Vitals and nursing note reviewed.   Constitutional:       General: She is not in acute distress.     Appearance: She is obese. She is ill-appearing. She is not diaphoretic.      Interventions: She is sedated and intubated.   HENT:      Mouth/Throat:      Mouth: Mucous membranes are moist.      Pharynx: Oropharynx is clear.   Eyes:      Pupils: Pupils are equal, round, and reactive to light.      Comments: Has downward left gaze bilaterally   Neck:      Vascular: No JVD.   Cardiovascular:      Rate and Rhythm: Regular rhythm. Tachycardia present.      Pulses:           Radial pulses are 2+ on the right side and 2+ on the left side.        Dorsalis pedis pulses are 1+ on the right side and 1+ on the left side.      Comments: Intermittent bradycardia and tachycardia  Pulmonary:      Effort: No tachypnea, accessory muscle usage or respiratory distress. She is intubated.      Breath sounds: Normal breath sounds.   Chest:      Chest wall: No deformity.       Abdominal:      General: Bowel sounds are decreased. There is no distension.      Palpations: Abdomen is soft.      Tenderness: There is no abdominal tenderness.   Genitourinary:     Comments: Portillo in place  Musculoskeletal:       Cervical back: Neck supple.      Right lower le+ Pitting Edema present.      Left lower le+ Pitting Edema present.      Right foot: No deformity.      Left foot: No deformity.   Lymphadenopathy:      Cervical: No cervical adenopathy.   Skin:     General: Skin is warm and dry.      Capillary Refill: Capillary refill takes less than 2 seconds.          Neurological:      Comments: No focus or track when eyes open; no noted purposeful motor movement.  + carini and gag reflex.  Withdraws all 4 extremities to pain but weakly.  But still on sedation actively weaning slowly       Vents:  Vent Mode: A/C (22)  Ventilator Initiated: Yes (22)  Set Rate: 22 BPM (22)  Vt Set: 420 mL (22)  Pressure Support: 0 cmH20 (22)  PEEP/CPAP: 5 cmH20 (22)  Oxygen Concentration (%): 60 (22)  Peak Airway Pressure: 42 cmH2O (22)  Plateau Pressure: 24 cmH20 (22)  Total Ve: 9.7 mL (22)  F/VT Ratio<105 (RSBI): (!) 49.22 (22)    Lines/Drains/Airways       Peripherally Inserted Central Catheter Line  Duration             PICC Double Lumen -- days              Drain  Duration                  NG/OG Tube 22 [place] sump 14 Fr. Center mouth 3 days         Urethral Catheter 22 Latex 16 Fr. 3 days              Airway  Duration                  Airway - Non-Surgical 226 3 days              Arterial Line  Duration             Arterial Line 22 0545 Left Radial <1 day              Peripheral Intravenous Line  Duration                  Peripheral IV - Single Lumen 22 1921 20 G Right Shoulder 3 days                    Significant Labs:    CBC/Anemia Profile:  Recent Labs   Lab 22  0410 22  0407 22  0551   WBC 15.87* 13.41*  --    HGB 8.0* 7.0*  --    HCT 25.5* 22.4* 32*    357  --    MCV 86 88  --    RDW 16.1* 16.6*  --         Chemistries:  Recent Labs   Lab  06/22/22  0410 06/23/22  0407   * 152*   K 3.8 3.3*   * 118*   CO2 20* 23   BUN 27* 32*   CREATININE 1.2 1.1   CALCIUM 8.3* 8.1*   MG 1.9 1.8   PHOS  --  3.0       ABGs:   Recent Labs   Lab 06/23/22  0551   PH 7.273*   PCO2 49.4*   HCO3 22.9*   POCSATURATED 100   BE -4     All pertinent labs within the past 24 hours have been reviewed.    Significant Imaging:  I have reviewed all pertinent imaging results/findings within the past 24 hours.  CXR: I have reviewed all pertinent results/findings within the past 24 hours and my personal findings are:  bilat diffuse interstitial infiltrates

## 2022-06-23 NOTE — ASSESSMENT & PLAN NOTE
On previous admission s/t IVDU  Continue scheudled Oxacillin scheuled until 7/11/22  Blood cultures on 6/19 neg and repeat 6/22 NGTD  Tmax last 24 hours 100.5

## 2022-06-23 NOTE — EICU
Intervention Initiated From:  Bedside    Randi Communicated with Bedside Nurse regarding:  Documentation, ECG Change, HR and Respiratory      Comments: Called to the bedside with code blue in progress.  Dr. Ramsay notified and entered the room via camera. Code documentation charted in the code documentation flowsheet according to dictation from staff at the bedside.

## 2022-06-23 NOTE — EICU
eICU Physician Virtual/Remote Brief Evaluation Note      Called into room for cardiac arrest  Patient in PA with monitor showing sinus tach  ROSC returned after 2 rounds of epi  Arterial line placed post code  Post code ABG pH 7.27, pCO2 49, PO2 237, bicarb 22  Sodium 148, potassium 3.2, ionized calcium 1.27, glucose 206, adequate 32  Enteral potassium replacement protocol ordered  Phosphorus added to a.m. labs       B. Harjit Ramsay MD  eICU Attending  [phone]    This report has been created through the use of [employer] dictation software. Typographical and content errors may occur with this process. While efforts are made to detect and correct such errors, in some cases errors will persist. For this reason, wording in this document should be considered in the proper context and not strictly verbatim

## 2022-06-23 NOTE — ASSESSMENT & PLAN NOTE
H/H dropped to 6.6- Transfused 1 unit- now 7.4/23    No active bleeding, Anemia of Chronic Disease    S/p Transfusion, H/H 8/26    H/H 7.2/23

## 2022-06-23 NOTE — PROGRESS NOTES
[facility] - Intensive Care ([facility])  Critical Care Medicine  Progress Note    Patient Name: Tianna Leon  MRN: [id number]  Admission Date: 6/19/2022  Hospital Length of Stay: 4 days  Code Status: Prior  Attending Provider: Aj Olguin MD  Primary Care Provider: Spenser Campa MD   Principal Problem: Anoxic encephalopathy    Subjective:     HPI:  37 F w/pmh significant for IV drug abuse, MDD, bipolar disorder, anxiety, PTSD, suicidal ideation, COPD, ADHD, anxiety, asthma, cervical cancer, GERD, Hep C, HLD.   She was d/c from OSH on 6/19 following 35 day admission during which she was treated for MSSA tricuspid endocarditis and right empyema, s/p VATS/decortication. She was also COVID + during admission. On 6/10, she was d/c to [facility] and continued on oxacillin (to continue through 7/10/22) via PICC line.     Yesterday, she was found unresponsive in cardiac arrest at LTAC. She was resuscitated after 1 round of Epi and arrived intubated. Report given to ED was that patient injected an unknown substance into her PICC line at LTAC.   In ED, labs significant for lactate of 4.0, ABG pH 7.215, PCO2 47.8, PO2 75, HCO3 19.3, WBC 44.18, Hgb 8.4, Cr 1.8, AST 53, , troponin 0.184, tox positive for opiates and amphetamine.   CXR w/multifocal infiltrates, CT head w/no acute finding.   Given vancomycin and cefepime and placed on sedation for mechanical ventilation.       Hospital/ICU Course:  Admitted to ICU @ 2128.   6/20 - Seen and examined at start of shift. Remains intubated and sedated on mechanical vent. Failed brief SAT/SBT with tachycardia, tachypnea   6/21 - Remains intubated/sedated on mechanical vent with minimal oxygen demand. Continues failing SAT/SBT with tachypnea, tachycardia. Sedation changed to precedex    6/22 - Remains intubated/sedated on mechanical vent with minimal oxygen demand. Not appropriate for SAT/SBT today d/t agitation, tachypnea, arrhythmia on sedation despite Versed infusion added    NOTE - At approx 1015, staff informed that pt had a bradycardic/?PEA event lasting 3 sec during turning, spontaneously resolved to SR 60s  6/23 - Had another cardiac arrest this AM about 0500 hr with PEA/Asystole with ROSC attained.  Currently supine in bed and [facility] in no distress sedated on Fentanyl and Versed infusions not requiring pressor support.  Mother at bed side and update given and all questions answered.  Mother request DNR status at this time.        Review of Systems   Unable to perform ROS: Intubated       Objective:     Vital Signs (Most Recent):  Temp: 98.6 °F (37 °C) (06/23/22 0730)  Pulse: (!) 58 (06/23/22 0930)  Resp: (!) 22 (06/23/22 0930)  BP: 100/69 (06/23/22 0900)  SpO2: 100 % (06/23/22 0930)   Vital Signs (24h Range):  Temp:  [98.3 °F (36.8 °C)-100.5 °F (38.1 °C)] 98.6 °F (37 °C)  Pulse:  [] 58  Resp:  [22-74] 22  SpO2:  [91 %-100 %] 100 %  BP: ()/() 100/69  Arterial Line BP: (120-222)/() 120/61     Weight: 86.3 kg (190 lb 4.1 oz)  Body mass index is 31.66 kg/m².      Intake/Output Summary (Last 24 hours) at 6/23/2022 1058  Last data filed at 6/23/2022 0940  Gross per 24 hour   Intake 2114.36 ml   Output 2205 ml   Net -90.64 ml       Physical Exam  Vitals and nursing note reviewed.   Constitutional:       General: She is not in acute distress.     Appearance: She is obese. She is ill-appearing. She is not diaphoretic.      Interventions: She is sedated and intubated.   HENT:      Mouth/Throat:      Mouth: Mucous membranes are moist.      Pharynx: Oropharynx is clear.   Eyes:      Pupils: Pupils are equal, round, and reactive to light.      Comments: Has downward left gaze bilaterally   Neck:      Vascular: No JVD.   Cardiovascular:      Rate and Rhythm: Regular rhythm. Tachycardia present.      Pulses:           Radial pulses are 2+ on the right side and 2+ on the left side.        Dorsalis pedis pulses are 1+ on the right side and 1+ on the  left side.      Comments: Intermittent bradycardia and tachycardia  Pulmonary:      Effort: No tachypnea, accessory muscle usage or respiratory distress. She is intubated.      Breath sounds: Normal breath sounds.   Chest:      Chest wall: No deformity.       Abdominal:      General: Bowel sounds are decreased. There is no distension.      Palpations: Abdomen is soft.      Tenderness: There is no abdominal tenderness.   Genitourinary:     Comments: Portillo in place  Musculoskeletal:      Cervical back: Neck supple.      Right lower le+ Pitting Edema present.      Left lower le+ Pitting Edema present.      Right foot: No deformity.      Left foot: No deformity.   Lymphadenopathy:      Cervical: No cervical adenopathy.   Skin:     General: Skin is warm and dry.      Capillary Refill: Capillary refill takes less than 2 seconds.          Neurological:      Comments: No focus or track when eyes open; no noted purposeful motor movement.  + carini and gag reflex.  Withdraws all 4 extremities to pain but weakly.  But still on sedation actively weaning slowly       Vents:  Vent Mode: A/C (22)  Ventilator Initiated: Yes (22)  Set Rate: 22 BPM (22)  Vt Set: 420 mL (22)  Pressure Support: 0 cmH20 (22)  PEEP/CPAP: 5 cmH20 (22)  Oxygen Concentration (%): 60 (22)  Peak Airway Pressure: 42 cmH2O (22)  Plateau Pressure: 24 cmH20 (22)  Total Ve: 9.7 mL (22)  F/VT Ratio<105 (RSBI): (!) 49.22 (22)    Lines/Drains/Airways       Peripherally Inserted Central Catheter Line  Duration             PICC Double Lumen -- days              Drain  Duration                  NG/OG Tube 22 [place] sump 14 Fr. Center mouth 3 days         Urethral Catheter 22 Latex 16 Fr. 3 days              Airway  Duration                  Airway - Non-Surgical 22 3 days              Arterial Line  Duration              Arterial Line 06/23/22 0545 Left Radial <1 day              Peripheral Intravenous Line  Duration                  Peripheral IV - Single Lumen 06/19/22 1921 20 G Right Shoulder 3 days                    Significant Labs:    CBC/Anemia Profile:  Recent Labs   Lab 06/22/22 0410 06/23/22  0407 06/23/22  0551   WBC 15.87* 13.41*  --    HGB 8.0* 7.0*  --    HCT 25.5* 22.4* 32*    357  --    MCV 86 88  --    RDW 16.1* 16.6*  --         Chemistries:  Recent Labs   Lab 06/22/22 0410 06/23/22  0407   * 152*   K 3.8 3.3*   * 118*   CO2 20* 23   BUN 27* 32*   CREATININE 1.2 1.1   CALCIUM 8.3* 8.1*   MG 1.9 1.8   PHOS  --  3.0       ABGs:   Recent Labs   Lab 06/23/22  0551   PH 7.273*   PCO2 49.4*   HCO3 22.9*   POCSATURATED 100   BE -4     All pertinent labs within the past 24 hours have been reviewed.    Significant Imaging:  I have reviewed all pertinent imaging results/findings within the past 24 hours.  CXR: I have reviewed all pertinent results/findings within the past 24 hours and my personal findings are:  bilat diffuse interstitial infiltrates      ABG  Recent Labs   Lab 06/23/22  0551   PH 7.273*   PO2 237*   PCO2 49.4*   HCO3 22.9*   BE -4     Assessment/Plan:     Neuro  * Anoxic encephalopathy  Suspect S/t illicit drug injection to PICC leading to brief cardiac arrest, unclear if anoxic injury present due to difficulty keeping patient calm with sedation weaning  Does not track or follow commands  CT head repeated 6/22 w/no acute findings  Continue supportive care and neuro monitoring; MRI for more definitive neuro imaging not possible while requiring continuous sedation    Psychiatric  Borderline personality disorder  Holding psych meds for neuro assessment off sedation    IVDU (intravenous drug user) / Drug addiction  IV drug abuse continues in LTAC; direct etiology of cardiac arrest  Continue to encourage cessation efforts; grim survival prognosis with continued IV drug use given  current health problems    Pulmonary  On mechanically assisted ventilation  See full plan under resp failure  VAP prophylaxis  Daily SAT/SBT for extubation readiness     COPD (chronic obstructive pulmonary disease)  Duo-neb q 4 prn  No exacerbation    S/P VATS on 6/6/2022 secondary to Empyema  POD #17   Old CT sutures removed 6/21 per CTS recs  Follow intermittent CXR    Acute respiratory failure with hypoxia and hypercapnia on Vent s/p Cardiac Arrest  S/t illicit IV drug injection  Vent settings reviewed adjusted for optimal gas exchange  Sedation for vent control  abg daily and prn to assess therapy  Daily SAT/SBT for extubation readiness - not appropriate for SAT/SBT today  CXR suggest fluid overload, hold further diuresing given recent cardiac arrest this AM    Cardiac/Vascular  Hypertriglyceridemia  Triglycerides 426 after 24 hours of propofol infusion  Propofol d/c 6/21  Re-check on 6/23 pending    Endocarditis of tricuspid valve  MSSA bacteremia treatment in place from prior admit  ID consult; discharge plan was for oxacillin continuous to complete on 7/11/2022  Continue Oxacillin    Cardiac arrest  S/t illicit IV injection on admit, unclear etiology this AM  May be contributing to encephalopathy   ICU cardiac and hemodynamic monitoring  Replace electrolytes  No pressor support needed.     ID  MSSA bacteremia  On previous admission s/t IVDU  Continue scheudled Oxacillin scheuled until 7/11/22  Blood cultures on 6/19 neg and repeat 6/22 NGTD  Tmax last 24 hours 100.5    Hematology  Pulmonary embolism, septic  Noted on prior admit  Continue oxacillin as above    Oncology  Normocytic anemia  Transfused one unit PRBC 6/20  Hgb 7 this morning; no overt bleeding  Monitor w/daily cbc  Conservative transfusion protocol      Preventive Measures and Monitoring:   Stress Ulcer: PPI  Nutrition: TF  Glucose control: stable  Bowel prophylaxis: PRN Dulcolax  DVT prophylaxis: SQ Hep/SCDs  Hx CAD on B-Blocker: no hx CAD  Head  of Bed/Reposition: Elevate HOB and turn Q1-2 hours   Early Mobility: bed rest  SAT/SBT: pending  RASS goal: -2  Vent Day: #5  OG Day: #5  Central Line Day: PICC already in place on admit  Left radial arterial line Day: #1  Portillo Day:  IVAB Day: Oxacillin per ID  Code Status: DNR    Counseling/Consultation:I have discussed the care of this patient in detail with the bedside nursing staff and Dr. San and Dr. Olguin    Advance Care Planning     Met with mother at bed side and discussed current critical state in detail and repeat cardiac arrest overnight and all questions answered and discussed DNR status.  Mother elects DNR status at this time and Dr. Olguin informed to place order.       Critical Care Time: 65 minutes  Critical secondary to Patient has a condition that poses threat to life and bodily function: Cardiac Arrest X 2 and acute resp failure intubated on [facility] ventilation  Patient has an abrupt change in neurologic status: Acute Encephalopathy  Patient is currently receiving parenteral controlled substances: Versed and Fentanyl infusions      Critical care was time spent personally by me on the following activities: development of treatment plan with patient or surrogate and bedside caregivers, discussions with consultants, evaluation of patient's response to treatment, examination of patient, ordering and performing treatments and interventions, ordering and review of laboratory studies, ordering and review of radiographic studies, pulse oximetry, re-evaluation of patient's condition. This critical care time did not overlap with that of any other provider or involve time for any procedures.     Sunday Nogueira NP  Critical Care Medicine  [facility] - Intensive Care ([facility])

## 2022-06-23 NOTE — ASSESSMENT & PLAN NOTE
S/t illicit IV injection on admit, unclear etiology this AM  May be contributing to encephalopathy   ICU cardiac and hemodynamic monitoring  Replace electrolytes  No pressor support needed.

## 2022-06-23 NOTE — ASSESSMENT & PLAN NOTE
Triglycerides 426 after 24 hours of propofol infusion  Propofol d/c 6/21  Re-check on 6/23 pending

## 2022-06-24 LAB
ALLENS TEST: NORMAL
ANION GAP SERPL CALC-SCNC: 9 MMOL/L (ref 8–16)
ANISOCYTOSIS BLD QL SMEAR: SLIGHT
BASOPHILS # BLD AUTO: 0.07 K/UL (ref 0–0.2)
BASOPHILS NFR BLD: 0 % (ref 0–1.9)
BASOPHILS NFR BLD: 0.5 % (ref 0–1.9)
BUN SERPL-MCNC: 32 MG/DL (ref 6–20)
CALCIUM SERPL-MCNC: 8.3 MG/DL (ref 8.7–10.5)
CHLORIDE SERPL-SCNC: 116 MMOL/L (ref 95–110)
CO2 SERPL-SCNC: 24 MMOL/L (ref 23–29)
CREAT SERPL-MCNC: 0.9 MG/DL (ref 0.5–1.4)
DACRYOCYTES BLD QL SMEAR: ABNORMAL
DELSYS: NORMAL
DIFFERENTIAL METHOD: ABNORMAL
DIFFERENTIAL METHOD: ABNORMAL
EOSINOPHIL # BLD AUTO: 0.2 K/UL (ref 0–0.5)
EOSINOPHIL NFR BLD: 1.5 % (ref 0–8)
EOSINOPHIL NFR BLD: 2 % (ref 0–8)
ERYTHROCYTE [DISTWIDTH] IN BLOOD BY AUTOMATED COUNT: 17 % (ref 11.5–14.5)
ERYTHROCYTE [DISTWIDTH] IN BLOOD BY AUTOMATED COUNT: 17.5 % (ref 11.5–14.5)
ERYTHROCYTE [SEDIMENTATION RATE] IN BLOOD BY WESTERGREN METHOD: 22 MM/H
EST. GFR  (AFRICAN AMERICAN): >60 ML/MIN/1.73 M^2
EST. GFR  (NON AFRICAN AMERICAN): >60 ML/MIN/1.73 M^2
FIO2: 35
GLUCOSE SERPL-MCNC: 104 MG/DL (ref 70–110)
HCO3 UR-SCNC: 26.9 MMOL/L (ref 24–28)
HCT VFR BLD AUTO: 24.1 % (ref 37–48.5)
HCT VFR BLD AUTO: 29 % (ref 37–48.5)
HGB BLD-MCNC: 7.4 G/DL (ref 12–16)
HGB BLD-MCNC: 8.9 G/DL (ref 12–16)
IMM GRANULOCYTES # BLD AUTO: 0.51 K/UL (ref 0–0.04)
IMM GRANULOCYTES # BLD AUTO: ABNORMAL K/UL (ref 0–0.04)
IMM GRANULOCYTES NFR BLD AUTO: 3.3 % (ref 0–0.5)
IMM GRANULOCYTES NFR BLD AUTO: ABNORMAL % (ref 0–0.5)
LYMPHOCYTES # BLD AUTO: 2.3 K/UL (ref 1–4.8)
LYMPHOCYTES NFR BLD: 15.2 % (ref 18–48)
LYMPHOCYTES NFR BLD: 16 % (ref 18–48)
MAGNESIUM SERPL-MCNC: 1.7 MG/DL (ref 1.6–2.6)
MCH RBC QN AUTO: 27.3 PG (ref 27–31)
MCH RBC QN AUTO: 27.3 PG (ref 27–31)
MCHC RBC AUTO-ENTMCNC: 30.7 G/DL (ref 32–36)
MCHC RBC AUTO-ENTMCNC: 30.7 G/DL (ref 32–36)
MCV RBC AUTO: 89 FL (ref 82–98)
MCV RBC AUTO: 89 FL (ref 82–98)
METAMYELOCYTES NFR BLD MANUAL: 3 %
MODE: NORMAL
MONOCYTES # BLD AUTO: 1.1 K/UL (ref 0.3–1)
MONOCYTES NFR BLD: 6 % (ref 4–15)
MONOCYTES NFR BLD: 6.9 % (ref 4–15)
MYELOCYTES NFR BLD MANUAL: 2 %
NEUTROPHILS # BLD AUTO: 11.1 K/UL (ref 1.8–7.7)
NEUTROPHILS NFR BLD: 71 % (ref 38–73)
NEUTROPHILS NFR BLD: 72.6 % (ref 38–73)
NRBC BLD-RTO: 0 /100 WBC
NRBC BLD-RTO: 0 /100 WBC
OVALOCYTES BLD QL SMEAR: ABNORMAL
PCO2 BLDA: 39.6 MMHG (ref 35–45)
PEEP: 5
PH SMN: 7.44 [PH] (ref 7.35–7.45)
PLATELET # BLD AUTO: 327 K/UL (ref 150–450)
PLATELET # BLD AUTO: 424 K/UL (ref 150–450)
PLATELET BLD QL SMEAR: ABNORMAL
PMV BLD AUTO: 10 FL (ref 9.2–12.9)
PMV BLD AUTO: 9.7 FL (ref 9.2–12.9)
PO2 BLDA: 92 MMHG (ref 80–100)
POC BE: 3 MMOL/L
POC SATURATED O2: 97 % (ref 95–100)
POIKILOCYTOSIS BLD QL SMEAR: SLIGHT
POTASSIUM SERPL-SCNC: 4.5 MMOL/L (ref 3.5–5.1)
RBC # BLD AUTO: 2.71 M/UL (ref 4–5.4)
RBC # BLD AUTO: 3.26 M/UL (ref 4–5.4)
SAMPLE: NORMAL
SITE: NORMAL
SODIUM SERPL-SCNC: 149 MMOL/L (ref 136–145)
SP02: 97
TRIGL SERPL-MCNC: 189 MG/DL (ref 30–150)
VT: 380
WBC # BLD AUTO: 15.27 K/UL (ref 3.9–12.7)
WBC # BLD AUTO: 23.31 K/UL (ref 3.9–12.7)

## 2022-06-24 PROCEDURE — 99291 PR CRITICAL CARE, E/M 30-74 MINUTES: ICD-10-PCS | Mod: ,,, | Performed by: NURSE PRACTITIONER

## 2022-06-24 PROCEDURE — 63600175 PHARM REV CODE 636 W HCPCS: Performed by: INTERNAL MEDICINE

## 2022-06-24 PROCEDURE — 87205 SMEAR GRAM STAIN: CPT | Performed by: NURSE PRACTITIONER

## 2022-06-24 PROCEDURE — S0030 INJECTION, METRONIDAZOLE: HCPCS | Performed by: NURSE PRACTITIONER

## 2022-06-24 PROCEDURE — 99900035 HC TECH TIME PER 15 MIN (STAT)

## 2022-06-24 PROCEDURE — 25000003 PHARM REV CODE 250: Performed by: NURSE PRACTITIONER

## 2022-06-24 PROCEDURE — 63600175 PHARM REV CODE 636 W HCPCS: Performed by: NURSE PRACTITIONER

## 2022-06-24 PROCEDURE — 94003 VENT MGMT INPAT SUBQ DAY: CPT

## 2022-06-24 PROCEDURE — 25000003 PHARM REV CODE 250: Performed by: INTERNAL MEDICINE

## 2022-06-24 PROCEDURE — 80048 BASIC METABOLIC PNL TOTAL CA: CPT | Performed by: NURSE PRACTITIONER

## 2022-06-24 PROCEDURE — 94761 N-INVAS EAR/PLS OXIMETRY MLT: CPT

## 2022-06-24 PROCEDURE — 37799 UNLISTED PX VASCULAR SURGERY: CPT

## 2022-06-24 PROCEDURE — 82803 BLOOD GASES ANY COMBINATION: CPT

## 2022-06-24 PROCEDURE — 87070 CULTURE OTHR SPECIMN AEROBIC: CPT | Performed by: NURSE PRACTITIONER

## 2022-06-24 PROCEDURE — 25000003 PHARM REV CODE 250: Performed by: SURGERY

## 2022-06-24 PROCEDURE — 83735 ASSAY OF MAGNESIUM: CPT | Performed by: NURSE PRACTITIONER

## 2022-06-24 PROCEDURE — 85025 COMPLETE CBC W/AUTO DIFF WBC: CPT | Performed by: NURSE PRACTITIONER

## 2022-06-24 PROCEDURE — 63600175 PHARM REV CODE 636 W HCPCS: Performed by: SURGERY

## 2022-06-24 PROCEDURE — 27000221 HC OXYGEN, UP TO 24 HOURS

## 2022-06-24 PROCEDURE — 84478 ASSAY OF TRIGLYCERIDES: CPT | Performed by: NURSE PRACTITIONER

## 2022-06-24 PROCEDURE — 99900026 HC AIRWAY MAINTENANCE (STAT)

## 2022-06-24 PROCEDURE — 85027 COMPLETE CBC AUTOMATED: CPT | Performed by: NURSE PRACTITIONER

## 2022-06-24 PROCEDURE — 99291 CRITICAL CARE FIRST HOUR: CPT | Mod: ,,, | Performed by: NURSE PRACTITIONER

## 2022-06-24 PROCEDURE — 20000000 HC ICU ROOM

## 2022-06-24 PROCEDURE — 85007 BL SMEAR W/DIFF WBC COUNT: CPT | Performed by: NURSE PRACTITIONER

## 2022-06-24 RX ORDER — MAGNESIUM SULFATE 1 G/100ML
1 INJECTION INTRAVENOUS ONCE
Status: COMPLETED | OUTPATIENT
Start: 2022-06-24 | End: 2022-06-24

## 2022-06-24 RX ORDER — FENTANYL CITRATE-0.9 % NACL/PF 10 MCG/ML
0-250 PLASTIC BAG, INJECTION (ML) INTRAVENOUS CONTINUOUS
Status: DISCONTINUED | OUTPATIENT
Start: 2022-06-24 | End: 2022-06-26

## 2022-06-24 RX ORDER — METRONIDAZOLE 500 MG/100ML
500 INJECTION, SOLUTION INTRAVENOUS
Status: DISCONTINUED | OUTPATIENT
Start: 2022-06-24 | End: 2022-06-26

## 2022-06-24 RX ORDER — CEFEPIME HYDROCHLORIDE 1 G/50ML
2 INJECTION, SOLUTION INTRAVENOUS
Status: DISCONTINUED | OUTPATIENT
Start: 2022-06-24 | End: 2022-06-26

## 2022-06-24 RX ORDER — HYDRALAZINE HYDROCHLORIDE 20 MG/ML
20 INJECTION INTRAMUSCULAR; INTRAVENOUS EVERY 6 HOURS PRN
Status: DISCONTINUED | OUTPATIENT
Start: 2022-06-24 | End: 2022-06-26

## 2022-06-24 RX ORDER — ENALAPRILAT 1.25 MG/ML
1.25 INJECTION INTRAVENOUS EVERY 6 HOURS PRN
Status: DISCONTINUED | OUTPATIENT
Start: 2022-06-24 | End: 2022-06-26

## 2022-06-24 RX ADMIN — Medication 800 MG: at 02:06

## 2022-06-24 RX ADMIN — HEPARIN SODIUM 5000 UNITS: 5000 INJECTION INTRAVENOUS; SUBCUTANEOUS at 05:06

## 2022-06-24 RX ADMIN — MAGNESIUM SULFATE 1 G: 1 INJECTION INTRAVENOUS at 05:06

## 2022-06-24 RX ADMIN — HEPARIN SODIUM 5000 UNITS: 5000 INJECTION INTRAVENOUS; SUBCUTANEOUS at 01:06

## 2022-06-24 RX ADMIN — MUPIROCIN: 20 OINTMENT TOPICAL at 09:06

## 2022-06-24 RX ADMIN — HYDRALAZINE HYDROCHLORIDE 10 MG: 20 INJECTION INTRAMUSCULAR; INTRAVENOUS at 12:06

## 2022-06-24 RX ADMIN — Medication 250 MCG/HR: at 06:06

## 2022-06-24 RX ADMIN — CHLORHEXIDINE GLUCONATE 0.12% ORAL RINSE 15 ML: 1.2 LIQUID ORAL at 09:06

## 2022-06-24 RX ADMIN — MIDAZOLAM 2 MG: 1 INJECTION INTRAMUSCULAR; INTRAVENOUS at 06:06

## 2022-06-24 RX ADMIN — PANTOPRAZOLE SODIUM 40 MG: 40 GRANULE, DELAYED RELEASE ORAL at 08:06

## 2022-06-24 RX ADMIN — CEFEPIME HYDROCHLORIDE 2 G: 2 INJECTION, SOLUTION INTRAVENOUS at 11:06

## 2022-06-24 RX ADMIN — ENALAPRILAT 1.25 MG: 1.25 INJECTION INTRAVENOUS at 09:06

## 2022-06-24 RX ADMIN — HYDRALAZINE HYDROCHLORIDE 20 MG: 20 INJECTION INTRAMUSCULAR; INTRAVENOUS at 06:06

## 2022-06-24 RX ADMIN — CEFEPIME HYDROCHLORIDE 2 G: 2 INJECTION, SOLUTION INTRAVENOUS at 05:06

## 2022-06-24 RX ADMIN — OXACILLIN SODIUM 12 G: 10 INJECTION, POWDER, FOR SOLUTION INTRAVENOUS at 08:06

## 2022-06-24 RX ADMIN — HEPARIN SODIUM 5000 UNITS: 5000 INJECTION INTRAVENOUS; SUBCUTANEOUS at 09:06

## 2022-06-24 RX ADMIN — METRONIDAZOLE 500 MG: 500 INJECTION, SOLUTION INTRAVENOUS at 06:06

## 2022-06-24 RX ADMIN — MUPIROCIN: 20 OINTMENT TOPICAL at 08:06

## 2022-06-24 RX ADMIN — CHLORHEXIDINE GLUCONATE 0.12% ORAL RINSE 15 ML: 1.2 LIQUID ORAL at 08:06

## 2022-06-24 RX ADMIN — METRONIDAZOLE 500 MG: 500 INJECTION, SOLUTION INTRAVENOUS at 12:06

## 2022-06-24 NOTE — ASSESSMENT & PLAN NOTE
Patient with Hypercapnic Respiratory failure which is Acute.  she is not on home oxygen. Supplemental oxygen was provided and noted- Vent Mode: A/C  Oxygen Concentration (%):  [35] 35  Resp Rate Total:  [22 br/min-46 br/min] 22 br/min  Vt Set:  [380 mL] 380 mL  PEEP/CPAP:  [5 cmH20] 5 cmH20  Pressure Support:  [0 cmH20] 0 cmH20  Mean Airway Pressure:  [8.3 woS58-13 cmH20] 11 cmH20.   Signs/symptoms of respiratory failure include- on vent, AMS. Contributing diagnoses includes - Interstitial lung disease and Pneumonia Labs and images were reviewed. Patient Has recent ABG, which has been reviewed. Will treat underlying causes and adjust management of respiratory failure as follows- Vent support, nebs, IV Abx    6/20- continue vent support as she failed SAT/SBT this am  6/21- trying to extubate, failed SAT/SBT this am  6/22- see Cardiac arrest above  6/23- Day 5 on vent- had 2 PEAs this am, decreased responsiveness  6/24- Day 6 vent. Unable to wean sedation to determine neuro status

## 2022-06-24 NOTE — NURSING
Initial [facility] call made. Screened by Lorraine Reynolds. Referral Number [id number]

## 2022-06-24 NOTE — SUBJECTIVE & OBJECTIVE
Review of Systems   Unable to perform ROS: Mental status change       Objective:     Vital Signs (Most Recent):  Temp: 98.5 °F (36.9 °C) (06/24/22 1115)  Pulse: 63 (06/24/22 1115)  Resp: (!) 22 (06/24/22 1115)  BP: 108/66 (06/24/22 1100)  SpO2: 98 % (06/24/22 1115)   Vital Signs (24h Range):  Temp:  [98.1 °F (36.7 °C)-101.3 °F (38.5 °C)] 98.5 °F (36.9 °C)  Pulse:  [56-94] 63  Resp:  [21-49] 22  SpO2:  [93 %-99 %] 98 %  BP: ()/(53-84) 108/66  Arterial Line BP: (104-229)/(52-98) 127/64     Weight: 88.5 kg (195 lb 1.7 oz)  Body mass index is 32.47 kg/m².      Intake/Output Summary (Last 24 hours) at 6/24/2022 1126  Last data filed at 6/24/2022 1100  Gross per 24 hour   Intake 3167.46 ml   Output 2485 ml   Net 682.46 ml       Physical Exam  Vitals and nursing note reviewed.   Constitutional:       General: She is not in acute distress.     Appearance: She is obese. She is ill-appearing. She is not diaphoretic.      Interventions: She is sedated and intubated.   HENT:      Head: Normocephalic and atraumatic.      Mouth/Throat:      Mouth: Mucous membranes are moist.      Pharynx: Oropharynx is clear.   Eyes:      Pupils: Pupils are equal, round, and reactive to light.   Neck:      Vascular: No JVD.   Cardiovascular:      Rate and Rhythm: Normal rate and regular rhythm.      Pulses:           Radial pulses are 2+ on the right side and 2+ on the left side.        Dorsalis pedis pulses are 1+ on the right side and 1+ on the left side.   Pulmonary:      Effort: No tachypnea (intermittent with stimuli), accessory muscle usage or respiratory distress. She is intubated.      Breath sounds: Rhonchi present.   Chest:      Chest wall: No deformity.       Abdominal:      General: Bowel sounds are decreased. There is no distension.      Palpations: Abdomen is soft.      Tenderness: There is no abdominal tenderness.   Genitourinary:     Comments: Portillo in place  Musculoskeletal:      Cervical back: Neck supple.      Right lower  leg: Edema (trace) present.      Left lower leg: Edema (trace) present.      Right foot: No deformity.      Left foot: No deformity.   Lymphadenopathy:      Cervical: No cervical adenopathy.   Skin:     General: Skin is warm and dry.      Capillary Refill: Capillary refill takes less than 2 seconds.      Findings: No rash.          Neurological:      Mental Status: She is lethargic.      Comments: Obtunded.  Opens eyes to painful stimuli but will not track. no noted spont or purposeful motor movement.  + carini, corneal and gag reflexs.  Withdraws all 4 extremities to pain but weakly.  Will not wake up or respond on min sedation.       Vents:  Vent Mode: A/C (06/24/22 0919)  Ventilator Initiated: Yes (06/19/22 1915)  Set Rate: 22 BPM (06/24/22 0919)  Vt Set: 380 mL (06/24/22 0919)  Pressure Support: 0 cmH20 (06/24/22 0919)  PEEP/CPAP: 5 cmH20 (06/24/22 0919)  Oxygen Concentration (%): 35 (06/24/22 1115)  Peak Airway Pressure: 24 cmH2O (06/24/22 0919)  Plateau Pressure: 22 cmH20 (06/24/22 0919)  Total Ve: 8.47 mL (06/24/22 0919)  F/VT Ratio<105 (RSBI): (!) 56.56 (06/24/22 0919)    Lines/Drains/Airways       Peripherally Inserted Central Catheter Line  Duration             PICC Double Lumen -- days              Drain  Duration                  NG/OG Tube 06/19/22 1916 [place] sump 14 Fr. Center mouth 4 days         Urethral Catheter 06/19/22 2039 Latex 16 Fr. 4 days              Airway  Duration                  Airway - Non-Surgical 06/19/22 2306 4 days              Arterial Line  Duration             Arterial Line 06/23/22 0545 Left Radial 1 day              Peripheral Intravenous Line  Duration                  Peripheral IV - Single Lumen 06/19/22 1921 20 G Right Shoulder 4 days                    Significant Labs:    CBC/Anemia Profile:  Recent Labs   Lab 06/23/22  0407 06/23/22  0551 06/23/22  1210 06/23/22 2004 06/24/22  0853   WBC 13.41*  --   --  18.68* 15.27*   HGB 7.0*  --  7.2* 8.1* 7.4*   HCT 22.4*   < >  22.7* 26.2* 24.1*     --   --  369 327   MCV 88  --   --  89 89   RDW 16.6*  --   --  16.8* 17.0*    < > = values in this interval not displayed.        Chemistries:  Recent Labs   Lab 06/23/22  0407 06/23/22  2139 06/24/22  0351   * 150* 149*   K 3.3* 3.8 4.5   * 116* 116*   CO2 23 21* 24   BUN 32* 32* 32*   CREATININE 1.1 1.0 0.9   CALCIUM 8.1* 8.9 8.3*   MG 1.8 1.7 1.7   PHOS 3.0 3.3  --        ABGs:   Recent Labs   Lab 06/24/22  0410   PH 7.440   PCO2 39.6   HCO3 26.9   POCSATURATED 97   BE 3     POCT Glucose:   Recent Labs   Lab 06/23/22  2142   POCTGLUCOSE 131*     All pertinent labs within the past 24 hours have been reviewed.

## 2022-06-24 NOTE — PROGRESS NOTES
[facility] - Intensive Care ([facility])  [facility] Medicine  Progress Note    Patient Name: Tianna Leon  MRN: [id number]  Patient Class: IP- Inpatient   Admission Date: 6/19/2022  Length of Stay: 5 days  Attending Physician: Columba Helms MD  Primary Care Provider: Spenser Campa MD        Subjective:     Principal Problem:Acute encephalopathy        HPI:  Ms. Leon is a 37 year old  female with long standing history of IV drug abuse, was recently discharged from this facility on 6/10/22 after a 35 day hospital stay. During previous admission she was found to have MSSA Tricuspid Endocarditis, treated on oxacillin (to be continued until 7/10/22). Also had right empyema, s/p VATS and chest tube. She was COVID positive during that admission. She was discharged to [facility] on 6/10. Today she was found unresponsive after she apparently injected an unknown substance into her left PICC line at the facility. She coded, received one round of epi prior to ROSC. Intubated prior to arrival. WBC 44,000, bands 19%. Lactic acid 4.0  Cr 1.8 CO2 14. CXR multifocal infiltrates. BP stable. On propofol. Received IV Zosyn.     Cardiac Arrest. Continued IV drug abuse. Tricuspid endocarditis. MSSA bacteremia. Right Empyema.      Overview/Hospital Course:  37 F WF, Hx of IVDA, MDD, bipolar disorder, anxiety, PTSD, suicidal ideation, COPD, ADHD, asthma, cervical cancer, GERD, Hep C, HLD who was d/uri from this hosp on 6/10 following 35 day admission during which she was treated for MSSA tricuspid endocarditis and right empyema, s/p VATS/decortication. She was also COVID + during admission. On 6/10, she was d/c to [facility] and continued on oxacillin (to continue through 7/10/22) via PICC line. Yesterday, she was found unresponsive in cardiac arrest at [facility]. She was resuscitated after 1 round of Epi and arrived intubated. Report given to ED was that patient injected an unknown substance into her PICC line at [facility].   In ED,  Lactate 4.0, ABG 7.215/47.8/75/19.3, WBC 44.18, Hgb 8.4, Cr 1.8, AST 53, , troponin 0.184, Tox positive for opiates and amphetamine. CXR w/multifocal infiltrates, CT head w/no acute finding. Given Vancomycin and Cefepime and placed on sedation for mechanical ventilation. Admitted to ICU for Cardiac Arrest. Continued IVDA. Tricuspid endocarditis. MSSA bacteremia. Right Empyema s/p VATS.    6/20- Remains intubated and sedated on mechanical vent. Failed brief SAT/SBT with tachycardia, tachypnea. WBC down to 16, H/H dropped to 6.6/21- got  a unit of blood, H/H improved to 7.4/23. Bun/Cr improved 22/1.4. Troponin coming down- likely demand ischemia from Cardiac Arrest as a result of IV drug.      6/21- Appreciate ICU team. Pt remains intubated, sedated on vent, was on Fentanyl/Diprivan this am, Again failed SAT/SBT this am, also TG nearly 426, hence sedation lowered and switched Diprivan to Precedex and will try SAT/SBT. WBC down to 11, H/H 7/22, HCO3 21, Bun down to 24. Await repeat SAT/SBT. Good Urine output. Abx were changed to Oxacillin again yesterday due to her hx of MSSA.    6/22- Remains intubated sedated on Vent- Precedex, Fenatnyl, Versed. Pt had a few periods of Bradycardia on Precedex gtt last night- eICU had to intervene and given Versed. Again this am, while pt having BM and agitated, Asystole alarming then pt went into NSR 60s- Precedex d/uri. Earlier CXR showed fluid load- given small dose IV Lasix. Getting TF @40 cc/hr. Tmax 101.8F last night. Good urine output. WBC increased to 15.8, H/H 8/25.     6/23- remains intubated, sedated on vent, on Versed/Fentanyl gtts. Sedation has been lowered. She 2 back to back cardiac arrests this morning around 0500 hr with PEA/Asystole with ROSC attained. Not requiring pressor support. Mother was at bed side and was updated about her critical condition and poor prognosis and all questions answered. Mother requested DNR status at this time.  Na increased 152, Cl  117, WBC down to 13, H/H 7/24. Getting continuous Oxacillin gtt for her Endocarditis.      6/24: remains intubated, sedated on vent, on Versed/Fentanyl gtts. No pressors. Unable to assess neuro status as patient develops hypertension, tachypnea and restlessness with any turning or suctioning once weaned down on sedation. Tmax 101.3F      Interval History: remains intubated, sedated on vent, on Versed/Fentanyl gtts. Sedation has been lowered. She 2 back to back cardiac arrests this 6/23 morning around 0500 hr with PEA/Asystole with ROSC attained. Not requiring pressor support. Mother was at bed side and was updated about her critical condition and poor prognosis and all questions answered. Mother requested DNR status at this time.  On continuous Oxacillin gtt for endocarditis.      Review of Systems   Unable to perform ROS: Intubated   Objective:     Vital Signs (Most Recent):  Temp: 98.5 °F (36.9 °C) (06/24/22 1115)  Pulse: 63 (06/24/22 1153)  Resp: (!) 22 (06/24/22 1153)  BP: 108/66 (06/24/22 1100)  SpO2: 97 % (06/24/22 1153)   Vital Signs (24h Range):  Temp:  [98.1 °F (36.7 °C)-101.3 °F (38.5 °C)] 98.5 °F (36.9 °C)  Pulse:  [56-94] 63  Resp:  [21-49] 22  SpO2:  [93 %-99 %] 97 %  BP: ()/(53-84) 108/66  Arterial Line BP: (104-229)/(52-98) 127/64     Weight: 88.5 kg (195 lb 1.7 oz)  Body mass index is 32.47 kg/m².    Intake/Output Summary (Last 24 hours) at 6/24/2022 1158  Last data filed at 6/24/2022 1100  Gross per 24 hour   Intake 3167.46 ml   Output 2485 ml   Net 682.46 ml        Physical Exam  Vitals and nursing note reviewed.   Constitutional:       General: She is not in acute distress.     Appearance: She is obese. She is ill-appearing. She is not diaphoretic.      Interventions: She is sedated and intubated.   HENT:      Mouth/Throat:      Mouth: Mucous membranes are moist.      Pharynx: Oropharynx is clear.   Eyes:      Pupils: Pupils are equal, round, and reactive to light.      Comments: Has  downward left gaze bilaterally   Neck:      Vascular: No JVD.   Cardiovascular:      Rate and Rhythm: Regular rhythm. Tachycardia present.      Pulses:           Radial pulses are 2+ on the right side and 2+ on the left side.        Dorsalis pedis pulses are 1+ on the right side and 1+ on the left side.      Comments: Intermittent bradycardia and tachycardia  Pulmonary:      Effort: No tachypnea, accessory muscle usage or respiratory distress. She is intubated.      Breath sounds: Normal breath sounds.   Chest:      Chest wall: No deformity.       Abdominal:      General: Bowel sounds are decreased. There is no distension.      Palpations: Abdomen is soft.      Tenderness: There is no abdominal tenderness.   Genitourinary:     Comments: Portillo in place  Musculoskeletal:      Cervical back: Neck supple.      Right lower le+ Pitting Edema present.      Left lower le+ Pitting Edema present.      Right foot: No deformity.      Left foot: No deformity.   Lymphadenopathy:      Cervical: No cervical adenopathy.   Skin:     General: Skin is warm and dry.      Capillary Refill: Capillary refill takes less than 2 seconds.          Neurological:      Comments: No focus or track when eyes open; no noted purposeful motor movement.  + carini and gag reflex.  Withdraws all 4 extremities to pain but weakly.  But still on sedation actively weaning slowly        Vent Mode: A/C  Oxygen Concentration (%):  [35] 35  Resp Rate Total:  [22 br/min-46 br/min] 22 br/min  Vt Set:  [380 mL] 380 mL  PEEP/CPAP:  [5 cmH20] 5 cmH20  Pressure Support:  [0 cmH20] 0 cmH20  Mean Airway Pressure:  [8.3 srL77-81 cmH20] 11 cmH20  Temp:  [98.1 °F (36.7 °C)-101.3 °F (38.5 °C)] 98.5 °F (36.9 °C)  Pulse:  [56-94] 63  Resp:  [21-49] 22  SpO2:  [93 %-99 %] 97 %  BP: ()/(53-84) 108/66  Arterial Line BP: (104-229)/(52-98) 127/64   Art pH/pCO2/pO2/HCO3:  7.440/39.6/92/26.9 ( 0410)  Lab Results   Component Value Date    [id number] Negative  06/19/2022    [id number] Positive (A) 05/31/2022    [id number] Negative 05/06/2022        Recent Labs   Lab 06/19/22 1953 06/19/22 2010 06/19/22 2104 06/19/22  2321 06/20/22 0512 06/20/22  1244 06/23/22  0407 06/23/22  0551 06/23/22 2004 06/23/22  2139 06/24/22  0351 06/24/22  0853   LACTATE 4.0*  --   --  1.6 0.7  --   --   --   --   --   --   --    TROPONINI  --   --  0.184*  --  0.126*  --   --   --   --   --   --   --    NA  --    < >  --   --  145   < > 152*  --   --  150* 149*  --    WBC  --    < >  --   --  16.49*   < > 13.41*  --  18.68*  --   --  15.27*   GRAN  --    < >  --   --  76.0*  12.6*   < > 49.8  6.7  --  52.3  9.8*  --   --  72.6  11.1*   LYMPH  --    < >  --   --  15.8*  2.6   < > 35.2  4.7  --  33.9  6.3*  --   --  15.2*  2.3   HGB  --    < >  --   --  6.6*   < > 7.0*   < > 8.1*  --   --  7.4*   HCT  --    < >  --   --  21.1*   < > 22.4*   < > 26.2*  --   --  24.1*   BUN  --    < >  --   --  22*   < > 32*  --   --  32* 32*  --    CREATININE  --    < >  --   --  1.4   < > 1.1  --   --  1.0 0.9  --    ESTGFRAFRICA  --    < >  --   --  55*   < > >60  --   --  >60 >60  --    EGFRNONAA  --    < >  --   --  47*   < > >60  --   --  >60 >60  --    K  --    < >  --   --  3.6   < > 3.3*  --   --  3.8 4.5  --    CL  --    < >  --   --  110   < > 118*  --   --  116* 116*  --    CO2  --    < >  --   --  21*   < > 23  --   --  21* 24  --    PHOS  --   --   --   --   --   --  3.0  --   --  3.3  --   --    MG  --   --   --   --  1.6   < > 1.8  --   --  1.7 1.7  --     < > = values in this interval not displayed.       Microbiology Results (last 7 days)       Procedure Component Value Units Date/Time    Blood culture x two cultures. Draw prior to antibiotics. [[id number]] Collected: 06/19/22 2030    Order Status: Completed Specimen: Blood from Peripheral, Antecubital, Right Updated: 06/24/22 0812     Blood Culture, Routine No Growth to date      No Growth to date      No Growth to date      No  Growth to date      No Growth to date    Narrative:      Aerobic and anaerobic    Blood culture x two cultures. Draw prior to antibiotics. [[id number]] Collected: 06/19/22 2105    Order Status: Completed Specimen: Blood from Peripheral, Wrist, Right Updated: 06/24/22 0812     Blood Culture, Routine No Growth to date      No Growth to date      No Growth to date      No Growth to date      No Growth to date    Narrative:      Aerobic and anaerobic    Blood culture [[id number]] Collected: 06/22/22 1311    Order Status: Completed Specimen: Blood Updated: 06/23/22 2212     Blood Culture, Routine No Growth to date      No Growth to date    Blood culture [[id number]] Collected: 06/22/22 1311    Order Status: Completed Specimen: Blood Updated: 06/23/22 2212     Blood Culture, Routine No Growth to date      No Growth to date    Culture, Respiratory with Gram Stain [[id number]] Collected: 06/20/22 1350    Order Status: Completed Specimen: Respiratory from Endotracheal Aspirate Updated: 06/23/22 0734     Respiratory Culture Normal respiratory da      No S aureus or Pseudomonas isolated.     Gram Stain (Respiratory) <10 epithelial cells per low power field.     Gram Stain (Respiratory) Moderate WBC's     Gram Stain (Respiratory) No organisms seen          Antibiotics (From admission, onward)                Start     Stop Route Frequency Ordered    06/24/22 1045  metronidazole IVPB 500 mg         -- IV Every 8 hours (non-standard times) 06/24/22 0930    06/24/22 1030  cefepime in dextrose 5 % IVPB 2 g         -- IV Every 8 hours (non-standard times) 06/24/22 0930    06/20/22 0900  mupirocin 2 % ointment  (DECOLONIZATION PROTOCOL ORDERS)         06/25 0859 Nasl 2 times daily 06/20/22 0750    06/20/22 0900  oxacillin 12 g in  mL CONTINUOUS INFUSION         07/11 2359 IV Every 24 hours (non-standard times) 06/20/22 0752          Anticoagulants       Ordered     Route Frequency Start Stop    06/19/22 2233  heparin (porcine)          SubQ Every 8 hours 06/19/22 7161 --          X-Ray Chest 1 View  Narrative: EXAMINATION:  XR CHEST 1 VIEW    CLINICAL HISTORY:  Respiratory distress., respiratory failure;    COMPARISON:  06/22/2022 chest x-ray.    FINDINGS:  Endotracheal tube, left PICC line and nasogastric tube remain.  Cardiac size is mildly enlarged    There are bilateral infiltrates present which appear stable.  Impression: Stable chest x-ray.    Electronically signed by: Diogenes Crisostomo MD  Date:    06/23/2022  Time:    07:16   Significant Labs: All pertinent labs within the past 24 hours have been reviewed.    Significant Imaging: I have reviewed all pertinent imaging results/findings within the past 24 hours.      Assessment/Plan:      * Acute encephalopathy  S/p cardiac arrest and on vent on Sedation    6/23- had 2 more back to back episodes of cardiac Arrests/PEAs, now with decreased mental status despite lowering sedation    Acute respiratory failure with hypoxia and hypercapnia on Vent s/p Cardiac Arrest  Patient with Hypercapnic Respiratory failure which is Acute.  she is not on home oxygen. Supplemental oxygen was provided and noted- Vent Mode: A/C  Oxygen Concentration (%):  [35] 35  Resp Rate Total:  [22 br/min-46 br/min] 22 br/min  Vt Set:  [380 mL] 380 mL  PEEP/CPAP:  [5 cmH20] 5 cmH20  Pressure Support:  [0 cmH20] 0 cmH20  Mean Airway Pressure:  [8.3 lyT30-81 cmH20] 11 cmH20.   Signs/symptoms of respiratory failure include- on vent, AMS. Contributing diagnoses includes - Interstitial lung disease and Pneumonia Labs and images were reviewed. Patient Has recent ABG, which has been reviewed. Will treat underlying causes and adjust management of respiratory failure as follows- Vent support, nebs, IV Abx    6/20- continue vent support as she failed SAT/SBT this am  6/21- trying to extubate, failed SAT/SBT this am  6/22- see Cardiac arrest above  6/23- Day 5 on vent- had 2 PEAs this am, decreased responsiveness  6/24- Day 6 vent.  Unable to wean sedation to determine neuro status    On mechanically assisted ventilation  See resp failure above      Cardiac arrest x 2  -IV drug use at LTAC  -BP stable at this time, not requiring pressors  -consult pulmonary for vent management    Doing better, on Vent, hemodynamically stable    Day 3 post cardiac arrest- on vent, trying to extubate    Day 4 on vent- Febrile, Bradycardic on Precedex- unstable- no weaning attempt today    Day 5 since Cardiac arrest, now had another 2 PEAs/Cardiac Arrests this am with ROSC but since then appears to have suffered significant Anoxic Brain Injury. Has corneal and gag reflex but no eye contact or response    Endocarditis of tricuspid valve  -MSSA bacteremia during previous admission  -Follow up on repeat blood cultures  -IV vanc and cefepime empirically for now  -ID consult    Cont Vanc/Cefepime    Changed to Oxacillin yesterday    Continue Oxacillin    Normocytic anemia  H/H dropped to 6.6- Transfused 1 unit- now 7.4/23    No active bleeding, Anemia of Chronic Disease    S/p Transfusion, H/H 8/26    H/H 7.2/23    Pulmonary embolism, septic         MSSA bacteremia  -during previous admission, was supposed to be on oxacillin until 7/10/22  -repeat cultures pending  -ID consult    May resume Oxacillin soon, if blood Cx remain NGTD    Resumed yesterday    Cont Oxacillin    IVDU (intravenous drug user) / Drug addiction  -continued active IV drug use while at [facility]      Borderline personality disorder       VTE Risk Mitigation (From admission, onward)         Ordered     heparin (porcine) injection 5,000 Units  Every 8 hours         06/19/22 2233     Place sequential compression device  Until discontinued         06/19/22 2233                Discharge Planning   YESSICA:      Code Status: DNR   Is the patient medically ready for discharge?:     Reason for patient still in hospital (select all that apply): Patient trending condition, Treatment and Consult  recommendations  Discharge Plan A: Other (patient remains critically ill at this time, pending clinical improvement for d/c planning.)            Critical care time spent on the evaluation and treatment of severe organ dysfunction, review of pertinent labs and imaging studies, discussions with consulting providers and discussions with patient/family: 30 minutes.      Columba Helms MD  Department of Hospital Medicine   [facility] - Intensive Care [facility])

## 2022-06-24 NOTE — PLAN OF CARE
Remains intubated and sedated on Fentanyl at 100mcg, Versed weaned off.  sbp 100-120's, SB 50-60s on monitor, tmax 101.3, applied ice packs. Pt has not had any more episodes of agitation. Tube feed at goal rate of 50cc/hr, tolerating well. X1 large bowel movement, sanchez to gravity with adequate urine output. Magnesium replaced with a total of 1600mg po and 1g Magnesium IV infusing now. Pt with drawls from pain, pupils equal, reactive and sluggish. Will continue with POC.       Problem: Adult Inpatient Plan of Care  Goal: Plan of Care Review  Outcome: Ongoing, Progressing  Goal: Patient-Specific Goal (Individualized)  Outcome: Ongoing, Progressing  Goal: Absence of Hospital-Acquired Illness or Injury  Outcome: Ongoing, Progressing  Goal: Optimal Comfort and Wellbeing  Outcome: Ongoing, Progressing  Goal: Readiness for Transition of Care  Outcome: Ongoing, Progressing     Problem: Fall Injury Risk  Goal: Absence of Fall and Fall-Related Injury  Outcome: Ongoing, Progressing     Problem: Adjustment to Illness (Sepsis/Septic Shock)  Goal: Optimal Coping  Outcome: Ongoing, Progressing     Problem: Bleeding (Sepsis/Septic Shock)  Goal: Absence of Bleeding  Outcome: Ongoing, Progressing     Problem: Glycemic Control Impaired (Sepsis/Septic Shock)  Goal: Blood Glucose Level Within Desired Range  Outcome: Ongoing, Progressing     Problem: Infection Progression (Sepsis/Septic Shock)  Goal: Absence of Infection Signs and Symptoms  Outcome: Ongoing, Progressing     Problem: Nutrition Impaired (Sepsis/Septic Shock)  Goal: Optimal Nutrition Intake  Outcome: Ongoing, Progressing     Problem: Fluid and Electrolyte Imbalance (Acute Kidney Injury/Impairment)  Goal: Fluid and Electrolyte Balance  Outcome: Ongoing, Progressing     Problem: Oral Intake Inadequate (Acute Kidney Injury/Impairment)  Goal: Optimal Nutrition Intake  Outcome: Ongoing, Progressing     Problem: Renal Function Impairment (Acute Kidney Injury/Impairment)  Goal:  Effective Renal Function  Outcome: Ongoing, Progressing     Problem: Skin Injury Risk Increased  Goal: Skin Health and Integrity  Outcome: Ongoing, Progressing     Problem: Infection  Goal: Absence of Infection Signs and Symptoms  Outcome: Ongoing, Progressing     Problem: Communication Impairment (Mechanical Ventilation, Invasive)  Goal: Effective Communication  Outcome: Ongoing, Progressing     Problem: Device-Related Complication Risk (Mechanical Ventilation, Invasive)  Goal: Optimal Device Function  Outcome: Ongoing, Progressing     Problem: Inability to Wean (Mechanical Ventilation, Invasive)  Goal: Mechanical Ventilation Liberation  Outcome: Ongoing, Progressing     Problem: Nutrition Impairment (Mechanical Ventilation, Invasive)  Goal: Optimal Nutrition Delivery  Outcome: Ongoing, Progressing     Problem: Skin and Tissue Injury (Mechanical Ventilation, Invasive)  Goal: Absence of Device-Related Skin and Tissue Injury  Outcome: Ongoing, Progressing     Problem: Ventilator-Induced Lung Injury (Mechanical Ventilation, Invasive)  Goal: Absence of Ventilator-Induced Lung Injury  Outcome: Ongoing, Progressing     Problem: Communication Impairment (Artificial Airway)  Goal: Effective Communication  Outcome: Ongoing, Progressing     Problem: Device-Related Complication Risk (Artificial Airway)  Goal: Optimal Device Function  Outcome: Ongoing, Progressing     Problem: Skin and Tissue Injury (Artificial Airway)  Goal: Absence of Device-Related Skin or Tissue Injury  Outcome: Ongoing, Progressing     Problem: Coping Ineffective  Goal: Effective Coping  Outcome: Ongoing, Progressing

## 2022-06-24 NOTE — ASSESSMENT & PLAN NOTE
Cont Oxacillin for know MSSA/SBE  Still spiking temps with leukocytosis  Blood cultures from 6/19 neg and repeat from 6/22 NGTD  Sputum culture from 6/20 neg but having thick yellow/tan sputum with OET suctioning  Will start Cefepime and flagyl for possible asp PNA  CXR in AM

## 2022-06-24 NOTE — PLAN OF CARE
PT INTERMITTENTLY AGITATED R/T SUCTIONING AND TURNING RESULTING IN INCREASED HR AND BP.  T-.8.  FENTANYL WEANED TO 25 MCG/KG/HOUR PER CONNOR, NP.  ORDERED HYDRALAZINE GIVEN PRN FOR ELEVATED BP DURING AGITATION WITHOUT RESPONSE.  DOSE INCREASED FOR OVER NIGHT.  GOOD URINE OUTPUT.  STOOL X2.  MOTHER AND DAUGHTER UPDATED TO POC AND PT CONDITION.  TOLERATES TURNING EVERY TWO HOURS AND TUBE FEEDS.  MORE ALERT BUT NOT FOLLOWING COMMANDS AND FLACCID TO BUE; EXTENDS BLE.

## 2022-06-24 NOTE — ASSESSMENT & PLAN NOTE
S/t illicit IV drug injection and possible asp PNA  Vent settings reviewed adjusted for optimal gas exchange  Sedation for vent control  abg daily and prn to assess therapy  Daily SAT/SBT for extubation readiness once/if awake and responsive  Oxygenation stable

## 2022-06-24 NOTE — ASSESSMENT & PLAN NOTE
S/t illicit IV injection on admit, but unclear etiology for repeat arrest in house 6/23 AM  Likely contributing to encephalopathy   ICU cardiac and hemodynamic monitoring  Replace electrolytes as needed  No pressor support needed.

## 2022-06-24 NOTE — SUBJECTIVE & OBJECTIVE
Interval History: remains intubated, sedated on vent, on Versed/Fentanyl gtts. Sedation has been lowered. She 2 back to back cardiac arrests this 6/23 morning around 0500 hr with PEA/Asystole with ROSC attained. Not requiring pressor support. Mother was at bed side and was updated about her critical condition and poor prognosis and all questions answered. Mother requested DNR status at this time.  On continuous Oxacillin gtt for endocarditis.      Review of Systems   Unable to perform ROS: Intubated   Objective:     Vital Signs (Most Recent):  Temp: 98.5 °F (36.9 °C) (06/24/22 1115)  Pulse: 63 (06/24/22 1153)  Resp: (!) 22 (06/24/22 1153)  BP: 108/66 (06/24/22 1100)  SpO2: 97 % (06/24/22 1153)   Vital Signs (24h Range):  Temp:  [98.1 °F (36.7 °C)-101.3 °F (38.5 °C)] 98.5 °F (36.9 °C)  Pulse:  [56-94] 63  Resp:  [21-49] 22  SpO2:  [93 %-99 %] 97 %  BP: ()/(53-84) 108/66  Arterial Line BP: (104-229)/(52-98) 127/64     Weight: 88.5 kg (195 lb 1.7 oz)  Body mass index is 32.47 kg/m².    Intake/Output Summary (Last 24 hours) at 6/24/2022 1158  Last data filed at 6/24/2022 1100  Gross per 24 hour   Intake 3167.46 ml   Output 2485 ml   Net 682.46 ml        Physical Exam  Vitals and nursing note reviewed.   Constitutional:       General: She is not in acute distress.     Appearance: She is obese. She is ill-appearing. She is not diaphoretic.      Interventions: She is sedated and intubated.   HENT:      Mouth/Throat:      Mouth: Mucous membranes are moist.      Pharynx: Oropharynx is clear.   Eyes:      Pupils: Pupils are equal, round, and reactive to light.      Comments: Has downward left gaze bilaterally   Neck:      Vascular: No JVD.   Cardiovascular:      Rate and Rhythm: Regular rhythm. Tachycardia present.      Pulses:           Radial pulses are 2+ on the right side and 2+ on the left side.        Dorsalis pedis pulses are 1+ on the right side and 1+ on the left side.      Comments: Intermittent bradycardia  and tachycardia  Pulmonary:      Effort: No tachypnea, accessory muscle usage or respiratory distress. She is intubated.      Breath sounds: Normal breath sounds.   Chest:      Chest wall: No deformity.       Abdominal:      General: Bowel sounds are decreased. There is no distension.      Palpations: Abdomen is soft.      Tenderness: There is no abdominal tenderness.   Genitourinary:     Comments: Portillo in place  Musculoskeletal:      Cervical back: Neck supple.      Right lower le+ Pitting Edema present.      Left lower le+ Pitting Edema present.      Right foot: No deformity.      Left foot: No deformity.   Lymphadenopathy:      Cervical: No cervical adenopathy.   Skin:     General: Skin is warm and dry.      Capillary Refill: Capillary refill takes less than 2 seconds.          Neurological:      Comments: No focus or track when eyes open; no noted purposeful motor movement.  + carini and gag reflex.  Withdraws all 4 extremities to pain but weakly.  But still on sedation actively weaning slowly        Vent Mode: A/C  Oxygen Concentration (%):  [35] 35  Resp Rate Total:  [22 br/min-46 br/min] 22 br/min  Vt Set:  [380 mL] 380 mL  PEEP/CPAP:  [5 cmH20] 5 cmH20  Pressure Support:  [0 cmH20] 0 cmH20  Mean Airway Pressure:  [8.3 ppQ84-99 cmH20] 11 cmH20  Temp:  [98.1 °F (36.7 °C)-101.3 °F (38.5 °C)] 98.5 °F (36.9 °C)  Pulse:  [56-94] 63  Resp:  [21-49] 22  SpO2:  [93 %-99 %] 97 %  BP: ()/(53-84) 108/66  Arterial Line BP: (104-229)/(52-98) 127/64   Art pH/pCO2/pO2/HCO3:  7.440/39.6/92/26.9 ( 0410)  Lab Results   Component Value Date    [id number] Negative 2022    [id number] Positive (A) 2022    [id number] Negative 2022        Recent Labs   Lab 22  2321 22  0512 22  1244 22  0407 22  0551 22  2139 22  0351 22  0853   LACTATE 4.0*  --   --  1.6 0.7  --   --   --   --   --    --   --    TROPONINI  --   --  0.184*  --  0.126*  --   --   --   --   --   --   --    NA  --    < >  --   --  145   < > 152*  --   --  150* 149*  --    WBC  --    < >  --   --  16.49*   < > 13.41*  --  18.68*  --   --  15.27*   GRAN  --    < >  --   --  76.0*  12.6*   < > 49.8  6.7  --  52.3  9.8*  --   --  72.6  11.1*   LYMPH  --    < >  --   --  15.8*  2.6   < > 35.2  4.7  --  33.9  6.3*  --   --  15.2*  2.3   HGB  --    < >  --   --  6.6*   < > 7.0*   < > 8.1*  --   --  7.4*   HCT  --    < >  --   --  21.1*   < > 22.4*   < > 26.2*  --   --  24.1*   BUN  --    < >  --   --  22*   < > 32*  --   --  32* 32*  --    CREATININE  --    < >  --   --  1.4   < > 1.1  --   --  1.0 0.9  --    ESTGFRAFRICA  --    < >  --   --  55*   < > >60  --   --  >60 >60  --    EGFRNONAA  --    < >  --   --  47*   < > >60  --   --  >60 >60  --    K  --    < >  --   --  3.6   < > 3.3*  --   --  3.8 4.5  --    CL  --    < >  --   --  110   < > 118*  --   --  116* 116*  --    CO2  --    < >  --   --  21*   < > 23  --   --  21* 24  --    PHOS  --   --   --   --   --   --  3.0  --   --  3.3  --   --    MG  --   --   --   --  1.6   < > 1.8  --   --  1.7 1.7  --     < > = values in this interval not displayed.       Microbiology Results (last 7 days)       Procedure Component Value Units Date/Time    Blood culture x two cultures. Draw prior to antibiotics. [[id number]] Collected: 06/19/22 2030    Order Status: Completed Specimen: Blood from Peripheral, Antecubital, Right Updated: 06/24/22 0812     Blood Culture, Routine No Growth to date      No Growth to date      No Growth to date      No Growth to date      No Growth to date    Narrative:      Aerobic and anaerobic    Blood culture x two cultures. Draw prior to antibiotics. [[id number]] Collected: 06/19/22 2105    Order Status: Completed Specimen: Blood from Peripheral, Wrist, Right Updated: 06/24/22 0812     Blood Culture, Routine No Growth to date      No Growth to date      No  Growth to date      No Growth to date      No Growth to date    Narrative:      Aerobic and anaerobic    Blood culture [[id number]] Collected: 06/22/22 1311    Order Status: Completed Specimen: Blood Updated: 06/23/22 2212     Blood Culture, Routine No Growth to date      No Growth to date    Blood culture [[id number]] Collected: 06/22/22 1311    Order Status: Completed Specimen: Blood Updated: 06/23/22 2212     Blood Culture, Routine No Growth to date      No Growth to date    Culture, Respiratory with Gram Stain [[id number]] Collected: 06/20/22 1350    Order Status: Completed Specimen: Respiratory from Endotracheal Aspirate Updated: 06/23/22 0734     Respiratory Culture Normal respiratory da      No S aureus or Pseudomonas isolated.     Gram Stain (Respiratory) <10 epithelial cells per low power field.     Gram Stain (Respiratory) Moderate WBC's     Gram Stain (Respiratory) No organisms seen          Antibiotics (From admission, onward)                Start     Stop Route Frequency Ordered    06/24/22 1045  metronidazole IVPB 500 mg         -- IV Every 8 hours (non-standard times) 06/24/22 0930    06/24/22 1030  cefepime in dextrose 5 % IVPB 2 g         -- IV Every 8 hours (non-standard times) 06/24/22 0930    06/20/22 0900  mupirocin 2 % ointment  (DECOLONIZATION PROTOCOL ORDERS)         06/25 0859 Nasl 2 times daily 06/20/22 0750    06/20/22 0900  oxacillin 12 g in  mL CONTINUOUS INFUSION         07/11 2359 IV Every 24 hours (non-standard times) 06/20/22 0752          Anticoagulants       Ordered     Route Frequency Start Stop    06/19/22 2233  heparin (porcine)         SubQ Every 8 hours 06/19/22 2245 --          X-Ray Chest 1 View  Narrative: EXAMINATION:  XR CHEST 1 VIEW    CLINICAL HISTORY:  Respiratory distress., respiratory failure;    COMPARISON:  06/22/2022 chest x-ray.    FINDINGS:  Endotracheal tube, left PICC line and nasogastric tube remain.  Cardiac size is mildly enlarged    There are  bilateral infiltrates present which appear stable.  Impression: Stable chest x-ray.    Electronically signed by: Diogenes Crisostomo MD  Date:    06/23/2022  Time:    07:16   Significant Labs: All pertinent labs within the past 24 hours have been reviewed.    Significant Imaging: I have reviewed all pertinent imaging results/findings within the past 24 hours.

## 2022-06-24 NOTE — ASSESSMENT & PLAN NOTE
On previous admission s/t IVDU  Continue scheudled Oxacillin scheuled until 7/11/22  Blood cultures on 6/19 neg and repeat 6/22 NGTD  Tmax last 24 hours 101.3, start Rx for Asp PNA

## 2022-06-24 NOTE — PROGRESS NOTES
[facility] - Intensive Care ([facility])  Critical Care Medicine  Progress Note    Patient Name: Tianna Leon  MRN: [id number]  Admission Date: 6/19/2022  Hospital Length of Stay: 5 days  Code Status: DNR  Attending Provider: Columba Helms MD  Primary Care Provider: Spenser Campa MD   Principal Problem: Acute encephalopathy    Subjective:     HPI:  37 F w/pmh significant for IV drug abuse, MDD, bipolar disorder, anxiety, PTSD, suicidal ideation, COPD, ADHD, anxiety, asthma, cervical cancer, GERD, Hep C, HLD.   She was d/c from OSH on 6/19 following 35 day admission during which she was treated for MSSA tricuspid endocarditis and right empyema, s/p VATS/decortication. She was also COVID + during admission. On 6/10, she was d/c to [facility] and continued on oxacillin (to continue through 7/10/22) via PICC line.     Yesterday, she was found unresponsive in cardiac arrest at LTAC. She was resuscitated after 1 round of Epi and arrived intubated. Report given to ED was that patient injected an unknown substance into her PICC line at LTAC.   In ED, labs significant for lactate of 4.0, ABG pH 7.215, PCO2 47.8, PO2 75, HCO3 19.3, WBC 44.18, Hgb 8.4, Cr 1.8, AST 53, , troponin 0.184, tox positive for opiates and amphetamine.   CXR w/multifocal infiltrates, CT head w/no acute finding.   Given vancomycin and cefepime and placed on sedation for mechanical ventilation.       Hospital/ICU Course:  Admitted to ICU @ 2128.   6/20 - Seen and examined at start of shift. Remains intubated and sedated on mechanical vent. Failed brief SAT/SBT with tachycardia, tachypnea   6/21 - Remains intubated/sedated on mechanical vent with minimal oxygen demand. Continues failing SAT/SBT with tachypnea, tachycardia. Sedation changed to precedex    6/22 - Remains intubated/sedated on mechanical vent with minimal oxygen demand. Not appropriate for SAT/SBT today d/t agitation, tachypnea, arrhythmia on sedation despite Versed infusion added    NOTE - At approx 1015, staff informed that pt had a bradycardic/?PEA event lasting 3 sec during turning, spontaneously resolved to SR 60s  6/23 - Had another cardiac arrest this AM about 0500 hr with PEA/Asystole with ROSC attained.  Currently supine in bed and intubated on mech ventilation in no distress sedated on Fentanyl and Versed infusions not requiring pressor support.  Mother at bed side and update given and all questions answered.  Mother request DNR status at this time.    6/24 - supine intubated on mech ventilation obtunded without neuro improvement with sedation weaning.  No distress and tolerating TF and not requiring pressor support.  Patient develops hypertension, tachypnea and restlessness with any turning or suctioning once weaned down on sedation and requires resuming higher doses of sedation.  This is preventing sedation vacation for neuro assessments       Review of Systems   Unable to perform ROS: Mental status change       Objective:     Vital Signs (Most Recent):  Temp: 98.5 °F (36.9 °C) (06/24/22 1115)  Pulse: 63 (06/24/22 1115)  Resp: (!) 22 (06/24/22 1115)  BP: 108/66 (06/24/22 1100)  SpO2: 98 % (06/24/22 1115)   Vital Signs (24h Range):  Temp:  [98.1 °F (36.7 °C)-101.3 °F (38.5 °C)] 98.5 °F (36.9 °C)  Pulse:  [56-94] 63  Resp:  [21-49] 22  SpO2:  [93 %-99 %] 98 %  BP: ()/(53-84) 108/66  Arterial Line BP: (104-229)/(52-98) 127/64     Weight: 88.5 kg (195 lb 1.7 oz)  Body mass index is 32.47 kg/m².      Intake/Output Summary (Last 24 hours) at 6/24/2022 1126  Last data filed at 6/24/2022 1100  Gross per 24 hour   Intake 3167.46 ml   Output 2485 ml   Net 682.46 ml       Physical Exam  Vitals and nursing note reviewed.   Constitutional:       General: She is not in acute distress.     Appearance: She is obese. She is ill-appearing. She is not diaphoretic.      Interventions: She is sedated and intubated.   HENT:      Head: Normocephalic and atraumatic.      Mouth/Throat:      Mouth:  Mucous membranes are moist.      Pharynx: Oropharynx is clear.   Eyes:      Pupils: Pupils are equal, round, and reactive to light.   Neck:      Vascular: No JVD.   Cardiovascular:      Rate and Rhythm: Normal rate and regular rhythm.      Pulses:           Radial pulses are 2+ on the right side and 2+ on the left side.        Dorsalis pedis pulses are 1+ on the right side and 1+ on the left side.   Pulmonary:      Effort: No tachypnea (intermittent with stimuli), accessory muscle usage or respiratory distress. She is intubated.      Breath sounds: Rhonchi present.   Chest:      Chest wall: No deformity.       Abdominal:      General: Bowel sounds are decreased. There is no distension.      Palpations: Abdomen is soft.      Tenderness: There is no abdominal tenderness.   Genitourinary:     Comments: Portillo in place  Musculoskeletal:      Cervical back: Neck supple.      Right lower leg: Edema (trace) present.      Left lower leg: Edema (trace) present.      Right foot: No deformity.      Left foot: No deformity.   Lymphadenopathy:      Cervical: No cervical adenopathy.   Skin:     General: Skin is warm and dry.      Capillary Refill: Capillary refill takes less than 2 seconds.      Findings: No rash.          Neurological:      Mental Status: She is lethargic.      Comments: Obtunded.  Opens eyes to painful stimuli but will not track. no noted spont or purposeful motor movement.  + carini, corneal and gag reflexs.  Withdraws all 4 extremities to pain but weakly.  Will not wake up or respond on min sedation.       Vents:  Vent Mode: A/C (06/24/22 0919)  Ventilator Initiated: Yes (06/19/22 1915)  Set Rate: 22 BPM (06/24/22 0919)  Vt Set: 380 mL (06/24/22 0919)  Pressure Support: 0 cmH20 (06/24/22 0919)  PEEP/CPAP: 5 cmH20 (06/24/22 0919)  Oxygen Concentration (%): 35 (06/24/22 1115)  Peak Airway Pressure: 24 cmH2O (06/24/22 0919)  Plateau Pressure: 22 cmH20 (06/24/22 0919)  Total Ve: 8.47 mL (06/24/22 0919)  F/VT  Ratio<105 (RSBI): (!) 56.56 (06/24/22 0919)    Lines/Drains/Airways       Peripherally Inserted Central Catheter Line  Duration             PICC Double Lumen -- days              Drain  Duration                  NG/OG Tube 06/19/22 1916 Inman sump 14 Fr. Center mouth 4 days         Urethral Catheter 06/19/22 2039 Latex 16 Fr. 4 days              Airway  Duration                  Airway - Non-Surgical 06/19/22 2306 4 days              Arterial Line  Duration             Arterial Line 06/23/22 0545 Left Radial 1 day              Peripheral Intravenous Line  Duration                  Peripheral IV - Single Lumen 06/19/22 1921 20 G Right Shoulder 4 days                    Significant Labs:    CBC/Anemia Profile:  Recent Labs   Lab 06/23/22  0407 06/23/22  0551 06/23/22  1210 06/23/22 2004 06/24/22  0853   WBC 13.41*  --   --  18.68* 15.27*   HGB 7.0*  --  7.2* 8.1* 7.4*   HCT 22.4*   < > 22.7* 26.2* 24.1*     --   --  369 327   MCV 88  --   --  89 89   RDW 16.6*  --   --  16.8* 17.0*    < > = values in this interval not displayed.        Chemistries:  Recent Labs   Lab 06/23/22 0407 06/23/22 2139 06/24/22  0351   * 150* 149*   K 3.3* 3.8 4.5   * 116* 116*   CO2 23 21* 24   BUN 32* 32* 32*   CREATININE 1.1 1.0 0.9   CALCIUM 8.1* 8.9 8.3*   MG 1.8 1.7 1.7   PHOS 3.0 3.3  --        ABGs:   Recent Labs   Lab 06/24/22  0410   PH 7.440   PCO2 39.6   HCO3 26.9   POCSATURATED 97   BE 3     POCT Glucose:   Recent Labs   Lab 06/23/22  2142   POCTGLUCOSE 131*     All pertinent labs within the past 24 hours have been reviewed.        ABG  Recent Labs   Lab 06/24/22  0410   PH 7.440   PO2 92   PCO2 39.6   HCO3 26.9   BE 3     Assessment/Plan:     Neuro  * Acute encephalopathy  Suspect S/t illicit drug injection to PICC leading to brief cardiac arrest, unclear if anoxic injury present due to difficulty keeping patient calm with sedation weaning  Does not track or follow commands on min sedation  Likely some  degree of anoxic encephalopathy  If not awakening will need Neuro consult  CT head repeated 6/22 w/no acute findings  Patient develops hypertension, tachypnea and restlessness with any turning or suctioning once weaned down on sedation and requires resuming higher doses of sedation.  This is preventing sedation vacation for neuro assessments   Continue supportive care and neuro monitoring; MRI for more definitive neuro imaging not possible while requiring continuous sedation    Psychiatric  Borderline personality disorder  Holding psych meds for neuro assessment off sedation    IVDU (intravenous drug user) / Drug addiction  IV drug abuse continues in LTAC; direct etiology of cardiac arrest  Continue to encourage cessation efforts; grim survival prognosis with continued IV drug use given current health problems    Pulmonary  On mechanically assisted ventilation  See full plan under resp failure  VAP prophylaxis  Daily SAT/SBT for extubation readiness     COPD (chronic obstructive pulmonary disease)  Duo-neb q 4 prn  No exacerbation    S/P VATS on 6/6/2022 secondary to Empyema  POD #18  Old CT sutures removed 6/21 per CTS recs  Follow intermittent CXR    Acute respiratory failure with hypoxia and hypercapnia on Vent s/p Cardiac Arrest  S/t illicit IV drug injection and possible asp PNA  Vent settings reviewed adjusted for optimal gas exchange  Sedation for vent control  abg daily and prn to assess therapy  Daily SAT/SBT for extubation readiness once/if awake and responsive  Oxygenation stable    Cardiac/Vascular  Endocarditis of tricuspid valve  MSSA bacteremia treatment in place from prior admit  ID consult; discharge plan was for oxacillin continuous to complete on 7/11/2022  Continue Oxacillin    Cardiac arrest x 2  S/t illicit IV injection on admit, but unclear etiology for repeat arrest in house 6/23 AM  Likely contributing to encephalopathy   ICU cardiac and hemodynamic monitoring  Replace electrolytes as  needed  No pressor support needed.     ID  MSSA bacteremia  On previous admission s/t IVDU  Continue scheudled Oxacillin scheuled until 7/11/22  Blood cultures on 6/19 neg and repeat 6/22 NGTD  Tmax last 24 hours 101.3, start Rx for Asp PNA    Severe sepsis  Cont Oxacillin for know MSSA/SBE  Still spiking temps with leukocytosis  Blood cultures from 6/19 neg and repeat from 6/22 NGTD  Sputum culture from 6/20 neg but having thick yellow/tan sputum with OET suctioning  Will start Cefepime and flagyl for possible asp PNA  CXR in AM      Hematology  Pulmonary embolism, septic  Noted on prior admit  Continue oxacillin as above    Oncology  Normocytic anemia  Transfused one unit PRBC 6/20  Hgb up to 8.1 this morning; no overt bleeding  Monitor w/daily cbc  Conservative transfusion protocol        Preventive Measures and Monitoring:   Stress Ulcer: PPI  Nutrition: TF  Glucose control: stable  Bowel prophylaxis: PRN Dulcolax  DVT prophylaxis: SQ Hep/SCDs  Hx CAD on B-Blocker: no hx CAD  Head of Bed/Reposition: Elevate HOB and turn Q1-2 hours   Early Mobility: bed rest  SAT/SBT: once awake   RASS goal: -2  Vent Day: #6  OG Day: #6  Central Line Day: PICC already in place on admit  Left radial arterial line Day: #2  Portillo Day: #6  IVAB Day: Oxacillin per ID  Code Status: DNR     Counseling/Consultation:I have discussed the care of this patient in detail with the bedside nursing staff and Dr. San and Dr. Helms.  Also discussed care in detail with mother and daughter at bed side and all questions answered.      Critical Care Time: 55 minutes  Critical secondary to Patient has a condition that poses threat to life and bodily function: Cardiac Arrest X 2 and acute resp failure intubated on [facility] ventilation  Patient has an abrupt change in neurologic status: Acute Encephalopathy  Patient is currently receiving parenteral controlled substances:  Fentanyl infusion     Critical care was time spent personally by me on the  following activities: development of treatment plan with patient or surrogate and bedside caregivers, discussions with consultants, evaluation of patient's response to treatment, examination of patient, ordering and performing treatments and interventions, ordering and review of laboratory studies, ordering and review of radiographic studies, pulse oximetry, re-evaluation of patient's condition. This critical care time did not overlap with that of any other provider or involve time for any procedures.     Sunday Nogueira NP  Critical Care Medicine  [facility] - Intensive Care [facility])

## 2022-06-24 NOTE — ASSESSMENT & PLAN NOTE
Transfused one unit PRBC 6/20  Hgb up to 8.1 this morning; no overt bleeding  Monitor w/daily cbc  Conservative transfusion protocol

## 2022-06-24 NOTE — ASSESSMENT & PLAN NOTE
Suspect S/t illicit drug injection to PICC leading to brief cardiac arrest, unclear if anoxic injury present due to difficulty keeping patient calm with sedation weaning  Does not track or follow commands on min sedation  Likely some degree of anoxic encephalopathy  If not awakening will need Neuro consult  CT head repeated 6/22 w/no acute findings  Patient develops hypertension, tachypnea and restlessness with any turning or suctioning once weaned down on sedation and requires resuming higher doses of sedation.  This is preventing sedation vacation for neuro assessments   Continue supportive care and neuro monitoring; MRI for more definitive neuro imaging not possible while requiring continuous sedation

## 2022-06-24 NOTE — EICU
EICU Physician Brief Note - Overnight Events    Brief episode of self-limited VT followed by persistent tachypnea and HTN. On camera, she is tahcypneic, no accessory respiratory muscle use, ? rhythmic head movements though could be related to respiratory effort. Pupils equal about 2 mm and no nystagmus. Chart reviewed - patient with extensive recent history MSSA tricuspid endocarditis, IVDU, empyema s/p VATS decorticaiton, s/p cardiac arest as outside facility and agian PEA arrest on 6/23 am.   Ventilator settings: AC/VC 22/380/0.9/5/35% breathing 38, -400, PIP 28, sat 96%.  ABG ordered stat 7.44/37/81/25.  Plan:  Versed a total of 4 mg IVP given, and Ativan 2 mg IVP.  Versed gtt increased to 6 mg/hr and Fentanyl 250 mcg/hr.  It is likely that this is being caused by versed having been titrated down (from 10 mg/hr to lowest 2 mg/hr).   Symptoms are improving with the above sedation. If not back to baseline, will order head CT.    Eicu is available should acute issues arise.

## 2022-06-24 NOTE — PROGRESS NOTES
Pt had run of VTACH on monitor heart rate 120's, respirations increased, peak pressures increased with guppy like breathing on ventilator, o2 sats 96%, art line sbp in 170-200', prn Hydralazine given and during this time pt had large bowel movement. Fentanyl and Versed gtt increased see MAR. EICU button pushed and Dr. Pemberton on camera see MD note.

## 2022-06-24 NOTE — EICU
EICU Physician Brief Note - Overnight Events    Called for mag still <2 despite po replacement.  Plan:  Mag sulfate 1 g IVPB, administer slowly over 3 hours to prevent excretion in the urine.  Eicu is available should acute issues arise.

## 2022-06-25 PROBLEM — E87.8 ELECTROLYTE IMBALANCE: Status: ACTIVE | Noted: 2022-06-25

## 2022-06-25 LAB
ALLENS TEST: ABNORMAL
ANION GAP SERPL CALC-SCNC: 15 MMOL/L (ref 8–16)
ANISOCYTOSIS BLD QL SMEAR: SLIGHT
BACTERIA BLD CULT: NORMAL
BACTERIA BLD CULT: NORMAL
BASOPHILS # BLD AUTO: 0.07 K/UL (ref 0–0.2)
BASOPHILS NFR BLD: 0 % (ref 0–1.9)
BASOPHILS NFR BLD: 0.3 % (ref 0–1.9)
BUN SERPL-MCNC: 31 MG/DL (ref 6–20)
CALCIUM SERPL-MCNC: 9.5 MG/DL (ref 8.7–10.5)
CHLORIDE SERPL-SCNC: 114 MMOL/L (ref 95–110)
CO2 SERPL-SCNC: 20 MMOL/L (ref 23–29)
CREAT SERPL-MCNC: 0.8 MG/DL (ref 0.5–1.4)
DACRYOCYTES BLD QL SMEAR: ABNORMAL
DELSYS: ABNORMAL
DIFFERENTIAL METHOD: ABNORMAL
DIFFERENTIAL METHOD: ABNORMAL
EOSINOPHIL # BLD AUTO: 0.1 K/UL (ref 0–0.5)
EOSINOPHIL NFR BLD: 0 % (ref 0–8)
EOSINOPHIL NFR BLD: 0.3 % (ref 0–8)
ERYTHROCYTE [DISTWIDTH] IN BLOOD BY AUTOMATED COUNT: 18.3 % (ref 11.5–14.5)
ERYTHROCYTE [DISTWIDTH] IN BLOOD BY AUTOMATED COUNT: 18.6 % (ref 11.5–14.5)
EST. GFR  (AFRICAN AMERICAN): >60 ML/MIN/1.73 M^2
EST. GFR  (NON AFRICAN AMERICAN): >60 ML/MIN/1.73 M^2
FIO2: 35
GLUCOSE SERPL-MCNC: 121 MG/DL (ref 70–110)
HCO3 UR-SCNC: 26.2 MMOL/L (ref 24–28)
HCT VFR BLD AUTO: 28.5 % (ref 37–48.5)
HCT VFR BLD AUTO: 30.5 % (ref 37–48.5)
HGB BLD-MCNC: 8.7 G/DL (ref 12–16)
HGB BLD-MCNC: 9.5 G/DL (ref 12–16)
IMM GRANULOCYTES # BLD AUTO: 0.74 K/UL (ref 0–0.04)
IMM GRANULOCYTES # BLD AUTO: ABNORMAL K/UL (ref 0–0.04)
IMM GRANULOCYTES NFR BLD AUTO: 3.1 % (ref 0–0.5)
IMM GRANULOCYTES NFR BLD AUTO: ABNORMAL % (ref 0–0.5)
LYMPHOCYTES # BLD AUTO: 3.3 K/UL (ref 1–4.8)
LYMPHOCYTES NFR BLD: 13.9 % (ref 18–48)
LYMPHOCYTES NFR BLD: 9 % (ref 18–48)
MAGNESIUM SERPL-MCNC: 1.9 MG/DL (ref 1.6–2.6)
MCH RBC QN AUTO: 27.2 PG (ref 27–31)
MCH RBC QN AUTO: 27.8 PG (ref 27–31)
MCHC RBC AUTO-ENTMCNC: 30.5 G/DL (ref 32–36)
MCHC RBC AUTO-ENTMCNC: 31.1 G/DL (ref 32–36)
MCV RBC AUTO: 89 FL (ref 82–98)
MCV RBC AUTO: 89 FL (ref 82–98)
METAMYELOCYTES NFR BLD MANUAL: 2 %
MODE: ABNORMAL
MONOCYTES # BLD AUTO: 1.4 K/UL (ref 0.3–1)
MONOCYTES NFR BLD: 1 % (ref 4–15)
MONOCYTES NFR BLD: 5.7 % (ref 4–15)
MYELOCYTES NFR BLD MANUAL: 1 %
NEUTROPHILS # BLD AUTO: 18.3 K/UL (ref 1.8–7.7)
NEUTROPHILS NFR BLD: 76.7 % (ref 38–73)
NEUTROPHILS NFR BLD: 85 % (ref 38–73)
NEUTS BAND NFR BLD MANUAL: 2 %
NRBC BLD-RTO: 0 /100 WBC
NRBC BLD-RTO: 0 /100 WBC
OVALOCYTES BLD QL SMEAR: ABNORMAL
PCO2 BLDA: 38 MMHG (ref 35–45)
PEEP: 5
PH SMN: 7.45 [PH] (ref 7.35–7.45)
PLATELET # BLD AUTO: 451 K/UL (ref 150–450)
PLATELET # BLD AUTO: 512 K/UL (ref 150–450)
PLATELET BLD QL SMEAR: ABNORMAL
PMV BLD AUTO: 10.4 FL (ref 9.2–12.9)
PMV BLD AUTO: 9.9 FL (ref 9.2–12.9)
PO2 BLDA: 135 MMHG (ref 80–100)
POC BE: 2 MMOL/L
POC SATURATED O2: 99 % (ref 95–100)
POIKILOCYTOSIS BLD QL SMEAR: SLIGHT
POTASSIUM SERPL-SCNC: 3.5 MMOL/L (ref 3.5–5.1)
PS: 10
RBC # BLD AUTO: 3.2 M/UL (ref 4–5.4)
RBC # BLD AUTO: 3.42 M/UL (ref 4–5.4)
SAMPLE: ABNORMAL
SCHISTOCYTES BLD QL SMEAR: PRESENT
SITE: ABNORMAL
SODIUM SERPL-SCNC: 149 MMOL/L (ref 136–145)
WBC # BLD AUTO: 23.83 K/UL (ref 3.9–12.7)
WBC # BLD AUTO: 30.45 K/UL (ref 3.9–12.7)

## 2022-06-25 PROCEDURE — 82803 BLOOD GASES ANY COMBINATION: CPT

## 2022-06-25 PROCEDURE — 25000003 PHARM REV CODE 250: Performed by: INTERNAL MEDICINE

## 2022-06-25 PROCEDURE — 63600175 PHARM REV CODE 636 W HCPCS: Performed by: SURGERY

## 2022-06-25 PROCEDURE — 63600175 PHARM REV CODE 636 W HCPCS: Performed by: INTERNAL MEDICINE

## 2022-06-25 PROCEDURE — 85025 COMPLETE CBC W/AUTO DIFF WBC: CPT | Performed by: NURSE PRACTITIONER

## 2022-06-25 PROCEDURE — 85027 COMPLETE CBC AUTOMATED: CPT | Performed by: NURSE PRACTITIONER

## 2022-06-25 PROCEDURE — 94003 VENT MGMT INPAT SUBQ DAY: CPT

## 2022-06-25 PROCEDURE — 99291 PR CRITICAL CARE, E/M 30-74 MINUTES: ICD-10-PCS | Mod: ,,, | Performed by: NURSE PRACTITIONER

## 2022-06-25 PROCEDURE — 99900035 HC TECH TIME PER 15 MIN (STAT)

## 2022-06-25 PROCEDURE — S0030 INJECTION, METRONIDAZOLE: HCPCS | Performed by: NURSE PRACTITIONER

## 2022-06-25 PROCEDURE — 25000003 PHARM REV CODE 250: Performed by: NURSE PRACTITIONER

## 2022-06-25 PROCEDURE — 37799 UNLISTED PX VASCULAR SURGERY: CPT

## 2022-06-25 PROCEDURE — 63600175 PHARM REV CODE 636 W HCPCS: Performed by: NURSE PRACTITIONER

## 2022-06-25 PROCEDURE — 85007 BL SMEAR W/DIFF WBC COUNT: CPT | Performed by: NURSE PRACTITIONER

## 2022-06-25 PROCEDURE — 20000000 HC ICU ROOM

## 2022-06-25 PROCEDURE — 83735 ASSAY OF MAGNESIUM: CPT | Performed by: NURSE PRACTITIONER

## 2022-06-25 PROCEDURE — 99900026 HC AIRWAY MAINTENANCE (STAT)

## 2022-06-25 PROCEDURE — 99291 CRITICAL CARE FIRST HOUR: CPT | Mod: ,,, | Performed by: NURSE PRACTITIONER

## 2022-06-25 PROCEDURE — 80048 BASIC METABOLIC PNL TOTAL CA: CPT | Performed by: NURSE PRACTITIONER

## 2022-06-25 PROCEDURE — 27000221 HC OXYGEN, UP TO 24 HOURS

## 2022-06-25 RX ORDER — METOPROLOL TARTRATE 1 MG/ML
5 INJECTION, SOLUTION INTRAVENOUS ONCE
Status: COMPLETED | OUTPATIENT
Start: 2022-06-25 | End: 2022-06-25

## 2022-06-25 RX ADMIN — METRONIDAZOLE 500 MG: 500 INJECTION, SOLUTION INTRAVENOUS at 02:06

## 2022-06-25 RX ADMIN — HEPARIN SODIUM 5000 UNITS: 5000 INJECTION INTRAVENOUS; SUBCUTANEOUS at 09:06

## 2022-06-25 RX ADMIN — CHLORHEXIDINE GLUCONATE 0.12% ORAL RINSE 15 ML: 1.2 LIQUID ORAL at 08:06

## 2022-06-25 RX ADMIN — OXACILLIN SODIUM 12 G: 10 INJECTION, POWDER, FOR SOLUTION INTRAVENOUS at 08:06

## 2022-06-25 RX ADMIN — ENALAPRILAT 1.25 MG: 1.25 INJECTION INTRAVENOUS at 03:06

## 2022-06-25 RX ADMIN — MIDAZOLAM 2 MG: 1 INJECTION INTRAMUSCULAR; INTRAVENOUS at 01:06

## 2022-06-25 RX ADMIN — MIDAZOLAM 2 MG: 1 INJECTION INTRAMUSCULAR; INTRAVENOUS at 08:06

## 2022-06-25 RX ADMIN — PANTOPRAZOLE SODIUM 40 MG: 40 GRANULE, DELAYED RELEASE ORAL at 08:06

## 2022-06-25 RX ADMIN — CEFEPIME HYDROCHLORIDE 2 G: 2 INJECTION, SOLUTION INTRAVENOUS at 01:06

## 2022-06-25 RX ADMIN — HYDRALAZINE HYDROCHLORIDE 20 MG: 20 INJECTION INTRAMUSCULAR; INTRAVENOUS at 06:06

## 2022-06-25 RX ADMIN — HYDRALAZINE HYDROCHLORIDE 20 MG: 20 INJECTION INTRAMUSCULAR; INTRAVENOUS at 12:06

## 2022-06-25 RX ADMIN — CEFEPIME HYDROCHLORIDE 2 G: 2 INJECTION, SOLUTION INTRAVENOUS at 11:06

## 2022-06-25 RX ADMIN — CEFEPIME HYDROCHLORIDE 2 G: 2 INJECTION, SOLUTION INTRAVENOUS at 06:06

## 2022-06-25 RX ADMIN — METOROPROLOL TARTRATE 5 MG: 5 INJECTION, SOLUTION INTRAVENOUS at 02:06

## 2022-06-25 RX ADMIN — METRONIDAZOLE 500 MG: 500 INJECTION, SOLUTION INTRAVENOUS at 10:06

## 2022-06-25 RX ADMIN — METRONIDAZOLE 500 MG: 500 INJECTION, SOLUTION INTRAVENOUS at 07:06

## 2022-06-25 RX ADMIN — HEPARIN SODIUM 5000 UNITS: 5000 INJECTION INTRAVENOUS; SUBCUTANEOUS at 05:06

## 2022-06-25 RX ADMIN — Medication 150 MCG/HR: at 04:06

## 2022-06-25 NOTE — ASSESSMENT & PLAN NOTE
On previous admission s/t IVDU  Continue scheudled Oxacillin scheuled until 7/11/22  Blood cultures on 6/19 neg and repeat 6/22 NGTD

## 2022-06-25 NOTE — ASSESSMENT & PLAN NOTE
Discussed prognosis and goals of care with family. Considering comfort care measures and palliative vent withdrawal tomorrow AM

## 2022-06-25 NOTE — PLAN OF CARE
Pt remains intubated on vent FiO2 30% Peep 5. NSR on monitor. VSS. T max 100.0 today. Continues to have episodes of tachypnea, tachycardia, and hypertension which last approx 5-10 minutes. No prns given today. Portillo in place with adequate urine output. Tolerating TF at goal . UMANG updated. Plan to possible withdraw care tomorrow at 11am per mother and daughter at bedside this afternoon.

## 2022-06-25 NOTE — PLAN OF CARE
Remains intubated fio2 30%, with drawls from pain only, left eye gaze, pupils equal and sluggish  Intermittent episodes of agitation, off sedation x2 hr before becoming agitated and sedation restarted. Hypertensive, HR in the 140-160's. Sbp up to the 200, prn Hydralazine and Enalaprilat given.   Metoprolol given x1 dose for HR  Afebrile  X2 bm  Portillo to gravity with good urine output  TF at goal rate of 50cc/hr. Tolerating well.      Problem: Adult Inpatient Plan of Care  Goal: Plan of Care Review  Outcome: Ongoing, Not Progressing  Goal: Patient-Specific Goal (Individualized)  Outcome: Ongoing, Not Progressing  Goal: Absence of Hospital-Acquired Illness or Injury  Outcome: Ongoing, Not Progressing  Goal: Optimal Comfort and Wellbeing  Outcome: Ongoing, Not Progressing  Goal: Readiness for Transition of Care  Outcome: Ongoing, Not Progressing     Problem: Fall Injury Risk  Goal: Absence of Fall and Fall-Related Injury  Outcome: Ongoing, Not Progressing     Problem: Adjustment to Illness (Sepsis/Septic Shock)  Goal: Optimal Coping  Outcome: Ongoing, Not Progressing     Problem: Bleeding (Sepsis/Septic Shock)  Goal: Absence of Bleeding  Outcome: Ongoing, Not Progressing     Problem: Glycemic Control Impaired (Sepsis/Septic Shock)  Goal: Blood Glucose Level Within Desired Range  Outcome: Ongoing, Not Progressing     Problem: Infection Progression (Sepsis/Septic Shock)  Goal: Absence of Infection Signs and Symptoms  Outcome: Ongoing, Not Progressing     Problem: Nutrition Impaired (Sepsis/Septic Shock)  Goal: Optimal Nutrition Intake  Outcome: Ongoing, Not Progressing     Problem: Fluid and Electrolyte Imbalance (Acute Kidney Injury/Impairment)  Goal: Fluid and Electrolyte Balance  Outcome: Ongoing, Not Progressing     Problem: Oral Intake Inadequate (Acute Kidney Injury/Impairment)  Goal: Optimal Nutrition Intake  Outcome: Ongoing, Not Progressing     Problem: Renal Function Impairment (Acute Kidney  Injury/Impairment)  Goal: Effective Renal Function  Outcome: Ongoing, Not Progressing     Problem: Skin Injury Risk Increased  Goal: Skin Health and Integrity  Outcome: Ongoing, Not Progressing     Problem: Infection  Goal: Absence of Infection Signs and Symptoms  Outcome: Ongoing, Not Progressing     Problem: Communication Impairment (Mechanical Ventilation, Invasive)  Goal: Effective Communication  Outcome: Ongoing, Not Progressing     Problem: Device-Related Complication Risk (Mechanical Ventilation, Invasive)  Goal: Optimal Device Function  Outcome: Ongoing, Not Progressing     Problem: Inability to Wean (Mechanical Ventilation, Invasive)  Goal: Mechanical Ventilation Liberation  Outcome: Ongoing, Not Progressing     Problem: Nutrition Impairment (Mechanical Ventilation, Invasive)  Goal: Optimal Nutrition Delivery  Outcome: Ongoing, Not Progressing     Problem: Skin and Tissue Injury (Mechanical Ventilation, Invasive)  Goal: Absence of Device-Related Skin and Tissue Injury  Outcome: Ongoing, Not Progressing     Problem: Ventilator-Induced Lung Injury (Mechanical Ventilation, Invasive)  Goal: Absence of Ventilator-Induced Lung Injury  Outcome: Ongoing, Not Progressing     Problem: Communication Impairment (Artificial Airway)  Goal: Effective Communication  Outcome: Ongoing, Not Progressing     Problem: Device-Related Complication Risk (Artificial Airway)  Goal: Optimal Device Function  Outcome: Ongoing, Not Progressing     Problem: Skin and Tissue Injury (Artificial Airway)  Goal: Absence of Device-Related Skin or Tissue Injury  Outcome: Ongoing, Not Progressing     Problem: Coping Ineffective  Goal: Effective Coping  Outcome: Ongoing, Not Progressing

## 2022-06-25 NOTE — EICU
EICU FOLLOWUP NOTE:  Delayed note entry, epic EMR was down    Suspected anoxic encephalopathy, off sedation for optimal neuro assessment.  Episodes of tachycardia associated with hypertension.  Lopressor 5 mg IV ×1 ordered    Toni Sanchez MD  [facility]  [phone]

## 2022-06-25 NOTE — PROGRESS NOTES
Notified EICU Dr. Sanchez about pt being tachycardic 150-160's, received order for Metoprolol 5mg IV push now.

## 2022-06-25 NOTE — PROGRESS NOTES
[facility] - Intensive Care ([facility])  [facility] Medicine  Progress Note    Patient Name: Tianna Leon  MRN: [id number]  Patient Class: IP- Inpatient   Admission Date: 6/19/2022  Length of Stay: 6 days  Attending Physician: Columba Helms MD  Primary Care Provider: Spenser Campa MD        Subjective:     Principal Problem:Anoxic encephalopathy        HPI:  Ms. Leon is a 37 year old  female with long standing history of IV drug abuse, was recently discharged from this facility on 6/10/22 after a 35 day hospital stay. During previous admission she was found to have MSSA Tricuspid Endocarditis, treated on oxacillin (to be continued until 7/10/22). Also had right empyema, s/p VATS and chest tube. She was COVID positive during that admission. She was discharged to [facility] on 6/10. Today she was found unresponsive after she apparently injected an unknown substance into her left PICC line at the facility. She coded, received one round of epi prior to ROSC. Intubated prior to arrival. WBC 44,000, bands 19%. Lactic acid 4.0  Cr 1.8 CO2 14. CXR multifocal infiltrates. BP stable. On propofol. Received IV Zosyn.     Cardiac Arrest. Continued IV drug abuse. Tricuspid endocarditis. MSSA bacteremia. Right Empyema.      Overview/Hospital Course:  37 F WF, Hx of IVDA, MDD, bipolar disorder, anxiety, PTSD, suicidal ideation, COPD, ADHD, asthma, cervical cancer, GERD, Hep C, HLD who was d/uri from this hosp on 6/10 following 35 day admission during which she was treated for MSSA tricuspid endocarditis and right empyema, s/p VATS/decortication. She was also COVID + during admission. On 6/10, she was d/c to [facility] and continued on oxacillin (to continue through 7/10/22) via PICC line. Yesterday, she was found unresponsive in cardiac arrest at [facility]. She was resuscitated after 1 round of Epi and arrived intubated. Report given to ED was that patient injected an unknown substance into her PICC line at [facility].   In ED,  Lactate 4.0, ABG 7.215/47.8/75/19.3, WBC 44.18, Hgb 8.4, Cr 1.8, AST 53, , troponin 0.184, Tox positive for opiates and amphetamine. CXR w/multifocal infiltrates, CT head w/no acute finding. Given Vancomycin and Cefepime and placed on sedation for mechanical ventilation. Admitted to ICU for Cardiac Arrest. Continued IVDA. Tricuspid endocarditis. MSSA bacteremia. Right Empyema s/p VATS.    6/20- Remains intubated and sedated on mechanical vent. Failed brief SAT/SBT with tachycardia, tachypnea. WBC down to 16, H/H dropped to 6.6/21- got  a unit of blood, H/H improved to 7.4/23. Bun/Cr improved 22/1.4. Troponin coming down- likely demand ischemia from Cardiac Arrest as a result of IV drug.      6/21- Appreciate ICU team. Pt remains intubated, sedated on vent, was on Fentanyl/Diprivan this am, Again failed SAT/SBT this am, also TG nearly 426, hence sedation lowered and switched Diprivan to Precedex and will try SAT/SBT. WBC down to 11, H/H 7/22, HCO3 21, Bun down to 24. Await repeat SAT/SBT. Good Urine output. Abx were changed to Oxacillin again yesterday due to her hx of MSSA.    6/22- Remains intubated sedated on Vent- Precedex, Fenatnyl, Versed. Pt had a few periods of Bradycardia on Precedex gtt last night- eICU had to intervene and given Versed. Again this am, while pt having BM and agitated, Asystole alarming then pt went into NSR 60s- Precedex d/uri. Earlier CXR showed fluid load- given small dose IV Lasix. Getting TF @40 cc/hr. Tmax 101.8F last night. Good urine output. WBC increased to 15.8, H/H 8/25.     6/23- remains intubated, sedated on vent, on Versed/Fentanyl gtts. Sedation has been lowered. She 2 back to back cardiac arrests this morning around 0500 hr with PEA/Asystole with ROSC attained. Not requiring pressor support. Mother was at bed side and was updated about her critical condition and poor prognosis and all questions answered. Mother requested DNR status at this time.  Na increased 152, Cl  117, WBC down to 13, H/H 7/24. Getting continuous Oxacillin gtt for her Endocarditis.      6/24: remains intubated, sedated on vent, on Versed/Fentanyl gtts. No pressors. Unable to assess neuro status as patient develops hypertension, tachypnea and restlessness with any turning or suctioning once weaned down on sedation. Tmax 101.3F    6/25: No improvement in neuro status from day prior. Episodes of restlessness, tachycardia, hypertension and tachypnea when sedation weaned. Intubated, off pressors. Tolerating tube feeds, +diarrhea      Interval History: remains intubated, sedated on vent, on Versed/Fentanyl gtts. Sedation has been lowered. She 2 back to back cardiac arrests this 6/23 morning around 0500 hr with PEA/Asystole with ROSC attained. Not requiring pressor support. Mother was at bed side and was updated about her critical condition and poor prognosis and all questions answered. Mother requested DNR status at this time.  On continuous Oxacillin gtt for endocarditis.      Review of Systems   Unable to perform ROS: Intubated   Objective:     Vital Signs (Most Recent):  Temp: 100 °F (37.8 °C) (06/25/22 1130)  Pulse: 105 (06/25/22 1545)  Resp: (!) 27 (06/25/22 1545)  BP: (!) 172/83 (06/25/22 1500)  SpO2: 98 % (06/25/22 1545)   Vital Signs (24h Range):  Temp:  [98.3 °F (36.8 °C)-100 °F (37.8 °C)] 100 °F (37.8 °C)  Pulse:  [] 105  Resp:  [20-49] 27  SpO2:  [95 %-100 %] 98 %  BP: (139-241)/() 172/83  Arterial Line BP: (163-178)/(69-80) 163/69     Weight: 88.5 kg (195 lb 1.7 oz)  Body mass index is 32.47 kg/m².    Intake/Output Summary (Last 24 hours) at 6/25/2022 1557  Last data filed at 6/25/2022 1200  Gross per 24 hour   Intake 2398.55 ml   Output 3970 ml   Net -1571.45 ml        Physical Exam  Vitals and nursing note reviewed.   Constitutional:       General: She is not in acute distress.     Appearance: She is obese. She is ill-appearing. She is not diaphoretic.      Interventions: She is sedated  and intubated.   HENT:      Mouth/Throat:      Mouth: Mucous membranes are moist.      Pharynx: Oropharynx is clear.   Eyes:      Pupils: Pupils are equal, round, and reactive to light.      Comments: Has downward left gaze bilaterally   Neck:      Vascular: No JVD.   Cardiovascular:      Rate and Rhythm: Regular rhythm. Tachycardia present.      Pulses:           Radial pulses are 2+ on the right side and 2+ on the left side.        Dorsalis pedis pulses are 1+ on the right side and 1+ on the left side.      Comments: Intermittent bradycardia and tachycardia  Pulmonary:      Effort: No tachypnea, accessory muscle usage or respiratory distress. She is intubated.      Breath sounds: Normal breath sounds.   Chest:      Chest wall: No deformity.       Abdominal:      General: Bowel sounds are decreased. There is no distension.      Palpations: Abdomen is soft.      Tenderness: There is no abdominal tenderness.   Genitourinary:     Comments: Portillo in place  Musculoskeletal:      Cervical back: Neck supple.      Right lower le+ Pitting Edema present.      Left lower le+ Pitting Edema present.      Right foot: No deformity.      Left foot: No deformity.   Lymphadenopathy:      Cervical: No cervical adenopathy.   Skin:     General: Skin is warm and dry.      Capillary Refill: Capillary refill takes less than 2 seconds.          Neurological:      Comments: No focus or track when eyes open; no noted purposeful motor movement.  + carini and gag reflex.  Withdraws all 4 extremities to pain but weakly.  But still on sedation actively weaning slowly        Vent Mode: Spont  Oxygen Concentration (%):  [30-35] 30  Resp Rate Total:  [17 br/min-43 br/min] 27 br/min  Vt Set:  [380 mL] 380 mL  PEEP/CPAP:  [5 cmH20] 5 cmH20  Pressure Support:  [0 cmH20-10 cmH20] 10 cmH20  Mean Airway Pressure:  [7.7 cmH20-9.3 cmH20] 8 cmH20  Temp:  [98.3 °F (36.8 °C)-100 °F (37.8 °C)] 100 °F (37.8 °C)  Pulse:  [] 105  Resp:  [20-49]  27  SpO2:  [95 %-100 %] 98 %  BP: (139-241)/() 172/83  Arterial Line BP: (163-178)/(69-80) 163/69   Art pH/pCO2/pO2/HCO3:  7.447/38.0/135/26.2 (06/25 0410)  Lab Results   Component Value Date    DOF30BWIOVFI Negative 06/19/2022    [id number] Positive (A) 05/31/2022    [id number] Negative 05/06/2022        Recent Labs   Lab 06/19/22 1953 06/19/22 2010 06/19/22 2104 06/19/22  2321 06/20/22  0512 06/20/22  1244 06/23/22  0407 06/23/22  0551 06/23/22  2139 06/24/22  0351 06/24/22  0853 06/24/22 2049 06/25/22  0407 06/25/22  0837   LACTATE 4.0*  --   --  1.6 0.7  --   --   --   --   --   --   --   --   --    TROPONINI  --   --  0.184*  --  0.126*  --   --   --   --   --   --   --   --   --    NA  --    < >  --   --  145   < > 152*  --  150* 149*  --   --  149*  --    WBC  --    < >  --   --  16.49*   < > 13.41*   < >  --   --  15.27* 23.31*  --  30.45*   GRAN  --    < >  --   --  76.0*  12.6*   < > 49.8  6.7   < >  --   --  72.6  11.1* 71.0  --  85.0*   LYMPH  --    < >  --   --  15.8*  2.6   < > 35.2  4.7   < >  --   --  15.2*  2.3 16.0*  --  9.0*   HGB  --    < >  --   --  6.6*   < > 7.0*   < >  --   --  7.4* 8.9*  --  9.5*   HCT  --    < >  --   --  21.1*   < > 22.4*   < >  --   --  24.1* 29.0*  --  30.5*   BUN  --    < >  --   --  22*   < > 32*  --  32* 32*  --   --  31*  --    CREATININE  --    < >  --   --  1.4   < > 1.1  --  1.0 0.9  --   --  0.8  --    ESTGFRAFRICA  --    < >  --   --  55*   < > >60  --  >60 >60  --   --  >60  --    EGFRNONAA  --    < >  --   --  47*   < > >60  --  >60 >60  --   --  >60  --    K  --    < >  --   --  3.6   < > 3.3*  --  3.8 4.5  --   --  3.5  --    CL  --    < >  --   --  110   < > 118*  --  116* 116*  --   --  114*  --    CO2  --    < >  --   --  21*   < > 23  --  21* 24  --   --  20*  --    PHOS  --   --   --   --   --   --  3.0  --  3.3  --   --   --   --   --    MG  --   --   --   --  1.6   < > 1.8  --  1.7 1.7  --   --  1.9  --     < > = values in this  interval not displayed.       Microbiology Results (last 7 days)       Procedure Component Value Units Date/Time    Blood culture x two cultures. Draw prior to antibiotics. [[id number]] Collected: 06/19/22 2105    Order Status: Completed Specimen: Blood from Peripheral, Wrist, Right Updated: 06/25/22 0812     Blood Culture, Routine No growth after 5 days.    Narrative:      Aerobic and anaerobic    Blood culture x two cultures. Draw prior to antibiotics. [[id number]] Collected: 06/19/22 2030    Order Status: Completed Specimen: Blood from Peripheral, Antecubital, Right Updated: 06/25/22 0812     Blood Culture, Routine No growth after 5 days.    Narrative:      Aerobic and anaerobic    Culture, Respiratory with Gram Stain [[id number]] Collected: 06/24/22 1355    Order Status: Completed Specimen: Respiratory from Endotracheal Aspirate Updated: 06/25/22 0722     Respiratory Culture No Growth     Gram Stain (Respiratory) Few WBC's     Gram Stain (Respiratory) Rare Gram positive cocci     Gram Stain (Respiratory) Rare Gram negative rods    Blood culture [[id number]] Collected: 06/22/22 1311    Order Status: Completed Specimen: Blood Updated: 06/24/22 2212     Blood Culture, Routine No Growth to date      No Growth to date      No Growth to date    Blood culture [[id number]] Collected: 06/22/22 1311    Order Status: Completed Specimen: Blood Updated: 06/24/22 2212     Blood Culture, Routine No Growth to date      No Growth to date      No Growth to date    Culture, Respiratory with Gram Stain [[id number]] Collected: 06/20/22 1350    Order Status: Completed Specimen: Respiratory from Endotracheal Aspirate Updated: 06/23/22 0734     Respiratory Culture Normal respiratory da      No S aureus or Pseudomonas isolated.     Gram Stain (Respiratory) <10 epithelial cells per low power field.     Gram Stain (Respiratory) Moderate WBC's     Gram Stain (Respiratory) No organisms seen          Antibiotics (From admission, onward)                 Start     Stop Route Frequency Ordered    06/24/22 1045  metronidazole IVPB 500 mg         -- IV Every 8 hours (non-standard times) 06/24/22 0930    06/24/22 1030  cefepime in dextrose 5 % IVPB 2 g         -- IV Every 8 hours (non-standard times) 06/24/22 0930    06/20/22 0900  oxacillin 12 g in  mL CONTINUOUS INFUSION         07/11 2359 IV Every 24 hours (non-standard times) 06/20/22 0752          Anticoagulants       Ordered     Route Frequency Start Stop    06/19/22 2233  heparin (porcine)         SubQ Every 8 hours 06/19/22 2245 --          X-Ray Chest 1 View  Narrative: EXAMINATION:  XR CHEST 1 VIEW    CLINICAL HISTORY:  Pneumonia., PNA;    COMPARISON:  [id number]    FINDINGS:  Lines and tubes are unchanged.    Mild cardiomegaly.    Bilateral infiltrates appear slightly improved.  Impression: See above.    Electronically signed by: Diogenes Crisostomo MD  Date:    06/25/2022  Time:    08:42   Significant Labs: All pertinent labs within the past 24 hours have been reviewed.    Significant Imaging: I have reviewed all pertinent imaging results/findings within the past 24 hours.      Assessment/Plan:      * Anoxic encephalopathy  Discussed prognosis and goals of care with family. Considering comfort care measures and palliative vent withdrawal tomorrow AM    Acute respiratory failure with hypoxia and hypercapnia on Vent s/p Cardiac Arrest  Patient with Hypercapnic Respiratory failure which is Acute.  she is not on home oxygen. Supplemental oxygen was provided and noted- Vent Mode: A/C  Oxygen Concentration (%):  [35] 35  Resp Rate Total:  [22 br/min-46 br/min] 22 br/min  Vt Set:  [380 mL] 380 mL  PEEP/CPAP:  [5 cmH20] 5 cmH20  Pressure Support:  [0 cmH20] 0 cmH20  Mean Airway Pressure:  [8.3 cmW40-02 cmH20] 11 cmH20.   Signs/symptoms of respiratory failure include- on vent, AMS. Contributing diagnoses includes - Interstitial lung disease and Pneumonia Labs and images were reviewed. Patient Has recent ABG,  which has been reviewed. Will treat underlying causes and adjust management of respiratory failure as follows- Vent support, nebs, IV Abx    6/20- continue vent support as she failed SAT/SBT this am  6/21- trying to extubate, failed SAT/SBT this am  6/22- see Cardiac arrest above  6/23- Day 5 on vent- had 2 PEAs this am, decreased responsiveness  6/24- Day 6 vent. Unable to wean sedation to determine neuro status    On mechanically assisted ventilation  See resp failure above      Cardiac arrest x 2  -IV drug use at LTAC  -BP stable at this time, not requiring pressors  -consult pulmonary for vent management    Doing better, on Vent, hemodynamically stable    Day 3 post cardiac arrest- on vent, trying to extubate    Day 4 on vent- Febrile, Bradycardic on Precedex- unstable- no weaning attempt today    Day 5 since Cardiac arrest, now had another 2 PEAs/Cardiac Arrests this am with ROSC but since then appears to have suffered significant Anoxic Brain Injury. Has corneal and gag reflex but no eye contact or response    Endocarditis of tricuspid valve  -MSSA bacteremia during previous admission  -Follow up on repeat blood cultures  -IV vanc and cefepime empirically for now  -ID consult    Cont Vanc/Cefepime    Changed to Oxacillin yesterday    Continue Oxacillin    Normocytic anemia  H/H dropped to 6.6- Transfused 1 unit- now 7.4/23    No active bleeding, Anemia of Chronic Disease    S/p Transfusion, H/H 8/26    H/H 7.2/23    Pulmonary embolism, septic         MSSA bacteremia  -during previous admission, was supposed to be on oxacillin until 7/10/22  -repeat cultures pending  -ID consult    May resume Oxacillin soon, if blood Cx remain NGTD    Resumed yesterday    Cont Oxacillin    IVDU (intravenous drug user) / Drug addiction  -continued active IV drug use while at [facility]      Borderline personality disorder       VTE Risk Mitigation (From admission, onward)         Ordered     heparin (porcine) injection 5,000  Units  Every 8 hours         06/19/22 2233     Place sequential compression device  Until discontinued         06/19/22 2233                Discharge Planning   YESSICA:      Code Status: DNR   Is the patient medically ready for discharge?:     Reason for patient still in hospital (select all that apply): Patient trending condition, Treatment and Pending disposition  Discharge Plan A: Other (patient remains critically ill at this time, pending clinical improvement for d/c planning.)            Critical care time spent on the evaluation and treatment of severe organ dysfunction, review of pertinent labs and imaging studies, discussions with consulting providers and discussions with patient/family: 30 minutes.      Columba Helms MD  Department of Hospital Medicine   Critical access hospital - Intensive Care ([facility])

## 2022-06-25 NOTE — ASSESSMENT & PLAN NOTE
Transfused one unit PRBC 6/20  AM CBC still pending  Monitor w/daily cbc  Conservative transfusion protocol

## 2022-06-25 NOTE — ASSESSMENT & PLAN NOTE
Cont Oxacillin for know MSSA/SBE  Temp spike last night only to 100.8  Blood cultures from 6/19 neg and repeat from 6/22 NGTD  Sputum culture from 6/20 neg but having thick yellow/tan sputum with OET suctioning, repeat sputum from 6/24 NGTD  Cont Cefepime and flagyl day #2 of 7 for possible asp PNA  CXR slightly improved infiltrates this AM

## 2022-06-25 NOTE — ASSESSMENT & PLAN NOTE
Suspect S/t illicit drug injection to PICC leading to brief cardiac arrest, unclear if anoxic injury present due to difficulty keeping patient calm with sedation weaning  CT head repeated 6/22 w/no acute findings  Managed to keep patient on min Fentanyl infusion (to avoid withdrawals) for 24 hours and no neuro improvement.  Has brainstem reflexes but will not awaken, follow commands, track with eyes or make any purposeful movements  Continue supportive care and neuro monitoring and discuss more with family today

## 2022-06-25 NOTE — SUBJECTIVE & OBJECTIVE
Review of Systems   Unable to perform ROS: Mental status change       Objective:     Vital Signs (Most Recent):  Temp: 98.5 °F (36.9 °C) (06/25/22 0715)  Pulse: 87 (06/25/22 0848)  Resp: (!) 31 (06/25/22 0848)  BP: (!) 140/73 (06/25/22 0730)  SpO2: 96 % (06/25/22 0848)   Vital Signs (24h Range):  Temp:  [98.3 °F (36.8 °C)-100.8 °F (38.2 °C)] 98.5 °F (36.9 °C)  Pulse:  [] 87  Resp:  [21-49] 31  SpO2:  [96 %-99 %] 96 %  BP: ()/() 140/73  Arterial Line BP: (111-204)/() 163/69     Weight: 88.5 kg (195 lb 1.7 oz)  Body mass index is 32.47 kg/m².      Intake/Output Summary (Last 24 hours) at 6/25/2022 0849  Last data filed at 6/25/2022 0700  Gross per 24 hour   Intake 3073.97 ml   Output 4220 ml   Net -1146.03 ml       Physical Exam  Vitals and nursing note reviewed.   Constitutional:       General: She is not in acute distress.     Appearance: She is obese. She is ill-appearing. She is not diaphoretic.      Interventions: She is sedated and intubated.   HENT:      Head: Normocephalic and atraumatic.      Mouth/Throat:      Mouth: Mucous membranes are moist.      Pharynx: Oropharynx is clear.   Eyes:      General: Lids are normal.      Pupils: Pupils are equal, round, and reactive to light.   Neck:      Vascular: No JVD.   Cardiovascular:      Rate and Rhythm: Normal rate and regular rhythm.      Pulses:           Radial pulses are 2+ on the right side and 2+ on the left side.        Dorsalis pedis pulses are 1+ on the right side and 1+ on the left side.   Pulmonary:      Effort: Tachypnea (intermittent with stimuli) and accessory muscle usage (intermittent) present. No respiratory distress. She is intubated.      Breath sounds: Rhonchi present.      Comments: Still copious thick yellow sputum suctioned from OET  Chest:       Abdominal:      General: Bowel sounds are normal. There is no distension.      Palpations: Abdomen is soft.      Tenderness: There is no abdominal tenderness.    Genitourinary:     Comments: Portillo in place  Musculoskeletal:      Cervical back: Neck supple.      Right lower leg: No edema.      Left lower leg: No edema.      Right foot: No deformity.      Left foot: No deformity.   Lymphadenopathy:      Cervical: No cervical adenopathy.   Skin:     General: Skin is warm and dry.      Capillary Refill: Capillary refill takes less than 2 seconds.      Findings: No rash.          Neurological:      Mental Status: She is lethargic.      Comments: Obtunded.  Opens eyes to painful stimuli but will not track. no noted spont or purposeful motor movement.  + carini, corneal and gag reflexs.  Posturing all 4 extremities to pain.  Will not wake up or respond on min sedation.  Exam done on min sedation Fentanyl infusion       Vents:  Vent Mode: Spont (06/25/22 0848)  Ventilator Initiated: Yes (06/19/22 1915)  Set Rate: 0 BPM (06/25/22 0848)  Vt Set: 380 mL (06/25/22 0848)  Pressure Support: 10 cmH20 (06/25/22 0848)  PEEP/CPAP: 5 cmH20 (06/25/22 0848)  Oxygen Concentration (%): 30 (06/25/22 0848)  Peak Airway Pressure: 17 cmH2O (06/25/22 0848)  Plateau Pressure: 22 cmH20 (06/25/22 0848)  Total Ve: 19 mL (06/25/22 0848)  F/VT Ratio<105 (RSBI): (!) 64.99 (06/25/22 0848)    Lines/Drains/Airways       Peripherally Inserted Central Catheter Line  Duration             PICC Double Lumen -- days              Drain  Duration                  NG/OG Tube 06/19/22 1916 Metcalfe sump 14 Fr. Center mouth 5 days         Urethral Catheter 06/19/22 2039 Latex 16 Fr. 5 days              Airway  Duration                  Airway - Non-Surgical 06/19/22 2306 5 days              Arterial Line  Duration             Arterial Line 06/23/22 0545 Left Radial 2 days              Peripheral Intravenous Line  Duration                  Peripheral IV - Single Lumen 06/19/22 1921 20 G Right Shoulder 5 days                    Significant Labs:    CBC/Anemia Profile:  Recent Labs   Lab 06/23/22 2004 06/24/22  0821  06/24/22 2049   WBC 18.68* 15.27* 23.31*   HGB 8.1* 7.4* 8.9*   HCT 26.2* 24.1* 29.0*    327 424   MCV 89 89 89   RDW 16.8* 17.0* 17.5*        Chemistries:  Recent Labs   Lab 06/23/22  2139 06/24/22  0351 06/25/22  0407   * 149* 149*   K 3.8 4.5 3.5   * 116* 114*   CO2 21* 24 20*   BUN 32* 32* 31*   CREATININE 1.0 0.9 0.8   CALCIUM 8.9 8.3* 9.5   MG 1.7 1.7 1.9   PHOS 3.3  --   --        ABGs:   Recent Labs   Lab 06/25/22  0410   PH 7.447   PCO2 38.0   HCO3 26.2   POCSATURATED 99   BE 2     All pertinent labs within the past 24 hours have been reviewed.    Significant Imaging:  I have reviewed all pertinent imaging results/findings within the past 24 hours.  CXR: I have reviewed all pertinent results/findings within the past 24 hours and my personal findings are:  Bilateral infiltrates appear slightly improved.

## 2022-06-25 NOTE — SUBJECTIVE & OBJECTIVE
Interval History: remains intubated, sedated on vent, on Versed/Fentanyl gtts. Sedation has been lowered. She 2 back to back cardiac arrests this 6/23 morning around 0500 hr with PEA/Asystole with ROSC attained. Not requiring pressor support. Mother was at bed side and was updated about her critical condition and poor prognosis and all questions answered. Mother requested DNR status at this time.  On continuous Oxacillin gtt for endocarditis.      Review of Systems   Unable to perform ROS: Intubated   Objective:     Vital Signs (Most Recent):  Temp: 100 °F (37.8 °C) (06/25/22 1130)  Pulse: 105 (06/25/22 1545)  Resp: (!) 27 (06/25/22 1545)  BP: (!) 172/83 (06/25/22 1500)  SpO2: 98 % (06/25/22 1545)   Vital Signs (24h Range):  Temp:  [98.3 °F (36.8 °C)-100 °F (37.8 °C)] 100 °F (37.8 °C)  Pulse:  [] 105  Resp:  [20-49] 27  SpO2:  [95 %-100 %] 98 %  BP: (139-241)/() 172/83  Arterial Line BP: (163-178)/(69-80) 163/69     Weight: 88.5 kg (195 lb 1.7 oz)  Body mass index is 32.47 kg/m².    Intake/Output Summary (Last 24 hours) at 6/25/2022 1557  Last data filed at 6/25/2022 1200  Gross per 24 hour   Intake 2398.55 ml   Output 3970 ml   Net -1571.45 ml        Physical Exam  Vitals and nursing note reviewed.   Constitutional:       General: She is not in acute distress.     Appearance: She is obese. She is ill-appearing. She is not diaphoretic.      Interventions: She is sedated and intubated.   HENT:      Mouth/Throat:      Mouth: Mucous membranes are moist.      Pharynx: Oropharynx is clear.   Eyes:      Pupils: Pupils are equal, round, and reactive to light.      Comments: Has downward left gaze bilaterally   Neck:      Vascular: No JVD.   Cardiovascular:      Rate and Rhythm: Regular rhythm. Tachycardia present.      Pulses:           Radial pulses are 2+ on the right side and 2+ on the left side.        Dorsalis pedis pulses are 1+ on the right side and 1+ on the left side.      Comments: Intermittent  bradycardia and tachycardia  Pulmonary:      Effort: No tachypnea, accessory muscle usage or respiratory distress. She is intubated.      Breath sounds: Normal breath sounds.   Chest:      Chest wall: No deformity.       Abdominal:      General: Bowel sounds are decreased. There is no distension.      Palpations: Abdomen is soft.      Tenderness: There is no abdominal tenderness.   Genitourinary:     Comments: Portillo in place  Musculoskeletal:      Cervical back: Neck supple.      Right lower le+ Pitting Edema present.      Left lower le+ Pitting Edema present.      Right foot: No deformity.      Left foot: No deformity.   Lymphadenopathy:      Cervical: No cervical adenopathy.   Skin:     General: Skin is warm and dry.      Capillary Refill: Capillary refill takes less than 2 seconds.          Neurological:      Comments: No focus or track when eyes open; no noted purposeful motor movement.  + carini and gag reflex.  Withdraws all 4 extremities to pain but weakly.  But still on sedation actively weaning slowly        Vent Mode: Spont  Oxygen Concentration (%):  [30-35] 30  Resp Rate Total:  [17 br/min-43 br/min] 27 br/min  Vt Set:  [380 mL] 380 mL  PEEP/CPAP:  [5 cmH20] 5 cmH20  Pressure Support:  [0 cmH20-10 cmH20] 10 cmH20  Mean Airway Pressure:  [7.7 cmH20-9.3 cmH20] 8 cmH20  Temp:  [98.3 °F (36.8 °C)-100 °F (37.8 °C)] 100 °F (37.8 °C)  Pulse:  [] 105  Resp:  [20-49] 27  SpO2:  [95 %-100 %] 98 %  BP: (139-241)/() 172/83  Arterial Line BP: (163-178)/(69-80) 163/69   Art pH/pCO2/pO2/HCO3:  7.447/38.0/135/26.2 ( 0410)  Lab Results   Component Value Date    [id number] Negative 2022    [id number] Positive (A) 2022    [id number] Negative 2022        Recent Labs   Lab 221 22  0512 22  1244 22  0407 22  0551 22  2139 22  0351 22  0853 22  2049 22  0407  06/25/22  0837   LACTATE 4.0*  --   --  1.6 0.7  --   --   --   --   --   --   --   --   --    TROPONINI  --   --  0.184*  --  0.126*  --   --   --   --   --   --   --   --   --    NA  --    < >  --   --  145   < > 152*  --  150* 149*  --   --  149*  --    WBC  --    < >  --   --  16.49*   < > 13.41*   < >  --   --  15.27* 23.31*  --  30.45*   GRAN  --    < >  --   --  76.0*  12.6*   < > 49.8  6.7   < >  --   --  72.6  11.1* 71.0  --  85.0*   LYMPH  --    < >  --   --  15.8*  2.6   < > 35.2  4.7   < >  --   --  15.2*  2.3 16.0*  --  9.0*   HGB  --    < >  --   --  6.6*   < > 7.0*   < >  --   --  7.4* 8.9*  --  9.5*   HCT  --    < >  --   --  21.1*   < > 22.4*   < >  --   --  24.1* 29.0*  --  30.5*   BUN  --    < >  --   --  22*   < > 32*  --  32* 32*  --   --  31*  --    CREATININE  --    < >  --   --  1.4   < > 1.1  --  1.0 0.9  --   --  0.8  --    ESTGFRAFRICA  --    < >  --   --  55*   < > >60  --  >60 >60  --   --  >60  --    EGFRNONAA  --    < >  --   --  47*   < > >60  --  >60 >60  --   --  >60  --    K  --    < >  --   --  3.6   < > 3.3*  --  3.8 4.5  --   --  3.5  --    CL  --    < >  --   --  110   < > 118*  --  116* 116*  --   --  114*  --    CO2  --    < >  --   --  21*   < > 23  --  21* 24  --   --  20*  --    PHOS  --   --   --   --   --   --  3.0  --  3.3  --   --   --   --   --    MG  --   --   --   --  1.6   < > 1.8  --  1.7 1.7  --   --  1.9  --     < > = values in this interval not displayed.       Microbiology Results (last 7 days)       Procedure Component Value Units Date/Time    Blood culture x two cultures. Draw prior to antibiotics. [[id number]] Collected: 06/19/22 2105    Order Status: Completed Specimen: Blood from Peripheral, Wrist, Right Updated: 06/25/22 0812     Blood Culture, Routine No growth after 5 days.    Narrative:      Aerobic and anaerobic    Blood culture x two cultures. Draw prior to antibiotics. [[id number]] Collected: 06/19/22 2030    Order Status: Completed Specimen:  Blood from Peripheral, Antecubital, Right Updated: 06/25/22 0812     Blood Culture, Routine No growth after 5 days.    Narrative:      Aerobic and anaerobic    Culture, Respiratory with Gram Stain [[id number]] Collected: 06/24/22 1355    Order Status: Completed Specimen: Respiratory from Endotracheal Aspirate Updated: 06/25/22 0722     Respiratory Culture No Growth     Gram Stain (Respiratory) Few WBC's     Gram Stain (Respiratory) Rare Gram positive cocci     Gram Stain (Respiratory) Rare Gram negative rods    Blood culture [[id number]] Collected: 06/22/22 1311    Order Status: Completed Specimen: Blood Updated: 06/24/22 2212     Blood Culture, Routine No Growth to date      No Growth to date      No Growth to date    Blood culture [[id number]] Collected: 06/22/22 1311    Order Status: Completed Specimen: Blood Updated: 06/24/22 2212     Blood Culture, Routine No Growth to date      No Growth to date      No Growth to date    Culture, Respiratory with Gram Stain [[id number]] Collected: 06/20/22 1350    Order Status: Completed Specimen: Respiratory from Endotracheal Aspirate Updated: 06/23/22 0734     Respiratory Culture Normal respiratory da      No S aureus or Pseudomonas isolated.     Gram Stain (Respiratory) <10 epithelial cells per low power field.     Gram Stain (Respiratory) Moderate WBC's     Gram Stain (Respiratory) No organisms seen          Antibiotics (From admission, onward)                Start     Stop Route Frequency Ordered    06/24/22 1045  metronidazole IVPB 500 mg         -- IV Every 8 hours (non-standard times) 06/24/22 0930    06/24/22 1030  cefepime in dextrose 5 % IVPB 2 g         -- IV Every 8 hours (non-standard times) 06/24/22 0930    06/20/22 0900  oxacillin 12 g in  mL CONTINUOUS INFUSION         07/11 2359 IV Every 24 hours (non-standard times) 06/20/22 0752          Anticoagulants       Ordered     Route Frequency Start Stop    06/19/22 2233  heparin (porcine)         SubQ Every 8  hours 06/19/22 2685 --          X-Ray Chest 1 View  Narrative: EXAMINATION:  XR CHEST 1 VIEW    CLINICAL HISTORY:  Pneumonia., PNA;    COMPARISON:  [id number]    FINDINGS:  Lines and tubes are unchanged.    Mild cardiomegaly.    Bilateral infiltrates appear slightly improved.  Impression: See above.    Electronically signed by: Diogenes Crisostomo MD  Date:    06/25/2022  Time:    08:42   Significant Labs: All pertinent labs within the past 24 hours have been reviewed.    Significant Imaging: I have reviewed all pertinent imaging results/findings within the past 24 hours.

## 2022-06-25 NOTE — ASSESSMENT & PLAN NOTE
S/t illicit IV drug injection and possible asp PNA  Vent settings reviewed adjusted for optimal gas exchange  Sedation for vent control  abg daily and prn to assess therapy  Daily SAT/SBT for extubation readiness once/if awakens and responsive  Oxygenation stable

## 2022-06-25 NOTE — PROGRESS NOTES
[facility] - Intensive Care ([facility])  Critical Care Medicine  Progress Note    Patient Name: Tianna Leon  MRN: [id number]  Admission Date: 6/19/2022  Hospital Length of Stay: 6 days  Code Status: DNR  Attending Provider: Columba Helms MD  Primary Care Provider: Spenser Campa MD   Principal Problem: Anoxic encephalopathy    Subjective:     HPI:  37 F w/pmh significant for IV drug abuse, MDD, bipolar disorder, anxiety, PTSD, suicidal ideation, COPD, ADHD, anxiety, asthma, cervical cancer, GERD, Hep C, HLD.   She was d/c from OSH on 6/19 following 35 day admission during which she was treated for MSSA tricuspid endocarditis and right empyema, s/p VATS/decortication. She was also COVID + during admission. On 6/10, she was d/c to [facility] and continued on oxacillin (to continue through 7/10/22) via PICC line.     Yesterday, she was found unresponsive in cardiac arrest at LTAC. She was resuscitated after 1 round of Epi and arrived intubated. Report given to ED was that patient injected an unknown substance into her PICC line at LTAC.   In ED, labs significant for lactate of 4.0, ABG pH 7.215, PCO2 47.8, PO2 75, HCO3 19.3, WBC 44.18, Hgb 8.4, Cr 1.8, AST 53, , troponin 0.184, tox positive for opiates and amphetamine.   CXR w/multifocal infiltrates, CT head w/no acute finding.   Given vancomycin and cefepime and placed on sedation for mechanical ventilation.       Hospital/ICU Course:  Admitted to ICU @ 2128.   6/20 - Seen and examined at start of shift. Remains intubated and sedated on mechanical vent. Failed brief SAT/SBT with tachycardia, tachypnea   6/21 - Remains intubated/sedated on mechanical vent with minimal oxygen demand. Continues failing SAT/SBT with tachypnea, tachycardia. Sedation changed to precedex    6/22 - Remains intubated/sedated on mechanical vent with minimal oxygen demand. Not appropriate for SAT/SBT today d/t agitation, tachypnea, arrhythmia on sedation despite Versed infusion added    NOTE - At approx 1015, staff informed that pt had a bradycardic/?PEA event lasting 3 sec during turning, spontaneously resolved to SR 60s  6/23 - Had another cardiac arrest this AM about 0500 hr with PEA/Asystole with ROSC attained.  Currently supine in bed and intubated on mech ventilation in no distress sedated on Fentanyl and Versed infusions not requiring pressor support.  Mother at bed side and update given and all questions answered.  Mother request DNR status at this time.    6/24 - supine intubated on mech ventilation obtunded without neuro improvement with sedation weaning.  No distress and tolerating TF and not requiring pressor support.  Patient develops hypertension, tachypnea and restlessness with any turning or suctioning once weaned down on sedation and requires resuming higher doses of sedation.  This is preventing sedation vacation for neuro assessments   6/25 - supine intubated on mech ventilation.  Still with periods of restlessness, tachycardia, hypertension and tachypnea last 24 hours increased episodes on min Fentanyl infusion for neuro assessments which patient remains obtunded and unresponsive.  Tolerating TF and frequent diarrhea.        Review of Systems   Unable to perform ROS: Mental status change       Objective:     Vital Signs (Most Recent):  Temp: 98.5 °F (36.9 °C) (06/25/22 0715)  Pulse: 87 (06/25/22 0848)  Resp: (!) 31 (06/25/22 0848)  BP: (!) 140/73 (06/25/22 0730)  SpO2: 96 % (06/25/22 0848)   Vital Signs (24h Range):  Temp:  [98.3 °F (36.8 °C)-100.8 °F (38.2 °C)] 98.5 °F (36.9 °C)  Pulse:  [] 87  Resp:  [21-49] 31  SpO2:  [96 %-99 %] 96 %  BP: ()/() 140/73  Arterial Line BP: (111-204)/() 163/69     Weight: 88.5 kg (195 lb 1.7 oz)  Body mass index is 32.47 kg/m².      Intake/Output Summary (Last 24 hours) at 6/25/2022 0849  Last data filed at 6/25/2022 0700  Gross per 24 hour   Intake 3073.97 ml   Output 4220 ml   Net -1146.03 ml       Physical  Exam  Vitals and nursing note reviewed.   Constitutional:       General: She is not in acute distress.     Appearance: She is obese. She is ill-appearing. She is not diaphoretic.      Interventions: She is sedated and intubated.   HENT:      Head: Normocephalic and atraumatic.      Mouth/Throat:      Mouth: Mucous membranes are moist.      Pharynx: Oropharynx is clear.   Eyes:      General: Lids are normal.      Pupils: Pupils are equal, round, and reactive to light.   Neck:      Vascular: No JVD.   Cardiovascular:      Rate and Rhythm: Normal rate and regular rhythm.      Pulses:           Radial pulses are 2+ on the right side and 2+ on the left side.        Dorsalis pedis pulses are 1+ on the right side and 1+ on the left side.   Pulmonary:      Effort: Tachypnea (intermittent with stimuli) and accessory muscle usage (intermittent) present. No respiratory distress. She is intubated.      Breath sounds: Rhonchi present.      Comments: Still copious thick yellow sputum suctioned from OET  Chest:       Abdominal:      General: Bowel sounds are normal. There is no distension.      Palpations: Abdomen is soft.      Tenderness: There is no abdominal tenderness.   Genitourinary:     Comments: Portillo in place  Musculoskeletal:      Cervical back: Neck supple.      Right lower leg: No edema.      Left lower leg: No edema.      Right foot: No deformity.      Left foot: No deformity.   Lymphadenopathy:      Cervical: No cervical adenopathy.   Skin:     General: Skin is warm and dry.      Capillary Refill: Capillary refill takes less than 2 seconds.      Findings: No rash.          Neurological:      Mental Status: She is lethargic.      Comments: Obtunded.  Opens eyes to painful stimuli but will not track. no noted spont or purposeful motor movement.  + carini, corneal and gag reflexs.  Posturing all 4 extremities to pain.  Will not wake up or respond on min sedation.  Exam done on min sedation Fentanyl infusion        Vents:  Vent Mode: Spont (06/25/22 0848)  Ventilator Initiated: Yes (06/19/22 1915)  Set Rate: 0 BPM (06/25/22 0848)  Vt Set: 380 mL (06/25/22 0848)  Pressure Support: 10 cmH20 (06/25/22 0848)  PEEP/CPAP: 5 cmH20 (06/25/22 0848)  Oxygen Concentration (%): 30 (06/25/22 0848)  Peak Airway Pressure: 17 cmH2O (06/25/22 0848)  Plateau Pressure: 22 cmH20 (06/25/22 0848)  Total Ve: 19 mL (06/25/22 0848)  F/VT Ratio<105 (RSBI): (!) 64.99 (06/25/22 0848)    Lines/Drains/Airways       Peripherally Inserted Central Catheter Line  Duration             PICC Double Lumen -- days              Drain  Duration                  NG/OG Tube 06/19/22 1916 [place] sump 14 Fr. Center mouth 5 days         Urethral Catheter 06/19/22 2039 Latex 16 Fr. 5 days              Airway  Duration                  Airway - Non-Surgical 06/19/22 2306 5 days              Arterial Line  Duration             Arterial Line 06/23/22 0545 Left Radial 2 days              Peripheral Intravenous Line  Duration                  Peripheral IV - Single Lumen 06/19/22 1921 20 G Right Shoulder 5 days                    Significant Labs:    CBC/Anemia Profile:  Recent Labs   Lab 06/23/22 2004 06/24/22  0853 06/24/22 2049   WBC 18.68* 15.27* 23.31*   HGB 8.1* 7.4* 8.9*   HCT 26.2* 24.1* 29.0*    327 424   MCV 89 89 89   RDW 16.8* 17.0* 17.5*        Chemistries:  Recent Labs   Lab 06/23/22  2139 06/24/22  0351 06/25/22  0407   * 149* 149*   K 3.8 4.5 3.5   * 116* 114*   CO2 21* 24 20*   BUN 32* 32* 31*   CREATININE 1.0 0.9 0.8   CALCIUM 8.9 8.3* 9.5   MG 1.7 1.7 1.9   PHOS 3.3  --   --        ABGs:   Recent Labs   Lab 06/25/22  0410   PH 7.447   PCO2 38.0   HCO3 26.2   POCSATURATED 99   BE 2     All pertinent labs within the past 24 hours have been reviewed.    Significant Imaging:  I have reviewed all pertinent imaging results/findings within the past 24 hours.  CXR: I have reviewed all pertinent results/findings within the past 24 hours and  my personal findings are:  Bilateral infiltrates appear slightly improved.      ABG  Recent Labs   Lab 06/25/22  0410   PH 7.447   PO2 135*   PCO2 38.0   HCO3 26.2   BE 2     Assessment/Plan:     Neuro  * Anoxic encephalopathy  Suspect S/t illicit drug injection to PICC leading to brief cardiac arrest, unclear if anoxic injury present due to difficulty keeping patient calm with sedation weaning  CT head repeated 6/22 w/no acute findings  Managed to keep patient on min Fentanyl infusion (to avoid withdrawals) for 24 hours and no neuro improvement.  Has brainstem reflexes but will not awaken, follow commands, track with eyes or make any purposeful movements  Continue supportive care and neuro monitoring and discuss more with family today    Psychiatric  Borderline personality disorder  Holding psych meds for neuro assessment off sedation    IVDU (intravenous drug user) / Drug addiction  IV drug abuse continues in LTAC; direct etiology of cardiac arrest  Continue to encourage cessation efforts; grim survival prognosis with continued IV drug use given current health problems    Pulmonary  On mechanically assisted ventilation  See full plan under resp failure  VAP prophylaxis  Daily SAT/SBT for extubation readiness     COPD (chronic obstructive pulmonary disease)  Duo-neb q 4 prn  No exacerbation    S/P VATS on 6/6/2022 secondary to Empyema  POD #19  Old CT sutures removed 6/21 per CTS recs  Follow intermittent CXR    Acute respiratory failure with hypoxia and hypercapnia on Vent s/p Cardiac Arrest  S/t illicit IV drug injection and possible asp PNA  Vent settings reviewed adjusted for optimal gas exchange  Sedation for vent control  abg daily and prn to assess therapy  Daily SAT/SBT for extubation readiness once/if awakens and responsive  Oxygenation stable    Cardiac/Vascular  Endocarditis of tricuspid valve  MSSA bacteremia treatment in place from prior admit  ID consult; discharge plan was for oxacillin continuous to  complete on 7/11/2022  Continue Oxacillin    Cardiac arrest x 2  S/t illicit IV injection on admit, but unclear etiology for repeat arrest in house 6/23 AM  Likely contributing to encephalopathy   ICU cardiac and hemodynamic monitoring  Replace electrolytes as needed  No pressor support needed.     Renal/  Electrolyte imbalance  Replace K+  Has PRN Electrolyte replacement protocol ordered    ID  MSSA bacteremia  On previous admission s/t IVDU  Continue scheudled Oxacillin scheuled until 7/11/22  Blood cultures on 6/19 neg and repeat 6/22 NGTD    Severe sepsis  Cont Oxacillin for know MSSA/SBE  Temp spike last night only to 100.8  Blood cultures from 6/19 neg and repeat from 6/22 NGTD  Sputum culture from 6/20 neg but having thick yellow/tan sputum with OET suctioning, repeat sputum from 6/24 NGTD  Cont Cefepime and flagyl day #2 of 7 for possible asp PNA  CXR slightly improved infiltrates this AM      Hematology  Pulmonary embolism, septic  Noted on prior admit  Continue oxacillin as above    Oncology  Normocytic anemia  Transfused one unit PRBC 6/20  AM CBC still pending  Monitor w/daily cbc  Conservative transfusion protocol          Preventive Measures and Monitoring:   Stress Ulcer: PPI  Nutrition: TF  Glucose control: stable  Bowel prophylaxis: PRN Dulcolax  DVT prophylaxis: SQ Hep/SCDs  Hx CAD on B-Blocker: no hx CAD  Head of Bed/Reposition: Elevate HOB and turn Q1-2 hours   Early Mobility: bed rest  SAT/SBT: once awake   RASS goal: -1  Vent Day: #7  OG Day: #7  Central Line Day: PICC already in place on admit  Left radial arterial line Day: #3  Portillo Day: #7  IVAB Day: Oxacillin per ID  Code Status: DNR     Counseling/Consultation:I have discussed the care of this patient in detail with the bedside nursing staff and Dr. San and Dr. Helms.       Critical Care Time: 65 minutes  Critical secondary to Patient has a condition that poses threat to life and bodily function: Cardiac Arrest X 2 and acute resp  failure intubated on [facility] ventilation  Patient has an abrupt change in neurologic status: Acute Encephalopathy  Patient is currently receiving parenteral controlled substances:  Fentanyl infusion     Critical care was time spent personally by me on the following activities: development of treatment plan with patient or surrogate and bedside caregivers, discussions with consultants, evaluation of patient's response to treatment, examination of patient, ordering and performing treatments and interventions, ordering and review of laboratory studies, ordering and review of radiographic studies, pulse oximetry, re-evaluation of patient's condition. This critical care time did not overlap with that of any other provider or involve time for any procedures.     Sunday Nogueira NP  Critical Care Medicine  Critical access hospital - Intensive Care ([facility])

## 2022-06-26 VITALS
WEIGHT: 195.13 LBS | TEMPERATURE: 100 F | OXYGEN SATURATION: 92 % | RESPIRATION RATE: 17 BRPM | DIASTOLIC BLOOD PRESSURE: 98 MMHG | SYSTOLIC BLOOD PRESSURE: 205 MMHG | BODY MASS INDEX: 32.51 KG/M2 | HEART RATE: 98 BPM | HEIGHT: 65 IN

## 2022-06-26 LAB
ALLENS TEST: ABNORMAL
ANION GAP SERPL CALC-SCNC: 12 MMOL/L (ref 8–16)
BUN SERPL-MCNC: 34 MG/DL (ref 6–20)
CALCIUM SERPL-MCNC: 8.9 MG/DL (ref 8.7–10.5)
CHLORIDE SERPL-SCNC: 117 MMOL/L (ref 95–110)
CO2 SERPL-SCNC: 23 MMOL/L (ref 23–29)
CREAT SERPL-MCNC: 0.8 MG/DL (ref 0.5–1.4)
DELSYS: ABNORMAL
EST. GFR  (AFRICAN AMERICAN): >60 ML/MIN/1.73 M^2
EST. GFR  (NON AFRICAN AMERICAN): >60 ML/MIN/1.73 M^2
FIO2: 30
GLUCOSE SERPL-MCNC: 117 MG/DL (ref 70–110)
HCO3 UR-SCNC: 28.1 MMOL/L (ref 24–28)
MAGNESIUM SERPL-MCNC: 2 MG/DL (ref 1.6–2.6)
MODE: ABNORMAL
PCO2 BLDA: 42.9 MMHG (ref 35–45)
PEEP: 5
PH SMN: 7.42 [PH] (ref 7.35–7.45)
PO2 BLDA: 96 MMHG (ref 80–100)
POC BE: 4 MMOL/L
POC SATURATED O2: 98 % (ref 95–100)
POTASSIUM SERPL-SCNC: 3.4 MMOL/L (ref 3.5–5.1)
PS: 10
SAMPLE: ABNORMAL
SITE: ABNORMAL
SODIUM SERPL-SCNC: 152 MMOL/L (ref 136–145)

## 2022-06-26 PROCEDURE — 82803 BLOOD GASES ANY COMBINATION: CPT

## 2022-06-26 PROCEDURE — 25000003 PHARM REV CODE 250: Performed by: NURSE PRACTITIONER

## 2022-06-26 PROCEDURE — 99900035 HC TECH TIME PER 15 MIN (STAT)

## 2022-06-26 PROCEDURE — 63600175 PHARM REV CODE 636 W HCPCS: Performed by: SURGERY

## 2022-06-26 PROCEDURE — 94003 VENT MGMT INPAT SUBQ DAY: CPT

## 2022-06-26 PROCEDURE — 99291 PR CRITICAL CARE, E/M 30-74 MINUTES: ICD-10-PCS | Mod: ,,, | Performed by: NURSE PRACTITIONER

## 2022-06-26 PROCEDURE — 83735 ASSAY OF MAGNESIUM: CPT | Performed by: NURSE PRACTITIONER

## 2022-06-26 PROCEDURE — 25000003 PHARM REV CODE 250: Performed by: SURGERY

## 2022-06-26 PROCEDURE — 37799 UNLISTED PX VASCULAR SURGERY: CPT

## 2022-06-26 PROCEDURE — S0030 INJECTION, METRONIDAZOLE: HCPCS | Performed by: NURSE PRACTITIONER

## 2022-06-26 PROCEDURE — 99900026 HC AIRWAY MAINTENANCE (STAT)

## 2022-06-26 PROCEDURE — 27000221 HC OXYGEN, UP TO 24 HOURS

## 2022-06-26 PROCEDURE — 80048 BASIC METABOLIC PNL TOTAL CA: CPT | Performed by: NURSE PRACTITIONER

## 2022-06-26 PROCEDURE — 99291 CRITICAL CARE FIRST HOUR: CPT | Mod: ,,, | Performed by: NURSE PRACTITIONER

## 2022-06-26 PROCEDURE — 63600175 PHARM REV CODE 636 W HCPCS: Performed by: NURSE PRACTITIONER

## 2022-06-26 PROCEDURE — 63600175 PHARM REV CODE 636 W HCPCS: Performed by: INTERNAL MEDICINE

## 2022-06-26 PROCEDURE — 25000003 PHARM REV CODE 250: Performed by: INTERNAL MEDICINE

## 2022-06-26 RX ORDER — MIDAZOLAM HYDROCHLORIDE 1 MG/ML
4 INJECTION INTRAMUSCULAR; INTRAVENOUS
Status: DISCONTINUED | OUTPATIENT
Start: 2022-06-26 | End: 2022-06-26 | Stop reason: HOSPADM

## 2022-06-26 RX ORDER — HYDROMORPHONE HYDROCHLORIDE 1 MG/ML
2 INJECTION, SOLUTION INTRAMUSCULAR; INTRAVENOUS; SUBCUTANEOUS
Status: DISCONTINUED | OUTPATIENT
Start: 2022-06-26 | End: 2022-06-26 | Stop reason: HOSPADM

## 2022-06-26 RX ADMIN — HYDROMORPHONE HYDROCHLORIDE 2 MG: 1 INJECTION, SOLUTION INTRAMUSCULAR; INTRAVENOUS; SUBCUTANEOUS at 06:06

## 2022-06-26 RX ADMIN — HYDROMORPHONE HYDROCHLORIDE 2 MG: 1 INJECTION, SOLUTION INTRAMUSCULAR; INTRAVENOUS; SUBCUTANEOUS at 11:06

## 2022-06-26 RX ADMIN — GLYCOPYRROLATE 0.2 MG: 0.2 INJECTION, SOLUTION INTRAMUSCULAR; INTRAVITREAL at 11:06

## 2022-06-26 RX ADMIN — HYDROMORPHONE HYDROCHLORIDE 2 MG: 1 INJECTION, SOLUTION INTRAMUSCULAR; INTRAVENOUS; SUBCUTANEOUS at 01:06

## 2022-06-26 RX ADMIN — PANTOPRAZOLE SODIUM 40 MG: 40 GRANULE, DELAYED RELEASE ORAL at 09:06

## 2022-06-26 RX ADMIN — HEPARIN SODIUM 5000 UNITS: 5000 INJECTION INTRAVENOUS; SUBCUTANEOUS at 05:06

## 2022-06-26 RX ADMIN — HYDROMORPHONE HYDROCHLORIDE 2 MG: 1 INJECTION, SOLUTION INTRAMUSCULAR; INTRAVENOUS; SUBCUTANEOUS at 08:06

## 2022-06-26 RX ADMIN — Medication 200 MCG/HR: at 09:06

## 2022-06-26 RX ADMIN — CHLORHEXIDINE GLUCONATE 0.12% ORAL RINSE 15 ML: 1.2 LIQUID ORAL at 09:06

## 2022-06-26 RX ADMIN — HYDROMORPHONE HYDROCHLORIDE 2 MG: 1 INJECTION, SOLUTION INTRAMUSCULAR; INTRAVENOUS; SUBCUTANEOUS at 03:06

## 2022-06-26 RX ADMIN — CEFEPIME HYDROCHLORIDE 2 G: 2 INJECTION, SOLUTION INTRAVENOUS at 02:06

## 2022-06-26 RX ADMIN — MIDAZOLAM 2 MG: 1 INJECTION INTRAMUSCULAR; INTRAVENOUS at 08:06

## 2022-06-26 RX ADMIN — POTASSIUM BICARBONATE 35 MEQ: 391 TABLET, EFFERVESCENT ORAL at 06:06

## 2022-06-26 RX ADMIN — METRONIDAZOLE 500 MG: 500 INJECTION, SOLUTION INTRAVENOUS at 09:06

## 2022-06-26 RX ADMIN — MIDAZOLAM 2 MG: 1 INJECTION INTRAMUSCULAR; INTRAVENOUS at 03:06

## 2022-06-26 RX ADMIN — METRONIDAZOLE 500 MG: 500 INJECTION, SOLUTION INTRAVENOUS at 02:06

## 2022-06-26 RX ADMIN — MIDAZOLAM 2 MG: 1 INJECTION INTRAMUSCULAR; INTRAVENOUS at 10:06

## 2022-06-26 RX ADMIN — OXACILLIN SODIUM 12 G: 10 INJECTION, POWDER, FOR SOLUTION INTRAVENOUS at 08:06

## 2022-06-26 NOTE — PLAN OF CARE
Transitioned to comfort care. [facility] Report given to RENETTA Ferguson. Awaiting transport.

## 2022-06-26 NOTE — CONSULTS
CM met with pt's Mother (Karyn) and family at bedside.  Family prefers inpt hospice referral to be faxed to [facility].  [facility] spoke with [facility], states will review referral.  [facility] she can have a hospice rep at bedside around 1530.    UPDATE 1545: [facility], EDY at bedside.  Edwinr to communicate d/c transition/transport to staff nurse.

## 2022-06-26 NOTE — SUBJECTIVE & OBJECTIVE
Review of Systems   Unable to perform ROS: Mental status change       Objective:     Vital Signs (Most Recent):  Temp: 99.5 °F (37.5 °C) (06/26/22 0715)  Pulse: (!) 154 (06/26/22 0800)  Resp: (!) 45 (06/26/22 0800)  BP: (!) 165/80 (06/26/22 0700)  SpO2: 98 % (06/26/22 0800)   Vital Signs (24h Range):  Temp:  [98.5 °F (36.9 °C)-100 °F (37.8 °C)] 99.5 °F (37.5 °C)  Pulse:  [] 154  Resp:  [15-45] 45  SpO2:  [96 %-100 %] 98 %  BP: (145-241)/() 165/80     Weight: 88.5 kg (195 lb 1.7 oz)  Body mass index is 32.47 kg/m².      Intake/Output Summary (Last 24 hours) at 6/26/2022 0905  Last data filed at 6/26/2022 0600  Gross per 24 hour   Intake 2937.72 ml   Output 2295 ml   Net 642.72 ml       Physical Exam  Vitals and nursing note reviewed.   Constitutional:       General: She is not in acute distress.     Appearance: She is obese. She is ill-appearing. She is not diaphoretic.      Interventions: She is sedated and intubated.   HENT:      Head: Normocephalic and atraumatic.      Mouth/Throat:      Mouth: Mucous membranes are moist.      Pharynx: Oropharynx is clear.   Eyes:      General: Lids are normal.      Pupils: Pupils are equal, round, and reactive to light.   Neck:      Vascular: No JVD.   Cardiovascular:      Rate and Rhythm: Normal rate and regular rhythm.      Pulses:           Radial pulses are 2+ on the right side and 2+ on the left side.        Dorsalis pedis pulses are 1+ on the right side and 1+ on the left side.      Comments: HR varies from 80s to as high as 150s but ST and not sustained.  Pulmonary:      Effort: Tachypnea (intermittent with stimuli) and accessory muscle usage (intermittent) present. No respiratory distress. She is intubated.      Breath sounds: Examination of the right-lower field reveals decreased breath sounds. Examination of the left-lower field reveals decreased breath sounds. Decreased breath sounds present.      Comments: Decreased sputum production with OET  suctioning  Chest:       Abdominal:      General: Bowel sounds are normal. There is no distension.      Palpations: Abdomen is soft.      Tenderness: There is no abdominal tenderness.   Genitourinary:     Comments: Portillo in place  Musculoskeletal:      Cervical back: Neck supple.      Right lower leg: Edema (trace) present.      Left lower leg: Edema (trace) present.      Right foot: No deformity.      Left foot: No deformity.   Lymphadenopathy:      Cervical: No cervical adenopathy.   Skin:     General: Skin is warm and dry.      Capillary Refill: Capillary refill takes less than 2 seconds.      Findings: No rash.          Neurological:      Mental Status: She is lethargic.      Comments: Obtunded.  Opens eyes to painful stimuli but will not track. no noted spont or purposeful motor movement.  + carini, corneal and gag reflexs.  Posturing all 4 extremities to pain.  Will not wake up or respond on min sedation.  Exam done on min sedation Fentanyl infusion       Vents:  Vent Mode: Spont (06/26/22 0749)  Ventilator Initiated: Yes (06/19/22 1915)  Set Rate: 0 BPM (06/26/22 0749)  Vt Set: 380 mL (06/26/22 0749)  Pressure Support: 10 cmH20 (06/26/22 0749)  PEEP/CPAP: 5 cmH20 (06/26/22 0749)  Oxygen Concentration (%): 30 (06/26/22 0800)  Peak Airway Pressure: 16 cmH2O (06/26/22 0749)  Plateau Pressure: 22 cmH20 (06/26/22 0749)  Total Ve: 7.19 mL (06/26/22 0749)  F/VT Ratio<105 (RSBI): (!) 52.27 (06/26/22 0749)    Lines/Drains/Airways       Peripherally Inserted Central Catheter Line  Duration             PICC Double Lumen -- days              Drain  Duration                  NG/OG Tube 06/19/22 1916 [place] sump 14 Fr. Center mouth 6 days         Urethral Catheter 06/19/22 2039 Latex 16 Fr. 6 days              Airway  Duration                  Airway - Non-Surgical 06/19/22 2306 6 days              Arterial Line  Duration             Arterial Line 06/23/22 0545 Left Radial 3 days              Peripheral Intravenous Line   Duration                  Peripheral IV - Single Lumen 06/19/22 1921 20 G Right Shoulder 6 days                    Significant Labs:    CBC/Anemia Profile:  Recent Labs   Lab 06/24/22  2049 06/25/22  0837 06/25/22  1934   WBC 23.31* 30.45* 23.83*   HGB 8.9* 9.5* 8.7*   HCT 29.0* 30.5* 28.5*    512* 451*   MCV 89 89 89   RDW 17.5* 18.3* 18.6*        Chemistries:  Recent Labs   Lab 06/25/22  0407 06/26/22  0426   * 152*   K 3.5 3.4*   * 117*   CO2 20* 23   BUN 31* 34*   CREATININE 0.8 0.8   CALCIUM 9.5 8.9   MG 1.9 2.0       ABGs:   Recent Labs   Lab 06/26/22  0453   PH 7.425   PCO2 42.9   HCO3 28.1*   POCSATURATED 98   BE 4     All pertinent labs within the past 24 hours have been reviewed.

## 2022-06-26 NOTE — ASSESSMENT & PLAN NOTE
S/t illicit IV drug injection and possible asp PNA  Vent settings reviewed adjusted for optimal gas exchange  Vent day #8  Family declines long term support with Trach/PEG/NHP and plans palliative vent withdrawal with comfort care today

## 2022-06-26 NOTE — PROGRESS NOTES
Mother, grandmother, daughter and Aunts X 2 at bed side and discussed patient's critical state and obtunded neuro state with no neuro improvement on Day # 8 hospitalization post arrest and on mech ventilation.  Again offered Trach/PEG/NHP but Family states patient had Living Will made in 2016 and declared to family as well she would not want long term supportive devices such as trach/peg/NHP.  All family present wish to proceed with palliative vent withdrawal and comfort care and all family questions answered.     LISANDRO Nogueira [facility]

## 2022-06-26 NOTE — PROGRESS NOTES
[facility] - Intensive Care ([facility])  Critical Care Medicine  Progress Note    Patient Name: Tianna Leon  MRN: [id number]  Admission Date: 6/19/2022  Hospital Length of Stay: 7 days  Code Status: DNR  Attending Provider: Columba Helms MD  Primary Care Provider: Spenser Campa MD   Principal Problem: Anoxic encephalopathy    Subjective:     HPI:  37 F w/pmh significant for IV drug abuse, MDD, bipolar disorder, anxiety, PTSD, suicidal ideation, COPD, ADHD, anxiety, asthma, cervical cancer, GERD, Hep C, HLD.   She was d/c from OSH on 6/19 following 35 day admission during which she was treated for MSSA tricuspid endocarditis and right empyema, s/p VATS/decortication. She was also COVID + during admission. On 6/10, she was d/c to [facility] and continued on oxacillin (to continue through 7/10/22) via PICC line.     Yesterday, she was found unresponsive in cardiac arrest at LTAC. She was resuscitated after 1 round of Epi and arrived intubated. Report given to ED was that patient injected an unknown substance into her PICC line at LTAC.   In ED, labs significant for lactate of 4.0, ABG pH 7.215, PCO2 47.8, PO2 75, HCO3 19.3, WBC 44.18, Hgb 8.4, Cr 1.8, AST 53, , troponin 0.184, tox positive for opiates and amphetamine.   CXR w/multifocal infiltrates, CT head w/no acute finding.   Given vancomycin and cefepime and placed on sedation for mechanical ventilation.       Hospital/ICU Course:  Admitted to ICU @ 2128.   6/20 - Seen and examined at start of shift. Remains intubated and sedated on mechanical vent. Failed brief SAT/SBT with tachycardia, tachypnea   6/21 - Remains intubated/sedated on mechanical vent with minimal oxygen demand. Continues failing SAT/SBT with tachypnea, tachycardia. Sedation changed to precedex    6/22 - Remains intubated/sedated on mechanical vent with minimal oxygen demand. Not appropriate for SAT/SBT today d/t agitation, tachypnea, arrhythmia on sedation despite Versed infusion added    NOTE - At approx 1015, staff informed that pt had a bradycardic/?PEA event lasting 3 sec during turning, spontaneously resolved to SR 60s  6/23 - Had another cardiac arrest this AM about 0500 hr with PEA/Asystole with ROSC attained.  Currently supine in bed and intubated on mech ventilation in no distress sedated on Fentanyl and Versed infusions not requiring pressor support.  Mother at bed side and update given and all questions answered.  Mother request DNR status at this time.    6/24 - supine intubated on mech ventilation obtunded without neuro improvement with sedation weaning.  No distress and tolerating TF and not requiring pressor support.  Patient develops hypertension, tachypnea and restlessness with any turning or suctioning once weaned down on sedation and requires resuming higher doses of sedation.  This is preventing sedation vacation for neuro assessments   6/25 - supine intubated on mech ventilation.  Still with periods of restlessness, tachycardia, hypertension and tachypnea last 24 hours increased episodes on min Fentanyl infusion for neuro assessments which patient remains obtunded and unresponsive.  Tolerating TF and frequent diarrhea.    6/26 - Supine in bed still intubated on vent and unresponsive.  Met with mom, daughter and aunt yesterday and discussed current critical state and no neuro improvement now day #8 of hospitalization post cardiac arrest.  Family elects comfort care with palliative extubation today.  Cont Fentanyl infusion for now.      Review of Systems   Unable to perform ROS: Mental status change       Objective:     Vital Signs (Most Recent):  Temp: 99.5 °F (37.5 °C) (06/26/22 0715)  Pulse: (!) 154 (06/26/22 0800)  Resp: (!) 45 (06/26/22 0800)  BP: (!) 165/80 (06/26/22 0700)  SpO2: 98 % (06/26/22 0800)   Vital Signs (24h Range):  Temp:  [98.5 °F (36.9 °C)-100 °F (37.8 °C)] 99.5 °F (37.5 °C)  Pulse:  [] 154  Resp:  [15-45] 45  SpO2:  [96 %-100 %] 98 %  BP:  (145-241)/() 165/80     Weight: 88.5 kg (195 lb 1.7 oz)  Body mass index is 32.47 kg/m².      Intake/Output Summary (Last 24 hours) at 6/26/2022 0905  Last data filed at 6/26/2022 0600  Gross per 24 hour   Intake 2937.72 ml   Output 2295 ml   Net 642.72 ml       Physical Exam  Vitals and nursing note reviewed.   Constitutional:       General: She is not in acute distress.     Appearance: She is obese. She is ill-appearing. She is not diaphoretic.      Interventions: She is sedated and intubated.   HENT:      Head: Normocephalic and atraumatic.      Mouth/Throat:      Mouth: Mucous membranes are moist.      Pharynx: Oropharynx is clear.   Eyes:      General: Lids are normal.      Pupils: Pupils are equal, round, and reactive to light.   Neck:      Vascular: No JVD.   Cardiovascular:      Rate and Rhythm: Normal rate and regular rhythm.      Pulses:           Radial pulses are 2+ on the right side and 2+ on the left side.        Dorsalis pedis pulses are 1+ on the right side and 1+ on the left side.      Comments: HR varies from 80s to as high as 150s but ST and not sustained.  Pulmonary:      Effort: Tachypnea (intermittent with stimuli) and accessory muscle usage (intermittent) present. No respiratory distress. She is intubated.      Breath sounds: Examination of the right-lower field reveals decreased breath sounds. Examination of the left-lower field reveals decreased breath sounds. Decreased breath sounds present.      Comments: Decreased sputum production with OET suctioning  Chest:       Abdominal:      General: Bowel sounds are normal. There is no distension.      Palpations: Abdomen is soft.      Tenderness: There is no abdominal tenderness.   Genitourinary:     Comments: Portillo in place  Musculoskeletal:      Cervical back: Neck supple.      Right lower leg: Edema (trace) present.      Left lower leg: Edema (trace) present.      Right foot: No deformity.      Left foot: No deformity.   Lymphadenopathy:       Cervical: No cervical adenopathy.   Skin:     General: Skin is warm and dry.      Capillary Refill: Capillary refill takes less than 2 seconds.      Findings: No rash.          Neurological:      Mental Status: She is lethargic.      Comments: Obtunded.  Opens eyes to painful stimuli but will not track. no noted spont or purposeful motor movement.  + carini, corneal and gag reflexs.  Posturing all 4 extremities to pain.  Will not wake up or respond on min sedation.  Exam done on min sedation Fentanyl infusion       Vents:  Vent Mode: Spont (06/26/22 0749)  Ventilator Initiated: Yes (06/19/22 1915)  Set Rate: 0 BPM (06/26/22 0749)  Vt Set: 380 mL (06/26/22 0749)  Pressure Support: 10 cmH20 (06/26/22 0749)  PEEP/CPAP: 5 cmH20 (06/26/22 0749)  Oxygen Concentration (%): 30 (06/26/22 0800)  Peak Airway Pressure: 16 cmH2O (06/26/22 0749)  Plateau Pressure: 22 cmH20 (06/26/22 0749)  Total Ve: 7.19 mL (06/26/22 0749)  F/VT Ratio<105 (RSBI): (!) 52.27 (06/26/22 0749)    Lines/Drains/Airways       Peripherally Inserted Central Catheter Line  Duration             PICC Double Lumen -- days              Drain  Duration                  NG/OG Tube 06/19/22 1916 Spraggs sump 14 Fr. Center mouth 6 days         Urethral Catheter 06/19/22 2039 Latex 16 Fr. 6 days              Airway  Duration                  Airway - Non-Surgical 06/19/22 2306 6 days              Arterial Line  Duration             Arterial Line 06/23/22 0545 Left Radial 3 days              Peripheral Intravenous Line  Duration                  Peripheral IV - Single Lumen 06/19/22 1921 20 G Right Shoulder 6 days                    Significant Labs:    CBC/Anemia Profile:  Recent Labs   Lab 06/24/22 2049 06/25/22  0837 06/25/22  1934   WBC 23.31* 30.45* 23.83*   HGB 8.9* 9.5* 8.7*   HCT 29.0* 30.5* 28.5*    512* 451*   MCV 89 89 89   RDW 17.5* 18.3* 18.6*        Chemistries:  Recent Labs   Lab 06/25/22  0407 06/26/22  0426   * 152*   K 3.5 3.4*   CL  114* 117*   CO2 20* 23   BUN 31* 34*   CREATININE 0.8 0.8   CALCIUM 9.5 8.9   MG 1.9 2.0       ABGs:   Recent Labs   Lab 06/26/22  0453   PH 7.425   PCO2 42.9   HCO3 28.1*   POCSATURATED 98   BE 4     All pertinent labs within the past 24 hours have been reviewed.        ABG  Recent Labs   Lab 06/26/22 0453   PH 7.425   PO2 96   PCO2 42.9   HCO3 28.1*   BE 4     Assessment/Plan:     Neuro  * Anoxic encephalopathy  Suspect S/t illicit drug injection to PICC leading to brief cardiac arrest, unclear if anoxic injury present due to difficulty keeping patient calm with sedation weaning  CT head repeated 6/22 w/no acute findings  Managed to keep patient on min Fentanyl infusion (to avoid withdrawals) for 24 hours and no neuro improvement.  Has brainstem reflexes but will not awaken, follow commands, track with eyes or make any purposeful movements now Day #8 of hospitalization post cardiac arrest/admit  Family planning palliative vent withdrawal and comfort care today    Psychiatric  Borderline personality disorder  Holding psych meds for neuro assessments    IVDU (intravenous drug user) / Drug addiction  IV drug abuse continues in LTAC; direct etiology of cardiac arrest  Continue to encourage cessation efforts; grim survival prognosis with continued IV drug use given current health problems    Pulmonary  On mechanically assisted ventilation  See full plan under resp failure  VAP prophylaxis  Daily SAT/SBT for extubation readiness     COPD (chronic obstructive pulmonary disease)  Duo-neb q 4 prn  No exacerbation    S/P VATS on 6/6/2022 secondary to Empyema  POD #20  Old CT sutures removed 6/21 per CTS recs  Follow intermittent CXR    Acute respiratory failure with hypoxia and hypercapnia on Vent s/p Cardiac Arrest  S/t illicit IV drug injection and possible asp PNA  Vent settings reviewed adjusted for optimal gas exchange  Vent day #8  Family declines long term support with Trach/PEG/NHP and plans palliative vent  withdrawal with comfort care today    Cardiac/Vascular  Endocarditis of tricuspid valve  MSSA bacteremia treatment in place from prior admit  ID consult; discharge plan was for oxacillin continuous to complete on 7/11/2022  Continue Oxacillin    Cardiac arrest x 2  S/t illicit IV injection on admit, but unclear etiology for repeat arrest in house 6/23 AM  Likely contributing to encephalopathy   ICU cardiac and hemodynamic monitoring  Replace electrolytes as needed  No pressor support needed.     Renal/  Electrolyte imbalance  Replace K+  Has PRN Electrolyte replacement protocol ordered    ID  MSSA bacteremia  On previous admission s/t IVDU  Continue scheudled Oxacillin scheuled until 7/11/22  Blood cultures on 6/19 neg and repeat 6/22 NGTD    Severe sepsis  Cont Oxacillin for know MSSA/SBE  Fevers improving, Tmax 100  Blood cultures from 6/19 neg and repeat from 6/22 NGTD  Sputum culture from 6/20 neg but having thick yellow/tan sputum with OET suctioning, repeat sputum from 6/24 NGTD  Cont Cefepime and flagyl day #3 of 7 for possible asp PNA      Hematology  Pulmonary embolism, septic  Noted on prior admit  Continue oxacillin as above    Oncology  Normocytic anemia  Transfused one unit PRBC 6/20  Monitor w/daily cbc  Conservative transfusion protocol        Preventive Measures and Monitoring:   Stress Ulcer: PPI  Nutrition: TF  Glucose control: stable  Bowel prophylaxis: PRN Dulcolax  DVT prophylaxis: SQ Hep/SCDs  Hx CAD on B-Blocker: no hx CAD  Head of Bed/Reposition: Elevate HOB and turn Q1-2 hours   Early Mobility: bed rest  SAT/SBT: plan comfort care today  RASS goal: -1  Vent Day: #8  OG Day: #8  Central Line Day: PICC already in place on admit  Left radial arterial line Day: #4  Portillo Day: #8  IVAB Day: Oxacillin per ID  Code Status: DNR     Counseling/Consultation:I have discussed the care of this patient in detail with the bedside nursing staff and Dr. San and Dr. Helms.      Advance Care Planning        Met with mother, daughter and aunt yesterday and discussed current critical state and lack of neuro improvement now 8 days post cardiac arrest and admit.  Family offered long term support with Trach/PEG/NHP but decline and request palliative vent withdrawal with comfort care today.  Patient already DNR.            Critical Care Time: 60 minutes  Critical secondary to Patient has a condition that poses threat to life and bodily function: Cardiac Arrest X 2 and acute resp [facility]  Patient has an abrupt change in neurologic status: Anoxic Encephalopathy  Patient is currently receiving parenteral controlled substances:  Fentanyl infusion     Critical care was time spent personally by me on the following activities: development of treatment plan with patient or surrogate and bedside caregivers, discussions with consultants, evaluation of patient's response to treatment, examination of patient, ordering and performing treatments and interventions, ordering and review of laboratory studies, ordering and review of radiographic studies, pulse oximetry, re-evaluation of patient's condition. This critical care time did not overlap with that of any other provider or involve time for any procedures.     Sunday Nogueira NP  Critical Care Medicine  Critical access hospital - Intensive Care ([facility])

## 2022-06-26 NOTE — ASSESSMENT & PLAN NOTE
Discussed prognosis and goals of care with family. Considering comfort care measures and palliative vent withdrawal tomorrow AM    Mother, grandmother, daughter and Aunts X 2 at bed side and discussed patient's critical state and obtunded neuro state with no neuro improvement on Day # 8 hospitalization [facility].  Again offered Trach/PEG/NHP but Family states patient had Living Will made in 2016 and declared to family as well she would not want long term supportive devices such as trach/peg/NHP.  All family present wish to proceed with palliative vent withdrawal and comfort care

## 2022-06-26 NOTE — ASSESSMENT & PLAN NOTE
Cont Oxacillin for know MSSA/SBE  Fevers improving, Tmax 100  Blood cultures from 6/19 neg and repeat from 6/22 NGTD  Sputum culture from 6/20 neg but having thick yellow/tan sputum with OET suctioning, repeat sputum from 6/24 NGTD  Cont Cefepime and flagyl day #3 of 7 for possible asp PNA

## 2022-06-26 NOTE — PLAN OF CARE
POC reviewed. Pt remains intubated and sedated but continues to have tachycardic tachypnic and hypertensive episodes. Gave prn versed twice this shift with relief. Pt remained on spontaneous on ventilator throughout shift. Will give day shift rn report and they will assume care.

## 2022-06-26 NOTE — ASSESSMENT & PLAN NOTE
Patient with Hypercapnic Respiratory failure which is Acute.  she is not on home oxygen. Supplemental oxygen was provided and noted- Vent Mode: Spont  Oxygen Concentration (%):  [30] 30  Resp Rate Total:  [15 br/min-46 br/min] 38 br/min  Vt Set:  [380 mL] 380 mL  PEEP/CPAP:  [5 cmH20] 5 cmH20  Pressure Support:  [10 cmH20] 10 cmH20  Mean Airway Pressure:  [7.7 cmH20-9.1 cmH20] 7.8 cmH20.   Signs/symptoms of respiratory failure include- on vent, AMS. Contributing diagnoses includes - Interstitial lung disease and Pneumonia Labs and images were reviewed. Patient Has recent ABG, which has been reviewed. Will treat underlying causes and adjust management of respiratory failure as follows- Vent support, nebs, IV Abx    6/20- continue vent support as she failed SAT/SBT this am  6/21- trying to extubate, failed SAT/SBT this am  6/22- see Cardiac arrest above  6/23- Day 5 on vent- had 2 PEAs this am, decreased responsiveness  6/24- Day 6 vent. Unable to wean sedation to determine neuro status

## 2022-06-26 NOTE — DISCHARGE SUMMARY
[facility] - [facility] Care ([facility])  [facility] Medicine  Discharge Summary      Patient Name: Tianna Leon  MRN: [id number]  Patient Class: IP- Inpatient  Admission Date: 6/19/2022  Hospital Length of Stay: 7 days  Discharge Date and Time:  06/26/2022 5:20 PM  Attending Physician: Columba Helms MD   Discharging Provider: Columba Helms MD  Primary Care Provider: Spenser Campa MD      HPI:   Ms. Leon is a 37 year old  female with long standing history of IV drug abuse, was recently discharged from this facility on 6/10/22 after a 35 day hospital stay. During previous admission she was found to have MSSA Tricuspid Endocarditis, treated on oxacillin (to be continued until 7/10/22). Also had right empyema, s/p VATS and chest tube. She was COVID positive during that admission. She was discharged to [facility] on 6/10. Today she was found unresponsive after she apparently injected an unknown substance into her left PICC line at the facility. She coded, received one round of epi prior to ROSC. Intubated prior to arrival. WBC 44,000, bands 19%. Lactic acid 4.0  Cr 1.8 CO2 14. CXR multifocal infiltrates. BP stable. On propofol. Received IV Zosyn.     Cardiac Arrest. Continued IV drug abuse. Tricuspid endocarditis. MSSA bacteremia. Right Empyema.        Hospital Course:   37 F WF, Hx of IVDA, MDD, bipolar disorder, anxiety, PTSD, suicidal ideation, COPD, ADHD, asthma, cervical cancer, GERD, Hep C, HLD who was d/uri from this hosp on 6/10 following 35 day admission during which she was treated for MSSA tricuspid endocarditis and right empyema, s/p VATS/decortication. She was also COVID + during admission. On 6/10, she was d/c to [facility] and continued on oxacillin (to continue through 7/10/22) via PICC line. Yesterday, she was found unresponsive in cardiac arrest at [facility]. She was resuscitated after 1 round of Epi and arrived intubated. Report given to ED was that patient injected an unknown substance into her  PICC line at [facility].   In ED, Lactate 4.0, ABG 7.215/47.8/75/19.3, WBC 44.18, Hgb 8.4, Cr 1.8, AST 53, , troponin 0.184, Tox positive for opiates and amphetamine. CXR w/multifocal infiltrates, CT head w/no acute finding. Given Vancomycin and Cefepime and placed on sedation for mechanical ventilation. Admitted to ICU for Cardiac Arrest. Continued IVDA. Tricuspid endocarditis. MSSA bacteremia. Right Empyema s/p VATS.    6/20- Remains intubated and sedated on mechanical vent. Failed brief SAT/SBT with tachycardia, tachypnea. WBC down to 16, H/H dropped to 6.6/21- got  a unit of blood, H/H improved to 7.4/23. Bun/Cr improved 22/1.4. Troponin coming down- likely demand ischemia from Cardiac Arrest as a result of IV drug.      6/21- Appreciate ICU team. Pt remains intubated, sedated on vent, was on Fentanyl/Diprivan this am, Again failed SAT/SBT this am, also TG nearly 426, hence sedation lowered and switched Diprivan to Precedex and will try SAT/SBT. WBC down to 11, H/H 7/22, HCO3 21, Bun down to 24. Await repeat SAT/SBT. Good Urine output. Abx were changed to Oxacillin again yesterday due to her hx of MSSA.    6/22- Remains intubated sedated on Vent- Precedex, Fenatnyl, Versed. Pt had a few periods of Bradycardia on Precedex gtt last night- eICU had to intervene and given Versed. Again this am, while pt having BM and agitated, Asystole alarming then pt went into NSR 60s- Precedex d/uri. Earlier CXR showed fluid load- given small dose IV Lasix. Getting TF @40 cc/hr. Tmax 101.8F last night. Good urine output. WBC increased to 15.8, H/H 8/25.     6/23- remains intubated, sedated on vent, on Versed/Fentanyl gtts. Sedation has been lowered. She 2 back to back cardiac arrests this morning around 0500 hr with PEA/Asystole with ROSC attained. Not requiring pressor support. Mother was at bed side and was updated about her critical condition and poor prognosis and all questions answered. Mother requested DNR status at this  time.  Na increased 152, Cl 117, WBC down to 13, H/H 7/24. Getting continuous Oxacillin gtt for her Endocarditis.      6/24: remains intubated, sedated on vent, on Versed/Fentanyl gtts. No pressors. Unable to assess neuro status as patient develops hypertension, tachypnea and restlessness with any turning or suctioning once weaned down on sedation. Tmax 101.3F    6/25: No improvement in neuro status from day prior. Episodes of restlessness, tachycardia, hypertension and tachypnea when sedation weaned. Intubated, off pressors. Tolerating tube feeds, +diarrhea    6/26: Mother, grandmother, daughter and Aunts X 2 at bed side and discussed patient's critical state and obtunded neuro state with no neuro improvement on Day # 8 hospitalization post arrest and on [facility] ventilation.  Again offered Trach/PEG/NHP but Family states patient had Living Will made in 2016 and declared to family as well she would not want long term supportive devices such as trach/peg/NHP.  All family present wish to proceed with palliative vent withdrawal and comfort care. Discharge to inpatient hospice       Goals of Care Treatment Preferences:  Code Status: DNR      Consults:   Consults (From admission, onward)        Status Ordering Provider     Inpatient consult to Social Work/Case Management  Once        Provider:  (Not yet assigned)    Completed GALDINO HA     IP consult to case management  Once        Provider:  (Not yet assigned)    Completed ASHELY ROCHE     Inpatient consult to Registered Dietitian/Nutritionist  Once        Provider:  (Not yet assigned)    Completed ASHELY ROCHE     Inpatient consult to Pulmonology  Once        Provider:  VALENTINA Juares-BC    Completed KARLA KEMP          * Anoxic encephalopathy  Discussed prognosis and goals of care with family. Considering comfort care measures and palliative vent withdrawal tomorrow AM    Mother, grandmother, daughter and Aunts X 2 at bed side and discussed patient's  critical state and obtunded neuro state with no neuro improvement on Day # 8 hospitalization post arrest and on [facility] ventilation.  Again offered Trach/PEG/NHP but Family states patient had Living Will made in 2016 and declared to family as well she would not want long term supportive devices such as trach/peg/NHP.  All family present wish to proceed with palliative vent withdrawal and comfort care    Acute respiratory failure with hypoxia and hypercapnia on Vent s/p Cardiac Arrest  Patient with Hypercapnic Respiratory failure which is Acute.  she is not on home oxygen. Supplemental oxygen was provided and noted- Vent Mode: Spont  Oxygen Concentration (%):  [30] 30  Resp Rate Total:  [15 br/min-46 br/min] 38 br/min  Vt Set:  [380 mL] 380 mL  PEEP/CPAP:  [5 cmH20] 5 cmH20  Pressure Support:  [10 cmH20] 10 cmH20  Mean Airway Pressure:  [7.7 cmH20-9.1 cmH20] 7.8 cmH20.   Signs/symptoms of respiratory failure include- on vent, AMS. Contributing diagnoses includes - Interstitial lung disease and Pneumonia Labs and images were reviewed. Patient Has recent ABG, which has been reviewed. Will treat underlying causes and adjust management of respiratory failure as follows- Vent support, nebs, IV Abx    6/20- continue vent support as she failed SAT/SBT this am  6/21- trying to extubate, failed SAT/SBT this am  6/22- see Cardiac arrest above  6/23- Day 5 on vent- had 2 PEAs this am, decreased responsiveness  6/24- Day 6 vent. Unable to wean sedation to determine neuro status    Cardiac arrest x 2  -IV drug use at LTAC  -BP stable at this time, not requiring pressors  -consult pulmonary for vent management    Doing better, on Vent, hemodynamically stable    Day 3 post cardiac arrest- on vent, trying to extubate    Day 4 on vent- Febrile, Bradycardic on Precedex- unstable- no weaning attempt today    Day 5 since Cardiac arrest, now had another 2 PEAs/Cardiac Arrests this am with ROSC but since then appears to have suffered  significant Anoxic Brain Injury. Has corneal and gag reflex but no eye contact or response      Final Active Diagnoses:    Diagnosis Date Noted POA    PRINCIPAL PROBLEM:  Anoxic encephalopathy [G93.1] 06/21/2022 Yes    Electrolyte imbalance [E87.8] 06/25/2022 No    On mechanically assisted ventilation [Z99.11] 06/20/2022 Not Applicable    Acute respiratory failure with hypoxia and hypercapnia on Vent s/p Cardiac Arrest [J96.01, J96.02] 06/20/2022 Yes    Endocarditis of tricuspid valve [I07.9] 06/19/2022 Yes    Cardiac arrest x 2 [I46.9] 06/19/2022 Yes    S/P VATS on 6/6/2022 secondary to Empyema [Z98.890] 06/07/2022 Not Applicable    Normocytic anemia [D64.9] 05/12/2022 Yes    Pulmonary embolism, septic [I26.90] 05/11/2022 Yes    MSSA bacteremia [R78.81, B95.61] 05/07/2022 Yes    IVDU (intravenous drug user) / Drug addiction [F19.90] 05/06/2022 Yes     Chronic    Severe sepsis [A41.9, R65.20] 05/06/2022 Yes    Borderline personality disorder [F60.3] 01/21/2019 Yes     Chronic    COPD (chronic obstructive pulmonary disease) [J44.9] 12/29/2017 Yes     Chronic      Problems Resolved During this Admission:    Diagnosis Date Noted Date Resolved POA    BEE (acute kidney injury) [N17.9] 06/03/2022 06/22/2022 Yes    Empyema of right pleural space [J86.9] 05/20/2022 06/21/2022 Yes    Hypertriglyceridemia [E78.1] 12/28/2016 06/24/2022 No       Discharged Condition: poor    Disposition: Hospice/Home    Follow Up:    Patient Instructions:      Activity as tolerated       Significant Diagnostic Studies: Labs: All labs within the past 24 hours have been reviewed    Pending Diagnostic Studies:     None         Medications:  None    Indwelling Lines/Drains at time of discharge:   Lines/Drains/Airways     Peripherally Inserted Central Catheter Line  Duration           PICC Double Lumen -- days          Drain  Duration                Urethral Catheter 06/19/22 2039 Latex 16 Fr. 6 days                Time spent on the  discharge of patient: 35 minutes      Columba Helms MD  Department of Hospital Medicine  [facility] - Intensive Care ([facility])

## 2022-06-26 NOTE — NURSING
Transitioned pt to comfort care per family request. Discussed with Asaf Nogueira NP. Extubated with Hilaria, RT at bedside. PRN meds given.

## 2022-06-26 NOTE — ASSESSMENT & PLAN NOTE
Suspect S/t illicit drug injection to PICC leading to brief cardiac arrest, unclear if anoxic injury present due to difficulty keeping patient calm with sedation weaning  CT head repeated 6/22 w/no acute findings  Managed to keep patient on min Fentanyl infusion (to avoid withdrawals) for 24 hours and no neuro improvement.  Has brainstem reflexes but will not awaken, follow commands, track with eyes or make any purposeful movements now Day #8 of hospitalization post cardiac arrest/admit  Family planning palliative vent withdrawal and comfort care today

## 2022-06-27 LAB
BACTERIA BLD CULT: NORMAL
BACTERIA BLD CULT: NORMAL
BACTERIA SPEC AEROBE CULT: NO GROWTH
GRAM STN SPEC: NORMAL

## 2022-06-27 NOTE — PROGRESS NOTES
Pt picked up by [facility], in route to [facility]. Pt mother Karyn updated and Yulia at [facility] updated on .

## 2022-06-27 NOTE — PROGRESS NOTES
Pt awaiting to be transferred to in patient Hospice of . No s/s of distress noted. PRN comfort meds given prn as needed.

## 2022-06-27 NOTE — PLAN OF CARE
[facility] - Intensive Care (Hospital)  Discharge Final Note    Primary Care Provider: Spenser Campa MD    Expected Discharge Date: 6/26/2022    Final Discharge Note (most recent)     Final Note - 06/27/22 0831        Final Note    Assessment Type Final Discharge Note     Anticipated Discharge Disposition Hospice/Medical Facility        Post-Acute Status    Discharge Delays None known at this time                 Important Message from Medicare

## 2022-07-04 LAB
FUNGUS SPEC CULT: NORMAL
FUNGUS SPEC CULT: NORMAL

## 2022-07-26 LAB
ACID FAST MOD KINY STN SPEC: NORMAL
ACID FAST MOD KINY STN SPEC: NORMAL
MYCOBACTERIUM SPEC QL CULT: NORMAL
MYCOBACTERIUM SPEC QL CULT: NORMAL

## 2022-09-23 NOTE — ASSESSMENT & PLAN NOTE
This patient does have evidence of infective focus  My overall impression is sepsis. Vital signs were reviewed and noted in progress note.  Antibiotics given-   Antibiotics (From admission, onward)            Start     Stop Route Frequency Ordered    05/09/22 1100  oxacillin 12 g in  mL CONTINUOUS INFUSION         -- IV Every 24 hours (non-standard times) 05/09/22 0932    05/07/22 2100  mupirocin 2 % ointment         05/12 2059 Nasl 2 times daily 05/07/22 1646        Cultures were taken-   Microbiology Results (last 7 days)     Procedure Component Value Units Date/Time    Culture, Respiratory with Gram Stain [[id number]] Collected: 05/16/22 0758    Order Status: Sent Specimen: Respiratory from Sputum Updated: 05/16/22 0759    Culture, Anaerobe [[id number]] Collected: 05/11/22 1440    Order Status: Completed Specimen: Abscess from Back Updated: 05/16/22 0733     Anaerobic Culture No anaerobes isolated    Blood culture [[id number]] Collected: 05/11/22 1213    Order Status: Completed Specimen: Blood Updated: 05/16/22 0612     Blood Culture, Routine No Growth to date      No Growth to date      No Growth to date      No Growth to date      No Growth to date    Blood culture [[id number]] Collected: 05/11/22 1213    Order Status: Completed Specimen: Blood Updated: 05/16/22 0612     Blood Culture, Routine No Growth to date      No Growth to date      No Growth to date      No Growth to date      No Growth to date    Aerobic culture [[id number]]  (Abnormal)  (Susceptibility) Collected: 05/11/22 1440    Order Status: Completed Specimen: Abscess from Back Updated: 05/14/22 1057     Aerobic Bacterial Culture STAPHYLOCOCCUS AUREUS  Many      Gram stain [[id number]] Collected: 05/11/22 1440    Order Status: Completed Specimen: Abscess from Back Updated: 05/12/22 0306     Gram Stain Result Few WBC's      Few Gram positive cocci    Gram stain [[id number]]     Order Status: Canceled Specimen: Joint Fluid from Back     Blood culture  [[id number]]  (Abnormal) Collected: 05/08/22 1516    Order Status: Completed Specimen: Blood from Peripheral, Right Hand Updated: 05/11/22 1009     Blood Culture, Routine Gram stain aer bottle: Gram positive cocci in clusters resembling Staph       Results called to and read back by: Chasity Raymundo RN  05/09/2022  11:31      STAPHYLOCOCCUS AUREUS  ID consult required at [facility].  For susceptibility see order #[id number]      Narrative:      Collection has been rescheduled by [email or web address] at 05/08/2022 13:22 Reason:   Pt in mri will be there after another hour [id number]  Collection has been rescheduled by [email or web address] at 05/08/2022 13:22 Reason:   Pt in mri will be there after another hour [id number]    Blood culture [[id number]]  (Abnormal) Collected: 05/08/22 1515    Order Status: Completed Specimen: Blood from Peripheral, Left Arm Updated: 05/11/22 1009     Blood Culture, Routine Gram stain aer bottle: Gram positive cocci in clusters resembling Staph      Results called to and read back by: Chasity Raymundo RN  05/09/2022  15:40      STAPHYLOCOCCUS AUREUS  ID consult required at [facility] and [facility] locations.  For susceptibility see order #[id number]      Narrative:      Collection has been rescheduled by [email or web address] at 05/08/2022 13:22 Reason:   Pt in mri will be there after another hour [id number]  Collection has been rescheduled by [email or web address] at 05/08/2022 13:22 Reason:   Pt in mri will be there after another hour [id number]        Latest lactate reviewed, they are-  No results for input(s): LACTATE in the last 72 hours.    Organ dysfunction indicated by Acute kidney injury  Source-  lung    Source control Achieved by-   Cont Broad spectrum IVAB     Bacteremia  Infectious disease consulted     17

## 2024-04-30 NOTE — ED NOTES
Pt. Stable for DC per Provider   [Follow-Up: _____] : a [unfilled] follow-up visit [Spouse] : spouse [FreeTextEntry1] : [facility]

## 2024-05-03 NOTE — SUBJECTIVE & OBJECTIVE
Interval History: see hospital course    Review of Systems   Constitutional:  Positive for activity change, appetite change and fatigue. Negative for chills, diaphoresis, fever and unexpected weight change.   HENT:  Negative for congestion, hearing loss, mouth sores, postnasal drip, rhinorrhea, sore throat and trouble swallowing.    Eyes:  Negative for discharge and visual disturbance.   Respiratory:  Negative for cough and chest tightness.    Cardiovascular:  Negative for chest pain, palpitations and leg swelling.   Gastrointestinal:  Negative for blood in stool, constipation, diarrhea, nausea and vomiting.   Endocrine: Negative for cold intolerance and heat intolerance.   Genitourinary:  Negative for difficulty urinating, dyspareunia, flank pain and hematuria.   Musculoskeletal:  Positive for back pain and myalgias. Negative for arthralgias.   Skin: Negative.    Neurological:  Positive for weakness. Negative for dizziness and light-headedness.   Hematological:  Negative for adenopathy. Does not bruise/bleed easily.   Psychiatric/Behavioral:  Negative for agitation, behavioral problems, confusion and sleep disturbance. The patient is nervous/anxious.    Objective:     Vital Signs (Most Recent):  Temp: 98 °F (36.7 °C) (05/22/22 1249)  Pulse: 80 (05/22/22 1327)  Resp: 18 (05/22/22 1327)  BP: (!) 145/79 (05/22/22 1249)  SpO2: 96 % (05/22/22 1327)   Vital Signs (24h Range):  Temp:  [98 °F (36.7 °C)-99.3 °F (37.4 °C)] 98 °F (36.7 °C)  Pulse:  [] 80  Resp:  [16-20] 18  SpO2:  [93 %-97 %] 96 %  BP: (118-155)/(68-85) 145/79     Weight: 97.1 kg (214 lb 1.1 oz)  Body mass index is 35.62 kg/m².    Intake/Output Summary (Last 24 hours) at 5/22/2022 1624  Last data filed at 5/22/2022 1400  Gross per 24 hour   Intake 603.51 ml   Output 7760 ml   Net -7156.49 ml        Physical Exam  Vitals and nursing note reviewed.   Constitutional:       General: She is not in acute distress.     Appearance: She is well-developed.    HENT:      Head: Normocephalic and atraumatic.      Right Ear: Hearing and external ear normal.      Left Ear: Hearing and external ear normal.      Nose: No rhinorrhea.      Right Sinus: No maxillary sinus tenderness or frontal sinus tenderness.      Left Sinus: No maxillary sinus tenderness or frontal sinus tenderness.      Mouth/Throat:      Mouth: No oral lesions.      Pharynx: Uvula midline.   Eyes:      General:         Right eye: No discharge.         Left eye: No discharge.      Conjunctiva/sclera: Conjunctivae normal.      Pupils: Pupils are equal, round, and reactive to light.   Neck:      Thyroid: No thyromegaly.      Vascular: No carotid bruit.      Trachea: No tracheal deviation.   Cardiovascular:      Rate and Rhythm: Normal rate and regular rhythm.      Pulses:           Dorsalis pedis pulses are 2+ on the right side and 2+ on the left side.      Heart sounds: Normal heart sounds, S1 normal and S2 normal. No murmur heard.  Pulmonary:      Effort: Pulmonary effort is normal. No respiratory distress.      Breath sounds: Examination of the right-lower field reveals decreased breath sounds. Examination of the left-lower field reveals decreased breath sounds. Decreased breath sounds present.      Comments: Right pigtail small bore chest tube in place with moderate serous drainage with pus to to drainage system   Chest tube site C/D/I  Chest:   Breasts:      Right: No supraclavicular adenopathy.      Left: No supraclavicular adenopathy.     Abdominal:      General: Bowel sounds are decreased. There is no distension.      Palpations: Abdomen is soft. There is no mass.      Tenderness: There is no abdominal tenderness.   Musculoskeletal:      Cervical back: Normal range of motion.      Thoracic back: Tenderness present. Decreased range of motion.      Lumbar back: Tenderness present. Decreased range of motion.   Lymphadenopathy:      Cervical: No cervical adenopathy.      Upper Body:      Right upper body:  No supraclavicular adenopathy.      Left upper body: No supraclavicular adenopathy.   Skin:     General: Skin is warm and dry.      Capillary Refill: Capillary refill takes less than 2 seconds.      Findings: No rash.   Neurological:      Mental Status: She is alert and oriented to person, place, and time.      Sensory: No sensory deficit.      Coordination: Coordination normal.      Gait: Gait normal.   Psychiatric:         Attention and Perception: She is inattentive.         Mood and Affect: Mood is anxious. Mood is not depressed.         Speech: Speech normal.         Behavior: Behavior normal.         Thought Content: Thought content normal.         Judgment: Judgment normal.       Significant Labs: All pertinent labs within the past 24 hours have been reviewed.  CBC:   Recent Labs   Lab 05/21/22  0500 05/22/22  0604   WBC 9.70 9.55   HGB 7.1* 7.7*   HCT 23.0* 23.9*   * 481*       CMP:   Recent Labs   Lab 05/21/22  0500 05/22/22  0604    140   K 4.6 4.6   CL 99 100   CO2 30* 31*   GLU 86 95   BUN 9 10   CREATININE 0.6 0.6   CALCIUM 7.7* 8.3*   PROT 5.3* 5.7*   ALBUMIN 1.3* 1.4*   BILITOT 0.3 0.3   ALKPHOS 111 119   AST 11 12   ALT 15 16   ANIONGAP 9 9   EGFRNONAA >60 >60         Significant Imaging: I have reviewed all pertinent imaging results/findings within the past 24 hours.   vulvectomy/yes

## 2024-05-09 NOTE — SUBJECTIVE & OBJECTIVE
Interval History: WBC in normal range. Afebrile, narcotic dose decreased.     Review of Systems   Constitutional:  Positive for activity change, appetite change and fatigue. Negative for chills, diaphoresis, fever and unexpected weight change.   HENT:  Negative for congestion, hearing loss, mouth sores, postnasal drip, rhinorrhea, sore throat and trouble swallowing.    Eyes:  Negative for discharge and visual disturbance.   Respiratory:  Negative for cough, chest tightness and shortness of breath.    Cardiovascular:  Negative for chest pain, palpitations and leg swelling.   Gastrointestinal:  Positive for abdominal distention and abdominal pain. Negative for blood in stool, constipation, diarrhea, nausea and vomiting.   Endocrine: Negative for cold intolerance and heat intolerance.   Genitourinary:  Negative for difficulty urinating, dyspareunia, flank pain and hematuria.   Musculoskeletal:  Positive for back pain and myalgias. Negative for arthralgias.   Skin: Negative.    Neurological:  Positive for weakness and headaches. Negative for dizziness and light-headedness.   Hematological:  Negative for adenopathy. Does not bruise/bleed easily.   Psychiatric/Behavioral:  Negative for agitation, behavioral problems, confusion and sleep disturbance. The patient is nervous/anxious.    Objective:     Vital Signs (Most Recent):  Temp: 98.7 °F (37.1 °C) (05/17/22 1219)  Pulse: 78 (05/17/22 1325)  Resp: 18 (05/17/22 1219)  BP: (!) 83/51 (05/17/22 1219)  SpO2: 97 % (05/17/22 1219)   Vital Signs (24h Range):  Temp:  [96.5 °F (35.8 °C)-98.7 °F (37.1 °C)] 98.7 °F (37.1 °C)  Pulse:  [65-92] 78  Resp:  [17-18] 18  SpO2:  [93 %-99 %] 97 %  BP: ()/(51-67) 83/51     Weight: 95.1 kg (209 lb 10.5 oz)  Body mass index is 34.89 kg/m².    Intake/Output Summary (Last 24 hours) at 5/17/2022 1507  Last data filed at 5/17/2022 1245  Gross per 24 hour   Intake 1025.59 ml   Output 1000 ml   Net 25.59 ml      Physical Exam  Vitals and  Thank you,     He does not need to repeat the test if negative. They will biopsy for this during EGD regardless. So that can serve as the double check.     Ultrasound on 6/6 is appropriate.    LACI Hayes     nursing note reviewed.   Constitutional:       General: She is not in acute distress.     Appearance: She is well-developed. She is ill-appearing.   HENT:      Head: Normocephalic and atraumatic.      Right Ear: Hearing and external ear normal.      Left Ear: Hearing and external ear normal.      Nose: No rhinorrhea.      Right Sinus: No maxillary sinus tenderness or frontal sinus tenderness.      Left Sinus: No maxillary sinus tenderness or frontal sinus tenderness.      Mouth/Throat:      Mouth: No oral lesions.      Pharynx: Uvula midline.   Eyes:      General:         Right eye: No discharge.         Left eye: No discharge.      Conjunctiva/sclera: Conjunctivae normal.      Pupils: Pupils are equal, round, and reactive to light.   Neck:      Thyroid: No thyromegaly.      Vascular: No carotid bruit.      Trachea: No tracheal deviation.   Cardiovascular:      Rate and Rhythm: Regular rhythm. Tachycardia present.      Pulses:           Dorsalis pedis pulses are 2+ on the right side and 2+ on the left side.      Heart sounds: Normal heart sounds, S1 normal and S2 normal. No murmur heard.  Pulmonary:      Effort: Pulmonary effort is normal. No respiratory distress.      Breath sounds: Examination of the right-lower field reveals decreased breath sounds. Examination of the left-lower field reveals decreased breath sounds. Decreased breath sounds present.   Chest:   Breasts:      Right: No supraclavicular adenopathy.      Left: No supraclavicular adenopathy.     Abdominal:      General: Bowel sounds are decreased. There is no distension.      Palpations: Abdomen is soft. There is no mass.      Tenderness: There is no abdominal tenderness.   Musculoskeletal:      Cervical back: Normal range of motion.      Thoracic back: Tenderness present. Decreased range of motion.      Lumbar back: Tenderness present. Decreased range of motion.   Lymphadenopathy:      Cervical: No cervical adenopathy.      Upper Body:      Right  upper body: No supraclavicular adenopathy.      Left upper body: No supraclavicular adenopathy.   Skin:     General: Skin is warm and dry.      Capillary Refill: Capillary refill takes less than 2 seconds.      Findings: No rash.   Neurological:      Mental Status: She is alert and oriented to person, place, and time.      Sensory: No sensory deficit.      Coordination: Coordination normal.      Gait: Gait normal.   Psychiatric:         Mood and Affect: Mood is not anxious or depressed.         Speech: Speech normal.         Behavior: Behavior normal.         Thought Content: Thought content normal.         Judgment: Judgment normal.       Significant Labs: All pertinent labs within the past 24 hours have been reviewed.  CBC:   Recent Labs   Lab 05/16/22  0431 05/17/22  0533   WBC 13.46* 9.50   HGB 8.4* 8.2*   HCT 26.7* 26.7*   * 510*     CMP: No results for input(s): NA, K, CL, CO2, GLU, BUN, CREATININE, CALCIUM, PROT, ALBUMIN, BILITOT, ALKPHOS, AST, ALT, ANIONGAP, EGFRNONAA in the last 48 hours.    Invalid input(s): ESTGF[place]    Significant Imaging: I have reviewed all pertinent imaging results/findings within the past 24 hours.

## 2025-06-12 NOTE — ASSESSMENT & PLAN NOTE
Chief Complaint   Patient presents with    Discuss Medications         \"Have you been to the ER, urgent care clinic since your last visit?  Hospitalized since your last visit?\"    NO    “Have you seen or consulted any other health care providers outside of [facility] since your last visit?”    NO            Click Here for Release of Records Request           6/12/2025     2:13 PM   PHQ-9    Little interest or pleasure in doing things 0   Feeling down, depressed, or hopeless 0   PHQ-2 Score 0   PHQ-9 Total Score 0           Financial Resource Strain: Low Risk  (4/24/2024)    Overall Financial Resource Strain (CARDIA)     Difficulty of Paying Living Expenses: Not hard at all      Food Insecurity: No Food Insecurity (6/12/2025)    Hunger Vital Sign     Worried About Running Out of Food in the Last Year: Never true     Ran Out of Food in the Last Year: Never true          Health Maintenance Due   Topic Date Due    Pneumococcal 50+ years Vaccine (1 of 1 - PCV) Never done    COVID-19 Vaccine (3 - 2024-25 season) 09/01/2024    Depression Screen  07/01/2025    '   Triglycerides 426 after 24 hours of propofol infusion  Propofol d/c 6/21  Re-check on 6/23   December 2024.    She prefers to manage her cholesterol through exercise and diet, even though medication was recommended.    She had a urinary tract infection on June 7, 2025, and is currently taking amoxicillin for it. She experienced burning, pain, and urgency. She hasn't noticed any new swelling in her legs.            REVIEW OF SYSTEMS:    Per HPI      CURRENT MEDICATIONS:    Current Outpatient Medications   Medication Sig    losartan (COZAAR) 25 MG tablet Take 1 tablet by mouth daily Patient needs to schedule appointment for refill.    NONFORMULARY     spironolactone (ALDACTONE) 25 MG tablet Take 1 tablet by mouth daily    LYSINE PO Take by mouth daily    Multiple Vitamin (MULTIVITAMIN PO) Take 1 tablet by mouth daily    Cholecalciferol 50 MCG (2000 UT) TABS Take by mouth daily    co-enzyme Q-10 30 MG capsule Take by mouth daily    cyanocobalamin 100 MCG tablet 0 Refills     No current facility-administered medications for this visit.         VITAL SIGNS:    Wt Readings from Last 3 Encounters:   06/12/25 56.3 kg (124 lb 3.2 oz)   09/25/24 56.7 kg (125 lb)   08/23/24 56.2 kg (124 lb)     Temp Readings from Last 3 Encounters:   06/12/25 97.2 °F (36.2 °C) (Temporal)   09/25/24 98.4 °F (36.9 °C) (Temporal)   07/01/24 98.1 °F (36.7 °C)     BP Readings from Last 3 Encounters:   06/12/25 98/68   09/25/24 123/76   07/01/24 97/61     Pulse Readings from Last 3 Encounters:   06/12/25 96   09/25/24 91   07/01/24 88               PHYSICAL EXAMINATION:     Physical Exam  Vitals and nursing note reviewed.   Constitutional:       General: She is not in acute distress.     Appearance: Normal appearance. She is not toxic-appearing.   HENT:      Head: Normocephalic.      Nose: Nose normal.   Eyes:      General: No scleral icterus.        Right eye: No discharge.         Left eye: No discharge.      Extraocular Movements: Extraocular movements intact.   Cardiovascular:      Rate and Rhythm: Normal rate and regular rhythm.

## (undated) DEVICE — SUT VICRYL CTD 2-0 GI 27 SH

## (undated) DEVICE — CONTAINER SPECIMEN OR STER 4OZ

## (undated) DEVICE — TAPE MEDIPORE 4IN X 2YDS

## (undated) DEVICE — CONNECTOR Y 3/8X3/8X1/2 STRL

## (undated) DEVICE — TUBING MEDI-VAC 20FT .25IN

## (undated) DEVICE — TIP SUCTION YANKAUER

## (undated) DEVICE — ELECTRODE BLADE INSULATED 1 IN

## (undated) DEVICE — APPLICATOR CHLORAPREP ORN 26ML

## (undated) DEVICE — SEE MEDLINE ITEM 157148

## (undated) DEVICE — TOWEL OR DISP STRL BLUE 4/PK

## (undated) DEVICE — TROCAR ENDOPATH XCEL 5X100MM

## (undated) DEVICE — SUT PERMA HAND SILK BLK 0-0

## (undated) DEVICE — MANIFOLD 4 PORT

## (undated) DEVICE — CONNECTOR TUBING STR 5 IN 1

## (undated) DEVICE — SOL IRR NACL .9% 3000ML

## (undated) DEVICE — NDL SAFETY 22G X 1.5 ECLIPSE

## (undated) DEVICE — TROCAR ENDOPATH XCEL 11MM 10CM

## (undated) DEVICE — DRAIN CHEST DRY SUCTION

## (undated) DEVICE — ELECTRODE REM PLYHSV RETURN 9

## (undated) DEVICE — CATH 36FR THORACIC RT ANGL

## (undated) DEVICE — GLOVE SURG BIOGEL LATEX SZ 7.5

## (undated) DEVICE — DRAPE INCISE IOBAN 2 23X17IN

## (undated) DEVICE — DRESSING MEPORE ISLAND 31/2X4

## (undated) DEVICE — SUT VICRYL 3-0 27 RB-1

## (undated) DEVICE — SET PNEUMOCLEAR HEAT HUM SE HF

## (undated) DEVICE — SUT MONOCRYL 4-0 PS-1 UND

## (undated) DEVICE — GAUZE SPONGE 4X4 12PLY

## (undated) DEVICE — CANNULA ENDOPATH XCEL 5X100MM

## (undated) DEVICE — TAPE SILK 3IN

## (undated) DEVICE — SYR 30CC LUER LOCK

## (undated) DEVICE — SUT 2/0 36IN ETHIBOND EXCE

## (undated) DEVICE — KIT ANTIFOG W/SPONG & FLUID

## (undated) DEVICE — IRRIGATOR ENDOSCOPY DISP.

## (undated) DEVICE — SEE MEDLINE ITEM 157117

## (undated) DEVICE — TUBE LUKI ASP COLL 6 1/4

## (undated) DEVICE — CATH THORACIC 36FR ST

## (undated) DEVICE — SEE MEDLINE ITEM 157027

## (undated) DEVICE — TAPE ADH MEDIPORE 4 X 10YDS

## (undated) DEVICE — TROCAR THORACICI 10-12MM

## (undated) DEVICE — SEE MEDLINE ITEM 157131

## (undated) DEVICE — SUT VICRYL+ 27 UR-6 VIOL

## (undated) DEVICE — GRASPER TIP RENEW LAP FENEST

## (undated) DEVICE — SYR ONLY LUER LOCK 20CC

## (undated) DEVICE — SOL NS 1000CC